# Patient Record
Sex: FEMALE | Race: ASIAN | NOT HISPANIC OR LATINO | URBAN - METROPOLITAN AREA
[De-identification: names, ages, dates, MRNs, and addresses within clinical notes are randomized per-mention and may not be internally consistent; named-entity substitution may affect disease eponyms.]

---

## 2018-05-01 ENCOUNTER — OUTPATIENT (OUTPATIENT)
Dept: OUTPATIENT SERVICES | Facility: HOSPITAL | Age: 58
LOS: 1 days | End: 2018-05-01
Payer: MEDICAID

## 2018-05-01 PROCEDURE — G9001: CPT

## 2018-05-18 ENCOUNTER — INPATIENT (INPATIENT)
Facility: HOSPITAL | Age: 58
LOS: 3 days | Discharge: HOME | End: 2018-05-22
Attending: HOSPITALIST | Admitting: HOSPITALIST
Payer: MEDICAID

## 2018-05-18 VITALS
TEMPERATURE: 98 F | SYSTOLIC BLOOD PRESSURE: 136 MMHG | DIASTOLIC BLOOD PRESSURE: 60 MMHG | OXYGEN SATURATION: 100 % | HEART RATE: 90 BPM | RESPIRATION RATE: 18 BRPM

## 2018-05-18 NOTE — ED ADULT TRIAGE NOTE - CHIEF COMPLAINT QUOTE
Pt c/o being sent by PCP r/t possible UTI from visit one month ago, now presenting with general body aches, "too much pain everywhere". C/o mild SOB, respirations noted to be even and unlabored on assessment PMH DM on metformin.

## 2018-05-19 DIAGNOSIS — Z29.9 ENCOUNTER FOR PROPHYLACTIC MEASURES, UNSPECIFIED: ICD-10-CM

## 2018-05-19 DIAGNOSIS — N39.0 URINARY TRACT INFECTION, SITE NOT SPECIFIED: ICD-10-CM

## 2018-05-19 DIAGNOSIS — E11.9 TYPE 2 DIABETES MELLITUS WITHOUT COMPLICATIONS: ICD-10-CM

## 2018-05-19 DIAGNOSIS — I10 ESSENTIAL (PRIMARY) HYPERTENSION: ICD-10-CM

## 2018-05-19 DIAGNOSIS — J18.9 PNEUMONIA, UNSPECIFIED ORGANISM: ICD-10-CM

## 2018-05-19 DIAGNOSIS — R07.9 CHEST PAIN, UNSPECIFIED: ICD-10-CM

## 2018-05-19 LAB
ALBUMIN SERPL ELPH-MCNC: 4 G/DL — SIGNIFICANT CHANGE UP (ref 3.3–5)
ALP SERPL-CCNC: 84 U/L — SIGNIFICANT CHANGE UP (ref 40–120)
ALT FLD-CCNC: 15 U/L — SIGNIFICANT CHANGE UP (ref 4–33)
APPEARANCE UR: CLEAR — SIGNIFICANT CHANGE UP
AST SERPL-CCNC: 17 U/L — SIGNIFICANT CHANGE UP (ref 4–32)
B PERT DNA SPEC QL NAA+PROBE: SIGNIFICANT CHANGE UP
BACTERIA # UR AUTO: HIGH
BASE EXCESS BLDV CALC-SCNC: 3.3 MMOL/L — SIGNIFICANT CHANGE UP
BASOPHILS # BLD AUTO: 0.03 K/UL — SIGNIFICANT CHANGE UP (ref 0–0.2)
BASOPHILS NFR BLD AUTO: 0.2 % — SIGNIFICANT CHANGE UP (ref 0–2)
BILIRUB SERPL-MCNC: < 0.2 MG/DL — LOW (ref 0.2–1.2)
BILIRUB UR-MCNC: SIGNIFICANT CHANGE UP
BLOOD GAS VENOUS - CREATININE: 0.68 MG/DL — SIGNIFICANT CHANGE UP (ref 0.5–1.3)
BLOOD UR QL VISUAL: NEGATIVE — SIGNIFICANT CHANGE UP
BUN SERPL-MCNC: 10 MG/DL — SIGNIFICANT CHANGE UP (ref 7–23)
C PNEUM DNA SPEC QL NAA+PROBE: NOT DETECTED — SIGNIFICANT CHANGE UP
CALCIUM SERPL-MCNC: 9.5 MG/DL — SIGNIFICANT CHANGE UP (ref 8.4–10.5)
CHLORIDE BLDV-SCNC: 105 MMOL/L — SIGNIFICANT CHANGE UP (ref 96–108)
CHLORIDE SERPL-SCNC: 96 MMOL/L — LOW (ref 98–107)
CHOLEST SERPL-MCNC: 156 MG/DL — SIGNIFICANT CHANGE UP (ref 120–199)
CK MB BLD-MCNC: 1.05 NG/ML — SIGNIFICANT CHANGE UP (ref 1–4.7)
CK MB BLD-MCNC: 1.17 NG/ML — SIGNIFICANT CHANGE UP (ref 1–4.7)
CK MB BLD-MCNC: SIGNIFICANT CHANGE UP (ref 0–2.5)
CK MB BLD-MCNC: SIGNIFICANT CHANGE UP (ref 0–2.5)
CK SERPL-CCNC: 58 U/L — SIGNIFICANT CHANGE UP (ref 25–170)
CK SERPL-CCNC: 68 U/L — SIGNIFICANT CHANGE UP (ref 25–170)
CO2 SERPL-SCNC: 26 MMOL/L — SIGNIFICANT CHANGE UP (ref 22–31)
COLOR SPEC: HIGH
CREAT SERPL-MCNC: 0.8 MG/DL — SIGNIFICANT CHANGE UP (ref 0.5–1.3)
EOSINOPHIL # BLD AUTO: 0.21 K/UL — SIGNIFICANT CHANGE UP (ref 0–0.5)
EOSINOPHIL NFR BLD AUTO: 1.5 % — SIGNIFICANT CHANGE UP (ref 0–6)
FLUAV H1 2009 PAND RNA SPEC QL NAA+PROBE: NOT DETECTED — SIGNIFICANT CHANGE UP
FLUAV H1 RNA SPEC QL NAA+PROBE: NOT DETECTED — SIGNIFICANT CHANGE UP
FLUAV H3 RNA SPEC QL NAA+PROBE: NOT DETECTED — SIGNIFICANT CHANGE UP
FLUAV SUBTYP SPEC NAA+PROBE: SIGNIFICANT CHANGE UP
FLUBV RNA SPEC QL NAA+PROBE: NOT DETECTED — SIGNIFICANT CHANGE UP
GAS PNL BLDV: 135 MMOL/L — LOW (ref 136–146)
GLUCOSE BLDV-MCNC: 166 — HIGH (ref 70–99)
GLUCOSE SERPL-MCNC: 165 MG/DL — HIGH (ref 70–99)
GLUCOSE UR-MCNC: NEGATIVE — SIGNIFICANT CHANGE UP
HADV DNA SPEC QL NAA+PROBE: NOT DETECTED — SIGNIFICANT CHANGE UP
HBA1C BLD-MCNC: 7.2 % — HIGH (ref 4–5.6)
HCO3 BLDV-SCNC: 26 MMOL/L — SIGNIFICANT CHANGE UP (ref 20–27)
HCOV 229E RNA SPEC QL NAA+PROBE: NOT DETECTED — SIGNIFICANT CHANGE UP
HCOV HKU1 RNA SPEC QL NAA+PROBE: NOT DETECTED — SIGNIFICANT CHANGE UP
HCOV NL63 RNA SPEC QL NAA+PROBE: NOT DETECTED — SIGNIFICANT CHANGE UP
HCOV OC43 RNA SPEC QL NAA+PROBE: NOT DETECTED — SIGNIFICANT CHANGE UP
HCT VFR BLD CALC: 30.4 % — LOW (ref 34.5–45)
HCT VFR BLDV CALC: 31.2 % — LOW (ref 34.5–45)
HDLC SERPL-MCNC: 41 MG/DL — LOW (ref 45–65)
HGB BLD-MCNC: 9.5 G/DL — LOW (ref 11.5–15.5)
HGB BLDV-MCNC: 10.1 G/DL — LOW (ref 11.5–15.5)
HMPV RNA SPEC QL NAA+PROBE: NOT DETECTED — SIGNIFICANT CHANGE UP
HPIV1 RNA SPEC QL NAA+PROBE: NOT DETECTED — SIGNIFICANT CHANGE UP
HPIV2 RNA SPEC QL NAA+PROBE: NOT DETECTED — SIGNIFICANT CHANGE UP
HPIV3 RNA SPEC QL NAA+PROBE: NOT DETECTED — SIGNIFICANT CHANGE UP
HPIV4 RNA SPEC QL NAA+PROBE: NOT DETECTED — SIGNIFICANT CHANGE UP
IMM GRANULOCYTES # BLD AUTO: 0.05 # — SIGNIFICANT CHANGE UP
IMM GRANULOCYTES NFR BLD AUTO: 0.4 % — SIGNIFICANT CHANGE UP (ref 0–1.5)
KETONES UR-MCNC: NEGATIVE — SIGNIFICANT CHANGE UP
LACTATE BLDV-MCNC: 1.2 MMOL/L — SIGNIFICANT CHANGE UP (ref 0.5–2)
LEUKOCYTE ESTERASE UR-ACNC: HIGH
LIPID PNL WITH DIRECT LDL SERPL: 107 MG/DL — SIGNIFICANT CHANGE UP
LYMPHOCYTES # BLD AUTO: 32 % — SIGNIFICANT CHANGE UP (ref 13–44)
LYMPHOCYTES # BLD AUTO: 4.48 K/UL — HIGH (ref 1–3.3)
M PNEUMO DNA SPEC QL NAA+PROBE: NOT DETECTED — SIGNIFICANT CHANGE UP
MAGNESIUM SERPL-MCNC: 1.9 MG/DL — SIGNIFICANT CHANGE UP (ref 1.6–2.6)
MCHC RBC-ENTMCNC: 24.3 PG — LOW (ref 27–34)
MCHC RBC-ENTMCNC: 31.3 % — LOW (ref 32–36)
MCV RBC AUTO: 77.7 FL — LOW (ref 80–100)
MONOCYTES # BLD AUTO: 0.97 K/UL — HIGH (ref 0–0.9)
MONOCYTES NFR BLD AUTO: 6.9 % — SIGNIFICANT CHANGE UP (ref 2–14)
MUCOUS THREADS # UR AUTO: SIGNIFICANT CHANGE UP
NEUTROPHILS # BLD AUTO: 8.25 K/UL — HIGH (ref 1.8–7.4)
NEUTROPHILS NFR BLD AUTO: 59 % — SIGNIFICANT CHANGE UP (ref 43–77)
NITRITE UR-MCNC: POSITIVE — HIGH
NRBC # FLD: 0 — SIGNIFICANT CHANGE UP
PCO2 BLDV: 49 MMHG — SIGNIFICANT CHANGE UP (ref 41–51)
PH BLDV: 7.38 PH — SIGNIFICANT CHANGE UP (ref 7.32–7.43)
PH UR: 6 — SIGNIFICANT CHANGE UP (ref 4.6–8)
PLATELET # BLD AUTO: 296 K/UL — SIGNIFICANT CHANGE UP (ref 150–400)
PMV BLD: 10.1 FL — SIGNIFICANT CHANGE UP (ref 7–13)
PO2 BLDV: 31 MMHG — LOW (ref 35–40)
POTASSIUM BLDV-SCNC: 3.8 MMOL/L — SIGNIFICANT CHANGE UP (ref 3.4–4.5)
POTASSIUM SERPL-MCNC: 3.9 MMOL/L — SIGNIFICANT CHANGE UP (ref 3.5–5.3)
POTASSIUM SERPL-SCNC: 3.9 MMOL/L — SIGNIFICANT CHANGE UP (ref 3.5–5.3)
PROT SERPL-MCNC: 8.6 G/DL — HIGH (ref 6–8.3)
PROT UR-MCNC: 20 MG/DL — SIGNIFICANT CHANGE UP
RBC # BLD: 3.91 M/UL — SIGNIFICANT CHANGE UP (ref 3.8–5.2)
RBC # FLD: 12.6 % — SIGNIFICANT CHANGE UP (ref 10.3–14.5)
RBC CASTS # UR COMP ASSIST: SIGNIFICANT CHANGE UP (ref 0–?)
RSV RNA SPEC QL NAA+PROBE: NOT DETECTED — SIGNIFICANT CHANGE UP
RV+EV RNA SPEC QL NAA+PROBE: NOT DETECTED — SIGNIFICANT CHANGE UP
SAO2 % BLDV: 51.6 % — LOW (ref 60–85)
SODIUM SERPL-SCNC: 136 MMOL/L — SIGNIFICANT CHANGE UP (ref 135–145)
SP GR SPEC: 1.01 — SIGNIFICANT CHANGE UP (ref 1–1.04)
SQUAMOUS # UR AUTO: SIGNIFICANT CHANGE UP
TRIGL SERPL-MCNC: 98 MG/DL — SIGNIFICANT CHANGE UP (ref 10–149)
TROPONIN T SERPL-MCNC: < 0.06 NG/ML — SIGNIFICANT CHANGE UP (ref 0–0.06)
TROPONIN T SERPL-MCNC: < 0.06 NG/ML — SIGNIFICANT CHANGE UP (ref 0–0.06)
TSH SERPL-MCNC: 1.24 UIU/ML — SIGNIFICANT CHANGE UP (ref 0.27–4.2)
UROBILINOGEN FLD QL: 4 MG/DL — HIGH
WBC # BLD: 13.99 K/UL — HIGH (ref 3.8–10.5)
WBC # FLD AUTO: 13.99 K/UL — HIGH (ref 3.8–10.5)
WBC UR QL: >50 — HIGH (ref 0–?)

## 2018-05-19 PROCEDURE — 71046 X-RAY EXAM CHEST 2 VIEWS: CPT | Mod: 26

## 2018-05-19 PROCEDURE — 99223 1ST HOSP IP/OBS HIGH 75: CPT

## 2018-05-19 PROCEDURE — 71250 CT THORAX DX C-: CPT | Mod: 26

## 2018-05-19 RX ORDER — DEXTROSE 50 % IN WATER 50 %
25 SYRINGE (ML) INTRAVENOUS ONCE
Qty: 0 | Refills: 0 | Status: DISCONTINUED | OUTPATIENT
Start: 2018-05-19 | End: 2018-05-22

## 2018-05-19 RX ORDER — AZITHROMYCIN 500 MG/1
500 TABLET, FILM COATED ORAL ONCE
Qty: 0 | Refills: 0 | Status: COMPLETED | OUTPATIENT
Start: 2018-05-19 | End: 2018-05-19

## 2018-05-19 RX ORDER — DEXTROSE 50 % IN WATER 50 %
12.5 SYRINGE (ML) INTRAVENOUS ONCE
Qty: 0 | Refills: 0 | Status: DISCONTINUED | OUTPATIENT
Start: 2018-05-19 | End: 2018-05-22

## 2018-05-19 RX ORDER — ERGOCALCIFEROL 1.25 MG/1
50000 CAPSULE ORAL
Qty: 0 | Refills: 0 | Status: DISCONTINUED | OUTPATIENT
Start: 2018-05-27 | End: 2018-05-19

## 2018-05-19 RX ORDER — INSULIN LISPRO 100/ML
VIAL (ML) SUBCUTANEOUS
Qty: 0 | Refills: 0 | Status: DISCONTINUED | OUTPATIENT
Start: 2018-05-19 | End: 2018-05-22

## 2018-05-19 RX ORDER — PANTOPRAZOLE SODIUM 20 MG/1
1 TABLET, DELAYED RELEASE ORAL
Qty: 0 | Refills: 0 | COMMUNITY

## 2018-05-19 RX ORDER — PANTOPRAZOLE SODIUM 20 MG/1
40 TABLET, DELAYED RELEASE ORAL
Qty: 0 | Refills: 0 | Status: DISCONTINUED | OUTPATIENT
Start: 2018-05-19 | End: 2018-05-22

## 2018-05-19 RX ORDER — ASPIRIN/CALCIUM CARB/MAGNESIUM 324 MG
162 TABLET ORAL ONCE
Qty: 0 | Refills: 0 | Status: DISCONTINUED | OUTPATIENT
Start: 2018-05-19 | End: 2018-05-21

## 2018-05-19 RX ORDER — ACETAMINOPHEN 500 MG
650 TABLET ORAL EVERY 6 HOURS
Qty: 0 | Refills: 0 | Status: DISCONTINUED | OUTPATIENT
Start: 2018-05-19 | End: 2018-05-22

## 2018-05-19 RX ORDER — ASPIRIN/CALCIUM CARB/MAGNESIUM 324 MG
81 TABLET ORAL DAILY
Qty: 0 | Refills: 0 | Status: DISCONTINUED | OUTPATIENT
Start: 2018-05-20 | End: 2018-05-22

## 2018-05-19 RX ORDER — GLUCAGON INJECTION, SOLUTION 0.5 MG/.1ML
1 INJECTION, SOLUTION SUBCUTANEOUS ONCE
Qty: 0 | Refills: 0 | Status: DISCONTINUED | OUTPATIENT
Start: 2018-05-19 | End: 2018-05-22

## 2018-05-19 RX ORDER — CEFTRIAXONE 500 MG/1
1 INJECTION, POWDER, FOR SOLUTION INTRAMUSCULAR; INTRAVENOUS ONCE
Qty: 0 | Refills: 0 | Status: COMPLETED | OUTPATIENT
Start: 2018-05-19 | End: 2018-05-19

## 2018-05-19 RX ORDER — AZITHROMYCIN 500 MG/1
500 TABLET, FILM COATED ORAL EVERY 24 HOURS
Qty: 0 | Refills: 0 | Status: DISCONTINUED | OUTPATIENT
Start: 2018-05-20 | End: 2018-05-21

## 2018-05-19 RX ORDER — INSULIN LISPRO 100/ML
VIAL (ML) SUBCUTANEOUS AT BEDTIME
Qty: 0 | Refills: 0 | Status: DISCONTINUED | OUTPATIENT
Start: 2018-05-19 | End: 2018-05-22

## 2018-05-19 RX ORDER — DEXTROSE 50 % IN WATER 50 %
15 SYRINGE (ML) INTRAVENOUS ONCE
Qty: 0 | Refills: 0 | Status: DISCONTINUED | OUTPATIENT
Start: 2018-05-19 | End: 2018-05-22

## 2018-05-19 RX ORDER — CEFTRIAXONE 500 MG/1
1 INJECTION, POWDER, FOR SOLUTION INTRAMUSCULAR; INTRAVENOUS EVERY 24 HOURS
Qty: 0 | Refills: 0 | Status: DISCONTINUED | OUTPATIENT
Start: 2018-05-20 | End: 2018-05-22

## 2018-05-19 RX ORDER — ERGOCALCIFEROL 1.25 MG/1
50000 CAPSULE ORAL
Qty: 0 | Refills: 0 | Status: DISCONTINUED | OUTPATIENT
Start: 2018-05-20 | End: 2018-05-22

## 2018-05-19 RX ORDER — ENOXAPARIN SODIUM 100 MG/ML
40 INJECTION SUBCUTANEOUS EVERY 24 HOURS
Qty: 0 | Refills: 0 | Status: DISCONTINUED | OUTPATIENT
Start: 2018-05-19 | End: 2018-05-22

## 2018-05-19 RX ORDER — SODIUM CHLORIDE 9 MG/ML
1000 INJECTION, SOLUTION INTRAVENOUS
Qty: 0 | Refills: 0 | Status: DISCONTINUED | OUTPATIENT
Start: 2018-05-19 | End: 2018-05-22

## 2018-05-19 RX ORDER — NITROGLYCERIN 6.5 MG
0.3 CAPSULE, EXTENDED RELEASE ORAL ONCE
Qty: 0 | Refills: 0 | Status: DISCONTINUED | OUTPATIENT
Start: 2018-05-19 | End: 2018-05-22

## 2018-05-19 RX ORDER — KETOROLAC TROMETHAMINE 30 MG/ML
15 SYRINGE (ML) INJECTION ONCE
Qty: 0 | Refills: 0 | Status: DISCONTINUED | OUTPATIENT
Start: 2018-05-19 | End: 2018-05-19

## 2018-05-19 RX ORDER — ATORVASTATIN CALCIUM 80 MG/1
20 TABLET, FILM COATED ORAL AT BEDTIME
Qty: 0 | Refills: 0 | Status: DISCONTINUED | OUTPATIENT
Start: 2018-05-19 | End: 2018-05-22

## 2018-05-19 RX ORDER — ASPIRIN/CALCIUM CARB/MAGNESIUM 324 MG
162 TABLET ORAL
Qty: 0 | Refills: 0 | Status: DISCONTINUED | OUTPATIENT
Start: 2018-05-19 | End: 2018-05-19

## 2018-05-19 RX ORDER — ATENOLOL 25 MG/1
50 TABLET ORAL DAILY
Qty: 0 | Refills: 0 | Status: DISCONTINUED | OUTPATIENT
Start: 2018-05-19 | End: 2018-05-22

## 2018-05-19 RX ORDER — SODIUM CHLORIDE 9 MG/ML
1000 INJECTION, SOLUTION INTRAVENOUS ONCE
Qty: 0 | Refills: 0 | Status: COMPLETED | OUTPATIENT
Start: 2018-05-19 | End: 2018-05-19

## 2018-05-19 RX ADMIN — Medication 15 MILLIGRAM(S): at 03:10

## 2018-05-19 RX ADMIN — Medication 650 MILLIGRAM(S): at 15:05

## 2018-05-19 RX ADMIN — Medication 650 MILLIGRAM(S): at 16:05

## 2018-05-19 RX ADMIN — Medication 1: at 13:28

## 2018-05-19 RX ADMIN — Medication 15 MILLIGRAM(S): at 02:55

## 2018-05-19 RX ADMIN — ENOXAPARIN SODIUM 40 MILLIGRAM(S): 100 INJECTION SUBCUTANEOUS at 12:12

## 2018-05-19 RX ADMIN — AZITHROMYCIN 250 MILLIGRAM(S): 500 TABLET, FILM COATED ORAL at 04:00

## 2018-05-19 RX ADMIN — Medication 650 MILLIGRAM(S): at 23:28

## 2018-05-19 RX ADMIN — Medication 1 TABLET(S): at 12:12

## 2018-05-19 RX ADMIN — ATORVASTATIN CALCIUM 20 MILLIGRAM(S): 80 TABLET, FILM COATED ORAL at 21:45

## 2018-05-19 RX ADMIN — SODIUM CHLORIDE 1000 MILLILITER(S): 9 INJECTION, SOLUTION INTRAVENOUS at 02:55

## 2018-05-19 RX ADMIN — CEFTRIAXONE 100 GRAM(S): 500 INJECTION, POWDER, FOR SOLUTION INTRAMUSCULAR; INTRAVENOUS at 03:14

## 2018-05-19 RX ADMIN — Medication 650 MILLIGRAM(S): at 22:59

## 2018-05-19 RX ADMIN — Medication 1 TABLET(S): at 12:11

## 2018-05-19 NOTE — ED PROVIDER NOTE - OBJECTIVE STATEMENT
59 yo female with PMH of UTI diagnosed one month ago by her PMD, on macrobid and pyridium, presents to the ED with fever x several days Tmax 103, defervesces with tylenol and motrin at home but then becomes febrile again, c/o whole body pain but especially suprapubic pain, generalized headache, mild SOB. No resp distress, no neck stiffness or photophobia. Patient appears uncomfortable. At her neuro baseline per son at bedside. Patient is [place] speaking, electing for son to translate at bedside. 59 yo female with PMH of DM and UTI diagnosed one month ago by her PMD, on macrobid and pyridium, presents to the ED with fever x several days Tmax 103, defervesces with tylenol and motrin at home but then becomes febrile again, c/o whole body pain but especially suprapubic pain, generalized headache, mild SOB. No resp distress, no neck stiffness or photophobia. Patient appears uncomfortable. At her neuro baseline per son at bedside. Patient is Vietnamese speaking, electing for son to translate at bedside.

## 2018-05-19 NOTE — CONSULT NOTE ADULT - SUBJECTIVE AND OBJECTIVE BOX
CHIEF COMPLAINT: chest pain x 2 months, whole  body ache ,HA, b/l leg pain    HISTORY OF PRESENT ILLNESS: 58 years old [place]ian female with HTN, DM  who  was  diagnosed with UTI one month ago by her PMD, and placed on microbid and pyridium, presents to the ED with fever x several days Tmax 103, defervesce with tylenol and Motrin at home but then becomes febrile again, c/o whole body pain but especially suprapubic pain, generalized headache, mild shortness of breath and epigastric  pain associate with whole back pain and sore throat. No resp distress, no neck stiffness or photophobia. She found to have UTI and also c/o 10/10 epigastric pain  which improved from nitro s/l x2 and EKG with no ischemic  changes. Currently patient appear comfortable but still c/o of epigastric pain which exacerbates on palpitation ,also c/o left lower abdomen pain.    Allergies:No Known Allergies  	    MEDICATIONS:  aspirin  chewable 162 milliGRAM(s) Oral once  nitroglycerin     SubLingual 0.3 milliGRAM(s) SubLingual once          PAST MEDICAL & SURGICAL HISTORY:  Diabetes  HTN  No significant past surgical history      FAMILY HISTORY:  No pertinent family history in first degree relatives      SOCIAL HISTORY:    [ ] live with:son  [ x] Non-smoker,    [x ] no alcohol use    REVIEW OF SYSTEMS:  General: + fatigue/malaise, no weight loss/gain.  Skin: no rashes.  Ophthalmologic: no blurred vision, no loss of vision. 	  ENT: + sore throat, rhinorrhea, sinus congestion.  Cardiovascular :+ chest pain/epigastric pain ,no palpitation, no dizziness ,no diaphoresis, no edema  Respiratory: + SOB, + cough ,no  wheeze.  Gastrointestinal:  no N/V/D, no melena/hematemesis/hematochezia.  Genitourinary: no dysuria/hesitancy or hematuria.  Musculoskeletal: generalized body ache  Neurological: + HA ,no changes in vision or hearing, no lightheadedness/dizziness, no syncope/near syncope	  Psychiatric: no unusual stress/anxiety.       PHYSICAL EXAM:  T(C): 36.6 (05-18-18 @ 23:55), Max: 36.6 (05-18-18 @ 23:55)  HR: 90 (05-18-18 @ 23:55) (90 - 90)  BP: 136/60 (05-18-18 @ 23:55) (136/60 - 136/60)  RR: 18 (05-18-18 @ 23:55) (18 - 18)  SpO2: 100% (05-18-18 @ 23:55) (100% - 100%)        Appearance: Normal	  HEENT:   Normal oral mucosa, PERRL, EOMI	  Lymphatic: No lymphadenopathy  Cardiovascular: Normal S1 S2, No JVD, No murmurs, No edema  Respiratory: Lungs clear to auscultation	  Psychiatry: A & O x 3, Mood & affect appropriate  Gastrointestinal:  Soft, Non-tender, + BS	  Skin: No rashes, No ecchymoses, No cyanosis	  Neurologic: Non-focal  Extremities: Normal range of motion, No clubbing, cyanosis or edema  Vascular: Peripheral pulses palpable 2+ bilaterally        LABS:	 	    CBC Full  -  ( 19 May 2018 02:38 )  WBC Count : 13.99 K/uL  Hemoglobin : 9.5 g/dL  Hematocrit : 30.4 %  Platelet Count - Automated : 296 K/uL  Mean Cell Volume : 77.7 fL  Mean Cell Hemoglobin : 24.3 pg  Mean Cell Hemoglobin Concentration : 31.3 %  Auto Neutrophil # : 8.25 K/uL  Auto Lymphocyte # : 4.48 K/uL  Auto Monocyte # : 0.97 K/uL  Auto Eosinophil # : 0.21 K/uL  Auto Basophil # : 0.03 K/uL  Auto Neutrophil % : 59.0 %  Auto Lymphocyte % : 32.0 %  Auto Monocyte % : 6.9 %  Auto Eosinophil % : 1.5 %  Auto Basophil % : 0.2 %    05-19    136  |  96<L>  |  10  ----------------------------<  165<H>  3.9   |  26  |  0.80    Ca    9.5      19 May 2018 02:38    TPro  8.6<H>  /  Alb  4.0  /  TBili  < 0.2<L>  /  DBili  x   /  AST  17  /  ALT  15  /  AlkPhos  84  05-19    CARDIAC MARKERS:    CKMB: 1.17 ng/mL (05-19 @ 02:38)    CKMB Relative Index: Test not performed (05-19 @ 02:38)      TELEMETRY: 	    ECG:  NSR	  RADIOLOGY:chest Xray< from: Xray Chest 2 Views PA/Lat (05.19.18 @ 02:21) >  NTERPRETATION:  Evaluation limited by low inspiratory volume. Left linear opacity likely scarring vs discoid atelectasis.      	      ASSESSMENT/PLAN: 58 years old [place]ian female with HTN, DM presents with chest pain, generalized  bodyache and HA , on antibiotics for UTI. Chest pain likley unstable angina  improved with nitro s/l.   Plan: trend CK/trop   echo  stress test

## 2018-05-19 NOTE — ED ADULT NURSE NOTE - OBJECTIVE STATEMENT
Pt. received into room # 25 with c/o generalized body pain and fever. As per son Pt. was recently diagnosed with UTI and has been running a fever. 20g placed to L ac. labs sent. meds given as ordered. will continue to monitor. ST

## 2018-05-19 NOTE — H&P ADULT - HISTORY OF PRESENT ILLNESS
57 y/o Icelandic speaking female (son at her bedside translated), with a PmHx of Dm, HTN and UTI diagnosed 1 month ago by her PCP (was on Macrobid and Pyridium), presented to the [facility] ED c/o fever, body aches and chest pain. Pt states about 1 week ago she started to develop a fever (max temp of 103), generalized body aches but worse to her left hip/thigh region, and chest pain. She states over this past week her symptoms were getting worse so today she decided to come to the hospital for an evaluation. She had associated symptoms of SOB and "head pain" but denied having a HA or any chills, abd pain, n/v, blurred vision, back pain, dizziness. Last travel was 1 month age to [place]. She described the chest pain originally as a substernal to epigastric region, 10/10, non-radiating pain that felt like something was "stuck" in her chest and was worse with inspiration. She states after the ED gave her a nitro tablet, her pain was reduced to about a 6/10. Son states she last had a cardiac workup (echo/stress test) was 3 years ago and was negative. She appears comfortable at this time and is being admitted to telemetry for r/o acs, pneumonia and UTI.

## 2018-05-19 NOTE — H&P ADULT - NEGATIVE CARDIOVASCULAR SYMPTOMS
no dyspnea on exertion/no peripheral edema/no palpitations/no orthopnea/no paroxysmal nocturnal dyspnea

## 2018-05-19 NOTE — ED PROVIDER NOTE - ATTENDING CONTRIBUTION TO CARE
57 y/o F with h/o HTN, DM BIB family member for fevers, body aches, and UTI.  Per family who is translating for pt by her request, the pt was diagnosed with a UTI by her PMD about 1 month ago.  Pt never took the antibiotics, was called back and told to go to the hospital as cultures at the time were positive for ESBL.  Pt never followed up.  But for the past several days, pt c/o body aches, headaches, subjective fevers, relieved with otc meds at home.  Pt c/o feeling generally unwell and weak.  No other recent illness/hospitalizations, travel, known sick contacts.  Tired appearing, lying comfortably in stretcher, awake and alert, nontoxic.  AF/VSS.  NCAT, EOMI, PERRL, dry mucosa.  Neck is supple.  Lungs cta bl.  Cards nl S1/S2, RRR, no MRG.  Abd soft (+)suprapubic tenderness, no rebound or guarding, no cvat.  No pedal edema or calf tenderness.  No focal neuro deficits.  Plan for labs, urine, incl cx given report of ESBL pos urine, cxr, ekg, will need admission.

## 2018-05-19 NOTE — H&P ADULT - ATTENDING COMMENTS
patient seen and examined.  58F with chest pain R/O ACS with UTI and clinical PNA complicated by DM type 2, HTN.  Cardiology consult for R/O ACS - awaiting TTE and continue tele monitor.  Treat UTI and PNA with CTX and azithromycin IV.  F/U UCx and sensitivity.

## 2018-05-19 NOTE — H&P ADULT - NSHPSOCIALHISTORY_GEN_ALL_CORE
Marital Status:     Occupation: Homemaker    Tobacco Use: neg    ETOH Use: neg    Flu Vaccine:       neg                           Pneumonia Vaccine:  neg

## 2018-05-19 NOTE — H&P ADULT - ASSESSMENT
57 y/o Pashto speaking female (son at her bedside translated), with a PmHx of Dm, HTN and UTI diagnosed 1 month ago by her PCP (was on Macrobid and Pyridium), presented to the [facility] ED c/o fever, body aches and chest pain.  Pt was admitted to telemetry for r/o acs, UTI and poss pneumonia.

## 2018-05-19 NOTE — H&P ADULT - RS GEN PE MLT RESP DETAILS PC
airway patent/clear to auscultation bilaterally/chest wall tenderness/good air movement/respirations non-labored/breath sounds equal

## 2018-05-20 LAB
BACTERIA UR CULT: SIGNIFICANT CHANGE UP
BUN SERPL-MCNC: 9 MG/DL — SIGNIFICANT CHANGE UP (ref 7–23)
CALCIUM SERPL-MCNC: 9.3 MG/DL — SIGNIFICANT CHANGE UP (ref 8.4–10.5)
CHLORIDE SERPL-SCNC: 96 MMOL/L — LOW (ref 98–107)
CO2 SERPL-SCNC: 27 MMOL/L — SIGNIFICANT CHANGE UP (ref 22–31)
CREAT SERPL-MCNC: 0.69 MG/DL — SIGNIFICANT CHANGE UP (ref 0.5–1.3)
GLUCOSE SERPL-MCNC: 160 MG/DL — HIGH (ref 70–99)
HBA1C BLD-MCNC: 7.3 % — HIGH (ref 4–5.6)
HCT VFR BLD CALC: 32 % — LOW (ref 34.5–45)
HGB BLD-MCNC: 9.9 G/DL — LOW (ref 11.5–15.5)
MCHC RBC-ENTMCNC: 24 PG — LOW (ref 27–34)
MCHC RBC-ENTMCNC: 30.9 % — LOW (ref 32–36)
MCV RBC AUTO: 77.5 FL — LOW (ref 80–100)
NRBC # FLD: 0 — SIGNIFICANT CHANGE UP
PLATELET # BLD AUTO: 308 K/UL — SIGNIFICANT CHANGE UP (ref 150–400)
PMV BLD: 10.2 FL — SIGNIFICANT CHANGE UP (ref 7–13)
POTASSIUM SERPL-MCNC: 4.4 MMOL/L — SIGNIFICANT CHANGE UP (ref 3.5–5.3)
POTASSIUM SERPL-SCNC: 4.4 MMOL/L — SIGNIFICANT CHANGE UP (ref 3.5–5.3)
RBC # BLD: 4.13 M/UL — SIGNIFICANT CHANGE UP (ref 3.8–5.2)
RBC # FLD: 12.6 % — SIGNIFICANT CHANGE UP (ref 10.3–14.5)
SODIUM SERPL-SCNC: 134 MMOL/L — LOW (ref 135–145)
SPECIMEN SOURCE: SIGNIFICANT CHANGE UP
WBC # BLD: 11.77 K/UL — HIGH (ref 3.8–10.5)
WBC # FLD AUTO: 11.77 K/UL — HIGH (ref 3.8–10.5)

## 2018-05-20 PROCEDURE — 93306 TTE W/DOPPLER COMPLETE: CPT | Mod: 26

## 2018-05-20 PROCEDURE — 99233 SBSQ HOSP IP/OBS HIGH 50: CPT

## 2018-05-20 RX ORDER — TRAMADOL HYDROCHLORIDE 50 MG/1
25 TABLET ORAL ONCE
Qty: 0 | Refills: 0 | Status: DISCONTINUED | OUTPATIENT
Start: 2018-05-20 | End: 2018-05-20

## 2018-05-20 RX ADMIN — ERGOCALCIFEROL 50000 UNIT(S): 1.25 CAPSULE ORAL at 11:37

## 2018-05-20 RX ADMIN — Medication 650 MILLIGRAM(S): at 10:13

## 2018-05-20 RX ADMIN — Medication 650 MILLIGRAM(S): at 08:24

## 2018-05-20 RX ADMIN — TRAMADOL HYDROCHLORIDE 25 MILLIGRAM(S): 50 TABLET ORAL at 23:11

## 2018-05-20 RX ADMIN — ENOXAPARIN SODIUM 40 MILLIGRAM(S): 100 INJECTION SUBCUTANEOUS at 11:37

## 2018-05-20 RX ADMIN — PANTOPRAZOLE SODIUM 40 MILLIGRAM(S): 20 TABLET, DELAYED RELEASE ORAL at 06:27

## 2018-05-20 RX ADMIN — ATORVASTATIN CALCIUM 20 MILLIGRAM(S): 80 TABLET, FILM COATED ORAL at 21:24

## 2018-05-20 RX ADMIN — Medication 1 TABLET(S): at 11:36

## 2018-05-20 RX ADMIN — AZITHROMYCIN 250 MILLIGRAM(S): 500 TABLET, FILM COATED ORAL at 03:59

## 2018-05-20 RX ADMIN — CEFTRIAXONE 100 GRAM(S): 500 INJECTION, POWDER, FOR SOLUTION INTRAMUSCULAR; INTRAVENOUS at 03:18

## 2018-05-20 RX ADMIN — Medication 1: at 17:50

## 2018-05-20 RX ADMIN — Medication 81 MILLIGRAM(S): at 11:37

## 2018-05-21 DIAGNOSIS — E55.9 VITAMIN D DEFICIENCY, UNSPECIFIED: ICD-10-CM

## 2018-05-21 LAB
BASOPHILS # BLD AUTO: 0.04 K/UL — SIGNIFICANT CHANGE UP (ref 0–0.2)
BASOPHILS NFR BLD AUTO: 0.3 % — SIGNIFICANT CHANGE UP (ref 0–2)
BUN SERPL-MCNC: 9 MG/DL — SIGNIFICANT CHANGE UP (ref 7–23)
CALCIUM SERPL-MCNC: 9.3 MG/DL — SIGNIFICANT CHANGE UP (ref 8.4–10.5)
CHLORIDE SERPL-SCNC: 96 MMOL/L — LOW (ref 98–107)
CO2 SERPL-SCNC: 24 MMOL/L — SIGNIFICANT CHANGE UP (ref 22–31)
CREAT SERPL-MCNC: 0.76 MG/DL — SIGNIFICANT CHANGE UP (ref 0.5–1.3)
EOSINOPHIL # BLD AUTO: 0.32 K/UL — SIGNIFICANT CHANGE UP (ref 0–0.5)
EOSINOPHIL NFR BLD AUTO: 2.4 % — SIGNIFICANT CHANGE UP (ref 0–6)
GLUCOSE SERPL-MCNC: 150 MG/DL — HIGH (ref 70–99)
HCT VFR BLD CALC: 30.9 % — LOW (ref 34.5–45)
HGB BLD-MCNC: 9.8 G/DL — LOW (ref 11.5–15.5)
IMM GRANULOCYTES # BLD AUTO: 0.07 # — SIGNIFICANT CHANGE UP
IMM GRANULOCYTES NFR BLD AUTO: 0.5 % — SIGNIFICANT CHANGE UP (ref 0–1.5)
LIDOCAIN IGE QN: 26 U/L — SIGNIFICANT CHANGE UP (ref 7–60)
LYMPHOCYTES # BLD AUTO: 25 % — SIGNIFICANT CHANGE UP (ref 13–44)
LYMPHOCYTES # BLD AUTO: 3.29 K/UL — SIGNIFICANT CHANGE UP (ref 1–3.3)
MAGNESIUM SERPL-MCNC: 2.1 MG/DL — SIGNIFICANT CHANGE UP (ref 1.6–2.6)
MCHC RBC-ENTMCNC: 24.4 PG — LOW (ref 27–34)
MCHC RBC-ENTMCNC: 31.7 % — LOW (ref 32–36)
MCV RBC AUTO: 76.9 FL — LOW (ref 80–100)
MONOCYTES # BLD AUTO: 0.68 K/UL — SIGNIFICANT CHANGE UP (ref 0–0.9)
MONOCYTES NFR BLD AUTO: 5.2 % — SIGNIFICANT CHANGE UP (ref 2–14)
NEUTROPHILS # BLD AUTO: 8.77 K/UL — HIGH (ref 1.8–7.4)
NEUTROPHILS NFR BLD AUTO: 66.6 % — SIGNIFICANT CHANGE UP (ref 43–77)
NRBC # FLD: 0 — SIGNIFICANT CHANGE UP
PLATELET # BLD AUTO: 348 K/UL — SIGNIFICANT CHANGE UP (ref 150–400)
PMV BLD: 10.7 FL — SIGNIFICANT CHANGE UP (ref 7–13)
POTASSIUM SERPL-MCNC: 4.3 MMOL/L — SIGNIFICANT CHANGE UP (ref 3.5–5.3)
POTASSIUM SERPL-SCNC: 4.3 MMOL/L — SIGNIFICANT CHANGE UP (ref 3.5–5.3)
RBC # BLD: 4.02 M/UL — SIGNIFICANT CHANGE UP (ref 3.8–5.2)
RBC # FLD: 12.5 % — SIGNIFICANT CHANGE UP (ref 10.3–14.5)
SODIUM SERPL-SCNC: 138 MMOL/L — SIGNIFICANT CHANGE UP (ref 135–145)
WBC # BLD: 13.17 K/UL — HIGH (ref 3.8–10.5)
WBC # FLD AUTO: 13.17 K/UL — HIGH (ref 3.8–10.5)

## 2018-05-21 PROCEDURE — 93970 EXTREMITY STUDY: CPT | Mod: 26

## 2018-05-21 PROCEDURE — 99233 SBSQ HOSP IP/OBS HIGH 50: CPT

## 2018-05-21 PROCEDURE — 93458 L HRT ARTERY/VENTRICLE ANGIO: CPT | Mod: 26,GC

## 2018-05-21 RX ORDER — SIMETHICONE 80 MG/1
80 TABLET, CHEWABLE ORAL ONCE
Qty: 0 | Refills: 0 | Status: COMPLETED | OUTPATIENT
Start: 2018-05-21 | End: 2018-05-21

## 2018-05-21 RX ORDER — KETOROLAC TROMETHAMINE 30 MG/ML
15 SYRINGE (ML) INJECTION ONCE
Qty: 0 | Refills: 0 | Status: DISCONTINUED | OUTPATIENT
Start: 2018-05-21 | End: 2018-05-21

## 2018-05-21 RX ORDER — SODIUM CHLORIDE 9 MG/ML
500 INJECTION INTRAMUSCULAR; INTRAVENOUS; SUBCUTANEOUS
Qty: 0 | Refills: 0 | Status: DISCONTINUED | OUTPATIENT
Start: 2018-05-21 | End: 2018-05-22

## 2018-05-21 RX ADMIN — Medication 81 MILLIGRAM(S): at 10:05

## 2018-05-21 RX ADMIN — SIMETHICONE 80 MILLIGRAM(S): 80 TABLET, CHEWABLE ORAL at 17:46

## 2018-05-21 RX ADMIN — Medication 15 MILLIGRAM(S): at 17:47

## 2018-05-21 RX ADMIN — ENOXAPARIN SODIUM 40 MILLIGRAM(S): 100 INJECTION SUBCUTANEOUS at 17:47

## 2018-05-21 RX ADMIN — ATORVASTATIN CALCIUM 20 MILLIGRAM(S): 80 TABLET, FILM COATED ORAL at 21:59

## 2018-05-21 RX ADMIN — Medication 650 MILLIGRAM(S): at 06:00

## 2018-05-21 RX ADMIN — TRAMADOL HYDROCHLORIDE 25 MILLIGRAM(S): 50 TABLET ORAL at 00:00

## 2018-05-21 RX ADMIN — Medication 1 TABLET(S): at 17:46

## 2018-05-21 RX ADMIN — Medication 15 MILLIGRAM(S): at 18:00

## 2018-05-21 RX ADMIN — ATENOLOL 50 MILLIGRAM(S): 25 TABLET ORAL at 05:35

## 2018-05-21 RX ADMIN — CEFTRIAXONE 100 GRAM(S): 500 INJECTION, POWDER, FOR SOLUTION INTRAMUSCULAR; INTRAVENOUS at 03:00

## 2018-05-21 RX ADMIN — Medication 650 MILLIGRAM(S): at 05:35

## 2018-05-21 RX ADMIN — AZITHROMYCIN 250 MILLIGRAM(S): 500 TABLET, FILM COATED ORAL at 03:01

## 2018-05-21 RX ADMIN — PANTOPRAZOLE SODIUM 40 MILLIGRAM(S): 20 TABLET, DELAYED RELEASE ORAL at 05:35

## 2018-05-21 RX ADMIN — Medication 1 TABLET(S): at 17:47

## 2018-05-21 NOTE — CHART NOTE - NSCHARTNOTEFT_GEN_A_CORE
Patient scheduled for [facility]     RECS  - will f/u results    Please call for questions or concerns     SHARON Chau [phone]

## 2018-05-21 NOTE — CHART NOTE - NSCHARTNOTEFT_GEN_A_CORE
GIANLUCA ADELITA 58y Female s/p cath right radial site check    Dressing is clear/dry/intact.   Site is without hematoma or bleeding.   Vital Signs Last 24 Hrs  T(C): 36.8 (21 May 2018 15:22), Max: 37.6 (21 May 2018 05:29)  T(F): 98.2 (21 May 2018 15:22), Max: 99.6 (21 May 2018 05:29)  HR: 60 (21 May 2018 15:22) (60 - 88)  BP: 109/60 (21 May 2018 15:22) (109/60 - 139/87)  BP(mean): --  RR: 18 (21 May 2018 15:22) (16 - 18)  SpO2: 96% (21 May 2018 15:22) (96% - 99%)  Pulses palpable, cap refill <2 sec.   Will continue to monitor.

## 2018-05-22 ENCOUNTER — TRANSCRIPTION ENCOUNTER (OUTPATIENT)
Age: 58
End: 2018-05-22

## 2018-05-22 VITALS — WEIGHT: 171.96 LBS

## 2018-05-22 DIAGNOSIS — R10.13 EPIGASTRIC PAIN: ICD-10-CM

## 2018-05-22 LAB
BASOPHILS # BLD AUTO: 0.04 K/UL — SIGNIFICANT CHANGE UP (ref 0–0.2)
BASOPHILS NFR BLD AUTO: 0.3 % — SIGNIFICANT CHANGE UP (ref 0–2)
BUN SERPL-MCNC: 16 MG/DL — SIGNIFICANT CHANGE UP (ref 7–23)
CALCIUM SERPL-MCNC: 8.9 MG/DL — SIGNIFICANT CHANGE UP (ref 8.4–10.5)
CHLORIDE SERPL-SCNC: 97 MMOL/L — LOW (ref 98–107)
CO2 SERPL-SCNC: 23 MMOL/L — SIGNIFICANT CHANGE UP (ref 22–31)
CREAT SERPL-MCNC: 0.86 MG/DL — SIGNIFICANT CHANGE UP (ref 0.5–1.3)
EOSINOPHIL # BLD AUTO: 0.31 K/UL — SIGNIFICANT CHANGE UP (ref 0–0.5)
EOSINOPHIL NFR BLD AUTO: 2.7 % — SIGNIFICANT CHANGE UP (ref 0–6)
GLUCOSE SERPL-MCNC: 136 MG/DL — HIGH (ref 70–99)
HCT VFR BLD CALC: 29.5 % — LOW (ref 34.5–45)
HGB BLD-MCNC: 9.2 G/DL — LOW (ref 11.5–15.5)
IMM GRANULOCYTES # BLD AUTO: 0.05 # — SIGNIFICANT CHANGE UP
IMM GRANULOCYTES NFR BLD AUTO: 0.4 % — SIGNIFICANT CHANGE UP (ref 0–1.5)
LYMPHOCYTES # BLD AUTO: 3.7 K/UL — HIGH (ref 1–3.3)
LYMPHOCYTES # BLD AUTO: 32.2 % — SIGNIFICANT CHANGE UP (ref 13–44)
MAGNESIUM SERPL-MCNC: 2.3 MG/DL — SIGNIFICANT CHANGE UP (ref 1.6–2.6)
MCHC RBC-ENTMCNC: 24 PG — LOW (ref 27–34)
MCHC RBC-ENTMCNC: 31.2 % — LOW (ref 32–36)
MCV RBC AUTO: 76.8 FL — LOW (ref 80–100)
MONOCYTES # BLD AUTO: 0.57 K/UL — SIGNIFICANT CHANGE UP (ref 0–0.9)
MONOCYTES NFR BLD AUTO: 5 % — SIGNIFICANT CHANGE UP (ref 2–14)
NEUTROPHILS # BLD AUTO: 6.81 K/UL — SIGNIFICANT CHANGE UP (ref 1.8–7.4)
NEUTROPHILS NFR BLD AUTO: 59.4 % — SIGNIFICANT CHANGE UP (ref 43–77)
NRBC # FLD: 0 — SIGNIFICANT CHANGE UP
PHOSPHATE SERPL-MCNC: 2.6 MG/DL — SIGNIFICANT CHANGE UP (ref 2.5–4.5)
PLATELET # BLD AUTO: 344 K/UL — SIGNIFICANT CHANGE UP (ref 150–400)
PMV BLD: 10.8 FL — SIGNIFICANT CHANGE UP (ref 7–13)
POTASSIUM SERPL-MCNC: 4.6 MMOL/L — SIGNIFICANT CHANGE UP (ref 3.5–5.3)
POTASSIUM SERPL-SCNC: 4.6 MMOL/L — SIGNIFICANT CHANGE UP (ref 3.5–5.3)
RBC # BLD: 3.84 M/UL — SIGNIFICANT CHANGE UP (ref 3.8–5.2)
RBC # FLD: 12.6 % — SIGNIFICANT CHANGE UP (ref 10.3–14.5)
SODIUM SERPL-SCNC: 136 MMOL/L — SIGNIFICANT CHANGE UP (ref 135–145)
WBC # BLD: 11.48 K/UL — HIGH (ref 3.8–10.5)
WBC # FLD AUTO: 11.48 K/UL — HIGH (ref 3.8–10.5)

## 2018-05-22 PROCEDURE — 99239 HOSP IP/OBS DSCHRG MGMT >30: CPT

## 2018-05-22 RX ORDER — ATORVASTATIN CALCIUM 80 MG/1
1 TABLET, FILM COATED ORAL
Qty: 0 | Refills: 0 | DISCHARGE
Start: 2018-05-22

## 2018-05-22 RX ORDER — ASPIRIN/CALCIUM CARB/MAGNESIUM 324 MG
1 TABLET ORAL
Qty: 0 | Refills: 0 | DISCHARGE
Start: 2018-05-22

## 2018-05-22 RX ORDER — MELOXICAM 15 MG/1
1 TABLET ORAL
Qty: 0 | Refills: 0 | COMMUNITY

## 2018-05-22 RX ORDER — ATORVASTATIN CALCIUM 80 MG/1
1 TABLET, FILM COATED ORAL
Qty: 0 | Refills: 0 | COMMUNITY

## 2018-05-22 RX ORDER — PANTOPRAZOLE SODIUM 20 MG/1
1 TABLET, DELAYED RELEASE ORAL
Qty: 30 | Refills: 0
Start: 2018-05-22 | End: 2018-06-20

## 2018-05-22 RX ORDER — RANITIDINE HYDROCHLORIDE 150 MG/1
1 TABLET, FILM COATED ORAL
Qty: 0 | Refills: 0 | COMMUNITY

## 2018-05-22 RX ORDER — METFORMIN HYDROCHLORIDE 850 MG/1
1 TABLET ORAL
Qty: 0 | Refills: 0 | COMMUNITY

## 2018-05-22 RX ADMIN — CEFTRIAXONE 100 GRAM(S): 500 INJECTION, POWDER, FOR SOLUTION INTRAMUSCULAR; INTRAVENOUS at 03:04

## 2018-05-22 RX ADMIN — ATENOLOL 50 MILLIGRAM(S): 25 TABLET ORAL at 06:30

## 2018-05-22 RX ADMIN — PANTOPRAZOLE SODIUM 40 MILLIGRAM(S): 20 TABLET, DELAYED RELEASE ORAL at 06:30

## 2018-05-22 NOTE — DISCHARGE NOTE ADULT - MEDICATION SUMMARY - MEDICATIONS TO STOP TAKING
I will STOP taking the medications listed below when I get home from the hospital:    raNITIdine 150 mg oral capsule  -- 1 cap(s) by mouth 2 times a day

## 2018-05-22 NOTE — DISCHARGE NOTE ADULT - CARE PLAN
Principal Discharge DX:	Atypical chest pain  Goal:	Your cardiac catheterization revealed normal coronary arteries without obstruction. Continue your medications as directed including Protonix to prevent indigestion/acid reflux.  Assessment and plan of treatment:	Follow up with your primary care physician for further monitoring in 1-2 weeks. Please call to arrange appointment.  Secondary Diagnosis:	Hypertension  Goal:	Ensure compliance with your medications for high blood pressure to maintain goal 120/80mmhg to prevent heart attack, stroke and renal failure.  Assessment and plan of treatment:	Follow up with your primary care physician for further monitoring in 1-2 weeks. Please call to arrange appointment. Low cholesterol diet. Salt restriction. 1800Kcal diabetic diet with carb restriction. [facility] [phone]  Secondary Diagnosis:	Diabetes  Goal:	Ensure compliance with your medications as you were previously taking at home for diabetes. Goal is to maintain a normal blood glucose level and HgA1C level < 5.7 and to prevent diabetic complications such as uncontrolled diabetes, diabetic coma, blindness, renal failure, and amputations.  Assessment and plan of treatment:	Follow up with your primary care physician for further monitoring in 1-2 weeks. Please call to arrange appointment. Continue diet modification. Avoid complex carbohydrates such as bread, pasta, cereal, white rice, white potatoes, etc. Avoid concentrated sugar as found in desserts, candy, soda, juice, etc. Consume a diet based on lean protein (chicken, fish) and vegetables. [facility] [phone]  Secondary Diagnosis:	UTI (urinary tract infection)  Goal:	You completed course of IV antibiotics with Ceftriaxone.  Assessment and plan of treatment:	Follow up with your primary care physician for further monitoring in 1-2 weeks. Please call to arrange appointment. [facility] [phone] Principal Discharge DX:	Atypical chest pain  Goal:	Your cardiac catheterization revealed normal coronary arteries without obstruction. Continue your medications as directed including Protonix to prevent indigestion/acid reflux.  Assessment and plan of treatment:	Follow up with your primary care physician for further monitoring in 1-2 weeks. Please call to arrange appointment.  Secondary Diagnosis:	Hypertension  Goal:	Ensure compliance with your medications for high blood pressure to maintain goal 120/80mmhg to prevent heart attack, stroke and renal failure.  Assessment and plan of treatment:	Follow up with your primary care physician for further monitoring in 1-2 weeks. Please call to arrange appointment. Low cholesterol diet. Salt restriction. 1800Kcal diabetic diet with carb restriction. [facility] [phone]  Secondary Diagnosis:	Diabetes  Goal:	Ensure compliance with your medications as you were previously taking at home for diabetes. Goal is to maintain a normal blood glucose level and HgA1C level < 5.7 and to prevent diabetic complications such as uncontrolled diabetes, diabetic coma, blindness, renal failure, and amputations.  Assessment and plan of treatment:	Follow up with your primary care physician for further monitoring in 1-2 weeks. Please call to arrange appointment. Continue diet modification. Avoid complex carbohydrates such as bread, pasta, cereal, white rice, white potatoes, etc. Avoid concentrated sugar as found in desserts, candy, soda, juice, etc. Consume a diet based on lean protein (chicken, fish) and vegetables. [facility] [phone]  Secondary Diagnosis:	UTI (urinary tract infection)  Goal:	You completed course of IV antibiotics with Ceftriaxone.  Assessment and plan of treatment:	Follow up with your primary care physician for further monitoring in 1-2 weeks. Please call to arrange appointment. [facility] [phone]  Secondary Diagnosis:	Acid reflux disease  Goal:	Continue Protonix daily.  Assessment and plan of treatment:	Follow up with your Gastroenterologist for further monitoring in 1-2 weeks. Please call to arrange appointment. [facility] [phone], ask for Gastroenterologist appointment.  Secondary Diagnosis:	Leg pain  Goal:	You were recommended for SHERRI/PVR as outpatient. Your ultrasound of the lower extremities was negative.  Assessment and plan of treatment:	Follow up with your primary care physician for further monitoring in 1-2 weeks. Please call to arrange appointment. [facility] [phone] Principal Discharge DX:	Atypical chest pain  Goal:	Your cardiac catheterization did not reveal any blockages in your coronary arteries. Continue your medications as directed including Protonix to prevent indigestion/acid reflux.  Assessment and plan of treatment:	Follow up with your primary care physician for further monitoring in 1-2 weeks. Please call to arrange appointment.  Secondary Diagnosis:	Hypertension  Goal:	Ensure compliance with your medications for high blood pressure to maintain goal 120/80mmhg to prevent heart attack, stroke and renal failure.  Assessment and plan of treatment:	Follow up with your primary care physician for further monitoring in 1-2 weeks. Please call to arrange appointment. Low cholesterol diet. Salt restriction. 1800Kcal diabetic diet with carb restriction. [facility] [phone]  Secondary Diagnosis:	Diabetes  Goal:	Ensure compliance with Metformin as you were previously taking at home for diabetes. Goal is to maintain a normal blood glucose level and HgA1C level < 5.7 and to prevent diabetic complications such as uncontrolled diabetes, diabetic coma, blindness, renal failure, and amputations.  Assessment and plan of treatment:	Follow up with your primary care physician for further monitoring in 1-2 weeks. Please call to arrange appointment. Continue diet modification. Avoid complex carbohydrates such as bread, pasta, cereal, white rice, white potatoes, etc. Avoid concentrated sugar as found in desserts, candy, soda, juice, etc. Consume a diet based on lean protein (chicken, fish) and vegetables. [facility] [phone]  Secondary Diagnosis:	UTI (urinary tract infection)  Goal:	You completed course of IV antibiotics with Ceftriaxone.  Assessment and plan of treatment:	Follow up with your primary care physician for further monitoring in 1-2 weeks. Please call to arrange appointment. [facility] [phone]  Secondary Diagnosis:	Acid reflux disease  Goal:	Continue Protonix daily.  Assessment and plan of treatment:	Follow up with your Gastroenterologist for further monitoring in 1-2 weeks. Please call to arrange appointment. [facility] [phone], ask for Gastroenterologist appointment.  Secondary Diagnosis:	Leg pain  Goal:	You were recommended for SHERRI/PVR as outpatient. Your ultrasound of the lower extremities was negative.  Assessment and plan of treatment:	Follow up with your primary care physician for further monitoring in 1-2 weeks. Please call to arrange appointment. [facility] [phone]

## 2018-05-22 NOTE — DISCHARGE NOTE ADULT - HOSPITAL COURSE
59 y/o Irish speaking female (son at her bedside translated), with a PmHx of Dm, HTN and UTI diagnosed 1 month ago by her PCP (was on Macrobid and Pyridium), presented to the [facility] ED c/o fever, body aches and chest pain. Pt states about 1 week ago she started to develop a fever (max temp of 103), generalized body aches but worse to her left hip/thigh region, and chest pain. She states over this past week her symptoms were getting worse so today she decided to come to the hospital for an evaluation. She had associated symptoms of SOB and "head pain" but denied having a HA or any chills, abd pain, n/v, blurred vision, back pain, dizziness. Last travel was 1 month age to [place]. She described the chest pain originally as a substernal to epigastric region, 10/10, non-radiating pain that felt like something was "stuck" in her chest and was worse with inspiration. She states after the ED gave her a nitro tablet, her pain was reduced to about a 6/10. Son states she last had a cardiac workup (echo/stress test) was 3 years ago and was negative.     On admission: EKG: NSR @ 78, T inv III. ACS ruled out by negative cardiac enzymes. WBC: 13.99. UA - brown, positive nitrites, small leuks. RVP negative. CXR - No acute pulmonary disease. CT Chest: Left upper lobe subsegmental atelectasis. Calcified granulomas in the right upper and lower lobes.    TTE: Normal left ventricular internal dimensions and wall thicknesses.  Normal left ventricular systolic function. No segmental wall motion abnormalities. Normal right ventricular size and function. *** No previous Echo exam.    On 5/21, Pt underwent cardiac cath: LVEDP 16, normal coronary arteries; RRA access     LE doppler: No evidence of deep vein thrombosis in the right and left lower extremities.  No evidence of deep and superficial venous insufficiency noted in the right and left lower extremities.    ***incomplete 57 y/o Belarusian speaking female (son at her bedside translated), with a PmHx of Dm, HTN and UTI diagnosed 1 month ago by her PCP (was on Macrobid and Pyridium), presented to the [facility] ED c/o fever, body aches and chest pain. Pt states about 1 week ago she started to develop a fever (max temp of 103), generalized body aches but worse to her left hip/thigh region, and chest pain. She states over this past week her symptoms were getting worse so today she decided to come to the hospital for an evaluation. She had associated symptoms of SOB and "head pain" but denied having a HA or any chills, abd pain, n/v, blurred vision, back pain, dizziness. Last travel was 1 month age to [place]. She described the chest pain originally as a substernal to epigastric region, 10/10, non-radiating pain that felt like something was "stuck" in her chest and was worse with inspiration. She states after the ED gave her a nitro tablet, her pain was reduced to about a 6/10. Son states she last had a cardiac workup (echo/stress test) was 3 years ago and was negative.     On admission: EKG: NSR @ 78, T inv III. ACS ruled out by negative cardiac enzymes. WBC: 13.99. UA - brown, positive nitrites, small leuks. RVP negative. CXR - No acute pulmonary disease. CT Chest: Left upper lobe subsegmental atelectasis. Calcified granulomas in the right upper and lower lobes.    TTE: Normal left ventricular internal dimensions and wall thicknesses.  Normal left ventricular systolic function. No segmental wall motion abnormalities. Normal right ventricular size and function. *** No previous Echo exam.    On 5/21, Pt underwent cardiac cath: LVEDP 16, normal coronary arteries; RRA access     LE doppler: No evidence of deep vein thrombosis in the right and left lower extremities.  No evidence of deep and superficial venous insufficiency noted in the right and left lower extremities.    Pt was cleared for discharge by cardiology, 57 y/o German speaking female (son at her bedside translated), with a PmHx of Dm, HTN and UTI diagnosed 1 month ago by her PCP (was on Macrobid and Pyridium), presented to the [facility] ED c/o fever, body aches and chest pain. Pt states about 1 week ago she started to develop a fever (max temp of 103), generalized body aches but worse to her left hip/thigh region, and chest pain. She states over this past week her symptoms were getting worse so today she decided to come to the hospital for an evaluation. She had associated symptoms of SOB and "head pain" but denied having a HA or any chills, abd pain, n/v, blurred vision, back pain, dizziness. Last travel was 1 month age to [place]. She described the chest pain originally as a substernal to epigastric region, 10/10, non-radiating pain that felt like something was "stuck" in her chest and was worse with inspiration. She states after the ED gave her a nitro tablet, her pain was reduced to about a 6/10. Son states she last had a cardiac workup (echo/stress test) was 3 years ago and was negative.     On admission: EKG: NSR @ 78, T inv III. ACS ruled out by negative cardiac enzymes. WBC: 13.99. UA - brown, positive nitrites, small leuks. RVP negative. CXR - No acute pulmonary disease. CT Chest: Left upper lobe subsegmental atelectasis. Calcified granulomas in the right upper and lower lobes.    TTE: Normal left ventricular internal dimensions and wall thicknesses.  Normal left ventricular systolic function. No segmental wall motion abnormalities. Normal right ventricular size and function. *** No previous Echo exam.    On 5/21, Pt underwent cardiac cath: LVEDP 16, normal coronary arteries; RRA access     LE doppler: No evidence of deep vein thrombosis in the right and left lower extremities.  No evidence of deep and superficial venous insufficiency noted in the right and left lower extremities.    Pt was cleared for discharge by cardiology, recommend c/w PPI. Case discussed with Dr. Kenny, labs/vitals reviewed, Pt medically cleared for discharge to home with outpt follow up noted above. 57 y/o Armenian speaking female (son at her bedside translated), with a PmHx of Dm, HTN and UTI diagnosed 1 month ago by her PCP (was on Macrobid and Pyridium), presented to the [facility] ED c/o fever, body aches and chest pain. Pt states about 1 week ago she started to develop a fever (max temp of 103), generalized body aches but worse to her left hip/thigh region, and chest pain. She states over this past week her symptoms were getting worse so today she decided to come to the hospital for an evaluation. She had associated symptoms of SOB and "head pain" but denied having a HA or any chills, abd pain, n/v, blurred vision, back pain, dizziness. Last travel was 1 month age to [place]. She described the chest pain originally as a substernal to epigastric region, 10/10, non-radiating pain that felt like something was "stuck" in her chest and was worse with inspiration. She states after the ED gave her a nitro tablet, her pain was reduced to about a 6/10. Son states she last had a cardiac workup (echo/stress test) was 3 years ago and was negative.     On admission: EKG: NSR @ 78bpm, T inv III. ACS ruled out by negative cardiac enzymes. WBC: 13.99. UA - brown, positive nitrites, small leuks. +UTI, Pt was treated with 3 days of IV Ceftriaxone. RVP negative. CXR - No acute pulmonary disease. CT Chest: Left upper lobe subsegmental atelectasis. Calcified granulomas in the right upper and lower lobes.    TTE: Normal left ventricular internal dimensions and wall thicknesses.  Normal left ventricular systolic function. No segmental wall motion abnormalities. Normal right ventricular size and function. *** No previous Echo exam.    On 5/21, Pt underwent cardiac cath: LVEDP 16, normal coronary arteries; RRA access     LE doppler: No evidence of deep vein thrombosis in the right and left lower extremities.  No evidence of deep and superficial venous insufficiency noted in the right and left lower extremities.    Pt was cleared for discharge by cardiology, recommend c/w PPI. Case discussed with Dr. Kenny, labs/vitals reviewed, Pt medically cleared for discharge to home with outpt follow up noted above.

## 2018-05-22 NOTE — PROGRESS NOTE ADULT - PROBLEM SELECTOR PLAN 2
-UA positive. NGTD on culture  - d/c Azithromycin,  - c/w Ceftriaxone, f/u UCx
-UA positive. NGTD on culture  azithromycin and ceftriaxone iv
likely due to gastritis  c/w pPI  GI follow up outpt

## 2018-05-22 NOTE — DISCHARGE NOTE ADULT - PROVIDER TOKENS
FREE:[LAST:[[facility]],PHONE:[phone]],FAX:[(   )    -]]

## 2018-05-22 NOTE — DISCHARGE NOTE ADULT - INSTRUCTIONS
Low cholesterol diet. Salt restriction. 1800Kcal diabetic diet with carb restriction.  Avoid complex carbohydrates such as bread, pasta, cereal, white rice, white potatoes, etc. Avoid concentrated sugar as found in desserts, candy, soda, juice, etc. Consume a diet based on lean protein (chicken, fish) and vegetables.

## 2018-05-22 NOTE — DISCHARGE NOTE ADULT - PLAN OF CARE
Ensure compliance with your medications for high blood pressure to maintain goal 120/80mmhg to prevent heart attack, stroke and renal failure. Follow up with your primary care physician for further monitoring in 1-2 weeks. Please call to arrange appointment. Low cholesterol diet. Salt restriction. 1800Kcal diabetic diet with carb restriction. [facility] [phone] Ensure compliance with your medications as you were previously taking at home for diabetes. Goal is to maintain a normal blood glucose level and HgA1C level < 5.7 and to prevent diabetic complications such as uncontrolled diabetes, diabetic coma, blindness, renal failure, and amputations. Follow up with your primary care physician for further monitoring in 1-2 weeks. Please call to arrange appointment. Continue diet modification. Avoid complex carbohydrates such as bread, pasta, cereal, white rice, white potatoes, etc. Avoid concentrated sugar as found in desserts, candy, soda, juice, etc. Consume a diet based on lean protein (chicken, fish) and vegetables. [facility] [phone] You completed course of IV antibiotics with Ceftriaxone. Follow up with your primary care physician for further monitoring in 1-2 weeks. Please call to arrange appointment. [facility] [phone] Your cardiac catheterization revealed normal coronary arteries without obstruction. Continue your medications as directed including Protonix to prevent indigestion/acid reflux. Follow up with your primary care physician for further monitoring in 1-2 weeks. Please call to arrange appointment. Continue Protonix daily. Follow up with your Gastroenterologist for further monitoring in 1-2 weeks. Please call to arrange appointment. [facility] [phone], ask for Gastroenterologist appointment. You were recommended for SHERRI/PVR as outpatient. Your ultrasound of the lower extremities was negative. Follow up with your primary care physician for further monitoring in 1-2 weeks. Please call to arrange appointment. [facility] [phone] Your cardiac catheterization did not reveal any blockages in your coronary arteries. Continue your medications as directed including Protonix to prevent indigestion/acid reflux. Ensure compliance with Metformin as you were previously taking at home for diabetes. Goal is to maintain a normal blood glucose level and HgA1C level < 5.7 and to prevent diabetic complications such as uncontrolled diabetes, diabetic coma, blindness, renal failure, and amputations.

## 2018-05-22 NOTE — PROGRESS NOTE ADULT - PROBLEM SELECTOR PLAN 4
-BP at goal.   c/w atenolol
denies pain/discomfort
-BP at goal. c/w atenolol
FS within acceptable range  monitor fs, insulin ssc, hgba1-c, hold po meds

## 2018-05-22 NOTE — PROGRESS NOTE ADULT - PROBLEM SELECTOR PLAN 1
-Persistent, atypical chest pain.  -Possibly ACS vs chostocondritis? CT chest not indicative of pneumonia. Possible past TB?  -Cardiology input appreciated. c/w ASA, statin. Possible cath in AM. NPO p MN.
-Persistent, atypical chest pain.  -CATH neg for CAD  -  CT chest not indicative of pneumonia.   -. c/w ASA, statin.
-Persistent, atypical chest pain.  -CATH neg for CAD  -  CT chest not indicative of pneumonia.   -. c/w ASA, statin. Possible cath in AM. NPO p MN.

## 2018-05-22 NOTE — PROGRESS NOTE ADULT - PROBLEM SELECTOR PLAN 3
FS within acceptable range  monitor fs, insulin ssc, hgba1-c, hold po meds
monitor fs, insulin ssc, hgba1-c, hold po meds
-UA positive. NGTD on culture  - d/c Azithromycin,  - d/c Ceftriaxone s/p 3 days of ABx, UCx negative

## 2018-05-22 NOTE — PROGRESS NOTE ADULT - ASSESSMENT
58 year old [facility] female with HTN, DM presents with chest pain/burning, generalized  aches and HA , on antibiotics for UTI. Chest pain likley unstable angina  improved with nitro s/l.     CE negative x 2 but pt with continued symptoms   - cont asa and statin   - given risk factors and typical symptoms will plan for cardiac cath tomorrow   - NPO past midnight   - f/u TTE     Brandy Vazquez MD
59 y/o Serbian speaking female (son at her bedside translated), with a PmHx of Dm, HTN and UTI diagnosed 1 month ago by her PCP (was on Macrobid and Pyridium), presented to the [facility] ED c/o fever, body aches and chest pain.  Pt was admitted to telemetry for r/o acs, UTI and poss pneumonia.
59 y/o Hebrew speaking female (son at her bedside translated), with a PmHx of Dm, HTN and UTI diagnosed 1 month ago by her PCP (was on Macrobid and Pyridium), presented to the [facility] ED c/o fever, body aches and chest pain.  Pt was admitted to telemetry for r/o acs, UTI and poss pneumonia.
59 y/o Arabic speaking female (son at her bedside translated), with a PmHx of Dm, HTN and UTI diagnosed 1 month ago by her PCP (was on Macrobid and Pyridium), presented to the [facility] ED c/o fever, body aches and chest pain.  Pt was admitted to telemetry for r/o acs, UTI and poss pneumonia.

## 2018-05-22 NOTE — PROGRESS NOTE ADULT - SUBJECTIVE AND OBJECTIVE BOX
Pt seen at bedside. She wishes son to act as . She continues to have burning epigastric pain. She denies palpitations. Sitting in bed in no acute distress.     MEDICATIONS:  aspirin  chewable 162 milliGRAM(s) Oral once  aspirin enteric coated 81 milliGRAM(s) Oral daily  ATENolol  Tablet 50 milliGRAM(s) Oral daily  enoxaparin Injectable 40 milliGRAM(s) SubCutaneous every 24 hours  nitroglycerin     SubLingual 0.3 milliGRAM(s) SubLingual once    azithromycin  IVPB 500 milliGRAM(s) IV Intermittent every 24 hours  cefTRIAXone   IVPB 1 Gram(s) IV Intermittent every 24 hours      acetaminophen   Tablet 650 milliGRAM(s) Oral every 6 hours PRN  acetaminophen   Tablet. 650 milliGRAM(s) Oral every 6 hours PRN    pantoprazole    Tablet 40 milliGRAM(s) Oral before breakfast    atorvastatin 20 milliGRAM(s) Oral at bedtime  dextrose 40% Gel 15 Gram(s) Oral once PRN  dextrose 50% Injectable 12.5 Gram(s) IV Push once  dextrose 50% Injectable 25 Gram(s) IV Push once  dextrose 50% Injectable 25 Gram(s) IV Push once  glucagon  Injectable 1 milliGRAM(s) IntraMuscular once PRN  insulin lispro (HumaLOG) corrective regimen sliding scale   SubCutaneous three times a day before meals  insulin lispro (HumaLOG) corrective regimen sliding scale   SubCutaneous at bedtime    calcium carbonate 1250 mG + Vitamin D (OsCal 500 + D) 1 Tablet(s) Oral daily  dextrose 5%. 1000 milliLiter(s) IV Continuous <Continuous>  ergocalciferol 58429 Unit(s) Oral every week  multivitamin 1 Tablet(s) Oral daily      REVIEW OF SYSTEMS:  General: no fatigue/malaise, weight loss/gain.  Skin: no rashes.  Ophthalmologic: no blurred vision, no loss of vision. 	  ENT: no sore throat, rhinorrhea, sinus congestion.  Respiratory: no SOB, cough or wheeze.  Gastrointestinal:  no N/V/D, no melena/hematemesis/hematochezia.  Genitourinary: no dysuria/hesitancy or hematuria.  Musculoskeletal: no myalgias or arthralgias.  Neurological: no changes in vision or hearing, no lightheadedness/dizziness, no syncope/near syncope	  Psychiatric: no unusual stress/anxiety.   Hematology/Lymphatics: no unusual bleeding, bruising and no lymphadenopathy.  Endocrine: no unusual thirst.   All others negative except as stated above and in HPI.    PHYSICAL EXAM:  T(C): 37.4 (05-20-18 @ 07:55), Max: 37.7 (05-20-18 @ 06:23)  HR: 88 (05-20-18 @ 07:55) (70 - 92)  BP: 119/71 (05-20-18 @ 06:23) (119/71 - 134/74)  RR: 16 (05-20-18 @ 07:55) (16 - 18)  SpO2: 100% (05-20-18 @ 06:23) (96% - 100%)  Wt(kg): --  I&O's Summary      Appearance: Normal	  HEENT:   Normal oral mucosa, PERRL, EOMI	  Lymphatic: No lymphadenopathy  Cardiovascular: Normal S1 S2, No JVD, No murmurs, No edema  Respiratory: Lungs clear to auscultation	  Psychiatry: A & O x 3, Mood & affect appropriate  Gastrointestinal:  Soft, Non-tender, + BS	  Skin: No rashes, No ecchymoses, No cyanosis	  Neurologic: Non-focal  Extremities: Normal range of motion, No clubbing, cyanosis or edema  Vascular: Peripheral pulses palpable 2+ bilaterally        LABS:	 	    CBC Full  -  ( 20 May 2018 06:39 )  WBC Count : 11.77 K/uL  Hemoglobin : 9.9 g/dL  Hematocrit : 32.0 %  Platelet Count - Automated : 308 K/uL  Mean Cell Volume : 77.5 fL  Mean Cell Hemoglobin : 24.0 pg  Mean Cell Hemoglobin Concentration : 30.9 %  Auto Neutrophil # : x  Auto Lymphocyte # : x  Auto Monocyte # : x  Auto Eosinophil # : x  Auto Basophil # : x  Auto Neutrophil % : x  Auto Lymphocyte % : x  Auto Monocyte % : x  Auto Eosinophil % : x  Auto Basophil % : x    05-20    134<L>  |  96<L>  |  9   ----------------------------<  160<H>  4.4   |  27  |  0.69  05-19    136  |  96<L>  |  10  ----------------------------<  165<H>  3.9   |  26  |  0.80    Ca    9.3      20 May 2018 06:39  Ca    9.5      19 May 2018 02:38  Mg     1.9     05-19    TPro  8.6<H>  /  Alb  4.0  /  TBili  < 0.2<L>  /  DBili  x   /  AST  17  /  ALT  15  /  AlkPhos  84  05-19    Tele: NSR 70 - 80s
Patient is a 58y old  Female who presents with a chief complaint of Fever/Body aches/Chest pain (19 May 2018 07:44)    Translation provided by son at bedside, at patient's request    SUBJECTIVE / OVERNIGHT EVENTS: No acute events overnight. NSR on tele. Persistent, reproducible xiphoid chest pain, worse with inspiration and touch. Afebrile. No HA, vision changes, SOB, abd pain, n/v/d. No LE edema    MEDICATIONS  (STANDING):  aspirin  chewable 162 milliGRAM(s) Oral once  aspirin enteric coated 81 milliGRAM(s) Oral daily  ATENolol  Tablet 50 milliGRAM(s) Oral daily  atorvastatin 20 milliGRAM(s) Oral at bedtime  azithromycin  IVPB 500 milliGRAM(s) IV Intermittent every 24 hours  calcium carbonate 1250 mG + Vitamin D (OsCal 500 + D) 1 Tablet(s) Oral daily  cefTRIAXone   IVPB 1 Gram(s) IV Intermittent every 24 hours  dextrose 5%. 1000 milliLiter(s) (50 mL/Hr) IV Continuous <Continuous>  dextrose 50% Injectable 12.5 Gram(s) IV Push once  dextrose 50% Injectable 25 Gram(s) IV Push once  dextrose 50% Injectable 25 Gram(s) IV Push once  enoxaparin Injectable 40 milliGRAM(s) SubCutaneous every 24 hours  ergocalciferol 09518 Unit(s) Oral every week  insulin lispro (HumaLOG) corrective regimen sliding scale   SubCutaneous three times a day before meals  insulin lispro (HumaLOG) corrective regimen sliding scale   SubCutaneous at bedtime  multivitamin 1 Tablet(s) Oral daily  nitroglycerin     SubLingual 0.3 milliGRAM(s) SubLingual once  pantoprazole    Tablet 40 milliGRAM(s) Oral before breakfast    MEDICATIONS  (PRN):  acetaminophen   Tablet 650 milliGRAM(s) Oral every 6 hours PRN For Temp greater than 38 C (100.4 F)  acetaminophen   Tablet. 650 milliGRAM(s) Oral every 6 hours PRN mild, moderate pain  dextrose 40% Gel 15 Gram(s) Oral once PRN Blood Glucose LESS THAN 70 milliGRAM(s)/deciliter  glucagon  Injectable 1 milliGRAM(s) IntraMuscular once PRN Glucose LESS THAN 70 milligrams/deciliter          PHYSICAL EXAM:  Vital Signs Last 24 Hrs  T(C): 36.8 (20 May 2018 11:32), Max: 37.7 (20 May 2018 06:23)  T(F): 98.2 (20 May 2018 11:32), Max: 99.9 (20 May 2018 06:23)  HR: 81 (20 May 2018 11:32) (70 - 92)  BP: 111/53 (20 May 2018 11:32) (111/53 - 134/74)  BP(mean): --  RR: 16 (20 May 2018 11:32) (16 - 18)  SpO2: 98% (20 May 2018 11:32) (96% - 100%)  GENERAL: Alert, awake, NAD, well-developed  HEAD:  Atraumatic, Normocephalic  EYES: EOMI, PERRLA, conjunctiva and sclera clear  NECK: Supple, No JVD  CHEST/LUNG: Clear to auscultation bilaterally; No wheeze  HEART: Regular rate and rhythm; No murmurs, rubs, or gallops  ABDOMEN: Soft, Nontender, Nondistended; Bowel sounds present  EXTREMITIES:  2+ Peripheral Pulses, No clubbing, cyanosis, or edema  NEUROLOGY: Non-focal  SKIN: No Rashes or lesions      LABS:                        9.9    11.77 )-----------( 308      ( 20 May 2018 06:39 )             32.0     05-20    134<L>  |  96<L>  |  9   ----------------------------<  160<H>  4.4   |  27  |  0.69    Ca    9.3      20 May 2018 06:39  Mg     1.9     05-19    TPro  8.6<H>  /  Alb  4.0  /  TBili  < 0.2<L>  /  DBili  x   /  AST  17  /  ALT  15  /  AlkPhos  84  05-19      CARDIAC MARKERS ( 19 May 2018 08:43 )  x     / < 0.06 ng/mL / 58 u/L / 1.05 ng/mL / x      CARDIAC MARKERS ( 19 May 2018 02:38 )  x     / < 0.06 ng/mL / 68 u/L / 1.17 ng/mL / x          Urinalysis Basic - ( 19 May 2018 02:38 )    Color: BROWN / Appearance: CLEAR / S.011 / pH: 6.0  Gluc: NEGATIVE / Ketone: NEGATIVE  / Bili: SMALL / Urobili: 4 mg/dL   Blood: NEGATIVE / Protein: 20 mg/dL / Nitrite: POSITIVE   Leuk Esterase: SMALL / RBC: 0-2 / WBC >50   Sq Epi: OCC / Non Sq Epi: x / Bacteria: MANY        RADIOLOGY & ADDITIONAL TESTS:    Imaging Personally Reviewed:    Consultant(s) Notes Reviewed:  Cardiology	    Care Discussed with Consultants/Other Providers:
Patient seen and examined at bedside.  Endorsing epigastric pain, specially when lying down.  No other new/acute complaints         Medications:  acetaminophen   Tablet 650 milliGRAM(s) Oral every 6 hours PRN  acetaminophen   Tablet. 650 milliGRAM(s) Oral every 6 hours PRN  aspirin enteric coated 81 milliGRAM(s) Oral daily  ATENolol  Tablet 50 milliGRAM(s) Oral daily  atorvastatin 20 milliGRAM(s) Oral at bedtime  calcium carbonate 1250 mG + Vitamin D (OsCal 500 + D) 1 Tablet(s) Oral daily  cefTRIAXone   IVPB 1 Gram(s) IV Intermittent every 24 hours  dextrose 40% Gel 15 Gram(s) Oral once PRN  dextrose 5%. 1000 milliLiter(s) IV Continuous <Continuous>  dextrose 50% Injectable 12.5 Gram(s) IV Push once  dextrose 50% Injectable 25 Gram(s) IV Push once  dextrose 50% Injectable 25 Gram(s) IV Push once  enoxaparin Injectable 40 milliGRAM(s) SubCutaneous every 24 hours  ergocalciferol 81874 Unit(s) Oral every week  glucagon  Injectable 1 milliGRAM(s) IntraMuscular once PRN  insulin lispro (HumaLOG) corrective regimen sliding scale   SubCutaneous three times a day before meals  insulin lispro (HumaLOG) corrective regimen sliding scale   SubCutaneous at bedtime  multivitamin 1 Tablet(s) Oral daily  nitroglycerin     SubLingual 0.3 milliGRAM(s) SubLingual once  pantoprazole    Tablet 40 milliGRAM(s) Oral before breakfast  sodium chloride 0.9%. 500 milliLiter(s) IV Continuous <Continuous>      PAST MEDICAL & SURGICAL HISTORY:  Hypertension  Diabetes  No significant past surgical history        Vitals:  T(F): 99.5 (05-22), Max: 99.5 (05-22)  HR: 74 (05-22) (59 - 74)  BP: 112/64 (05-22) (108/56 - 139/81)  RR: 18 (05-22)  SpO2: 99% (05-22)  I&O's Summary      Physical Exam:  Appearance: No acute distress; well appearing  Eyes: PERRL, EOMI, pink conjunctiva  HENT: Normal oral mucosa  Cardiovascular: RRR, S1, S2, no murmurs, rubs, or gallops; no edema; no JVD  Respiratory: Clear to auscultation bilaterally  Gastrointestinal: soft, non-tender, non-distended with normal bowel sounds  Musculoskeletal: No clubbing; no joint deformity   Neurologic: Non-focal  Lymphatic: No lymphadenopathy  Psychiatry: AAOx3, mood & affect appropriate  Skin: No rashes, ecchymoses, or cyanosis                          9.2    11.48 )-----------( 344      ( 22 May 2018 07:15 )             29.5     05-22    136  |  97<L>  |  16  ----------------------------<  136<H>  4.6   |  23  |  0.86    Ca    8.9      22 May 2018 07:15  Phos  2.6     05-22  Mg     2.3     05-22    CATH    < from: Cardiac Cath Lab - Adult (05.21.18 @ 11:33) >  CORONARY VESSELS: The coronary circulation is right dominant.  LM:   --  LM: Normal.  LAD:   --  LAD: The vessel was large sized. Angiography showed mild  atherosclerosis with no flow limiting lesions.  --  D1: Normal.  --  D2: Normal.  CX:   --  Circumflex: Angiography showed minor luminal irregularities with  no flow limiting lesions.  RCA:   --  RCA: Angiography showed mild atherosclerosis with no flow  limiting lesions.  COMPLICATIONS: There were no complications.  DIAGNOSTIC IMPRESSIONS: Mild non-obstructive CAD  DIAGNOSTIC RECOMMENDATIONS: Medical therapy  INTERVENTIONAL IMPRESSIONS: Mild non-obstructive CAD  INTERVENTIONAL RECOMMENDATIONS: Medical therapy  Prepared and signed by  Yessi Denton M.D.    < end of copied text >       < from: Transthoracic Echocardiogram (05.20.18 @ 09:25) >    1. Normal left ventricular internal dimensions and wall  thicknesses.  2. Normal left ventricular systolic function. No segmental  wall motion abnormalities.  3. Normal right ventricular size and function.  *** No previous Echo exam.    < end of copied text >        Interpretation of Telemetry:  sinus rhythm, HR 50s     58 year old Pashto female with HTN, DM presented for chest pain, relieved by nitro.  LHC w/ non-obstructive CAD.  Sx's likely due to gastritis from diet (highly condimented foods).    TTE normal     RECS   - cont asa and statin and rest of meds  - will benefit from PCP follow up and treatment of gastritis w/ PPI therapy     No cardiac objections for discharge     SHARON Chau MD  [id number]
Patient is a 58y old  Female who presents with a chief complaint of Fever/Body aches/Chest pain (22 May 2018 08:19)      SUBJECTIVE / OVERNIGHT EVENTS: Improved abd pain. No CP or SOB    MEDICATIONS  (STANDING):  aspirin enteric coated 81 milliGRAM(s) Oral daily  ATENolol  Tablet 50 milliGRAM(s) Oral daily  atorvastatin 20 milliGRAM(s) Oral at bedtime  calcium carbonate 1250 mG + Vitamin D (OsCal 500 + D) 1 Tablet(s) Oral daily  dextrose 5%. 1000 milliLiter(s) (50 mL/Hr) IV Continuous <Continuous>  dextrose 50% Injectable 12.5 Gram(s) IV Push once  dextrose 50% Injectable 25 Gram(s) IV Push once  dextrose 50% Injectable 25 Gram(s) IV Push once  enoxaparin Injectable 40 milliGRAM(s) SubCutaneous every 24 hours  ergocalciferol 59731 Unit(s) Oral every week  insulin lispro (HumaLOG) corrective regimen sliding scale   SubCutaneous three times a day before meals  insulin lispro (HumaLOG) corrective regimen sliding scale   SubCutaneous at bedtime  multivitamin 1 Tablet(s) Oral daily  nitroglycerin     SubLingual 0.3 milliGRAM(s) SubLingual once  pantoprazole    Tablet 40 milliGRAM(s) Oral before breakfast  sodium chloride 0.9%. 500 milliLiter(s) (75 mL/Hr) IV Continuous <Continuous>    MEDICATIONS  (PRN):  acetaminophen   Tablet 650 milliGRAM(s) Oral every 6 hours PRN For Temp greater than 38 C (100.4 F)  acetaminophen   Tablet. 650 milliGRAM(s) Oral every 6 hours PRN mild, moderate pain  dextrose 40% Gel 15 Gram(s) Oral once PRN Blood Glucose LESS THAN 70 milliGRAM(s)/deciliter  glucagon  Injectable 1 milliGRAM(s) IntraMuscular once PRN Glucose LESS THAN 70 milligrams/deciliter      T(C): 37.5 (05-22-18 @ 06:27), Max: 37.5 (05-22-18 @ 06:27)  HR: 74 (05-22-18 @ 06:27) (59 - 74)  BP: 112/64 (05-22-18 @ 06:27) (108/56 - 139/81)  RR: 18 (05-22-18 @ 06:27) (18 - 18)  SpO2: 99% (05-22-18 @ 06:27) (96% - 99%)  CAPILLARY BLOOD GLUCOSE      POCT Blood Glucose.: 174 mg/dL (22 May 2018 12:28)  POCT Blood Glucose.: 128 mg/dL (22 May 2018 08:02)  POCT Blood Glucose.: 121 mg/dL (21 May 2018 21:20)  POCT Blood Glucose.: 136 mg/dL (21 May 2018 17:05)    I&O's Summary      PHYSICAL EXAM:  GENERAL: NAD,   HEAD:  Normocephalic  EYES: EOMI,  conjunctiva and sclera clear  NECK: Supple,   CHEST/LUNG: Clear to auscultation bilaterally; No wheeze  HEART:  s1 s2 + Regular rate and rhythm;   ABDOMEN: Soft, Nontender, Nondistended; Bowel sounds present  EXTREMITIES:   No clubbing, cyanosis  NEURO: AAOx3      LABS:                        9.2    11.48 )-----------( 344      ( 22 May 2018 07:15 )             29.5     05-22    136  |  97<L>  |  16  ----------------------------<  136<H>  4.6   |  23  |  0.86    Ca    8.9      22 May 2018 07:15  Phos  2.6     05-22  Mg     2.3     05-22        Consultant(s) Notes Reviewed:  Cardiology
Patient is a 58y old  Female who presents with a chief complaint of Fever/Body aches/Chest pain (19 May 2018 07:44)      SUBJECTIVE / OVERNIGHT EVENTS: No fever or chills    MEDICATIONS  (STANDING):  aspirin  chewable 162 milliGRAM(s) Oral once  aspirin enteric coated 81 milliGRAM(s) Oral daily  ATENolol  Tablet 50 milliGRAM(s) Oral daily  atorvastatin 20 milliGRAM(s) Oral at bedtime  azithromycin  IVPB 500 milliGRAM(s) IV Intermittent every 24 hours  calcium carbonate 1250 mG + Vitamin D (OsCal 500 + D) 1 Tablet(s) Oral daily  cefTRIAXone   IVPB 1 Gram(s) IV Intermittent every 24 hours  dextrose 5%. 1000 milliLiter(s) (50 mL/Hr) IV Continuous <Continuous>  dextrose 50% Injectable 12.5 Gram(s) IV Push once  dextrose 50% Injectable 25 Gram(s) IV Push once  dextrose 50% Injectable 25 Gram(s) IV Push once  enoxaparin Injectable 40 milliGRAM(s) SubCutaneous every 24 hours  ergocalciferol 46423 Unit(s) Oral every week  insulin lispro (HumaLOG) corrective regimen sliding scale   SubCutaneous three times a day before meals  insulin lispro (HumaLOG) corrective regimen sliding scale   SubCutaneous at bedtime  multivitamin 1 Tablet(s) Oral daily  nitroglycerin     SubLingual 0.3 milliGRAM(s) SubLingual once  pantoprazole    Tablet 40 milliGRAM(s) Oral before breakfast  sodium chloride 0.9%. 500 milliLiter(s) (75 mL/Hr) IV Continuous <Continuous>    MEDICATIONS  (PRN):  acetaminophen   Tablet 650 milliGRAM(s) Oral every 6 hours PRN For Temp greater than 38 C (100.4 F)  acetaminophen   Tablet. 650 milliGRAM(s) Oral every 6 hours PRN mild, moderate pain  dextrose 40% Gel 15 Gram(s) Oral once PRN Blood Glucose LESS THAN 70 milliGRAM(s)/deciliter  glucagon  Injectable 1 milliGRAM(s) IntraMuscular once PRN Glucose LESS THAN 70 milligrams/deciliter      T(C): 36.8 (05-21-18 @ 15:22), Max: 37.6 (05-21-18 @ 05:29)  HR: 60 (05-21-18 @ 15:22) (60 - 88)  BP: 109/60 (05-21-18 @ 15:22) (109/60 - 139/87)  RR: 18 (05-21-18 @ 15:22) (16 - 18)  SpO2: 96% (05-21-18 @ 15:22) (96% - 99%)  CAPILLARY BLOOD GLUCOSE      POCT Blood Glucose.: 143 mg/dL (21 May 2018 08:16)  POCT Blood Glucose.: 137 mg/dL (20 May 2018 22:26)  POCT Blood Glucose.: 182 mg/dL (20 May 2018 16:55)    I&O's Summary      PHYSICAL EXAM:  GENERAL: NAD,   HEAD:  Normocephalic  EYES: EOMI,  conjunctiva and sclera clear  NECK: Supple,   CHEST/LUNG: Clear to auscultation bilaterally; No wheeze  HEART:  s1 s2 + Regular rate and rhythm;   ABDOMEN: Soft, Nontender, Nondistended; Bowel sounds present  EXTREMITIES:   No clubbing, cyanosis  NEURO: AAOx3      LABS:                        9.8    13.17 )-----------( 348      ( 21 May 2018 06:40 )             30.9     05-21    138  |  96<L>  |  9   ----------------------------<  150<H>  4.3   |  24  |  0.76    Ca    9.3      21 May 2018 06:40  Mg     2.1     05-21          Consultant(s) Notes Reviewed:  cardiology

## 2018-05-22 NOTE — DISCHARGE NOTE ADULT - MEDICATION SUMMARY - MEDICATIONS TO TAKE
I will START or STAY ON the medications listed below when I get home from the hospital:    aspirin 81 mg oral delayed release tablet  -- 1 tab(s) by mouth once a day  -- Indication: For Heart Health    metFORMIN 500 mg oral tablet  -- 1 tab(s) by mouth 2 times a day   -- Indication: For Diabetes    atorvastatin 20 mg oral tablet  -- 1 tab(s) by mouth once a day (at bedtime)  -- Indication: For High cholesterol    atenolol 50 mg oral tablet  -- 1 tab(s) by mouth once a day  -- Indication: For High blood pressure    pantoprazole 40 mg oral delayed release tablet  -- 1 tab(s) by mouth once a day (before a meal)  -- Indication: For Indigestion    Calcium 500+D oral tablet, chewable  -- 1 tab(s) by mouth 2 times a day  -- Indication: For Supplement    Multiple Vitamins oral tablet  -- 1 tab(s) by mouth once a day  -- Indication: For Supplement    Vitamin D2 50,000 intl units (1.25 mg) oral capsule  -- 1 cap(s) by mouth once a week  -- Indication: For Vitamin D deficiency

## 2018-05-22 NOTE — DISCHARGE NOTE ADULT - PATIENT PORTAL LINK FT
You can access the [facility], offered by [facility], by registering with the following website: [email or web address]

## 2018-05-23 DIAGNOSIS — R69 ILLNESS, UNSPECIFIED: ICD-10-CM

## 2018-05-24 LAB
BACTERIA BLD CULT: SIGNIFICANT CHANGE UP
BACTERIA BLD CULT: SIGNIFICANT CHANGE UP

## 2018-05-30 ENCOUNTER — EMERGENCY (EMERGENCY)
Facility: HOSPITAL | Age: 58
LOS: 1 days | Discharge: HOME | End: 2018-05-30
Attending: EMERGENCY MEDICINE | Admitting: EMERGENCY MEDICINE
Payer: MEDICAID

## 2018-05-30 VITALS
SYSTOLIC BLOOD PRESSURE: 115 MMHG | OXYGEN SATURATION: 100 % | TEMPERATURE: 98 F | DIASTOLIC BLOOD PRESSURE: 52 MMHG | HEART RATE: 61 BPM | RESPIRATION RATE: 60 BRPM

## 2018-05-30 PROCEDURE — 99284 EMERGENCY DEPT VISIT MOD MDM: CPT | Mod: 25

## 2018-05-30 PROCEDURE — 93010 ELECTROCARDIOGRAM REPORT: CPT

## 2018-05-30 NOTE — ED ADULT TRIAGE NOTE - CHIEF COMPLAINT QUOTE
pt c/o epigastric pain radiating to the RUQ and upper back since being d/c on 5/22. sates she was having pain during admission (for UTI/PNA/Fever) and then it went away and returned after being home for a day. pt has taken Naproxen and Pantoprazole with very little relief. pt c/o epigastric pain radiating to the RUQ and upper back since being d/c on 5/22. sates she was having pain during admission (for UTI/PNA/Fever) and then it went away and returned after being home for a day. pt has taken Naproxen and Pantoprazole with very little relief. pt appears uncomfortable in triage. EKG to be completed.

## 2018-05-31 VITALS
RESPIRATION RATE: 20 BRPM | DIASTOLIC BLOOD PRESSURE: 64 MMHG | OXYGEN SATURATION: 100 % | TEMPERATURE: 98 F | SYSTOLIC BLOOD PRESSURE: 110 MMHG | HEART RATE: 56 BPM

## 2018-05-31 PROBLEM — E11.9 TYPE 2 DIABETES MELLITUS WITHOUT COMPLICATIONS: Chronic | Status: ACTIVE | Noted: 2018-05-19

## 2018-05-31 PROBLEM — I10 ESSENTIAL (PRIMARY) HYPERTENSION: Chronic | Status: ACTIVE | Noted: 2018-05-19

## 2018-05-31 LAB
ALBUMIN SERPL ELPH-MCNC: 3.7 G/DL — SIGNIFICANT CHANGE UP (ref 3.3–5)
ALP SERPL-CCNC: 81 U/L — SIGNIFICANT CHANGE UP (ref 40–120)
ALT FLD-CCNC: 20 U/L — SIGNIFICANT CHANGE UP (ref 4–33)
AST SERPL-CCNC: 21 U/L — SIGNIFICANT CHANGE UP (ref 4–32)
BASE EXCESS BLDV CALC-SCNC: 1.9 MMOL/L — SIGNIFICANT CHANGE UP
BASOPHILS # BLD AUTO: 0.04 K/UL — SIGNIFICANT CHANGE UP (ref 0–0.2)
BASOPHILS NFR BLD AUTO: 0.4 % — SIGNIFICANT CHANGE UP (ref 0–2)
BILIRUB SERPL-MCNC: < 0.2 MG/DL — LOW (ref 0.2–1.2)
BLOOD GAS VENOUS - CREATININE: 0.87 MG/DL — SIGNIFICANT CHANGE UP (ref 0.5–1.3)
BUN SERPL-MCNC: 15 MG/DL — SIGNIFICANT CHANGE UP (ref 7–23)
CALCIUM SERPL-MCNC: 9.2 MG/DL — SIGNIFICANT CHANGE UP (ref 8.4–10.5)
CHLORIDE BLDV-SCNC: 105 MMOL/L — SIGNIFICANT CHANGE UP (ref 96–108)
CHLORIDE SERPL-SCNC: 98 MMOL/L — SIGNIFICANT CHANGE UP (ref 98–107)
CO2 SERPL-SCNC: 23 MMOL/L — SIGNIFICANT CHANGE UP (ref 22–31)
CREAT SERPL-MCNC: 0.98 MG/DL — SIGNIFICANT CHANGE UP (ref 0.5–1.3)
EOSINOPHIL # BLD AUTO: 0.25 K/UL — SIGNIFICANT CHANGE UP (ref 0–0.5)
EOSINOPHIL NFR BLD AUTO: 2.5 % — SIGNIFICANT CHANGE UP (ref 0–6)
GAS PNL BLDV: 135 MMOL/L — LOW (ref 136–146)
GLUCOSE BLDV-MCNC: 140 — HIGH (ref 70–99)
GLUCOSE SERPL-MCNC: 137 MG/DL — HIGH (ref 70–99)
HCO3 BLDV-SCNC: 25 MMOL/L — SIGNIFICANT CHANGE UP (ref 20–27)
HCT VFR BLD CALC: 27.6 % — LOW (ref 34.5–45)
HCT VFR BLDV CALC: 29.2 % — LOW (ref 34.5–45)
HGB BLD-MCNC: 8.8 G/DL — LOW (ref 11.5–15.5)
HGB BLDV-MCNC: 9.4 G/DL — LOW (ref 11.5–15.5)
IMM GRANULOCYTES # BLD AUTO: 0.08 # — SIGNIFICANT CHANGE UP
IMM GRANULOCYTES NFR BLD AUTO: 0.8 % — SIGNIFICANT CHANGE UP (ref 0–1.5)
LACTATE BLDV-MCNC: 1.6 MMOL/L — SIGNIFICANT CHANGE UP (ref 0.5–2)
LIDOCAIN IGE QN: 43 U/L — SIGNIFICANT CHANGE UP (ref 7–60)
LYMPHOCYTES # BLD AUTO: 4.63 K/UL — HIGH (ref 1–3.3)
LYMPHOCYTES # BLD AUTO: 47.2 % — HIGH (ref 13–44)
MCHC RBC-ENTMCNC: 23.8 PG — LOW (ref 27–34)
MCHC RBC-ENTMCNC: 31.9 % — LOW (ref 32–36)
MCV RBC AUTO: 74.6 FL — LOW (ref 80–100)
MONOCYTES # BLD AUTO: 0.47 K/UL — SIGNIFICANT CHANGE UP (ref 0–0.9)
MONOCYTES NFR BLD AUTO: 4.8 % — SIGNIFICANT CHANGE UP (ref 2–14)
NEUTROPHILS # BLD AUTO: 4.34 K/UL — SIGNIFICANT CHANGE UP (ref 1.8–7.4)
NEUTROPHILS NFR BLD AUTO: 44.3 % — SIGNIFICANT CHANGE UP (ref 43–77)
NRBC # FLD: 0 — SIGNIFICANT CHANGE UP
PCO2 BLDV: 53 MMHG — HIGH (ref 41–51)
PH BLDV: 7.34 PH — SIGNIFICANT CHANGE UP (ref 7.32–7.43)
PLATELET # BLD AUTO: 334 K/UL — SIGNIFICANT CHANGE UP (ref 150–400)
PMV BLD: 9.8 FL — SIGNIFICANT CHANGE UP (ref 7–13)
PO2 BLDV: < 24 MMHG — LOW (ref 35–40)
POTASSIUM BLDV-SCNC: 4.4 MMOL/L — SIGNIFICANT CHANGE UP (ref 3.4–4.5)
POTASSIUM SERPL-MCNC: 4.5 MMOL/L — SIGNIFICANT CHANGE UP (ref 3.5–5.3)
POTASSIUM SERPL-SCNC: 4.5 MMOL/L — SIGNIFICANT CHANGE UP (ref 3.5–5.3)
PROT SERPL-MCNC: 7.8 G/DL — SIGNIFICANT CHANGE UP (ref 6–8.3)
RBC # BLD: 3.7 M/UL — LOW (ref 3.8–5.2)
RBC # FLD: 13 % — SIGNIFICANT CHANGE UP (ref 10.3–14.5)
SAO2 % BLDV: 31.1 % — LOW (ref 60–85)
SODIUM SERPL-SCNC: 135 MMOL/L — SIGNIFICANT CHANGE UP (ref 135–145)
WBC # BLD: 9.81 K/UL — SIGNIFICANT CHANGE UP (ref 3.8–10.5)
WBC # FLD AUTO: 9.81 K/UL — SIGNIFICANT CHANGE UP (ref 3.8–10.5)

## 2018-05-31 PROCEDURE — 76705 ECHO EXAM OF ABDOMEN: CPT | Mod: 26

## 2018-05-31 PROCEDURE — 71046 X-RAY EXAM CHEST 2 VIEWS: CPT | Mod: 26

## 2018-05-31 RX ORDER — FAMOTIDINE 10 MG/ML
20 INJECTION INTRAVENOUS ONCE
Qty: 0 | Refills: 0 | Status: COMPLETED | OUTPATIENT
Start: 2018-05-31 | End: 2018-05-31

## 2018-05-31 RX ORDER — SODIUM CHLORIDE 9 MG/ML
1000 INJECTION INTRAMUSCULAR; INTRAVENOUS; SUBCUTANEOUS ONCE
Qty: 0 | Refills: 0 | Status: COMPLETED | OUTPATIENT
Start: 2018-05-31 | End: 2018-05-31

## 2018-05-31 RX ADMIN — SODIUM CHLORIDE 1000 MILLILITER(S): 9 INJECTION INTRAMUSCULAR; INTRAVENOUS; SUBCUTANEOUS at 01:00

## 2018-05-31 RX ADMIN — Medication 30 MILLILITER(S): at 01:14

## 2018-05-31 RX ADMIN — FAMOTIDINE 20 MILLIGRAM(S): 10 INJECTION INTRAVENOUS at 01:14

## 2018-05-31 NOTE — ED PROVIDER NOTE - PHYSICAL EXAMINATION
no LE edema, normal equal distal pulses.  abd: soft, minimal epigastric/ruq ttp, no rebound/guarding, neg murphys. no CVAT

## 2018-05-31 NOTE — ED ADULT NURSE NOTE - CHIEF COMPLAINT QUOTE
pt c/o epigastric pain radiating to the RUQ and upper back since being d/c on 5/22. sates she was having pain during admission (for UTI/PNA/Fever) and then it went away and returned after being home for a day. pt has taken Naproxen and Pantoprazole with very little relief. pt appears uncomfortable in triage. EKG to be completed.

## 2018-05-31 NOTE — ED ADULT NURSE NOTE - OBJECTIVE STATEMENT
Elvin RN: rita, amb with assistance, arrived to ed room Trauma A. pt complains of squeezing epigastric/RUQ pain radiating to back. Per son at bedside pt "was recently here for pneumonia, urinary tract infection. They did an angio of her heart but it was normal". CT showed no PNA per reports. Pt was evaluated by ed provider. 20G IV placed to L AC, labs drawn and sent to lab. NS infusing without s.s of infiltration. Pt appears comfortable. Respirations even and unlabored. Able to tolerate PO medications.  Pt transported to US. Rpt given to Primary RN.

## 2018-05-31 NOTE — ED PROVIDER NOTE - PROGRESS NOTE DETAILS
Klepfish: LAbs grossly wnl. US no acute pathology. Pt asymptomatic. Abd soft ntnd. Given copy of results, comfortable for dc, outpt pmd/gi f/u. recommended stopping naproxen and instead take tylenol prn for pain, continue home pantoprazole.

## 2018-05-31 NOTE — ED PROVIDER NOTE - OBJECTIVE STATEMENT
[place], prefers son to translate  58F PMH DM, HTN, recently dc'd ~1w ago, p/w lower midsternal/epigastric cp, "squeezing," radiating to RUQ, x1w, intermittent, occasionally worse w/ food, no alleviating factors. +Constipation, yesterday had a few drops of BRBPR but none today, no black stool. Denies f/c, SOB, NVD, urinary complaints. Taking occasional naproxen w/ minimal relief.   Pt recently admitted for similar pain. tx for UTI. Had cath w/ no CAD, echo grossly wnl, CT chest w/ no acute pathology.

## 2018-05-31 NOTE — ED PROVIDER NOTE - MEDICAL DECISION MAKING DETAILS
58F PMH DM, HTN, recently dc'd ~1w ago, p/w lower midsternal/epigastric cp, radiating to RUQ, x1w, intermittent, occasionally worse w/ food. Constipation. Recent cardiac w/u wnl. No other systemic symptoms. Vitals wnl, exam as above. EKG grossly wnl.   ddx: GERD/gastritis/PUD vs. juan. clinically not ACS, PE, tamponade, dissection, PTX, perf, myocarditis, mediastinitis.   CBC, cmp, vbg comp, lipase. IVF, symptom control. RUQ US. Reassess.

## 2018-10-30 NOTE — H&P ADULT - CONSTITUTIONAL DETAILS
Will discuss need for vicodin on appt 11/5.  Pt already taking tylenol with codeine   well-groomed/well-developed/no distress/well-nourished

## 2019-04-09 ENCOUNTER — EMERGENCY (EMERGENCY)
Facility: HOSPITAL | Age: 59
LOS: 1 days | Discharge: HOME | End: 2019-04-09
Attending: EMERGENCY MEDICINE | Admitting: EMERGENCY MEDICINE
Payer: MEDICAID

## 2019-04-09 VITALS
OXYGEN SATURATION: 100 % | RESPIRATION RATE: 16 BRPM | SYSTOLIC BLOOD PRESSURE: 139 MMHG | DIASTOLIC BLOOD PRESSURE: 70 MMHG | TEMPERATURE: 97 F | HEART RATE: 59 BPM

## 2019-04-09 PROCEDURE — 99284 EMERGENCY DEPT VISIT MOD MDM: CPT

## 2019-04-09 NOTE — ED PROVIDER NOTE - OBJECTIVE STATEMENT
Patient states to have had intermittent headaches for more than one year; has seen PMD a few times who only prescribed Tylenol; patient states headache is worsening in the past 6 days.

## 2019-04-09 NOTE — ED ADULT TRIAGE NOTE - CHIEF COMPLAINT QUOTE
Pt. c/o severe headache x 1 year; worst over the last 7 days acc with neck and L ear pain; Tylenol & Motrin given today not helping. Denies visual changes.

## 2019-04-10 PROCEDURE — 70450 CT HEAD/BRAIN W/O DYE: CPT | Mod: 26

## 2019-04-10 RX ORDER — ACETAMINOPHEN 500 MG
650 TABLET ORAL ONCE
Qty: 0 | Refills: 0 | Status: COMPLETED | OUTPATIENT
Start: 2019-04-10 | End: 2019-04-10

## 2019-04-10 RX ADMIN — Medication 650 MILLIGRAM(S): at 00:49

## 2019-09-02 ENCOUNTER — EMERGENCY (EMERGENCY)
Facility: HOSPITAL | Age: 59
LOS: 1 days | Discharge: HOME | End: 2019-09-02
Attending: EMERGENCY MEDICINE | Admitting: EMERGENCY MEDICINE
Payer: MEDICAID

## 2019-09-02 VITALS
SYSTOLIC BLOOD PRESSURE: 123 MMHG | OXYGEN SATURATION: 99 % | TEMPERATURE: 97 F | RESPIRATION RATE: 16 BRPM | HEART RATE: 63 BPM | DIASTOLIC BLOOD PRESSURE: 59 MMHG

## 2019-09-02 PROCEDURE — 72020 X-RAY EXAM OF SPINE 1 VIEW: CPT | Mod: 26

## 2019-09-02 PROCEDURE — 93971 EXTREMITY STUDY: CPT | Mod: 26,LT

## 2019-09-02 PROCEDURE — 99284 EMERGENCY DEPT VISIT MOD MDM: CPT

## 2019-09-02 RX ORDER — ACETAMINOPHEN 500 MG
975 TABLET ORAL ONCE
Refills: 0 | Status: COMPLETED | OUTPATIENT
Start: 2019-09-02 | End: 2019-09-02

## 2019-09-02 RX ORDER — LIDOCAINE 4 G/100G
1 CREAM TOPICAL ONCE
Refills: 0 | Status: COMPLETED | OUTPATIENT
Start: 2019-09-02 | End: 2019-09-02

## 2019-09-02 RX ADMIN — Medication 975 MILLIGRAM(S): at 13:13

## 2019-09-02 RX ADMIN — Medication 1 TABLET(S): at 13:19

## 2019-09-02 RX ADMIN — LIDOCAINE 1 PATCH: 4 CREAM TOPICAL at 13:13

## 2019-09-02 NOTE — ED PROVIDER NOTE - PROGRESS NOTE DETAILS
Trace PGY1 - Discussed results w/ pt.  She is feeling better and is agreeable to d/c w/ good f/u and strict return precautions.

## 2019-09-02 NOTE — ED PROVIDER NOTE - NS ED ROS FT
General: denies fever, chills, weight loss/weight gain.  HEENT: +ear pain, difficulty hearing; denies nasal congestion, sore throat, rhinorrhea  Eyes: denies visual changes, blurred vision, eye discharge, eye redness  Neck: denies neck pain, neck swelling  CV: denies chest pain, palpitations  Resp: denies difficulty breathing, cough  Abdominal: denies nausea, vomiting, diarrhea, abdominal pain, blood in stool, dark stool  MSK: +RLE pain, +back pain; denies muscle aches, bony pain, leg swelling  Neuro: denies headaches, numbness, tingling, dizziness, lightheadedness.  Skin: denies rashes, cuts, bruises  Hematologic: denies unexplained bruises

## 2019-09-02 NOTE — ED PROVIDER NOTE - NSFOLLOWUPINSTRUCTIONS_ED_ALL_ED_FT
Rest, drink plenty of fluids.  Advance activity as tolerated.  Continue all previously prescribed medications as directed.  Follow up with your primary care physician in 48-72 hours- bring copies of your results.  Return to the ER for worsening or persistent symptoms, and/or ANY NEW OR CONCERNING SYMPTOMS. If you have issues obtaining follow up, please call: [phone] to obtain a doctor or specialist who takes your insurance in your area.    For the earwax, you can purchase debrox ear drops, which can be found in any major pharmacy and bought without a prescription.  Follow the instructions on the bottle and use accordingly.  Please follow with your primary care doctor within 2-3 days and bring copies of your results.  As your doctor about the need to get a pain management doctor as well.

## 2019-09-02 NOTE — ED PROVIDER NOTE - ATTENDING CONTRIBUTION TO CARE
Dr. Lopez: 60 yo female with HTN, HLD, DM, in ED with bilateral ear pain for a few days, R>L.  Also complaining of chronic low back pain and chronic right LE pain.  No sore throat or fever.  On exam pt overall well appearing and in NAD, heart RRR, lungs CTAB, abd NTND, extremities without swelling and nontender to palpation, strength 5/5 in all extremities and skin without rash.  Bilateral mild paraspinal low back TTP.  Throat patent and clear.  Right ear distal canal with erythema, TM difficult to visualize but light reflex intact.  Left ear normal.  No mastoid TTP bilaterally.

## 2019-09-02 NOTE — ED PROVIDER NOTE - PHYSICAL EXAMINATION
GENERAL: Patient awake alert NAD.  HEENT: Erythematous right ear with ear wax, unremarkable left ear  LUNGS: CTAB, no wheezes or crackles.   CARDIAC: RRR, no m/r/g.    ABDOMEN: Soft, NT, ND, No rebound, guarding. No CVA tenderness.   EXT: No edema. No calf tenderness. CV 2+DP/PT bilaterally.   MSK: +spinal tenderness, leg pain with movement, no deformities.  NEURO: A&Ox3. Moving all extremities.  SKIN: Warm and dry. No rash.  PSYCH: Normal affect. GENERAL: Patient awake alert NAD.  HEENT: Erythematous right ear with ear wax, unremarkable left ear  LUNGS: CTAB, no wheezes or crackles.   CARDIAC: RRR, no m/r/g.    ABDOMEN: Soft, NT, ND, No rebound, guarding. No CVA tenderness.   EXT: No edema. No calf tenderness. CV 2+DP/PT bilaterally.   MSK: +paraspinal tenderness, leg pain with movement, no deformities.  NEURO: A&Ox3. Moving all extremities.  SKIN: Warm and dry. No rash.  PSYCH: Normal affect.

## 2019-09-02 NOTE — ED PROVIDER NOTE - PATIENT PORTAL LINK FT
You can access the FollowMyHealth Patient Portal offered by [facility] by registering at the following website: [email or web address]. By joining [facility]’s FollowMyHealth portal, you will also be able to view your health information using other applications (apps) compatible with our system.

## 2019-09-02 NOTE — ED PROVIDER NOTE - OBJECTIVE STATEMENT
Pt. is a 59 y.o. F w/ pmhx of HTN, HLD, DM p/w right ear pain and gradual decrease in hearing, RLE pain that has been going on for weeks, and some back pain that is chronic.  Pt.'s two sons at bedside translated, as pt. is Hungarian speaking.  Pt. told her sons that the ear pain started after she got some water in her ear while showering.  The pain is worse when chewing food and it has become progressively more difficult for her to hear.  Pt. also has RLE pain that is worse when she walks, and also a chronic back pain that hasn't worsened acutely.  No fevers, chills, CP, SOB, n/v/d, abdominal tenderness, dysuria, hematuria, melena, or hematochezia. Pt. is a 59 y.o. F w/ pmhx of HTN, HLD, DM p/w right ear pain and gradual decrease in hearing, RLE pain that has been going on for weeks, and some back pain that is chronic.  Pt.'s two sons at bedside translated, as pt. is Romanian speaking and requesting them to translate.  Pt. told her sons that the ear pain started after she got some water in her ear while showering.  The pain is worse when chewing food and it has become progressively more difficult for her to hear.  Pt. also has RLE pain that is worse when she walks, and also a chronic back pain that hasn't worsened acutely.  No fevers, chills, CP, SOB, n/v/d, abdominal tenderness, dysuria, hematuria, melena, or hematochezia.

## 2019-09-02 NOTE — ED PROVIDER NOTE - CLINICAL SUMMARY MEDICAL DECISION MAKING FREE TEXT BOX
Pt. is a 59F w/ hx of dm, htn, hld p/w right ear pain, chronic back pain, and chronic RLE pain.  Will start pt. on augmentin for the otitis media.  will obtain us of RLE and xray of thoracolumbar region.  Likely d/c if pt. is feeling better.

## 2019-10-01 ENCOUNTER — OUTPATIENT (OUTPATIENT)
Dept: OUTPATIENT SERVICES | Facility: HOSPITAL | Age: 59
LOS: 1 days | End: 2019-10-01
Payer: MEDICAID

## 2019-10-01 PROCEDURE — G9001: CPT

## 2019-10-07 DIAGNOSIS — Z71.89 OTHER SPECIFIED COUNSELING: ICD-10-CM

## 2020-01-01 NOTE — ED PROVIDER NOTE - CARE PLAN
[facility] called by KYLER Erazo due to mothers Utox positive for Cannibinoids. Case was taken by [facility] - "Zari HUYNH" ID# [id number].   Local Assigned [facility]  - Marcelle Dominguez. Work # [phone] Work cell # [phone]. Principal Discharge DX:	Abdominal pain

## 2020-04-13 ENCOUNTER — TRANSCRIPTION ENCOUNTER (OUTPATIENT)
Age: 60
End: 2020-04-13

## 2020-05-22 ENCOUNTER — INPATIENT (INPATIENT)
Facility: HOSPITAL | Age: 60
LOS: 3 days | Discharge: ANOTHER HOSPITAL | End: 2020-05-26
Attending: INTERNAL MEDICINE | Admitting: INTERNAL MEDICINE
Payer: MEDICAID

## 2020-05-22 VITALS
DIASTOLIC BLOOD PRESSURE: 57 MMHG | TEMPERATURE: 100 F | HEART RATE: 85 BPM | RESPIRATION RATE: 16 BRPM | OXYGEN SATURATION: 96 % | SYSTOLIC BLOOD PRESSURE: 122 MMHG

## 2020-05-22 DIAGNOSIS — D61.818 OTHER PANCYTOPENIA: ICD-10-CM

## 2020-05-22 DIAGNOSIS — E11.9 TYPE 2 DIABETES MELLITUS WITHOUT COMPLICATIONS: ICD-10-CM

## 2020-05-22 DIAGNOSIS — C95.00 ACUTE LEUKEMIA OF UNSPECIFIED CELL TYPE NOT HAVING ACHIEVED REMISSION: ICD-10-CM

## 2020-05-22 DIAGNOSIS — R09.89 OTHER SPECIFIED SYMPTOMS AND SIGNS INVOLVING THE CIRCULATORY AND RESPIRATORY SYSTEMS: ICD-10-CM

## 2020-05-22 DIAGNOSIS — Z02.9 ENCOUNTER FOR ADMINISTRATIVE EXAMINATIONS, UNSPECIFIED: ICD-10-CM

## 2020-05-22 DIAGNOSIS — Z79.899 OTHER LONG TERM (CURRENT) DRUG THERAPY: ICD-10-CM

## 2020-05-22 DIAGNOSIS — U07.1 COVID-19: ICD-10-CM

## 2020-05-22 DIAGNOSIS — D70.9 NEUTROPENIA, UNSPECIFIED: ICD-10-CM

## 2020-05-22 LAB
ALBUMIN SERPL ELPH-MCNC: 4.3 G/DL — SIGNIFICANT CHANGE UP (ref 3.3–5)
ALP SERPL-CCNC: 66 U/L — SIGNIFICANT CHANGE UP (ref 40–120)
ALT FLD-CCNC: 9 U/L — SIGNIFICANT CHANGE UP (ref 4–33)
ANION GAP SERPL CALC-SCNC: 13 MMO/L — SIGNIFICANT CHANGE UP (ref 7–14)
ANISOCYTOSIS BLD QL: SLIGHT — SIGNIFICANT CHANGE UP
APTT BLD: 29.8 SEC — SIGNIFICANT CHANGE UP (ref 27.5–36.3)
AST SERPL-CCNC: 11 U/L — SIGNIFICANT CHANGE UP (ref 4–32)
AUER BODIES BLD QL SMEAR: PRESENT — SIGNIFICANT CHANGE UP
BASE EXCESS BLDV CALC-SCNC: 1 MMOL/L — SIGNIFICANT CHANGE UP
BASOPHILS # BLD AUTO: 0 K/UL — SIGNIFICANT CHANGE UP (ref 0–0.2)
BASOPHILS NFR BLD AUTO: 0 % — SIGNIFICANT CHANGE UP (ref 0–2)
BASOPHILS NFR SPEC: 0 % — SIGNIFICANT CHANGE UP (ref 0–2)
BILIRUB SERPL-MCNC: 0.3 MG/DL — SIGNIFICANT CHANGE UP (ref 0.2–1.2)
BLASTS # FLD: 6 % — CRITICAL HIGH (ref 0–0)
BLD GP AB SCN SERPL QL: NEGATIVE — SIGNIFICANT CHANGE UP
BLOOD GAS VENOUS - CREATININE: 0.78 MG/DL — SIGNIFICANT CHANGE UP (ref 0.5–1.3)
BLOOD GAS VENOUS - FIO2: 21 — SIGNIFICANT CHANGE UP
BUN SERPL-MCNC: 11 MG/DL — SIGNIFICANT CHANGE UP (ref 7–23)
CALCIUM SERPL-MCNC: 9.6 MG/DL — SIGNIFICANT CHANGE UP (ref 8.4–10.5)
CHLORIDE BLDV-SCNC: 105 MMOL/L — SIGNIFICANT CHANGE UP (ref 96–108)
CHLORIDE SERPL-SCNC: 102 MMOL/L — SIGNIFICANT CHANGE UP (ref 98–107)
CO2 SERPL-SCNC: 26 MMOL/L — SIGNIFICANT CHANGE UP (ref 22–31)
CREAT SERPL-MCNC: 0.78 MG/DL — SIGNIFICANT CHANGE UP (ref 0.5–1.3)
CRP SERPL-MCNC: 83.4 MG/L — HIGH
D DIMER BLD IA.RAPID-MCNC: 4083 NG/ML — SIGNIFICANT CHANGE UP
EOSINOPHIL # BLD AUTO: 0 K/UL — SIGNIFICANT CHANGE UP (ref 0–0.5)
EOSINOPHIL NFR BLD AUTO: 0 % — SIGNIFICANT CHANGE UP (ref 0–6)
EOSINOPHIL NFR FLD: 0 % — SIGNIFICANT CHANGE UP (ref 0–6)
FERRITIN SERPL-MCNC: 1095 NG/ML — HIGH (ref 15–150)
GAS PNL BLDV: 141 MMOL/L — SIGNIFICANT CHANGE UP (ref 136–146)
GLUCOSE BLDV-MCNC: 155 MG/DL — HIGH (ref 70–99)
GLUCOSE SERPL-MCNC: 171 MG/DL — HIGH (ref 70–99)
HCO3 BLDV-SCNC: 25 MMOL/L — SIGNIFICANT CHANGE UP (ref 20–27)
HCT VFR BLD CALC: 18.9 % — CRITICAL LOW (ref 34.5–45)
HCT VFR BLDV CALC: 20.6 % — CRITICAL LOW (ref 34.5–45)
HGB BLD-MCNC: 6.2 G/DL — CRITICAL LOW (ref 11.5–15.5)
HGB BLDV-MCNC: 6.6 G/DL — CRITICAL LOW (ref 11.5–15.5)
HYPOCHROMIA BLD QL: SLIGHT — SIGNIFICANT CHANGE UP
IMM GRANULOCYTES NFR BLD AUTO: 8.7 % — HIGH (ref 0–1.5)
INR BLD: 1.3 — HIGH (ref 0.88–1.17)
LACTATE BLDV-MCNC: 1.5 MMOL/L — SIGNIFICANT CHANGE UP (ref 0.5–2)
LYMPHOCYTES # BLD AUTO: 1.05 K/UL — SIGNIFICANT CHANGE UP (ref 1–3.3)
LYMPHOCYTES # BLD AUTO: 65.2 % — HIGH (ref 13–44)
LYMPHOCYTES NFR SPEC AUTO: 84 % — HIGH (ref 13–44)
MANUAL SMEAR VERIFICATION: SIGNIFICANT CHANGE UP
MCHC RBC-ENTMCNC: 25.6 PG — LOW (ref 27–34)
MCHC RBC-ENTMCNC: 32.8 % — SIGNIFICANT CHANGE UP (ref 32–36)
MCV RBC AUTO: 78.1 FL — LOW (ref 80–100)
MICROCYTES BLD QL: SLIGHT — SIGNIFICANT CHANGE UP
MONOCYTES # BLD AUTO: 0.32 K/UL — SIGNIFICANT CHANGE UP (ref 0–0.9)
MONOCYTES NFR BLD AUTO: 19.9 % — HIGH (ref 2–14)
MONOCYTES NFR BLD: 0 % — LOW (ref 2–9)
NEUTROPHIL AB SER-ACNC: 8 % — LOW (ref 43–77)
NEUTROPHILS # BLD AUTO: 0.1 K/UL — LOW (ref 1.8–7.4)
NEUTROPHILS NFR BLD AUTO: 6.2 % — LOW (ref 43–77)
NRBC # BLD: 0 /100WBC — SIGNIFICANT CHANGE UP
NRBC # FLD: 0.02 K/UL — SIGNIFICANT CHANGE UP (ref 0–0)
NRBC FLD-RTO: 1.2 — SIGNIFICANT CHANGE UP
PCO2 BLDV: 45 MMHG — SIGNIFICANT CHANGE UP (ref 41–51)
PH BLDV: 7.37 PH — SIGNIFICANT CHANGE UP (ref 7.32–7.43)
PLATELET # BLD AUTO: 2 K/UL — CRITICAL LOW (ref 150–400)
PLATELET COUNT - ESTIMATE: SIGNIFICANT CHANGE UP
PMV BLD: 10.4 FL — SIGNIFICANT CHANGE UP (ref 7–13)
PO2 BLDV: 29 MMHG — LOW (ref 35–40)
POIKILOCYTOSIS BLD QL AUTO: SLIGHT — SIGNIFICANT CHANGE UP
POTASSIUM BLDV-SCNC: 3.9 MMOL/L — SIGNIFICANT CHANGE UP (ref 3.4–4.5)
POTASSIUM SERPL-MCNC: 4.2 MMOL/L — SIGNIFICANT CHANGE UP (ref 3.5–5.3)
POTASSIUM SERPL-SCNC: 4.2 MMOL/L — SIGNIFICANT CHANGE UP (ref 3.5–5.3)
PROCALCITONIN SERPL-MCNC: 0.06 NG/ML — SIGNIFICANT CHANGE UP (ref 0.02–0.1)
PROMYELOCYTES # FLD: 2 % — CRITICAL HIGH (ref 0–0)
PROT SERPL-MCNC: 7.8 G/DL — SIGNIFICANT CHANGE UP (ref 6–8.3)
PROTHROM AB SERPL-ACNC: 14.9 SEC — HIGH (ref 9.8–13.1)
RBC # BLD: 2.42 M/UL — LOW (ref 3.8–5.2)
RBC # FLD: 14.5 % — SIGNIFICANT CHANGE UP (ref 10.3–14.5)
REVIEW TO FOLLOW: YES — SIGNIFICANT CHANGE UP
RH IG SCN BLD-IMP: POSITIVE — SIGNIFICANT CHANGE UP
SAO2 % BLDV: 43.4 % — LOW (ref 60–85)
SODIUM SERPL-SCNC: 141 MMOL/L — SIGNIFICANT CHANGE UP (ref 135–145)
TROPONIN T, HIGH SENSITIVITY: 7 NG/L — SIGNIFICANT CHANGE UP (ref ?–14)
WBC # BLD: 1.61 K/UL — LOW (ref 3.8–10.5)
WBC # FLD AUTO: 1.61 K/UL — LOW (ref 3.8–10.5)

## 2020-05-22 PROCEDURE — 93010 ELECTROCARDIOGRAM REPORT: CPT

## 2020-05-22 PROCEDURE — 71045 X-RAY EXAM CHEST 1 VIEW: CPT | Mod: 26

## 2020-05-22 PROCEDURE — 70450 CT HEAD/BRAIN W/O DYE: CPT | Mod: 26

## 2020-05-22 PROCEDURE — 85060 BLOOD SMEAR INTERPRETATION: CPT

## 2020-05-22 RX ORDER — SODIUM CHLORIDE 9 MG/ML
1000 INJECTION INTRAMUSCULAR; INTRAVENOUS; SUBCUTANEOUS
Refills: 0 | Status: DISCONTINUED | OUTPATIENT
Start: 2020-05-22 | End: 2020-05-25

## 2020-05-22 RX ORDER — ACETAMINOPHEN 500 MG
975 TABLET ORAL ONCE
Refills: 0 | Status: COMPLETED | OUTPATIENT
Start: 2020-05-22 | End: 2020-05-22

## 2020-05-22 RX ORDER — TRETINOIN 10 MG/1
40 CAPSULE, LIQUID FILLED ORAL EVERY 12 HOURS
Refills: 0 | Status: COMPLETED | OUTPATIENT
Start: 2020-05-22 | End: 2020-05-23

## 2020-05-22 RX ORDER — CEFEPIME 1 G/1
2000 INJECTION, POWDER, FOR SOLUTION INTRAMUSCULAR; INTRAVENOUS ONCE
Refills: 0 | Status: COMPLETED | OUTPATIENT
Start: 2020-05-22 | End: 2020-05-22

## 2020-05-22 RX ADMIN — Medication 975 MILLIGRAM(S): at 17:31

## 2020-05-22 RX ADMIN — CEFEPIME 100 MILLIGRAM(S): 1 INJECTION, POWDER, FOR SOLUTION INTRAMUSCULAR; INTRAVENOUS at 18:46

## 2020-05-22 NOTE — H&P ADULT - NSHPPHYSICALEXAM_GEN_ALL_CORE
Vital Signs Last 24 Hrs  T(C): 36.2 (22 May 2020 22:25), Max: 37.7 (22 May 2020 15:19)  T(F): 97.2 (22 May 2020 22:25), Max: 99.9 (22 May 2020 15:19)  HR: 70 (22 May 2020 22:25) (65 - 85)  BP: 121/56 (22 May 2020 22:25) (104/50 - 122/57)  BP(mean): --  RR: 18 (22 May 2020 22:25) (16 - 20)  SpO2: 99% (22 May 2020 22:25) (96% - 100%)    PHYSICAL EXAM:  GENERAL: NAD, well-developed  HEAD:  Atraumatic, Normocephalic  EYES: EOMI, PERRLA, conjunctiva and sclera clear  NECK: Supple, No JVD  CHEST/LUNG: Clear to auscultation bilaterally; No wheeze  HEART: Regular rate and rhythm; No murmurs, rubs, or gallops  ABDOMEN: Soft, Nontender, Nondistended; Bowel sounds present  EXTREMITIES:  2+ Peripheral Pulses, No clubbing, cyanosis, or edema  PSYCH: AAOx3  NEUROLOGY: non-focal  SKIN: No rashes or lesions Vital Signs Last 24 Hrs  T(C): 36.2 (22 May 2020 22:25), Max: 37.7 (22 May 2020 15:19)  T(F): 97.2 (22 May 2020 22:25), Max: 99.9 (22 May 2020 15:19)  HR: 70 (22 May 2020 22:25) (65 - 85)  BP: 121/56 (22 May 2020 22:25) (104/50 - 122/57)  BP(mean): --  RR: 18 (22 May 2020 22:25) (16 - 20)  SpO2: 99% (22 May 2020 22:25) (96% - 100%)    PHYSICAL EXAM:  GENERAL: NAD, well-developed, resting comfortably in bed  HEAD:  Atraumatic, Normocephalic  EYES: EOMI, PERRLA, conjunctiva and sclera clear  NECK: Supple, No JVD  CHEST/LUNG: Clear to auscultation bilaterally; No wheeze  HEART: Regular rate and rhythm; No murmurs, rubs, or gallops  ABDOMEN: Soft, Nontender, Nondistended; Bowel sounds present  EXTREMITIES:  2+ Peripheral Pulses, No clubbing, cyanosis, or edema  PSYCH: AAOx3  NEUROLOGY: non-focal  SKIN: petechial rash on lower extremity

## 2020-05-22 NOTE — ED PROVIDER NOTE - NOTES
I d/w Heme fellows and reviewwed all labs incl Blasts, neutropenia, debra rods, etc.  Fellow will see pt tonight.

## 2020-05-22 NOTE — ED PROVIDER NOTE - CRITICAL CARE PROVIDED
direct patient care (not related to procedure)/interpretation of diagnostic studies/consultation with other physicians/additional history taking/consult w/ pt's family directly relating to pts condition/documentation

## 2020-05-22 NOTE — ED ADULT NURSE NOTE - CHIEF COMPLAINT QUOTE
Patient Malay speaking, info from her son. Patient tested covid positive since 4/15, c/o continued with fevers, cough, body pain and shaking chills.   Shamim patient son.

## 2020-05-22 NOTE — H&P ADULT - PROBLEM SELECTOR PLAN 1
Patient presenting with new onset pancytopenia with peripheral blasts; Peripheral smear showing no signs of schistocytes, positive for blasts  - Hematology consulted in the ED. Appreciate recs.  - check daily coags, fibrinogen and d-dimer, tumor lysis labs (uric acid, bmp, mag, phos)  - Ordered for 1 unit of packed RBCs and 1 unit of platelets given low suspicion for DIC as coags are normal and no schistocytes on smear.  - Plan for bone marrow biopsy on Tuesday per Hematology

## 2020-05-22 NOTE — H&P ADULT - NSHPLABSRESULTS_GEN_ALL_CORE
Telephone Encounter by Gracia Ramirez CMA at 08/07/17 01:43 PM     Author:  Gracia Ramirez CMA Service:  (none) Author Type:  Certified Medical Assistant     Filed:  08/07/17 01:44 PM Encounter Date:  8/6/2017 Status:  Signed     :  Gracia Ramirez CMA (Certified Medical Assistant)            Refilled per medication protocol.[HS1.1T]      Revision History        User Key Date/Time User Provider Type Action    > HS1.1 08/07/17 01:44 PM Gracia Ramirez CMA Certified Medical Assistant Sign    T - Template            
The Labs were reviewed by me   The Radiology was reviewed by me    EKG tracing reviewed by me    05-22    141  |  102  |  11  ----------------------------<  171<H>  4.2   |  26  |  0.78    Ca    9.6      22 May 2020 17:25    TPro  7.8  /  Alb  4.3  /  TBili  0.3  /  DBili  x   /  AST  11  /  ALT  9   /  AlkPhos  66  05-22          PT/INR - ( 22 May 2020 17:25 )   PT: 14.9 SEC;   INR: 1.30          PTT - ( 22 May 2020 17:25 )  PTT:29.8 SEC                                        6.2    1.61  )-----------( 2        ( 22 May 2020 17:25 )             18.9     CAPILLARY BLOOD GLUCOSE

## 2020-05-22 NOTE — CONSULT NOTE ADULT - ATTENDING COMMENTS
Pt seen at bedside. Has petechial rash over BLE but no areas or large ecchymoses. Peripheral smear reviewed: blasts and promyelocytes noted with 2 pros with debra rods. Fibrinogen is WNL. Keep plt count above 10 K/microL. She is on Atra for possible APL. Awaiting flow cytometry. Will plan for bone marrow biopsy on Tuesday.

## 2020-05-22 NOTE — ED PROVIDER NOTE - PROGRESS NOTE DETAILS
Lewis Calle D.O., PGY1 (Resident)  Received call from colleague of patient's nephrologist (presumed CKD?) Dr. Brewer would said if patient needs to be admitted, they prefer [facility] and Dr. Barney Ryan. Lewis Calle D.O., PGY1 (Resident)  Received call from colleague of patient's nephrologist (presumed CKD?) Dr. Thomas? would said if patient needs to be admitted, they prefer [facility] and Dr. Barney Ryan. Lewis Calle D.O., PGY1 (Resident)  Patient pancytopenic (severe neutropenia). Will give empiric cefepime. Add on fibrinogen, ldh, haptoglobin to eval for MAHA.  Pending CT head as platelet count 2000 and patient was complaining of head warmness but due to language barrier and patient's hearing difficulty, could be headache? Will scan for intracranial bleeding. If positive, will likely transufse plts. Nila-PGY1: pt received at sign-out, seen and evaluated at bedside.  Pt sitting comfortably in NAD. LM with [employer] answering service re: admission, awaiting callback.

## 2020-05-22 NOTE — H&P ADULT - NSHPREVIEWOFSYSTEMS_GEN_ALL_CORE
REVIEW OF SYSTEMS:    CONSTITUTIONAL: +Weakness, + Fevers + Chills  EYES/ENT: No visual changes;  No vertigo or throat pain   NECK: No pain or stiffness  RESPIRATORY: + Pleuritic chest pain + Shortness of breath No cough, wheezing, hemoptysis; No shortness of breath  CARDIOVASCULAR: No chest pain or palpitations  GASTROINTESTINAL: No abdominal or epigastric pain. No nausea, vomiting, or hematemesis; No diarrhea or constipation. No melena or hematochezia.  GENITOURINARY: No dysuria, frequency or hematuria  NEUROLOGICAL: +Headaches; No numbness or weakness  SKIN: +Rash on lower extremeties; No itching, rashes

## 2020-05-22 NOTE — ED ADULT TRIAGE NOTE - CHIEF COMPLAINT QUOTE
Patient Luxembourgish speaking, info from her son. Patient tested covid positive since 4/15, c/o continued with fevers, cough, body pain and shaking chills.   Shamim patient son.

## 2020-05-22 NOTE — ED PROVIDER NOTE - OBJECTIVE STATEMENT
60 y.o. female hx of HTN on atenolol, HLD, DM on metformin presents to the ED for multiple medical complaints. Patient deferred free translation and opted for son to translate. As per son, patient was hypothermic to 92 degrees intermittently for the past 2 days but is generally febrile to 101-102F which improves with tylenol. Patient has been complaining of head feeling hot, pleuritic chest pain that moves around w/o numbness tingling, paresthesias, weakness, a rash on bilateral LE x1 month, a tear drop clearing on her tongue, as well as a few drops of blood in her stool with hard bowel movements. Patient has been having poor PO intake for the past 3 days 2/2 throat pain. Denies leg pain or swelling, abdominal pain, nausea, vomiting, diarrhea, back pain. Patient was COVID + on 4/16/2020, and retested and positive again 5/15/2020.    SON: Cleo Méndez [phone]  PCP: Dr. Elisa Zamora

## 2020-05-22 NOTE — ED PROVIDER NOTE - NS ED ROS FT
CONSTITUTIONAL: FEVERS, CHILLS. No fatigue, dizziness, lightheadedness weakness  HEENT: SORE THROAT, NASAL CONGESTION. No loss of vision, double vision, blurry vision  CV: CHEST PAIN. No palpitations  PULM: No cough, shortness of breath  GI: CONSTIPATION, DROPS OF BLOOD IN STOOL. No abdominal pain, nausea, vomiting, diarrhea  : No dysuria, polyuria, hematuria  SKIN: RASH ON LEGS. No swelling  MSK: No muscle aches, joint aches  NEURO: HEADACHE. no paresthesias

## 2020-05-22 NOTE — ED PROVIDER NOTE - CARE PLAN
Principal Discharge DX:	Pancytopenia  Secondary Diagnosis:	Neutropenic fever Principal Discharge DX:	Pancytopenia  Secondary Diagnosis:	Neutropenic fever  Secondary Diagnosis:	COVID-19 virus infection

## 2020-05-22 NOTE — H&P ADULT - PROBLEM SELECTOR PLAN 4
- COVID-19 sent. Follow up PCR  - Contact and Droplet precautions  - Please keep in an isolated room.

## 2020-05-22 NOTE — ED ADULT NURSE REASSESSMENT NOTE - CONDITION
No acute complaints. Eating dinner at this time resting comfortably in stretcher. No acute complaints/unchanged

## 2020-05-22 NOTE — H&P ADULT - PROBLEM SELECTOR PLAN 2
Pt with Smear showing blast cells, granulocytes (concerning for myeloblasts),   - ATRA ordered by Hematology team  - Flow cytometry  - Daily Tumor lysis labs (uric acid, bmp, mag, phos), coag, fibrinogen, and d-dimer  - Would give rasburicase 3 mg IV x1 if uric acid >8.0

## 2020-05-22 NOTE — H&P ADULT - HISTORY OF PRESENT ILLNESS
Patient is a 59 yo female with a hx of HTN and DM to ED for fevers. Per family, patient has had a low temperature to 92 for the past few days, but has also been spiking fevers (Tmax 101-102F). Of note, patient has been taking tylenol for fevers. Patient also notes pleuritic chest pain but denies shortness of breath. Of note, patient was recently COVID + on 4/16 and retested on 5/15 positive. Patient is a 59 yo female with a hx of HTN and DM to ED for fevers. Per family, patient has had a low temperature to 92 for the past few days, but has also been spiking fevers (Tmax 101-102F). Of note, patient has been taking tylenol for fevers. Patient also notes pleuritic chest pain but denies shortness of breath. Patient complaining of decreased po intake over the past few days as well as dry mouth and throat pain.  Of note, patient was recently COVID + on 4/16 and retested on 5/15 positive.    In the ED, her VS were stable and she had been afebrile. Patient was found to be pancytopenic with elevated d-dimers and fibrinogen, and ferritin. She had a CXR that was clear.

## 2020-05-22 NOTE — H&P ADULT - PROBLEM SELECTOR PLAN 3
- Treat empirically with Vancomycin/Cefepime for now  - Sent blood cultures - Sent blood cultures  - s/p Cefepime in the ED.  - Will monitor off antibiotics for now as suspect fevers are likely 2/2 to leukemia and COVID-19  - Follow up COVID-19

## 2020-05-22 NOTE — H&P ADULT - PROBLEM SELECTOR PLAN 6
Per chart review, patient previously on metformin at home  - Check a1c  - SSI for now  - Adjust insulin regimen as needed

## 2020-05-22 NOTE — H&P ADULT - PROBLEM SELECTOR PLAN 7
Diet: Carb consistent diet  DVT Prophylaxis: Contraindicated i/s/o thrombocytopenia  Dispo: PT Consulted

## 2020-05-22 NOTE — H&P ADULT - PROBLEM SELECTOR PLAN 5
Patient and son unaware of medications. Family to bring in prescription list in the am. Primary team to follow up medication reconciliation

## 2020-05-22 NOTE — ED PROVIDER NOTE - CLINICAL SUMMARY MEDICAL DECISION MAKING FREE TEXT BOX
60 y.o. female here for multiple medical complaints consistent with continued COVID infection. Febrile but not tachycardic, hypoxic, tachypneic but patient is on atenolol. Exam with central clearing on tongue, petechial rash on legs x1 month. Patient overall appears in no acute distress at present time but given symptoms, concern for PE given chest pain vs PNA vs TTP vs ITP, vs liver disease. Will get COVID labs including blood cultures, CXR, give tylenol, and reassess. 60 y.o. female here for multiple medical complaints consistent with continued COVID infection. Febrile but VS otherwise WNL. Exam with central clearing on tongue, petechial rash on legs x1 month. Patient overall appears in no acute distress at present time. Will send labs and reassess.

## 2020-05-22 NOTE — H&P ADULT - ASSESSMENT
59 yo female with a hx of HTN and DM admitted to medicine for pancytopenia concerning for acute leukemia.

## 2020-05-22 NOTE — ED PROVIDER NOTE - PHYSICAL EXAMINATION
GENERAL: middl aged female, lying in bed, NAD. Not hypoxic, tachypneic, tachycardic. Vital signs are within normal limits  HEENT: NC/AT, conjunctiva noninjected and sclera anicteric, moist mucous membranes. Hard of hearing (only can hear through left year. Tear drop clearing on middle tongue. No oropharyngeal exudates  NECK: Supple, trachea midline, no tonsillar swelling, LAD  LUNG: CTAB, no w/r/r appreciated  CV: RRR, no m/r/g appreciated, Pulses- Radial: 2+ b/l; posterior tibial: 2+ b/l; dorsalis pedis: 2+ b/l  ABDOMEN: Soft, NTND, no rebound or guarding  BACK: No midline spinal tenderness or step-offs. No paraspinal tenderness to palpation  MSK: No edema, no visible deformities, full range of motion UE/LE b/l, 5/5 muscle strength UE/LE b/l, nontender extremities  NEURO: AAOx4 (to person, place, time, event), sensation grossly intact, steady gait, CN II-XII grossly intact  SKIN: petechial rash on LE bilateral. Warm, dry, well perfused  PSYCH: Normal mood and affect GENERAL: middl aged female, lying in bed, NAD. Not hypoxic, tachypneic, tachycardic. Vital signs are within normal limits  HEENT: NC/AT, conjunctiva noninjected and sclera anicteric, moist mucous membranes. Hard of hearing (only can hear through left year. Tear drop clearing on middle tongue. No oropharyngeal exudates  NECK: Supple, trachea midline, no tonsillar swelling, LAD  LUNG: CTAB, no w/r/r appreciated  CV: RRR, no m/r/g appreciated, Pulses- Radial: 2+ b/l; posterior tibial: 2+ b/l; dorsalis pedis: 2+ b/l  ABDOMEN: Soft, NTND, no rebound or guarding  BACK: No midline spinal tenderness or step-offs. No paraspinal tenderness to palpation  : Patient declined rectal exam 2/2 Zoroastrian.  MSK: No edema, no visible deformities, full range of motion UE/LE b/l, 5/5 muscle strength UE/LE b/l, nontender extremities  NEURO: AAOx4 (to person, place, time, event), sensation grossly intact, steady gait, CN II-XII grossly intact  SKIN: petechial rash on LE bilateral. Warm, dry, well perfused  PSYCH: Normal mood and affect

## 2020-05-22 NOTE — CONSULT NOTE ADULT - ASSESSMENT
60 y.o. Mohawk speaking  female hx of HTN on atenolol, HLD, DM, recovering from COVID presented to the ED with fever, malaise, HA, found to be pancytopenic with peripheral blasts concerning for leukemia.    # Pancytopenia with peripheral blasts  -WBC 1.6, Hg 6.2 and plt 2 with blasts on differential  -pt complains of HA, CT head was negative for bleeding.   -peripheral smear reviewed (5/22): decreased WBCs,  notable for blast cells, a few with granules in cytoplasm, overall appearance of myeloblasts  ,+ promyelocytes, decreased platelets, no clumping, normal appearing RBCs, no schistocytes  -coags showed mild INR elevation but fibrinogen is high - not in DIC but pt is at risk of developing DIC  -please check daily coags.  check fibrinogen and d-dimer, tumor lysis labs (uric acid, bmp, mag, phos)  -if uric acid > 8.0, will give rasburicase 3mg IV x 1.  if elevated, start allopurinol 300 mg daily  -transfuse 1 u PRBC now, 1 u platelets. transfuse for hg < 7.0 and platelets < 10, <15 if febrile and < 50 if bleeding  -will give ATRA (all-trans-retinoic acid) 45 mg/m2 in 2 divided doses    -will send peripheral flowcytometry  -Bone marrow bx on 5/26  -Hematology team will follow in the a.m.    d/[place].  [id number]; [phone] 60 y.o. Sinhala speaking  female hx of HTN on atenolol, HLD, DM, recovering from COVID presented to the ED with fever, malaise, HA, found to be pancytopenic with peripheral blasts concerning for leukemia.    # Pancytopenia with peripheral blasts  -WBC 1.6, Hg 6.2 and plt 2 with blasts on differential  -pt complains of HA, CT head was negative for bleeding.   -peripheral smear reviewed (5/22): decreased WBCs,  notable for blast cells, a few with granules in cytoplasm, overall appearance of myeloblasts  ,+ promyelocytes, decreased platelets, no clumping, normal appearing RBCs, no schistocytes  -coags showed mild INR elevation but fibrinogen is high - not in DIC but pt is at risk of developing DIC  -please check daily coags.  check fibrinogen and d-dimer, tumor lysis labs (uric acid, bmp, mag, phos)  -if uric acid > 8.0, will give rasburicase 3mg IV x 1.  if elevated, start allopurinol 300 mg daily  -transfuse 1 u PRBC now, 1 u platelets. transfuse for hg < 7.0 and platelets < 10, <15 if febrile and < 50 if bleeding  -will give ATRA (all-trans-retinoic acid or tretinoin) 45 mg/m2 in 2 divided doses    -will send peripheral flowcytometry  -Bone marrow bx on 5/26  -Hematology team will follow in the a.m.    d/[place].  [id number]; [phone]

## 2020-05-22 NOTE — ED ADULT NURSE NOTE - OBJECTIVE STATEMENT
Pt w/ hx of htn hld DM received to rm 8 aaox4 ambulatory Pashto speaking, Pt requests son translate, Per son; Pt tested positive in april and again in may for Covid 19 flu like symptoms persist now with blood in stool and intermittent chest pain.  Pt p/w NAD breaths even unlabored skin warm dry intact 18 g IV access obtained at [facility] labs as ordered will monitor.

## 2020-05-22 NOTE — H&P ADULT - PROBLEM SELECTOR PROBLEM 3
THE [facility] Emergency Department in [address]  Phone:  [phone]  Fax:  [phone] [facility]   MRN: [id number]    Department:  THE [facility] Emergency Department in [place]   Date of Visit:  6/28/ IF THERE IS ANY CHANGE OR WORSENING OF YOUR CONDITION, CALL YOUR PRIMARY CARE PHYSICIAN AT ONCE OR RETURN IMMEDIATELY TO THE EMERGENCY DEPARTMENT.     If you have been prescribed any medication(s), please fill your prescription right away and begin taking t Neutropenic fever

## 2020-05-22 NOTE — ED PROVIDER NOTE - ATTENDING CONTRIBUTION TO CARE
Attending Attestation: Dr. Lizama  I have personally performed a history and physical examination of the patient and discussed management with the resident as well as the patient.  I reviewed the resident's note and agree with the documented findings and plan of care.  I have authored and modified critical sections of the Provider Note, including but not limited to HPI, Physical Exam and MDM. 60 y.o. female here for multiple medical complaints consistent with continued COVID infection. Febrile but VS otherwise WNL. Exam with central clearing on tongue, petechial rash on legs x1 month. Patient overall appears in no acute distress at present time. Will send labs and reassess.

## 2020-05-23 LAB
ALBUMIN SERPL ELPH-MCNC: 4.1 G/DL — SIGNIFICANT CHANGE UP (ref 3.3–5)
ALP SERPL-CCNC: 63 U/L — SIGNIFICANT CHANGE UP (ref 40–120)
ALT FLD-CCNC: 7 U/L — SIGNIFICANT CHANGE UP (ref 4–33)
ANION GAP SERPL CALC-SCNC: 12 MMO/L — SIGNIFICANT CHANGE UP (ref 7–14)
ANION GAP SERPL CALC-SCNC: 13 MMO/L — SIGNIFICANT CHANGE UP (ref 7–14)
APPEARANCE UR: CLEAR — SIGNIFICANT CHANGE UP
APTT BLD: 29.8 SEC — SIGNIFICANT CHANGE UP (ref 27.5–36.3)
AST SERPL-CCNC: 12 U/L — SIGNIFICANT CHANGE UP (ref 4–32)
BACTERIA # UR AUTO: NEGATIVE — SIGNIFICANT CHANGE UP
BASOPHILS # BLD AUTO: 0.01 K/UL — SIGNIFICANT CHANGE UP (ref 0–0.2)
BASOPHILS NFR BLD AUTO: 0.5 % — SIGNIFICANT CHANGE UP (ref 0–2)
BILIRUB DIRECT SERPL-MCNC: < 0.2 MG/DL — SIGNIFICANT CHANGE UP (ref 0.1–0.2)
BILIRUB SERPL-MCNC: 0.4 MG/DL — SIGNIFICANT CHANGE UP (ref 0.2–1.2)
BILIRUB SERPL-MCNC: 0.5 MG/DL — SIGNIFICANT CHANGE UP (ref 0.2–1.2)
BILIRUB UR-MCNC: NEGATIVE — SIGNIFICANT CHANGE UP
BLD GP AB SCN SERPL QL: NEGATIVE — SIGNIFICANT CHANGE UP
BLOOD UR QL VISUAL: NEGATIVE — SIGNIFICANT CHANGE UP
BUN SERPL-MCNC: 11 MG/DL — SIGNIFICANT CHANGE UP (ref 7–23)
BUN SERPL-MCNC: 11 MG/DL — SIGNIFICANT CHANGE UP (ref 7–23)
CALCIUM SERPL-MCNC: 9.2 MG/DL — SIGNIFICANT CHANGE UP (ref 8.4–10.5)
CALCIUM SERPL-MCNC: 9.3 MG/DL — SIGNIFICANT CHANGE UP (ref 8.4–10.5)
CHLORIDE SERPL-SCNC: 103 MMOL/L — SIGNIFICANT CHANGE UP (ref 98–107)
CHLORIDE SERPL-SCNC: 104 MMOL/L — SIGNIFICANT CHANGE UP (ref 98–107)
CO2 SERPL-SCNC: 24 MMOL/L — SIGNIFICANT CHANGE UP (ref 22–31)
CO2 SERPL-SCNC: 25 MMOL/L — SIGNIFICANT CHANGE UP (ref 22–31)
COLOR SPEC: SIGNIFICANT CHANGE UP
CREAT SERPL-MCNC: 0.65 MG/DL — SIGNIFICANT CHANGE UP (ref 0.5–1.3)
CREAT SERPL-MCNC: 0.68 MG/DL — SIGNIFICANT CHANGE UP (ref 0.5–1.3)
D DIMER BLD IA.RAPID-MCNC: 5899 NG/ML — SIGNIFICANT CHANGE UP
EOSINOPHIL # BLD AUTO: 0 K/UL — SIGNIFICANT CHANGE UP (ref 0–0.5)
EOSINOPHIL NFR BLD AUTO: 0 % — SIGNIFICANT CHANGE UP (ref 0–6)
FIBRINOGEN PPP-MCNC: 625 MG/DL — HIGH (ref 300–520)
GLUCOSE BLDC GLUCOMTR-MCNC: 139 MG/DL — HIGH (ref 70–99)
GLUCOSE BLDC GLUCOMTR-MCNC: 162 MG/DL — HIGH (ref 70–99)
GLUCOSE BLDC GLUCOMTR-MCNC: 163 MG/DL — HIGH (ref 70–99)
GLUCOSE BLDC GLUCOMTR-MCNC: 296 MG/DL — HIGH (ref 70–99)
GLUCOSE SERPL-MCNC: 160 MG/DL — HIGH (ref 70–99)
GLUCOSE SERPL-MCNC: 179 MG/DL — HIGH (ref 70–99)
GLUCOSE UR-MCNC: NEGATIVE — SIGNIFICANT CHANGE UP
HAPTOGLOB SERPL-MCNC: 429 MG/DL — HIGH (ref 34–200)
HBA1C BLD-MCNC: 7 % — HIGH (ref 4–5.6)
HBA1C BLD-MCNC: 7.3 % — HIGH (ref 4–5.6)
HCT VFR BLD CALC: 21.3 % — LOW (ref 34.5–45)
HCT VFR BLD CALC: 24.5 % — LOW (ref 34.5–45)
HGB BLD-MCNC: 7.2 G/DL — LOW (ref 11.5–15.5)
HGB BLD-MCNC: 8.1 G/DL — LOW (ref 11.5–15.5)
HYALINE CASTS # UR AUTO: NEGATIVE — SIGNIFICANT CHANGE UP
IMM GRANULOCYTES NFR BLD AUTO: 16 % — HIGH (ref 0–1.5)
INR BLD: 1.31 — HIGH (ref 0.88–1.17)
KETONES UR-MCNC: NEGATIVE — SIGNIFICANT CHANGE UP
LDH SERPL L TO P-CCNC: 182 U/L — SIGNIFICANT CHANGE UP (ref 135–225)
LDH SERPL L TO P-CCNC: 184 U/L — SIGNIFICANT CHANGE UP (ref 135–225)
LEUKOCYTE ESTERASE UR-ACNC: NEGATIVE — SIGNIFICANT CHANGE UP
LYMPHOCYTES # BLD AUTO: 0.9 K/UL — LOW (ref 1–3.3)
LYMPHOCYTES # BLD AUTO: 45 % — HIGH (ref 13–44)
MAGNESIUM SERPL-MCNC: 2 MG/DL — SIGNIFICANT CHANGE UP (ref 1.6–2.6)
MAGNESIUM SERPL-MCNC: 2.2 MG/DL — SIGNIFICANT CHANGE UP (ref 1.6–2.6)
MANUAL SMEAR VERIFICATION: SIGNIFICANT CHANGE UP
MCHC RBC-ENTMCNC: 27.1 PG — SIGNIFICANT CHANGE UP (ref 27–34)
MCHC RBC-ENTMCNC: 27.2 PG — SIGNIFICANT CHANGE UP (ref 27–34)
MCHC RBC-ENTMCNC: 33.1 % — SIGNIFICANT CHANGE UP (ref 32–36)
MCHC RBC-ENTMCNC: 33.8 % — SIGNIFICANT CHANGE UP (ref 32–36)
MCV RBC AUTO: 80.4 FL — SIGNIFICANT CHANGE UP (ref 80–100)
MCV RBC AUTO: 81.9 FL — SIGNIFICANT CHANGE UP (ref 80–100)
MONOCYTES # BLD AUTO: 0.66 K/UL — SIGNIFICANT CHANGE UP (ref 0–0.9)
MONOCYTES NFR BLD AUTO: 33 % — HIGH (ref 2–14)
NEUTROPHILS # BLD AUTO: 0.11 K/UL — LOW (ref 1.8–7.4)
NEUTROPHILS NFR BLD AUTO: 5.5 % — LOW (ref 43–77)
NITRITE UR-MCNC: NEGATIVE — SIGNIFICANT CHANGE UP
NRBC # FLD: 0.02 K/UL — SIGNIFICANT CHANGE UP (ref 0–0)
NRBC # FLD: 0.02 K/UL — SIGNIFICANT CHANGE UP (ref 0–0)
NRBC FLD-RTO: 1 — SIGNIFICANT CHANGE UP
NRBC FLD-RTO: 1 — SIGNIFICANT CHANGE UP
PH UR: 6 — SIGNIFICANT CHANGE UP (ref 5–8)
PHOSPHATE SERPL-MCNC: 3.3 MG/DL — SIGNIFICANT CHANGE UP (ref 2.5–4.5)
PHOSPHATE SERPL-MCNC: 3.6 MG/DL — SIGNIFICANT CHANGE UP (ref 2.5–4.5)
PLATELET # BLD AUTO: 2 K/UL — CRITICAL LOW (ref 150–400)
PLATELET # BLD AUTO: 23 K/UL — LOW (ref 150–400)
PMV BLD: 10.9 FL — SIGNIFICANT CHANGE UP (ref 7–13)
PMV BLD: SIGNIFICANT CHANGE UP FL (ref 7–13)
POTASSIUM SERPL-MCNC: 3.8 MMOL/L — SIGNIFICANT CHANGE UP (ref 3.5–5.3)
POTASSIUM SERPL-MCNC: 4.3 MMOL/L — SIGNIFICANT CHANGE UP (ref 3.5–5.3)
POTASSIUM SERPL-SCNC: 3.8 MMOL/L — SIGNIFICANT CHANGE UP (ref 3.5–5.3)
POTASSIUM SERPL-SCNC: 4.3 MMOL/L — SIGNIFICANT CHANGE UP (ref 3.5–5.3)
PROT SERPL-MCNC: 7.3 G/DL — SIGNIFICANT CHANGE UP (ref 6–8.3)
PROT UR-MCNC: 20 — SIGNIFICANT CHANGE UP
PROTHROM AB SERPL-ACNC: 15.1 SEC — HIGH (ref 9.8–13.1)
RBC # BLD: 2.65 M/UL — LOW (ref 3.8–5.2)
RBC # BLD: 2.99 M/UL — LOW (ref 3.8–5.2)
RBC # FLD: 15.4 % — HIGH (ref 10.3–14.5)
RBC # FLD: 16 % — HIGH (ref 10.3–14.5)
RBC CASTS # UR COMP ASSIST: SIGNIFICANT CHANGE UP (ref 0–?)
RH IG SCN BLD-IMP: POSITIVE — SIGNIFICANT CHANGE UP
RH IG SCN BLD-IMP: POSITIVE — SIGNIFICANT CHANGE UP
SARS-COV-2 RNA SPEC QL NAA+PROBE: SIGNIFICANT CHANGE UP
SODIUM SERPL-SCNC: 140 MMOL/L — SIGNIFICANT CHANGE UP (ref 135–145)
SODIUM SERPL-SCNC: 141 MMOL/L — SIGNIFICANT CHANGE UP (ref 135–145)
SP GR SPEC: 1.01 — SIGNIFICANT CHANGE UP (ref 1–1.04)
SQUAMOUS # UR AUTO: SIGNIFICANT CHANGE UP
TRANSFUSION REACTION INTERP 1: SIGNIFICANT CHANGE UP
URATE SERPL-MCNC: 4.6 MG/DL — SIGNIFICANT CHANGE UP (ref 2.5–7)
UROBILINOGEN FLD QL: NORMAL — SIGNIFICANT CHANGE UP
WBC # BLD: 1.91 K/UL — LOW (ref 3.8–10.5)
WBC # BLD: 2 K/UL — LOW (ref 3.8–10.5)
WBC # FLD AUTO: 1.91 K/UL — LOW (ref 3.8–10.5)
WBC # FLD AUTO: 2 K/UL — LOW (ref 3.8–10.5)
WBC UR QL: SIGNIFICANT CHANGE UP (ref 0–?)

## 2020-05-23 PROCEDURE — 88189 FLOWCYTOMETRY/READ 16 & >: CPT

## 2020-05-23 PROCEDURE — 99223 1ST HOSP IP/OBS HIGH 75: CPT | Mod: GC

## 2020-05-23 PROCEDURE — 86078 PHYS BLOOD BANK SERV REACTJ: CPT

## 2020-05-23 RX ORDER — CEFEPIME 1 G/1
2000 INJECTION, POWDER, FOR SOLUTION INTRAMUSCULAR; INTRAVENOUS ONCE
Refills: 0 | Status: COMPLETED | OUTPATIENT
Start: 2020-05-23 | End: 2020-05-23

## 2020-05-23 RX ORDER — DEXTROSE 50 % IN WATER 50 %
15 SYRINGE (ML) INTRAVENOUS ONCE
Refills: 0 | Status: DISCONTINUED | OUTPATIENT
Start: 2020-05-23 | End: 2020-05-26

## 2020-05-23 RX ORDER — ACETAMINOPHEN 500 MG
650 TABLET ORAL EVERY 6 HOURS
Refills: 0 | Status: DISCONTINUED | OUTPATIENT
Start: 2020-05-23 | End: 2020-05-26

## 2020-05-23 RX ORDER — GLUCAGON INJECTION, SOLUTION 0.5 MG/.1ML
1 INJECTION, SOLUTION SUBCUTANEOUS ONCE
Refills: 0 | Status: DISCONTINUED | OUTPATIENT
Start: 2020-05-23 | End: 2020-05-26

## 2020-05-23 RX ORDER — SODIUM CHLORIDE 9 MG/ML
1000 INJECTION, SOLUTION INTRAVENOUS
Refills: 0 | Status: DISCONTINUED | OUTPATIENT
Start: 2020-05-23 | End: 2020-05-26

## 2020-05-23 RX ORDER — INSULIN LISPRO 100/ML
VIAL (ML) SUBCUTANEOUS
Refills: 0 | Status: DISCONTINUED | OUTPATIENT
Start: 2020-05-23 | End: 2020-05-26

## 2020-05-23 RX ORDER — DEXTROSE 50 % IN WATER 50 %
12.5 SYRINGE (ML) INTRAVENOUS ONCE
Refills: 0 | Status: DISCONTINUED | OUTPATIENT
Start: 2020-05-23 | End: 2020-05-26

## 2020-05-23 RX ORDER — DEXTROSE 50 % IN WATER 50 %
25 SYRINGE (ML) INTRAVENOUS ONCE
Refills: 0 | Status: DISCONTINUED | OUTPATIENT
Start: 2020-05-23 | End: 2020-05-26

## 2020-05-23 RX ORDER — TRETINOIN 10 MG/1
40 CAPSULE, LIQUID FILLED ORAL EVERY 12 HOURS
Refills: 0 | Status: DISCONTINUED | OUTPATIENT
Start: 2020-05-24 | End: 2020-05-26

## 2020-05-23 RX ORDER — LACTOBACILLUS ACIDOPHILUS 100MM CELL
1 CAPSULE ORAL THREE TIMES A DAY
Refills: 0 | Status: DISCONTINUED | OUTPATIENT
Start: 2020-05-23 | End: 2020-05-26

## 2020-05-23 RX ORDER — INSULIN LISPRO 100/ML
VIAL (ML) SUBCUTANEOUS AT BEDTIME
Refills: 0 | Status: DISCONTINUED | OUTPATIENT
Start: 2020-05-23 | End: 2020-05-26

## 2020-05-23 RX ORDER — CEFEPIME 1 G/1
2000 INJECTION, POWDER, FOR SOLUTION INTRAMUSCULAR; INTRAVENOUS EVERY 8 HOURS
Refills: 0 | Status: DISCONTINUED | OUTPATIENT
Start: 2020-05-23 | End: 2020-05-26

## 2020-05-23 RX ORDER — CEFEPIME 1 G/1
INJECTION, POWDER, FOR SOLUTION INTRAMUSCULAR; INTRAVENOUS
Refills: 0 | Status: DISCONTINUED | OUTPATIENT
Start: 2020-05-23 | End: 2020-05-26

## 2020-05-23 RX ADMIN — Medication 3: at 12:08

## 2020-05-23 RX ADMIN — Medication 650 MILLIGRAM(S): at 22:12

## 2020-05-23 RX ADMIN — SODIUM CHLORIDE 100 MILLILITER(S): 9 INJECTION INTRAMUSCULAR; INTRAVENOUS; SUBCUTANEOUS at 09:40

## 2020-05-23 RX ADMIN — Medication 650 MILLIGRAM(S): at 09:40

## 2020-05-23 RX ADMIN — TRETINOIN 40 MILLIGRAM(S): 10 CAPSULE, LIQUID FILLED ORAL at 15:27

## 2020-05-23 RX ADMIN — CEFEPIME 100 MILLIGRAM(S): 1 INJECTION, POWDER, FOR SOLUTION INTRAMUSCULAR; INTRAVENOUS at 21:25

## 2020-05-23 RX ADMIN — TRETINOIN 40 MILLIGRAM(S): 10 CAPSULE, LIQUID FILLED ORAL at 02:25

## 2020-05-23 RX ADMIN — CEFEPIME 100 MILLIGRAM(S): 1 INJECTION, POWDER, FOR SOLUTION INTRAMUSCULAR; INTRAVENOUS at 15:27

## 2020-05-23 RX ADMIN — Medication 1 TABLET(S): at 21:24

## 2020-05-23 NOTE — PHYSICAL THERAPY INITIAL EVALUATION ADULT - GENERAL OBSERVATIONS, REHAB EVAL
Patient found supine in bed in NAD. Patient's son Joseluis [phone]. Attempted to contact son 3 times with no answer.

## 2020-05-23 NOTE — PHYSICAL THERAPY INITIAL EVALUATION ADULT - PERTINENT HX OF CURRENT PROBLEM, REHAB EVAL
59 yo female with a hx of HTN and DM to ED for fevers. Per family, patient has had a low temperature to 92 for the past few days, but has also been spiking fevers (Tmax 101-102F).

## 2020-05-23 NOTE — PHYSICAL THERAPY INITIAL EVALUATION ADULT - PRECAUTIONS/LIMITATIONS, REHAB EVAL
isolation precautions/[place]/CONTACT isolation precautions for rule out COVID maintained throughout.

## 2020-05-23 NOTE — PROVIDER CONTACT NOTE (OTHER) - ASSESSMENT
/59, oral temp 98.3 F. Patient assessed, as per pt, she feels warm and having chills, no visible signs of acute distress note.

## 2020-05-23 NOTE — CHART NOTE - NSCHARTNOTEFT_GEN_A_CORE
Patient received one unit of platelet. had temp 100.4 r/o transfusion reaction.  LDH. Haptoglobulin, ordered.  paper filled out for transfusion reaction.  CBC Blood culture , ua, urine culture ordered.  Laisha WHITLEY

## 2020-05-24 DIAGNOSIS — G44.229 CHRONIC TENSION-TYPE HEADACHE, NOT INTRACTABLE: ICD-10-CM

## 2020-05-24 DIAGNOSIS — C95.00 ACUTE LEUKEMIA OF UNSPECIFIED CELL TYPE NOT HAVING ACHIEVED REMISSION: ICD-10-CM

## 2020-05-24 LAB
ANION GAP SERPL CALC-SCNC: 15 MMO/L — HIGH (ref 7–14)
APTT BLD: 28.6 SEC — SIGNIFICANT CHANGE UP (ref 27.5–36.3)
BASOPHILS # BLD AUTO: 0 K/UL — SIGNIFICANT CHANGE UP (ref 0–0.2)
BASOPHILS NFR BLD AUTO: 0 % — SIGNIFICANT CHANGE UP (ref 0–2)
BUN SERPL-MCNC: 13 MG/DL — SIGNIFICANT CHANGE UP (ref 7–23)
CALCIUM SERPL-MCNC: 9.1 MG/DL — SIGNIFICANT CHANGE UP (ref 8.4–10.5)
CHLORIDE SERPL-SCNC: 104 MMOL/L — SIGNIFICANT CHANGE UP (ref 98–107)
CO2 SERPL-SCNC: 23 MMOL/L — SIGNIFICANT CHANGE UP (ref 22–31)
CREAT SERPL-MCNC: 0.7 MG/DL — SIGNIFICANT CHANGE UP (ref 0.5–1.3)
D DIMER BLD IA.RAPID-MCNC: 7507 NG/ML — SIGNIFICANT CHANGE UP
EOSINOPHIL # BLD AUTO: 0.01 K/UL — SIGNIFICANT CHANGE UP (ref 0–0.5)
EOSINOPHIL NFR BLD AUTO: 0.4 % — SIGNIFICANT CHANGE UP (ref 0–6)
FIBRINOGEN PPP-MCNC: 527 MG/DL — HIGH (ref 300–520)
GLUCOSE BLDC GLUCOMTR-MCNC: 134 MG/DL — HIGH (ref 70–99)
GLUCOSE BLDC GLUCOMTR-MCNC: 149 MG/DL — HIGH (ref 70–99)
GLUCOSE BLDC GLUCOMTR-MCNC: 175 MG/DL — HIGH (ref 70–99)
GLUCOSE BLDC GLUCOMTR-MCNC: 177 MG/DL — HIGH (ref 70–99)
GLUCOSE SERPL-MCNC: 137 MG/DL — HIGH (ref 70–99)
HCT VFR BLD CALC: 21.6 % — LOW (ref 34.5–45)
HCT VFR BLD CALC: 22.2 % — LOW (ref 34.5–45)
HGB BLD-MCNC: 7.3 G/DL — LOW (ref 11.5–15.5)
HGB BLD-MCNC: 7.4 G/DL — LOW (ref 11.5–15.5)
IMM GRANULOCYTES NFR BLD AUTO: 12.3 % — HIGH (ref 0–1.5)
INR BLD: 1.28 — HIGH (ref 0.88–1.17)
LDH SERPL L TO P-CCNC: 148 U/L — SIGNIFICANT CHANGE UP (ref 135–225)
LYMPHOCYTES # BLD AUTO: 1.39 K/UL — SIGNIFICANT CHANGE UP (ref 1–3.3)
LYMPHOCYTES # BLD AUTO: 59.1 % — HIGH (ref 13–44)
MAGNESIUM SERPL-MCNC: 2 MG/DL — SIGNIFICANT CHANGE UP (ref 1.6–2.6)
MANUAL SMEAR VERIFICATION: SIGNIFICANT CHANGE UP
MCHC RBC-ENTMCNC: 26.5 PG — LOW (ref 27–34)
MCHC RBC-ENTMCNC: 28.2 PG — SIGNIFICANT CHANGE UP (ref 27–34)
MCHC RBC-ENTMCNC: 32.9 % — SIGNIFICANT CHANGE UP (ref 32–36)
MCHC RBC-ENTMCNC: 34.3 % — SIGNIFICANT CHANGE UP (ref 32–36)
MCV RBC AUTO: 80.7 FL — SIGNIFICANT CHANGE UP (ref 80–100)
MCV RBC AUTO: 82.4 FL — SIGNIFICANT CHANGE UP (ref 80–100)
MONOCYTES # BLD AUTO: 0.49 K/UL — SIGNIFICANT CHANGE UP (ref 0–0.9)
MONOCYTES NFR BLD AUTO: 20.9 % — HIGH (ref 2–14)
NEUTROPHILS # BLD AUTO: 0.17 K/UL — LOW (ref 1.8–7.4)
NEUTROPHILS NFR BLD AUTO: 7.3 % — LOW (ref 43–77)
NRBC # FLD: 0.03 K/UL — SIGNIFICANT CHANGE UP (ref 0–0)
NRBC # FLD: 0.03 K/UL — SIGNIFICANT CHANGE UP (ref 0–0)
NRBC FLD-RTO: 1.3 — SIGNIFICANT CHANGE UP
NRBC FLD-RTO: 1.6 — SIGNIFICANT CHANGE UP
PHOSPHATE SERPL-MCNC: 3.5 MG/DL — SIGNIFICANT CHANGE UP (ref 2.5–4.5)
PLATELET # BLD AUTO: 57 K/UL — LOW (ref 150–400)
PLATELET # BLD AUTO: 8 K/UL — CRITICAL LOW (ref 150–400)
PMV BLD: 11.3 FL — SIGNIFICANT CHANGE UP (ref 7–13)
PMV BLD: 8.5 FL — SIGNIFICANT CHANGE UP (ref 7–13)
POTASSIUM SERPL-MCNC: 4.1 MMOL/L — SIGNIFICANT CHANGE UP (ref 3.5–5.3)
POTASSIUM SERPL-SCNC: 4.1 MMOL/L — SIGNIFICANT CHANGE UP (ref 3.5–5.3)
PROTHROM AB SERPL-ACNC: 14.8 SEC — HIGH (ref 9.8–13.1)
RBC # BLD: 2.62 M/UL — LOW (ref 3.8–5.2)
RBC # BLD: 2.75 M/UL — LOW (ref 3.8–5.2)
RBC # FLD: 15.9 % — HIGH (ref 10.3–14.5)
RBC # FLD: 15.9 % — HIGH (ref 10.3–14.5)
SODIUM SERPL-SCNC: 142 MMOL/L — SIGNIFICANT CHANGE UP (ref 135–145)
URATE SERPL-MCNC: 5.1 MG/DL — SIGNIFICANT CHANGE UP (ref 2.5–7)
WBC # BLD: 1.82 K/UL — LOW (ref 3.8–10.5)
WBC # BLD: 2.35 K/UL — LOW (ref 3.8–10.5)
WBC # FLD AUTO: 1.82 K/UL — LOW (ref 3.8–10.5)
WBC # FLD AUTO: 2.35 K/UL — LOW (ref 3.8–10.5)

## 2020-05-24 PROCEDURE — 99232 SBSQ HOSP IP/OBS MODERATE 35: CPT

## 2020-05-24 RX ORDER — ALLOPURINOL 300 MG
300 TABLET ORAL DAILY
Refills: 0 | Status: DISCONTINUED | OUTPATIENT
Start: 2020-05-24 | End: 2020-05-26

## 2020-05-24 RX ORDER — ACETAMINOPHEN 500 MG
650 TABLET ORAL ONCE
Refills: 0 | Status: COMPLETED | OUTPATIENT
Start: 2020-05-24 | End: 2020-05-24

## 2020-05-24 RX ADMIN — CEFEPIME 100 MILLIGRAM(S): 1 INJECTION, POWDER, FOR SOLUTION INTRAMUSCULAR; INTRAVENOUS at 21:17

## 2020-05-24 RX ADMIN — Medication 650 MILLIGRAM(S): at 22:02

## 2020-05-24 RX ADMIN — Medication 650 MILLIGRAM(S): at 12:26

## 2020-05-24 RX ADMIN — TRETINOIN 40 MILLIGRAM(S): 10 CAPSULE, LIQUID FILLED ORAL at 06:08

## 2020-05-24 RX ADMIN — TRETINOIN 40 MILLIGRAM(S): 10 CAPSULE, LIQUID FILLED ORAL at 17:28

## 2020-05-24 RX ADMIN — Medication 1 TABLET(S): at 13:54

## 2020-05-24 RX ADMIN — CEFEPIME 100 MILLIGRAM(S): 1 INJECTION, POWDER, FOR SOLUTION INTRAMUSCULAR; INTRAVENOUS at 06:06

## 2020-05-24 RX ADMIN — CEFEPIME 100 MILLIGRAM(S): 1 INJECTION, POWDER, FOR SOLUTION INTRAMUSCULAR; INTRAVENOUS at 13:54

## 2020-05-24 RX ADMIN — Medication 1 TABLET(S): at 06:06

## 2020-05-24 RX ADMIN — Medication 1: at 12:08

## 2020-05-24 RX ADMIN — Medication 1 TABLET(S): at 21:19

## 2020-05-24 NOTE — PROGRESS NOTE ADULT - ASSESSMENT
59 yo female with a hx of HTN and DM admitted to medicine for pancytopenia concerning for APL.    Problem/Plan - 1:  ·  Problem: Pancytopenia.  Plan: Patient presenting with new onset pancytopenia with peripheral blasts; Peripheral smear showing no signs of schistocytes, positive for blasts  - Hematology consulted in the ED. Appreciate recs.  - check daily coags, fibrinogen and d-dimer, tumor lysis labs (uric acid, bmp, mag, phos)  - Transfused 1 unit of plts + 1 unit of pRBC on 5/23, getting 1 unit of plts 5/24  - Plan for bone marrow biopsy on Tuesday per Hematology.     Problem/Plan - 2:  ·  Problem: Leukemia, acute.  Plan: Pt with Smear showing blast cells, granulocytes (concerning for myeloblasts),   - ATRA ordered by Hematology team  - Flow cytometry testing from 5/23/20  - Daily Tumor lysis labs (uric acid, bmp, mag, phos), coag, fibrinogen, and d-dimer  - Would give rasburicase 3 mg IV x1 if uric acid >8.0.     Problem/Plan - 3:  ·  Problem: Neutropenic fever.  Plan: - Sent blood cultures  - s/p Cefepime in the ED.  - Cont cefepime, blood cxs so far (-)  - COVID-19 (-) 5/22    Problem/Plan - 4:  ·  Problem: 2019 novel coronavirus disease (COVID-19).  Plan: - COVID-19 sent. Follow up PCR  - Contact and Droplet precautions  - Please keep in an isolated room.     Problem/Plan - 5:  ·  Problem: Medication management.  Plan: Patient and son unaware of medications. Family to bring in prescription list in the am. Primary team to follow up medication reconciliation.     Problem/Plan - 6:  Problem: Diabetes. Plan: Per chart review, patient previously on metformin at home  - A1c 7.0  - SSI for now  - Adjust insulin regimen as needed.    Problem/Plan - 7:  ·  Problem: Discharge planning issues.  Plan: Diet: Carb consistent diet  DVT Prophylaxis: Contraindicated i/s/o thrombocytopenia  Dispo: PT Consulted. 59 yo female with a hx of HTN and DM admitted to medicine for pancytopenia concerning for APL.    Problem/Plan - 1:  ·  Problem: Pancytopenia.  Plan: Patient presenting with new onset pancytopenia with peripheral blasts; Peripheral smear showing no signs of schistocytes, positive for blasts  - Hematology consulted in the ED. Appreciate recs.  - check daily coags, fibrinogen and d-dimer, tumor lysis labs (uric acid, bmp, mag, phos)  - Transfused 1 unit of plts + 1 unit of pRBC on 5/23, getting 1 unit of plts 5/24  - Plan for bone marrow biopsy on Tuesday per Hematology.     Problem/Plan - 2:  ·  Problem: Leukemia, acute.  Plan: Pt with Smear showing blast cells, granulocytes (concerning for myeloblasts),   - ATRA ordered by Hematology team  - Flow cytometry testing from 5/23/20  - Daily Tumor lysis labs (uric acid, bmp, mag, phos), coag, fibrinogen, and d-dimer  - Would give rasburicase 3 mg IV x1 if uric acid >8.0.     Problem/Plan - 3:  ·  Problem: Neutropenic fever.  Plan: - Sent blood cultures  - s/p Cefepime in the ED.  - Cont cefepime, blood cxs so far (-)  - COVID-19 (-) 5/22    Problem/Plan - 4:  ·  Problem: 2019 novel coronavirus disease (COVID-19).  Plan: - COVID-19 PCR (-) 5/22/20.  - Contact and Droplet precautions  - Please keep in an isolated room.     Problem/Plan - 5:  ·  Problem: Medication management.  Plan: Patient and son unaware of medications. Family to bring in prescription list in the am. Primary team to follow up medication reconciliation.     Problem/Plan - 6:  Problem: Diabetes. Plan: Per chart review, patient previously on metformin at home  - A1c 7.0  - SSI for now  - Adjust insulin regimen as needed.    Problem/Plan - 7:  ·  Problem: Discharge planning issues.  Plan: Diet: Carb consistent diet  DVT Prophylaxis: Contraindicated i/s/o thrombocytopenia  Dispo: PT Consulted.

## 2020-05-24 NOTE — PROVIDER CONTACT NOTE (CRITICAL VALUE NOTIFICATION) - RECOMMENDATIONS
resend CBC s/p platelet transfusion
notify pa, continue to monitor for signs of bleeding, give platelet transfusion

## 2020-05-24 NOTE — PROGRESS NOTE ADULT - PROBLEM SELECTOR PLAN 2
Explained to her and her son reasons for neutropenic fever. She is on broad spectrum Cefepime with blood cultures no growth to date.

## 2020-05-24 NOTE — PROGRESS NOTE ADULT - PROBLEM SELECTOR PLAN 1
Reviewed with her and her son flow cytometry sent Saturday consistent with APL. Explained to her and her son plan of bone marrow biopsy on Tuesday and will try to look for [address] for continued treatment of her leukemia. Explained chemotherapy regimen usually used: arsenic trioxide and continued transfusions. We reviewed expectations and they are agreeable with present plan. Reviewed with her and her son flow cytometry sent Saturday consistent with APL. Explained to her and her son plan of bone marrow biopsy on Tuesday and will try to look for [address] for continued treatment of her leukemia. Start on allopurinol. Explained chemotherapy regimen usually used: arsenic trioxide and continued transfusions. We reviewed expectations and they are agreeable with present plan.

## 2020-05-24 NOTE — PROVIDER CONTACT NOTE (CRITICAL VALUE NOTIFICATION) - BACKGROUND
Pt. admitted with pancytopenia, specimen drawn prior to platelet transfusion
pt is 59 y/o female admit diagnosis other pancytopenia pmh includes hld, dm type II and htn

## 2020-05-24 NOTE — PROGRESS NOTE ADULT - SUBJECTIVE AND OBJECTIVE BOX
Still running occasional fevers  No active bleeding overnight    Vital Signs Last 24 Hrs  T(C): 36.9 (24 May 2020 06:17), Max: 38.2 (23 May 2020 21:34)  T(F): 98.4 (24 May 2020 06:17), Max: 100.8 (23 May 2020 21:34)  HR: 77 (24 May 2020 06:17) (77 - 89)  BP: 134/54 (24 May 2020 06:17) (112/51 - 134/59)  BP(mean): --  RR: 18 (24 May 2020 06:17) (18 - 20)  SpO2: 100% (24 May 2020 06:17) (96% - 100%)    I&O's Summary      GENERAL: NAD, well-developed, resting comfortably in bed  HEAD:  Atraumatic, Normocephalic  EYES: EOMI, PERRLA, conjunctiva and sclera clear  NECK: Supple, No JVD  CHEST/LUNG: Clear to auscultation bilaterally; No wheeze  HEART: Regular rate and rhythm; No murmurs, rubs, or gallops  ABDOMEN: Soft, Nontender, Nondistended; Bowel sounds present  EXTREMITIES:  2+ Peripheral Pulses, No clubbing, cyanosis, or edema  NEUROLOGY: A+O x 3, non-focal  SKIN: petechial rash on lower extremity  PSYCH: nl affect    LABS:                        7.3    2.35  )-----------( 8        ( 24 May 2020 05:45 )             22.2     05-24    142  |  104  |  13  ----------------------------<  137<H>  4.1   |  23  |  0.70    Ca    9.1      24 May 2020 05:45  Phos  3.5     05-24  Mg     2.0     05-24    TPro  x   /  Alb  x   /  TBili  0.5  /  DBili  < 0.2  /  AST  x   /  ALT  x   /  AlkPhos  x   05-23    PT/INR - ( 24 May 2020 05:45 )   PT: 14.8 SEC;   INR: 1.28          PTT - ( 24 May 2020 05:45 )  PTT:28.6 SEC  CAPILLARY BLOOD GLUCOSE      POCT Blood Glucose.: 149 mg/dL (24 May 2020 08:06)  POCT Blood Glucose.: 163 mg/dL (23 May 2020 21:19)  POCT Blood Glucose.: 139 mg/dL (23 May 2020 17:10)  POCT Blood Glucose.: 296 mg/dL (23 May 2020 11:45)        Urinalysis Basic - ( 23 May 2020 12:21 )    Color: LIGHT YELLOW / Appearance: CLEAR / S.013 / pH: 6.0  Gluc: NEGATIVE / Ketone: NEGATIVE  / Bili: NEGATIVE / Urobili: NORMAL   Blood: NEGATIVE / Protein: 20 / Nitrite: NEGATIVE   Leuk Esterase: NEGATIVE / RBC: 0-2 / WBC 0-2   Sq Epi: OCC / Non Sq Epi: x / Bacteria: NEGATIVE        RADIOLOGY & ADDITIONAL TESTS:    Imaging Personally Reviewed:  [x] YES  [ ] NO    Consultant(s) Notes Reviewed:  [x] YES  [ ] NO

## 2020-05-24 NOTE — PROVIDER CONTACT NOTE (OTHER) - ACTION/TREATMENT ORDERED:
GUTIERREZ Bergeron notified, Tylenol PO to be given as prescribed, ice packs provided. Will continue to monitor.

## 2020-05-24 NOTE — PROGRESS NOTE ADULT - PROBLEM SELECTOR PLAN 3
While ATRA could cause color change in vision, will continue to follow symptoms. She had CT of the head without acute bleed. Her son reporting headaches predating admission. May consider MRI of the brain to evaluate for any chloroma or lesion.

## 2020-05-24 NOTE — CHART NOTE - NSCHARTNOTEFT_GEN_A_CORE
Discussed AM labs with Dr. Johnson:    CBC Full  -  ( 24 May 2020 05:45 )  WBC Count : 2.35 K/uL  RBC Count : 2.75 M/uL  Hemoglobin : 7.3 g/dL  Hematocrit : 22.2 %  Platelet Count - Automated : 8 K/uL  Mean Cell Volume : 80.7 fL  Mean Cell Hemoglobin : 26.5 pg  Mean Cell Hemoglobin Concentration : 32.9 %  Auto Neutrophil # : 0.17 K/uL  Auto Lymphocyte # : 1.39 K/uL  Auto Monocyte # : 0.49 K/uL  Auto Eosinophil # : 0.01 K/uL  Auto Basophil # : 0.00 K/uL  Auto Neutrophil % : 7.3 %  Auto Lymphocyte % : 59.1 %  Auto Monocyte % : 20.9 %  Auto Eosinophil % : 0.4 %  Auto Basophil % : 0.0 %      05-24    142  |  104  |  13  ----------------------------<  137<H>  4.1   |  23  |  0.70    Ca    9.1      24 May 2020 05:45  Phos  3.5     05-24  Mg     2.0     05-24    TPro  x   /  Alb  x   /  TBili  0.5  /  DBili  < 0.2  /  AST  x   /  ALT  x   /  AlkPhos  x   05-23    Patient with platelets of 8 today 5/24/2020. As per RN patient with no active bleeding. Ordered for 1 unit of platelets per medical attending recommendations. Team to continue to monitor.

## 2020-05-24 NOTE — PROGRESS NOTE ADULT - SUBJECTIVE AND OBJECTIVE BOX
Hematology Follow-up (Called her son [phone] and was  along with updating family)    INTERVAL HPI/OVERNIGHT EVENTS: She was febrile last night. Feels pressure over top of head which predated ATRA. Since ATRA administration, feels pressure but also thought she saw patch of white. This headache/ pressure seems worse when she has fevers and better with holding pressure over top of head. Denies any focal weakness or bleeding.     VITAL SIGNS:  T(F): 98.4 (20 @ 06:17)  HR: 77 (20 @ 06:17)  BP: 134/54 (20 @ 06:17)  RR: 18 (20 @ 06:17)  SpO2: 100% (20 @ :17)  Wt(kg): --    PHYSICAL EXAM:    Constitutional: NAD, sitting up in bed   Eyes: PERRL, EOMI, sclera non-icteric  Neck: supple, no masses, no JVD  Respiratory: CTA b/l, good air entry b/l  Cardiovascular: RRR, normal S1S2  Gastrointestinal: soft, NTND, no masses palpable, BS normal   Extremities:  no edema   Skin: faint petechial rash over BLE     MEDICATIONS  (STANDING):  cefepime   IVPB      cefepime   IVPB 2000 milliGRAM(s) IV Intermittent every 8 hours  dextrose 5%. 1000 milliLiter(s) (50 mL/Hr) IV Continuous <Continuous>  dextrose 50% Injectable 12.5 Gram(s) IV Push once  dextrose 50% Injectable 25 Gram(s) IV Push once  dextrose 50% Injectable 25 Gram(s) IV Push once  insulin lispro (HumaLOG) corrective regimen sliding scale   SubCutaneous three times a day before meals  insulin lispro (HumaLOG) corrective regimen sliding scale   SubCutaneous at bedtime  lactobacillus acidophilus 1 Tablet(s) Oral three times a day  sodium chloride 0.9%. 1000 milliLiter(s) (100 mL/Hr) IV Continuous <Continuous>  tretinoin 40 milliGRAM(s) Oral every 12 hours    MEDICATIONS  (PRN):  acetaminophen   Tablet .. 650 milliGRAM(s) Oral every 6 hours PRN Temp greater or equal to 38C (100.4F)  dextrose 40% Gel 15 Gram(s) Oral once PRN Blood Glucose LESS THAN 70 milliGRAM(s)/deciliter  glucagon  Injectable 1 milliGRAM(s) IntraMuscular once PRN Glucose LESS THAN 70 milligrams/deciliter      No Known Allergies      LABS:                        7.3    2.35  )-----------( 8        ( 24 May 2020 05:45 )             22.2         142  |  104  |  13  ----------------------------<  137<H>  4.1   |  23  |  0.70    Ca    9.1      24 May 2020 05:45  Phos  3.5       Mg     2.0         TPro  x   /  Alb  x   /  TBili  0.5  /  DBili  < 0.2  /  AST  x   /  ALT  x   /  AlkPhos  x       PT/INR - ( 24 May 2020 05:45 )   PT: 14.8 SEC;   INR: 1.28          PTT - ( 24 May 2020 05:45 )  PTT:28.6 SEC Fibrinogen Assay: 527.0 mg/dL ( @ 05:45)  Lactate Dehydrogenase, Serum: 148 U/L ( @ 05:45)    Urinalysis Basic - ( 23 May 2020 12:21 )    Color: LIGHT YELLOW / Appearance: CLEAR / S.013 / pH: 6.0  Gluc: NEGATIVE / Ketone: NEGATIVE  / Bili: NEGATIVE / Urobili: NORMAL   Blood: NEGATIVE / Protein: 20 / Nitrite: NEGATIVE   Leuk Esterase: NEGATIVE / RBC: 0-2 / WBC 0-2   Sq Epi: OCC / Non Sq Epi: x / Bacteria: NEGATIVE        RADIOLOGY & ADDITIONAL TESTS:  Studies reviewed.

## 2020-05-25 LAB
ALBUMIN SERPL ELPH-MCNC: 4.1 G/DL — SIGNIFICANT CHANGE UP (ref 3.3–5)
ALP SERPL-CCNC: 63 U/L — SIGNIFICANT CHANGE UP (ref 40–120)
ALT FLD-CCNC: 11 U/L — SIGNIFICANT CHANGE UP (ref 4–33)
ANION GAP SERPL CALC-SCNC: 14 MMO/L — SIGNIFICANT CHANGE UP (ref 7–14)
APTT BLD: 28.3 SEC — SIGNIFICANT CHANGE UP (ref 27.5–36.3)
AST SERPL-CCNC: 18 U/L — SIGNIFICANT CHANGE UP (ref 4–32)
BASOPHILS # BLD AUTO: 0 K/UL — SIGNIFICANT CHANGE UP (ref 0–0.2)
BASOPHILS NFR BLD AUTO: 0 % — SIGNIFICANT CHANGE UP (ref 0–2)
BILIRUB SERPL-MCNC: 0.4 MG/DL — SIGNIFICANT CHANGE UP (ref 0.2–1.2)
BUN SERPL-MCNC: 13 MG/DL — SIGNIFICANT CHANGE UP (ref 7–23)
CALCIUM SERPL-MCNC: 9.6 MG/DL — SIGNIFICANT CHANGE UP (ref 8.4–10.5)
CHLORIDE SERPL-SCNC: 102 MMOL/L — SIGNIFICANT CHANGE UP (ref 98–107)
CO2 SERPL-SCNC: 25 MMOL/L — SIGNIFICANT CHANGE UP (ref 22–31)
CREAT SERPL-MCNC: 0.64 MG/DL — SIGNIFICANT CHANGE UP (ref 0.5–1.3)
D DIMER BLD IA.RAPID-MCNC: 3847 NG/ML — SIGNIFICANT CHANGE UP
EOSINOPHIL # BLD AUTO: 0.01 K/UL — SIGNIFICANT CHANGE UP (ref 0–0.5)
EOSINOPHIL NFR BLD AUTO: 0.6 % — SIGNIFICANT CHANGE UP (ref 0–6)
FIBRINOGEN PPP-MCNC: 507 MG/DL — SIGNIFICANT CHANGE UP (ref 300–520)
GLUCOSE BLDC GLUCOMTR-MCNC: 133 MG/DL — HIGH (ref 70–99)
GLUCOSE BLDC GLUCOMTR-MCNC: 146 MG/DL — HIGH (ref 70–99)
GLUCOSE BLDC GLUCOMTR-MCNC: 151 MG/DL — HIGH (ref 70–99)
GLUCOSE BLDC GLUCOMTR-MCNC: 156 MG/DL — HIGH (ref 70–99)
GLUCOSE SERPL-MCNC: 154 MG/DL — HIGH (ref 70–99)
HCT VFR BLD CALC: 21.7 % — LOW (ref 34.5–45)
HGB BLD-MCNC: 7.5 G/DL — LOW (ref 11.5–15.5)
IMM GRANULOCYTES NFR BLD AUTO: 0 % — SIGNIFICANT CHANGE UP (ref 0–1.5)
INR BLD: 1.22 — HIGH (ref 0.88–1.17)
LYMPHOCYTES # BLD AUTO: 1.11 K/UL — SIGNIFICANT CHANGE UP (ref 1–3.3)
LYMPHOCYTES # BLD AUTO: 66.5 % — HIGH (ref 13–44)
MAGNESIUM SERPL-MCNC: 2.1 MG/DL — SIGNIFICANT CHANGE UP (ref 1.6–2.6)
MCHC RBC-ENTMCNC: 28.3 PG — SIGNIFICANT CHANGE UP (ref 27–34)
MCHC RBC-ENTMCNC: 34.6 % — SIGNIFICANT CHANGE UP (ref 32–36)
MCV RBC AUTO: 81.9 FL — SIGNIFICANT CHANGE UP (ref 80–100)
MONOCYTES # BLD AUTO: 0.14 K/UL — SIGNIFICANT CHANGE UP (ref 0–0.9)
MONOCYTES NFR BLD AUTO: 8.4 % — SIGNIFICANT CHANGE UP (ref 2–14)
NEUTROPHILS # BLD AUTO: 0.41 K/UL — LOW (ref 1.8–7.4)
NEUTROPHILS NFR BLD AUTO: 24.5 % — LOW (ref 43–77)
NRBC # FLD: 0 K/UL — SIGNIFICANT CHANGE UP (ref 0–0)
PHOSPHATE SERPL-MCNC: 3.4 MG/DL — SIGNIFICANT CHANGE UP (ref 2.5–4.5)
PLATELET # BLD AUTO: 47 K/UL — LOW (ref 150–400)
PMV BLD: 11.2 FL — SIGNIFICANT CHANGE UP (ref 7–13)
POTASSIUM SERPL-MCNC: 4.2 MMOL/L — SIGNIFICANT CHANGE UP (ref 3.5–5.3)
POTASSIUM SERPL-SCNC: 4.2 MMOL/L — SIGNIFICANT CHANGE UP (ref 3.5–5.3)
PROT SERPL-MCNC: 7.7 G/DL — SIGNIFICANT CHANGE UP (ref 6–8.3)
PROTHROM AB SERPL-ACNC: 14 SEC — HIGH (ref 9.8–13.1)
RBC # BLD: 2.65 M/UL — LOW (ref 3.8–5.2)
RBC # FLD: 16.2 % — HIGH (ref 10.3–14.5)
SODIUM SERPL-SCNC: 141 MMOL/L — SIGNIFICANT CHANGE UP (ref 135–145)
URATE SERPL-MCNC: 5.1 MG/DL — SIGNIFICANT CHANGE UP (ref 2.5–7)
WBC # BLD: 1.67 K/UL — LOW (ref 3.8–10.5)
WBC # FLD AUTO: 1.67 K/UL — LOW (ref 3.8–10.5)

## 2020-05-25 PROCEDURE — 99232 SBSQ HOSP IP/OBS MODERATE 35: CPT | Mod: GC

## 2020-05-25 RX ORDER — POLYETHYLENE GLYCOL 3350 17 G/17G
17 POWDER, FOR SOLUTION ORAL ONCE
Refills: 0 | Status: COMPLETED | OUTPATIENT
Start: 2020-05-25 | End: 2020-05-25

## 2020-05-25 RX ADMIN — Medication 300 MILLIGRAM(S): at 12:47

## 2020-05-25 RX ADMIN — Medication 1 TABLET(S): at 12:47

## 2020-05-25 RX ADMIN — POLYETHYLENE GLYCOL 3350 17 GRAM(S): 17 POWDER, FOR SOLUTION ORAL at 21:37

## 2020-05-25 RX ADMIN — TRETINOIN 40 MILLIGRAM(S): 10 CAPSULE, LIQUID FILLED ORAL at 08:19

## 2020-05-25 RX ADMIN — CEFEPIME 100 MILLIGRAM(S): 1 INJECTION, POWDER, FOR SOLUTION INTRAMUSCULAR; INTRAVENOUS at 12:47

## 2020-05-25 RX ADMIN — Medication 1 TABLET(S): at 05:25

## 2020-05-25 RX ADMIN — Medication 1 TABLET(S): at 21:38

## 2020-05-25 RX ADMIN — CEFEPIME 100 MILLIGRAM(S): 1 INJECTION, POWDER, FOR SOLUTION INTRAMUSCULAR; INTRAVENOUS at 05:25

## 2020-05-25 RX ADMIN — CEFEPIME 100 MILLIGRAM(S): 1 INJECTION, POWDER, FOR SOLUTION INTRAMUSCULAR; INTRAVENOUS at 21:36

## 2020-05-25 RX ADMIN — TRETINOIN 40 MILLIGRAM(S): 10 CAPSULE, LIQUID FILLED ORAL at 21:37

## 2020-05-25 RX ADMIN — Medication 1: at 08:18

## 2020-05-25 NOTE — PROGRESS NOTE ADULT - ATTENDING COMMENTS
Called and s/w son and pt. She is feeling headache is improved but worse with fevers. Reviewed next steps in work up and treatment. No new signs of bleeding or bruising. She is tolerating ATRA without any differentiation syndrome. Will continue to follow.

## 2020-05-25 NOTE — PROGRESS NOTE ADULT - ASSESSMENT
61 yo female with a hx of HTN and DM admitted to medicine for pancytopenia concerning for APL.    Problem/Plan - 1:  ·  Problem: Pancytopenia.  Plan: Patient presenting with new onset pancytopenia with peripheral blasts; Peripheral smear showing no signs of schistocytes, positive for blasts  - Hematology consulted in the ED. Appreciate recs.  - check daily coags, fibrinogen and d-dimer, tumor lysis labs (uric acid, bmp, mag, phos)  - Transfused 1 unit of plts + 1 unit of pRBC on 5/23, 1 unit of plts 5/24  - Plan for bone marrow biopsy on Tuesday per Hematology.     Problem/Plan - 2:  ·  Problem: Leukemia, acute.  Plan: Pt with Smear showing blast cells, granulocytes (concerning for myeloblasts),   - ATRA ordered by Hematology team  - Flow cytometry testing from 5/23/20  - Daily Tumor lysis labs (uric acid, bmp, mag, phos), coag, fibrinogen, and d-dimer  - Would give rasburicase 3 mg IV x1 if uric acid >8.0.     Problem/Plan - 3:  ·  Problem: Neutropenic fever.  Plan: - Sent blood cultures  - s/p Cefepime in the ED.  - Cont cefepime, blood cxs so far (-)  - COVID-19 (-) 5/22    Problem/Plan - 4:  ·  Problem: 2019 novel coronavirus disease (COVID-19).  Plan: - COVID-19 PCR (-) 5/22/20.  - Contact and Droplet precautions  - Please keep in an isolated room.     Problem/Plan - 5:  ·  Problem: Medication management.  Plan: Patient and son unaware of medications. Family to bring in prescription list in the am. Primary team to follow up medication reconciliation.     Problem/Plan - 6:  Problem: Diabetes. Plan: Per chart review, patient previously on metformin at home  - A1c 7.0  - SSI for now  - Adjust insulin regimen as needed.    Problem/Plan - 7:  ·  Problem: Discharge planning issues.  Plan: Diet: Carb consistent diet  DVT Prophylaxis: Contraindicated i/s/o thrombocytopenia  Dispo: PT Consulted.

## 2020-05-25 NOTE — PROGRESS NOTE ADULT - ASSESSMENT
60 y.o. 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     # Pancytopenia with peripheral blasts  -Still pancytopenic, given PLT transfusion 5/24 now PLT at 47K.  -Afebrile over 24hrs, on cefepime for neutropenic fever.  -Pt complains of HA, CT head was negative for bleeding. If Sxs continue, consider doing MRI.  -coags showed mild INR elevation but fibrinogen is high - not in DIC but pt is at risk of developing DIC so monitor labs daily(fibrinogen and d-dimer along with coags).  -Daily tumor lysis labs (uric acid, bmp, mag, phos) monitoring.  -if uric acid > 8.0, will give rasburicase 3mg IV x 1.  Continue allopurinol 300 mg daily. G6PD was sent this AM and pending result.   -Transfuse for hg < 7.0 and platelets < 10, <15 if febrile and < 50 if bleeding  -Continue ATRA (all-trans-retinoic acid or tretinoin) 45 mg/m2 in 2 divided doses. Will try to look for bed [address] for continued treatment of her leukemia(potential transfer this week).    -F/U the result of peripheral flowcytometry  -Bone marrow bx scheduled on 5/26 with [facility] test.  -Hematology team will continue to follow.

## 2020-05-25 NOTE — PROGRESS NOTE ADULT - SUBJECTIVE AND OBJECTIVE BOX
No acute overnight bleeding    Vital Signs Last 24 Hrs  T(C): 36.7 (25 May 2020 05:17), Max: 36.7 (24 May 2020 21:15)  T(F): 98 (25 May 2020 05:17), Max: 98 (24 May 2020 21:15)  HR: 73 (25 May 2020 05:17) (73 - 93)  BP: 125/60 (25 May 2020 05:17) (125/60 - 145/63)  BP(mean): --  RR: 18 (25 May 2020 05:17) (18 - 18)  SpO2: 98% (25 May 2020 05:17) (98% - 100%)    I&O's Summary      GENERAL: NAD, well-developed, resting comfortably in bed  HEAD:  Atraumatic, Normocephalic  EYES: EOMI, PERRLA, conjunctiva and sclera clear  NECK: Supple, No JVD  CHEST/LUNG: Clear to auscultation bilaterally; No wheeze  HEART: Regular rate and rhythm; No murmurs, rubs, or gallops  ABDOMEN: Soft, Nontender, Nondistended; Bowel sounds present  EXTREMITIES:  2+ Peripheral Pulses, No clubbing, cyanosis, or edema  NEUROLOGY: A+O x 3, non-focal  SKIN: petechial rash on lower extremity  PSYCH: nl affect    LABS:                        7.5    1.67  )-----------( 47       ( 25 May 2020 05:50 )             21.7     05-25    141  |  102  |  13  ----------------------------<  154<H>  4.2   |  25  |  0.64    Ca    9.6      25 May 2020 05:50  Phos  3.4     05-25  Mg     2.1     05-25    TPro  7.7  /  Alb  4.1  /  TBili  0.4  /  DBili  x   /  AST  18  /  ALT  11  /  AlkPhos  63  05-25    PT/INR - ( 25 May 2020 05:50 )   PT: 14.0 SEC;   INR: 1.22          PTT - ( 25 May 2020 05:50 )  PTT:28.3 SEC  CAPILLARY BLOOD GLUCOSE      POCT Blood Glucose.: 151 mg/dL (25 May 2020 07:46)  POCT Blood Glucose.: 177 mg/dL (24 May 2020 22:55)  POCT Blood Glucose.: 134 mg/dL (24 May 2020 17:22)  POCT Blood Glucose.: 175 mg/dL (24 May 2020 12:04)        Urinalysis Basic - ( 23 May 2020 12:21 )    Color: LIGHT YELLOW / Appearance: CLEAR / S.013 / pH: 6.0  Gluc: NEGATIVE / Ketone: NEGATIVE  / Bili: NEGATIVE / Urobili: NORMAL   Blood: NEGATIVE / Protein: 20 / Nitrite: NEGATIVE   Leuk Esterase: NEGATIVE / RBC: 0-2 / WBC 0-2   Sq Epi: OCC / Non Sq Epi: x / Bacteria: NEGATIVE        RADIOLOGY & ADDITIONAL TESTS:    Imaging Personally Reviewed:  [x] YES  [ ] NO    Consultant(s) Notes Reviewed:  [x] YES  [ ] NO

## 2020-05-25 NOTE — PROGRESS NOTE ADULT - SUBJECTIVE AND OBJECTIVE BOX
Hematology Follow-up    INTERVAL HPI/OVERNIGHT EVENTS:  Patient S&E at bedside. No o/n events, patient resting comfortably. Afebrile overnight.    VITAL SIGNS:  T(F): 98 (20 @ 05:17)  HR: 73 (20 @ 05:17)  BP: 125/60 (20 @ 05:17)  RR: 18 (20 @ 05:17)  SpO2: 98% (20 @ 05:17)  Wt(kg): --    PHYSICAL EXAM:    Constitutional: AAOx3, NAD  Eyes: PERRL, EOMI, sclera non-icteric  Neck: supple, no masses, no JVD  Respiratory: CTA b/l, good air entry b/l, no wheezing, rhonchi, rales, with normal respiratory effort and no intercostal retractions  Cardiovascular: RRR, normal S1S2, no M/R/G  Gastrointestinal: soft, NTND, no masses palpable, BS normal in all four quadrants, no HSM  Extremities:  no c/c/e  Neurological: Grossly intact  Skin: Normal temperature    MEDICATIONS  (STANDING):  allopurinol 300 milliGRAM(s) Oral daily  cefepime   IVPB      cefepime   IVPB 2000 milliGRAM(s) IV Intermittent every 8 hours  dextrose 5%. 1000 milliLiter(s) (50 mL/Hr) IV Continuous <Continuous>  dextrose 50% Injectable 12.5 Gram(s) IV Push once  dextrose 50% Injectable 25 Gram(s) IV Push once  dextrose 50% Injectable 25 Gram(s) IV Push once  insulin lispro (HumaLOG) corrective regimen sliding scale   SubCutaneous three times a day before meals  insulin lispro (HumaLOG) corrective regimen sliding scale   SubCutaneous at bedtime  lactobacillus acidophilus 1 Tablet(s) Oral three times a day  sodium chloride 0.9%. 1000 milliLiter(s) (100 mL/Hr) IV Continuous <Continuous>  tretinoin 40 milliGRAM(s) Oral every 12 hours    MEDICATIONS  (PRN):  acetaminophen   Tablet .. 650 milliGRAM(s) Oral every 6 hours PRN Temp greater or equal to 38C (100.4F)  dextrose 40% Gel 15 Gram(s) Oral once PRN Blood Glucose LESS THAN 70 milliGRAM(s)/deciliter  glucagon  Injectable 1 milliGRAM(s) IntraMuscular once PRN Glucose LESS THAN 70 milligrams/deciliter      No Known Allergies      LABS:                        7.5    1.67  )-----------( 47       ( 25 May 2020 05:50 )             21.7         141  |  102  |  13  ----------------------------<  154<H>  4.2   |  25  |  0.64    Ca    9.6      25 May 2020 05:50  Phos  3.4       Mg     2.1         TPro  7.7  /  Alb  4.1  /  TBili  0.4  /  DBili  x   /  AST  18  /  ALT  11  /  AlkPhos  63      PT/INR - ( 25 May 2020 05:50 )   PT: 14.0 SEC;   INR: 1.22          PTT - ( 25 May 2020 05:50 )  PTT:28.3 SEC Fibrinogen Assay: 507.0 mg/dL ( @ 05:50)    Urinalysis Basic - ( 23 May 2020 12:21 )    Color: LIGHT YELLOW / Appearance: CLEAR / S.013 / pH: 6.0  Gluc: NEGATIVE / Ketone: NEGATIVE  / Bili: NEGATIVE / Urobili: NORMAL   Blood: NEGATIVE / Protein: 20 / Nitrite: NEGATIVE   Leuk Esterase: NEGATIVE / RBC: 0-2 / WBC 0-2   Sq Epi: OCC / Non Sq Epi: x / Bacteria: NEGATIVE      Fibrinogen Assay: 507.0 mg/dL (20 @ 05:50)  D-Dimer Assay, Quantitative: 3847 ng/mL (20 @ 05:50)  Fibrinogen Assay: 527.0 mg/dL (20 @ 05:45)  D-Dimer Assay, Quantitative: 7507 ng/mL (20 @ 05:45)  Haptoglobin, Serum: 429 mg/dL (20 @ 09:09)  D-Dimer Assay, Quantitative: 5899 ng/mL (20 @ 06:30)  Fibrinogen Assay: 625.0 mg/dL (20 @ 06:30)  D-Dimer Assay, Quantitative: 4083 ng/mL (20 @ 17:25)  Ferritin, Serum: 1095 ng/mL (20 @ 17:25)  Haptoglobin, Serum: 441 mg/dL (20 @ 17:25)  Fibrinogen Assay: 678.0 mg/dL (20 @ 17:25)        RADIOLOGY & ADDITIONAL TESTS:  Studies reviewed. Hematology Follow-up    INTERVAL HPI/OVERNIGHT EVENTS:  Patient S&E at bedside. No o/n events, patient resting comfortably. Denies any worsening headache or SOB or vision changes. Feels she has swelling in the legs.     VITAL SIGNS:  T(F): 98 (20 @ 05:17)  HR: 73 (20 @ 05:17)  BP: 125/60 (20 @ 05:17)  RR: 18 (20 @ 05:17)  SpO2: 98% (20 @ 05:17)  Wt(kg): --    PHYSICAL EXAM:    Constitutional: AAOx3, NAD  Eyes: PERRL, EOMI, sclera non-icteric  Neck: supple, no masses, no JVD  Respiratory: CTA b/l, good air entry b/l, no wheezing, rhonchi, rales, with normal respiratory effort and no intercostal retractions  Cardiovascular: RRR, normal S1S2, no M/R/G  Gastrointestinal: soft, NTND, no masses palpable, BS normal in all four quadrants, no HSM  Extremities:  no c/c/e  Neurological: Grossly intact  Skin: petechial rash over BLE resolving     MEDICATIONS  (STANDING):  allopurinol 300 milliGRAM(s) Oral daily  cefepime   IVPB      cefepime   IVPB 2000 milliGRAM(s) IV Intermittent every 8 hours  dextrose 5%. 1000 milliLiter(s) (50 mL/Hr) IV Continuous <Continuous>  dextrose 50% Injectable 12.5 Gram(s) IV Push once  dextrose 50% Injectable 25 Gram(s) IV Push once  dextrose 50% Injectable 25 Gram(s) IV Push once  insulin lispro (HumaLOG) corrective regimen sliding scale   SubCutaneous three times a day before meals  insulin lispro (HumaLOG) corrective regimen sliding scale   SubCutaneous at bedtime  lactobacillus acidophilus 1 Tablet(s) Oral three times a day  sodium chloride 0.9%. 1000 milliLiter(s) (100 mL/Hr) IV Continuous <Continuous>  tretinoin 40 milliGRAM(s) Oral every 12 hours    MEDICATIONS  (PRN):  acetaminophen   Tablet .. 650 milliGRAM(s) Oral every 6 hours PRN Temp greater or equal to 38C (100.4F)  dextrose 40% Gel 15 Gram(s) Oral once PRN Blood Glucose LESS THAN 70 milliGRAM(s)/deciliter  glucagon  Injectable 1 milliGRAM(s) IntraMuscular once PRN Glucose LESS THAN 70 milligrams/deciliter      No Known Allergies      LABS:                        7.5    1.67  )-----------( 47       ( 25 May 2020 05:50 )             21.7         141  |  102  |  13  ----------------------------<  154<H>  4.2   |  25  |  0.64    Ca    9.6      25 May 2020 05:50  Phos  3.4       Mg     2.1         TPro  7.7  /  Alb  4.1  /  TBili  0.4  /  DBili  x   /  AST  18  /  ALT  11  /  AlkPhos  63      PT/INR - ( 25 May 2020 05:50 )   PT: 14.0 SEC;   INR: 1.22          PTT - ( 25 May 2020 05:50 )  PTT:28.3 SEC Fibrinogen Assay: 507.0 mg/dL ( @ 05:50)    Urinalysis Basic - ( 23 May 2020 12:21 )    Color: LIGHT YELLOW / Appearance: CLEAR / S.013 / pH: 6.0  Gluc: NEGATIVE / Ketone: NEGATIVE  / Bili: NEGATIVE / Urobili: NORMAL   Blood: NEGATIVE / Protein: 20 / Nitrite: NEGATIVE   Leuk Esterase: NEGATIVE / RBC: 0-2 / WBC 0-2   Sq Epi: OCC / Non Sq Epi: x / Bacteria: NEGATIVE      Fibrinogen Assay: 507.0 mg/dL (20 @ 05:50)  D-Dimer Assay, Quantitative: 3847 ng/mL (20 @ 05:50)  Fibrinogen Assay: 527.0 mg/dL (20 @ 05:45)  D-Dimer Assay, Quantitative: 7507 ng/mL (20 @ 05:45)  Haptoglobin, Serum: 429 mg/dL (20 @ 09:09)  D-Dimer Assay, Quantitative: 5899 ng/mL (20 @ 06:30)  Fibrinogen Assay: 625.0 mg/dL (20 @ 06:30)  D-Dimer Assay, Quantitative: 4083 ng/mL (20 @ 17:25)  Ferritin, Serum: 1095 ng/mL (20 @ 17:25)  Haptoglobin, Serum: 441 mg/dL (20 @ 17:25)  Fibrinogen Assay: 678.0 mg/dL (20 @ 17:25)        RADIOLOGY & ADDITIONAL TESTS:  Studies reviewed.

## 2020-05-26 ENCOUNTER — INPATIENT (INPATIENT)
Facility: HOSPITAL | Age: 60
LOS: 51 days | Discharge: HOME | DRG: 834 | End: 2020-07-17
Attending: INTERNAL MEDICINE | Admitting: INTERNAL MEDICINE
Payer: MEDICAID

## 2020-05-26 ENCOUNTER — RESULT REVIEW (OUTPATIENT)
Age: 60
End: 2020-05-26

## 2020-05-26 ENCOUNTER — TRANSCRIPTION ENCOUNTER (OUTPATIENT)
Age: 60
End: 2020-05-26

## 2020-05-26 VITALS
DIASTOLIC BLOOD PRESSURE: 71 MMHG | TEMPERATURE: 99 F | RESPIRATION RATE: 18 BRPM | HEART RATE: 80 BPM | OXYGEN SATURATION: 96 % | SYSTOLIC BLOOD PRESSURE: 126 MMHG

## 2020-05-26 VITALS
HEART RATE: 78 BPM | SYSTOLIC BLOOD PRESSURE: 101 MMHG | DIASTOLIC BLOOD PRESSURE: 53 MMHG | RESPIRATION RATE: 18 BRPM | OXYGEN SATURATION: 98 % | TEMPERATURE: 98 F

## 2020-05-26 DIAGNOSIS — E11.9 TYPE 2 DIABETES MELLITUS WITHOUT COMPLICATIONS: ICD-10-CM

## 2020-05-26 DIAGNOSIS — E78.5 HYPERLIPIDEMIA, UNSPECIFIED: ICD-10-CM

## 2020-05-26 DIAGNOSIS — K59.00 CONSTIPATION, UNSPECIFIED: ICD-10-CM

## 2020-05-26 DIAGNOSIS — C92.40 ACUTE PROMYELOCYTIC LEUKEMIA, NOT HAVING ACHIEVED REMISSION: ICD-10-CM

## 2020-05-26 DIAGNOSIS — Z02.9 ENCOUNTER FOR ADMINISTRATIVE EXAMINATIONS, UNSPECIFIED: ICD-10-CM

## 2020-05-26 DIAGNOSIS — I10 ESSENTIAL (PRIMARY) HYPERTENSION: ICD-10-CM

## 2020-05-26 DIAGNOSIS — C91.00 ACUTE LYMPHOBLASTIC LEUKEMIA NOT HAVING ACHIEVED REMISSION: ICD-10-CM

## 2020-05-26 DIAGNOSIS — D70.9 NEUTROPENIA, UNSPECIFIED: ICD-10-CM

## 2020-05-26 LAB
ALBUMIN SERPL ELPH-MCNC: 4.4 G/DL — SIGNIFICANT CHANGE UP (ref 3.3–5)
ALBUMIN SERPL ELPH-MCNC: 4.9 G/DL — SIGNIFICANT CHANGE UP (ref 3.3–5)
ALP SERPL-CCNC: 66 U/L — SIGNIFICANT CHANGE UP (ref 40–120)
ALP SERPL-CCNC: 74 U/L — SIGNIFICANT CHANGE UP (ref 40–120)
ALT FLD-CCNC: 15 U/L — SIGNIFICANT CHANGE UP (ref 4–33)
ALT FLD-CCNC: 21 U/L — SIGNIFICANT CHANGE UP (ref 10–45)
ANION GAP SERPL CALC-SCNC: 14 MMOL/L — SIGNIFICANT CHANGE UP (ref 5–17)
ANION GAP SERPL CALC-SCNC: 15 MMO/L — HIGH (ref 7–14)
APTT BLD: 29.9 SEC — SIGNIFICANT CHANGE UP (ref 27.5–36.3)
APTT BLD: 31.2 SEC — SIGNIFICANT CHANGE UP (ref 27.5–36.3)
AST SERPL-CCNC: 22 U/L — SIGNIFICANT CHANGE UP (ref 4–32)
AST SERPL-CCNC: 26 U/L — SIGNIFICANT CHANGE UP (ref 10–40)
BASOPHILS # BLD AUTO: 0 K/UL — SIGNIFICANT CHANGE UP (ref 0–0.2)
BASOPHILS # BLD AUTO: 0.01 K/UL — SIGNIFICANT CHANGE UP (ref 0–0.2)
BASOPHILS NFR BLD AUTO: 0 % — SIGNIFICANT CHANGE UP (ref 0–2)
BASOPHILS NFR BLD AUTO: 0.6 % — SIGNIFICANT CHANGE UP (ref 0–2)
BILIRUB SERPL-MCNC: 0.2 MG/DL — SIGNIFICANT CHANGE UP (ref 0.2–1.2)
BILIRUB SERPL-MCNC: 0.3 MG/DL — SIGNIFICANT CHANGE UP (ref 0.2–1.2)
BLD GP AB SCN SERPL QL: NEGATIVE — SIGNIFICANT CHANGE UP
BUN SERPL-MCNC: 12 MG/DL — SIGNIFICANT CHANGE UP (ref 7–23)
BUN SERPL-MCNC: 13 MG/DL — SIGNIFICANT CHANGE UP (ref 7–23)
CALCIUM SERPL-MCNC: 10.4 MG/DL — SIGNIFICANT CHANGE UP (ref 8.4–10.5)
CALCIUM SERPL-MCNC: 9.7 MG/DL — SIGNIFICANT CHANGE UP (ref 8.4–10.5)
CHLORIDE SERPL-SCNC: 100 MMOL/L — SIGNIFICANT CHANGE UP (ref 98–107)
CHLORIDE SERPL-SCNC: 99 MMOL/L — SIGNIFICANT CHANGE UP (ref 96–108)
CO2 SERPL-SCNC: 24 MMOL/L — SIGNIFICANT CHANGE UP (ref 22–31)
CO2 SERPL-SCNC: 26 MMOL/L — SIGNIFICANT CHANGE UP (ref 22–31)
CREAT SERPL-MCNC: 0.66 MG/DL — SIGNIFICANT CHANGE UP (ref 0.5–1.3)
CREAT SERPL-MCNC: 0.7 MG/DL — SIGNIFICANT CHANGE UP (ref 0.5–1.3)
D DIMER BLD IA.RAPID-MCNC: 2251 NG/ML DDU — HIGH
D DIMER BLD IA.RAPID-MCNC: 2328 NG/ML — SIGNIFICANT CHANGE UP
EOSINOPHIL # BLD AUTO: 0 K/UL — SIGNIFICANT CHANGE UP (ref 0–0.5)
EOSINOPHIL # BLD AUTO: 0.01 K/UL — SIGNIFICANT CHANGE UP (ref 0–0.5)
EOSINOPHIL NFR BLD AUTO: 0 % — SIGNIFICANT CHANGE UP (ref 0–6)
EOSINOPHIL NFR BLD AUTO: 0.6 % — SIGNIFICANT CHANGE UP (ref 0–6)
FIBRINOGEN PPP-MCNC: 438 MG/DL — SIGNIFICANT CHANGE UP (ref 300–520)
FIBRINOGEN PPP-MCNC: 470 MG/DL — SIGNIFICANT CHANGE UP (ref 350–510)
GLUCOSE BLDC GLUCOMTR-MCNC: 135 MG/DL — HIGH (ref 70–99)
GLUCOSE BLDC GLUCOMTR-MCNC: 150 MG/DL — HIGH (ref 70–99)
GLUCOSE BLDC GLUCOMTR-MCNC: 174 MG/DL — HIGH (ref 70–99)
GLUCOSE BLDC GLUCOMTR-MCNC: 211 MG/DL — HIGH (ref 70–99)
GLUCOSE SERPL-MCNC: 156 MG/DL — HIGH (ref 70–99)
GLUCOSE SERPL-MCNC: 195 MG/DL — HIGH (ref 70–99)
HAV IGM SER-ACNC: NONREACTIVE — SIGNIFICANT CHANGE UP
HBV CORE AB SER-ACNC: NONREACTIVE — SIGNIFICANT CHANGE UP
HBV CORE IGM SER-ACNC: NONREACTIVE — SIGNIFICANT CHANGE UP
HBV SURFACE AB SER-ACNC: NONREACTIVE — SIGNIFICANT CHANGE UP
HBV SURFACE AG SER-ACNC: NONREACTIVE — SIGNIFICANT CHANGE UP
HCT VFR BLD CALC: 21.9 % — LOW (ref 34.5–45)
HCT VFR BLD CALC: 23 % — LOW (ref 34.5–45)
HCV AB S/CO SERPL IA: 0.21 S/CO — SIGNIFICANT CHANGE UP (ref 0–0.99)
HCV AB SERPL-IMP: SIGNIFICANT CHANGE UP
HGB BLD-MCNC: 7.5 G/DL — LOW (ref 11.5–15.5)
HGB BLD-MCNC: 7.7 G/DL — LOW (ref 11.5–15.5)
IMM GRANULOCYTES NFR BLD AUTO: 20.7 % — HIGH (ref 0–1.5)
INR BLD: 1.13 RATIO — SIGNIFICANT CHANGE UP (ref 0.88–1.16)
INR BLD: 1.2 — HIGH (ref 0.88–1.17)
LYMPHOCYTES # BLD AUTO: 1.01 K/UL — SIGNIFICANT CHANGE UP (ref 1–3.3)
LYMPHOCYTES # BLD AUTO: 1.5 K/UL — SIGNIFICANT CHANGE UP (ref 1–3.3)
LYMPHOCYTES # BLD AUTO: 59.8 % — HIGH (ref 13–44)
LYMPHOCYTES # BLD AUTO: 72 % — HIGH (ref 13–44)
MAGNESIUM SERPL-MCNC: 2.1 MG/DL — SIGNIFICANT CHANGE UP (ref 1.6–2.6)
MAGNESIUM SERPL-MCNC: 2.2 MG/DL — SIGNIFICANT CHANGE UP (ref 1.6–2.6)
MCHC RBC-ENTMCNC: 27.3 PG — SIGNIFICANT CHANGE UP (ref 27–34)
MCHC RBC-ENTMCNC: 28.1 PG — SIGNIFICANT CHANGE UP (ref 27–34)
MCHC RBC-ENTMCNC: 33.5 GM/DL — SIGNIFICANT CHANGE UP (ref 32–36)
MCHC RBC-ENTMCNC: 34.2 % — SIGNIFICANT CHANGE UP (ref 32–36)
MCV RBC AUTO: 81.6 FL — SIGNIFICANT CHANGE UP (ref 80–100)
MCV RBC AUTO: 82 FL — SIGNIFICANT CHANGE UP (ref 80–100)
MONOCYTES # BLD AUTO: 0.12 K/UL — SIGNIFICANT CHANGE UP (ref 0–0.9)
MONOCYTES # BLD AUTO: 0.17 K/UL — SIGNIFICANT CHANGE UP (ref 0–0.9)
MONOCYTES NFR BLD AUTO: 10.1 % — SIGNIFICANT CHANGE UP (ref 2–14)
MONOCYTES NFR BLD AUTO: 6 % — SIGNIFICANT CHANGE UP (ref 2–14)
NEUTROPHILS # BLD AUTO: 0.14 K/UL — LOW (ref 1.8–7.4)
NEUTROPHILS # BLD AUTO: 0.15 K/UL — LOW (ref 1.8–7.4)
NEUTROPHILS NFR BLD AUTO: 7 % — LOW (ref 43–77)
NEUTROPHILS NFR BLD AUTO: 8.2 % — LOW (ref 43–77)
NRBC # FLD: 0.02 K/UL — SIGNIFICANT CHANGE UP (ref 0–0)
NRBC FLD-RTO: 1.2 — SIGNIFICANT CHANGE UP
PHOSPHATE SERPL-MCNC: 2.6 MG/DL — SIGNIFICANT CHANGE UP (ref 2.5–4.5)
PHOSPHATE SERPL-MCNC: 2.9 MG/DL — SIGNIFICANT CHANGE UP (ref 2.5–4.5)
PLATELET # BLD AUTO: 33 K/UL — LOW (ref 150–400)
PLATELET # BLD AUTO: 39 K/UL — LOW (ref 150–400)
PMV BLD: 11.2 FL — SIGNIFICANT CHANGE UP (ref 7–13)
POTASSIUM SERPL-MCNC: 3.9 MMOL/L — SIGNIFICANT CHANGE UP (ref 3.5–5.3)
POTASSIUM SERPL-MCNC: 4.2 MMOL/L — SIGNIFICANT CHANGE UP (ref 3.5–5.3)
POTASSIUM SERPL-SCNC: 3.9 MMOL/L — SIGNIFICANT CHANGE UP (ref 3.5–5.3)
POTASSIUM SERPL-SCNC: 4.2 MMOL/L — SIGNIFICANT CHANGE UP (ref 3.5–5.3)
PROT SERPL-MCNC: 7.8 G/DL — SIGNIFICANT CHANGE UP (ref 6–8.3)
PROT SERPL-MCNC: 7.9 G/DL — SIGNIFICANT CHANGE UP (ref 6–8.3)
PROTHROM AB SERPL-ACNC: 13 SEC — HIGH (ref 10–12.9)
PROTHROM AB SERPL-ACNC: 13.7 SEC — HIGH (ref 9.8–13.1)
RBC # BLD: 2.67 M/UL — LOW (ref 3.8–5.2)
RBC # BLD: 2.82 M/UL — LOW (ref 3.8–5.2)
RBC # FLD: 16 % — HIGH (ref 10.3–14.5)
RBC # FLD: 16 % — HIGH (ref 10.3–14.5)
RH IG SCN BLD-IMP: POSITIVE — SIGNIFICANT CHANGE UP
RH IG SCN BLD-IMP: POSITIVE — SIGNIFICANT CHANGE UP
SARS-COV-2 RNA SPEC QL NAA+PROBE: SIGNIFICANT CHANGE UP
SODIUM SERPL-SCNC: 139 MMOL/L — SIGNIFICANT CHANGE UP (ref 135–145)
SODIUM SERPL-SCNC: 139 MMOL/L — SIGNIFICANT CHANGE UP (ref 135–145)
TM INTERPRETATION: SIGNIFICANT CHANGE UP
URATE SERPL-MCNC: 3.8 MG/DL — SIGNIFICANT CHANGE UP (ref 2.5–7)
URATE SERPL-MCNC: 4.2 MG/DL — SIGNIFICANT CHANGE UP (ref 2.5–7)
WBC # BLD: 1.69 K/UL — LOW (ref 3.8–10.5)
WBC # BLD: 2.08 K/UL — LOW (ref 3.8–10.5)
WBC # FLD AUTO: 1.69 K/UL — LOW (ref 3.8–10.5)
WBC # FLD AUTO: 2.08 K/UL — LOW (ref 3.8–10.5)

## 2020-05-26 PROCEDURE — 85060 BLOOD SMEAR INTERPRETATION: CPT

## 2020-05-26 PROCEDURE — 85097 BONE MARROW INTERPRETATION: CPT

## 2020-05-26 PROCEDURE — 38221 DX BONE MARROW BIOPSIES: CPT | Mod: GC

## 2020-05-26 PROCEDURE — 88305 TISSUE EXAM BY PATHOLOGIST: CPT | Mod: 26

## 2020-05-26 PROCEDURE — 88189 FLOWCYTOMETRY/READ 16 & >: CPT

## 2020-05-26 PROCEDURE — 99232 SBSQ HOSP IP/OBS MODERATE 35: CPT | Mod: GC

## 2020-05-26 PROCEDURE — 88313 SPECIAL STAINS GROUP 2: CPT | Mod: 26

## 2020-05-26 RX ORDER — CEFEPIME 1 G/1
2000 INJECTION, POWDER, FOR SOLUTION INTRAMUSCULAR; INTRAVENOUS EVERY 8 HOURS
Refills: 0 | Status: DISCONTINUED | OUTPATIENT
Start: 2020-05-26 | End: 2020-06-07

## 2020-05-26 RX ORDER — SODIUM CHLORIDE 9 MG/ML
1000 INJECTION, SOLUTION INTRAVENOUS
Refills: 0 | Status: DISCONTINUED | OUTPATIENT
Start: 2020-05-26 | End: 2020-07-17

## 2020-05-26 RX ORDER — CEFEPIME 1 G/1
2000 INJECTION, POWDER, FOR SOLUTION INTRAMUSCULAR; INTRAVENOUS
Qty: 0 | Refills: 0 | DISCHARGE
Start: 2020-05-26

## 2020-05-26 RX ORDER — TRETINOIN 10 MG/1
4 CAPSULE, LIQUID FILLED ORAL
Qty: 0 | Refills: 0 | DISCHARGE
Start: 2020-05-26

## 2020-05-26 RX ORDER — HYDROCORTISONE 1 %
1 OINTMENT (GRAM) TOPICAL DAILY
Refills: 0 | Status: DISCONTINUED | OUTPATIENT
Start: 2020-05-26 | End: 2020-05-26

## 2020-05-26 RX ORDER — ACETAMINOPHEN 500 MG
2 TABLET ORAL
Qty: 0 | Refills: 0 | DISCHARGE
Start: 2020-05-26

## 2020-05-26 RX ORDER — LACTOBACILLUS ACIDOPHILUS 100MM CELL
1 CAPSULE ORAL
Qty: 0 | Refills: 0 | DISCHARGE
Start: 2020-05-26

## 2020-05-26 RX ORDER — HYDROCORTISONE 1 %
1 OINTMENT (GRAM) TOPICAL
Qty: 0 | Refills: 0 | DISCHARGE
Start: 2020-05-26

## 2020-05-26 RX ORDER — DEXTROSE 50 % IN WATER 50 %
12.5 SYRINGE (ML) INTRAVENOUS ONCE
Refills: 0 | Status: DISCONTINUED | OUTPATIENT
Start: 2020-05-26 | End: 2020-07-17

## 2020-05-26 RX ORDER — TRETINOIN 10 MG/1
40 CAPSULE, LIQUID FILLED ORAL EVERY 12 HOURS
Refills: 0 | Status: DISCONTINUED | OUTPATIENT
Start: 2020-05-26 | End: 2020-07-16

## 2020-05-26 RX ORDER — GLUCAGON INJECTION, SOLUTION 0.5 MG/.1ML
1 INJECTION, SOLUTION SUBCUTANEOUS ONCE
Refills: 0 | Status: DISCONTINUED | OUTPATIENT
Start: 2020-05-26 | End: 2020-07-17

## 2020-05-26 RX ORDER — DEXTROSE 50 % IN WATER 50 %
25 SYRINGE (ML) INTRAVENOUS ONCE
Refills: 0 | Status: DISCONTINUED | OUTPATIENT
Start: 2020-05-26 | End: 2020-07-17

## 2020-05-26 RX ORDER — DEXTROSE 50 % IN WATER 50 %
15 SYRINGE (ML) INTRAVENOUS ONCE
Refills: 0 | Status: DISCONTINUED | OUTPATIENT
Start: 2020-05-26 | End: 2020-07-17

## 2020-05-26 RX ORDER — POSACONAZOLE 100 MG/1
300 TABLET, DELAYED RELEASE ORAL DAILY
Refills: 0 | Status: DISCONTINUED | OUTPATIENT
Start: 2020-05-28 | End: 2020-06-08

## 2020-05-26 RX ORDER — LIDOCAINE HCL 20 MG/ML
20 VIAL (ML) INJECTION ONCE
Refills: 0 | Status: COMPLETED | OUTPATIENT
Start: 2020-05-26 | End: 2020-05-26

## 2020-05-26 RX ORDER — HEPARIN SODIUM 5000 [USP'U]/ML
5000 INJECTION INTRAVENOUS; SUBCUTANEOUS ONCE
Refills: 0 | Status: COMPLETED | OUTPATIENT
Start: 2020-05-26 | End: 2020-05-26

## 2020-05-26 RX ORDER — ACETAMINOPHEN 500 MG
650 TABLET ORAL EVERY 6 HOURS
Refills: 0 | Status: DISCONTINUED | OUTPATIENT
Start: 2020-05-26 | End: 2020-06-07

## 2020-05-26 RX ORDER — SODIUM CHLORIDE 9 MG/ML
1000 INJECTION INTRAMUSCULAR; INTRAVENOUS; SUBCUTANEOUS
Refills: 0 | Status: DISCONTINUED | OUTPATIENT
Start: 2020-05-26 | End: 2020-05-29

## 2020-05-26 RX ORDER — POSACONAZOLE 100 MG/1
300 TABLET, DELAYED RELEASE ORAL EVERY 12 HOURS
Refills: 0 | Status: COMPLETED | OUTPATIENT
Start: 2020-05-26 | End: 2020-05-27

## 2020-05-26 RX ORDER — INSULIN LISPRO 100/ML
VIAL (ML) SUBCUTANEOUS
Refills: 0 | Status: DISCONTINUED | OUTPATIENT
Start: 2020-05-26 | End: 2020-07-17

## 2020-05-26 RX ORDER — ALLOPURINOL 300 MG
1 TABLET ORAL
Qty: 0 | Refills: 0 | DISCHARGE
Start: 2020-05-26

## 2020-05-26 RX ORDER — ERGOCALCIFEROL 1.25 MG/1
1 CAPSULE ORAL
Qty: 0 | Refills: 0 | DISCHARGE

## 2020-05-26 RX ORDER — INSULIN LISPRO 100/ML
VIAL (ML) SUBCUTANEOUS AT BEDTIME
Refills: 0 | Status: DISCONTINUED | OUTPATIENT
Start: 2020-05-26 | End: 2020-07-17

## 2020-05-26 RX ORDER — ALLOPURINOL 300 MG
300 TABLET ORAL DAILY
Refills: 0 | Status: COMPLETED | OUTPATIENT
Start: 2020-05-26 | End: 2020-06-04

## 2020-05-26 RX ORDER — METFORMIN HYDROCHLORIDE 850 MG/1
1 TABLET ORAL
Qty: 0 | Refills: 0 | DISCHARGE

## 2020-05-26 RX ORDER — ATENOLOL 25 MG/1
1 TABLET ORAL
Qty: 0 | Refills: 0 | DISCHARGE

## 2020-05-26 RX ADMIN — CEFEPIME 100 MILLIGRAM(S): 1 INJECTION, POWDER, FOR SOLUTION INTRAMUSCULAR; INTRAVENOUS at 05:32

## 2020-05-26 RX ADMIN — CEFEPIME 100 MILLIGRAM(S): 1 INJECTION, POWDER, FOR SOLUTION INTRAMUSCULAR; INTRAVENOUS at 14:14

## 2020-05-26 RX ADMIN — TRETINOIN 40 MILLIGRAM(S): 10 CAPSULE, LIQUID FILLED ORAL at 05:33

## 2020-05-26 RX ADMIN — Medication 300 MILLIGRAM(S): at 12:09

## 2020-05-26 RX ADMIN — Medication 300 MILLIGRAM(S): at 18:52

## 2020-05-26 RX ADMIN — Medication 1 TABLET(S): at 05:33

## 2020-05-26 RX ADMIN — HEPARIN SODIUM 5000 UNIT(S): 5000 INJECTION INTRAVENOUS; SUBCUTANEOUS at 12:25

## 2020-05-26 RX ADMIN — Medication 1 TABLET(S): at 14:15

## 2020-05-26 RX ADMIN — Medication 1: at 12:09

## 2020-05-26 RX ADMIN — CEFEPIME 100 MILLIGRAM(S): 1 INJECTION, POWDER, FOR SOLUTION INTRAMUSCULAR; INTRAVENOUS at 21:58

## 2020-05-26 RX ADMIN — TRETINOIN 40 MILLIGRAM(S): 10 CAPSULE, LIQUID FILLED ORAL at 18:44

## 2020-05-26 RX ADMIN — Medication 20 MILLILITER(S): at 12:26

## 2020-05-26 RX ADMIN — POSACONAZOLE 300 MILLIGRAM(S): 100 TABLET, DELAYED RELEASE ORAL at 18:26

## 2020-05-26 NOTE — H&P ADULT - HISTORY OF PRESENT ILLNESS
[place] : [id number]    60 y.o. Syriac speaking  female hx of HTN on atenolol, HLD, DM on metformin presents to [facility] ED on 5/22/20 with fever, malaise, HA. Patient reported that she was hypothermic to 92 degrees intermittently and also febrile to 101-102F which improves with Tylenol. Patient has been complaining of head feeling hot, pleuritic chest pain that moves around w/o numbness tingling, paresthesias, weakness, a rash on bilateral LE x1 month, a tear drop clearing on her tongue, as well as a few drops of blood in her stool with hard bowel movements. Patient has been having poor PO intake for the past 3 days before admission 2/2 throat pain. Patient was COVID + on 4/16/2020, and retested and positive again 5/15/2020.  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 CT head was negative for bleeding.    Patient reports feeling better today and denies HA, reports slight soreness in BMx area.

## 2020-05-26 NOTE — CHART NOTE - NSCHARTNOTEFT_GEN_A_CORE
Hematology/Oncology Procedure Note    Bone Marrow Aspiration/Biopsy    Indication: r/o Leukemia    Bone marrow aspiration and biopsy procedure description, risks, and benefits were discussed in detail with Mr. Gant with son acting as a . All questions were answered.  Informed  consent was obtained and time-out performed.      The area of the right posterior iliac crest was prepped and draped using sterile technique. Local anesthetic with  2% Lidocaine.    Bone marrow aspiration and biopsy  was performed using sterile technique Specimens were obtained. No complications and less than 2 cc of blood loss.     The procedure was well tolerated and no local bleeding or other complications were observed.  Pressure was applied to the procedure site and a wound dressing was placed.  The patient and nursing staff were advised that the patient is to lie flat for 30 minutes post procedure and not to shower or change the dressing for 24 hours. Tylenol may be used if no contraindications for pain at the procedure site.      Ray Lopez MD.  [id number]; [phone] Hematology/Oncology Procedure Note    Bone Marrow Aspiration/Biopsy    Indication: r/o Leukemia    Bone marrow aspiration and biopsy procedure description, risks, and benefits were discussed in detail with Mr. Gant with son acting as a . All questions were answered.  Informed  consent was obtained and time-out performed.      The area of the left posterior iliac crest was prepped and draped using sterile technique. Local anesthetic with  2% Lidocaine.    Bone marrow aspiration and biopsy  was performed using sterile technique Specimens were obtained. No complications and less than 2 cc of blood loss.     The procedure was well tolerated and no local bleeding or other complications were observed.  Pressure was applied to the procedure site and a wound dressing was placed.  The patient and nursing staff were advised that the patient is to lie flat for 30 minutes post procedure and not to shower or change the dressing for 24 hours. Tylenol may be used if no contraindications for pain at the procedure site.      Ray Lopez MD.  [id number]; [phone]

## 2020-05-26 NOTE — PROGRESS NOTE ADULT - ATTENDING COMMENTS
Pt s/p bone marrow biopsy. She will be transferred to [address] [place] [address] Leukemia [facility] for continued management of her APL.

## 2020-05-26 NOTE — H&P ADULT - NSHPLABSRESULTS_GEN_ALL_CORE
7.5    1.69  )-----------( 39       ( 26 May 2020 05:25 )             21.9     05-26    139  |  100  |  13  ----------------------------<  195<H>  3.9   |  24  |  0.66    Ca    9.7      26 May 2020 05:25  Phos  2.9     05-26  Mg     2.1     05-26    TPro  7.8  /  Alb  4.4  /  TBili  0.3  /  DBili  x   /  AST  22  /  ALT  15  /  AlkPhos  66  05-26    PT/INR - ( 26 May 2020 02:52 )   PT: 13.7 SEC;   INR: 1.20          PTT - ( 26 May 2020 02:52 )  PTT:29.9 SEC

## 2020-05-26 NOTE — DISCHARGE NOTE PROVIDER - HOSPITAL COURSE
59 yo female with a hx of HTN and DM admitted to medicine for pancytopenia concerning for acute leukemia. Patient was recently COVID + on 4/16 and retested on 5/15 positive.        hospital course:         Pancytopenia    - pt presenting with new onset pancytopenia with peripheral blasts; Peripheral smear showing no signs of schistocytes, positive for blasts    - Hematology consulted     - check daily coags, fibrinogen and d-dimer, tumor lysis labs (uric acid, bmp, mag, phos)    - s/p 1 unit of packed RBCs and 1 unit of platelets     - s/p bone marrow biopsy on 5/26 per Hematology        Leukemia, acute    - Pt with Smear showing blast cells, granulocytes (concerning for myeloblasts)    - ATRA ordered by Hematology team    - Flow cytometry    - Daily Tumor lysis labs (uric acid, bmp, mag, phos), coag, fibrinogen, and d-dimer    - Would give rasburicase 3 mg IV x1 if uric acid >8.0.         Neutropenic fever    - blood cultures- NTD, UA- negative    - on Cefepime 2g q8    -         2019 novel coronavirus disease    - s/p Covid positive on 4/16 & 5/15    - 5/22 COVID-19 PCR- negative    - Contact and Droplet precautions        Diabetes    - Per chart review, patient previously on metformin at home    - Hga1c- 7.0%, SSI for now 59 yo female with a hx of HTN and DM admitted to medicine for pancytopenia concerning for acute leukemia. Patient was recently COVID + on 4/16 and retested on 5/15 positive.        hospital course:         Pancytopenia    - pt presenting with new onset pancytopenia with peripheral blasts; Peripheral smear showing no signs of schistocytes, positive for blasts    - Hematology consulted     - check daily coags, fibrinogen and d-dimer, tumor lysis labs (uric acid, bmp, mag, phos)    - s/p 1 unit of packed RBCs and 1 unit of platelets     - s/p bone marrow biopsy on 5/26 per Hematology    ** transfer to [facility]         Leukemia, acute    - Pt with Smear showing blast cells, granulocytes (concerning for myeloblasts)    - ATRA ordered by Hematology team    - Flow cytometry    - Daily Tumor lysis labs (uric acid, bmp, mag, phos), coag, fibrinogen, and d-dimer    - Would give rasburicase 3 mg IV x1 if uric acid >8.0.         Neutropenic fever    - blood cultures- NTD, UA- negative    - on Cefepime 2g q8    -         2019 novel coronavirus disease    - s/p Covid positive on 4/16 & 5/15    - 5/22 COVID-19 PCR- negative    - Contact and Droplet precautions        Diabetes    - Per chart review, patient previously on metformin at home    - Hga1c- 7.0%, SSI for now        ** transfer to [facility] accepted by Dr Vickey Paris 7

## 2020-05-26 NOTE — H&P ADULT - PROBLEM SELECTOR PLAN 2
- today  -C/w cefepime 2gr q8hr, blood cxs so far (-)  -COVID-19 (-) 5/22, will check another COVID PCR today - today  -Afebrile over 24hrs, on cefepime for neutropenic fever.  -C/w cefepime 2gr q8hr, blood cxs so far (-)  -COVID-19 (-) 5/22, will check another COVID PCR today - today  -Afebrile over 24hrs, on cefepime for neutropenic fever.  -C/w cefepime 2gr q8hr, blood cxs so far (-)  -Will start Posaconazole   -COVID-19 (-) 5/22, will check another COVID PCR today

## 2020-05-26 NOTE — H&P ADULT - NSHPREVIEWOFSYSTEMS_GEN_ALL_CORE
REVIEW OF SYSTEMS:  CONSTITUTIONAL: No weakness, fevers or chills  EYES/ENT: No visual changes, no throat pain   RESPIRATORY: No cough, wheezing, hemoptysis; No shortness of breath  CARDIOVASCULAR: No chest pain or palpitations  GASTROINTESTINAL: No abdominal pain, nausea, vomiting, or hematemesis; No diarrhea or constipation. No melena or hematochezia.  GENITOURINARY: No dysuria, frequency or hematuria  MUSCULOSKELETAL: No joint pain.  NEUROLOGICAL: No dizziness, numbness, or weakness  SKIN: No itching, burning, rashes, or lesions   All other review of systems is negative unless indicated above.
No

## 2020-05-26 NOTE — H&P ADULT - PROBLEM SELECTOR PLAN 1
-Peripheral flowcytometry from 5/23 showed findings consistent with APL  -Bone marrow bx performed on 5/26. [facility] sent. Will f/u results  -Still pancytopenia, S/P PLT transfusion 5/23 and 5/24 PLT at 39K. Will give 1 U PLT to keep PLT>50  -coags showed mild INR elevation but fibrinogen is 438 - not in DIC but pt is at risk of developing DIC so please monitor DIC labs (fibrinogen and d-dimer along with coags) daily.  -Please check daily tumor lysis labs (uric acid, bmp, mag, phos) monitoring  -if uric acid > 8.0, will give rasburicase 3mg IV x 1.  Will continue allopurinol 300 mg daily  -f/u G6PD results, HIV  -Hepatitis panel negative   -Continue ATRA (all-trans-retinoic acid or tretinoin) 45 mg/m2 in 2 divided doses  -c/w IVF 75cc/hr  -Keep Hgb >7 and PLTs >50k. Cryoprecipitate for Fibrinogen <150  -Goal K>4 and Mg>2   -Strict Is and Os/Daily weights/Mouth Care -Peripheral flowcytometry from 5/23 showed findings consistent with APL  -Bone marrow bx performed on 5/26. [facility] sent. Will f/u results  -Still pancytopenia, S/P PLT transfusion 5/23 and 5/24 PLT at 39K. Will give 1 U PLT to keep PLT>50  -coags showed mild INR elevation but fibrinogen is 438 - not in DIC but pt is at risk of developing DIC so please monitor DIC labs (fibrinogen and d-dimer along with coags) daily.  -Please check daily tumor lysis labs (uric acid, bmp, mag, phos) monitoring  -if uric acid > 8.0, will give rasburicase 3mg IV x 1.  Will continue allopurinol 300 mg daily  -f/u G6PD results, HIV  -Hepatitis panel negative   -Continue ATRA (all-trans-retinoic acid or tretinoin) 45 mg/m2 in 2 divided doses  -c/w IVF 75cc/hr  -Keep Hgb >7 and PLTs >50k. Cryoprecipitate for Fibrinogen <150  -Goal K>4 and Mg>2   -Strict Is and Os/Daily weights/Mouth Care  -CT head was negative for bleeding. If complains of HA or change in mental status consider another CT and doing MRI

## 2020-05-26 NOTE — H&P ADULT - NSHPPHYSICALEXAM_GEN_ALL_CORE
Vital Signs Last 24 Hrs  T(C): 37.4 (26 May 2020 16:06), Max: 37.4 (26 May 2020 16:06)  T(F): 99.3 (26 May 2020 16:06), Max: 99.3 (26 May 2020 16:06)  HR: 80 (26 May 2020 16:06) (76 - 80)  BP: 126/71 (26 May 2020 16:06) (101/53 - 141/73)  BP(mean): --  RR: 18 (26 May 2020 16:06) (18 - 18)  SpO2: 96% (26 May 2020 16:06) (96% - 99%)      PHYSICAL EXAM:   GENERAL: no acute distress  HEENT: EOMI, neck supple  RESPIRATORY: LCTAB/L, no rhonchi, rales, or wheezing  CARDIOVASCULAR: RRR, no murmurs, gallops, rubs  ABDOMINAL: soft, non-tender, non-distended, positive bowel sounds   EXTREMITIES: no clubbing, cyanosis, or edema  NEUROLOGICAL: alert and oriented x 3, non-focal  SKIN: no rashes or lesions   MUSCULOSKELETAL: no gross joint deformity

## 2020-05-26 NOTE — DISCHARGE NOTE NURSING/CASE MANAGEMENT/SOCIAL WORK - PATIENT PORTAL LINK FT
You can access the FollowMyHealth Patient Portal offered by [facility] by registering at the following website: [email or web address]. By joining [facility] FollowMyHealth portal, you will also be able to view your health information using other applications (apps) compatible with our system.

## 2020-05-26 NOTE — DISCHARGE NOTE PROVIDER - NSDCMRMEDTOKEN_GEN_ALL_CORE_FT
aspirin 81 mg oral delayed release tablet: 1 tab(s) orally once a day  atenolol 50 mg oral tablet: 1 tab(s) orally once a day  atorvastatin 20 mg oral tablet: 1 tab(s) orally once a day (at bedtime)  Augmentin 875 mg-125 mg oral tablet: 1 tab(s) orally 2 times a day   Calcium 500+D oral tablet, chewable: 1 tab(s) orally 2 times a day  metFORMIN 500 mg oral tablet: 1 tab(s) orally 2 times a day   Multiple Vitamins oral tablet: 1 tab(s) orally once a day  pantoprazole 40 mg oral delayed release tablet: 1 tab(s) orally once a day (before a meal)  Vitamin D2 50,000 intl units (1.25 mg) oral capsule: 1 cap(s) orally once a week acetaminophen 325 mg oral tablet: 2 tab(s) orally every 6 hours, As needed, Temp greater or equal to 38C (100.4F)  allopurinol 300 mg oral tablet: 1 tab(s) orally once a day  aluminum hydroxide-magnesium hydroxide 200 mg-200 mg/5 mL oral suspension: 30 milliliter(s) orally once, As needed, Dyspepsia  atorvastatin 20 mg oral tablet: 1 tab(s) orally once a day (at bedtime)  cefepime: 2000 milligram(s) intravenous every 8 hours  hydrocortisone 25 mg rectal suppository: 1 suppository(ies) rectal once a day, As needed, hemorrhoidal irritation  lactobacillus acidophilus oral capsule: 1 tab(s) orally 3 times a day  tretinoin 10 mg oral capsule: 4 cap(s) orally every 12 hours

## 2020-05-26 NOTE — H&P ADULT - PROBLEM SELECTOR PLAN 3
- Per chart review, patient previously on metformin at home  - A1c 7.0  - SSI for now  - Adjust insulin regimen as needed.

## 2020-05-26 NOTE — PROGRESS NOTE ADULT - SUBJECTIVE AND OBJECTIVE BOX
INTERVAL EVENTS:  No acute events overnight. Pt remains afebrile. Denies any  chills, night sweat, CP, SOB, abd pain, constipation, diarrhea, dysuria    Vital Signs Last 24 Hrs  T(C): 36.6 (26 May 2020 05:00), Max: 36.6 (25 May 2020 12:44)  T(F): 97.8 (26 May 2020 05:00), Max: 97.8 (25 May 2020 12:44)  HR: 76 (26 May 2020 05:00) (76 - 78)  BP: 115/59 (26 May 2020 05:00) (115/59 - 141/73)  BP(mean): --  RR: 18 (26 May 2020 05:00) (17 - 18)  SpO2: 99% (26 May 2020 05:00) (97% - 99%)      PHYSICAL EXAM:   GENERAL: no acute distress  HEENT: EOMI, neck supple  RESPIRATORY: LCTAB/L, no rhonchi, rales, or wheezing  CARDIOVASCULAR: RRR, no murmurs, gallops, rubs  ABDOMINAL: soft, non-tender, non-distended, positive bowel sounds   EXTREMITIES: no clubbing, cyanosis, or edema  NEUROLOGICAL: alert and oriented x 3, non-focal  SKIN: + petechiae rashe b/l LE  MUSCULOSKELETAL: no gross joint deformity                          7.5    1.69  )-----------( 39       ( 26 May 2020 05:25 )             21.9     05-26    139  |  100  |  13  ----------------------------<  195<H>  3.9   |  24  |  0.66    Ca    9.7      26 May 2020 05:25  Phos  2.9     05-26  Mg     2.1     05-26    TPro  7.8  /  Alb  4.4  /  TBili  0.3  /  DBili  x   /  AST  22  /  ALT  15  /  AlkPhos  66  05-26    PT/INR - ( 26 May 2020 02:52 )   PT: 13.7 SEC;   INR: 1.20          PTT - ( 26 May 2020 02:52 )  PTT:29.9 SEC    MEDICATIONS  (STANDING):  allopurinol 300 milliGRAM(s) Oral daily  cefepime   IVPB      cefepime   IVPB 2000 milliGRAM(s) IV Intermittent every 8 hours  dextrose 5%. 1000 milliLiter(s) (50 mL/Hr) IV Continuous <Continuous>  dextrose 50% Injectable 12.5 Gram(s) IV Push once  dextrose 50% Injectable 25 Gram(s) IV Push once  dextrose 50% Injectable 25 Gram(s) IV Push once  heparin   Injectable 5000 Unit(s) IV Push once  insulin lispro (HumaLOG) corrective regimen sliding scale   SubCutaneous three times a day before meals  insulin lispro (HumaLOG) corrective regimen sliding scale   SubCutaneous at bedtime  lactobacillus acidophilus 1 Tablet(s) Oral three times a day  lidocaine 2% Injectable 20 milliLiter(s) Local Injection once  tretinoin 40 milliGRAM(s) Oral every 12 hours    TM Interpretation:   Flow Cytometry Final Report  ________________________________________________________________________  Specimen: Peripheral blood  Collected: 05/23/2020 2:00  Received: 05/23/2020 10:30  Processed: 05/23/2020 10:45  Reported: 05/26/2020 10:03  Accession #: [id number]  ________________________________________________________________________  CLINICAL DATA: Rule out acute leukemia   ______________________________________________________________________  DIAGNOSIS:  Peripheral blood:       - Immature myeloid population (36% of cells), positive for , HLA-DR, CD33, CD13, partial  CD15, , partial CD38, partial CD64; negative CD34, CD56, CD14, CD16, CD11b.  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  Correlation with cytogenetics/FISH for t(15;17) is  necessary for confirmation.  Please see interpretation.    INTERPRETATION:  MORPHOLOGY: Increased abnormal promyelocytes with hypergranules and some with debra rods.    IMMUNOPHENOTYPE: Immature myeloid population (36% of cells), positive for , HLA-DR, CD33,  CD13, partial CD15, , partial CD38, partial CD64; negative CD34, CD56, CD14, CD16, CD11b.    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  Correlation with cytogenetics/FISH for t(15;17) is  necessary for confirmation.  Dr. Camara notified of diagnosis on 05/23/2020.  ___  Verified By: Moiz Garvin M.D.  (Electronic Signature)

## 2020-05-26 NOTE — PROGRESS NOTE ADULT - ASSESSMENT
61 yo female with a hx of HTN and DM admitted to medicine for pancytopenia concerning for APL.    Problem/Plan - 1:  ·  Problem: Pancytopenia.  Plan: Patient presenting with new onset pancytopenia with peripheral blasts; Peripheral smear showing no signs of schistocytes, positive for blasts  - Hematology consulted in the ED. Appreciate recs.  - check daily coags, fibrinogen and d-dimer, tumor lysis labs (uric acid, bmp, mag, phos)  - Transfused 1 unit of plts + 1 unit of pRBC on 5/23, 1 unit of plts 5/24  - Plan for bone marrow biopsy 5/26/20 per Hematology.     Problem/Plan - 2:  ·  Problem: Leukemia, acute.  Plan: Pt with Smear showing blast cells, granulocytes (concerning for myeloblasts),   - ATRA ordered by Hematology team  - Flow cytometry testing from 5/23/20  - Daily Tumor lysis labs (uric acid, bmp, mag, phos), coag, fibrinogen, and d-dimer  - Would give rasburicase 3 mg IV x1 if uric acid >8.0.     Problem/Plan - 3:  ·  Problem: Neutropenic fever.  Plan: - Sent blood cultures  - s/p Cefepime in the ED.  - Cont cefepime, blood cxs so far (-)  - COVID-19 (-) 5/22    Problem/Plan - 4:  ·  Problem: 2019 novel coronavirus disease (COVID-19).  Plan: - COVID-19 PCR (-) 5/22/20.  - Contact and Droplet precautions  - Please keep in an isolated room.     Problem/Plan - 5:  ·  Problem: Medication management.  Plan: Patient and son unaware of medications. Family to bring in prescription list in the am. Primary team to follow up medication reconciliation.     Problem/Plan - 6:  Problem: Diabetes. Plan: Per chart review, patient previously on metformin at home  - A1c 7.0  - SSI for now  - Adjust insulin regimen as needed.    Problem/Plan - 7:  ·  Problem: Discharge planning issues.  Plan: Diet: Carb consistent diet  DVT Prophylaxis: Contraindicated i/s/o thrombocytopenia  Dispo: PT Consulted.

## 2020-05-26 NOTE — PROGRESS NOTE ADULT - SUBJECTIVE AND OBJECTIVE BOX
Son and angelito translated over phone  Pt has some hemorrhoidal irritation and complains of sensation of "something pulling inside her rt upper breast wall"    Vital Signs Last 24 Hrs  T(C): 36.6 (26 May 2020 05:00), Max: 36.6 (25 May 2020 12:44)  T(F): 97.8 (26 May 2020 05:00), Max: 97.8 (25 May 2020 12:44)  HR: 76 (26 May 2020 05:00) (76 - 78)  BP: 115/59 (26 May 2020 05:00) (115/59 - 141/73)  BP(mean): --  RR: 18 (26 May 2020 05:00) (17 - 18)  SpO2: 99% (26 May 2020 05:00) (97% - 99%)    I&O's Summary    PE: I asked RN to chaperone with me today to exam pt's chest wall and rt UE  GENERAL: NAD, well-developed, resting comfortably in bed  HEAD:  Atraumatic, Normocephalic  EYES: EOMI, PERRLA, conjunctiva and sclera clear  NECK: Supple, No JVD  CHEST/LUNG: Clear to auscultation bilaterally; No wheezel no obvious masses or lumps on rt upper CW or rt breast tissue  HEART: Regular rate and rhythm; No murmurs, rubs, or gallops  ABDOMEN: Soft, Nontender, Nondistended; Bowel sounds present  EXTREMITIES:  2+ Peripheral Pulses, No rt axillary LAD, no swelling in UE or LE b/l  NEUROLOGY: A+O x 3, non-focal  SKIN: petechial rash on lower extremity  PSYCH: nl affect      LABS:                        7.5    1.69  )-----------( 39       ( 26 May 2020 05:25 )             21.9     05-26    139  |  100  |  13  ----------------------------<  195<H>  3.9   |  24  |  0.66    Ca    9.7      26 May 2020 05:25  Phos  2.9     05-26  Mg     2.1     05-26    TPro  7.8  /  Alb  4.4  /  TBili  0.3  /  DBili  x   /  AST  22  /  ALT  15  /  AlkPhos  66  05-26    PT/INR - ( 26 May 2020 02:52 )   PT: 13.7 SEC;   INR: 1.20          PTT - ( 26 May 2020 02:52 )  PTT:29.9 SEC  CAPILLARY BLOOD GLUCOSE      POCT Blood Glucose.: 150 mg/dL (26 May 2020 08:31)  POCT Blood Glucose.: 156 mg/dL (25 May 2020 21:58)  POCT Blood Glucose.: 133 mg/dL (25 May 2020 17:00)  POCT Blood Glucose.: 146 mg/dL (25 May 2020 12:00)            RADIOLOGY & ADDITIONAL TESTS:    Imaging Personally Reviewed:  [x] YES  [ ] NO    Consultant(s) Notes Reviewed:  [x] YES  [ ] NO

## 2020-05-26 NOTE — H&P ADULT - ASSESSMENT
60 y.o. Greek speaking female hx of HTN on atenolol, HLD, DM, recovering from COVID presented to the ED with fever, malaise, HA, found to be pancytopenic with peripheral blasts, Peripheral flow cytometry from 5/23 showed findings consistent with APL.

## 2020-05-26 NOTE — DISCHARGE NOTE PROVIDER - NSDCCPCAREPLAN_GEN_ALL_CORE_FT
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 Platelets transfused 5/23. CT head negative for bleed.  Continue tretinoin as prescribed. Bone marrow biopsy performed 5/26- you are being transered from [facility] to [facility] for chemo inpatient.      SECONDARY DISCHARGE DIAGNOSES  Diagnosis: COVID-19 virus infection  Assessment and Plan of Treatment: COVID swab negative 5/22/2020    Diagnosis: Diabetes  Assessment and Plan of Treatment: Continue sliding scale, monitor finger sticks premeal and at bedtime.    Diagnosis: Neutropenic fever  Assessment and Plan of Treatment: Due to leukopenia.

## 2020-05-26 NOTE — PROGRESS NOTE ADULT - ASSESSMENT
60 y.o. Hebrew speaking female hx of HTN on atenolol, HLD, DM, recovering from COVID presented to the ED with fever, malaise, HA, found to be pancytopenic with peripheral blasts, was diagnosed with acute leukemia most likely APL.     # Acute promyelocytic leukemia  -peripheral flowcytometry from 5/23 showed findings consistent with APL  -Still pancytopenic, given PLT transfusion 5/23 and 5/24 PLT at 39K.  -Afebrile over 24hrs, on cefepime for neutropenic fever.  -Pt complains of HA, CT head was negative for bleeding. If Sxs continue, consider doing MRI.  -coags showed mild INR elevation but fibrinogen is high - not in DIC but pt is at risk of developing DIC so monitor labs daily(fibrinogen and d-dimer along with coags).  -Daily tumor lysis labs (uric acid, bmp, mag, phos) monitoring.  -if uric acid > 8.0, will give rasburicase 3mg IV x 1.  Continue allopurinol 300 mg daily. G6PD was sent this AM and pending result.   -Transfuse for hg < 7.0 and platelets < 10, <15 if febrile and < 50 if bleeding  -Continue ATRA (all-trans-retinoic acid or tretinoin) 45 mg/m2 in 2 divided doses. Will try to look for [address] for continued treatment of her leukemia(potential transfer this week).    -Bone marrow bx scheduled on 5/26 with [facility] test.  -Hematology team will continue to follow.      Ray Lopez MD. PGY 5  Heme-Onc fellow  [phone]; [id number] 60 y.o. English speaking female hx of HTN on atenolol, HLD, DM, recovering from COVID presented to the ED with fever, malaise, HA, found to be pancytopenic with peripheral blasts, was diagnosed with acute leukemia most likely APL.     # Acute promyelocytic leukemia  -peripheral flowcytometry from 5/23 showed findings consistent with APL  -Still pancytopenic, given PLT transfusion 5/23 and 5/24 PLT at 39K.  -Afebrile over 24hrs, on cefepime for neutropenic fever.  -Pt complains of HA, CT head was negative for bleeding. If Sxs continue, consider doing MRI.  -coags showed mild INR elevation but fibrinogen is high - not in DIC but pt is at risk of developing DIC so monitor labs daily(fibrinogen and d-dimer along with coags).  -Daily tumor lysis labs (uric acid, bmp, mag, phos) monitoring.  -if uric acid > 8.0, will give rasburicase 3mg IV x 1.  Continue allopurinol 300 mg daily. G6PD was sent this AM and pending result.   -Transfuse for hg < 7.0 and platelets < 10, <15 if febrile and < 50 if bleeding  -Continue ATRA (all-trans-retinoic acid or tretinoin) 45 mg/m2 in 2 divided doses.   -Bone marrow bx performed on 5/26. [facility] sent.  -Will transfer to [address] today for continued treatment of her leukemia(potential transfer this week). Spoke with [facility] hematology team.        Ray Lopez MD. PGY 5  Heme-Onc fellow  [phone]; [id number]

## 2020-05-27 ENCOUNTER — TRANSCRIPTION ENCOUNTER (OUTPATIENT)
Age: 60
End: 2020-05-27

## 2020-05-27 DIAGNOSIS — E11.9 TYPE 2 DIABETES MELLITUS WITHOUT COMPLICATIONS: ICD-10-CM

## 2020-05-27 DIAGNOSIS — I10 ESSENTIAL (PRIMARY) HYPERTENSION: ICD-10-CM

## 2020-05-27 DIAGNOSIS — B99.9 UNSPECIFIED INFECTIOUS DISEASE: ICD-10-CM

## 2020-05-27 DIAGNOSIS — Z29.9 ENCOUNTER FOR PROPHYLACTIC MEASURES, UNSPECIFIED: ICD-10-CM

## 2020-05-27 LAB
ALBUMIN SERPL ELPH-MCNC: 4.3 G/DL — SIGNIFICANT CHANGE UP (ref 3.3–5)
ALBUMIN SERPL ELPH-MCNC: 4.6 G/DL — SIGNIFICANT CHANGE UP (ref 3.3–5)
ALP SERPL-CCNC: 75 U/L — SIGNIFICANT CHANGE UP (ref 40–120)
ALP SERPL-CCNC: 77 U/L — SIGNIFICANT CHANGE UP (ref 40–120)
ALT FLD-CCNC: 19 U/L — SIGNIFICANT CHANGE UP (ref 10–45)
ALT FLD-CCNC: 21 U/L — SIGNIFICANT CHANGE UP (ref 10–45)
ANION GAP SERPL CALC-SCNC: 11 MMOL/L — SIGNIFICANT CHANGE UP (ref 5–17)
ANION GAP SERPL CALC-SCNC: 14 MMOL/L — SIGNIFICANT CHANGE UP (ref 5–17)
APTT BLD: 28 SEC — SIGNIFICANT CHANGE UP (ref 27.5–36.3)
APTT BLD: 29.2 SEC — SIGNIFICANT CHANGE UP (ref 27.5–36.3)
AST SERPL-CCNC: 21 U/L — SIGNIFICANT CHANGE UP (ref 10–40)
AST SERPL-CCNC: 24 U/L — SIGNIFICANT CHANGE UP (ref 10–40)
BASOPHILS # BLD AUTO: 0 K/UL — SIGNIFICANT CHANGE UP (ref 0–0.2)
BASOPHILS NFR BLD AUTO: 0 % — SIGNIFICANT CHANGE UP (ref 0–2)
BILIRUB SERPL-MCNC: 0.2 MG/DL — SIGNIFICANT CHANGE UP (ref 0.2–1.2)
BILIRUB SERPL-MCNC: 0.3 MG/DL — SIGNIFICANT CHANGE UP (ref 0.2–1.2)
BUN SERPL-MCNC: 15 MG/DL — SIGNIFICANT CHANGE UP (ref 7–23)
BUN SERPL-MCNC: 16 MG/DL — SIGNIFICANT CHANGE UP (ref 7–23)
CALCIUM SERPL-MCNC: 9.7 MG/DL — SIGNIFICANT CHANGE UP (ref 8.4–10.5)
CALCIUM SERPL-MCNC: 9.8 MG/DL — SIGNIFICANT CHANGE UP (ref 8.4–10.5)
CHLORIDE SERPL-SCNC: 100 MMOL/L — SIGNIFICANT CHANGE UP (ref 96–108)
CHLORIDE SERPL-SCNC: 101 MMOL/L — SIGNIFICANT CHANGE UP (ref 96–108)
CO2 SERPL-SCNC: 23 MMOL/L — SIGNIFICANT CHANGE UP (ref 22–31)
CO2 SERPL-SCNC: 25 MMOL/L — SIGNIFICANT CHANGE UP (ref 22–31)
CREAT SERPL-MCNC: 0.77 MG/DL — SIGNIFICANT CHANGE UP (ref 0.5–1.3)
CREAT SERPL-MCNC: 0.81 MG/DL — SIGNIFICANT CHANGE UP (ref 0.5–1.3)
CULTURE RESULTS: SIGNIFICANT CHANGE UP
CULTURE RESULTS: SIGNIFICANT CHANGE UP
D DIMER BLD IA.RAPID-MCNC: 1984 NG/ML DDU — HIGH
EOSINOPHIL # BLD AUTO: 0 K/UL — SIGNIFICANT CHANGE UP (ref 0–0.5)
EOSINOPHIL NFR BLD AUTO: 0 % — SIGNIFICANT CHANGE UP (ref 0–6)
FIBRINOGEN PPP-MCNC: 412 MG/DL — SIGNIFICANT CHANGE UP (ref 350–510)
FIBRINOGEN PPP-MCNC: 446 MG/DL — SIGNIFICANT CHANGE UP (ref 350–510)
G6PD RBC-CCNC: 13.1 — SIGNIFICANT CHANGE UP (ref 7–20.5)
GIANT PLATELETS BLD QL SMEAR: PRESENT — SIGNIFICANT CHANGE UP
GLUCOSE BLDC GLUCOMTR-MCNC: 151 MG/DL — HIGH (ref 70–99)
GLUCOSE BLDC GLUCOMTR-MCNC: 163 MG/DL — HIGH (ref 70–99)
GLUCOSE BLDC GLUCOMTR-MCNC: 169 MG/DL — HIGH (ref 70–99)
GLUCOSE BLDC GLUCOMTR-MCNC: 216 MG/DL — HIGH (ref 70–99)
GLUCOSE SERPL-MCNC: 211 MG/DL — HIGH (ref 70–99)
GLUCOSE SERPL-MCNC: 215 MG/DL — HIGH (ref 70–99)
HCT VFR BLD CALC: 20.3 % — CRITICAL LOW (ref 34.5–45)
HCT VFR BLD CALC: 22.3 % — LOW (ref 34.5–45)
HCV AB S/CO SERPL IA: 0.18 S/CO — SIGNIFICANT CHANGE UP (ref 0–0.99)
HCV AB SERPL-IMP: SIGNIFICANT CHANGE UP
HEMATOPATHOLOGY REPORT: SIGNIFICANT CHANGE UP
HGB BLD-MCNC: 6.6 G/DL — CRITICAL LOW (ref 11.5–15.5)
HGB BLD-MCNC: 7.2 G/DL — LOW (ref 11.5–15.5)
HIV 1+2 AB+HIV1 P24 AG SERPL QL IA: SIGNIFICANT CHANGE UP
INR BLD: 1.1 RATIO — SIGNIFICANT CHANGE UP (ref 0.88–1.16)
INR BLD: 1.17 RATIO — HIGH (ref 0.88–1.16)
LDH SERPL L TO P-CCNC: 193 U/L — SIGNIFICANT CHANGE UP (ref 50–242)
LG PLATELETS BLD QL AUTO: SLIGHT — SIGNIFICANT CHANGE UP
LYMPHOCYTES # BLD AUTO: 1.41 K/UL — SIGNIFICANT CHANGE UP (ref 1–3.3)
LYMPHOCYTES # BLD AUTO: 60 % — HIGH (ref 13–44)
MAGNESIUM SERPL-MCNC: 2 MG/DL — SIGNIFICANT CHANGE UP (ref 1.6–2.6)
MAGNESIUM SERPL-MCNC: 2.1 MG/DL — SIGNIFICANT CHANGE UP (ref 1.6–2.6)
MANUAL SMEAR VERIFICATION: SIGNIFICANT CHANGE UP
MCHC RBC-ENTMCNC: 26.8 PG — LOW (ref 27–34)
MCHC RBC-ENTMCNC: 26.9 PG — LOW (ref 27–34)
MCHC RBC-ENTMCNC: 32.3 GM/DL — SIGNIFICANT CHANGE UP (ref 32–36)
MCHC RBC-ENTMCNC: 32.5 GM/DL — SIGNIFICANT CHANGE UP (ref 32–36)
MCV RBC AUTO: 82.5 FL — SIGNIFICANT CHANGE UP (ref 80–100)
MCV RBC AUTO: 83.2 FL — SIGNIFICANT CHANGE UP (ref 80–100)
METAMYELOCYTES # FLD: 10 % — HIGH (ref 0–0)
MONOCYTES # BLD AUTO: 0.07 K/UL — SIGNIFICANT CHANGE UP (ref 0–0.9)
MONOCYTES NFR BLD AUTO: 3 % — SIGNIFICANT CHANGE UP (ref 2–14)
MYELOCYTES NFR BLD: 15 % — HIGH (ref 0–0)
NEUTROPHILS # BLD AUTO: 0.21 K/UL — LOW (ref 1.8–7.4)
NEUTROPHILS NFR BLD AUTO: 4 % — LOW (ref 43–77)
NEUTS BAND # BLD: 5 % — SIGNIFICANT CHANGE UP (ref 0–8)
NRBC # BLD: 0 /100 WBCS — SIGNIFICANT CHANGE UP (ref 0–0)
NRBC # BLD: 1 /100 — HIGH (ref 0–0)
PHOSPHATE SERPL-MCNC: 2.1 MG/DL — LOW (ref 2.5–4.5)
PHOSPHATE SERPL-MCNC: 2.3 MG/DL — LOW (ref 2.5–4.5)
PLAT MORPH BLD: ABNORMAL
PLATELET # BLD AUTO: 51 K/UL — LOW (ref 150–400)
PLATELET # BLD AUTO: 77 K/UL — LOW (ref 150–400)
POTASSIUM SERPL-MCNC: 4.4 MMOL/L — SIGNIFICANT CHANGE UP (ref 3.5–5.3)
POTASSIUM SERPL-MCNC: 5.2 MMOL/L — SIGNIFICANT CHANGE UP (ref 3.5–5.3)
POTASSIUM SERPL-SCNC: 4.4 MMOL/L — SIGNIFICANT CHANGE UP (ref 3.5–5.3)
POTASSIUM SERPL-SCNC: 5.2 MMOL/L — SIGNIFICANT CHANGE UP (ref 3.5–5.3)
PROMYELOCYTES # FLD: 3 % — HIGH (ref 0–0)
PROT SERPL-MCNC: 7.1 G/DL — SIGNIFICANT CHANGE UP (ref 6–8.3)
PROT SERPL-MCNC: 7.6 G/DL — SIGNIFICANT CHANGE UP (ref 6–8.3)
PROTHROM AB SERPL-ACNC: 12.6 SEC — SIGNIFICANT CHANGE UP (ref 10–12.9)
PROTHROM AB SERPL-ACNC: 13.4 SEC — HIGH (ref 10–12.9)
RBC # BLD: 2.46 M/UL — LOW (ref 3.8–5.2)
RBC # BLD: 2.68 M/UL — LOW (ref 3.8–5.2)
RBC # FLD: 15.7 % — HIGH (ref 10.3–14.5)
RBC # FLD: 15.7 % — HIGH (ref 10.3–14.5)
RBC BLD AUTO: SIGNIFICANT CHANGE UP
SODIUM SERPL-SCNC: 137 MMOL/L — SIGNIFICANT CHANGE UP (ref 135–145)
SODIUM SERPL-SCNC: 137 MMOL/L — SIGNIFICANT CHANGE UP (ref 135–145)
SPECIMEN SOURCE: SIGNIFICANT CHANGE UP
SPECIMEN SOURCE: SIGNIFICANT CHANGE UP
TM INTERPRETATION: SIGNIFICANT CHANGE UP
URATE SERPL-MCNC: 2.7 MG/DL — SIGNIFICANT CHANGE UP (ref 2.5–7)
URATE SERPL-MCNC: 3.2 MG/DL — SIGNIFICANT CHANGE UP (ref 2.5–7)
WBC # BLD: 2.13 K/UL — LOW (ref 3.8–10.5)
WBC # BLD: 2.35 K/UL — LOW (ref 3.8–10.5)
WBC # FLD AUTO: 2.13 K/UL — LOW (ref 3.8–10.5)
WBC # FLD AUTO: 2.35 K/UL — LOW (ref 3.8–10.5)

## 2020-05-27 PROCEDURE — 71045 X-RAY EXAM CHEST 1 VIEW: CPT | Mod: 26,77

## 2020-05-27 PROCEDURE — 93010 ELECTROCARDIOGRAM REPORT: CPT

## 2020-05-27 PROCEDURE — 99233 SBSQ HOSP IP/OBS HIGH 50: CPT

## 2020-05-27 PROCEDURE — 71045 X-RAY EXAM CHEST 1 VIEW: CPT | Mod: 26

## 2020-05-27 RX ORDER — POTASSIUM PHOSPHATE, MONOBASIC POTASSIUM PHOSPHATE, DIBASIC 236; 224 MG/ML; MG/ML
15 INJECTION, SOLUTION INTRAVENOUS ONCE
Refills: 0 | Status: COMPLETED | OUTPATIENT
Start: 2020-05-27 | End: 2020-05-27

## 2020-05-27 RX ORDER — CHLORHEXIDINE GLUCONATE 213 G/1000ML
1 SOLUTION TOPICAL
Refills: 0 | Status: DISCONTINUED | OUTPATIENT
Start: 2020-05-27 | End: 2020-06-13

## 2020-05-27 RX ORDER — ARSENIC TRIOXIDE 2 MG/ML
11 INJECTION, SOLUTION INTRAVENOUS EVERY 24 HOURS
Refills: 0 | Status: DISCONTINUED | OUTPATIENT
Start: 2020-05-27 | End: 2020-05-27

## 2020-05-27 RX ORDER — SODIUM CHLORIDE 9 MG/ML
10 INJECTION INTRAMUSCULAR; INTRAVENOUS; SUBCUTANEOUS
Refills: 0 | Status: DISCONTINUED | OUTPATIENT
Start: 2020-05-27 | End: 2020-07-17

## 2020-05-27 RX ORDER — SALIVA SUBSTITUTE COMB NO.11 351 MG
5 POWDER IN PACKET (EA) MUCOUS MEMBRANE
Refills: 0 | Status: DISCONTINUED | OUTPATIENT
Start: 2020-05-27 | End: 2020-07-17

## 2020-05-27 RX ORDER — ARSENIC TRIOXIDE 2 MG/ML
11 INJECTION, SOLUTION INTRAVENOUS EVERY 24 HOURS
Refills: 0 | Status: COMPLETED | OUTPATIENT
Start: 2020-05-27 | End: 2020-06-02

## 2020-05-27 RX ORDER — PHYTONADIONE (VIT K1) 5 MG
5 TABLET ORAL DAILY
Refills: 0 | Status: COMPLETED | OUTPATIENT
Start: 2020-05-27 | End: 2020-05-30

## 2020-05-27 RX ADMIN — Medication 5 MILLILITER(S): at 08:40

## 2020-05-27 RX ADMIN — POSACONAZOLE 300 MILLIGRAM(S): 100 TABLET, DELAYED RELEASE ORAL at 05:45

## 2020-05-27 RX ADMIN — CEFEPIME 100 MILLIGRAM(S): 1 INJECTION, POWDER, FOR SOLUTION INTRAMUSCULAR; INTRAVENOUS at 13:12

## 2020-05-27 RX ADMIN — Medication 2: at 17:37

## 2020-05-27 RX ADMIN — POTASSIUM PHOSPHATE, MONOBASIC POTASSIUM PHOSPHATE, DIBASIC 62.5 MILLIMOLE(S): 236; 224 INJECTION, SOLUTION INTRAVENOUS at 21:54

## 2020-05-27 RX ADMIN — TRETINOIN 40 MILLIGRAM(S): 10 CAPSULE, LIQUID FILLED ORAL at 18:06

## 2020-05-27 RX ADMIN — Medication 5 MILLILITER(S): at 20:58

## 2020-05-27 RX ADMIN — Medication 300 MILLIGRAM(S): at 12:18

## 2020-05-27 RX ADMIN — Medication 1: at 12:23

## 2020-05-27 RX ADMIN — Medication 5 MILLILITER(S): at 18:06

## 2020-05-27 RX ADMIN — CEFEPIME 100 MILLIGRAM(S): 1 INJECTION, POWDER, FOR SOLUTION INTRAMUSCULAR; INTRAVENOUS at 05:44

## 2020-05-27 RX ADMIN — ARSENIC TRIOXIDE 130.5 MILLIGRAM(S): 2 INJECTION, SOLUTION INTRAVENOUS at 16:27

## 2020-05-27 RX ADMIN — CEFEPIME 100 MILLIGRAM(S): 1 INJECTION, POWDER, FOR SOLUTION INTRAMUSCULAR; INTRAVENOUS at 20:58

## 2020-05-27 RX ADMIN — TRETINOIN 40 MILLIGRAM(S): 10 CAPSULE, LIQUID FILLED ORAL at 05:45

## 2020-05-27 RX ADMIN — Medication 5 MILLIGRAM(S): at 21:54

## 2020-05-27 RX ADMIN — Medication 1: at 08:39

## 2020-05-27 RX ADMIN — Medication 5 MILLILITER(S): at 12:18

## 2020-05-27 NOTE — DISCHARGE NOTE PROVIDER - NSDCFUADDINST_GEN_ALL_CORE_FT
PLEASE DO NOT TAKE THE CHEMOTHERAPY PILLS. YOU WILL BE ADVISED BY YOUR PHYSICIAN TO TAKE IT.     PLEASE TAKE ELIQUIS AS ADVISED, YOU WERE STARTED ON BLOOD THINNERS ON 6/23/20, YOU WILL NEED TO BE TREATED UNTIL 9/23/20 FOR YOUR DVT'S.

## 2020-05-27 NOTE — PROGRESS NOTE ADULT - PROBLEM SELECTOR PLAN 2
Patient is neutropenic, afebrile  Continue Cefepime, Posaconazole ppx  previous COVID-19+ as outpatient 4/16, 5/15 - now COVID (-) on 5/22, 5/26

## 2020-05-27 NOTE — PROGRESS NOTE ADULT - SUBJECTIVE AND OBJECTIVE BOX
Diagnosis: r/o Acute Promyelocytic Leukemia    Protocol/Chemo Regimen: to be determined (currently on tretinoin 40mg BID since 5/22)  Day: n/a     Pt endorsed:    Review of Systems:    Pain scale:                                        Location:    Diet:     Allergies: No Known Allergies      ANTIMICROBIALS  cefepime   IVPB 2000 milliGRAM(s) IV Intermittent every 8 hours      HEME/ONC MEDICATIONS  tretinoin 40 milliGRAM(s) Oral every 12 hours      STANDING MEDICATIONS  allopurinol 300 milliGRAM(s) Oral daily  Biotene Dry Mouth Oral Rinse 5 milliLiter(s) Swish and Spit five times a day  dextrose 5%. 1000 milliLiter(s) IV Continuous <Continuous>  dextrose 50% Injectable 12.5 Gram(s) IV Push once  dextrose 50% Injectable 25 Gram(s) IV Push once  dextrose 50% Injectable 25 Gram(s) IV Push once  insulin lispro (HumaLOG) corrective regimen sliding scale   SubCutaneous three times a day before meals  insulin lispro (HumaLOG) corrective regimen sliding scale   SubCutaneous at bedtime  sodium chloride 0.9%. 1000 milliLiter(s) IV Continuous <Continuous>      PRN MEDICATIONS  acetaminophen   Tablet .. 650 milliGRAM(s) Oral every 6 hours PRN  aluminum hydroxide/magnesium hydroxide/simethicone Suspension 30 milliLiter(s) Oral every 6 hours PRN  dextrose 40% Gel 15 Gram(s) Oral once PRN  glucagon  Injectable 1 milliGRAM(s) IntraMuscular once PRN      Vital Signs Last 24 Hrs  T(C): 36.2 (27 May 2020 05:08), Max: 37.4 (26 May 2020 16:06)  T(F): 97.2 (27 May 2020 05:08), Max: 99.3 (26 May 2020 16:06)  HR: 79 (27 May 2020 05:08) (78 - 87)  BP: 106/58 (27 May 2020 05:08) (101/53 - 142/76)  RR: 18 (27 May 2020 05:08) (18 - 18)  SpO2: 96% (27 May 2020 05:08) (95% - 99%)    PHYSICAL EXAM  General: adult in NAD  HEENT: clear oropharynx, anicteric sclera, pink conjunctiva  Neck: supple  CV: normal S1/S2 RRR  Lungs: positive air movement b/l ant lungs,clear to auscultation, no wheezes, no rales  Abdomen: soft non-tender non-distended  Ext: no clubbing cyanosis or edema  Skin: no rashes and no petechiae  Neuro: alert and oriented X 4, no focal deficits  Central Line: normal    LABS:    COVID-19 PCR . (05.26.20 @ 17:56)    COVID-19 PCR: NotDetec: This test has been validated by [employer] to be accurate;  though it has not been FDA cleared/approved by the usual pathway.  As with all laboratory tests, results should be correlated with clinical  findings.  https://www.fda.gov/media/408975/download  https://www.fda.gov/media/197828/download                              7.7    2.08  )-----------( 33       ( 26 May 2020 18:29 )             23.0         Mean Cell Volume : 81.6 fl  Mean Cell Hemoglobin : 27.3 pg  Mean Cell Hemoglobin Concentration : 33.5 gm/dL  Auto Neutrophil # : 0.15 K/uL  Auto Lymphocyte # : 1.50 K/uL  Auto Monocyte # : 0.12 K/uL  Auto Eosinophil # : 0.00 K/uL  Auto Basophil # : 0.00 K/uL  Auto Neutrophil % : 7.0 %  Auto Lymphocyte % : 72.0 %  Auto Monocyte % : 6.0 %  Auto Eosinophil % : 0.0 %  Auto Basophil % : 0.0 %      05-26    139  |  99  |  12  ----------------------------<  156<H>  4.2   |  26  |  0.70    Ca    10.4      26 May 2020 18:29  Phos  2.6     05-26  Mg     2.2     05-26    TPro  7.9  /  Alb  4.9  /  TBili  0.2  /  DBili  x   /  AST  26  /  ALT  21  /  AlkPhos  74  05-26      PT/INR - ( 26 May 2020 18:29 )   PT: 13.0 sec;   INR: 1.13 ratio         PTT - ( 26 May 2020 18:29 )  PTT:31.2 sec    LDH --  Uric Acid 3.8        RADIOLOGY & ADDITIONAL STUDIES: Diagnosis: Acute Promyelocytic Leukemia    Protocol/Chemo Regimen: to be determined (currently on tretinoin 40mg BID since 5/22)  Day: n/a     Pt endorsed:    Review of Systems:    Pain scale:                                        Location:    Diet:     Allergies: No Known Allergies      ANTIMICROBIALS  cefepime   IVPB 2000 milliGRAM(s) IV Intermittent every 8 hours      HEME/ONC MEDICATIONS  tretinoin 40 milliGRAM(s) Oral every 12 hours      STANDING MEDICATIONS  allopurinol 300 milliGRAM(s) Oral daily  Biotene Dry Mouth Oral Rinse 5 milliLiter(s) Swish and Spit five times a day  dextrose 5%. 1000 milliLiter(s) IV Continuous <Continuous>  dextrose 50% Injectable 12.5 Gram(s) IV Push once  dextrose 50% Injectable 25 Gram(s) IV Push once  dextrose 50% Injectable 25 Gram(s) IV Push once  insulin lispro (HumaLOG) corrective regimen sliding scale   SubCutaneous three times a day before meals  insulin lispro (HumaLOG) corrective regimen sliding scale   SubCutaneous at bedtime  sodium chloride 0.9%. 1000 milliLiter(s) IV Continuous <Continuous>      PRN MEDICATIONS  acetaminophen   Tablet .. 650 milliGRAM(s) Oral every 6 hours PRN  aluminum hydroxide/magnesium hydroxide/simethicone Suspension 30 milliLiter(s) Oral every 6 hours PRN  dextrose 40% Gel 15 Gram(s) Oral once PRN  glucagon  Injectable 1 milliGRAM(s) IntraMuscular once PRN      Vital Signs Last 24 Hrs  T(C): 36.2 (27 May 2020 05:08), Max: 37.4 (26 May 2020 16:06)  T(F): 97.2 (27 May 2020 05:08), Max: 99.3 (26 May 2020 16:06)  HR: 79 (27 May 2020 05:08) (78 - 87)  BP: 106/58 (27 May 2020 05:08) (101/53 - 142/76)  RR: 18 (27 May 2020 05:08) (18 - 18)  SpO2: 96% (27 May 2020 05:08) (95% - 99%)    PHYSICAL EXAM  General: adult in NAD  HEENT: clear oropharynx, anicteric sclera, pink conjunctiva  Neck: supple  CV: normal S1/S2 RRR  Lungs: positive air movement b/l ant lungs,clear to auscultation, no wheezes, no rales  Abdomen: soft non-tender non-distended  Ext: no clubbing cyanosis or edema  Skin: no rashes and no petechiae  Neuro: alert and oriented X 4, no focal deficits  Central Line: normal    LABS:    COVID-19 PCR . (05.26.20 @ 17:56)    COVID-19 PCR: NotDetec: This test has been validated by [employer] to be accurate;  though it has not been FDA cleared/approved by the usual pathway.  As with all laboratory tests, results should be correlated with clinical  findings.  https://www.fda.gov/media/107189/download  https://www.fda.gov/media/102318/download                              7.7    2.08  )-----------( 33       ( 26 May 2020 18:29 )             23.0         Mean Cell Volume : 81.6 fl  Mean Cell Hemoglobin : 27.3 pg  Mean Cell Hemoglobin Concentration : 33.5 gm/dL  Auto Neutrophil # : 0.15 K/uL  Auto Lymphocyte # : 1.50 K/uL  Auto Monocyte # : 0.12 K/uL  Auto Eosinophil # : 0.00 K/uL  Auto Basophil # : 0.00 K/uL  Auto Neutrophil % : 7.0 %  Auto Lymphocyte % : 72.0 %  Auto Monocyte % : 6.0 %  Auto Eosinophil % : 0.0 %  Auto Basophil % : 0.0 %      05-26    139  |  99  |  12  ----------------------------<  156<H>  4.2   |  26  |  0.70    Ca    10.4      26 May 2020 18:29  Phos  2.6     05-26  Mg     2.2     05-26    TPro  7.9  /  Alb  4.9  /  TBili  0.2  /  DBili  x   /  AST  26  /  ALT  21  /  AlkPhos  74  05-26      PT/INR - ( 26 May 2020 18:29 )   PT: 13.0 sec;   INR: 1.13 ratio         PTT - ( 26 May 2020 18:29 )  PTT:31.2 sec    LDH --  Uric Acid 3.8        RADIOLOGY & ADDITIONAL STUDIES: Diagnosis: Acute Promyelocytic Leukemia    Protocol/Chemo Regimen: to be determined (currently on tretinoin 40mg BID since 5/22)  Day: n/a     Pt endorsed: mid chest discomfort, back pain    Review of Systems: denies nausea, vomiting, diarrhea, SOB    Pain scale: 2-3            Location: mid chest, mid back    Diet: carbohydrate controlled (Halal)    Allergies: No Known Allergies      ANTIMICROBIALS  cefepime   IVPB 2000 milliGRAM(s) IV Intermittent every 8 hours      HEME/ONC MEDICATIONS  tretinoin 40 milliGRAM(s) Oral every 12 hours      STANDING MEDICATIONS  allopurinol 300 milliGRAM(s) Oral daily  Biotene Dry Mouth Oral Rinse 5 milliLiter(s) Swish and Spit five times a day  dextrose 5%. 1000 milliLiter(s) IV Continuous <Continuous>  dextrose 50% Injectable 12.5 Gram(s) IV Push once  dextrose 50% Injectable 25 Gram(s) IV Push once  dextrose 50% Injectable 25 Gram(s) IV Push once  insulin lispro (HumaLOG) corrective regimen sliding scale   SubCutaneous three times a day before meals  insulin lispro (HumaLOG) corrective regimen sliding scale   SubCutaneous at bedtime  sodium chloride 0.9%. 1000 milliLiter(s) IV Continuous <Continuous>      PRN MEDICATIONS  acetaminophen   Tablet .. 650 milliGRAM(s) Oral every 6 hours PRN  aluminum hydroxide/magnesium hydroxide/simethicone Suspension 30 milliLiter(s) Oral every 6 hours PRN  dextrose 40% Gel 15 Gram(s) Oral once PRN  glucagon  Injectable 1 milliGRAM(s) IntraMuscular once PRN      Vital Signs Last 24 Hrs  T(C): 36.2 (27 May 2020 05:08), Max: 37.4 (26 May 2020 16:06)  T(F): 97.2 (27 May 2020 05:08), Max: 99.3 (26 May 2020 16:06)  HR: 79 (27 May 2020 05:08) (78 - 87)  BP: 106/58 (27 May 2020 05:08) (101/53 - 142/76)  RR: 18 (27 May 2020 05:08) (18 - 18)  SpO2: 96% (27 May 2020 05:08) (95% - 99%)    PHYSICAL EXAM  General: adult in NAD  HEENT: clear oropharynx, anicteric sclera, pink conjunctiva  Neck: supple  CV: normal S1/S2 RRR  Lungs: positive air movement b/l ant lungs,clear to auscultation, no wheezes, no rales  Abdomen: soft non-tender non-distended  Ext: no clubbing cyanosis or edema  Skin: no rashes and no petechiae  Neuro: alert and oriented X 4, no focal deficits  Central Line: RUE PICC c/d/i    LABS:    COVID-19 PCR . (05.26.20 @ 17:56)    COVID-19 PCR: NotDetec: This test has been validated by [employer] to be accurate;  though it has not been FDA cleared/approved by the usual pathway.  As with all laboratory tests, results should be correlated with clinical  findings.  https://www.fda.gov/media/324165/download  https://www.fda.gov/media/424564/download                        7.2    2.35  )-----------( 77       ( 27 May 2020 10:31 )             22.3     27 May 2020 10:31    137    |  100    |  16     ----------------------------<  211    5.2     |  23     |  0.77     Ca    9.8        27 May 2020 10:31  Phos  2.3       27 May 2020 10:31  Mg     2.1       27 May 2020 10:31    TPro  7.6    /  Alb  4.6    /  TBili  0.3    /  DBili  x      /  AST  24     /  ALT  21     /  AlkPhos  77     27 May 2020 10:31    PT/INR - ( 27 May 2020 10:31 )   PT: 12.6 sec;   INR: 1.10 ratio    PTT - ( 27 May 2020 10:31 )  PTT:29.2 sec    CAPILLARY BLOOD GLUCOSE  POCT Blood Glucose.: 216 mg/dL (27 May 2020 16:57)  POCT Blood Glucose.: 151 mg/dL (27 May 2020 12:15)  POCT Blood Glucose.: 169 mg/dL (27 May 2020 07:57)  POCT Blood Glucose.: 211 mg/dL (26 May 2020 22:11)  POCT Blood Glucose.: 135 mg/dL (26 May 2020 18:48)    LIVER FUNCTIONS - ( 27 May 2020 10:31 )  Alb: 4.6 g/dL / Pro: 7.6 g/dL / ALK PHOS: 77 U/L / ALT: 21 U/L / AST: 24 U/L / GGT: x             RADIOLOGY & ADDITIONAL STUDIES:    from: Xray Chest 1 View- PORTABLE-Urgent (05.27.20 @ 12:35)   IMPRESSION:     No pleural effusion or pneumothorax. Left upper lung linear scarring unchanged. Otherwise, clear lungs. Degenerative changes of the spine.

## 2020-05-27 NOTE — PROGRESS NOTE ADULT - ASSESSMENT
59 y/o Arabic speaking female hx of HTN on atenolol, HLD, DM, recovering from COVID-19 presented to the ED with fever, malaise, HA, found to be pancytopenic with peripheral blasts, Peripheral flow cytometry from 5/23 showed findings consistent with APL. 61 y/o Macedonian speaking female hx of HTN on atenolol, HLD, DM, recovering from COVID-19 presented to the ED with fever, malaise, HA, found to be pancytopenic with peripheral blasts, Peripheral flow cytometry from 5/23 showed findings consistent with APL. ATRA started on 5/22, Arsenic Trioxide started 5/27.

## 2020-05-27 NOTE — DISCHARGE NOTE PROVIDER - CARE PROVIDER_API CALL
Karen Hurd  HEMATOLOGY  [address] [place] [address]  Phone: [phone]  Fax: [phone]  Follow Up Time:

## 2020-05-27 NOTE — PROGRESS NOTE ADULT - ATTENDING COMMENTS
61 y/o Chinese speaking female who is transferred for newly diagnosed APL.  Presented with pancytopenia, flow cytometry/bone marrow biopsy/FISH from 5/26- consistent with acute promyelocytic leukemia with PML-AWA translocation.    She was started on ATRA 5/26, begin GALA today.  Monitor carefully for signs of differentiation syndrome.    Check CBC, coags/fibrinogen twice daily.  Keep plt >50, fibrinogen >150.  Monitor lytes daily, keep K>4, Mg>2.  EKG daily.   If any signs of differentiation syndrome, would hold ATRA/GALA and begin dexamethasone 10 mg bid.    Headache better, CT head no contrast on 5/22 with no bleeding.    Discussed with patient's son over the phone at length regarding diagnosis/treatment.

## 2020-05-27 NOTE — DISCHARGE NOTE PROVIDER - EXTENDED VTE OTHER REASON FREE TEXT
Patient prescribed Eliquis to be taken as outpatient for history of DVT of the Right upper extremity

## 2020-05-27 NOTE — DISCHARGE NOTE PROVIDER - HOSPITAL COURSE
60 y.o. Amharic speaking  female hx of HTN on atenolol, HLD, DM on metformin presents to [facility] ED on 5/22/20 with fever, malaise, HA. Patient reported that she was hypothermic to 92 degrees intermittently and also febrile to 101-102F which improves with Tylenol. Patient has been complaining of head feeling hot, pleuritic chest pain that moves around w/o numbness tingling, paresthesias, weakness, a rash on bilateral LE x1 month, a tear drop clearing on her tongue, as well as a few drops of blood in her stool with hard bowel movements. Patient has been having poor PO intake for the past 3 days before admission 2/2 throat pain. Patient was COVID + on 4/16/2020, and retested and positive again 5/15/2020. 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 CT head was negative for bleeding. 60 y.o. Maltese speaking  female hx of HTN on atenolol, HLD, DM on metformin presents to [facility] ED on 5/22/20 with fever, malaise, HA. Patient reported that she was hypothermic to 92 degrees intermittently and also febrile to 101-102F which improves with Tylenol. Patient has been complaining of head feeling hot, pleuritic chest pain that moves around w/o numbness tingling, paresthesias, weakness, a rash on bilateral LE x1 month, a tear drop clearing on her tongue, as well as a few drops of blood in her stool with hard bowel movements. Patient has been having poor PO intake for the past 3 days before admission 2/2 throat pain. Patient was COVID + on 4/16/2020, and retested and positive again 5/15/2020. 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 CT head was negative for acute intracranial hemorrhage.         On 5/26 patient had a bone marrow biopsy, which confirmed the diagnosis of APL. Patient was previously started on Atra on 5/23 with flow cytometry results. Started on Arsenic trioxide on 5/27 after confirmation with BMBX.     On 5/28 patient complained of occipital hyperthermia. Symptom was attributed to ATRA. An MR of the brain with contrast was done to rule out extraneous causes which revealed ---- 60 y.o. Albanian speaking  female hx of HTN on atenolol, HLD, DM on metformin presents to [facility] ED on 5/22/20 with fever, malaise, HA. Patient reported that she was hypothermic to 92 degrees intermittently and also febrile to 101-102F which improves with Tylenol. Patient has been complaining of head feeling hot, pleuritic chest pain that moves around w/o numbness tingling, paresthesias, weakness, a rash on bilateral LE x1 month, a tear drop clearing on her tongue, as well as a few drops of blood in her stool with hard bowel movements. Patient has been having poor PO intake for the past 3 days before admission 2/2 throat pain. Patient was COVID + on 4/16/2020, and retested and positive again 5/15/2020. 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 CT head was negative for acute intracranial hemorrhage.         On 5/26 patient had a bone marrow biopsy, which confirmed the diagnosis of APL. Patient was previously started on Atra on 5/23 with flow cytometry results. Started on Arsenic trioxide on 5/27 after confirmation with BMBX.     On 5/28 patient complained of occipital hyperthermia. Symptom was attributed to ATRA. An MR of the brain with contrast was done to rule out extraneous causes which revealed negative. 60 y.o. Slovak speaking  female hx of HTN on atenolol, HLD, DM on metformin presents to [facility] ED on 5/22/20 with fever, malaise, HA. Patient reported that she was hypothermic to 92 degrees intermittently and also febrile to 101-102F which improves with Tylenol. Patient has been complaining of head feeling hot, pleuritic chest pain that moves around w/o numbness tingling, paresthesias, weakness, a rash on bilateral LE x1 month, a tear drop clearing on her tongue, as well as a few drops of blood in her stool with hard bowel movements. Patient has been having poor PO intake for the past 3 days before admission 2/2 throat pain. Patient was COVID + on 4/16/2020, and retested and positive again 5/15/2020. 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 CT head was negative for acute intracranial hemorrhage.         On 5/26 patient had a bone marrow biopsy, which confirmed the diagnosis of APL. Patient was previously started on Atra on 5/23 with flow cytometry results. Started on Arsenic trioxide on 5/27 after confirmation with BMBX. On 5/28 patient complained of occipital hyperthermia. Symptom was attributed to ATRA. An MR of the brain with contrast was done to rule out extraneous causes which revealed negative. Due to refractory thrombocytopenia platelets were given around the clock and HLA typing was completed. Patient reported dark stool and with concern for GIB IV Protonix was stared and CBC q 12. 60 y.o. Indonesian speaking  female hx of HTN on atenolol, HLD, DM on metformin presents to [facility] ED on 5/22/20 with fever, malaise, HA. Patient reported that she was hypothermic to 92 degrees intermittently and also febrile to 101-102F which improves with Tylenol. Patient has been complaining of head feeling hot, pleuritic chest pain that moves around w/o numbness tingling, paresthesias, weakness, a rash on bilateral LE x1 month, a tear drop clearing on her tongue, as well as a few drops of blood in her stool with hard bowel movements. Patient has been having poor PO intake for the past 3 days before admission 2/2 throat pain. Patient was COVID + on 4/16/2020, and retested and positive again 5/15/2020. 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 CT head was negative for acute intracranial hemorrhage.         On 5/26 patient had a bone marrow biopsy, which confirmed the diagnosis of APL. Patient was previously started on Atra on 5/23 with flow cytometry results. Started on Arsenic trioxide on 5/27 after confirmation with BMBX. On 5/28 patient complained of occipital hyperthermia. Symptom was attributed to ATRA. An MR of the brain with contrast was done to rule out extraneous causes which revealed negative. Due to refractory thrombocytopenia short course of Prednisone 20mg x 3 days was started and platelets were given around the clock. HLA typing was completed. Patient reported dark stool and with concern for GIB IV Protonix was stared and CBC q 12. 60 y.o. Kiswahili speaking  female hx of HTN on atenolol, HLD, DM on metformin presents to [facility] ED on 5/22/20 with fever, malaise, HA. Patient reported that she was hypothermic to 92 degrees intermittently and also febrile to 101-102F which improves with Tylenol. Patient has been complaining of head feeling hot, pleuritic chest pain that moves around w/o numbness tingling, paresthesias, weakness, a rash on bilateral LE x1 month, a tear drop clearing on her tongue, as well as a few drops of blood in her stool with hard bowel movements. Patient has been having poor PO intake for the past 3 days before admission 2/2 throat pain. Patient was COVID + on 4/16/2020, and retested and positive again 5/15/2020. 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 CT head was negative for acute intracranial hemorrhage.     On 5/26 patient had a bone marrow biopsy, which confirmed the diagnosis of APL. Patient was previously started on Atra on 5/23 with flow cytometry results. Started on Arsenic trioxide on 5/27 after confirmation with BMBX. On 5/28 patient complained of occipital hyperthermia. Symptom was attributed to ATRA. An MR of the brain with contrast was done to rule out extraneous causes which revealed negative. Due to refractory thrombocytopenia short course of Prednisone 20mg x 3 days was started and platelets were given around the clock. HLA typing was completed. Patient reported dark stool and with concern for GIB IV Protonix was stared and CBC q 12. 60 y.o. Latvian speaking  female hx of HTN on atenolol, HLD, DM on metformin presents to [facility] ED on 5/22/20 with fever, malaise, HA. Patient reported that she was hypothermic to 92 degrees intermittently and also febrile to 101-102F which improves with Tylenol. Patient has been complaining of head feeling hot, pleuritic chest pain that moves around w/o numbness tingling, paresthesias, weakness, a rash on bilateral LE x1 month, a tear drop clearing on her tongue, as well as a few drops of blood in her stool with hard bowel movements. Patient has been having poor PO intake for the past 3 days before admission 2/2 throat pain. Patient was COVID + on 4/16/2020, and retested and positive again 5/15/2020. 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 CT head was negative for acute intracranial hemorrhage. On 5/26 patient had a bone marrow biopsy, which confirmed the diagnosis of APL. Patient was previously started on Atra on 5/23 with flow cytometry results. Started on Arsenic trioxide on 5/27 after confirmation with BMBX. On 5/28 patient complained of occipital hyperthermia. Symptom was attributed to ATRA. An MR of the brain with contrast was done to rule out extraneous causes which revealed negative. Due to refractory thrombocytopenia short course of Prednisone 20mg x 3 days was started and platelets were given around the clock. HLA typing was completed. Patient reported dark stool and with concern for GIB IV Protonix was stared and CBC q 12. She was refractory to platelets maintained platelet count between 30-40 during the hospitalization, however transfused paltelets half units Q 12hrs. 60 y.o. Kinyarwanda speaking  female hx of HTN on atenolol, HLD, DM on metformin presents to [facility] ED on 5/22/20 with fever, malaise, HA. Patient reported that she was hypothermic to 92 degrees intermittently and also febrile to 101-102F which improves with Tylenol. Patient has been complaining of head feeling hot, pleuritic chest pain that moves around w/o numbness tingling, paresthesias, weakness, a rash on bilateral LE x1 month, a tear drop clearing on her tongue, as well as a few drops of blood in her stool with hard bowel movements. Patient has been having poor PO intake for the past 3 days before admission 2/2 throat pain. Patient was COVID + on 4/16/2020, and retested and positive again 5/15/2020. 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 CT head was negative for acute intracranial hemorrhage. On 5/26 patient had a bone marrow biopsy, which confirmed the diagnosis of APL. Patient was previously started on Atra on 5/23 with flow cytometry results. Started on Arsenic trioxide on 5/27 after confirmation with BMBX. On 5/28 patient complained of occipital hyperthermia. Symptom was attributed to ATRA. An MR of the brain with contrast was done to rule out extraneous causes which revealed negative. Due to refractory thrombocytopenia short course of Prednisone 20mg x 3 days was started and platelets were given around the clock. HLA typing was completed. Patient reported dark stool and with concern for GIB IV Protonix was stared and CBC q 12. She was refractory to platelets maintained platelet count between 30-40 during the hospitalization, however transfused paltelets half units Q 12hrs.         Patient tested positive for COVID19 PCR on 6/16 and subsequently placed on isolation on [address] [place] [address]. 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 At this time, Arsenic was held. After repeated COVID19 PCR negative test results, patient was transferred back to [address] [place] [address]. On 6/26, patient had a bone marrow biopsy which revealed _____. 60 y.o. Khmer speaking  female hx of HTN on atenolol, HLD, DM on metformin presents to [facility] ED on 5/22/20 with fever, malaise, HA. Patient reported that she was hypothermic to 92 degrees intermittently and also febrile to 101-102F which improves with Tylenol. Patient has been complaining of head feeling hot, pleuritic chest pain that moves around w/o numbness tingling, paresthesias, weakness, a rash on bilateral LE x1 month, a tear drop clearing on her tongue, as well as a few drops of blood in her stool with hard bowel movements. Patient has been having poor PO intake for the past 3 days before admission 2/2 throat pain. Patient was COVID + on 4/16/2020, and retested and positive again 5/15/2020. 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 CT head was negative for acute intracranial hemorrhage. On 5/26 patient had a bone marrow biopsy, which confirmed the diagnosis of APL. Patient was previously started on Atra on 5/23 with flow cytometry results. Started on Arsenic trioxide on 5/27 after confirmation with BMBX. On 5/28 patient complained of occipital hyperthermia. Symptom was attributed to ATRA. An MR of the brain with contrast was done to rule out extraneous causes which revealed negative. Due to refractory thrombocytopenia short course of Prednisone 20mg x 3 days was started and platelets were given around the clock. HLA typing was completed. Patient reported dark stool and with concern for GIB IV Protonix was stared and CBC q 12. She was refractory to platelets maintained platelet count between 30-40 during the hospitalization, however transfused paltelets half units Q 12hrs.         Patient tested positive for COVID19 PCR on 6/16 and subsequently placed on isolation on [address] [place] [address]. 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 At this time, Arsenic was held. After repeated COVID19 PCR negative test results, patient was transferred back to [address] [place] [address]. On 6/26, patient had a bone marrow biopsy which revealed _____. ENT was consulted for hearing loss and recommended-- 60 y.o. Romansh speaking  female hx of HTN on atenolol, HLD, DM on metformin presents to [facility] ED on 5/22/20 with fever, malaise, HA. Patient reported that she was hypothermic to 92 degrees intermittently and also febrile to 101-102F which improves with Tylenol. Patient has been complaining of head feeling hot, pleuritic chest pain that moves around w/o numbness tingling, paresthesias, weakness, a rash on bilateral LE x1 month, a tear drop clearing on her tongue, as well as a few drops of blood in her stool with hard bowel movements. Patient has been having poor PO intake for the past 3 days before admission 2/2 throat pain. Patient was COVID + on 4/16/2020, and retested and positive again 5/15/2020. 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 CT head was negative for acute intracranial hemorrhage. On 5/26 patient had a bone marrow biopsy, which confirmed the diagnosis of APL. Patient was previously started on Atra on 5/23 with flow cytometry results. Started on Arsenic trioxide on 5/27 after confirmation with BMBX. On 5/28 patient complained of occipital hyperthermia. Symptom was attributed to ATRA. An MR of the brain with contrast was done to rule out extraneous causes which revealed negative. Due to refractory thrombocytopenia short course of Prednisone 20mg x 3 days was started and platelets were given around the clock. HLA typing was completed. Patient reported dark stool and with concern for GIB IV Protonix was stared and CBC q 12. She was refractory to platelets maintained platelet count between 30-40 during the hospitalization, however transfused paltelets half units Q 12hrs.     Patient tested positive for COVID19 PCR on 6/16 and subsequently placed on isolation on [address] [place] [address]. 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 At this time, Arsenic was held. After repeated COVID19 PCR negative test results, patient was transferred back to [address] [place] [address]. On 6/26, patient had a bone marrow biopsy which revealed cerumen impaction, debrox ear drops started, as per ENT recommendation was consulted for hearing loss and recommended audiogram. 60 y.o. Chinese speaking  female hx of HTN on atenolol, HLD, DM on metformin presents to [facility] ED on 5/22/20 with fever, malaise, HA. Patient reported that she was hypothermic to 92 degrees intermittently and also febrile to 101-102F which improves with Tylenol. Patient has been complaining of head feeling hot, pleuritic chest pain that moves around w/o numbness tingling, paresthesias, weakness, a rash on bilateral LE x1 month, a tear drop clearing on her tongue, as well as a few drops of blood in her stool with hard bowel movements. Patient has been having poor PO intake for the past 3 days before admission 2/2 throat pain. Patient was COVID + on 4/16/2020, and retested and positive again 5/15/2020. 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 CT head was negative for acute intracranial hemorrhage. On 5/26 patient had a bone marrow biopsy, which confirmed the diagnosis of APL. Patient was previously started on Atra on 5/23 with flow cytometry results. Started on Arsenic trioxide on 5/27 after confirmation with BMBX. On 5/28 patient complained of occipital hyperthermia. Symptom was attributed to ATRA. An MR of the brain with contrast was done to rule out extraneous causes which revealed negative. Due to refractory thrombocytopenia short course of Prednisone 20mg x 3 days was started and platelets were given around the clock. HLA typing was completed. Patient reported dark stool and with concern for GIB IV Protonix was stared and CBC q 12. She was refractory to platelets maintained platelet count between 30-40 during the hospitalization, however transfused paltelets half units Q 12hrs.     Patient tested positive for COVID19 PCR on 6/16 and subsequently placed on isolation on [address] [facility]. 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 At this time, Arsenic was held. After repeated COVID19 PCR negative test results, patient was transferred back to [address] [place] [address]. On 6/26, patient had a bone marrow biopsy which revealed cerumen impaction, debrox ear drops started, as per ENT recommendation was consulted for hearing loss and recommended audiogram. The audiogram shoed severe hearing lost on bilateral ears and recommended outpatient follow up . On 7/1, PICC line was placed. Repeat right arm Doppler showed resolution of clot and decreased phasicity in the right subclavian vein suggests proximal obstruction. 60 y.o. Greek speaking  female hx of HTN on atenolol, HLD, DM on metformin presents to [facility] ED on 5/22/20 with fever, malaise, HA. Patient reported that she was hypothermic to 92 degrees intermittently and also febrile to 101-102F which improves with Tylenol. Patient has been complaining of head feeling hot, pleuritic chest pain that moves around w/o numbness tingling, paresthesias, weakness, a rash on bilateral LE x1 month, a tear drop clearing on her tongue, as well as a few drops of blood in her stool with hard bowel movements. Patient has been having poor PO intake for the past 3 days before admission 2/2 throat pain. Patient was COVID + on 4/16/2020, and retested and positive again 5/15/2020. 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 CT head was negative for acute intracranial hemorrhage. On 5/26 patient had a bone marrow biopsy, which confirmed the diagnosis of APL. Patient was previously started on Atra on 5/23 with flow cytometry results. Started on Arsenic trioxide on 5/27 after confirmation with BMBX. On 5/28 patient complained of occipital hyperthermia. Symptom was attributed to ATRA. An MR of the brain with contrast was done to rule out extraneous causes which revealed negative. Due to refractory thrombocytopenia short course of Prednisone 20mg x 3 days was started and platelets were given around the clock. HLA typing was completed. Patient reported dark stool and with concern for GIB IV Protonix was stared and CBC q 12. She was refractory to platelets maintained platelet count between 30-40 during the hospitalization, however transfused paltelets half units Q 12hrs.     Patient tested positive for COVID19 PCR on 6/16 and subsequently placed on isolation on [facility]. 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 At this time, Arsenic was held. After repeated COVID19 PCR negative test results, patient was transferred back to [address] [place] [address]. On 6/26, patient had a bone marrow biopsy which revealed cerumen impaction, debrox ear drops started, as per ENT recommendation was consulted for hearing loss and recommended audiogram. The audiogram shoed severe hearing lost on bilateral ears and recommended outpatient follow up . On 7/1, PICC line was placed. Repeat right arm Doppler showed resolution of clot and decreased phasicity in the right subclavian vein suggests proximal obstruction.        On 7/4 patient complained of parietal hypersensitivity and scalp pain to palpation. Patient was started on lidocaine gel to scalp decreased pain and hypersensitivity. On 7/9 patient had a bone marrow biopsy which revealed -- 60 y.o. Ukrainian speaking  female hx of HTN on atenolol, HLD, DM on metformin presents to [facility] ED on 5/22/20 with fever, malaise, HA. Patient reported that she was hypothermic to 92 degrees intermittently and also febrile to 101-102F which improves with Tylenol. Patient has been complaining of head feeling hot, pleuritic chest pain that moves around w/o numbness tingling, paresthesias, weakness, a rash on bilateral LE x1 month, a tear drop clearing on her tongue, as well as a few drops of blood in her stool with hard bowel movements. Patient has been having poor PO intake for the past 3 days before admission 2/2 throat pain. Patient was COVID + on 4/16/2020, and retested and positive again 5/15/2020. 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 CT head was negative for acute intracranial hemorrhage. On 5/26 patient had a bone marrow biopsy, which confirmed the diagnosis of APL. Patient was previously started on Atra on 5/23 with flow cytometry results. Started on Arsenic trioxide on 5/27 after confirmation with BMBX. On 5/28 patient complained of occipital hyperthermia. Symptom was attributed to ATRA. An MR of the brain with contrast was done to rule out extraneous causes which revealed negative. Due to refractory thrombocytopenia short course of Prednisone 20mg x 3 days was started and platelets were given around the clock. HLA typing was completed. Patient reported dark stool and with concern for GIB IV Protonix was stared and CBC q 12. She was refractory to platelets maintained platelet count between 30-40 during the hospitalization, however transfused paltelets half units Q 12hrs.     Patient tested positive for COVID19 PCR on 6/16 and subsequently placed on isolation on 9 [facility]. 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 At this time, Arsenic was held. After repeated COVID19 PCR negative test results, patient was transferred back to [address] [place] [address]. On 6/26, patient had a bone marrow biopsy which revealed cerumen impaction, debrox ear drops started, as per ENT recommendation was consulted for hearing loss and recommended audiogram. The audiogram shoed severe hearing lost on bilateral ears and recommended outpatient follow up . On 7/1, PICC line was placed. Repeat right arm Doppler showed resolution of clot and decreased phasicity in the right subclavian vein suggests proximal obstruction.    On 7/4 patient complained of parietal hypersensitivity and scalp pain to palpation. Patient was started on lidocaine gel to scalp decreased pain and hypersensitivity. On 7/9 patient had a bone marrow biopsy which revealed -- 60 y.o. Divehi speaking  female hx of HTN on atenolol, HLD, DM on metformin presents to [facility] ED on 5/22/20 with fever, malaise, HA. Patient reported that she was hypothermic to 92 degrees intermittently and also febrile to 101-102F which improves with Tylenol. Patient has been complaining of head feeling hot, pleuritic chest pain that moves around w/o numbness tingling, paresthesias, weakness, a rash on bilateral LE x1 month, a tear drop clearing on her tongue, as well as a few drops of blood in her stool with hard bowel movements. Patient has been having poor PO intake for the past 3 days before admission 2/2 throat pain. Patient was COVID + on 4/16/2020, and retested and positive again 5/15/2020. 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 CT head was negative for acute intracranial hemorrhage. On 5/26 patient had a bone marrow biopsy, which confirmed the diagnosis of APL. Patient was previously started on Atra on 5/23 with flow cytometry results. Started on Arsenic trioxide on 5/27 after confirmation with BMBX. On 5/28 patient complained of occipital hyperthermia. Symptom was attributed to ATRA. An MR of the brain with contrast was done to rule out extraneous causes which revealed negative. Due to refractory thrombocytopenia short course of Prednisone 20mg x 3 days was started and platelets were given around the clock. HLA typing was completed. Patient reported dark stool and with concern for GIB IV Protonix was stared and CBC q 12. She was refractory to platelets maintained platelet count between 30-40 during the hospitalization, however transfused paltelets half units Q 12hrs.     Patient tested positive for COVID19 PCR on 6/16 and subsequently placed on isolation on 9 [facility]. 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 At this time, Arsenic was held. After repeated COVID19 PCR negative test results, patient was transferred back to [address] [place] [address]. On 6/26, patient had a bone marrow biopsy which revealed cerumen impaction, debrox ear drops started, as per ENT recommendation was consulted for hearing loss and recommended audiogram. The audiogram shoed severe hearing lost on bilateral ears and recommended outpatient follow up . On 7/1, PICC line was placed. Repeat right arm Doppler showed resolution of clot and decreased phasicity in the right subclavian vein suggests proximal obstruction.    On 7/4 patient complained of parietal hypersensitivity and scalp pain to palpation. Patient was started on lidocaine gel to scalp decreased pain and hypersensitivity. On 7/8 patient complained of chest pain, Cardiac etiology was ruled out. A Chest xray was done which had an incidental finding of PICC tip in the left subclavian vein. On 7/9 patient had a  successful PICC exchange. Patient had a bone marrow biopsy on 7/13 which revealed --- 60 y.o. Kazakh speaking  female hx of HTN on atenolol, HLD, DM on metformin presents to [facility] ED on 5/22/20 with fever, malaise, HA. Patient reported that she was hypothermic to 92 degrees intermittently and also febrile to 101-102F which improves with Tylenol. Patient has been complaining of head feeling hot, pleuritic chest pain that moves around w/o numbness tingling, paresthesias, weakness, a rash on bilateral LE x1 month, a tear drop clearing on her tongue, as well as a few drops of blood in her stool with hard bowel movements. Patient has been having poor PO intake for the past 3 days before admission 2/2 throat pain. Patient was COVID + on 4/16/2020, and retested and positive again 5/15/2020. 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 CT head was negative for acute intracranial hemorrhage. On 5/26 patient had a bone marrow biopsy, which confirmed the diagnosis of APL. Patient was previously started on Atra on 5/23 with flow cytometry results. Started on Arsenic trioxide on 5/27 after confirmation with BMBX. On 5/28 patient complained of occipital hyperthermia. Symptom was attributed to ATRA. An MR of the brain with contrast was done to rule out extraneous causes which revealed negative. Due to refractory thrombocytopenia short course of Prednisone 20mg x 3 days was started and platelets were given around the clock. HLA typing was completed. Patient reported dark stool and with concern for GIB IV Protonix was stared and CBC q 12. She was refractory to platelets maintained platelet count between 30-40 during the hospitalization, however transfused paltelets half units Q 12hrs.     Patient tested positive for COVID19 PCR on 6/16 and subsequently placed on isolation on 9 [facility]. 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 At this time, Arsenic was held. After repeated COVID19 PCR negative test results, patient was transferred back to [address] [place] [address]. On 6/26, patient had a bone marrow biopsy which revealed cerumen impaction, debrox ear drops started, as per ENT recommendation was consulted for hearing loss and recommended audiogram. The audiogram shoed severe hearing lost on bilateral ears and recommended outpatient follow up . On 7/1, PICC line was placed. Repeat right arm Doppler showed resolution of clot and decreased phasicity in the right subclavian vein suggests proximal obstruction.    On 7/4 patient complained of parietal hypersensitivity and scalp pain to palpation. Patient was started on lidocaine gel to scalp decreased pain and hypersensitivity. On 7/8 patient complained of chest pain, Cardiac etiology was ruled out. A Chest xray was done which had an incidental finding of PICC tip in the left subclavian vein. On 7/9 patient had a  successful PICC exchange. Patient had a bone marrow biopsy on 7/13 which revealed persistent disease patient continued the Arsenic trioxide and ATRA. 60 y.o. Danish speaking  female hx of HTN on atenolol, HLD, DM on metformin presents to [facility] ED on 5/22/20 with fever, malaise, HA. Patient reported that she was hypothermic to 92 degrees intermittently and also febrile to 101-102F which improves with Tylenol. Patient has been complaining of head feeling hot, pleuritic chest pain that moves around w/o numbness tingling, paresthesias, weakness, a rash on bilateral LE x1 month, a tear drop clearing on her tongue, as well as a few drops of blood in her stool with hard bowel movements. Patient has been having poor PO intake for the past 3 days before admission 2/2 throat pain. Patient was COVID + on 4/16/2020, and retested and positive again 5/15/2020. 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 CT head was negative for acute intracranial hemorrhage. On 5/26 patient had a bone marrow biopsy, which confirmed the diagnosis of APL. Patient was previously started on Atra on 5/23 with flow cytometry results. Started on Arsenic trioxide on 5/27 after confirmation with BMBX. On 5/28 patient complained of occipital hyperthermia. Symptom was attributed to ATRA. An MR of the brain with contrast was done to rule out extraneous causes which revealed negative. Due to refractory thrombocytopenia short course of Prednisone 20mg x 3 days was started and platelets were given around the clock. HLA typing was completed. Patient reported dark stool and with concern for GIB IV Protonix was stared and CBC q 12. She was refractory to platelets maintained platelet count between 30-40 during the hospitalization, however transfused paltelets half units Q 12hrs.     Patient tested positive for COVID19 PCR on 6/16 and subsequently placed on isolation on 9 [facility]. 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 At this time, Arsenic was held. After repeated COVID19 PCR negative test results, patient was transferred back to [address] [place] [address]. On 6/26, patient had a bone marrow biopsy which revealed cerumen impaction, debrox ear drops started, as per ENT recommendation was consulted for hearing loss and recommended audiogram. The audiogram shoed severe hearing lost on bilateral ears and recommended outpatient follow up . On 7/1, PICC line was placed. Repeat right arm Doppler showed resolution of clot and decreased phasicity in the right subclavian vein suggests proximal obstruction.    On 7/4 patient complained of parietal hypersensitivity and scalp pain to palpation. Patient was started on lidocaine gel to scalp decreased pain and hypersensitivity. On 7/8 patient complained of chest pain, Cardiac etiology was ruled out. A Chest xray was done which had an incidental finding of PICC tip in the left subclavian vein. On 7/9 patient had a  successful PICC exchange. Patient had a bone marrow biopsy on 7/13 which revealed persistent disease patient continued the Arsenic trioxide and ATRA 60 y.o. Mohawk speaking  female hx of HTN HLD, DM on metformin presented to [facility] ED on 5/22/20 with fever, malaise and headaches. Patient was found to be COVID (+)             Patient reported that she was hypothermic to 92 degrees intermittently and also febrile to 101-102F which improves with Tylenol. Patient has been complaining of head feeling hot, pleuritic chest pain that moves around w/o numbness tingling, paresthesias, weakness, a rash on bilateral LE x1 month, a tear drop clearing on her tongue, as well as a few drops of blood in her stool with hard bowel movements. Patient has been having poor PO intake for the past 3 days before admission 2/2 throat pain. Patient was COVID + on 4/16/2020, and retested and positive again 5/15/2020. 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 CT head was negative for acute intracranial hemorrhage. On 5/26 patient had a bone marrow biopsy, which confirmed the diagnosis of APL. Patient was previously started on Atra on 5/23 with flow cytometry results. Started on Arsenic trioxide on 5/27 after confirmation with BMBX. On 5/28 patient complained of occipital hyperthermia. Symptom was attributed to ATRA. An MR of the brain with contrast was done to rule out extraneous causes which revealed negative. Due to refractory thrombocytopenia short course of Prednisone 20mg x 3 days was started and platelets were given around the clock. HLA typing was completed. Patient reported dark stool and with concern for GIB IV Protonix was stared and CBC q 12. She was refractory to platelets maintained platelet count between 30-40 during the hospitalization, however transfused paltelets half units Q 12hrs.     Patient tested positive for COVID19 PCR on 6/16 and subsequently placed on isolation on [address] [place] [address]. 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 At this time, Arsenic was held. After repeated COVID19 PCR negative test results, patient was transferred back to [address] [place] [address]. On 6/26, patient had a bone marrow biopsy which revealed cerumen impaction, debrox ear drops started, as per ENT recommendation was consulted for hearing loss and recommended audiogram. The audiogram shoed severe hearing lost on bilateral ears and recommended outpatient follow up . On 7/1, PICC line was placed. Repeat right arm Doppler showed resolution of clot and decreased phasicity in the right subclavian vein suggests proximal obstruction.    On 7/4 patient complained of parietal hypersensitivity and scalp pain to palpation. Patient was started on lidocaine gel to scalp decreased pain and hypersensitivity. On 7/8 patient complained of chest pain, Cardiac etiology was ruled out. A Chest xray was done which had an incidental finding of PICC tip in the left subclavian vein. On 7/9 patient had a  successful PICC exchange. Patient had a bone marrow biopsy on 7/13 which revealed persistent disease patient continued the Arsenic trioxide and ATRA 60 y.o. Telugu speaking  female hx of HTN HLD, DM on metformin presented to [facility]  ED on 5/22/20 with fever, malaise and headaches. Patient was found to be COVID (+) on 4/16/20. and 5/15/20. Patient on presentation was found to have pancytopenia and peripheral blasts. 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         On 5/26 patient had a bone marrow biopsy, which confirmed the diagnosis of APL. Patient was previously started on Atra on 5/23 with flow cytometry results. Started on Arsenic trioxide on 5/27 after confirmation with BMBX. Due to refractory thrombocytopenia, patient was treated with short course of Prednisone 20mg x 3 days. Patient was given slow infusion of platelets over 3 hours q 12hours, refractory thrombocytopenia eventually resolved. Arsenic was held from 6/22/20 to 6/30/20 due to Herpes Zoster infection. On 6/25 patient had a bone marrow biopsy which showed persistent disease.  IArsenic was resumed on 6/30. Arsenic was held again for one day on 7/10 (because of removal of PICC line and lack of IV access) and resumed on 7/11.         On 5/22 patient complained of occipital/ parietal headaches and hyperthermia of the head. Symptoms were suspected to be secondary to ATRA.  Patient underwent a CT of the head, and MR of the Brain, findings as follows.        CT Head No Cont (05.22.20 @ 20:02)     There is no hydrocephalus, mass effect, midline shift, vasogenic edema, or acute intracranial hemorrhage.  There is no CT evidence of acute territorial infarct.      The visualized paranasal sinuses are clear. The mastoids are under pneumatized.    No acute intracranial hemorrhage, mass effect, vasogenic edema, or evidence of acute territorial infarct.        MR Head w/ IV Cont (05.29.20 @ 14:59)     There is a small amount of fluid in the mastoid sinuses and middle ear cavities bilaterally. There is slightly prominent soft tissue in the left nasopharynx could be related to lymphoid hyperplasia versus lymphoma. No masses or abnormal enhancement of the brain parenchyma.        On 6/16 patient was found to be COVID (+) was transferred to [address] [place] [address] and placed on isolation. Patient's COVID antibody was found to be (+) , on 6/19 when COVID was found to be (-) and patient was transferred back to .     On 6/20 patient was found to have an Enterococcus UTI and patient was treated with antibiotics. On 6/20 patient complained of discomfort in her PICC site.  A doppler was done the results as follows:        US Extremity Nonvasc Limited, Right (06.20.20 @ 18:53) :    Ultrasound evaluation of the right arm demonstrates mild subcutaneous edema. There is no evidence of hematoma or fluid collection.        a second VA duplex was done for the  persistence of symptoms. results as follows:     VA Duplex Upper Ext Vein Scan, Right (06.23.20 @ 17:19)     There is acute, partially occlusive DVT affecting the right axillary vein.        Patient was started on anti coagulation with Heparin gtt, which was subsequently switched to therapeutic treatment dose of Lovenox. A repeat VA duplex vein scan and partial resolution was observed. Patient underwent a third VA duplex which showed complete resolution of RUQ DVT. Findings as follows:         VA Duplex Upper Ext Vein Scan, Right (07.16.20 @ 14:17)     The right internal jugular, subclavian, axillary and brachial veins are patent and compressible where applicable.  The basilic and cephalic veins (superficial veins) are patent and without thrombus.     Doppler examination shows normal spontaneous and phasic flow.    No evidence of right upper extremity deep venous thrombosis.        On 6/22 patient developed a vesicular rash on the Right buttock and posterior thigh. Patient was seen by Dermatology and was diagnosed with Herpes Zoster infection. As per Dermatology was treated with Valtrex and herpetic neuralgia was managed with Gabapentin. A PICC line was placed in the Left upper extremity.  On 7/8 patient had a routine cxr and was incidentally found to have a malpositioned PICC. on 7/9 patient underwent a PICC exchange. On 7/10 LUE PICC line was removed. A CT and the Doppler of the left lower extremity. results were as follows:        VA Duplex Upper Ext Vein Scan, Left (07.10.20 @ 01:12)     Left upper extremity PICC line in place.    No evidence of left upper extremity deep venous thrombosis.        CT Upper Extremity No Cont, Left (07.10.20 @ 23:05)     No fluid collection.         On 7/13 patient had a bone marrow biopsy the pathology was (+) for Hypercellular marrow with trilineage hematopoesis. On 7/15 ATRA and Arsenic were stopped. Lovenox was switched to Eliquis.                                                                                                     Patient complained of dark stools, CBC's were closely monitored and patient was treated with IV protonix. Dark stools were attributed to low platelets and platelets were replenished.     Patient tested positive for COVID19 PCR on 6/16 and subsequently placed on isolation on 9 [facility]. 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 At this time, Arsenic was held. After repeated COVID19 PCR negative test results, patient was transferred back to [address] [place] [address]. On 6/26, patient had a bone marrow biopsy which revealed cerumen impaction, debrox ear drops started, as per ENT recommendation was consulted for hearing loss and recommended audiogram. The audiogram shoed severe hearing lost on bilateral ears and recommended outpatient follow up . On 7/1, PICC line was placed. Repeat right arm Doppler showed resolution of clot and decreased phasicity in the right subclavian vein suggests proximal obstruction.    On 7/4 patient complained of parietal hypersensitivity and scalp pain to palpation. Patient was started on lidocaine gel to scalp decreased pain and hypersensitivity. On 7/8 patient complained of chest pain, Cardiac etiology was ruled out. A Chest xray was done which had an incidental finding of PICC tip in the left subclavian vein. On 7/9 patient had a  successful PICC exchange. Patient had a bone marrow biopsy on 7/13 which revealed persistent disease patient continued the Arsenic trioxide and ATRA 60 y.o. Urdu speaking  female hx of HTN HLD, DM on metformin presented to [facility]  ED on 5/22/20 with fever, malaise and headaches. Patient was found to be COVID (+) on 4/16/20. and 5/15/20. Patient on presentation was found to have pancytopenia and peripheral blasts. 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         On 5/26 patient had a bone marrow biopsy, which confirmed the diagnosis of APL. Patient was previously started on Atra on 5/23 with flow cytometry results. Started on Arsenic trioxide on 5/27 after confirmation with BMBX. Due to refractory thrombocytopenia, patient was treated with short course of Prednisone 20mg x 3 days. Patient was given slow infusion of platelets over 3 hours q 12hours, refractory thrombocytopenia eventually resolved. Arsenic was held from 6/22/20 to 6/30/20 due to Herpes Zoster infection. On 6/25 patient had a bone marrow biopsy which showed persistent disease.  IArsenic was resumed on 6/30. Arsenic was held again for one day on 7/10 (because of removal of PICC line and lack of IV access) and resumed on 7/11.         On 5/22 patient complained of occipital/ parietal headaches and hyperthermia of the head. Symptoms were suspected to be secondary to ATRA.  Patient underwent a CT of the head, and MR of the Brain, findings as follows.        CT Head No Cont (05.22.20 @ 20:02)     There is no hydrocephalus, mass effect, midline shift, vasogenic edema, or acute intracranial hemorrhage.  There is no CT evidence of acute territorial infarct.      The visualized paranasal sinuses are clear. The mastoids are under pneumatized.    No acute intracranial hemorrhage, mass effect, vasogenic edema, or evidence of acute territorial infarct.        MR Head w/ IV Cont (05.29.20 @ 14:59)     There is a small amount of fluid in the mastoid sinuses and middle ear cavities bilaterally. There is slightly prominent soft tissue in the left nasopharynx could be related to lymphoid hyperplasia versus lymphoma. No masses or abnormal enhancement of the brain parenchyma.        On 6/16 patient was found to be COVID (+) was transferred to [address] [place] [address] and placed on isolation. Patient's COVID antibody was found to be (+) , on 6/19 when COVID was found to be (-) and patient was transferred back to .     On 6/20 patient was found to have an Enterococcus UTI and patient was treated with antibiotics. On 6/20 patient complained of discomfort in her PICC site.  A doppler was done the results as follows:        US Extremity Nonvasc Limited, Right (06.20.20 @ 18:53) :    Ultrasound evaluation of the right arm demonstrates mild subcutaneous edema. There is no evidence of hematoma or fluid collection.        a second VA duplex was done for the  persistence of symptoms. results as follows:     VA Duplex Upper Ext Vein Scan, Right (06.23.20 @ 17:19)     There is acute, partially occlusive DVT affecting the right axillary vein.        Patient was started on anti coagulation with Heparin gtt, which was subsequently switched to therapeutic treatment dose of Lovenox. A repeat VA duplex vein scan and partial resolution was observed. Patient underwent a third VA duplex which showed complete resolution of RUQ DVT. Findings as follows:         VA Duplex Upper Ext Vein Scan, Right (07.16.20 @ 14:17)     The right internal jugular, subclavian, axillary and brachial veins are patent and compressible where applicable.  The basilic and cephalic veins (superficial veins) are patent and without thrombus.     Doppler examination shows normal spontaneous and phasic flow.    No evidence of right upper extremity deep venous thrombosis.        On 6/22 patient developed a vesicular rash on the Right buttock and posterior thigh. Patient was seen by Dermatology and was diagnosed with Herpes Zoster infection. As per Dermatology was treated with Valtrex and herpetic neuralgia was managed with Gabapentin. A PICC line was placed in the Left upper extremity.  On 7/8 patient had a routine cxr and was incidentally found to have a malpositioned PICC. on 7/9 patient underwent a PICC exchange. On 7/10 LUE PICC line was removed. A CT and the Doppler of the left lower extremity. results were as follows:        VA Duplex Upper Ext Vein Scan, Left (07.10.20 @ 01:12)     Left upper extremity PICC line in place.    No evidence of left upper extremity deep venous thrombosis.        CT Upper Extremity No Cont, Left (07.10.20 @ 23:05)     No fluid collection.         On 7/13 patient had a bone marrow biopsy the pathology was (+) for Hypercellular marrow with trilineage hematopoesis. On 7/15 ATRA and Arsenic were stopped. Lovenox was switched to Eliquis. Patient was discharged home. Patient was advised to follow up with Dr. Hurd, as outpatient. Chemotherapy with ATRA to be restarted as per instructions of . Arsenic to be scheduled as outpatient.                                                                                                     Patient complained of dark stools, CBC's were closely monitored and patient was treated with IV protonix. Dark stools were attributed to low platelets and platelets were replenished.     Patient tested positive for COVID19 PCR on 6/16 and subsequently placed on isolation on [address] [place] [address]. 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 At this time, Arsenic was held. After repeated COVID19 PCR negative test results, patient was transferred back to [address] [place] [address]. On 6/26, patient had a bone marrow biopsy which revealed cerumen impaction, debrox ear drops started, as per ENT recommendation was consulted for hearing loss and recommended audiogram. The audiogram shoed severe hearing lost on bilateral ears and recommended outpatient follow up . On 7/1, PICC line was placed. Repeat right arm Doppler showed resolution of clot and decreased phasicity in the right subclavian vein suggests proximal obstruction.    On 7/4 patient complained of parietal hypersensitivity and scalp pain to palpation. Patient was started on lidocaine gel to scalp decreased pain and hypersensitivity. On 7/8 patient complained of chest pain, Cardiac etiology was ruled out. A Chest xray was done which had an incidental finding of PICC tip in the left subclavian vein. On 7/9 patient had a  successful PICC exchange. Patient had a bone marrow biopsy on 7/13 which revealed persistent disease patient continued the Arsenic trioxide and ATRA

## 2020-05-27 NOTE — DISCHARGE NOTE PROVIDER - NSDCCPCAREPLAN_GEN_ALL_CORE_FT
PRINCIPAL DISCHARGE DIAGNOSIS  Diagnosis: APL (acute promyelocytic leukemia)  Assessment and Plan of Treatment: APL (acute promyelocytic leukemia)  maintain counts, remain from infection. Notify MD and report to ER for any temperature greater than or equal to 100.4 degrees, intractable nausea, vomiting, diarrhea, or uncontrolled bleeding.

## 2020-05-27 NOTE — ADVANCED PRACTICE NURSE CONSULT - ASSESSMENT
Patient's alert & oriented x 4,resting in bed,denies any discomfort,explained re- chemo TX.by GUTIERREZ Baltazar ,,carenotes given ,consent signed,labs checked & reviewed by Dr. Hurd during rounds,EKG ,done, w/ L AC PIV,inserted 5/26,can give chemo  via PIV as per GUTIERREZ Baltazar ,Chemo TX checked & verified w/ Primary Nurse,Arsenc trioxide .15 mg/kg=11 mg IV to infuse over 2 hours,given @ 1628 pm . Primary Nurse aware re- chemo TX.,will follow.

## 2020-05-27 NOTE — DISCHARGE NOTE PROVIDER - NSDCMRMEDTOKEN_GEN_ALL_CORE_FT
acetaminophen 325 mg oral tablet: 2 tab(s) orally every 6 hours, As needed, Temp greater or equal to 38C (100.4F)  allopurinol 300 mg oral tablet: 1 tab(s) orally once a day  aluminum hydroxide-magnesium hydroxide 200 mg-200 mg/5 mL oral suspension: 30 milliliter(s) orally once, As needed, Dyspepsia  atorvastatin 20 mg oral tablet: 1 tab(s) orally once a day (at bedtime)  cefepime: 2000 milligram(s) intravenous every 8 hours  hydrocortisone 25 mg rectal suppository: 1 suppository(ies) rectal once a day, As needed, hemorrhoidal irritation  lactobacillus acidophilus oral capsule: 1 tab(s) orally 3 times a day  tretinoin 10 mg oral capsule: 4 cap(s) orally every 12 hours acetaminophen 325 mg oral tablet: 2 tab(s) orally every 6 hours, As needed, Temp greater or equal to 38C (100.4F)  aluminum hydroxide-magnesium hydroxide 200 mg-200 mg/5 mL oral suspension: 30 milliliter(s) orally once, As needed, Dyspepsia  atorvastatin 20 mg oral tablet: 1 tab(s) orally once a day (at bedtime)  hydrocortisone 25 mg rectal suppository: 1 suppository(ies) rectal once a day, As needed, hemorrhoidal irritation  tretinoin 10 mg oral capsule: 4 cap(s) orally every 12 hours MDD:2 acetaminophen 325 mg oral tablet: 2 tab(s) orally every 6 hours, As needed, Temp greater or equal to 38C (100.4F)  aluminum hydroxide-magnesium hydroxide 200 mg-200 mg/5 mL oral suspension: 30 milliliter(s) orally once, As needed, Dyspepsia  atorvastatin 20 mg oral tablet: 1 tab(s) orally once a day (at bedtime)  enoxaparin 80 mg/0.8 mL injectable solution: 80 milligram(s) subcutaneously 2 times a day MDD:2   hydrocortisone 25 mg rectal suppository: 1 suppository(ies) rectal once a day, As needed, hemorrhoidal irritation  tretinoin 10 mg oral capsule: 4 cap(s) orally every 12 hours MDD:2 atorvastatin 20 mg oral tablet: 1 tab(s) orally once a day (at bedtime)  enoxaparin 80 mg/0.8 mL injectable solution: 80 milligram(s) subcutaneously 2 times a day MDD:2   hydrocortisone 25 mg rectal suppository: 1 suppository(ies) rectal once a day, As needed, hemorrhoidal irritation  tretinoin 10 mg oral capsule: 4 cap(s) orally every 12 hours MDD:2 ammonium lactate 12% topical lotion: 1 application topically 2 times a day  atenolol 25 mg oral tablet: 1 tab(s) orally once a day  atorvastatin 20 mg oral tablet: 1 tab(s) orally once a day (at bedtime)  Eliquis Starter Pack for Treatment of DVT and PE 5 mg oral tablet: 1 tab(s) orally 2 times a day   gabapentin 100 mg oral capsule: 1 cap(s) orally every 8 hours  hydrocortisone 25 mg rectal suppository: 1 suppository(ies) rectal once a day, As needed, hemorrhoidal irritation  ocular lubricant ophthalmic solution: 1 drop(s) to each affected eye 3 times a day  polyethylene glycol 3350 oral powder for reconstitution: 17 gram(s) orally once a day, As Needed ammonium lactate 12% topical lotion: 1 application topically 2 times a day  atenolol 25 mg oral tablet: 1 tab(s) orally once a day  atorvastatin 20 mg oral tablet: 1 tab(s) orally once a day (at bedtime)  Eliquis Starter Pack for Treatment of DVT and PE 5 mg oral tablet: 1 tab(s) orally 2 times a day   gabapentin 100 mg oral capsule: 1 cap(s) orally every 8 hours  lidocaine 0.5% topical gel: Apply topically to affected area once a day MDD:apply once daily  metFORMIN 500 mg oral tablet: 1 tab(s) orally 2 times a day MDD:2 tablets  Nystop 100,000 units/g topical powder: Apply topically to affected area 3 times a day   ocular lubricant ophthalmic solution: 1 drop(s) to each affected eye 3 times a day  polyethylene glycol 3350 oral powder for reconstitution: 17 gram(s) orally once a day, As Needed

## 2020-05-27 NOTE — ADVANCED PRACTICE NURSE CONSULT - REASON FOR CONSULT
Chemotherapy Notes:                                                                                                                                                                                                                                                                                                                         Day 1 Arsenic Trioxide IV

## 2020-05-27 NOTE — PROGRESS NOTE ADULT - PROBLEM SELECTOR PLAN 1
suspected based on peripheral blood flow cytometry and smear  s/p Bone Marrow Biopsy 5/26 at [facility] - follow up results  continue tretinoin 40mg BID  Monitor for differentiation syndrome  continue Allopurinol 300mg PO daily  Follow up CBC w diff, CMP, TLS, DIC labs BID  Keep PLTs > 50,000, Fibrinogen > 150  Follow up G6PD  Echo EF 60% suspected based on peripheral blood flow cytometry and smear  s/p Bone Marrow Biopsy 5/26 at [facility] - follow up results  continue tretinoin 40mg BID (started 5/22)  Arsenic Trioxide starting today 5/27  Monitor for differentiation syndrome  Check EKG for QTc every Tuesday, Friday  continue Allopurinol 300mg PO daily  Follow up CBC w diff, CMP, TLS, DIC labs BID  Keep PLTs > 50,000, Fibrinogen > 150  Follow up G6PD  Echo EF 60%

## 2020-05-27 NOTE — DISCHARGE NOTE PROVIDER - NSDCFUADDAPPT_GEN_ALL_CORE_FT
To [facility]  to see Dr. Hurd on You will receive a call from [facility] regarding your appointment with    and also Chemotherapy appointments.

## 2020-05-28 LAB
ALBUMIN SERPL ELPH-MCNC: 4.5 G/DL — SIGNIFICANT CHANGE UP (ref 3.3–5)
ALBUMIN SERPL ELPH-MCNC: 4.5 G/DL — SIGNIFICANT CHANGE UP (ref 3.3–5)
ALP SERPL-CCNC: 80 U/L — SIGNIFICANT CHANGE UP (ref 40–120)
ALP SERPL-CCNC: 87 U/L — SIGNIFICANT CHANGE UP (ref 40–120)
ALT FLD-CCNC: 19 U/L — SIGNIFICANT CHANGE UP (ref 10–45)
ALT FLD-CCNC: 21 U/L — SIGNIFICANT CHANGE UP (ref 10–45)
ANION GAP SERPL CALC-SCNC: 13 MMOL/L — SIGNIFICANT CHANGE UP (ref 5–17)
ANION GAP SERPL CALC-SCNC: 13 MMOL/L — SIGNIFICANT CHANGE UP (ref 5–17)
ANISOCYTOSIS BLD QL: SLIGHT — SIGNIFICANT CHANGE UP
APTT BLD: 28.3 SEC — SIGNIFICANT CHANGE UP (ref 27.5–36.3)
AST SERPL-CCNC: 22 U/L — SIGNIFICANT CHANGE UP (ref 10–40)
AST SERPL-CCNC: 22 U/L — SIGNIFICANT CHANGE UP (ref 10–40)
BASOPHILS # BLD AUTO: 0 K/UL — SIGNIFICANT CHANGE UP (ref 0–0.2)
BASOPHILS NFR BLD AUTO: 0 % — SIGNIFICANT CHANGE UP (ref 0–2)
BILIRUB SERPL-MCNC: 0.3 MG/DL — SIGNIFICANT CHANGE UP (ref 0.2–1.2)
BILIRUB SERPL-MCNC: 0.3 MG/DL — SIGNIFICANT CHANGE UP (ref 0.2–1.2)
BUN SERPL-MCNC: 14 MG/DL — SIGNIFICANT CHANGE UP (ref 7–23)
BUN SERPL-MCNC: 16 MG/DL — SIGNIFICANT CHANGE UP (ref 7–23)
CALCIUM SERPL-MCNC: 10 MG/DL — SIGNIFICANT CHANGE UP (ref 8.4–10.5)
CALCIUM SERPL-MCNC: 9.7 MG/DL — SIGNIFICANT CHANGE UP (ref 8.4–10.5)
CHLORIDE SERPL-SCNC: 101 MMOL/L — SIGNIFICANT CHANGE UP (ref 96–108)
CHLORIDE SERPL-SCNC: 104 MMOL/L — SIGNIFICANT CHANGE UP (ref 96–108)
CO2 SERPL-SCNC: 23 MMOL/L — SIGNIFICANT CHANGE UP (ref 22–31)
CO2 SERPL-SCNC: 23 MMOL/L — SIGNIFICANT CHANGE UP (ref 22–31)
CREAT SERPL-MCNC: 0.74 MG/DL — SIGNIFICANT CHANGE UP (ref 0.5–1.3)
CREAT SERPL-MCNC: 0.78 MG/DL — SIGNIFICANT CHANGE UP (ref 0.5–1.3)
CULTURE RESULTS: SIGNIFICANT CHANGE UP
CULTURE RESULTS: SIGNIFICANT CHANGE UP
D DIMER BLD IA.RAPID-MCNC: 1526 NG/ML DDU — HIGH
DACRYOCYTES BLD QL SMEAR: SLIGHT — SIGNIFICANT CHANGE UP
EOSINOPHIL # BLD AUTO: 0 K/UL — SIGNIFICANT CHANGE UP (ref 0–0.5)
EOSINOPHIL NFR BLD AUTO: 0 % — SIGNIFICANT CHANGE UP (ref 0–6)
FIBRINOGEN PPP-MCNC: 425 MG/DL — SIGNIFICANT CHANGE UP (ref 350–510)
FIBRINOGEN PPP-MCNC: 443 MG/DL — SIGNIFICANT CHANGE UP (ref 350–510)
GLUCOSE BLDC GLUCOMTR-MCNC: 136 MG/DL — HIGH (ref 70–99)
GLUCOSE BLDC GLUCOMTR-MCNC: 143 MG/DL — HIGH (ref 70–99)
GLUCOSE BLDC GLUCOMTR-MCNC: 149 MG/DL — HIGH (ref 70–99)
GLUCOSE BLDC GLUCOMTR-MCNC: 200 MG/DL — HIGH (ref 70–99)
GLUCOSE SERPL-MCNC: 146 MG/DL — HIGH (ref 70–99)
GLUCOSE SERPL-MCNC: 190 MG/DL — HIGH (ref 70–99)
HCT VFR BLD CALC: 24.5 % — LOW (ref 34.5–45)
HCT VFR BLD CALC: 26 % — LOW (ref 34.5–45)
HGB BLD-MCNC: 8.2 G/DL — LOW (ref 11.5–15.5)
HGB BLD-MCNC: 8.5 G/DL — LOW (ref 11.5–15.5)
HYPOCHROMIA BLD QL: SLIGHT — SIGNIFICANT CHANGE UP
INR BLD: 1.17 RATIO — HIGH (ref 0.88–1.16)
LDH SERPL L TO P-CCNC: 197 U/L — SIGNIFICANT CHANGE UP (ref 50–242)
LYMPHOCYTES # BLD AUTO: 1.5 K/UL — SIGNIFICANT CHANGE UP (ref 1–3.3)
LYMPHOCYTES # BLD AUTO: 58 % — HIGH (ref 13–44)
MAGNESIUM SERPL-MCNC: 1.9 MG/DL — SIGNIFICANT CHANGE UP (ref 1.6–2.6)
MAGNESIUM SERPL-MCNC: 2.1 MG/DL — SIGNIFICANT CHANGE UP (ref 1.6–2.6)
MANUAL SMEAR VERIFICATION: SIGNIFICANT CHANGE UP
MCHC RBC-ENTMCNC: 27.6 PG — SIGNIFICANT CHANGE UP (ref 27–34)
MCHC RBC-ENTMCNC: 27.8 PG — SIGNIFICANT CHANGE UP (ref 27–34)
MCHC RBC-ENTMCNC: 32.7 GM/DL — SIGNIFICANT CHANGE UP (ref 32–36)
MCHC RBC-ENTMCNC: 33.5 GM/DL — SIGNIFICANT CHANGE UP (ref 32–36)
MCV RBC AUTO: 83.1 FL — SIGNIFICANT CHANGE UP (ref 80–100)
MCV RBC AUTO: 84.4 FL — SIGNIFICANT CHANGE UP (ref 80–100)
METAMYELOCYTES # FLD: 4 % — HIGH (ref 0–0)
MONOCYTES # BLD AUTO: 0.39 K/UL — SIGNIFICANT CHANGE UP (ref 0–0.9)
MONOCYTES NFR BLD AUTO: 15 % — HIGH (ref 2–14)
MYELOCYTES NFR BLD: 10 % — HIGH (ref 0–0)
NEUTROPHILS # BLD AUTO: 0.34 K/UL — LOW (ref 1.8–7.4)
NEUTROPHILS NFR BLD AUTO: 11 % — LOW (ref 43–77)
NEUTS BAND # BLD: 2 % — SIGNIFICANT CHANGE UP (ref 0–8)
NRBC # BLD: 0 /100 WBCS — SIGNIFICANT CHANGE UP (ref 0–0)
NRBC # BLD: 0 /100 — SIGNIFICANT CHANGE UP (ref 0–0)
OVALOCYTES BLD QL SMEAR: SLIGHT — SIGNIFICANT CHANGE UP
PHOSPHATE SERPL-MCNC: 2.3 MG/DL — LOW (ref 2.5–4.5)
PHOSPHATE SERPL-MCNC: 3.3 MG/DL — SIGNIFICANT CHANGE UP (ref 2.5–4.5)
PLAT MORPH BLD: NORMAL — SIGNIFICANT CHANGE UP
PLATELET # BLD AUTO: 36 K/UL — LOW (ref 150–400)
PLATELET # BLD AUTO: 42 K/UL — LOW (ref 150–400)
POIKILOCYTOSIS BLD QL AUTO: SLIGHT — SIGNIFICANT CHANGE UP
POTASSIUM SERPL-MCNC: 4.1 MMOL/L — SIGNIFICANT CHANGE UP (ref 3.5–5.3)
POTASSIUM SERPL-MCNC: 4.6 MMOL/L — SIGNIFICANT CHANGE UP (ref 3.5–5.3)
POTASSIUM SERPL-SCNC: 4.1 MMOL/L — SIGNIFICANT CHANGE UP (ref 3.5–5.3)
POTASSIUM SERPL-SCNC: 4.6 MMOL/L — SIGNIFICANT CHANGE UP (ref 3.5–5.3)
PROT SERPL-MCNC: 7.2 G/DL — SIGNIFICANT CHANGE UP (ref 6–8.3)
PROT SERPL-MCNC: 7.4 G/DL — SIGNIFICANT CHANGE UP (ref 6–8.3)
PROTHROM AB SERPL-ACNC: 13.5 SEC — HIGH (ref 10–12.9)
RBC # BLD: 2.95 M/UL — LOW (ref 3.8–5.2)
RBC # BLD: 3.08 M/UL — LOW (ref 3.8–5.2)
RBC # FLD: 15.7 % — HIGH (ref 10.3–14.5)
RBC # FLD: 15.9 % — HIGH (ref 10.3–14.5)
RBC BLD AUTO: ABNORMAL
SODIUM SERPL-SCNC: 137 MMOL/L — SIGNIFICANT CHANGE UP (ref 135–145)
SODIUM SERPL-SCNC: 140 MMOL/L — SIGNIFICANT CHANGE UP (ref 135–145)
SPECIMEN SOURCE: SIGNIFICANT CHANGE UP
SPECIMEN SOURCE: SIGNIFICANT CHANGE UP
URATE SERPL-MCNC: 2.5 MG/DL — SIGNIFICANT CHANGE UP (ref 2.5–7)
URATE SERPL-MCNC: 2.8 MG/DL — SIGNIFICANT CHANGE UP (ref 2.5–7)
WBC # BLD: 2.29 K/UL — LOW (ref 3.8–10.5)
WBC # BLD: 2.59 K/UL — LOW (ref 3.8–10.5)
WBC # FLD AUTO: 2.29 K/UL — LOW (ref 3.8–10.5)
WBC # FLD AUTO: 2.59 K/UL — LOW (ref 3.8–10.5)

## 2020-05-28 PROCEDURE — 99232 SBSQ HOSP IP/OBS MODERATE 35: CPT

## 2020-05-28 RX ORDER — POLYETHYLENE GLYCOL 3350 17 G/17G
17 POWDER, FOR SOLUTION ORAL DAILY
Refills: 0 | Status: DISCONTINUED | OUTPATIENT
Start: 2020-05-28 | End: 2020-07-17

## 2020-05-28 RX ORDER — POTASSIUM PHOSPHATE, MONOBASIC POTASSIUM PHOSPHATE, DIBASIC 236; 224 MG/ML; MG/ML
15 INJECTION, SOLUTION INTRAVENOUS ONCE
Refills: 0 | Status: COMPLETED | OUTPATIENT
Start: 2020-05-28 | End: 2020-05-28

## 2020-05-28 RX ORDER — TRAMADOL HYDROCHLORIDE 50 MG/1
25 TABLET ORAL ONCE
Refills: 0 | Status: DISCONTINUED | OUTPATIENT
Start: 2020-05-28 | End: 2020-05-28

## 2020-05-28 RX ORDER — PANTOPRAZOLE SODIUM 20 MG/1
40 TABLET, DELAYED RELEASE ORAL
Refills: 0 | Status: DISCONTINUED | OUTPATIENT
Start: 2020-05-28 | End: 2020-06-04

## 2020-05-28 RX ADMIN — TRETINOIN 40 MILLIGRAM(S): 10 CAPSULE, LIQUID FILLED ORAL at 05:26

## 2020-05-28 RX ADMIN — CEFEPIME 100 MILLIGRAM(S): 1 INJECTION, POWDER, FOR SOLUTION INTRAMUSCULAR; INTRAVENOUS at 13:13

## 2020-05-28 RX ADMIN — TRAMADOL HYDROCHLORIDE 25 MILLIGRAM(S): 50 TABLET ORAL at 11:47

## 2020-05-28 RX ADMIN — Medication 5 MILLILITER(S): at 00:16

## 2020-05-28 RX ADMIN — POTASSIUM PHOSPHATE, MONOBASIC POTASSIUM PHOSPHATE, DIBASIC 62.5 MILLIMOLE(S): 236; 224 INJECTION, SOLUTION INTRAVENOUS at 18:36

## 2020-05-28 RX ADMIN — Medication 5 MILLILITER(S): at 21:58

## 2020-05-28 RX ADMIN — CEFEPIME 100 MILLIGRAM(S): 1 INJECTION, POWDER, FOR SOLUTION INTRAMUSCULAR; INTRAVENOUS at 21:57

## 2020-05-28 RX ADMIN — ARSENIC TRIOXIDE 130.5 MILLIGRAM(S): 2 INJECTION, SOLUTION INTRAVENOUS at 15:46

## 2020-05-28 RX ADMIN — Medication 300 MILLIGRAM(S): at 11:46

## 2020-05-28 RX ADMIN — TRETINOIN 40 MILLIGRAM(S): 10 CAPSULE, LIQUID FILLED ORAL at 17:18

## 2020-05-28 RX ADMIN — POLYETHYLENE GLYCOL 3350 17 GRAM(S): 17 POWDER, FOR SOLUTION ORAL at 11:50

## 2020-05-28 RX ADMIN — CEFEPIME 100 MILLIGRAM(S): 1 INJECTION, POWDER, FOR SOLUTION INTRAMUSCULAR; INTRAVENOUS at 05:26

## 2020-05-28 RX ADMIN — Medication 1: at 17:17

## 2020-05-28 RX ADMIN — Medication 5 MILLILITER(S): at 17:17

## 2020-05-28 RX ADMIN — Medication 5 MILLILITER(S): at 08:58

## 2020-05-28 RX ADMIN — PANTOPRAZOLE SODIUM 40 MILLIGRAM(S): 20 TABLET, DELAYED RELEASE ORAL at 11:46

## 2020-05-28 RX ADMIN — Medication 5 MILLIGRAM(S): at 11:50

## 2020-05-28 RX ADMIN — POSACONAZOLE 300 MILLIGRAM(S): 100 TABLET, DELAYED RELEASE ORAL at 11:50

## 2020-05-28 RX ADMIN — Medication 5 MILLILITER(S): at 11:51

## 2020-05-28 NOTE — PROGRESS NOTE ADULT - PROBLEM SELECTOR PLAN 2
Patient is Neutropenic, Afebrile  Continue Cefepime, Posaconazole ppx  previous COVID-19+ as outpatient 4/16, 5/15 - now COVID (-) on 5/22, 5/26 Patient is Neutropenic, Afebrile  Continue Cefepime, Posaconazole ppx  previous COVID-19+ as outpatient 4/16, 5/15 - now COVID (-) on 5/22, 5/26 5/27 CXR (-) PICC in place.

## 2020-05-28 NOTE — PROGRESS NOTE ADULT - ATTENDING COMMENTS
61 y/o Swedish speaking female who is transferred for newly diagnosed APL.  Presented with pancytopenia, flow cytometry/bone marrow biopsy/FISH from 5/26- consistent with acute promyelocytic leukemia with PML-AWA translocation.    She was started on ATRA 5/26, begin GALA today.  Monitor carefully for signs of differentiation syndrome.    Check CBC, coags/fibrinogen twice daily.  Keep plt >50, fibrinogen >150.  Monitor lytes daily, keep K>4, Mg>2.  EKG daily.   If any signs of differentiation syndrome, would hold ATRA/GALA and begin dexamethasone 10 mg bid.    Headache better, CT head no contrast on 5/22 with no bleeding.    Discussed with patient's son over the phone at length regarding diagnosis/treatment. 61 y/o Moh[place] speaking female who is transferred for newly diagnosed APL.  Presented with pancytopenia, flow cytometry/bone marrow biopsy/FISH from 5/26- consistent with acute promyelocytic leukemia with PML-AWA translocation.    She was started on ATRA 5/23, GALA 5/27- day 2.  Monitor carefully for signs of differentiation syndrome.    Check CBC, coags/fibrinogen twice daily.  Keep plt >50, fibrinogen >150.  Monitor lytes daily, keep K>4, Mg>2.  EKG daily.   If any signs of differentiation syndrome, would hold ATRA/GALA and begin dexamethasone 10 mg bid.    Headache better, CT head no contrast on 5/22 with no bleeding.  Attempt tramadol, if no improvement check MRI brain.    Discussed with patient's son over the phone at length regarding diagnosis/treatment.

## 2020-05-28 NOTE — PROGRESS NOTE ADULT - PROBLEM SELECTOR PLAN 1
suspected based on peripheral blood flow cytometry and smear  s/p Bone Marrow Biopsy 5/26 at [facility] (+) for acute promyelocytic leukemia  continue tretinoin 40mg BID (started 5/22)  continue Arsenic Trioxide starting (started on 5/27)  Monitor for differentiation syndrome  Check EKG for QTc every Tuesday, Friday  continue Allopurinol 300mg PO daily  Follow up CBC w diff, CMP, TLS, DIC labs BID  Keep PLTs > 50,000, Fibrinogen > 150  Follow up G6PD  Echo EF 60% suspected based on peripheral blood flow cytometry and smear  s/p Bone Marrow Biopsy 5/26 at [facility] (+) for acute promyelocytic leukemia  continue tretinoin 40mg BID (started 5/22)  continue Arsenic Trioxide starting (started on 5/27)  Monitor for differentiation syndrome  Check EKG for QTc every Tuesday, Friday  continue Allopurinol 300mg PO daily  Follow up CBC w diff, CMP, TLS, DIC labs BID  Keep PLTs > 50,000, Fibrinogen > 150  Follow up G6PD  Echo EF 60%  5/25 G6PD deficiency (-)

## 2020-05-28 NOTE — PROGRESS NOTE ADULT - SUBJECTIVE AND OBJECTIVE BOX
Diagnosis: Acute Promyelocytic Leukemia     Protocol/Chemo Regimen: Tretinoin 40 mg BID, Arsenic 11 mg every 24 hours    Day:  ATRA day 6, Arsenic day 2     Pt endorsed: No acute complaints     Review of Systems: Patient denies chest pain, palpitations, SOB, abdominal pain, vomiting, diarrhea.     Pain scale: 0    Diet: Regular     Allergies    No Known Allergies    Intolerances        ANTIMICROBIALS  cefepime   IVPB 2000 milliGRAM(s) IV Intermittent every 8 hours  posaconazole DR Tablet 300 milliGRAM(s) Oral daily      HEME/ONC MEDICATIONS  arsenic trioxide IVPB (eMAR) 11 milliGRAM(s) IV Intermittent every 24 hours  tretinoin 40 milliGRAM(s) Oral every 12 hours      STANDING MEDICATIONS  allopurinol 300 milliGRAM(s) Oral daily  Biotene Dry Mouth Oral Rinse 5 milliLiter(s) Swish and Spit five times a day  chlorhexidine 4% Liquid 1 Application(s) Topical <User Schedule>  dextrose 5%. 1000 milliLiter(s) IV Continuous <Continuous>  dextrose 50% Injectable 12.5 Gram(s) IV Push once  dextrose 50% Injectable 25 Gram(s) IV Push once  dextrose 50% Injectable 25 Gram(s) IV Push once  insulin lispro (HumaLOG) corrective regimen sliding scale   SubCutaneous three times a day before meals  insulin lispro (HumaLOG) corrective regimen sliding scale   SubCutaneous at bedtime  phytonadione   Solution 5 milliGRAM(s) Oral daily  sodium chloride 0.9%. 1000 milliLiter(s) IV Continuous <Continuous>      PRN MEDICATIONS  acetaminophen   Tablet .. 650 milliGRAM(s) Oral every 6 hours PRN  aluminum hydroxide/magnesium hydroxide/simethicone Suspension 30 milliLiter(s) Oral every 6 hours PRN  dextrose 40% Gel 15 Gram(s) Oral once PRN  glucagon  Injectable 1 milliGRAM(s) IntraMuscular once PRN  sodium chloride 0.9% lock flush 10 milliLiter(s) IV Push every 1 hour PRN        Vital Signs Last 24 Hrs  T(C): 36.9 (28 May 2020 05:58), Max: 37.2 (27 May 2020 21:30)  T(F): 98.5 (28 May 2020 05:58), Max: 98.9 (27 May 2020 21:30)  HR: 73 (28 May 2020 05:58) (72 - 88)  BP: 125/77 (28 May 2020 05:58) (118/69 - 135/72)  BP(mean): --  RR: 18 (28 May 2020 05:58) (16 - 18)  SpO2: 99% (28 May 2020 05:58) (97% - 100%)    PHYSICAL EXAM  General: NAD  CV: (+) S1/S2 RRR  Lungs: clear to auscultation, no wheezes or rales  Abdomen: soft, non-tender, non-distended (+) BS  Ext: no edema  Skin: no rash  Neuro: alert and oriented X 3, no focal deficits  Central Line: PICC line C/D/I     RECENT CULTURES:  05-23 @ 12:31  .Blood Blood-Peripheral  No growth to date.    05-22 @ 17:25  .Blood Blood-Peripheral  No Growth Final    05-22 @ 17:10  .Blood Blood-Peripheral  No Growth Final          LABS:                        8.5    2.59  )-----------( 42       ( 28 May 2020 06:43 )             26.0         Mean Cell Volume : 84.4 fl  Mean Cell Hemoglobin : 27.6 pg  Mean Cell Hemoglobin Concentration : 32.7 gm/dL  Auto Neutrophil # : 0.34 K/uL  Auto Lymphocyte # : 1.50 K/uL  Auto Monocyte # : 0.39 K/uL  Auto Eosinophil # : 0.00 K/uL  Auto Basophil # : 0.00 K/uL  Auto Neutrophil % : 11.0 %  Auto Lymphocyte % : 58.0 %  Auto Monocyte % : 15.0 %  Auto Eosinophil % : 0.0 %  Auto Basophil % : 0.0 %      05-28    140  |  104  |  16  ----------------------------<  146<H>  4.6   |  23  |  0.78    Ca    10.0      28 May 2020 06:43  Phos  3.3     05-28  Mg     2.1     05-28    TPro  7.2  /  Alb  4.5  /  TBili  0.3  /  DBili  x   /  AST  22  /  ALT  21  /  AlkPhos  80  05-28      Mg 2.1  Phos 3.3  Mg 2.0  Phos 2.1  Mg 2.1  Phos 2.3      PT/INR - ( 27 May 2020 19:58 )   PT: 13.4 sec;   INR: 1.17 ratio         PTT - ( 27 May 2020 19:58 )  PTT:28.0 sec      Uric Acid 2.8    LDH --  Uric Acid 2.7      Uric Acid 3.2        RADIOLOGY & ADDITIONAL STUDIES:  Xray Chest 1 View- PORTABLE-Urgent (05.27.20 @ 18:44)   INTERPRETATION:  Right PICC with tip in the SVC. Diagnosis: Acute Promyelocytic Leukemia     Protocol/Chemo Regimen: Tretinoin 40 mg BID, Arsenic 11 mg every 24 hours    Day:  ATRA day 6, Arsenic day 2     Pt endorsed: (+) constipation     Review of Systems: Patient denies chest pain, palpitations, SOB, abdominal pain, vomiting, diarrhea.     Pain scale: 0    Diet: Regular     Allergies    No Known Allergies    Intolerances        ANTIMICROBIALS  cefepime   IVPB 2000 milliGRAM(s) IV Intermittent every 8 hours  posaconazole DR Tablet 300 milliGRAM(s) Oral daily      HEME/ONC MEDICATIONS  arsenic trioxide IVPB (eMAR) 11 milliGRAM(s) IV Intermittent every 24 hours  tretinoin 40 milliGRAM(s) Oral every 12 hours      STANDING MEDICATIONS  allopurinol 300 milliGRAM(s) Oral daily  Biotene Dry Mouth Oral Rinse 5 milliLiter(s) Swish and Spit five times a day  chlorhexidine 4% Liquid 1 Application(s) Topical <User Schedule>  dextrose 5%. 1000 milliLiter(s) IV Continuous <Continuous>  dextrose 50% Injectable 12.5 Gram(s) IV Push once  dextrose 50% Injectable 25 Gram(s) IV Push once  dextrose 50% Injectable 25 Gram(s) IV Push once  insulin lispro (HumaLOG) corrective regimen sliding scale   SubCutaneous three times a day before meals  insulin lispro (HumaLOG) corrective regimen sliding scale   SubCutaneous at bedtime  phytonadione   Solution 5 milliGRAM(s) Oral daily  sodium chloride 0.9%. 1000 milliLiter(s) IV Continuous <Continuous>      PRN MEDICATIONS  acetaminophen   Tablet .. 650 milliGRAM(s) Oral every 6 hours PRN  aluminum hydroxide/magnesium hydroxide/simethicone Suspension 30 milliLiter(s) Oral every 6 hours PRN  dextrose 40% Gel 15 Gram(s) Oral once PRN  glucagon  Injectable 1 milliGRAM(s) IntraMuscular once PRN  sodium chloride 0.9% lock flush 10 milliLiter(s) IV Push every 1 hour PRN        Vital Signs Last 24 Hrs  T(C): 36.9 (28 May 2020 05:58), Max: 37.2 (27 May 2020 21:30)  T(F): 98.5 (28 May 2020 05:58), Max: 98.9 (27 May 2020 21:30)  HR: 73 (28 May 2020 05:58) (72 - 88)  BP: 125/77 (28 May 2020 05:58) (118/69 - 135/72)  BP(mean): --  RR: 18 (28 May 2020 05:58) (16 - 18)  SpO2: 99% (28 May 2020 05:58) (97% - 100%)    PHYSICAL EXAM  General: NAD  CV: (+) S1/S2 RRR  Lungs: clear to auscultation, no wheezes or rales  Abdomen: soft, non-tender, non-distended (+) BS  Ext: no edema  Skin: no rash  Neuro: alert and oriented X 3, no focal deficits  Central Line: PICC line C/D/I     RECENT CULTURES:  05-23 @ 12:31  .Blood Blood-Peripheral  No growth to date.    05-22 @ 17:25  .Blood Blood-Peripheral  No Growth Final    05-22 @ 17:10  .Blood Blood-Peripheral  No Growth Final          LABS:                        8.5    2.59  )-----------( 42       ( 28 May 2020 06:43 )             26.0         Mean Cell Volume : 84.4 fl  Mean Cell Hemoglobin : 27.6 pg  Mean Cell Hemoglobin Concentration : 32.7 gm/dL  Auto Neutrophil # : 0.34 K/uL  Auto Lymphocyte # : 1.50 K/uL  Auto Monocyte # : 0.39 K/uL  Auto Eosinophil # : 0.00 K/uL  Auto Basophil # : 0.00 K/uL  Auto Neutrophil % : 11.0 %  Auto Lymphocyte % : 58.0 %  Auto Monocyte % : 15.0 %  Auto Eosinophil % : 0.0 %  Auto Basophil % : 0.0 %      05-28    140  |  104  |  16  ----------------------------<  146<H>  4.6   |  23  |  0.78    Ca    10.0      28 May 2020 06:43  Phos  3.3     05-28  Mg     2.1     05-28    TPro  7.2  /  Alb  4.5  /  TBili  0.3  /  DBili  x   /  AST  22  /  ALT  21  /  AlkPhos  80  05-28      Mg 2.1  Phos 3.3  Mg 2.0  Phos 2.1  Mg 2.1  Phos 2.3      PT/INR - ( 27 May 2020 19:58 )   PT: 13.4 sec;   INR: 1.17 ratio         PTT - ( 27 May 2020 19:58 )  PTT:28.0 sec      Uric Acid 2.8    LDH --  Uric Acid 2.7      Uric Acid 3.2        RADIOLOGY & ADDITIONAL STUDIES:  Xray Chest 1 View- PORTABLE-Urgent (05.27.20 @ 18:44)   INTERPRETATION:  Right PICC with tip in the SVC.

## 2020-05-28 NOTE — PROGRESS NOTE ADULT - ASSESSMENT
59 y/o Kyrgyz speaking female hx of HTN on atenolol, HLD, DM, recovering from COVID-19 presented to the ED with fever, malaise, HA, found to be pancytopenic with peripheral blasts, Peripheral flow cytometry from 5/23 showed findings consistent with APL. ATRA started on 5/22, Arsenic Trioxide started 5/27.

## 2020-05-28 NOTE — ADVANCED PRACTICE NURSE CONSULT - ASSESSMENT
Patient's alert & oriented x 4,resting in bed,denies any discomfort,reinforced teachibngs about her chemo regimen well understood.,labs checked & reviewed by Dr. Hurd  ,Chemo TX checked & verified w/ Primary Nurse,Arsenc trioxide .15 mg/kg=11 mg IV to infuse over 2 hours,given @ 1554 pm at 125ml/hr. Primary Nurse aware re- chemo TX.,will follow. Safety maintained.

## 2020-05-28 NOTE — ADVANCED PRACTICE NURSE CONSULT - REASON FOR CONSULT
Chemotherapy Notes:                                                                                                                                                                                                                                                                                                                         Day 2 Arsenic Trioxide IV

## 2020-05-29 LAB
ALBUMIN SERPL ELPH-MCNC: 4.2 G/DL — SIGNIFICANT CHANGE UP (ref 3.3–5)
ALBUMIN SERPL ELPH-MCNC: 4.4 G/DL — SIGNIFICANT CHANGE UP (ref 3.3–5)
ALP SERPL-CCNC: 86 U/L — SIGNIFICANT CHANGE UP (ref 40–120)
ALP SERPL-CCNC: 86 U/L — SIGNIFICANT CHANGE UP (ref 40–120)
ALT FLD-CCNC: 19 U/L — SIGNIFICANT CHANGE UP (ref 10–45)
ALT FLD-CCNC: 22 U/L — SIGNIFICANT CHANGE UP (ref 10–45)
ANION GAP SERPL CALC-SCNC: 12 MMOL/L — SIGNIFICANT CHANGE UP (ref 5–17)
ANION GAP SERPL CALC-SCNC: 13 MMOL/L — SIGNIFICANT CHANGE UP (ref 5–17)
APTT BLD: 30.2 SEC — SIGNIFICANT CHANGE UP (ref 27.5–36.3)
AST SERPL-CCNC: 22 U/L — SIGNIFICANT CHANGE UP (ref 10–40)
AST SERPL-CCNC: 23 U/L — SIGNIFICANT CHANGE UP (ref 10–40)
BASOPHILS # BLD AUTO: 0 K/UL — SIGNIFICANT CHANGE UP (ref 0–0.2)
BASOPHILS NFR BLD AUTO: 0 % — SIGNIFICANT CHANGE UP (ref 0–2)
BILIRUB SERPL-MCNC: 0.2 MG/DL — SIGNIFICANT CHANGE UP (ref 0.2–1.2)
BILIRUB SERPL-MCNC: 0.3 MG/DL — SIGNIFICANT CHANGE UP (ref 0.2–1.2)
BLD GP AB SCN SERPL QL: NEGATIVE — SIGNIFICANT CHANGE UP
BUN SERPL-MCNC: 13 MG/DL — SIGNIFICANT CHANGE UP (ref 7–23)
BUN SERPL-MCNC: 15 MG/DL — SIGNIFICANT CHANGE UP (ref 7–23)
CALCIUM SERPL-MCNC: 9.7 MG/DL — SIGNIFICANT CHANGE UP (ref 8.4–10.5)
CALCIUM SERPL-MCNC: 9.8 MG/DL — SIGNIFICANT CHANGE UP (ref 8.4–10.5)
CHLORIDE SERPL-SCNC: 102 MMOL/L — SIGNIFICANT CHANGE UP (ref 96–108)
CHLORIDE SERPL-SCNC: 103 MMOL/L — SIGNIFICANT CHANGE UP (ref 96–108)
CO2 SERPL-SCNC: 22 MMOL/L — SIGNIFICANT CHANGE UP (ref 22–31)
CO2 SERPL-SCNC: 23 MMOL/L — SIGNIFICANT CHANGE UP (ref 22–31)
CREAT SERPL-MCNC: 0.77 MG/DL — SIGNIFICANT CHANGE UP (ref 0.5–1.3)
CREAT SERPL-MCNC: 0.81 MG/DL — SIGNIFICANT CHANGE UP (ref 0.5–1.3)
D DIMER BLD IA.RAPID-MCNC: 1480 NG/ML DDU — HIGH
EOSINOPHIL # BLD AUTO: 0 K/UL — SIGNIFICANT CHANGE UP (ref 0–0.5)
EOSINOPHIL NFR BLD AUTO: 0 % — SIGNIFICANT CHANGE UP (ref 0–6)
FIBRINOGEN PPP-MCNC: 402 MG/DL — SIGNIFICANT CHANGE UP (ref 350–510)
FIBRINOGEN PPP-MCNC: 418 MG/DL — SIGNIFICANT CHANGE UP (ref 350–510)
GLUCOSE BLDC GLUCOMTR-MCNC: 142 MG/DL — HIGH (ref 70–99)
GLUCOSE BLDC GLUCOMTR-MCNC: 144 MG/DL — HIGH (ref 70–99)
GLUCOSE BLDC GLUCOMTR-MCNC: 166 MG/DL — HIGH (ref 70–99)
GLUCOSE BLDC GLUCOMTR-MCNC: 200 MG/DL — HIGH (ref 70–99)
GLUCOSE SERPL-MCNC: 146 MG/DL — HIGH (ref 70–99)
GLUCOSE SERPL-MCNC: 181 MG/DL — HIGH (ref 70–99)
HCT VFR BLD CALC: 23.4 % — LOW (ref 34.5–45)
HCT VFR BLD CALC: 24.2 % — LOW (ref 34.5–45)
HGB BLD-MCNC: 7.6 G/DL — LOW (ref 11.5–15.5)
HGB BLD-MCNC: 7.8 G/DL — LOW (ref 11.5–15.5)
INR BLD: 1.13 RATIO — SIGNIFICANT CHANGE UP (ref 0.88–1.16)
INR BLD: 1.2 RATIO — HIGH (ref 0.88–1.16)
LDH SERPL L TO P-CCNC: 214 U/L — SIGNIFICANT CHANGE UP (ref 50–242)
LYMPHOCYTES # BLD AUTO: 1.69 K/UL — SIGNIFICANT CHANGE UP (ref 1–3.3)
LYMPHOCYTES # BLD AUTO: 60 % — HIGH (ref 13–44)
MAGNESIUM SERPL-MCNC: 2 MG/DL — SIGNIFICANT CHANGE UP (ref 1.6–2.6)
MANUAL SMEAR VERIFICATION: SIGNIFICANT CHANGE UP
MCHC RBC-ENTMCNC: 27.3 PG — SIGNIFICANT CHANGE UP (ref 27–34)
MCHC RBC-ENTMCNC: 27.4 PG — SIGNIFICANT CHANGE UP (ref 27–34)
MCHC RBC-ENTMCNC: 32.2 GM/DL — SIGNIFICANT CHANGE UP (ref 32–36)
MCHC RBC-ENTMCNC: 32.5 GM/DL — SIGNIFICANT CHANGE UP (ref 32–36)
MCV RBC AUTO: 84.2 FL — SIGNIFICANT CHANGE UP (ref 80–100)
MCV RBC AUTO: 84.9 FL — SIGNIFICANT CHANGE UP (ref 80–100)
METAMYELOCYTES # FLD: 1 % — HIGH (ref 0–0)
MONOCYTES # BLD AUTO: 0.56 K/UL — SIGNIFICANT CHANGE UP (ref 0–0.9)
MONOCYTES NFR BLD AUTO: 20 % — HIGH (ref 2–14)
MYELOCYTES NFR BLD: 9 % — HIGH (ref 0–0)
NEUTROPHILS # BLD AUTO: 0.22 K/UL — LOW (ref 1.8–7.4)
NEUTROPHILS NFR BLD AUTO: 7 % — LOW (ref 43–77)
NEUTS BAND # BLD: 1 % — SIGNIFICANT CHANGE UP (ref 0–8)
NRBC # BLD: 0 /100 WBCS — SIGNIFICANT CHANGE UP (ref 0–0)
NRBC # BLD: 0 /100 — SIGNIFICANT CHANGE UP (ref 0–0)
PHOSPHATE SERPL-MCNC: 2.7 MG/DL — SIGNIFICANT CHANGE UP (ref 2.5–4.5)
PHOSPHATE SERPL-MCNC: 3.1 MG/DL — SIGNIFICANT CHANGE UP (ref 2.5–4.5)
PLAT MORPH BLD: NORMAL — SIGNIFICANT CHANGE UP
PLATELET # BLD AUTO: 35 K/UL — LOW (ref 150–400)
PLATELET # BLD AUTO: 64 K/UL — LOW (ref 150–400)
POTASSIUM SERPL-MCNC: 4.2 MMOL/L — SIGNIFICANT CHANGE UP (ref 3.5–5.3)
POTASSIUM SERPL-MCNC: 4.4 MMOL/L — SIGNIFICANT CHANGE UP (ref 3.5–5.3)
POTASSIUM SERPL-SCNC: 4.2 MMOL/L — SIGNIFICANT CHANGE UP (ref 3.5–5.3)
POTASSIUM SERPL-SCNC: 4.4 MMOL/L — SIGNIFICANT CHANGE UP (ref 3.5–5.3)
PROT SERPL-MCNC: 6.9 G/DL — SIGNIFICANT CHANGE UP (ref 6–8.3)
PROT SERPL-MCNC: 7.3 G/DL — SIGNIFICANT CHANGE UP (ref 6–8.3)
PROTHROM AB SERPL-ACNC: 13 SEC — HIGH (ref 10–12.9)
PROTHROM AB SERPL-ACNC: 13.8 SEC — HIGH (ref 10–12.9)
RBC # BLD: 2.78 M/UL — LOW (ref 3.8–5.2)
RBC # BLD: 2.85 M/UL — LOW (ref 3.8–5.2)
RBC # FLD: 15.9 % — HIGH (ref 10.3–14.5)
RBC # FLD: 15.9 % — HIGH (ref 10.3–14.5)
RBC BLD AUTO: SIGNIFICANT CHANGE UP
RH IG SCN BLD-IMP: POSITIVE — SIGNIFICANT CHANGE UP
SODIUM SERPL-SCNC: 137 MMOL/L — SIGNIFICANT CHANGE UP (ref 135–145)
SODIUM SERPL-SCNC: 138 MMOL/L — SIGNIFICANT CHANGE UP (ref 135–145)
URATE SERPL-MCNC: 2.5 MG/DL — SIGNIFICANT CHANGE UP (ref 2.5–7)
VARIANT LYMPHS # BLD: 2 % — SIGNIFICANT CHANGE UP (ref 0–6)
WBC # BLD: 2.51 K/UL — LOW (ref 3.8–10.5)
WBC # BLD: 2.81 K/UL — LOW (ref 3.8–10.5)
WBC # FLD AUTO: 2.51 K/UL — LOW (ref 3.8–10.5)
WBC # FLD AUTO: 2.81 K/UL — LOW (ref 3.8–10.5)

## 2020-05-29 PROCEDURE — 70552 MRI BRAIN STEM W/DYE: CPT | Mod: 26

## 2020-05-29 PROCEDURE — 99232 SBSQ HOSP IP/OBS MODERATE 35: CPT

## 2020-05-29 PROCEDURE — 93010 ELECTROCARDIOGRAM REPORT: CPT

## 2020-05-29 RX ORDER — AER TRAVELER 0.5 G/1
1 SOLUTION RECTAL; TOPICAL EVERY 12 HOURS
Refills: 0 | Status: DISCONTINUED | OUTPATIENT
Start: 2020-05-29 | End: 2020-06-24

## 2020-05-29 RX ORDER — MAGNESIUM SULFATE 500 MG/ML
1 VIAL (ML) INJECTION ONCE
Refills: 0 | Status: COMPLETED | OUTPATIENT
Start: 2020-05-29 | End: 2020-05-30

## 2020-05-29 RX ORDER — SODIUM CHLORIDE 9 MG/ML
1000 INJECTION INTRAMUSCULAR; INTRAVENOUS; SUBCUTANEOUS
Refills: 0 | Status: DISCONTINUED | OUTPATIENT
Start: 2020-05-29 | End: 2020-06-27

## 2020-05-29 RX ADMIN — Medication 5 MILLILITER(S): at 11:18

## 2020-05-29 RX ADMIN — TRETINOIN 40 MILLIGRAM(S): 10 CAPSULE, LIQUID FILLED ORAL at 17:53

## 2020-05-29 RX ADMIN — Medication 1 TABLET(S): at 11:21

## 2020-05-29 RX ADMIN — Medication 1 TABLET(S): at 21:05

## 2020-05-29 RX ADMIN — Medication 5 MILLIGRAM(S): at 11:18

## 2020-05-29 RX ADMIN — AER TRAVELER 1 APPLICATION(S): 0.5 SOLUTION RECTAL; TOPICAL at 21:06

## 2020-05-29 RX ADMIN — TRETINOIN 40 MILLIGRAM(S): 10 CAPSULE, LIQUID FILLED ORAL at 05:46

## 2020-05-29 RX ADMIN — Medication 300 MILLIGRAM(S): at 11:18

## 2020-05-29 RX ADMIN — PANTOPRAZOLE SODIUM 40 MILLIGRAM(S): 20 TABLET, DELAYED RELEASE ORAL at 05:46

## 2020-05-29 RX ADMIN — ARSENIC TRIOXIDE 130.5 MILLIGRAM(S): 2 INJECTION, SOLUTION INTRAVENOUS at 16:27

## 2020-05-29 RX ADMIN — SODIUM CHLORIDE 25 MILLILITER(S): 9 INJECTION INTRAMUSCULAR; INTRAVENOUS; SUBCUTANEOUS at 21:08

## 2020-05-29 RX ADMIN — POLYETHYLENE GLYCOL 3350 17 GRAM(S): 17 POWDER, FOR SOLUTION ORAL at 11:19

## 2020-05-29 RX ADMIN — POSACONAZOLE 300 MILLIGRAM(S): 100 TABLET, DELAYED RELEASE ORAL at 11:19

## 2020-05-29 RX ADMIN — Medication 1: at 17:52

## 2020-05-29 RX ADMIN — CEFEPIME 100 MILLIGRAM(S): 1 INJECTION, POWDER, FOR SOLUTION INTRAMUSCULAR; INTRAVENOUS at 15:53

## 2020-05-29 RX ADMIN — CEFEPIME 100 MILLIGRAM(S): 1 INJECTION, POWDER, FOR SOLUTION INTRAMUSCULAR; INTRAVENOUS at 05:46

## 2020-05-29 RX ADMIN — Medication 5 MILLIGRAM(S): at 11:20

## 2020-05-29 RX ADMIN — Medication 5 MILLILITER(S): at 17:53

## 2020-05-29 RX ADMIN — Medication 5 MILLILITER(S): at 21:06

## 2020-05-29 NOTE — CHART NOTE - NSCHARTNOTEFT_GEN_A_CORE
Called by RN to evaluate Pt. for c/o itching at Picc line site. Pt. seen and evaluated at bedside. Son utilized by phone

## 2020-05-29 NOTE — PROGRESS NOTE ADULT - PROBLEM SELECTOR PLAN 2
Patient is Neutropenic, Afebrile  Continue Cefepime, Posaconazole ppx  previous COVID-19+ as outpatient 4/16, 5/15 - now COVID (-) on 5/22, 5/26 5/27 CXR (-) PICC in place.

## 2020-05-29 NOTE — PROGRESS NOTE ADULT - SUBJECTIVE AND OBJECTIVE BOX
Diagnosis: Acute Promyelocytic Leukemia     Protocol/Chemo Regimen: Tretinoin 40 mg BID, Arsenic 11 mg every 24 hours    Day:  ATRA day 7, Arsenic day 3    Pt endorsed: (+) Occipital hyperthermia.     Review of Systems: Patient denies chest pain, palpitations, SOB, abdominal pain, vomiting, diarrhea.     Pain scale: 0    Diet: Regular     Allergies    No Known Allergies    Intolerances      ANTIMICROBIALS  cefepime   IVPB 2000 milliGRAM(s) IV Intermittent every 8 hours  posaconazole DR Tablet 300 milliGRAM(s) Oral daily      HEME/ONC MEDICATIONS  arsenic trioxide IVPB (eMAR) 11 milliGRAM(s) IV Intermittent every 24 hours  tretinoin 40 milliGRAM(s) Oral every 12 hours      STANDING MEDICATIONS  allopurinol 300 milliGRAM(s) Oral daily  Biotene Dry Mouth Oral Rinse 5 milliLiter(s) Swish and Spit five times a day  chlorhexidine 4% Liquid 1 Application(s) Topical <User Schedule>  dextrose 5%. 1000 milliLiter(s) IV Continuous <Continuous>  dextrose 50% Injectable 12.5 Gram(s) IV Push once  dextrose 50% Injectable 25 Gram(s) IV Push once  dextrose 50% Injectable 25 Gram(s) IV Push once  insulin lispro (HumaLOG) corrective regimen sliding scale   SubCutaneous three times a day before meals  insulin lispro (HumaLOG) corrective regimen sliding scale   SubCutaneous at bedtime  pantoprazole    Tablet 40 milliGRAM(s) Oral before breakfast  phytonadione   Solution 5 milliGRAM(s) Oral daily  polyethylene glycol 3350 17 Gram(s) Oral daily  sodium chloride 0.9%. 1000 milliLiter(s) IV Continuous <Continuous>      PRN MEDICATIONS  acetaminophen   Tablet .. 650 milliGRAM(s) Oral every 6 hours PRN  aluminum hydroxide/magnesium hydroxide/simethicone Suspension 30 milliLiter(s) Oral every 6 hours PRN  bisacodyl 5 milliGRAM(s) Oral every 12 hours PRN  dextrose 40% Gel 15 Gram(s) Oral once PRN  glucagon  Injectable 1 milliGRAM(s) IntraMuscular once PRN  sodium chloride 0.9% lock flush 10 milliLiter(s) IV Push every 1 hour PRN        Vital Signs Last 24 Hrs  T(C): 37.2 (29 May 2020 05:07), Max: 37.7 (28 May 2020 20:30)  T(F): 99 (29 May 2020 05:07), Max: 99.9 (28 May 2020 20:30)  HR: 70 (29 May 2020 05:07) (70 - 90)  BP: 120/68 (29 May 2020 05:07) (120/68 - 144/77)  BP(mean): --  RR: 18 (29 May 2020 05:07) (16 - 18)  SpO2: 97% (29 May 2020 05:07) (97% - 99%)    PHYSICAL EXAM  General: NAD  CV: (+) S1/S2 RRR  Lungs: clear to auscultation, no wheezes or rales  Abdomen: soft, non-tender, non-distended (+) BS  Ext: no edema  Skin: no rash  Neuro: alert and oriented X 3, no focal deficits  Central Line: PICC line C/S    RECENT CULTURES:  05-23 @ 09:09  .Blood Blood-Peripheral  No Growth Final    05-22 @ 17:25  .Blood Blood-Peripheral  No Growth Final    05-22 @ 17:10  .Blood Blood-Peripheral  No Growth Final          LABS:                        7.8    2.81  )-----------( 64       ( 29 May 2020 06:54 )             24.2         Mean Cell Volume : 84.9 fl  Mean Cell Hemoglobin : 27.4 pg  Mean Cell Hemoglobin Concentration : 32.2 gm/dL  Auto Neutrophil # : 0.22 K/uL  Auto Lymphocyte # : 1.69 K/uL  Auto Monocyte # : 0.56 K/uL  Auto Eosinophil # : 0.00 K/uL  Auto Basophil # : 0.00 K/uL  Auto Neutrophil % : 7.0 %  Auto Lymphocyte % : 60.0 %  Auto Monocyte % : 20.0 %  Auto Eosinophil % : 0.0 %  Auto Basophil % : 0.0 %      05-29    137  |  102  |  15  ----------------------------<  146<H>  4.2   |  22  |  0.81    Ca    9.7      29 May 2020 06:54  Phos  3.1     05-29  Mg     1.9     05-28    TPro  7.3  /  Alb  4.4  /  TBili  0.3  /  DBili  x   /  AST  22  /  ALT  19  /  AlkPhos  86  05-29      Mg   Phos 3.1  Mg 1.9  Phos 2.3      PT/INR - ( 29 May 2020 06:54 )   PT: 13.0 sec;   INR: 1.13 ratio         PTT - ( 29 May 2020 06:54 )  PTT:30.2 sec      Uric Acid 2.5    LDH --  Uric Acid 2.5        RADIOLOGY & ADDITIONAL STUDIES:  Xray Chest 1 View- PORTABLE-Urgent (05.27.20 @ 18:44)   Right PICC line terminates in the SVC. No pneumothorax.   Left midlung atelectasis/scar.

## 2020-05-29 NOTE — PROGRESS NOTE ADULT - ASSESSMENT
61 y/o Macedonian speaking female hx of HTN on atenolol, HLD, DM, recovering from COVID-19 presented to the ED with fever, malaise, HA, found to be pancytopenic with peripheral blasts, Peripheral flow cytometry from 5/23 showed findings consistent with APL. ATRA started on 5/22, Arsenic Trioxide started 5/27. 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 ATRA started on 5/22, Arsenic Trioxide started 5/27 continued on close monitoring for differentiation syndrome.

## 2020-05-29 NOTE — PROGRESS NOTE ADULT - ATTENDING COMMENTS
61 y/o Slovak speaking female who is transferred for newly diagnosed APL.  Presented with pancytopenia, flow cytometry/bone marrow biopsy/FISH from 5/26- consistent with acute promyelocytic leukemia with PML-AWA translocation.    She was started on ATRA 5/23, GALA 5/27- day 2.  Monitor carefully for signs of differentiation syndrome.    Check CBC, coags/fibrinogen twice daily.  Keep plt >50, fibrinogen >150.  Monitor lytes daily, keep K>4, Mg>2.  EKG daily.   If any signs of differentiation syndrome, would hold ATRA/GALA and begin dexamethasone 10 mg bid.    Headache better, CT head no contrast on 5/22 with no bleeding.  Attempt tramadol, if no improvement check MRI brain.    Discussed with patient's son over the phone at length regarding diagnosis/treatment. 61 y/o Slovenian speaking female who is transferred for newly diagnosed APL.  Presented with pancytopenia, flow cytometry/bone marrow biopsy/FISH from 5/26- consistent with acute promyelocytic leukemia with PML-AWA translocation.    She was started on ATRA 5/23- day 7, GALA 5/27- day 3.  Monitor carefully for signs of differentiation syndrome.    Check CBC, coags/fibrinogen twice daily.  Keep plt >50, fibrinogen >150.  Monitor lytes daily, keep K>4, Mg>2.  EKG daily.   If any signs of differentiation syndrome, would hold ATRA/GALA and begin dexamethasone 10 mg bid.    Headaches likely due to ATRA, CT head no contrast on 5/22 with no bleeding.  MRI head scheduled- requested by family.  Fioricet today, no improvement with tylenol, tramadol.  Discussed with patient's son over the phone at length regarding diagnosis/treatment.

## 2020-05-29 NOTE — ADVANCED PRACTICE NURSE CONSULT - ASSESSMENT
Labs today WBC 2.81 HGB 7.8 HCT 24.2 PLTS 64 CR. 0.81 TBILI. 0.3,K 4.2 Pt found in bed, alert and oriented x4, v/s stable afebrile, n/c offered.  Right picc line in place. Site without redness or swelling. Lumen previously accessed with + blood return flushes well with NS. Chemotherapy verified by 2 RNs prior to administration. at 1430 treated with arsenic .15mg/kg= 11 mg ivss over 2 hours. Pt remains in bed with n/c offered. Primary RN aware of treatment plan. Labs today WBC 2.81 HGB 7.8 HCT 24.2 PLTS 64 CR. 0.81 TBILI. 0.3,K 4.2 Pt found in bed, alert and oriented x4, v/s stable afebrile, n/c offered.  Right picc line in place. Site without redness or swelling. Lumen previously accessed with + blood return flushes well with NS. Chemotherapy verified by 2 RNs prior to administration. at 1630 treated with arsenic .15mg/kg= 11 mg ivss over 2 hours. Pt remains in bed with n/c offered. Primary RN aware of treatment plan.

## 2020-05-29 NOTE — PROGRESS NOTE ADULT - PROBLEM SELECTOR PLAN 1
suspected based on peripheral blood flow cytometry and smear  s/p Bone Marrow Biopsy 5/26 at [facility] (+) for acute promyelocytic leukemia  continue tretinoin 40mg BID (started 5/22)  continue Arsenic Trioxide starting (started on 5/27)  Monitor for differentiation syndrome  Check EKG for QTc every Tuesday, Friday  continue Allopurinol 300mg PO daily  Follow up CBC w diff, CMP, TLS, DIC labs BID  Keep PLTs > 50,000, Fibrinogen > 150  Follow up G6PD  Echo EF 60%  5/25 G6PD deficiency (-) Bone Marrow Biopsy 5/26 at [facility] (+) for acute promyelocytic leukemia  continue tretinoin 40mg BID (started 5/22)  continue Arsenic Trioxide starting (started on 5/27)  Monitor for differentiation syndrome  Check EKG for QTc every Tuesday, Friday  continue Allopurinol 300mg PO daily  Follow up CBC w diff, CMP, TLS, DIC labs BID  Keep PLTs > 50,000, Fibrinogen > 150  Echo EF 60%  5/25 G6PD deficiency (-)  5/29 occipital hyperthermia - follow up MR of the brain with contrast.

## 2020-05-30 DIAGNOSIS — L53.9 ERYTHEMATOUS CONDITION, UNSPECIFIED: ICD-10-CM

## 2020-05-30 LAB
ALBUMIN SERPL ELPH-MCNC: 4.3 G/DL — SIGNIFICANT CHANGE UP (ref 3.3–5)
ALBUMIN SERPL ELPH-MCNC: 4.7 G/DL — SIGNIFICANT CHANGE UP (ref 3.3–5)
ALP SERPL-CCNC: 84 U/L — SIGNIFICANT CHANGE UP (ref 40–120)
ALP SERPL-CCNC: 95 U/L — SIGNIFICANT CHANGE UP (ref 40–120)
ALT FLD-CCNC: 21 U/L — SIGNIFICANT CHANGE UP (ref 10–45)
ALT FLD-CCNC: 25 U/L — SIGNIFICANT CHANGE UP (ref 10–45)
ANION GAP SERPL CALC-SCNC: 13 MMOL/L — SIGNIFICANT CHANGE UP (ref 5–17)
ANION GAP SERPL CALC-SCNC: 13 MMOL/L — SIGNIFICANT CHANGE UP (ref 5–17)
APTT BLD: 29.4 SEC — SIGNIFICANT CHANGE UP (ref 27.5–36.3)
AST SERPL-CCNC: 25 U/L — SIGNIFICANT CHANGE UP (ref 10–40)
AST SERPL-CCNC: 29 U/L — SIGNIFICANT CHANGE UP (ref 10–40)
BASOPHILS # BLD AUTO: 0 K/UL — SIGNIFICANT CHANGE UP (ref 0–0.2)
BASOPHILS NFR BLD AUTO: 0 % — SIGNIFICANT CHANGE UP (ref 0–2)
BILIRUB SERPL-MCNC: 0.3 MG/DL — SIGNIFICANT CHANGE UP (ref 0.2–1.2)
BILIRUB SERPL-MCNC: 0.3 MG/DL — SIGNIFICANT CHANGE UP (ref 0.2–1.2)
BUN SERPL-MCNC: 13 MG/DL — SIGNIFICANT CHANGE UP (ref 7–23)
BUN SERPL-MCNC: 14 MG/DL — SIGNIFICANT CHANGE UP (ref 7–23)
CALCIUM SERPL-MCNC: 10 MG/DL — SIGNIFICANT CHANGE UP (ref 8.4–10.5)
CALCIUM SERPL-MCNC: 9.8 MG/DL — SIGNIFICANT CHANGE UP (ref 8.4–10.5)
CHLORIDE SERPL-SCNC: 103 MMOL/L — SIGNIFICANT CHANGE UP (ref 96–108)
CHLORIDE SERPL-SCNC: 104 MMOL/L — SIGNIFICANT CHANGE UP (ref 96–108)
CO2 SERPL-SCNC: 22 MMOL/L — SIGNIFICANT CHANGE UP (ref 22–31)
CO2 SERPL-SCNC: 23 MMOL/L — SIGNIFICANT CHANGE UP (ref 22–31)
CREAT SERPL-MCNC: 0.79 MG/DL — SIGNIFICANT CHANGE UP (ref 0.5–1.3)
CREAT SERPL-MCNC: 0.9 MG/DL — SIGNIFICANT CHANGE UP (ref 0.5–1.3)
D DIMER BLD IA.RAPID-MCNC: 1144 NG/ML DDU — HIGH
EOSINOPHIL # BLD AUTO: 0 K/UL — SIGNIFICANT CHANGE UP (ref 0–0.5)
EOSINOPHIL NFR BLD AUTO: 0 % — SIGNIFICANT CHANGE UP (ref 0–6)
FIBRINOGEN PPP-MCNC: 399 MG/DL — SIGNIFICANT CHANGE UP (ref 350–510)
FIBRINOGEN PPP-MCNC: 420 MG/DL — SIGNIFICANT CHANGE UP (ref 350–510)
GLUCOSE BLDC GLUCOMTR-MCNC: 136 MG/DL — HIGH (ref 70–99)
GLUCOSE BLDC GLUCOMTR-MCNC: 139 MG/DL — HIGH (ref 70–99)
GLUCOSE BLDC GLUCOMTR-MCNC: 149 MG/DL — HIGH (ref 70–99)
GLUCOSE BLDC GLUCOMTR-MCNC: 187 MG/DL — HIGH (ref 70–99)
GLUCOSE SERPL-MCNC: 132 MG/DL — HIGH (ref 70–99)
GLUCOSE SERPL-MCNC: 138 MG/DL — HIGH (ref 70–99)
HCT VFR BLD CALC: 23.1 % — LOW (ref 34.5–45)
HCT VFR BLD CALC: 23.7 % — LOW (ref 34.5–45)
HGB BLD-MCNC: 7.4 G/DL — LOW (ref 11.5–15.5)
HGB BLD-MCNC: 7.9 G/DL — LOW (ref 11.5–15.5)
LDH SERPL L TO P-CCNC: 216 U/L — SIGNIFICANT CHANGE UP (ref 50–242)
LYMPHOCYTES # BLD AUTO: 1.82 K/UL — SIGNIFICANT CHANGE UP (ref 1–3.3)
LYMPHOCYTES # BLD AUTO: 48 % — HIGH (ref 13–44)
MAGNESIUM SERPL-MCNC: 2.2 MG/DL — SIGNIFICANT CHANGE UP (ref 1.6–2.6)
MAGNESIUM SERPL-MCNC: 2.4 MG/DL — SIGNIFICANT CHANGE UP (ref 1.6–2.6)
MANUAL SMEAR VERIFICATION: SIGNIFICANT CHANGE UP
MCHC RBC-ENTMCNC: 27.1 PG — SIGNIFICANT CHANGE UP (ref 27–34)
MCHC RBC-ENTMCNC: 28 PG — SIGNIFICANT CHANGE UP (ref 27–34)
MCHC RBC-ENTMCNC: 32 GM/DL — SIGNIFICANT CHANGE UP (ref 32–36)
MCHC RBC-ENTMCNC: 33.3 GM/DL — SIGNIFICANT CHANGE UP (ref 32–36)
MCV RBC AUTO: 84 FL — SIGNIFICANT CHANGE UP (ref 80–100)
MCV RBC AUTO: 84.6 FL — SIGNIFICANT CHANGE UP (ref 80–100)
METAMYELOCYTES # FLD: 1 % — HIGH (ref 0–0)
MONOCYTES # BLD AUTO: 1.1 K/UL — HIGH (ref 0–0.9)
MONOCYTES NFR BLD AUTO: 29 % — HIGH (ref 2–14)
MYELOCYTES NFR BLD: 7 % — HIGH (ref 0–0)
NEUTROPHILS # BLD AUTO: 0.57 K/UL — LOW (ref 1.8–7.4)
NEUTROPHILS NFR BLD AUTO: 11 % — LOW (ref 43–77)
NEUTS BAND # BLD: 4 % — SIGNIFICANT CHANGE UP (ref 0–8)
NRBC # BLD: 0 /100 WBCS — SIGNIFICANT CHANGE UP (ref 0–0)
NRBC # BLD: 1 /100 — HIGH (ref 0–0)
PHOSPHATE SERPL-MCNC: 1.8 MG/DL — LOW (ref 2.5–4.5)
PHOSPHATE SERPL-MCNC: 1.8 MG/DL — LOW (ref 2.5–4.5)
PHOSPHATE SERPL-MCNC: 3 MG/DL — SIGNIFICANT CHANGE UP (ref 2.5–4.5)
PLAT MORPH BLD: NORMAL — SIGNIFICANT CHANGE UP
PLATELET # BLD AUTO: 65 K/UL — LOW (ref 150–400)
PLATELET # BLD AUTO: 74 K/UL — LOW (ref 150–400)
POTASSIUM SERPL-MCNC: 4.3 MMOL/L — SIGNIFICANT CHANGE UP (ref 3.5–5.3)
POTASSIUM SERPL-MCNC: 4.9 MMOL/L — SIGNIFICANT CHANGE UP (ref 3.5–5.3)
POTASSIUM SERPL-SCNC: 4.3 MMOL/L — SIGNIFICANT CHANGE UP (ref 3.5–5.3)
POTASSIUM SERPL-SCNC: 4.9 MMOL/L — SIGNIFICANT CHANGE UP (ref 3.5–5.3)
PROT SERPL-MCNC: 7.2 G/DL — SIGNIFICANT CHANGE UP (ref 6–8.3)
PROT SERPL-MCNC: 7.9 G/DL — SIGNIFICANT CHANGE UP (ref 6–8.3)
RBC # BLD: 2.73 M/UL — LOW (ref 3.8–5.2)
RBC # BLD: 2.82 M/UL — LOW (ref 3.8–5.2)
RBC # FLD: 16 % — HIGH (ref 10.3–14.5)
RBC # FLD: 16.3 % — HIGH (ref 10.3–14.5)
RBC BLD AUTO: SIGNIFICANT CHANGE UP
SODIUM SERPL-SCNC: 139 MMOL/L — SIGNIFICANT CHANGE UP (ref 135–145)
SODIUM SERPL-SCNC: 139 MMOL/L — SIGNIFICANT CHANGE UP (ref 135–145)
URATE SERPL-MCNC: 2.3 MG/DL — LOW (ref 2.5–7)
URATE SERPL-MCNC: 2.6 MG/DL — SIGNIFICANT CHANGE UP (ref 2.5–7)
WBC # BLD: 3.79 K/UL — LOW (ref 3.8–10.5)
WBC # BLD: 4.5 K/UL — SIGNIFICANT CHANGE UP (ref 3.8–10.5)
WBC # FLD AUTO: 3.79 K/UL — LOW (ref 3.8–10.5)
WBC # FLD AUTO: 4.5 K/UL — SIGNIFICANT CHANGE UP (ref 3.8–10.5)

## 2020-05-30 PROCEDURE — 71045 X-RAY EXAM CHEST 1 VIEW: CPT | Mod: 26

## 2020-05-30 PROCEDURE — 99232 SBSQ HOSP IP/OBS MODERATE 35: CPT | Mod: GC

## 2020-05-30 RX ORDER — POTASSIUM PHOSPHATE, MONOBASIC POTASSIUM PHOSPHATE, DIBASIC 236; 224 MG/ML; MG/ML
15 INJECTION, SOLUTION INTRAVENOUS ONCE
Refills: 0 | Status: COMPLETED | OUTPATIENT
Start: 2020-05-30 | End: 2020-05-30

## 2020-05-30 RX ORDER — DIPHENHYDRAMINE HCL 50 MG
25 CAPSULE ORAL EVERY 6 HOURS
Refills: 0 | Status: DISCONTINUED | OUTPATIENT
Start: 2020-05-30 | End: 2020-07-15

## 2020-05-30 RX ORDER — HYDROCORTISONE 1 %
1 OINTMENT (GRAM) TOPICAL EVERY 8 HOURS
Refills: 0 | Status: DISCONTINUED | OUTPATIENT
Start: 2020-05-30 | End: 2020-06-24

## 2020-05-30 RX ADMIN — CEFEPIME 100 MILLIGRAM(S): 1 INJECTION, POWDER, FOR SOLUTION INTRAMUSCULAR; INTRAVENOUS at 09:16

## 2020-05-30 RX ADMIN — ARSENIC TRIOXIDE 130.5 MILLIGRAM(S): 2 INJECTION, SOLUTION INTRAVENOUS at 16:04

## 2020-05-30 RX ADMIN — Medication 1: at 17:09

## 2020-05-30 RX ADMIN — SODIUM CHLORIDE 25 MILLILITER(S): 9 INJECTION INTRAMUSCULAR; INTRAVENOUS; SUBCUTANEOUS at 23:25

## 2020-05-30 RX ADMIN — Medication 5 MILLILITER(S): at 23:26

## 2020-05-30 RX ADMIN — TRETINOIN 40 MILLIGRAM(S): 10 CAPSULE, LIQUID FILLED ORAL at 06:35

## 2020-05-30 RX ADMIN — Medication 5 MILLIGRAM(S): at 09:25

## 2020-05-30 RX ADMIN — Medication 300 MILLIGRAM(S): at 09:25

## 2020-05-30 RX ADMIN — TRETINOIN 40 MILLIGRAM(S): 10 CAPSULE, LIQUID FILLED ORAL at 18:21

## 2020-05-30 RX ADMIN — AER TRAVELER 1 APPLICATION(S): 0.5 SOLUTION RECTAL; TOPICAL at 06:34

## 2020-05-30 RX ADMIN — CEFEPIME 100 MILLIGRAM(S): 1 INJECTION, POWDER, FOR SOLUTION INTRAMUSCULAR; INTRAVENOUS at 00:50

## 2020-05-30 RX ADMIN — CEFEPIME 100 MILLIGRAM(S): 1 INJECTION, POWDER, FOR SOLUTION INTRAMUSCULAR; INTRAVENOUS at 23:26

## 2020-05-30 RX ADMIN — PANTOPRAZOLE SODIUM 40 MILLIGRAM(S): 20 TABLET, DELAYED RELEASE ORAL at 06:34

## 2020-05-30 RX ADMIN — CHLORHEXIDINE GLUCONATE 1 APPLICATION(S): 213 SOLUTION TOPICAL at 09:26

## 2020-05-30 RX ADMIN — Medication 100 GRAM(S): at 03:02

## 2020-05-30 RX ADMIN — POLYETHYLENE GLYCOL 3350 17 GRAM(S): 17 POWDER, FOR SOLUTION ORAL at 09:25

## 2020-05-30 RX ADMIN — CEFEPIME 100 MILLIGRAM(S): 1 INJECTION, POWDER, FOR SOLUTION INTRAMUSCULAR; INTRAVENOUS at 15:34

## 2020-05-30 RX ADMIN — AER TRAVELER 1 APPLICATION(S): 0.5 SOLUTION RECTAL; TOPICAL at 15:38

## 2020-05-30 RX ADMIN — POSACONAZOLE 300 MILLIGRAM(S): 100 TABLET, DELAYED RELEASE ORAL at 09:26

## 2020-05-30 RX ADMIN — Medication 5 MILLILITER(S): at 09:15

## 2020-05-30 RX ADMIN — Medication 5 MILLILITER(S): at 15:34

## 2020-05-30 NOTE — PROGRESS NOTE ADULT - PROBLEM SELECTOR PLAN 1
Bone Marrow Biopsy 5/26 at [facility] (+) for acute promyelocytic leukemia  continue tretinoin 40mg BID (started 5/22)  continue Arsenic Trioxide starting (started on 5/27)  Monitor for differentiation syndrome  Check EKG for QTc every Tuesday, Friday  continue Allopurinol 300mg PO daily  Follow up CBC w diff, CMP, TLS, DIC labs BID  Keep PLTs > 50,000, Fibrinogen > 150  Echo EF 60%  5/25 G6PD deficiency (-)  5/29 occipital hyperthermia - follow up MR of the brain with contrast. Bone Marrow Biopsy 5/26 at [facility] (+) for acute promyelocytic leukemia  continue tretinoin 40mg BID (started 5/22)  continue Arsenic Trioxide starting (started on 5/27)  Monitor for differentiation syndrome  Check EKG for QTc every Tuesday, Friday  continue Allopurinol 300mg PO daily  Follow up CBC w diff, CMP, TLS, DIC labs BID  Keep PLTs > 50,000, Fibrinogen > 150  5/29 occipital hyperthermia - follow up MR of the brain with contrast. (-)

## 2020-05-30 NOTE — ADVANCED PRACTICE NURSE CONSULT - ASSESSMENT
Pt found in bed, alert and oriented x4, n/c offered. Chemotherapy teachings done .Pt. verbalized understanding. Pt. has right double lumen PICC not in use.Awaiting for doppler . Pt. has left lower arm PIV g# 20 .Site with no s/s of redness or swelling .With positive blood return noted and flushing easily with 10 ML NS. Dsg dry and intact.Pt. is hard stick. Chemotherapy verified by 2 RNs prior to administration , At  1604  treated with arsenic .15mg/kg= 11 mg ivss over 2 hours. Pt remains in bed with n/c offered. Primary RN aware of treatment plan.

## 2020-05-30 NOTE — PROVIDER CONTACT NOTE (OTHER) - ASSESSMENT
VSS. Pt in no acute signs of distress. Both lumens of PICC have blood return & flushable without resistance. slight swelling noted on R upper arm

## 2020-05-30 NOTE — PROGRESS NOTE ADULT - PROBLEM SELECTOR PLAN 2
Patient is Neutropenic, Afebrile  Continue Cefepime, Posaconazole ppx  previous COVID-19+ as outpatient 4/16, 5/15 - now COVID (-) on 5/22, 5/26 5/27 CXR (-) PICC in place. Patient is Neutropenic, Afebrile  Continue Cefepime, Posaconazole ppx  previous COVID-19+ as outpatient 4/16, 5/15 - now COVID (-) on 5/22, 5/26 5/27 CXR (-) PICC in place.  5/30- CXR

## 2020-05-30 NOTE — PROGRESS NOTE ADULT - SUBJECTIVE AND OBJECTIVE BOX
Diagnosis:    Protocol/Chemo Regimen:    Day:     Pt endorsed:    Review of Systems:     Pain scale:     Diet:     Allergies    No Known Allergies    Intolerances        ANTIMICROBIALS  cefepime   IVPB 2000 milliGRAM(s) IV Intermittent every 8 hours  posaconazole DR Tablet 300 milliGRAM(s) Oral daily      HEME/ONC MEDICATIONS  arsenic trioxide IVPB (eMAR) 11 milliGRAM(s) IV Intermittent every 24 hours  tretinoin 40 milliGRAM(s) Oral every 12 hours      STANDING MEDICATIONS  allopurinol 300 milliGRAM(s) Oral daily  Biotene Dry Mouth Oral Rinse 5 milliLiter(s) Swish and Spit five times a day  bisacodyl 5 milliGRAM(s) Oral every 12 hours  chlorhexidine 4% Liquid 1 Application(s) Topical <User Schedule>  dextrose 5%. 1000 milliLiter(s) IV Continuous <Continuous>  dextrose 50% Injectable 12.5 Gram(s) IV Push once  dextrose 50% Injectable 25 Gram(s) IV Push once  dextrose 50% Injectable 25 Gram(s) IV Push once  insulin lispro (HumaLOG) corrective regimen sliding scale   SubCutaneous three times a day before meals  insulin lispro (HumaLOG) corrective regimen sliding scale   SubCutaneous at bedtime  pantoprazole    Tablet 40 milliGRAM(s) Oral before breakfast  phytonadione   Solution 5 milliGRAM(s) Oral daily  polyethylene glycol 3350 17 Gram(s) Oral daily  sodium chloride 0.9%. 1000 milliLiter(s) IV Continuous <Continuous>  witch hazel Pads 1 Application(s) Topical every 12 hours      PRN MEDICATIONS  acetaminophen   Tablet .. 650 milliGRAM(s) Oral every 6 hours PRN  acetaminophen 325 mG/butalbital 50 mG/caffeine 40 mG 1 Tablet(s) Oral every 4 hours PRN  aluminum hydroxide/magnesium hydroxide/simethicone Suspension 30 milliLiter(s) Oral every 6 hours PRN  dextrose 40% Gel 15 Gram(s) Oral once PRN  glucagon  Injectable 1 milliGRAM(s) IntraMuscular once PRN  sodium chloride 0.9% lock flush 10 milliLiter(s) IV Push every 1 hour PRN        Vital Signs Last 24 Hrs  T(C): 36.3 (30 May 2020 00:40), Max: 36.7 (29 May 2020 17:15)  T(F): 97.4 (30 May 2020 00:40), Max: 98.1 (29 May 2020 17:15)  HR: 77 (30 May 2020 00:40) (71 - 88)  BP: 115/65 (30 May 2020 00:40) (104/65 - 129/75)  BP(mean): --  RR: 18 (30 May 2020 00:40) (16 - 18)  SpO2: 98% (30 May 2020 00:40) (97% - 98%)    PHYSICAL EXAM  General: NAD  HEENT: PERRLA, EOMOI, clear oropharynx, anicteric sclera, pink conjunctiva  Neck: supple  CV: (+) S1/S2 RRR  Lungs: clear to auscultation, no wheezes or rales  Abdomen: soft, non-tender, non-distended (+) BS  Ext: no clubbing, cyanosis or edema  Skin: no rashes and no petechiae  Neuro: alert and oriented X 3, no focal deficits  Central Line:     RECENT CULTURES:  05-23 @ 09:09  .Blood Blood-Peripheral  --  --  --    No Growth Final  --        LABS:                        7.4    3.79  )-----------( 74       ( 30 May 2020 06:52 )             23.1         Mean Cell Volume : 84.6 fl  Mean Cell Hemoglobin : 27.1 pg  Mean Cell Hemoglobin Concentration : 32.0 gm/dL  Auto Neutrophil # : x  Auto Lymphocyte # : x  Auto Monocyte # : x  Auto Eosinophil # : x  Auto Basophil # : x  Auto Neutrophil % : x  Auto Lymphocyte % : x  Auto Monocyte % : x  Auto Eosinophil % : x  Auto Basophil % : x      05-30    139  |  104  |  14  ----------------------------<  138<H>  4.3   |  22  |  0.79    Ca    9.8      30 May 2020 06:52  Phos  3.0     05-30  Mg     2.4     05-30    TPro  7.2  /  Alb  4.3  /  TBili  0.3  /  DBili  x   /  AST  25  /  ALT  21  /  AlkPhos  84  05-30      Mg 2.4  Phos 3.0  Mg 2.0  Phos 2.7      PT/INR - ( 29 May 2020 18:38 )   PT: 13.8 sec;   INR: 1.20 ratio         PTT - ( 29 May 2020 06:54 )  PTT:30.2 sec      Uric Acid 2.6        RADIOLOGY & ADDITIONAL STUDIES: Diagnosis: Acute Promyelocytic Leukemia     Protocol/Chemo Regimen: Tretinoin 40 mg BID, Arsenic 11 mg every 24 hours    Day:  ATRA day 8, Arsenic day 4    Pt endorsed: c/o scalp sensitivity    Review of Systems: Patient denies chest pain, palpitations, SOB, abdominal pain, vomiting, diarrhea.     Pain scale: 0    Diet: Regular     Allergies: No Known Allergies    ANTIMICROBIALS  cefepime   IVPB 2000 milliGRAM(s) IV Intermittent every 8 hours  posaconazole DR Tablet 300 milliGRAM(s) Oral daily    HEME/ONC MEDICATIONS  arsenic trioxide IVPB (eMAR) 11 milliGRAM(s) IV Intermittent every 24 hours  tretinoin 40 milliGRAM(s) Oral every 12 hours    STANDING MEDICATIONS  allopurinol 300 milliGRAM(s) Oral daily  Biotene Dry Mouth Oral Rinse 5 milliLiter(s) Swish and Spit five times a day  bisacodyl 5 milliGRAM(s) Oral every 12 hours  chlorhexidine 4% Liquid 1 Application(s) Topical <User Schedule>  dextrose 5%. 1000 milliLiter(s) IV Continuous <Continuous>  dextrose 50% Injectable 12.5 Gram(s) IV Push once  dextrose 50% Injectable 25 Gram(s) IV Push once  dextrose 50% Injectable 25 Gram(s) IV Push once  insulin lispro (HumaLOG) corrective regimen sliding scale   SubCutaneous three times a day before meals  insulin lispro (HumaLOG) corrective regimen sliding scale   SubCutaneous at bedtime  pantoprazole    Tablet 40 milliGRAM(s) Oral before breakfast  phytonadione   Solution 5 milliGRAM(s) Oral daily  polyethylene glycol 3350 17 Gram(s) Oral daily  sodium chloride 0.9%. 1000 milliLiter(s) IV Continuous <Continuous>  witch hazel Pads 1 Application(s) Topical every 12 hours    PRN MEDICATIONS  acetaminophen   Tablet .. 650 milliGRAM(s) Oral every 6 hours PRN  acetaminophen 325 mG/butalbital 50 mG/caffeine 40 mG 1 Tablet(s) Oral every 4 hours PRN  aluminum hydroxide/magnesium hydroxide/simethicone Suspension 30 milliLiter(s) Oral every 6 hours PRN  dextrose 40% Gel 15 Gram(s) Oral once PRN  glucagon  Injectable 1 milliGRAM(s) IntraMuscular once PRN  sodium chloride 0.9% lock flush 10 milliLiter(s) IV Push every 1 hour PRN    Vital Signs Last 24 Hrs  T(C): 36.3 (30 May 2020 00:40), Max: 36.7 (29 May 2020 17:15)  T(F): 97.4 (30 May 2020 00:40), Max: 98.1 (29 May 2020 17:15)  HR: 77 (30 May 2020 00:40) (71 - 88)  BP: 115/65 (30 May 2020 00:40) (104/65 - 129/75)  BP(mean): --  RR: 18 (30 May 2020 00:40) (16 - 18)  SpO2: 98% (30 May 2020 00:40) (97% - 98%)    PHYSICAL EXAM  General: NAD  CV: (+) S1/S2 RRR  Lungs: clear to auscultation, no wheezes or rales  Abdomen: soft, non-tender, non-distended (+) BS  Ext: no edema  Skin: no rash  Neuro: alert and oriented X 3, no focal deficits  Central Line: PICC line, erythema + around the dressing site.    RECENT CULTURES:  05-23 @ 09:09  .Blood Blood-Peripheral  No Growth Final    LABS:                        7.4    3.79  )-----------( 74       ( 30 May 2020 06:52 )             23.1         Mean Cell Volume : 84.6 fl  Mean Cell Hemoglobin : 27.1 pg  Mean Cell Hemoglobin Concentration : 32.0 gm/dL  Auto Neutrophil # : x  Auto Lymphocyte # : x  Auto Monocyte # : x  Auto Eosinophil # : x  Auto Basophil # : x  Auto Neutrophil % : x  Auto Lymphocyte % : x  Auto Monocyte % : x  Auto Eosinophil % : x  Auto Basophil % : x      05-30    139  |  104  |  14  ----------------------------<  138<H>  4.3   |  22  |  0.79    Ca    9.8      30 May 2020 06:52  Phos  3.0     05-30  Mg     2.4     05-30    TPro  7.2  /  Alb  4.3  /  TBili  0.3  /  DBili  x   /  AST  25  /  ALT  21  /  AlkPhos  84  05-30  Mg 2.4  Phos 3.0  Mg 2.0  Phos 2.7  PT/INR - ( 29 May 2020 18:38 )   PT: 13.8 sec;   INR: 1.20 ratio         PTT - ( 29 May 2020 06:54 )  PTT:30.2 sec    Uric Acid 2.6    RADIOLOGY & ADDITIONAL STUDIES:   MR Head w/ IV Cont (05.29.20 @ 14:59) >  There is a small amount of fluid in the mastoid sinuses and middle ear cavities bilaterally. There is slightly prominent soft tissue in the left nasopharynx could be related to lymphoid hyperplasia versus lymphoma. No masses or abnormal enhancement of the brain parenchyma.

## 2020-05-30 NOTE — PROGRESS NOTE ADULT - ATTENDING COMMENTS
61 y/o Portuguese speaking female who is transferred for newly diagnosed APL.  Presented with pancytopenia, flow cytometry/bone marrow biopsy/FISH from 5/26- consistent with acute promyelocytic leukemia with PML-AWA translocation.    She was started on ATRA 5/23- day 7, GALA 5/27- day 3.  Monitor carefully for signs of differentiation syndrome.    Check CBC, coags/fibrinogen twice daily.  Keep plt >50, fibrinogen >150.  Monitor lytes daily, keep K>4, Mg>2.  EKG daily.   If any signs of differentiation syndrome, would hold ATRA/GALA and begin dexamethasone 10 mg bid.    Headaches likely due to ATRA, CT head no contrast on 5/22 with no bleeding.  MRI head scheduled- requested by family.  Fioricet today, no improvement with tylenol, tramadol.  Discussed with patient's son over the phone at length regarding diagnosis/treatment. 61 y/o Wolof speaking female who is transferred for newly diagnosed APL.  Presented with pancytopenia, flow cytometry/bone marrow biopsy/FISH from 5/26- consistent with acute promyelocytic leukemia with PML-AWA translocation.    She was started on ATRA 5/23- day 7, GALA 5/27- day 3.  Monitor carefully for signs of differentiation syndrome.    Check CBC, coags/fibrinogen twice daily.  Keep plt >50, fibrinogen >150.  Monitor lytes daily, keep K>4, Mg>2.  EKG daily.   If any signs of differentiation syndrome, would hold ATRA/GALA and begin dexamethasone 10 mg bid.    Headaches likely due to ATRA, CT head no contrast on 5/22 with no bleeding.  MRI head scheduled- requested by family.  No more headaches, just a sensation of heat on the top of the head. MRI was not suggestive of any bleeding. Heat on the head is worse with platelet transfusion, will try benadryl prior to platelets. Fioricet today, no improvement with tylenol, tramadol.  Discussed with patient's son over the phone at length regarding diagnosis/treatment.

## 2020-05-30 NOTE — PROGRESS NOTE ADULT - ASSESSMENT
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 ATRA started on 5/22, Arsenic Trioxide started 5/27 continued on close monitoring for differentiation syndrome.

## 2020-05-31 LAB
ALBUMIN SERPL ELPH-MCNC: 4.4 G/DL — SIGNIFICANT CHANGE UP (ref 3.3–5)
ALBUMIN SERPL ELPH-MCNC: 4.5 G/DL — SIGNIFICANT CHANGE UP (ref 3.3–5)
ALP SERPL-CCNC: 104 U/L — SIGNIFICANT CHANGE UP (ref 40–120)
ALP SERPL-CCNC: 99 U/L — SIGNIFICANT CHANGE UP (ref 40–120)
ALT FLD-CCNC: 28 U/L — SIGNIFICANT CHANGE UP (ref 10–45)
ALT FLD-CCNC: 28 U/L — SIGNIFICANT CHANGE UP (ref 10–45)
ANION GAP SERPL CALC-SCNC: 10 MMOL/L — SIGNIFICANT CHANGE UP (ref 5–17)
ANION GAP SERPL CALC-SCNC: 15 MMOL/L — SIGNIFICANT CHANGE UP (ref 5–17)
APTT BLD: 29.4 SEC — SIGNIFICANT CHANGE UP (ref 27.5–36.3)
APTT BLD: 29.8 SEC — SIGNIFICANT CHANGE UP (ref 27.5–36.3)
AST SERPL-CCNC: 27 U/L — SIGNIFICANT CHANGE UP (ref 10–40)
AST SERPL-CCNC: 28 U/L — SIGNIFICANT CHANGE UP (ref 10–40)
BASOPHILS # BLD AUTO: 0 K/UL — SIGNIFICANT CHANGE UP (ref 0–0.2)
BASOPHILS NFR BLD AUTO: 0 % — SIGNIFICANT CHANGE UP (ref 0–2)
BILIRUB SERPL-MCNC: 0.3 MG/DL — SIGNIFICANT CHANGE UP (ref 0.2–1.2)
BILIRUB SERPL-MCNC: 0.3 MG/DL — SIGNIFICANT CHANGE UP (ref 0.2–1.2)
BUN SERPL-MCNC: 15 MG/DL — SIGNIFICANT CHANGE UP (ref 7–23)
BUN SERPL-MCNC: 19 MG/DL — SIGNIFICANT CHANGE UP (ref 7–23)
CALCIUM SERPL-MCNC: 10.3 MG/DL — SIGNIFICANT CHANGE UP (ref 8.4–10.5)
CALCIUM SERPL-MCNC: 9.9 MG/DL — SIGNIFICANT CHANGE UP (ref 8.4–10.5)
CHLORIDE SERPL-SCNC: 104 MMOL/L — SIGNIFICANT CHANGE UP (ref 96–108)
CHLORIDE SERPL-SCNC: 104 MMOL/L — SIGNIFICANT CHANGE UP (ref 96–108)
CO2 SERPL-SCNC: 21 MMOL/L — LOW (ref 22–31)
CO2 SERPL-SCNC: 22 MMOL/L — SIGNIFICANT CHANGE UP (ref 22–31)
CREAT SERPL-MCNC: 0.79 MG/DL — SIGNIFICANT CHANGE UP (ref 0.5–1.3)
CREAT SERPL-MCNC: 0.89 MG/DL — SIGNIFICANT CHANGE UP (ref 0.5–1.3)
D DIMER BLD IA.RAPID-MCNC: 1860 NG/ML DDU — HIGH
EOSINOPHIL # BLD AUTO: 0 K/UL — SIGNIFICANT CHANGE UP (ref 0–0.5)
EOSINOPHIL NFR BLD AUTO: 0 % — SIGNIFICANT CHANGE UP (ref 0–6)
FIBRINOGEN PPP-MCNC: 389 MG/DL — SIGNIFICANT CHANGE UP (ref 350–510)
FIBRINOGEN PPP-MCNC: 405 MG/DL — SIGNIFICANT CHANGE UP (ref 350–510)
GLUCOSE BLDC GLUCOMTR-MCNC: 139 MG/DL — HIGH (ref 70–99)
GLUCOSE BLDC GLUCOMTR-MCNC: 150 MG/DL — HIGH (ref 70–99)
GLUCOSE BLDC GLUCOMTR-MCNC: 161 MG/DL — HIGH (ref 70–99)
GLUCOSE BLDC GLUCOMTR-MCNC: 195 MG/DL — HIGH (ref 70–99)
GLUCOSE BLDC GLUCOMTR-MCNC: 206 MG/DL — HIGH (ref 70–99)
GLUCOSE SERPL-MCNC: 133 MG/DL — HIGH (ref 70–99)
GLUCOSE SERPL-MCNC: 210 MG/DL — HIGH (ref 70–99)
HCT VFR BLD CALC: 23 % — LOW (ref 34.5–45)
HCT VFR BLD CALC: 25.1 % — LOW (ref 34.5–45)
HGB BLD-MCNC: 7.5 G/DL — LOW (ref 11.5–15.5)
HGB BLD-MCNC: 8.2 G/DL — LOW (ref 11.5–15.5)
INR BLD: 1.15 RATIO — SIGNIFICANT CHANGE UP (ref 0.88–1.16)
LDH SERPL L TO P-CCNC: 272 U/L — HIGH (ref 50–242)
LYMPHOCYTES # BLD AUTO: 1.34 K/UL — SIGNIFICANT CHANGE UP (ref 1–3.3)
LYMPHOCYTES # BLD AUTO: 28 % — SIGNIFICANT CHANGE UP (ref 13–44)
MAGNESIUM SERPL-MCNC: 2 MG/DL — SIGNIFICANT CHANGE UP (ref 1.6–2.6)
MAGNESIUM SERPL-MCNC: 2.3 MG/DL — SIGNIFICANT CHANGE UP (ref 1.6–2.6)
MANUAL SMEAR VERIFICATION: SIGNIFICANT CHANGE UP
MCHC RBC-ENTMCNC: 27.5 PG — SIGNIFICANT CHANGE UP (ref 27–34)
MCHC RBC-ENTMCNC: 27.7 PG — SIGNIFICANT CHANGE UP (ref 27–34)
MCHC RBC-ENTMCNC: 32.6 GM/DL — SIGNIFICANT CHANGE UP (ref 32–36)
MCHC RBC-ENTMCNC: 32.7 GM/DL — SIGNIFICANT CHANGE UP (ref 32–36)
MCV RBC AUTO: 84.2 FL — SIGNIFICANT CHANGE UP (ref 80–100)
MCV RBC AUTO: 84.8 FL — SIGNIFICANT CHANGE UP (ref 80–100)
METAMYELOCYTES # FLD: 6 % — HIGH (ref 0–0)
MONOCYTES # BLD AUTO: 1.57 K/UL — HIGH (ref 0–0.9)
MONOCYTES NFR BLD AUTO: 33 % — HIGH (ref 2–14)
MYELOCYTES NFR BLD: 10 % — HIGH (ref 0–0)
NEUTROPHILS # BLD AUTO: 1.1 K/UL — LOW (ref 1.8–7.4)
NEUTROPHILS NFR BLD AUTO: 12 % — LOW (ref 43–77)
NEUTS BAND # BLD: 11 % — HIGH (ref 0–8)
NRBC # BLD: 0 /100 WBCS — SIGNIFICANT CHANGE UP (ref 0–0)
NRBC # BLD: 1 /100 — HIGH (ref 0–0)
PHOSPHATE SERPL-MCNC: 2.8 MG/DL — SIGNIFICANT CHANGE UP (ref 2.5–4.5)
PHOSPHATE SERPL-MCNC: 4.9 MG/DL — HIGH (ref 2.5–4.5)
PLAT MORPH BLD: NORMAL — SIGNIFICANT CHANGE UP
PLATELET # BLD AUTO: 45 K/UL — LOW (ref 150–400)
PLATELET # BLD AUTO: 57 K/UL — LOW (ref 150–400)
POTASSIUM SERPL-MCNC: 4.1 MMOL/L — SIGNIFICANT CHANGE UP (ref 3.5–5.3)
POTASSIUM SERPL-MCNC: 4.5 MMOL/L — SIGNIFICANT CHANGE UP (ref 3.5–5.3)
POTASSIUM SERPL-SCNC: 4.1 MMOL/L — SIGNIFICANT CHANGE UP (ref 3.5–5.3)
POTASSIUM SERPL-SCNC: 4.5 MMOL/L — SIGNIFICANT CHANGE UP (ref 3.5–5.3)
PROT SERPL-MCNC: 7.6 G/DL — SIGNIFICANT CHANGE UP (ref 6–8.3)
PROT SERPL-MCNC: 7.7 G/DL — SIGNIFICANT CHANGE UP (ref 6–8.3)
PROTHROM AB SERPL-ACNC: 13.2 SEC — HIGH (ref 10–12.9)
RBC # BLD: 2.73 M/UL — LOW (ref 3.8–5.2)
RBC # BLD: 2.96 M/UL — LOW (ref 3.8–5.2)
RBC # FLD: 16.2 % — HIGH (ref 10.3–14.5)
RBC # FLD: 16.4 % — HIGH (ref 10.3–14.5)
RBC BLD AUTO: SIGNIFICANT CHANGE UP
SODIUM SERPL-SCNC: 135 MMOL/L — SIGNIFICANT CHANGE UP (ref 135–145)
SODIUM SERPL-SCNC: 141 MMOL/L — SIGNIFICANT CHANGE UP (ref 135–145)
URATE SERPL-MCNC: 2.3 MG/DL — LOW (ref 2.5–7)
URATE SERPL-MCNC: 2.6 MG/DL — SIGNIFICANT CHANGE UP (ref 2.5–7)
WBC # BLD: 4.77 K/UL — SIGNIFICANT CHANGE UP (ref 3.8–10.5)
WBC # BLD: 5.06 K/UL — SIGNIFICANT CHANGE UP (ref 3.8–10.5)
WBC # FLD AUTO: 4.77 K/UL — SIGNIFICANT CHANGE UP (ref 3.8–10.5)
WBC # FLD AUTO: 5.06 K/UL — SIGNIFICANT CHANGE UP (ref 3.8–10.5)

## 2020-05-31 PROCEDURE — 99232 SBSQ HOSP IP/OBS MODERATE 35: CPT | Mod: GC

## 2020-05-31 PROCEDURE — 93971 EXTREMITY STUDY: CPT | Mod: 26

## 2020-05-31 RX ORDER — PETROLATUM,WHITE
1 JELLY (GRAM) TOPICAL
Refills: 0 | Status: DISCONTINUED | OUTPATIENT
Start: 2020-05-31 | End: 2020-06-02

## 2020-05-31 RX ADMIN — Medication 5 MILLILITER(S): at 08:29

## 2020-05-31 RX ADMIN — CHLORHEXIDINE GLUCONATE 1 APPLICATION(S): 213 SOLUTION TOPICAL at 08:27

## 2020-05-31 RX ADMIN — CEFEPIME 100 MILLIGRAM(S): 1 INJECTION, POWDER, FOR SOLUTION INTRAMUSCULAR; INTRAVENOUS at 08:29

## 2020-05-31 RX ADMIN — Medication 1 APPLICATION(S): at 00:12

## 2020-05-31 RX ADMIN — CEFEPIME 100 MILLIGRAM(S): 1 INJECTION, POWDER, FOR SOLUTION INTRAMUSCULAR; INTRAVENOUS at 16:21

## 2020-05-31 RX ADMIN — POSACONAZOLE 300 MILLIGRAM(S): 100 TABLET, DELAYED RELEASE ORAL at 11:48

## 2020-05-31 RX ADMIN — Medication 1 APPLICATION(S): at 05:57

## 2020-05-31 RX ADMIN — TRETINOIN 40 MILLIGRAM(S): 10 CAPSULE, LIQUID FILLED ORAL at 18:03

## 2020-05-31 RX ADMIN — Medication 5 MILLILITER(S): at 11:46

## 2020-05-31 RX ADMIN — Medication 300 MILLIGRAM(S): at 11:48

## 2020-05-31 RX ADMIN — ARSENIC TRIOXIDE 130.5 MILLIGRAM(S): 2 INJECTION, SOLUTION INTRAVENOUS at 15:37

## 2020-05-31 RX ADMIN — Medication 5 MILLILITER(S): at 20:22

## 2020-05-31 RX ADMIN — AER TRAVELER 1 APPLICATION(S): 0.5 SOLUTION RECTAL; TOPICAL at 17:43

## 2020-05-31 RX ADMIN — TRETINOIN 40 MILLIGRAM(S): 10 CAPSULE, LIQUID FILLED ORAL at 05:57

## 2020-05-31 RX ADMIN — Medication 1 APPLICATION(S): at 21:13

## 2020-05-31 RX ADMIN — POTASSIUM PHOSPHATE, MONOBASIC POTASSIUM PHOSPHATE, DIBASIC 62.5 MILLIMOLE(S): 236; 224 INJECTION, SOLUTION INTRAVENOUS at 00:11

## 2020-05-31 RX ADMIN — Medication 1: at 14:15

## 2020-05-31 RX ADMIN — PANTOPRAZOLE SODIUM 40 MILLIGRAM(S): 20 TABLET, DELAYED RELEASE ORAL at 08:31

## 2020-05-31 RX ADMIN — Medication 1 APPLICATION(S): at 14:10

## 2020-05-31 RX ADMIN — Medication 5 MILLILITER(S): at 16:21

## 2020-05-31 RX ADMIN — SODIUM CHLORIDE 25 MILLILITER(S): 9 INJECTION INTRAMUSCULAR; INTRAVENOUS; SUBCUTANEOUS at 21:13

## 2020-05-31 RX ADMIN — Medication 1 TABLET(S): at 00:57

## 2020-05-31 RX ADMIN — SODIUM CHLORIDE 25 MILLILITER(S): 9 INJECTION INTRAMUSCULAR; INTRAVENOUS; SUBCUTANEOUS at 18:04

## 2020-05-31 NOTE — PROGRESS NOTE ADULT - ATTENDING COMMENTS
59 y/o Occitan speaking female who is transferred for newly diagnosed APL.  Presented with pancytopenia, flow cytometry/bone marrow biopsy/FISH from 5/26- consistent with acute promyelocytic leukemia with PML-AWA translocation.    She was started on ATRA 5/23- day 7, GALA 5/27- day 3.  Monitor carefully for signs of differentiation syndrome.    Check CBC, coags/fibrinogen twice daily.  Keep plt >50, fibrinogen >150.  Monitor lytes daily, keep K>4, Mg>2.  EKG daily.   If any signs of differentiation syndrome, would hold ATRA/GALA and begin dexamethasone 10 mg bid.    Headaches likely due to ATRA, CT head no contrast on 5/22 with no bleeding.  MRI head scheduled- requested by family.  No more headaches, just a sensation of heat on the top of the head. MRI was not suggestive of any bleeding. Heat on the head is worse with platelet transfusion, will try benadryl prior to platelets. Fioricet today, no improvement with tylenol, tramadol.  Discussed with patient's son over the phone at length regarding diagnosis/treatment. 61 y/o Yoruba speaking female who is transferred for newly diagnosed APL.  Presented with pancytopenia, flow cytometry/bone marrow biopsy/FISH from 5/26- consistent with acute promyelocytic leukemia with PML-AWA translocation.    She was started on ATRA 5/23- day 9, GALA 5/27- day 5.  Monitor carefully for signs of differentiation syndrome.   Check CBC, coags/fibrinogen twice daily.  Keep plt >50, fibrinogen >150.  Monitor lytes daily, keep K>4, Mg>2.  EKG twice a week  If any signs of differentiation syndrome, would hold ATRA/GALA and begin dexamethasone 10 mg bid.    Headaches likely due to ATRA, CT head no contrast on 5/22 with no bleeding.  MRI head scheduled- requested by family.  No more headaches, just a sensation of heat on the top of the head. MRI was not suggestive of any bleeding. Heat on the head is worse with platelet transfusion, will try benadryl prior to platelets. Fioricet today which patient reports has helped.   Discussed with patient's son over the phone at length regarding diagnosis/treatment. 61 y/o Estonian speaking female who is transferred for newly diagnosed APL.  Presented with pancytopenia, flow cytometry/bone marrow biopsy/FISH from 5/26- consistent with acute promyelocytic leukemia with PML-AWA translocation.    She was started on ATRA 5/23- day 9, GALA 5/27- day 5.  Monitor carefully for signs of differentiation syndrome.   Check CBC, coags/fibrinogen twice daily.  Keep plt >50, fibrinogen >150.  Monitor lytes daily, keep K>4, Mg>2.  EKG twice a week  If any signs of differentiation syndrome, would hold ATRA/GALA and begin dexamethasone 10 mg bid.    Headaches likely due to ATRA, CT head no contrast on 5/22 with no bleeding.  No more headaches, just a sensation of heat on the top of the head. MRI was not suggestive of any bleeding. Heat on the head is worse with platelet transfusion, will try benadryl prior to platelets. Fioricet today which patient reports has helped.   Discussed with patient's son over the phone at length regarding diagnosis/treatment.

## 2020-05-31 NOTE — PROGRESS NOTE ADULT - PROBLEM SELECTOR PLAN 2
Patient is Neutropenic, Afebrile  Continue Cefepime, Posaconazole ppx  previous COVID-19+ as outpatient 4/16, 5/15 - now COVID (-) on 5/22, 5/26 5/27 CXR (-) PICC in place.  5/30- CXR Patient is Neutropenic, Afebrile  Continue Cefepime, Posaconazole ppx  previous COVID-19+ as outpatient 4/16, 5/15 - now COVID (-) on 5/22, 5/26 5/27 CXR (-) PICC in place.  5/30- CXR- Right PICC tip within SVC, bandlike opacity within the left upper lung is unchanged.

## 2020-05-31 NOTE — PROGRESS NOTE ADULT - PROBLEM SELECTOR PLAN 6
Erythema around PICC dressing.  F/u doppler. Erythema around PICC dressing.  Warm compress.  F/u doppler.

## 2020-05-31 NOTE — PROGRESS NOTE ADULT - ASSESSMENT
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 ATRA started on 5/22, Arsenic Trioxide started 5/27 continued on close monitoring for differentiation syndrome.

## 2020-05-31 NOTE — ADVANCED PRACTICE NURSE CONSULT - ASSESSMENT
Consent confirmed in chart.  H/W verified.  EKG completed on 5- which showed a QTc of 388ms.  All labs reviewed by PA prior to administration of Arsenic.    Patient verbalized slight tingling in BL fingertips.   Patient has double lumen DL PICC pending ultrasound prior to use.  PIV in left hand 20 gauge. No premeds given.      Two license professionals perform the following independent verification prior to administrastion: Drug name; Drug dose; Infusion volume of bag or syringe; Rate of administartion; Route of administartion; Expiration date/time; Appearance and integrity of drug(s);Compatability of Solution;  Rate set on infusion pump.  In the presence of the patient, verify the patient's identification using two patient identifiers.  Arsenic :  0.15mg/kg totaling 11mg over 2 hours @ Consent confirmed in chart.  H/W verified.  EKG completed on 5- which showed a QTc of 388ms.  All labs reviewed by PA prior to administration of Arsenic.    Patient verbalized slight tingling in BL fingertips.   Patient has double lumen DL PICC pending ultrasound prior to use.  PIV in left hand 20 gauge. No premeds given today.  Zofran 8mg IV given prior to chemotherapy on D1 5-.      Two license professionals perform the following independent verification prior to administrastion: Drug name; Drug dose; Infusion volume of bag or syringe; Rate of administartion; Route of administartion; Expiration date/time; Appearance and integrity of drug(s);Compatability of Solution;  Rate set on infusion pump.  In the presence of the patient, verify the patient's identification using two patient identifiers.  Arsenic :  0.15mg/kg totaling 11mg over 2 hours @ Consent confirmed in chart.  H/W verified.  EKG completed on 5- which showed a QTc of 388ms.  All labs reviewed by AUSTIN Saleem prior to administration of Arsenic.    Patient verbalized slight tingling in BL fingertips.   Patient has double lumen DL PICC pending ultrasound prior to use.  PIV in left hand 20 gauge. No premeds given today.  Zofran 8mg IV given prior to chemotherapy on D1 5-.      Two license professionals perform the following independent verification prior to administrastion: Drug name; Drug dose; Infusion volume of bag or syringe; Rate of administartion; Route of administartion; Expiration date/time; Appearance and integrity of drug(s);Compatability of Solution;  Rate set on infusion pump.  In the presence of the patient, verify the patient's identification using two patient identifiers.  Arsenic :  0.15mg/kg totaling 11mg over 2 hours @ Consent confirmed in chart.  H/W verified.  EKG completed on 5- which showed a QTc of 388ms.  All labs reviewed by AUSTIN Saleem prior to administration of Arsenic.    Patient verbalized slight tingling in BL fingertips.   Patient has double lumen DL PICC pending ultrasound prior to use.  PIV in left hand 20 gauge. No premeds given today.  Zofran 8mg IV given prior to chemotherapy on D1 5-.      Two license professionals perform the following independent verification prior to administrastion: Drug name; Drug dose; Infusion volume of bag or syringe; Rate of administartion; Route of administartion; Expiration date/time; Appearance and integrity of drug(s);Compatability of Solution;  Rate set on infusion pump.  In the presence of the patient, verify the patient's identification using two patient identifiers.  Arsenic :  0.15mg/kg totaling 11mg over 2 hours @  8164    1737: Consent confirmed in chart.  H/W verified.  EKG completed on 5- which showed a QTc of 388ms.  All labs reviewed by NP Juan Saleem prior to administration of Arsenic.    Patient verbalized slight tingling in BL fingertips.   Patient has double lumen PICC pending doppler prior to use.   [facility],  No premeds given today.  Zofran 8mg IV given prior to chemotherapy on D1 5-.      Two license professionals perform the following independent verification prior to administration Drug name; Drug dose; Infusion volume of bag or syringe; Rate of administration Route of administration Expiration date/time; Appearance and integrity of Commutability of Solution;  Rate set on infusion pump.  In the presence of the patient, verify the patient's identification using two patient identifiers.    1537:  Arsenic:  0.15mg/kg totaling 11mg over 2 hours @  1537 through DL PICC purple line.  Blood return and patentcy checked prior to the start of chemotherapy.    1737: Consent confirmed in chart.  H/W verified.  EKG completed on 5- which showed a QTc of 388ms.  All labs reviewed by NP Juan Saleem prior to administration of Arsenic.    Patient verbalized slight tingling in BL fingertips.   Patient has double lumen PICC pending doppler prior to use.   [facility] Juan Saleem,  No premeds given today.  Zofran 8mg IV given prior to chemotherapy on D1 5-.      Two license professionals perform the following independent verification prior to administration Drug name; Drug dose; Infusion volume of bag or syringe; Rate of administration Route of administration Expiration date/time; Appearance and integrity of Commutability of Solution;  Rate set on infusion pump.  In the presence of the patient, verify the patient's identification using two patient identifiers.    1537:  Arsenic:  0.15mg/kg totaling 11mg over 2 hours @  1537 through DL PICC purple line.  Blood return and patentcy checked prior to the start of chemotherapy.    1737: Tolerated treatment without reaction or complaints. Consent confirmed in chart.  H/W verified.  EKG completed on 5- which showed a QTc of 388ms.  All labs reviewed by NP Juan Saleem prior to administration of Arsenic.    Patient verbalized slight tingling in BL fingertips.   Patient has double lumen PICC pending doppler prior to use.   Doppler completed [facility] available to use per Juan Saleem,         Two license professionals perform the following independent verification prior to administration Drug name; Drug dose; Infusion volume of bag or syringe; Rate of administration Route of administration Expiration date/time; Appearance and integrity of Commutability of Solution;  Rate set on infusion pump.  In the presence of the patient, verify the patient's identification using two patient identifiers.    1537:  Arsenic:  0.15mg/kg totaling 11mg over 2 hours @  1537 through DL PICC purple line.  Blood return and patentcy checked prior to the start of chemotherapy.    1737: Tolerated treatment without reaction or complaints.

## 2020-05-31 NOTE — PROGRESS NOTE ADULT - PROBLEM SELECTOR PLAN 1
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 (-)

## 2020-05-31 NOTE — PROGRESS NOTE ADULT - SUBJECTIVE AND OBJECTIVE BOX
Diagnosis:    Protocol/Chemo Regimen:    Day:     Pt endorsed:    Review of Systems:     Pain scale:     Diet:     Allergies    No Known Allergies    Intolerances        ANTIMICROBIALS  cefepime   IVPB 2000 milliGRAM(s) IV Intermittent every 8 hours  posaconazole DR Tablet 300 milliGRAM(s) Oral daily      HEME/ONC MEDICATIONS  arsenic trioxide IVPB (eMAR) 11 milliGRAM(s) IV Intermittent every 24 hours  tretinoin 40 milliGRAM(s) Oral every 12 hours      STANDING MEDICATIONS  allopurinol 300 milliGRAM(s) Oral daily  Biotene Dry Mouth Oral Rinse 5 milliLiter(s) Swish and Spit five times a day  bisacodyl 5 milliGRAM(s) Oral every 12 hours  chlorhexidine 4% Liquid 1 Application(s) Topical <User Schedule>  dextrose 5%. 1000 milliLiter(s) IV Continuous <Continuous>  dextrose 50% Injectable 12.5 Gram(s) IV Push once  dextrose 50% Injectable 25 Gram(s) IV Push once  dextrose 50% Injectable 25 Gram(s) IV Push once  hydrocortisone 2.5% Rectal Cream 1 Application(s) Rectal every 8 hours  insulin lispro (HumaLOG) corrective regimen sliding scale   SubCutaneous three times a day before meals  insulin lispro (HumaLOG) corrective regimen sliding scale   SubCutaneous at bedtime  pantoprazole    Tablet 40 milliGRAM(s) Oral before breakfast  polyethylene glycol 3350 17 Gram(s) Oral daily  sodium chloride 0.9%. 1000 milliLiter(s) IV Continuous <Continuous>  witch hazel Pads 1 Application(s) Topical every 12 hours      PRN MEDICATIONS  acetaminophen   Tablet .. 650 milliGRAM(s) Oral every 6 hours PRN  acetaminophen 325 mG/butalbital 50 mG/caffeine 40 mG 1 Tablet(s) Oral every 4 hours PRN  aluminum hydroxide/magnesium hydroxide/simethicone Suspension 30 milliLiter(s) Oral every 6 hours PRN  dextrose 40% Gel 15 Gram(s) Oral once PRN  diphenhydrAMINE   Injectable 25 milliGRAM(s) IV Push every 6 hours PRN  glucagon  Injectable 1 milliGRAM(s) IntraMuscular once PRN  sodium chloride 0.9% lock flush 10 milliLiter(s) IV Push every 1 hour PRN        Vital Signs Last 24 Hrs  T(C): 36.3 (31 May 2020 05:33), Max: 36.8 (30 May 2020 17:08)  T(F): 97.4 (31 May 2020 05:33), Max: 98.2 (30 May 2020 17:08)  HR: 81 (31 May 2020 05:33) (75 - 90)  BP: 132/70 (31 May 2020 05:33) (112/69 - 133/68)  BP(mean): --  RR: 18 (31 May 2020 05:33) (18 - 18)  SpO2: 99% (31 May 2020 05:33) (97% - 99%)    PHYSICAL EXAM  General: NAD  HEENT: PERRLA, EOMOI, clear oropharynx, anicteric sclera, pink conjunctiva  Neck: supple  CV: (+) S1/S2 RRR  Lungs: clear to auscultation, no wheezes or rales  Abdomen: soft, non-tender, non-distended (+) BS  Ext: no clubbing, cyanosis or edema  Skin: no rashes and no petechiae  Neuro: alert and oriented X 3, no focal deficits  Central Line:     RECENT CULTURES:        LABS:                        8.2    x     )-----------( 57       ( 31 May 2020 07:08 )             25.1         Mean Cell Volume : 84.8 fl  Mean Cell Hemoglobin : 27.7 pg  Mean Cell Hemoglobin Concentration : 32.7 gm/dL  Auto Neutrophil # : x  Auto Lymphocyte # : x  Auto Monocyte # : x  Auto Eosinophil # : x  Auto Basophil # : x  Auto Neutrophil % : x  Auto Lymphocyte % : x  Auto Monocyte % : x  Auto Eosinophil % : x  Auto Basophil % : x      05-30    139  |  103  |  13  ----------------------------<  132<H>  4.9   |  23  |  0.90    Ca    10.0      30 May 2020 20:14  Phos  1.8     05-30  Mg     2.2     05-30    TPro  7.9  /  Alb  4.7  /  TBili  0.3  /  DBili  x   /  AST  29  /  ALT  25  /  AlkPhos  95  05-30      Mg 2.2  Phos 1.8      PT/INR - ( 29 May 2020 18:38 )   PT: 13.8 sec;   INR: 1.20 ratio         PTT - ( 30 May 2020 20:11 )  PTT:29.4 sec    LDH --  Uric Acid 2.3        RADIOLOGY & ADDITIONAL STUDIES: Diagnosis: Acute Promyelocytic Leukemia     Protocol/Chemo Regimen: Tretinoin 40 mg BID, Arsenic 11 mg every 24 hours    Day:  ATRA day 9, Arsenic day 4    Pt endorsed: c/o scalp sensitivity, heat     Review of Systems: Patient denies chest pain, palpitations, SOB, abdominal pain, vomiting, diarrhea.     Pain scale: 0    Diet: Regular     Allergies: No Known Allergies    ANTIMICROBIALS  cefepime   IVPB 2000 milliGRAM(s) IV Intermittent every 8 hours  posaconazole DR Tablet 300 milliGRAM(s) Oral daily    HEME/ONC MEDICATIONS  arsenic trioxide IVPB (eMAR) 11 milliGRAM(s) IV Intermittent every 24 hours  tretinoin 40 milliGRAM(s) Oral every 12 hours    STANDING MEDICATIONS  allopurinol 300 milliGRAM(s) Oral daily  Biotene Dry Mouth Oral Rinse 5 milliLiter(s) Swish and Spit five times a day  bisacodyl 5 milliGRAM(s) Oral every 12 hours  chlorhexidine 4% Liquid 1 Application(s) Topical <User Schedule>  dextrose 5%. 1000 milliLiter(s) IV Continuous <Continuous>  dextrose 50% Injectable 12.5 Gram(s) IV Push once  dextrose 50% Injectable 25 Gram(s) IV Push once  dextrose 50% Injectable 25 Gram(s) IV Push once  hydrocortisone 2.5% Rectal Cream 1 Application(s) Rectal every 8 hours  insulin lispro (HumaLOG) corrective regimen sliding scale   SubCutaneous three times a day before meals  insulin lispro (HumaLOG) corrective regimen sliding scale   SubCutaneous at bedtime  pantoprazole    Tablet 40 milliGRAM(s) Oral before breakfast  polyethylene glycol 3350 17 Gram(s) Oral daily  sodium chloride 0.9%. 1000 milliLiter(s) IV Continuous <Continuous>  witch hazel Pads 1 Application(s) Topical every 12 hours    PRN MEDICATIONS  acetaminophen   Tablet .. 650 milliGRAM(s) Oral every 6 hours PRN  acetaminophen 325 mG/butalbital 50 mG/caffeine 40 mG 1 Tablet(s) Oral every 4 hours PRN  aluminum hydroxide/magnesium hydroxide/simethicone Suspension 30 milliLiter(s) Oral every 6 hours PRN  dextrose 40% Gel 15 Gram(s) Oral once PRN  diphenhydrAMINE   Injectable 25 milliGRAM(s) IV Push every 6 hours PRN  glucagon  Injectable 1 milliGRAM(s) IntraMuscular once PRN  sodium chloride 0.9% lock flush 10 milliLiter(s) IV Push every 1 hour PRN    Vital Signs Last 24 Hrs  T(C): 36.3 (31 May 2020 05:33), Max: 36.8 (30 May 2020 17:08)  T(F): 97.4 (31 May 2020 05:33), Max: 98.2 (30 May 2020 17:08)  HR: 81 (31 May 2020 05:33) (75 - 90)  BP: 132/70 (31 May 2020 05:33) (112/69 - 133/68)  BP(mean): --  RR: 18 (31 May 2020 05:33) (18 - 18)  SpO2: 99% (31 May 2020 05:33) (97% - 99%)    PHYSICAL EXAM  General: NAD  CV: (+) S1/S2 RRR  Lungs: clear to auscultation, no wheezes or rales  Abdomen: soft, non-tender, non-distended (+) BS  Ext: no edema  Skin: no rash  Neuro: alert and oriented X 3, no focal deficits  Central Line: PICC line, erythema + around the dressing site.    RECENT CULTURES:  Culture - Blood (05.23.20 @ 09:09)    Specimen Source: .Blood Blood-Venous    Culture Results:   No Growth Final    LABS:                         8.2    4.77  )-----------( 57       ( 31 May 2020 07:08 )             25.1   Mean Cell Volume : 84.8 fl  Mean Cell Hemoglobin : 27.7 pg  Mean Cell Hemoglobin Concentration : 32.7 gm/dL  Auto Neutrophil # : 1.10 K/uL  Auto Lymphocyte # : 1.34 K/uL  Auto Monocyte # : 1.57 K/uL  Auto Eosinophil # : 0.00 K/uL  Auto Basophil # : 0.00 K/uL  Auto Neutrophil % : 12.0 %  Auto Lymphocyte % : 28.0 %  Auto Monocyte % : 33.0 %  Auto Eosinophil % : 0.0 %  Auto Basophil % : 0.0 %    05-31    141  |  104  |  19  ----------------------------<  133<H>  4.5   |  22  |  0.89    Ca    10.3      31 May 2020 07:08  Phos  4.9     05-31  Mg     2.3     05-31    TPro  7.6  /  Alb  4.5  /  TBili  0.3  /  DBili  x   /  AST  27  /  ALT  28  /  AlkPhos  99  05-31  Mg 2.3  Phos 4.9  Mg 2.2  Phos 1.8  PT/INR - ( 29 May 2020 18:38 )   PT: 13.8 sec;   INR: 1.20 ratio    PTT - ( 31 May 2020 08:44 )  PTT:29.8 sec    Uric Acid 2.6  LDH --  Uric Acid 2.3    RADIOLOGY & ADDITIONAL STUDIES:  Xray Chest 1 View- PORTABLE-Urgent (05.30.20 @ 11:04) >  Right PICC tip within SVC.    Bandlike opacity within the left upper lung is unchanged. The lungs are otherwise clear.. Diagnosis: Acute Promyelocytic Leukemia     Protocol/Chemo Regimen: Tretinoin 40 mg BID, Arsenic 11 mg every 24 hours    Day:  ATRA day 9, Arsenic day 4    Patient speaks Persian, refused  service, patient's son used as a  as she refused  service.    Pt endorsed: c/o scalp sensitivity, heat     Review of Systems: Patient denies chest pain, palpitations, SOB, abdominal pain, vomiting, diarrhea.     Pain scale: 0    Diet: Regular     Allergies: No Known Allergies    ANTIMICROBIALS  cefepime   IVPB 2000 milliGRAM(s) IV Intermittent every 8 hours  posaconazole DR Tablet 300 milliGRAM(s) Oral daily    HEME/ONC MEDICATIONS  arsenic trioxide IVPB (eMAR) 11 milliGRAM(s) IV Intermittent every 24 hours  tretinoin 40 milliGRAM(s) Oral every 12 hours    STANDING MEDICATIONS  allopurinol 300 milliGRAM(s) Oral daily  Biotene Dry Mouth Oral Rinse 5 milliLiter(s) Swish and Spit five times a day  bisacodyl 5 milliGRAM(s) Oral every 12 hours  chlorhexidine 4% Liquid 1 Application(s) Topical <User Schedule>  dextrose 5%. 1000 milliLiter(s) IV Continuous <Continuous>  dextrose 50% Injectable 12.5 Gram(s) IV Push once  dextrose 50% Injectable 25 Gram(s) IV Push once  dextrose 50% Injectable 25 Gram(s) IV Push once  hydrocortisone 2.5% Rectal Cream 1 Application(s) Rectal every 8 hours  insulin lispro (HumaLOG) corrective regimen sliding scale   SubCutaneous three times a day before meals  insulin lispro (HumaLOG) corrective regimen sliding scale   SubCutaneous at bedtime  pantoprazole    Tablet 40 milliGRAM(s) Oral before breakfast  polyethylene glycol 3350 17 Gram(s) Oral daily  sodium chloride 0.9%. 1000 milliLiter(s) IV Continuous <Continuous>  witch hazel Pads 1 Application(s) Topical every 12 hours    PRN MEDICATIONS  acetaminophen   Tablet .. 650 milliGRAM(s) Oral every 6 hours PRN  acetaminophen 325 mG/butalbital 50 mG/caffeine 40 mG 1 Tablet(s) Oral every 4 hours PRN  aluminum hydroxide/magnesium hydroxide/simethicone Suspension 30 milliLiter(s) Oral every 6 hours PRN  dextrose 40% Gel 15 Gram(s) Oral once PRN  diphenhydrAMINE   Injectable 25 milliGRAM(s) IV Push every 6 hours PRN  glucagon  Injectable 1 milliGRAM(s) IntraMuscular once PRN  sodium chloride 0.9% lock flush 10 milliLiter(s) IV Push every 1 hour PRN    Vital Signs Last 24 Hrs  T(C): 36.3 (31 May 2020 05:33), Max: 36.8 (30 May 2020 17:08)  T(F): 97.4 (31 May 2020 05:33), Max: 98.2 (30 May 2020 17:08)  HR: 81 (31 May 2020 05:33) (75 - 90)  BP: 132/70 (31 May 2020 05:33) (112/69 - 133/68)  BP(mean): --  RR: 18 (31 May 2020 05:33) (18 - 18)  SpO2: 99% (31 May 2020 05:33) (97% - 99%)    PHYSICAL EXAM  General: NAD  CV: (+) S1/S2 RRR  Lungs: clear to auscultation, no wheezes or rales  Abdomen: soft, non-tender, non-distended (+) BS  Ext: no edema  Skin: no rash  Neuro: alert and oriented X 3, no focal deficits, speaks Bengalli.  Central Line: PICC line, erythema + around the dressing site.    RECENT CULTURES:  Culture - Blood (05.23.20 @ 09:09)    Specimen Source: .Blood Blood-Venous    Culture Results:   No Growth Final    LABS:                         8.2    4.77  )-----------( 57       ( 31 May 2020 07:08 )             25.1   Mean Cell Volume : 84.8 fl  Mean Cell Hemoglobin : 27.7 pg  Mean Cell Hemoglobin Concentration : 32.7 gm/dL  Auto Neutrophil # : 1.10 K/uL  Auto Lymphocyte # : 1.34 K/uL  Auto Monocyte # : 1.57 K/uL  Auto Eosinophil # : 0.00 K/uL  Auto Basophil # : 0.00 K/uL  Auto Neutrophil % : 12.0 %  Auto Lymphocyte % : 28.0 %  Auto Monocyte % : 33.0 %  Auto Eosinophil % : 0.0 %  Auto Basophil % : 0.0 %    05-31    141  |  104  |  19  ----------------------------<  133<H>  4.5   |  22  |  0.89    Ca    10.3      31 May 2020 07:08  Phos  4.9     05-31  Mg     2.3     05-31    TPro  7.6  /  Alb  4.5  /  TBili  0.3  /  DBili  x   /  AST  27  /  ALT  28  /  AlkPhos  99  05-31  Mg 2.3  Phos 4.9  Mg 2.2  Phos 1.8  PT/INR - ( 29 May 2020 18:38 )   PT: 13.8 sec;   INR: 1.20 ratio    PTT - ( 31 May 2020 08:44 )  PTT:29.8 sec    Uric Acid 2.6  LDH --  Uric Acid 2.3    RADIOLOGY & ADDITIONAL STUDIES:  Xray Chest 1 View- PORTABLE-Urgent (05.30.20 @ 11:04) >  Right PICC tip within SVC.    Bandlike opacity within the left upper lung is unchanged. The lungs are otherwise clear.. Diagnosis: Acute Promyelocytic Leukemia     Protocol/Chemo Regimen: Tretinoin 40 mg BID, Arsenic 11 mg every 24 hours    Day:  ATRA day 9, Arsenic day 4    Patient speaks Czech, refused  service, patient's son used as a .,    Pt endorsed: c/o scalp sensitivity, heat     Review of Systems: Patient denies chest pain, palpitations, SOB, abdominal pain, vomiting, diarrhea.     Pain scale: 0    Diet: Regular     Allergies: No Known Allergies    ANTIMICROBIALS  cefepime   IVPB 2000 milliGRAM(s) IV Intermittent every 8 hours  posaconazole DR Tablet 300 milliGRAM(s) Oral daily    HEME/ONC MEDICATIONS  arsenic trioxide IVPB (eMAR) 11 milliGRAM(s) IV Intermittent every 24 hours  tretinoin 40 milliGRAM(s) Oral every 12 hours    STANDING MEDICATIONS  allopurinol 300 milliGRAM(s) Oral daily  Biotene Dry Mouth Oral Rinse 5 milliLiter(s) Swish and Spit five times a day  bisacodyl 5 milliGRAM(s) Oral every 12 hours  chlorhexidine 4% Liquid 1 Application(s) Topical <User Schedule>  dextrose 5%. 1000 milliLiter(s) IV Continuous <Continuous>  dextrose 50% Injectable 12.5 Gram(s) IV Push once  dextrose 50% Injectable 25 Gram(s) IV Push once  dextrose 50% Injectable 25 Gram(s) IV Push once  hydrocortisone 2.5% Rectal Cream 1 Application(s) Rectal every 8 hours  insulin lispro (HumaLOG) corrective regimen sliding scale   SubCutaneous three times a day before meals  insulin lispro (HumaLOG) corrective regimen sliding scale   SubCutaneous at bedtime  pantoprazole    Tablet 40 milliGRAM(s) Oral before breakfast  polyethylene glycol 3350 17 Gram(s) Oral daily  sodium chloride 0.9%. 1000 milliLiter(s) IV Continuous <Continuous>  witch hazel Pads 1 Application(s) Topical every 12 hours    PRN MEDICATIONS  acetaminophen   Tablet .. 650 milliGRAM(s) Oral every 6 hours PRN  acetaminophen 325 mG/butalbital 50 mG/caffeine 40 mG 1 Tablet(s) Oral every 4 hours PRN  aluminum hydroxide/magnesium hydroxide/simethicone Suspension 30 milliLiter(s) Oral every 6 hours PRN  dextrose 40% Gel 15 Gram(s) Oral once PRN  diphenhydrAMINE   Injectable 25 milliGRAM(s) IV Push every 6 hours PRN  glucagon  Injectable 1 milliGRAM(s) IntraMuscular once PRN  sodium chloride 0.9% lock flush 10 milliLiter(s) IV Push every 1 hour PRN    Vital Signs Last 24 Hrs  T(C): 36.3 (31 May 2020 05:33), Max: 36.8 (30 May 2020 17:08)  T(F): 97.4 (31 May 2020 05:33), Max: 98.2 (30 May 2020 17:08)  HR: 81 (31 May 2020 05:33) (75 - 90)  BP: 132/70 (31 May 2020 05:33) (112/69 - 133/68)  BP(mean): --  RR: 18 (31 May 2020 05:33) (18 - 18)  SpO2: 99% (31 May 2020 05:33) (97% - 99%)    PHYSICAL EXAM  General: NAD  CV: (+) S1/S2 RRR  Lungs: clear to auscultation, no wheezes or rales  Abdomen: soft, non-tender, non-distended (+) BS  Ext: no edema  Skin: no rash  Neuro: alert and oriented X 3, no focal deficits, speaks Bengalli.  Central Line: PICC line, erythema + around the dressing site.    RECENT CULTURES:  Culture - Blood (05.23.20 @ 09:09)    Specimen Source: .Blood Blood-Venous    Culture Results:   No Growth Final    LABS:                         8.2    4.77  )-----------( 57       ( 31 May 2020 07:08 )             25.1   Mean Cell Volume : 84.8 fl  Mean Cell Hemoglobin : 27.7 pg  Mean Cell Hemoglobin Concentration : 32.7 gm/dL  Auto Neutrophil # : 1.10 K/uL  Auto Lymphocyte # : 1.34 K/uL  Auto Monocyte # : 1.57 K/uL  Auto Eosinophil # : 0.00 K/uL  Auto Basophil # : 0.00 K/uL  Auto Neutrophil % : 12.0 %  Auto Lymphocyte % : 28.0 %  Auto Monocyte % : 33.0 %  Auto Eosinophil % : 0.0 %  Auto Basophil % : 0.0 %    05-31    141  |  104  |  19  ----------------------------<  133<H>  4.5   |  22  |  0.89    Ca    10.3      31 May 2020 07:08  Phos  4.9     05-31  Mg     2.3     05-31    TPro  7.6  /  Alb  4.5  /  TBili  0.3  /  DBili  x   /  AST  27  /  ALT  28  /  AlkPhos  99  05-31  Mg 2.3  Phos 4.9  Mg 2.2  Phos 1.8  PT/INR - ( 29 May 2020 18:38 )   PT: 13.8 sec;   INR: 1.20 ratio    PTT - ( 31 May 2020 08:44 )  PTT:29.8 sec    Uric Acid 2.6  LDH --  Uric Acid 2.3    RADIOLOGY & ADDITIONAL STUDIES:  Xray Chest 1 View- PORTABLE-Urgent (05.30.20 @ 11:04) >  Right PICC tip within SVC.    Bandlike opacity within the left upper lung is unchanged. The lungs are otherwise clear.. Diagnosis: Acute Promyelocytic Leukemia     Protocol/Chemo Regimen: Tretinoin 40 mg BID, Arsenic 11 mg every 24 hours    Day:  ATRA day 9, Arsenic day 4    Patient speaks Persian, refused  service, patient's son used as a .,    Pt endorsed: c/o scalp sensitivity, heat     Review of Systems: Patient denies chest pain, palpitations, SOB, abdominal pain, vomiting, diarrhea.     Pain scale: 0    Diet: Regular     Allergies: No Known Allergies    ANTIMICROBIALS  cefepime   IVPB 2000 milliGRAM(s) IV Intermittent every 8 hours  posaconazole DR Tablet 300 milliGRAM(s) Oral daily    HEME/ONC MEDICATIONS  arsenic trioxide IVPB (eMAR) 11 milliGRAM(s) IV Intermittent every 24 hours  tretinoin 40 milliGRAM(s) Oral every 12 hours    STANDING MEDICATIONS  allopurinol 300 milliGRAM(s) Oral daily  Biotene Dry Mouth Oral Rinse 5 milliLiter(s) Swish and Spit five times a day  bisacodyl 5 milliGRAM(s) Oral every 12 hours  chlorhexidine 4% Liquid 1 Application(s) Topical <User Schedule>  dextrose 5%. 1000 milliLiter(s) IV Continuous <Continuous>  dextrose 50% Injectable 12.5 Gram(s) IV Push once  dextrose 50% Injectable 25 Gram(s) IV Push once  dextrose 50% Injectable 25 Gram(s) IV Push once  hydrocortisone 2.5% Rectal Cream 1 Application(s) Rectal every 8 hours  insulin lispro (HumaLOG) corrective regimen sliding scale   SubCutaneous three times a day before meals  insulin lispro (HumaLOG) corrective regimen sliding scale   SubCutaneous at bedtime  pantoprazole    Tablet 40 milliGRAM(s) Oral before breakfast  polyethylene glycol 3350 17 Gram(s) Oral daily  sodium chloride 0.9%. 1000 milliLiter(s) IV Continuous <Continuous>  witch hazel Pads 1 Application(s) Topical every 12 hours    PRN MEDICATIONS  acetaminophen   Tablet .. 650 milliGRAM(s) Oral every 6 hours PRN  acetaminophen 325 mG/butalbital 50 mG/caffeine 40 mG 1 Tablet(s) Oral every 4 hours PRN  aluminum hydroxide/magnesium hydroxide/simethicone Suspension 30 milliLiter(s) Oral every 6 hours PRN  dextrose 40% Gel 15 Gram(s) Oral once PRN  diphenhydrAMINE   Injectable 25 milliGRAM(s) IV Push every 6 hours PRN  glucagon  Injectable 1 milliGRAM(s) IntraMuscular once PRN  sodium chloride 0.9% lock flush 10 milliLiter(s) IV Push every 1 hour PRN    Vital Signs Last 24 Hrs  T(C): 36.3 (31 May 2020 05:33), Max: 36.8 (30 May 2020 17:08)  T(F): 97.4 (31 May 2020 05:33), Max: 98.2 (30 May 2020 17:08)  HR: 81 (31 May 2020 05:33) (75 - 90)  BP: 132/70 (31 May 2020 05:33) (112/69 - 133/68)  BP(mean): --  RR: 18 (31 May 2020 05:33) (18 - 18)  SpO2: 99% (31 May 2020 05:33) (97% - 99%)    PHYSICAL EXAM  General: NAD  CV: (+) S1/S2 RRR  Lungs: clear to auscultation, no wheezes or rales  Abdomen: soft, non-tender, non-distended (+) BS  Ext: no edema in right LE, LE trace edema +.  Skin: no rash  Neuro: alert and oriented X 3, no focal deficits, speaks Bengalli.  Central Line: PICC line, erythema + around the dressing site.    RECENT CULTURES:  Culture - Blood (05.23.20 @ 09:09)    Specimen Source: .Blood Blood-Venous    Culture Results:   No Growth Final    LABS:                         8.2    4.77  )-----------( 57       ( 31 May 2020 07:08 )             25.1   Mean Cell Volume : 84.8 fl  Mean Cell Hemoglobin : 27.7 pg  Mean Cell Hemoglobin Concentration : 32.7 gm/dL  Auto Neutrophil # : 1.10 K/uL  Auto Lymphocyte # : 1.34 K/uL  Auto Monocyte # : 1.57 K/uL  Auto Eosinophil # : 0.00 K/uL  Auto Basophil # : 0.00 K/uL  Auto Neutrophil % : 12.0 %  Auto Lymphocyte % : 28.0 %  Auto Monocyte % : 33.0 %  Auto Eosinophil % : 0.0 %  Auto Basophil % : 0.0 %    05-31    141  |  104  |  19  ----------------------------<  133<H>  4.5   |  22  |  0.89    Ca    10.3      31 May 2020 07:08  Phos  4.9     05-31  Mg     2.3     05-31    TPro  7.6  /  Alb  4.5  /  TBili  0.3  /  DBili  x   /  AST  27  /  ALT  28  /  AlkPhos  99  05-31  Mg 2.3  Phos 4.9  Mg 2.2  Phos 1.8  PT/INR - ( 29 May 2020 18:38 )   PT: 13.8 sec;   INR: 1.20 ratio    PTT - ( 31 May 2020 08:44 )  PTT:29.8 sec    Uric Acid 2.6  LDH --  Uric Acid 2.3    RADIOLOGY & ADDITIONAL STUDIES:  Xray Chest 1 View- PORTABLE-Urgent (05.30.20 @ 11:04) >  Right PICC tip within SVC.    Bandlike opacity within the left upper lung is unchanged. The lungs are otherwise clear..

## 2020-06-01 LAB
ALBUMIN SERPL ELPH-MCNC: 3.9 G/DL — SIGNIFICANT CHANGE UP (ref 3.3–5)
ALBUMIN SERPL ELPH-MCNC: 4 G/DL — SIGNIFICANT CHANGE UP (ref 3.3–5)
ALP SERPL-CCNC: 90 U/L — SIGNIFICANT CHANGE UP (ref 40–120)
ALP SERPL-CCNC: 91 U/L — SIGNIFICANT CHANGE UP (ref 40–120)
ALT FLD-CCNC: 27 U/L — SIGNIFICANT CHANGE UP (ref 10–45)
ALT FLD-CCNC: 30 U/L — SIGNIFICANT CHANGE UP (ref 10–45)
ANION GAP SERPL CALC-SCNC: 11 MMOL/L — SIGNIFICANT CHANGE UP (ref 5–17)
ANION GAP SERPL CALC-SCNC: 14 MMOL/L — SIGNIFICANT CHANGE UP (ref 5–17)
APTT BLD: 26 SEC — LOW (ref 27.5–36.3)
APTT BLD: 27.3 SEC — LOW (ref 27.5–36.3)
AST SERPL-CCNC: 31 U/L — SIGNIFICANT CHANGE UP (ref 10–40)
AST SERPL-CCNC: 32 U/L — SIGNIFICANT CHANGE UP (ref 10–40)
BASOPHILS # BLD AUTO: 0 K/UL — SIGNIFICANT CHANGE UP (ref 0–0.2)
BASOPHILS NFR BLD AUTO: 0 % — SIGNIFICANT CHANGE UP (ref 0–2)
BILIRUB SERPL-MCNC: 0.3 MG/DL — SIGNIFICANT CHANGE UP (ref 0.2–1.2)
BILIRUB SERPL-MCNC: 0.4 MG/DL — SIGNIFICANT CHANGE UP (ref 0.2–1.2)
BLASTS # FLD: 2 % — HIGH (ref 0–0)
BLD GP AB SCN SERPL QL: NEGATIVE — SIGNIFICANT CHANGE UP
BUN SERPL-MCNC: 13 MG/DL — SIGNIFICANT CHANGE UP (ref 7–23)
BUN SERPL-MCNC: 17 MG/DL — SIGNIFICANT CHANGE UP (ref 7–23)
CALCIUM SERPL-MCNC: 9.1 MG/DL — SIGNIFICANT CHANGE UP (ref 8.4–10.5)
CALCIUM SERPL-MCNC: 9.5 MG/DL — SIGNIFICANT CHANGE UP (ref 8.4–10.5)
CHLORIDE SERPL-SCNC: 106 MMOL/L — SIGNIFICANT CHANGE UP (ref 96–108)
CHLORIDE SERPL-SCNC: 107 MMOL/L — SIGNIFICANT CHANGE UP (ref 96–108)
CO2 SERPL-SCNC: 19 MMOL/L — LOW (ref 22–31)
CO2 SERPL-SCNC: 20 MMOL/L — LOW (ref 22–31)
CREAT SERPL-MCNC: 0.78 MG/DL — SIGNIFICANT CHANGE UP (ref 0.5–1.3)
CREAT SERPL-MCNC: 0.84 MG/DL — SIGNIFICANT CHANGE UP (ref 0.5–1.3)
D DIMER BLD IA.RAPID-MCNC: 1222 NG/ML DDU — HIGH
EOSINOPHIL # BLD AUTO: 0 K/UL — SIGNIFICANT CHANGE UP (ref 0–0.5)
EOSINOPHIL NFR BLD AUTO: 0 % — SIGNIFICANT CHANGE UP (ref 0–6)
FIBRINOGEN PPP-MCNC: 356 MG/DL — SIGNIFICANT CHANGE UP (ref 350–510)
FIBRINOGEN PPP-MCNC: 388 MG/DL — SIGNIFICANT CHANGE UP (ref 350–510)
GLUCOSE BLDC GLUCOMTR-MCNC: 115 MG/DL — HIGH (ref 70–99)
GLUCOSE BLDC GLUCOMTR-MCNC: 119 MG/DL — HIGH (ref 70–99)
GLUCOSE BLDC GLUCOMTR-MCNC: 140 MG/DL — HIGH (ref 70–99)
GLUCOSE BLDC GLUCOMTR-MCNC: 187 MG/DL — HIGH (ref 70–99)
GLUCOSE SERPL-MCNC: 140 MG/DL — HIGH (ref 70–99)
GLUCOSE SERPL-MCNC: 150 MG/DL — HIGH (ref 70–99)
HCT VFR BLD CALC: 20.7 % — CRITICAL LOW (ref 34.5–45)
HCT VFR BLD CALC: 23 % — LOW (ref 34.5–45)
HGB BLD-MCNC: 6.9 G/DL — CRITICAL LOW (ref 11.5–15.5)
HGB BLD-MCNC: 7.7 G/DL — LOW (ref 11.5–15.5)
INR BLD: 1.13 RATIO — SIGNIFICANT CHANGE UP (ref 0.88–1.16)
INR BLD: 1.21 RATIO — HIGH (ref 0.88–1.16)
LDH SERPL L TO P-CCNC: 286 U/L — HIGH (ref 50–242)
LYMPHOCYTES # BLD AUTO: 1.78 K/UL — SIGNIFICANT CHANGE UP (ref 1–3.3)
LYMPHOCYTES # BLD AUTO: 43 % — SIGNIFICANT CHANGE UP (ref 13–44)
MAGNESIUM SERPL-MCNC: 1.6 MG/DL — SIGNIFICANT CHANGE UP (ref 1.6–2.6)
MAGNESIUM SERPL-MCNC: 2 MG/DL — SIGNIFICANT CHANGE UP (ref 1.6–2.6)
MANUAL SMEAR VERIFICATION: SIGNIFICANT CHANGE UP
MCHC RBC-ENTMCNC: 28 PG — SIGNIFICANT CHANGE UP (ref 27–34)
MCHC RBC-ENTMCNC: 28.2 PG — SIGNIFICANT CHANGE UP (ref 27–34)
MCHC RBC-ENTMCNC: 33.3 GM/DL — SIGNIFICANT CHANGE UP (ref 32–36)
MCHC RBC-ENTMCNC: 33.5 GM/DL — SIGNIFICANT CHANGE UP (ref 32–36)
MCV RBC AUTO: 84.1 FL — SIGNIFICANT CHANGE UP (ref 80–100)
MCV RBC AUTO: 84.2 FL — SIGNIFICANT CHANGE UP (ref 80–100)
METAMYELOCYTES # FLD: 3 % — HIGH (ref 0–0)
MONOCYTES # BLD AUTO: 1.12 K/UL — HIGH (ref 0–0.9)
MONOCYTES NFR BLD AUTO: 27 % — HIGH (ref 2–14)
MYELOCYTES NFR BLD: 4 % — HIGH (ref 0–0)
NEUTROPHILS # BLD AUTO: 0.83 K/UL — LOW (ref 1.8–7.4)
NEUTROPHILS NFR BLD AUTO: 18 % — LOW (ref 43–77)
NEUTS BAND # BLD: 2 % — SIGNIFICANT CHANGE UP (ref 0–8)
NRBC # BLD: 0 /100 WBCS — SIGNIFICANT CHANGE UP (ref 0–0)
NRBC # BLD: 0 /100 — SIGNIFICANT CHANGE UP (ref 0–0)
PHOSPHATE SERPL-MCNC: 3 MG/DL — SIGNIFICANT CHANGE UP (ref 2.5–4.5)
PHOSPHATE SERPL-MCNC: 3.1 MG/DL — SIGNIFICANT CHANGE UP (ref 2.5–4.5)
PLAT MORPH BLD: NORMAL — SIGNIFICANT CHANGE UP
PLATELET # BLD AUTO: 22 K/UL — LOW (ref 150–400)
PLATELET # BLD AUTO: 25 K/UL — LOW (ref 150–400)
PLATELET # BLD AUTO: 29 K/UL — LOW (ref 150–400)
POTASSIUM SERPL-MCNC: 3.7 MMOL/L — SIGNIFICANT CHANGE UP (ref 3.5–5.3)
POTASSIUM SERPL-MCNC: 4.4 MMOL/L — SIGNIFICANT CHANGE UP (ref 3.5–5.3)
POTASSIUM SERPL-SCNC: 3.7 MMOL/L — SIGNIFICANT CHANGE UP (ref 3.5–5.3)
POTASSIUM SERPL-SCNC: 4.4 MMOL/L — SIGNIFICANT CHANGE UP (ref 3.5–5.3)
PROMYELOCYTES # FLD: 1 % — HIGH (ref 0–0)
PROT SERPL-MCNC: 6.7 G/DL — SIGNIFICANT CHANGE UP (ref 6–8.3)
PROT SERPL-MCNC: 6.9 G/DL — SIGNIFICANT CHANGE UP (ref 6–8.3)
PROTHROM AB SERPL-ACNC: 12.9 SEC — SIGNIFICANT CHANGE UP (ref 10–12.9)
PROTHROM AB SERPL-ACNC: 13.9 SEC — HIGH (ref 10–12.9)
RBC # BLD: 2.46 M/UL — LOW (ref 3.8–5.2)
RBC # BLD: 2.73 M/UL — LOW (ref 3.8–5.2)
RBC # FLD: 16.2 % — HIGH (ref 10.3–14.5)
RBC # FLD: 16.5 % — HIGH (ref 10.3–14.5)
RBC BLD AUTO: SIGNIFICANT CHANGE UP
RH IG SCN BLD-IMP: POSITIVE — SIGNIFICANT CHANGE UP
SODIUM SERPL-SCNC: 137 MMOL/L — SIGNIFICANT CHANGE UP (ref 135–145)
SODIUM SERPL-SCNC: 140 MMOL/L — SIGNIFICANT CHANGE UP (ref 135–145)
URATE SERPL-MCNC: 2.4 MG/DL — LOW (ref 2.5–7)
URATE SERPL-MCNC: 2.5 MG/DL — SIGNIFICANT CHANGE UP (ref 2.5–7)
WBC # BLD: 4.13 K/UL — SIGNIFICANT CHANGE UP (ref 3.8–10.5)
WBC # BLD: 4.47 K/UL — SIGNIFICANT CHANGE UP (ref 3.8–10.5)
WBC # FLD AUTO: 4.13 K/UL — SIGNIFICANT CHANGE UP (ref 3.8–10.5)
WBC # FLD AUTO: 4.47 K/UL — SIGNIFICANT CHANGE UP (ref 3.8–10.5)

## 2020-06-01 PROCEDURE — 99232 SBSQ HOSP IP/OBS MODERATE 35: CPT | Mod: GC

## 2020-06-01 RX ORDER — POTASSIUM CHLORIDE 20 MEQ
40 PACKET (EA) ORAL ONCE
Refills: 0 | Status: COMPLETED | OUTPATIENT
Start: 2020-06-01 | End: 2020-06-01

## 2020-06-01 RX ORDER — FUROSEMIDE 40 MG
40 TABLET ORAL ONCE
Refills: 0 | Status: COMPLETED | OUTPATIENT
Start: 2020-06-01 | End: 2020-06-01

## 2020-06-01 RX ORDER — MAGNESIUM SULFATE 500 MG/ML
1 VIAL (ML) INJECTION ONCE
Refills: 0 | Status: COMPLETED | OUTPATIENT
Start: 2020-06-01 | End: 2020-06-01

## 2020-06-01 RX ADMIN — TRETINOIN 40 MILLIGRAM(S): 10 CAPSULE, LIQUID FILLED ORAL at 17:32

## 2020-06-01 RX ADMIN — CEFEPIME 100 MILLIGRAM(S): 1 INJECTION, POWDER, FOR SOLUTION INTRAMUSCULAR; INTRAVENOUS at 23:44

## 2020-06-01 RX ADMIN — Medication 5 MILLILITER(S): at 15:43

## 2020-06-01 RX ADMIN — Medication 1 TABLET(S): at 00:05

## 2020-06-01 RX ADMIN — CEFEPIME 100 MILLIGRAM(S): 1 INJECTION, POWDER, FOR SOLUTION INTRAMUSCULAR; INTRAVENOUS at 17:32

## 2020-06-01 RX ADMIN — Medication 5 MILLILITER(S): at 08:25

## 2020-06-01 RX ADMIN — Medication 1 APPLICATION(S): at 13:25

## 2020-06-01 RX ADMIN — CEFEPIME 100 MILLIGRAM(S): 1 INJECTION, POWDER, FOR SOLUTION INTRAMUSCULAR; INTRAVENOUS at 00:43

## 2020-06-01 RX ADMIN — Medication 650 MILLIGRAM(S): at 11:09

## 2020-06-01 RX ADMIN — Medication 40 MILLIGRAM(S): at 13:57

## 2020-06-01 RX ADMIN — POSACONAZOLE 300 MILLIGRAM(S): 100 TABLET, DELAYED RELEASE ORAL at 11:09

## 2020-06-01 RX ADMIN — CHLORHEXIDINE GLUCONATE 1 APPLICATION(S): 213 SOLUTION TOPICAL at 08:25

## 2020-06-01 RX ADMIN — TRETINOIN 40 MILLIGRAM(S): 10 CAPSULE, LIQUID FILLED ORAL at 05:41

## 2020-06-01 RX ADMIN — SODIUM CHLORIDE 25 MILLILITER(S): 9 INJECTION INTRAMUSCULAR; INTRAVENOUS; SUBCUTANEOUS at 08:25

## 2020-06-01 RX ADMIN — CEFEPIME 100 MILLIGRAM(S): 1 INJECTION, POWDER, FOR SOLUTION INTRAMUSCULAR; INTRAVENOUS at 08:25

## 2020-06-01 RX ADMIN — SODIUM CHLORIDE 25 MILLILITER(S): 9 INJECTION INTRAMUSCULAR; INTRAVENOUS; SUBCUTANEOUS at 05:39

## 2020-06-01 RX ADMIN — Medication 5 MILLILITER(S): at 11:09

## 2020-06-01 RX ADMIN — ARSENIC TRIOXIDE 130.5 MILLIGRAM(S): 2 INJECTION, SOLUTION INTRAVENOUS at 17:42

## 2020-06-01 RX ADMIN — Medication 300 MILLIGRAM(S): at 11:09

## 2020-06-01 RX ADMIN — Medication 5 MILLILITER(S): at 20:05

## 2020-06-01 RX ADMIN — Medication 100 GRAM(S): at 21:53

## 2020-06-01 RX ADMIN — AER TRAVELER 1 APPLICATION(S): 0.5 SOLUTION RECTAL; TOPICAL at 07:50

## 2020-06-01 RX ADMIN — Medication 5 MILLIGRAM(S): at 21:51

## 2020-06-01 RX ADMIN — Medication 5 MILLILITER(S): at 23:45

## 2020-06-01 RX ADMIN — PANTOPRAZOLE SODIUM 40 MILLIGRAM(S): 20 TABLET, DELAYED RELEASE ORAL at 05:41

## 2020-06-01 RX ADMIN — AER TRAVELER 1 APPLICATION(S): 0.5 SOLUTION RECTAL; TOPICAL at 17:33

## 2020-06-01 RX ADMIN — Medication 5 MILLILITER(S): at 00:44

## 2020-06-01 RX ADMIN — SODIUM CHLORIDE 25 MILLILITER(S): 9 INJECTION INTRAMUSCULAR; INTRAVENOUS; SUBCUTANEOUS at 21:47

## 2020-06-01 RX ADMIN — Medication 1 APPLICATION(S): at 05:41

## 2020-06-01 RX ADMIN — Medication 25 MILLIGRAM(S): at 11:10

## 2020-06-01 RX ADMIN — Medication 1: at 11:32

## 2020-06-01 RX ADMIN — Medication 40 MILLIEQUIVALENT(S): at 21:53

## 2020-06-01 NOTE — PROGRESS NOTE ADULT - PROBLEM SELECTOR PLAN 6
Erythema around PICC dressing.  Warm compress.  F/u doppler. Erythema around PICC dressing.  Warm compress.  F/u doppler (-).

## 2020-06-01 NOTE — DIETITIAN INITIAL EVALUATION ADULT. - PHYSICAL APPEARANCE
well nourished/other (specify) Skin: no pressure injuries   Edema: none at this time     ht: 62", wt: 165pounds, BMI: 30.2kg/m2, IBW: 110 pounds +/- 10%

## 2020-06-01 NOTE — PROGRESS NOTE ADULT - ATTENDING COMMENTS
61 y/o Mongolian speaking female who is transferred for newly diagnosed APL.  Presented with pancytopenia, flow cytometry/bone marrow biopsy/FISH from 5/26- consistent with acute promyelocytic leukemia with PML-AWA translocation.    She was started on ATRA 5/23- day 9, GALA 5/27- day 5.  Monitor carefully for signs of differentiation syndrome.   Check CBC, coags/fibrinogen twice daily.  Keep plt >50, fibrinogen >150.  Monitor lytes daily, keep K>4, Mg>2.  EKG twice a week  If any signs of differentiation syndrome, would hold ATRA/GALA and begin dexamethasone 10 mg bid.    Headaches likely due to ATRA, CT head no contrast on 5/22 with no bleeding.  No more headaches, just a sensation of heat on the top of the head. MRI was not suggestive of any bleeding. Heat on the head is worse with platelet transfusion, will try benadryl prior to platelets. Fioricet today which patient reports has helped.   Discussed with patient's son over the phone at length regarding diagnosis/treatment. 59 y/o Faroese speaking F who is transferred for newly diagnosed low/interm risk APL.  Presented with pancytopenia, flow cytometry/bone marrow biopsy/FISH from 5/26- consistent with acute promyelocytic leukemia with PML-AWA translocation.    She was started on ATRA 5/23- day +10, GALA 5/27- day +6    -monitor carefully for signs of differentiation syndrome.   -Check CBC, coags/fibrinogen twice daily.  Keep plt >50, fibrinogen >150.  Monitor lytes daily, keep K>4, Mg>2.  EKG twice a week  -Headaches likely due to ATRA, CT head no contrast on 5/22 with no bleeding.  No more headaches, just a sensation of heat on the top of the head. MRI was not suggestive of any bleeding. Heat on the head is worse with platelet transfusion, will try benadryl prior to platelets. Fioricet today which patient reports has helped.   -cont Cefepime/Posaconazole

## 2020-06-01 NOTE — DIETITIAN INITIAL EVALUATION ADULT. - FACTORS AFF FOOD INTAKE
pt reports she has been eating well in house; son confirms the same, states pt has been enjoying variety of meals in house, has been assisted by Diet Technicians; denies any chewing/swallowing issues; noted last BM 5/31; pt observes halal dietary laws

## 2020-06-01 NOTE — DIETITIAN INITIAL EVALUATION ADULT. - PROBLEM SELECTOR PLAN 2
- today  -Afebrile over 24hrs, on cefepime for neutropenic fever.  -C/w cefepime 2gr q8hr, blood cxs so far (-)  -Will start Posaconazole   -COVID-19 (-) 5/22, will check another COVID PCR today

## 2020-06-01 NOTE — DIETITIAN INITIAL EVALUATION ADULT. - REASON INDICATOR FOR ASSESSMENT
Pt seen for LOS.  Source: pt, spoke c pt in [facility], pt sleepy at this time as she recently received Benadryl in preparation for blood transfusion, preferred for RD to speak c sonJoseluis on the phone.     Pt admitted to [facility] c fever, malaise, pt c APL, undergoing chemo at this time.

## 2020-06-01 NOTE — PROGRESS NOTE ADULT - PROBLEM SELECTOR PLAN 1
Bone Marrow Biopsy 5/26 at [facility] (+) for acute promyelocytic leukemia  continue tretinoin 40mg BID (started 5/22)  continue Arsenic Trioxide starting (started on 5/27)  Monitor for differentiation syndrome  Check EKG for QTc every Tuesday, Friday  continue Allopurinol 300mg PO daily  Follow up CBC w diff, CMP, TLS, DIC labs BID  Keep PLTs > 50,000, Fibrinogen > 150  5/29 occipital hyperthermia - follow up MR of the brain with contrast. (-) Bone Marrow Biopsy 5/26 at [facility] (+) for acute promyelocytic leukemia  continue tretinoin 40mg BID (started 5/22)  continue Arsenic Trioxide starting (started on 5/27)  Monitor for differentiation syndrome, if WBC continue to rise >10K, start Hydrea.  Check EKG for QTc every Tuesday, Friday  continue Allopurinol 300mg PO daily  Follow up CBC w diff, CMP, TLS, DIC labs BID  Keep PLTs > 50,000, Fibrinogen > 150  5/29 occipital hyperthermia - follow up MR of the brain with contrast. (-)  Daily weights- today- 78.1 kg gained 4 kgs from admission weight, Lasix 40 mg IV x1 dose given in between blood products.

## 2020-06-01 NOTE — DIETITIAN INITIAL EVALUATION ADULT. - OTHER INFO
Son reports pt c some decreased intake about one month ago when pt had COVID-19 and then once she was better and home she was eating better, states she had some decrease in intake right before admission for a few days but otherwise she was eating well. Son unable to provide usual intake at this time, reports pt was checking her Finger sticks at home and usually her Finger sticks were under control. Noted A1C of 7% indicating overall good glycemic control.     Son reports NKFA. States pt was likely taking multivitamin at home. Per chart, pt was also on lactobacillus at home.     Son reports pt c stable wt PTA but unsure of exact wt, noted admit wt of 165 pounds, and most recent wt of 172.1 pounds, likely some increase due to fluids, would monitor.

## 2020-06-01 NOTE — DIETITIAN INITIAL EVALUATION ADULT. - ADD RECOMMEND
Encouraged continued good PO intake, balanced meals and importance of adequate intake at this time. Pt aware RD remains available as needed for further nutrition interventions.

## 2020-06-01 NOTE — PROGRESS NOTE ADULT - SUBJECTIVE AND OBJECTIVE BOX
Diagnosis:    Protocol/Chemo Regimen:    Day:     Pt endorsed:    Review of Systems:     Pain scale:     Diet:     Allergies    No Known Allergies    Intolerances        ANTIMICROBIALS  cefepime   IVPB 2000 milliGRAM(s) IV Intermittent every 8 hours  posaconazole DR Tablet 300 milliGRAM(s) Oral daily      HEME/ONC MEDICATIONS  arsenic trioxide IVPB (eMAR) 11 milliGRAM(s) IV Intermittent every 24 hours  tretinoin 40 milliGRAM(s) Oral every 12 hours      STANDING MEDICATIONS  allopurinol 300 milliGRAM(s) Oral daily  Biotene Dry Mouth Oral Rinse 5 milliLiter(s) Swish and Spit five times a day  bisacodyl 5 milliGRAM(s) Oral every 12 hours  chlorhexidine 4% Liquid 1 Application(s) Topical <User Schedule>  dextrose 5%. 1000 milliLiter(s) IV Continuous <Continuous>  dextrose 50% Injectable 12.5 Gram(s) IV Push once  dextrose 50% Injectable 25 Gram(s) IV Push once  dextrose 50% Injectable 25 Gram(s) IV Push once  hydrocortisone 2.5% Rectal Cream 1 Application(s) Rectal every 8 hours  insulin lispro (HumaLOG) corrective regimen sliding scale   SubCutaneous three times a day before meals  insulin lispro (HumaLOG) corrective regimen sliding scale   SubCutaneous at bedtime  pantoprazole    Tablet 40 milliGRAM(s) Oral before breakfast  polyethylene glycol 3350 17 Gram(s) Oral daily  sodium chloride 0.9%. 1000 milliLiter(s) IV Continuous <Continuous>  witch hazel Pads 1 Application(s) Topical every 12 hours      PRN MEDICATIONS  acetaminophen   Tablet .. 650 milliGRAM(s) Oral every 6 hours PRN  acetaminophen 325 mG/butalbital 50 mG/caffeine 40 mG 1 Tablet(s) Oral every 4 hours PRN  aluminum hydroxide/magnesium hydroxide/simethicone Suspension 30 milliLiter(s) Oral every 6 hours PRN  dextrose 40% Gel 15 Gram(s) Oral once PRN  diphenhydrAMINE   Injectable 25 milliGRAM(s) IV Push every 6 hours PRN  glucagon  Injectable 1 milliGRAM(s) IntraMuscular once PRN  petrolatum white Ointment 1 Application(s) Topical two times a day PRN  sodium chloride 0.9% lock flush 10 milliLiter(s) IV Push every 1 hour PRN        Vital Signs Last 24 Hrs  T(C): 36.8 (01 Jun 2020 05:08), Max: 37.1 (31 May 2020 22:05)  T(F): 98.2 (01 Jun 2020 05:08), Max: 98.7 (31 May 2020 22:05)  HR: 84 (01 Jun 2020 05:08) (84 - 95)  BP: 134/69 (01 Jun 2020 05:08) (115/53 - 148/70)  BP(mean): --  RR: 18 (01 Jun 2020 05:08) (18 - 18)  SpO2: 97% (01 Jun 2020 05:08) (96% - 99%)    PHYSICAL EXAM  General: NAD  HEENT: PERRLA, EOMOI, clear oropharynx, anicteric sclera, pink conjunctiva  Neck: supple  CV: (+) S1/S2 RRR  Lungs: clear to auscultation, no wheezes or rales  Abdomen: soft, non-tender, non-distended (+) BS  Ext: no clubbing, cyanosis or edema  Skin: no rashes and no petechiae  Neuro: alert and oriented X 3, no focal deficits  Central Line:     RECENT CULTURES:        LABS:                        7.5    5.06  )-----------( 45       ( 31 May 2020 18:25 )             23.0         Mean Cell Volume : 84.2 fl  Mean Cell Hemoglobin : 27.5 pg  Mean Cell Hemoglobin Concentration : 32.6 gm/dL  Auto Neutrophil # : x  Auto Lymphocyte # : x  Auto Monocyte # : x  Auto Eosinophil # : x  Auto Basophil # : x  Auto Neutrophil % : x  Auto Lymphocyte % : x  Auto Monocyte % : x  Auto Eosinophil % : x  Auto Basophil % : x      05-31    135  |  104  |  15  ----------------------------<  210<H>  4.1   |  21<L>  |  0.79    Ca    9.9      31 May 2020 18:25  Phos  2.8     05-31  Mg     2.0     05-31    TPro  7.7  /  Alb  4.4  /  TBili  0.3  /  DBili  x   /  AST  28  /  ALT  28  /  AlkPhos  104  05-31      Mg 2.0  Phos 2.8      PT/INR - ( 31 May 2020 18:25 )   PT: 13.2 sec;   INR: 1.15 ratio         PTT - ( 31 May 2020 18:25 )  PTT:29.4 sec    LDH --  Uric Acid 2.3        RADIOLOGY & ADDITIONAL STUDIES: Diagnosis: Acute Promyelocytic Leukemia     Protocol/Chemo Regimen: Tretinoin 40 mg BID, Arsenic 11 mg every 24 hours    Day:  ATRA day 10, Arsenic day 5    Patient speaks Upper sorbian, refused  service, patient's son used as a .,    Pt endorsed:  scalp sensitivity/pain is resolved    Review of Systems: Patient denies chest pain, palpitations, SOB, abdominal pain, vomiting, diarrhea.     Pain scale: 0    Diet: Regular     Allergies: No Known Allergies    ANTIMICROBIALS  cefepime   IVPB 2000 milliGRAM(s) IV Intermittent every 8 hours  posaconazole DR Tablet 300 milliGRAM(s) Oral daily      HEME/ONC MEDICATIONS  arsenic trioxide IVPB (eMAR) 11 milliGRAM(s) IV Intermittent every 24 hours  tretinoin 40 milliGRAM(s) Oral every 12 hours      STANDING MEDICATIONS  allopurinol 300 milliGRAM(s) Oral daily  Biotene Dry Mouth Oral Rinse 5 milliLiter(s) Swish and Spit five times a day  bisacodyl 5 milliGRAM(s) Oral every 12 hours  chlorhexidine 4% Liquid 1 Application(s) Topical <User Schedule>  dextrose 5%. 1000 milliLiter(s) IV Continuous <Continuous>  dextrose 50% Injectable 12.5 Gram(s) IV Push once  dextrose 50% Injectable 25 Gram(s) IV Push once  dextrose 50% Injectable 25 Gram(s) IV Push once  hydrocortisone 2.5% Rectal Cream 1 Application(s) Rectal every 8 hours  insulin lispro (HumaLOG) corrective regimen sliding scale   SubCutaneous three times a day before meals  insulin lispro (HumaLOG) corrective regimen sliding scale   SubCutaneous at bedtime  pantoprazole    Tablet 40 milliGRAM(s) Oral before breakfast  polyethylene glycol 3350 17 Gram(s) Oral daily  sodium chloride 0.9%. 1000 milliLiter(s) IV Continuous <Continuous>  witch hazel Pads 1 Application(s) Topical every 12 hours      PRN MEDICATIONS  acetaminophen   Tablet .. 650 milliGRAM(s) Oral every 6 hours PRN  acetaminophen 325 mG/butalbital 50 mG/caffeine 40 mG 1 Tablet(s) Oral every 4 hours PRN  aluminum hydroxide/magnesium hydroxide/simethicone Suspension 30 milliLiter(s) Oral every 6 hours PRN  dextrose 40% Gel 15 Gram(s) Oral once PRN  diphenhydrAMINE   Injectable 25 milliGRAM(s) IV Push every 6 hours PRN  glucagon  Injectable 1 milliGRAM(s) IntraMuscular once PRN  petrolatum white Ointment 1 Application(s) Topical two times a day PRN  sodium chloride 0.9% lock flush 10 milliLiter(s) IV Push every 1 hour PRN        Vital Signs Last 24 Hrs  T(C): 36.8 (01 Jun 2020 05:08), Max: 37.1 (31 May 2020 22:05)  T(F): 98.2 (01 Jun 2020 05:08), Max: 98.7 (31 May 2020 22:05)  HR: 84 (01 Jun 2020 05:08) (84 - 95)  BP: 134/69 (01 Jun 2020 05:08) (115/53 - 148/70)  BP(mean): --  RR: 18 (01 Jun 2020 05:08) (18 - 18)  SpO2: 97% (01 Jun 2020 05:08) (96% - 99%)    PHYSICAL EXAM  General: NAD  CV: (+) S1/S2 RRR  Lungs: clear to auscultation, no wheezes or rales  Abdomen: soft, non-tender, non-distended (+) BS  Ext: no edema in right LE, LE trace edema +.  Skin: no rash  Neuro: alert and oriented X 3, no focal deficits, speaks Bengalli.  Central Line: PICC line, erythema + around the dressing site.    RECENT CULTURES:        LABS:                        7.5    5.06  )-----------( 45       ( 31 May 2020 18:25 )             23.0         Mean Cell Volume : 84.2 fl  Mean Cell Hemoglobin : 27.5 pg  Mean Cell Hemoglobin Concentration : 32.6 gm/dL  Auto Neutrophil # : x  Auto Lymphocyte # : x  Auto Monocyte # : x  Auto Eosinophil # : x  Auto Basophil # : x  Auto Neutrophil % : x  Auto Lymphocyte % : x  Auto Monocyte % : x  Auto Eosinophil % : x  Auto Basophil % : x      05-31    135  |  104  |  15  ----------------------------<  210<H>  4.1   |  21<L>  |  0.79    Ca    9.9      31 May 2020 18:25  Phos  2.8     05-31  Mg     2.0     05-31    TPro  7.7  /  Alb  4.4  /  TBili  0.3  /  DBili  x   /  AST  28  /  ALT  28  /  AlkPhos  104  05-31      Mg 2.0  Phos 2.8      PT/INR - ( 31 May 2020 18:25 )   PT: 13.2 sec;   INR: 1.15 ratio         PTT - ( 31 May 2020 18:25 )  PTT:29.4 sec    LDH --  Uric Acid 2.3        RADIOLOGY & ADDITIONAL STUDIES: Diagnosis: Acute Promyelocytic Leukemia     Protocol/Chemo Regimen: Tretinoin 40 mg BID, Arsenic 11 mg every 24 hours    Day:  ATRA day 10, Arsenic day 5    Patient speaks Polish, refused  service, patient's son used as a .,    Pt endorsed:  scalp sensitivity/pain is resolved    Review of Systems: Patient denies chest pain, palpitations, SOB, abdominal pain, vomiting, diarrhea.     Pain scale: 0    Diet: Regular     Allergies: No Known Allergies    ANTIMICROBIALS  cefepime   IVPB 2000 milliGRAM(s) IV Intermittent every 8 hours  posaconazole DR Tablet 300 milliGRAM(s) Oral daily      HEME/ONC MEDICATIONS  arsenic trioxide IVPB (eMAR) 11 milliGRAM(s) IV Intermittent every 24 hours  tretinoin 40 milliGRAM(s) Oral every 12 hours      STANDING MEDICATIONS  allopurinol 300 milliGRAM(s) Oral daily  Biotene Dry Mouth Oral Rinse 5 milliLiter(s) Swish and Spit five times a day  bisacodyl 5 milliGRAM(s) Oral every 12 hours  chlorhexidine 4% Liquid 1 Application(s) Topical <User Schedule>  dextrose 5%. 1000 milliLiter(s) IV Continuous <Continuous>  dextrose 50% Injectable 12.5 Gram(s) IV Push once  dextrose 50% Injectable 25 Gram(s) IV Push once  dextrose 50% Injectable 25 Gram(s) IV Push once  hydrocortisone 2.5% Rectal Cream 1 Application(s) Rectal every 8 hours  insulin lispro (HumaLOG) corrective regimen sliding scale   SubCutaneous three times a day before meals  insulin lispro (HumaLOG) corrective regimen sliding scale   SubCutaneous at bedtime  pantoprazole    Tablet 40 milliGRAM(s) Oral before breakfast  polyethylene glycol 3350 17 Gram(s) Oral daily  sodium chloride 0.9%. 1000 milliLiter(s) IV Continuous <Continuous>  witch hazel Pads 1 Application(s) Topical every 12 hours      PRN MEDICATIONS  acetaminophen   Tablet .. 650 milliGRAM(s) Oral every 6 hours PRN  acetaminophen 325 mG/butalbital 50 mG/caffeine 40 mG 1 Tablet(s) Oral every 4 hours PRN  aluminum hydroxide/magnesium hydroxide/simethicone Suspension 30 milliLiter(s) Oral every 6 hours PRN  dextrose 40% Gel 15 Gram(s) Oral once PRN  diphenhydrAMINE   Injectable 25 milliGRAM(s) IV Push every 6 hours PRN  glucagon  Injectable 1 milliGRAM(s) IntraMuscular once PRN  petrolatum white Ointment 1 Application(s) Topical two times a day PRN  sodium chloride 0.9% lock flush 10 milliLiter(s) IV Push every 1 hour PRN        Vital Signs Last 24 Hrs  T(C): 36.8 (01 Jun 2020 05:08), Max: 37.1 (31 May 2020 22:05)  T(F): 98.2 (01 Jun 2020 05:08), Max: 98.7 (31 May 2020 22:05)  HR: 84 (01 Jun 2020 05:08) (84 - 95)  BP: 134/69 (01 Jun 2020 05:08) (115/53 - 148/70)  BP(mean): --  RR: 18 (01 Jun 2020 05:08) (18 - 18)  SpO2: 97% (01 Jun 2020 05:08) (96% - 99%)    PHYSICAL EXAM  General: NAD  CV: (+) S1/S2 RRR  Lungs: clear to auscultation, no wheezes or rales  Abdomen: soft, non-tender, non-distended (+) BS  Ext: no edema in right LE, LE trace edema +.  Skin: no rash  Neuro: alert and oriented X 3, no focal deficits, speaks Bengalli.  Central Line: PICC line, erythema + around the dressing site.    RECENT CULTURES:  Culture - Blood (05.23.20 @ 09:09)    Specimen Source: .Blood Blood-Venous    Culture Results:   No Growth Final    Culture - Blood (05.23.20 @ 09:09)    Specimen Source: .Blood Blood-Peripheral    Culture Results:   No Growth Final    LABS:                          6.9    4.13  )-----------( 29       ( 01 Jun 2020 07:17 )             20.7   Mean Cell Volume : 84.1 fl  Mean Cell Hemoglobin : 28.0 pg  Mean Cell Hemoglobin Concentration : 33.3 gm/dL  Auto Neutrophil # : 0.83 K/uL  Auto Lymphocyte # : 1.78 K/uL  Auto Monocyte # : 1.12 K/uL  Auto Eosinophil # : 0.00 K/uL  Auto Basophil # : 0.00 K/uL  Auto Neutrophil % : 18.0 %  Auto Lymphocyte % : 43.0 %  Auto Monocyte % : 27.0 %  Auto Eosinophil % : 0.0 %  Auto Basophil % : 0.0 %      06-01    137  |  106  |  17  ----------------------------<  140<H>  4.4   |  20<L>  |  0.84    Ca    9.5      01 Jun 2020 07:15  Phos  3.1     06-01  Mg     2.0     06-01    TPro  6.9  /  Alb  4.0  /  TBili  0.3  /  DBili  x   /  AST  32  /  ALT  27  /  AlkPhos  91  06-01  Mg 2.0  Phos 3.1  Mg 2.0  Phos 2.8  PT/INR - ( 01 Jun 2020 07:17 )   PT: 12.9 sec;   INR: 1.13 ratio    PTT - ( 01 Jun 2020 07:17 )  PTT:26.0 sec    Uric Acid 2.5    LDH --  Uric Acid 2.3    RADIOLOGY & ADDITIONAL STUDIES:   VA Duplex Upper Ext Vein Scan, Right (05.31.20 @ 14:47) >  No evidence ofdeep venous thrombosis in the visualized right upper extremity. Diagnosis: Acute Promyelocytic Leukemia     Protocol/Chemo Regimen: Tretinoin 40 mg BID, Arsenic 11 mg every 24 hours    Day:  ATRA day 10, Arsenic day 6    Patient speaks Arabic, refused  service, patient's son used as a .,    Pt endorsed:  scalp sensitivity/ heat sensation on top of the head now resolved    Review of Systems: Patient denies chest pain, palpitations, SOB, abdominal pain, vomiting, diarrhea.     Pain scale: 0    Diet: Regular     Allergies: No Known Allergies    ANTIMICROBIALS  cefepime   IVPB 2000 milliGRAM(s) IV Intermittent every 8 hours  posaconazole DR Tablet 300 milliGRAM(s) Oral daily    HEME/ONC MEDICATIONS  arsenic trioxide IVPB (eMAR) 11 milliGRAM(s) IV Intermittent every 24 hours  tretinoin 40 milliGRAM(s) Oral every 12 hours    STANDING MEDICATIONS  allopurinol 300 milliGRAM(s) Oral daily  Biotene Dry Mouth Oral Rinse 5 milliLiter(s) Swish and Spit five times a day  bisacodyl 5 milliGRAM(s) Oral every 12 hours  chlorhexidine 4% Liquid 1 Application(s) Topical <User Schedule>  dextrose 5%. 1000 milliLiter(s) IV Continuous <Continuous>  dextrose 50% Injectable 12.5 Gram(s) IV Push once  dextrose 50% Injectable 25 Gram(s) IV Push once  dextrose 50% Injectable 25 Gram(s) IV Push once  hydrocortisone 2.5% Rectal Cream 1 Application(s) Rectal every 8 hours  insulin lispro (HumaLOG) corrective regimen sliding scale   SubCutaneous three times a day before meals  insulin lispro (HumaLOG) corrective regimen sliding scale   SubCutaneous at bedtime  pantoprazole    Tablet 40 milliGRAM(s) Oral before breakfast  polyethylene glycol 3350 17 Gram(s) Oral daily  sodium chloride 0.9%. 1000 milliLiter(s) IV Continuous <Continuous>  witch hazel Pads 1 Application(s) Topical every 12 hours    PRN MEDICATIONS  acetaminophen   Tablet .. 650 milliGRAM(s) Oral every 6 hours PRN  acetaminophen 325 mG/butalbital 50 mG/caffeine 40 mG 1 Tablet(s) Oral every 4 hours PRN  aluminum hydroxide/magnesium hydroxide/simethicone Suspension 30 milliLiter(s) Oral every 6 hours PRN  dextrose 40% Gel 15 Gram(s) Oral once PRN  diphenhydrAMINE   Injectable 25 milliGRAM(s) IV Push every 6 hours PRN  glucagon  Injectable 1 milliGRAM(s) IntraMuscular once PRN  petrolatum white Ointment 1 Application(s) Topical two times a day PRN  sodium chloride 0.9% lock flush 10 milliLiter(s) IV Push every 1 hour PRN    Vital Signs Last 24 Hrs  T(C): 36.8 (01 Jun 2020 05:08), Max: 37.1 (31 May 2020 22:05)  T(F): 98.2 (01 Jun 2020 05:08), Max: 98.7 (31 May 2020 22:05)  HR: 84 (01 Jun 2020 05:08) (84 - 95)  BP: 134/69 (01 Jun 2020 05:08) (115/53 - 148/70)  BP(mean): --  RR: 18 (01 Jun 2020 05:08) (18 - 18)  SpO2: 97% (01 Jun 2020 05:08) (96% - 99%)    PHYSICAL EXAM  General: NAD  CV: (+) S1/S2 RRR  Lungs: clear to auscultation, no wheezes or rales  Abdomen: soft, non-tender, non-distended (+) BS  Ext: no edema in right LE, LE trace edema +.  Skin: no rash  Neuro: alert and oriented X 3, no focal deficits, speaks only Arabic  Central Line: PICC line, erythema + around the dressing site.    RECENT CULTURES:  Culture - Blood (05.23.20 @ 09:09)    Specimen Source: .Blood Blood-Venous    Culture Results:   No Growth Final    Culture - Blood (05.23.20 @ 09:09)    Specimen Source: .Blood Blood-Peripheral    Culture Results:   No Growth Final    LABS:                          6.9    4.13  )-----------( 29       ( 01 Jun 2020 07:17 )             20.7   Mean Cell Volume : 84.1 fl  Mean Cell Hemoglobin : 28.0 pg  Mean Cell Hemoglobin Concentration : 33.3 gm/dL  Auto Neutrophil # : 0.83 K/uL  Auto Lymphocyte # : 1.78 K/uL  Auto Monocyte # : 1.12 K/uL  Auto Eosinophil # : 0.00 K/uL  Auto Basophil # : 0.00 K/uL  Auto Neutrophil % : 18.0 %  Auto Lymphocyte % : 43.0 %  Auto Monocyte % : 27.0 %  Auto Eosinophil % : 0.0 %  Auto Basophil % : 0.0 %      06-01    137  |  106  |  17  ----------------------------<  140<H>  4.4   |  20<L>  |  0.84    Ca    9.5      01 Jun 2020 07:15  Phos  3.1     06-01  Mg     2.0     06-01    TPro  6.9  /  Alb  4.0  /  TBili  0.3  /  DBili  x   /  AST  32  /  ALT  27  /  AlkPhos  91  06-01  Mg 2.0  Phos 3.1  Mg 2.0  Phos 2.8  PT/INR - ( 01 Jun 2020 07:17 )   PT: 12.9 sec;   INR: 1.13 ratio    PTT - ( 01 Jun 2020 07:17 )  PTT:26.0 sec    Uric Acid 2.5    LDH --  Uric Acid 2.3    RADIOLOGY & ADDITIONAL STUDIES:   VA Duplex Upper Ext Vein Scan, Right (05.31.20 @ 14:47) >  No evidence ofdeep venous thrombosis in the visualized right upper extremity.

## 2020-06-01 NOTE — PROGRESS NOTE ADULT - ASSESSMENT
61 y/o Portuguese speaking female hx of HTN on atenolol, HLD, DM, recovering from COVID-19 presented to the ED with fever, malaise, HA, found to be pancytopenic with peripheral blasts, Peripheral flow cytometry and Bone marrow biopsy confirmed Acute Promyelocytic leukemia. ATRA started on 5/22, Arsenic Trioxide started 5/27 continued on close monitoring for differentiation syndrome.

## 2020-06-01 NOTE — ADVANCED PRACTICE NURSE CONSULT - ASSESSMENT
Consent confirmed in chart.  H/W verified.  EKG completed on 5- which showed a QTc of 388ms.  All labs reviewed by AUSTIN Saleem prior to administration of Arsenic.      Patient has double lumen PICC pending doppler prior to use.   Doppler completed [facility] available to use per Juan Saleem,         Two license professionals perform the following independent verification prior to administration Drug name; Drug dose; Infusion volume of bag or syringe; Rate of administration Route of administration Expiration date/time; Appearance and integrity of Commutability of Solution;  Rate set on infusion pump.  In the presence of the patient, verify the patient's identification using two patient identifiers.    :  Arsenic:  0.15mg/kg totaling 11mg over 2 hours @  1537 through DL PICC purple line.  Blood return and patentcy checked prior to the start of chemotherapy. Consent confirmed in chart.  H/W verified.  EKG completed on 5- which showed a QTc of 388ms.  All labs reviewed and orders signed in full by Dr. John prior to the administration of Arsenic.    30 minute observation completed between PRBC transfusion and administration of Arsenic         Two license professionals perform the following independent verification prior to administration Drug name; Drug dose; Infusion volume of bag or syringe; Rate of administration Route of administration Expiration date/time; Appearance and integrity of Commutability of Solution;  Rate set on infusion pump.  In the presence of the patient, verify the patient's identification using two patient identifiers.    1742: Arsenic:  0.15mg/kg totaling 11mg over 2 hours @  1537 through DL PICC purple line.  Blood return and patentcy checked prior to the start of chemotherapy.

## 2020-06-01 NOTE — PROGRESS NOTE ADULT - PROBLEM SELECTOR PLAN 2
Patient is Neutropenic, Afebrile  Continue Cefepime, Posaconazole ppx  previous COVID-19+ as outpatient 4/16, 5/15 - now COVID (-) on 5/22, 5/26 5/27 CXR (-) PICC in place.  5/30- CXR- Right PICC tip within SVC, bandlike opacity within the left upper lung is unchanged.

## 2020-06-01 NOTE — DIETITIAN INITIAL EVALUATION ADULT. - PROBLEM SELECTOR PLAN 1
-Peripheral flowcytometry from 5/23 showed findings consistent with APL  -Bone marrow bx performed on 5/26. [facility] sent. Will f/u results  -Still pancytopenia, S/P PLT transfusion 5/23 and 5/24 PLT at 39K. Will give 1 U PLT to keep PLT>50  -coags showed mild INR elevation but fibrinogen is 438 - not in DIC but pt is at risk of developing DIC so please monitor DIC labs (fibrinogen and d-dimer along with coags) daily.  -Please check daily tumor lysis labs (uric acid, bmp, mag, phos) monitoring  -if uric acid > 8.0, will give rasburicase 3mg IV x 1.  Will continue allopurinol 300 mg daily  -f/u G6PD results, HIV  -Hepatitis panel negative   -Continue ATRA (all-trans-retinoic acid or tretinoin) 45 mg/m2 in 2 divided doses  -c/w IVF 75cc/hr  -Keep Hgb >7 and PLTs >50k. Cryoprecipitate for Fibrinogen <150  -Goal K>4 and Mg>2   -Strict Is and Os/Daily weights/Mouth Care  -CT head was negative for bleeding. If complains of HA or change in mental status consider another CT and doing MRI

## 2020-06-02 LAB
ALBUMIN SERPL ELPH-MCNC: 4.4 G/DL — SIGNIFICANT CHANGE UP (ref 3.3–5)
ALBUMIN SERPL ELPH-MCNC: 4.4 G/DL — SIGNIFICANT CHANGE UP (ref 3.3–5)
ALP SERPL-CCNC: 101 U/L — SIGNIFICANT CHANGE UP (ref 40–120)
ALP SERPL-CCNC: 99 U/L — SIGNIFICANT CHANGE UP (ref 40–120)
ALT FLD-CCNC: 36 U/L — SIGNIFICANT CHANGE UP (ref 10–45)
ALT FLD-CCNC: 39 U/L — SIGNIFICANT CHANGE UP (ref 10–45)
ANION GAP SERPL CALC-SCNC: 13 MMOL/L — SIGNIFICANT CHANGE UP (ref 5–17)
ANION GAP SERPL CALC-SCNC: 13 MMOL/L — SIGNIFICANT CHANGE UP (ref 5–17)
APTT BLD: 31.2 SEC — SIGNIFICANT CHANGE UP (ref 27.5–36.3)
APTT BLD: 31.2 SEC — SIGNIFICANT CHANGE UP (ref 27.5–36.3)
AST SERPL-CCNC: 39 U/L — SIGNIFICANT CHANGE UP (ref 10–40)
AST SERPL-CCNC: 41 U/L — HIGH (ref 10–40)
BASOPHILS # BLD AUTO: 0 K/UL — SIGNIFICANT CHANGE UP (ref 0–0.2)
BASOPHILS NFR BLD AUTO: 0 % — SIGNIFICANT CHANGE UP (ref 0–2)
BILIRUB SERPL-MCNC: 0.4 MG/DL — SIGNIFICANT CHANGE UP (ref 0.2–1.2)
BILIRUB SERPL-MCNC: 0.4 MG/DL — SIGNIFICANT CHANGE UP (ref 0.2–1.2)
BLASTS # FLD: 4 % — HIGH (ref 0–0)
BUN SERPL-MCNC: 20 MG/DL — SIGNIFICANT CHANGE UP (ref 7–23)
BUN SERPL-MCNC: 21 MG/DL — SIGNIFICANT CHANGE UP (ref 7–23)
CALCIUM SERPL-MCNC: 9.8 MG/DL — SIGNIFICANT CHANGE UP (ref 8.4–10.5)
CALCIUM SERPL-MCNC: 9.9 MG/DL — SIGNIFICANT CHANGE UP (ref 8.4–10.5)
CHLORIDE SERPL-SCNC: 103 MMOL/L — SIGNIFICANT CHANGE UP (ref 96–108)
CHLORIDE SERPL-SCNC: 104 MMOL/L — SIGNIFICANT CHANGE UP (ref 96–108)
CO2 SERPL-SCNC: 21 MMOL/L — LOW (ref 22–31)
CO2 SERPL-SCNC: 22 MMOL/L — SIGNIFICANT CHANGE UP (ref 22–31)
CREAT SERPL-MCNC: 0.94 MG/DL — SIGNIFICANT CHANGE UP (ref 0.5–1.3)
CREAT SERPL-MCNC: 1.04 MG/DL — SIGNIFICANT CHANGE UP (ref 0.5–1.3)
D DIMER BLD IA.RAPID-MCNC: 980 NG/ML DDU — HIGH
EOSINOPHIL # BLD AUTO: 0 K/UL — SIGNIFICANT CHANGE UP (ref 0–0.5)
EOSINOPHIL NFR BLD AUTO: 0 % — SIGNIFICANT CHANGE UP (ref 0–6)
FIBRINOGEN PPP-MCNC: 371 MG/DL — SIGNIFICANT CHANGE UP (ref 350–510)
FIBRINOGEN PPP-MCNC: 427 MG/DL — SIGNIFICANT CHANGE UP (ref 350–510)
GLUCOSE BLDC GLUCOMTR-MCNC: 126 MG/DL — HIGH (ref 70–99)
GLUCOSE BLDC GLUCOMTR-MCNC: 135 MG/DL — HIGH (ref 70–99)
GLUCOSE BLDC GLUCOMTR-MCNC: 138 MG/DL — HIGH (ref 70–99)
GLUCOSE BLDC GLUCOMTR-MCNC: 144 MG/DL — HIGH (ref 70–99)
GLUCOSE SERPL-MCNC: 132 MG/DL — HIGH (ref 70–99)
GLUCOSE SERPL-MCNC: 144 MG/DL — HIGH (ref 70–99)
HCT VFR BLD CALC: 23.2 % — LOW (ref 34.5–45)
HCT VFR BLD CALC: 24.2 % — LOW (ref 34.5–45)
HGB BLD-MCNC: 7.8 G/DL — LOW (ref 11.5–15.5)
HGB BLD-MCNC: 8 G/DL — LOW (ref 11.5–15.5)
INR BLD: 1.11 RATIO — SIGNIFICANT CHANGE UP (ref 0.88–1.16)
INR BLD: 1.14 RATIO — SIGNIFICANT CHANGE UP (ref 0.88–1.16)
LDH SERPL L TO P-CCNC: 305 U/L — HIGH (ref 50–242)
LYMPHOCYTES # BLD AUTO: 1.33 K/UL — SIGNIFICANT CHANGE UP (ref 1–3.3)
LYMPHOCYTES # BLD AUTO: 27 % — SIGNIFICANT CHANGE UP (ref 13–44)
MAGNESIUM SERPL-MCNC: 2 MG/DL — SIGNIFICANT CHANGE UP (ref 1.6–2.6)
MAGNESIUM SERPL-MCNC: 2.3 MG/DL — SIGNIFICANT CHANGE UP (ref 1.6–2.6)
MANUAL SMEAR VERIFICATION: SIGNIFICANT CHANGE UP
MCHC RBC-ENTMCNC: 27.9 PG — SIGNIFICANT CHANGE UP (ref 27–34)
MCHC RBC-ENTMCNC: 28.1 PG — SIGNIFICANT CHANGE UP (ref 27–34)
MCHC RBC-ENTMCNC: 33.1 GM/DL — SIGNIFICANT CHANGE UP (ref 32–36)
MCHC RBC-ENTMCNC: 33.6 GM/DL — SIGNIFICANT CHANGE UP (ref 32–36)
MCV RBC AUTO: 83.5 FL — SIGNIFICANT CHANGE UP (ref 80–100)
MCV RBC AUTO: 84.3 FL — SIGNIFICANT CHANGE UP (ref 80–100)
METAMYELOCYTES # FLD: 14 % — HIGH (ref 0–0)
MONOCYTES # BLD AUTO: 0.59 K/UL — SIGNIFICANT CHANGE UP (ref 0–0.9)
MONOCYTES NFR BLD AUTO: 12 % — SIGNIFICANT CHANGE UP (ref 2–14)
MYELOCYTES NFR BLD: 18 % — HIGH (ref 0–0)
NEUTROPHILS # BLD AUTO: 1.13 K/UL — LOW (ref 1.8–7.4)
NEUTROPHILS NFR BLD AUTO: 13 % — LOW (ref 43–77)
NEUTS BAND # BLD: 10 % — HIGH (ref 0–8)
NRBC # BLD: 0 /100 WBCS — SIGNIFICANT CHANGE UP (ref 0–0)
NRBC # BLD: 0 /100 — SIGNIFICANT CHANGE UP (ref 0–0)
PHOSPHATE SERPL-MCNC: 2.6 MG/DL — SIGNIFICANT CHANGE UP (ref 2.5–4.5)
PHOSPHATE SERPL-MCNC: 3 MG/DL — SIGNIFICANT CHANGE UP (ref 2.5–4.5)
PLAT MORPH BLD: NORMAL — SIGNIFICANT CHANGE UP
PLATELET # BLD AUTO: 11 K/UL — CRITICAL LOW (ref 150–400)
PLATELET # BLD AUTO: 11 K/UL — CRITICAL LOW (ref 150–400)
PLATELET # BLD AUTO: 24 K/UL — LOW (ref 150–400)
PLATELET # BLD AUTO: 25 K/UL — LOW (ref 150–400)
PLATELET # BLD AUTO: 32 K/UL — LOW (ref 150–400)
POTASSIUM SERPL-MCNC: 4 MMOL/L — SIGNIFICANT CHANGE UP (ref 3.5–5.3)
POTASSIUM SERPL-MCNC: 4.6 MMOL/L — SIGNIFICANT CHANGE UP (ref 3.5–5.3)
POTASSIUM SERPL-SCNC: 4 MMOL/L — SIGNIFICANT CHANGE UP (ref 3.5–5.3)
POTASSIUM SERPL-SCNC: 4.6 MMOL/L — SIGNIFICANT CHANGE UP (ref 3.5–5.3)
PROMYELOCYTES # FLD: 2 % — HIGH (ref 0–0)
PROT SERPL-MCNC: 7.3 G/DL — SIGNIFICANT CHANGE UP (ref 6–8.3)
PROT SERPL-MCNC: 7.4 G/DL — SIGNIFICANT CHANGE UP (ref 6–8.3)
PROTHROM AB SERPL-ACNC: 12.8 SEC — SIGNIFICANT CHANGE UP (ref 10–12.9)
PROTHROM AB SERPL-ACNC: 13.2 SEC — HIGH (ref 10–12.9)
RBC # BLD: 2.78 M/UL — LOW (ref 3.8–5.2)
RBC # BLD: 2.87 M/UL — LOW (ref 3.8–5.2)
RBC # FLD: 16.1 % — HIGH (ref 10.3–14.5)
RBC # FLD: 16.4 % — HIGH (ref 10.3–14.5)
RBC BLD AUTO: NORMAL — SIGNIFICANT CHANGE UP
SODIUM SERPL-SCNC: 138 MMOL/L — SIGNIFICANT CHANGE UP (ref 135–145)
SODIUM SERPL-SCNC: 138 MMOL/L — SIGNIFICANT CHANGE UP (ref 135–145)
URATE SERPL-MCNC: 2.8 MG/DL — SIGNIFICANT CHANGE UP (ref 2.5–7)
URATE SERPL-MCNC: 2.8 MG/DL — SIGNIFICANT CHANGE UP (ref 2.5–7)
WBC # BLD: 4.92 K/UL — SIGNIFICANT CHANGE UP (ref 3.8–10.5)
WBC # BLD: 5.1 K/UL — SIGNIFICANT CHANGE UP (ref 3.8–10.5)
WBC # FLD AUTO: 4.92 K/UL — SIGNIFICANT CHANGE UP (ref 3.8–10.5)
WBC # FLD AUTO: 5.1 K/UL — SIGNIFICANT CHANGE UP (ref 3.8–10.5)

## 2020-06-02 PROCEDURE — 93010 ELECTROCARDIOGRAM REPORT: CPT

## 2020-06-02 PROCEDURE — 99232 SBSQ HOSP IP/OBS MODERATE 35: CPT | Mod: GC

## 2020-06-02 RX ORDER — PETROLATUM,WHITE
1 JELLY (GRAM) TOPICAL THREE TIMES A DAY
Refills: 0 | Status: DISCONTINUED | OUTPATIENT
Start: 2020-06-02 | End: 2020-07-15

## 2020-06-02 RX ORDER — SODIUM CHLORIDE 0.65 %
1 AEROSOL, SPRAY (ML) NASAL EVERY 8 HOURS
Refills: 0 | Status: DISCONTINUED | OUTPATIENT
Start: 2020-06-02 | End: 2020-07-06

## 2020-06-02 RX ORDER — FUROSEMIDE 40 MG
40 TABLET ORAL ONCE
Refills: 0 | Status: COMPLETED | OUTPATIENT
Start: 2020-06-02 | End: 2020-06-02

## 2020-06-02 RX ADMIN — CEFEPIME 100 MILLIGRAM(S): 1 INJECTION, POWDER, FOR SOLUTION INTRAMUSCULAR; INTRAVENOUS at 07:27

## 2020-06-02 RX ADMIN — Medication 5 MILLILITER(S): at 21:31

## 2020-06-02 RX ADMIN — POSACONAZOLE 300 MILLIGRAM(S): 100 TABLET, DELAYED RELEASE ORAL at 11:43

## 2020-06-02 RX ADMIN — SODIUM CHLORIDE 25 MILLILITER(S): 9 INJECTION INTRAMUSCULAR; INTRAVENOUS; SUBCUTANEOUS at 05:50

## 2020-06-02 RX ADMIN — ARSENIC TRIOXIDE 130.5 MILLIGRAM(S): 2 INJECTION, SOLUTION INTRAVENOUS at 16:10

## 2020-06-02 RX ADMIN — Medication 5 MILLIGRAM(S): at 21:32

## 2020-06-02 RX ADMIN — Medication 1 APPLICATION(S): at 05:50

## 2020-06-02 RX ADMIN — Medication 1 SPRAY(S): at 14:55

## 2020-06-02 RX ADMIN — Medication 1 APPLICATION(S): at 14:55

## 2020-06-02 RX ADMIN — Medication 1 SPRAY(S): at 21:32

## 2020-06-02 RX ADMIN — Medication 5 MILLILITER(S): at 11:43

## 2020-06-02 RX ADMIN — Medication 5 MILLILITER(S): at 07:29

## 2020-06-02 RX ADMIN — Medication 1 APPLICATION(S): at 21:32

## 2020-06-02 RX ADMIN — Medication 40 MILLIGRAM(S): at 11:42

## 2020-06-02 RX ADMIN — Medication 300 MILLIGRAM(S): at 11:42

## 2020-06-02 RX ADMIN — Medication 1 APPLICATION(S): at 21:33

## 2020-06-02 RX ADMIN — PANTOPRAZOLE SODIUM 40 MILLIGRAM(S): 20 TABLET, DELAYED RELEASE ORAL at 05:52

## 2020-06-02 RX ADMIN — CEFEPIME 100 MILLIGRAM(S): 1 INJECTION, POWDER, FOR SOLUTION INTRAMUSCULAR; INTRAVENOUS at 15:20

## 2020-06-02 RX ADMIN — TRETINOIN 40 MILLIGRAM(S): 10 CAPSULE, LIQUID FILLED ORAL at 05:52

## 2020-06-02 RX ADMIN — TRETINOIN 40 MILLIGRAM(S): 10 CAPSULE, LIQUID FILLED ORAL at 17:19

## 2020-06-02 NOTE — PROGRESS NOTE ADULT - PROBLEM SELECTOR PLAN 6
Erythema around PICC dressing.  Warm compress.  F/u doppler (-). Erythema around PICC dressing resolved  F/u doppler (-).

## 2020-06-02 NOTE — PROGRESS NOTE ADULT - ASSESSMENT
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 ATRA started on 5/22, Arsenic Trioxide started 5/27 continued on close monitoring for differentiation syndrome.

## 2020-06-02 NOTE — PROGRESS NOTE ADULT - SUBJECTIVE AND OBJECTIVE BOX
Diagnosis:    Protocol/Chemo Regimen:    Day:     Pt endorsed:    Review of Systems:     Pain scale:     Diet:     Allergies    No Known Allergies    Intolerances        ANTIMICROBIALS  cefepime   IVPB 2000 milliGRAM(s) IV Intermittent every 8 hours  posaconazole DR Tablet 300 milliGRAM(s) Oral daily      HEME/ONC MEDICATIONS  arsenic trioxide IVPB (eMAR) 11 milliGRAM(s) IV Intermittent every 24 hours  tretinoin 40 milliGRAM(s) Oral every 12 hours      STANDING MEDICATIONS  allopurinol 300 milliGRAM(s) Oral daily  Biotene Dry Mouth Oral Rinse 5 milliLiter(s) Swish and Spit five times a day  bisacodyl 5 milliGRAM(s) Oral every 12 hours  chlorhexidine 4% Liquid 1 Application(s) Topical <User Schedule>  dextrose 5%. 1000 milliLiter(s) IV Continuous <Continuous>  dextrose 50% Injectable 12.5 Gram(s) IV Push once  dextrose 50% Injectable 25 Gram(s) IV Push once  dextrose 50% Injectable 25 Gram(s) IV Push once  hydrocortisone 2.5% Rectal Cream 1 Application(s) Rectal every 8 hours  insulin lispro (HumaLOG) corrective regimen sliding scale   SubCutaneous three times a day before meals  insulin lispro (HumaLOG) corrective regimen sliding scale   SubCutaneous at bedtime  pantoprazole    Tablet 40 milliGRAM(s) Oral before breakfast  polyethylene glycol 3350 17 Gram(s) Oral daily  sodium chloride 0.9%. 1000 milliLiter(s) IV Continuous <Continuous>  witch hazel Pads 1 Application(s) Topical every 12 hours      PRN MEDICATIONS  acetaminophen   Tablet .. 650 milliGRAM(s) Oral every 6 hours PRN  acetaminophen 325 mG/butalbital 50 mG/caffeine 40 mG 1 Tablet(s) Oral every 4 hours PRN  aluminum hydroxide/magnesium hydroxide/simethicone Suspension 30 milliLiter(s) Oral every 6 hours PRN  dextrose 40% Gel 15 Gram(s) Oral once PRN  diphenhydrAMINE   Injectable 25 milliGRAM(s) IV Push every 6 hours PRN  glucagon  Injectable 1 milliGRAM(s) IntraMuscular once PRN  petrolatum white Ointment 1 Application(s) Topical two times a day PRN  sodium chloride 0.9% lock flush 10 milliLiter(s) IV Push every 1 hour PRN        Vital Signs Last 24 Hrs  T(C): 37.4 (02 Jun 2020 05:37), Max: 37.4 (02 Jun 2020 05:37)  T(F): 99.4 (02 Jun 2020 05:37), Max: 99.4 (02 Jun 2020 05:37)  HR: 81 (02 Jun 2020 05:37) (78 - 89)  BP: 129/77 (02 Jun 2020 05:37) (120/68 - 144/79)  BP(mean): --  RR: 18 (02 Jun 2020 05:37) (18 - 18)  SpO2: 96% (02 Jun 2020 05:37) (96% - 100%)    PHYSICAL EXAM  General: NAD  HEENT: PERRLA, EOMOI, clear oropharynx, anicteric sclera, pink conjunctiva  Neck: supple  CV: (+) S1/S2 RRR  Lungs: clear to auscultation, no wheezes or rales  Abdomen: soft, non-tender, non-distended (+) BS  Ext: no clubbing, cyanosis or edema  Skin: no rashes and no petechiae  Neuro: alert and oriented X 3, no focal deficits  Central Line:     RECENT CULTURES:        LABS:                        x      x     )-----------( 32       ( 02 Jun 2020 00:37 )             x            Mean Cell Volume : 84.2 fl  Mean Cell Hemoglobin : 28.2 pg  Mean Cell Hemoglobin Concentration : 33.5 gm/dL  Auto Neutrophil # : x  Auto Lymphocyte # : x  Auto Monocyte # : x  Auto Eosinophil # : x  Auto Basophil # : x  Auto Neutrophil % : x  Auto Lymphocyte % : x  Auto Monocyte % : x  Auto Eosinophil % : x  Auto Basophil % : x      06-01    140  |  107  |  13  ----------------------------<  150<H>  3.7   |  19<L>  |  0.78    Ca    9.1      01 Jun 2020 17:55  Phos  3.0     06-01  Mg     1.6     06-01    TPro  6.7  /  Alb  3.9  /  TBili  0.4  /  DBili  x   /  AST  31  /  ALT  30  /  AlkPhos  90  06-01      Mg 1.6  Phos 3.0      PT/INR - ( 01 Jun 2020 17:55 )   PT: 13.9 sec;   INR: 1.21 ratio         PTT - ( 01 Jun 2020 17:55 )  PTT:27.3 sec    LDH --  Uric Acid 2.4        RADIOLOGY & ADDITIONAL STUDIES: Diagnosis: Acute Promyelocytic Leukemia     Protocol/Chemo Regimen: Tretinoin 40 mg BID, Arsenic 11 mg every 24 hours    Day:  ATRA day 11, Arsenic day 7    Patient speaks Arabic, refused  service, patient's son used as a .,    Pt endorsed:  scalp sensitivity/ heat sensation on top of the head now resolved    Review of Systems: Patient denies chest pain, palpitations, SOB, abdominal pain, vomiting, diarrhea.     Pain scale: 0    Diet: Regular     Allergies: No Known Allergies    ANTIMICROBIALS  cefepime   IVPB 2000 milliGRAM(s) IV Intermittent every 8 hours  posaconazole DR Tablet 300 milliGRAM(s) Oral daily    HEME/ONC MEDICATIONS  arsenic trioxide IVPB (eMAR) 11 milliGRAM(s) IV Intermittent every 24 hours  tretinoin 40 milliGRAM(s) Oral every 12 hours    STANDING MEDICATIONS  allopurinol 300 milliGRAM(s) Oral daily  Biotene Dry Mouth Oral Rinse 5 milliLiter(s) Swish and Spit five times a day  bisacodyl 5 milliGRAM(s) Oral every 12 hours  chlorhexidine 4% Liquid 1 Application(s) Topical <User Schedule>  dextrose 5%. 1000 milliLiter(s) IV Continuous <Continuous>  dextrose 50% Injectable 12.5 Gram(s) IV Push once  dextrose 50% Injectable 25 Gram(s) IV Push once  dextrose 50% Injectable 25 Gram(s) IV Push once  hydrocortisone 2.5% Rectal Cream 1 Application(s) Rectal every 8 hours  insulin lispro (HumaLOG) corrective regimen sliding scale   SubCutaneous three times a day before meals  insulin lispro (HumaLOG) corrective regimen sliding scale   SubCutaneous at bedtime  pantoprazole    Tablet 40 milliGRAM(s) Oral before breakfast  polyethylene glycol 3350 17 Gram(s) Oral daily  sodium chloride 0.9%. 1000 milliLiter(s) IV Continuous <Continuous>  witch hazel Pads 1 Application(s) Topical every 12 hours    PRN MEDICATIONS  acetaminophen   Tablet .. 650 milliGRAM(s) Oral every 6 hours PRN  acetaminophen 325 mG/butalbital 50 mG/caffeine 40 mG 1 Tablet(s) Oral every 4 hours PRN  aluminum hydroxide/magnesium hydroxide/simethicone Suspension 30 milliLiter(s) Oral every 6 hours PRN  dextrose 40% Gel 15 Gram(s) Oral once PRN  diphenhydrAMINE   Injectable 25 milliGRAM(s) IV Push every 6 hours PRN  glucagon  Injectable 1 milliGRAM(s) IntraMuscular once PRN  petrolatum white Ointment 1 Application(s) Topical two times a day PRN  sodium chloride 0.9% lock flush 10 milliLiter(s) IV Push every 1 hour PRN    Vital Signs Last 24 Hrs  T(C): 37.4 (02 Jun 2020 05:37), Max: 37.4 (02 Jun 2020 05:37)  T(F): 99.4 (02 Jun 2020 05:37), Max: 99.4 (02 Jun 2020 05:37)  HR: 81 (02 Jun 2020 05:37) (78 - 89)  BP: 129/77 (02 Jun 2020 05:37) (120/68 - 144/79)  BP(mean): --  RR: 18 (02 Jun 2020 05:37) (18 - 18)  SpO2: 96% (02 Jun 2020 05:37) (96% - 100%)    PHYSICAL EXAM  General: NAD  CV: (+) S1/S2 RRR  Lungs: clear to auscultation, no wheezes or rales  Abdomen: soft, non-tender, non-distended (+) BS  Ext: no edema in right LE, LE trace edema +.  Skin: no rash  Neuro: alert and oriented X 3, no focal deficits, speaks only Arabic  Central Line: PICC line, erythema + around the dressing site.    RECENT CULTURES:  Culture - Blood (05.23.20 @ 09:09)    Specimen Source: .Blood Blood-Venous    Culture Results:   No Growth Final    LABS:                                        8.0    4.92  )-----------( 25       ( 02 Jun 2020 07:29 )             24.2     Mean Cell Volume : 84.3 fl  Mean Cell Hemoglobin : 27.9 pg  Mean Cell Hemoglobin Concentration : 33.1 gm/dL  Auto Neutrophil # : x  Auto Lymphocyte # : x  Auto Monocyte # : x  Auto Eosinophil # : x  Auto Basophil # : x  Auto Neutrophil % : x  Auto Lymphocyte % : x  Auto Monocyte % : x  Auto Eosinophil % : x  Auto Basophil % : x      06-02    138  |  104  |  21  ----------------------------<  132<H>  4.6   |  21<L>  |  1.04    Ca    9.9      02 Jun 2020 07:25  Phos  3.0     06-02  Mg     2.3     06-02    TPro  7.3  /  Alb  4.4  /  TBili  0.4  /  DBili  x   /  AST  39  /  ALT  36  /  AlkPhos  99  06-02      Mg 2.3  Phos 3.0  Mg 1.6  Phos 3.0  PT/INR - ( 02 Jun 2020 07:29 )   PT: 12.8 sec;   INR: 1.11 ratio    PTT - ( 02 Jun 2020 07:29 )  PTT:31.2 sec    Uric Acid 2.8  LDH --  Uric Acid 2.4    RADIOLOGY & ADDITIONAL STUDIES:   VA Duplex Upper Ext Vein Scan, Right (05.31.20 @ 14:47) >  No evidence ofdeep venous thrombosis in the visualized right upper extremity. Diagnosis: Acute Promyelocytic Leukemia     Protocol/Chemo Regimen: Tretinoin 40 mg BID, Arsenic 11 mg every 24 hours    Day:  ATRA day 11, Arsenic day 7    Patient speaks Armenian, refused  service, patient's son used as a .,    Pt endorsed:  scalp sensitivity/ heat sensation on top of the head now resolved    Review of Systems: Patient denies chest pain, palpitations, SOB, abdominal pain, vomiting, diarrhea.     Pain scale: 0    Diet: Regular     Allergies: No Known Allergies    ANTIMICROBIALS  cefepime   IVPB 2000 milliGRAM(s) IV Intermittent every 8 hours  posaconazole DR Tablet 300 milliGRAM(s) Oral daily    HEME/ONC MEDICATIONS  arsenic trioxide IVPB (eMAR) 11 milliGRAM(s) IV Intermittent every 24 hours  tretinoin 40 milliGRAM(s) Oral every 12 hours    STANDING MEDICATIONS  allopurinol 300 milliGRAM(s) Oral daily  Biotene Dry Mouth Oral Rinse 5 milliLiter(s) Swish and Spit five times a day  bisacodyl 5 milliGRAM(s) Oral every 12 hours  chlorhexidine 4% Liquid 1 Application(s) Topical <User Schedule>  dextrose 5%. 1000 milliLiter(s) IV Continuous <Continuous>  dextrose 50% Injectable 12.5 Gram(s) IV Push once  dextrose 50% Injectable 25 Gram(s) IV Push once  dextrose 50% Injectable 25 Gram(s) IV Push once  hydrocortisone 2.5% Rectal Cream 1 Application(s) Rectal every 8 hours  insulin lispro (HumaLOG) corrective regimen sliding scale   SubCutaneous three times a day before meals  insulin lispro (HumaLOG) corrective regimen sliding scale   SubCutaneous at bedtime  pantoprazole    Tablet 40 milliGRAM(s) Oral before breakfast  polyethylene glycol 3350 17 Gram(s) Oral daily  sodium chloride 0.9%. 1000 milliLiter(s) IV Continuous <Continuous>  witch hazel Pads 1 Application(s) Topical every 12 hours    PRN MEDICATIONS  acetaminophen   Tablet .. 650 milliGRAM(s) Oral every 6 hours PRN  acetaminophen 325 mG/butalbital 50 mG/caffeine 40 mG 1 Tablet(s) Oral every 4 hours PRN  aluminum hydroxide/magnesium hydroxide/simethicone Suspension 30 milliLiter(s) Oral every 6 hours PRN  dextrose 40% Gel 15 Gram(s) Oral once PRN  diphenhydrAMINE   Injectable 25 milliGRAM(s) IV Push every 6 hours PRN  glucagon  Injectable 1 milliGRAM(s) IntraMuscular once PRN  petrolatum white Ointment 1 Application(s) Topical two times a day PRN  sodium chloride 0.9% lock flush 10 milliLiter(s) IV Push every 1 hour PRN    Vital Signs Last 24 Hrs  T(C): 37.4 (02 Jun 2020 05:37), Max: 37.4 (02 Jun 2020 05:37)  T(F): 99.4 (02 Jun 2020 05:37), Max: 99.4 (02 Jun 2020 05:37)  HR: 81 (02 Jun 2020 05:37) (78 - 89)  BP: 129/77 (02 Jun 2020 05:37) (120/68 - 144/79)  BP(mean): --  RR: 18 (02 Jun 2020 05:37) (18 - 18)  SpO2: 96% (02 Jun 2020 05:37) (96% - 100%)    PHYSICAL EXAM  General: NAD  CV: (+) S1/S2 RRR  Lungs: clear to auscultation, no wheezes or rales  Abdomen: soft, non-tender, non-distended (+) BS  Ext: no edema in right LE, LE trace edema +.  Skin: no rash  Neuro: alert and oriented X 3, no focal deficits, speaks only Armenian  Central Line: PICC line, erythema + around the dressing site resolving.    RECENT CULTURES:  Culture - Blood (05.23.20 @ 09:09)    Specimen Source: .Blood Blood-Venous    Culture Results:   No Growth Final    LABS:                                        8.0    4.92  )-----------( 25       ( 02 Jun 2020 07:29 )             24.2     Mean Cell Volume : 84.3 fl  Mean Cell Hemoglobin : 27.9 pg  Mean Cell Hemoglobin Concentration : 33.1 gm/dL  Auto Neutrophil # : x  Auto Lymphocyte # : x  Auto Monocyte # : x  Auto Eosinophil # : x  Auto Basophil # : x  Auto Neutrophil % : x  Auto Lymphocyte % : x  Auto Monocyte % : x  Auto Eosinophil % : x  Auto Basophil % : x      06-02    138  |  104  |  21  ----------------------------<  132<H>  4.6   |  21<L>  |  1.04    Ca    9.9      02 Jun 2020 07:25  Phos  3.0     06-02  Mg     2.3     06-02    TPro  7.3  /  Alb  4.4  /  TBili  0.4  /  DBili  x   /  AST  39  /  ALT  36  /  AlkPhos  99  06-02      Mg 2.3  Phos 3.0  Mg 1.6  Phos 3.0  PT/INR - ( 02 Jun 2020 07:29 )   PT: 12.8 sec;   INR: 1.11 ratio    PTT - ( 02 Jun 2020 07:29 )  PTT:31.2 sec    Uric Acid 2.8  LDH --  Uric Acid 2.4    RADIOLOGY & ADDITIONAL STUDIES:   VA Duplex Upper Ext Vein Scan, Right (05.31.20 @ 14:47) >  No evidence ofdeep venous thrombosis in the visualized right upper extremity.

## 2020-06-02 NOTE — PROGRESS NOTE ADULT - PROBLEM SELECTOR PLAN 2
Patient is Neutropenic, Afebrile  Continue Cefepime, Posaconazole ppx  previous COVID-19+ as outpatient 4/16, 5/15 - now COVID (-) on 5/22, 5/26 5/27 CXR (-) PICC in place.  5/30- CXR- Right PICC tip within SVC, bandlike opacity within the left upper lung is unchanged. Patient is Neutropenic, Afebrile  Continue Cefepime, Posaconazole ppx  previous COVID-19+ as outpatient 4/16, 5/15 - now COVID (-) on 5/22, 5/26 5/27 CXR (-) PICC in place.  5/30- CXR- Right PICC tip within SVC, bandlike opacity within the left upper lung is unchanged.  6/2- COVID  test pending result.

## 2020-06-02 NOTE — ADVANCED PRACTICE NURSE CONSULT - ASSESSMENT
Lab results reviewed by Dr. Cuevas. Reinforced teachings to patient about her chemo regimen well understood. Right arm ac with double lumen PICC both ports in used patent. Patient to get platelet transfusion after arsenic infusion. Arsenic trioxide 11mg in 250ml NSS started at 1611 x 2 hours infusion via lowest port of NSS line into purple port from PICC line right arm through Alaris IV pump. 2 RNs verification completed prior to start of treatment. Primary RN aware of plan of care. Safety maintained.

## 2020-06-02 NOTE — PROGRESS NOTE ADULT - PROBLEM SELECTOR PLAN 1
Bone Marrow Biopsy 5/26 at [facility] (+) for acute promyelocytic leukemia  continue tretinoin 40mg BID (started 5/22)  continue Arsenic Trioxide starting (started on 5/27)  Monitor for differentiation syndrome, if WBC continue to rise >10K, start Hydrea.  Check EKG for QTc every Tuesday, Friday  continue Allopurinol 300mg PO daily  Follow up CBC w diff, CMP, TLS, DIC labs BID  Keep PLTs > 50,000, Fibrinogen > 150  5/29 occipital hyperthermia - follow up MR of the brain with contrast. (-)  Daily weights- today- 78.1 kg gained 4 kgs from admission weight, Lasix 40 mg IV x1 dose given in between blood products. Bone Marrow Biopsy 5/26 at [facility] (+) for acute promyelocytic leukemia  continue tretinoin 40mg BID (started 5/22)  continue Arsenic Trioxide starting (started on 5/27)  Monitor for differentiation syndrome, if WBC continue to rise >10K, start Hydrea.  Check EKG for QTc every Tuesday, Friday  continue Allopurinol 300mg PO daily  Follow up CBC w diff, CMP, TLS, DIC labs BID  Keep PLTs > 50,000, Fibrinogen > 150. Transfuse one unit Platelets today.  5/29 occipital hyperthermia - MR of the brain with contrast. (-)  Daily weights- today- 77.9 kg gained 4 kgs from admission weight, Lasix 40 mg IV x1 dose given.

## 2020-06-03 DIAGNOSIS — E11.9 TYPE 2 DIABETES MELLITUS WITHOUT COMPLICATIONS: ICD-10-CM

## 2020-06-03 LAB
ALBUMIN SERPL ELPH-MCNC: 4.4 G/DL — SIGNIFICANT CHANGE UP (ref 3.3–5)
ALBUMIN SERPL ELPH-MCNC: 4.8 G/DL — SIGNIFICANT CHANGE UP (ref 3.3–5)
ALP SERPL-CCNC: 101 U/L — SIGNIFICANT CHANGE UP (ref 40–120)
ALP SERPL-CCNC: 117 U/L — SIGNIFICANT CHANGE UP (ref 40–120)
ALT FLD-CCNC: 39 U/L — SIGNIFICANT CHANGE UP (ref 10–45)
ALT FLD-CCNC: 45 U/L — SIGNIFICANT CHANGE UP (ref 10–45)
ANION GAP SERPL CALC-SCNC: 12 MMOL/L — SIGNIFICANT CHANGE UP (ref 5–17)
ANION GAP SERPL CALC-SCNC: 14 MMOL/L — SIGNIFICANT CHANGE UP (ref 5–17)
ANISOCYTOSIS BLD QL: SLIGHT — SIGNIFICANT CHANGE UP
APTT BLD: 30.9 SEC — SIGNIFICANT CHANGE UP (ref 27.5–36.3)
APTT BLD: 31.5 SEC — SIGNIFICANT CHANGE UP (ref 27.5–36.3)
AST SERPL-CCNC: 42 U/L — HIGH (ref 10–40)
AST SERPL-CCNC: 43 U/L — HIGH (ref 10–40)
BASOPHILS # BLD AUTO: 0 K/UL — SIGNIFICANT CHANGE UP (ref 0–0.2)
BASOPHILS NFR BLD AUTO: 0 % — SIGNIFICANT CHANGE UP (ref 0–2)
BILIRUB SERPL-MCNC: 0.4 MG/DL — SIGNIFICANT CHANGE UP (ref 0.2–1.2)
BILIRUB SERPL-MCNC: 0.4 MG/DL — SIGNIFICANT CHANGE UP (ref 0.2–1.2)
BLASTS # FLD: 3.6 % — HIGH (ref 0–0)
BUN SERPL-MCNC: 19 MG/DL — SIGNIFICANT CHANGE UP (ref 7–23)
BUN SERPL-MCNC: 19 MG/DL — SIGNIFICANT CHANGE UP (ref 7–23)
CALCIUM SERPL-MCNC: 10 MG/DL — SIGNIFICANT CHANGE UP (ref 8.4–10.5)
CALCIUM SERPL-MCNC: 10.4 MG/DL — SIGNIFICANT CHANGE UP (ref 8.4–10.5)
CHLORIDE SERPL-SCNC: 102 MMOL/L — SIGNIFICANT CHANGE UP (ref 96–108)
CHLORIDE SERPL-SCNC: 102 MMOL/L — SIGNIFICANT CHANGE UP (ref 96–108)
CO2 SERPL-SCNC: 22 MMOL/L — SIGNIFICANT CHANGE UP (ref 22–31)
CO2 SERPL-SCNC: 23 MMOL/L — SIGNIFICANT CHANGE UP (ref 22–31)
CREAT SERPL-MCNC: 0.84 MG/DL — SIGNIFICANT CHANGE UP (ref 0.5–1.3)
CREAT SERPL-MCNC: 0.91 MG/DL — SIGNIFICANT CHANGE UP (ref 0.5–1.3)
D DIMER BLD IA.RAPID-MCNC: 894 NG/ML DDU — HIGH
EOSINOPHIL # BLD AUTO: 0 K/UL — SIGNIFICANT CHANGE UP (ref 0–0.5)
EOSINOPHIL NFR BLD AUTO: 0 % — SIGNIFICANT CHANGE UP (ref 0–6)
FIBRINOGEN PPP-MCNC: 413 MG/DL — SIGNIFICANT CHANGE UP (ref 350–510)
FIBRINOGEN PPP-MCNC: 431 MG/DL — SIGNIFICANT CHANGE UP (ref 350–510)
GLUCOSE BLDC GLUCOMTR-MCNC: 137 MG/DL — HIGH (ref 70–99)
GLUCOSE BLDC GLUCOMTR-MCNC: 151 MG/DL — HIGH (ref 70–99)
GLUCOSE BLDC GLUCOMTR-MCNC: 181 MG/DL — HIGH (ref 70–99)
GLUCOSE BLDC GLUCOMTR-MCNC: 200 MG/DL — HIGH (ref 70–99)
GLUCOSE SERPL-MCNC: 134 MG/DL — HIGH (ref 70–99)
GLUCOSE SERPL-MCNC: 155 MG/DL — HIGH (ref 70–99)
HCT VFR BLD CALC: 22.6 % — LOW (ref 34.5–45)
HCT VFR BLD CALC: 24.5 % — LOW (ref 34.5–45)
HCT VFR BLD CALC: SIGNIFICANT CHANGE UP (ref 34.5–45)
HGB BLD-MCNC: 7.6 G/DL — LOW (ref 11.5–15.5)
HGB BLD-MCNC: 8.2 G/DL — LOW (ref 11.5–15.5)
HGB BLD-MCNC: SIGNIFICANT CHANGE UP (ref 11.5–15.5)
INR BLD: 1.13 RATIO — SIGNIFICANT CHANGE UP (ref 0.88–1.16)
INR BLD: 1.17 RATIO — HIGH (ref 0.88–1.16)
LDH SERPL L TO P-CCNC: 301 U/L — HIGH (ref 50–242)
LYMPHOCYTES # BLD AUTO: 1.62 K/UL — SIGNIFICANT CHANGE UP (ref 1–3.3)
LYMPHOCYTES # BLD AUTO: 33.9 % — SIGNIFICANT CHANGE UP (ref 13–44)
MAGNESIUM SERPL-MCNC: 2 MG/DL — SIGNIFICANT CHANGE UP (ref 1.6–2.6)
MAGNESIUM SERPL-MCNC: 2 MG/DL — SIGNIFICANT CHANGE UP (ref 1.6–2.6)
MANUAL SMEAR VERIFICATION: SIGNIFICANT CHANGE UP
MCHC RBC-ENTMCNC: 27.9 PG — SIGNIFICANT CHANGE UP (ref 27–34)
MCHC RBC-ENTMCNC: 28.1 PG — SIGNIFICANT CHANGE UP (ref 27–34)
MCHC RBC-ENTMCNC: 33.5 GM/DL — SIGNIFICANT CHANGE UP (ref 32–36)
MCHC RBC-ENTMCNC: 33.6 GM/DL — SIGNIFICANT CHANGE UP (ref 32–36)
MCHC RBC-ENTMCNC: SIGNIFICANT CHANGE UP (ref 27–34)
MCHC RBC-ENTMCNC: SIGNIFICANT CHANGE UP (ref 32–36)
MCV RBC AUTO: 83.3 FL — SIGNIFICANT CHANGE UP (ref 80–100)
MCV RBC AUTO: 83.7 FL — SIGNIFICANT CHANGE UP (ref 80–100)
MCV RBC AUTO: SIGNIFICANT CHANGE UP (ref 80–100)
METAMYELOCYTES # FLD: 23.2 % — HIGH (ref 0–0)
MICROCYTES BLD QL: SLIGHT — SIGNIFICANT CHANGE UP
MONOCYTES # BLD AUTO: 0.34 K/UL — SIGNIFICANT CHANGE UP (ref 0–0.9)
MONOCYTES NFR BLD AUTO: 7.2 % — SIGNIFICANT CHANGE UP (ref 2–14)
MYELOCYTES NFR BLD: 10.7 % — HIGH (ref 0–0)
NEUTROPHILS # BLD AUTO: 0.89 K/UL — LOW (ref 1.8–7.4)
NEUTROPHILS NFR BLD AUTO: 10.7 % — LOW (ref 43–77)
NEUTS BAND # BLD: 8 % — SIGNIFICANT CHANGE UP (ref 0–8)
NRBC # BLD: 0 /100 WBCS — SIGNIFICANT CHANGE UP (ref 0–0)
NRBC # BLD: 0 /100 WBCS — SIGNIFICANT CHANGE UP (ref 0–0)
NRBC # BLD: 1 /100 — HIGH (ref 0–0)
PHOSPHATE SERPL-MCNC: 3.1 MG/DL — SIGNIFICANT CHANGE UP (ref 2.5–4.5)
PHOSPHATE SERPL-MCNC: 3.7 MG/DL — SIGNIFICANT CHANGE UP (ref 2.5–4.5)
PLAT MORPH BLD: NORMAL — SIGNIFICANT CHANGE UP
PLATELET # BLD AUTO: 10 K/UL — CRITICAL LOW (ref 150–400)
PLATELET # BLD AUTO: 7 K/UL — CRITICAL LOW (ref 150–400)
PLATELET # BLD AUTO: 8 K/UL — CRITICAL LOW (ref 150–400)
POIKILOCYTOSIS BLD QL AUTO: SLIGHT — SIGNIFICANT CHANGE UP
POTASSIUM SERPL-MCNC: 3.9 MMOL/L — SIGNIFICANT CHANGE UP (ref 3.5–5.3)
POTASSIUM SERPL-MCNC: 4.1 MMOL/L — SIGNIFICANT CHANGE UP (ref 3.5–5.3)
POTASSIUM SERPL-SCNC: 3.9 MMOL/L — SIGNIFICANT CHANGE UP (ref 3.5–5.3)
POTASSIUM SERPL-SCNC: 4.1 MMOL/L — SIGNIFICANT CHANGE UP (ref 3.5–5.3)
PROMYELOCYTES # FLD: 0.9 % — HIGH (ref 0–0)
PROT SERPL-MCNC: 7.2 G/DL — SIGNIFICANT CHANGE UP (ref 6–8.3)
PROT SERPL-MCNC: 8.3 G/DL — SIGNIFICANT CHANGE UP (ref 6–8.3)
PROTHROM AB SERPL-ACNC: 13.1 SEC — HIGH (ref 10–12.9)
PROTHROM AB SERPL-ACNC: 13.4 SEC — HIGH (ref 10–12.9)
RBC # BLD: 2.7 M/UL — LOW (ref 3.8–5.2)
RBC # BLD: 2.75 M/UL — LOW (ref 3.8–5.2)
RBC # BLD: 2.94 M/UL — LOW (ref 3.8–5.2)
RBC # FLD: 16.6 % — HIGH (ref 10.3–14.5)
RBC # FLD: 16.6 % — HIGH (ref 10.3–14.5)
RBC # FLD: SIGNIFICANT CHANGE UP (ref 10.3–14.5)
RBC BLD AUTO: SIGNIFICANT CHANGE UP
SARS-COV-2 RNA SPEC QL NAA+PROBE: SIGNIFICANT CHANGE UP
SODIUM SERPL-SCNC: 137 MMOL/L — SIGNIFICANT CHANGE UP (ref 135–145)
SODIUM SERPL-SCNC: 138 MMOL/L — SIGNIFICANT CHANGE UP (ref 135–145)
URATE SERPL-MCNC: 2.6 MG/DL — SIGNIFICANT CHANGE UP (ref 2.5–7)
URATE SERPL-MCNC: 2.8 MG/DL — SIGNIFICANT CHANGE UP (ref 2.5–7)
VARIANT LYMPHS # BLD: 1.8 % — SIGNIFICANT CHANGE UP (ref 0–6)
WBC # BLD: 4.45 K/UL — SIGNIFICANT CHANGE UP (ref 3.8–10.5)
WBC # BLD: 4.78 K/UL — SIGNIFICANT CHANGE UP (ref 3.8–10.5)
WBC # BLD: 5.02 K/UL — SIGNIFICANT CHANGE UP (ref 3.8–10.5)
WBC # FLD AUTO: 4.45 K/UL — SIGNIFICANT CHANGE UP (ref 3.8–10.5)
WBC # FLD AUTO: 4.78 K/UL — SIGNIFICANT CHANGE UP (ref 3.8–10.5)
WBC # FLD AUTO: 5.02 K/UL — SIGNIFICANT CHANGE UP (ref 3.8–10.5)

## 2020-06-03 PROCEDURE — 99231 SBSQ HOSP IP/OBS SF/LOW 25: CPT | Mod: GC

## 2020-06-03 RX ORDER — ARSENIC TRIOXIDE 2 MG/ML
11 INJECTION, SOLUTION INTRAVENOUS EVERY 24 HOURS
Refills: 0 | Status: COMPLETED | OUTPATIENT
Start: 2020-06-03 | End: 2020-06-09

## 2020-06-03 RX ORDER — FUROSEMIDE 40 MG
40 TABLET ORAL ONCE
Refills: 0 | Status: COMPLETED | OUTPATIENT
Start: 2020-06-03 | End: 2020-06-03

## 2020-06-03 RX ORDER — POTASSIUM CHLORIDE 20 MEQ
40 PACKET (EA) ORAL ONCE
Refills: 0 | Status: COMPLETED | OUTPATIENT
Start: 2020-06-03 | End: 2020-06-03

## 2020-06-03 RX ADMIN — Medication 300 MILLIGRAM(S): at 11:03

## 2020-06-03 RX ADMIN — CEFEPIME 100 MILLIGRAM(S): 1 INJECTION, POWDER, FOR SOLUTION INTRAMUSCULAR; INTRAVENOUS at 08:23

## 2020-06-03 RX ADMIN — AER TRAVELER 1 APPLICATION(S): 0.5 SOLUTION RECTAL; TOPICAL at 05:48

## 2020-06-03 RX ADMIN — Medication 5 MILLILITER(S): at 08:23

## 2020-06-03 RX ADMIN — Medication 1 APPLICATION(S): at 05:47

## 2020-06-03 RX ADMIN — CEFEPIME 100 MILLIGRAM(S): 1 INJECTION, POWDER, FOR SOLUTION INTRAMUSCULAR; INTRAVENOUS at 01:42

## 2020-06-03 RX ADMIN — Medication 1: at 16:50

## 2020-06-03 RX ADMIN — Medication 40 MILLIGRAM(S): at 16:51

## 2020-06-03 RX ADMIN — Medication 1 APPLICATION(S): at 22:17

## 2020-06-03 RX ADMIN — POSACONAZOLE 300 MILLIGRAM(S): 100 TABLET, DELAYED RELEASE ORAL at 11:03

## 2020-06-03 RX ADMIN — Medication 1 SPRAY(S): at 22:17

## 2020-06-03 RX ADMIN — TRETINOIN 40 MILLIGRAM(S): 10 CAPSULE, LIQUID FILLED ORAL at 05:47

## 2020-06-03 RX ADMIN — Medication 1 SPRAY(S): at 05:47

## 2020-06-03 RX ADMIN — Medication 1 APPLICATION(S): at 05:48

## 2020-06-03 RX ADMIN — CEFEPIME 100 MILLIGRAM(S): 1 INJECTION, POWDER, FOR SOLUTION INTRAMUSCULAR; INTRAVENOUS at 16:57

## 2020-06-03 RX ADMIN — ARSENIC TRIOXIDE 130.5 MILLIGRAM(S): 2 INJECTION, SOLUTION INTRAVENOUS at 15:50

## 2020-06-03 RX ADMIN — PANTOPRAZOLE SODIUM 40 MILLIGRAM(S): 20 TABLET, DELAYED RELEASE ORAL at 05:46

## 2020-06-03 RX ADMIN — Medication 1 SPRAY(S): at 16:22

## 2020-06-03 RX ADMIN — Medication 25 MILLIGRAM(S): at 06:35

## 2020-06-03 RX ADMIN — Medication 5 MILLIGRAM(S): at 22:17

## 2020-06-03 RX ADMIN — Medication 40 MILLIEQUIVALENT(S): at 11:01

## 2020-06-03 RX ADMIN — Medication 5 MILLILITER(S): at 11:02

## 2020-06-03 RX ADMIN — TRETINOIN 40 MILLIGRAM(S): 10 CAPSULE, LIQUID FILLED ORAL at 16:51

## 2020-06-03 RX ADMIN — Medication 1: at 12:38

## 2020-06-03 RX ADMIN — Medication 5 MILLILITER(S): at 16:50

## 2020-06-03 NOTE — ADVANCED PRACTICE NURSE CONSULT - REASON FOR CONSULT
Day 1/7  Arsenic trioxide [facility], [facility] [phone]  SW following.  Blaire Viktoriya [phone] is the assigned initial assessment worker with the Division of [place] Child Protective Services (CPS).  Blaire is going to attempt to meet with the mother today in her hospital room.  SW will continue to follow.    Addendum 3:51 PM  CPS met with infant's mother, Oliva Rubio.  Mom is interested [place] [facility] and will plan to discharge there tomorrow.  [facility] [place] and [place] bed is available.  Plan will be for mom to discharge to the [facility] ([facility] for residential AODA treatment.  SW will continue to follow for discharge planning for infant.

## 2020-06-03 NOTE — ADVANCED PRACTICE NURSE CONSULT - ASSESSMENT
Lab results reviewed by Dr. Cuevas. Reinforced teachings to patient about her chemo regimen well understood. Right arm ac with double lumen PICC both ports in used patent. Patient to get platelet transfusion after arsenic infusion. Arsenic trioxide 11mg in 250ml NSS started at 1550 x 2 hours infusion via lowest port of NSS line into purple port from PICC line right arm through Alaris IV pump. 2 RNs verification completed prior to start of treatment. Primary RN aware of plan of care. Safety maintained.

## 2020-06-03 NOTE — PHYSICAL THERAPY INITIAL EVALUATION ADULT - PLANNED THERAPY INTERVENTIONS, PT EVAL
GOAL: Pt will negotiate 5 steps  up and down using HR support, independent  within 2-3 weeks./gait training/balance training/strengthening/transfer training

## 2020-06-03 NOTE — PHYSICAL THERAPY INITIAL EVALUATION ADULT - PATIENT/FAMILY AGREES WITH PLAN
yes/Home with home PT for safety assessment, gait,stair negotiation, balance, & strength training and to return pt to baseline functional mobility status.

## 2020-06-03 NOTE — PHYSICAL THERAPY INITIAL EVALUATION ADULT - PERTINENT HX OF CURRENT PROBLEM, REHAB EVAL
60 y.o. Amharic speaking  female hx of HTN on atenolol, HLD, DM on metformin presents to [facility] ED on 5/22/20 with fever, malaise, HA.  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 CT head was negative for bleeding.  COVID-19+ as outpatient 4/16, 5/15 - now COVID (-) on 5/22, 5/26

## 2020-06-03 NOTE — PROGRESS NOTE ADULT - PROBLEM SELECTOR PLAN 5
Hold pharmacologic DVT ppx for thrombocytopenia No pharmacologic ppx 2/2 thrombocytopenia        Contact Information [phone]

## 2020-06-03 NOTE — CHART NOTE - NSCHARTNOTEFT_GEN_A_CORE
Medicine PA Episodic Note    CC: Rash  HPI: Notified by RN that patient has a rash on bilateral arms. She denies itching. She does not recall when the rash began.  Also states that she felt some 'heat on head' after receiving her platelet transfusion. She states that ice packs help with the head discomfort. Denies swelling, headache, chest pain, palpitations, sob, dyspnea, dysphagia. Denies NVD, abdominal pain.       Vital Signs Last 24 Hrs  T(C): 37 (03 Jun 2020 04:34), Max: 37.6 (02 Jun 2020 21:50)  T(F): 98.6 (03 Jun 2020 04:34), Max: 99.6 (02 Jun 2020 21:50)  HR: 83 (03 Jun 2020 04:34) (75 - 96)  BP: 137/67 (03 Jun 2020 04:34) (118/69 - 145/76)  BP(mean): --  RR: 18 (03 Jun 2020 04:34) (18 - 18)  SpO2: 95% (03 Jun 2020 04:34) (95% - 99%)      Labs:                          See note  4.78  )-----------( 10       ( 03 Jun 2020 04:39 )             See note    06-03    138  |  102  |  19  ----------------------------<  134<H>  3.9   |  22  |  0.91    Ca    10.0      03 Jun 2020 04:39  Phos  3.7     06-03  Mg     2.0     06-03    TPro  7.2  /  Alb  4.4  /  TBili  0.4  /  DBili  x   /  AST  43<H>  /  ALT  39  /  AlkPhos  101  06-03        Radiology:    Physical Exam:  General: WN/WD NAD  Neurology: A&Ox3, nonfocal, CHAIREZ x 4  Head:  Normocephalic, atraumatic  Respiratory: CTA B/L  CV: RRR, S1S2, no murmur  Abdominal: Soft, NT, ND no palpable mass  MSK: No edema, + peripheral pulses, FROM all 4 extremity  Skin: Pinprick rash on Right Upper extremity, spreading to torso.  Macular rash on Left upper extremity.     Assessment & Plan:  HPI:    60 y.o. Tajik speaking  female hx of HTN on atenolol, HLD, DM on metformin presents to [facility] ED on 5/22/20 with fever, malaise, HA. Patient reported that she was hypothermic to 92 degrees intermittently and also febrile to 101-102F which improves with Tylenol. Patient has been complaining of head feeling hot, pleuritic chest pain that moves around w/o numbness tingling, paresthesias, weakness, a rash on bilateral LE x1 month, a tear drop clearing on her tongue, as well as a few drops of blood in her stool with hard bowel movements. Patient has been having poor PO intake for the past 3 days before admission 2/2 throat pain. Patient was COVID + on 4/16/2020, and retested and positive again 5/15/2020.    Patient with a current complaint of rash on bilateral arms after platelet transfusion.       Plan:    #Rash  - Patient in no acute distress, no sob, mentating well and speaking in full sentences.   - Benadryl 25mg IV PRN given  - Will monitor for signs/symptoms of anaphylaxis  - Will endorse to AM team.        Endorse to AM team.   Follow up with Attending in AM.  Wendy WHITLEY  Dept of Medicine Medicine PA Episodic Note    CC: Rash  HPI: Notified by RN that patient who is Yoruba speaking has a rash on bilateral arms. I was able to communicate with patient in patient's primary language. She denies itching. She does not recall when the rash began.  Also states that she felt some 'heat on head' after receiving her platelet transfusion. She states that ice packs help with the head discomfort. Denies swelling, headache, chest pain, palpitations, sob, dyspnea, dysphagia. Denies NVD, abdominal pain.       Vital Signs Last 24 Hrs  T(C): 37 (03 Jun 2020 04:34), Max: 37.6 (02 Jun 2020 21:50)  T(F): 98.6 (03 Jun 2020 04:34), Max: 99.6 (02 Jun 2020 21:50)  HR: 83 (03 Jun 2020 04:34) (75 - 96)  BP: 137/67 (03 Jun 2020 04:34) (118/69 - 145/76)  BP(mean): --  RR: 18 (03 Jun 2020 04:34) (18 - 18)  SpO2: 95% (03 Jun 2020 04:34) (95% - 99%)      Labs:                          See note  4.78  )-----------( 10       ( 03 Jun 2020 04:39 )             See note    06-03    138  |  102  |  19  ----------------------------<  134<H>  3.9   |  22  |  0.91    Ca    10.0      03 Jun 2020 04:39  Phos  3.7     06-03  Mg     2.0     06-03    TPro  7.2  /  Alb  4.4  /  TBili  0.4  /  DBili  x   /  AST  43<H>  /  ALT  39  /  AlkPhos  101  06-03        Radiology:    Physical Exam:  General: WN/WD NAD  Neurology: A&Ox3, nonfocal, CHAIREZ x 4  Head:  Normocephalic, atraumatic  Respiratory: CTA B/L  CV: RRR, S1S2, no murmur  Abdominal: Soft, NT, ND no palpable mass  MSK: No edema, + peripheral pulses, FROM all 4 extremity  Skin: Pinprick rash on Right Upper extremity, spreading to torso.  Macular rash on Left upper extremity.     Assessment & Plan:  HPI:    60 y.o. Yoruba speaking  female hx of HTN on atenolol, HLD, DM on metformin presents to [facility] ED on 5/22/20 with fever, malaise, HA. Patient reported that she was hypothermic to 92 degrees intermittently and also febrile to 101-102F which improves with Tylenol. Patient has been complaining of head feeling hot, pleuritic chest pain that moves around w/o numbness tingling, paresthesias, weakness, a rash on bilateral LE x1 month, a tear drop clearing on her tongue, as well as a few drops of blood in her stool with hard bowel movements. Patient has been having poor PO intake for the past 3 days before admission 2/2 throat pain. Patient was COVID + on 4/16/2020, and retested and positive again 5/15/2020.    Patient with a current complaint of rash on bilateral arms after platelet transfusion.       Plan:    #Rash  - Patient in no acute distress, no sob, mentating well and speaking in full sentences.   - Benadryl 25mg IV PRN given  - Will monitor for signs/symptoms of respiratory compromise or acute changes in clinical status.   - Will endorse to AM team.        Endorse to AM team.   Follow up with Attending in AM.  Wendy WHITLEY  Dept of Medicine

## 2020-06-03 NOTE — PROGRESS NOTE ADULT - SUBJECTIVE AND OBJECTIVE BOX
Diagnosis:    Protocol/Chemo Regimen:    Day:     Pt endorsed:    Review of Systems:     Pain scale:     Diet:     Allergies    No Known Allergies    Intolerances        ANTIMICROBIALS  cefepime   IVPB 2000 milliGRAM(s) IV Intermittent every 8 hours  posaconazole DR Tablet 300 milliGRAM(s) Oral daily      HEME/ONC MEDICATIONS  tretinoin 40 milliGRAM(s) Oral every 12 hours      STANDING MEDICATIONS  allopurinol 300 milliGRAM(s) Oral daily  Biotene Dry Mouth Oral Rinse 5 milliLiter(s) Swish and Spit five times a day  bisacodyl 5 milliGRAM(s) Oral every 12 hours  chlorhexidine 4% Liquid 1 Application(s) Topical <User Schedule>  dextrose 5%. 1000 milliLiter(s) IV Continuous <Continuous>  dextrose 50% Injectable 12.5 Gram(s) IV Push once  dextrose 50% Injectable 25 Gram(s) IV Push once  dextrose 50% Injectable 25 Gram(s) IV Push once  hydrocortisone 2.5% Rectal Cream 1 Application(s) Rectal every 8 hours  insulin lispro (HumaLOG) corrective regimen sliding scale   SubCutaneous three times a day before meals  insulin lispro (HumaLOG) corrective regimen sliding scale   SubCutaneous at bedtime  pantoprazole    Tablet 40 milliGRAM(s) Oral before breakfast  petrolatum white Ointment 1 Application(s) Topical three times a day  polyethylene glycol 3350 17 Gram(s) Oral daily  sodium chloride 0.65% Nasal 1 Spray(s) Both Nostrils every 8 hours  sodium chloride 0.9%. 1000 milliLiter(s) IV Continuous <Continuous>  witch hazel Pads 1 Application(s) Topical every 12 hours      PRN MEDICATIONS  acetaminophen   Tablet .. 650 milliGRAM(s) Oral every 6 hours PRN  acetaminophen 325 mG/butalbital 50 mG/caffeine 40 mG 1 Tablet(s) Oral every 4 hours PRN  aluminum hydroxide/magnesium hydroxide/simethicone Suspension 30 milliLiter(s) Oral every 6 hours PRN  dextrose 40% Gel 15 Gram(s) Oral once PRN  diphenhydrAMINE   Injectable 25 milliGRAM(s) IV Push every 6 hours PRN  glucagon  Injectable 1 milliGRAM(s) IntraMuscular once PRN  sodium chloride 0.9% lock flush 10 milliLiter(s) IV Push every 1 hour PRN        Vital Signs Last 24 Hrs  T(C): 37 (03 Jun 2020 04:34), Max: 37.6 (02 Jun 2020 21:50)  T(F): 98.6 (03 Jun 2020 04:34), Max: 99.6 (02 Jun 2020 21:50)  HR: 83 (03 Jun 2020 04:34) (75 - 96)  BP: 137/67 (03 Jun 2020 04:34) (118/69 - 145/76)  BP(mean): --  RR: 18 (03 Jun 2020 04:34) (18 - 18)  SpO2: 95% (03 Jun 2020 04:34) (95% - 99%)    PHYSICAL EXAM  General: NAD  HEENT: PERRLA, EOMI, clear oropharynx  Neck: supple  CV: (+) S1/S2 RRR  Lungs: clear to auscultation, no wheezes or rales  Abdomen: soft, non-tender, non-distended (+) BS  Ext: no edema  Skin: no rashes   Neuro: alert and oriented X 3, no focal deficits  Central Line:     RECENT CULTURES:        LABS:                        See note  4.78  )-----------( 10       ( 03 Jun 2020 04:39 )             See note        Mean Cell Volume : See note  Mean Cell Hemoglobin : See note  Mean Cell Hemoglobin Concentration : See note  Auto Neutrophil # : 0.89 K/uL  Auto Lymphocyte # : 1.62 K/uL  Auto Monocyte # : 0.34 K/uL  Auto Eosinophil # : 0.00 K/uL  Auto Basophil # : 0.00 K/uL  Auto Neutrophil % : 10.7 %  Auto Lymphocyte % : 33.9 %  Auto Monocyte % : 7.2 %  Auto Eosinophil % : 0.0 %  Auto Basophil % : 0.0 %      06-03    138  |  102  |  19  ----------------------------<  134<H>  3.9   |  22  |  0.91    Ca    10.0      03 Jun 2020 04:39  Phos  3.7     06-03  Mg     2.0     06-03    TPro  7.2  /  Alb  4.4  /  TBili  0.4  /  DBili  x   /  AST  43<H>  /  ALT  39  /  AlkPhos  101  06-03      Mg 2.0  Phos 3.7  Mg 2.0  Phos 2.6      PT/INR - ( 03 Jun 2020 04:39 )   PT: 13.1 sec;   INR: 1.13 ratio         PTT - ( 03 Jun 2020 04:39 )  PTT:31.5 sec      Uric Acid 2.8    LDH --  Uric Acid 2.8        RADIOLOGY & ADDITIONAL STUDIES: Diagnosis: Acute Promyelocytic Leukemia     Protocol/Chemo Regimen: ATRA/Arsenic    Day:  ATRA Day 12 and Arsenic Day 8    Patient Arabic speaking refusing  service. Patient's son called by patient    Pt endorsed: no compliants    Review of Systems: Patient denies headache, dizziness, visual changes, chest pain, palpitations, SOB, abdominal pain, nausea, vomiting, diarrhea or dysuria.    Pain scale: 0    Diet: Regular     Allergies: No Known Allergies      ANTIMICROBIALS  cefepime   IVPB 2000 milliGRAM(s) IV Intermittent every 8 hours  posaconazole DR Tablet 300 milliGRAM(s) Oral daily      HEME/ONC MEDICATIONS  tretinoin 40 milliGRAM(s) Oral every 12 hours      STANDING MEDICATIONS  allopurinol 300 milliGRAM(s) Oral daily  Biotene Dry Mouth Oral Rinse 5 milliLiter(s) Swish and Spit five times a day  bisacodyl 5 milliGRAM(s) Oral every 12 hours  chlorhexidine 4% Liquid 1 Application(s) Topical <User Schedule>  dextrose 5%. 1000 milliLiter(s) IV Continuous <Continuous>  dextrose 50% Injectable 12.5 Gram(s) IV Push once  dextrose 50% Injectable 25 Gram(s) IV Push once  dextrose 50% Injectable 25 Gram(s) IV Push once  hydrocortisone 2.5% Rectal Cream 1 Application(s) Rectal every 8 hours  insulin lispro (HumaLOG) corrective regimen sliding scale   SubCutaneous three times a day before meals  insulin lispro (HumaLOG) corrective regimen sliding scale   SubCutaneous at bedtime  pantoprazole    Tablet 40 milliGRAM(s) Oral before breakfast  petrolatum white Ointment 1 Application(s) Topical three times a day  polyethylene glycol 3350 17 Gram(s) Oral daily  sodium chloride 0.65% Nasal 1 Spray(s) Both Nostrils every 8 hours  sodium chloride 0.9%. 1000 milliLiter(s) IV Continuous <Continuous>  witch hazel Pads 1 Application(s) Topical every 12 hours      PRN MEDICATIONS  acetaminophen   Tablet .. 650 milliGRAM(s) Oral every 6 hours PRN  acetaminophen 325 mG/butalbital 50 mG/caffeine 40 mG 1 Tablet(s) Oral every 4 hours PRN  aluminum hydroxide/magnesium hydroxide/simethicone Suspension 30 milliLiter(s) Oral every 6 hours PRN  dextrose 40% Gel 15 Gram(s) Oral once PRN  diphenhydrAMINE   Injectable 25 milliGRAM(s) IV Push every 6 hours PRN  glucagon  Injectable 1 milliGRAM(s) IntraMuscular once PRN  sodium chloride 0.9% lock flush 10 milliLiter(s) IV Push every 1 hour PRN      Vital Signs Last 24 Hrs  T(C): 37 (03 Jun 2020 04:34), Max: 37.6 (02 Jun 2020 21:50)  T(F): 98.6 (03 Jun 2020 04:34), Max: 99.6 (02 Jun 2020 21:50)  HR: 83 (03 Jun 2020 04:34) (75 - 96)  BP: 137/67 (03 Jun 2020 04:34) (118/69 - 145/76)  BP(mean): --  RR: 18 (03 Jun 2020 04:34) (18 - 18)  SpO2: 95% (03 Jun 2020 04:34) (95% - 99%)      PHYSICAL EXAM  General: NAD  HEENT: clear oropharynx  CV: (+) S1/S2 RRR  Lungs: clear to auscultation, no wheezes or rales  Abdomen: soft, non-tender, non-distended (+) BS  Ext: no edema  Skin: no rashes   Neuro: alert and oriented X 3  Central Line: C/D/I          LABS:                           7.6    4.45  )-----------( 8        ( 03 Jun 2020 09:09 )             22.6         Mean Cell Volume : 83.7 fl  Mean Cell Hemoglobin : 28.1 pg  Mean Cell Hemoglobin Concentration : 33.6 gm/dL  Auto Neutrophil # : x  Auto Lymphocyte # : x  Auto Monocyte # : x  Auto Eosinophil # : x  Auto Basophil # : x  Auto Neutrophil % : x  Auto Lymphocyte % : x  Auto Monocyte % : x  Auto Eosinophil % : x  Auto Basophil % : x      06-03    138  |  102  |  19  ----------------------------<  134<H>  3.9   |  22  |  0.91    Ca    10.0      03 Jun 2020 04:39  Phos  3.7     06-03  Mg     2.0     06-03    TPro  7.2  /  Alb  4.4  /  TBili  0.4  /  DBili  x   /  AST  43<H>  /  ALT  39  /  AlkPhos  101  06-03      Mg 2.0  Phos 3.7      PT/INR - ( 03 Jun 2020 04:39 )   PT: 13.1 sec;   INR: 1.13 ratio         PTT - ( 03 Jun 2020 04:39 )  PTT:31.5 sec      Uric Acid 2.8        RADIOLOGY & ADDITIONAL STUDIES:    EXAM:  DUPLEX EXT VEINS UPPER RT                        PROCEDURE DATE:  05/31/2020    IMPRESSION: No evidence of deep venous thrombosis in the visualized right upper extremity.

## 2020-06-03 NOTE — PROGRESS NOTE ADULT - PROBLEM SELECTOR PLAN 2
Patient is Neutropenic, Afebrile  Continue Cefepime, Posaconazole ppx  previous COVID-19+ as outpatient 4/16, 5/15 - now COVID (-) on 5/22, 5/26 5/27 CXR (-) PICC in place.  5/30- CXR- Right PICC tip within SVC, bandlike opacity within the left upper lung is unchanged.  6/2- COVID  test pending result. The patient is neutropenic, afebrile  If febrile Pan Cx and CXR  Continue Cefepime and Posaconazole  Previous COVID-19 (+) outpatient now PCR (-)

## 2020-06-03 NOTE — PHYSICAL THERAPY INITIAL EVALUATION ADULT - ADDITIONAL COMMENTS
.[place] house Patient lives in apt. with son ~ 10  steps to enter. Patient ambulated without AD independent.

## 2020-06-03 NOTE — PHYSICAL THERAPY INITIAL EVALUATION ADULT - BALANCE TRAINING, PT EVAL
GOAL: Pt will improve  balance during (static/dynamic) (standing) activities by at least 1 balance grade within 3-4 weeks to assist with greater independence during functional mobility and ADL's.;

## 2020-06-03 NOTE — PROGRESS NOTE ADULT - PROBLEM SELECTOR PLAN 1
Bone Marrow Biopsy 5/26 at [facility] (+) for acute promyelocytic leukemia  continue tretinoin 40mg BID (started 5/22)  continue Arsenic Trioxide starting (started on 5/27)  Monitor for differentiation syndrome, if WBC continue to rise >10K, start Hydrea.  Check EKG for QTc every Tuesday, Friday  continue Allopurinol 300mg PO daily  Follow up CBC w diff, CMP, TLS, DIC labs BID  Keep PLTs > 50,000, Fibrinogen > 150. Transfuse one unit Platelets today.  5/29 occipital hyperthermia - MR of the brain with contrast. (-)  Daily weights- today- 77.9 kg gained 4 kgs from admission weight, Lasix 40 mg IV x1 dose given. Continue ATRA 40mg BID (started 5/22) and Arsenic Trioxide daily (started on 5/27)  Monitor for differentiation syndrome and check EKG for QTc prolongation every Tuesday and Friday. Keep K>4 and Mg>2  Follow up CBC with diff, CMP, TLS and DIC labs q 12  Keep PLTs >50 K although now refractory. Order 1/2 units over 3 hours q 6. Keep Fibrinogen >150 if less give Cryo  Strict Is and Os/Daily weights/Mouth Care  Allopurinol 300mg PO daily through tomorrow

## 2020-06-03 NOTE — PROGRESS NOTE ADULT - PROBLEM SELECTOR PLAN 3
Normotensive currently, hold Atenolol (home med) Home regimen with Atenolol held on admission  If BP increases can consider restarting

## 2020-06-03 NOTE — PROGRESS NOTE ADULT - ASSESSMENT
61 y/o Frisian speaking female hx of HTN on atenolol, HLD, DM, recovering from COVID-19 presented to the ED with fever, malaise, HA, found to be pancytopenic with peripheral blasts, Peripheral flow cytometry and Bone marrow biopsy confirmed Acute Promyelocytic leukemia. ATRA started on 5/22, Arsenic Trioxide started 5/27 continued on close monitoring for differentiation syndrome. This is a 59 yo Occitan speaking F with PMHx of HTN, HLD, T2DM, COVID-19 infection admitted for management of newly diagnosed APL. The patient is currently reciving ATRA and Arsenic. syndrome. The patient has pancytopenia 2/2 disease and/or ATRA/Arsenic.

## 2020-06-03 NOTE — PROGRESS NOTE ADULT - ATTENDING COMMENTS
61 y/o Occitan speaking F who is transferred for newly diagnosed low/interm risk APL.  Presented with pancytopenia, flow cytometry/bone marrow biopsy/FISH from 5/26- consistent with acute promyelocytic leukemia with PML-AWA translocation.    She was started on ATRA 5/23- day +11, GALA 5/27- day +7    -monitor carefully for signs of differentiation syndrome.   -Check CBC, coags/fibrinogen twice daily.  Keep plt >40-50, fibrinogen >150.  Monitor lytes daily, keep K>4, Mg>2.  EKG twice a week  -Headaches likely due to ATRA, CT head no contrast on 5/22 with no bleeding.  No more headaches, just a sensation of heat on the top of the head. MRI was not suggestive of any bleeding. Heat on the head is worse with platelet transfusion, will try benadryl prior to platelets. On Fioricet which patient reports has helped.   -cont Cefepime/Posaconazole 61 y/o Lao speaking F who is transferred for newly diagnosed low/interm risk APL.  Presented with pancytopenia, flow cytometry/bone marrow biopsy/FISH from 5/26- consistent with acute promyelocytic leukemia with PML-AWA translocation.    She was started on ATRA 5/23- day +12, GALA 5/27- day +8    -monitor carefully for signs of differentiation syndrome. Wbc has been stable  -Check CBC, coags/fibrinogen twice daily.  Keep plt >40-50, fibrinogen >150.  Monitor lytes daily, keep K>4, Mg>2.  EKG twice a week  -Headaches likely due to ATRA, CT head no contrast on 5/22 with no bleeding.  No more headaches, just a sensation of heat on the top of the head. MRI was not suggestive of any bleeding. Heat on the head is worse with platelet transfusion, will try benadryl prior to platelets. On Fioricet which patient reports has helped.   -cont Cefepime/Posaconazole  -plt refractory -getting slow plt infusions

## 2020-06-04 DIAGNOSIS — R74.0 NONSPECIFIC ELEVATION OF LEVELS OF TRANSAMINASE AND LACTIC ACID DEHYDROGENASE [LDH]: ICD-10-CM

## 2020-06-04 LAB
ALBUMIN SERPL ELPH-MCNC: 4.6 G/DL — SIGNIFICANT CHANGE UP (ref 3.3–5)
ALP SERPL-CCNC: 106 U/L — SIGNIFICANT CHANGE UP (ref 40–120)
ALT FLD-CCNC: 48 U/L — HIGH (ref 10–45)
ANION GAP SERPL CALC-SCNC: 13 MMOL/L — SIGNIFICANT CHANGE UP (ref 5–17)
APTT BLD: 31.9 SEC — SIGNIFICANT CHANGE UP (ref 27.5–36.3)
AST SERPL-CCNC: 48 U/L — HIGH (ref 10–40)
BASOPHILS # BLD AUTO: 0 K/UL — SIGNIFICANT CHANGE UP (ref 0–0.2)
BASOPHILS NFR BLD AUTO: 0 % — SIGNIFICANT CHANGE UP (ref 0–2)
BILIRUB SERPL-MCNC: 0.5 MG/DL — SIGNIFICANT CHANGE UP (ref 0.2–1.2)
BUN SERPL-MCNC: 20 MG/DL — SIGNIFICANT CHANGE UP (ref 7–23)
CALCIUM SERPL-MCNC: 10 MG/DL — SIGNIFICANT CHANGE UP (ref 8.4–10.5)
CHLORIDE SERPL-SCNC: 104 MMOL/L — SIGNIFICANT CHANGE UP (ref 96–108)
CO2 SERPL-SCNC: 22 MMOL/L — SIGNIFICANT CHANGE UP (ref 22–31)
CREAT SERPL-MCNC: 0.85 MG/DL — SIGNIFICANT CHANGE UP (ref 0.5–1.3)
D DIMER BLD IA.RAPID-MCNC: 825 NG/ML DDU — HIGH
EOSINOPHIL # BLD AUTO: 0 K/UL — SIGNIFICANT CHANGE UP (ref 0–0.5)
EOSINOPHIL NFR BLD AUTO: 0 % — SIGNIFICANT CHANGE UP (ref 0–6)
FIBRINOGEN PPP-MCNC: 417 MG/DL — SIGNIFICANT CHANGE UP (ref 350–510)
GLUCOSE BLDC GLUCOMTR-MCNC: 129 MG/DL — HIGH (ref 70–99)
GLUCOSE BLDC GLUCOMTR-MCNC: 140 MG/DL — HIGH (ref 70–99)
GLUCOSE BLDC GLUCOMTR-MCNC: 205 MG/DL — HIGH (ref 70–99)
GLUCOSE BLDC GLUCOMTR-MCNC: 219 MG/DL — HIGH (ref 70–99)
GLUCOSE SERPL-MCNC: 134 MG/DL — HIGH (ref 70–99)
HCT VFR BLD CALC: 22.2 % — LOW (ref 34.5–45)
HCT VFR BLD CALC: 23.9 % — LOW (ref 34.5–45)
HGB BLD-MCNC: 7.5 G/DL — LOW (ref 11.5–15.5)
HGB BLD-MCNC: 7.9 G/DL — LOW (ref 11.5–15.5)
INR BLD: 1.14 RATIO — SIGNIFICANT CHANGE UP (ref 0.88–1.16)
LDH SERPL L TO P-CCNC: 292 U/L — HIGH (ref 50–242)
LYMPHOCYTES # BLD AUTO: 1.33 K/UL — SIGNIFICANT CHANGE UP (ref 1–3.3)
LYMPHOCYTES # BLD AUTO: 27 % — SIGNIFICANT CHANGE UP (ref 13–44)
MAGNESIUM SERPL-MCNC: 2 MG/DL — SIGNIFICANT CHANGE UP (ref 1.6–2.6)
MANUAL SMEAR VERIFICATION: SIGNIFICANT CHANGE UP
MCHC RBC-ENTMCNC: 27.6 PG — SIGNIFICANT CHANGE UP (ref 27–34)
MCHC RBC-ENTMCNC: 28.3 PG — SIGNIFICANT CHANGE UP (ref 27–34)
MCHC RBC-ENTMCNC: 33.1 GM/DL — SIGNIFICANT CHANGE UP (ref 32–36)
MCHC RBC-ENTMCNC: 33.8 GM/DL — SIGNIFICANT CHANGE UP (ref 32–36)
MCV RBC AUTO: 83.6 FL — SIGNIFICANT CHANGE UP (ref 80–100)
MCV RBC AUTO: 83.8 FL — SIGNIFICANT CHANGE UP (ref 80–100)
METAMYELOCYTES # FLD: 5 % — HIGH (ref 0–0)
MONOCYTES # BLD AUTO: 1.13 K/UL — HIGH (ref 0–0.9)
MONOCYTES NFR BLD AUTO: 23 % — HIGH (ref 2–14)
MYELOCYTES NFR BLD: 2 % — HIGH (ref 0–0)
NEUTROPHILS # BLD AUTO: 1.72 K/UL — LOW (ref 1.8–7.4)
NEUTROPHILS NFR BLD AUTO: 33 % — LOW (ref 43–77)
NEUTS BAND # BLD: 2 % — SIGNIFICANT CHANGE UP (ref 0–8)
NRBC # BLD: 0 /100 WBCS — SIGNIFICANT CHANGE UP (ref 0–0)
NRBC # BLD: 0 /100 — SIGNIFICANT CHANGE UP (ref 0–0)
PHOSPHATE SERPL-MCNC: 3.5 MG/DL — SIGNIFICANT CHANGE UP (ref 2.5–4.5)
PLAT MORPH BLD: NORMAL — SIGNIFICANT CHANGE UP
PLATELET # BLD AUTO: 31 K/UL — LOW (ref 150–400)
PLATELET # BLD AUTO: 5 K/UL — CRITICAL LOW (ref 150–400)
POTASSIUM SERPL-MCNC: 3.9 MMOL/L — SIGNIFICANT CHANGE UP (ref 3.5–5.3)
POTASSIUM SERPL-SCNC: 3.9 MMOL/L — SIGNIFICANT CHANGE UP (ref 3.5–5.3)
PROMYELOCYTES # FLD: 6 % — HIGH (ref 0–0)
PROT SERPL-MCNC: 7.7 G/DL — SIGNIFICANT CHANGE UP (ref 6–8.3)
PROTHROM AB SERPL-ACNC: 13.2 SEC — HIGH (ref 10–12.9)
RBC # BLD: 2.65 M/UL — LOW (ref 3.8–5.2)
RBC # BLD: 2.86 M/UL — LOW (ref 3.8–5.2)
RBC # FLD: 16.6 % — HIGH (ref 10.3–14.5)
RBC # FLD: 16.7 % — HIGH (ref 10.3–14.5)
RBC BLD AUTO: SIGNIFICANT CHANGE UP
SODIUM SERPL-SCNC: 139 MMOL/L — SIGNIFICANT CHANGE UP (ref 135–145)
URATE SERPL-MCNC: 2.6 MG/DL — SIGNIFICANT CHANGE UP (ref 2.5–7)
VARIANT LYMPHS # BLD: 2 % — SIGNIFICANT CHANGE UP (ref 0–6)
WBC # BLD: 4 K/UL — SIGNIFICANT CHANGE UP (ref 3.8–10.5)
WBC # BLD: 4.91 K/UL — SIGNIFICANT CHANGE UP (ref 3.8–10.5)
WBC # FLD AUTO: 4 K/UL — SIGNIFICANT CHANGE UP (ref 3.8–10.5)
WBC # FLD AUTO: 4.91 K/UL — SIGNIFICANT CHANGE UP (ref 3.8–10.5)

## 2020-06-04 PROCEDURE — 99232 SBSQ HOSP IP/OBS MODERATE 35: CPT | Mod: GC

## 2020-06-04 RX ORDER — ONDANSETRON 8 MG/1
8 TABLET, FILM COATED ORAL EVERY 8 HOURS
Refills: 0 | Status: DISCONTINUED | OUTPATIENT
Start: 2020-06-04 | End: 2020-07-17

## 2020-06-04 RX ORDER — POTASSIUM CHLORIDE 20 MEQ
40 PACKET (EA) ORAL ONCE
Refills: 0 | Status: COMPLETED | OUTPATIENT
Start: 2020-06-04 | End: 2020-06-04

## 2020-06-04 RX ORDER — PETROLATUM,WHITE
1 JELLY (GRAM) TOPICAL
Refills: 0 | Status: DISCONTINUED | OUTPATIENT
Start: 2020-06-04 | End: 2020-07-15

## 2020-06-04 RX ORDER — PHYTONADIONE (VIT K1) 5 MG
10 TABLET ORAL DAILY
Refills: 0 | Status: COMPLETED | OUTPATIENT
Start: 2020-06-04 | End: 2020-06-06

## 2020-06-04 RX ORDER — TRETINOIN 10 MG/1
4 CAPSULE, LIQUID FILLED ORAL
Qty: 240 | Refills: 3
Start: 2020-06-04 | End: 2020-10-01

## 2020-06-04 RX ORDER — PANTOPRAZOLE SODIUM 20 MG/1
40 TABLET, DELAYED RELEASE ORAL EVERY 12 HOURS
Refills: 0 | Status: DISCONTINUED | OUTPATIENT
Start: 2020-06-04 | End: 2020-06-07

## 2020-06-04 RX ADMIN — Medication 5 MILLILITER(S): at 12:45

## 2020-06-04 RX ADMIN — Medication 1 APPLICATION(S): at 06:13

## 2020-06-04 RX ADMIN — Medication 1 SPRAY(S): at 06:13

## 2020-06-04 RX ADMIN — Medication 1 APPLICATION(S): at 21:56

## 2020-06-04 RX ADMIN — PANTOPRAZOLE SODIUM 40 MILLIGRAM(S): 20 TABLET, DELAYED RELEASE ORAL at 12:45

## 2020-06-04 RX ADMIN — TRETINOIN 40 MILLIGRAM(S): 10 CAPSULE, LIQUID FILLED ORAL at 17:14

## 2020-06-04 RX ADMIN — Medication 1 APPLICATION(S): at 21:57

## 2020-06-04 RX ADMIN — Medication 40 MILLIEQUIVALENT(S): at 10:44

## 2020-06-04 RX ADMIN — Medication 2: at 18:05

## 2020-06-04 RX ADMIN — POLYETHYLENE GLYCOL 3350 17 GRAM(S): 17 POWDER, FOR SOLUTION ORAL at 12:45

## 2020-06-04 RX ADMIN — Medication 1 SPRAY(S): at 21:56

## 2020-06-04 RX ADMIN — Medication 300 MILLIGRAM(S): at 12:45

## 2020-06-04 RX ADMIN — TRETINOIN 40 MILLIGRAM(S): 10 CAPSULE, LIQUID FILLED ORAL at 06:13

## 2020-06-04 RX ADMIN — Medication 5 MILLILITER(S): at 17:18

## 2020-06-04 RX ADMIN — Medication 5 MILLIGRAM(S): at 21:56

## 2020-06-04 RX ADMIN — Medication 10 MILLIGRAM(S): at 10:44

## 2020-06-04 RX ADMIN — PANTOPRAZOLE SODIUM 40 MILLIGRAM(S): 20 TABLET, DELAYED RELEASE ORAL at 17:18

## 2020-06-04 RX ADMIN — Medication 5 MILLILITER(S): at 20:41

## 2020-06-04 RX ADMIN — Medication 20 MILLIGRAM(S): at 10:44

## 2020-06-04 RX ADMIN — Medication 5 MILLIGRAM(S): at 10:20

## 2020-06-04 RX ADMIN — ARSENIC TRIOXIDE 130.5 MILLIGRAM(S): 2 INJECTION, SOLUTION INTRAVENOUS at 14:48

## 2020-06-04 RX ADMIN — CEFEPIME 100 MILLIGRAM(S): 1 INJECTION, POWDER, FOR SOLUTION INTRAMUSCULAR; INTRAVENOUS at 08:21

## 2020-06-04 RX ADMIN — AER TRAVELER 1 APPLICATION(S): 0.5 SOLUTION RECTAL; TOPICAL at 17:17

## 2020-06-04 RX ADMIN — POSACONAZOLE 300 MILLIGRAM(S): 100 TABLET, DELAYED RELEASE ORAL at 12:45

## 2020-06-04 RX ADMIN — Medication 1 APPLICATION(S): at 14:22

## 2020-06-04 RX ADMIN — CEFEPIME 100 MILLIGRAM(S): 1 INJECTION, POWDER, FOR SOLUTION INTRAMUSCULAR; INTRAVENOUS at 00:11

## 2020-06-04 RX ADMIN — CEFEPIME 100 MILLIGRAM(S): 1 INJECTION, POWDER, FOR SOLUTION INTRAMUSCULAR; INTRAVENOUS at 17:14

## 2020-06-04 RX ADMIN — Medication 1 APPLICATION(S): at 14:23

## 2020-06-04 RX ADMIN — PANTOPRAZOLE SODIUM 40 MILLIGRAM(S): 20 TABLET, DELAYED RELEASE ORAL at 06:13

## 2020-06-04 RX ADMIN — Medication 1 APPLICATION(S): at 06:14

## 2020-06-04 RX ADMIN — Medication 5 MILLILITER(S): at 08:22

## 2020-06-04 RX ADMIN — Medication 1 SPRAY(S): at 14:21

## 2020-06-04 NOTE — PROGRESS NOTE ADULT - PROBLEM SELECTOR PLAN 5
No pharmacologic ppx 2/2 thrombocytopenia        Contact Information [phone] Home regimen with Atenolol held on admission  If BP increases can consider restarting

## 2020-06-04 NOTE — PROGRESS NOTE ADULT - PROBLEM SELECTOR PLAN 4
5/23 HgA1c 7.0  FS AC & HS with HISS  Consistent Carbohydrate diet Grade 1, likely due to treatment. May need to change Posaconazole to Caspofungin  Monitor daily CMP  Avoid further hepatotoxic medications

## 2020-06-04 NOTE — PROGRESS NOTE ADULT - PROBLEM SELECTOR PLAN 2
The patient is neutropenic, afebrile  If febrile Pan Cx and CXR  Continue Cefepime and Posaconazole  Previous COVID-19 (+) outpatient now PCR (-) The patient is not neutropenic, afebrile  If febrile Pan Cx and CXR  Continue Cefepime and Posaconazole  Previous COVID-19 (+) outpatient now PCR (-) The patient is not neutropenic, afebrile  If febrile Pan Cx and CXR  Continue Cefepime and Posaconazole  Previous COVID-19 (+) outpatient, now PCR (-)

## 2020-06-04 NOTE — ADVANCED PRACTICE NURSE CONSULT - ASSESSMENT
Pt. a/ox3.at the chair.oob to bathroom,voided.Lab results reviewed by Dr. Cuevas. Reinforced teachings to patient about her chemo regimen well understood. Right arm ac with double lumen PICC both ports in used patent. Patient to get platelet transfusion during infusion of arsenic.Dr. Swenson aware and said okay to infuse arsenic with blood infusing in the other port. Arsenic trioxide 11mg in 250ml NSS started at 1448 x 2 hours infusion via lowest port of NSS line into purple port from PICC line right arm through Alaris IV pump. 2 RNs verification completed prior to start of treatment. Primary RN aware of plan of care. Safety maintained.

## 2020-06-04 NOTE — PROGRESS NOTE ADULT - ATTENDING COMMENTS
61 y/o Czech speaking F who is transferred for newly diagnosed low/interm risk APL.  Presented with pancytopenia, flow cytometry/bone marrow biopsy/FISH from 5/26- consistent with acute promyelocytic leukemia with PML-AWA translocation.    She was started on ATRA 5/23- day +12, GALA 5/27- day +8    -monitor carefully for signs of differentiation syndrome. Wbc has been stable  -Check CBC, coags/fibrinogen twice daily.  Keep plt >40-50, fibrinogen >150.  Monitor lytes daily, keep K>4, Mg>2.  EKG twice a week  -Headaches likely due to ATRA, CT head no contrast on 5/22 with no bleeding.  No more headaches, just a sensation of heat on the top of the head. MRI was not suggestive of any bleeding. Heat on the head is worse with platelet transfusion, will try benadryl prior to platelets. On Fioricet which patient reports has helped.   -cont Cefepime/Posaconazole  -plt refractory -getting slow plt infusions 59 y/o Japanese speaking F who is transferred for newly diagnosed low/interm risk APL.  Presented with pancytopenia, flow cytometry/bone marrow biopsy/FISH from 5/26- consistent with acute promyelocytic leukemia with PML-AWA translocation.    She was started on ATRA 5/23- day +12, GALA 5/27- day +9    -monitor carefully for signs of differentiation syndrome. Wbc has been stable  -Check CBC, coags/fibrinogen once daily.  Goal plt >40-50, fibrinogen >150; however, pt is plt refractory and unable to maintain these plt goals. Being monitored closely for any signs of bleeding -none at this time. Awaiting HLA-matched plt. Meantime, getting plt transfusions via continuous 1/2 unit infusions. Will add Prednisone 20mg x 5 days in case pt has autoimmune destruction of plt. Low threshold for scanning if pt has HA  -monitor lytes daily, keep K>4, Mg>2.  EKG twice a week  -pt had HA in May -had  CT head no contrast on 5/22 with no bleeding.  No more headaches, just a sensation of heat on the top of the head. MRI was not suggestive of any bleeding. Heat on the head is worse with platelet transfusion, on benadryl prior to platelets and PRN Fioricet   -cont Cefepime/Posaconazole  -plt refractory -getting slow plt infusions

## 2020-06-04 NOTE — PROGRESS NOTE ADULT - SUBJECTIVE AND OBJECTIVE BOX
Diagnosis: Acute Promyelocytic Leukemia     Protocol/Chemo Regimen: ATRA/Arsenic    Day:  ATRA Day 13 and Arsenic Day 9    Patient Serbian speaking refusing  service. Patient's son called by patient    Pt endorsed: no compliants    Review of Systems: Patient denies headache, dizziness, visual changes, chest pain, palpitations, SOB, abdominal pain, nausea, vomiting, diarrhea or dysuria.    Pain scale: 0    Diet: Regular     Allergies: No Known Allergies      ANTIMICROBIALS  cefepime   IVPB 2000 milliGRAM(s) IV Intermittent every 8 hours  posaconazole DR Tablet 300 milliGRAM(s) Oral daily      HEME/ONC MEDICATIONS  tretinoin 40 milliGRAM(s) Oral every 12 hours      STANDING MEDICATIONS  allopurinol 300 milliGRAM(s) Oral daily  Biotene Dry Mouth Oral Rinse 5 milliLiter(s) Swish and Spit five times a day  bisacodyl 5 milliGRAM(s) Oral every 12 hours  chlorhexidine 4% Liquid 1 Application(s) Topical <User Schedule>  dextrose 5%. 1000 milliLiter(s) IV Continuous <Continuous>  dextrose 50% Injectable 12.5 Gram(s) IV Push once  dextrose 50% Injectable 25 Gram(s) IV Push once  dextrose 50% Injectable 25 Gram(s) IV Push once  hydrocortisone 2.5% Rectal Cream 1 Application(s) Rectal every 8 hours  insulin lispro (HumaLOG) corrective regimen sliding scale   SubCutaneous three times a day before meals  insulin lispro (HumaLOG) corrective regimen sliding scale   SubCutaneous at bedtime  pantoprazole    Tablet 40 milliGRAM(s) Oral before breakfast  petrolatum white Ointment 1 Application(s) Topical three times a day  polyethylene glycol 3350 17 Gram(s) Oral daily  sodium chloride 0.65% Nasal 1 Spray(s) Both Nostrils every 8 hours  sodium chloride 0.9%. 1000 milliLiter(s) IV Continuous <Continuous>  witch hazel Pads 1 Application(s) Topical every 12 hours      PRN MEDICATIONS  acetaminophen   Tablet .. 650 milliGRAM(s) Oral every 6 hours PRN  acetaminophen 325 mG/butalbital 50 mG/caffeine 40 mG 1 Tablet(s) Oral every 4 hours PRN  aluminum hydroxide/magnesium hydroxide/simethicone Suspension 30 milliLiter(s) Oral every 6 hours PRN  dextrose 40% Gel 15 Gram(s) Oral once PRN  diphenhydrAMINE   Injectable 25 milliGRAM(s) IV Push every 6 hours PRN  glucagon  Injectable 1 milliGRAM(s) IntraMuscular once PRN  sodium chloride 0.9% lock flush 10 milliLiter(s) IV Push every 1 hour PRN      Vital Signs Last 24 Hrs  T(C): 37 (03 Jun 2020 04:34), Max: 37.6 (02 Jun 2020 21:50)  T(F): 98.6 (03 Jun 2020 04:34), Max: 99.6 (02 Jun 2020 21:50)  HR: 83 (03 Jun 2020 04:34) (75 - 96)  BP: 137/67 (03 Jun 2020 04:34) (118/69 - 145/76)  BP(mean): --  RR: 18 (03 Jun 2020 04:34) (18 - 18)  SpO2: 95% (03 Jun 2020 04:34) (95% - 99%)      PHYSICAL EXAM  General: NAD  HEENT: clear oropharynx  CV: (+) S1/S2 RRR  Lungs: clear to auscultation, no wheezes or rales  Abdomen: soft, non-tender, non-distended (+) BS  Ext: no edema  Skin: no rashes   Neuro: alert and oriented X 3  Central Line: C/D/I          LABS:                RADIOLOGY & ADDITIONAL STUDIES:    EXAM:  DUPLEX EXT VEINS UPPER RT                        PROCEDURE DATE:  05/31/2020    IMPRESSION: No evidence of deep venous thrombosis in the visualized right upper extremity. Diagnosis: Acute Promyelocytic Leukemia     Protocol/Chemo Regimen: ATRA/Arsenic    Day:  ATRA Day 13 and Arsenic Day 9    Patient Welsh speaking refusing  service. Patient's son called by patient    Pt endorsed: no compliants    Review of Systems: Patient denies headache, dizziness, visual changes, chest pain, palpitations, SOB, abdominal pain, nausea, vomiting, diarrhea or dysuria.    Pain scale: 0    Diet: Regular     Allergies: No Known Allergies      ANTIMICROBIALS  cefepime   IVPB 2000 milliGRAM(s) IV Intermittent every 8 hours  posaconazole DR Tablet 300 milliGRAM(s) Oral daily      HEME/ONC MEDICATIONS  tretinoin 40 milliGRAM(s) Oral every 12 hours      STANDING MEDICATIONS  allopurinol 300 milliGRAM(s) Oral daily  Biotene Dry Mouth Oral Rinse 5 milliLiter(s) Swish and Spit five times a day  bisacodyl 5 milliGRAM(s) Oral every 12 hours  chlorhexidine 4% Liquid 1 Application(s) Topical <User Schedule>  dextrose 5%. 1000 milliLiter(s) IV Continuous <Continuous>  dextrose 50% Injectable 12.5 Gram(s) IV Push once  dextrose 50% Injectable 25 Gram(s) IV Push once  dextrose 50% Injectable 25 Gram(s) IV Push once  hydrocortisone 2.5% Rectal Cream 1 Application(s) Rectal every 8 hours  insulin lispro (HumaLOG) corrective regimen sliding scale   SubCutaneous three times a day before meals  insulin lispro (HumaLOG) corrective regimen sliding scale   SubCutaneous at bedtime  pantoprazole    Tablet 40 milliGRAM(s) Oral before breakfast  petrolatum white Ointment 1 Application(s) Topical three times a day  polyethylene glycol 3350 17 Gram(s) Oral daily  sodium chloride 0.65% Nasal 1 Spray(s) Both Nostrils every 8 hours  sodium chloride 0.9%. 1000 milliLiter(s) IV Continuous <Continuous>  witch hazel Pads 1 Application(s) Topical every 12 hours      PRN MEDICATIONS  acetaminophen   Tablet .. 650 milliGRAM(s) Oral every 6 hours PRN  acetaminophen 325 mG/butalbital 50 mG/caffeine 40 mG 1 Tablet(s) Oral every 4 hours PRN  aluminum hydroxide/magnesium hydroxide/simethicone Suspension 30 milliLiter(s) Oral every 6 hours PRN  dextrose 40% Gel 15 Gram(s) Oral once PRN  diphenhydrAMINE   Injectable 25 milliGRAM(s) IV Push every 6 hours PRN  glucagon  Injectable 1 milliGRAM(s) IntraMuscular once PRN  sodium chloride 0.9% lock flush 10 milliLiter(s) IV Push every 1 hour PRN      Vital Signs Last 24 Hrs  T(C): 37 (03 Jun 2020 04:34), Max: 37.6 (02 Jun 2020 21:50)  T(F): 98.6 (03 Jun 2020 04:34), Max: 99.6 (02 Jun 2020 21:50)  HR: 83 (03 Jun 2020 04:34) (75 - 96)  BP: 137/67 (03 Jun 2020 04:34) (118/69 - 145/76)  BP(mean): --  RR: 18 (03 Jun 2020 04:34) (18 - 18)  SpO2: 95% (03 Jun 2020 04:34) (95% - 99%)      PHYSICAL EXAM  General: NAD  HEENT: clear oropharynx  CV: (+) S1/S2 RRR  Lungs: clear to auscultation, no wheezes or rales  Abdomen: soft, non-tender, non-distended (+) BS  Ext: no edema  Skin: no rashes   Neuro: alert and oriented X 3  Central Line: C/D/I          LABS:                          7.9    4.91  )-----------( 5        ( 04 Jun 2020 07:03 )             23.9         Mean Cell Volume : 83.6 fl  Mean Cell Hemoglobin : 27.6 pg  Mean Cell Hemoglobin Concentration : 33.1 gm/dL  Auto Neutrophil # : x  Auto Lymphocyte # : x  Auto Monocyte # : x  Auto Eosinophil # : x  Auto Basophil # : x  Auto Neutrophil % : x  Auto Lymphocyte % : x  Auto Monocyte % : x  Auto Eosinophil % : x  Auto Basophil % : x      06-04    139  |  104  |  20  ----------------------------<  134<H>  3.9   |  22  |  0.85    Ca    10.0      04 Jun 2020 07:03  Phos  3.5     06-04  Mg     2.0     06-04    TPro  7.7  /  Alb  4.6  /  TBili  0.5  /  DBili  x   /  AST  48<H>  /  ALT  48<H>  /  AlkPhos  106  06-04      Mg 2.0  Phos 3.5        PT/INR - ( 03 Jun 2020 18:19 )   PT: 13.4 sec;   INR: 1.17 ratio         PTT - ( 03 Jun 2020 18:19 )  PTT:30.9 sec      Uric Acid 2.6          RADIOLOGY & ADDITIONAL STUDIES:    EXAM:  DUPLEX EXT VEINS UPPER RT                        PROCEDURE DATE:  05/31/2020    IMPRESSION: No evidence of deep venous thrombosis in the visualized right upper extremity. Diagnosis: Acute Promyelocytic Leukemia     Protocol/Chemo Regimen: ATRA/Arsenic    Day:  ATRA Day 13 and Arsenic Day 9    Patient Czech speaking refusing  service. Patient's son called by patient    Pt endorsed: dark stool and poor appetite    Review of Systems: Patient denies headache, dizziness, visual changes, chest pain, palpitations, SOB, abdominal pain, nausea, vomiting, diarrhea or dysuria.    Pain scale: 0    Diet: Regular     Allergies: No Known Allergies      ANTIMICROBIALS  cefepime   IVPB 2000 milliGRAM(s) IV Intermittent every 8 hours  posaconazole DR Tablet 300 milliGRAM(s) Oral daily      HEME/ONC MEDICATIONS  tretinoin 40 milliGRAM(s) Oral every 12 hours      STANDING MEDICATIONS  allopurinol 300 milliGRAM(s) Oral daily  Biotene Dry Mouth Oral Rinse 5 milliLiter(s) Swish and Spit five times a day  bisacodyl 5 milliGRAM(s) Oral every 12 hours  chlorhexidine 4% Liquid 1 Application(s) Topical <User Schedule>  dextrose 5%. 1000 milliLiter(s) IV Continuous <Continuous>  dextrose 50% Injectable 12.5 Gram(s) IV Push once  dextrose 50% Injectable 25 Gram(s) IV Push once  dextrose 50% Injectable 25 Gram(s) IV Push once  hydrocortisone 2.5% Rectal Cream 1 Application(s) Rectal every 8 hours  insulin lispro (HumaLOG) corrective regimen sliding scale   SubCutaneous three times a day before meals  insulin lispro (HumaLOG) corrective regimen sliding scale   SubCutaneous at bedtime  pantoprazole    Tablet 40 milliGRAM(s) Oral before breakfast  petrolatum white Ointment 1 Application(s) Topical three times a day  polyethylene glycol 3350 17 Gram(s) Oral daily  sodium chloride 0.65% Nasal 1 Spray(s) Both Nostrils every 8 hours  sodium chloride 0.9%. 1000 milliLiter(s) IV Continuous <Continuous>  witch hazel Pads 1 Application(s) Topical every 12 hours      PRN MEDICATIONS  acetaminophen   Tablet .. 650 milliGRAM(s) Oral every 6 hours PRN  acetaminophen 325 mG/butalbital 50 mG/caffeine 40 mG 1 Tablet(s) Oral every 4 hours PRN  aluminum hydroxide/magnesium hydroxide/simethicone Suspension 30 milliLiter(s) Oral every 6 hours PRN  dextrose 40% Gel 15 Gram(s) Oral once PRN  diphenhydrAMINE   Injectable 25 milliGRAM(s) IV Push every 6 hours PRN  glucagon  Injectable 1 milliGRAM(s) IntraMuscular once PRN  sodium chloride 0.9% lock flush 10 milliLiter(s) IV Push every 1 hour PRN      Vital Signs Last 24 Hrs  T(C): 37 (03 Jun 2020 04:34), Max: 37.6 (02 Jun 2020 21:50)  T(F): 98.6 (03 Jun 2020 04:34), Max: 99.6 (02 Jun 2020 21:50)  HR: 83 (03 Jun 2020 04:34) (75 - 96)  BP: 137/67 (03 Jun 2020 04:34) (118/69 - 145/76)  BP(mean): --  RR: 18 (03 Jun 2020 04:34) (18 - 18)  SpO2: 95% (03 Jun 2020 04:34) (95% - 99%)      PHYSICAL EXAM  General: NAD  HEENT: (+) blood blisters on lips  CV: (+) S1/S2 RRR  Lungs: clear to auscultation, no wheezes or rales  Abdomen: soft, non-tender, non-distended (+) BS  Ext: no edema  Skin: (+) petechial rash to upper chest and lower abdomen  Neuro: alert and oriented X 3  Central Line: C/D/I          LABS:                          7.9    4.91  )-----------( 5        ( 04 Jun 2020 07:03 )             23.9         Mean Cell Volume : 83.6 fl  Mean Cell Hemoglobin : 27.6 pg  Mean Cell Hemoglobin Concentration : 33.1 gm/dL  Auto Neutrophil # : x  Auto Lymphocyte # : x  Auto Monocyte # : x  Auto Eosinophil # : x  Auto Basophil # : x  Auto Neutrophil % : x  Auto Lymphocyte % : x  Auto Monocyte % : x  Auto Eosinophil % : x  Auto Basophil % : x      06-04    139  |  104  |  20  ----------------------------<  134<H>  3.9   |  22  |  0.85    Ca    10.0      04 Jun 2020 07:03  Phos  3.5     06-04  Mg     2.0     06-04    TPro  7.7  /  Alb  4.6  /  TBili  0.5  /  DBili  x   /  AST  48<H>  /  ALT  48<H>  /  AlkPhos  106  06-04      Mg 2.0  Phos 3.5        PT/INR - ( 03 Jun 2020 18:19 )   PT: 13.4 sec;   INR: 1.17 ratio         PTT - ( 03 Jun 2020 18:19 )  PTT:30.9 sec      Uric Acid 2.6          RADIOLOGY & ADDITIONAL STUDIES:    EXAM:  DUPLEX EXT VEINS UPPER RT                        PROCEDURE DATE:  05/31/2020    IMPRESSION: No evidence of deep venous thrombosis in the visualized right upper extremity. Diagnosis: Acute Promyelocytic Leukemia     Protocol/Chemo Regimen: ATRA/Arsenic    Day:  ATRA Day 13 and Arsenic Day 9    Patient French speaking refusing  service. Patient's son called by patient    Pt endorsed: dark stool and poor appetite    Review of Systems: Patient denies headache, dizziness, visual changes, chest pain, palpitations, SOB, abdominal pain, nausea, vomiting, diarrhea or dysuria.    Pain scale: 0    Diet: Regular     Allergies: No Known Allergies      ANTIMICROBIALS  cefepime   IVPB 2000 milliGRAM(s) IV Intermittent every 8 hours  posaconazole DR Tablet 300 milliGRAM(s) Oral daily      HEME/ONC MEDICATIONS  tretinoin 40 milliGRAM(s) Oral every 12 hours      STANDING MEDICATIONS  allopurinol 300 milliGRAM(s) Oral daily  Biotene Dry Mouth Oral Rinse 5 milliLiter(s) Swish and Spit five times a day  bisacodyl 5 milliGRAM(s) Oral every 12 hours  chlorhexidine 4% Liquid 1 Application(s) Topical <User Schedule>  dextrose 5%. 1000 milliLiter(s) IV Continuous <Continuous>  dextrose 50% Injectable 12.5 Gram(s) IV Push once  dextrose 50% Injectable 25 Gram(s) IV Push once  dextrose 50% Injectable 25 Gram(s) IV Push once  hydrocortisone 2.5% Rectal Cream 1 Application(s) Rectal every 8 hours  insulin lispro (HumaLOG) corrective regimen sliding scale   SubCutaneous three times a day before meals  insulin lispro (HumaLOG) corrective regimen sliding scale   SubCutaneous at bedtime  pantoprazole    Tablet 40 milliGRAM(s) Oral before breakfast  petrolatum white Ointment 1 Application(s) Topical three times a day  polyethylene glycol 3350 17 Gram(s) Oral daily  sodium chloride 0.65% Nasal 1 Spray(s) Both Nostrils every 8 hours  sodium chloride 0.9%. 1000 milliLiter(s) IV Continuous <Continuous>  witch hazel Pads 1 Application(s) Topical every 12 hours      PRN MEDICATIONS  acetaminophen   Tablet .. 650 milliGRAM(s) Oral every 6 hours PRN  acetaminophen 325 mG/butalbital 50 mG/caffeine 40 mG 1 Tablet(s) Oral every 4 hours PRN  aluminum hydroxide/magnesium hydroxide/simethicone Suspension 30 milliLiter(s) Oral every 6 hours PRN  dextrose 40% Gel 15 Gram(s) Oral once PRN  diphenhydrAMINE   Injectable 25 milliGRAM(s) IV Push every 6 hours PRN  glucagon  Injectable 1 milliGRAM(s) IntraMuscular once PRN  sodium chloride 0.9% lock flush 10 milliLiter(s) IV Push every 1 hour PRN      Vital Signs Last 24 Hrs  T(C): 37 (03 Jun 2020 04:34), Max: 37.6 (02 Jun 2020 21:50)  T(F): 98.6 (03 Jun 2020 04:34), Max: 99.6 (02 Jun 2020 21:50)  HR: 83 (03 Jun 2020 04:34) (75 - 96)  BP: 137/67 (03 Jun 2020 04:34) (118/69 - 145/76)  BP(mean): --  RR: 18 (03 Jun 2020 04:34) (18 - 18)  SpO2: 95% (03 Jun 2020 04:34) (95% - 99%)        PHYSICAL EXAM  General: NAD  HEENT: (+) blood blisters on lips  CV: (+) S1/S2 RRR  Lungs: clear to auscultation, no wheezes or rales  Abdomen: soft, non-tender, non-distended (+) BS  Ext: no edema  Skin: (+) petechial rash to upper chest and lower abdomen  Neuro: alert and oriented X 3  Central Line: C/D/I          LABS:                          7.9    4.91  )-----------( 5        ( 04 Jun 2020 07:03 )             23.9         Mean Cell Volume : 83.6 fl  Mean Cell Hemoglobin : 27.6 pg  Mean Cell Hemoglobin Concentration : 33.1 gm/dL  Auto Neutrophil # : x  Auto Lymphocyte # : x  Auto Monocyte # : x  Auto Eosinophil # : x  Auto Basophil # : x  Auto Neutrophil % : x  Auto Lymphocyte % : x  Auto Monocyte % : x  Auto Eosinophil % : x  Auto Basophil % : x      06-04    139  |  104  |  20  ----------------------------<  134<H>  3.9   |  22  |  0.85    Ca    10.0      04 Jun 2020 07:03  Phos  3.5     06-04  Mg     2.0     06-04    TPro  7.7  /  Alb  4.6  /  TBili  0.5  /  DBili  x   /  AST  48<H>  /  ALT  48<H>  /  AlkPhos  106  06-04      Mg 2.0  Phos 3.5        PT/INR - ( 03 Jun 2020 18:19 )   PT: 13.4 sec;   INR: 1.17 ratio         PTT - ( 03 Jun 2020 18:19 )  PTT:30.9 sec      Uric Acid 2.6          RADIOLOGY & ADDITIONAL STUDIES:    EXAM:  DUPLEX EXT VEINS UPPER RT                        PROCEDURE DATE:  05/31/2020    IMPRESSION: No evidence of deep venous thrombosis in the visualized right upper extremity.

## 2020-06-04 NOTE — PROGRESS NOTE ADULT - PROBLEM SELECTOR PLAN 1
Continue ATRA 40mg BID (started 5/22) and Arsenic Trioxide daily (started on 5/27)  Monitor for differentiation syndrome and check EKG for QTc prolongation every Tuesday and Friday. Keep K>4 and Mg>2  Follow up CBC with diff, CMP, TLS and DIC labs q 12  Keep PLTs >50 K although now refractory. Order 1/2 units over 3 hours q 6. Keep Fibrinogen >150 if less give Cryo  Strict Is and Os/Daily weights/Mouth Care  Allopurinol 300mg PO daily through tomorrow Continue ATRA 40mg BID (started 5/22) and Arsenic Trioxide daily (started on 5/27)  Monitor for differentiation syndrome and check EKG for QTc prolongation every Tuesday and Friday. Keep K>4 and Mg>2  Follow up CBC with diff, CMP, TLS and DIC daily  KCL 40 mEq PO x 1  Keep PLTs >50 K although now refractory. Order 1/2 units over 3 hours q 3 for CIVI. HLA work up in process. Keep Fibrinogen >150 if less give Cryo  Strict Is and Os/Daily weights/Mouth Care  Allopurinol 300mg PO daily through today Continue ATRA 40mg BID (started 5/22) and Arsenic Trioxide daily (started on 5/27)  Monitor for differentiation syndrome and check EKG for QTc prolongation every Tuesday and Friday. Keep K>4 and Mg>2  Follow up CBC with diff, CMP, TLS and DIC daily  KCL 40 mEq PO x 1  Keep PLTs >50 K although now refractory. Order 1/2 units over 3 hours q 3 for CIVI. HLA work up in process. Prednisone 20mg PO daily x 3 days started  Keep Fibrinogen >150 if less give Cryo  Strict Is and Os/Daily weights/Mouth Care  Allopurinol 300mg PO daily through today

## 2020-06-04 NOTE — PROGRESS NOTE ADULT - ASSESSMENT
This is a 61 yo Maltese speaking F with PMHx of HTN, HLD, T2DM, COVID-19 infection admitted for management of newly diagnosed APL. The patient is currently reciving ATRA and Arsenic. syndrome. The patient has pancytopenia 2/2 disease and/or ATRA/Arsenic. This is a 61 yo Wolof speaking F with PMHx of HTN, HLD, T2DM, COVID-19 infection admitted for management of newly diagnosed APL. The patient is currently reciving ATRA and Arsenic. syndrome. Patients hospital course has been complicated by transaminitis and refractory thrombocytopenia. The patient has pancytopenia 2/2 disease and/or ATRA/Arsenic.

## 2020-06-04 NOTE — PROGRESS NOTE ADULT - PROBLEM SELECTOR PLAN 3
Home regimen with Atenolol held on admission  If BP increases can consider restarting Patient reporting dark black stools  Follow up Stool for OB  Start CIVI of platelets  Protonix IV q 12  CBC q 12 and transfuse if Hgb <7

## 2020-06-05 LAB
ALBUMIN SERPL ELPH-MCNC: 4.6 G/DL — SIGNIFICANT CHANGE UP (ref 3.3–5)
ALBUMIN SERPL ELPH-MCNC: 4.6 G/DL — SIGNIFICANT CHANGE UP (ref 3.3–5)
ALP SERPL-CCNC: 103 U/L — SIGNIFICANT CHANGE UP (ref 40–120)
ALP SERPL-CCNC: 105 U/L — SIGNIFICANT CHANGE UP (ref 40–120)
ALT FLD-CCNC: 44 U/L — SIGNIFICANT CHANGE UP (ref 10–45)
ALT FLD-CCNC: 46 U/L — HIGH (ref 10–45)
ANION GAP SERPL CALC-SCNC: 14 MMOL/L — SIGNIFICANT CHANGE UP (ref 5–17)
ANION GAP SERPL CALC-SCNC: 14 MMOL/L — SIGNIFICANT CHANGE UP (ref 5–17)
APTT BLD: 29.1 SEC — SIGNIFICANT CHANGE UP (ref 27.5–36.3)
AST SERPL-CCNC: 40 U/L — SIGNIFICANT CHANGE UP (ref 10–40)
AST SERPL-CCNC: 41 U/L — HIGH (ref 10–40)
BASOPHILS # BLD AUTO: 0 K/UL — SIGNIFICANT CHANGE UP (ref 0–0.2)
BASOPHILS # BLD AUTO: 0.01 K/UL — SIGNIFICANT CHANGE UP (ref 0–0.2)
BASOPHILS NFR BLD AUTO: 0 % — SIGNIFICANT CHANGE UP (ref 0–2)
BASOPHILS NFR BLD AUTO: 0.2 % — SIGNIFICANT CHANGE UP (ref 0–2)
BILIRUB SERPL-MCNC: 0.3 MG/DL — SIGNIFICANT CHANGE UP (ref 0.2–1.2)
BILIRUB SERPL-MCNC: 0.4 MG/DL — SIGNIFICANT CHANGE UP (ref 0.2–1.2)
BLD GP AB SCN SERPL QL: NEGATIVE — SIGNIFICANT CHANGE UP
BUN SERPL-MCNC: 20 MG/DL — SIGNIFICANT CHANGE UP (ref 7–23)
BUN SERPL-MCNC: 21 MG/DL — SIGNIFICANT CHANGE UP (ref 7–23)
CALCIUM SERPL-MCNC: 10 MG/DL — SIGNIFICANT CHANGE UP (ref 8.4–10.5)
CALCIUM SERPL-MCNC: 9.8 MG/DL — SIGNIFICANT CHANGE UP (ref 8.4–10.5)
CHLORIDE SERPL-SCNC: 104 MMOL/L — SIGNIFICANT CHANGE UP (ref 96–108)
CHLORIDE SERPL-SCNC: 106 MMOL/L — SIGNIFICANT CHANGE UP (ref 96–108)
CO2 SERPL-SCNC: 18 MMOL/L — LOW (ref 22–31)
CO2 SERPL-SCNC: 19 MMOL/L — LOW (ref 22–31)
CREAT SERPL-MCNC: 0.88 MG/DL — SIGNIFICANT CHANGE UP (ref 0.5–1.3)
CREAT SERPL-MCNC: 0.94 MG/DL — SIGNIFICANT CHANGE UP (ref 0.5–1.3)
D DIMER BLD IA.RAPID-MCNC: 639 NG/ML DDU — HIGH
EOSINOPHIL # BLD AUTO: 0.01 K/UL — SIGNIFICANT CHANGE UP (ref 0–0.5)
EOSINOPHIL # BLD AUTO: 0.05 K/UL — SIGNIFICANT CHANGE UP (ref 0–0.5)
EOSINOPHIL NFR BLD AUTO: 0.2 % — SIGNIFICANT CHANGE UP (ref 0–6)
EOSINOPHIL NFR BLD AUTO: 1 % — SIGNIFICANT CHANGE UP (ref 0–6)
FIBRINOGEN PPP-MCNC: 387 MG/DL — SIGNIFICANT CHANGE UP (ref 350–510)
GLUCOSE BLDC GLUCOMTR-MCNC: 169 MG/DL — HIGH (ref 70–99)
GLUCOSE BLDC GLUCOMTR-MCNC: 222 MG/DL — HIGH (ref 70–99)
GLUCOSE BLDC GLUCOMTR-MCNC: 232 MG/DL — HIGH (ref 70–99)
GLUCOSE BLDC GLUCOMTR-MCNC: 254 MG/DL — HIGH (ref 70–99)
GLUCOSE SERPL-MCNC: 126 MG/DL — HIGH (ref 70–99)
GLUCOSE SERPL-MCNC: 133 MG/DL — HIGH (ref 70–99)
HCT VFR BLD CALC: 19.6 % — CRITICAL LOW (ref 34.5–45)
HCT VFR BLD CALC: 20 % — CRITICAL LOW (ref 34.5–45)
HCT VFR BLD CALC: 26.1 % — LOW (ref 34.5–45)
HGB BLD-MCNC: 6.5 G/DL — CRITICAL LOW (ref 11.5–15.5)
HGB BLD-MCNC: 6.8 G/DL — CRITICAL LOW (ref 11.5–15.5)
HGB BLD-MCNC: 8.6 G/DL — LOW (ref 11.5–15.5)
IMM GRANULOCYTES NFR BLD AUTO: 24.4 % — HIGH (ref 0–1.5)
INR BLD: 1.15 RATIO — SIGNIFICANT CHANGE UP (ref 0.88–1.16)
LDH SERPL L TO P-CCNC: 269 U/L — HIGH (ref 50–242)
LYMPHOCYTES # BLD AUTO: 0.62 K/UL — LOW (ref 1–3.3)
LYMPHOCYTES # BLD AUTO: 1.6 K/UL — SIGNIFICANT CHANGE UP (ref 1–3.3)
LYMPHOCYTES # BLD AUTO: 15.5 % — SIGNIFICANT CHANGE UP (ref 13–44)
LYMPHOCYTES # BLD AUTO: 34 % — SIGNIFICANT CHANGE UP (ref 13–44)
MAGNESIUM SERPL-MCNC: 1.8 MG/DL — SIGNIFICANT CHANGE UP (ref 1.6–2.6)
MANUAL SMEAR VERIFICATION: SIGNIFICANT CHANGE UP
MCHC RBC-ENTMCNC: 27.9 PG — SIGNIFICANT CHANGE UP (ref 27–34)
MCHC RBC-ENTMCNC: 28 PG — SIGNIFICANT CHANGE UP (ref 27–34)
MCHC RBC-ENTMCNC: 28.1 PG — SIGNIFICANT CHANGE UP (ref 27–34)
MCHC RBC-ENTMCNC: 33 GM/DL — SIGNIFICANT CHANGE UP (ref 32–36)
MCHC RBC-ENTMCNC: 33.2 GM/DL — SIGNIFICANT CHANGE UP (ref 32–36)
MCHC RBC-ENTMCNC: 34 GM/DL — SIGNIFICANT CHANGE UP (ref 32–36)
MCV RBC AUTO: 82.6 FL — SIGNIFICANT CHANGE UP (ref 80–100)
MCV RBC AUTO: 84.1 FL — SIGNIFICANT CHANGE UP (ref 80–100)
MCV RBC AUTO: 85 FL — SIGNIFICANT CHANGE UP (ref 80–100)
METAMYELOCYTES # FLD: 9 % — HIGH (ref 0–0)
MONOCYTES # BLD AUTO: 0.08 K/UL — SIGNIFICANT CHANGE UP (ref 0–0.9)
MONOCYTES # BLD AUTO: 0.19 K/UL — SIGNIFICANT CHANGE UP (ref 0–0.9)
MONOCYTES NFR BLD AUTO: 2 % — SIGNIFICANT CHANGE UP (ref 2–14)
MONOCYTES NFR BLD AUTO: 4 % — SIGNIFICANT CHANGE UP (ref 2–14)
MYELOCYTES NFR BLD: 2 % — HIGH (ref 0–0)
NEUTROPHILS # BLD AUTO: 2.31 K/UL — SIGNIFICANT CHANGE UP (ref 1.8–7.4)
NEUTROPHILS # BLD AUTO: 2.31 K/UL — SIGNIFICANT CHANGE UP (ref 1.8–7.4)
NEUTROPHILS NFR BLD AUTO: 43 % — SIGNIFICANT CHANGE UP (ref 43–77)
NEUTROPHILS NFR BLD AUTO: 57.7 % — SIGNIFICANT CHANGE UP (ref 43–77)
NEUTS BAND # BLD: 6 % — SIGNIFICANT CHANGE UP (ref 0–8)
NRBC # BLD: 0 /100 WBCS — SIGNIFICANT CHANGE UP (ref 0–0)
NRBC # BLD: 0 /100 WBCS — SIGNIFICANT CHANGE UP (ref 0–0)
NRBC # BLD: 0 /100 — SIGNIFICANT CHANGE UP (ref 0–0)
OB PNL STL: NEGATIVE — SIGNIFICANT CHANGE UP
PHOSPHATE SERPL-MCNC: 3.2 MG/DL — SIGNIFICANT CHANGE UP (ref 2.5–4.5)
PLAT MORPH BLD: NORMAL — SIGNIFICANT CHANGE UP
PLATELET # BLD AUTO: 102 K/UL — LOW (ref 150–400)
PLATELET # BLD AUTO: 80 K/UL — LOW (ref 150–400)
PLATELET # BLD AUTO: 84 K/UL — LOW (ref 150–400)
PLATELET # BLD AUTO: 90 K/UL — LOW (ref 150–400)
POTASSIUM SERPL-MCNC: 4.1 MMOL/L — SIGNIFICANT CHANGE UP (ref 3.5–5.3)
POTASSIUM SERPL-MCNC: 4.4 MMOL/L — SIGNIFICANT CHANGE UP (ref 3.5–5.3)
POTASSIUM SERPL-SCNC: 4.1 MMOL/L — SIGNIFICANT CHANGE UP (ref 3.5–5.3)
POTASSIUM SERPL-SCNC: 4.4 MMOL/L — SIGNIFICANT CHANGE UP (ref 3.5–5.3)
PROT SERPL-MCNC: 7.6 G/DL — SIGNIFICANT CHANGE UP (ref 6–8.3)
PROT SERPL-MCNC: 7.7 G/DL — SIGNIFICANT CHANGE UP (ref 6–8.3)
PROTHROM AB SERPL-ACNC: 13.3 SEC — HIGH (ref 10–12.9)
RBC # BLD: 2.33 M/UL — LOW (ref 3.8–5.2)
RBC # BLD: 2.42 M/UL — LOW (ref 3.8–5.2)
RBC # BLD: 3.07 M/UL — LOW (ref 3.8–5.2)
RBC # FLD: 16.5 % — HIGH (ref 10.3–14.5)
RBC # FLD: 16.9 % — HIGH (ref 10.3–14.5)
RBC # FLD: 17.1 % — HIGH (ref 10.3–14.5)
RBC BLD AUTO: SIGNIFICANT CHANGE UP
RH IG SCN BLD-IMP: POSITIVE — SIGNIFICANT CHANGE UP
SODIUM SERPL-SCNC: 137 MMOL/L — SIGNIFICANT CHANGE UP (ref 135–145)
SODIUM SERPL-SCNC: 138 MMOL/L — SIGNIFICANT CHANGE UP (ref 135–145)
URATE SERPL-MCNC: 2.4 MG/DL — LOW (ref 2.5–7)
URATE SERPL-MCNC: 2.6 MG/DL — SIGNIFICANT CHANGE UP (ref 2.5–7)
VARIANT LYMPHS # BLD: 1 % — SIGNIFICANT CHANGE UP (ref 0–6)
WBC # BLD: 4.01 K/UL — SIGNIFICANT CHANGE UP (ref 3.8–10.5)
WBC # BLD: 4.32 K/UL — SIGNIFICANT CHANGE UP (ref 3.8–10.5)
WBC # BLD: 4.72 K/UL — SIGNIFICANT CHANGE UP (ref 3.8–10.5)
WBC # FLD AUTO: 4.01 K/UL — SIGNIFICANT CHANGE UP (ref 3.8–10.5)
WBC # FLD AUTO: 4.32 K/UL — SIGNIFICANT CHANGE UP (ref 3.8–10.5)
WBC # FLD AUTO: 4.72 K/UL — SIGNIFICANT CHANGE UP (ref 3.8–10.5)

## 2020-06-05 PROCEDURE — 93010 ELECTROCARDIOGRAM REPORT: CPT

## 2020-06-05 PROCEDURE — 71045 X-RAY EXAM CHEST 1 VIEW: CPT | Mod: 26

## 2020-06-05 PROCEDURE — 99232 SBSQ HOSP IP/OBS MODERATE 35: CPT | Mod: GC

## 2020-06-05 RX ORDER — CEFTRIAXONE 500 MG/1
2 INJECTION, POWDER, FOR SOLUTION INTRAMUSCULAR; INTRAVENOUS
Qty: 10 | Refills: 0
Start: 2020-06-05 | End: 2020-06-09

## 2020-06-05 RX ORDER — IPRATROPIUM/ALBUTEROL SULFATE 18-103MCG
3 AEROSOL WITH ADAPTER (GRAM) INHALATION ONCE
Refills: 0 | Status: COMPLETED | OUTPATIENT
Start: 2020-06-05 | End: 2020-06-05

## 2020-06-05 RX ORDER — FUROSEMIDE 40 MG
20 TABLET ORAL ONCE
Refills: 0 | Status: COMPLETED | OUTPATIENT
Start: 2020-06-05 | End: 2020-06-05

## 2020-06-05 RX ORDER — FUROSEMIDE 40 MG
40 TABLET ORAL ONCE
Refills: 0 | Status: COMPLETED | OUTPATIENT
Start: 2020-06-05 | End: 2020-06-05

## 2020-06-05 RX ORDER — DEXAMETHASONE 0.5 MG/5ML
10 ELIXIR ORAL ONCE
Refills: 0 | Status: COMPLETED | OUTPATIENT
Start: 2020-06-05 | End: 2020-06-05

## 2020-06-05 RX ORDER — IPRATROPIUM/ALBUTEROL SULFATE 18-103MCG
3 AEROSOL WITH ADAPTER (GRAM) INHALATION EVERY 6 HOURS
Refills: 0 | Status: DISCONTINUED | OUTPATIENT
Start: 2020-06-05 | End: 2020-06-18

## 2020-06-05 RX ADMIN — Medication 3: at 18:39

## 2020-06-05 RX ADMIN — Medication 1 APPLICATION(S): at 21:27

## 2020-06-05 RX ADMIN — Medication 1 APPLICATION(S): at 06:16

## 2020-06-05 RX ADMIN — Medication 1 APPLICATION(S): at 13:07

## 2020-06-05 RX ADMIN — Medication 650 MILLIGRAM(S): at 08:52

## 2020-06-05 RX ADMIN — CEFEPIME 100 MILLIGRAM(S): 1 INJECTION, POWDER, FOR SOLUTION INTRAMUSCULAR; INTRAVENOUS at 08:52

## 2020-06-05 RX ADMIN — PANTOPRAZOLE SODIUM 40 MILLIGRAM(S): 20 TABLET, DELAYED RELEASE ORAL at 06:13

## 2020-06-05 RX ADMIN — Medication 25 MILLIGRAM(S): at 08:52

## 2020-06-05 RX ADMIN — SODIUM CHLORIDE 25 MILLILITER(S): 9 INJECTION INTRAMUSCULAR; INTRAVENOUS; SUBCUTANEOUS at 18:41

## 2020-06-05 RX ADMIN — Medication 20 MILLIGRAM(S): at 06:12

## 2020-06-05 RX ADMIN — SODIUM CHLORIDE 25 MILLILITER(S): 9 INJECTION INTRAMUSCULAR; INTRAVENOUS; SUBCUTANEOUS at 06:15

## 2020-06-05 RX ADMIN — Medication 100 MILLIGRAM(S): at 16:36

## 2020-06-05 RX ADMIN — Medication 3 MILLILITER(S): at 11:52

## 2020-06-05 RX ADMIN — Medication 5 MILLILITER(S): at 16:35

## 2020-06-05 RX ADMIN — PANTOPRAZOLE SODIUM 40 MILLIGRAM(S): 20 TABLET, DELAYED RELEASE ORAL at 18:41

## 2020-06-05 RX ADMIN — Medication 1 SPRAY(S): at 06:13

## 2020-06-05 RX ADMIN — Medication 1: at 09:24

## 2020-06-05 RX ADMIN — Medication 3 MILLILITER(S): at 19:22

## 2020-06-05 RX ADMIN — ARSENIC TRIOXIDE 130.5 MILLIGRAM(S): 2 INJECTION, SOLUTION INTRAVENOUS at 13:58

## 2020-06-05 RX ADMIN — Medication 20 MILLIGRAM(S): at 16:35

## 2020-06-05 RX ADMIN — TRETINOIN 40 MILLIGRAM(S): 10 CAPSULE, LIQUID FILLED ORAL at 08:53

## 2020-06-05 RX ADMIN — Medication 1 APPLICATION(S): at 21:28

## 2020-06-05 RX ADMIN — Medication 1 SPRAY(S): at 21:26

## 2020-06-05 RX ADMIN — Medication 5 MILLILITER(S): at 11:48

## 2020-06-05 RX ADMIN — Medication 2: at 12:53

## 2020-06-05 RX ADMIN — Medication 1 SPRAY(S): at 14:01

## 2020-06-05 RX ADMIN — Medication 5 MILLIGRAM(S): at 21:24

## 2020-06-05 RX ADMIN — Medication 5 MILLILITER(S): at 08:53

## 2020-06-05 RX ADMIN — Medication 100 MILLIGRAM(S): at 21:24

## 2020-06-05 RX ADMIN — CEFEPIME 100 MILLIGRAM(S): 1 INJECTION, POWDER, FOR SOLUTION INTRAMUSCULAR; INTRAVENOUS at 16:35

## 2020-06-05 RX ADMIN — POSACONAZOLE 300 MILLIGRAM(S): 100 TABLET, DELAYED RELEASE ORAL at 11:48

## 2020-06-05 RX ADMIN — Medication 102 MILLIGRAM(S): at 04:00

## 2020-06-05 RX ADMIN — AER TRAVELER 1 APPLICATION(S): 0.5 SOLUTION RECTAL; TOPICAL at 18:41

## 2020-06-05 RX ADMIN — Medication 5 MILLILITER(S): at 00:22

## 2020-06-05 RX ADMIN — CEFEPIME 100 MILLIGRAM(S): 1 INJECTION, POWDER, FOR SOLUTION INTRAMUSCULAR; INTRAVENOUS at 00:22

## 2020-06-05 RX ADMIN — Medication 10 MILLIGRAM(S): at 11:49

## 2020-06-05 RX ADMIN — POLYETHYLENE GLYCOL 3350 17 GRAM(S): 17 POWDER, FOR SOLUTION ORAL at 11:48

## 2020-06-05 RX ADMIN — Medication 1 APPLICATION(S): at 06:12

## 2020-06-05 RX ADMIN — CHLORHEXIDINE GLUCONATE 1 APPLICATION(S): 213 SOLUTION TOPICAL at 08:54

## 2020-06-05 RX ADMIN — AER TRAVELER 1 APPLICATION(S): 0.5 SOLUTION RECTAL; TOPICAL at 06:15

## 2020-06-05 RX ADMIN — Medication 3 MILLILITER(S): at 03:12

## 2020-06-05 RX ADMIN — TRETINOIN 40 MILLIGRAM(S): 10 CAPSULE, LIQUID FILLED ORAL at 20:25

## 2020-06-05 RX ADMIN — Medication 40 MILLIGRAM(S): at 02:30

## 2020-06-05 RX ADMIN — Medication 1 APPLICATION(S): at 14:02

## 2020-06-05 RX ADMIN — Medication 5 MILLILITER(S): at 20:25

## 2020-06-05 NOTE — PROGRESS NOTE ADULT - PROBLEM SELECTOR PLAN 1
Continue ATRA 40mg BID (started 5/22) and Arsenic Trioxide daily (started on 5/27)  Monitor for differentiation syndrome and check EKG for QTc prolongation every Tuesday and Friday. Keep K>4 and Mg>2  Follow up CBC with diff, CMP, TLS and DIC daily  Keep PLTs >50 K although now refractory. Order 1/2 units over 3 hours q 3 for CIVI. HLA work up in process. Prednisone 20mg PO daily x 3 days started  Keep Fibrinogen >150 if less give Cryo  Strict Is and Os/Daily weights/Mouth Care  6/5 pt with acute sob, CXR (prelim) poss pulmonary edema   given one dose of Dexamethasone, ATRA on hold. COVID-19 PCR . Continue ATRA 40mg BID (started 5/22) and Arsenic Trioxide daily (started on 5/27)  Monitor for differentiation syndrome and check EKG for QTc prolongation every Tuesday and Friday. Keep K>4 and Mg>2  Follow up CBC with diff, CMP, TLS and DIC daily  Keep PLTs >50 K although now refractory. Order 1/2 units over 3 hours q 3 for CIVI. HLA work up in process. Prednisone 20mg PO daily x 3 days started  Keep Fibrinogen >150 if less give Cryo  Strict Is and Os/Daily weights/Mouth Care  6/5 pt with acute sob, CXR - right upper lung linear atelectasis  given one dose of Dexamethasone  COVID-19 PCR (-)   Atra continued, incentive spirometry, duonebs ATC

## 2020-06-05 NOTE — ADVANCED PRACTICE NURSE CONSULT - ASSESSMENT
Labs today WBC 4.32 HGB 6.5 HCT 19.6 PLTS 102 CR. 0.94 TBILI. 0.3,K 4.1,Mg 1.8. Pt found in bed, alert and oriented x4, v/s stable afebrile, n/c offered.  Right picc line in place. Site without redness or swelling. Lumen previously accessed with + blood return flushes well with NS. Chemotherapy verified by 2 RNs prior to administration. at 1400 treated with arsenic .15mg/kg= 11 mg ivss over 2 hours. Pt remains in bed with n/c offered. Primary RN aware of treatment plan.

## 2020-06-05 NOTE — PROGRESS NOTE ADULT - ATTENDING COMMENTS
59 y/o German speaking F who is transferred for newly diagnosed low/interm risk APL.  Presented with pancytopenia, flow cytometry/bone marrow biopsy/FISH from 5/26- consistent with acute promyelocytic leukemia with PML-AWA translocation.    She was started on ATRA 5/23- day +12, GALA 5/27- day +9    -monitor carefully for signs of differentiation syndrome. Wbc has been stable  -Check CBC, coags/fibrinogen once daily.  Goal plt >40-50, fibrinogen >150; however, pt is plt refractory and unable to maintain these plt goals. Being monitored closely for any signs of bleeding -none at this time. Awaiting HLA-matched plt. Meantime, getting plt transfusions via continuous 1/2 unit infusions. Will add Prednisone 20mg x 5 days in case pt has autoimmune destruction of plt. Low threshold for scanning if pt has HA  -monitor lytes daily, keep K>4, Mg>2.  EKG twice a week  -pt had HA in May -had  CT head no contrast on 5/22 with no bleeding.  No more headaches, just a sensation of heat on the top of the head. MRI was not suggestive of any bleeding. Heat on the head is worse with platelet transfusion, on benadryl prior to platelets and PRN Fioricet   -cont Cefepime/Posaconazole  -plt refractory -getting slow plt infusions 61 y/o Romansh speaking F who is transferred for newly diagnosed low/interm risk APL.  Presented with pancytopenia, flow cytometry/bone marrow biopsy/FISH from 5/26- consistent with acute promyelocytic leukemia with PML-AWA translocation.    She was started on ATRA 5/23- day +14, GALA 5/27- day +10    -pt had SOB last night -given Lasix, given Dexa 10mg x1, pt improved. CXR 6/5 -atelectasis. Daily weights, diurese today again. Monitor for signs of differentiation syndrome. Wbc has been stable  -Check CBC, coags/fibrinogen once daily.  Goal plt >40-50, fibrinogen >150; however, pt has been plt refractory and unable to maintain these plt goals. Being monitored closely for any signs of bleeding -none at this time. On 6/4, pt had ABO-matched platelets given over 3 hrs in 1/2 unit infusions -->she responded very well, plt count >100. Pt was also started on Prednisone 20mg on 6/4 x 5 days in case pt has autoimmune destruction of plt. Low threshold for scanning if pt has HA  -monitor lytes daily, keep K>4, Mg>2.  EKG twice a week  -pt had HA in May -had  CT head no contrast on 5/22 with no bleeding.  No more headaches, just a sensation of heat on the top of the head. MRI was not suggestive of any bleeding. Heat on the head is worse with platelet transfusion, on benadryl prior to platelets and PRN Fioricet   -cont Cefepime/Posaconazole

## 2020-06-05 NOTE — CHART NOTE - NSCHARTNOTEFT_GEN_A_CORE
Nutrition Follow Up Note  Patient seen for: Nutrition follow up. Pt with newly diagnosed APL receiving treatment.     Source: Pt, Son via phone. Pt is primarily Thai speaking and preferred so to translate via speakerphone, declined  services.     Diet : Consistent carbohydrate, Halal diet with ensure clear 3 daily     Patient reports: Per son pt with nausea but no episodes of vomiting, pt with a couple small BMs this morning-pt previously with dark colored stools, son stated pt did not report further blood today, interested in stewed prunes.      PO intake : Per son pt with reduced po intake over the past couple of days which pt attributes to sensitivity to food smells and nausea, stated pt has been mostly taking oatmeal during this time, enjoys ensure clear supplements-receptive to glucerna shakes to further optimize nutrient density. RD obtained meal selections for today and tomorrow.       Weight: 164.9 lbs (5/26), 172.1 lbs (6/1), 173 lbs (6/5), weight increased from admission, likely attributed to fluid shifts, no edema noted currently.     Pertinent Medications: MEDICATIONS  (STANDING):  albuterol/ipratropium for Nebulization 3 milliLiter(s) Nebulizer every 6 hours  arsenic trioxide IVPB (eMAR) 11 milliGRAM(s) IV Intermittent every 24 hours  Biotene Dry Mouth Oral Rinse 5 milliLiter(s) Swish and Spit five times a day  bisacodyl 5 milliGRAM(s) Oral every 12 hours  cefepime   IVPB 2000 milliGRAM(s) IV Intermittent every 8 hours  chlorhexidine 4% Liquid 1 Application(s) Topical <User Schedule>  dextrose 5%. 1000 milliLiter(s) (50 mL/Hr) IV Continuous <Continuous>  dextrose 50% Injectable 12.5 Gram(s) IV Push once  dextrose 50% Injectable 25 Gram(s) IV Push once  dextrose 50% Injectable 25 Gram(s) IV Push once  hydrocortisone 2.5% Rectal Cream 1 Application(s) Rectal every 8 hours  insulin lispro (HumaLOG) corrective regimen sliding scale   SubCutaneous three times a day before meals  insulin lispro (HumaLOG) corrective regimen sliding scale   SubCutaneous at bedtime  pantoprazole  Injectable 40 milliGRAM(s) IV Push every 12 hours  petrolatum white Ointment 1 Application(s) Topical three times a day  phytonadione   Solution 10 milliGRAM(s) Oral daily  polyethylene glycol 3350 17 Gram(s) Oral daily  posaconazole DR Tablet 300 milliGRAM(s) Oral daily  predniSONE   Tablet 20 milliGRAM(s) Oral daily  sodium chloride 0.65% Nasal 1 Spray(s) Both Nostrils every 8 hours  sodium chloride 0.9%. 1000 milliLiter(s) (25 mL/Hr) IV Continuous <Continuous>  tretinoin 40 milliGRAM(s) Oral every 12 hours  witch hazel Pads 1 Application(s) Topical every 12 hours    MEDICATIONS  (PRN):  acetaminophen   Tablet .. 650 milliGRAM(s) Oral every 6 hours PRN Temp greater or equal to 38C (100.4F), Mild Pain (1 - 3)  acetaminophen 325 mG/butalbital 50 mG/caffeine 40 mG 1 Tablet(s) Oral every 4 hours PRN headache  aluminum hydroxide/magnesium hydroxide/simethicone Suspension 30 milliLiter(s) Oral every 6 hours PRN Dyspepsia  AQUAPHOR (petrolatum Ointment) 1 Application(s) Topical every 3 hours PRN Dry skin  dextrose 40% Gel 15 Gram(s) Oral once PRN Blood Glucose LESS THAN 70 milliGRAM(s)/deciliter  diphenhydrAMINE   Injectable 25 milliGRAM(s) IV Push every 6 hours PRN Allergy symptoms  glucagon  Injectable 1 milliGRAM(s) IntraMuscular once PRN Glucose LESS THAN 70 milligrams/deciliter  ondansetron Injectable 8 milliGRAM(s) IV Push every 8 hours PRN Nausea and/or Vomiting  sodium chloride 0.9% lock flush 10 milliLiter(s) IV Push every 1 hour PRN Pre/post blood products, medications, blood draw, and to maintain line patency    Pertinent Labs: 06-05 @ 07:15: Na 137, BUN 21, Cr 0.94, <H>, K+ 4.1, Phos 3.2, Mg 1.8, Alk Phos 103, ALT/SGPT 44, AST/SGOT 40, HbA1c --  06-05 @ 02:58: Na 138, BUN 20, Cr 0.88, <H>, K+ 4.4, Phos --, Mg --, Alk Phos 105, ALT/SGPT 46<H>, AST/SGOT 41<H>, HbA1c --    Finger Sticks:  POCT Blood Glucose.: 232 mg/dL (06-05 @ 12:47)  POCT Blood Glucose.: 169 mg/dL (06-05 @ 09:14)  POCT Blood Glucose.: 219 mg/dL (06-04 @ 21:51)  POCT Blood Glucose.: 205 mg/dL (06-04 @ 17:24)      Skin per nursing documentation: free of pressure injury   Edema: none     Estimated Needs:   [ x] no change since previous assessment  [ ] recalculated:     Previous Nutrition Diagnosis: Predicted suboptimal energy intake  Nutrition Diagnosis is: ongoing    New Nutrition Diagnosis:  Related to:    As evidenced by:      Interventions:     Recommend  1) Continue Halal, consistent carbohydrate diet with evening snack, consider changing to glucerna 3 daily to further optimize po intake   2) Continue to encourage po intake and obtain/honor food preferences as able, RD encouraged pt to order nutrient dense foods with meals to consume in between, emphasis on protein containing foods first.     Monitoring and Evaluation:     Continue to monitor Nutritional intake, Tolerance to diet prescription, weights, labs, skin integrity    RD remains available upon request and will follow up per protocol    Alma Jacobson R.D., [facility], Pager #[phone]

## 2020-06-05 NOTE — PROGRESS NOTE ADULT - PROBLEM SELECTOR PLAN 4
Mild transaminitis Grade 1, likely due to treatment.   Monitor daily CMP  Avoid further hepatotoxic medications

## 2020-06-05 NOTE — PROGRESS NOTE ADULT - ASSESSMENT
This is a 61 yo Slovak speaking F with PMHx of HTN, HLD, T2DM, COVID-19 infection admitted for management of newly diagnosed APL. The patient is currently reciving ATRA and Arsenic. syndrome. Patients hospital course has been complicated by transaminitis and refractory thrombocytopenia. The patient has pancytopenia 2/2 disease and/or ATRA/Arsenic.

## 2020-06-05 NOTE — PROGRESS NOTE ADULT - SUBJECTIVE AND OBJECTIVE BOX
Diagnosis: Acute Promyelocytic Leukemia     Protocol/Chemo Regimen: ATRA/Arsenic    Day:  ATRA Day 14 and Arsenic Day 10    Patient Turkish speaking refusing  service. Patient's son called by patient    Pt endorsed: improved shortness of breath after duoneb treatment.     Review of Systems: Patient denies headache, dizziness, visual changes, chest pain, palpitations, abdominal pain, nausea, vomiting, diarrhea or dysuria.    Pain scale: 0    Diet: Regular     Allergies: No Known Allergies    Intolerances        ANTIMICROBIALS  cefepime   IVPB 2000 milliGRAM(s) IV Intermittent every 8 hours  posaconazole DR Tablet 300 milliGRAM(s) Oral daily      HEME/ONC MEDICATIONS  arsenic trioxide IVPB (eMAR) 11 milliGRAM(s) IV Intermittent every 24 hours  tretinoin 40 milliGRAM(s) Oral every 12 hours      STANDING MEDICATIONS  albuterol/ipratropium for Nebulization 3 milliLiter(s) Nebulizer every 6 hours  Biotene Dry Mouth Oral Rinse 5 milliLiter(s) Swish and Spit five times a day  bisacodyl 5 milliGRAM(s) Oral every 12 hours  chlorhexidine 4% Liquid 1 Application(s) Topical <User Schedule>  dextrose 5%. 1000 milliLiter(s) IV Continuous <Continuous>  dextrose 50% Injectable 12.5 Gram(s) IV Push once  dextrose 50% Injectable 25 Gram(s) IV Push once  dextrose 50% Injectable 25 Gram(s) IV Push once  hydrocortisone 2.5% Rectal Cream 1 Application(s) Rectal every 8 hours  insulin lispro (HumaLOG) corrective regimen sliding scale   SubCutaneous three times a day before meals  insulin lispro (HumaLOG) corrective regimen sliding scale   SubCutaneous at bedtime  pantoprazole  Injectable 40 milliGRAM(s) IV Push every 12 hours  petrolatum white Ointment 1 Application(s) Topical three times a day  phytonadione   Solution 10 milliGRAM(s) Oral daily  polyethylene glycol 3350 17 Gram(s) Oral daily  predniSONE   Tablet 20 milliGRAM(s) Oral daily  sodium chloride 0.65% Nasal 1 Spray(s) Both Nostrils every 8 hours  sodium chloride 0.9%. 1000 milliLiter(s) IV Continuous <Continuous>  witch hazel Pads 1 Application(s) Topical every 12 hours      PRN MEDICATIONS  acetaminophen   Tablet .. 650 milliGRAM(s) Oral every 6 hours PRN  acetaminophen 325 mG/butalbital 50 mG/caffeine 40 mG 1 Tablet(s) Oral every 4 hours PRN  aluminum hydroxide/magnesium hydroxide/simethicone Suspension 30 milliLiter(s) Oral every 6 hours PRN  AQUAPHOR (petrolatum Ointment) 1 Application(s) Topical every 3 hours PRN  dextrose 40% Gel 15 Gram(s) Oral once PRN  diphenhydrAMINE   Injectable 25 milliGRAM(s) IV Push every 6 hours PRN  glucagon  Injectable 1 milliGRAM(s) IntraMuscular once PRN  ondansetron Injectable 8 milliGRAM(s) IV Push every 8 hours PRN  sodium chloride 0.9% lock flush 10 milliLiter(s) IV Push every 1 hour PRN        Vital Signs Last 24 Hrs  T(C): 36.7 (05 Jun 2020 13:10), Max: 37 (04 Jun 2020 15:20)  T(F): 98 (05 Jun 2020 13:10), Max: 98.6 (04 Jun 2020 15:20)  HR: 78 (05 Jun 2020 13:10) (69 - 83)  BP: 135/66 (05 Jun 2020 13:10) (112/60 - 148/81)  BP(mean): --  RR: 20 (05 Jun 2020 13:10) (18 - 20)  SpO2: 95% (05 Jun 2020 13:10) (95% - 100%)    PHYSICAL EXAM  General: NAD  HEENT: (+) blood blisters on lips  CV: (+) S1/S2 RRR  Lungs: clear to auscultation, no wheezes or rales  Abdomen: soft, non-tender, non-distended (+) BS  Ext: no edema  Skin: (+) petechial rash to upper chest and lower abdomen  Neuro: alert and oriented X 3  Central Line: C/D/I    RECENT CULTURES:    COVID-19 PCR . (06.02.20 @ 18:42)    COVID-19 PCR: NotDetec: This test has been validated by [employer] to be accurate;  though it has not been FDA cleared/approved by the usual pathway.  As with all laboratory tests, results should be correlated with clinical  findings.  https://www.fda.gov/media/275815/download  https://www.fda.gov/media/268683/download            LABS:                        6.5    4.32  )-----------( 102      ( 05 Jun 2020 07:15 )             19.6         Mean Cell Volume : 84.1 fl  Mean Cell Hemoglobin : 27.9 pg  Mean Cell Hemoglobin Concentration : 33.2 gm/dL  Auto Neutrophil # : x  Auto Lymphocyte # : x  Auto Monocyte # : x  Auto Eosinophil # : x  Auto Basophil # : x  Auto Neutrophil % : x  Auto Lymphocyte % : x  Auto Monocyte % : x  Auto Eosinophil % : x  Auto Basophil % : x      06-05    137  |  104  |  21  ----------------------------<  126<H>  4.1   |  19<L>  |  0.94    Ca    9.8      05 Jun 2020 07:15  Phos  3.2     06-05  Mg     1.8     06-05    TPro  7.7  /  Alb  4.6  /  TBili  0.3  /  DBili  x   /  AST  40  /  ALT  44  /  AlkPhos  103  06-05      Mg 1.8  Phos 3.2      PT/INR - ( 05 Jun 2020 07:15 )   PT: 13.3 sec;   INR: 1.15 ratio         PTT - ( 05 Jun 2020 07:15 )  PTT:29.1 sec      Uric Acid 2.4        RADIOLOGY & ADDITIONAL STUDIES:    Xray Chest 1 View- PORTABLE-Urgent (06.05.20 @ 02:48) >  Lines and Tubes: Right PICC in the SVC.  Lungs: Left upper lung linear atelectasis. The lungs are otherwise clear.  Pleura: No pleural effusion or pneumothorax.  Heart and Mediastinum: The heart is normal in size.  Skeletal: Unremarkable.

## 2020-06-05 NOTE — CHART NOTE - NSCHARTNOTEFT_GEN_A_CORE
Medicine [facility]     GIANLUCA UREÑA  MRN-[id number]    60 y.o. Kinyarwanda speaking  female hx of HTN on atenolol, HLD, DM on metformin, APL       Notified by RN for patient     Vital Signs Last 24 Hrs  T(C): 36.2 (06-05-20 @ 01:39), Max: 37.4 (06-04-20 @ 06:55)  T(F): 97.2 (06-05-20 @ 01:39), Max: 99.3 (06-04-20 @ 06:55)  HR: 83 (06-05-20 @ 01:39) (73 - 92)  BP: 117/69 (06-05-20 @ 01:39) (112/60 - 154/79)  BP(mean): --  RR: 18 (06-05-20 @ 01:39) (18 - 18)  SpO2: 96% (06-05-20 @ 01:39) (94% - 98%)  Daily     Daily   I&O's Summary    03 Jun 2020 07:01  -  04 Jun 2020 07:00  --------------------------------------------------------  IN: 2098 mL / OUT: 2400 mL / NET: -302 mL    04 Jun 2020 07:01  -  05 Jun 2020 02:58  --------------------------------------------------------  IN: 1800 mL / OUT: 1400 mL / NET: 400 mL      CAPILLARY BLOOD GLUCOSE                          7.5    4.00  )-----------( 31       ( 04 Jun 2020 17:45 )             22.2     06-04    139  |  104  |  20  ----------------------------<  134<H>  3.9   |  22  |  0.85    Ca    10.0      04 Jun 2020 07:03  Phos  3.5     06-04  Mg     2.0     06-04    TPro  7.7  /  Alb  4.6  /  TBili  0.5  /  DBili  x   /  AST  48<H>  /  ALT  48<H>  /  AlkPhos  106  06-04    PT/INR - ( 04 Jun 2020 07:03 )   PT: 13.2 sec;   INR: 1.14 ratio         PTT - ( 04 Jun 2020 07:03 )  PTT:31.9 sec          PHYSICAL EXAM:  GENERAL: tachypneic   CHEST/LUNG: crackles at lung bases   HEART: Regular rate and rhythm  ABDOMEN: Soft, Nontender, Nondistended; Bowel sounds present  EXTREMITIES:  2+ Peripheral Pulses,     Assessment/Plan: ? differentiation syndrome vs fluid overload   - VSS (aside from initial hypoxia to 90% -- > s/p NC 2L -->100%)   - echo from 2018 EF 60% (no prior on record)   - CXR from 5/30 showing clear lungs   - given IV lasix 40mg stat   - CXR urgent   - D/W heme/onc fellow Dr. Tena overnight, will hold ATRA/arsenic, and give x 1 dose dexamethasone 10mg x1   - will endorse events to day team   - will monitor closely overnight       Federico Benoit PA-C,   Department of Medicine Medicine [facility]     GIANLUCA UREÑA  [id number]    60 y.o. Romanian speaking  female hx of HTN on atenolol, HLD, DM on metformin, APL       Notified by RN for patient     Vital Signs Last 24 Hrs  T(C): 36.2 (06-05-20 @ 01:39), Max: 37.4 (06-04-20 @ 06:55)  T(F): 97.2 (06-05-20 @ 01:39), Max: 99.3 (06-04-20 @ 06:55)  HR: 83 (06-05-20 @ 01:39) (73 - 92)  BP: 117/69 (06-05-20 @ 01:39) (112/60 - 154/79)  BP(mean): --  RR: 18 (06-05-20 @ 01:39) (18 - 18)  SpO2: 96% (06-05-20 @ 01:39) (94% - 98%)  Daily     Daily   I&O's Summary    03 Jun 2020 07:01  -  04 Jun 2020 07:00  --------------------------------------------------------  IN: 2098 mL / OUT: 2400 mL / NET: -302 mL    04 Jun 2020 07:01  -  05 Jun 2020 02:58  --------------------------------------------------------  IN: 1800 mL / OUT: 1400 mL / NET: 400 mL      CAPILLARY BLOOD GLUCOSE                          7.5    4.00  )-----------( 31       ( 04 Jun 2020 17:45 )             22.2     06-04    139  |  104  |  20  ----------------------------<  134<H>  3.9   |  22  |  0.85    Ca    10.0      04 Jun 2020 07:03  Phos  3.5     06-04  Mg     2.0     06-04    TPro  7.7  /  Alb  4.6  /  TBili  0.5  /  DBili  x   /  AST  48<H>  /  ALT  48<H>  /  AlkPhos  106  06-04    PT/INR - ( 04 Jun 2020 07:03 )   PT: 13.2 sec;   INR: 1.14 ratio         PTT - ( 04 Jun 2020 07:03 )  PTT:31.9 sec          PHYSICAL EXAM:  GENERAL: tachypneic   CHEST/LUNG: crackles at lung bases   HEART: Regular rate and rhythm  ABDOMEN: Soft, Nontender, Nondistended; Bowel sounds present  EXTREMITIES:  2+ Peripheral Pulses,     Assessment/Plan: ? differentiation syndrome vs fluid overload   - VSS (aside from initial hypoxia to 90% -- > s/p NC 2L -->100%)   - echo from 2018 EF 60% (no prior on record)   - CXR from 5/30 showing clear lungs  - given IV lasix 40mg stat   - CXR urgent ---> d/w radiology on call #[id number] CXR showing mild pulmonary edema   - D/W heme/onc fellow Dr. Tena overnight, will hold ATRA/arsenic, and give x 1 dose dexamethasone 10mg x1   - will endorse events to day team   - will monitor closely overnight       Federico Benoit PA-C,   Department of Medicine Medicine PA Note     GIANLUCA UREÑA  MRN-[id number]    60 y.o. Serbian speaking  female hx of HTN on atenolol, HLD, DM on metformin, APL       Notified by RN for patient c/o SOB. Patient seen and examined at bedside, tachypneic. Patient Serbian speaking, refusing  using her son Giovana as . stating she is "having chest congestion and shortness of breath". He states she has no other complaints or issues presently, no CP/N/V/D/headache/diaphoresis/dizziness or abdominal pain. Of note Patient is on ATRA treatment for APL.     Vital Signs Last 24 Hrs  T(C): 36.2 (06-05-20 @ 01:39), Max: 37.4 (06-04-20 @ 06:55)  T(F): 97.2 (06-05-20 @ 01:39), Max: 99.3 (06-04-20 @ 06:55)  HR: 83 (06-05-20 @ 01:39) (73 - 92)  BP: 117/69 (06-05-20 @ 01:39) (112/60 - 154/79)  BP(mean): --  RR: 18 (06-05-20 @ 01:39) (18 - 18)  SpO2: 96% (06-05-20 @ 01:39) (94% - 98%)  Daily     Daily   I&O's Summary    03 Jun 2020 07:01  -  04 Jun 2020 07:00  --------------------------------------------------------  IN: 2098 mL / OUT: 2400 mL / NET: -302 mL    04 Jun 2020 07:01  -  05 Jun 2020 02:58  --------------------------------------------------------  IN: 1800 mL / OUT: 1400 mL / NET: 400 mL      CAPILLARY BLOOD GLUCOSE                          7.5    4.00  )-----------( 31       ( 04 Jun 2020 17:45 )             22.2     06-04    139  |  104  |  20  ----------------------------<  134<H>  3.9   |  22  |  0.85    Ca    10.0      04 Jun 2020 07:03  Phos  3.5     06-04  Mg     2.0     06-04    TPro  7.7  /  Alb  4.6  /  TBili  0.5  /  DBili  x   /  AST  48<H>  /  ALT  48<H>  /  AlkPhos  106  06-04    PT/INR - ( 04 Jun 2020 07:03 )   PT: 13.2 sec;   INR: 1.14 ratio         PTT - ( 04 Jun 2020 07:03 )  PTT:31.9 sec      PHYSICAL EXAM:  GENERAL: tachypneic   CHEST/LUNG: crackles at lung bases   HEART: Regular rate and rhythm  ABDOMEN: Soft, Nontender, Nondistended; Bowel sounds present  EXTREMITIES:  2+ Peripheral Pulses,     Assessment/Plan: ? differentiation syndrome vs fluid overload   - VSS (aside from initial hypoxia to 90% -- > s/p NC 2L -->100%)   - echo from 2018 EF 60% (no prior on record)   - CXR from 5/30 showing clear lungs  - given IV lasix 40mg stat   - CXR urgent ---> d/w radiology on call #[id number] CXR showing mild pulmonary edema   -duoneb x 1   - D/W heme/onc fellow Dr. Tena overnight, will hold ATRA/arsenic, and give x 1 dose dexamethasone 10mg x1   - will endorse events to day team   - will monitor closely overnight       Federico Benoit PA-C,   Department of Medicine

## 2020-06-05 NOTE — PROGRESS NOTE ADULT - PROBLEM SELECTOR PLAN 3
Patient reporting dark black stools  Follow up Stool for OB  Start CIVI of platelets  Protonix IV q 12  CBC q 12 and transfuse if Hgb <7

## 2020-06-05 NOTE — PROGRESS NOTE ADULT - PROBLEM SELECTOR PLAN 2
The patient is not neutropenic, afebrile  If febrile Pan Cx and CXR  Continue Cefepime and Posaconazole  Previous COVID-19 (+) outpatient  5/22, 5/26 and 6/2 COVID (-)

## 2020-06-06 LAB
ALBUMIN SERPL ELPH-MCNC: 4.9 G/DL — SIGNIFICANT CHANGE UP (ref 3.3–5)
ALP SERPL-CCNC: 107 U/L — SIGNIFICANT CHANGE UP (ref 40–120)
ALT FLD-CCNC: 53 U/L — HIGH (ref 10–45)
ANION GAP SERPL CALC-SCNC: 16 MMOL/L — SIGNIFICANT CHANGE UP (ref 5–17)
APTT BLD: 28.7 SEC — SIGNIFICANT CHANGE UP (ref 27.5–36.3)
AST SERPL-CCNC: 43 U/L — HIGH (ref 10–40)
BASOPHILS # BLD AUTO: 0 K/UL — SIGNIFICANT CHANGE UP (ref 0–0.2)
BASOPHILS NFR BLD AUTO: 0 % — SIGNIFICANT CHANGE UP (ref 0–2)
BILIRUB SERPL-MCNC: 0.4 MG/DL — SIGNIFICANT CHANGE UP (ref 0.2–1.2)
BUN SERPL-MCNC: 29 MG/DL — HIGH (ref 7–23)
CALCIUM SERPL-MCNC: 10.1 MG/DL — SIGNIFICANT CHANGE UP (ref 8.4–10.5)
CHLORIDE SERPL-SCNC: 103 MMOL/L — SIGNIFICANT CHANGE UP (ref 96–108)
CO2 SERPL-SCNC: 18 MMOL/L — LOW (ref 22–31)
CREAT SERPL-MCNC: 1.01 MG/DL — SIGNIFICANT CHANGE UP (ref 0.5–1.3)
D DIMER BLD IA.RAPID-MCNC: 499 NG/ML DDU — HIGH
D DIMER BLD IA.RAPID-MCNC: 522 NG/ML DDU — HIGH
EOSINOPHIL # BLD AUTO: 0 K/UL — SIGNIFICANT CHANGE UP (ref 0–0.5)
EOSINOPHIL NFR BLD AUTO: 0 % — SIGNIFICANT CHANGE UP (ref 0–6)
FIBRINOGEN PPP-MCNC: 348 MG/DL — LOW (ref 350–510)
FIBRINOGEN PPP-MCNC: 357 MG/DL — SIGNIFICANT CHANGE UP (ref 350–510)
GLUCOSE BLDC GLUCOMTR-MCNC: 144 MG/DL — HIGH (ref 70–99)
GLUCOSE BLDC GLUCOMTR-MCNC: 200 MG/DL — HIGH (ref 70–99)
GLUCOSE BLDC GLUCOMTR-MCNC: 203 MG/DL — HIGH (ref 70–99)
GLUCOSE BLDC GLUCOMTR-MCNC: 273 MG/DL — HIGH (ref 70–99)
GLUCOSE SERPL-MCNC: 180 MG/DL — HIGH (ref 70–99)
HCT VFR BLD CALC: 24.6 % — LOW (ref 34.5–45)
HCT VFR BLD CALC: 25.1 % — LOW (ref 34.5–45)
HGB BLD-MCNC: 8.1 G/DL — LOW (ref 11.5–15.5)
HGB BLD-MCNC: 8.4 G/DL — LOW (ref 11.5–15.5)
INR BLD: 1.1 RATIO — SIGNIFICANT CHANGE UP (ref 0.88–1.16)
LDH SERPL L TO P-CCNC: 279 U/L — HIGH (ref 50–242)
LYMPHOCYTES # BLD AUTO: 1.72 K/UL — SIGNIFICANT CHANGE UP (ref 1–3.3)
LYMPHOCYTES # BLD AUTO: 36 % — SIGNIFICANT CHANGE UP (ref 13–44)
MAGNESIUM SERPL-MCNC: 2.1 MG/DL — SIGNIFICANT CHANGE UP (ref 1.6–2.6)
MANUAL SMEAR VERIFICATION: SIGNIFICANT CHANGE UP
MCHC RBC-ENTMCNC: 28 PG — SIGNIFICANT CHANGE UP (ref 27–34)
MCHC RBC-ENTMCNC: 28.4 PG — SIGNIFICANT CHANGE UP (ref 27–34)
MCHC RBC-ENTMCNC: 32.9 GM/DL — SIGNIFICANT CHANGE UP (ref 32–36)
MCHC RBC-ENTMCNC: 33.5 GM/DL — SIGNIFICANT CHANGE UP (ref 32–36)
MCV RBC AUTO: 84.8 FL — SIGNIFICANT CHANGE UP (ref 80–100)
MCV RBC AUTO: 85.1 FL — SIGNIFICANT CHANGE UP (ref 80–100)
METAMYELOCYTES # FLD: 1.8 % — HIGH (ref 0–0)
MONOCYTES # BLD AUTO: 0.21 K/UL — SIGNIFICANT CHANGE UP (ref 0–0.9)
MONOCYTES NFR BLD AUTO: 4.4 % — SIGNIFICANT CHANGE UP (ref 2–14)
MYELOCYTES NFR BLD: 6.1 % — HIGH (ref 0–0)
NEUTROPHILS # BLD AUTO: 2.39 K/UL — SIGNIFICANT CHANGE UP (ref 1.8–7.4)
NEUTROPHILS NFR BLD AUTO: 43 % — SIGNIFICANT CHANGE UP (ref 43–77)
NEUTS BAND # BLD: 7 % — SIGNIFICANT CHANGE UP (ref 0–8)
NRBC # BLD: 0 /100 WBCS — SIGNIFICANT CHANGE UP (ref 0–0)
PHOSPHATE SERPL-MCNC: 3.7 MG/DL — SIGNIFICANT CHANGE UP (ref 2.5–4.5)
PLAT MORPH BLD: NORMAL — SIGNIFICANT CHANGE UP
PLATELET # BLD AUTO: 60 K/UL — LOW (ref 150–400)
PLATELET # BLD AUTO: 66 K/UL — LOW (ref 150–400)
POTASSIUM SERPL-MCNC: 4.9 MMOL/L — SIGNIFICANT CHANGE UP (ref 3.5–5.3)
POTASSIUM SERPL-SCNC: 4.9 MMOL/L — SIGNIFICANT CHANGE UP (ref 3.5–5.3)
PROMYELOCYTES # FLD: 1.7 % — HIGH (ref 0–0)
PROT SERPL-MCNC: 7.9 G/DL — SIGNIFICANT CHANGE UP (ref 6–8.3)
PROTHROM AB SERPL-ACNC: 12.6 SEC — SIGNIFICANT CHANGE UP (ref 10–12.9)
RBC # BLD: 2.89 M/UL — LOW (ref 3.8–5.2)
RBC # BLD: 2.96 M/UL — LOW (ref 3.8–5.2)
RBC # FLD: 16.8 % — HIGH (ref 10.3–14.5)
RBC # FLD: 17.2 % — HIGH (ref 10.3–14.5)
RBC BLD AUTO: SIGNIFICANT CHANGE UP
SODIUM SERPL-SCNC: 137 MMOL/L — SIGNIFICANT CHANGE UP (ref 135–145)
URATE SERPL-MCNC: 2.7 MG/DL — SIGNIFICANT CHANGE UP (ref 2.5–7)
URATE SERPL-MCNC: 3 MG/DL — SIGNIFICANT CHANGE UP (ref 2.5–7)
WBC # BLD: 4.77 K/UL — SIGNIFICANT CHANGE UP (ref 3.8–10.5)
WBC # BLD: 5.95 K/UL — SIGNIFICANT CHANGE UP (ref 3.8–10.5)
WBC # FLD AUTO: 4.77 K/UL — SIGNIFICANT CHANGE UP (ref 3.8–10.5)
WBC # FLD AUTO: 5.95 K/UL — SIGNIFICANT CHANGE UP (ref 3.8–10.5)

## 2020-06-06 PROCEDURE — 99232 SBSQ HOSP IP/OBS MODERATE 35: CPT | Mod: GC

## 2020-06-06 RX ORDER — FUROSEMIDE 40 MG
40 TABLET ORAL ONCE
Refills: 0 | Status: COMPLETED | OUTPATIENT
Start: 2020-06-06 | End: 2020-06-06

## 2020-06-06 RX ORDER — BACLOFEN 100 %
10 POWDER (GRAM) MISCELLANEOUS ONCE
Refills: 0 | Status: COMPLETED | OUTPATIENT
Start: 2020-06-06 | End: 2020-06-06

## 2020-06-06 RX ORDER — BACLOFEN 100 %
5 POWDER (GRAM) MISCELLANEOUS ONCE
Refills: 0 | Status: COMPLETED | OUTPATIENT
Start: 2020-06-06 | End: 2020-06-06

## 2020-06-06 RX ADMIN — TRETINOIN 40 MILLIGRAM(S): 10 CAPSULE, LIQUID FILLED ORAL at 21:28

## 2020-06-06 RX ADMIN — CEFEPIME 100 MILLIGRAM(S): 1 INJECTION, POWDER, FOR SOLUTION INTRAMUSCULAR; INTRAVENOUS at 07:53

## 2020-06-06 RX ADMIN — Medication 1: at 07:53

## 2020-06-06 RX ADMIN — Medication 1 SPRAY(S): at 06:30

## 2020-06-06 RX ADMIN — POLYETHYLENE GLYCOL 3350 17 GRAM(S): 17 POWDER, FOR SOLUTION ORAL at 11:12

## 2020-06-06 RX ADMIN — Medication 5 MILLILITER(S): at 07:55

## 2020-06-06 RX ADMIN — CEFEPIME 100 MILLIGRAM(S): 1 INJECTION, POWDER, FOR SOLUTION INTRAMUSCULAR; INTRAVENOUS at 16:15

## 2020-06-06 RX ADMIN — Medication 3 MILLILITER(S): at 00:32

## 2020-06-06 RX ADMIN — Medication 2: at 17:30

## 2020-06-06 RX ADMIN — Medication 5 MILLILITER(S): at 00:32

## 2020-06-06 RX ADMIN — Medication 1 APPLICATION(S): at 13:42

## 2020-06-06 RX ADMIN — POSACONAZOLE 300 MILLIGRAM(S): 100 TABLET, DELAYED RELEASE ORAL at 11:34

## 2020-06-06 RX ADMIN — Medication 3 MILLILITER(S): at 11:12

## 2020-06-06 RX ADMIN — Medication 20 MILLIGRAM(S): at 06:29

## 2020-06-06 RX ADMIN — Medication 1 APPLICATION(S): at 22:36

## 2020-06-06 RX ADMIN — Medication 1 APPLICATION(S): at 06:29

## 2020-06-06 RX ADMIN — CEFEPIME 100 MILLIGRAM(S): 1 INJECTION, POWDER, FOR SOLUTION INTRAMUSCULAR; INTRAVENOUS at 23:10

## 2020-06-06 RX ADMIN — Medication 40 MILLIGRAM(S): at 11:11

## 2020-06-06 RX ADMIN — Medication 5 MILLIGRAM(S): at 15:21

## 2020-06-06 RX ADMIN — Medication 1 SPRAY(S): at 13:50

## 2020-06-06 RX ADMIN — ARSENIC TRIOXIDE 130.5 MILLIGRAM(S): 2 INJECTION, SOLUTION INTRAVENOUS at 14:03

## 2020-06-06 RX ADMIN — Medication 5 MILLILITER(S): at 23:10

## 2020-06-06 RX ADMIN — PANTOPRAZOLE SODIUM 40 MILLIGRAM(S): 20 TABLET, DELAYED RELEASE ORAL at 17:17

## 2020-06-06 RX ADMIN — TRETINOIN 40 MILLIGRAM(S): 10 CAPSULE, LIQUID FILLED ORAL at 08:22

## 2020-06-06 RX ADMIN — Medication 3 MILLILITER(S): at 17:17

## 2020-06-06 RX ADMIN — Medication 1 APPLICATION(S): at 06:32

## 2020-06-06 RX ADMIN — SODIUM CHLORIDE 25 MILLILITER(S): 9 INJECTION INTRAMUSCULAR; INTRAVENOUS; SUBCUTANEOUS at 06:29

## 2020-06-06 RX ADMIN — AER TRAVELER 1 APPLICATION(S): 0.5 SOLUTION RECTAL; TOPICAL at 16:59

## 2020-06-06 RX ADMIN — Medication 100 MILLIGRAM(S): at 21:28

## 2020-06-06 RX ADMIN — CHLORHEXIDINE GLUCONATE 1 APPLICATION(S): 213 SOLUTION TOPICAL at 08:22

## 2020-06-06 RX ADMIN — Medication 3: at 12:12

## 2020-06-06 RX ADMIN — Medication 5 MILLIGRAM(S): at 21:29

## 2020-06-06 RX ADMIN — Medication 3 MILLILITER(S): at 23:11

## 2020-06-06 RX ADMIN — Medication 5 MILLILITER(S): at 15:23

## 2020-06-06 RX ADMIN — Medication 1 SPRAY(S): at 21:29

## 2020-06-06 RX ADMIN — SODIUM CHLORIDE 25 MILLILITER(S): 9 INJECTION INTRAMUSCULAR; INTRAVENOUS; SUBCUTANEOUS at 17:20

## 2020-06-06 RX ADMIN — Medication 100 MILLIGRAM(S): at 06:30

## 2020-06-06 RX ADMIN — AER TRAVELER 1 APPLICATION(S): 0.5 SOLUTION RECTAL; TOPICAL at 06:29

## 2020-06-06 RX ADMIN — Medication 5 MILLILITER(S): at 11:12

## 2020-06-06 RX ADMIN — Medication 10 MILLIGRAM(S): at 11:12

## 2020-06-06 RX ADMIN — Medication 10 MILLIGRAM(S): at 23:49

## 2020-06-06 RX ADMIN — Medication 1 APPLICATION(S): at 21:29

## 2020-06-06 RX ADMIN — CEFEPIME 100 MILLIGRAM(S): 1 INJECTION, POWDER, FOR SOLUTION INTRAMUSCULAR; INTRAVENOUS at 00:32

## 2020-06-06 RX ADMIN — Medication 5 MILLILITER(S): at 21:27

## 2020-06-06 RX ADMIN — PANTOPRAZOLE SODIUM 40 MILLIGRAM(S): 20 TABLET, DELAYED RELEASE ORAL at 06:29

## 2020-06-06 RX ADMIN — Medication 100 MILLIGRAM(S): at 13:51

## 2020-06-06 RX ADMIN — Medication 3 MILLILITER(S): at 06:28

## 2020-06-06 NOTE — PROGRESS NOTE ADULT - ATTENDING COMMENTS
61 y/o Turkish speaking F who is transferred for newly diagnosed low/interm risk APL.  Presented with pancytopenia, flow cytometry/bone marrow biopsy/FISH from 5/26- consistent with acute promyelocytic leukemia with PML-AWA translocation.    She was started on ATRA 5/23- day +14, GALA 5/27- day +10    -pt had SOB last night -given Lasix, given Dexa 10mg x1, pt improved. CXR 6/5 -atelectasis. Daily weights, diurese today again. Monitor for signs of differentiation syndrome. Wbc has been stable  -Check CBC, coags/fibrinogen once daily.  Goal plt >40-50, fibrinogen >150; however, pt has been plt refractory and unable to maintain these plt goals. Being monitored closely for any signs of bleeding -none at this time. On 6/4, pt had ABO-matched platelets given over 3 hrs in 1/2 unit infusions -->she responded very well, plt count >100. Pt was also started on Prednisone 20mg on 6/4 x 5 days in case pt has autoimmune destruction of plt. Low threshold for scanning if pt has HA  -monitor lytes daily, keep K>4, Mg>2.  EKG twice a week  -pt had HA in May -had  CT head no contrast on 5/22 with no bleeding.  No more headaches, just a sensation of heat on the top of the head. MRI was not suggestive of any bleeding. Heat on the head is worse with platelet transfusion, on benadryl prior to platelets and PRN Fioricet   -cont Cefepime/Posaconazole 59 y/o Indonesian speaking F who is transferred for newly diagnosed low/interm risk APL.  Presented with pancytopenia, flow cytometry/bone marrow biopsy/FISH from 5/26- consistent with acute promyelocytic leukemia with PML-AWA translocation.    She was started on ATRA 5/23- day +15, GALA 5/27- day +11    -pt had SOB on 6/4 -given Lasix, given Dexa 10mg x1, pt improved. CXR 6/5 -atelectasis. Daily weights, diurese today again. Monitor for signs of differentiation syndrome. Wbc has been stable  -new onset tremors, unclear etiology, electrolytes WNL, will hold cefepime, trial of baclofen for symptomatic relief  -Check CBC, coags/fibrinogen once daily.  Goal plt >40-50, fibrinogen >150; however, pt has been plt refractory and unable to maintain these plt goals. Being monitored closely for any signs of bleeding -none at this time. On 6/4, pt had ABO-matched platelets given over 3 hrs in 1/2 unit infusions -->she responded very well, plt count >100. 6/6 platelet count 66K Pt was also started on Prednisone 20mg on 6/4 x 5 days in case pt has autoimmune destruction of plt. Low threshold for scanning if pt has HA  -monitor lytes daily, keep K>4, Mg>2.  EKG twice a week  -pt had HA in May -had  CT head no contrast on 5/22 with no bleeding.  No more headaches, just a sensation of heat on the top of the head. MRI was not suggestive of any bleeding. Heat on the head is worse with platelet transfusion, on benadryl prior to platelets and PRN Fioricet   -cont Cefepime/Posaconazole

## 2020-06-06 NOTE — PROGRESS NOTE ADULT - PROBLEM SELECTOR PLAN 1
Continue ATRA 40mg BID (started 5/22) and Arsenic Trioxide daily (started on 5/27)  Monitor for differentiation syndrome and check EKG for QTc prolongation every Tuesday and Friday. Keep K>4 and Mg>2  Follow up CBC with diff, CMP, TLS and DIC daily  Keep PLTs >50 K although now refractory. Order 1/2 units over 3 hours q 3 for CIVI. HLA work up in process. Prednisone 20mg PO daily x 3 days started  Keep Fibrinogen >150 if less give Cryo  Strict Is and Os/Daily weights/Mouth Care  6/5 pt with acute sob, CXR - right upper lung linear atelectasis  given one dose of Dexamethasone  COVID-19 PCR (-)   Atra continued, incentive spirometry, duonebs ATC Continue ATRA 40mg BID (started 5/22) and Arsenic Trioxide daily (started on 5/27)  Monitor for differentiation syndrome and check EKG for QTc prolongation every Tuesday and Friday. Keep K>4 and Mg>2  Follow up CBC with diff, CMP, TLS and DIC daily  Keep PLTs >50 K although now refractory. Order 1/2 units over 3 hours q 3 for CIVI. HLA work up in process. Prednisone 20mg PO daily x 3 days started  Keep Fibrinogen >150 if less give Cryo  Strict Is and Os/Daily weights/Mouth Care  6/5 pt with acute sob, CXR - right upper lung linear atelectasis  given one dose of Dexamethasone  COVID-19 PCR (-)   Atra continued, incentive spirometry, duo nebs ATC  6/6 Intentional tremors - responsive to baclofen

## 2020-06-06 NOTE — ADVANCED PRACTICE NURSE CONSULT - ASSESSMENT
Labs today WBC 4.7 HGB 8.1 HCT 24.6 PLTS 66 CR. 1.01 TBILI. 0.4,K 4.9,Mg 2.1. Pt found in bed, alert and oriented x4, v/s stable afebrile, n/c offered.  Right picc line in place. Site without redness or swelling. Lumen previously accessed with + blood return flushes well with NS. Chemotherapy verified by 2 RNs prior to administration. at 1400 treated with arsenic .15mg/kg= 11 mg ivss over 2 hours. Pt remains in bed with n/c offered. Primary RN aware of treatment plan.

## 2020-06-06 NOTE — PROGRESS NOTE ADULT - SUBJECTIVE AND OBJECTIVE BOX
Diagnosis: Acute Promyelocytic leukemia     Protocol/Chemo Regimen: ATRA/ Arsenic     Day: ATRA Day 15 and Arsenic Day 11     Patient Sami speaking refusing  service. Patient's son called by patient    Review of Systems: Patient denies headache, dizziness, visual changes, chest pain, palpitations, abdominal pain, nausea, vomiting, diarrhea or dysuria.    Pain scale: 0     Diet: Regular     Allergies    No Known Allergies    Intolerances        ANTIMICROBIALS  cefepime   IVPB 2000 milliGRAM(s) IV Intermittent every 8 hours  posaconazole DR Tablet 300 milliGRAM(s) Oral daily      HEME/ONC MEDICATIONS  arsenic trioxide IVPB (eMAR) 11 milliGRAM(s) IV Intermittent every 24 hours  tretinoin 40 milliGRAM(s) Oral every 12 hours      STANDING MEDICATIONS  albuterol/ipratropium for Nebulization 3 milliLiter(s) Nebulizer every 6 hours  benzonatate 100 milliGRAM(s) Oral every 8 hours  Biotene Dry Mouth Oral Rinse 5 milliLiter(s) Swish and Spit five times a day  bisacodyl 5 milliGRAM(s) Oral every 12 hours  chlorhexidine 4% Liquid 1 Application(s) Topical <User Schedule>  dextrose 5%. 1000 milliLiter(s) IV Continuous <Continuous>  dextrose 50% Injectable 12.5 Gram(s) IV Push once  dextrose 50% Injectable 25 Gram(s) IV Push once  dextrose 50% Injectable 25 Gram(s) IV Push once  hydrocortisone 2.5% Rectal Cream 1 Application(s) Rectal every 8 hours  insulin lispro (HumaLOG) corrective regimen sliding scale   SubCutaneous three times a day before meals  insulin lispro (HumaLOG) corrective regimen sliding scale   SubCutaneous at bedtime  pantoprazole  Injectable 40 milliGRAM(s) IV Push every 12 hours  petrolatum white Ointment 1 Application(s) Topical three times a day  polyethylene glycol 3350 17 Gram(s) Oral daily  predniSONE   Tablet 20 milliGRAM(s) Oral daily  sodium chloride 0.65% Nasal 1 Spray(s) Both Nostrils every 8 hours  sodium chloride 0.9%. 1000 milliLiter(s) IV Continuous <Continuous>  witch hazel Pads 1 Application(s) Topical every 12 hours      PRN MEDICATIONS  acetaminophen   Tablet .. 650 milliGRAM(s) Oral every 6 hours PRN  acetaminophen 325 mG/butalbital 50 mG/caffeine 40 mG 1 Tablet(s) Oral every 4 hours PRN  aluminum hydroxide/magnesium hydroxide/simethicone Suspension 30 milliLiter(s) Oral every 6 hours PRN  AQUAPHOR (petrolatum Ointment) 1 Application(s) Topical every 3 hours PRN  dextrose 40% Gel 15 Gram(s) Oral once PRN  diphenhydrAMINE   Injectable 25 milliGRAM(s) IV Push every 6 hours PRN  glucagon  Injectable 1 milliGRAM(s) IntraMuscular once PRN  ondansetron Injectable 8 milliGRAM(s) IV Push every 8 hours PRN  sodium chloride 0.9% lock flush 10 milliLiter(s) IV Push every 1 hour PRN        Vital Signs Last 24 Hrs  T(C): 35.9 (06 Jun 2020 13:10), Max: 36.2 (05 Jun 2020 21:29)  T(F): 96.6 (06 Jun 2020 13:10), Max: 97.2 (05 Jun 2020 21:29)  HR: 91 (06 Jun 2020 13:10) (66 - 91)  BP: 147/67 (06 Jun 2020 13:10) (132/72 - 155/72)  BP(mean): --  RR: 20 (06 Jun 2020 13:10) (18 - 20)  SpO2: 97% (06 Jun 2020 13:10) (97% - 100%)    PHYSICAL EXAM  General: NAD  HEENT: (+) blood blisters on lips  CV: (+) S1/S2 RRR  Lungs: clear to auscultation, no wheezes or rales  Abdomen: soft, non-tender, non-distended (+) BS  Ext: no edema  Skin: (+) petechial rash to upper chest and lower abdomen  Neuro: alert and oriented X 3  Central Line: C/D/I    RECENT CULTURES:    COVID-19 PCR . (06.02.20 @ 18:42)    COVID-19 PCR: NotDetec: This test has been validated by [facility] [facility] to be accurate;  though it has not been FDA cleared/approved by the usual pathway.  As with all laboratory tests, results should be correlated with clinical  findings.        LABS:                        8.1    4.77  )-----------( 66       ( 06 Jun 2020 07:12 )             24.6         Mean Cell Volume : 85.1 fl  Mean Cell Hemoglobin : 28.0 pg  Mean Cell Hemoglobin Concentration : 32.9 gm/dL  Auto Neutrophil # : 2.39 K/uL  Auto Lymphocyte # : 1.72 K/uL  Auto Monocyte # : 0.21 K/uL  Auto Eosinophil # : 0.00 K/uL  Auto Basophil # : 0.00 K/uL  Auto Neutrophil % : 43.0 %  Auto Lymphocyte % : 36.0 %  Auto Monocyte % : 4.4 %  Auto Eosinophil % : 0.0 %  Auto Basophil % : 0.0 %      06-06    137  |  103  |  29<H>  ----------------------------<  180<H>  4.9   |  18<L>  |  1.01    Ca    10.1      06 Jun 2020 07:12  Phos  3.7     06-06  Mg     2.1     06-06    TPro  7.9  /  Alb  4.9  /  TBili  0.4  /  DBili  x   /  AST  43<H>  /  ALT  53<H>  /  AlkPhos  107  06-06      Mg 2.1  Phos 3.7      PT/INR - ( 06 Jun 2020 07:12 )   PT: 12.6 sec;   INR: 1.10 ratio         PTT - ( 06 Jun 2020 07:12 )  PTT:28.7 sec      Uric Acid 2.7    LDH --  Uric Acid 2.6        RADIOLOGY & ADDITIONAL STUDIES:     Xray Chest 1 View- PORTABLE-Urgent (06.05.20 @ 02:48) >  1.  Right PICC in the SVC.  2.  Left upper lung linear atelectasis.    < end of copied text >

## 2020-06-06 NOTE — PROGRESS NOTE ADULT - ASSESSMENT
This is a 61 yo Tamaz[place] speaking F with PMHx of HTN, HLD, T2DM, COVID-19 infection admitted for management of newly diagnosed APL. The patient is currently reciving ATRA and Arsenic. syndrome. Patients hospital course has been complicated by transaminitis and refractory thrombocytopenia. The patient has pancytopenia 2/2 disease and/or ATRA/Arsenic.

## 2020-06-07 DIAGNOSIS — G25.0 ESSENTIAL TREMOR: ICD-10-CM

## 2020-06-07 DIAGNOSIS — R06.02 SHORTNESS OF BREATH: ICD-10-CM

## 2020-06-07 LAB
ALBUMIN SERPL ELPH-MCNC: 4.9 G/DL — SIGNIFICANT CHANGE UP (ref 3.3–5)
ALP SERPL-CCNC: 106 U/L — SIGNIFICANT CHANGE UP (ref 40–120)
ALT FLD-CCNC: 52 U/L — HIGH (ref 10–45)
ANION GAP SERPL CALC-SCNC: 14 MMOL/L — SIGNIFICANT CHANGE UP (ref 5–17)
ANISOCYTOSIS BLD QL: SLIGHT — SIGNIFICANT CHANGE UP
AST SERPL-CCNC: 40 U/L — SIGNIFICANT CHANGE UP (ref 10–40)
BASOPHILS # BLD AUTO: 0 K/UL — SIGNIFICANT CHANGE UP (ref 0–0.2)
BASOPHILS NFR BLD AUTO: 0 % — SIGNIFICANT CHANGE UP (ref 0–2)
BILIRUB SERPL-MCNC: 0.4 MG/DL — SIGNIFICANT CHANGE UP (ref 0.2–1.2)
BUN SERPL-MCNC: 41 MG/DL — HIGH (ref 7–23)
CALCIUM SERPL-MCNC: 10.2 MG/DL — SIGNIFICANT CHANGE UP (ref 8.4–10.5)
CHLORIDE SERPL-SCNC: 106 MMOL/L — SIGNIFICANT CHANGE UP (ref 96–108)
CO2 SERPL-SCNC: 18 MMOL/L — LOW (ref 22–31)
CREAT SERPL-MCNC: 1.07 MG/DL — SIGNIFICANT CHANGE UP (ref 0.5–1.3)
D DIMER BLD IA.RAPID-MCNC: 614 NG/ML DDU — HIGH
DACRYOCYTES BLD QL SMEAR: SLIGHT — SIGNIFICANT CHANGE UP
EOSINOPHIL # BLD AUTO: 0 K/UL — SIGNIFICANT CHANGE UP (ref 0–0.5)
EOSINOPHIL NFR BLD AUTO: 0 % — SIGNIFICANT CHANGE UP (ref 0–6)
FIBRINOGEN PPP-MCNC: 286 MG/DL — LOW (ref 350–510)
GLUCOSE BLDC GLUCOMTR-MCNC: 175 MG/DL — HIGH (ref 70–99)
GLUCOSE BLDC GLUCOMTR-MCNC: 181 MG/DL — HIGH (ref 70–99)
GLUCOSE BLDC GLUCOMTR-MCNC: 199 MG/DL — HIGH (ref 70–99)
GLUCOSE BLDC GLUCOMTR-MCNC: 224 MG/DL — HIGH (ref 70–99)
GLUCOSE SERPL-MCNC: 139 MG/DL — HIGH (ref 70–99)
HCT VFR BLD CALC: 24 % — LOW (ref 34.5–45)
HCT VFR BLD CALC: 24.8 % — LOW (ref 34.5–45)
HGB BLD-MCNC: 8.1 G/DL — LOW (ref 11.5–15.5)
HGB BLD-MCNC: 8.2 G/DL — LOW (ref 11.5–15.5)
HYPOCHROMIA BLD QL: SLIGHT — SIGNIFICANT CHANGE UP
LDH SERPL L TO P-CCNC: 302 U/L — HIGH (ref 50–242)
LYMPHOCYTES # BLD AUTO: 1.62 K/UL — SIGNIFICANT CHANGE UP (ref 1–3.3)
LYMPHOCYTES # BLD AUTO: 19 % — SIGNIFICANT CHANGE UP (ref 13–44)
MAGNESIUM SERPL-MCNC: 2.3 MG/DL — SIGNIFICANT CHANGE UP (ref 1.6–2.6)
MANUAL SMEAR VERIFICATION: SIGNIFICANT CHANGE UP
MCHC RBC-ENTMCNC: 28.3 PG — SIGNIFICANT CHANGE UP (ref 27–34)
MCHC RBC-ENTMCNC: 28.6 PG — SIGNIFICANT CHANGE UP (ref 27–34)
MCHC RBC-ENTMCNC: 33.1 GM/DL — SIGNIFICANT CHANGE UP (ref 32–36)
MCHC RBC-ENTMCNC: 33.8 GM/DL — SIGNIFICANT CHANGE UP (ref 32–36)
MCV RBC AUTO: 84.8 FL — SIGNIFICANT CHANGE UP (ref 80–100)
MCV RBC AUTO: 85.5 FL — SIGNIFICANT CHANGE UP (ref 80–100)
METAMYELOCYTES # FLD: 3 % — HIGH (ref 0–0)
MONOCYTES # BLD AUTO: 1.36 K/UL — HIGH (ref 0–0.9)
MONOCYTES NFR BLD AUTO: 16 % — HIGH (ref 2–14)
MYELOCYTES NFR BLD: 11 % — HIGH (ref 0–0)
NEUTROPHILS # BLD AUTO: 4.35 K/UL — SIGNIFICANT CHANGE UP (ref 1.8–7.4)
NEUTROPHILS NFR BLD AUTO: 46 % — SIGNIFICANT CHANGE UP (ref 43–77)
NEUTS BAND # BLD: 5 % — SIGNIFICANT CHANGE UP (ref 0–8)
NRBC # BLD: 0 /100 WBCS — SIGNIFICANT CHANGE UP (ref 0–0)
NRBC # BLD: 1 /100 — HIGH (ref 0–0)
OVALOCYTES BLD QL SMEAR: SLIGHT — SIGNIFICANT CHANGE UP
PHOSPHATE SERPL-MCNC: 4.2 MG/DL — SIGNIFICANT CHANGE UP (ref 2.5–4.5)
PLAT MORPH BLD: NORMAL — SIGNIFICANT CHANGE UP
PLATELET # BLD AUTO: 41 K/UL — LOW (ref 150–400)
PLATELET # BLD AUTO: 47 K/UL — LOW (ref 150–400)
POIKILOCYTOSIS BLD QL AUTO: SLIGHT — SIGNIFICANT CHANGE UP
POTASSIUM SERPL-MCNC: 4.7 MMOL/L — SIGNIFICANT CHANGE UP (ref 3.5–5.3)
POTASSIUM SERPL-SCNC: 4.7 MMOL/L — SIGNIFICANT CHANGE UP (ref 3.5–5.3)
PROT SERPL-MCNC: 8.1 G/DL — SIGNIFICANT CHANGE UP (ref 6–8.3)
RBC # BLD: 2.83 M/UL — LOW (ref 3.8–5.2)
RBC # BLD: 2.9 M/UL — LOW (ref 3.8–5.2)
RBC # FLD: 17.5 % — HIGH (ref 10.3–14.5)
RBC # FLD: 17.8 % — HIGH (ref 10.3–14.5)
RBC BLD AUTO: ABNORMAL
SODIUM SERPL-SCNC: 138 MMOL/L — SIGNIFICANT CHANGE UP (ref 135–145)
URATE SERPL-MCNC: 3.1 MG/DL — SIGNIFICANT CHANGE UP (ref 2.5–7)
URATE SERPL-MCNC: 3.3 MG/DL — SIGNIFICANT CHANGE UP (ref 2.5–7)
WBC # BLD: 11.78 K/UL — HIGH (ref 3.8–10.5)
WBC # BLD: 8.53 K/UL — SIGNIFICANT CHANGE UP (ref 3.8–10.5)
WBC # FLD AUTO: 11.78 K/UL — HIGH (ref 3.8–10.5)
WBC # FLD AUTO: 8.53 K/UL — SIGNIFICANT CHANGE UP (ref 3.8–10.5)

## 2020-06-07 PROCEDURE — 99232 SBSQ HOSP IP/OBS MODERATE 35: CPT | Mod: GC

## 2020-06-07 PROCEDURE — 71250 CT THORAX DX C-: CPT | Mod: 26

## 2020-06-07 RX ORDER — ACETAMINOPHEN 500 MG
650 TABLET ORAL EVERY 6 HOURS
Refills: 0 | Status: DISCONTINUED | OUTPATIENT
Start: 2020-06-07 | End: 2020-07-17

## 2020-06-07 RX ORDER — FUROSEMIDE 40 MG
40 TABLET ORAL ONCE
Refills: 0 | Status: COMPLETED | OUTPATIENT
Start: 2020-06-07 | End: 2020-06-07

## 2020-06-07 RX ORDER — PANTOPRAZOLE SODIUM 20 MG/1
40 TABLET, DELAYED RELEASE ORAL
Refills: 0 | Status: DISCONTINUED | OUTPATIENT
Start: 2020-06-07 | End: 2020-07-15

## 2020-06-07 RX ORDER — FUROSEMIDE 40 MG
20 TABLET ORAL ONCE
Refills: 0 | Status: DISCONTINUED | OUTPATIENT
Start: 2020-06-07 | End: 2020-06-07

## 2020-06-07 RX ORDER — BACLOFEN 100 %
5 POWDER (GRAM) MISCELLANEOUS EVERY 12 HOURS
Refills: 0 | Status: DISCONTINUED | OUTPATIENT
Start: 2020-06-07 | End: 2020-06-09

## 2020-06-07 RX ADMIN — Medication 650 MILLIGRAM(S): at 08:29

## 2020-06-07 RX ADMIN — Medication 25 MILLIGRAM(S): at 10:46

## 2020-06-07 RX ADMIN — ARSENIC TRIOXIDE 130.5 MILLIGRAM(S): 2 INJECTION, SOLUTION INTRAVENOUS at 14:35

## 2020-06-07 RX ADMIN — Medication 100 MILLIGRAM(S): at 23:36

## 2020-06-07 RX ADMIN — Medication 1 APPLICATION(S): at 05:32

## 2020-06-07 RX ADMIN — Medication 5 MILLILITER(S): at 23:37

## 2020-06-07 RX ADMIN — Medication 5 MILLIGRAM(S): at 14:36

## 2020-06-07 RX ADMIN — Medication 5 MILLILITER(S): at 08:00

## 2020-06-07 RX ADMIN — Medication 25 MILLIGRAM(S): at 19:33

## 2020-06-07 RX ADMIN — AER TRAVELER 1 APPLICATION(S): 0.5 SOLUTION RECTAL; TOPICAL at 05:31

## 2020-06-07 RX ADMIN — Medication 1: at 08:49

## 2020-06-07 RX ADMIN — AER TRAVELER 1 APPLICATION(S): 0.5 SOLUTION RECTAL; TOPICAL at 18:22

## 2020-06-07 RX ADMIN — Medication 3 MILLILITER(S): at 23:36

## 2020-06-07 RX ADMIN — Medication 20 MILLIGRAM(S): at 05:33

## 2020-06-07 RX ADMIN — Medication 3 MILLILITER(S): at 05:32

## 2020-06-07 RX ADMIN — Medication 1 SPRAY(S): at 05:33

## 2020-06-07 RX ADMIN — Medication 5 MILLIGRAM(S): at 10:46

## 2020-06-07 RX ADMIN — Medication 2: at 17:09

## 2020-06-07 RX ADMIN — Medication 5 MILLILITER(S): at 11:26

## 2020-06-07 RX ADMIN — TRETINOIN 40 MILLIGRAM(S): 10 CAPSULE, LIQUID FILLED ORAL at 08:30

## 2020-06-07 RX ADMIN — Medication 40 MILLIGRAM(S): at 13:19

## 2020-06-07 RX ADMIN — Medication 100 MILLIGRAM(S): at 05:32

## 2020-06-07 RX ADMIN — Medication 1 SPRAY(S): at 13:20

## 2020-06-07 RX ADMIN — Medication 650 MILLIGRAM(S): at 19:34

## 2020-06-07 RX ADMIN — PANTOPRAZOLE SODIUM 40 MILLIGRAM(S): 20 TABLET, DELAYED RELEASE ORAL at 11:26

## 2020-06-07 RX ADMIN — Medication 5 MILLILITER(S): at 17:07

## 2020-06-07 RX ADMIN — CEFEPIME 100 MILLIGRAM(S): 1 INJECTION, POWDER, FOR SOLUTION INTRAMUSCULAR; INTRAVENOUS at 08:00

## 2020-06-07 RX ADMIN — Medication 3 MILLILITER(S): at 17:07

## 2020-06-07 RX ADMIN — PANTOPRAZOLE SODIUM 40 MILLIGRAM(S): 20 TABLET, DELAYED RELEASE ORAL at 05:32

## 2020-06-07 RX ADMIN — Medication 3 MILLILITER(S): at 12:52

## 2020-06-07 RX ADMIN — Medication 100 MILLIGRAM(S): at 13:20

## 2020-06-07 RX ADMIN — Medication 1: at 13:35

## 2020-06-07 RX ADMIN — Medication 1 APPLICATION(S): at 13:28

## 2020-06-07 RX ADMIN — TRETINOIN 40 MILLIGRAM(S): 10 CAPSULE, LIQUID FILLED ORAL at 19:36

## 2020-06-07 RX ADMIN — Medication 1 APPLICATION(S): at 15:00

## 2020-06-07 RX ADMIN — SODIUM CHLORIDE 25 MILLILITER(S): 9 INJECTION INTRAMUSCULAR; INTRAVENOUS; SUBCUTANEOUS at 17:10

## 2020-06-07 RX ADMIN — POSACONAZOLE 300 MILLIGRAM(S): 100 TABLET, DELAYED RELEASE ORAL at 11:26

## 2020-06-07 RX ADMIN — CHLORHEXIDINE GLUCONATE 1 APPLICATION(S): 213 SOLUTION TOPICAL at 08:05

## 2020-06-07 RX ADMIN — Medication 1 SPRAY(S): at 23:36

## 2020-06-07 NOTE — PROGRESS NOTE ADULT - PROBLEM SELECTOR PLAN 6
5/23 HgA1c 7.0  FS AC & HS with HISS  Consistent Carbohydrate diet Home regimen with Atenolol held on admission  If BP increases can consider restarting Mild transaminitis Grade 1, likely due to treatment.   Monitor daily CMP  Avoid further hepatotoxic medications

## 2020-06-07 NOTE — PROGRESS NOTE ADULT - ASSESSMENT
This is a 61 yo Japanese speaking F with PMHx of HTN, HLD, T2DM, COVID-19 infection admitted for management of newly diagnosed APL. The patient is currently reciving ATRA and Arsenic. syndrome. Patients hospital course has been complicated by transaminitis and refractory thrombocytopenia. The patient has pancytopenia 2/2 disease and/or ATRA/Arsenic.

## 2020-06-07 NOTE — PROGRESS NOTE ADULT - PROBLEM SELECTOR PLAN 8
No pharmacologic ppx 2/2 thrombocytopenia        Contact Information [phone] 5/23 HgA1c 7.0  FS AC & HS with HISS  Consistent Carbohydrate diet

## 2020-06-07 NOTE — PROGRESS NOTE ADULT - PROBLEM SELECTOR PLAN 3
Patient reporting dark black stools  Follow up Stool for OB  Start CIVI of platelets  Protonix IV q 12  CBC q 12 and transfuse if Hgb <7 Stool occult blood (-)   no more complaints of dark stools.  protonix changed to oral once daily. Patient with c/o persistent sob  on 6/5 patient received one dose of dexa for ? ATRA syndrome.   6/5 CXR (-) for acute infiltrates, (+) atelectasis   incentive spirometer provided.   received diuresis with lasix 40 mg on 6/5, 6/6 and 6/7   O2 saturation off O2 = 95%  6/7 follow up CT chest without contrast

## 2020-06-07 NOTE — PROGRESS NOTE ADULT - PROBLEM SELECTOR PLAN 2
The patient is not neutropenic, afebrile  If febrile Pan Cx and CXR  Continue Cefepime and Posaconazole  Previous COVID-19 (+) outpatient  5/22, 5/26 and 6/2 COVID (-) The patient is not neutropenic, afebrile  will d/c Cefepime, consider d/cing Posaconazole in am.   Previous COVID-19 (+) outpatient  5/22, 5/26 and 6/2 COVID (-)

## 2020-06-07 NOTE — PROGRESS NOTE ADULT - ATTENDING COMMENTS
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  Presented with pancytopenia, flow cytometry/bone marrow biopsy/FISH from 5/26- consistent with acute promyelocytic leukemia with PML-AWA translocation.    She was started on ATRA 5/23- day +15, GALA 5/27- day +11    -pt had SOB on 6/4 -given Lasix, given Dexa 10mg x1, pt improved. CXR 6/5 -atelectasis. Daily weights, diurese today again. Monitor for signs of differentiation syndrome. Wbc has been stable  -new onset tremors, unclear etiology, electrolytes WNL, will hold cefepime, trial of baclofen for symptomatic relief  -Check CBC, coags/fibrinogen once daily.  Goal plt >40-50, fibrinogen >150; however, pt has been plt refractory and unable to maintain these plt goals. Being monitored closely for any signs of bleeding -none at this time. On 6/4, pt had ABO-matched platelets given over 3 hrs in 1/2 unit infusions -->she responded very well, plt count >100. 6/6 platelet count 66K Pt was also started on Prednisone 20mg on 6/4 x 5 days in case pt has autoimmune destruction of plt. Low threshold for scanning if pt has HA  -monitor lytes daily, keep K>4, Mg>2.  EKG twice a week  -pt had HA in May -had  CT head no contrast on 5/22 with no bleeding.  No more headaches, just a sensation of heat on the top of the head. MRI was not suggestive of any bleeding. Heat on the head is worse with platelet transfusion, on benadryl prior to platelets and PRN Fioricet   -cont Cefepime/Posaconazole 61 y/o Sinhala speaking F who is transferred for newly diagnosed low/interm risk APL.  Presented with pancytopenia, flow cytometry/bone marrow biopsy/FISH from 5/26- consistent with acute promyelocytic leukemia with PML-AWA translocation.    She was started on ATRA 5/23- day +15, GALA 5/27- day +12    -pt had SOB on 6/4 -given Lasix, given Dexa 10mg x1, pt improved. CXR 6/5 -atelectasis. Daily weights, diurese today again. Monitor for signs of differentiation syndrome. Wbc has been stable  -new onset tremors, unclear etiology, electrolytes WNL, will hold cefepime, trial of baclofen for symptomatic relief  -Check CBC, coags/fibrinogen once daily.  Goal plt >40-50, fibrinogen >150; however, pt has been plt refractory and unable to maintain these plt goals. Being monitored closely for any signs of bleeding -none at this time. On 6/4, pt had ABO-matched platelets given over 3 hrs in 1/2 unit infusions -->she responded very well, plt count >100. 6/6 platelet count 66K Pt was also started on Prednisone 20mg on 6/4 x 5 days in case pt has autoimmune destruction of plt. Low threshold for scanning if pt has HA  -monitor lytes daily, keep K>4, Mg>2.  EKG twice a week  -pt had HA in May -had  CT head no contrast on 5/22 with no bleeding.  No more headaches, just a sensation of heat on the top of the head. MRI was not suggestive of any bleeding. Heat on the head is worse with platelet transfusion, on benadryl prior to platelets and PRN Fioricet   -cont Cefepime/Posaconazole

## 2020-06-07 NOTE — PROGRESS NOTE ADULT - SUBJECTIVE AND OBJECTIVE BOX
Diagnosis:    Protocol/Chemo Regimen:    Day:     Pt endorsed:    Review of Systems:     Pain scale:     Diet:     Allergies    No Known Allergies    Intolerances        ANTIMICROBIALS  cefepime   IVPB 2000 milliGRAM(s) IV Intermittent every 8 hours  posaconazole DR Tablet 300 milliGRAM(s) Oral daily      HEME/ONC MEDICATIONS  arsenic trioxide IVPB (eMAR) 11 milliGRAM(s) IV Intermittent every 24 hours  tretinoin 40 milliGRAM(s) Oral every 12 hours      STANDING MEDICATIONS  albuterol/ipratropium for Nebulization 3 milliLiter(s) Nebulizer every 6 hours  benzonatate 100 milliGRAM(s) Oral every 8 hours  Biotene Dry Mouth Oral Rinse 5 milliLiter(s) Swish and Spit five times a day  bisacodyl 5 milliGRAM(s) Oral every 12 hours  chlorhexidine 4% Liquid 1 Application(s) Topical <User Schedule>  dextrose 5%. 1000 milliLiter(s) IV Continuous <Continuous>  dextrose 50% Injectable 12.5 Gram(s) IV Push once  dextrose 50% Injectable 25 Gram(s) IV Push once  dextrose 50% Injectable 25 Gram(s) IV Push once  hydrocortisone 2.5% Rectal Cream 1 Application(s) Rectal every 8 hours  insulin lispro (HumaLOG) corrective regimen sliding scale   SubCutaneous three times a day before meals  insulin lispro (HumaLOG) corrective regimen sliding scale   SubCutaneous at bedtime  pantoprazole  Injectable 40 milliGRAM(s) IV Push every 12 hours  petrolatum white Ointment 1 Application(s) Topical three times a day  polyethylene glycol 3350 17 Gram(s) Oral daily  predniSONE   Tablet 20 milliGRAM(s) Oral daily  sodium chloride 0.65% Nasal 1 Spray(s) Both Nostrils every 8 hours  sodium chloride 0.9%. 1000 milliLiter(s) IV Continuous <Continuous>  witch hazel Pads 1 Application(s) Topical every 12 hours      PRN MEDICATIONS  acetaminophen   Tablet .. 650 milliGRAM(s) Oral every 6 hours PRN  acetaminophen 325 mG/butalbital 50 mG/caffeine 40 mG 1 Tablet(s) Oral every 4 hours PRN  aluminum hydroxide/magnesium hydroxide/simethicone Suspension 30 milliLiter(s) Oral every 6 hours PRN  AQUAPHOR (petrolatum Ointment) 1 Application(s) Topical every 3 hours PRN  dextrose 40% Gel 15 Gram(s) Oral once PRN  diphenhydrAMINE   Injectable 25 milliGRAM(s) IV Push every 6 hours PRN  glucagon  Injectable 1 milliGRAM(s) IntraMuscular once PRN  ondansetron Injectable 8 milliGRAM(s) IV Push every 8 hours PRN  sodium chloride 0.9% lock flush 10 milliLiter(s) IV Push every 1 hour PRN        Vital Signs Last 24 Hrs  T(C): 35.7 (07 Jun 2020 05:13), Max: 36.1 (06 Jun 2020 22:03)  T(F): 96.2 (07 Jun 2020 05:13), Max: 97 (07 Jun 2020 01:16)  HR: 86 (07 Jun 2020 05:13) (74 - 93)  BP: 134/80 (07 Jun 2020 05:13) (129/84 - 157/76)  BP(mean): --  RR: 18 (07 Jun 2020 05:13) (18 - 20)  SpO2: 100% (07 Jun 2020 05:13) (97% - 100%)    PHYSICAL EXAM  General: NAD  Oral: no erythema or ulcers  HEENT: PERRLA, EOMI, clear oropharynx  Neck: supple  CV: (+) S1/S2 RRR  Lungs: clear to auscultation, no wheezes or rales  Abdomen: soft, non-tender, non-distended (+) BS  Ext: no edema  Skin: no rash  Neuro: alert and oriented X 3, no focal deficits  Central Line:     RECENT CULTURES:    COVID-19 PCR . (06.02.20 @ 18:42)    COVID-19 PCR: NotDetec: This test has been validated by [employer] to be accurate;  though it has not been FDA cleared/approved by the usual pathway.  As with all laboratory tests, results should be correlated with clinical  findings.  https://www.fda.gov/media/083844/download  https://www.fda.gov/media/324724/download            LABS:                        8.2    8.53  )-----------( 47       ( 07 Jun 2020 07:09 )             24.8         Mean Cell Volume : 85.5 fl  Mean Cell Hemoglobin : 28.3 pg  Mean Cell Hemoglobin Concentration : 33.1 gm/dL  Auto Neutrophil # : 4.35 K/uL  Auto Lymphocyte # : 1.62 K/uL  Auto Monocyte # : 1.36 K/uL  Auto Eosinophil # : 0.00 K/uL  Auto Basophil # : 0.00 K/uL  Auto Neutrophil % : 46.0 %  Auto Lymphocyte % : 19.0 %  Auto Monocyte % : 16.0 %  Auto Eosinophil % : 0.0 %  Auto Basophil % : 0.0 %      06-07    138  |  106  |  41<H>  ----------------------------<  139<H>  4.7   |  18<L>  |  1.07    Ca    10.2      07 Jun 2020 07:08  Phos  4.2     06-07  Mg     2.3     06-07    TPro  8.1  /  Alb  4.9  /  TBili  0.4  /  DBili  x   /  AST  40  /  ALT  52<H>  /  AlkPhos  106  06-07      Mg 2.3  Phos 4.2      PT/INR - ( 06 Jun 2020 07:12 )   PT: 12.6 sec;   INR: 1.10 ratio         PTT - ( 06 Jun 2020 07:12 )  PTT:28.7 sec      Uric Acid 3.1    LDH --  Uric Acid 3.0        RADIOLOGY & ADDITIONAL STUDIES:    Xray Chest 1 View- PORTABLE-Urgent (06.05.20 @ 02:48) >  Lines and Tubes: Right PICC in the SVC.  Lungs: Left upper lung linear atelectasis. The lungs are otherwise clear.  Pleura: No pleural effusion or pneumothorax.  Heart and Mediastinum: The heart is normal in size.  Skeletal: Unremarkable. Diagnosis: Acute Promyelocytic leukemia     Protocol/Chemo Regimen: ATRA/ Arsenic     Day: ATRA Day 16 and Arsenic Day 12    Patient Icelandic speaking refusing  service. Patient's son called by patient        Review of Systems: Patient denies headache, dizziness, visual changes, chest pain, palpitations, abdominal pain, nausea, vomiting, diarrhea or dysuria.    Pain scale: 0     Diet: Regular     Allergies    No Known Allergies    Intolerances      ANTIMICROBIALS  cefepime   IVPB 2000 milliGRAM(s) IV Intermittent every 8 hours  posaconazole DR Tablet 300 milliGRAM(s) Oral daily      HEME/ONC MEDICATIONS  arsenic trioxide IVPB (eMAR) 11 milliGRAM(s) IV Intermittent every 24 hours  tretinoin 40 milliGRAM(s) Oral every 12 hours      STANDING MEDICATIONS  albuterol/ipratropium for Nebulization 3 milliLiter(s) Nebulizer every 6 hours  benzonatate 100 milliGRAM(s) Oral every 8 hours  Biotene Dry Mouth Oral Rinse 5 milliLiter(s) Swish and Spit five times a day  bisacodyl 5 milliGRAM(s) Oral every 12 hours  chlorhexidine 4% Liquid 1 Application(s) Topical <User Schedule>  dextrose 5%. 1000 milliLiter(s) IV Continuous <Continuous>  dextrose 50% Injectable 12.5 Gram(s) IV Push once  dextrose 50% Injectable 25 Gram(s) IV Push once  dextrose 50% Injectable 25 Gram(s) IV Push once  hydrocortisone 2.5% Rectal Cream 1 Application(s) Rectal every 8 hours  insulin lispro (HumaLOG) corrective regimen sliding scale   SubCutaneous three times a day before meals  insulin lispro (HumaLOG) corrective regimen sliding scale   SubCutaneous at bedtime  pantoprazole  Injectable 40 milliGRAM(s) IV Push every 12 hours  petrolatum white Ointment 1 Application(s) Topical three times a day  polyethylene glycol 3350 17 Gram(s) Oral daily  predniSONE   Tablet 20 milliGRAM(s) Oral daily  sodium chloride 0.65% Nasal 1 Spray(s) Both Nostrils every 8 hours  sodium chloride 0.9%. 1000 milliLiter(s) IV Continuous <Continuous>  witch hazel Pads 1 Application(s) Topical every 12 hours      PRN MEDICATIONS  acetaminophen   Tablet .. 650 milliGRAM(s) Oral every 6 hours PRN  acetaminophen 325 mG/butalbital 50 mG/caffeine 40 mG 1 Tablet(s) Oral every 4 hours PRN  aluminum hydroxide/magnesium hydroxide/simethicone Suspension 30 milliLiter(s) Oral every 6 hours PRN  AQUAPHOR (petrolatum Ointment) 1 Application(s) Topical every 3 hours PRN  dextrose 40% Gel 15 Gram(s) Oral once PRN  diphenhydrAMINE   Injectable 25 milliGRAM(s) IV Push every 6 hours PRN  glucagon  Injectable 1 milliGRAM(s) IntraMuscular once PRN  ondansetron Injectable 8 milliGRAM(s) IV Push every 8 hours PRN  sodium chloride 0.9% lock flush 10 milliLiter(s) IV Push every 1 hour PRN        Vital Signs Last 24 Hrs  T(C): 35.7 (07 Jun 2020 05:13), Max: 36.1 (06 Jun 2020 22:03)  T(F): 96.2 (07 Jun 2020 05:13), Max: 97 (07 Jun 2020 01:16)  HR: 86 (07 Jun 2020 05:13) (74 - 93)  BP: 134/80 (07 Jun 2020 05:13) (129/84 - 157/76)  BP(mean): --  RR: 18 (07 Jun 2020 05:13) (18 - 20)  SpO2: 100% (07 Jun 2020 05:13) (97% - 100%)    PHYSICAL EXAM  General: NAD  Oral: no erythema or ulcers  HEENT: PERRLA, EOMI, clear oropharynx  Neck: supple  CV: (+) S1/S2 RRR  Lungs: clear to auscultation, no wheezes or rales  Abdomen: soft, non-tender, non-distended (+) BS  Ext: no edema  Skin: no rash  Neuro: alert and oriented X 3, no focal deficits  Central Line:     RECENT CULTURES:    COVID-19 PCR . (06.02.20 @ 18:42)    COVID-19 PCR: NotDetec: This test has been validated by [employer] to be accurate;  though it has not been FDA cleared/approved by the usual pathway.  As with all laboratory tests, results should be correlated with clinical  findings.  https://www.fda.gov/media/191913/download  https://www.fda.gov/media/928968/download            LABS:                        8.2    8.53  )-----------( 47       ( 07 Jun 2020 07:09 )             24.8         Mean Cell Volume : 85.5 fl  Mean Cell Hemoglobin : 28.3 pg  Mean Cell Hemoglobin Concentration : 33.1 gm/dL  Auto Neutrophil # : 4.35 K/uL  Auto Lymphocyte # : 1.62 K/uL  Auto Monocyte # : 1.36 K/uL  Auto Eosinophil # : 0.00 K/uL  Auto Basophil # : 0.00 K/uL  Auto Neutrophil % : 46.0 %  Auto Lymphocyte % : 19.0 %  Auto Monocyte % : 16.0 %  Auto Eosinophil % : 0.0 %  Auto Basophil % : 0.0 %      06-07    138  |  106  |  41<H>  ----------------------------<  139<H>  4.7   |  18<L>  |  1.07    Ca    10.2      07 Jun 2020 07:08  Phos  4.2     06-07  Mg     2.3     06-07    TPro  8.1  /  Alb  4.9  /  TBili  0.4  /  DBili  x   /  AST  40  /  ALT  52<H>  /  AlkPhos  106  06-07      Mg 2.3  Phos 4.2      PT/INR - ( 06 Jun 2020 07:12 )   PT: 12.6 sec;   INR: 1.10 ratio         PTT - ( 06 Jun 2020 07:12 )  PTT:28.7 sec      Uric Acid 3.1    LDH --  Uric Acid 3.0        RADIOLOGY & ADDITIONAL STUDIES:    Xray Chest 1 View- PORTABLE-Urgent (06.05.20 @ 02:48) >  Lines and Tubes: Right PICC in the SVC.  Lungs: Left upper lung linear atelectasis. The lungs are otherwise clear.  Pleura: No pleural effusion or pneumothorax.  Heart and Mediastinum: The heart is normal in size.  Skeletal: Unremarkable. Diagnosis: Acute Promyelocytic leukemia     Protocol/Chemo Regimen: ATRA/ Arsenic     Day: ATRA Day 16 and Arsenic Day 12    Patient Albanian speaking refusing  service. Patient's son called by patient    Patient endorses: (+) Chest congestion, occasional dry cough, mild shortness of breath.     Review of Systems: Patient denies headache, dizziness, visual changes, palpitations, abdominal pain, nausea, vomiting, diarrhea or dysuria.    Pain scale: 0     Diet: Regular     Allergies    No Known Allergies    Intolerances      ANTIMICROBIALS  cefepime   IVPB 2000 milliGRAM(s) IV Intermittent every 8 hours  posaconazole DR Tablet 300 milliGRAM(s) Oral daily      HEME/ONC MEDICATIONS  arsenic trioxide IVPB (eMAR) 11 milliGRAM(s) IV Intermittent every 24 hours  tretinoin 40 milliGRAM(s) Oral every 12 hours      STANDING MEDICATIONS  albuterol/ipratropium for Nebulization 3 milliLiter(s) Nebulizer every 6 hours  benzonatate 100 milliGRAM(s) Oral every 8 hours  Biotene Dry Mouth Oral Rinse 5 milliLiter(s) Swish and Spit five times a day  bisacodyl 5 milliGRAM(s) Oral every 12 hours  chlorhexidine 4% Liquid 1 Application(s) Topical <User Schedule>  dextrose 5%. 1000 milliLiter(s) IV Continuous <Continuous>  dextrose 50% Injectable 12.5 Gram(s) IV Push once  dextrose 50% Injectable 25 Gram(s) IV Push once  dextrose 50% Injectable 25 Gram(s) IV Push once  hydrocortisone 2.5% Rectal Cream 1 Application(s) Rectal every 8 hours  insulin lispro (HumaLOG) corrective regimen sliding scale   SubCutaneous three times a day before meals  insulin lispro (HumaLOG) corrective regimen sliding scale   SubCutaneous at bedtime  pantoprazole  Injectable 40 milliGRAM(s) IV Push every 12 hours  petrolatum white Ointment 1 Application(s) Topical three times a day  polyethylene glycol 3350 17 Gram(s) Oral daily  predniSONE   Tablet 20 milliGRAM(s) Oral daily  sodium chloride 0.65% Nasal 1 Spray(s) Both Nostrils every 8 hours  sodium chloride 0.9%. 1000 milliLiter(s) IV Continuous <Continuous>  witch hazel Pads 1 Application(s) Topical every 12 hours      PRN MEDICATIONS  acetaminophen   Tablet .. 650 milliGRAM(s) Oral every 6 hours PRN  acetaminophen 325 mG/butalbital 50 mG/caffeine 40 mG 1 Tablet(s) Oral every 4 hours PRN  aluminum hydroxide/magnesium hydroxide/simethicone Suspension 30 milliLiter(s) Oral every 6 hours PRN  AQUAPHOR (petrolatum Ointment) 1 Application(s) Topical every 3 hours PRN  dextrose 40% Gel 15 Gram(s) Oral once PRN  diphenhydrAMINE   Injectable 25 milliGRAM(s) IV Push every 6 hours PRN  glucagon  Injectable 1 milliGRAM(s) IntraMuscular once PRN  ondansetron Injectable 8 milliGRAM(s) IV Push every 8 hours PRN  sodium chloride 0.9% lock flush 10 milliLiter(s) IV Push every 1 hour PRN        Vital Signs Last 24 Hrs  T(C): 35.7 (07 Jun 2020 05:13), Max: 36.1 (06 Jun 2020 22:03)  T(F): 96.2 (07 Jun 2020 05:13), Max: 97 (07 Jun 2020 01:16)  HR: 86 (07 Jun 2020 05:13) (74 - 93)  BP: 134/80 (07 Jun 2020 05:13) (129/84 - 157/76)  BP(mean): --  RR: 18 (07 Jun 2020 05:13) (18 - 20)  SpO2: 100% (07 Jun 2020 05:13) (97% - 100%)    PHYSICAL EXAM  General: NAD  Oral: no erythema or ulcers  HEENT: PERRLA, EOMI, clear oropharynx  Neck: supple  CV: (+) S1/S2 RRR  Lungs: clear to auscultation, no wheezes or rales  Abdomen: soft, non-tender, non-distended (+) BS  Ext: no edema  Skin: no rash  Neuro: alert and oriented X 3, no focal deficits  Central Line:     RECENT CULTURES:    COVID-19 PCR . (06.02.20 @ 18:42)    COVID-19 PCR: NotDetec: This test has been validated by [employer] to be accurate;  though it has not been FDA cleared/approved by the usual pathway.  As with all laboratory tests, results should be correlated with clinical  findings.  https://www.fda.gov/media/247045/download  https://www.fda.gov/media/177279/download            LABS:                        8.2    8.53  )-----------( 47       ( 07 Jun 2020 07:09 )             24.8         Mean Cell Volume : 85.5 fl  Mean Cell Hemoglobin : 28.3 pg  Mean Cell Hemoglobin Concentration : 33.1 gm/dL  Auto Neutrophil # : 4.35 K/uL  Auto Lymphocyte # : 1.62 K/uL  Auto Monocyte # : 1.36 K/uL  Auto Eosinophil # : 0.00 K/uL  Auto Basophil # : 0.00 K/uL  Auto Neutrophil % : 46.0 %  Auto Lymphocyte % : 19.0 %  Auto Monocyte % : 16.0 %  Auto Eosinophil % : 0.0 %  Auto Basophil % : 0.0 %      06-07    138  |  106  |  41<H>  ----------------------------<  139<H>  4.7   |  18<L>  |  1.07    Ca    10.2      07 Jun 2020 07:08  Phos  4.2     06-07  Mg     2.3     06-07    TPro  8.1  /  Alb  4.9  /  TBili  0.4  /  DBili  x   /  AST  40  /  ALT  52<H>  /  AlkPhos  106  06-07      Mg 2.3  Phos 4.2      PT/INR - ( 06 Jun 2020 07:12 )   PT: 12.6 sec;   INR: 1.10 ratio         PTT - ( 06 Jun 2020 07:12 )  PTT:28.7 sec      Uric Acid 3.1    LDH --  Uric Acid 3.0        RADIOLOGY & ADDITIONAL STUDIES:    Xray Chest 1 View- PORTABLE-Urgent (06.05.20 @ 02:48) >  Lines and Tubes: Right PICC in the SVC.  Lungs: Left upper lung linear atelectasis. The lungs are otherwise clear.  Pleura: No pleural effusion or pneumothorax.  Heart and Mediastinum: The heart is normal in size.  Skeletal: Unremarkable.

## 2020-06-07 NOTE — PROGRESS NOTE ADULT - PROBLEM SELECTOR PLAN 5
Home regimen with Atenolol held on admission  If BP increases can consider restarting Mild transaminitis Grade 1, likely due to treatment.   Monitor daily CMP  Avoid further hepatotoxic medications Stool occult blood (-)   no more complaints of dark stools.  protonix changed to oral once daily.

## 2020-06-07 NOTE — PROGRESS NOTE ADULT - PROBLEM SELECTOR PLAN 1
Continue ATRA 40mg BID (started 5/22) and Arsenic Trioxide daily (started on 5/27)  Monitor for differentiation syndrome and check EKG for QTc prolongation every Tuesday and Friday. Keep K>4 and Mg>2  Follow up CBC with diff, CMP, TLS and DIC daily  Keep PLTs >50 K although now refractory. Order 1/2 units over 3 hours q 3 for CIVI. HLA work up in process. Prednisone 20mg PO daily x 3 days started  Keep Fibrinogen >150 if less give Cryo  Strict Is and Os/Daily weights/Mouth Care  6/5 pt with acute sob, CXR - right upper lung linear atelectasis  given one dose of Dexamethasone  COVID-19 PCR (-)   Atra continued, incentive spirometry, duo nebs ATC  6/6 Intentional tremors - responsive to baclofen Continue ATRA 40mg BID (started 5/22) and Arsenic Trioxide daily (started on 5/27)  Monitor for differentiation syndrome and check EKG for QTc prolongation every Tuesday and Friday. Keep K>4 and Mg>2  Follow up CBC with diff, CMP, TLS and DIC daily  Keep PLTs >50 K although now refractory. Order 1/2 units over 3 hours q 3 for CIVI. HLA work up in process. Prednisone 20mg PO daily x 3 days started  Keep Fibrinogen >150 if less give Cryo  Strict Is and Os/Daily weights/Mouth Care  6/6 Intentional tremors - responsive to baclofen Continue ATRA 40mg BID (started 5/22) and Arsenic Trioxide daily (started on 5/27)  Monitor for differentiation syndrome and check EKG for QTc prolongation every Tuesday and Friday. Keep K>4 and Mg>2  Follow up CBC with diff, CMP, TLS and DIC daily  Keep PLTs >50 K although now refractory. Order 1/2 units over 3 hours q 3 for CIVI. HLA work up in process. Prednisone 20mg PO daily x 3 days started  Keep Fibrinogen >150 if less give Cryo  Strict Is and Os/Daily weights/Mouth Care

## 2020-06-07 NOTE — PROGRESS NOTE ADULT - PROBLEM SELECTOR PLAN 4
Mild transaminitis Grade 1, likely due to treatment.   Monitor daily CMP  Avoid further hepatotoxic medications Stool occult blood (-)   no more complaints of dark stools.  protonix changed to oral once daily. new onset essential tremor   started on Baclofen 5 mg q 12h for x 5 days. monitor progress and renew as needed.

## 2020-06-07 NOTE — ADVANCED PRACTICE NURSE CONSULT - ASSESSMENT
Labs today WBC 11.7 HGB 8.1 HCT 24 PLTS 41 CR. 1.07 TBILI. 0.4,K 4.7,Mg 2.3. Pt found in bed, alert and oriented x4, v/s stable afebrile, n/c offered.  Right picc line in place. Site without redness or swelling. Lumen previously accessed with + blood return flushes well with NS. Chemotherapy verified by 2 RNs prior to administration. at 1436 treated with arsenic .15mg/kg= 11 mg ivss over 2 hours. Pt remains in bed with n/c offered. Primary RN aware of treatment plan.

## 2020-06-07 NOTE — PROGRESS NOTE ADULT - PROBLEM SELECTOR PLAN 7
No pharmacologic ppx 2/2 thrombocytopenia        Contact Information [phone] 5/23 HgA1c 7.0  FS AC & HS with HISS  Consistent Carbohydrate diet Home regimen with Atenolol held on admission  If BP increases can consider restarting

## 2020-06-08 LAB
ALBUMIN SERPL ELPH-MCNC: 4.5 G/DL — SIGNIFICANT CHANGE UP (ref 3.3–5)
ALP SERPL-CCNC: 101 U/L — SIGNIFICANT CHANGE UP (ref 40–120)
ALT FLD-CCNC: 46 U/L — HIGH (ref 10–45)
ANION GAP SERPL CALC-SCNC: 12 MMOL/L — SIGNIFICANT CHANGE UP (ref 5–17)
APTT BLD: 25.7 SEC — LOW (ref 27.5–36.3)
AST SERPL-CCNC: 39 U/L — SIGNIFICANT CHANGE UP (ref 10–40)
BASOPHILS # BLD AUTO: 0 K/UL — SIGNIFICANT CHANGE UP (ref 0–0.2)
BASOPHILS NFR BLD AUTO: 0 % — SIGNIFICANT CHANGE UP (ref 0–2)
BILIRUB SERPL-MCNC: 0.3 MG/DL — SIGNIFICANT CHANGE UP (ref 0.2–1.2)
BLD GP AB SCN SERPL QL: NEGATIVE — SIGNIFICANT CHANGE UP
BUN SERPL-MCNC: 44 MG/DL — HIGH (ref 7–23)
CALCIUM SERPL-MCNC: 10.2 MG/DL — SIGNIFICANT CHANGE UP (ref 8.4–10.5)
CHLORIDE SERPL-SCNC: 105 MMOL/L — SIGNIFICANT CHANGE UP (ref 96–108)
CO2 SERPL-SCNC: 20 MMOL/L — LOW (ref 22–31)
CREAT SERPL-MCNC: 1.11 MG/DL — SIGNIFICANT CHANGE UP (ref 0.5–1.3)
D DIMER BLD IA.RAPID-MCNC: 624 NG/ML DDU — HIGH
EOSINOPHIL # BLD AUTO: 0 K/UL — SIGNIFICANT CHANGE UP (ref 0–0.5)
EOSINOPHIL NFR BLD AUTO: 0 % — SIGNIFICANT CHANGE UP (ref 0–6)
FIBRINOGEN PPP-MCNC: 267 MG/DL — LOW (ref 350–510)
GLUCOSE BLDC GLUCOMTR-MCNC: 131 MG/DL — HIGH (ref 70–99)
GLUCOSE BLDC GLUCOMTR-MCNC: 162 MG/DL — HIGH (ref 70–99)
GLUCOSE BLDC GLUCOMTR-MCNC: 206 MG/DL — HIGH (ref 70–99)
GLUCOSE BLDC GLUCOMTR-MCNC: 216 MG/DL — HIGH (ref 70–99)
GLUCOSE SERPL-MCNC: 120 MG/DL — HIGH (ref 70–99)
HCT VFR BLD CALC: 23.8 % — LOW (ref 34.5–45)
HGB BLD-MCNC: 7.7 G/DL — LOW (ref 11.5–15.5)
INR BLD: 1.1 RATIO — SIGNIFICANT CHANGE UP (ref 0.88–1.16)
LDH SERPL L TO P-CCNC: 295 U/L — HIGH (ref 50–242)
LYMPHOCYTES # BLD AUTO: 2.77 K/UL — SIGNIFICANT CHANGE UP (ref 1–3.3)
LYMPHOCYTES # BLD AUTO: 25.9 % — SIGNIFICANT CHANGE UP (ref 13–44)
MAGNESIUM SERPL-MCNC: 2.3 MG/DL — SIGNIFICANT CHANGE UP (ref 1.6–2.6)
MCHC RBC-ENTMCNC: 27.8 PG — SIGNIFICANT CHANGE UP (ref 27–34)
MCHC RBC-ENTMCNC: 32.4 GM/DL — SIGNIFICANT CHANGE UP (ref 32–36)
MCV RBC AUTO: 85.9 FL — SIGNIFICANT CHANGE UP (ref 80–100)
MONOCYTES # BLD AUTO: 0.92 K/UL — HIGH (ref 0–0.9)
MONOCYTES NFR BLD AUTO: 8.6 % — SIGNIFICANT CHANGE UP (ref 2–14)
NEUTROPHILS # BLD AUTO: 5.54 K/UL — SIGNIFICANT CHANGE UP (ref 1.8–7.4)
NEUTROPHILS NFR BLD AUTO: 44.8 % — SIGNIFICANT CHANGE UP (ref 43–77)
PHOSPHATE SERPL-MCNC: 4.1 MG/DL — SIGNIFICANT CHANGE UP (ref 2.5–4.5)
PLATELET # BLD AUTO: 32 K/UL — LOW (ref 150–400)
PLATELET # BLD AUTO: 36 K/UL — LOW (ref 150–400)
PLATELET # BLD AUTO: 39 K/UL — LOW (ref 150–400)
PLATELET # BLD AUTO: 47 K/UL — LOW (ref 150–400)
POTASSIUM SERPL-MCNC: 4 MMOL/L — SIGNIFICANT CHANGE UP (ref 3.5–5.3)
POTASSIUM SERPL-SCNC: 4 MMOL/L — SIGNIFICANT CHANGE UP (ref 3.5–5.3)
PROT SERPL-MCNC: 7.2 G/DL — SIGNIFICANT CHANGE UP (ref 6–8.3)
PROTHROM AB SERPL-ACNC: 12.6 SEC — SIGNIFICANT CHANGE UP (ref 10–12.9)
RBC # BLD: 2.77 M/UL — LOW (ref 3.8–5.2)
RBC # FLD: 17.7 % — HIGH (ref 10.3–14.5)
RH IG SCN BLD-IMP: POSITIVE — SIGNIFICANT CHANGE UP
SODIUM SERPL-SCNC: 137 MMOL/L — SIGNIFICANT CHANGE UP (ref 135–145)
URATE SERPL-MCNC: 3.4 MG/DL — SIGNIFICANT CHANGE UP (ref 2.5–7)
WBC # BLD: 10.71 K/UL — HIGH (ref 3.8–10.5)
WBC # FLD AUTO: 10.71 K/UL — HIGH (ref 3.8–10.5)

## 2020-06-08 PROCEDURE — 99232 SBSQ HOSP IP/OBS MODERATE 35: CPT | Mod: GC

## 2020-06-08 RX ORDER — FUROSEMIDE 40 MG
40 TABLET ORAL ONCE
Refills: 0 | Status: COMPLETED | OUTPATIENT
Start: 2020-06-08 | End: 2020-06-08

## 2020-06-08 RX ORDER — HYDROXYUREA 500 MG/1
500 CAPSULE ORAL ONCE
Refills: 0 | Status: COMPLETED | OUTPATIENT
Start: 2020-06-08 | End: 2020-06-08

## 2020-06-08 RX ADMIN — Medication 100 MILLIGRAM(S): at 21:34

## 2020-06-08 RX ADMIN — Medication 1 SPRAY(S): at 13:47

## 2020-06-08 RX ADMIN — AER TRAVELER 1 APPLICATION(S): 0.5 SOLUTION RECTAL; TOPICAL at 05:47

## 2020-06-08 RX ADMIN — Medication 40 MILLIGRAM(S): at 12:25

## 2020-06-08 RX ADMIN — Medication 5 MILLILITER(S): at 23:39

## 2020-06-08 RX ADMIN — TRETINOIN 40 MILLIGRAM(S): 10 CAPSULE, LIQUID FILLED ORAL at 08:07

## 2020-06-08 RX ADMIN — Medication 1 APPLICATION(S): at 21:41

## 2020-06-08 RX ADMIN — Medication 3 MILLILITER(S): at 23:41

## 2020-06-08 RX ADMIN — Medication 1 SPRAY(S): at 21:40

## 2020-06-08 RX ADMIN — Medication 1: at 12:29

## 2020-06-08 RX ADMIN — Medication 25 MILLIGRAM(S): at 05:40

## 2020-06-08 RX ADMIN — Medication 1 APPLICATION(S): at 13:49

## 2020-06-08 RX ADMIN — Medication 650 MILLIGRAM(S): at 05:40

## 2020-06-08 RX ADMIN — AER TRAVELER 1 APPLICATION(S): 0.5 SOLUTION RECTAL; TOPICAL at 17:20

## 2020-06-08 RX ADMIN — Medication 5 MILLIGRAM(S): at 05:46

## 2020-06-08 RX ADMIN — TRETINOIN 40 MILLIGRAM(S): 10 CAPSULE, LIQUID FILLED ORAL at 21:34

## 2020-06-08 RX ADMIN — Medication 100 MILLIGRAM(S): at 05:47

## 2020-06-08 RX ADMIN — Medication 25 MILLIGRAM(S): at 13:45

## 2020-06-08 RX ADMIN — Medication 3 MILLILITER(S): at 11:33

## 2020-06-08 RX ADMIN — Medication 5 MILLIGRAM(S): at 17:20

## 2020-06-08 RX ADMIN — PANTOPRAZOLE SODIUM 40 MILLIGRAM(S): 20 TABLET, DELAYED RELEASE ORAL at 05:47

## 2020-06-08 RX ADMIN — HYDROXYUREA 500 MILLIGRAM(S): 500 CAPSULE ORAL at 12:25

## 2020-06-08 RX ADMIN — Medication 5 MILLILITER(S): at 20:52

## 2020-06-08 RX ADMIN — Medication 5 MILLILITER(S): at 08:07

## 2020-06-08 RX ADMIN — Medication 3 MILLILITER(S): at 17:20

## 2020-06-08 RX ADMIN — Medication 20 MILLIGRAM(S): at 05:46

## 2020-06-08 RX ADMIN — Medication 25 MILLIGRAM(S): at 23:28

## 2020-06-08 RX ADMIN — ARSENIC TRIOXIDE 130.5 MILLIGRAM(S): 2 INJECTION, SOLUTION INTRAVENOUS at 14:33

## 2020-06-08 RX ADMIN — Medication 650 MILLIGRAM(S): at 21:43

## 2020-06-08 RX ADMIN — Medication 650 MILLIGRAM(S): at 13:44

## 2020-06-08 RX ADMIN — Medication 1 APPLICATION(S): at 00:16

## 2020-06-08 RX ADMIN — Medication 3 MILLILITER(S): at 05:47

## 2020-06-08 RX ADMIN — Medication 5 MILLILITER(S): at 17:22

## 2020-06-08 RX ADMIN — Medication 1 APPLICATION(S): at 13:47

## 2020-06-08 RX ADMIN — Medication 1 APPLICATION(S): at 05:48

## 2020-06-08 RX ADMIN — Medication 2: at 17:20

## 2020-06-08 RX ADMIN — Medication 1 APPLICATION(S): at 05:47

## 2020-06-08 RX ADMIN — Medication 100 MILLIGRAM(S): at 13:44

## 2020-06-08 RX ADMIN — Medication 5 MILLILITER(S): at 11:34

## 2020-06-08 NOTE — ADVANCED PRACTICE NURSE CONSULT - ASSESSMENT
Pt. a/ox3.at the chair.oob to bathroom,voided.Lab results reviewed by Dr. Cuevas. Reinforced teachings to patient about her chemo regimen well understood. Right arm ac with double lumen PICC both ports in used patent. Patient to get platelet transfusion during infusion of arsenic.Dr. Swenson aware and said okay to infuse arsenic with platelets infusing in the other port. Arsenic trioxide 11mg in 250ml NSS started at 1433 x 2 hours infusion via lowest port of NSS line into purple port from PICC line right arm through Alaris IV pump. 2 RNs verification completed prior to start of treatment. Primary RN aware of plan of care. Safety maintained.

## 2020-06-08 NOTE — PROGRESS NOTE ADULT - PROBLEM SELECTOR PLAN 1
Continue ATRA 40mg BID (started 5/22) and Arsenic Trioxide daily (started on 5/27)  Monitor for differentiation syndrome and check EKG for QTc prolongation every Tuesday and Friday. Keep K>4 and Mg>2  Follow up CBC with diff, CMP, TLS and DIC daily  Keep PLTs >50 K although now refractory. Order 1/2 units over 3 hours q 3 for CIVI. HLA work up in process. Prednisone 20mg PO daily x 3 days started  Keep Fibrinogen >150 if less give Cryo  Strict Is and Os/Daily weights/Mouth Care Continue ATRA 40mg BID (started 5/22) and Arsenic Trioxide daily (started on 5/27)  Monitor for differentiation syndrome and check EKG for QTc prolongation every Tuesday and Friday. Keep K>4 and Mg>2  Follow up CBC with diff, CMP, TLS and DIC daily  Keep PLTs >50 K although now refractory. Order 1/2 units over 3 hours q 3 for CIVI. HLA work up in process. Prednisone 20mg PO daily x 3 days started  Keep Fibrinogen >150 if less give Cryo  Strict Is and Os/Daily weights/Mouth Care  - Thrombocytopenia: Platelets 1/2 units over 3 hours q 8 hours Continue ATRA 40mg BID (started 5/22) and Arsenic Trioxide daily (started on 5/27)  Monitor for differentiation syndrome and check EKG for QTc prolongation every Tuesday and Friday. Keep K > 4 and Mg > 2  Follow up CBC with diff, CMP, TLS and DIC daily  Keep PLTs >50 K although now refractory. Continue with platelets infusion (single donor) 1/2 units over 3 hours every 8 hours until HLA platelets available   Prednisone 20mg PO daily x 5 more days   Keep Fibrinogen >150 if less give Cryo  Strict Is and Os/Daily weights/Mouth Care  Total WBC >10 today 6/8. Hydrea 500mg PO x 1 ordered. Monitor WBC in am   - Thrombocytopenia: Platelets 1/2 units over 3 hours q 8 hours - until HLA platelets available   - Volume overload: Lasix 40mg IV x 1 today

## 2020-06-08 NOTE — PROGRESS NOTE ADULT - PROBLEM SELECTOR PLAN 3
Patient with c/o persistent sob  on 6/5 patient received one dose of dexa for ? ATRA syndrome.   6/5 CXR (-) for acute infiltrates, (+) atelectasis   incentive spirometer provided.   received diuresis with lasix 40 mg on 6/5, 6/6 and 6/7   O2 saturation off O2 = 95%  6/7 follow up CT chest without contrast Patient with c/o persistent sob  on 6/5 patient received one dose of dexa for ? ATRA syndrome   6/5 CXR (-) for acute infiltrates, (+) atelectasis   incentive spirometer provided.   received diuresis with lasix 40 mg on 6/5 - 6/8  O2 saturation off O2 = 95%  CT chest on 6/7 c/w biapical pleural-parenchymal scarring with calcification, likely from prior granulomatous disease. No consolidation. (Son reports a history of TB in 1980s)

## 2020-06-08 NOTE — PROGRESS NOTE ADULT - SUBJECTIVE AND OBJECTIVE BOX
Diagnosis: Acute Promyelocytic leukemia     Protocol/Chemo Regimen: ATRA/ Arsenic     Day: ATRA Day 17 and Arsenic Day 13    Patient Serbian speaking refusing  service. Patient's son called by patient    Patient endorses: (+) Chest congestion, occasional dry cough, mild shortness of breath    Review of Systems:  Denies nausea, vomiting, diarrhea, chest pain, SOB     Pain scale: 0     Diet: Regular     Allergies: No Known Allergies    -------------------- Diagnosis: Acute Promyelocytic leukemia     Protocol/Chemo Regimen: ATRA/ Arsenic     Day: ATRA Day 17 and Arsenic Day 13    Patient Chinese speaking refusing  service. Patient's son called by patient    Patient endorses: (+) Chest congestion, occasional dry cough, mild shortness of breath    Review of Systems:  Denies nausea, vomiting, diarrhea, chest pain, SOB     Pain scale: 0     Diet: Regular     Allergies: No Known Allergies    ANTIMICROBIALS  posaconazole DR Tablet 300 milliGRAM(s) Oral daily    HEME/ONC MEDICATIONS  arsenic trioxide IVPB (eMAR) 11 milliGRAM(s) IV Intermittent every 24 hours  tretinoin 40 milliGRAM(s) Oral every 12 hours    STANDING MEDICATIONS  albuterol/ipratropium for Nebulization 3 milliLiter(s) Nebulizer every 6 hours  baclofen 5 milliGRAM(s) Oral every 12 hours  benzonatate 100 milliGRAM(s) Oral every 8 hours  Biotene Dry Mouth Oral Rinse 5 milliLiter(s) Swish and Spit five times a day  bisacodyl 5 milliGRAM(s) Oral every 12 hours  chlorhexidine 4% Liquid 1 Application(s) Topical <User Schedule>  dextrose 5%. 1000 milliLiter(s) IV Continuous <Continuous>  dextrose 50% Injectable 12.5 Gram(s) IV Push once  dextrose 50% Injectable 25 Gram(s) IV Push once  dextrose 50% Injectable 25 Gram(s) IV Push once  hydrocortisone 2.5% Rectal Cream 1 Application(s) Rectal every 8 hours  insulin lispro (HumaLOG) corrective regimen sliding scale   SubCutaneous three times a day before meals  insulin lispro (HumaLOG) corrective regimen sliding scale   SubCutaneous at bedtime  pantoprazole    Tablet 40 milliGRAM(s) Oral before breakfast  petrolatum white Ointment 1 Application(s) Topical three times a day  polyethylene glycol 3350 17 Gram(s) Oral daily  sodium chloride 0.65% Nasal 1 Spray(s) Both Nostrils every 8 hours  sodium chloride 0.9%. 1000 milliLiter(s) IV Continuous <Continuous>  witch hazel Pads 1 Application(s) Topical every 12 hours    PRN MEDICATIONS  acetaminophen   Tablet .. 650 milliGRAM(s) Oral every 6 hours PRN  acetaminophen 325 mG/butalbital 50 mG/caffeine 40 mG 1 Tablet(s) Oral every 4 hours PRN  aluminum hydroxide/magnesium hydroxide/simethicone Suspension 30 milliLiter(s) Oral every 6 hours PRN  AQUAPHOR (petrolatum Ointment) 1 Application(s) Topical every 3 hours PRN  dextrose 40% Gel 15 Gram(s) Oral once PRN  diphenhydrAMINE   Injectable 25 milliGRAM(s) IV Push every 6 hours PRN  glucagon  Injectable 1 milliGRAM(s) IntraMuscular once PRN  ondansetron Injectable 8 milliGRAM(s) IV Push every 8 hours PRN  sodium chloride 0.9% lock flush 10 milliLiter(s) IV Push every 1 hour PRN    Vital Signs Last 24 Hrs  T(C): 36.4 (08 Jun 2020 06:00), Max: 36.9 (07 Jun 2020 20:10)  T(F): 97.5 (08 Jun 2020 06:00), Max: 98.5 (07 Jun 2020 20:10)  HR: 75 (08 Jun 2020 06:00) (73 - 93)  BP: 137/77 (08 Jun 2020 06:00) (124/71 - 157/74)  BP(mean): --  RR: 18 (08 Jun 2020 06:00) (18 - 20)  SpO2: 100% (08 Jun 2020 06:00) (96% - 100%)    PHYSICAL EXAM  General: adult in NAD  HEENT: clear oropharynx, no erythema, no ulcers  Neck: supple  CV: normal S1, S2, RRR  Lungs: clear to auscultation, no wheezes, no rales  Abdomen: soft, nontender, nondistended, normal BS  Ext: no edema  Skin: no rash  Neuro: alert and oriented x 3  Central line: normal     LABS:                        7.7    10.71 )-----------( 32       ( 08 Jun 2020 06:24 )             23.8         Mean Cell Volume : 85.9 fl  Mean Cell Hemoglobin : 27.8 pg  Mean Cell Hemoglobin Concentration : 32.4 gm/dL  Auto Neutrophil # : 5.54 K/uL  Auto Lymphocyte # : 2.77 K/uL  Auto Monocyte # : 0.92 K/uL  Auto Eosinophil # : 0.00 K/uL  Auto Basophil # : 0.00 K/uL  Auto Neutrophil % : 44.8 %  Auto Lymphocyte % : 25.9 %  Auto Monocyte % : 8.6 %  Auto Eosinophil % : 0.0 %  Auto Basophil % : 0.0 %    06-08  137  |  105  |  44<H>  ----------------------------<  120<H>  4.0   |  20<L>  |  1.11    Ca    10.2      08 Jun 2020 06:23  Phos  4.1     06-08  Mg     2.3     06-08    TPro  7.2  /  Alb  4.5  /  TBili  0.3  /  DBili  x   /  AST  39  /  ALT  46<H>  /  AlkPhos  101  06-08    Mg 2.3  Phos 4.1    PT/INR - ( 08 Jun 2020 06:24 )   PT: 12.6 sec;   INR: 1.10 ratio      PTT - ( 08 Jun 2020 06:24 )  PTT:25.7 sec    Uric Acid 3.4  LDH --  Uric Acid 3.3    RADIOLOGY & ADDITIONAL STUDIES:  < from: Xray Chest 1 View- PORTABLE-Urgent (06.05.20 @ 02:48) >  IMPRESSION:    1.  Right PICC in the SVC.  2.  Left upper lung linear atelectasis. Diagnosis: Acute Promyelocytic leukemia     Protocol/Chemo Regimen: ATRA/Arsenic     Day: ATRA Day 17 and Arsenic Day 13    Patient Sinhala speaking refusing  service. Patient's son called by patient    Patient endorses: (+) Chest congestion, occasional dry cough, mild shortness of breath    Review of Systems:  Denies nausea, vomiting, diarrhea, chest pain, SOB     Pain scale: 0     Diet: Regular     Allergies: No Known Allergies    ANTIMICROBIALS  posaconazole DR Tablet 300 milliGRAM(s) Oral daily    HEME/ONC MEDICATIONS  arsenic trioxide IVPB (eMAR) 11 milliGRAM(s) IV Intermittent every 24 hours  tretinoin 40 milliGRAM(s) Oral every 12 hours    STANDING MEDICATIONS  albuterol/ipratropium for Nebulization 3 milliLiter(s) Nebulizer every 6 hours  baclofen 5 milliGRAM(s) Oral every 12 hours  benzonatate 100 milliGRAM(s) Oral every 8 hours  Biotene Dry Mouth Oral Rinse 5 milliLiter(s) Swish and Spit five times a day  bisacodyl 5 milliGRAM(s) Oral every 12 hours  chlorhexidine 4% Liquid 1 Application(s) Topical <User Schedule>  dextrose 5%. 1000 milliLiter(s) IV Continuous <Continuous>  dextrose 50% Injectable 12.5 Gram(s) IV Push once  dextrose 50% Injectable 25 Gram(s) IV Push once  dextrose 50% Injectable 25 Gram(s) IV Push once  hydrocortisone 2.5% Rectal Cream 1 Application(s) Rectal every 8 hours  insulin lispro (HumaLOG) corrective regimen sliding scale   SubCutaneous three times a day before meals  insulin lispro (HumaLOG) corrective regimen sliding scale   SubCutaneous at bedtime  pantoprazole    Tablet 40 milliGRAM(s) Oral before breakfast  petrolatum white Ointment 1 Application(s) Topical three times a day  polyethylene glycol 3350 17 Gram(s) Oral daily  sodium chloride 0.65% Nasal 1 Spray(s) Both Nostrils every 8 hours  sodium chloride 0.9%. 1000 milliLiter(s) IV Continuous <Continuous>  witch hazel Pads 1 Application(s) Topical every 12 hours    PRN MEDICATIONS  acetaminophen   Tablet .. 650 milliGRAM(s) Oral every 6 hours PRN  acetaminophen 325 mG/butalbital 50 mG/caffeine 40 mG 1 Tablet(s) Oral every 4 hours PRN  aluminum hydroxide/magnesium hydroxide/simethicone Suspension 30 milliLiter(s) Oral every 6 hours PRN  AQUAPHOR (petrolatum Ointment) 1 Application(s) Topical every 3 hours PRN  dextrose 40% Gel 15 Gram(s) Oral once PRN  diphenhydrAMINE   Injectable 25 milliGRAM(s) IV Push every 6 hours PRN  glucagon  Injectable 1 milliGRAM(s) IntraMuscular once PRN  ondansetron Injectable 8 milliGRAM(s) IV Push every 8 hours PRN  sodium chloride 0.9% lock flush 10 milliLiter(s) IV Push every 1 hour PRN    Vital Signs Last 24 Hrs  T(C): 36.4 (08 Jun 2020 06:00), Max: 36.9 (07 Jun 2020 20:10)  T(F): 97.5 (08 Jun 2020 06:00), Max: 98.5 (07 Jun 2020 20:10)  HR: 75 (08 Jun 2020 06:00) (73 - 93)  BP: 137/77 (08 Jun 2020 06:00) (124/71 - 157/74)  BP(mean): --  RR: 18 (08 Jun 2020 06:00) (18 - 20)  SpO2: 100% (08 Jun 2020 06:00) (96% - 100%)    PHYSICAL EXAM  General: adult in NAD  HEENT: clear oropharynx, no erythema, no ulcers  Neck: supple  CV: normal S1, S2, RRR  Lungs: clear to auscultation, no wheezes, no rales  Abdomen: soft, nontender, nondistended, normal BS  Ext: no edema  Skin: no rash  Neuro: alert and oriented x 3  Central line: normal     LABS:                        7.7    10.71 )-----------( 32       ( 08 Jun 2020 06:24 )             23.8         Mean Cell Volume : 85.9 fl  Mean Cell Hemoglobin : 27.8 pg  Mean Cell Hemoglobin Concentration : 32.4 gm/dL  Auto Neutrophil # : 5.54 K/uL  Auto Lymphocyte # : 2.77 K/uL  Auto Monocyte # : 0.92 K/uL  Auto Eosinophil # : 0.00 K/uL  Auto Basophil # : 0.00 K/uL  Auto Neutrophil % : 44.8 %  Auto Lymphocyte % : 25.9 %  Auto Monocyte % : 8.6 %  Auto Eosinophil % : 0.0 %  Auto Basophil % : 0.0 %    06-08  137  |  105  |  44<H>  ----------------------------<  120<H>  4.0   |  20<L>  |  1.11    Ca    10.2      08 Jun 2020 06:23  Phos  4.1     06-08  Mg     2.3     06-08    TPro  7.2  /  Alb  4.5  /  TBili  0.3  /  DBili  x   /  AST  39  /  ALT  46<H>  /  AlkPhos  101  06-08    Mg 2.3  Phos 4.1    PT/INR - ( 08 Jun 2020 06:24 )   PT: 12.6 sec;   INR: 1.10 ratio      PTT - ( 08 Jun 2020 06:24 )  PTT:25.7 sec    Uric Acid 3.4  LDH --  Uric Acid 3.3    RADIOLOGY & ADDITIONAL STUDIES:  < from: Xray Chest 1 View- PORTABLE-Urgent (06.05.20 @ 02:48) >  IMPRESSION:    1.  Right PICC in the SVC.  2.  Left upper lung linear atelectasis.

## 2020-06-08 NOTE — PROGRESS NOTE ADULT - PROBLEM SELECTOR PLAN 2
The patient is not neutropenic, afebrile  will d/c Cefepime, consider d/cing Posaconazole in am  Previous COVID-19 (+) outpatient  5/22, 5/26 and 6/2 COVID (-) The patient is not neutropenic, afebrile  Antibiotics/antifungals discontinued   Previous COVID-19 (+) outpatient  5/22, 5/26 and 6/2 COVID (-)

## 2020-06-08 NOTE — PROGRESS NOTE ADULT - ASSESSMENT
This is a 61 yo Maori speaking F with PMHx of HTN, HLD, T2DM, COVID-19 infection admitted for management of newly diagnosed APL. The patient is currently reciving ATRA and Arsenic. syndrome. Patients hospital course has been complicated by transaminitis and refractory thrombocytopenia. The patient has pancytopenia 2/2 disease and/or ATRA/Arsenic.

## 2020-06-08 NOTE — PROGRESS NOTE ADULT - PROBLEM SELECTOR PLAN 5
Stool occult blood (-)   no more complaints of dark stools.  protonix changed to oral once daily. Stool occult blood (-)   No more complaints of dark stools  Continue Protonix daily

## 2020-06-08 NOTE — PROGRESS NOTE ADULT - PROBLEM SELECTOR PLAN 6
Mild transaminitis Grade 1, likely due to treatment.   Monitor daily CMP  Avoid further hepatotoxic medications Mild transaminitis Grade 1, likely due to treatment. Improving   Monitor daily CMP  Avoid further hepatotoxic medications

## 2020-06-08 NOTE — PROGRESS NOTE ADULT - ATTENDING COMMENTS
61 y/o Korean speaking F who is transferred for newly diagnosed low/interm risk APL.  Presented with pancytopenia, flow cytometry/bone marrow biopsy/FISH from 5/26- consistent with acute promyelocytic leukemia with PML-AWA translocation.    She was started on ATRA 5/23- day +15, GALA 5/27- day +12    -pt had SOB on 6/4 -given Lasix, given Dexa 10mg x1, pt improved. CXR 6/5 -atelectasis. Daily weights, diurese today again. Monitor for signs of differentiation syndrome. Wbc has been stable  -new onset tremors, unclear etiology, electrolytes WNL, will hold cefepime, trial of baclofen for symptomatic relief  -Check CBC, coags/fibrinogen once daily.  Goal plt >40-50, fibrinogen >150; however, pt has been plt refractory and unable to maintain these plt goals. Being monitored closely for any signs of bleeding -none at this time. On 6/4, pt had ABO-matched platelets given over 3 hrs in 1/2 unit infusions -->she responded very well, plt count >100. 6/6 platelet count 66K Pt was also started on Prednisone 20mg on 6/4 x 5 days in case pt has autoimmune destruction of plt. Low threshold for scanning if pt has HA  -monitor lytes daily, keep K>4, Mg>2.  EKG twice a week  -pt had HA in May -had  CT head no contrast on 5/22 with no bleeding.  No more headaches, just a sensation of heat on the top of the head. MRI was not suggestive of any bleeding. Heat on the head is worse with platelet transfusion, on benadryl prior to platelets and PRN Fioricet   -cont Cefepime/Posaconazole 61 y/o English speaking F who is transferred for newly diagnosed low/interm risk APL.  Presented with pancytopenia, flow cytometry/bone marrow biopsy/FISH from 5/26- consistent with acute promyelocytic leukemia with PML-AWA translocation.    She was started on ATRA 5/23- day +15, GALA 5/27- day +13    -f/u CT chest, pt had SOB on 6/4, now resolved. She was given Lasix, given Dexa 10mg x1, pt improved. CXR 6/5 -atelectasis. Daily weights, diurese. wbc>10 -will give Hydrea 500mg po x 1 dose. Give Prednisone 20mg x 5 more days starting 6/8. If pt becomes SOB again, change to Dexa 10mg q12hrs  -new onset tremors, unclear etiology, electrolytes WNL. Cefepime held, given Baclofen -they resolved  -Check CBC, coags/fibrinogen once daily.  Goal plt >40-50, fibrinogen >150; however, pt has been plt refractory and unable to maintain these plt goals. Being monitored closely for any signs of bleeding -none at this time. On 6/4, pt had ABO-matched platelets given over 3 hrs in 1/2 unit infusions -->she responded very well, plt count >100.   -monitor lytes daily, keep K>4, Mg>2.  EKG twice a week  -pt had HA in May -had  CT head no contrast on 5/22 with no bleeding.  No more headaches, just a sensation of heat on the top of the head. MRI was not suggestive of any bleeding. Heat on the head is worse with platelet transfusion, on benadryl prior to platelets and PRN Fioricet   -d/c Posaconazole

## 2020-06-09 LAB
ALBUMIN SERPL ELPH-MCNC: 4.5 G/DL — SIGNIFICANT CHANGE UP (ref 3.3–5)
ALP SERPL-CCNC: 109 U/L — SIGNIFICANT CHANGE UP (ref 40–120)
ALT FLD-CCNC: 49 U/L — HIGH (ref 10–45)
ANION GAP SERPL CALC-SCNC: 11 MMOL/L — SIGNIFICANT CHANGE UP (ref 5–17)
AST SERPL-CCNC: 39 U/L — SIGNIFICANT CHANGE UP (ref 10–40)
BASOPHILS # BLD AUTO: 0 K/UL — SIGNIFICANT CHANGE UP (ref 0–0.2)
BASOPHILS NFR BLD AUTO: 0 % — SIGNIFICANT CHANGE UP (ref 0–2)
BILIRUB SERPL-MCNC: 0.3 MG/DL — SIGNIFICANT CHANGE UP (ref 0.2–1.2)
BUN SERPL-MCNC: 45 MG/DL — HIGH (ref 7–23)
CALCIUM SERPL-MCNC: 9.5 MG/DL — SIGNIFICANT CHANGE UP (ref 8.4–10.5)
CHLORIDE SERPL-SCNC: 106 MMOL/L — SIGNIFICANT CHANGE UP (ref 96–108)
CO2 SERPL-SCNC: 22 MMOL/L — SIGNIFICANT CHANGE UP (ref 22–31)
CREAT SERPL-MCNC: 1.11 MG/DL — SIGNIFICANT CHANGE UP (ref 0.5–1.3)
D DIMER BLD IA.RAPID-MCNC: 658 NG/ML DDU — HIGH
EOSINOPHIL # BLD AUTO: 0 K/UL — SIGNIFICANT CHANGE UP (ref 0–0.5)
EOSINOPHIL NFR BLD AUTO: 0 % — SIGNIFICANT CHANGE UP (ref 0–6)
FIBRINOGEN PPP-MCNC: 294 MG/DL — LOW (ref 350–510)
GLUCOSE BLDC GLUCOMTR-MCNC: 128 MG/DL — HIGH (ref 70–99)
GLUCOSE BLDC GLUCOMTR-MCNC: 132 MG/DL — HIGH (ref 70–99)
GLUCOSE BLDC GLUCOMTR-MCNC: 178 MG/DL — HIGH (ref 70–99)
GLUCOSE BLDC GLUCOMTR-MCNC: 201 MG/DL — HIGH (ref 70–99)
GLUCOSE SERPL-MCNC: 132 MG/DL — HIGH (ref 70–99)
HCT VFR BLD CALC: 22.6 % — LOW (ref 34.5–45)
HCT VFR BLD CALC: 23.1 % — LOW (ref 34.5–45)
HGB BLD-MCNC: 7.6 G/DL — LOW (ref 11.5–15.5)
HGB BLD-MCNC: 7.9 G/DL — LOW (ref 11.5–15.5)
LDH SERPL L TO P-CCNC: 306 U/L — HIGH (ref 50–242)
LYMPHOCYTES # BLD AUTO: 28 % — SIGNIFICANT CHANGE UP (ref 13–44)
LYMPHOCYTES # BLD AUTO: 3.02 K/UL — SIGNIFICANT CHANGE UP (ref 1–3.3)
MAGNESIUM SERPL-MCNC: 2.2 MG/DL — SIGNIFICANT CHANGE UP (ref 1.6–2.6)
MANUAL SMEAR VERIFICATION: SIGNIFICANT CHANGE UP
MCHC RBC-ENTMCNC: 28.4 PG — SIGNIFICANT CHANGE UP (ref 27–34)
MCHC RBC-ENTMCNC: 28.8 PG — SIGNIFICANT CHANGE UP (ref 27–34)
MCHC RBC-ENTMCNC: 33.6 GM/DL — SIGNIFICANT CHANGE UP (ref 32–36)
MCHC RBC-ENTMCNC: 34.2 GM/DL — SIGNIFICANT CHANGE UP (ref 32–36)
MCV RBC AUTO: 83.1 FL — SIGNIFICANT CHANGE UP (ref 80–100)
MCV RBC AUTO: 85.6 FL — SIGNIFICANT CHANGE UP (ref 80–100)
METAMYELOCYTES # FLD: 6 % — HIGH (ref 0–0)
MONOCYTES # BLD AUTO: 0.86 K/UL — SIGNIFICANT CHANGE UP (ref 0–0.9)
MONOCYTES NFR BLD AUTO: 8 % — SIGNIFICANT CHANGE UP (ref 2–14)
MYELOCYTES NFR BLD: 9 % — HIGH (ref 0–0)
NEUTROPHILS # BLD AUTO: 5.28 K/UL — SIGNIFICANT CHANGE UP (ref 1.8–7.4)
NEUTROPHILS NFR BLD AUTO: 43 % — SIGNIFICANT CHANGE UP (ref 43–77)
NEUTS BAND # BLD: 6 % — SIGNIFICANT CHANGE UP (ref 0–8)
NRBC # BLD: 0 /100 WBCS — SIGNIFICANT CHANGE UP (ref 0–0)
NRBC # BLD: 0 /100 — SIGNIFICANT CHANGE UP (ref 0–0)
PHOSPHATE SERPL-MCNC: 4.7 MG/DL — HIGH (ref 2.5–4.5)
PLAT MORPH BLD: NORMAL — SIGNIFICANT CHANGE UP
PLATELET # BLD AUTO: 49 K/UL — LOW (ref 150–400)
PLATELET # BLD AUTO: 60 K/UL — LOW (ref 150–400)
PLATELET # BLD AUTO: 60 K/UL — LOW (ref 150–400)
POTASSIUM SERPL-MCNC: 4 MMOL/L — SIGNIFICANT CHANGE UP (ref 3.5–5.3)
POTASSIUM SERPL-SCNC: 4 MMOL/L — SIGNIFICANT CHANGE UP (ref 3.5–5.3)
PROT SERPL-MCNC: 7.5 G/DL — SIGNIFICANT CHANGE UP (ref 6–8.3)
RBC # BLD: 2.64 M/UL — LOW (ref 3.8–5.2)
RBC # BLD: 2.78 M/UL — LOW (ref 3.8–5.2)
RBC # FLD: 17.7 % — HIGH (ref 10.3–14.5)
RBC # FLD: 17.7 % — HIGH (ref 10.3–14.5)
RBC BLD AUTO: SIGNIFICANT CHANGE UP
SODIUM SERPL-SCNC: 139 MMOL/L — SIGNIFICANT CHANGE UP (ref 135–145)
WBC # BLD: 10.77 K/UL — HIGH (ref 3.8–10.5)
WBC # BLD: 10.87 K/UL — HIGH (ref 3.8–10.5)
WBC # FLD AUTO: 10.77 K/UL — HIGH (ref 3.8–10.5)
WBC # FLD AUTO: 10.87 K/UL — HIGH (ref 3.8–10.5)

## 2020-06-09 PROCEDURE — 99232 SBSQ HOSP IP/OBS MODERATE 35: CPT | Mod: GC

## 2020-06-09 PROCEDURE — 71045 X-RAY EXAM CHEST 1 VIEW: CPT | Mod: 26

## 2020-06-09 PROCEDURE — 93010 ELECTROCARDIOGRAM REPORT: CPT

## 2020-06-09 RX ORDER — FUROSEMIDE 40 MG
40 TABLET ORAL ONCE
Refills: 0 | Status: COMPLETED | OUTPATIENT
Start: 2020-06-09 | End: 2020-06-09

## 2020-06-09 RX ORDER — CALCIUM ACETATE 667 MG
667 TABLET ORAL
Refills: 0 | Status: COMPLETED | OUTPATIENT
Start: 2020-06-09 | End: 2020-06-09

## 2020-06-09 RX ORDER — BACLOFEN 100 %
5 POWDER (GRAM) MISCELLANEOUS EVERY 12 HOURS
Refills: 0 | Status: DISCONTINUED | OUTPATIENT
Start: 2020-06-09 | End: 2020-07-06

## 2020-06-09 RX ORDER — PHENYLEPHRINE-SHARK LIVER OIL-MINERAL OIL-PETROLATUM RECTAL OINTMENT
1 OINTMENT (GRAM) RECTAL EVERY 8 HOURS
Refills: 0 | Status: DISCONTINUED | OUTPATIENT
Start: 2020-06-09 | End: 2020-07-06

## 2020-06-09 RX ORDER — POTASSIUM CHLORIDE 20 MEQ
40 PACKET (EA) ORAL ONCE
Refills: 0 | Status: COMPLETED | OUTPATIENT
Start: 2020-06-09 | End: 2020-06-09

## 2020-06-09 RX ORDER — HYDROXYUREA 500 MG/1
500 CAPSULE ORAL ONCE
Refills: 0 | Status: COMPLETED | OUTPATIENT
Start: 2020-06-09 | End: 2020-06-09

## 2020-06-09 RX ADMIN — Medication 650 MILLIGRAM(S): at 18:32

## 2020-06-09 RX ADMIN — Medication 100 MILLIGRAM(S): at 14:37

## 2020-06-09 RX ADMIN — Medication 1 SPRAY(S): at 06:06

## 2020-06-09 RX ADMIN — Medication 40 MILLIEQUIVALENT(S): at 10:05

## 2020-06-09 RX ADMIN — Medication 1 APPLICATION(S): at 22:22

## 2020-06-09 RX ADMIN — Medication 1 APPLICATION(S): at 06:06

## 2020-06-09 RX ADMIN — Medication 3 MILLILITER(S): at 06:06

## 2020-06-09 RX ADMIN — Medication 20 MILLIGRAM(S): at 06:10

## 2020-06-09 RX ADMIN — HYDROXYUREA 500 MILLIGRAM(S): 500 CAPSULE ORAL at 12:02

## 2020-06-09 RX ADMIN — Medication 100 MILLIGRAM(S): at 22:20

## 2020-06-09 RX ADMIN — PANTOPRAZOLE SODIUM 40 MILLIGRAM(S): 20 TABLET, DELAYED RELEASE ORAL at 06:05

## 2020-06-09 RX ADMIN — Medication 667 MILLIGRAM(S): at 08:04

## 2020-06-09 RX ADMIN — Medication 5 MILLILITER(S): at 22:22

## 2020-06-09 RX ADMIN — Medication 1 APPLICATION(S): at 14:36

## 2020-06-09 RX ADMIN — Medication 667 MILLIGRAM(S): at 17:06

## 2020-06-09 RX ADMIN — Medication 5 MILLILITER(S): at 17:05

## 2020-06-09 RX ADMIN — AER TRAVELER 1 APPLICATION(S): 0.5 SOLUTION RECTAL; TOPICAL at 17:04

## 2020-06-09 RX ADMIN — TRETINOIN 40 MILLIGRAM(S): 10 CAPSULE, LIQUID FILLED ORAL at 08:48

## 2020-06-09 RX ADMIN — Medication 5 MILLILITER(S): at 08:04

## 2020-06-09 RX ADMIN — AER TRAVELER 1 APPLICATION(S): 0.5 SOLUTION RECTAL; TOPICAL at 06:05

## 2020-06-09 RX ADMIN — Medication 25 MILLIGRAM(S): at 18:32

## 2020-06-09 RX ADMIN — Medication 5 MILLIGRAM(S): at 17:06

## 2020-06-09 RX ADMIN — Medication 40 MILLIGRAM(S): at 12:02

## 2020-06-09 RX ADMIN — Medication 5 MILLILITER(S): at 12:03

## 2020-06-09 RX ADMIN — TRETINOIN 40 MILLIGRAM(S): 10 CAPSULE, LIQUID FILLED ORAL at 22:21

## 2020-06-09 RX ADMIN — ARSENIC TRIOXIDE 130.5 MILLIGRAM(S): 2 INJECTION, SOLUTION INTRAVENOUS at 15:17

## 2020-06-09 RX ADMIN — Medication 1: at 12:59

## 2020-06-09 RX ADMIN — Medication 1 APPLICATION(S): at 06:10

## 2020-06-09 RX ADMIN — Medication 1 SPRAY(S): at 14:37

## 2020-06-09 RX ADMIN — Medication 3 MILLILITER(S): at 12:02

## 2020-06-09 RX ADMIN — SODIUM CHLORIDE 25 MILLILITER(S): 9 INJECTION INTRAMUSCULAR; INTRAVENOUS; SUBCUTANEOUS at 12:17

## 2020-06-09 RX ADMIN — Medication 100 MILLIGRAM(S): at 06:05

## 2020-06-09 RX ADMIN — POLYETHYLENE GLYCOL 3350 17 GRAM(S): 17 POWDER, FOR SOLUTION ORAL at 12:02

## 2020-06-09 RX ADMIN — Medication 667 MILLIGRAM(S): at 12:02

## 2020-06-09 RX ADMIN — Medication 5 MILLIGRAM(S): at 22:20

## 2020-06-09 RX ADMIN — Medication 667 MILLIGRAM(S): at 22:21

## 2020-06-09 RX ADMIN — Medication 2: at 17:05

## 2020-06-09 RX ADMIN — Medication 1 APPLICATION(S): at 14:37

## 2020-06-09 RX ADMIN — Medication 3 MILLILITER(S): at 17:05

## 2020-06-09 RX ADMIN — Medication 5 MILLIGRAM(S): at 06:06

## 2020-06-09 RX ADMIN — Medication 1 SPRAY(S): at 22:22

## 2020-06-09 RX ADMIN — Medication 5 MILLIGRAM(S): at 10:05

## 2020-06-09 NOTE — PROGRESS NOTE ADULT - PROBLEM SELECTOR PLAN 7
Home regimen with Atenolol held on admission  If BP increases can consider restarting 5/23 HgA1c 7.0  FS AC & HS with HISS  Consistent Carbohydrate diet

## 2020-06-09 NOTE — PROGRESS NOTE ADULT - PROBLEM SELECTOR PLAN 3
Patient with c/o persistent sob  on 6/5 patient received one dose of dexa for ? ATRA syndrome   6/5 CXR (-) for acute infiltrates, (+) atelectasis   incentive spirometer provided.   received diuresis with lasix 40 mg on 6/5 - 6/8  O2 saturation off O2 = 95%  CT chest on 6/7 c/w biapical pleural-parenchymal scarring with calcification, likely from prior granulomatous disease. No consolidation. (Son reports a history of TB in 1980s) Patient with c/o persistent sob with stable O2 sat  6/5 Patient received one dose of Dexamethasone for possible ATRA syndrome   CXR (-) for acute infiltrates and (+) atelectasis   Continue Incentive Spirometer   Lasix 40 mg IV 6/5 - 6/8 6/7 CT chest with biapical pleural-parenchymal scarring with calcification, likely from prior granulomatous disease. No consolidation. (Son reports a history of TB in 1980s) Patient previously with c/o SOB with stable O2 sat and concern for ATRA syndrome  6/5 Patient received one dose of Dexamethasone   CXR (-) for acute infiltrates and (+) atelectasis   Continue Incentive Spirometer   Lasix 40 mg IV 6/5 - 6/8 6/8 Hydrea 500mg x 1 given  6/7 CT chest with biapical pleural-parenchymal scarring with calcification, likely from prior granulomatous disease. No consolidation. (Son reports a history of TB in 1980s) Patient previously with c/o SOB with stable O2 sat and concern for ATRA syndrome  6/5 Patient received one dose of Dexamethasone   CXR (-) for acute infiltrates and (+) atelectasis   Continue Incentive Spirometer   Lasix PRN  6/8 Hydrea 500mg x 1 given  6/7 CT chest with biapical pleural-parenchymal scarring with calcification, likely from prior granulomatous disease. No consolidation. (Son reports a history of TB in 1980s)

## 2020-06-09 NOTE — PROGRESS NOTE ADULT - ASSESSMENT
This is a 59 yo Albanian speaking F with PMHx of HTN, HLD, T2DM, COVID-19 infection admitted for management of newly diagnosed APL. The patient is currently reciving ATRA and Arsenic. syndrome. Patients hospital course has been complicated by transaminitis and refractory thrombocytopenia. The patient has pancytopenia 2/2 disease and/or ATRA/Arsenic.

## 2020-06-09 NOTE — PROGRESS NOTE ADULT - PROBLEM SELECTOR PLAN 4
new onset essential tremor   started on Baclofen 5 mg q 12h for x 5 days. monitor progress and renew as needed new onset essential tremor  Improving with Baclofen 5 mg q 12h for x 5 days (through 6/10)  Monitor progress and renew as needed

## 2020-06-09 NOTE — PROGRESS NOTE ADULT - PROBLEM SELECTOR PLAN 1
Continue ATRA 40mg BID (started 5/22) and Arsenic Trioxide daily (started on 5/27)  Monitor for differentiation syndrome and check EKG for QTc prolongation every Tuesday and Friday. Keep K > 4 and Mg > 2  Follow up CBC with diff, CMP, TLS and DIC daily  Keep PLTs >50 K although now refractory. Continue with platelets infusion (single donor) 1/2 units over 3 hours every 8 hours until HLA platelets available   Prednisone 20mg PO daily x 5 more days   Keep Fibrinogen >150 if less give Cryo  Strict Is and Os/Daily weights/Mouth Care  Total WBC >10 today 6/8. Hydrea 500mg PO x 1 ordered. Monitor WBC in am   - Thrombocytopenia: Platelets 1/2 units over 3 hours q 8 hours - until HLA platelets available   - Volume overload: Lasix 40mg IV x 1 today Continue ATRA 40mg BID (started 5/22) and Arsenic Trioxide daily (started on 5/27)  Monitor for differentiation syndrome and check EKG for QTc prolongation every Tuesday and Friday. Keep K > 4 and Mg > 2  Follow up CBC with diff, CMP, TLS and DIC panel daily  WBC >10   Keep PLTs >50 K. If unable to achieve goal continue with platelets infusion (single donor) 1/2 units over 3 hours every 8 hours until HLA platelets available   Prednisone 20mg PO daily x 5 more days (through 6/12)  Keep Fibrinogen >150 if less give Cryo  Strict Is and Os/Daily weights/Mouth Care Continue ATRA 40mg BID (started 5/22) and Arsenic Trioxide daily (started on 5/27)  Monitor for differentiation syndrome and check EKG for QTc prolongation every Tuesday and Friday. Keep K > 4 and Mg > 2  Potassium 40 mEq PO x 1  Follow up CBC with diff, CMP, TLS and DIC panel daily  WBC >10   Keep PLTs >50 K. If unable to achieve goal continue with platelets infusion (single donor) 1/2 units over 3 hours every 8 hours until HLA platelets available   Prednisone 20mg PO daily x 5 more days (through 6/12)  Keep Fibrinogen >150 if less give Cryo  Strict Is and Os/Daily weights/Mouth Care Continue ATRA 40mg BID (started 5/22) and Arsenic Trioxide daily (started on 5/27)  Monitor for differentiation syndrome and check EKG for QTc prolongation every Tuesday and Friday. Keep K > 4 and Mg > 2  Potassium 40 mEq PO x 1  Follow up CBC with diff, CMP, TLS and DIC panel daily  WBC >10   Keep PLTs >50 K. If unable to achieve goal continue with platelets infusion (single donor) 1/2 units over 3 hours every 8 hours until HLA platelets available   Prednisone 20mg PO daily x 5 more days (through 6/12)  Keep Fibrinogen >150 if less give Cryo  Strict Is and Os/Daily weights/Mouth Care  Lasix 40mg IV x 1

## 2020-06-09 NOTE — PROGRESS NOTE ADULT - PROBLEM SELECTOR PLAN 5
Stool occult blood (-)   No more complaints of dark stools  Continue Protonix daily Mild transaminitis Grade 1, likely due to treatment. Improving   Monitor daily CMP  Avoid further hepatotoxic medications

## 2020-06-09 NOTE — ADVANCED PRACTICE NURSE CONSULT - ASSESSMENT
Lab results reviewed by Dr. Cuevas. Patient with language barrier but able to understand a little about her chemo regimen. Right arm ac double lumen PICC line(s/p cxr related to dressing almost off from site),as per NP(NYA Finch) it's tereza to use. Seen by PICC line RN dressing changed by same. 2 RNs verification completed prior to start of chemotherapy. Arsenic 11mg in 250ml D5W started at 1517 x 2 hours infusion via lowest port of NSS line into red port of PICC line. Patent. Primary RN aware of plan of care. Safety maintained.

## 2020-06-09 NOTE — PROGRESS NOTE ADULT - SUBJECTIVE AND OBJECTIVE BOX
Diagnosis:    Protocol/Chemo Regimen:    Day:     Pt endorsed:    Review of Systems:     Pain scale:     Diet:     Allergies    No Known Allergies    Intolerances        ANTIMICROBIALS      HEME/ONC MEDICATIONS  arsenic trioxide IVPB (eMAR) 11 milliGRAM(s) IV Intermittent every 24 hours  tretinoin 40 milliGRAM(s) Oral every 12 hours      STANDING MEDICATIONS  albuterol/ipratropium for Nebulization 3 milliLiter(s) Nebulizer every 6 hours  baclofen 5 milliGRAM(s) Oral every 12 hours  benzonatate 100 milliGRAM(s) Oral every 8 hours  Biotene Dry Mouth Oral Rinse 5 milliLiter(s) Swish and Spit five times a day  bisacodyl 5 milliGRAM(s) Oral every 12 hours  chlorhexidine 4% Liquid 1 Application(s) Topical <User Schedule>  dextrose 5%. 1000 milliLiter(s) IV Continuous <Continuous>  dextrose 50% Injectable 12.5 Gram(s) IV Push once  dextrose 50% Injectable 25 Gram(s) IV Push once  dextrose 50% Injectable 25 Gram(s) IV Push once  hydrocortisone 2.5% Rectal Cream 1 Application(s) Rectal every 8 hours  insulin lispro (HumaLOG) corrective regimen sliding scale   SubCutaneous three times a day before meals  insulin lispro (HumaLOG) corrective regimen sliding scale   SubCutaneous at bedtime  pantoprazole    Tablet 40 milliGRAM(s) Oral before breakfast  petrolatum white Ointment 1 Application(s) Topical three times a day  polyethylene glycol 3350 17 Gram(s) Oral daily  predniSONE   Tablet 20 milliGRAM(s) Oral daily  sodium chloride 0.65% Nasal 1 Spray(s) Both Nostrils every 8 hours  sodium chloride 0.9%. 1000 milliLiter(s) IV Continuous <Continuous>  witch hazel Pads 1 Application(s) Topical every 12 hours      PRN MEDICATIONS  acetaminophen   Tablet .. 650 milliGRAM(s) Oral every 6 hours PRN  acetaminophen 325 mG/butalbital 50 mG/caffeine 40 mG 1 Tablet(s) Oral every 4 hours PRN  aluminum hydroxide/magnesium hydroxide/simethicone Suspension 30 milliLiter(s) Oral every 6 hours PRN  AQUAPHOR (petrolatum Ointment) 1 Application(s) Topical every 3 hours PRN  dextrose 40% Gel 15 Gram(s) Oral once PRN  diphenhydrAMINE   Injectable 25 milliGRAM(s) IV Push every 6 hours PRN  glucagon  Injectable 1 milliGRAM(s) IntraMuscular once PRN  ondansetron Injectable 8 milliGRAM(s) IV Push every 8 hours PRN  sodium chloride 0.9% lock flush 10 milliLiter(s) IV Push every 1 hour PRN        Vital Signs Last 24 Hrs  T(C): 36.1 (09 Jun 2020 06:07), Max: 36.7 (08 Jun 2020 14:15)  T(F): 97 (09 Jun 2020 06:07), Max: 98.1 (08 Jun 2020 17:10)  HR: 76 (09 Jun 2020 06:07) (75 - 97)  BP: 144/73 (09 Jun 2020 06:07) (122/76 - 155/72)  BP(mean): --  RR: 18 (09 Jun 2020 06:07) (18 - 18)  SpO2: 97% (09 Jun 2020 06:07) (94% - 100%)    PHYSICAL EXAM  General: NAD  HEENT: PERRLA, EOMI, clear oropharynx  Neck: supple  CV: (+) S1/S2 RRR  Lungs: clear to auscultation, no wheezes or rales  Abdomen: soft, non-tender, non-distended (+) BS  Ext: no edema  Skin: no rashes   Neuro: alert and oriented X 3, no focal deficits  Central Line:     RECENT CULTURES:        LABS:                        7.6    10.77 )-----------( 60       ( 09 Jun 2020 04:47 )             22.6         Mean Cell Volume : 85.6 fl  Mean Cell Hemoglobin : 28.8 pg  Mean Cell Hemoglobin Concentration : 33.6 gm/dL  Auto Neutrophil # : 5.28 K/uL  Auto Lymphocyte # : 3.02 K/uL  Auto Monocyte # : 0.86 K/uL  Auto Eosinophil # : 0.00 K/uL  Auto Basophil # : 0.00 K/uL  Auto Neutrophil % : 43.0 %  Auto Lymphocyte % : 28.0 %  Auto Monocyte % : 8.0 %  Auto Eosinophil % : 0.0 %  Auto Basophil % : 0.0 %      06-09    139  |  106  |  45<H>  ----------------------------<  132<H>  4.0   |  22  |  1.11    Ca    9.5      09 Jun 2020 04:47  Phos  4.7     06-09  Mg     2.2     06-09    TPro  7.5  /  Alb  4.5  /  TBili  0.3  /  DBili  x   /  AST  39  /  ALT  49<H>  /  AlkPhos  109  06-09      Mg 2.2  Phos 4.7      PT/INR - ( 08 Jun 2020 06:24 )   PT: 12.6 sec;   INR: 1.10 ratio         PTT - ( 08 Jun 2020 06:24 )  PTT:25.7 sec      Uric Acid --        RADIOLOGY & ADDITIONAL STUDIES: Diagnosis: Acute Promyelocytic leukemia     Protocol/Chemo Regimen: ATRA/Arsenic     Day: ATRA Day 18 and Arsenic Day 14    Patient French speaking refusing  service. Patient's son called by patient    Patient endorses: no acute complaints    Review of Systems:  Patient denies headache, dizziness, visual changes, chest pain, palpitations, SOB, abdominal pain, nausea, vomiting, diarrhea or dysuria.    Pain scale: 0     Diet: Regular     Allergies: No Known Allergies      HEME/ONC MEDICATIONS  arsenic trioxide IVPB (eMAR) 11 milliGRAM(s) IV Intermittent every 24 hours  tretinoin 40 milliGRAM(s) Oral every 12 hours      STANDING MEDICATIONS  albuterol/ipratropium for Nebulization 3 milliLiter(s) Nebulizer every 6 hours  baclofen 5 milliGRAM(s) Oral every 12 hours  benzonatate 100 milliGRAM(s) Oral every 8 hours  Biotene Dry Mouth Oral Rinse 5 milliLiter(s) Swish and Spit five times a day  bisacodyl 5 milliGRAM(s) Oral every 12 hours  chlorhexidine 4% Liquid 1 Application(s) Topical <User Schedule>  dextrose 5%. 1000 milliLiter(s) IV Continuous <Continuous>  dextrose 50% Injectable 12.5 Gram(s) IV Push once  dextrose 50% Injectable 25 Gram(s) IV Push once  dextrose 50% Injectable 25 Gram(s) IV Push once  hydrocortisone 2.5% Rectal Cream 1 Application(s) Rectal every 8 hours  insulin lispro (HumaLOG) corrective regimen sliding scale   SubCutaneous three times a day before meals  insulin lispro (HumaLOG) corrective regimen sliding scale   SubCutaneous at bedtime  pantoprazole    Tablet 40 milliGRAM(s) Oral before breakfast  petrolatum white Ointment 1 Application(s) Topical three times a day  polyethylene glycol 3350 17 Gram(s) Oral daily  predniSONE   Tablet 20 milliGRAM(s) Oral daily  sodium chloride 0.65% Nasal 1 Spray(s) Both Nostrils every 8 hours  sodium chloride 0.9%. 1000 milliLiter(s) IV Continuous <Continuous>  witch hazel Pads 1 Application(s) Topical every 12 hours      PRN MEDICATIONS  acetaminophen   Tablet .. 650 milliGRAM(s) Oral every 6 hours PRN  acetaminophen 325 mG/butalbital 50 mG/caffeine 40 mG 1 Tablet(s) Oral every 4 hours PRN  aluminum hydroxide/magnesium hydroxide/simethicone Suspension 30 milliLiter(s) Oral every 6 hours PRN  AQUAPHOR (petrolatum Ointment) 1 Application(s) Topical every 3 hours PRN  dextrose 40% Gel 15 Gram(s) Oral once PRN  diphenhydrAMINE   Injectable 25 milliGRAM(s) IV Push every 6 hours PRN  glucagon  Injectable 1 milliGRAM(s) IntraMuscular once PRN  ondansetron Injectable 8 milliGRAM(s) IV Push every 8 hours PRN  sodium chloride 0.9% lock flush 10 milliLiter(s) IV Push every 1 hour PRN      Vital Signs Last 24 Hrs  T(C): 36.1 (09 Jun 2020 06:07), Max: 36.7 (08 Jun 2020 14:15)  T(F): 97 (09 Jun 2020 06:07), Max: 98.1 (08 Jun 2020 17:10)  HR: 76 (09 Jun 2020 06:07) (75 - 97)  BP: 144/73 (09 Jun 2020 06:07) (122/76 - 155/72)  BP(mean): --  RR: 18 (09 Jun 2020 06:07) (18 - 18)  SpO2: 97% (09 Jun 2020 06:07) (94% - 100%)      PHYSICAL EXAM  General: NAD  HEENT: clear oropharynx  CV: (+) S1/S2 RRR  Lungs: clear to auscultation, no wheezes or rales  Abdomen: soft, non-tender, non-distended (+) BS  Ext: no edema  Skin: no rashes   Neuro: alert and oriented X 3, no focal deficits  Central Line: C/D/I        LABS:                RADIOLOGY & ADDITIONAL STUDIES:    EXAM:  CT CHEST                        PROCEDURE DATE:  06/07/2020    IMPRESSION:Biapical pleural-parenchymal scarring with calcification noted, likely from prior granulomatous disease. No consolidations, edema, effusion or pneumothorax. Apparent mild groundglass opacity noted bilaterally likely due to expiratory phase of scanning. No definite consolidations. Diagnosis: Acute Promyelocytic leukemia     Protocol/Chemo Regimen: ATRA/Arsenic     Day: ATRA Day 18 and Arsenic Day 14    Patient Wol[place] speaking refusing  service. Patient's son called by patient    Patient endorses: no acute complaints    Review of Systems:  Patient denies headache, dizziness, visual changes, chest pain, palpitations, SOB, abdominal pain, nausea, vomiting, diarrhea or dysuria.    Pain scale: 0     Diet: Regular     Allergies: No Known Allergies      HEME/ONC MEDICATIONS  arsenic trioxide IVPB (eMAR) 11 milliGRAM(s) IV Intermittent every 24 hours  tretinoin 40 milliGRAM(s) Oral every 12 hours      STANDING MEDICATIONS  albuterol/ipratropium for Nebulization 3 milliLiter(s) Nebulizer every 6 hours  baclofen 5 milliGRAM(s) Oral every 12 hours  benzonatate 100 milliGRAM(s) Oral every 8 hours  Biotene Dry Mouth Oral Rinse 5 milliLiter(s) Swish and Spit five times a day  bisacodyl 5 milliGRAM(s) Oral every 12 hours  chlorhexidine 4% Liquid 1 Application(s) Topical <User Schedule>  dextrose 5%. 1000 milliLiter(s) IV Continuous <Continuous>  dextrose 50% Injectable 12.5 Gram(s) IV Push once  dextrose 50% Injectable 25 Gram(s) IV Push once  dextrose 50% Injectable 25 Gram(s) IV Push once  hydrocortisone 2.5% Rectal Cream 1 Application(s) Rectal every 8 hours  insulin lispro (HumaLOG) corrective regimen sliding scale   SubCutaneous three times a day before meals  insulin lispro (HumaLOG) corrective regimen sliding scale   SubCutaneous at bedtime  pantoprazole    Tablet 40 milliGRAM(s) Oral before breakfast  petrolatum white Ointment 1 Application(s) Topical three times a day  polyethylene glycol 3350 17 Gram(s) Oral daily  predniSONE   Tablet 20 milliGRAM(s) Oral daily  sodium chloride 0.65% Nasal 1 Spray(s) Both Nostrils every 8 hours  sodium chloride 0.9%. 1000 milliLiter(s) IV Continuous <Continuous>  witch hazel Pads 1 Application(s) Topical every 12 hours      PRN MEDICATIONS  acetaminophen   Tablet .. 650 milliGRAM(s) Oral every 6 hours PRN  acetaminophen 325 mG/butalbital 50 mG/caffeine 40 mG 1 Tablet(s) Oral every 4 hours PRN  aluminum hydroxide/magnesium hydroxide/simethicone Suspension 30 milliLiter(s) Oral every 6 hours PRN  AQUAPHOR (petrolatum Ointment) 1 Application(s) Topical every 3 hours PRN  dextrose 40% Gel 15 Gram(s) Oral once PRN  diphenhydrAMINE   Injectable 25 milliGRAM(s) IV Push every 6 hours PRN  glucagon  Injectable 1 milliGRAM(s) IntraMuscular once PRN  ondansetron Injectable 8 milliGRAM(s) IV Push every 8 hours PRN  sodium chloride 0.9% lock flush 10 milliLiter(s) IV Push every 1 hour PRN      Vital Signs Last 24 Hrs  T(C): 36.1 (09 Jun 2020 06:07), Max: 36.7 (08 Jun 2020 14:15)  T(F): 97 (09 Jun 2020 06:07), Max: 98.1 (08 Jun 2020 17:10)  HR: 76 (09 Jun 2020 06:07) (75 - 97)  BP: 144/73 (09 Jun 2020 06:07) (122/76 - 155/72)  BP(mean): --  RR: 18 (09 Jun 2020 06:07) (18 - 18)  SpO2: 97% (09 Jun 2020 06:07) (94% - 100%)      PHYSICAL EXAM  General: NAD  HEENT: clear oropharynx  CV: (+) S1/S2 RRR  Lungs: clear to auscultation, no wheezes or rales  Abdomen: soft, non-tender, non-distended (+) BS  Ext: no edema  Skin: no rashes   Neuro: alert and oriented X 3, no focal deficits  Central Line: C/D/I        LABS:                            7.6    10.77 )-----------( 60       ( 09 Jun 2020 04:47 )             22.6         Mean Cell Volume : 85.6 fl  Mean Cell Hemoglobin : 28.8 pg  Mean Cell Hemoglobin Concentration : 33.6 gm/dL  Auto Neutrophil # : 5.28 K/uL  Auto Lymphocyte # : 3.02 K/uL  Auto Monocyte # : 0.86 K/uL  Auto Eosinophil # : 0.00 K/uL  Auto Basophil # : 0.00 K/uL  Auto Neutrophil % : 43.0 %  Auto Lymphocyte % : 28.0 %  Auto Monocyte % : 8.0 %  Auto Eosinophil % : 0.0 %  Auto Basophil % : 0.0 %      06-09    139  |  106  |  45<H>  ----------------------------<  132<H>  4.0   |  22  |  1.11    Ca    9.5      09 Jun 2020 04:47  Phos  4.7     06-09  Mg     2.2     06-09    TPro  7.5  /  Alb  4.5  /  TBili  0.3  /  DBili  x   /  AST  39  /  ALT  49<H>  /  AlkPhos  109  06-09      Mg 2.2  Phos 4.7      PT/INR - ( 08 Jun 2020 06:24 )   PT: 12.6 sec;   INR: 1.10 ratio         PTT - ( 08 Jun 2020 06:24 )  PTT:25.7 sec      Uric Acid --          RADIOLOGY & ADDITIONAL STUDIES:    EXAM:  CT CHEST                        PROCEDURE DATE:  06/07/2020    IMPRESSION:Biapical pleural-parenchymal scarring with calcification noted, likely from prior granulomatous disease. No consolidations, edema, effusion or pneumothorax. Apparent mild groundglass opacity noted bilaterally likely due to expiratory phase of scanning. No definite consolidations. Diagnosis: Acute Promyelocytic leukemia     Protocol/Chemo Regimen: ATRA/Arsenic     Day: ATRA Day 18 and Arsenic Day 14    Patient Georgian speaking refusing  service. Patient's son called by patient    Patient endorses: (+) Hemorrhoids    Review of Systems:  Patient denies headache, dizziness, visual changes, chest pain, palpitations, SOB, abdominal pain, nausea, vomiting, diarrhea or dysuria.    Pain scale: 0     Diet: Regular     Allergies: No Known Allergies      HEME/ONC MEDICATIONS  arsenic trioxide IVPB (eMAR) 11 milliGRAM(s) IV Intermittent every 24 hours  tretinoin 40 milliGRAM(s) Oral every 12 hours      STANDING MEDICATIONS  albuterol/ipratropium for Nebulization 3 milliLiter(s) Nebulizer every 6 hours  baclofen 5 milliGRAM(s) Oral every 12 hours  benzonatate 100 milliGRAM(s) Oral every 8 hours  Biotene Dry Mouth Oral Rinse 5 milliLiter(s) Swish and Spit five times a day  bisacodyl 5 milliGRAM(s) Oral every 12 hours  chlorhexidine 4% Liquid 1 Application(s) Topical <User Schedule>  dextrose 5%. 1000 milliLiter(s) IV Continuous <Continuous>  dextrose 50% Injectable 12.5 Gram(s) IV Push once  dextrose 50% Injectable 25 Gram(s) IV Push once  dextrose 50% Injectable 25 Gram(s) IV Push once  hydrocortisone 2.5% Rectal Cream 1 Application(s) Rectal every 8 hours  insulin lispro (HumaLOG) corrective regimen sliding scale   SubCutaneous three times a day before meals  insulin lispro (HumaLOG) corrective regimen sliding scale   SubCutaneous at bedtime  pantoprazole    Tablet 40 milliGRAM(s) Oral before breakfast  petrolatum white Ointment 1 Application(s) Topical three times a day  polyethylene glycol 3350 17 Gram(s) Oral daily  predniSONE   Tablet 20 milliGRAM(s) Oral daily  sodium chloride 0.65% Nasal 1 Spray(s) Both Nostrils every 8 hours  sodium chloride 0.9%. 1000 milliLiter(s) IV Continuous <Continuous>  witch hazel Pads 1 Application(s) Topical every 12 hours      PRN MEDICATIONS  acetaminophen   Tablet .. 650 milliGRAM(s) Oral every 6 hours PRN  acetaminophen 325 mG/butalbital 50 mG/caffeine 40 mG 1 Tablet(s) Oral every 4 hours PRN  aluminum hydroxide/magnesium hydroxide/simethicone Suspension 30 milliLiter(s) Oral every 6 hours PRN  AQUAPHOR (petrolatum Ointment) 1 Application(s) Topical every 3 hours PRN  dextrose 40% Gel 15 Gram(s) Oral once PRN  diphenhydrAMINE   Injectable 25 milliGRAM(s) IV Push every 6 hours PRN  glucagon  Injectable 1 milliGRAM(s) IntraMuscular once PRN  ondansetron Injectable 8 milliGRAM(s) IV Push every 8 hours PRN  sodium chloride 0.9% lock flush 10 milliLiter(s) IV Push every 1 hour PRN      Vital Signs Last 24 Hrs  T(C): 36.1 (09 Jun 2020 06:07), Max: 36.7 (08 Jun 2020 14:15)  T(F): 97 (09 Jun 2020 06:07), Max: 98.1 (08 Jun 2020 17:10)  HR: 76 (09 Jun 2020 06:07) (75 - 97)  BP: 144/73 (09 Jun 2020 06:07) (122/76 - 155/72)  BP(mean): --  RR: 18 (09 Jun 2020 06:07) (18 - 18)  SpO2: 97% (09 Jun 2020 06:07) (94% - 100%)      PHYSICAL EXAM  General: NAD  HEENT: clear oropharynx  CV: (+) S1/S2 RRR  Lungs: clear to auscultation, no wheezes or rales  Abdomen: soft, non-tender, non-distended (+) BS  Ext: no edema  Skin: no rashes   Neuro: alert and oriented X 3, no focal deficits  Central Line: C/D/I        LABS:                            7.6    10.77 )-----------( 60       ( 09 Jun 2020 04:47 )             22.6         Mean Cell Volume : 85.6 fl  Mean Cell Hemoglobin : 28.8 pg  Mean Cell Hemoglobin Concentration : 33.6 gm/dL  Auto Neutrophil # : 5.28 K/uL  Auto Lymphocyte # : 3.02 K/uL  Auto Monocyte # : 0.86 K/uL  Auto Eosinophil # : 0.00 K/uL  Auto Basophil # : 0.00 K/uL  Auto Neutrophil % : 43.0 %  Auto Lymphocyte % : 28.0 %  Auto Monocyte % : 8.0 %  Auto Eosinophil % : 0.0 %  Auto Basophil % : 0.0 %      06-09    139  |  106  |  45<H>  ----------------------------<  132<H>  4.0   |  22  |  1.11    Ca    9.5      09 Jun 2020 04:47  Phos  4.7     06-09  Mg     2.2     06-09    TPro  7.5  /  Alb  4.5  /  TBili  0.3  /  DBili  x   /  AST  39  /  ALT  49<H>  /  AlkPhos  109  06-09      Mg 2.2  Phos 4.7      PT/INR - ( 08 Jun 2020 06:24 )   PT: 12.6 sec;   INR: 1.10 ratio         PTT - ( 08 Jun 2020 06:24 )  PTT:25.7 sec      Uric Acid --          RADIOLOGY & ADDITIONAL STUDIES:    EXAM:  CT CHEST                        PROCEDURE DATE:  06/07/2020    IMPRESSION:Biapical pleural-parenchymal scarring with calcification noted, likely from prior granulomatous disease. No consolidations, edema, effusion or pneumothorax. Apparent mild groundglass opacity noted bilaterally likely due to expiratory phase of scanning. No definite consolidations.

## 2020-06-09 NOTE — PROGRESS NOTE ADULT - PROBLEM SELECTOR PLAN 8
5/23 HgA1c 7.0  FS AC & HS with HISS  Consistent Carbohydrate diet No pharmacologic ppx 2/2 thrombocytopenia        Contact Information [phone]

## 2020-06-09 NOTE — PROGRESS NOTE ADULT - PROBLEM SELECTOR PLAN 6
Mild transaminitis Grade 1, likely due to treatment. Improving   Monitor daily CMP  Avoid further hepatotoxic medications Home regimen with Atenolol held on admission  If BP increases can consider restarting

## 2020-06-09 NOTE — PROGRESS NOTE ADULT - PROBLEM SELECTOR PLAN 2
The patient is not neutropenic, afebrile  Antibiotics/antifungals discontinued   Previous COVID-19 (+) outpatient  5/22, 5/26 and 6/2 COVID (-) The patient is not neutropenic, afebrile  If febrile Pan Cx and CXR  Previous COVID-19 (+) outpatient  5/22, 5/26 and 6/2 COVID (-)

## 2020-06-09 NOTE — PROGRESS NOTE ADULT - ATTENDING COMMENTS
61 y/o Greek speaking F who is transferred for newly diagnosed low/interm risk APL.  Presented with pancytopenia, flow cytometry/bone marrow biopsy/FISH from 5/26- consistent with acute promyelocytic leukemia with PML-AWA translocation.    She was started on ATRA 5/23- day +15, GALA 5/27- day +13    -f/u CT chest, pt had SOB on 6/4, now resolved. She was given Lasix, given Dexa 10mg x1, pt improved. CXR 6/5 -atelectasis. Daily weights, diurese. wbc>10 -will give Hydrea 500mg po x 1 dose. Give Prednisone 20mg x 5 more days starting 6/8. If pt becomes SOB again, change to Dexa 10mg q12hrs  -new onset tremors, unclear etiology, electrolytes WNL. Cefepime held, given Baclofen -they resolved  -Check CBC, coags/fibrinogen once daily.  Goal plt >40-50, fibrinogen >150; however, pt has been plt refractory and unable to maintain these plt goals. Being monitored closely for any signs of bleeding -none at this time. On 6/4, pt had ABO-matched platelets given over 3 hrs in 1/2 unit infusions -->she responded very well, plt count >100.   -monitor lytes daily, keep K>4, Mg>2.  EKG twice a week  -pt had HA in May -had  CT head no contrast on 5/22 with no bleeding.  No more headaches, just a sensation of heat on the top of the head. MRI was not suggestive of any bleeding. 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  Presented with pancytopenia, flow cytometry/bone marrow biopsy/FISH from 5/26- consistent with acute promyelocytic leukemia with PML-AWA translocation.    She was started on ATRA 5/23- day +18, GALA 5/27- day +14    -CT chest 6/6 c/w granulomatous disease (hx of TB in 1980's) pt had SOB on 6/4, now resolved.  Daily weights, diurese. wbc>10 -will give Hydrea 500mg po x 1 dose again today. Give Prednisone 20mg x 5 more days starting 6/8. If pt becomes SOB again, change to Dexa 10mg q12hrs  -tremors improved, unclear etiology, electrolytes WNL. Cefepime held, given Baclofen -they resolved  -Check CBC, coags/fibrinogen once daily.  Goal plt >40-50, fibrinogen >150; however, pt has been plt refractory and unable to maintain these plt goals. Being monitored closely for any signs of bleeding -none at this time. On 6/4, pt had ABO-matched platelets given over 3 hrs in 1/2 unit infusions -->she responded very well, plt count >100.   -monitor lytes daily, keep K>4, Mg>2.  EKG twice a week  -pt had HA in May -had  CT head no contrast on 5/22 with no bleeding.  No more headaches, just a sensation of heat on the top of the head. MRI was not suggestive of any bleeding. Heat on the head is worse with platelet transfusion, on benadryl prior to platelets and PRN Fioricet   -off antibiotics

## 2020-06-10 LAB
ALBUMIN SERPL ELPH-MCNC: 4.6 G/DL — SIGNIFICANT CHANGE UP (ref 3.3–5)
ALP SERPL-CCNC: 110 U/L — SIGNIFICANT CHANGE UP (ref 40–120)
ALT FLD-CCNC: 61 U/L — HIGH (ref 10–45)
ANION GAP SERPL CALC-SCNC: 12 MMOL/L — SIGNIFICANT CHANGE UP (ref 5–17)
AST SERPL-CCNC: 54 U/L — HIGH (ref 10–40)
BASOPHILS # BLD AUTO: 0 K/UL — SIGNIFICANT CHANGE UP (ref 0–0.2)
BASOPHILS NFR BLD AUTO: 0 % — SIGNIFICANT CHANGE UP (ref 0–2)
BILIRUB SERPL-MCNC: 0.4 MG/DL — SIGNIFICANT CHANGE UP (ref 0.2–1.2)
BUN SERPL-MCNC: 37 MG/DL — HIGH (ref 7–23)
CALCIUM SERPL-MCNC: 10.3 MG/DL — SIGNIFICANT CHANGE UP (ref 8.4–10.5)
CHLORIDE SERPL-SCNC: 105 MMOL/L — SIGNIFICANT CHANGE UP (ref 96–108)
CO2 SERPL-SCNC: 24 MMOL/L — SIGNIFICANT CHANGE UP (ref 22–31)
CREAT SERPL-MCNC: 0.91 MG/DL — SIGNIFICANT CHANGE UP (ref 0.5–1.3)
D DIMER BLD IA.RAPID-MCNC: 672 NG/ML DDU — HIGH
EOSINOPHIL # BLD AUTO: 0 K/UL — SIGNIFICANT CHANGE UP (ref 0–0.5)
EOSINOPHIL NFR BLD AUTO: 0 % — SIGNIFICANT CHANGE UP (ref 0–6)
FIBRINOGEN PPP-MCNC: 291 MG/DL — LOW (ref 350–510)
GLUCOSE BLDC GLUCOMTR-MCNC: 117 MG/DL — HIGH (ref 70–99)
GLUCOSE BLDC GLUCOMTR-MCNC: 160 MG/DL — HIGH (ref 70–99)
GLUCOSE BLDC GLUCOMTR-MCNC: 171 MG/DL — HIGH (ref 70–99)
GLUCOSE BLDC GLUCOMTR-MCNC: 186 MG/DL — HIGH (ref 70–99)
GLUCOSE SERPL-MCNC: 106 MG/DL — HIGH (ref 70–99)
HCT VFR BLD CALC: 22.8 % — LOW (ref 34.5–45)
HCT VFR BLD CALC: 25.1 % — LOW (ref 34.5–45)
HGB BLD-MCNC: 7.8 G/DL — LOW (ref 11.5–15.5)
HGB BLD-MCNC: 8.4 G/DL — LOW (ref 11.5–15.5)
INR BLD: 1.15 RATIO — SIGNIFICANT CHANGE UP (ref 0.88–1.16)
LDH SERPL L TO P-CCNC: 295 U/L — HIGH (ref 50–242)
LYMPHOCYTES # BLD AUTO: 4.37 K/UL — HIGH (ref 1–3.3)
LYMPHOCYTES # BLD AUTO: 47 % — HIGH (ref 13–44)
MAGNESIUM SERPL-MCNC: 2.2 MG/DL — SIGNIFICANT CHANGE UP (ref 1.6–2.6)
MANUAL SMEAR VERIFICATION: SIGNIFICANT CHANGE UP
MCHC RBC-ENTMCNC: 28.1 PG — SIGNIFICANT CHANGE UP (ref 27–34)
MCHC RBC-ENTMCNC: 29.1 PG — SIGNIFICANT CHANGE UP (ref 27–34)
MCHC RBC-ENTMCNC: 33.5 GM/DL — SIGNIFICANT CHANGE UP (ref 32–36)
MCHC RBC-ENTMCNC: 34.2 GM/DL — SIGNIFICANT CHANGE UP (ref 32–36)
MCV RBC AUTO: 83.9 FL — SIGNIFICANT CHANGE UP (ref 80–100)
MCV RBC AUTO: 85.1 FL — SIGNIFICANT CHANGE UP (ref 80–100)
METAMYELOCYTES # FLD: 3 % — HIGH (ref 0–0)
MONOCYTES # BLD AUTO: 0.65 K/UL — SIGNIFICANT CHANGE UP (ref 0–0.9)
MONOCYTES NFR BLD AUTO: 7 % — SIGNIFICANT CHANGE UP (ref 2–14)
MYELOCYTES NFR BLD: 5 % — HIGH (ref 0–0)
NEUTROPHILS # BLD AUTO: 3.53 K/UL — SIGNIFICANT CHANGE UP (ref 1.8–7.4)
NEUTROPHILS NFR BLD AUTO: 36 % — LOW (ref 43–77)
NEUTS BAND # BLD: 2 % — SIGNIFICANT CHANGE UP (ref 0–8)
NRBC # BLD: 0 /100 WBCS — SIGNIFICANT CHANGE UP (ref 0–0)
NRBC # BLD: 1 /100 — HIGH (ref 0–0)
PHOSPHATE SERPL-MCNC: 4.5 MG/DL — SIGNIFICANT CHANGE UP (ref 2.5–4.5)
PLAT MORPH BLD: NORMAL — SIGNIFICANT CHANGE UP
PLATELET # BLD AUTO: 54 K/UL — LOW (ref 150–400)
PLATELET # BLD AUTO: 57 K/UL — LOW (ref 150–400)
POTASSIUM SERPL-MCNC: 4 MMOL/L — SIGNIFICANT CHANGE UP (ref 3.5–5.3)
POTASSIUM SERPL-SCNC: 4 MMOL/L — SIGNIFICANT CHANGE UP (ref 3.5–5.3)
PROT SERPL-MCNC: 7.6 G/DL — SIGNIFICANT CHANGE UP (ref 6–8.3)
PROTHROM AB SERPL-ACNC: 13.3 SEC — HIGH (ref 10–12.9)
RBC # BLD: 2.68 M/UL — LOW (ref 3.8–5.2)
RBC # BLD: 2.99 M/UL — LOW (ref 3.8–5.2)
RBC # FLD: 17.4 % — HIGH (ref 10.3–14.5)
RBC # FLD: 18.4 % — HIGH (ref 10.3–14.5)
RBC BLD AUTO: SIGNIFICANT CHANGE UP
SARS-COV-2 RNA SPEC QL NAA+PROBE: SIGNIFICANT CHANGE UP
SODIUM SERPL-SCNC: 141 MMOL/L — SIGNIFICANT CHANGE UP (ref 135–145)
URATE SERPL-MCNC: 3.8 MG/DL — SIGNIFICANT CHANGE UP (ref 2.5–7)
WBC # BLD: 9.29 K/UL — SIGNIFICANT CHANGE UP (ref 3.8–10.5)
WBC # BLD: 9.33 K/UL — SIGNIFICANT CHANGE UP (ref 3.8–10.5)
WBC # FLD AUTO: 9.29 K/UL — SIGNIFICANT CHANGE UP (ref 3.8–10.5)
WBC # FLD AUTO: 9.33 K/UL — SIGNIFICANT CHANGE UP (ref 3.8–10.5)

## 2020-06-10 PROCEDURE — 99232 SBSQ HOSP IP/OBS MODERATE 35: CPT | Mod: GC

## 2020-06-10 RX ORDER — ARSENIC TRIOXIDE 2 MG/ML
11 INJECTION, SOLUTION INTRAVENOUS EVERY 24 HOURS
Refills: 0 | Status: COMPLETED | OUTPATIENT
Start: 2020-06-10 | End: 2020-06-16

## 2020-06-10 RX ORDER — POTASSIUM CHLORIDE 20 MEQ
40 PACKET (EA) ORAL ONCE
Refills: 0 | Status: COMPLETED | OUTPATIENT
Start: 2020-06-10 | End: 2020-06-10

## 2020-06-10 RX ORDER — FUROSEMIDE 40 MG
40 TABLET ORAL ONCE
Refills: 0 | Status: COMPLETED | OUTPATIENT
Start: 2020-06-10 | End: 2020-06-10

## 2020-06-10 RX ADMIN — Medication 1 APPLICATION(S): at 21:29

## 2020-06-10 RX ADMIN — Medication 1 APPLICATION(S): at 13:05

## 2020-06-10 RX ADMIN — Medication 1: at 13:04

## 2020-06-10 RX ADMIN — AER TRAVELER 1 APPLICATION(S): 0.5 SOLUTION RECTAL; TOPICAL at 17:23

## 2020-06-10 RX ADMIN — Medication 5 MILLILITER(S): at 21:27

## 2020-06-10 RX ADMIN — ARSENIC TRIOXIDE 130.5 MILLIGRAM(S): 2 INJECTION, SOLUTION INTRAVENOUS at 15:01

## 2020-06-10 RX ADMIN — Medication 5 MILLILITER(S): at 11:04

## 2020-06-10 RX ADMIN — Medication 1 SPRAY(S): at 13:03

## 2020-06-10 RX ADMIN — PANTOPRAZOLE SODIUM 40 MILLIGRAM(S): 20 TABLET, DELAYED RELEASE ORAL at 08:40

## 2020-06-10 RX ADMIN — Medication 1 APPLICATION(S): at 13:03

## 2020-06-10 RX ADMIN — AER TRAVELER 1 APPLICATION(S): 0.5 SOLUTION RECTAL; TOPICAL at 05:53

## 2020-06-10 RX ADMIN — Medication 20 MILLIGRAM(S): at 05:51

## 2020-06-10 RX ADMIN — Medication 3 MILLILITER(S): at 11:03

## 2020-06-10 RX ADMIN — Medication 5 MILLIGRAM(S): at 05:51

## 2020-06-10 RX ADMIN — TRETINOIN 40 MILLIGRAM(S): 10 CAPSULE, LIQUID FILLED ORAL at 21:28

## 2020-06-10 RX ADMIN — Medication 40 MILLIEQUIVALENT(S): at 08:41

## 2020-06-10 RX ADMIN — Medication 100 MILLIGRAM(S): at 21:28

## 2020-06-10 RX ADMIN — Medication 1 SPRAY(S): at 21:29

## 2020-06-10 RX ADMIN — Medication 1: at 17:21

## 2020-06-10 RX ADMIN — Medication 100 MILLIGRAM(S): at 13:03

## 2020-06-10 RX ADMIN — Medication 5 MILLIGRAM(S): at 21:28

## 2020-06-10 RX ADMIN — Medication 40 MILLIGRAM(S): at 12:02

## 2020-06-10 RX ADMIN — Medication 3 MILLILITER(S): at 05:51

## 2020-06-10 RX ADMIN — Medication 1 SPRAY(S): at 05:51

## 2020-06-10 RX ADMIN — POLYETHYLENE GLYCOL 3350 17 GRAM(S): 17 POWDER, FOR SOLUTION ORAL at 11:04

## 2020-06-10 RX ADMIN — Medication 5 MILLIGRAM(S): at 12:05

## 2020-06-10 RX ADMIN — TRETINOIN 40 MILLIGRAM(S): 10 CAPSULE, LIQUID FILLED ORAL at 08:38

## 2020-06-10 RX ADMIN — Medication 3 MILLILITER(S): at 23:17

## 2020-06-10 RX ADMIN — Medication 1 APPLICATION(S): at 05:51

## 2020-06-10 RX ADMIN — Medication 5 MILLILITER(S): at 23:17

## 2020-06-10 RX ADMIN — Medication 100 MILLIGRAM(S): at 05:51

## 2020-06-10 RX ADMIN — Medication 5 MILLIGRAM(S): at 17:20

## 2020-06-10 RX ADMIN — Medication 5 MILLILITER(S): at 08:37

## 2020-06-10 RX ADMIN — Medication 3 MILLILITER(S): at 17:21

## 2020-06-10 RX ADMIN — Medication 1 APPLICATION(S): at 05:53

## 2020-06-10 NOTE — PROGRESS NOTE ADULT - SUBJECTIVE AND OBJECTIVE BOX
Diagnosis:    Protocol/Chemo Regimen:    Day:     Pt endorsed:    Review of Systems:     Pain scale:     Diet:     Allergies    No Known Allergies    Intolerances        ANTIMICROBIALS      HEME/ONC MEDICATIONS  tretinoin 40 milliGRAM(s) Oral every 12 hours      STANDING MEDICATIONS  albuterol/ipratropium for Nebulization 3 milliLiter(s) Nebulizer every 6 hours  baclofen 5 milliGRAM(s) Oral every 12 hours  benzonatate 100 milliGRAM(s) Oral every 8 hours  Biotene Dry Mouth Oral Rinse 5 milliLiter(s) Swish and Spit five times a day  bisacodyl 5 milliGRAM(s) Oral every 12 hours  chlorhexidine 4% Liquid 1 Application(s) Topical <User Schedule>  dextrose 5%. 1000 milliLiter(s) IV Continuous <Continuous>  dextrose 50% Injectable 12.5 Gram(s) IV Push once  dextrose 50% Injectable 25 Gram(s) IV Push once  dextrose 50% Injectable 25 Gram(s) IV Push once  hydrocortisone 2.5% Rectal Cream 1 Application(s) Rectal every 8 hours  insulin lispro (HumaLOG) corrective regimen sliding scale   SubCutaneous three times a day before meals  insulin lispro (HumaLOG) corrective regimen sliding scale   SubCutaneous at bedtime  pantoprazole    Tablet 40 milliGRAM(s) Oral before breakfast  petrolatum white Ointment 1 Application(s) Topical three times a day  polyethylene glycol 3350 17 Gram(s) Oral daily  predniSONE   Tablet 20 milliGRAM(s) Oral daily  sodium chloride 0.65% Nasal 1 Spray(s) Both Nostrils every 8 hours  sodium chloride 0.9%. 1000 milliLiter(s) IV Continuous <Continuous>  witch hazel Pads 1 Application(s) Topical every 12 hours      PRN MEDICATIONS  acetaminophen   Tablet .. 650 milliGRAM(s) Oral every 6 hours PRN  acetaminophen 325 mG/butalbital 50 mG/caffeine 40 mG 1 Tablet(s) Oral every 4 hours PRN  aluminum hydroxide/magnesium hydroxide/simethicone Suspension 30 milliLiter(s) Oral every 6 hours PRN  AQUAPHOR (petrolatum Ointment) 1 Application(s) Topical every 3 hours PRN  dextrose 40% Gel 15 Gram(s) Oral once PRN  diphenhydrAMINE   Injectable 25 milliGRAM(s) IV Push every 6 hours PRN  glucagon  Injectable 1 milliGRAM(s) IntraMuscular once PRN  hemorrhoidal Ointment 1 Application(s) Rectal every 8 hours PRN  ondansetron Injectable 8 milliGRAM(s) IV Push every 8 hours PRN  sodium chloride 0.9% lock flush 10 milliLiter(s) IV Push every 1 hour PRN        Vital Signs Last 24 Hrs  T(C): 35.9 (10 Luis 2020 05:35), Max: 37.1 (09 Jun 2020 09:34)  T(F): 96.6 (10 Luis 2020 05:35), Max: 98.7 (09 Jun 2020 09:34)  HR: 70 (10 Luis 2020 05:35) (70 - 99)  BP: 124/83 (10 Luis 2020 05:35) (124/83 - 166/83)  BP(mean): --  RR: 18 (10 Luis 2020 05:35) (18 - 19)  SpO2: 100% (10 Luis 2020 05:35) (98% - 100%)    PHYSICAL EXAM  General: NAD  HEENT: PERRLA, EOMI, clear oropharynx  Neck: supple  CV: (+) S1/S2 RRR  Lungs: clear to auscultation, no wheezes or rales  Abdomen: soft, non-tender, non-distended (+) BS  Ext: no edema  Skin: no rashes   Neuro: alert and oriented X 3, no focal deficits  Central Line:     RECENT CULTURES:        LABS:                        7.8    9.29  )-----------( 57       ( 10 Luis 2020 06:58 )             22.8         Mean Cell Volume : 85.1 fl  Mean Cell Hemoglobin : 29.1 pg  Mean Cell Hemoglobin Concentration : 34.2 gm/dL  Auto Neutrophil # : x  Auto Lymphocyte # : x  Auto Monocyte # : x  Auto Eosinophil # : x  Auto Basophil # : x  Auto Neutrophil % : x  Auto Lymphocyte % : x  Auto Monocyte % : x  Auto Eosinophil % : x  Auto Basophil % : x      06-09    139  |  106  |  45<H>  ----------------------------<  132<H>  4.0   |  22  |  1.11    Ca    9.5      09 Jun 2020 04:47  Phos  4.7     06-09  Mg     2.2     06-09    TPro  7.5  /  Alb  4.5  /  TBili  0.3  /  DBili  x   /  AST  39  /  ALT  49<H>  /  AlkPhos  109  06-09                  RADIOLOGY & ADDITIONAL STUDIES: Diagnosis: Acute Promyelocytic leukemia     Protocol/Chemo Regimen: ATRA/Arsenic     Day: ATRA Day 19 and Arsenic Day 15    Patient Yakut speaking refusing  service. Patient's son called by patient    Patient endorses: (+) Hemorrhoids    Review of Systems:  Patient denies headache, dizziness, visual changes, chest pain, palpitations, SOB, abdominal pain, nausea, vomiting, diarrhea or dysuria.    Pain scale: 0     Diet: Regular     Allergies: No Known Allergies      HEME/ONC MEDICATIONS  tretinoin 40 milliGRAM(s) Oral every 12 hours      STANDING MEDICATIONS  albuterol/ipratropium for Nebulization 3 milliLiter(s) Nebulizer every 6 hours  baclofen 5 milliGRAM(s) Oral every 12 hours  benzonatate 100 milliGRAM(s) Oral every 8 hours  Biotene Dry Mouth Oral Rinse 5 milliLiter(s) Swish and Spit five times a day  bisacodyl 5 milliGRAM(s) Oral every 12 hours  chlorhexidine 4% Liquid 1 Application(s) Topical <User Schedule>  dextrose 5%. 1000 milliLiter(s) IV Continuous <Continuous>  dextrose 50% Injectable 12.5 Gram(s) IV Push once  dextrose 50% Injectable 25 Gram(s) IV Push once  dextrose 50% Injectable 25 Gram(s) IV Push once  hydrocortisone 2.5% Rectal Cream 1 Application(s) Rectal every 8 hours  insulin lispro (HumaLOG) corrective regimen sliding scale   SubCutaneous three times a day before meals  insulin lispro (HumaLOG) corrective regimen sliding scale   SubCutaneous at bedtime  pantoprazole    Tablet 40 milliGRAM(s) Oral before breakfast  petrolatum white Ointment 1 Application(s) Topical three times a day  polyethylene glycol 3350 17 Gram(s) Oral daily  predniSONE   Tablet 20 milliGRAM(s) Oral daily  sodium chloride 0.65% Nasal 1 Spray(s) Both Nostrils every 8 hours  sodium chloride 0.9%. 1000 milliLiter(s) IV Continuous <Continuous>  witch hazel Pads 1 Application(s) Topical every 12 hours      PRN MEDICATIONS  acetaminophen   Tablet .. 650 milliGRAM(s) Oral every 6 hours PRN  acetaminophen 325 mG/butalbital 50 mG/caffeine 40 mG 1 Tablet(s) Oral every 4 hours PRN  aluminum hydroxide/magnesium hydroxide/simethicone Suspension 30 milliLiter(s) Oral every 6 hours PRN  AQUAPHOR (petrolatum Ointment) 1 Application(s) Topical every 3 hours PRN  dextrose 40% Gel 15 Gram(s) Oral once PRN  diphenhydrAMINE   Injectable 25 milliGRAM(s) IV Push every 6 hours PRN  glucagon  Injectable 1 milliGRAM(s) IntraMuscular once PRN  hemorrhoidal Ointment 1 Application(s) Rectal every 8 hours PRN  ondansetron Injectable 8 milliGRAM(s) IV Push every 8 hours PRN  sodium chloride 0.9% lock flush 10 milliLiter(s) IV Push every 1 hour PRN      Vital Signs Last 24 Hrs  T(C): 35.9 (10 Luis 2020 05:35), Max: 37.1 (09 Jun 2020 09:34)  T(F): 96.6 (10 Luis 2020 05:35), Max: 98.7 (09 Jun 2020 09:34)  HR: 70 (10 Luis 2020 05:35) (70 - 99)  BP: 124/83 (10 Luis 2020 05:35) (124/83 - 166/83)  BP(mean): --  RR: 18 (10 Luis 2020 05:35) (18 - 19)  SpO2: 100% (10 Luis 2020 05:35) (98% - 100%)      PHYSICAL EXAM  General: NAD  HEENT: clear oropharynx  CV: (+) S1/S2 RRR  Lungs: clear to auscultation, no wheezes or rales  Abdomen: soft, non-tender, non-distended (+) BS  Ext: no edema  Skin: no rashes   Neuro: alert and oriented X 3, no focal deficits  Central Line: C/D/I        LABS:                            7.8    9.29  )-----------( 57       ( 10 Luis 2020 06:58 )             22.8         Mean Cell Volume : 85.1 fl  Mean Cell Hemoglobin : 29.1 pg  Mean Cell Hemoglobin Concentration : 34.2 gm/dL  Auto Neutrophil # : 3.53 K/uL  Auto Lymphocyte # : 4.37 K/uL  Auto Monocyte # : 0.65 K/uL  Auto Eosinophil # : 0.00 K/uL  Auto Basophil # : 0.00 K/uL  Auto Neutrophil % : 36.0 %  Auto Lymphocyte % : 47.0 %  Auto Monocyte % : 7.0 %  Auto Eosinophil % : 0.0 %  Auto Basophil % : 0.0 %      06-10    141  |  105  |  37<H>  ----------------------------<  106<H>  4.0   |  24  |  0.91    Ca    10.3      10 Luis 2020 06:58  Phos  4.5     06-10  Mg     2.2     06-10    TPro  7.6  /  Alb  4.6  /  TBili  0.4  /  DBili  x   /  AST  54<H>  /  ALT  61<H>  /  AlkPhos  110  06-10      Mg 2.2  Phos 4.5      PT/INR - ( 10 Luis 2020 06:58 )   PT: 13.3 sec;   INR: 1.15 ratio            Uric Acid 3.8        RADIOLOGY & ADDITIONAL STUDIES:    EXAM:  CT CHEST                        PROCEDURE DATE:  06/07/2020    IMPRESSION:Biapical pleural-parenchymal scarring with calcification noted, likely from prior granulomatous disease. No consolidations, edema, effusion or pneumothorax. Apparent mild groundglass opacity noted bilaterally likely due to expiratory phase of scanning. No definite consolidations. Diagnosis: Acute Promyelocytic leukemia     Protocol/Chemo Regimen: ATRA/Arsenic     Day: ATRA Day 19 and Arsenic Day 15    Patient Yakut speaking refusing  service. Patient's son called by patient    Patient endorses: shortness of breath at times    Review of Systems:  Patient denies headache, dizziness, visual changes, chest pain, palpitations, abdominal pain, nausea, vomiting, diarrhea or dysuria.    Pain scale: 0     Diet: Regular     Allergies: No Known Allergies      HEME/ONC MEDICATIONS  tretinoin 40 milliGRAM(s) Oral every 12 hours      STANDING MEDICATIONS  albuterol/ipratropium for Nebulization 3 milliLiter(s) Nebulizer every 6 hours  baclofen 5 milliGRAM(s) Oral every 12 hours  benzonatate 100 milliGRAM(s) Oral every 8 hours  Biotene Dry Mouth Oral Rinse 5 milliLiter(s) Swish and Spit five times a day  bisacodyl 5 milliGRAM(s) Oral every 12 hours  chlorhexidine 4% Liquid 1 Application(s) Topical <User Schedule>  dextrose 5%. 1000 milliLiter(s) IV Continuous <Continuous>  dextrose 50% Injectable 12.5 Gram(s) IV Push once  dextrose 50% Injectable 25 Gram(s) IV Push once  dextrose 50% Injectable 25 Gram(s) IV Push once  hydrocortisone 2.5% Rectal Cream 1 Application(s) Rectal every 8 hours  insulin lispro (HumaLOG) corrective regimen sliding scale   SubCutaneous three times a day before meals  insulin lispro (HumaLOG) corrective regimen sliding scale   SubCutaneous at bedtime  pantoprazole    Tablet 40 milliGRAM(s) Oral before breakfast  petrolatum white Ointment 1 Application(s) Topical three times a day  polyethylene glycol 3350 17 Gram(s) Oral daily  predniSONE   Tablet 20 milliGRAM(s) Oral daily  sodium chloride 0.65% Nasal 1 Spray(s) Both Nostrils every 8 hours  sodium chloride 0.9%. 1000 milliLiter(s) IV Continuous <Continuous>  witch hazel Pads 1 Application(s) Topical every 12 hours      PRN MEDICATIONS  acetaminophen   Tablet .. 650 milliGRAM(s) Oral every 6 hours PRN  acetaminophen 325 mG/butalbital 50 mG/caffeine 40 mG 1 Tablet(s) Oral every 4 hours PRN  aluminum hydroxide/magnesium hydroxide/simethicone Suspension 30 milliLiter(s) Oral every 6 hours PRN  AQUAPHOR (petrolatum Ointment) 1 Application(s) Topical every 3 hours PRN  dextrose 40% Gel 15 Gram(s) Oral once PRN  diphenhydrAMINE   Injectable 25 milliGRAM(s) IV Push every 6 hours PRN  glucagon  Injectable 1 milliGRAM(s) IntraMuscular once PRN  hemorrhoidal Ointment 1 Application(s) Rectal every 8 hours PRN  ondansetron Injectable 8 milliGRAM(s) IV Push every 8 hours PRN  sodium chloride 0.9% lock flush 10 milliLiter(s) IV Push every 1 hour PRN      Vital Signs Last 24 Hrs  T(C): 35.9 (10 Luis 2020 05:35), Max: 37.1 (09 Jun 2020 09:34)  T(F): 96.6 (10 Luis 2020 05:35), Max: 98.7 (09 Jun 2020 09:34)  HR: 70 (10 Luis 2020 05:35) (70 - 99)  BP: 124/83 (10 Lius 2020 05:35) (124/83 - 166/83)  BP(mean): --  RR: 18 (10 Luis 2020 05:35) (18 - 19)  SpO2: 100% (10 Luis 2020 05:35) (98% - 100%)      PHYSICAL EXAM  General: NAD  HEENT: clear oropharynx  CV: (+) S1/S2 RRR  Lungs: clear to auscultation, no wheezes or rales  Abdomen: soft, non-tender, non-distended (+) BS  Ext: no edema  Skin: no rashes   Neuro: alert and oriented X 3, no focal deficits  Central Line: C/D/I        LABS:                            7.8    9.29  )-----------( 57       ( 10 Luis 2020 06:58 )             22.8         Mean Cell Volume : 85.1 fl  Mean Cell Hemoglobin : 29.1 pg  Mean Cell Hemoglobin Concentration : 34.2 gm/dL  Auto Neutrophil # : 3.53 K/uL  Auto Lymphocyte # : 4.37 K/uL  Auto Monocyte # : 0.65 K/uL  Auto Eosinophil # : 0.00 K/uL  Auto Basophil # : 0.00 K/uL  Auto Neutrophil % : 36.0 %  Auto Lymphocyte % : 47.0 %  Auto Monocyte % : 7.0 %  Auto Eosinophil % : 0.0 %  Auto Basophil % : 0.0 %      06-10    141  |  105  |  37<H>  ----------------------------<  106<H>  4.0   |  24  |  0.91    Ca    10.3      10 Luis 2020 06:58  Phos  4.5     06-10  Mg     2.2     06-10    TPro  7.6  /  Alb  4.6  /  TBili  0.4  /  DBili  x   /  AST  54<H>  /  ALT  61<H>  /  AlkPhos  110  06-10      Mg 2.2  Phos 4.5      PT/INR - ( 10 Luis 2020 06:58 )   PT: 13.3 sec;   INR: 1.15 ratio            Uric Acid 3.8        RADIOLOGY & ADDITIONAL STUDIES:    EXAM:  CT CHEST                        PROCEDURE DATE:  06/07/2020    IMPRESSION:Biapical pleural-parenchymal scarring with calcification noted, likely from prior granulomatous disease. No consolidations, edema, effusion or pneumothorax. Apparent mild groundglass opacity noted bilaterally likely due to expiratory phase of scanning. No definite consolidations.

## 2020-06-10 NOTE — PROGRESS NOTE ADULT - ASSESSMENT
This is a 61 yo Khmer speaking F with PMHx of HTN, HLD, T2DM, COVID-19 infection admitted for management of newly diagnosed APL. The patient is currently reciving ATRA and Arsenic. syndrome. Patients hospital course has been complicated by transaminitis and refractory thrombocytopenia. The patient has pancytopenia 2/2 disease and/or ATRA/Arsenic.

## 2020-06-10 NOTE — PROGRESS NOTE ADULT - PROBLEM SELECTOR PLAN 3
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 No consolidation. 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 No consolidation. 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 No consolidation. (Son reports a history of TB in 1980s)

## 2020-06-10 NOTE — PROGRESS NOTE ADULT - PROBLEM SELECTOR PLAN 5
Mild transaminitis Grade 1, likely due to treatment. Improving   Monitor daily CMP  Avoid further hepatotoxic medications Mild transaminitis Grade 1, likely due to treatment. Stable  Monitor daily CMP  Avoid further hepatotoxic medications

## 2020-06-10 NOTE — PROGRESS NOTE ADULT - ATTENDING COMMENTS
61 y/o Belarusian speaking F who is transferred for newly diagnosed low/interm risk APL.  Presented with pancytopenia, flow cytometry/bone marrow biopsy/FISH from 5/26- consistent with acute promyelocytic leukemia with PML-AWA translocation.    She was started on ATRA 5/23- day +18, GALA 5/27- day +14    -CT chest 6/6 c/w granulomatous disease (hx of TB in 1980's) pt had SOB on 6/4, now resolved.  Daily weights, diurese. wbc>10 -will give Hydrea 500mg po x 1 dose again today. Give Prednisone 20mg x 5 more days starting 6/8. If pt becomes SOB again, change to Dexa 10mg q12hrs  -tremors improved, unclear etiology, electrolytes WNL. Cefepime held, given Baclofen -they resolved  -Check CBC, coags/fibrinogen once daily.  Goal plt >40-50, fibrinogen >150; however, pt has been plt refractory and unable to maintain these plt goals. Being monitored closely for any signs of bleeding -none at this time. On 6/4, pt had ABO-matched platelets given over 3 hrs in 1/2 unit infusions -->she responded very well, plt count >100.   -monitor lytes daily, keep K>4, Mg>2.  EKG twice a week  -pt had HA in May -had  CT head no contrast on 5/22 with no bleeding.  No more headaches, just a sensation of heat on the top of the head. MRI was not suggestive of any bleeding. Heat on the head is worse with platelet transfusion, on benadryl prior to platelets and PRN Fioricet   -off antibiotics 59 y/o Setswana speaking F who is transferred for newly diagnosed low/interm risk APL.  Presented with pancytopenia, flow cytometry/bone marrow biopsy/FISH from 5/26- consistent with acute promyelocytic leukemia with PML-AWA translocation.    She was started on ATRA 5/23- day +18, GALA 5/27- day +15    -CT chest 6/6 c/w granulomatous disease (hx of TB in 1980's) pt had SOB on 6/4, now resolved.  Daily weights, diurese. wbc>10 -will give Hydrea 500mg po x 1 dose again today. Give Prednisone 20mg x 5 more days starting 6/8. If pt becomes SOB again, change to Dexa 10mg q12hrs  -tremors improved, unclear etiology, electrolytes WNL. Cefepime held, given Baclofen -they improved  -Check CBC, coags/fibrinogen once daily.  Goal plt >40-50, fibrinogen >150; however, pt has been plt refractory and unable to maintain these plt goals. Being monitored closely for any signs of bleeding -none at this time. On 6/4, pt had ABO-matched platelets given over 3 hrs in 1/2 unit infusions -->she responded very well, plt count >100.   -monitor lytes daily, keep K>4, Mg>2.  EKG twice a week  -pt had HA in May -had  CT head no contrast on 5/22 with no bleeding.  No more headaches, just a sensation of heat on the top of the head. MRI was not suggestive of any bleeding. Heat on the head is worse with platelet transfusion, on benadryl prior to platelets and PRN Fioricet   -off antibiotics

## 2020-06-10 NOTE — ADVANCED PRACTICE NURSE CONSULT - ASSESSMENT
Pt. a/ox3.at the chair.oob to bathroom,voided.Lab results reviewed by Dr. Cuevas. Reinforced teachings to patient about her chemo regimen well understood. Right arm basilic double lumen PICC both ports,w/ + blood return flushes easily, chemo TX. checked & verified w/ Primary Nurse,. Arsenic trioxide 11mg in 250ml NSS started at 1448 x 2 hours infusion via lowest port of NSS line into purple port from PICC line right arm through Alaris IV pump. . Primary RN aware of plan of care, Safety maintained.

## 2020-06-10 NOTE — PROGRESS NOTE ADULT - PROBLEM SELECTOR PLAN 1
Continue ATRA 40mg BID (started 5/22) and Arsenic Trioxide daily (started on 5/27)  Monitor for differentiation syndrome and check EKG for QTc prolongation every Tuesday and Friday. Keep K > 4 and Mg > 2  Potassium 40 mEq PO x 1  Follow up CBC with diff, CMP, TLS and DIC panel daily  WBC >10   Keep PLTs >50 K. If unable to achieve goal continue with platelets infusion (single donor) 1/2 units over 3 hours every 8 hours until HLA platelets available   Prednisone 20mg PO daily x 5 more days (through 6/12)  Keep Fibrinogen >150 if less give Cryo  Strict Is and Os/Daily weights/Mouth Care  Lasix 40mg IV x 1 Continue ATRA 40mg BID (started 5/22) and Arsenic Trioxide daily (started on 5/27)  Monitor for differentiation syndrome and check EKG for QTc prolongation every Tuesday and Friday. Keep K > 4 and Mg > 2  KCL 40 mEq PO x 1  Status post Hydrea 500mg PO x 1 on 6/8 and 6/9 for WBC >10  Follow up CBC with diff, CMP, TLS and DIC panel daily  Keep PLTs >50 K. If unable to achieve goal continue with platelets infusion (single donor) 1/2 units over 3 hours every 8 hours until HLA platelets available   Prednisone 20mg PO daily x 5 more days (through 6/12)  Keep Fibrinogen >150 if less give Cryo  Strict Is and Os/Daily weights/Mouth Care Continue ATRA 40mg BID (started 5/22) and Arsenic Trioxide daily (started on 5/27)  Monitor for differentiation syndrome and check EKG for QTc prolongation every Tuesday and Friday. Keep K > 4 and Mg > 2  KCL 40 mEq PO x 1  Status post Hydrea 500mg PO x 1 on 6/8 and 6/9 for WBC >10  Follow up CBC with diff, CMP, TLS and DIC panel daily  Keep PLTs >50 K. If unable to achieve goal continue with platelets infusion (single donor) 1/2 units over 3 hours every 8 hours until HLA platelets available   Prednisone 20mg PO daily x 5 more days (through 6/12)  Keep Fibrinogen >150 if less give Cryo  Strict Is and Os/Daily weights/Mouth Care  Lasix 40mg IV x 1

## 2020-06-10 NOTE — PROGRESS NOTE ADULT - PROBLEM SELECTOR PLAN 2
The patient is not neutropenic, afebrile  If febrile Pan Cx and CXR  Previous COVID-19 (+) outpatient  5/22, 5/26 and 6/2 COVID (-) The patient is not neutropenic, afebrile  If febrile Pan Cx and CXR  Previous COVID-19 (+) outpatient  5/22, 5/26 and 6/2 COVID (-). 6/9 Results pending

## 2020-06-10 NOTE — PROGRESS NOTE ADULT - PROBLEM SELECTOR PLAN 4
new onset essential tremor  Improving with Baclofen 5 mg q 12h for x 5 days (through 6/10)  Monitor progress and renew as needed

## 2020-06-11 LAB
ALBUMIN SERPL ELPH-MCNC: 4.8 G/DL — SIGNIFICANT CHANGE UP (ref 3.3–5)
ALP SERPL-CCNC: 117 U/L — SIGNIFICANT CHANGE UP (ref 40–120)
ALT FLD-CCNC: 80 U/L — HIGH (ref 10–45)
ANION GAP SERPL CALC-SCNC: 15 MMOL/L — SIGNIFICANT CHANGE UP (ref 5–17)
AST SERPL-CCNC: 64 U/L — HIGH (ref 10–40)
BASOPHILS # BLD AUTO: 0 K/UL — SIGNIFICANT CHANGE UP (ref 0–0.2)
BASOPHILS NFR BLD AUTO: 0 % — SIGNIFICANT CHANGE UP (ref 0–2)
BILIRUB SERPL-MCNC: 0.4 MG/DL — SIGNIFICANT CHANGE UP (ref 0.2–1.2)
BUN SERPL-MCNC: 40 MG/DL — HIGH (ref 7–23)
CALCIUM SERPL-MCNC: 10.3 MG/DL — SIGNIFICANT CHANGE UP (ref 8.4–10.5)
CHLORIDE SERPL-SCNC: 103 MMOL/L — SIGNIFICANT CHANGE UP (ref 96–108)
CO2 SERPL-SCNC: 22 MMOL/L — SIGNIFICANT CHANGE UP (ref 22–31)
CREAT SERPL-MCNC: 0.96 MG/DL — SIGNIFICANT CHANGE UP (ref 0.5–1.3)
D DIMER BLD IA.RAPID-MCNC: 817 NG/ML DDU — HIGH
EOSINOPHIL # BLD AUTO: 0.09 K/UL — SIGNIFICANT CHANGE UP (ref 0–0.5)
EOSINOPHIL NFR BLD AUTO: 1 % — SIGNIFICANT CHANGE UP (ref 0–6)
FIBRINOGEN PPP-MCNC: 300 MG/DL — LOW (ref 350–510)
GLUCOSE BLDC GLUCOMTR-MCNC: 133 MG/DL — HIGH (ref 70–99)
GLUCOSE BLDC GLUCOMTR-MCNC: 167 MG/DL — HIGH (ref 70–99)
GLUCOSE BLDC GLUCOMTR-MCNC: 173 MG/DL — HIGH (ref 70–99)
GLUCOSE BLDC GLUCOMTR-MCNC: 214 MG/DL — HIGH (ref 70–99)
GLUCOSE SERPL-MCNC: 123 MG/DL — HIGH (ref 70–99)
HCT VFR BLD CALC: 25.8 % — LOW (ref 34.5–45)
HGB BLD-MCNC: 8.4 G/DL — LOW (ref 11.5–15.5)
LDH SERPL L TO P-CCNC: 295 U/L — HIGH (ref 50–242)
LYMPHOCYTES # BLD AUTO: 3.85 K/UL — HIGH (ref 1–3.3)
LYMPHOCYTES # BLD AUTO: 43 % — SIGNIFICANT CHANGE UP (ref 13–44)
MAGNESIUM SERPL-MCNC: 2.1 MG/DL — SIGNIFICANT CHANGE UP (ref 1.6–2.6)
MANUAL SMEAR VERIFICATION: SIGNIFICANT CHANGE UP
MCHC RBC-ENTMCNC: 28 PG — SIGNIFICANT CHANGE UP (ref 27–34)
MCHC RBC-ENTMCNC: 32.6 GM/DL — SIGNIFICANT CHANGE UP (ref 32–36)
MCV RBC AUTO: 86 FL — SIGNIFICANT CHANGE UP (ref 80–100)
METAMYELOCYTES # FLD: 5 % — HIGH (ref 0–0)
MONOCYTES # BLD AUTO: 0.45 K/UL — SIGNIFICANT CHANGE UP (ref 0–0.9)
MONOCYTES NFR BLD AUTO: 5 % — SIGNIFICANT CHANGE UP (ref 2–14)
MYELOCYTES NFR BLD: 18 % — HIGH (ref 0–0)
NEUTROPHILS # BLD AUTO: 2.51 K/UL — SIGNIFICANT CHANGE UP (ref 1.8–7.4)
NEUTROPHILS NFR BLD AUTO: 26 % — LOW (ref 43–77)
NEUTS BAND # BLD: 2 % — SIGNIFICANT CHANGE UP (ref 0–8)
NRBC # BLD: 0 /100 — SIGNIFICANT CHANGE UP (ref 0–0)
PHOSPHATE SERPL-MCNC: 4.6 MG/DL — HIGH (ref 2.5–4.5)
PLAT MORPH BLD: NORMAL — SIGNIFICANT CHANGE UP
PLATELET # BLD AUTO: 55 K/UL — LOW (ref 150–400)
POTASSIUM SERPL-MCNC: 4.1 MMOL/L — SIGNIFICANT CHANGE UP (ref 3.5–5.3)
POTASSIUM SERPL-SCNC: 4.1 MMOL/L — SIGNIFICANT CHANGE UP (ref 3.5–5.3)
PROT SERPL-MCNC: 7.8 G/DL — SIGNIFICANT CHANGE UP (ref 6–8.3)
RBC # BLD: 3 M/UL — LOW (ref 3.8–5.2)
RBC # FLD: 17.9 % — HIGH (ref 10.3–14.5)
RBC BLD AUTO: SIGNIFICANT CHANGE UP
SODIUM SERPL-SCNC: 140 MMOL/L — SIGNIFICANT CHANGE UP (ref 135–145)
URATE SERPL-MCNC: 3.9 MG/DL — SIGNIFICANT CHANGE UP (ref 2.5–7)
WBC # BLD: 8.95 K/UL — SIGNIFICANT CHANGE UP (ref 3.8–10.5)
WBC # FLD AUTO: 8.95 K/UL — SIGNIFICANT CHANGE UP (ref 3.8–10.5)

## 2020-06-11 PROCEDURE — 99232 SBSQ HOSP IP/OBS MODERATE 35: CPT | Mod: GC

## 2020-06-11 RX ORDER — CALCIUM ACETATE 667 MG
667 TABLET ORAL
Refills: 0 | Status: COMPLETED | OUTPATIENT
Start: 2020-06-11 | End: 2020-06-11

## 2020-06-11 RX ORDER — FUROSEMIDE 40 MG
40 TABLET ORAL ONCE
Refills: 0 | Status: COMPLETED | OUTPATIENT
Start: 2020-06-11 | End: 2020-06-11

## 2020-06-11 RX ORDER — ATENOLOL 25 MG/1
25 TABLET ORAL DAILY
Refills: 0 | Status: DISCONTINUED | OUTPATIENT
Start: 2020-06-11 | End: 2020-07-17

## 2020-06-11 RX ADMIN — Medication 100 MILLIGRAM(S): at 05:36

## 2020-06-11 RX ADMIN — Medication 1 APPLICATION(S): at 13:13

## 2020-06-11 RX ADMIN — Medication 1 APPLICATION(S): at 13:02

## 2020-06-11 RX ADMIN — TRETINOIN 40 MILLIGRAM(S): 10 CAPSULE, LIQUID FILLED ORAL at 19:57

## 2020-06-11 RX ADMIN — Medication 5 MILLILITER(S): at 17:15

## 2020-06-11 RX ADMIN — Medication 100 MILLIGRAM(S): at 22:28

## 2020-06-11 RX ADMIN — Medication 5 MILLILITER(S): at 19:57

## 2020-06-11 RX ADMIN — Medication 100 MILLIGRAM(S): at 13:05

## 2020-06-11 RX ADMIN — Medication 5 MILLILITER(S): at 09:04

## 2020-06-11 RX ADMIN — Medication 20 MILLIGRAM(S): at 05:37

## 2020-06-11 RX ADMIN — Medication 1 APPLICATION(S): at 22:30

## 2020-06-11 RX ADMIN — Medication 667 MILLIGRAM(S): at 09:04

## 2020-06-11 RX ADMIN — Medication 5 MILLIGRAM(S): at 05:36

## 2020-06-11 RX ADMIN — Medication 3 MILLILITER(S): at 23:06

## 2020-06-11 RX ADMIN — Medication 667 MILLIGRAM(S): at 12:39

## 2020-06-11 RX ADMIN — Medication 40 MILLIGRAM(S): at 17:14

## 2020-06-11 RX ADMIN — Medication 5 MILLIGRAM(S): at 17:15

## 2020-06-11 RX ADMIN — Medication 1 SPRAY(S): at 13:13

## 2020-06-11 RX ADMIN — Medication 2: at 12:39

## 2020-06-11 RX ADMIN — Medication 667 MILLIGRAM(S): at 17:16

## 2020-06-11 RX ADMIN — Medication 1 SPRAY(S): at 22:29

## 2020-06-11 RX ADMIN — Medication 1: at 17:53

## 2020-06-11 RX ADMIN — Medication 5 MILLIGRAM(S): at 22:28

## 2020-06-11 RX ADMIN — Medication 1 SPRAY(S): at 05:37

## 2020-06-11 RX ADMIN — AER TRAVELER 1 APPLICATION(S): 0.5 SOLUTION RECTAL; TOPICAL at 17:16

## 2020-06-11 RX ADMIN — Medication 667 MILLIGRAM(S): at 22:28

## 2020-06-11 RX ADMIN — Medication 3 MILLILITER(S): at 12:40

## 2020-06-11 RX ADMIN — PANTOPRAZOLE SODIUM 40 MILLIGRAM(S): 20 TABLET, DELAYED RELEASE ORAL at 09:03

## 2020-06-11 RX ADMIN — Medication 1 APPLICATION(S): at 05:38

## 2020-06-11 RX ADMIN — Medication 5 MILLILITER(S): at 12:39

## 2020-06-11 RX ADMIN — ATENOLOL 25 MILLIGRAM(S): 25 TABLET ORAL at 17:25

## 2020-06-11 RX ADMIN — Medication 3 MILLILITER(S): at 05:36

## 2020-06-11 RX ADMIN — AER TRAVELER 1 APPLICATION(S): 0.5 SOLUTION RECTAL; TOPICAL at 05:37

## 2020-06-11 RX ADMIN — Medication 3 MILLILITER(S): at 17:14

## 2020-06-11 RX ADMIN — SODIUM CHLORIDE 25 MILLILITER(S): 9 INJECTION INTRAMUSCULAR; INTRAVENOUS; SUBCUTANEOUS at 05:36

## 2020-06-11 RX ADMIN — ARSENIC TRIOXIDE 130.5 MILLIGRAM(S): 2 INJECTION, SOLUTION INTRAVENOUS at 15:00

## 2020-06-11 RX ADMIN — Medication 1 APPLICATION(S): at 05:37

## 2020-06-11 RX ADMIN — Medication 1: at 09:05

## 2020-06-11 RX ADMIN — Medication 5 MILLILITER(S): at 23:06

## 2020-06-11 RX ADMIN — TRETINOIN 40 MILLIGRAM(S): 10 CAPSULE, LIQUID FILLED ORAL at 09:04

## 2020-06-11 NOTE — PROGRESS NOTE ADULT - ATTENDING COMMENTS
61 y/o Wolof speaking F who is transferred for newly diagnosed low/interm risk APL.  Presented with pancytopenia, flow cytometry/bone marrow biopsy/FISH from 5/26- consistent with acute promyelocytic leukemia with PML-AWA translocation.    She was started on ATRA 5/23- day +18, GALA 5/27- day +15    -CT chest 6/6 c/w granulomatous disease (hx of TB in 1980's) pt had SOB on 6/4, now resolved.  Daily weights, diurese. wbc>10 -will give Hydrea 500mg po x 1 dose again today. Give Prednisone 20mg x 5 more days starting 6/8. If pt becomes SOB again, change to Dexa 10mg q12hrs  -tremors improved, unclear etiology, electrolytes WNL. Cefepime held, given Baclofen -they improved  -Check CBC, coags/fibrinogen once daily.  Goal plt >40-50, fibrinogen >150; however, pt has been plt refractory and unable to maintain these plt goals. Being monitored closely for any signs of bleeding -none at this time. On 6/4, pt had ABO-matched platelets given over 3 hrs in 1/2 unit infusions -->she responded very well, plt count >100.   -monitor lytes daily, keep K>4, Mg>2.  EKG twice a week  -pt had HA in May -had  CT head no contrast on 5/22 with no bleeding.  No more headaches, just a sensation of heat on the top of the head. MRI was not suggestive of any bleeding. Heat on the head is worse with platelet transfusion, on benadryl prior to platelets and PRN Fioricet   -off antibiotics 59 y/o Divehi speaking F who is transferred for newly diagnosed low/interm risk APL.  Presented with pancytopenia, flow cytometry/bone marrow biopsy/FISH from 5/26- consistent with acute promyelocytic leukemia with PML-AWA translocation.    She was started on ATRA 5/23- day +19, GALA 5/27- day +16    -CT chest 6/6 c/w granulomatous disease (hx of TB in 1980's) pt had SOB on 6/4, now resolved.  Daily weights, diurese. Will give Hydrea 500mg if wbc>10. On Prednisone 20mg x 5 more days starting 6/8, ends on 5/12. If pt becomes SOB again, change to Dexa 10mg q12hrs  -tremors improved, unclear etiology, electrolytes WNL. Cefepime held, given Baclofen -they improved  -Check CBC, coags/fibrinogen once daily.  Goal plt >40-50, fibrinogen >150; responding to ABO-matched platelets given over 3 hrs in 1/2 unit infusions   -monitor lytes daily, keep K>4, Mg>2.  EKG twice a week  -pt had HA in May -had  CT head no contrast on 5/22 with no bleeding.  No more headaches, just a sensation of heat on the top of the head. MRI was not suggestive of any bleeding. Heat on the head is worse with platelet transfusion, on benadryl prior to platelets and PRN Fioricet   -off antibiotics

## 2020-06-11 NOTE — PROGRESS NOTE ADULT - SUBJECTIVE AND OBJECTIVE BOX
Diagnosis:    Protocol/Chemo Regimen:    Day:     Pt endorsed:    Review of Systems:     Pain scale:     Diet:     Allergies    No Known Allergies    Intolerances        ANTIMICROBIALS      HEME/ONC MEDICATIONS  arsenic trioxide IVPB (eMAR) 11 milliGRAM(s) IV Intermittent every 24 hours  tretinoin 40 milliGRAM(s) Oral every 12 hours      STANDING MEDICATIONS  albuterol/ipratropium for Nebulization 3 milliLiter(s) Nebulizer every 6 hours  baclofen 5 milliGRAM(s) Oral every 12 hours  benzonatate 100 milliGRAM(s) Oral every 8 hours  Biotene Dry Mouth Oral Rinse 5 milliLiter(s) Swish and Spit five times a day  bisacodyl 5 milliGRAM(s) Oral every 12 hours  calcium acetate 667 milliGRAM(s) Oral four times a day with meals  chlorhexidine 4% Liquid 1 Application(s) Topical <User Schedule>  dextrose 5%. 1000 milliLiter(s) IV Continuous <Continuous>  dextrose 50% Injectable 12.5 Gram(s) IV Push once  dextrose 50% Injectable 25 Gram(s) IV Push once  dextrose 50% Injectable 25 Gram(s) IV Push once  hydrocortisone 2.5% Rectal Cream 1 Application(s) Rectal every 8 hours  insulin lispro (HumaLOG) corrective regimen sliding scale   SubCutaneous three times a day before meals  insulin lispro (HumaLOG) corrective regimen sliding scale   SubCutaneous at bedtime  pantoprazole    Tablet 40 milliGRAM(s) Oral before breakfast  petrolatum white Ointment 1 Application(s) Topical three times a day  polyethylene glycol 3350 17 Gram(s) Oral daily  predniSONE   Tablet 20 milliGRAM(s) Oral daily  sodium chloride 0.65% Nasal 1 Spray(s) Both Nostrils every 8 hours  sodium chloride 0.9%. 1000 milliLiter(s) IV Continuous <Continuous>  witch hazel Pads 1 Application(s) Topical every 12 hours      PRN MEDICATIONS  acetaminophen   Tablet .. 650 milliGRAM(s) Oral every 6 hours PRN  acetaminophen 325 mG/butalbital 50 mG/caffeine 40 mG 1 Tablet(s) Oral every 4 hours PRN  aluminum hydroxide/magnesium hydroxide/simethicone Suspension 30 milliLiter(s) Oral every 6 hours PRN  AQUAPHOR (petrolatum Ointment) 1 Application(s) Topical every 3 hours PRN  dextrose 40% Gel 15 Gram(s) Oral once PRN  diphenhydrAMINE   Injectable 25 milliGRAM(s) IV Push every 6 hours PRN  glucagon  Injectable 1 milliGRAM(s) IntraMuscular once PRN  hemorrhoidal Ointment 1 Application(s) Rectal every 8 hours PRN  ondansetron Injectable 8 milliGRAM(s) IV Push every 8 hours PRN  sodium chloride 0.9% lock flush 10 milliLiter(s) IV Push every 1 hour PRN        Vital Signs Last 24 Hrs  T(C): 36.2 (11 Jun 2020 05:24), Max: 37.1 (10 Luis 2020 13:13)  T(F): 97.1 (11 Jun 2020 05:24), Max: 98.7 (10 Luis 2020 13:13)  HR: 79 (11 Jun 2020 05:24) (73 - 105)  BP: 137/72 (11 Jun 2020 05:24) (113/69 - 159/73)  BP(mean): --  RR: 18 (11 Jun 2020 05:24) (18 - 20)  SpO2: 99% (11 Jun 2020 05:24) (90% - 100%)    PHYSICAL EXAM  General: NAD  HEENT: PERRLA, EOMI, clear oropharynx  Neck: supple  CV: (+) S1/S2 RRR  Lungs: clear to auscultation, no wheezes or rales  Abdomen: soft, non-tender, non-distended (+) BS  Ext: no edema  Skin: no rashes   Neuro: alert and oriented X 3, no focal deficits  Central Line:     RECENT CULTURES:        LABS:                        8.4    8.95  )-----------( 55       ( 11 Jun 2020 07:07 )             25.8         Mean Cell Volume : 86.0 fl  Mean Cell Hemoglobin : 28.0 pg  Mean Cell Hemoglobin Concentration : 32.6 gm/dL  Auto Neutrophil # : x  Auto Lymphocyte # : x  Auto Monocyte # : x  Auto Eosinophil # : x  Auto Basophil # : x  Auto Neutrophil % : x  Auto Lymphocyte % : x  Auto Monocyte % : x  Auto Eosinophil % : x  Auto Basophil % : x      06-11    140  |  103  |  40<H>  ----------------------------<  123<H>  4.1   |  22  |  0.96    Ca    10.3      11 Jun 2020 07:07  Phos  4.6     06-11  Mg     2.1     06-11    TPro  7.8  /  Alb  4.8  /  TBili  0.4  /  DBili  x   /  AST  64<H>  /  ALT  80<H>  /  AlkPhos  117  06-11      Mg 2.1  Phos 4.6      PT/INR - ( 10 Luis 2020 06:58 )   PT: 13.3 sec;   INR: 1.15 ratio               Uric Acid 3.9        RADIOLOGY & ADDITIONAL STUDIES: Diagnosis: Acute Promyelocytic leukemia     Protocol/Chemo Regimen: ATRA/Arsenic     Day: ATRA Day 20 and Arsenic Day 16    Patient French speaking refusing  service. Patient's son called by patient    Patient endorses: shortness of breath at times    Review of Systems: Patient denies headache, dizziness, visual changes, chest pain, palpitations, abdominal pain, nausea, vomiting, diarrhea or dysuria.    Pain scale: 0     Diet: Regular     Allergies: No Known Allergies      HEME/ONC MEDICATIONS  arsenic trioxide IVPB (eMAR) 11 milliGRAM(s) IV Intermittent every 24 hours  tretinoin 40 milliGRAM(s) Oral every 12 hours      STANDING MEDICATIONS  albuterol/ipratropium for Nebulization 3 milliLiter(s) Nebulizer every 6 hours  baclofen 5 milliGRAM(s) Oral every 12 hours  benzonatate 100 milliGRAM(s) Oral every 8 hours  Biotene Dry Mouth Oral Rinse 5 milliLiter(s) Swish and Spit five times a day  bisacodyl 5 milliGRAM(s) Oral every 12 hours  calcium acetate 667 milliGRAM(s) Oral four times a day with meals  chlorhexidine 4% Liquid 1 Application(s) Topical <User Schedule>  dextrose 5%. 1000 milliLiter(s) IV Continuous <Continuous>  dextrose 50% Injectable 12.5 Gram(s) IV Push once  dextrose 50% Injectable 25 Gram(s) IV Push once  dextrose 50% Injectable 25 Gram(s) IV Push once  hydrocortisone 2.5% Rectal Cream 1 Application(s) Rectal every 8 hours  insulin lispro (HumaLOG) corrective regimen sliding scale   SubCutaneous three times a day before meals  insulin lispro (HumaLOG) corrective regimen sliding scale   SubCutaneous at bedtime  pantoprazole    Tablet 40 milliGRAM(s) Oral before breakfast  petrolatum white Ointment 1 Application(s) Topical three times a day  polyethylene glycol 3350 17 Gram(s) Oral daily  predniSONE   Tablet 20 milliGRAM(s) Oral daily  sodium chloride 0.65% Nasal 1 Spray(s) Both Nostrils every 8 hours  sodium chloride 0.9%. 1000 milliLiter(s) IV Continuous <Continuous>  witch hazel Pads 1 Application(s) Topical every 12 hours      PRN MEDICATIONS  acetaminophen   Tablet .. 650 milliGRAM(s) Oral every 6 hours PRN  acetaminophen 325 mG/butalbital 50 mG/caffeine 40 mG 1 Tablet(s) Oral every 4 hours PRN  aluminum hydroxide/magnesium hydroxide/simethicone Suspension 30 milliLiter(s) Oral every 6 hours PRN  AQUAPHOR (petrolatum Ointment) 1 Application(s) Topical every 3 hours PRN  dextrose 40% Gel 15 Gram(s) Oral once PRN  diphenhydrAMINE   Injectable 25 milliGRAM(s) IV Push every 6 hours PRN  glucagon  Injectable 1 milliGRAM(s) IntraMuscular once PRN  hemorrhoidal Ointment 1 Application(s) Rectal every 8 hours PRN  ondansetron Injectable 8 milliGRAM(s) IV Push every 8 hours PRN  sodium chloride 0.9% lock flush 10 milliLiter(s) IV Push every 1 hour PRN      Vital Signs Last 24 Hrs  T(C): 36.2 (11 Jun 2020 05:24), Max: 37.1 (10 Luis 2020 13:13)  T(F): 97.1 (11 Jun 2020 05:24), Max: 98.7 (10 Luis 2020 13:13)  HR: 79 (11 Jun 2020 05:24) (73 - 105)  BP: 137/72 (11 Jun 2020 05:24) (113/69 - 159/73)  BP(mean): --  RR: 18 (11 Jun 2020 05:24) (18 - 20)  SpO2: 99% (11 Jun 2020 05:24) (90% - 100%)        PHYSICAL EXAM  General: NAD  HEENT: clear oropharynx  CV: (+) S1/S2 RRR  Lungs: clear to auscultation, no wheezes or rales  Abdomen: soft, non-tender, non-distended (+) BS  Ext: no edema  Skin: no rashes   Neuro: alert and oriented X 3, no focal deficits  Central Line: C/D/I        LABS:                             8.4    8.95  )-----------( 55       ( 11 Jun 2020 07:07 )             25.8         Mean Cell Volume : 86.0 fl  Mean Cell Hemoglobin : 28.0 pg  Mean Cell Hemoglobin Concentration : 32.6 gm/dL  Auto Neutrophil # : 2.51 K/uL  Auto Lymphocyte # : 3.85 K/uL  Auto Monocyte # : 0.45 K/uL  Auto Eosinophil # : 0.09 K/uL  Auto Basophil # : 0.00 K/uL  Auto Neutrophil % : 26.0 %  Auto Lymphocyte % : 43.0 %  Auto Monocyte % : 5.0 %  Auto Eosinophil % : 1.0 %  Auto Basophil % : 0.0 %      06-11    140  |  103  |  40<H>  ----------------------------<  123<H>  4.1   |  22  |  0.96    Ca    10.3      11 Jun 2020 07:07  Phos  4.6     06-11  Mg     2.1     06-11    TPro  7.8  /  Alb  4.8  /  TBili  0.4  /  DBili  x   /  AST  64<H>  /  ALT  80<H>  /  AlkPhos  117  06-11      Mg 2.1  Phos 4.6      PT/INR - ( 10 Luis 2020 06:58 )   PT: 13.3 sec;   INR: 1.15 ratio              Uric Acid 3.9          RADIOLOGY & ADDITIONAL STUDIES:    EXAM:  CT CHEST                        PROCEDURE DATE:  06/07/2020    IMPRESSION:Biapical pleural-parenchymal scarring with calcification noted, likely from prior granulomatous disease. No consolidations, edema, effusion or pneumothorax. Apparent mild groundglass opacity noted bilaterally likely due to expiratory phase of scanning. No definite consolidations.

## 2020-06-11 NOTE — PROGRESS NOTE ADULT - PROBLEM SELECTOR PLAN 2
The patient is not neutropenic, afebrile  If febrile Pan Cx and CXR  Previous COVID-19 (+) outpatient  5/22, 5/26 and 6/2 COVID (-). 6/9 Results pending The patient is not neutropenic, afebrile  If febrile Pan Cx and CXR  Previous COVID-19 (+) outpatient  5/22, 5/26, 6/2 and 6/9 COVID (-)

## 2020-06-11 NOTE — PROGRESS NOTE ADULT - PROBLEM SELECTOR PLAN 5
Mild transaminitis Grade 1, likely due to treatment. Stable  Monitor daily CMP  Avoid further hepatotoxic medications

## 2020-06-11 NOTE — PROGRESS NOTE ADULT - PROBLEM SELECTOR PLAN 1
Continue ATRA 40mg BID (started 5/22) and Arsenic Trioxide daily (started on 5/27)  Monitor for differentiation syndrome and check EKG for QTc prolongation every Tuesday and Friday. Keep K > 4 and Mg > 2  KCL 40 mEq PO x 1  Status post Hydrea 500mg PO x 1 on 6/8 and 6/9 for WBC >10  Follow up CBC with diff, CMP, TLS and DIC panel daily  Keep PLTs >50 K. If unable to achieve goal continue with platelets infusion (single donor) 1/2 units over 3 hours every 8 hours until HLA platelets available   Prednisone 20mg PO daily x 5 more days (through 6/12)  Keep Fibrinogen >150 if less give Cryo  Strict Is and Os/Daily weights/Mouth Care  Lasix 40mg IV x 1 Continue ATRA 40mg BID (started 5/22) and Arsenic Trioxide daily (started on 5/27)  Monitor for differentiation syndrome and check EKG for QTc prolongation every Tuesday and Friday. Keep K > 4 and Mg > 2  Status post Hydrea 500mg PO x 1 on 6/8 and 6/9 for WBC >10  Follow up CBC with diff, CMP, TLS and DIC panel daily  Hyperphosphatemia: Phoslo 4 tabs PO today  Keep PLTs >50 K. If unable to achieve goal continue with platelets infusion (single donor) 1/2 units over 3 hours every 8 hours until HLA platelets available   Prednisone 20mg PO daily x 5 more days (through 6/12)  Keep Fibrinogen >150 if less give Cryo  Strict Is and Os/Daily weights/Mouth Care  Lasix 40mg IV x 1

## 2020-06-11 NOTE — CHART NOTE - NSCHARTNOTEFT_GEN_A_CORE
Nutrition Follow Up Note  Patient seen for: Nutrition follow up. Pt with APL receiving treatment.     Source: Pt, Son via phone called by patient, pt is primarily Mongolian speaking, declined  and preferred to call her son    Diet : Halal, Consistent carbohydrate diet with evening snack with glucerna 3 daily     Patient reports: Nausea at times, no episodes of vomiting, last BM today per pt     PO intake : Per son pt's appetite is improved and pt continues to enjoy lighter foods such as oatmeal and banana which she wishes to receive at all meals, pt enjoys pasta and rice dishes and per son if pt enjoys her meal she will eat everything if she does not like her meal she will leave some but overall intake is improved. Pt enjoys glucerna shakes and will take 3 per day.       Weight: 168.2 lbs (5/27), 171.5 lbs (6/3), 170.8 lbs (6/10), weight elevated slightly from admission.    Pertinent Medications: MEDICATIONS  (STANDING):  albuterol/ipratropium for Nebulization 3 milliLiter(s) Nebulizer every 6 hours  arsenic trioxide IVPB (eMAR) 11 milliGRAM(s) IV Intermittent every 24 hours  baclofen 5 milliGRAM(s) Oral every 12 hours  benzonatate 100 milliGRAM(s) Oral every 8 hours  Biotene Dry Mouth Oral Rinse 5 milliLiter(s) Swish and Spit five times a day  bisacodyl 5 milliGRAM(s) Oral every 12 hours  calcium acetate 667 milliGRAM(s) Oral four times a day with meals  chlorhexidine 4% Liquid 1 Application(s) Topical <User Schedule>  dextrose 5%. 1000 milliLiter(s) (50 mL/Hr) IV Continuous <Continuous>  dextrose 50% Injectable 12.5 Gram(s) IV Push once  dextrose 50% Injectable 25 Gram(s) IV Push once  dextrose 50% Injectable 25 Gram(s) IV Push once  hydrocortisone 2.5% Rectal Cream 1 Application(s) Rectal every 8 hours  insulin lispro (HumaLOG) corrective regimen sliding scale   SubCutaneous three times a day before meals  insulin lispro (HumaLOG) corrective regimen sliding scale   SubCutaneous at bedtime  pantoprazole    Tablet 40 milliGRAM(s) Oral before breakfast  petrolatum white Ointment 1 Application(s) Topical three times a day  polyethylene glycol 3350 17 Gram(s) Oral daily  predniSONE   Tablet 20 milliGRAM(s) Oral daily  sodium chloride 0.65% Nasal 1 Spray(s) Both Nostrils every 8 hours  sodium chloride 0.9%. 1000 milliLiter(s) (25 mL/Hr) IV Continuous <Continuous>  tretinoin 40 milliGRAM(s) Oral every 12 hours  witch hazel Pads 1 Application(s) Topical every 12 hours    MEDICATIONS  (PRN):  acetaminophen   Tablet .. 650 milliGRAM(s) Oral every 6 hours PRN Temp greater or equal to 38C (100.4F), Mild Pain (1 - 3)  acetaminophen 325 mG/butalbital 50 mG/caffeine 40 mG 1 Tablet(s) Oral every 4 hours PRN headache  aluminum hydroxide/magnesium hydroxide/simethicone Suspension 30 milliLiter(s) Oral every 6 hours PRN Dyspepsia  AQUAPHOR (petrolatum Ointment) 1 Application(s) Topical every 3 hours PRN Dry skin  dextrose 40% Gel 15 Gram(s) Oral once PRN Blood Glucose LESS THAN 70 milliGRAM(s)/deciliter  diphenhydrAMINE   Injectable 25 milliGRAM(s) IV Push every 6 hours PRN Allergy symptoms  glucagon  Injectable 1 milliGRAM(s) IntraMuscular once PRN Glucose LESS THAN 70 milligrams/deciliter  hemorrhoidal Ointment 1 Application(s) Rectal every 8 hours PRN Hemorrhoids  hydrocodone/homatropine Syrup 5 milliLiter(s) Oral every 8 hours PRN Cough  ondansetron Injectable 8 milliGRAM(s) IV Push every 8 hours PRN Nausea and/or Vomiting  sodium chloride 0.9% lock flush 10 milliLiter(s) IV Push every 1 hour PRN Pre/post blood products, medications, blood draw, and to maintain line patency    Pertinent Labs: 06-11 @ 07:07: Na 140, BUN 40<H>, Cr 0.96, <H>, K+ 4.1, Phos 4.6<H>, Mg 2.1, Alk Phos 117, ALT/SGPT 80<H>, AST/SGOT 64<H>, HbA1c --    Finger Sticks:  POCT Blood Glucose.: 214 mg/dL (06-11 @ 12:16)  POCT Blood Glucose.: 167 mg/dL (06-11 @ 08:54)  POCT Blood Glucose.: 186 mg/dL (06-10 @ 21:29)  POCT Blood Glucose.: 160 mg/dL (06-10 @ 17:11)      Skin per nursing documentation: free of pressure injury   Edema: none     Estimated Needs:   [ x] no change since previous assessment  [ ] recalculated:     Previous Nutrition Diagnosis: Predicted suboptimal energy intake  Nutrition Diagnosis is: ongoing, addressed with supplements       New Nutrition Diagnosis:  Related to:    As evidenced by:      Interventions:     Recommend  1) Continue diet and supplements as ordered  2) Continue to encourage po intake and obtain/honor food preferences as able    Monitoring and Evaluation:     Continue to monitor Nutritional intake, Tolerance to diet prescription, weights, labs, skin integrity    RD remains available upon request and will follow up per protocol    Alma Jacobson R.D., [facility], Pager #[phone]

## 2020-06-11 NOTE — PROGRESS NOTE ADULT - PROBLEM SELECTOR PLAN 6
Home regimen with Atenolol held on admission  If BP increases can consider restarting Home regimen with Atenolol held on admission  Will restart at 25mg PO daily for increased BP and increase if needed

## 2020-06-11 NOTE — ADVANCED PRACTICE NURSE CONSULT - ASSESSMENT
Pt. a/ox4,sitting in the chair,denies any discomfort .Lab results reviewed by Dr. John during rounds, Reinforced teachings to patient about her chemo regimen well understood. Right arm basilic double lumen PICC both ports,w/ + blood return flushes easily, chemo TX. checked & verified w/ Primary Nurse,. Arsenic trioxide 11mg in 250ml NSS started at 1500 pm, x 2 hours infusion via lowest port of NSS line into purple port from PICC line right arm through Alaris IV pump. . Primary RN aware of plan of care, Safety maintained.

## 2020-06-11 NOTE — PROGRESS NOTE ADULT - ASSESSMENT
This is a 59 yo Sinhala speaking F with PMHx of HTN, HLD, T2DM, COVID-19 infection admitted for management of newly diagnosed APL. The patient is currently reciving ATRA and Arsenic. syndrome. Patients hospital course has been complicated by transaminitis and refractory thrombocytopenia. The patient has pancytopenia 2/2 disease and/or ATRA/Arsenic.

## 2020-06-11 NOTE — PROGRESS NOTE ADULT - PROBLEM SELECTOR PLAN 3
Patient previously with c/o SOB with stable O2 sat and concern for ATRA syndrome, now intermittent  6/5 Patient received one dose of Dexamethasone   CXR (-) for acute infiltrates and (+) atelectasis   Continue Incentive Spirometer   Lasix PRN  6/8 and 6/9 Hydrea 500mg x 1 given  6/7 CT chest with biapical pleural-parenchymal scarring with calcification, likely from prior granulomatous disease. No consolidation. (Son reports a history of TB in 1980s) Patient previously with c/o cough and SOB with stable O2 sat and concern for differentiation syndrome, now intermittent  6/5 Patient received one dose of Dexamethasone   CXR (-) for acute infiltrates and (+) atelectasis   Continue Incentive Spirometer   Lasix PRN  6/8 and 6/9 Hydrea 500mg x 1 given  6/7 CT chest with biapical pleural-parenchymal scarring with calcification, likely from prior granulomatous disease. No consolidation. (Son reports a history of TB in 1980s)  Nasal Cannula PRN  Hycodan/Tessalon perles

## 2020-06-11 NOTE — PROGRESS NOTE ADULT - PROBLEM SELECTOR PLAN 4
new onset essential tremor  Improving with Baclofen 5 mg q 12h for x 5 days (through 6/10)  Monitor progress and renew as needed new onset essential tremor  Improving with Baclofen 5 mg q 12h  Monitor progress

## 2020-06-12 LAB
ALBUMIN SERPL ELPH-MCNC: 4.5 G/DL — SIGNIFICANT CHANGE UP (ref 3.3–5)
ALP SERPL-CCNC: 103 U/L — SIGNIFICANT CHANGE UP (ref 40–120)
ALT FLD-CCNC: 66 U/L — HIGH (ref 10–45)
ANION GAP SERPL CALC-SCNC: 13 MMOL/L — SIGNIFICANT CHANGE UP (ref 5–17)
APTT BLD: 28.6 SEC — SIGNIFICANT CHANGE UP (ref 27.5–36.3)
AST SERPL-CCNC: 48 U/L — HIGH (ref 10–40)
BASOPHILS # BLD AUTO: 0 K/UL — SIGNIFICANT CHANGE UP (ref 0–0.2)
BASOPHILS NFR BLD AUTO: 0 % — SIGNIFICANT CHANGE UP (ref 0–2)
BILIRUB SERPL-MCNC: 0.4 MG/DL — SIGNIFICANT CHANGE UP (ref 0.2–1.2)
BLD GP AB SCN SERPL QL: NEGATIVE — SIGNIFICANT CHANGE UP
BUN SERPL-MCNC: 34 MG/DL — HIGH (ref 7–23)
CALCIUM SERPL-MCNC: 10 MG/DL — SIGNIFICANT CHANGE UP (ref 8.4–10.5)
CHLORIDE SERPL-SCNC: 102 MMOL/L — SIGNIFICANT CHANGE UP (ref 96–108)
CO2 SERPL-SCNC: 25 MMOL/L — SIGNIFICANT CHANGE UP (ref 22–31)
CREAT SERPL-MCNC: 0.78 MG/DL — SIGNIFICANT CHANGE UP (ref 0.5–1.3)
D DIMER BLD IA.RAPID-MCNC: 581 NG/ML DDU — HIGH
EOSINOPHIL # BLD AUTO: 0 K/UL — SIGNIFICANT CHANGE UP (ref 0–0.5)
EOSINOPHIL NFR BLD AUTO: 0 % — SIGNIFICANT CHANGE UP (ref 0–6)
FIBRINOGEN PPP-MCNC: 286 MG/DL — LOW (ref 350–510)
GLUCOSE BLDC GLUCOMTR-MCNC: 127 MG/DL — HIGH (ref 70–99)
GLUCOSE BLDC GLUCOMTR-MCNC: 145 MG/DL — HIGH (ref 70–99)
GLUCOSE BLDC GLUCOMTR-MCNC: 165 MG/DL — HIGH (ref 70–99)
GLUCOSE BLDC GLUCOMTR-MCNC: 206 MG/DL — HIGH (ref 70–99)
GLUCOSE SERPL-MCNC: 109 MG/DL — HIGH (ref 70–99)
HCT VFR BLD CALC: 24.1 % — LOW (ref 34.5–45)
HGB BLD-MCNC: 7.9 G/DL — LOW (ref 11.5–15.5)
INR BLD: 1.14 RATIO — SIGNIFICANT CHANGE UP (ref 0.88–1.16)
LDH SERPL L TO P-CCNC: 224 U/L — SIGNIFICANT CHANGE UP (ref 50–242)
LYMPHOCYTES # BLD AUTO: 2.65 K/UL — SIGNIFICANT CHANGE UP (ref 1–3.3)
LYMPHOCYTES # BLD AUTO: 42 % — SIGNIFICANT CHANGE UP (ref 13–44)
MAGNESIUM SERPL-MCNC: 2 MG/DL — SIGNIFICANT CHANGE UP (ref 1.6–2.6)
MANUAL SMEAR VERIFICATION: SIGNIFICANT CHANGE UP
MCHC RBC-ENTMCNC: 28.1 PG — SIGNIFICANT CHANGE UP (ref 27–34)
MCHC RBC-ENTMCNC: 32.8 GM/DL — SIGNIFICANT CHANGE UP (ref 32–36)
MCV RBC AUTO: 85.8 FL — SIGNIFICANT CHANGE UP (ref 80–100)
METAMYELOCYTES # FLD: 4 % — HIGH (ref 0–0)
MONOCYTES # BLD AUTO: 0.88 K/UL — SIGNIFICANT CHANGE UP (ref 0–0.9)
MONOCYTES NFR BLD AUTO: 14 % — SIGNIFICANT CHANGE UP (ref 2–14)
MYELOCYTES NFR BLD: 3 % — HIGH (ref 0–0)
NEUTROPHILS # BLD AUTO: 1.89 K/UL — SIGNIFICANT CHANGE UP (ref 1.8–7.4)
NEUTROPHILS NFR BLD AUTO: 27 % — LOW (ref 43–77)
NEUTS BAND # BLD: 3 % — SIGNIFICANT CHANGE UP (ref 0–8)
NRBC # BLD: 0 /100 — SIGNIFICANT CHANGE UP (ref 0–0)
PHOSPHATE SERPL-MCNC: 4.8 MG/DL — HIGH (ref 2.5–4.5)
PLAT MORPH BLD: NORMAL — SIGNIFICANT CHANGE UP
PLATELET # BLD AUTO: 30 K/UL — LOW (ref 150–400)
POTASSIUM SERPL-MCNC: 3.8 MMOL/L — SIGNIFICANT CHANGE UP (ref 3.5–5.3)
POTASSIUM SERPL-SCNC: 3.8 MMOL/L — SIGNIFICANT CHANGE UP (ref 3.5–5.3)
PROMYELOCYTES # FLD: 7 % — HIGH (ref 0–0)
PROT SERPL-MCNC: 7.4 G/DL — SIGNIFICANT CHANGE UP (ref 6–8.3)
PROTHROM AB SERPL-ACNC: 13.1 SEC — HIGH (ref 10–12.9)
RBC # BLD: 2.81 M/UL — LOW (ref 3.8–5.2)
RBC # FLD: 17.3 % — HIGH (ref 10.3–14.5)
RBC BLD AUTO: SIGNIFICANT CHANGE UP
RH IG SCN BLD-IMP: POSITIVE — SIGNIFICANT CHANGE UP
SODIUM SERPL-SCNC: 140 MMOL/L — SIGNIFICANT CHANGE UP (ref 135–145)
URATE SERPL-MCNC: 3.9 MG/DL — SIGNIFICANT CHANGE UP (ref 2.5–7)
WBC # BLD: 6.3 K/UL — SIGNIFICANT CHANGE UP (ref 3.8–10.5)
WBC # FLD AUTO: 6.3 K/UL — SIGNIFICANT CHANGE UP (ref 3.8–10.5)

## 2020-06-12 PROCEDURE — 99232 SBSQ HOSP IP/OBS MODERATE 35: CPT | Mod: GC

## 2020-06-12 PROCEDURE — 93010 ELECTROCARDIOGRAM REPORT: CPT

## 2020-06-12 RX ORDER — POTASSIUM CHLORIDE 20 MEQ
40 PACKET (EA) ORAL ONCE
Refills: 0 | Status: COMPLETED | OUTPATIENT
Start: 2020-06-12 | End: 2020-06-12

## 2020-06-12 RX ADMIN — ATENOLOL 25 MILLIGRAM(S): 25 TABLET ORAL at 05:55

## 2020-06-12 RX ADMIN — Medication 5 MILLILITER(S): at 12:05

## 2020-06-12 RX ADMIN — Medication 3 MILLILITER(S): at 17:18

## 2020-06-12 RX ADMIN — Medication 3 MILLILITER(S): at 05:54

## 2020-06-12 RX ADMIN — Medication 40 MILLIEQUIVALENT(S): at 08:06

## 2020-06-12 RX ADMIN — Medication 1 APPLICATION(S): at 05:56

## 2020-06-12 RX ADMIN — Medication 1 SPRAY(S): at 21:39

## 2020-06-12 RX ADMIN — Medication 1 APPLICATION(S): at 14:26

## 2020-06-12 RX ADMIN — SODIUM CHLORIDE 25 MILLILITER(S): 9 INJECTION INTRAMUSCULAR; INTRAVENOUS; SUBCUTANEOUS at 05:54

## 2020-06-12 RX ADMIN — AER TRAVELER 1 APPLICATION(S): 0.5 SOLUTION RECTAL; TOPICAL at 17:19

## 2020-06-12 RX ADMIN — Medication 650 MILLIGRAM(S): at 20:30

## 2020-06-12 RX ADMIN — Medication 0: at 18:21

## 2020-06-12 RX ADMIN — Medication 100 MILLIGRAM(S): at 21:38

## 2020-06-12 RX ADMIN — Medication 3 MILLILITER(S): at 12:05

## 2020-06-12 RX ADMIN — Medication 2: at 12:04

## 2020-06-12 RX ADMIN — Medication 20 MILLIGRAM(S): at 05:55

## 2020-06-12 RX ADMIN — Medication 100 MILLIGRAM(S): at 05:55

## 2020-06-12 RX ADMIN — CHLORHEXIDINE GLUCONATE 1 APPLICATION(S): 213 SOLUTION TOPICAL at 10:32

## 2020-06-12 RX ADMIN — PANTOPRAZOLE SODIUM 40 MILLIGRAM(S): 20 TABLET, DELAYED RELEASE ORAL at 08:04

## 2020-06-12 RX ADMIN — Medication 5 MILLIGRAM(S): at 17:18

## 2020-06-12 RX ADMIN — Medication 5 MILLILITER(S): at 08:02

## 2020-06-12 RX ADMIN — SODIUM CHLORIDE 25 MILLILITER(S): 9 INJECTION INTRAMUSCULAR; INTRAVENOUS; SUBCUTANEOUS at 17:19

## 2020-06-12 RX ADMIN — ARSENIC TRIOXIDE 130.5 MILLIGRAM(S): 2 INJECTION, SOLUTION INTRAVENOUS at 13:57

## 2020-06-12 RX ADMIN — Medication 1 APPLICATION(S): at 05:57

## 2020-06-12 RX ADMIN — Medication 100 MILLIGRAM(S): at 14:25

## 2020-06-12 RX ADMIN — Medication 3 MILLILITER(S): at 23:16

## 2020-06-12 RX ADMIN — Medication 1 SPRAY(S): at 05:56

## 2020-06-12 RX ADMIN — Medication 1 APPLICATION(S): at 14:23

## 2020-06-12 RX ADMIN — Medication 5 MILLILITER(S): at 20:31

## 2020-06-12 RX ADMIN — Medication 1 APPLICATION(S): at 21:39

## 2020-06-12 RX ADMIN — AER TRAVELER 1 APPLICATION(S): 0.5 SOLUTION RECTAL; TOPICAL at 05:56

## 2020-06-12 RX ADMIN — TRETINOIN 40 MILLIGRAM(S): 10 CAPSULE, LIQUID FILLED ORAL at 08:04

## 2020-06-12 RX ADMIN — Medication 5 MILLILITER(S): at 17:18

## 2020-06-12 RX ADMIN — Medication 1 APPLICATION(S): at 21:40

## 2020-06-12 RX ADMIN — Medication 25 MILLIGRAM(S): at 20:30

## 2020-06-12 RX ADMIN — Medication 5 MILLIGRAM(S): at 21:38

## 2020-06-12 RX ADMIN — Medication 1 SPRAY(S): at 14:23

## 2020-06-12 RX ADMIN — Medication 5 MILLIGRAM(S): at 05:55

## 2020-06-12 RX ADMIN — TRETINOIN 40 MILLIGRAM(S): 10 CAPSULE, LIQUID FILLED ORAL at 20:32

## 2020-06-12 NOTE — ADVANCED PRACTICE NURSE CONSULT - ASSESSMENT
Pt. a/ox4,sitting in the chair,denies any discomfort .Lab results reviewed by Dr. John during rounds, Reinforced teachings to patient about her chemo regimen well understood. Right arm basilic double lumen PICC both ports,w/ + blood return flushes easily, chemo TX. checked & verified w/ Primary Nurse . EKG done. Arsenic trioxide 11mg in 250ml NSS started at 1357, x 2 hours infusion via lowest port of NSS line into purple port from PICC line right arm through Alaris IV pump. . Primary RN aware of plan of care, Safety maintained.

## 2020-06-12 NOTE — PROGRESS NOTE ADULT - ATTENDING COMMENTS
59 y/o Khmer speaking F who is transferred for newly diagnosed low/interm risk APL.  Presented with pancytopenia, flow cytometry/bone marrow biopsy/FISH from 5/26- consistent with acute promyelocytic leukemia with PML-AWA translocation.    She was started on ATRA 5/23- day +19, GALA 5/27- day +16    -CT chest 6/6 c/w granulomatous disease (hx of TB in 1980's) pt had SOB on 6/4, now resolved.  Daily weights, diurese. Will give Hydrea 500mg if wbc>10. On Prednisone 20mg x 5 more days starting 6/8, ends on 5/12. If pt becomes SOB again, change to Dexa 10mg q12hrs  -tremors improved, unclear etiology, electrolytes WNL. Cefepime held, given Baclofen -they improved  -Check CBC, coags/fibrinogen once daily.  Goal plt >40-50, fibrinogen >150; responding to ABO-matched platelets given over 3 hrs in 1/2 unit infusions   -monitor lytes daily, keep K>4, Mg>2.  EKG twice a week  -pt had HA in May -had  CT head no contrast on 5/22 with no bleeding.  No more headaches, just a sensation of heat on the top of the head. MRI was not suggestive of any bleeding. Heat on the head is worse with platelet transfusion, on benadryl prior to platelets and PRN Fioricet   -off antibiotics 59 y/o Kazakh speaking F who is transferred for newly diagnosed low/interm risk APL.  Presented with pancytopenia, flow cytometry/bone marrow biopsy/FISH from 5/26- consistent with acute promyelocytic leukemia with PML-AWA translocation.    She was started on ATRA 5/23- day +20, GALA 5/27- day +17    -CT chest 6/6 c/w granulomatous disease (hx of TB in 1980's) pt had SOB on 6/4, now resolved.  Daily weights, diurese. Will give Hydrea 500mg if wbc>10. On Prednisone 20mg through 5/12. If pt becomes SOB again, may restart  -tremors improved, unclear etiology, electrolytes WNL. Cefepime held, given Baclofen -they improved  -Check CBC, coags/fibrinogen once daily.  Goal plt >40-50, fibrinogen >150; responding to ABO-matched platelets given over 3 hrs in 1/2 unit infusions   -monitor lytes daily, keep K>4, Mg>2.  EKG twice a week  -off antibiotics  -plan on BM bx around day 29 post GALA

## 2020-06-12 NOTE — PROGRESS NOTE ADULT - PROBLEM SELECTOR PLAN 3
Patient previously with c/o cough and SOB with stable O2 sat and concern for differentiation syndrome, now intermittent  6/5 Patient received one dose of Dexamethasone   CXR (-) for acute infiltrates and (+) atelectasis   Continue Incentive Spirometer   Lasix PRN  6/8 and 6/9 Hydrea 500mg x 1 given  6/7 CT chest with biapical pleural-parenchymal scarring with calcification, likely from prior granulomatous disease. No consolidation. (Son reports a history of TB in 1980s)  Nasal Cannula PRN  Hycodan/Tessalon perles

## 2020-06-12 NOTE — PROGRESS NOTE ADULT - PROBLEM SELECTOR PLAN 2
The patient is not neutropenic, afebrile  If febrile Pan Cx and CXR  Previous COVID-19 (+) outpatient  5/22, 5/26, 6/2 and 6/9 COVID (-)

## 2020-06-12 NOTE — PROGRESS NOTE ADULT - PROBLEM SELECTOR PLAN 6
Home regimen with Atenolol held on admission  Will restart at 25mg PO daily for increased BP and increase if needed

## 2020-06-12 NOTE — PROGRESS NOTE ADULT - PROBLEM SELECTOR PLAN 1
Continue ATRA 40mg BID (started 5/22) and Arsenic Trioxide daily (started on 5/27)  Monitor for differentiation syndrome and check EKG for QTc prolongation every Tuesday and Friday. Keep K > 4 and Mg > 2  Status post Hydrea 500mg PO x 1 on 6/8 and 6/9 for WBC >10  Follow up CBC with diff, CMP, TLS and DIC panel daily  Hyperphosphatemia: Phoslo 4 tabs PO today  Keep PLTs >50 K. If unable to achieve goal continue with platelets infusion (single donor) 1/2 units over 3 hours every 8 hours until HLA platelets available   Prednisone 20mg PO daily x 5 more days (through 6/12)  Keep Fibrinogen >150 if less give Cryo  Strict Is and Os/Daily weights/Mouth Care  Lasix 40mg IV x 1

## 2020-06-12 NOTE — PROGRESS NOTE ADULT - SUBJECTIVE AND OBJECTIVE BOX
Diagnosis: Acute Promyelocytic leukemia     Protocol/Chemo Regimen: ATRA/Arsenic     Day: ATRA Day 21 and Arsenic Day 17    Patient Slovenian speaking refusing  service. Patient's son called by patient    Patient endorses: shortness of breath at times    Review of Systems: Patient denies headache, dizziness, visual changes, chest pain, palpitations, abdominal pain, nausea, vomiting, diarrhea or dysuria.    Pain scale: 0     Diet: Regular     Allergies: No Known Allergies    HEME/ONC MEDICATIONS  arsenic trioxide IVPB (eMAR) 11 milliGRAM(s) IV Intermittent every 24 hours  tretinoin 40 milliGRAM(s) Oral every 12 hours      STANDING MEDICATIONS  albuterol/ipratropium for Nebulization 3 milliLiter(s) Nebulizer every 6 hours  ATENolol  Tablet 25 milliGRAM(s) Oral daily  baclofen 5 milliGRAM(s) Oral every 12 hours  benzonatate 100 milliGRAM(s) Oral every 8 hours  Biotene Dry Mouth Oral Rinse 5 milliLiter(s) Swish and Spit five times a day  bisacodyl 5 milliGRAM(s) Oral every 12 hours  chlorhexidine 4% Liquid 1 Application(s) Topical <User Schedule>  dextrose 5%. 1000 milliLiter(s) IV Continuous <Continuous>  dextrose 50% Injectable 12.5 Gram(s) IV Push once  dextrose 50% Injectable 25 Gram(s) IV Push once  dextrose 50% Injectable 25 Gram(s) IV Push once  hydrocortisone 2.5% Rectal Cream 1 Application(s) Rectal every 8 hours  insulin lispro (HumaLOG) corrective regimen sliding scale   SubCutaneous three times a day before meals  insulin lispro (HumaLOG) corrective regimen sliding scale   SubCutaneous at bedtime  pantoprazole    Tablet 40 milliGRAM(s) Oral before breakfast  petrolatum white Ointment 1 Application(s) Topical three times a day  polyethylene glycol 3350 17 Gram(s) Oral daily  predniSONE   Tablet 20 milliGRAM(s) Oral daily  sodium chloride 0.65% Nasal 1 Spray(s) Both Nostrils every 8 hours  sodium chloride 0.9%. 1000 milliLiter(s) IV Continuous <Continuous>  witch hazel Pads 1 Application(s) Topical every 12 hours      PRN MEDICATIONS  acetaminophen   Tablet .. 650 milliGRAM(s) Oral every 6 hours PRN  acetaminophen 325 mG/butalbital 50 mG/caffeine 40 mG 1 Tablet(s) Oral every 4 hours PRN  aluminum hydroxide/magnesium hydroxide/simethicone Suspension 30 milliLiter(s) Oral every 6 hours PRN  AQUAPHOR (petrolatum Ointment) 1 Application(s) Topical every 3 hours PRN  dextrose 40% Gel 15 Gram(s) Oral once PRN  diphenhydrAMINE   Injectable 25 milliGRAM(s) IV Push every 6 hours PRN  glucagon  Injectable 1 milliGRAM(s) IntraMuscular once PRN  hemorrhoidal Ointment 1 Application(s) Rectal every 8 hours PRN  hydrocodone/homatropine Syrup 5 milliLiter(s) Oral every 8 hours PRN  ondansetron Injectable 8 milliGRAM(s) IV Push every 8 hours PRN  sodium chloride 0.9% lock flush 10 milliLiter(s) IV Push every 1 hour PRN      Vital Signs Last 24 Hrs  T(C): 36.1 (12 Jun 2020 05:05), Max: 36.6 (11 Jun 2020 17:00)  T(F): 97 (12 Jun 2020 05:05), Max: 97.9 (11 Jun 2020 17:00)  HR: 61 (12 Jun 2020 05:05) (61 - 91)  BP: 135/70 (12 Jun 2020 05:05) (135/69 - 155/74)  RR: 16 (12 Jun 2020 05:05) (16 - 18)  SpO2: 100% (12 Jun 2020 05:05) (97% - 100%)      PHYSICAL EXAM  General: NAD  HEENT: clear oropharynx  CV: (+) S1/S2 RRR  Lungs: clear to auscultation, no wheezes or rales  Abdomen: soft, non-tender, non-distended (+) BS  Ext: no edema  Skin: no rashes   Neuro: alert and oriented X 3, no focal deficits  Central Line: C/D/I    Cultures:     Culture - Blood (05.23.20 @ 09:09)    Specimen Source: .Blood Blood-Venous    Culture Results:   No Growth Final    Culture - Blood (05.23.20 @ 09:09)    Specimen Source: .Blood Blood-Peripheral    Culture Results:   No Growth Final    LABS:                        7.9    6.30  )-----------( 30       ( 12 Jun 2020 06:49 )             24.1       Mean Cell Volume : 85.8 fl  Mean Cell Hemoglobin : 28.1 pg  Mean Cell Hemoglobin Concentration : 32.8 gm/dL  Auto Neutrophil # : x  Auto Lymphocyte # : x  Auto Monocyte # : x  Auto Eosinophil # : x  Auto Basophil # : x  Auto Neutrophil % : x  Auto Lymphocyte % : x  Auto Monocyte % : x  Auto Eosinophil % : x  Auto Basophil % : x      06-12    140  |  102  |  34<H>  ----------------------------<  109<H>  3.8   |  25  |  0.78    Ca    10.0      12 Jun 2020 06:47  Phos  4.8     06-12  Mg     2.0     06-12    TPro  7.4  /  Alb  4.5  /  TBili  0.4  /  DBili  x   /  AST  48<H>  /  ALT  66<H>  /  AlkPhos  103  06-12      PT/INR - ( 12 Jun 2020 06:48 )   PT: 13.1 sec;   INR: 1.14 ratio    PTT - ( 12 Jun 2020 06:48 )  PTT:28.6 sec      Uric Acid 3.9      RADIOLOGY & ADDITIONAL STUDIES:    from: Xray Chest 1 View- PORTABLE-Urgent (06.09.20 @ 15:22)   Impression:  Poor inspiratory effort. The heart is slightly enlarged. The lungs are clear. A PICC line was placed on the right and the tip is in superior vena cava. No pneumothorax.

## 2020-06-12 NOTE — PROGRESS NOTE ADULT - ASSESSMENT
This is a 59 yo Romanian speaking F with PMHx of HTN, HLD, T2DM, COVID-19 infection admitted for management of newly diagnosed APL. The patient is currently reciving ATRA and Arsenic.  Patients hospital course has been complicated by transaminitis and refractory thrombocytopenia. The patient has pancytopenia 2/2 disease and/or ATRA/Arsenic.

## 2020-06-13 LAB
ALBUMIN SERPL ELPH-MCNC: 4.4 G/DL — SIGNIFICANT CHANGE UP (ref 3.3–5)
ALP SERPL-CCNC: 109 U/L — SIGNIFICANT CHANGE UP (ref 40–120)
ALT FLD-CCNC: 78 U/L — HIGH (ref 10–45)
ANION GAP SERPL CALC-SCNC: 11 MMOL/L — SIGNIFICANT CHANGE UP (ref 5–17)
AST SERPL-CCNC: 56 U/L — HIGH (ref 10–40)
BASOPHILS # BLD AUTO: 0 K/UL — SIGNIFICANT CHANGE UP (ref 0–0.2)
BASOPHILS NFR BLD AUTO: 0 % — SIGNIFICANT CHANGE UP (ref 0–2)
BILIRUB SERPL-MCNC: 0.4 MG/DL — SIGNIFICANT CHANGE UP (ref 0.2–1.2)
BUN SERPL-MCNC: 30 MG/DL — HIGH (ref 7–23)
CALCIUM SERPL-MCNC: 9.6 MG/DL — SIGNIFICANT CHANGE UP (ref 8.4–10.5)
CHLORIDE SERPL-SCNC: 106 MMOL/L — SIGNIFICANT CHANGE UP (ref 96–108)
CO2 SERPL-SCNC: 23 MMOL/L — SIGNIFICANT CHANGE UP (ref 22–31)
CREAT SERPL-MCNC: 0.75 MG/DL — SIGNIFICANT CHANGE UP (ref 0.5–1.3)
D DIMER BLD IA.RAPID-MCNC: 452 NG/ML DDU — HIGH
EOSINOPHIL # BLD AUTO: 0.04 K/UL — SIGNIFICANT CHANGE UP (ref 0–0.5)
EOSINOPHIL NFR BLD AUTO: 0.9 % — SIGNIFICANT CHANGE UP (ref 0–6)
FIBRINOGEN PPP-MCNC: 296 MG/DL — LOW (ref 350–510)
GLUCOSE BLDC GLUCOMTR-MCNC: 119 MG/DL — HIGH (ref 70–99)
GLUCOSE BLDC GLUCOMTR-MCNC: 129 MG/DL — HIGH (ref 70–99)
GLUCOSE BLDC GLUCOMTR-MCNC: 147 MG/DL — HIGH (ref 70–99)
GLUCOSE BLDC GLUCOMTR-MCNC: 172 MG/DL — HIGH (ref 70–99)
GLUCOSE SERPL-MCNC: 120 MG/DL — HIGH (ref 70–99)
HCT VFR BLD CALC: 23 % — LOW (ref 34.5–45)
HGB BLD-MCNC: 7.3 G/DL — LOW (ref 11.5–15.5)
LDH SERPL L TO P-CCNC: 211 U/L — SIGNIFICANT CHANGE UP (ref 50–242)
LYMPHOCYTES # BLD AUTO: 2.27 K/UL — SIGNIFICANT CHANGE UP (ref 1–3.3)
LYMPHOCYTES # BLD AUTO: 53.1 % — HIGH (ref 13–44)
MAGNESIUM SERPL-MCNC: 2 MG/DL — SIGNIFICANT CHANGE UP (ref 1.6–2.6)
MANUAL SMEAR VERIFICATION: SIGNIFICANT CHANGE UP
MCHC RBC-ENTMCNC: 27.7 PG — SIGNIFICANT CHANGE UP (ref 27–34)
MCHC RBC-ENTMCNC: 31.7 GM/DL — LOW (ref 32–36)
MCV RBC AUTO: 87.1 FL — SIGNIFICANT CHANGE UP (ref 80–100)
METAMYELOCYTES # FLD: 3.5 % — HIGH (ref 0–0)
MONOCYTES # BLD AUTO: 0.08 K/UL — SIGNIFICANT CHANGE UP (ref 0–0.9)
MONOCYTES NFR BLD AUTO: 1.8 % — LOW (ref 2–14)
MYELOCYTES NFR BLD: 7.1 % — HIGH (ref 0–0)
NEUTROPHILS # BLD AUTO: 1.44 K/UL — LOW (ref 1.8–7.4)
NEUTROPHILS NFR BLD AUTO: 31.8 % — LOW (ref 43–77)
NEUTS BAND # BLD: 1.8 % — SIGNIFICANT CHANGE UP (ref 0–8)
PHOSPHATE SERPL-MCNC: 4.5 MG/DL — SIGNIFICANT CHANGE UP (ref 2.5–4.5)
PLAT MORPH BLD: NORMAL — SIGNIFICANT CHANGE UP
PLATELET # BLD AUTO: 64 K/UL — LOW (ref 150–400)
POTASSIUM SERPL-MCNC: 4.5 MMOL/L — SIGNIFICANT CHANGE UP (ref 3.5–5.3)
POTASSIUM SERPL-SCNC: 4.5 MMOL/L — SIGNIFICANT CHANGE UP (ref 3.5–5.3)
PROT SERPL-MCNC: 7.4 G/DL — SIGNIFICANT CHANGE UP (ref 6–8.3)
RBC # BLD: 2.64 M/UL — LOW (ref 3.8–5.2)
RBC # FLD: 17.3 % — HIGH (ref 10.3–14.5)
RBC BLD AUTO: SIGNIFICANT CHANGE UP
SODIUM SERPL-SCNC: 140 MMOL/L — SIGNIFICANT CHANGE UP (ref 135–145)
URATE SERPL-MCNC: 3.8 MG/DL — SIGNIFICANT CHANGE UP (ref 2.5–7)
WBC # BLD: 4.28 K/UL — SIGNIFICANT CHANGE UP (ref 3.8–10.5)
WBC # FLD AUTO: 4.28 K/UL — SIGNIFICANT CHANGE UP (ref 3.8–10.5)

## 2020-06-13 PROCEDURE — 99232 SBSQ HOSP IP/OBS MODERATE 35: CPT

## 2020-06-13 RX ADMIN — TRETINOIN 40 MILLIGRAM(S): 10 CAPSULE, LIQUID FILLED ORAL at 19:42

## 2020-06-13 RX ADMIN — AER TRAVELER 1 APPLICATION(S): 0.5 SOLUTION RECTAL; TOPICAL at 05:36

## 2020-06-13 RX ADMIN — Medication 1 APPLICATION(S): at 21:20

## 2020-06-13 RX ADMIN — Medication 1 SPRAY(S): at 05:40

## 2020-06-13 RX ADMIN — AER TRAVELER 1 APPLICATION(S): 0.5 SOLUTION RECTAL; TOPICAL at 17:08

## 2020-06-13 RX ADMIN — Medication 5 MILLILITER(S): at 15:54

## 2020-06-13 RX ADMIN — Medication 3 MILLILITER(S): at 23:27

## 2020-06-13 RX ADMIN — Medication 5 MILLILITER(S): at 11:14

## 2020-06-13 RX ADMIN — Medication 5 MILLIGRAM(S): at 09:06

## 2020-06-13 RX ADMIN — Medication 1: at 16:39

## 2020-06-13 RX ADMIN — Medication 100 MILLIGRAM(S): at 13:41

## 2020-06-13 RX ADMIN — Medication 100 MILLIGRAM(S): at 05:38

## 2020-06-13 RX ADMIN — ARSENIC TRIOXIDE 130.5 MILLIGRAM(S): 2 INJECTION, SOLUTION INTRAVENOUS at 13:58

## 2020-06-13 RX ADMIN — Medication 1 APPLICATION(S): at 13:42

## 2020-06-13 RX ADMIN — Medication 5 MILLILITER(S): at 19:41

## 2020-06-13 RX ADMIN — TRETINOIN 40 MILLIGRAM(S): 10 CAPSULE, LIQUID FILLED ORAL at 09:29

## 2020-06-13 RX ADMIN — Medication 1 APPLICATION(S): at 05:40

## 2020-06-13 RX ADMIN — Medication 5 MILLIGRAM(S): at 05:38

## 2020-06-13 RX ADMIN — PANTOPRAZOLE SODIUM 40 MILLIGRAM(S): 20 TABLET, DELAYED RELEASE ORAL at 05:42

## 2020-06-13 RX ADMIN — Medication 1 APPLICATION(S): at 21:21

## 2020-06-13 RX ADMIN — Medication 3 MILLILITER(S): at 05:37

## 2020-06-13 RX ADMIN — ATENOLOL 25 MILLIGRAM(S): 25 TABLET ORAL at 05:38

## 2020-06-13 RX ADMIN — Medication 3 MILLILITER(S): at 17:08

## 2020-06-13 RX ADMIN — Medication 1 SPRAY(S): at 21:20

## 2020-06-13 RX ADMIN — Medication 3 MILLILITER(S): at 11:14

## 2020-06-13 RX ADMIN — Medication 1 APPLICATION(S): at 05:41

## 2020-06-13 RX ADMIN — Medication 5 MILLIGRAM(S): at 17:08

## 2020-06-13 RX ADMIN — Medication 1 SPRAY(S): at 13:42

## 2020-06-13 RX ADMIN — SODIUM CHLORIDE 25 MILLILITER(S): 9 INJECTION INTRAMUSCULAR; INTRAVENOUS; SUBCUTANEOUS at 05:40

## 2020-06-13 RX ADMIN — Medication 20 MILLIGRAM(S): at 05:38

## 2020-06-13 RX ADMIN — Medication 100 MILLIGRAM(S): at 21:13

## 2020-06-13 NOTE — PROGRESS NOTE ADULT - PROBLEM SELECTOR PLAN 1
Continue ATRA 40mg BID (started 5/22) and Arsenic Trioxide daily (started on 5/27)  Monitor for differentiation syndrome and check EKG for QTc prolongation every Tuesday and Friday. Keep K > 4 and Mg > 2  Status post Hydrea 500mg PO x 1 on 6/8 and 6/9 for WBC >10  Follow up CBC with diff, CMP, TLS and DIC panel daily  Hyperphosphatemia: Phoslo 4 tabs PO today  Keep PLTs >50 K. If unable to achieve goal continue with platelets infusion (single donor) 1/2 units over 3 hours every 8 hours until HLA platelets available   Prednisone 20mg PO daily x 5 more days (through 6/12)  Keep Fibrinogen >150 if less give Cryo  Strict Is and Os/Daily weights/Mouth Care  Lasix 40mg IV x 1 Continue ATRA 40mg BID (started 5/22) and Arsenic Trioxide daily (started on 5/27)  Monitor for differentiation syndrome and check EKG for QTc prolongation every Tuesday and Friday. Keep K > 4 and Mg > 2  Status post Hydrea 500mg PO x 1 on 6/8 and 6/9 for WBC >10  Follow up CBC with diff, CMP, TLS and DIC panel daily  Hyperphosphatemia: Phoslo 4 tabs PO   Keep PLTs >50 K. If unable to achieve goal continue with platelets infusion (single donor) 1/2 units over 3 hours every 8 hours until HLA platelets available   Prednisone 20mg PO daily x 5 more days (through 6/12)  Keep Fibrinogen >150 if less give Cryo  Strict Is and Os/Daily weights/Mouth Care

## 2020-06-13 NOTE — ADVANCED PRACTICE NURSE CONSULT - ASSESSMENT
Lab results reviwed by Dr. Hurd. Reinforced teachings to patient about her chemo regimen well understood. Right arm ac red port accessed,patent. Denies chestpain,nausea. 2 RNs verification completed. Arsenic 11mg in 250ml D5W started at 1358 x 2 hours infusion at 131ml/hr via lowest port of NSS line through red port of right arm PICC line. Patent. Primary RN aware of plan of care. Safety maintained.

## 2020-06-13 NOTE — PROGRESS NOTE ADULT - SUBJECTIVE AND OBJECTIVE BOX
Diagnosis: Acute Promyelocytic leukemia     Protocol/Chemo Regimen: ATRA/Arsenic     Day: ATRA Day 22 and Arsenic Day 18    Patient Polish speaking refusing  service. Patient's son called by patient    Patient endorses: shortness of breath at times    Review of Systems: Patient denies headache, dizziness, visual changes, chest pain, palpitations, abdominal pain, nausea, vomiting, diarrhea or dysuria.    Pain scale: 0     Diet: Regular     Allergies: No Known Allergies      HEME/ONC MEDICATIONS  arsenic trioxide IVPB (eMAR) 11 milliGRAM(s) IV Intermittent every 24 hours  tretinoin 40 milliGRAM(s) Oral every 12 hours      STANDING MEDICATIONS  albuterol/ipratropium for Nebulization 3 milliLiter(s) Nebulizer every 6 hours  ATENolol  Tablet 25 milliGRAM(s) Oral daily  baclofen 5 milliGRAM(s) Oral every 12 hours  benzonatate 100 milliGRAM(s) Oral every 8 hours  Biotene Dry Mouth Oral Rinse 5 milliLiter(s) Swish and Spit five times a day  bisacodyl 5 milliGRAM(s) Oral every 12 hours  chlorhexidine 4% Liquid 1 Application(s) Topical <User Schedule>  dextrose 5%. 1000 milliLiter(s) IV Continuous <Continuous>  dextrose 50% Injectable 12.5 Gram(s) IV Push once  dextrose 50% Injectable 25 Gram(s) IV Push once  dextrose 50% Injectable 25 Gram(s) IV Push once  hydrocortisone 2.5% Rectal Cream 1 Application(s) Rectal every 8 hours  insulin lispro (HumaLOG) corrective regimen sliding scale   SubCutaneous three times a day before meals  insulin lispro (HumaLOG) corrective regimen sliding scale   SubCutaneous at bedtime  pantoprazole    Tablet 40 milliGRAM(s) Oral before breakfast  petrolatum white Ointment 1 Application(s) Topical three times a day  polyethylene glycol 3350 17 Gram(s) Oral daily  sodium chloride 0.65% Nasal 1 Spray(s) Both Nostrils every 8 hours  sodium chloride 0.9%. 1000 milliLiter(s) IV Continuous <Continuous>  witch hazel Pads 1 Application(s) Topical every 12 hours      PRN MEDICATIONS  acetaminophen   Tablet .. 650 milliGRAM(s) Oral every 6 hours PRN  acetaminophen 325 mG/butalbital 50 mG/caffeine 40 mG 1 Tablet(s) Oral every 4 hours PRN  aluminum hydroxide/magnesium hydroxide/simethicone Suspension 30 milliLiter(s) Oral every 6 hours PRN  AQUAPHOR (petrolatum Ointment) 1 Application(s) Topical every 3 hours PRN  dextrose 40% Gel 15 Gram(s) Oral once PRN  diphenhydrAMINE   Injectable 25 milliGRAM(s) IV Push every 6 hours PRN  glucagon  Injectable 1 milliGRAM(s) IntraMuscular once PRN  hemorrhoidal Ointment 1 Application(s) Rectal every 8 hours PRN  hydrocodone/homatropine Syrup 5 milliLiter(s) Oral every 8 hours PRN  ondansetron Injectable 8 milliGRAM(s) IV Push every 8 hours PRN  sodium chloride 0.9% lock flush 10 milliLiter(s) IV Push every 1 hour PRN      Vital Signs Last 24 Hrs  T(C): 35.8 (13 Jun 2020 05:25), Max: 36.4 (12 Jun 2020 09:18)  T(F): 96.5 (13 Jun 2020 05:25), Max: 97.6 (12 Jun 2020 09:18)  HR: 67 (13 Jun 2020 05:25) (60 - 73)  BP: 120/68 (13 Jun 2020 05:25) (115/69 - 141/70)  RR: 18 (13 Jun 2020 05:25) (18 - 18)  SpO2: 98% (13 Jun 2020 05:25) (98% - 100%)    PHYSICAL EXAM  General: NAD  HEENT: clear oropharynx  CV: (+) S1/S2 RRR  Lungs: clear to auscultation, no wheezes or rales  Abdomen: soft, non-tender, non-distended (+) BS  Ext: no edema  Skin: no rashes   Neuro: alert and oriented X 3, no focal deficits  Central Line: C/D/I    Cultures:     Culture - Blood (05.23.20 @ 09:09)    Specimen Source: .Blood Blood-Venous    Culture Results:   No Growth Final    Culture - Blood (05.23.20 @ 09:09)    Specimen Source: .Blood Blood-Peripheral    Culture Results:   No Growth Final    LABS:                     RADIOLOGY & ADDITIONAL STUDIES:    from: Xray Chest 1 View- PORTABLE-Urgent (06.09.20 @ 15:22)   Impression:  Poor inspiratory effort. The heart is slightly enlarged. The lungs are clear. A PICC line was placed on the right and the tip is in superior vena cava. No pneumothorax.

## 2020-06-13 NOTE — PROGRESS NOTE ADULT - ATTENDING COMMENTS
59 y/o Albanian speaking F who is transferred for newly diagnosed low/interm risk APL.  Presented with pancytopenia, flow cytometry/bone marrow biopsy/FISH from 5/26- consistent with acute promyelocytic leukemia with PML-AWA translocation.    She was started on ATRA 5/23- day +20, GALA 5/27- day +17    -CT chest 6/6 c/w granulomatous disease (hx of TB in 1980's) pt had SOB on 6/4, now resolved.  Daily weights, diurese. Will give Hydrea 500mg if wbc>10. On Prednisone 20mg through 5/12. If pt becomes SOB again, may restart  -tremors improved, unclear etiology, electrolytes WNL. Cefepime held, given Baclofen -they improved  -Check CBC, coags/fibrinogen once daily.  Goal plt >40-50, fibrinogen >150; responding to ABO-matched platelets given over 3 hrs in 1/2 unit infusions   -monitor lytes daily, keep K>4, Mg>2.  EKG twice a week  -off antibiotics  -plan on BM bx around day 29 post GALA 59 y/o Lithuanian speaking F who is transferred for newly diagnosed low/interm risk APL.  Presented with pancytopenia, flow cytometry/bone marrow biopsy/FISH from 5/26- consistent with acute promyelocytic leukemia with PML-AWA translocation.    She was started on ATRA 5/23- day +21, GALA 5/27- day +18.    -CT chest 6/6 c/w granulomatous disease (hx of TB in 1980's) pt had SOB on 6/4, now resolved.  Daily weights, diurese. Will give Hydrea 500mg if wbc>10. On Prednisone 20mg through 5/12- now off.  If pt becomes SOB again, may restart  -tremors improved, unclear etiology, electrolytes WNL. Cefepime held, given Baclofen -they improved  -Check CBC, coags/fibrinogen once daily.  Goal plt >40-50, fibrinogen >150; responding to ABO-matched platelets given over 3 hrs in 1/2 unit infusions   -monitor lytes daily, keep K>4, Mg>2.  EKG twice a week  -off antibiotics  -plan on BM bx around day 29 post GALA

## 2020-06-13 NOTE — PROGRESS NOTE ADULT - ASSESSMENT
This is a 59 yo Ukrainian speaking F with PMHx of HTN, HLD, T2DM, COVID-19 infection admitted for management of newly diagnosed APL. The patient is currently reciving ATRA and Arsenic.  Patients hospital course has been complicated by transaminitis and refractory thrombocytopenia. The patient has pancytopenia 2/2 disease and/or ATRA/Arsenic.

## 2020-06-14 LAB
ALBUMIN SERPL ELPH-MCNC: 4.3 G/DL — SIGNIFICANT CHANGE UP (ref 3.3–5)
ALP SERPL-CCNC: 101 U/L — SIGNIFICANT CHANGE UP (ref 40–120)
ALT FLD-CCNC: 71 U/L — HIGH (ref 10–45)
ANION GAP SERPL CALC-SCNC: 13 MMOL/L — SIGNIFICANT CHANGE UP (ref 5–17)
AST SERPL-CCNC: 44 U/L — HIGH (ref 10–40)
BASOPHILS # BLD AUTO: 0 K/UL — SIGNIFICANT CHANGE UP (ref 0–0.2)
BASOPHILS NFR BLD AUTO: 0 % — SIGNIFICANT CHANGE UP (ref 0–2)
BILIRUB SERPL-MCNC: 0.3 MG/DL — SIGNIFICANT CHANGE UP (ref 0.2–1.2)
BUN SERPL-MCNC: 24 MG/DL — HIGH (ref 7–23)
CALCIUM SERPL-MCNC: 9.8 MG/DL — SIGNIFICANT CHANGE UP (ref 8.4–10.5)
CHLORIDE SERPL-SCNC: 106 MMOL/L — SIGNIFICANT CHANGE UP (ref 96–108)
CO2 SERPL-SCNC: 21 MMOL/L — LOW (ref 22–31)
CREAT SERPL-MCNC: 0.64 MG/DL — SIGNIFICANT CHANGE UP (ref 0.5–1.3)
D DIMER BLD IA.RAPID-MCNC: 594 NG/ML DDU — HIGH
EOSINOPHIL # BLD AUTO: 0 K/UL — SIGNIFICANT CHANGE UP (ref 0–0.5)
EOSINOPHIL NFR BLD AUTO: 0 % — SIGNIFICANT CHANGE UP (ref 0–6)
FIBRINOGEN PPP-MCNC: 282 MG/DL — LOW (ref 350–510)
GLUCOSE BLDC GLUCOMTR-MCNC: 102 MG/DL — HIGH (ref 70–99)
GLUCOSE BLDC GLUCOMTR-MCNC: 113 MG/DL — HIGH (ref 70–99)
GLUCOSE BLDC GLUCOMTR-MCNC: 154 MG/DL — HIGH (ref 70–99)
GLUCOSE BLDC GLUCOMTR-MCNC: 157 MG/DL — HIGH (ref 70–99)
GLUCOSE SERPL-MCNC: 108 MG/DL — HIGH (ref 70–99)
HCT VFR BLD CALC: 22.4 % — LOW (ref 34.5–45)
HCT VFR BLD CALC: 23.3 % — LOW (ref 34.5–45)
HGB BLD-MCNC: 7.4 G/DL — LOW (ref 11.5–15.5)
HGB BLD-MCNC: 7.7 G/DL — LOW (ref 11.5–15.5)
LDH SERPL L TO P-CCNC: 200 U/L — SIGNIFICANT CHANGE UP (ref 50–242)
LYMPHOCYTES # BLD AUTO: 2.48 K/UL — SIGNIFICANT CHANGE UP (ref 1–3.3)
LYMPHOCYTES # BLD AUTO: 57 % — HIGH (ref 13–44)
MAGNESIUM SERPL-MCNC: 2 MG/DL — SIGNIFICANT CHANGE UP (ref 1.6–2.6)
MANUAL SMEAR VERIFICATION: SIGNIFICANT CHANGE UP
MCHC RBC-ENTMCNC: 28.4 PG — SIGNIFICANT CHANGE UP (ref 27–34)
MCHC RBC-ENTMCNC: 28.4 PG — SIGNIFICANT CHANGE UP (ref 27–34)
MCHC RBC-ENTMCNC: 33 GM/DL — SIGNIFICANT CHANGE UP (ref 32–36)
MCHC RBC-ENTMCNC: 33 GM/DL — SIGNIFICANT CHANGE UP (ref 32–36)
MCV RBC AUTO: 85.8 FL — SIGNIFICANT CHANGE UP (ref 80–100)
MCV RBC AUTO: 86 FL — SIGNIFICANT CHANGE UP (ref 80–100)
METAMYELOCYTES # FLD: 5 % — HIGH (ref 0–0)
MONOCYTES # BLD AUTO: 0.17 K/UL — SIGNIFICANT CHANGE UP (ref 0–0.9)
MONOCYTES NFR BLD AUTO: 4 % — SIGNIFICANT CHANGE UP (ref 2–14)
MYELOCYTES NFR BLD: 2 % — HIGH (ref 0–0)
NEUTROPHILS # BLD AUTO: 1.35 K/UL — LOW (ref 1.8–7.4)
NEUTROPHILS NFR BLD AUTO: 29 % — LOW (ref 43–77)
NEUTS BAND # BLD: 2 % — SIGNIFICANT CHANGE UP (ref 0–8)
NRBC # BLD: 0 /100 WBCS — SIGNIFICANT CHANGE UP (ref 0–0)
NRBC # BLD: 0 /100 — SIGNIFICANT CHANGE UP (ref 0–0)
PHOSPHATE SERPL-MCNC: 4.2 MG/DL — SIGNIFICANT CHANGE UP (ref 2.5–4.5)
PLAT MORPH BLD: NORMAL — SIGNIFICANT CHANGE UP
PLATELET # BLD AUTO: 37 K/UL — LOW (ref 150–400)
PLATELET # BLD AUTO: 37 K/UL — LOW (ref 150–400)
POTASSIUM SERPL-MCNC: 4.1 MMOL/L — SIGNIFICANT CHANGE UP (ref 3.5–5.3)
POTASSIUM SERPL-SCNC: 4.1 MMOL/L — SIGNIFICANT CHANGE UP (ref 3.5–5.3)
PROT SERPL-MCNC: 7 G/DL — SIGNIFICANT CHANGE UP (ref 6–8.3)
RBC # BLD: 2.61 M/UL — LOW (ref 3.8–5.2)
RBC # BLD: 2.71 M/UL — LOW (ref 3.8–5.2)
RBC # FLD: 17.2 % — HIGH (ref 10.3–14.5)
RBC # FLD: 17.2 % — HIGH (ref 10.3–14.5)
RBC BLD AUTO: SIGNIFICANT CHANGE UP
SODIUM SERPL-SCNC: 140 MMOL/L — SIGNIFICANT CHANGE UP (ref 135–145)
URATE SERPL-MCNC: 3.4 MG/DL — SIGNIFICANT CHANGE UP (ref 2.5–7)
VARIANT LYMPHS # BLD: 1 % — SIGNIFICANT CHANGE UP (ref 0–6)
WBC # BLD: 4.34 K/UL — SIGNIFICANT CHANGE UP (ref 3.8–10.5)
WBC # BLD: 4.35 K/UL — SIGNIFICANT CHANGE UP (ref 3.8–10.5)
WBC # FLD AUTO: 4.34 K/UL — SIGNIFICANT CHANGE UP (ref 3.8–10.5)
WBC # FLD AUTO: 4.35 K/UL — SIGNIFICANT CHANGE UP (ref 3.8–10.5)

## 2020-06-14 PROCEDURE — 99232 SBSQ HOSP IP/OBS MODERATE 35: CPT

## 2020-06-14 RX ADMIN — AER TRAVELER 1 APPLICATION(S): 0.5 SOLUTION RECTAL; TOPICAL at 05:35

## 2020-06-14 RX ADMIN — Medication 5 MILLIGRAM(S): at 18:16

## 2020-06-14 RX ADMIN — TRETINOIN 40 MILLIGRAM(S): 10 CAPSULE, LIQUID FILLED ORAL at 20:07

## 2020-06-14 RX ADMIN — AER TRAVELER 1 APPLICATION(S): 0.5 SOLUTION RECTAL; TOPICAL at 17:03

## 2020-06-14 RX ADMIN — Medication 1 APPLICATION(S): at 05:38

## 2020-06-14 RX ADMIN — Medication 1 SPRAY(S): at 13:53

## 2020-06-14 RX ADMIN — Medication 3 MILLILITER(S): at 05:37

## 2020-06-14 RX ADMIN — Medication 25 MILLIGRAM(S): at 10:36

## 2020-06-14 RX ADMIN — PANTOPRAZOLE SODIUM 40 MILLIGRAM(S): 20 TABLET, DELAYED RELEASE ORAL at 05:37

## 2020-06-14 RX ADMIN — ATENOLOL 25 MILLIGRAM(S): 25 TABLET ORAL at 05:37

## 2020-06-14 RX ADMIN — Medication 100 MILLIGRAM(S): at 05:38

## 2020-06-14 RX ADMIN — ARSENIC TRIOXIDE 130.5 MILLIGRAM(S): 2 INJECTION, SOLUTION INTRAVENOUS at 13:53

## 2020-06-14 RX ADMIN — Medication 5 MILLILITER(S): at 11:01

## 2020-06-14 RX ADMIN — Medication 1 SPRAY(S): at 21:32

## 2020-06-14 RX ADMIN — Medication 1 APPLICATION(S): at 21:32

## 2020-06-14 RX ADMIN — Medication 5 MILLIGRAM(S): at 05:37

## 2020-06-14 RX ADMIN — Medication 1 APPLICATION(S): at 13:53

## 2020-06-14 RX ADMIN — Medication 5 MILLILITER(S): at 20:06

## 2020-06-14 RX ADMIN — Medication 1: at 17:37

## 2020-06-14 RX ADMIN — Medication 1 SPRAY(S): at 05:38

## 2020-06-14 RX ADMIN — TRETINOIN 40 MILLIGRAM(S): 10 CAPSULE, LIQUID FILLED ORAL at 07:54

## 2020-06-14 RX ADMIN — Medication 5 MILLILITER(S): at 07:54

## 2020-06-14 RX ADMIN — Medication 1 APPLICATION(S): at 05:39

## 2020-06-14 RX ADMIN — Medication 100 MILLIGRAM(S): at 21:31

## 2020-06-14 RX ADMIN — Medication 100 MILLIGRAM(S): at 13:52

## 2020-06-14 RX ADMIN — Medication 5 MILLILITER(S): at 17:03

## 2020-06-14 RX ADMIN — Medication 650 MILLIGRAM(S): at 21:33

## 2020-06-14 RX ADMIN — SODIUM CHLORIDE 25 MILLILITER(S): 9 INJECTION INTRAMUSCULAR; INTRAVENOUS; SUBCUTANEOUS at 05:39

## 2020-06-14 RX ADMIN — Medication 3 MILLILITER(S): at 17:05

## 2020-06-14 RX ADMIN — Medication 25 MILLIGRAM(S): at 21:34

## 2020-06-14 RX ADMIN — Medication 3 MILLILITER(S): at 11:02

## 2020-06-14 RX ADMIN — Medication 650 MILLIGRAM(S): at 08:56

## 2020-06-14 RX ADMIN — Medication 1 APPLICATION(S): at 21:36

## 2020-06-14 NOTE — PROGRESS NOTE ADULT - SUBJECTIVE AND OBJECTIVE BOX
Diagnosis: Acute Promyelocytic leukemia     Protocol/Chemo Regimen: ATRA/Arsenic     Day: ATRA Day 23 and Arsenic Day 19    Patient Czech speaking refusing  service. Patient's son called by patient    Patient endorses: shortness of breath at times    Review of Systems: Patient denies headache, dizziness, visual changes, chest pain, palpitations, abdominal pain, nausea, vomiting, diarrhea or dysuria.    Pain scale: 0     Diet: Regular     Allergies: No Known Allergies    HEME/ONC MEDICATIONS  arsenic trioxide IVPB (eMAR) 11 milliGRAM(s) IV Intermittent every 24 hours  tretinoin 40 milliGRAM(s) Oral every 12 hours      STANDING MEDICATIONS  albuterol/ipratropium for Nebulization 3 milliLiter(s) Nebulizer every 6 hours  ATENolol  Tablet 25 milliGRAM(s) Oral daily  baclofen 5 milliGRAM(s) Oral every 12 hours  benzonatate 100 milliGRAM(s) Oral every 8 hours  Biotene Dry Mouth Oral Rinse 5 milliLiter(s) Swish and Spit five times a day  bisacodyl 5 milliGRAM(s) Oral every 12 hours  dextrose 5%. 1000 milliLiter(s) IV Continuous <Continuous>  dextrose 50% Injectable 12.5 Gram(s) IV Push once  dextrose 50% Injectable 25 Gram(s) IV Push once  dextrose 50% Injectable 25 Gram(s) IV Push once  hydrocortisone 2.5% Rectal Cream 1 Application(s) Rectal every 8 hours  insulin lispro (HumaLOG) corrective regimen sliding scale   SubCutaneous three times a day before meals  insulin lispro (HumaLOG) corrective regimen sliding scale   SubCutaneous at bedtime  pantoprazole    Tablet 40 milliGRAM(s) Oral before breakfast  petrolatum white Ointment 1 Application(s) Topical three times a day  polyethylene glycol 3350 17 Gram(s) Oral daily  sodium chloride 0.65% Nasal 1 Spray(s) Both Nostrils every 8 hours  sodium chloride 0.9%. 1000 milliLiter(s) IV Continuous <Continuous>  witch hazel Pads 1 Application(s) Topical every 12 hours      PRN MEDICATIONS  acetaminophen   Tablet .. 650 milliGRAM(s) Oral every 6 hours PRN  acetaminophen 325 mG/butalbital 50 mG/caffeine 40 mG 1 Tablet(s) Oral every 4 hours PRN  aluminum hydroxide/magnesium hydroxide/simethicone Suspension 30 milliLiter(s) Oral every 6 hours PRN  AQUAPHOR (petrolatum Ointment) 1 Application(s) Topical every 3 hours PRN  dextrose 40% Gel 15 Gram(s) Oral once PRN  diphenhydrAMINE   Injectable 25 milliGRAM(s) IV Push every 6 hours PRN  glucagon  Injectable 1 milliGRAM(s) IntraMuscular once PRN  hemorrhoidal Ointment 1 Application(s) Rectal every 8 hours PRN  hydrocodone/homatropine Syrup 5 milliLiter(s) Oral every 8 hours PRN  ondansetron Injectable 8 milliGRAM(s) IV Push every 8 hours PRN  sodium chloride 0.9% lock flush 10 milliLiter(s) IV Push every 1 hour PRN      Vital Signs Last 24 Hrs  T(C): 36.2 (14 Jun 2020 05:33), Max: 36.5 (13 Jun 2020 09:50)  T(F): 97.2 (14 Jun 2020 05:33), Max: 97.7 (13 Jun 2020 09:50)  HR: 69 (14 Jun 2020 05:33) (65 - 91)  BP: 150/78 (14 Jun 2020 05:33) (128/73 - 151/79)  RR: 18 (14 Jun 2020 05:33) (18 - 20)  SpO2: 98% (14 Jun 2020 05:33) (95% - 98%)    PHYSICAL EXAM  General: NAD  HEENT: clear oropharynx  CV: (+) S1/S2 RRR  Lungs: clear to auscultation, no wheezes or rales  Abdomen: soft, non-tender, non-distended (+) BS  Ext: no edema  Skin: no rashes   Neuro: alert and oriented X 3, no focal deficits  Central Line: C/D/I    Cultures:     Culture - Blood (05.23.20 @ 09:09)    Specimen Source: .Blood Blood-Venous    Culture Results:   No Growth Final    Culture - Blood (05.23.20 @ 09:09)    Specimen Source: .Blood Blood-Peripheral    Culture Results:   No Growth Final    LABS:                                   7.3    4.28  )-----------( 64       ( 13 Jun 2020 07:11 )             23.0         Mean Cell Volume : 87.1 fl  Mean Cell Hemoglobin : 27.7 pg  Mean Cell Hemoglobin Concentration : 31.7 gm/dL  Auto Neutrophil # : 1.44 K/uL  Auto Lymphocyte # : 2.27 K/uL  Auto Monocyte # : 0.08 K/uL  Auto Eosinophil # : 0.04 K/uL  Auto Basophil # : 0.00 K/uL  Auto Neutrophil % : 31.8 %  Auto Lymphocyte % : 53.1 %  Auto Monocyte % : 1.8 %  Auto Eosinophil % : 0.9 %  Auto Basophil % : 0.0 %      06-13    140  |  106  |  30<H>  ----------------------------<  120<H>  4.5   |  23  |  0.75    Ca    9.6      13 Jun 2020 07:11  Phos  4.5     06-13  Mg     2.0     06-13    TPro  7.4  /  Alb  4.4  /  TBili  0.4  /  DBili  x   /  AST  56<H>  /  ALT  78<H>  /  AlkPhos  109  06-13                  RADIOLOGY & ADDITIONAL STUDIES:    from: Xray Chest 1 View- PORTABLE-Urgent (06.09.20 @ 15:22)   Indication: PICC line placement.  Impression:  Poor inspiratory effort. The heart is slightly enlarged. The lungs are clear. A PICC line was placed on the right and the tip is in superior vena cava. No pneumothorax. Diagnosis: Acute Promyelocytic leukemia     Protocol/Chemo Regimen: ATRA/Arsenic     Day: ATRA Day 23 and Arsenic Day 19    Patient Luxembourgish speaking refusing  service. Patient's son called by patient    Patient endorses: shortness of breath at times    Review of Systems: Patient denies headache, dizziness, visual changes, chest pain, palpitations, abdominal pain, nausea, vomiting, diarrhea or dysuria.    Pain scale: 0     Diet: Regular     Allergies: No Known Allergies    HEME/ONC MEDICATIONS  arsenic trioxide IVPB (eMAR) 11 milliGRAM(s) IV Intermittent every 24 hours  tretinoin 40 milliGRAM(s) Oral every 12 hours      STANDING MEDICATIONS  albuterol/ipratropium for Nebulization 3 milliLiter(s) Nebulizer every 6 hours  ATENolol  Tablet 25 milliGRAM(s) Oral daily  baclofen 5 milliGRAM(s) Oral every 12 hours  benzonatate 100 milliGRAM(s) Oral every 8 hours  Biotene Dry Mouth Oral Rinse 5 milliLiter(s) Swish and Spit five times a day  bisacodyl 5 milliGRAM(s) Oral every 12 hours  dextrose 5%. 1000 milliLiter(s) IV Continuous <Continuous>  dextrose 50% Injectable 12.5 Gram(s) IV Push once  dextrose 50% Injectable 25 Gram(s) IV Push once  dextrose 50% Injectable 25 Gram(s) IV Push once  hydrocortisone 2.5% Rectal Cream 1 Application(s) Rectal every 8 hours  insulin lispro (HumaLOG) corrective regimen sliding scale   SubCutaneous three times a day before meals  insulin lispro (HumaLOG) corrective regimen sliding scale   SubCutaneous at bedtime  pantoprazole    Tablet 40 milliGRAM(s) Oral before breakfast  petrolatum white Ointment 1 Application(s) Topical three times a day  polyethylene glycol 3350 17 Gram(s) Oral daily  sodium chloride 0.65% Nasal 1 Spray(s) Both Nostrils every 8 hours  sodium chloride 0.9%. 1000 milliLiter(s) IV Continuous <Continuous>  witch hazel Pads 1 Application(s) Topical every 12 hours      PRN MEDICATIONS  acetaminophen   Tablet .. 650 milliGRAM(s) Oral every 6 hours PRN  acetaminophen 325 mG/butalbital 50 mG/caffeine 40 mG 1 Tablet(s) Oral every 4 hours PRN  aluminum hydroxide/magnesium hydroxide/simethicone Suspension 30 milliLiter(s) Oral every 6 hours PRN  AQUAPHOR (petrolatum Ointment) 1 Application(s) Topical every 3 hours PRN  dextrose 40% Gel 15 Gram(s) Oral once PRN  diphenhydrAMINE   Injectable 25 milliGRAM(s) IV Push every 6 hours PRN  glucagon  Injectable 1 milliGRAM(s) IntraMuscular once PRN  hemorrhoidal Ointment 1 Application(s) Rectal every 8 hours PRN  hydrocodone/homatropine Syrup 5 milliLiter(s) Oral every 8 hours PRN  ondansetron Injectable 8 milliGRAM(s) IV Push every 8 hours PRN  sodium chloride 0.9% lock flush 10 milliLiter(s) IV Push every 1 hour PRN      Vital Signs Last 24 Hrs  T(C): 36.2 (14 Jun 2020 05:33), Max: 36.5 (13 Jun 2020 09:50)  T(F): 97.2 (14 Jun 2020 05:33), Max: 97.7 (13 Jun 2020 09:50)  HR: 69 (14 Jun 2020 05:33) (65 - 91)  BP: 150/78 (14 Jun 2020 05:33) (128/73 - 151/79)  RR: 18 (14 Jun 2020 05:33) (18 - 20)  SpO2: 98% (14 Jun 2020 05:33) (95% - 98%)    PHYSICAL EXAM  General: NAD  HEENT: clear oropharynx  CV: (+) S1/S2 RRR  Lungs: clear to auscultation, no wheezes or rales  Abdomen: soft, non-tender, non-distended (+) BS  Ext: no edema  Skin: no rashes   Neuro: alert and oriented X 3, no focal deficits  Central Line: C/D/I    Cultures:     Culture - Blood (05.23.20 @ 09:09)    Specimen Source: .Blood Blood-Venous    Culture Results:   No Growth Final    Culture - Blood (05.23.20 @ 09:09)    Specimen Source: .Blood Blood-Peripheral    Culture Results:   No Growth Final    LABS:                               7.4    4.35  )-----------( 37       ( 14 Jun 2020 07:36 )             22.4     14 Jun 2020 07:36    140    |  106    |  24     ----------------------------<  108    4.1     |  21     |  0.64     Ca    9.8        14 Jun 2020 07:36  Phos  4.2       14 Jun 2020 07:36  Mg     2.0       14 Jun 2020 07:36    TPro  7.0    /  Alb  4.3    /  TBili  0.3    /  DBili  x      /  AST  44     /  ALT  71     /  AlkPhos  101    14 Jun 2020 07:36      CAPILLARY BLOOD GLUCOSE  POCT Blood Glucose.: 113 mg/dL (14 Jun 2020 08:38)  POCT Blood Glucose.: 129 mg/dL (13 Jun 2020 21:32)  POCT Blood Glucose.: 172 mg/dL (13 Jun 2020 16:19)  POCT Blood Glucose.: 147 mg/dL (13 Jun 2020 12:12)    LIVER FUNCTIONS - ( 14 Jun 2020 07:36 )  Alb: 4.3 g/dL / Pro: 7.0 g/dL / ALK PHOS: 101 U/L / ALT: 71 U/L / AST: 44 U/L / GGT: x             RADIOLOGY & ADDITIONAL STUDIES:    from: Xray Chest 1 View- PORTABLE-Urgent (06.09.20 @ 15:22)   Indication: PICC line placement.  Impression:  Poor inspiratory effort. The heart is slightly enlarged. The lungs are clear. A PICC line was placed on the right and the tip is in superior vena cava. No pneumothorax. Diagnosis: Acute Promyelocytic leukemia     Protocol/Chemo Regimen: ATRA/Arsenic     Day: ATRA Day 23 and Arsenic Day 19    Patient Occitan speaking refusing  service. Patient's son called by patient    Patient endorses: no complaints    Review of Systems: Patient denies headache, dizziness, visual changes, chest pain, palpitations, abdominal pain, nausea, vomiting, diarrhea or dysuria.    Pain scale: 0     Diet: Regular     Allergies: No Known Allergies    HEME/ONC MEDICATIONS  arsenic trioxide IVPB (eMAR) 11 milliGRAM(s) IV Intermittent every 24 hours  tretinoin 40 milliGRAM(s) Oral every 12 hours      STANDING MEDICATIONS  albuterol/ipratropium for Nebulization 3 milliLiter(s) Nebulizer every 6 hours  ATENolol  Tablet 25 milliGRAM(s) Oral daily  baclofen 5 milliGRAM(s) Oral every 12 hours  benzonatate 100 milliGRAM(s) Oral every 8 hours  Biotene Dry Mouth Oral Rinse 5 milliLiter(s) Swish and Spit five times a day  bisacodyl 5 milliGRAM(s) Oral every 12 hours  dextrose 5%. 1000 milliLiter(s) IV Continuous <Continuous>  dextrose 50% Injectable 12.5 Gram(s) IV Push once  dextrose 50% Injectable 25 Gram(s) IV Push once  dextrose 50% Injectable 25 Gram(s) IV Push once  hydrocortisone 2.5% Rectal Cream 1 Application(s) Rectal every 8 hours  insulin lispro (HumaLOG) corrective regimen sliding scale   SubCutaneous three times a day before meals  insulin lispro (HumaLOG) corrective regimen sliding scale   SubCutaneous at bedtime  pantoprazole    Tablet 40 milliGRAM(s) Oral before breakfast  petrolatum white Ointment 1 Application(s) Topical three times a day  polyethylene glycol 3350 17 Gram(s) Oral daily  sodium chloride 0.65% Nasal 1 Spray(s) Both Nostrils every 8 hours  sodium chloride 0.9%. 1000 milliLiter(s) IV Continuous <Continuous>  witch hazel Pads 1 Application(s) Topical every 12 hours      PRN MEDICATIONS  acetaminophen   Tablet .. 650 milliGRAM(s) Oral every 6 hours PRN  acetaminophen 325 mG/butalbital 50 mG/caffeine 40 mG 1 Tablet(s) Oral every 4 hours PRN  aluminum hydroxide/magnesium hydroxide/simethicone Suspension 30 milliLiter(s) Oral every 6 hours PRN  AQUAPHOR (petrolatum Ointment) 1 Application(s) Topical every 3 hours PRN  dextrose 40% Gel 15 Gram(s) Oral once PRN  diphenhydrAMINE   Injectable 25 milliGRAM(s) IV Push every 6 hours PRN  glucagon  Injectable 1 milliGRAM(s) IntraMuscular once PRN  hemorrhoidal Ointment 1 Application(s) Rectal every 8 hours PRN  hydrocodone/homatropine Syrup 5 milliLiter(s) Oral every 8 hours PRN  ondansetron Injectable 8 milliGRAM(s) IV Push every 8 hours PRN  sodium chloride 0.9% lock flush 10 milliLiter(s) IV Push every 1 hour PRN      Vital Signs Last 24 Hrs  T(C): 36.2 (14 Jun 2020 05:33), Max: 36.5 (13 Jun 2020 09:50)  T(F): 97.2 (14 Jun 2020 05:33), Max: 97.7 (13 Jun 2020 09:50)  HR: 69 (14 Jun 2020 05:33) (65 - 91)  BP: 150/78 (14 Jun 2020 05:33) (128/73 - 151/79)  RR: 18 (14 Jun 2020 05:33) (18 - 20)  SpO2: 98% (14 Jun 2020 05:33) (95% - 98%)    PHYSICAL EXAM  General: NAD  HEENT: clear oropharynx  CV: (+) S1/S2 RRR  Lungs: clear to auscultation, no wheezes or rales  Abdomen: soft, non-tender, non-distended (+) BS  Ext: no edema  Skin: no rashes   Neuro: alert and oriented X 3, no focal deficits  Central Line: C/D/I    Cultures:     Culture - Blood (05.23.20 @ 09:09)    Specimen Source: .Blood Blood-Venous    Culture Results:   No Growth Final    Culture - Blood (05.23.20 @ 09:09)    Specimen Source: .Blood Blood-Peripheral    Culture Results:   No Growth Final    LABS:                               7.4    4.35  )-----------( 37       ( 14 Jun 2020 07:36 )             22.4     14 Jun 2020 07:36    140    |  106    |  24     ----------------------------<  108    4.1     |  21     |  0.64     Ca    9.8        14 Jun 2020 07:36  Phos  4.2       14 Jun 2020 07:36  Mg     2.0       14 Jun 2020 07:36    TPro  7.0    /  Alb  4.3    /  TBili  0.3    /  DBili  x      /  AST  44     /  ALT  71     /  AlkPhos  101    14 Jun 2020 07:36      CAPILLARY BLOOD GLUCOSE  POCT Blood Glucose.: 113 mg/dL (14 Jun 2020 08:38)  POCT Blood Glucose.: 129 mg/dL (13 Jun 2020 21:32)  POCT Blood Glucose.: 172 mg/dL (13 Jun 2020 16:19)  POCT Blood Glucose.: 147 mg/dL (13 Jun 2020 12:12)    LIVER FUNCTIONS - ( 14 Jun 2020 07:36 )  Alb: 4.3 g/dL / Pro: 7.0 g/dL / ALK PHOS: 101 U/L / ALT: 71 U/L / AST: 44 U/L / GGT: x             RADIOLOGY & ADDITIONAL STUDIES:    from: Xray Chest 1 View- PORTABLE-Urgent (06.09.20 @ 15:22)   Indication: PICC line placement.  Impression:  Poor inspiratory effort. The heart is slightly enlarged. The lungs are clear. A PICC line was placed on the right and the tip is in superior vena cava. No pneumothorax.

## 2020-06-14 NOTE — PROGRESS NOTE ADULT - ATTENDING COMMENTS
59 y/o Uzbek speaking F who is transferred for newly diagnosed low/interm risk APL.  Presented with pancytopenia, flow cytometry/bone marrow biopsy/FISH from 5/26- consistent with acute promyelocytic leukemia with PML-AWA translocation.    She was started on ATRA 5/23- day +21, GALA 5/27- day +18.    -CT chest 6/6 c/w granulomatous disease (hx of TB in 1980's) pt had SOB on 6/4, now resolved.  Daily weights, diurese. Will give Hydrea 500mg if wbc>10. On Prednisone 20mg through 5/12- now off.  If pt becomes SOB again, may restart  -tremors improved, unclear etiology, electrolytes WNL. Cefepime held, given Baclofen -they improved  -Check CBC, coags/fibrinogen once daily.  Goal plt >40-50, fibrinogen >150; responding to ABO-matched platelets given over 3 hrs in 1/2 unit infusions   -monitor lytes daily, keep K>4, Mg>2.  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  Presented with pancytopenia, flow cytometry/bone marrow biopsy/FISH from 5/26- consistent with acute promyelocytic leukemia with PML-AWA translocation.    She was started on ATRA 5/23- day +21, GALA 5/27- day +19.    -CT chest 6/6 c/w granulomatous disease (hx of TB in 1980's) pt had SOB on 6/4, now resolved.  Daily weights, diurese. Will give Hydrea 500mg if wbc>10. On Prednisone 20mg through 5/12- now off.  If pt becomes SOB again, may restart  -tremors improved, unclear etiology, electrolytes WNL. Cefepime held, given Baclofen -they improved  -Check CBC, coags/fibrinogen once daily.  Goal plt >40-50, fibrinogen >150; responding to ABO-matched platelets given over 3 hrs in 1/2 unit infusions   -monitor lytes daily, keep K>4, Mg>2.  EKG twice a week  -off antibiotics  -plan on BM bx around day 29 post GALA

## 2020-06-14 NOTE — PROGRESS NOTE ADULT - ASSESSMENT
This is a 59 yo Divehi speaking F with PMHx of HTN, HLD, T2DM, COVID-19 infection admitted for management of newly diagnosed APL. The patient is currently reciving ATRA and Arsenic.  Patients hospital course has been complicated by transaminitis and refractory thrombocytopenia. The patient has pancytopenia 2/2 disease and/or ATRA/Arsenic.

## 2020-06-14 NOTE — ADVANCED PRACTICE NURSE CONSULT - ASSESSMENT
Lab. results reviewed by Dr. Hurd. Reinforced teachings to patient about her chemo regimen well understood. Right arm ac red port accessed,patent. site with no s/s of redness,swelling or pain.With positive blood return noted and flushing easily with 10 ML NS.Denies chestpain,nausea. 2 RNs verification completed. Arsenic 11mg in 250ml D5W started at 1353  x 2 hours infusion at 135ml/hr via lowest port of NSS line through red port of right arm PICC line. Patent. Primary RN aware of plan of care. Safety maintained.

## 2020-06-15 DIAGNOSIS — E83.39 OTHER DISORDERS OF PHOSPHORUS METABOLISM: ICD-10-CM

## 2020-06-15 LAB
ALBUMIN SERPL ELPH-MCNC: 4.4 G/DL — SIGNIFICANT CHANGE UP (ref 3.3–5)
ALBUMIN SERPL ELPH-MCNC: 4.6 G/DL — SIGNIFICANT CHANGE UP (ref 3.3–5)
ALP SERPL-CCNC: 104 U/L — SIGNIFICANT CHANGE UP (ref 40–120)
ALP SERPL-CCNC: 107 U/L — SIGNIFICANT CHANGE UP (ref 40–120)
ALT FLD-CCNC: 64 U/L — HIGH (ref 10–45)
ALT FLD-CCNC: 67 U/L — HIGH (ref 10–45)
ANION GAP SERPL CALC-SCNC: 10 MMOL/L — SIGNIFICANT CHANGE UP (ref 5–17)
ANION GAP SERPL CALC-SCNC: 13 MMOL/L — SIGNIFICANT CHANGE UP (ref 5–17)
APTT BLD: 30.1 SEC — SIGNIFICANT CHANGE UP (ref 27.5–36.3)
AST SERPL-CCNC: 38 U/L — SIGNIFICANT CHANGE UP (ref 10–40)
AST SERPL-CCNC: 41 U/L — HIGH (ref 10–40)
BASOPHILS # BLD AUTO: 0 K/UL — SIGNIFICANT CHANGE UP (ref 0–0.2)
BASOPHILS NFR BLD AUTO: 0 % — SIGNIFICANT CHANGE UP (ref 0–2)
BILIRUB SERPL-MCNC: 0.4 MG/DL — SIGNIFICANT CHANGE UP (ref 0.2–1.2)
BILIRUB SERPL-MCNC: 0.4 MG/DL — SIGNIFICANT CHANGE UP (ref 0.2–1.2)
BLD GP AB SCN SERPL QL: NEGATIVE — SIGNIFICANT CHANGE UP
BUN SERPL-MCNC: 24 MG/DL — HIGH (ref 7–23)
BUN SERPL-MCNC: 26 MG/DL — HIGH (ref 7–23)
CALCIUM SERPL-MCNC: 9.7 MG/DL — SIGNIFICANT CHANGE UP (ref 8.4–10.5)
CALCIUM SERPL-MCNC: 9.8 MG/DL — SIGNIFICANT CHANGE UP (ref 8.4–10.5)
CHLORIDE SERPL-SCNC: 104 MMOL/L — SIGNIFICANT CHANGE UP (ref 96–108)
CHLORIDE SERPL-SCNC: 104 MMOL/L — SIGNIFICANT CHANGE UP (ref 96–108)
CO2 SERPL-SCNC: 23 MMOL/L — SIGNIFICANT CHANGE UP (ref 22–31)
CO2 SERPL-SCNC: 23 MMOL/L — SIGNIFICANT CHANGE UP (ref 22–31)
CREAT SERPL-MCNC: 0.65 MG/DL — SIGNIFICANT CHANGE UP (ref 0.5–1.3)
CREAT SERPL-MCNC: 0.65 MG/DL — SIGNIFICANT CHANGE UP (ref 0.5–1.3)
D DIMER BLD IA.RAPID-MCNC: 488 NG/ML DDU — HIGH
EOSINOPHIL # BLD AUTO: 0.13 K/UL — SIGNIFICANT CHANGE UP (ref 0–0.5)
EOSINOPHIL NFR BLD AUTO: 3.5 % — SIGNIFICANT CHANGE UP (ref 0–6)
FIBRINOGEN PPP-MCNC: 313 MG/DL — LOW (ref 350–510)
GLUCOSE BLDC GLUCOMTR-MCNC: 123 MG/DL — HIGH (ref 70–99)
GLUCOSE BLDC GLUCOMTR-MCNC: 128 MG/DL — HIGH (ref 70–99)
GLUCOSE BLDC GLUCOMTR-MCNC: 166 MG/DL — HIGH (ref 70–99)
GLUCOSE SERPL-MCNC: 113 MG/DL — HIGH (ref 70–99)
GLUCOSE SERPL-MCNC: 168 MG/DL — HIGH (ref 70–99)
HCT VFR BLD CALC: 22.5 % — LOW (ref 34.5–45)
HGB BLD-MCNC: 7.6 G/DL — LOW (ref 11.5–15.5)
INR BLD: 1.08 RATIO — SIGNIFICANT CHANGE UP (ref 0.88–1.16)
LDH SERPL L TO P-CCNC: 235 U/L — SIGNIFICANT CHANGE UP (ref 50–242)
LDH SERPL L TO P-CCNC: 235 U/L — SIGNIFICANT CHANGE UP (ref 50–242)
LYMPHOCYTES # BLD AUTO: 2.53 K/UL — SIGNIFICANT CHANGE UP (ref 1–3.3)
LYMPHOCYTES # BLD AUTO: 70.4 % — HIGH (ref 13–44)
MAGNESIUM SERPL-MCNC: 1.9 MG/DL — SIGNIFICANT CHANGE UP (ref 1.6–2.6)
MAGNESIUM SERPL-MCNC: 2 MG/DL — SIGNIFICANT CHANGE UP (ref 1.6–2.6)
MANUAL SMEAR VERIFICATION: SIGNIFICANT CHANGE UP
MCHC RBC-ENTMCNC: 28.9 PG — SIGNIFICANT CHANGE UP (ref 27–34)
MCHC RBC-ENTMCNC: 33.8 GM/DL — SIGNIFICANT CHANGE UP (ref 32–36)
MCV RBC AUTO: 85.6 FL — SIGNIFICANT CHANGE UP (ref 80–100)
MONOCYTES # BLD AUTO: 0 K/UL — SIGNIFICANT CHANGE UP (ref 0–0.9)
MONOCYTES NFR BLD AUTO: 0 % — LOW (ref 2–14)
NEUTROPHILS # BLD AUTO: 0.94 K/UL — LOW (ref 1.8–7.4)
NEUTROPHILS NFR BLD AUTO: 25.2 % — LOW (ref 43–77)
NEUTS BAND # BLD: 0.9 % — SIGNIFICANT CHANGE UP (ref 0–8)
PHOSPHATE SERPL-MCNC: 4.2 MG/DL — SIGNIFICANT CHANGE UP (ref 2.5–4.5)
PHOSPHATE SERPL-MCNC: 5.5 MG/DL — HIGH (ref 2.5–4.5)
PLAT MORPH BLD: NORMAL — SIGNIFICANT CHANGE UP
PLATELET # BLD AUTO: 33 K/UL — LOW (ref 150–400)
PLATELET # BLD AUTO: 38 K/UL — LOW (ref 150–400)
POTASSIUM SERPL-MCNC: 4.1 MMOL/L — SIGNIFICANT CHANGE UP (ref 3.5–5.3)
POTASSIUM SERPL-MCNC: 4.2 MMOL/L — SIGNIFICANT CHANGE UP (ref 3.5–5.3)
POTASSIUM SERPL-SCNC: 4.1 MMOL/L — SIGNIFICANT CHANGE UP (ref 3.5–5.3)
POTASSIUM SERPL-SCNC: 4.2 MMOL/L — SIGNIFICANT CHANGE UP (ref 3.5–5.3)
PROT SERPL-MCNC: 7.4 G/DL — SIGNIFICANT CHANGE UP (ref 6–8.3)
PROT SERPL-MCNC: 7.7 G/DL — SIGNIFICANT CHANGE UP (ref 6–8.3)
PROTHROM AB SERPL-ACNC: 12.4 SEC — SIGNIFICANT CHANGE UP (ref 10–12.9)
RBC # BLD: 2.63 M/UL — LOW (ref 3.8–5.2)
RBC # FLD: 16.6 % — HIGH (ref 10.3–14.5)
RBC BLD AUTO: SIGNIFICANT CHANGE UP
RH IG SCN BLD-IMP: POSITIVE — SIGNIFICANT CHANGE UP
SODIUM SERPL-SCNC: 137 MMOL/L — SIGNIFICANT CHANGE UP (ref 135–145)
SODIUM SERPL-SCNC: 140 MMOL/L — SIGNIFICANT CHANGE UP (ref 135–145)
URATE SERPL-MCNC: 3.8 MG/DL — SIGNIFICANT CHANGE UP (ref 2.5–7)
URATE SERPL-MCNC: 3.9 MG/DL — SIGNIFICANT CHANGE UP (ref 2.5–7)
WBC # BLD: 3.6 K/UL — LOW (ref 3.8–10.5)
WBC # FLD AUTO: 3.6 K/UL — LOW (ref 3.8–10.5)

## 2020-06-15 PROCEDURE — 99232 SBSQ HOSP IP/OBS MODERATE 35: CPT | Mod: GC

## 2020-06-15 RX ORDER — CALCIUM ACETATE 667 MG
667 TABLET ORAL
Refills: 0 | Status: COMPLETED | OUTPATIENT
Start: 2020-06-15 | End: 2020-06-16

## 2020-06-15 RX ADMIN — TRETINOIN 40 MILLIGRAM(S): 10 CAPSULE, LIQUID FILLED ORAL at 08:54

## 2020-06-15 RX ADMIN — ATENOLOL 25 MILLIGRAM(S): 25 TABLET ORAL at 05:52

## 2020-06-15 RX ADMIN — TRETINOIN 40 MILLIGRAM(S): 10 CAPSULE, LIQUID FILLED ORAL at 19:45

## 2020-06-15 RX ADMIN — Medication 1 APPLICATION(S): at 05:53

## 2020-06-15 RX ADMIN — Medication 100 MILLIGRAM(S): at 21:59

## 2020-06-15 RX ADMIN — Medication 650 MILLIGRAM(S): at 10:26

## 2020-06-15 RX ADMIN — Medication 100 MILLIGRAM(S): at 14:13

## 2020-06-15 RX ADMIN — Medication 5 MILLILITER(S): at 19:44

## 2020-06-15 RX ADMIN — Medication 1 APPLICATION(S): at 14:14

## 2020-06-15 RX ADMIN — Medication 1 SPRAY(S): at 22:00

## 2020-06-15 RX ADMIN — Medication 3 MILLILITER(S): at 00:18

## 2020-06-15 RX ADMIN — Medication 100 MILLIGRAM(S): at 05:52

## 2020-06-15 RX ADMIN — Medication 5 MILLIGRAM(S): at 09:00

## 2020-06-15 RX ADMIN — Medication 1: at 11:58

## 2020-06-15 RX ADMIN — Medication 1 APPLICATION(S): at 22:01

## 2020-06-15 RX ADMIN — Medication 1 APPLICATION(S): at 22:00

## 2020-06-15 RX ADMIN — POLYETHYLENE GLYCOL 3350 17 GRAM(S): 17 POWDER, FOR SOLUTION ORAL at 12:00

## 2020-06-15 RX ADMIN — Medication 5 MILLILITER(S): at 00:18

## 2020-06-15 RX ADMIN — Medication 667 MILLIGRAM(S): at 21:59

## 2020-06-15 RX ADMIN — Medication 25 MILLIGRAM(S): at 19:43

## 2020-06-15 RX ADMIN — Medication 5 MILLIGRAM(S): at 21:59

## 2020-06-15 RX ADMIN — AER TRAVELER 1 APPLICATION(S): 0.5 SOLUTION RECTAL; TOPICAL at 05:53

## 2020-06-15 RX ADMIN — Medication 5 MILLILITER(S): at 08:19

## 2020-06-15 RX ADMIN — ARSENIC TRIOXIDE 130.5 MILLIGRAM(S): 2 INJECTION, SOLUTION INTRAVENOUS at 14:27

## 2020-06-15 RX ADMIN — Medication 5 MILLIGRAM(S): at 17:17

## 2020-06-15 RX ADMIN — Medication 5 MILLILITER(S): at 17:08

## 2020-06-15 RX ADMIN — SODIUM CHLORIDE 25 MILLILITER(S): 9 INJECTION INTRAMUSCULAR; INTRAVENOUS; SUBCUTANEOUS at 17:19

## 2020-06-15 RX ADMIN — Medication 1 APPLICATION(S): at 05:55

## 2020-06-15 RX ADMIN — Medication 3 MILLILITER(S): at 17:15

## 2020-06-15 RX ADMIN — Medication 3 MILLILITER(S): at 05:53

## 2020-06-15 RX ADMIN — Medication 5 MILLIGRAM(S): at 05:55

## 2020-06-15 RX ADMIN — AER TRAVELER 1 APPLICATION(S): 0.5 SOLUTION RECTAL; TOPICAL at 17:18

## 2020-06-15 RX ADMIN — Medication 25 MILLIGRAM(S): at 10:26

## 2020-06-15 RX ADMIN — Medication 650 MILLIGRAM(S): at 19:43

## 2020-06-15 RX ADMIN — Medication 5 MILLILITER(S): at 11:59

## 2020-06-15 RX ADMIN — Medication 1 SPRAY(S): at 05:54

## 2020-06-15 RX ADMIN — Medication 3 MILLILITER(S): at 11:59

## 2020-06-15 RX ADMIN — Medication 1 SPRAY(S): at 14:13

## 2020-06-15 RX ADMIN — Medication 667 MILLIGRAM(S): at 09:00

## 2020-06-15 RX ADMIN — Medication 1 APPLICATION(S): at 14:13

## 2020-06-15 RX ADMIN — PANTOPRAZOLE SODIUM 40 MILLIGRAM(S): 20 TABLET, DELAYED RELEASE ORAL at 05:52

## 2020-06-15 RX ADMIN — Medication 667 MILLIGRAM(S): at 17:19

## 2020-06-15 NOTE — PROGRESS NOTE ADULT - ATTENDING COMMENTS
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  Presented with pancytopenia, flow cytometry/bone marrow biopsy/FISH from 5/26- consistent with acute promyelocytic leukemia with PML-AWA translocation.    She was started on ATRA 5/23- day +21, GALA 5/27- day +19.    -CT chest 6/6 c/w granulomatous disease (hx of TB in 1980's) pt had SOB on 6/4, now resolved.  Daily weights, diurese. Will give Hydrea 500mg if wbc>10. On Prednisone 20mg through 5/12- now off.  If pt becomes SOB again, may restart  -tremors improved, unclear etiology, electrolytes WNL. Cefepime held, given Baclofen -they improved  -Check CBC, coags/fibrinogen once daily.  Goal plt >40-50, fibrinogen >150; responding to ABO-matched platelets given over 3 hrs in 1/2 unit infusions   -monitor lytes daily, keep K>4, Mg>2.  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  Presented with pancytopenia, flow cytometry/bone marrow biopsy/FISH from 5/26- consistent with acute promyelocytic leukemia with PML-AWA translocation.    She was started on ATRA 5/23- day +21, GALA 5/27- day +20.    -CT chest 6/6 c/w granulomatous disease (hx of TB in 1980's) pt had SOB on 6/4, now resolved.  Daily weights, diurese. Will give Hydrea 500mg if wbc>10. On Prednisone 20mg through 5/12- now off.  If pt becomes SOB again, may restart  -tremors improved, unclear etiology, electrolytes WNL. Cefepime held, given Baclofen -they improved  -Check CBC, coags/fibrinogen once daily.  Goal plt >40-50, fibrinogen >150; responding to ABO-matched platelets given over 3 hrs in 1/2 unit infusions   -monitor lytes daily, keep K>4, Mg>2.  EKG twice a week  -off antibiotics  -plan on BM bx around day 29 post GALA

## 2020-06-15 NOTE — ADVANCED PRACTICE NURSE CONSULT - ASSESSMENT
Lab. results reviewed by Dr. Hurd. Reinforced teachings to patient about her chemo regimen well understood. Right arm ac red port accessed,patent. site with no s/s of redness,swelling or pain.With positive blood return noted and flushing easily with 10 ML NS.Denies chestpain,nausea.     Two license professionals perform the following independent verification prior to administration Drug name; Drug dose; Infusion volume of bag or syringe; Rate of administration Route of administration Expiration date/time; Appearance and integrity of Compatibility of Solution;  Rate set on infusion pump.  In the presence of the patient, verify the patient's identification using two patient identifiers.    Arsenic 11mg in 250ml D5W started at 1430  x 2 hours infusion at 135ml/hr via lowest port of NSS line through red port of right arm PICC line. Patent. Primary RN aware of plan of care. Safety maintained. Lab. results reviewed by Dr. Goldberg. Reinforced teachings to patient about her chemo regimen well understood. Right arm ac red port accessed,patent. site with no s/s of redness,swelling or pain.With positive blood return noted and flushing easily with 10 ML NS.Denies chestpain,nausea.     Two license professionals perform the following independent verification prior to administration Drug name; Drug dose; Infusion volume of bag or syringe; Rate of administration Route of administration Expiration date/time; Appearance and integrity of Compatibility of Solution;  Rate set on infusion pump.  In the presence of the patient, verify the patient's identification using two patient identifiers.    Arsenic 11mg in 250ml D5W started at 1430  x 2 hours infusion at 135ml/hr via lowest port of NSS line through red port of right arm PICC line. Patent. Primary RN aware of plan of care. Safety maintained. Lab. results reviewed by Dr. Goldberg. Reinforced teachings to patient about her chemo regimen well understood. Right arm ac red port accessed,patent. site with no s/s of redness,swelling or pain.With positive blood return noted and flushing easily with 10 ML NS.Denies chestpain,nausea.     Two license professionals perform the following independent verification prior to administration Drug name; Drug dose; Infusion volume of bag or syringe; Rate of administration Route of administration Expiration date/time; Appearance and integrity of Compatibility of Solution;  Rate set on infusion pump.  In the presence of the patient, verify the patient's identification using two patient identifiers.    Arsenic 11mg in 250ml D5W started at 1430  x 2 hours infusion at 135ml/hr via lowest port of NSS line through red port of right arm PICC line. Patent. Primary RN aware of plan of care. Safety maintained.    Tolerated treatment without reaction or complaints.

## 2020-06-15 NOTE — PROGRESS NOTE ADULT - SUBJECTIVE AND OBJECTIVE BOX
Diagnosis:    Protocol/Chemo Regimen:    Day:     Pt endorsed:    Review of Systems:     Pain scale:     Diet:     Allergies    No Known Allergies    Intolerances        ANTIMICROBIALS      HEME/ONC MEDICATIONS  arsenic trioxide IVPB (eMAR) 11 milliGRAM(s) IV Intermittent every 24 hours  tretinoin 40 milliGRAM(s) Oral every 12 hours      STANDING MEDICATIONS  albuterol/ipratropium for Nebulization 3 milliLiter(s) Nebulizer every 6 hours  ATENolol  Tablet 25 milliGRAM(s) Oral daily  baclofen 5 milliGRAM(s) Oral every 12 hours  benzonatate 100 milliGRAM(s) Oral every 8 hours  Biotene Dry Mouth Oral Rinse 5 milliLiter(s) Swish and Spit five times a day  bisacodyl 5 milliGRAM(s) Oral every 12 hours  dextrose 5%. 1000 milliLiter(s) IV Continuous <Continuous>  dextrose 50% Injectable 12.5 Gram(s) IV Push once  dextrose 50% Injectable 25 Gram(s) IV Push once  dextrose 50% Injectable 25 Gram(s) IV Push once  hydrocortisone 2.5% Rectal Cream 1 Application(s) Rectal every 8 hours  insulin lispro (HumaLOG) corrective regimen sliding scale   SubCutaneous three times a day before meals  insulin lispro (HumaLOG) corrective regimen sliding scale   SubCutaneous at bedtime  pantoprazole    Tablet 40 milliGRAM(s) Oral before breakfast  petrolatum white Ointment 1 Application(s) Topical three times a day  polyethylene glycol 3350 17 Gram(s) Oral daily  sodium chloride 0.65% Nasal 1 Spray(s) Both Nostrils every 8 hours  sodium chloride 0.9%. 1000 milliLiter(s) IV Continuous <Continuous>  witch hazel Pads 1 Application(s) Topical every 12 hours      PRN MEDICATIONS  acetaminophen   Tablet .. 650 milliGRAM(s) Oral every 6 hours PRN  AQUAPHOR (petrolatum Ointment) 1 Application(s) Topical every 3 hours PRN  dextrose 40% Gel 15 Gram(s) Oral once PRN  diphenhydrAMINE   Injectable 25 milliGRAM(s) IV Push every 6 hours PRN  glucagon  Injectable 1 milliGRAM(s) IntraMuscular once PRN  hemorrhoidal Ointment 1 Application(s) Rectal every 8 hours PRN  ondansetron Injectable 8 milliGRAM(s) IV Push every 8 hours PRN  sodium chloride 0.9% lock flush 10 milliLiter(s) IV Push every 1 hour PRN        Vital Signs Last 24 Hrs  T(C): 36.7 (15 Luis 2020 05:33), Max: 36.7 (15 Luis 2020 05:33)  T(F): 98 (15 Luis 2020 05:33), Max: 98 (15 Luis 2020 05:33)  HR: 66 (15 Luis 2020 05:33) (66 - 74)  BP: 146/78 (15 Luis 2020 05:33) (130/80 - 151/76)  BP(mean): --  RR: 18 (15 Luis 2020 05:33) (18 - 18)  SpO2: 97% (15 Luis 2020 05:33) (95% - 98%)    PHYSICAL EXAM  General: NAD  HEENT: PERRLA, EOMOI, clear oropharynx, anicteric sclera, pink conjunctiva  Neck: supple  CV: (+) S1/S2 RRR  Lungs: clear to auscultation, no wheezes or rales  Abdomen: soft, non-tender, non-distended (+) BS  Ext: no clubbing, cyanosis or edema  Skin: no rashes and no petechiae  Neuro: alert and oriented X 3, no focal deficits  Central Line:     RECENT CULTURES:        LABS:                        7.7    4.34  )-----------( 37       ( 14 Jun 2020 16:27 )             23.3         Mean Cell Volume : 86.0 fl  Mean Cell Hemoglobin : 28.4 pg  Mean Cell Hemoglobin Concentration : 33.0 gm/dL  Auto Neutrophil # : x  Auto Lymphocyte # : x  Auto Monocyte # : x  Auto Eosinophil # : x  Auto Basophil # : x  Auto Neutrophil % : x  Auto Lymphocyte % : x  Auto Monocyte % : x  Auto Eosinophil % : x  Auto Basophil % : x      06-14    140  |  106  |  24<H>  ----------------------------<  108<H>  4.1   |  21<L>  |  0.64    Ca    9.8      14 Jun 2020 07:36  Phos  4.2     06-14  Mg     2.0     06-14    TPro  7.0  /  Alb  4.3  /  TBili  0.3  /  DBili  x   /  AST  44<H>  /  ALT  71<H>  /  AlkPhos  101  06-14      Mg 2.0  Phos 4.2            Uric Acid 3.4        RADIOLOGY & ADDITIONAL STUDIES: Diagnosis: Acute Promyelocytic leukemia     Protocol/Chemo Regimen: ATRA/Arsenic     Day: ATRA Day 24  and Arsenic Day 20    Patient Faroese speaking refusing  service. Patient's son called by patient    Patient endorses: no complaints    Review of Systems: Patient denies headache, dizziness, visual changes, chest pain, palpitations, abdominal pain, nausea, vomiting, diarrhea or dysuria.    Pain scale: 0     Diet: Regular     Allergies: No Known Allergies    HEME/ONC MEDICATIONS  arsenic trioxide IVPB (eMAR) 11 milliGRAM(s) IV Intermittent every 24 hours  tretinoin 40 milliGRAM(s) Oral every 12 hours      STANDING MEDICATIONS  albuterol/ipratropium for Nebulization 3 milliLiter(s) Nebulizer every 6 hours  ATENolol  Tablet 25 milliGRAM(s) Oral daily  baclofen 5 milliGRAM(s) Oral every 12 hours  benzonatate 100 milliGRAM(s) Oral every 8 hours  Biotene Dry Mouth Oral Rinse 5 milliLiter(s) Swish and Spit five times a day  bisacodyl 5 milliGRAM(s) Oral every 12 hours  dextrose 5%. 1000 milliLiter(s) IV Continuous <Continuous>  dextrose 50% Injectable 12.5 Gram(s) IV Push once  dextrose 50% Injectable 25 Gram(s) IV Push once  dextrose 50% Injectable 25 Gram(s) IV Push once  hydrocortisone 2.5% Rectal Cream 1 Application(s) Rectal every 8 hours  insulin lispro (HumaLOG) corrective regimen sliding scale   SubCutaneous three times a day before meals  insulin lispro (HumaLOG) corrective regimen sliding scale   SubCutaneous at bedtime  pantoprazole    Tablet 40 milliGRAM(s) Oral before breakfast  petrolatum white Ointment 1 Application(s) Topical three times a day  polyethylene glycol 3350 17 Gram(s) Oral daily  sodium chloride 0.65% Nasal 1 Spray(s) Both Nostrils every 8 hours  sodium chloride 0.9%. 1000 milliLiter(s) IV Continuous <Continuous>  witch hazel Pads 1 Application(s) Topical every 12 hours      PRN MEDICATIONS  acetaminophen   Tablet .. 650 milliGRAM(s) Oral every 6 hours PRN  AQUAPHOR (petrolatum Ointment) 1 Application(s) Topical every 3 hours PRN  dextrose 40% Gel 15 Gram(s) Oral once PRN  diphenhydrAMINE   Injectable 25 milliGRAM(s) IV Push every 6 hours PRN  glucagon  Injectable 1 milliGRAM(s) IntraMuscular once PRN  hemorrhoidal Ointment 1 Application(s) Rectal every 8 hours PRN  ondansetron Injectable 8 milliGRAM(s) IV Push every 8 hours PRN  sodium chloride 0.9% lock flush 10 milliLiter(s) IV Push every 1 hour PRN        Vital Signs Last 24 Hrs  T(C): 36.7 (15 Luis 2020 05:33), Max: 36.7 (15 Luis 2020 05:33)  T(F): 98 (15 Luis 2020 05:33), Max: 98 (15 Luis 2020 05:33)  HR: 66 (15 Luis 2020 05:33) (66 - 74)  BP: 146/78 (15 Luis 2020 05:33) (130/80 - 151/76)  BP(mean): --  RR: 18 (15 Luis 2020 05:33) (18 - 18)  SpO2: 97% (15 Luis 2020 05:33) (95% - 98%)    PHYSICAL EXAM  General: NAD  HEENT: clear oropharynx  CV: (+) S1/S2 RRR  Lungs: clear to auscultation, no wheezes or rales  Abdomen: soft, non-tender, non-distended (+) BS  Ext: no edema  Skin: no rashes   Neuro: alert and oriented X 3, no focal deficits  Central Line: C/D/I    RECENT CULTURES:  Culture - Blood (05.23.20 @ 09:09)    Specimen Source: .Blood Blood-Venous    Culture Results:   No Growth Final          LABS:                        7.7    4.34  )-----------( 37       ( 14 Jun 2020 16:27 )             23.3         Mean Cell Volume : 86.0 fl  Mean Cell Hemoglobin : 28.4 pg  Mean Cell Hemoglobin Concentration : 33.0 gm/dL  Auto Neutrophil # : x  Auto Lymphocyte # : x  Auto Monocyte # : x  Auto Eosinophil # : x  Auto Basophil # : x  Auto Neutrophil % : x  Auto Lymphocyte % : x  Auto Monocyte % : x  Auto Eosinophil % : x  Auto Basophil % : x      06-14    140  |  106  |  24<H>  ----------------------------<  108<H>  4.1   |  21<L>  |  0.64    Ca    9.8      14 Jun 2020 07:36  Phos  4.2     06-14  Mg     2.0     06-14    TPro  7.0  /  Alb  4.3  /  TBili  0.3  /  DBili  x   /  AST  44<H>  /  ALT  71<H>  /  AlkPhos  101  06-14      Mg 2.0  Phos 4.2            Uric Acid 3.4        RADIOLOGY & ADDITIONAL STUDIES: Diagnosis: Acute Promyelocytic leukemia     Protocol/Chemo Regimen: ATRA/Arsenic     Day: ATRA Day 24  and Arsenic Day 20    Patient Ukrainian speaking refusing  service. Patient's son called by patient    Patient endorses: + fatigue.    Review of Systems: Patient denies headache, dizziness, visual changes, chest pain, palpitations, abdominal pain, nausea, vomiting, diarrhea or dysuria.    Pain scale: 0     Diet: Regular     Allergies: No Known Allergies    HEME/ONC MEDICATIONS  arsenic trioxide IVPB (eMAR) 11 milliGRAM(s) IV Intermittent every 24 hours  tretinoin 40 milliGRAM(s) Oral every 12 hours    STANDING MEDICATIONS  albuterol/ipratropium for Nebulization 3 milliLiter(s) Nebulizer every 6 hours  ATENolol  Tablet 25 milliGRAM(s) Oral daily  baclofen 5 milliGRAM(s) Oral every 12 hours  benzonatate 100 milliGRAM(s) Oral every 8 hours  Biotene Dry Mouth Oral Rinse 5 milliLiter(s) Swish and Spit five times a day  bisacodyl 5 milliGRAM(s) Oral every 12 hours  dextrose 5%. 1000 milliLiter(s) IV Continuous <Continuous>  dextrose 50% Injectable 12.5 Gram(s) IV Push once  dextrose 50% Injectable 25 Gram(s) IV Push once  dextrose 50% Injectable 25 Gram(s) IV Push once  hydrocortisone 2.5% Rectal Cream 1 Application(s) Rectal every 8 hours  insulin lispro (HumaLOG) corrective regimen sliding scale   SubCutaneous three times a day before meals  insulin lispro (HumaLOG) corrective regimen sliding scale   SubCutaneous at bedtime  pantoprazole    Tablet 40 milliGRAM(s) Oral before breakfast  petrolatum white Ointment 1 Application(s) Topical three times a day  polyethylene glycol 3350 17 Gram(s) Oral daily  sodium chloride 0.65% Nasal 1 Spray(s) Both Nostrils every 8 hours  sodium chloride 0.9%. 1000 milliLiter(s) IV Continuous <Continuous>  witch hazel Pads 1 Application(s) Topical every 12 hours    PRN MEDICATIONS  acetaminophen   Tablet .. 650 milliGRAM(s) Oral every 6 hours PRN  AQUAPHOR (petrolatum Ointment) 1 Application(s) Topical every 3 hours PRN  dextrose 40% Gel 15 Gram(s) Oral once PRN  diphenhydrAMINE   Injectable 25 milliGRAM(s) IV Push every 6 hours PRN  glucagon  Injectable 1 milliGRAM(s) IntraMuscular once PRN  hemorrhoidal Ointment 1 Application(s) Rectal every 8 hours PRN  ondansetron Injectable 8 milliGRAM(s) IV Push every 8 hours PRN  sodium chloride 0.9% lock flush 10 milliLiter(s) IV Push every 1 hour PRN    Vital Signs Last 24 Hrs  T(C): 36.7 (15 Luis 2020 05:33), Max: 36.7 (15 Luis 2020 05:33)  T(F): 98 (15 Luis 2020 05:33), Max: 98 (15 Luis 2020 05:33)  HR: 66 (15 Luis 2020 05:33) (66 - 74)  BP: 146/78 (15 Luis 2020 05:33) (130/80 - 151/76)  BP(mean): --  RR: 18 (15 Luis 2020 05:33) (18 - 18)  SpO2: 97% (15 Luis 2020 05:33) (95% - 98%)    PHYSICAL EXAM  General: NAD  HEENT: clear oropharynx  CV: (+) S1/S2 RRR  Lungs: clear to auscultation, no wheezes or rales  Abdomen: soft, non-tender, non-distended (+) BS  Ext: no edema  Skin: no rashes   Neuro: alert and oriented X 3, no focal deficits  Central Line: C/D/I    RECENT CULTURES:  Culture - Blood (05.23.20 @ 09:09)    Specimen Source: .Blood Blood-Venous    Culture Results:   No Growth Final    LABS:                          7.6    3.60  )-----------( 38       ( 15 Luis 2020 07:01 )             22.5   Mean Cell Volume : 85.6 fl  Mean Cell Hemoglobin : 28.9 pg  Mean Cell Hemoglobin Concentration : 33.8 gm/dL  Auto Neutrophil # : 0.94 K/uL  Auto Lymphocyte # : 2.53 K/uL  Auto Monocyte # : 0.00 K/uL  Auto Eosinophil # : 0.13 K/uL  Auto Basophil # : 0.00 K/uL  Auto Neutrophil % : 25.2 %  Auto Lymphocyte % : 70.4 %  Auto Monocyte % : 0.0 %  Auto Eosinophil % : 3.5 %  Auto Basophil % : 0.0 %      06-15    140  |  104  |  26<H>  ----------------------------<  113<H>  4.1   |  23  |  0.65    Ca    9.7      15 Luis 2020 06:45  Phos  5.5     06-15  Mg     2.0     06-15    TPro  7.4  /  Alb  4.4  /  TBili  0.4  /  DBili  x   /  AST  41<H>  /  ALT  67<H>  /  AlkPhos  104  06-15      Mg 2.0  Phos 5.5  PT/INR - ( 15 Luis 2020 06:46 )   PT: 12.4 sec;   INR: 1.08 ratio    PTT - ( 15 Luis 2020 06:46 )  PTT:30.1 sec    Uric Acid 3.8    RADIOLOGY & ADDITIONAL STUDIES:   Xray Chest 1 View- PORTABLE-Urgent (06.09.20 @ 15:22) >  Poor inspiratory effort. The heart is slightly enlarged. The lungs are clear. A PICC line was placed on the right and the tip is in superior vena cava. No pneumothorax.

## 2020-06-15 NOTE — PROGRESS NOTE ADULT - PROBLEM SELECTOR PLAN 1
Continue ATRA 40mg BID (started 5/22) and Arsenic Trioxide daily (started on 5/27)  Monitor for differentiation syndrome and check EKG for QTc prolongation every Tuesday and Friday. Keep K > 4 and Mg > 2  Status post Hydrea 500mg PO x 1 on 6/8 and 6/9 for WBC >10  Follow up CBC with diff, CMP, TLS and DIC panel daily  Keep PLTs >50 K. If unable to achieve goal continue with platelets infusion (single donor) 1/2 units over 3 hours every 8 hours until HLA platelets available   s/p Prednisone 20mg PO daily x 5 days (through 6/12)  Keep Fibrinogen >150 if less give Cryo  Strict Is and Os/Daily weights/Mouth Care Continue ATRA 40mg BID (started 5/22) and Arsenic Trioxide daily (started on 5/27)  Monitor for differentiation syndrome and check EKG for QTc prolongation every Tuesday and Friday. Keep K > 4 and Mg > 2  Status post Hydrea 500mg PO x 1 on 6/8 and 6/9 for WBC >10  Follow up CBC with diff, CMP, TLS and DIC panel daily  Keep PLTs >50 K. If unable to achieve goal continue with platelets infusion (single donor) 1/2 units over 3 hours every 12 hours.  Platelets 38 today, transfuse half unit infuse over 3 hrs Q 12hrs.  s/p Prednisone 20mg PO daily x 5 days (through 6/12) due to platelet antibody.  Keep Fibrinogen >150 if less give Cryo  Strict Is and Os/Daily weights/Mouth Care

## 2020-06-15 NOTE — PROGRESS NOTE ADULT - ASSESSMENT
This is a 61 yo Upper sorbian speaking F with PMHx of HTN, HLD, T2DM, COVID-19 infection admitted for management of newly diagnosed APL. The patient is currently reciving ATRA and Arsenic.  Patients hospital course has been complicated by transaminitis and refractory thrombocytopenia. The patient has pancytopenia 2/2 disease and/or ATRA/Arsenic.

## 2020-06-16 LAB
ALBUMIN SERPL ELPH-MCNC: 4.5 G/DL — SIGNIFICANT CHANGE UP (ref 3.3–5)
ALP SERPL-CCNC: 97 U/L — SIGNIFICANT CHANGE UP (ref 40–120)
ALT FLD-CCNC: 54 U/L — HIGH (ref 10–45)
ANION GAP SERPL CALC-SCNC: 12 MMOL/L — SIGNIFICANT CHANGE UP (ref 5–17)
AST SERPL-CCNC: 32 U/L — SIGNIFICANT CHANGE UP (ref 10–40)
BASOPHILS # BLD AUTO: 0 K/UL — SIGNIFICANT CHANGE UP (ref 0–0.2)
BASOPHILS NFR BLD AUTO: 0 % — SIGNIFICANT CHANGE UP (ref 0–2)
BILIRUB SERPL-MCNC: 0.4 MG/DL — SIGNIFICANT CHANGE UP (ref 0.2–1.2)
BUN SERPL-MCNC: 22 MG/DL — SIGNIFICANT CHANGE UP (ref 7–23)
CALCIUM SERPL-MCNC: 10.2 MG/DL — SIGNIFICANT CHANGE UP (ref 8.4–10.5)
CHLORIDE SERPL-SCNC: 103 MMOL/L — SIGNIFICANT CHANGE UP (ref 96–108)
CO2 SERPL-SCNC: 24 MMOL/L — SIGNIFICANT CHANGE UP (ref 22–31)
CREAT SERPL-MCNC: 0.64 MG/DL — SIGNIFICANT CHANGE UP (ref 0.5–1.3)
D DIMER BLD IA.RAPID-MCNC: 464 NG/ML DDU — HIGH
EOSINOPHIL # BLD AUTO: 0.03 K/UL — SIGNIFICANT CHANGE UP (ref 0–0.5)
EOSINOPHIL NFR BLD AUTO: 1 % — SIGNIFICANT CHANGE UP (ref 0–6)
FIBRINOGEN PPP-MCNC: 338 MG/DL — LOW (ref 350–510)
GLUCOSE BLDC GLUCOMTR-MCNC: 124 MG/DL — HIGH (ref 70–99)
GLUCOSE BLDC GLUCOMTR-MCNC: 126 MG/DL — HIGH (ref 70–99)
GLUCOSE BLDC GLUCOMTR-MCNC: 207 MG/DL — HIGH (ref 70–99)
GLUCOSE BLDC GLUCOMTR-MCNC: 97 MG/DL — SIGNIFICANT CHANGE UP (ref 70–99)
GLUCOSE SERPL-MCNC: 112 MG/DL — HIGH (ref 70–99)
HCT VFR BLD CALC: 22.1 % — LOW (ref 34.5–45)
HGB BLD-MCNC: 7.4 G/DL — LOW (ref 11.5–15.5)
LDH SERPL L TO P-CCNC: 187 U/L — SIGNIFICANT CHANGE UP (ref 50–242)
LYMPHOCYTES # BLD AUTO: 1.24 K/UL — SIGNIFICANT CHANGE UP (ref 1–3.3)
LYMPHOCYTES # BLD AUTO: 38 % — SIGNIFICANT CHANGE UP (ref 13–44)
MAGNESIUM SERPL-MCNC: 1.8 MG/DL — SIGNIFICANT CHANGE UP (ref 1.6–2.6)
MANUAL SMEAR VERIFICATION: SIGNIFICANT CHANGE UP
MCHC RBC-ENTMCNC: 28.5 PG — SIGNIFICANT CHANGE UP (ref 27–34)
MCHC RBC-ENTMCNC: 33.5 GM/DL — SIGNIFICANT CHANGE UP (ref 32–36)
MCV RBC AUTO: 85 FL — SIGNIFICANT CHANGE UP (ref 80–100)
METAMYELOCYTES # FLD: 2 % — HIGH (ref 0–0)
MONOCYTES # BLD AUTO: 0.07 K/UL — SIGNIFICANT CHANGE UP (ref 0–0.9)
MONOCYTES NFR BLD AUTO: 2 % — SIGNIFICANT CHANGE UP (ref 2–14)
MYELOCYTES NFR BLD: 1 % — HIGH (ref 0–0)
NEUTROPHILS # BLD AUTO: 1.8 K/UL — SIGNIFICANT CHANGE UP (ref 1.8–7.4)
NEUTROPHILS NFR BLD AUTO: 55 % — SIGNIFICANT CHANGE UP (ref 43–77)
NRBC # BLD: 0 /100 — SIGNIFICANT CHANGE UP (ref 0–0)
PHOSPHATE SERPL-MCNC: 4.7 MG/DL — HIGH (ref 2.5–4.5)
PLAT MORPH BLD: NORMAL — SIGNIFICANT CHANGE UP
PLATELET # BLD AUTO: 42 K/UL — LOW (ref 150–400)
POTASSIUM SERPL-MCNC: 4.1 MMOL/L — SIGNIFICANT CHANGE UP (ref 3.5–5.3)
POTASSIUM SERPL-SCNC: 4.1 MMOL/L — SIGNIFICANT CHANGE UP (ref 3.5–5.3)
PROT SERPL-MCNC: 7.1 G/DL — SIGNIFICANT CHANGE UP (ref 6–8.3)
RBC # BLD: 2.6 M/UL — LOW (ref 3.8–5.2)
RBC # FLD: 16.4 % — HIGH (ref 10.3–14.5)
RBC BLD AUTO: SIGNIFICANT CHANGE UP
SODIUM SERPL-SCNC: 139 MMOL/L — SIGNIFICANT CHANGE UP (ref 135–145)
URATE SERPL-MCNC: 3.9 MG/DL — SIGNIFICANT CHANGE UP (ref 2.5–7)
VARIANT LYMPHS # BLD: 1 % — SIGNIFICANT CHANGE UP (ref 0–6)
WBC # BLD: 3.27 K/UL — LOW (ref 3.8–10.5)
WBC # FLD AUTO: 3.27 K/UL — LOW (ref 3.8–10.5)

## 2020-06-16 PROCEDURE — 71045 X-RAY EXAM CHEST 1 VIEW: CPT | Mod: 26

## 2020-06-16 PROCEDURE — 99232 SBSQ HOSP IP/OBS MODERATE 35: CPT | Mod: GC

## 2020-06-16 PROCEDURE — 93010 ELECTROCARDIOGRAM REPORT: CPT

## 2020-06-16 RX ORDER — MAGNESIUM SULFATE 500 MG/ML
2 VIAL (ML) INJECTION ONCE
Refills: 0 | Status: COMPLETED | OUTPATIENT
Start: 2020-06-16 | End: 2020-06-16

## 2020-06-16 RX ADMIN — Medication 1 APPLICATION(S): at 22:00

## 2020-06-16 RX ADMIN — Medication 100 MILLIGRAM(S): at 21:55

## 2020-06-16 RX ADMIN — Medication 667 MILLIGRAM(S): at 17:23

## 2020-06-16 RX ADMIN — Medication 100 MILLIGRAM(S): at 05:32

## 2020-06-16 RX ADMIN — TRETINOIN 40 MILLIGRAM(S): 10 CAPSULE, LIQUID FILLED ORAL at 20:13

## 2020-06-16 RX ADMIN — Medication 1 SPRAY(S): at 05:30

## 2020-06-16 RX ADMIN — Medication 5 MILLILITER(S): at 11:29

## 2020-06-16 RX ADMIN — ARSENIC TRIOXIDE 130.5 MILLIGRAM(S): 2 INJECTION, SOLUTION INTRAVENOUS at 13:35

## 2020-06-16 RX ADMIN — PANTOPRAZOLE SODIUM 40 MILLIGRAM(S): 20 TABLET, DELAYED RELEASE ORAL at 05:32

## 2020-06-16 RX ADMIN — Medication 1 APPLICATION(S): at 21:58

## 2020-06-16 RX ADMIN — Medication 50 GRAM(S): at 13:27

## 2020-06-16 RX ADMIN — Medication 1 SPRAY(S): at 13:28

## 2020-06-16 RX ADMIN — Medication 3 MILLILITER(S): at 00:17

## 2020-06-16 RX ADMIN — Medication 1 APPLICATION(S): at 13:28

## 2020-06-16 RX ADMIN — Medication 5 MILLIGRAM(S): at 17:23

## 2020-06-16 RX ADMIN — Medication 667 MILLIGRAM(S): at 09:00

## 2020-06-16 RX ADMIN — Medication 1 APPLICATION(S): at 05:30

## 2020-06-16 RX ADMIN — Medication 667 MILLIGRAM(S): at 13:27

## 2020-06-16 RX ADMIN — TRETINOIN 40 MILLIGRAM(S): 10 CAPSULE, LIQUID FILLED ORAL at 08:59

## 2020-06-16 RX ADMIN — ATENOLOL 25 MILLIGRAM(S): 25 TABLET ORAL at 05:31

## 2020-06-16 RX ADMIN — Medication 5 MILLIGRAM(S): at 05:31

## 2020-06-16 RX ADMIN — Medication 5 MILLIGRAM(S): at 21:55

## 2020-06-16 RX ADMIN — Medication 25 MILLIGRAM(S): at 09:08

## 2020-06-16 RX ADMIN — Medication 1 SPRAY(S): at 21:58

## 2020-06-16 RX ADMIN — Medication 25 MILLIGRAM(S): at 20:54

## 2020-06-16 RX ADMIN — Medication 5 MILLILITER(S): at 20:17

## 2020-06-16 RX ADMIN — Medication 5 MILLIGRAM(S): at 09:07

## 2020-06-16 RX ADMIN — Medication 5 MILLILITER(S): at 08:59

## 2020-06-16 RX ADMIN — Medication 3 MILLILITER(S): at 11:30

## 2020-06-16 RX ADMIN — Medication 3 MILLILITER(S): at 17:23

## 2020-06-16 RX ADMIN — Medication 650 MILLIGRAM(S): at 20:53

## 2020-06-16 RX ADMIN — AER TRAVELER 1 APPLICATION(S): 0.5 SOLUTION RECTAL; TOPICAL at 05:31

## 2020-06-16 RX ADMIN — Medication 5 MILLILITER(S): at 00:17

## 2020-06-16 RX ADMIN — Medication 3 MILLILITER(S): at 05:31

## 2020-06-16 RX ADMIN — Medication 1 APPLICATION(S): at 13:29

## 2020-06-16 RX ADMIN — Medication 650 MILLIGRAM(S): at 09:04

## 2020-06-16 RX ADMIN — Medication 5 MILLILITER(S): at 17:22

## 2020-06-16 RX ADMIN — AER TRAVELER 1 APPLICATION(S): 0.5 SOLUTION RECTAL; TOPICAL at 17:24

## 2020-06-16 RX ADMIN — Medication 100 MILLIGRAM(S): at 13:28

## 2020-06-16 RX ADMIN — SODIUM CHLORIDE 25 MILLILITER(S): 9 INJECTION INTRAMUSCULAR; INTRAVENOUS; SUBCUTANEOUS at 05:32

## 2020-06-16 NOTE — PROGRESS NOTE ADULT - SUBJECTIVE AND OBJECTIVE BOX
Diagnosis:    Protocol/Chemo Regimen:    Day:     Pt endorsed:    Review of Systems:     Pain scale:     Diet:     Allergies    No Known Allergies    Intolerances        ANTIMICROBIALS      HEME/ONC MEDICATIONS  arsenic trioxide IVPB (eMAR) 11 milliGRAM(s) IV Intermittent every 24 hours  tretinoin 40 milliGRAM(s) Oral every 12 hours      STANDING MEDICATIONS  albuterol/ipratropium for Nebulization 3 milliLiter(s) Nebulizer every 6 hours  ATENolol  Tablet 25 milliGRAM(s) Oral daily  baclofen 5 milliGRAM(s) Oral every 12 hours  benzonatate 100 milliGRAM(s) Oral every 8 hours  Biotene Dry Mouth Oral Rinse 5 milliLiter(s) Swish and Spit five times a day  bisacodyl 5 milliGRAM(s) Oral every 12 hours  calcium acetate 667 milliGRAM(s) Oral four times a day with meals  dextrose 5%. 1000 milliLiter(s) IV Continuous <Continuous>  dextrose 50% Injectable 12.5 Gram(s) IV Push once  dextrose 50% Injectable 25 Gram(s) IV Push once  dextrose 50% Injectable 25 Gram(s) IV Push once  hydrocortisone 2.5% Rectal Cream 1 Application(s) Rectal every 8 hours  insulin lispro (HumaLOG) corrective regimen sliding scale   SubCutaneous three times a day before meals  insulin lispro (HumaLOG) corrective regimen sliding scale   SubCutaneous at bedtime  pantoprazole    Tablet 40 milliGRAM(s) Oral before breakfast  petrolatum white Ointment 1 Application(s) Topical three times a day  polyethylene glycol 3350 17 Gram(s) Oral daily  sodium chloride 0.65% Nasal 1 Spray(s) Both Nostrils every 8 hours  sodium chloride 0.9%. 1000 milliLiter(s) IV Continuous <Continuous>  witch hazel Pads 1 Application(s) Topical every 12 hours      PRN MEDICATIONS  acetaminophen   Tablet .. 650 milliGRAM(s) Oral every 6 hours PRN  AQUAPHOR (petrolatum Ointment) 1 Application(s) Topical every 3 hours PRN  dextrose 40% Gel 15 Gram(s) Oral once PRN  diphenhydrAMINE   Injectable 25 milliGRAM(s) IV Push every 6 hours PRN  glucagon  Injectable 1 milliGRAM(s) IntraMuscular once PRN  hemorrhoidal Ointment 1 Application(s) Rectal every 8 hours PRN  ondansetron Injectable 8 milliGRAM(s) IV Push every 8 hours PRN  sodium chloride 0.9% lock flush 10 milliLiter(s) IV Push every 1 hour PRN        Vital Signs Last 24 Hrs  T(C): 36.8 (16 Jun 2020 05:49), Max: 36.8 (16 Jun 2020 05:49)  T(F): 98.2 (16 Jun 2020 05:49), Max: 98.2 (16 Jun 2020 05:49)  HR: 70 (16 Jun 2020 05:49) (63 - 78)  BP: 130/72 (16 Jun 2020 05:49) (130/72 - 148/70)  BP(mean): --  RR: 18 (16 Jun 2020 05:49) (18 - 20)  SpO2: 98% (16 Jun 2020 05:49) (94% - 98%)    PHYSICAL EXAM  General: NAD  HEENT: PERRLA, EOMOI, clear oropharynx, anicteric sclera, pink conjunctiva  Neck: supple  CV: (+) S1/S2 RRR  Lungs: clear to auscultation, no wheezes or rales  Abdomen: soft, non-tender, non-distended (+) BS  Ext: no clubbing, cyanosis or edema  Skin: no rashes and no petechiae  Neuro: alert and oriented X 3, no focal deficits  Central Line:     RECENT CULTURES:        LABS:                        x      x     )-----------( 33       ( 15 Luis 2020 14:32 )             x            Mean Cell Volume : 85.6 fl  Mean Cell Hemoglobin : 28.9 pg  Mean Cell Hemoglobin Concentration : 33.8 gm/dL  Auto Neutrophil # : 0.94 K/uL  Auto Lymphocyte # : 2.53 K/uL  Auto Monocyte # : 0.00 K/uL  Auto Eosinophil # : 0.13 K/uL  Auto Basophil # : 0.00 K/uL  Auto Neutrophil % : 25.2 %  Auto Lymphocyte % : 70.4 %  Auto Monocyte % : 0.0 %  Auto Eosinophil % : 3.5 %  Auto Basophil % : 0.0 %      06-15    137  |  104  |  24<H>  ----------------------------<  168<H>  4.2   |  23  |  0.65    Ca    9.8      15 Luis 2020 18:38  Phos  4.2     06-15  Mg     1.9     06-15    TPro  7.7  /  Alb  4.6  /  TBili  0.4  /  DBili  x   /  AST  38  /  ALT  64<H>  /  AlkPhos  107  06-15      Mg 1.9  Phos 4.2      PT/INR - ( 15 Luis 2020 06:46 )   PT: 12.4 sec;   INR: 1.08 ratio         PTT - ( 15 Luis 2020 06:46 )  PTT:30.1 sec      Uric Acid 3.9        RADIOLOGY & ADDITIONAL STUDIES: Diagnosis: Acute Promyelocytic leukemia     Protocol/Chemo Regimen: ATRA/Arsenic     Day: ATRA Day 25  and Arsenic Day 21    Patient Telugu speaking refusing  service. Patient's son called by patient    Patient endorses: + fatigue  + scalp tenderness O/N while receiving platelets resolved this am.    Review of Systems: Patient denies headache, dizziness, visual changes, chest pain, palpitations, abdominal pain, nausea, vomiting, diarrhea or dysuria.    Pain scale: 0     Diet: Regular     Allergies: No Known Allergies    HEME/ONC MEDICATIONS  arsenic trioxide IVPB (eMAR) 11 milliGRAM(s) IV Intermittent every 24 hours  tretinoin 40 milliGRAM(s) Oral every 12 hours    STANDING MEDICATIONS  albuterol/ipratropium for Nebulization 3 milliLiter(s) Nebulizer every 6 hours  ATENolol  Tablet 25 milliGRAM(s) Oral daily  baclofen 5 milliGRAM(s) Oral every 12 hours  benzonatate 100 milliGRAM(s) Oral every 8 hours  Biotene Dry Mouth Oral Rinse 5 milliLiter(s) Swish and Spit five times a day  bisacodyl 5 milliGRAM(s) Oral every 12 hours  calcium acetate 667 milliGRAM(s) Oral four times a day with meals  dextrose 5%. 1000 milliLiter(s) IV Continuous <Continuous>  dextrose 50% Injectable 12.5 Gram(s) IV Push once  dextrose 50% Injectable 25 Gram(s) IV Push once  dextrose 50% Injectable 25 Gram(s) IV Push once  hydrocortisone 2.5% Rectal Cream 1 Application(s) Rectal every 8 hours  insulin lispro (HumaLOG) corrective regimen sliding scale   SubCutaneous three times a day before meals  insulin lispro (HumaLOG) corrective regimen sliding scale   SubCutaneous at bedtime  pantoprazole    Tablet 40 milliGRAM(s) Oral before breakfast  petrolatum white Ointment 1 Application(s) Topical three times a day  polyethylene glycol 3350 17 Gram(s) Oral daily  sodium chloride 0.65% Nasal 1 Spray(s) Both Nostrils every 8 hours  sodium chloride 0.9%. 1000 milliLiter(s) IV Continuous <Continuous>  witch hazel Pads 1 Application(s) Topical every 12 hours    PRN MEDICATIONS  acetaminophen   Tablet .. 650 milliGRAM(s) Oral every 6 hours PRN  AQUAPHOR (petrolatum Ointment) 1 Application(s) Topical every 3 hours PRN  dextrose 40% Gel 15 Gram(s) Oral once PRN  diphenhydrAMINE   Injectable 25 milliGRAM(s) IV Push every 6 hours PRN  glucagon  Injectable 1 milliGRAM(s) IntraMuscular once PRN  hemorrhoidal Ointment 1 Application(s) Rectal every 8 hours PRN  ondansetron Injectable 8 milliGRAM(s) IV Push every 8 hours PRN  sodium chloride 0.9% lock flush 10 milliLiter(s) IV Push every 1 hour PRN    Vital Signs Last 24 Hrs  T(C): 36.8 (16 Jun 2020 05:49), Max: 36.8 (16 Jun 2020 05:49)  T(F): 98.2 (16 Jun 2020 05:49), Max: 98.2 (16 Jun 2020 05:49)  HR: 70 (16 Jun 2020 05:49) (63 - 78)  BP: 130/72 (16 Jun 2020 05:49) (130/72 - 148/70)  BP(mean): --  RR: 18 (16 Jun 2020 05:49) (18 - 20)  SpO2: 98% (16 Jun 2020 05:49) (94% - 98%)    PHYSICAL EXAM  General: NAD  HEENT: clear oropharynx  CV: (+) S1/S2 RRR  Lungs: clear to auscultation, no wheezes or rales  Abdomen: soft, non-tender, non-distended (+) BS  Ext: no edema  Skin: no rashes   Neuro: alert and oriented X 3, no focal deficits  Central Line: C/D/I    RECENT CULTURES:  Culture - Blood (05.23.20 @ 09:09)    Specimen Source: .Blood Blood-Venous    Culture Results:   No Growth Final      LABS:                        7.4    3.27  )-----------( 42       ( 16 Jun 2020 07:23 )             22.1     Mean Cell Volume : 85.0 fl  Mean Cell Hemoglobin : 28.5 pg  Mean Cell Hemoglobin Concentration : 33.5 gm/dL  Auto Neutrophil # : 1.80 K/uL  Auto Lymphocyte # : 1.24 K/uL  Auto Monocyte # : 0.07 K/uL  Auto Eosinophil # : 0.03 K/uL  Auto Basophil # : 0.00 K/uL  Auto Neutrophil % : 55.0 %  Auto Lymphocyte % : 38.0 %  Auto Monocyte % : 2.0 %  Auto Eosinophil % : 1.0 %  Auto Basophil % : 0.0 %      06-16    139  |  103  |  22  ----------------------------<  112<H>  4.1   |  24  |  0.64    Ca    10.2      16 Jun 2020 07:23  Phos  4.7     06-16  Mg     1.8     06-16    TPro  7.1  /  Alb  4.5  /  TBili  0.4  /  DBili  x   /  AST  32  /  ALT  54<H>  /  AlkPhos  97  06-16    Mg 1.8  Phos 4.7  Mg 1.9  Phos 4.2  PT/INR - ( 15 Luis 2020 06:46 )   PT: 12.4 sec;   INR: 1.08 ratio    PTT - ( 15 Ulis 2020 06:46 )  PTT:30.1 sec    Uric Acid 3.9      Uric Acid 3.9    RADIOLOGY & ADDITIONAL STUDIES:   Xray Chest 1 View- PORTABLE-Urgent (06.16.20 @ 10:01) >  The heart is slightly enlarged. The lungs appear to be clear. No pleural effusion. No pneumothorax. A PICC line is seen on the right and the tip is in the superior vena cava. Diagnosis: Acute Promyelocytic leukemia     Protocol/Chemo Regimen: ATRA/Arsenic     Day: ATRA Day 25  and Arsenic Day 21    Patient Latvian speaking refusing  service. Patient's son called by patient    Patient endorses: + fatigue  + scalp tenderness O/N while receiving platelets resolved this am.  + cough    Review of Systems: Patient denies headache, dizziness, visual changes, chest pain, palpitations, abdominal pain, nausea, vomiting, diarrhea or dysuria.    Pain scale: 0     Diet: Regular     Allergies: No Known Allergies    HEME/ONC MEDICATIONS  arsenic trioxide IVPB (eMAR) 11 milliGRAM(s) IV Intermittent every 24 hours  tretinoin 40 milliGRAM(s) Oral every 12 hours    STANDING MEDICATIONS  albuterol/ipratropium for Nebulization 3 milliLiter(s) Nebulizer every 6 hours  ATENolol  Tablet 25 milliGRAM(s) Oral daily  baclofen 5 milliGRAM(s) Oral every 12 hours  benzonatate 100 milliGRAM(s) Oral every 8 hours  Biotene Dry Mouth Oral Rinse 5 milliLiter(s) Swish and Spit five times a day  bisacodyl 5 milliGRAM(s) Oral every 12 hours  calcium acetate 667 milliGRAM(s) Oral four times a day with meals  dextrose 5%. 1000 milliLiter(s) IV Continuous <Continuous>  dextrose 50% Injectable 12.5 Gram(s) IV Push once  dextrose 50% Injectable 25 Gram(s) IV Push once  dextrose 50% Injectable 25 Gram(s) IV Push once  hydrocortisone 2.5% Rectal Cream 1 Application(s) Rectal every 8 hours  insulin lispro (HumaLOG) corrective regimen sliding scale   SubCutaneous three times a day before meals  insulin lispro (HumaLOG) corrective regimen sliding scale   SubCutaneous at bedtime  pantoprazole    Tablet 40 milliGRAM(s) Oral before breakfast  petrolatum white Ointment 1 Application(s) Topical three times a day  polyethylene glycol 3350 17 Gram(s) Oral daily  sodium chloride 0.65% Nasal 1 Spray(s) Both Nostrils every 8 hours  sodium chloride 0.9%. 1000 milliLiter(s) IV Continuous <Continuous>  witch hazel Pads 1 Application(s) Topical every 12 hours    PRN MEDICATIONS  acetaminophen   Tablet .. 650 milliGRAM(s) Oral every 6 hours PRN  AQUAPHOR (petrolatum Ointment) 1 Application(s) Topical every 3 hours PRN  dextrose 40% Gel 15 Gram(s) Oral once PRN  diphenhydrAMINE   Injectable 25 milliGRAM(s) IV Push every 6 hours PRN  glucagon  Injectable 1 milliGRAM(s) IntraMuscular once PRN  hemorrhoidal Ointment 1 Application(s) Rectal every 8 hours PRN  ondansetron Injectable 8 milliGRAM(s) IV Push every 8 hours PRN  sodium chloride 0.9% lock flush 10 milliLiter(s) IV Push every 1 hour PRN    Vital Signs Last 24 Hrs  T(C): 36.8 (16 Jun 2020 05:49), Max: 36.8 (16 Jun 2020 05:49)  T(F): 98.2 (16 Jun 2020 05:49), Max: 98.2 (16 Jun 2020 05:49)  HR: 70 (16 Jun 2020 05:49) (63 - 78)  BP: 130/72 (16 Jun 2020 05:49) (130/72 - 148/70)  BP(mean): --  RR: 18 (16 Jun 2020 05:49) (18 - 20)  SpO2: 98% (16 Jun 2020 05:49) (94% - 98%)    PHYSICAL EXAM  General: NAD  HEENT: clear oropharynx  CV: (+) S1/S2 RRR  Lungs: clear to auscultation, no wheezes or rales  Abdomen: soft, non-tender, non-distended (+) BS  Ext: no edema  Skin: no rashes   Neuro: alert and oriented X 3, no focal deficits  Central Line: C/D/I    RECENT CULTURES:  Culture - Blood (05.23.20 @ 09:09)    Specimen Source: .Blood Blood-Venous    Culture Results:   No Growth Final      LABS:                        7.4    3.27  )-----------( 42       ( 16 Jun 2020 07:23 )             22.1     Mean Cell Volume : 85.0 fl  Mean Cell Hemoglobin : 28.5 pg  Mean Cell Hemoglobin Concentration : 33.5 gm/dL  Auto Neutrophil # : 1.80 K/uL  Auto Lymphocyte # : 1.24 K/uL  Auto Monocyte # : 0.07 K/uL  Auto Eosinophil # : 0.03 K/uL  Auto Basophil # : 0.00 K/uL  Auto Neutrophil % : 55.0 %  Auto Lymphocyte % : 38.0 %  Auto Monocyte % : 2.0 %  Auto Eosinophil % : 1.0 %  Auto Basophil % : 0.0 %      06-16    139  |  103  |  22  ----------------------------<  112<H>  4.1   |  24  |  0.64    Ca    10.2      16 Jun 2020 07:23  Phos  4.7     06-16  Mg     1.8     06-16    TPro  7.1  /  Alb  4.5  /  TBili  0.4  /  DBili  x   /  AST  32  /  ALT  54<H>  /  AlkPhos  97  06-16    Mg 1.8  Phos 4.7  Mg 1.9  Phos 4.2  PT/INR - ( 15 Luis 2020 06:46 )   PT: 12.4 sec;   INR: 1.08 ratio    PTT - ( 15 Luis 2020 06:46 )  PTT:30.1 sec    Uric Acid 3.9      Uric Acid 3.9    RADIOLOGY & ADDITIONAL STUDIES:   Xray Chest 1 View- PORTABLE-Urgent (06.16.20 @ 10:01) >  The heart is slightly enlarged. The lungs appear to be clear. No pleural effusion. No pneumothorax. A PICC line is seen on the right and the tip is in the superior vena cava.

## 2020-06-16 NOTE — PROGRESS NOTE ADULT - PROBLEM SELECTOR PLAN 1
Continue ATRA 40mg BID (started 5/22) and Arsenic Trioxide daily (started on 5/27)  Monitor for differentiation syndrome and check EKG for QTc prolongation every Tuesday and Friday. Keep K > 4 and Mg > 2  Status post Hydrea 500mg PO x 1 on 6/8 and 6/9 for WBC >10  Follow up CBC with diff, CMP, TLS and DIC panel daily  Keep PLTs >50 K. If unable to achieve goal continue with platelets infusion (single donor) 1/2 units over 3 hours every 12 hours.  Platelets 38 today, transfuse half unit infuse over 3 hrs Q 12hrs.  s/p Prednisone 20mg PO daily x 5 days (through 6/12) due to platelet antibody.  Keep Fibrinogen >150 if less give Cryo  Strict Is and Os/Daily weights/Mouth Care Continue ATRA 40mg BID (started 5/22) and Arsenic Trioxide daily (started on 5/27)  Monitor for differentiation syndrome and check EKG for QTc prolongation every Tuesday and Friday. Keep K > 4 and Mg > 2.  Magnesium- 1.8, Magnesium sulphate 2 gm IV x1 dose today.  Status post Hydrea 500mg PO x 1 on 6/8 and 6/9 for WBC >10  Follow up CBC with diff, CMP, TLS and DIC panel daily  Keep PLTs >50 K. If unable to achieve goal continue with platelets infusion (single donor) 1/2 units over 3 hours every 12 hours.  Platelets 42 today, transfuse half unit infuse over 3 hrs Q 12hrs.  s/p Prednisone 20mg PO daily x 5 days (through 6/12) due to platelet antibody.  Keep Fibrinogen >150 if less give Cryo  Strict Is and Os/Daily weights/Mouth Care

## 2020-06-16 NOTE — PROGRESS NOTE ADULT - PROBLEM SELECTOR PLAN 2
The patient is not neutropenic, afebrile  If febrile Pan Cx and CXR  Previous COVID-19 (+) outpatient  5/22, 5/26, 6/2 and 6/9 COVID (-) The patient is not neutropenic, afebrile  If febrile Pan Cx and CXR  Previous COVID-19 (+) outpatient  5/22, 5/26, 6/2 and 6/9 COVID (-)  6/16- Cough, f/u CXR. The patient is not neutropenic, afebrile  If febrile Pan Cx and CXR  Previous COVID-19 (+) outpatient  5/22, 5/26, 6/2 and 6/9 COVID (-)  6/16- Cough, f/u CXR (-).

## 2020-06-16 NOTE — PROGRESS NOTE ADULT - PROBLEM SELECTOR PLAN 5
Mild transaminitis Grade 1, likely due to treatment. Stable  Monitor daily CMP  Avoid further hepatotoxic medications Mild transaminitis Grade 1, ALT- 54 today, likely due to treatment. Stable  Monitor daily CMP  Avoid further hepatotoxic medications

## 2020-06-16 NOTE — PROGRESS NOTE ADULT - ASSESSMENT
This is a 59 yo Mohawk speaking F with PMHx of HTN, HLD, T2DM, COVID-19 infection admitted for management of newly diagnosed APL. The patient is currently reciving ATRA and Arsenic.  Patients hospital course has been complicated by transaminitis and refractory thrombocytopenia. The patient has pancytopenia 2/2 disease and/or ATRA/Arsenic.

## 2020-06-16 NOTE — PROGRESS NOTE ADULT - ATTENDING COMMENTS
61 y/o Azeri speaking F who is transferred for newly diagnosed low/interm risk APL.  Presented with pancytopenia, flow cytometry/bone marrow biopsy/FISH from 5/26- consistent with acute promyelocytic leukemia with PML-AWA translocation.    She was started on ATRA 5/23- day +21, GALA 5/27- day +20.    -CT chest 6/6 c/w granulomatous disease (hx of TB in 1980's) pt had SOB on 6/4, now resolved.  Daily weights, diurese. Will give Hydrea 500mg if wbc>10. On Prednisone 20mg through 5/12- now off.  If pt becomes SOB again, may restart  -tremors improved, unclear etiology, electrolytes WNL. Cefepime held, given Baclofen -they improved  -Check CBC, coags/fibrinogen once daily.  Goal plt >40-50, fibrinogen >150; responding to ABO-matched platelets given over 3 hrs in 1/2 unit infusions   -monitor lytes daily, keep K>4, Mg>2.  EKG twice a week  -off antibiotics  -plan on BM bx around day 29 post GALA 61 y/o Bulgarian speaking F who is transferred for newly diagnosed low/interm risk APL.  Presented with pancytopenia, flow cytometry/bone marrow biopsy/FISH from 5/26- consistent with acute promyelocytic leukemia with PML-AWA translocation.    She was started on ATRA 5/23- day +22, GALA 5/27- day +21.    -CT chest 6/6 c/w granulomatous disease (hx of TB in 1980's) pt had SOB on 6/4, now resolved.  Daily weights, diurese. Will give Hydrea 500mg if wbc>10. On Prednisone 20mg through 5/12- now off.  If pt becomes SOB again, may restart  -tremors improved, unclear etiology, electrolytes WNL. Cefepime held, given Baclofen -they improved  -Check CBC, coags/fibrinogen once daily.  Goal plt >40-50, fibrinogen >150; responding to ABO-matched platelets given over 3 hrs in 1/2 unit infusions   -monitor lytes daily, keep K>4, Mg>2.  EKG twice a week  -off antibiotics  -plan on BM bx around day 29 post GALA

## 2020-06-16 NOTE — ADVANCED PRACTICE NURSE CONSULT - ASSESSMENT
Lab. results reviewed by Dr. Goldberg.Chemotherapy teachings re inforced, well understood. Right arm ac double lumen PICC intact and patent.Dsg dry and intact, site with no s/s of redness,swelling or pain.With positive blood return noted and flushing easily with 10 ML NS.Denies chestpain or nausea,nausea. 2 RNs verification completed. Arsenic 11mg in 250ml D5W started at 1335  x 2 hours infusion at 135ml/hr via lowest port of NSS line through red port of right arm PICC line. Patent. Primary RN aware of plan of care. Safety maintained.

## 2020-06-17 LAB
ALBUMIN SERPL ELPH-MCNC: 4.8 G/DL — SIGNIFICANT CHANGE UP (ref 3.3–5)
ALP SERPL-CCNC: 110 U/L — SIGNIFICANT CHANGE UP (ref 40–120)
ALT FLD-CCNC: 58 U/L — HIGH (ref 10–45)
ANION GAP SERPL CALC-SCNC: 14 MMOL/L — SIGNIFICANT CHANGE UP (ref 5–17)
APTT BLD: 32.3 SEC — SIGNIFICANT CHANGE UP (ref 27.5–36.3)
AST SERPL-CCNC: 36 U/L — SIGNIFICANT CHANGE UP (ref 10–40)
BASOPHILS # BLD AUTO: 0 K/UL — SIGNIFICANT CHANGE UP (ref 0–0.2)
BASOPHILS NFR BLD AUTO: 0 % — SIGNIFICANT CHANGE UP (ref 0–2)
BILIRUB SERPL-MCNC: 0.5 MG/DL — SIGNIFICANT CHANGE UP (ref 0.2–1.2)
BUN SERPL-MCNC: 23 MG/DL — SIGNIFICANT CHANGE UP (ref 7–23)
CALCIUM SERPL-MCNC: 10.2 MG/DL — SIGNIFICANT CHANGE UP (ref 8.4–10.5)
CHLORIDE SERPL-SCNC: 102 MMOL/L — SIGNIFICANT CHANGE UP (ref 96–108)
CO2 SERPL-SCNC: 23 MMOL/L — SIGNIFICANT CHANGE UP (ref 22–31)
CREAT SERPL-MCNC: 0.73 MG/DL — SIGNIFICANT CHANGE UP (ref 0.5–1.3)
D DIMER BLD IA.RAPID-MCNC: 453 NG/ML DDU — HIGH
EOSINOPHIL # BLD AUTO: 0.24 K/UL — SIGNIFICANT CHANGE UP (ref 0–0.5)
EOSINOPHIL NFR BLD AUTO: 5.8 % — SIGNIFICANT CHANGE UP (ref 0–6)
FIBRINOGEN PPP-MCNC: 368 MG/DL — SIGNIFICANT CHANGE UP (ref 350–510)
GLUCOSE BLDC GLUCOMTR-MCNC: 115 MG/DL — HIGH (ref 70–99)
GLUCOSE BLDC GLUCOMTR-MCNC: 116 MG/DL — HIGH (ref 70–99)
GLUCOSE BLDC GLUCOMTR-MCNC: 124 MG/DL — HIGH (ref 70–99)
GLUCOSE BLDC GLUCOMTR-MCNC: 193 MG/DL — HIGH (ref 70–99)
GLUCOSE SERPL-MCNC: 130 MG/DL — HIGH (ref 70–99)
HCT VFR BLD CALC: 22.9 % — LOW (ref 34.5–45)
HGB BLD-MCNC: 7.6 G/DL — LOW (ref 11.5–15.5)
IMM GRANULOCYTES NFR BLD AUTO: 1.2 % — SIGNIFICANT CHANGE UP (ref 0–1.5)
INR BLD: 1.12 RATIO — SIGNIFICANT CHANGE UP (ref 0.88–1.16)
LDH SERPL L TO P-CCNC: 224 U/L — SIGNIFICANT CHANGE UP (ref 50–242)
LYMPHOCYTES # BLD AUTO: 1.91 K/UL — SIGNIFICANT CHANGE UP (ref 1–3.3)
LYMPHOCYTES # BLD AUTO: 46.5 % — HIGH (ref 13–44)
MAGNESIUM SERPL-MCNC: 2.1 MG/DL — SIGNIFICANT CHANGE UP (ref 1.6–2.6)
MANUAL SMEAR VERIFICATION: SIGNIFICANT CHANGE UP
MCHC RBC-ENTMCNC: 28.5 PG — SIGNIFICANT CHANGE UP (ref 27–34)
MCHC RBC-ENTMCNC: 33.2 GM/DL — SIGNIFICANT CHANGE UP (ref 32–36)
MCV RBC AUTO: 85.8 FL — SIGNIFICANT CHANGE UP (ref 80–100)
MONOCYTES # BLD AUTO: 0.02 K/UL — SIGNIFICANT CHANGE UP (ref 0–0.9)
MONOCYTES NFR BLD AUTO: 0.5 % — LOW (ref 2–14)
NEUTROPHILS # BLD AUTO: 1.89 K/UL — SIGNIFICANT CHANGE UP (ref 1.8–7.4)
NEUTROPHILS NFR BLD AUTO: 46 % — SIGNIFICANT CHANGE UP (ref 43–77)
NRBC # BLD: 0 /100 WBCS — SIGNIFICANT CHANGE UP (ref 0–0)
PHOSPHATE SERPL-MCNC: 5.1 MG/DL — HIGH (ref 2.5–4.5)
PLAT MORPH BLD: NORMAL — SIGNIFICANT CHANGE UP
PLATELET # BLD AUTO: 31 K/UL — LOW (ref 150–400)
POTASSIUM SERPL-MCNC: 4.1 MMOL/L — SIGNIFICANT CHANGE UP (ref 3.5–5.3)
POTASSIUM SERPL-SCNC: 4.1 MMOL/L — SIGNIFICANT CHANGE UP (ref 3.5–5.3)
PROT SERPL-MCNC: 8 G/DL — SIGNIFICANT CHANGE UP (ref 6–8.3)
PROTHROM AB SERPL-ACNC: 12.8 SEC — SIGNIFICANT CHANGE UP (ref 10–12.9)
RBC # BLD: 2.67 M/UL — LOW (ref 3.8–5.2)
RBC # FLD: 15.9 % — HIGH (ref 10.3–14.5)
RBC BLD AUTO: SIGNIFICANT CHANGE UP
SARS-COV-2 RNA SPEC QL NAA+PROBE: DETECTED
SODIUM SERPL-SCNC: 139 MMOL/L — SIGNIFICANT CHANGE UP (ref 135–145)
URATE SERPL-MCNC: 4.5 MG/DL — SIGNIFICANT CHANGE UP (ref 2.5–7)
WBC # BLD: 4.11 K/UL — SIGNIFICANT CHANGE UP (ref 3.8–10.5)
WBC # FLD AUTO: 4.11 K/UL — SIGNIFICANT CHANGE UP (ref 3.8–10.5)

## 2020-06-17 PROCEDURE — 99223 1ST HOSP IP/OBS HIGH 75: CPT

## 2020-06-17 PROCEDURE — 99232 SBSQ HOSP IP/OBS MODERATE 35: CPT | Mod: GC

## 2020-06-17 RX ORDER — CALCIUM ACETATE 667 MG
667 TABLET ORAL
Refills: 0 | Status: COMPLETED | OUTPATIENT
Start: 2020-06-17 | End: 2020-06-19

## 2020-06-17 RX ORDER — ARSENIC TRIOXIDE 2 MG/ML
11 INJECTION, SOLUTION INTRAVENOUS EVERY 24 HOURS
Refills: 0 | Status: DISCONTINUED | OUTPATIENT
Start: 2020-06-17 | End: 2020-06-23

## 2020-06-17 RX ADMIN — Medication 5 MILLILITER(S): at 08:04

## 2020-06-17 RX ADMIN — Medication 100 MILLIGRAM(S): at 05:33

## 2020-06-17 RX ADMIN — Medication 1 SPRAY(S): at 05:39

## 2020-06-17 RX ADMIN — Medication 1 SPRAY(S): at 21:35

## 2020-06-17 RX ADMIN — Medication 1 APPLICATION(S): at 21:36

## 2020-06-17 RX ADMIN — AER TRAVELER 1 APPLICATION(S): 0.5 SOLUTION RECTAL; TOPICAL at 05:43

## 2020-06-17 RX ADMIN — Medication 5 MILLILITER(S): at 00:57

## 2020-06-17 RX ADMIN — Medication 1 APPLICATION(S): at 13:06

## 2020-06-17 RX ADMIN — Medication 650 MILLIGRAM(S): at 19:45

## 2020-06-17 RX ADMIN — ATENOLOL 25 MILLIGRAM(S): 25 TABLET ORAL at 05:50

## 2020-06-17 RX ADMIN — TRETINOIN 40 MILLIGRAM(S): 10 CAPSULE, LIQUID FILLED ORAL at 19:51

## 2020-06-17 RX ADMIN — Medication 5 MILLIGRAM(S): at 17:46

## 2020-06-17 RX ADMIN — Medication 5 MILLILITER(S): at 11:21

## 2020-06-17 RX ADMIN — Medication 650 MILLIGRAM(S): at 08:04

## 2020-06-17 RX ADMIN — Medication 25 MILLIGRAM(S): at 19:45

## 2020-06-17 RX ADMIN — Medication 5 MILLIGRAM(S): at 05:33

## 2020-06-17 RX ADMIN — Medication 5 MILLIGRAM(S): at 21:30

## 2020-06-17 RX ADMIN — Medication 5 MILLILITER(S): at 17:46

## 2020-06-17 RX ADMIN — Medication 3 MILLILITER(S): at 05:32

## 2020-06-17 RX ADMIN — TRETINOIN 40 MILLIGRAM(S): 10 CAPSULE, LIQUID FILLED ORAL at 08:07

## 2020-06-17 RX ADMIN — Medication 667 MILLIGRAM(S): at 17:46

## 2020-06-17 RX ADMIN — Medication 100 MILLIGRAM(S): at 13:05

## 2020-06-17 RX ADMIN — Medication 3 MILLILITER(S): at 11:20

## 2020-06-17 RX ADMIN — Medication 1 SPRAY(S): at 13:06

## 2020-06-17 RX ADMIN — AER TRAVELER 1 APPLICATION(S): 0.5 SOLUTION RECTAL; TOPICAL at 17:38

## 2020-06-17 RX ADMIN — Medication 3 MILLILITER(S): at 00:58

## 2020-06-17 RX ADMIN — Medication 5 MILLILITER(S): at 19:50

## 2020-06-17 RX ADMIN — Medication 25 MILLIGRAM(S): at 08:03

## 2020-06-17 RX ADMIN — Medication 3 MILLILITER(S): at 17:46

## 2020-06-17 RX ADMIN — Medication 100 MILLIGRAM(S): at 21:30

## 2020-06-17 RX ADMIN — Medication 1 APPLICATION(S): at 05:43

## 2020-06-17 RX ADMIN — PANTOPRAZOLE SODIUM 40 MILLIGRAM(S): 20 TABLET, DELAYED RELEASE ORAL at 05:33

## 2020-06-17 RX ADMIN — Medication 667 MILLIGRAM(S): at 12:45

## 2020-06-17 RX ADMIN — Medication 1 APPLICATION(S): at 05:39

## 2020-06-17 RX ADMIN — ARSENIC TRIOXIDE 130.5 MILLIGRAM(S): 2 INJECTION, SOLUTION INTRAVENOUS at 13:32

## 2020-06-17 RX ADMIN — Medication 667 MILLIGRAM(S): at 21:30

## 2020-06-17 RX ADMIN — Medication 5 MILLIGRAM(S): at 11:20

## 2020-06-17 RX ADMIN — Medication 1 APPLICATION(S): at 21:34

## 2020-06-17 NOTE — PROGRESS NOTE ADULT - SUBJECTIVE AND OBJECTIVE BOX
Diagnosis:    Protocol/Chemo Regimen:    Day:     Pt endorsed:    Review of Systems:     Pain scale:     Diet:     Allergies    No Known Allergies    Intolerances        ANTIMICROBIALS      HEME/ONC MEDICATIONS  tretinoin 40 milliGRAM(s) Oral every 12 hours      STANDING MEDICATIONS  albuterol/ipratropium for Nebulization 3 milliLiter(s) Nebulizer every 6 hours  ATENolol  Tablet 25 milliGRAM(s) Oral daily  baclofen 5 milliGRAM(s) Oral every 12 hours  benzonatate 100 milliGRAM(s) Oral every 8 hours  Biotene Dry Mouth Oral Rinse 5 milliLiter(s) Swish and Spit five times a day  bisacodyl 5 milliGRAM(s) Oral every 12 hours  dextrose 5%. 1000 milliLiter(s) IV Continuous <Continuous>  dextrose 50% Injectable 12.5 Gram(s) IV Push once  dextrose 50% Injectable 25 Gram(s) IV Push once  dextrose 50% Injectable 25 Gram(s) IV Push once  hydrocortisone 2.5% Rectal Cream 1 Application(s) Rectal every 8 hours  insulin lispro (HumaLOG) corrective regimen sliding scale   SubCutaneous three times a day before meals  insulin lispro (HumaLOG) corrective regimen sliding scale   SubCutaneous at bedtime  pantoprazole    Tablet 40 milliGRAM(s) Oral before breakfast  petrolatum white Ointment 1 Application(s) Topical three times a day  polyethylene glycol 3350 17 Gram(s) Oral daily  sodium chloride 0.65% Nasal 1 Spray(s) Both Nostrils every 8 hours  sodium chloride 0.9%. 1000 milliLiter(s) IV Continuous <Continuous>  witch hazel Pads 1 Application(s) Topical every 12 hours      PRN MEDICATIONS  acetaminophen   Tablet .. 650 milliGRAM(s) Oral every 6 hours PRN  AQUAPHOR (petrolatum Ointment) 1 Application(s) Topical every 3 hours PRN  dextrose 40% Gel 15 Gram(s) Oral once PRN  diphenhydrAMINE   Injectable 25 milliGRAM(s) IV Push every 6 hours PRN  glucagon  Injectable 1 milliGRAM(s) IntraMuscular once PRN  hemorrhoidal Ointment 1 Application(s) Rectal every 8 hours PRN  ondansetron Injectable 8 milliGRAM(s) IV Push every 8 hours PRN  sodium chloride 0.9% lock flush 10 milliLiter(s) IV Push every 1 hour PRN        Vital Signs Last 24 Hrs  T(C): 36.7 (17 Jun 2020 05:12), Max: 36.7 (16 Jun 2020 09:47)  T(F): 98 (17 Jun 2020 05:12), Max: 98 (16 Jun 2020 09:47)  HR: 81 (17 Jun 2020 05:12) (65 - 81)  BP: 173/80 (17 Jun 2020 05:12) (121/75 - 173/80)  BP(mean): --  RR: 18 (17 Jun 2020 05:12) (18 - 18)  SpO2: 97% (17 Jun 2020 05:12) (97% - 100%)    PHYSICAL EXAM  General: NAD  HEENT: PERRLA, EOMOI, clear oropharynx, anicteric sclera, pink conjunctiva  Neck: supple  CV: (+) S1/S2 RRR  Lungs: clear to auscultation, no wheezes or rales  Abdomen: soft, non-tender, non-distended (+) BS  Ext: no clubbing, cyanosis or edema  Skin: no rashes and no petechiae  Neuro: alert and oriented X 3, no focal deficits  Central Line:     RECENT CULTURES:        LABS:                        7.6    4.11  )-----------( 31       ( 17 Jun 2020 06:47 )             22.9         Mean Cell Volume : 85.8 fl  Mean Cell Hemoglobin : 28.5 pg  Mean Cell Hemoglobin Concentration : 33.2 gm/dL  Auto Neutrophil # : x  Auto Lymphocyte # : x  Auto Monocyte # : x  Auto Eosinophil # : x  Auto Basophil # : x  Auto Neutrophil % : x  Auto Lymphocyte % : x  Auto Monocyte % : x  Auto Eosinophil % : x  Auto Basophil % : x      06-16    139  |  103  |  22  ----------------------------<  112<H>  4.1   |  24  |  0.64    Ca    10.2      16 Jun 2020 07:23  Phos  4.7     06-16  Mg     1.8     06-16    TPro  7.1  /  Alb  4.5  /  TBili  0.4  /  DBili  x   /  AST  32  /  ALT  54<H>  /  AlkPhos  97  06-16                  RADIOLOGY & ADDITIONAL STUDIES: Diagnosis: Acute Promyelocytic leukemia     Protocol/Chemo Regimen: ATRA/Arsenic     Day: ATRA Day 26  and Arsenic Day 22    Patient [place] speaking refusing  service. Patient's son called by patient    Patient endorses:   + scalp tenderness O/N before platelet transfusion.  + cough    Review of Systems: Patient denies dizziness, visual changes, chest pain, palpitations, abdominal pain, nausea, vomiting, diarrhea or dysuria.    Pain scale: 0     Diet: Regular     Allergies: No Known Allergies    HEME/ONC MEDICATIONS  tretinoin 40 milliGRAM(s) Oral every 12 hours    STANDING MEDICATIONS  albuterol/ipratropium for Nebulization 3 milliLiter(s) Nebulizer every 6 hours  ATENolol  Tablet 25 milliGRAM(s) Oral daily  baclofen 5 milliGRAM(s) Oral every 12 hours  benzonatate 100 milliGRAM(s) Oral every 8 hours  Biotene Dry Mouth Oral Rinse 5 milliLiter(s) Swish and Spit five times a day  bisacodyl 5 milliGRAM(s) Oral every 12 hours  dextrose 5%. 1000 milliLiter(s) IV Continuous <Continuous>  dextrose 50% Injectable 12.5 Gram(s) IV Push once  dextrose 50% Injectable 25 Gram(s) IV Push once  dextrose 50% Injectable 25 Gram(s) IV Push once  hydrocortisone 2.5% Rectal Cream 1 Application(s) Rectal every 8 hours  insulin lispro (HumaLOG) corrective regimen sliding scale   SubCutaneous three times a day before meals  insulin lispro (HumaLOG) corrective regimen sliding scale   SubCutaneous at bedtime  pantoprazole    Tablet 40 milliGRAM(s) Oral before breakfast  petrolatum white Ointment 1 Application(s) Topical three times a day  polyethylene glycol 3350 17 Gram(s) Oral daily  sodium chloride 0.65% Nasal 1 Spray(s) Both Nostrils every 8 hours  sodium chloride 0.9%. 1000 milliLiter(s) IV Continuous <Continuous>  witch hazel Pads 1 Application(s) Topical every 12 hours    PRN MEDICATIONS  acetaminophen   Tablet .. 650 milliGRAM(s) Oral every 6 hours PRN  AQUAPHOR (petrolatum Ointment) 1 Application(s) Topical every 3 hours PRN  dextrose 40% Gel 15 Gram(s) Oral once PRN  diphenhydrAMINE   Injectable 25 milliGRAM(s) IV Push every 6 hours PRN  glucagon  Injectable 1 milliGRAM(s) IntraMuscular once PRN  hemorrhoidal Ointment 1 Application(s) Rectal every 8 hours PRN  ondansetron Injectable 8 milliGRAM(s) IV Push every 8 hours PRN  sodium chloride 0.9% lock flush 10 milliLiter(s) IV Push every 1 hour PRN    Vital Signs Last 24 Hrs  T(C): 36.7 (17 Jun 2020 05:12), Max: 36.7 (16 Jun 2020 09:47)  T(F): 98 (17 Jun 2020 05:12), Max: 98 (16 Jun 2020 09:47)  HR: 81 (17 Jun 2020 05:12) (65 - 81)  BP: 173/80 (17 Jun 2020 05:12) (121/75 - 173/80)  BP(mean): --  RR: 18 (17 Jun 2020 05:12) (18 - 18)  SpO2: 97% (17 Jun 2020 05:12) (97% - 100%)    PHYSICAL EXAM  General: NAD  HEENT: clear oropharynx  CV: (+) S1/S2 RRR  Lungs: clear to auscultation, no wheezes or rales  Abdomen: soft, non-tender, non-distended (+) BS  Ext: no edema  Skin: no rashes   Neuro: alert and oriented X 3, no focal deficits  Central Line: C/D/I    RECENT CULTURES:  Culture - Blood (05.23.20 @ 09:09)    Specimen Source: .Blood Blood-Venous    Culture Results:   No Growth Final    Culture - Blood (05.23.20 @ 09:09)    Specimen Source: .Blood Blood-Peripheral    Culture Results:   No Growth Final    LABS:                        7.6    4.11  )-----------( 31       ( 17 Jun 2020 06:47 )             22.9     Mean Cell Volume : 85.8 fl  Mean Cell Hemoglobin : 28.5 pg  Mean Cell Hemoglobin Concentration : 33.2 gm/dL  Auto Neutrophil # : x  Auto Lymphocyte # : x  Auto Monocyte # : x  Auto Eosinophil # : x  Auto Basophil # : x  Auto Neutrophil % : x  Auto Lymphocyte % : x  Auto Monocyte % : x  Auto Eosinophil % : x  Auto Basophil % : x      06-16    139  |  103  |  22  ----------------------------<  112<H>  4.1   |  24  |  0.64    Ca    10.2      16 Jun 2020 07:23  Phos  4.7     06-16  Mg     1.8     06-16    TPro  7.1  /  Alb  4.5  /  TBili  0.4  /  DBili  x   /  AST  32  /  ALT  54<H>  /  AlkPhos  97  06-16      RADIOLOGY & ADDITIONAL STUDIES:  : Xray Chest 1 View- PORTABLE-Urgent (06.16.20 @ 10:01) >  The heart is slightly enlarged. The lungs appear to be clear. No pleural effusion. No pneumothorax. A PICC line is seen on the right and the tip is in the superior vena cava.

## 2020-06-17 NOTE — PROGRESS NOTE ADULT - PROBLEM SELECTOR PLAN 2
-known prior infection with positive PCR in April and May 2020; since then has had multiple negative PCR on 5/22, 5/26, 6/2, and 6/9  -Most recent test 6/16 PCR now recurrently positive. CXR 6/16 with clear lungs  -pt transferred to [address] [place] [address]; c/w airborne/droplet/contact precautions for now  -will send covid antibody testing, repeat pcr testing ordered for 6/18. Will need 2 consecutive pcr negatives to be considered for transfer back to [address] [place] [address]  -currently not sob, no distress, lungs clear. cont to monitor. If recurrently symptomatic, can consider ID eval. avoid plaquenil given no efficacy.

## 2020-06-17 NOTE — PROGRESS NOTE ADULT - PROBLEM SELECTOR PLAN 1
-Continue ATRA 40mg BID (started 5/22) and Arsenic Trioxide daily (started on 5/27)  -Monitor for differentiation syndrome and check EKG for QTc prolongation every Tuesday and Friday.   -Keep K > 4 and Mg > 2.  -Status post Hydrea 500mg PO x 1 on 6/8 and 6/9 for WBC >10  -Follow up CBC with diff, CMP, TLS and DIC panel daily  Keep PLTs >50 K. If unable to achieve goal continue with platelets infusion (single donor) 1/2 units over 3 hours every 12 hours. Last transfused this am for Platelets 31 today  -s/p Prednisone 20mg PO daily x 5 days (through 6/12) due to platelet antibody.  -Keep Fibrinogen >150 if less give Cryo  -Strict Is and Os/Daily weights/Mouth Care.  -plan on BM bx around day 29 post GALA .  -PICC line inrritation: c/w aquaphor and local skin care, can try cavilon per d/w RN.

## 2020-06-17 NOTE — PROGRESS NOTE ADULT - SUBJECTIVE AND OBJECTIVE BOX
MEDICINE ATTENDING ACCEPT NOTE    CC: Patient is a 60y old  Female who presents with a chief complaint of APML (17 Jun 2020 07:36)    HPI: 60 y.o. Romansh speaking  female hx of HTN on atenolol, HLD, DM on metformin presents to [facility] ED on 5/22/20 with fever, malaise, HA. Patient reported that she was hypothermic to 92 degrees intermittently and also febrile to 101-102F which improves with Tylenol. Patient has been complaining of head feeling hot, pleuritic chest pain that moves around w/o numbness tingling, paresthesias, weakness, a rash on bilateral LE x1 month, a tear drop clearing on her tongue, as well as a few drops of blood in her stool with hard bowel movements. Patient has been having poor PO intake for the past 3 days before admission 2/2 throat pain. Patient was COVID + on 4/16/2020, and retested and positive again 5/15/2020. 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 CT head was negative for acute intracranial hemorrhage. On 5/26 patient had a bone marrow biopsy, which confirmed the diagnosis of APL. Patient was previously started on Atra on 5/23 with flow cytometry results. Started on Arsenic trioxide on 5/27 after confirmation with BMBX. On 5/28 patient complained of occipital hyperthermia. Symptom was attributed to ATRA. An MR of the brain with contrast was done to rule out extraneous causes which revealed negative. Due to refractory thrombocytopenia short course of Prednisone 20mg x 3 days was started and platelets were given around the clock. HLA typing was completed. Patient reported dark stool and with concern for GIB IV Protonix was stared and CBC q 12. She was refractory to platelets maintained platelet count between 30-40 during the hospitalization, however transfused paltelets half units Q 12hrs.  Covid PCR was negative on 5/22, 5/26, 6/2, and 6/9, and became positive again on 6/16; subsequently patient is being transferred to medical covid unit.   Currently, pt endorsing _____.     REVIEW OF SYSTEMS:  CONSTITUTIONAL: No weakness. No fevers. No chills. No weight loss. Good appetite.  EYES: No visual changes. No eye pain.  ENT: No hearing difficulty. No vertigo. No dysphagia. No Sinusitis/rhinorrhea.  NECK: No pain. No stiffness/rigidity.  CARDIAC: No chest pain. No palpitations.  RESPIRATORY: No cough. No SOB. No hemoptysis.  GASTROINTESTINAL: No abdominal pain. No nausea. No vomiting. No hematemesis. No diarrhea. No constipation. No melena. No hematochezia.  GENITOURINARY: No dysuria. No frequency. No hesitancy. No hematuria.  NEUROLOGICAL: No numbness. No focal weakness. No incontinence. No headache.  BACK: No back pain.  EXTREMITIES: No lower extremity edema. Full ROM.  SKIN: No rashes. No itching. No other lesions.  PSYCHIATRIC: No depression. No anxiety. No SI/HI.  ALLERGIC: No lip swelling. No hives.  All other review of systems is negative unless indicated above.  [  ] Unable to assess ROS because    PAST MEDICAL & SURGICAL HISTORY:  HLD (hyperlipidemia)  Hypertension  Diabetes  No significant past surgical history    FAMILY HISTORY:  Family history of diabetes mellitus: Mother    Social History:    Marital Status:  (   )    (   ) Single    (   )    (  )   Occupation:   Lives with: (  ) alone  (  ) children   (  ) spouse   (  ) parents  (  ) other    Substance Use (street drugs): (  ) never used  (  ) other:  Tobacco Usage:  (   ) never smoked   (   ) former smoker   (   ) current smoker  (     ) pack year  (        ) last cigarette date  Alcohol Usage:  Sexual History:       MEDICATIONS  (STANDING):  albuterol/ipratropium for Nebulization 3 milliLiter(s) Nebulizer every 6 hours  arsenic trioxide IVPB (eMAR) 11 milliGRAM(s) IV Intermittent every 24 hours  ATENolol  Tablet 25 milliGRAM(s) Oral daily  baclofen 5 milliGRAM(s) Oral every 12 hours  benzonatate 100 milliGRAM(s) Oral every 8 hours  Biotene Dry Mouth Oral Rinse 5 milliLiter(s) Swish and Spit five times a day  bisacodyl 5 milliGRAM(s) Oral every 12 hours  calcium acetate 667 milliGRAM(s) Oral four times a day with meals  dextrose 5%. 1000 milliLiter(s) (50 mL/Hr) IV Continuous <Continuous>  dextrose 50% Injectable 12.5 Gram(s) IV Push once  dextrose 50% Injectable 25 Gram(s) IV Push once  dextrose 50% Injectable 25 Gram(s) IV Push once  hydrocortisone 2.5% Rectal Cream 1 Application(s) Rectal every 8 hours  insulin lispro (HumaLOG) corrective regimen sliding scale   SubCutaneous three times a day before meals  insulin lispro (HumaLOG) corrective regimen sliding scale   SubCutaneous at bedtime  pantoprazole    Tablet 40 milliGRAM(s) Oral before breakfast  petrolatum white Ointment 1 Application(s) Topical three times a day  polyethylene glycol 3350 17 Gram(s) Oral daily  sodium chloride 0.65% Nasal 1 Spray(s) Both Nostrils every 8 hours  sodium chloride 0.9%. 1000 milliLiter(s) (25 mL/Hr) IV Continuous <Continuous>  tretinoin 40 milliGRAM(s) Oral every 12 hours  witch hazel Pads 1 Application(s) Topical every 12 hours    MEDICATIONS  (PRN):  acetaminophen   Tablet .. 650 milliGRAM(s) Oral every 6 hours PRN Temp greater or equal to 38C (100.4F), Mild Pain (1 - 3)  AQUAPHOR (petrolatum Ointment) 1 Application(s) Topical every 3 hours PRN Dry skin  dextrose 40% Gel 15 Gram(s) Oral once PRN Blood Glucose LESS THAN 70 milliGRAM(s)/deciliter  diphenhydrAMINE   Injectable 25 milliGRAM(s) IV Push every 6 hours PRN Allergy symptoms  glucagon  Injectable 1 milliGRAM(s) IntraMuscular once PRN Glucose LESS THAN 70 milligrams/deciliter  hemorrhoidal Ointment 1 Application(s) Rectal every 8 hours PRN Hemorrhoids  ondansetron Injectable 8 milliGRAM(s) IV Push every 8 hours PRN Nausea and/or Vomiting  sodium chloride 0.9% lock flush 10 milliLiter(s) IV Push every 1 hour PRN Pre/post blood products, medications, blood draw, and to maintain line patency    Allergies    No Known Allergies    CAPILLARY BLOOD GLUCOSE  POCT Blood Glucose.: 193 mg/dL (17 Jun 2020 21:18)  POCT Blood Glucose.: 115 mg/dL (17 Jun 2020 17:32)  POCT Blood Glucose.: 124 mg/dL (17 Jun 2020 11:30)  POCT Blood Glucose.: 116 mg/dL (17 Jun 2020 07:58)    I&O's Summary    16 Jun 2020 07:01  -  17 Jun 2020 07:00  --------------------------------------------------------  IN: 1355 mL / OUT: 400 mL / NET: 955 mL    17 Jun 2020 07:01  -  17 Jun 2020 23:35  --------------------------------------------------------  IN: 3010 mL / OUT: 0 mL / NET: 3010 mL    PHYSICAL EXAM:  Vital Signs Last 24 Hrs  T(C): 36.5 (17 Jun 2020 20:15), Max: 37.2 (17 Jun 2020 17:36)  T(F): 97.7 (17 Jun 2020 20:15), Max: 99 (17 Jun 2020 17:36)  HR: 80 (17 Jun 2020 20:15) (70 - 81)  BP: 146/75 (17 Jun 2020 20:15) (121/75 - 173/80)  BP(mean): --  RR: 18 (17 Jun 2020 20:15) (18 - 20)  SpO2: 97% (17 Jun 2020 20:15) (96% - 98%)  CONSTITUTIONAL: NAD, well-developed, well-groomed  EYES: PERRLA; conjunctiva and sclera clear  ENMT: Moist oral mucosa, no pharyngeal injection or exudates; normal dentition  NECK: Supple, no palpable masses; no thyromegaly  RESPIRATORY: Normal respiratory effort; lungs are clear to auscultation bilaterally  CARDIOVASCULAR: Regular rate and rhythm, normal S1 and S2, no murmur/rub/gallop; No lower extremity edema; Peripheral pulses are 2+ bilaterally  ABDOMEN: Nontender to palpation, normoactive bowel sounds, no rebound/guarding; No hepatosplenomegaly  MUSCULOSKELETAL:  Normal gait; no clubbing or cyanosis of digits; no joint swelling or tenderness to palpation  PSYCH: A+O to person, place, and time; affect appropriate  NEUROLOGY: CN 2-12 are intact and symmetric; no gross sensory deficits   SKIN: No rashes; no palpable lesions    LABS: personally reviewed                         7.6    4.11  )-----------( 31       ( 17 Jun 2020 06:47 )             22.9     06-17    139  |  102  |  23  ----------------------------<  130<H>  4.1   |  23  |  0.73    Ca    10.2      17 Jun 2020 06:47  Phos  5.1     06-17  Mg     2.1     06-17    TPro  8.0  /  Alb  4.8  /  TBili  0.5  /  DBili  x   /  AST  36  /  ALT  58<H>  /  AlkPhos  110  06-17    PT/INR - ( 17 Jun 2020 06:47 )   PT: 12.8 sec;   INR: 1.12 ratio         PTT - ( 17 Jun 2020 06:47 )  PTT:32.3 sec    RADIOLOGY & ADDITIONAL TESTS:    < from: Xray Chest 1 View- PORTABLE-Urgent (06.16.20 @ 10:01) >  Impression:    The heart is slightly enlarged. The lungs appear to be clear. No pleural effusion. No pneumothorax. A PICC line is seen on the right and the tip is in the superior vena cava.    < from: CT Chest No Cont (06.07.20 @ 18:07) >  IMPRESSION:    Biapical pleural-parenchymal scarring with calcification noted, likely from prior granulomatous disease. No consolidations, edema, effusion or pneumothorax. Apparent mild groundglass opacity noted bilaterally likely due to expiratory phase of scanning. No definite consolidations.    < from: VA Duplex Upper Ext Vein Scan, Right (05.31.20 @ 14:47) >  IMPRESSION:   No evidence ofdeep venous thrombosis in the visualized right upper extremity.    < from: MR Head w/ IV Cont (05.29.20 @ 14:59) >  IMPRESSION: There is a small amount of fluid in the mastoid sinuses and middle ear cavities bilaterally. There is slightly prominent soft tissue in the left nasopharynx could be related to lymphoid hyperplasia versus lymphoma. No masses or abnormal enhancement of the brain parenchyma.    Results Reviewed: Yes  Imaging Personally Reviewed: Yes  Electrocardiogram Personally Reviewed: Yes    COORDINATION OF CARE:  Care Discussed with Consultants/Other Providers [Y/N]: Yes, heme/onc.  Prior or Outpatient Records Reviewed [Y/N]: Yes MEDICINE ATTENDING ACCEPT NOTE    CC: Patient is a 60y old  Female who presents with a chief complaint of APML (17 Jun 2020 07:36)    HPI: 60 y.o. Danish speaking  female hx of HTN on atenolol, HLD, DM on metformin presents to [facility] ED on 5/22/20 with fever, malaise, HA. Patient reported that she was hypothermic to 92 degrees intermittently and also febrile to 101-102F which improves with Tylenol. Patient has been complaining of head feeling hot, pleuritic chest pain that moves around w/o numbness tingling, paresthesias, weakness, a rash on bilateral LE x1 month, a tear drop clearing on her tongue, as well as a few drops of blood in her stool with hard bowel movements. Patient has been having poor PO intake for the past 3 days before admission 2/2 throat pain. Patient was COVID + on 4/16/2020, and retested and positive again 5/15/2020. 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 CT head was negative for acute intracranial hemorrhage. On 5/26 patient had a bone marrow biopsy, which confirmed the diagnosis of APL. Patient was previously started on Atra on 5/23 with flow cytometry results. Started on Arsenic trioxide on 5/27 after confirmation with BMBX. On 5/28 patient complained of occipital hyperthermia. Symptom was attributed to ATRA. An MR of the brain with contrast was done to rule out extraneous causes which revealed negative. Due to refractory thrombocytopenia short course of Prednisone 20mg x 3 days was started and platelets were given around the clock. HLA typing was completed. Patient reported dark stool and with concern for GIB IV Protonix was stared and CBC q 12. She was refractory to platelets maintained platelet count between 30-40 during the hospitalization, however transfused paltelets half units Q 12hrs.  Covid PCR was negative on 5/22, 5/26, 6/2, and 6/9, and became positive again on 6/16; subsequently patient is being transferred to medical covid unit.   Pt declines translatory phone for Danish translation, instead calls son Raman via her cell phone for translation. Currently, pt endorsing no acute sob or cp. +R arm irritation from dressing around PICC line. Otherwise no acute issue per son. No hematochezia/melena. Son endorsing hope that patient can get antibody testing for the coronavirus.     REVIEW OF SYSTEMS:  CONSTITUTIONAL: +weakness. No fevers. No chills. No weight loss. Good appetite.  EYES: No visual changes. No eye pain.  ENT: No hearing difficulty. No vertigo. No dysphagia. No Sinusitis/rhinorrhea.  NECK: No pain. No stiffness/rigidity.  CARDIAC: No chest pain. No palpitations.  RESPIRATORY: No cough. +intermittent SOB. No hemoptysis.  GASTROINTESTINAL: No abdominal pain. No nausea. No vomiting. No hematemesis. No diarrhea. No constipation. No melena. No hematochezia.  GENITOURINARY: No dysuria. No frequency. No hesitancy. No hematuria.  NEUROLOGICAL: No numbness. No focal weakness. No incontinence. No headache.  BACK: No back pain.  EXTREMITIES: No lower extremity edema. Full ROM.  SKIN: No rashes. No itching. No other lesions.  PSYCHIATRIC: No depression. No anxiety. No SI/HI.  ALLERGIC: No lip swelling. No hives.  All other review of systems is negative unless indicated above.      PAST MEDICAL & SURGICAL HISTORY:  HLD (hyperlipidemia)  Hypertension  Diabetes  No significant past surgical history    FAMILY HISTORY:  Family history of diabetes mellitus: Mother    Social History:    Occupation: not working  Lives with: (  ) alone  (x  ) children   (  ) spouse   (  ) parents  (  ) other    Substance Use (street drugs): (x  ) never used  (  ) other:  Tobacco Usage:  (  x ) never smoked   (   ) former smoker   (   ) current smoker  (     ) pack year  (        ) last cigarette date  Alcohol Usage: denies    MEDICATIONS  (STANDING):  albuterol/ipratropium for Nebulization 3 milliLiter(s) Nebulizer every 6 hours  arsenic trioxide IVPB (eMAR) 11 milliGRAM(s) IV Intermittent every 24 hours  ATENolol  Tablet 25 milliGRAM(s) Oral daily  baclofen 5 milliGRAM(s) Oral every 12 hours  benzonatate 100 milliGRAM(s) Oral every 8 hours  Biotene Dry Mouth Oral Rinse 5 milliLiter(s) Swish and Spit five times a day  bisacodyl 5 milliGRAM(s) Oral every 12 hours  calcium acetate 667 milliGRAM(s) Oral four times a day with meals  dextrose 5%. 1000 milliLiter(s) (50 mL/Hr) IV Continuous <Continuous>  dextrose 50% Injectable 12.5 Gram(s) IV Push once  dextrose 50% Injectable 25 Gram(s) IV Push once  dextrose 50% Injectable 25 Gram(s) IV Push once  hydrocortisone 2.5% Rectal Cream 1 Application(s) Rectal every 8 hours  insulin lispro (HumaLOG) corrective regimen sliding scale   SubCutaneous three times a day before meals  insulin lispro (HumaLOG) corrective regimen sliding scale   SubCutaneous at bedtime  pantoprazole    Tablet 40 milliGRAM(s) Oral before breakfast  petrolatum white Ointment 1 Application(s) Topical three times a day  polyethylene glycol 3350 17 Gram(s) Oral daily  sodium chloride 0.65% Nasal 1 Spray(s) Both Nostrils every 8 hours  sodium chloride 0.9%. 1000 milliLiter(s) (25 mL/Hr) IV Continuous <Continuous>  tretinoin 40 milliGRAM(s) Oral every 12 hours  witch hazel Pads 1 Application(s) Topical every 12 hours    MEDICATIONS  (PRN):  acetaminophen   Tablet .. 650 milliGRAM(s) Oral every 6 hours PRN Temp greater or equal to 38C (100.4F), Mild Pain (1 - 3)  AQUAPHOR (petrolatum Ointment) 1 Application(s) Topical every 3 hours PRN Dry skin  dextrose 40% Gel 15 Gram(s) Oral once PRN Blood Glucose LESS THAN 70 milliGRAM(s)/deciliter  diphenhydrAMINE   Injectable 25 milliGRAM(s) IV Push every 6 hours PRN Allergy symptoms  glucagon  Injectable 1 milliGRAM(s) IntraMuscular once PRN Glucose LESS THAN 70 milligrams/deciliter  hemorrhoidal Ointment 1 Application(s) Rectal every 8 hours PRN Hemorrhoids  ondansetron Injectable 8 milliGRAM(s) IV Push every 8 hours PRN Nausea and/or Vomiting  sodium chloride 0.9% lock flush 10 milliLiter(s) IV Push every 1 hour PRN Pre/post blood products, medications, blood draw, and to maintain line patency    Allergies    No Known Allergies    CAPILLARY BLOOD GLUCOSE  POCT Blood Glucose.: 193 mg/dL (17 Jun 2020 21:18)  POCT Blood Glucose.: 115 mg/dL (17 Jun 2020 17:32)  POCT Blood Glucose.: 124 mg/dL (17 Jun 2020 11:30)  POCT Blood Glucose.: 116 mg/dL (17 Jun 2020 07:58)    I&O's Summary    16 Jun 2020 07:01  -  17 Jun 2020 07:00  --------------------------------------------------------  IN: 1355 mL / OUT: 400 mL / NET: 955 mL    17 Jun 2020 07:01  -  17 Jun 2020 23:35  --------------------------------------------------------  IN: 3010 mL / OUT: 0 mL / NET: 3010 mL    PHYSICAL EXAM:  Vital Signs Last 24 Hrs  T(C): 36.5 (17 Jun 2020 20:15), Max: 37.2 (17 Jun 2020 17:36)  T(F): 97.7 (17 Jun 2020 20:15), Max: 99 (17 Jun 2020 17:36)  HR: 80 (17 Jun 2020 20:15) (70 - 81)  BP: 146/75 (17 Jun 2020 20:15) (121/75 - 173/80)  BP(mean): --  RR: 18 (17 Jun 2020 20:15) (18 - 20)  SpO2: 97% (17 Jun 2020 20:15) (96% - 98%)  CONSTITUTIONAL: NAD, well-developed, well-groomed  EYES: PERRLA; conjunctiva and sclera clear  ENMT: Moist oral mucosa, no pharyngeal injection or exudates; normal dentition  NECK: Supple, no palpable masses; no thyromegaly  RESPIRATORY: dec bs at bases.  no resp distress, laying flat without issue. no wheezing.   CARDIOVASCULAR: Regular rate and rhythm, normal S1 and S2, no murmur/rub/gallop; No lower extremity edema; Peripheral pulses are 2+ bilaterally  ABDOMEN: Nontender to palpation, normoactive bowel sounds, no rebound/guarding; No hepatosplenomegaly  MUSCULOSKELETAL:  Normal gait; no clubbing or cyanosis of digits; no joint swelling or tenderness to palpation  PSYCH: A+O to person, place, and time; affect appropriate  NEUROLOGY: CN 2-12 are intact and symmetric; no gross sensory deficits   SKIN: +irritation around picc line dressing. no petechiae.     LABS: personally reviewed                         7.6    4.11  )-----------( 31       ( 17 Jun 2020 06:47 )             22.9     06-17    139  |  102  |  23  ----------------------------<  130<H>  4.1   |  23  |  0.73    Ca    10.2      17 Jun 2020 06:47  Phos  5.1     06-17  Mg     2.1     06-17    TPro  8.0  /  Alb  4.8  /  TBili  0.5  /  DBili  x   /  AST  36  /  ALT  58<H>  /  AlkPhos  110  06-17    PT/INR - ( 17 Jun 2020 06:47 )   PT: 12.8 sec;   INR: 1.12 ratio         PTT - ( 17 Jun 2020 06:47 )  PTT:32.3 sec    RADIOLOGY & ADDITIONAL TESTS: personally reviewed     < from: Xray Chest 1 View- PORTABLE-Urgent (06.16.20 @ 10:01) >  Impression:    The heart is slightly enlarged. The lungs appear to be clear. No pleural effusion. No pneumothorax. A PICC line is seen on the right and the tip is in the superior vena cava.    < from: CT Chest No Cont (06.07.20 @ 18:07) >  IMPRESSION:    Biapical pleural-parenchymal scarring with calcification noted, likely from prior granulomatous disease. No consolidations, edema, effusion or pneumothorax. Apparent mild groundglass opacity noted bilaterally likely due to expiratory phase of scanning. No definite consolidations.    < from: VA Duplex Upper Ext Vein Scan, Right (05.31.20 @ 14:47) >  IMPRESSION:   No evidence ofdeep venous thrombosis in the visualized right upper extremity.    < from: MR Head w/ IV Cont (05.29.20 @ 14:59) >  IMPRESSION: There is a small amount of fluid in the mastoid sinuses and middle ear cavities bilaterally. There is slightly prominent soft tissue in the left nasopharynx could be related to lymphoid hyperplasia versus lymphoma. No masses or abnormal enhancement of the brain parenchyma.    Results Reviewed: Yes  Imaging Personally Reviewed: Yes  Electrocardiogram Personally Reviewed: Yes    COORDINATION OF CARE:  Care Discussed with Consultants/Other Providers [Y/N]: Yes, heme/onc.  Prior or Outpatient Records Reviewed [Y/N]: Yes

## 2020-06-17 NOTE — PROGRESS NOTE ADULT - ASSESSMENT
61 yo Bulgarian speaking F with PMHx of HTN, HLD, T2DM, COVID-19 infection admitted for management of newly diagnosed APL. The patient is currently reciving ATRA and Arsenic.  Patients hospital course has been complicated by transaminitis and refractory thrombocytopenia. The patient has pancytopenia 2/2 disease and/or ATRA/Arsenic. Course further c/b repeat covid PCR testing positive after interim multiple negative tests; now transferred to covid medicine unit.

## 2020-06-17 NOTE — PROGRESS NOTE ADULT - PROBLEM SELECTOR PLAN 1
Continue ATRA 40mg BID (started 5/22) and Arsenic Trioxide daily (started on 5/27)  Monitor for differentiation syndrome and check EKG for QTc prolongation every Tuesday and Friday. Keep K > 4 and Mg > 2.  Magnesium- 1.8, Magnesium sulphate 2 gm IV x1 dose today.  Status post Hydrea 500mg PO x 1 on 6/8 and 6/9 for WBC >10  Follow up CBC with diff, CMP, TLS and DIC panel daily  Keep PLTs >50 K. If unable to achieve goal continue with platelets infusion (single donor) 1/2 units over 3 hours every 12 hours.  Platelets 42 today, transfuse half unit infuse over 3 hrs Q 12hrs.  s/p Prednisone 20mg PO daily x 5 days (through 6/12) due to platelet antibody.  Keep Fibrinogen >150 if less give Cryo  Strict Is and Os/Daily weights/Mouth Care Continue ATRA 40mg BID (started 5/22) and Arsenic Trioxide daily (started on 5/27)  Monitor for differentiation syndrome and check EKG for QTc prolongation every Tuesday and Friday. Keep K > 4 and Mg > 2.  Status post Hydrea 500mg PO x 1 on 6/8 and 6/9 for WBC >10  Follow up CBC with diff, CMP, TLS and DIC panel daily  Keep PLTs >50 K. If unable to achieve goal continue with platelets infusion (single donor) 1/2 units over 3 hours every 12 hours.  Platelets 31 today, transfuse half unit infuse over 3 hrs Q 12hrs.  s/p Prednisone 20mg PO daily x 5 days (through 6/12) due to platelet antibody.  Keep Fibrinogen >150 if less give Cryo  Strict Is and Os/Daily weights/Mouth Care

## 2020-06-17 NOTE — PROGRESS NOTE ADULT - ATTENDING COMMENTS
61 y/o Yi speaking F who is transferred for newly diagnosed low/interm risk APL.  Presented with pancytopenia, flow cytometry/bone marrow biopsy/FISH from 5/26- consistent with acute promyelocytic leukemia with PML-AWA translocation.    She was started on ATRA 5/23- day +22, GALA 5/27- day +21.    -CT chest 6/6 c/w granulomatous disease (hx of TB in 1980's) pt had SOB on 6/4, now resolved.  Daily weights, diurese. Will give Hydrea 500mg if wbc>10. On Prednisone 20mg through 5/12- now off.  If pt becomes SOB again, may restart  -tremors improved, unclear etiology, electrolytes WNL. Cefepime held, given Baclofen -they improved  -Check CBC, coags/fibrinogen once daily.  Goal plt >40-50, fibrinogen >150; responding to ABO-matched platelets given over 3 hrs in 1/2 unit infusions   -monitor lytes daily, keep K>4, Mg>2.  EKG twice a week  -off antibiotics  -plan on BM bx around day 29 post GALA 61 y/o Czech speaking F who is transferred for newly diagnosed low/interm risk APL.  Presented with pancytopenia, flow cytometry/bone marrow biopsy/FISH from 5/26- consistent with acute promyelocytic leukemia with PML-AWA translocation.    She was started on ATRA 5/23- day +22, GALA 5/27- day +22.    -CT chest 6/6 c/w granulomatous disease (hx of TB in 1980's) pt had SOB on 6/4, now resolved.  Daily weights, diurese. Will give Hydrea 500mg if wbc>10. On Prednisone 20mg through 5/12- now off.  If pt becomes SOB again, may restart  -tremors improved, unclear etiology, electrolytes WNL. Cefepime held, given Baclofen -they improved  -Check CBC, coags/fibrinogen once daily.  Goal plt >40-50, fibrinogen >150; responding to ABO-matched platelets given over 3 hrs in 1/2 unit infusions   -monitor lytes daily, keep K>4, Mg>2.  EKG twice a week  -off antibiotics  -plan on BM bx around day 29 post GALA

## 2020-06-17 NOTE — PROGRESS NOTE ADULT - PROBLEM SELECTOR PLAN 3
-monitor counts as noted; hgb relatively stable, some concern over slow GI losses in addition to chemo effect, c/w ppi + preparation H.   -ongoing thrombocytopenia noted; s/p 1/2 units abo transfusion this am, goal 40-50, cont to trend cbc and monitor for clinical bleeding. may require repeat steroids

## 2020-06-17 NOTE — ADVANCED PRACTICE NURSE CONSULT - ASSESSMENT
Pt up in chair, A/Ox4. Pt with no complaints, denies nausea, SOB, CP. Right DL PICC easily flushed with brisk blood return, dressing C/D/I. Site without redness, swelling, pain. Labs reviewed by Dr Goldberg on rounds. 2 RN verification completed. Education reinforced with patient, able to verbalize understanding. At 13:32, Arsenic trioxide 0.15mg/kg = 11mg  in 250ml D5W over 2 hours infused via alaris pump to lowest port of NS line to purple lumen of PICC. Safety maintained.

## 2020-06-17 NOTE — PROGRESS NOTE ADULT - ASSESSMENT
This is a 61 yo Nepali speaking F with PMHx of HTN, HLD, T2DM, COVID-19 infection admitted for management of newly diagnosed APL. The patient is currently reciving ATRA and Arsenic.  Patients hospital course has been complicated by transaminitis and refractory thrombocytopenia. The patient has pancytopenia 2/2 disease and/or ATRA/Arsenic.

## 2020-06-17 NOTE — PROGRESS NOTE ADULT - PROBLEM SELECTOR PLAN 5
Mild transaminitis Grade 1, ALT- 54 today, likely due to treatment. Stable  Monitor daily CMP  Avoid further hepatotoxic medications Mild transaminitis Grade 1, ALT-  58 today, likely due to treatment. Stable  Monitor daily CMP  Avoid further hepatotoxic medications

## 2020-06-17 NOTE — PROGRESS NOTE ADULT - PROBLEM SELECTOR PLAN 2
The patient is not neutropenic, afebrile  If febrile Pan Cx and CXR  Previous COVID-19 (+) outpatient  5/22, 5/26, 6/2 and 6/9 COVID (-)  6/16- Cough, f/u CXR (-). The patient is not neutropenic, afebrile  If febrile Pan Cx and CXR  Previous COVID-19 (+) outpatient  5/22, 5/26, 6/2 and 6/9 COVID (-)  6/16- Cough, f/u CXR (-).  6/16- COVID PCR pending result.

## 2020-06-17 NOTE — CHART NOTE - NSCHARTNOTEFT_GEN_A_CORE
Nutrition Follow Up Note  Patient seen for: Pt seen for nutrition follow up. Pt with APL receiving treatment.     Source: Pt, son Joseluis via phone per pt preference, pt primarily Latvian speaking and prefers son to provide translation     Diet : Halal, Consistent carbohydrate diet with evening snack and glucerna 3 daily     Patient reports: No N+V, pt reports regular BMs     PO intake : Per son pt's appetite continues to improve and pt continues to consume lighter foods including oatmeal and hard boiled eggs, has been ordering items such as shrimp and rice or pastas with shrimp for lunch and dinner which pt has been enjoying. Pt will supplement with glucerna shakes 3 daily.       Weight: 164.9 lbs (5/26), 169.5 lbs (6/11), 167.5 lbs (6/16), weight remains elevated from admission but trending back towards admission.     Pertinent Medications: MEDICATIONS  (STANDING):  albuterol/ipratropium for Nebulization 3 milliLiter(s) Nebulizer every 6 hours  ATENolol  Tablet 25 milliGRAM(s) Oral daily  baclofen 5 milliGRAM(s) Oral every 12 hours  benzonatate 100 milliGRAM(s) Oral every 8 hours  Biotene Dry Mouth Oral Rinse 5 milliLiter(s) Swish and Spit five times a day  bisacodyl 5 milliGRAM(s) Oral every 12 hours  calcium acetate 667 milliGRAM(s) Oral four times a day with meals  dextrose 5%. 1000 milliLiter(s) (50 mL/Hr) IV Continuous <Continuous>  dextrose 50% Injectable 12.5 Gram(s) IV Push once  dextrose 50% Injectable 25 Gram(s) IV Push once  dextrose 50% Injectable 25 Gram(s) IV Push once  hydrocortisone 2.5% Rectal Cream 1 Application(s) Rectal every 8 hours  insulin lispro (HumaLOG) corrective regimen sliding scale   SubCutaneous three times a day before meals  insulin lispro (HumaLOG) corrective regimen sliding scale   SubCutaneous at bedtime  pantoprazole    Tablet 40 milliGRAM(s) Oral before breakfast  petrolatum white Ointment 1 Application(s) Topical three times a day  polyethylene glycol 3350 17 Gram(s) Oral daily  sodium chloride 0.65% Nasal 1 Spray(s) Both Nostrils every 8 hours  sodium chloride 0.9%. 1000 milliLiter(s) (25 mL/Hr) IV Continuous <Continuous>  tretinoin 40 milliGRAM(s) Oral every 12 hours  witch hazel Pads 1 Application(s) Topical every 12 hours    MEDICATIONS  (PRN):  acetaminophen   Tablet .. 650 milliGRAM(s) Oral every 6 hours PRN Temp greater or equal to 38C (100.4F), Mild Pain (1 - 3)  AQUAPHOR (petrolatum Ointment) 1 Application(s) Topical every 3 hours PRN Dry skin  dextrose 40% Gel 15 Gram(s) Oral once PRN Blood Glucose LESS THAN 70 milliGRAM(s)/deciliter  diphenhydrAMINE   Injectable 25 milliGRAM(s) IV Push every 6 hours PRN Allergy symptoms  glucagon  Injectable 1 milliGRAM(s) IntraMuscular once PRN Glucose LESS THAN 70 milligrams/deciliter  hemorrhoidal Ointment 1 Application(s) Rectal every 8 hours PRN Hemorrhoids  ondansetron Injectable 8 milliGRAM(s) IV Push every 8 hours PRN Nausea and/or Vomiting  sodium chloride 0.9% lock flush 10 milliLiter(s) IV Push every 1 hour PRN Pre/post blood products, medications, blood draw, and to maintain line patency    Pertinent Labs: 06-17 @ 06:47: Na 139, BUN 23, Cr 0.73, <H>, K+ 4.1, Phos 5.1<H>, Mg 2.1, Alk Phos 110, ALT/SGPT 58<H>, AST/SGOT 36, HbA1c --    Finger Sticks:  POCT Blood Glucose.: 116 mg/dL (06-17 @ 07:58)  POCT Blood Glucose.: 207 mg/dL (06-16 @ 21:46)  POCT Blood Glucose.: 126 mg/dL (06-16 @ 17:53)  POCT Blood Glucose.: 97 mg/dL (06-16 @ 11:42)      Skin per nursing documentation: free of pressure injury   Edema: none     Estimated Needs:   [x ] no change since previous assessment  [ ] recalculated:     Previous Nutrition Diagnosis: Predicted suboptimal energy intake   Nutrition Diagnosis is: ongoing, addressed with po intake and supplements     New Nutrition Diagnosis:  Related to:    As evidenced by:      Interventions:     Recommend  1) Continue diet and supplements as ordered  2) Continue to encourage po intake and obtain/honor food preferences as able     Monitoring and Evaluation:     Continue to monitor Nutritional intake, Tolerance to diet prescription, weights, labs, skin integrity    RD remains available upon request and will follow up per protocol    Alma Jacobson R.D., [facility], Pager #[phone]

## 2020-06-17 NOTE — PROGRESS NOTE ADULT - PROBLEM SELECTOR PLAN 4
-likely multifactorial; CT reviewed evidence of prior TB related changes, no acute infiltrate, most recent CXR 6/16 with clear lungs. +atelectasis, c/w incentive spirometer  -change duonebs to inhaler given covid+ status

## 2020-06-17 NOTE — CHART NOTE - NSCHARTNOTEFT_GEN_A_CORE
Patient tested for COVID on 6/16, results came back positive on 6/17. Currently stable, NAD, o2 sat 97% on RA.     - informed Dr. Camara heme/onc fellow of results, instructed to transfer patient to hospitalist service   - d/w with [facility] Dr. Santiago, will be moved to [address] [place] [address] d/w patient (Pashto speaking, refusing , has son speak for her)  - d/w patient's son / emergency contact, (initially called number listed on chart ([phone]), per patient number is ([phone]) D/W son extensively   - will inform heme/onc team in AM regarding transfer     Federico Benoit PA-C  Department of Medicine Patient tested for COVID on 6/16, results came back positive on 6/17. Currently stable, NAD, o2 sat 97% on RA.     - informed Dr. Camara heme/onc fellow of results, instructed to transfer patient to hospitalist service   - d/w with [facility] Dr. Santiago, will be moved to [address] [place] [address]   - d/w patient (Pashto speaking, refusing , has son speak for her)  - d/w patient's son / emergency contact, (initially called number listed on chart ([phone]), per patient number is ([phone]) D/W son extensively, both myself and nurse    - will inform heme/onc team in AM regarding transfer     Federico Benoit PA-C  Department of Medicine

## 2020-06-18 DIAGNOSIS — D61.818 OTHER PANCYTOPENIA: ICD-10-CM

## 2020-06-18 DIAGNOSIS — I10 ESSENTIAL (PRIMARY) HYPERTENSION: ICD-10-CM

## 2020-06-18 DIAGNOSIS — R06.00 DYSPNEA, UNSPECIFIED: ICD-10-CM

## 2020-06-18 DIAGNOSIS — E11.9 TYPE 2 DIABETES MELLITUS WITHOUT COMPLICATIONS: ICD-10-CM

## 2020-06-18 DIAGNOSIS — C92.40 ACUTE PROMYELOCYTIC LEUKEMIA, NOT HAVING ACHIEVED REMISSION: ICD-10-CM

## 2020-06-18 DIAGNOSIS — U07.1 COVID-19: ICD-10-CM

## 2020-06-18 LAB
ALBUMIN SERPL ELPH-MCNC: 4.5 G/DL — SIGNIFICANT CHANGE UP (ref 3.3–5)
ALP SERPL-CCNC: 107 U/L — SIGNIFICANT CHANGE UP (ref 40–120)
ALT FLD-CCNC: 43 U/L — SIGNIFICANT CHANGE UP (ref 10–45)
ANION GAP SERPL CALC-SCNC: 10 MMOL/L — SIGNIFICANT CHANGE UP (ref 5–17)
AST SERPL-CCNC: 25 U/L — SIGNIFICANT CHANGE UP (ref 10–40)
BASOPHILS # BLD AUTO: 0 K/UL — SIGNIFICANT CHANGE UP (ref 0–0.2)
BASOPHILS NFR BLD AUTO: 0 % — SIGNIFICANT CHANGE UP (ref 0–2)
BILIRUB SERPL-MCNC: 0.5 MG/DL — SIGNIFICANT CHANGE UP (ref 0.2–1.2)
BUN SERPL-MCNC: 20 MG/DL — SIGNIFICANT CHANGE UP (ref 7–23)
CALCIUM SERPL-MCNC: 10.2 MG/DL — SIGNIFICANT CHANGE UP (ref 8.4–10.5)
CHLORIDE SERPL-SCNC: 104 MMOL/L — SIGNIFICANT CHANGE UP (ref 96–108)
CO2 SERPL-SCNC: 24 MMOL/L — SIGNIFICANT CHANGE UP (ref 22–31)
CREAT SERPL-MCNC: 0.64 MG/DL — SIGNIFICANT CHANGE UP (ref 0.5–1.3)
D DIMER BLD IA.RAPID-MCNC: 427 NG/ML DDU — HIGH
EOSINOPHIL # BLD AUTO: 0.56 K/UL — HIGH (ref 0–0.5)
EOSINOPHIL NFR BLD AUTO: 13.4 % — HIGH (ref 0–6)
FIBRINOGEN PPP-MCNC: 383 MG/DL — SIGNIFICANT CHANGE UP (ref 350–510)
GLUCOSE BLDC GLUCOMTR-MCNC: 114 MG/DL — HIGH (ref 70–99)
GLUCOSE BLDC GLUCOMTR-MCNC: 115 MG/DL — HIGH (ref 70–99)
GLUCOSE BLDC GLUCOMTR-MCNC: 118 MG/DL — HIGH (ref 70–99)
GLUCOSE BLDC GLUCOMTR-MCNC: 136 MG/DL — HIGH (ref 70–99)
GLUCOSE SERPL-MCNC: 125 MG/DL — HIGH (ref 70–99)
HCT VFR BLD CALC: 22.2 % — LOW (ref 34.5–45)
HGB BLD-MCNC: 7.3 G/DL — LOW (ref 11.5–15.5)
IMM GRANULOCYTES NFR BLD AUTO: 3.6 % — HIGH (ref 0–1.5)
LDH SERPL L TO P-CCNC: 190 U/L — SIGNIFICANT CHANGE UP (ref 50–242)
LYMPHOCYTES # BLD AUTO: 1.32 K/UL — SIGNIFICANT CHANGE UP (ref 1–3.3)
LYMPHOCYTES # BLD AUTO: 31.5 % — SIGNIFICANT CHANGE UP (ref 13–44)
MAGNESIUM SERPL-MCNC: 1.8 MG/DL — SIGNIFICANT CHANGE UP (ref 1.6–2.6)
MANUAL SMEAR VERIFICATION: SIGNIFICANT CHANGE UP
MCHC RBC-ENTMCNC: 27.9 PG — SIGNIFICANT CHANGE UP (ref 27–34)
MCHC RBC-ENTMCNC: 32.9 GM/DL — SIGNIFICANT CHANGE UP (ref 32–36)
MCV RBC AUTO: 84.7 FL — SIGNIFICANT CHANGE UP (ref 80–100)
MONOCYTES # BLD AUTO: 0.03 K/UL — SIGNIFICANT CHANGE UP (ref 0–0.9)
MONOCYTES NFR BLD AUTO: 0.7 % — LOW (ref 2–14)
NEUTROPHILS # BLD AUTO: 2.13 K/UL — SIGNIFICANT CHANGE UP (ref 1.8–7.4)
NEUTROPHILS NFR BLD AUTO: 50.8 % — SIGNIFICANT CHANGE UP (ref 43–77)
NRBC # BLD: 0 /100 WBCS — SIGNIFICANT CHANGE UP (ref 0–0)
PHOSPHATE SERPL-MCNC: 4.8 MG/DL — HIGH (ref 2.5–4.5)
PLAT MORPH BLD: NORMAL — SIGNIFICANT CHANGE UP
PLATELET # BLD AUTO: 51 K/UL — LOW (ref 150–400)
POTASSIUM SERPL-MCNC: 4 MMOL/L — SIGNIFICANT CHANGE UP (ref 3.5–5.3)
POTASSIUM SERPL-SCNC: 4 MMOL/L — SIGNIFICANT CHANGE UP (ref 3.5–5.3)
PROT SERPL-MCNC: 7.2 G/DL — SIGNIFICANT CHANGE UP (ref 6–8.3)
RBC # BLD: 2.62 M/UL — LOW (ref 3.8–5.2)
RBC # FLD: 15.5 % — HIGH (ref 10.3–14.5)
RBC BLD AUTO: SIGNIFICANT CHANGE UP
SARS-COV-2 IGG SERPL IA-ACNC: 2.3 RATIO — HIGH
SARS-COV-2 IGG SERPL QL IA: POSITIVE
SARS-COV-2 IGG SERPL QL IA: POSITIVE
SARS-COV-2 IGM SERPL IA-ACNC: 5.56 RATIO — HIGH
SODIUM SERPL-SCNC: 138 MMOL/L — SIGNIFICANT CHANGE UP (ref 135–145)
WBC # BLD: 4.19 K/UL — SIGNIFICANT CHANGE UP (ref 3.8–10.5)
WBC # FLD AUTO: 4.19 K/UL — SIGNIFICANT CHANGE UP (ref 3.8–10.5)

## 2020-06-18 PROCEDURE — 99231 SBSQ HOSP IP/OBS SF/LOW 25: CPT | Mod: GC

## 2020-06-18 PROCEDURE — 99233 SBSQ HOSP IP/OBS HIGH 50: CPT

## 2020-06-18 PROCEDURE — 71045 X-RAY EXAM CHEST 1 VIEW: CPT | Mod: 26

## 2020-06-18 PROCEDURE — 93010 ELECTROCARDIOGRAM REPORT: CPT

## 2020-06-18 RX ORDER — ALBUTEROL 90 UG/1
2 AEROSOL, METERED ORAL EVERY 6 HOURS
Refills: 0 | Status: DISCONTINUED | OUTPATIENT
Start: 2020-06-18 | End: 2020-07-15

## 2020-06-18 RX ORDER — MAGNESIUM SULFATE 500 MG/ML
2 VIAL (ML) INJECTION ONCE
Refills: 0 | Status: COMPLETED | OUTPATIENT
Start: 2020-06-18 | End: 2020-06-18

## 2020-06-18 RX ADMIN — TRETINOIN 40 MILLIGRAM(S): 10 CAPSULE, LIQUID FILLED ORAL at 22:52

## 2020-06-18 RX ADMIN — Medication 100 MILLIGRAM(S): at 06:03

## 2020-06-18 RX ADMIN — Medication 5 MILLILITER(S): at 08:38

## 2020-06-18 RX ADMIN — ATENOLOL 25 MILLIGRAM(S): 25 TABLET ORAL at 06:07

## 2020-06-18 RX ADMIN — Medication 5 MILLIGRAM(S): at 06:03

## 2020-06-18 RX ADMIN — Medication 50 GRAM(S): at 14:53

## 2020-06-18 RX ADMIN — AER TRAVELER 1 APPLICATION(S): 0.5 SOLUTION RECTAL; TOPICAL at 08:39

## 2020-06-18 RX ADMIN — ARSENIC TRIOXIDE 130.5 MILLIGRAM(S): 2 INJECTION, SOLUTION INTRAVENOUS at 13:23

## 2020-06-18 RX ADMIN — Medication 1 APPLICATION(S): at 13:07

## 2020-06-18 RX ADMIN — Medication 1 APPLICATION(S): at 22:55

## 2020-06-18 RX ADMIN — Medication 1 APPLICATION(S): at 06:09

## 2020-06-18 RX ADMIN — AER TRAVELER 1 APPLICATION(S): 0.5 SOLUTION RECTAL; TOPICAL at 17:12

## 2020-06-18 RX ADMIN — Medication 5 MILLIGRAM(S): at 17:12

## 2020-06-18 RX ADMIN — Medication 100 MILLIGRAM(S): at 13:10

## 2020-06-18 RX ADMIN — Medication 667 MILLIGRAM(S): at 08:38

## 2020-06-18 RX ADMIN — TRETINOIN 40 MILLIGRAM(S): 10 CAPSULE, LIQUID FILLED ORAL at 08:38

## 2020-06-18 RX ADMIN — Medication 5 MILLILITER(S): at 13:06

## 2020-06-18 RX ADMIN — PANTOPRAZOLE SODIUM 40 MILLIGRAM(S): 20 TABLET, DELAYED RELEASE ORAL at 06:07

## 2020-06-18 RX ADMIN — Medication 667 MILLIGRAM(S): at 17:12

## 2020-06-18 RX ADMIN — Medication 667 MILLIGRAM(S): at 13:06

## 2020-06-18 RX ADMIN — Medication 5 MILLILITER(S): at 17:12

## 2020-06-18 RX ADMIN — Medication 1 APPLICATION(S): at 13:10

## 2020-06-18 RX ADMIN — Medication 667 MILLIGRAM(S): at 22:55

## 2020-06-18 RX ADMIN — Medication 1 APPLICATION(S): at 06:08

## 2020-06-18 RX ADMIN — Medication 100 MILLIGRAM(S): at 22:54

## 2020-06-18 NOTE — PROGRESS NOTE ADULT - ATTENDING COMMENTS
Derrek Cortes MD  Division of [facility] Medicine  Cell: [phone]  Pager: [phone]  Office: [phone]

## 2020-06-18 NOTE — PROGRESS NOTE ADULT - PROBLEM SELECTOR PLAN 3
Patient previously with c/o cough and SOB with stable O2 sat and concern for differentiation syndrome, now intermittent  6/5 Patient received one dose of Dexamethasone   CXR (-) for acute infiltrates and (+) atelectasis   Continue Incentive Spirometer   Lasix PRN  6/8 and 6/9 Hydrea 500mg x 1 given  6/7 CT chest with biapical pleural-parenchymal scarring with calcification, likely from prior granulomatous disease. No consolidation. (Son reports a history of TB in 1980s)  Nasal Cannula PRN  Hycodan/Tessalon perles Patient previously with c/o cough and SOB with stable O2 sat and concern for differentiation syndrome, now intermittent  6/5 Patient received one dose of Dexamethasone   CXR (-) for acute infiltrates and (+) atelectasis   Continue Incentive Spirometer   Lasix PRN  6/8 and 6/9 Hydrea 500mg x 1 given  6/7 CT chest with biapical pleural-parenchymal scarring with calcification, likely from prior granulomatous disease. No consolidation. (Son reports a history of TB in 1980s)  Nasal Cannula PRN  C/w Tessalon Perles

## 2020-06-18 NOTE — PROGRESS NOTE ADULT - PROBLEM SELECTOR PLAN 2
The patient is not neutropenic, afebrile  If febrile Pan Cx and CXR  Previous COVID-19 (+) outpatient. Then COVID (-) on 5/22, 5/26, 6/2 and 6/9.   On 6/16, patient w/ a cough. F/u CXR on 6/16 showed no acute pathology. COVID-19 PCR was repeated on 6/16 and it returned positive. Patient now transferred to [facility].   - COVID-19 Ab sent, follow-up results  - Monitor CBC and fever curve The patient is not neutropenic, afebrile  If febrile, Pan Cx and CXR  Previous COVID-19 (+) outpatient. Then COVID (-) on 5/22, 5/26, 6/2 and 6/9.   On 6/16, patient w/ a cough. F/u CXR on 6/16 showed no acute pathology. COVID-19 PCR was repeated (routinely) on 6/16 and it returned positive. Patient now transferred to [facility]. Appreciate care as per Medicine team  - COVID-19 Ab sent, follow-up results  - Monitor CBC and fever curve

## 2020-06-18 NOTE — PROGRESS NOTE ADULT - PROBLEM SELECTOR PLAN 3
-monitor counts as noted; hgb relatively stable, some concern over slow GI losses in addition to chemo effect, c/w ppi + preparation H.   -ongoing thrombocytopenia noted; goal 40-50, cont to trend cbc and monitor for clinical bleeding. may require repeat steroids.

## 2020-06-18 NOTE — PROGRESS NOTE ADULT - PROBLEM SELECTOR PLAN 4
-likely multifactorial; CT reviewed evidence of prior TB related changes, no acute infiltrate, most recent CXR 6/16 with clear lungs. +atelectasis, c/w incentive spirometer  -c/w inhaler given covid+ status

## 2020-06-18 NOTE — ADVANCED PRACTICE NURSE CONSULT - ASSESSMENT
Lab results reviewed  by Dr. Goldberg. Patient with language barrier but aware of her chemo regimen. Right arm ac double lumen PICC both in used patent. Denies nausea. Arsenic 11mg in 328ml D5W started at 1400 x 2 hours infusion at 130ml/hr via lowest port of NSS line into red port from PICC line. Patent. 2 RNs verification completed prior to start of chemotherapy. Primary RN aware of plan of care. Safety maintained.

## 2020-06-18 NOTE — PROGRESS NOTE ADULT - PROBLEM SELECTOR PLAN 8
DVT PPx: Holding pharmacologic AC 2/2 thrombocytopenia  Diet: Consistent Carb w/ Evening Snack  Dispo: Pending resolution of acute medical issues    Contact Information [phone] Phos = 4.8 this AM  Continue Phoslo

## 2020-06-18 NOTE — PROGRESS NOTE ADULT - SUBJECTIVE AND OBJECTIVE BOX
Patient is a 60y old  Female who presents with a chief complaint of APML (18 Jun 2020 12:17)        SUBJECTIVE / OVERNIGHT EVENTS: Patient's son translates over the phone, per her request. Patient reports intermittent chest pain after she coughs, but not at rest. She denies SOB.      MEDICATIONS  (STANDING):  arsenic trioxide IVPB (eMAR) 11 milliGRAM(s) IV Intermittent every 24 hours  ATENolol  Tablet 25 milliGRAM(s) Oral daily  baclofen 5 milliGRAM(s) Oral every 12 hours  benzonatate 100 milliGRAM(s) Oral every 8 hours  Biotene Dry Mouth Oral Rinse 5 milliLiter(s) Swish and Spit five times a day  bisacodyl 5 milliGRAM(s) Oral every 12 hours  calcium acetate 667 milliGRAM(s) Oral four times a day with meals  dextrose 5%. 1000 milliLiter(s) (50 mL/Hr) IV Continuous <Continuous>  dextrose 50% Injectable 12.5 Gram(s) IV Push once  dextrose 50% Injectable 25 Gram(s) IV Push once  dextrose 50% Injectable 25 Gram(s) IV Push once  hydrocortisone 2.5% Rectal Cream 1 Application(s) Rectal every 8 hours  insulin lispro (HumaLOG) corrective regimen sliding scale   SubCutaneous three times a day before meals  insulin lispro (HumaLOG) corrective regimen sliding scale   SubCutaneous at bedtime  pantoprazole    Tablet 40 milliGRAM(s) Oral before breakfast  petrolatum white Ointment 1 Application(s) Topical three times a day  polyethylene glycol 3350 17 Gram(s) Oral daily  sodium chloride 0.65% Nasal 1 Spray(s) Both Nostrils every 8 hours  sodium chloride 0.9%. 1000 milliLiter(s) (25 mL/Hr) IV Continuous <Continuous>  tretinoin 40 milliGRAM(s) Oral every 12 hours  witch hazel Pads 1 Application(s) Topical every 12 hours    MEDICATIONS  (PRN):  acetaminophen   Tablet .. 650 milliGRAM(s) Oral every 6 hours PRN Temp greater or equal to 38C (100.4F), Mild Pain (1 - 3)  ALBUTerol    90 MICROgram(s) HFA Inhaler 2 Puff(s) Inhalation every 6 hours PRN Shortness of Breath and/or Wheezing  AQUAPHOR (petrolatum Ointment) 1 Application(s) Topical every 3 hours PRN Dry skin  dextrose 40% Gel 15 Gram(s) Oral once PRN Blood Glucose LESS THAN 70 milliGRAM(s)/deciliter  diphenhydrAMINE   Injectable 25 milliGRAM(s) IV Push every 6 hours PRN Allergy symptoms  glucagon  Injectable 1 milliGRAM(s) IntraMuscular once PRN Glucose LESS THAN 70 milligrams/deciliter  guaiFENesin   Syrup  (Sugar-Free) 100 milliGRAM(s) Oral every 6 hours PRN Cough  hemorrhoidal Ointment 1 Application(s) Rectal every 8 hours PRN Hemorrhoids  ondansetron Injectable 8 milliGRAM(s) IV Push every 8 hours PRN Nausea and/or Vomiting  sodium chloride 0.9% lock flush 10 milliLiter(s) IV Push every 1 hour PRN Pre/post blood products, medications, blood draw, and to maintain line patency      Vital Signs Last 24 Hrs  T(C): 36.5 (18 Jun 2020 13:44), Max: 36.9 (18 Jun 2020 11:09)  T(F): 97.7 (18 Jun 2020 13:44), Max: 98.5 (18 Jun 2020 11:09)  HR: 87 (18 Jun 2020 13:44) (73 - 87)  BP: 117/68 (18 Jun 2020 13:44) (117/68 - 158/83)  BP(mean): --  RR: 18 (18 Jun 2020 13:44) (18 - 20)  SpO2: 96% (18 Jun 2020 13:44) (95% - 98%)  CAPILLARY BLOOD GLUCOSE      POCT Blood Glucose.: 114 mg/dL (18 Jun 2020 17:06)  POCT Blood Glucose.: 115 mg/dL (18 Jun 2020 12:36)  POCT Blood Glucose.: 118 mg/dL (18 Jun 2020 07:56)  POCT Blood Glucose.: 193 mg/dL (17 Jun 2020 21:18)    I&O's Summary    17 Jun 2020 07:01  -  18 Jun 2020 07:00  --------------------------------------------------------  IN: 3584 mL / OUT: 0 mL / NET: 3584 mL    18 Jun 2020 07:01  -  18 Jun 2020 17:45  --------------------------------------------------------  IN: 978 mL / OUT: 0 mL / NET: 978 mL          PHYSICAL EXAM:   GENERAL: NAD, well-developed  HEAD:  Atraumatic, Normocephalic  EYES: Conjunctiva and sclera clear  NECK: Supple   CHEST/LUNG: Clear to auscultation bilaterally; No wheeze; no chest wall tenderness to palpation.   HEART: S1S2 normal. Regular rate and rhythm; No murmurs, rubs, or gallops  ABDOMEN: Soft, Nontender, Nondistended; Bowel sounds present  EXTREMITIES:  No clubbing, cyanosis, or edema  PSYCH/Neuro: AAOx3. Non-focal.   SKIN: RUE PICC line area with some mild warmth and erythema.       LABS:                        7.3    4.19  )-----------( 51       ( 18 Jun 2020 08:17 )             22.2     06-18    138  |  104  |  20  ----------------------------<  125<H>  4.0   |  24  |  0.64    Ca    10.2      18 Jun 2020 08:17  Phos  4.8     06-18  Mg     1.8     06-18    TPro  7.2  /  Alb  4.5  /  TBili  0.5  /  DBili  x   /  AST  25  /  ALT  43  /  AlkPhos  107  06-18    PT/INR - ( 17 Jun 2020 06:47 )   PT: 12.8 sec;   INR: 1.12 ratio         PTT - ( 17 Jun 2020 06:47 )  PTT:32.3 sec      < from: Xray Chest 1 View- PORTABLE-Routine (06.18.20 @ 14:56) >  IMPRESSION:   Left upper lung linear atelectasis. Otherwise, clear lungs.     < end of copied text >        RADIOLOGY & ADDITIONAL TESTS:    Imaging Personally Reviewed: CXR report.   Consultant(s) Notes Reviewed:  Heme  Care Discussed with Consultants/Other Providers:

## 2020-06-18 NOTE — PROGRESS NOTE ADULT - ASSESSMENT
Patient is a 61 y/o Sami-speaking F w/ a PMHx of HTN, HLD, T2DM, and COVID-19 infection in 4/2020 who was admitted for management of newly diagnosed APL. The patient is currently receiving ATRA and Arsenic. Patients hospital course has been complicated by transaminitis, refractory thrombocytopenia, and she was found have a positive COVID-19 PCR on 6/16.. The patient has anemia and thrombocytopenia 2/2 disease and/or ATRA/Arsenic. Patient is a 61 y/o Greenlandic-speaking F w/ a PMHx of HTN, HLD, T2DM, and COVID-19 infection in 4/2020 who was admitted for management of newly diagnosed APL. The patient is currently receiving ATRA and Arsenic. Patients hospital course has been complicated by transaminitis, refractory thrombocytopenia, and she was found have a positive COVID-19 PCR on 6/16. The patient has anemia and thrombocytopenia 2/2 disease and/or ATRA/Arsenic.

## 2020-06-18 NOTE — PROGRESS NOTE ADULT - PROBLEM SELECTOR PLAN 1
-Continue ATRA 40mg BID (started 5/22) and Arsenic Trioxide daily (started on 5/27)  -Monitor for differentiation syndrome and check EKG for QTc prolongation every Tuesday and Friday.   -Keep K > 4 and Mg > 2.  -Status post Hydrea 500mg PO x 1 on 6/8 and 6/9 for WBC >10  -Follow up CBC with diff, CMP, TLS and DIC panel daily  Keep PLTs >50 K. If unable to achieve goal continue with platelets infusion (single donor) 1/2 units over 3 hours every 12 hours. Last transfused this am for Platelets 31 today  -s/p Prednisone 20mg PO daily x 5 days (through 6/12) due to platelet antibody.  -Keep Fibrinogen >150 if less give Cryo  -Strict Is and Os/Daily weights/Mouth Care.  -plan on BM bx around day 29 post GALA .  -PICC line irritation: c/w aquaphor and local skin care, can try cavilon per d/w RN. Warm compresses. PICC team f/u.  -Care plan as per hematology/oncology.

## 2020-06-18 NOTE — PROGRESS NOTE ADULT - ATTENDING COMMENTS
59 y/o Hungarian speaking F who is transferred for newly diagnosed low/interm risk APL.  Presented with pancytopenia, flow cytometry/bone marrow biopsy/FISH from 5/26- consistent with acute promyelocytic leukemia with PML-AWA translocation.    She was started on ATRA 5/23- day +22, GALA 5/27- day +22.    -CT chest 6/6 c/w granulomatous disease (hx of TB in 1980's) pt had SOB on 6/4, now resolved.  Daily weights, diurese. Will give Hydrea 500mg if wbc>10. On Prednisone 20mg through 5/12- now off.  If pt becomes SOB again, may restart  -tremors improved, unclear etiology, electrolytes WNL. Cefepime held, given Baclofen -they improved  -Check CBC, coags/fibrinogen once daily.  Goal plt >40-50, fibrinogen >150; responding to ABO-matched platelets given over 3 hrs in 1/2 unit infusions   -monitor lytes daily, keep K>4, Mg>2.  EKG twice a week  -off antibiotics  -plan on BM bx around day 29 post GALA 59 y/o Romansh speaking F who is transferred for newly diagnosed low/interm risk APL.  Presented with pancytopenia, flow cytometry/bone marrow biopsy/FISH from 5/26- consistent with acute promyelocytic leukemia with PML-AWA translocation.    She was started on ATRA 5/23- day +27, GALA 5/27- day +23.    -CT chest 6/6 c/w granulomatous disease (hx of TB in 1980's) pt had SOB on 6/4, now resolved.  Daily weights, diurese. Will give Hydrea 500mg if wbc>10. On Prednisone 20mg through 5/12- now off.  If pt becomes SOB again, may restart  -tremors improved, unclear etiology, electrolytes WNL. Cefepime held, given Baclofen -they improved  -Check CBC, coags/fibrinogen once daily.  Goal plt >40-50, fibrinogen >150; responding to ABO-matched platelets given over 3 hrs in 1/2 unit infusions   -monitor lytes daily, keep K>4, Mg>2.  EKG twice a week  -off antibiotics  -plan on BM bx around day 29 post GALA  -COVID19+ on 6/17, moved to 9

## 2020-06-18 NOTE — PROGRESS NOTE ADULT - PROBLEM SELECTOR PLAN 1
Continue ATRA 40mg BID (started 5/22) and Arsenic Trioxide daily (started on 5/27)  Monitor for differentiation syndrome and check EKG for QTc prolongation every Tuesday and Friday. Keep K > 4 and Mg > 2.  Status post Hydrea 500mg PO x 1 on 6/8 and 6/9 for WBC >10  Follow up CBC with diff, CMP, TLS and DIC panel daily  Keep PLTs >50 K. If unable to achieve goal continue with platelets infusion (single donor) 1/2 units over 3 hours every 12 hours.  s/p Prednisone 20mg PO daily x 5 days (through 6/12) due to platelet antibody.  Keep Fibrinogen >150 if less give Cryo  Strict Is and Os/Daily weights/Mouth Care Continue ATRA 40mg BID (started 5/22) and Arsenic Trioxide daily (started on 5/27)  Monitor for differentiation syndrome and check EKG for QTc prolongation every Tuesday and Friday. Keep K > 4 and Mg > 2. Mg = 1.8 today. Please replete.  Status post Hydrea 500mg PO x 1 on 6/8 and 6/9 for WBC >10  Follow up CBC with diff, CMP, TLS and DIC panel daily  Keep PLTs >50 K. If unable to achieve goal continue with platelets infusion (single donor) 1/2 units over 3 hours every 12 hours. PLT count = 51k today. Ok to hold platelet transfusion for now.  S/p Prednisone 20mg PO daily x 5 days (through 6/12) due to platelet antibody.  Keep Fibrinogen >150, if less give Cryo  Strict Is and Os/Daily weights/Mouth Care

## 2020-06-18 NOTE — PROGRESS NOTE ADULT - PROBLEM SELECTOR PLAN 9
Phos- 5.1, Continue Phoslo. DVT PPx: Holding pharmacologic AC 2/2 thrombocytopenia  Diet: Consistent Carb w/ Evening Snack  Dispo: Pending resolution of acute medical issues    Contact Information [phone]

## 2020-06-18 NOTE — PROGRESS NOTE ADULT - PROBLEM SELECTOR PLAN 6
Home regimen with Atenolol held on admission  Restart at 25mg PO daily for increased BP. Will increase if needed  Monitor BP Home regimen with Atenolol held on admission  Restarted at 25mg PO daily for increased BP  Monitor BP and adjust medications as needed

## 2020-06-18 NOTE — PROGRESS NOTE ADULT - SUBJECTIVE AND OBJECTIVE BOX
Diagnosis: Acute Promyelocytic leukemia     Protocol/Chemo Regimen: ATRA/Arsenic     Day: ATRA Day 27  and Arsenic Day 23    Patient Danish-speaking refusing  service. Patient's son called by patient    Patient endorses:       Review of Systems:     Pain scale:     Diet: Consistent Carb w/ Evening Snack    Allergies    No Known Allergies    Intolerances        ANTIMICROBIALS      HEME/ONC MEDICATIONS  arsenic trioxide IVPB (eMAR) 11 milliGRAM(s) IV Intermittent every 24 hours  tretinoin 40 milliGRAM(s) Oral every 12 hours      STANDING MEDICATIONS  ATENolol  Tablet 25 milliGRAM(s) Oral daily  baclofen 5 milliGRAM(s) Oral every 12 hours  benzonatate 100 milliGRAM(s) Oral every 8 hours  Biotene Dry Mouth Oral Rinse 5 milliLiter(s) Swish and Spit five times a day  bisacodyl 5 milliGRAM(s) Oral every 12 hours  calcium acetate 667 milliGRAM(s) Oral four times a day with meals  dextrose 5%. 1000 milliLiter(s) IV Continuous <Continuous>  dextrose 50% Injectable 12.5 Gram(s) IV Push once  dextrose 50% Injectable 25 Gram(s) IV Push once  dextrose 50% Injectable 25 Gram(s) IV Push once  hydrocortisone 2.5% Rectal Cream 1 Application(s) Rectal every 8 hours  insulin lispro (HumaLOG) corrective regimen sliding scale   SubCutaneous three times a day before meals  insulin lispro (HumaLOG) corrective regimen sliding scale   SubCutaneous at bedtime  pantoprazole    Tablet 40 milliGRAM(s) Oral before breakfast  petrolatum white Ointment 1 Application(s) Topical three times a day  polyethylene glycol 3350 17 Gram(s) Oral daily  sodium chloride 0.65% Nasal 1 Spray(s) Both Nostrils every 8 hours  sodium chloride 0.9%. 1000 milliLiter(s) IV Continuous <Continuous>  witch hazel Pads 1 Application(s) Topical every 12 hours      PRN MEDICATIONS  acetaminophen   Tablet .. 650 milliGRAM(s) Oral every 6 hours PRN  ALBUTerol    90 MICROgram(s) HFA Inhaler 2 Puff(s) Inhalation every 6 hours PRN  AQUAPHOR (petrolatum Ointment) 1 Application(s) Topical every 3 hours PRN  dextrose 40% Gel 15 Gram(s) Oral once PRN  diphenhydrAMINE   Injectable 25 milliGRAM(s) IV Push every 6 hours PRN  glucagon  Injectable 1 milliGRAM(s) IntraMuscular once PRN  hemorrhoidal Ointment 1 Application(s) Rectal every 8 hours PRN  ondansetron Injectable 8 milliGRAM(s) IV Push every 8 hours PRN  sodium chloride 0.9% lock flush 10 milliLiter(s) IV Push every 1 hour PRN        Vital Signs Last 24 Hrs  T(C): 36.7 (18 Jun 2020 05:49), Max: 37.2 (17 Jun 2020 17:36)  T(F): 98 (18 Jun 2020 05:49), Max: 99 (17 Jun 2020 17:36)  HR: 78 (18 Jun 2020 05:49) (74 - 81)  BP: 148/80 (18 Jun 2020 05:49) (130/71 - 158/83)  BP(mean): --  RR: 18 (18 Jun 2020 05:49) (18 - 20)  SpO2: 98% (18 Jun 2020 05:49) (96% - 98%)    PHYSICAL EXAM:  Constitutional: NAD  HEENT: NC/AT, MMM  Neck: Supple  Respiratory: Grossly CTAB; No wheeze appreciated  Cardiovascular: RRR; +S1/S2  Gastrointestinal: +BS, Soft, NT, No rigidity  Extremities: No LE edema  Neurological: Awake and alert, Answering questions and following commands appropriately  Skin: Warm and dry  Psychiatric: Calm and cooperative    RECENT CULTURES:        LABS:                        7.3    4.19  )-----------( 51       ( 18 Jun 2020 08:17 )             22.2         Mean Cell Volume : 84.7 fl  Mean Cell Hemoglobin : 27.9 pg  Mean Cell Hemoglobin Concentration : 32.9 gm/dL  Auto Neutrophil # : x  Auto Lymphocyte # : x  Auto Monocyte # : x  Auto Eosinophil # : x  Auto Basophil # : x  Auto Neutrophil % : x  Auto Lymphocyte % : x  Auto Monocyte % : x  Auto Eosinophil % : x  Auto Basophil % : x      06-17    139  |  102  |  23  ----------------------------<  130<H>  4.1   |  23  |  0.73    Ca    10.2      17 Jun 2020 06:47  Phos  5.1     06-17  Mg     2.1     06-17    TPro  8.0  /  Alb  4.8  /  TBili  0.5  /  DBili  x   /  AST  36  /  ALT  58<H>  /  AlkPhos  110  06-17          PT/INR - ( 17 Jun 2020 06:47 )   PT: 12.8 sec;   INR: 1.12 ratio         PTT - ( 17 Jun 2020 06:47 )  PTT:32.3 sec    RADIOLOGY & ADDITIONAL STUDIES:  Studies reviewed. Diagnosis: Acute Promyelocytic leukemia     Protocol/Chemo Regimen: ATRA/Arsenic     Day: ATRA Day 27  and Arsenic Day 23    Patient Georgian-speaking refusing  service. Patient's son called by patient    Patient endorses: Patient reports some discomfort in her RUE due to tape around the PICC line. No complaints of headache, chest pain, or shortness of breath currently.    Review of Systems: Patient denies headache, chest pain, shortness of breath, chest pain, shortness of breath, nausea, vomiting, or abdominal pain.    Pain scale: 0-1    Diet: Consistent Carb w/ Evening Snack    Allergies    No Known Allergies    Intolerances        ANTIMICROBIALS      HEME/ONC MEDICATIONS  arsenic trioxide IVPB (eMAR) 11 milliGRAM(s) IV Intermittent every 24 hours  tretinoin 40 milliGRAM(s) Oral every 12 hours      STANDING MEDICATIONS  ATENolol  Tablet 25 milliGRAM(s) Oral daily  baclofen 5 milliGRAM(s) Oral every 12 hours  benzonatate 100 milliGRAM(s) Oral every 8 hours  Biotene Dry Mouth Oral Rinse 5 milliLiter(s) Swish and Spit five times a day  bisacodyl 5 milliGRAM(s) Oral every 12 hours  calcium acetate 667 milliGRAM(s) Oral four times a day with meals  dextrose 5%. 1000 milliLiter(s) IV Continuous <Continuous>  dextrose 50% Injectable 12.5 Gram(s) IV Push once  dextrose 50% Injectable 25 Gram(s) IV Push once  dextrose 50% Injectable 25 Gram(s) IV Push once  hydrocortisone 2.5% Rectal Cream 1 Application(s) Rectal every 8 hours  insulin lispro (HumaLOG) corrective regimen sliding scale   SubCutaneous three times a day before meals  insulin lispro (HumaLOG) corrective regimen sliding scale   SubCutaneous at bedtime  pantoprazole    Tablet 40 milliGRAM(s) Oral before breakfast  petrolatum white Ointment 1 Application(s) Topical three times a day  polyethylene glycol 3350 17 Gram(s) Oral daily  sodium chloride 0.65% Nasal 1 Spray(s) Both Nostrils every 8 hours  sodium chloride 0.9%. 1000 milliLiter(s) IV Continuous <Continuous>  witch hazel Pads 1 Application(s) Topical every 12 hours      PRN MEDICATIONS  acetaminophen   Tablet .. 650 milliGRAM(s) Oral every 6 hours PRN  ALBUTerol    90 MICROgram(s) HFA Inhaler 2 Puff(s) Inhalation every 6 hours PRN  AQUAPHOR (petrolatum Ointment) 1 Application(s) Topical every 3 hours PRN  dextrose 40% Gel 15 Gram(s) Oral once PRN  diphenhydrAMINE   Injectable 25 milliGRAM(s) IV Push every 6 hours PRN  glucagon  Injectable 1 milliGRAM(s) IntraMuscular once PRN  hemorrhoidal Ointment 1 Application(s) Rectal every 8 hours PRN  ondansetron Injectable 8 milliGRAM(s) IV Push every 8 hours PRN  sodium chloride 0.9% lock flush 10 milliLiter(s) IV Push every 1 hour PRN        Vital Signs Last 24 Hrs  T(C): 36.7 (18 Jun 2020 05:49), Max: 37.2 (17 Jun 2020 17:36)  T(F): 98 (18 Jun 2020 05:49), Max: 99 (17 Jun 2020 17:36)  HR: 78 (18 Jun 2020 05:49) (74 - 81)  BP: 148/80 (18 Jun 2020 05:49) (130/71 - 158/83)  BP(mean): --  RR: 18 (18 Jun 2020 05:49) (18 - 20)  SpO2: 98% (18 Jun 2020 05:49) (96% - 98%)    PHYSICAL EXAM:  Constitutional: NAD  HEENT: NC/AT, MMM  Neck: Supple  Respiratory: Grossly CTAB; No wheeze appreciated  Cardiovascular: RRR; +S1/S2  Gastrointestinal: +BS, Soft, NT, No rigidity  Extremities: No LE edema, + RUE PICC line in place which appears c/d/i  Neurological: Awake and alert, Answering questions and following commands appropriately  Skin: Warm and dry  Psychiatric: Calm and cooperative    RECENT CULTURES:        LABS:                        7.3    4.19  )-----------( 51       ( 18 Jun 2020 08:17 )             22.2         Mean Cell Volume : 84.7 fl  Mean Cell Hemoglobin : 27.9 pg  Mean Cell Hemoglobin Concentration : 32.9 gm/dL  Auto Neutrophil # : x  Auto Lymphocyte # : x  Auto Monocyte # : x  Auto Eosinophil # : x  Auto Basophil # : x  Auto Neutrophil % : x  Auto Lymphocyte % : x  Auto Monocyte % : x  Auto Eosinophil % : x  Auto Basophil % : x      06-17    139  |  102  |  23  ----------------------------<  130<H>  4.1   |  23  |  0.73    Ca    10.2      17 Jun 2020 06:47  Phos  5.1     06-17  Mg     2.1     06-17    TPro  8.0  /  Alb  4.8  /  TBili  0.5  /  DBili  x   /  AST  36  /  ALT  58<H>  /  AlkPhos  110  06-17          PT/INR - ( 17 Jun 2020 06:47 )   PT: 12.8 sec;   INR: 1.12 ratio         PTT - ( 17 Jun 2020 06:47 )  PTT:32.3 sec    RADIOLOGY & ADDITIONAL STUDIES:  Studies reviewed.

## 2020-06-18 NOTE — PROGRESS NOTE ADULT - PROBLEM SELECTOR PLAN 7
5/23 HgA1c 7.0  FS AC & HS with HISS  Consistent Carbohydrate diet 5/23: HgA1c = 7.0  C/w HISS. FS are currently stable. Monitor FS AC & QHS   C/w Consistent Carbohydrate diet w/ Evening snack

## 2020-06-18 NOTE — PROGRESS NOTE ADULT - ASSESSMENT
59 yo Greek speaking F with PMHx of HTN, HLD, T2DM, COVID-19 infection admitted for management of newly diagnosed APL. The patient is currently reciving ATRA and Arsenic.  Patients hospital course has been complicated by transaminitis and refractory thrombocytopenia. The patient has pancytopenia 2/2 disease and/or ATRA/Arsenic. Course further c/b repeat covid PCR testing positive after interim multiple negative tests; now transferred to covid medicine unit.

## 2020-06-18 NOTE — PROGRESS NOTE ADULT - PROBLEM SELECTOR PLAN 2
-known prior infection with positive PCR in April and May 2020; since then has had multiple negative PCR on 5/22, 5/26, 6/2, and 6/9  -Most recent test 6/16 PCR now recurrently positive. CXR 6/16 with clear lungs  -pt transferred to [address] [place] [address]; c/w airborne/droplet/contact precautions for now  -will send covid antibody testing, repeat pcr testing ordered for 6/18. Will need 2 consecutive pcr negatives to be considered for transfer back to [address] [place] [address]  -currently not sob, no distress, lungs clear. cont to monitor. If recurrently symptomatic, can consider ID eval. avoid plaquenil given no efficacy.  -Supportive care for cough. Chest discomfort associated with cough. F/u CXR and EKG, though unlikely cardiac. Tylenol and cough meds PRN.

## 2020-06-18 NOTE — PROGRESS NOTE ADULT - PROBLEM SELECTOR PLAN 5
Improved  Monitor daily CMP  Avoid further hepatotoxic medications Resolved  Monitor daily CMP  Avoid further hepatotoxic medications

## 2020-06-19 DIAGNOSIS — M79.601 PAIN IN RIGHT ARM: ICD-10-CM

## 2020-06-19 LAB
ALBUMIN SERPL ELPH-MCNC: 4.7 G/DL — SIGNIFICANT CHANGE UP (ref 3.3–5)
ALP SERPL-CCNC: 123 U/L — HIGH (ref 40–120)
ALT FLD-CCNC: 42 U/L — SIGNIFICANT CHANGE UP (ref 10–45)
ANION GAP SERPL CALC-SCNC: 16 MMOL/L — SIGNIFICANT CHANGE UP (ref 5–17)
APTT BLD: 28.5 SEC — SIGNIFICANT CHANGE UP (ref 27.5–36.3)
AST SERPL-CCNC: 31 U/L — SIGNIFICANT CHANGE UP (ref 10–40)
BASOPHILS # BLD AUTO: 0 K/UL — SIGNIFICANT CHANGE UP (ref 0–0.2)
BASOPHILS NFR BLD AUTO: 0 % — SIGNIFICANT CHANGE UP (ref 0–2)
BILIRUB SERPL-MCNC: 0.6 MG/DL — SIGNIFICANT CHANGE UP (ref 0.2–1.2)
BLD GP AB SCN SERPL QL: NEGATIVE — SIGNIFICANT CHANGE UP
BUN SERPL-MCNC: 21 MG/DL — SIGNIFICANT CHANGE UP (ref 7–23)
CALCIUM SERPL-MCNC: 10.6 MG/DL — HIGH (ref 8.4–10.5)
CHLORIDE SERPL-SCNC: 100 MMOL/L — SIGNIFICANT CHANGE UP (ref 96–108)
CO2 SERPL-SCNC: 23 MMOL/L — SIGNIFICANT CHANGE UP (ref 22–31)
CREAT SERPL-MCNC: 0.71 MG/DL — SIGNIFICANT CHANGE UP (ref 0.5–1.3)
D DIMER BLD IA.RAPID-MCNC: 666 NG/ML DDU — HIGH
EOSINOPHIL # BLD AUTO: 0.08 K/UL — SIGNIFICANT CHANGE UP (ref 0–0.5)
EOSINOPHIL NFR BLD AUTO: 1.7 % — SIGNIFICANT CHANGE UP (ref 0–6)
FIBRINOGEN PPP-MCNC: 423 MG/DL — SIGNIFICANT CHANGE UP (ref 350–510)
GLUCOSE BLDC GLUCOMTR-MCNC: 131 MG/DL — HIGH (ref 70–99)
GLUCOSE BLDC GLUCOMTR-MCNC: 141 MG/DL — HIGH (ref 70–99)
GLUCOSE BLDC GLUCOMTR-MCNC: 158 MG/DL — HIGH (ref 70–99)
GLUCOSE BLDC GLUCOMTR-MCNC: 166 MG/DL — HIGH (ref 70–99)
GLUCOSE SERPL-MCNC: 135 MG/DL — HIGH (ref 70–99)
HCT VFR BLD CALC: 21.8 % — LOW (ref 34.5–45)
HCT VFR BLD CALC: 23.9 % — LOW (ref 34.5–45)
HGB BLD-MCNC: 7.2 G/DL — LOW (ref 11.5–15.5)
HGB BLD-MCNC: 7.9 G/DL — LOW (ref 11.5–15.5)
INR BLD: 1.13 RATIO — SIGNIFICANT CHANGE UP (ref 0.88–1.16)
LYMPHOCYTES # BLD AUTO: 1.9 K/UL — SIGNIFICANT CHANGE UP (ref 1–3.3)
LYMPHOCYTES # BLD AUTO: 39.5 % — SIGNIFICANT CHANGE UP (ref 13–44)
MAGNESIUM SERPL-MCNC: 2 MG/DL — SIGNIFICANT CHANGE UP (ref 1.6–2.6)
MANUAL SMEAR VERIFICATION: SIGNIFICANT CHANGE UP
MCHC RBC-ENTMCNC: 28.2 PG — SIGNIFICANT CHANGE UP (ref 27–34)
MCHC RBC-ENTMCNC: 28.3 PG — SIGNIFICANT CHANGE UP (ref 27–34)
MCHC RBC-ENTMCNC: 33 GM/DL — SIGNIFICANT CHANGE UP (ref 32–36)
MCHC RBC-ENTMCNC: 33.1 GM/DL — SIGNIFICANT CHANGE UP (ref 32–36)
MCV RBC AUTO: 85.5 FL — SIGNIFICANT CHANGE UP (ref 80–100)
MCV RBC AUTO: 85.7 FL — SIGNIFICANT CHANGE UP (ref 80–100)
MONOCYTES # BLD AUTO: 0 K/UL — SIGNIFICANT CHANGE UP (ref 0–0.9)
MONOCYTES NFR BLD AUTO: 0 % — LOW (ref 2–14)
MYELOCYTES NFR BLD: 0.9 % — HIGH (ref 0–0)
NEUTROPHILS # BLD AUTO: 2.78 K/UL — SIGNIFICANT CHANGE UP (ref 1.8–7.4)
NEUTROPHILS NFR BLD AUTO: 57.9 % — SIGNIFICANT CHANGE UP (ref 43–77)
NRBC # BLD: 0 /100 WBCS — SIGNIFICANT CHANGE UP (ref 0–0)
PHOSPHATE SERPL-MCNC: 4.9 MG/DL — HIGH (ref 2.5–4.5)
PLAT MORPH BLD: NORMAL — SIGNIFICANT CHANGE UP
PLATELET # BLD AUTO: 34 K/UL — LOW (ref 150–400)
PLATELET # BLD AUTO: 46 K/UL — LOW (ref 150–400)
POTASSIUM SERPL-MCNC: 4.3 MMOL/L — SIGNIFICANT CHANGE UP (ref 3.5–5.3)
POTASSIUM SERPL-SCNC: 4.3 MMOL/L — SIGNIFICANT CHANGE UP (ref 3.5–5.3)
PROT SERPL-MCNC: 8.4 G/DL — HIGH (ref 6–8.3)
PROTHROM AB SERPL-ACNC: 13.1 SEC — HIGH (ref 10–12.9)
RBC # BLD: 2.55 M/UL — LOW (ref 3.8–5.2)
RBC # BLD: 2.79 M/UL — LOW (ref 3.8–5.2)
RBC # FLD: 15.4 % — HIGH (ref 10.3–14.5)
RBC # FLD: 15.4 % — HIGH (ref 10.3–14.5)
RBC BLD AUTO: SIGNIFICANT CHANGE UP
RH IG SCN BLD-IMP: POSITIVE — SIGNIFICANT CHANGE UP
SODIUM SERPL-SCNC: 139 MMOL/L — SIGNIFICANT CHANGE UP (ref 135–145)
URATE SERPL-MCNC: 4.6 MG/DL — SIGNIFICANT CHANGE UP (ref 2.5–7)
WBC # BLD: 4.73 K/UL — SIGNIFICANT CHANGE UP (ref 3.8–10.5)
WBC # BLD: 4.81 K/UL — SIGNIFICANT CHANGE UP (ref 3.8–10.5)
WBC # FLD AUTO: 4.73 K/UL — SIGNIFICANT CHANGE UP (ref 3.8–10.5)
WBC # FLD AUTO: 4.81 K/UL — SIGNIFICANT CHANGE UP (ref 3.8–10.5)

## 2020-06-19 PROCEDURE — 93010 ELECTROCARDIOGRAM REPORT: CPT

## 2020-06-19 PROCEDURE — 99231 SBSQ HOSP IP/OBS SF/LOW 25: CPT | Mod: GC

## 2020-06-19 PROCEDURE — 99233 SBSQ HOSP IP/OBS HIGH 50: CPT

## 2020-06-19 RX ADMIN — ARSENIC TRIOXIDE 130.5 MILLIGRAM(S): 2 INJECTION, SOLUTION INTRAVENOUS at 12:54

## 2020-06-19 RX ADMIN — Medication 1 APPLICATION(S): at 07:00

## 2020-06-19 RX ADMIN — TRETINOIN 40 MILLIGRAM(S): 10 CAPSULE, LIQUID FILLED ORAL at 12:15

## 2020-06-19 RX ADMIN — Medication 1: at 17:07

## 2020-06-19 RX ADMIN — Medication 667 MILLIGRAM(S): at 12:14

## 2020-06-19 RX ADMIN — TRETINOIN 40 MILLIGRAM(S): 10 CAPSULE, LIQUID FILLED ORAL at 21:52

## 2020-06-19 RX ADMIN — Medication 5 MILLIGRAM(S): at 06:59

## 2020-06-19 RX ADMIN — Medication 100 MILLIGRAM(S): at 13:36

## 2020-06-19 RX ADMIN — Medication 1 APPLICATION(S): at 13:36

## 2020-06-19 RX ADMIN — Medication 5 MILLILITER(S): at 21:52

## 2020-06-19 RX ADMIN — Medication 5 MILLIGRAM(S): at 17:08

## 2020-06-19 RX ADMIN — Medication 1 APPLICATION(S): at 21:56

## 2020-06-19 RX ADMIN — Medication 100 MILLIGRAM(S): at 06:58

## 2020-06-19 RX ADMIN — Medication 5 MILLIGRAM(S): at 12:14

## 2020-06-19 RX ADMIN — Medication 5 MILLILITER(S): at 17:08

## 2020-06-19 RX ADMIN — POLYETHYLENE GLYCOL 3350 17 GRAM(S): 17 POWDER, FOR SOLUTION ORAL at 12:13

## 2020-06-19 RX ADMIN — Medication 5 MILLILITER(S): at 12:13

## 2020-06-19 RX ADMIN — ATENOLOL 25 MILLIGRAM(S): 25 TABLET ORAL at 06:58

## 2020-06-19 RX ADMIN — Medication 1: at 12:12

## 2020-06-19 RX ADMIN — AER TRAVELER 1 APPLICATION(S): 0.5 SOLUTION RECTAL; TOPICAL at 16:33

## 2020-06-19 RX ADMIN — PANTOPRAZOLE SODIUM 40 MILLIGRAM(S): 20 TABLET, DELAYED RELEASE ORAL at 06:59

## 2020-06-19 RX ADMIN — AER TRAVELER 1 APPLICATION(S): 0.5 SOLUTION RECTAL; TOPICAL at 07:01

## 2020-06-19 NOTE — PROGRESS NOTE ADULT - PROBLEM SELECTOR PLAN 9
DVT PPx: Holding pharmacologic AC 2/2 thrombocytopenia  Diet: Consistent Carb w/ Evening Snack  Dispo: Pending resolution of acute medical issues    Contact Information [phone]

## 2020-06-19 NOTE — PROGRESS NOTE ADULT - PROBLEM SELECTOR PLAN 6
Home regimen with Atenolol held on admission  Restarted at 25mg PO daily for increased BP  Monitor BP and adjust medications as needed

## 2020-06-19 NOTE — PROGRESS NOTE ADULT - PROBLEM SELECTOR PLAN 2
-known prior infection with positive PCR in April and May 2020; since then has had multiple negative PCR on 5/22, 5/26, 6/2, and 6/9  -Most recent test 6/16 PCR now recurrently positive. CXR 6/16 with clear lungs  -pt transferred to [address] [place] [address]; c/w airborne/droplet/contact precautions for now  -covid antibody testing positive, F/u repeat pcr test from 6/19. Will need 2 consecutive pcr negatives to be considered for transfer back to [address] [place] [address]  -currently not sob, no distress, lungs clear. cont to monitor. If recurrently symptomatic, can consider ID eval. avoid plaquenil given no efficacy.  -Supportive care for cough. Chest discomfort associated with cough. Tylenol and cough meds PRN.

## 2020-06-19 NOTE — ADVANCED PRACTICE NURSE CONSULT - ASSESSMENT
Patient located in room 902.  Patient presented with OX4 and no complaints of pain or discomfort.  Labs reviewed and treatment approved by Dr. Goldberg.  EKG completed today and QTc measured 421ms.  Target platelet is 50, PLT count today is 46.  Patient receving PLTS at this time.  Target levels for Mg and K+ are met.    Right picc placed in right upper extremity.  Flushed with 0.9NS 10ml with no resistance and excellent blood return.  Arsenic running through red line.      Two license professionals perform the following independent verification prior to administration Drug name; Drug dose; Infusion volume of bag or syringe; Rate of administration Route of administration Expiration date/time; Appearance and integrity of Compatability of Solution;  Rate set on infusion pump.  In the presence of the patient, verify the patient's identification using two patient identifiers.    1330:  Arsenic 0.15mg/kg totaling 11mg over 2 hours. Patient located in room 902.  Patient presented with OX4 and no complaints of pain or discomfort.  Labs reviewed and treatment approved by Dr. Goldberg.  EKG completed today and QTc measured 421ms.  Target platelet is 50, PLT count today is 46.  Patient receving PLTS at this time.  Target levels for Mg and K+ are met.    Right picc placed in right upper extremity.  Flushed with 0.9NS 10ml with no resistance and excellent blood return.  Arsenic running through red line.      Two license professionals perform the following independent verification prior to administration Drug name; Drug dose; Infusion volume of bag or syringe; Rate of administration Route of administration Expiration date/time; Appearance and integrity of Compatability of Solution;  Rate set on infusion pump.  In the presence of the patient, verify the patient's identification using two patient identifiers.    1330:  Arsenic 0.15mg/kg totaling 11mg over 2 hours.    1530:  Tolerated treatment without reaction or complaints.

## 2020-06-19 NOTE — PROGRESS NOTE ADULT - PROBLEM SELECTOR PLAN 1
-Continue ATRA 40mg BID (started 5/22) and Arsenic Trioxide daily (started on 5/27), per heme.   -Monitor for differentiation syndrome and check EKG for QTc prolongation every Tuesday and Friday.   -Keep K > 4 and Mg > 2.  -Status post Hydrea 500mg PO x 1 on 6/8 and 6/9 for WBC >10  -Follow up CBC with diff, CMP, INR, fibrinogen, TLS and DIC panel daily  Keep PLTs >50 K. If unable to achieve goal continue with platelets infusion (single donor) 1/2 units over 3 hours every 12 hours. -Last transfused 6/19.   -s/p Prednisone 20mg PO daily x 5 days (through 6/12) due to platelet antibody.  -Keep Fibrinogen >150 if less give Cryo  -Strict Is and Os/Daily weights/Mouth Care.  -plan on BM bx around day 29 post GALA .  -PICC line irritation: Seems to be non-infected, but some skin irritation probably from the tape. Line is flushing normally. PICC RN changed the dressing 6/19, needs at least weekly. Wound nurse consult to see if any further recs.   -Care plan as per hematology/oncology.

## 2020-06-19 NOTE — PROGRESS NOTE ADULT - SUBJECTIVE AND OBJECTIVE BOX
Diagnosis: Acute Promyelocytic leukemia     Protocol/Chemo Regimen: ATRA/Arsenic     Day: ATRA Day 28  and Arsenic Day 24    Patient Sami-speaking refusing  service. Patient's son called by patient    Patient endorses:    Review of Systems:     Pain scale:     Diet: Consistent Carb w/ Evening Snack    Allergies    No Known Allergies    Intolerances        ANTIMICROBIALS      HEME/ONC MEDICATIONS  arsenic trioxide IVPB (eMAR) 11 milliGRAM(s) IV Intermittent every 24 hours  tretinoin 40 milliGRAM(s) Oral every 12 hours      STANDING MEDICATIONS  ATENolol  Tablet 25 milliGRAM(s) Oral daily  baclofen 5 milliGRAM(s) Oral every 12 hours  benzonatate 100 milliGRAM(s) Oral every 8 hours  Biotene Dry Mouth Oral Rinse 5 milliLiter(s) Swish and Spit five times a day  bisacodyl 5 milliGRAM(s) Oral every 12 hours  calcium acetate 667 milliGRAM(s) Oral four times a day with meals  dextrose 5%. 1000 milliLiter(s) IV Continuous <Continuous>  dextrose 50% Injectable 12.5 Gram(s) IV Push once  dextrose 50% Injectable 25 Gram(s) IV Push once  dextrose 50% Injectable 25 Gram(s) IV Push once  hydrocortisone 2.5% Rectal Cream 1 Application(s) Rectal every 8 hours  insulin lispro (HumaLOG) corrective regimen sliding scale   SubCutaneous three times a day before meals  insulin lispro (HumaLOG) corrective regimen sliding scale   SubCutaneous at bedtime  pantoprazole    Tablet 40 milliGRAM(s) Oral before breakfast  petrolatum white Ointment 1 Application(s) Topical three times a day  polyethylene glycol 3350 17 Gram(s) Oral daily  sodium chloride 0.65% Nasal 1 Spray(s) Both Nostrils every 8 hours  sodium chloride 0.9%. 1000 milliLiter(s) IV Continuous <Continuous>  witch hazel Pads 1 Application(s) Topical every 12 hours      PRN MEDICATIONS  acetaminophen   Tablet .. 650 milliGRAM(s) Oral every 6 hours PRN  ALBUTerol    90 MICROgram(s) HFA Inhaler 2 Puff(s) Inhalation every 6 hours PRN  AQUAPHOR (petrolatum Ointment) 1 Application(s) Topical every 3 hours PRN  dextrose 40% Gel 15 Gram(s) Oral once PRN  diphenhydrAMINE   Injectable 25 milliGRAM(s) IV Push every 6 hours PRN  glucagon  Injectable 1 milliGRAM(s) IntraMuscular once PRN  guaiFENesin   Syrup  (Sugar-Free) 100 milliGRAM(s) Oral every 6 hours PRN  hemorrhoidal Ointment 1 Application(s) Rectal every 8 hours PRN  ondansetron Injectable 8 milliGRAM(s) IV Push every 8 hours PRN  sodium chloride 0.9% lock flush 10 milliLiter(s) IV Push every 1 hour PRN        Vital Signs Last 24 Hrs  T(C): 36.6 (19 Jun 2020 06:45), Max: 37.1 (18 Jun 2020 21:34)  T(F): 97.8 (19 Jun 2020 06:45), Max: 98.8 (18 Jun 2020 21:34)  HR: 92 (19 Jun 2020 06:45) (73 - 92)  BP: 125/69 (19 Jun 2020 06:45) (117/68 - 155/74)  BP(mean): --  RR: 18 (19 Jun 2020 06:45) (18 - 18)  SpO2: 95% (19 Jun 2020 06:45) (94% - 96%)    PHYSICAL EXAM:  Constitutional: NAD  HEENT: NC/AT, MMM  Neck: Supple  Respiratory: Grossly CTAB; No wheeze appreciated  Cardiovascular: RRR; +S1/S2  Gastrointestinal: +BS, Soft, NT, No rigidity  Extremities: No LE edema, + RUE PICC line  Neurological: Awake and alert, Answering questions and following commands appropriately  Skin: Warm and dry  Psychiatric: Calm and cooperative    RECENT CULTURES:        LABS:                        7.9    4.81  )-----------( 46       ( 19 Jun 2020 07:09 )             23.9         Mean Cell Volume : 85.7 fl  Mean Cell Hemoglobin : 28.3 pg  Mean Cell Hemoglobin Concentration : 33.1 gm/dL  Auto Neutrophil # : x  Auto Lymphocyte # : x  Auto Monocyte # : x  Auto Eosinophil # : x  Auto Basophil # : x  Auto Neutrophil % : x  Auto Lymphocyte % : x  Auto Monocyte % : x  Auto Eosinophil % : x  Auto Basophil % : x      06-19    139  |  100  |  21  ----------------------------<  135<H>  4.3   |  23  |  0.71    Ca    10.6<H>      19 Jun 2020 07:09  Phos  4.9     06-19  Mg     2.0     06-19    TPro  8.4<H>  /  Alb  4.7  /  TBili  0.6  /  DBili  x   /  AST  31  /  ALT  42  /  AlkPhos  123<H>  06-19      Mg 2.0  Phos 4.9      PT/INR - ( 19 Jun 2020 07:09 )   PT: 13.1 sec;   INR: 1.13 ratio         PTT - ( 19 Jun 2020 07:09 )  PTT:28.5 sec    LDH --  Uric Acid 4.6        RADIOLOGY & ADDITIONAL STUDIES:  Studies reviewed. Diagnosis: Acute Promyelocytic leukemia     Protocol/Chemo Regimen: ATRA/Arsenic     Day: ATRA Day 28  and Arsenic Day 24    Patient Swedish-speaking refusing  service. Patient's son called by patient    Patient endorses: Patient reports a lingering non-productive cough which is stable. She also endorses redness and irritation near her RUE PICC line. No complaints of chest pain or shortness of breath currently.    Review of Systems: Patient denies headache, chest pain, shortness of breath, nausea, vomiting, or abdominal pain.    Pain scale: 0-1    Diet: Consistent Carb w/ Evening Snack    Allergies    No Known Allergies    Intolerances        ANTIMICROBIALS      HEME/ONC MEDICATIONS  arsenic trioxide IVPB (eMAR) 11 milliGRAM(s) IV Intermittent every 24 hours  tretinoin 40 milliGRAM(s) Oral every 12 hours      STANDING MEDICATIONS  ATENolol  Tablet 25 milliGRAM(s) Oral daily  baclofen 5 milliGRAM(s) Oral every 12 hours  benzonatate 100 milliGRAM(s) Oral every 8 hours  Biotene Dry Mouth Oral Rinse 5 milliLiter(s) Swish and Spit five times a day  bisacodyl 5 milliGRAM(s) Oral every 12 hours  calcium acetate 667 milliGRAM(s) Oral four times a day with meals  dextrose 5%. 1000 milliLiter(s) IV Continuous <Continuous>  dextrose 50% Injectable 12.5 Gram(s) IV Push once  dextrose 50% Injectable 25 Gram(s) IV Push once  dextrose 50% Injectable 25 Gram(s) IV Push once  hydrocortisone 2.5% Rectal Cream 1 Application(s) Rectal every 8 hours  insulin lispro (HumaLOG) corrective regimen sliding scale   SubCutaneous three times a day before meals  insulin lispro (HumaLOG) corrective regimen sliding scale   SubCutaneous at bedtime  pantoprazole    Tablet 40 milliGRAM(s) Oral before breakfast  petrolatum white Ointment 1 Application(s) Topical three times a day  polyethylene glycol 3350 17 Gram(s) Oral daily  sodium chloride 0.65% Nasal 1 Spray(s) Both Nostrils every 8 hours  sodium chloride 0.9%. 1000 milliLiter(s) IV Continuous <Continuous>  witch hazel Pads 1 Application(s) Topical every 12 hours      PRN MEDICATIONS  acetaminophen   Tablet .. 650 milliGRAM(s) Oral every 6 hours PRN  ALBUTerol    90 MICROgram(s) HFA Inhaler 2 Puff(s) Inhalation every 6 hours PRN  AQUAPHOR (petrolatum Ointment) 1 Application(s) Topical every 3 hours PRN  dextrose 40% Gel 15 Gram(s) Oral once PRN  diphenhydrAMINE   Injectable 25 milliGRAM(s) IV Push every 6 hours PRN  glucagon  Injectable 1 milliGRAM(s) IntraMuscular once PRN  guaiFENesin   Syrup  (Sugar-Free) 100 milliGRAM(s) Oral every 6 hours PRN  hemorrhoidal Ointment 1 Application(s) Rectal every 8 hours PRN  ondansetron Injectable 8 milliGRAM(s) IV Push every 8 hours PRN  sodium chloride 0.9% lock flush 10 milliLiter(s) IV Push every 1 hour PRN        Vital Signs Last 24 Hrs  T(C): 36.6 (19 Jun 2020 06:45), Max: 37.1 (18 Jun 2020 21:34)  T(F): 97.8 (19 Jun 2020 06:45), Max: 98.8 (18 Jun 2020 21:34)  HR: 92 (19 Jun 2020 06:45) (73 - 92)  BP: 125/69 (19 Jun 2020 06:45) (117/68 - 155/74)  BP(mean): --  RR: 18 (19 Jun 2020 06:45) (18 - 18)  SpO2: 95% (19 Jun 2020 06:45) (94% - 96%)    PHYSICAL EXAM:  Constitutional: NAD  HEENT: NC/AT, MMM  Neck: Supple  Respiratory: Grossly CTAB; No wheeze appreciated  Cardiovascular: RRR; +S1/S2  Gastrointestinal: +BS, Soft, NT, No rigidity  Extremities: No LE edema, + RUE PICC line  Neurological: Awake and alert, Answering questions and following commands appropriately  Skin: Warm and dry, Erythema noted around RUE PICC line without significant TTP  Psychiatric: Calm and cooperative    RECENT CULTURES:        LABS:                        7.9    4.81  )-----------( 46       ( 19 Jun 2020 07:09 )             23.9         Mean Cell Volume : 85.7 fl  Mean Cell Hemoglobin : 28.3 pg  Mean Cell Hemoglobin Concentration : 33.1 gm/dL  Auto Neutrophil # : x  Auto Lymphocyte # : x  Auto Monocyte # : x  Auto Eosinophil # : x  Auto Basophil # : x  Auto Neutrophil % : x  Auto Lymphocyte % : x  Auto Monocyte % : x  Auto Eosinophil % : x  Auto Basophil % : x      06-19    139  |  100  |  21  ----------------------------<  135<H>  4.3   |  23  |  0.71    Ca    10.6<H>      19 Jun 2020 07:09  Phos  4.9     06-19  Mg     2.0     06-19    TPro  8.4<H>  /  Alb  4.7  /  TBili  0.6  /  DBili  x   /  AST  31  /  ALT  42  /  AlkPhos  123<H>  06-19      Mg 2.0  Phos 4.9      PT/INR - ( 19 Jun 2020 07:09 )   PT: 13.1 sec;   INR: 1.13 ratio         PTT - ( 19 Jun 2020 07:09 )  PTT:28.5 sec    LDH --  Uric Acid 4.6        RADIOLOGY & ADDITIONAL STUDIES:  Studies reviewed.

## 2020-06-19 NOTE — PROGRESS NOTE ADULT - ATTENDING COMMENTS
Derrek Cortes MD  Division of [facility] Medicine  Cell: [phone]  Pager: [phone]  Office: [phone]

## 2020-06-19 NOTE — PROGRESS NOTE ADULT - PROBLEM SELECTOR PLAN 5
Resolved  Monitor daily CMP  Avoid further hepatotoxic medications New onset essential tremor which has improved with Baclofen 5mg q12h  Continue to monitor

## 2020-06-19 NOTE — PROGRESS NOTE ADULT - PROBLEM SELECTOR PLAN 3
Patient previously with c/o cough and SOB with stable O2 sat and concern for differentiation syndrome, now intermittent  6/5 Patient received one dose of Dexamethasone   CXR (-) for acute infiltrates and (+) atelectasis   Continue Incentive Spirometer   Lasix PRN  6/8 and 6/9 Hydrea 500mg x 1 given  6/7 CT chest with biapical pleural-parenchymal scarring with calcification, likely from prior granulomatous disease. No consolidation. (Son reports a history of TB in 1980s)  Nasal Cannula PRN  C/w Tessalon Perles The patient is not neutropenic, afebrile  If febrile, Pan Cx and CXR  Previous COVID-19 (+) outpatient. Then COVID (-) on 5/22, 5/26, 6/2 and 6/9.   On 6/16, patient w/ a cough. F/u CXR on 6/16 showed no acute pathology. COVID-19 PCR was repeated (routinely) on 6/16 and it returned positive. Patient now transferred to [facility]. Appreciate care as per Medicine team  - COVID-19 Abs noted to be positive on 6/18. Agree with repeating COVID-19 PCR. Patient will need 2 consecutive negative PCRs prior to transfer back to [facility].  - Monitor CBC and fever curve

## 2020-06-19 NOTE — PROVIDER CONTACT NOTE (OTHER) - ASSESSMENT
VSS. Pt w/ no acute signs of distress. Both lumen has positive blood return and flushed w/o difficulty. Redness noted at the site. Pt c/o discomfort at the site.

## 2020-06-19 NOTE — PROGRESS NOTE ADULT - PROBLEM SELECTOR PLAN 7
5/23: HgA1c = 7.0  C/w HISS. FS are currently stable. Monitor FS AC & QHS   C/w Consistent Carbohydrate diet w/ Evening snack

## 2020-06-19 NOTE — PROGRESS NOTE ADULT - PROBLEM SELECTOR PLAN 1
Continue ATRA 40mg BID (started 5/22) and Arsenic Trioxide daily (started on 5/27)  Monitor for differentiation syndrome and check EKG for QTc prolongation every Tuesday and Friday. Keep K > 4 and Mg > 2. Mg = 1.8 today. Please replete.  Status post Hydrea 500mg PO x 1 on 6/8 and 6/9 for WBC >10  Follow up CBC with diff, CMP, TLS and DIC panel daily  Keep PLTs >50 K. If unable to achieve goal continue with platelets infusion (single donor) 1/2 units over 3 hours every 12 hours.   S/p Prednisone 20mg PO daily x 5 days (through 6/12) due to platelet antibody.  Keep Fibrinogen >150, if less give Cryo  Strict Is and Os/Daily weights/Mouth Care Continue ATRA 40mg BID (started 5/22) and Arsenic Trioxide daily (started on 5/27)  Monitor for differentiation syndrome and check EKG for QTc prolongation every Tuesday and Friday. Patient is due for an EKG today. Keep K > 4 and Mg > 2.  Status post Hydrea 500mg PO x 1 on 6/8 and 6/9 for WBC >10  Follow up CBC with diff, CMP, TLS and DIC panel daily  Keep PLTs >50 K. If unable to achieve goal, continue with platelets infusion (single donor) 1/2 units over 3 hours every 12 hours. PLT count = 46k today. Please transfuse 1/2 unit over 3hrs and check a post-transfusion CBC.  S/p Prednisone 20mg PO daily x 5 days (through 6/12) due to platelet antibody.  Keep Fibrinogen >150, if less give Cryo  Strict Is and Os/Daily weights/Mouth Care

## 2020-06-19 NOTE — PROGRESS NOTE ADULT - SUBJECTIVE AND OBJECTIVE BOX
Patient is a 60y old  Female who presents with a chief complaint of APML (19 Jun 2020 12:23)        SUBJECTIVE / OVERNIGHT EVENTS: Patient's son translates per her request. Patient reports some irritation at the [facility] site. She still has some intermittent cough. She denies any other complaints.       MEDICATIONS  (STANDING):  arsenic trioxide IVPB (eMAR) 11 milliGRAM(s) IV Intermittent every 24 hours  ATENolol  Tablet 25 milliGRAM(s) Oral daily  baclofen 5 milliGRAM(s) Oral every 12 hours  benzonatate 100 milliGRAM(s) Oral every 8 hours  Biotene Dry Mouth Oral Rinse 5 milliLiter(s) Swish and Spit five times a day  bisacodyl 5 milliGRAM(s) Oral every 12 hours  dextrose 5%. 1000 milliLiter(s) (50 mL/Hr) IV Continuous <Continuous>  dextrose 50% Injectable 12.5 Gram(s) IV Push once  dextrose 50% Injectable 25 Gram(s) IV Push once  dextrose 50% Injectable 25 Gram(s) IV Push once  hydrocortisone 2.5% Rectal Cream 1 Application(s) Rectal every 8 hours  insulin lispro (HumaLOG) corrective regimen sliding scale   SubCutaneous three times a day before meals  insulin lispro (HumaLOG) corrective regimen sliding scale   SubCutaneous at bedtime  pantoprazole    Tablet 40 milliGRAM(s) Oral before breakfast  petrolatum white Ointment 1 Application(s) Topical three times a day  polyethylene glycol 3350 17 Gram(s) Oral daily  sodium chloride 0.65% Nasal 1 Spray(s) Both Nostrils every 8 hours  sodium chloride 0.9%. 1000 milliLiter(s) (25 mL/Hr) IV Continuous <Continuous>  tretinoin 40 milliGRAM(s) Oral every 12 hours  witch hazel Pads 1 Application(s) Topical every 12 hours    MEDICATIONS  (PRN):  acetaminophen   Tablet .. 650 milliGRAM(s) Oral every 6 hours PRN Temp greater or equal to 38C (100.4F), Mild Pain (1 - 3)  ALBUTerol    90 MICROgram(s) HFA Inhaler 2 Puff(s) Inhalation every 6 hours PRN Shortness of Breath and/or Wheezing  AQUAPHOR (petrolatum Ointment) 1 Application(s) Topical every 3 hours PRN Dry skin  dextrose 40% Gel 15 Gram(s) Oral once PRN Blood Glucose LESS THAN 70 milliGRAM(s)/deciliter  diphenhydrAMINE   Injectable 25 milliGRAM(s) IV Push every 6 hours PRN Allergy symptoms  glucagon  Injectable 1 milliGRAM(s) IntraMuscular once PRN Glucose LESS THAN 70 milligrams/deciliter  guaiFENesin   Syrup  (Sugar-Free) 100 milliGRAM(s) Oral every 6 hours PRN Cough  hemorrhoidal Ointment 1 Application(s) Rectal every 8 hours PRN Hemorrhoids  ondansetron Injectable 8 milliGRAM(s) IV Push every 8 hours PRN Nausea and/or Vomiting  sodium chloride 0.9% lock flush 10 milliLiter(s) IV Push every 1 hour PRN Pre/post blood products, medications, blood draw, and to maintain line patency      Vital Signs Last 24 Hrs  T(C): 36.9 (19 Jun 2020 13:30), Max: 37.1 (18 Jun 2020 21:34)  T(F): 98.5 (19 Jun 2020 13:30), Max: 98.8 (18 Jun 2020 21:34)  HR: 71 (19 Jun 2020 13:30) (71 - 92)  BP: 116/76 (19 Jun 2020 13:30) (116/76 - 155/74)  BP(mean): --  RR: 18 (19 Jun 2020 13:30) (18 - 18)  SpO2: 96% (19 Jun 2020 13:30) (94% - 96%)  CAPILLARY BLOOD GLUCOSE      POCT Blood Glucose.: 158 mg/dL (19 Jun 2020 16:37)  POCT Blood Glucose.: 166 mg/dL (19 Jun 2020 12:02)  POCT Blood Glucose.: 131 mg/dL (19 Jun 2020 07:57)  POCT Blood Glucose.: 136 mg/dL (18 Jun 2020 21:12)    I&O's Summary    18 Jun 2020 07:01  -  19 Jun 2020 07:00  --------------------------------------------------------  IN: 1338 mL / OUT: 0 mL / NET: 1338 mL    19 Jun 2020 07:01  -  19 Jun 2020 18:02  --------------------------------------------------------  IN: 800 mL / OUT: 0 mL / NET: 800 mL          PHYSICAL EXAM:   GENERAL: NAD, well-developed  HEAD:  Atraumatic, Normocephalic  EYES: Conjunctiva and sclera clear  NECK: Supple   CHEST/LUNG: Clear to auscultation bilaterally; No wheeze  HEART: S1S2 normal. Regular rate and rhythm; No murmurs, rubs, or gallops  ABDOMEN: Soft, Nontender, Nondistended; Bowel sounds present  EXTREMITIES:  No clubbing, cyanosis, or edema. RUE PICC line skin with some erythema and mild breakdown, not warm.   PSYCH/Neuro: AAOx3. Non-focal.   SKIN: As above.       LABS:                        7.2    4.73  )-----------( 34       ( 19 Jun 2020 17:21 )             21.8     06-19    139  |  100  |  21  ----------------------------<  135<H>  4.3   |  23  |  0.71    Ca    10.6<H>      19 Jun 2020 07:09  Phos  4.9     06-19  Mg     2.0     06-19    TPro  8.4<H>  /  Alb  4.7  /  TBili  0.6  /  DBili  x   /  AST  31  /  ALT  42  /  AlkPhos  123<H>  06-19    PT/INR - ( 19 Jun 2020 07:09 )   PT: 13.1 sec;   INR: 1.13 ratio         PTT - ( 19 Jun 2020 07:09 )  PTT:28.5 sec        < from: Xray Chest 1 View- PORTABLE-Routine (06.18.20 @ 14:56) >  IMPRESSION:   Left upper lung linear atelectasis. Otherwise, clear lungs.     < end of copied text >      RADIOLOGY & ADDITIONAL TESTS:    Imaging Personally Reviewed: CXR report.   Consultant(s) Notes Reviewed:  Heme/onco  Care Discussed with Consultants/Other Providers:

## 2020-06-19 NOTE — PROGRESS NOTE ADULT - ATTENDING COMMENTS
59 y/o Turkish speaking F who is transferred for newly diagnosed low/interm risk APL.  Presented with pancytopenia, flow cytometry/bone marrow biopsy/FISH from 5/26- consistent with acute promyelocytic leukemia with PML-AWA translocation.    She was started on ATRA 5/23- day +27, GALA 5/27- day +23.    -CT chest 6/6 c/w granulomatous disease (hx of TB in 1980's) pt had SOB on 6/4, now resolved.  Daily weights, diurese. Will give Hydrea 500mg if wbc>10. On Prednisone 20mg through 5/12- now off.  If pt becomes SOB again, may restart  -tremors improved, unclear etiology, electrolytes WNL. Cefepime held, given Baclofen -they improved  -Check CBC, coags/fibrinogen once daily.  Goal plt >40-50, fibrinogen >150; responding to ABO-matched platelets given over 3 hrs in 1/2 unit infusions   -monitor lytes daily, keep K>4, Mg>2.  EKG twice a week  -off antibiotics  -plan on BM bx around day 29 post GALA  -COVID19+ on 6/17, moved to 9 59 y/o Kiswahili speaking F who is transferred for newly diagnosed low/interm risk APL.  Presented with pancytopenia, flow cytometry/bone marrow biopsy/FISH from 5/26- consistent with acute promyelocytic leukemia with PML-AWA translocation.    She was started on ATRA 5/23- day +28, GALA 5/27- day +24.    -CT chest 6/6 c/w granulomatous disease (hx of TB in 1980's) pt had SOB on 6/4, now resolved.  Daily weights, diurese. Will give Hydrea 500mg if wbc>10. On Prednisone 20mg through 5/12- now off.  If pt becomes SOB again, may restart  -tremors improved, unclear etiology, electrolytes WNL. Cefepime held, given Baclofen -they improved  -Check CBC, coags/fibrinogen once daily.  Goal plt >40-50, fibrinogen >150; responding to ABO-matched platelets given over 3 hrs in 1/2 unit infusions   -monitor lytes daily, keep K>4, Mg>2.  EKG twice a week  -off antibiotics  -plan on BM bx around day 29 post GALA  -COVID19+ on 6/17, moved to 9

## 2020-06-19 NOTE — PROGRESS NOTE ADULT - ASSESSMENT
Patient is a 61 y/o Lao-speaking F w/ a PMHx of HTN, HLD, T2DM, and COVID-19 infection in 4/2020 who was admitted for management of newly diagnosed APL. The patient is currently receiving ATRA and Arsenic. Patients hospital course has been complicated by transaminitis, refractory thrombocytopenia, and she was found have a positive COVID-19 PCR on 6/16. The patient has anemia and thrombocytopenia 2/2 disease and/or ATRA/Arsenic.

## 2020-06-19 NOTE — PROGRESS NOTE ADULT - PROBLEM SELECTOR PLAN 4
New onset essential tremor which has improved with Baclofen 5mg q12h  Monitor progress Patient previously with c/o cough and SOB with stable O2 sat and concern for differentiation syndrome, now intermittent  6/5 Patient received one dose of Dexamethasone   CXR (-) for acute infiltrates and (+) atelectasis   Continue Incentive Spirometer   Lasix PRN  6/8 and 6/9 Hydrea 500mg x 1 given  6/7 CT chest with biapical pleural-parenchymal scarring with calcification, likely from prior granulomatous disease. No consolidation. (Son reports a history of TB in 1980s)  Nasal Cannula PRN  C/w Tessalon Perles and Robitussin PRN

## 2020-06-19 NOTE — PROGRESS NOTE ADULT - ASSESSMENT
61 yo Polish speaking F with PMHx of HTN, HLD, T2DM, COVID-19 infection admitted for management of newly diagnosed APL. The patient is currently reciving ATRA and Arsenic.  Patients hospital course has been complicated by transaminitis and refractory thrombocytopenia. The patient has pancytopenia 2/2 disease and/or ATRA/Arsenic. Course further c/b repeat covid PCR testing positive after interim multiple negative tests; now transferred to covid medicine unit.

## 2020-06-19 NOTE — PROGRESS NOTE ADULT - PROBLEM SELECTOR PLAN 2
The patient is not neutropenic, afebrile  If febrile, Pan Cx and CXR  Previous COVID-19 (+) outpatient. Then COVID (-) on 5/22, 5/26, 6/2 and 6/9.   On 6/16, patient w/ a cough. F/u CXR on 6/16 showed no acute pathology. COVID-19 PCR was repeated (routinely) on 6/16 and it returned positive. Patient now transferred to [facility]. Appreciate care as per Medicine team  - COVID-19 Ab sent, follow-up results  - Monitor CBC and fever curve Patient noted to have erythema and irritation near her RUE PICC line. PICC appears to be working appropriately per d/w nurse. Skin changes possibly 2/2 adhesive tape around PICC? Recommend changing the bandage around the PICC site.  IV Nurse to assess PICC site today. Follow-up recs  Continue to closely monitor

## 2020-06-20 LAB
ANION GAP SERPL CALC-SCNC: 13 MMOL/L — SIGNIFICANT CHANGE UP (ref 5–17)
APPEARANCE UR: CLEAR — SIGNIFICANT CHANGE UP
BILIRUB UR-MCNC: NEGATIVE — SIGNIFICANT CHANGE UP
BUN SERPL-MCNC: 19 MG/DL — SIGNIFICANT CHANGE UP (ref 7–23)
CALCIUM SERPL-MCNC: 9.8 MG/DL — SIGNIFICANT CHANGE UP (ref 8.4–10.5)
CHLORIDE SERPL-SCNC: 103 MMOL/L — SIGNIFICANT CHANGE UP (ref 96–108)
CO2 SERPL-SCNC: 24 MMOL/L — SIGNIFICANT CHANGE UP (ref 22–31)
COLOR SPEC: SIGNIFICANT CHANGE UP
CREAT SERPL-MCNC: 0.69 MG/DL — SIGNIFICANT CHANGE UP (ref 0.5–1.3)
DIFF PNL FLD: NEGATIVE — SIGNIFICANT CHANGE UP
GLUCOSE BLDC GLUCOMTR-MCNC: 112 MG/DL — HIGH (ref 70–99)
GLUCOSE BLDC GLUCOMTR-MCNC: 127 MG/DL — HIGH (ref 70–99)
GLUCOSE BLDC GLUCOMTR-MCNC: 128 MG/DL — HIGH (ref 70–99)
GLUCOSE BLDC GLUCOMTR-MCNC: 198 MG/DL — HIGH (ref 70–99)
GLUCOSE SERPL-MCNC: 136 MG/DL — HIGH (ref 70–99)
GLUCOSE UR QL: NEGATIVE — SIGNIFICANT CHANGE UP
HCT VFR BLD CALC: 21 % — CRITICAL LOW (ref 34.5–45)
HCT VFR BLD CALC: 22.5 % — LOW (ref 34.5–45)
HGB BLD-MCNC: 6.9 G/DL — CRITICAL LOW (ref 11.5–15.5)
HGB BLD-MCNC: 7.3 G/DL — LOW (ref 11.5–15.5)
KETONES UR-MCNC: NEGATIVE — SIGNIFICANT CHANGE UP
LEUKOCYTE ESTERASE UR-ACNC: NEGATIVE — SIGNIFICANT CHANGE UP
MCHC RBC-ENTMCNC: 28 PG — SIGNIFICANT CHANGE UP (ref 27–34)
MCHC RBC-ENTMCNC: 28.4 PG — SIGNIFICANT CHANGE UP (ref 27–34)
MCHC RBC-ENTMCNC: 32.4 GM/DL — SIGNIFICANT CHANGE UP (ref 32–36)
MCHC RBC-ENTMCNC: 32.9 GM/DL — SIGNIFICANT CHANGE UP (ref 32–36)
MCV RBC AUTO: 86.2 FL — SIGNIFICANT CHANGE UP (ref 80–100)
MCV RBC AUTO: 86.4 FL — SIGNIFICANT CHANGE UP (ref 80–100)
NITRITE UR-MCNC: NEGATIVE — SIGNIFICANT CHANGE UP
NRBC # BLD: 0 /100 WBCS — SIGNIFICANT CHANGE UP (ref 0–0)
NRBC # BLD: 0 /100 WBCS — SIGNIFICANT CHANGE UP (ref 0–0)
PH UR: 6 — SIGNIFICANT CHANGE UP (ref 5–8)
PLATELET # BLD AUTO: 35 K/UL — LOW (ref 150–400)
PLATELET # BLD AUTO: 42 K/UL — LOW (ref 150–400)
PLATELET # BLD AUTO: 42 K/UL — LOW (ref 150–400)
POTASSIUM SERPL-MCNC: 3.8 MMOL/L — SIGNIFICANT CHANGE UP (ref 3.5–5.3)
POTASSIUM SERPL-SCNC: 3.8 MMOL/L — SIGNIFICANT CHANGE UP (ref 3.5–5.3)
PROT UR-MCNC: ABNORMAL
RBC # BLD: 2.43 M/UL — LOW (ref 3.8–5.2)
RBC # BLD: 2.61 M/UL — LOW (ref 3.8–5.2)
RBC # FLD: 15.1 % — HIGH (ref 10.3–14.5)
RBC # FLD: 15.2 % — HIGH (ref 10.3–14.5)
SARS-COV-2 RNA SPEC QL NAA+PROBE: SIGNIFICANT CHANGE UP
SODIUM SERPL-SCNC: 140 MMOL/L — SIGNIFICANT CHANGE UP (ref 135–145)
SP GR SPEC: 1.01 — SIGNIFICANT CHANGE UP (ref 1.01–1.02)
UROBILINOGEN FLD QL: NEGATIVE — SIGNIFICANT CHANGE UP
WBC # BLD: 3.63 K/UL — LOW (ref 3.8–10.5)
WBC # BLD: 4.03 K/UL — SIGNIFICANT CHANGE UP (ref 3.8–10.5)
WBC # FLD AUTO: 3.63 K/UL — LOW (ref 3.8–10.5)
WBC # FLD AUTO: 4.03 K/UL — SIGNIFICANT CHANGE UP (ref 3.8–10.5)

## 2020-06-20 PROCEDURE — 76882 US LMTD JT/FCL EVL NVASC XTR: CPT | Mod: 26,RT

## 2020-06-20 PROCEDURE — 71045 X-RAY EXAM CHEST 1 VIEW: CPT | Mod: 26

## 2020-06-20 PROCEDURE — 99233 SBSQ HOSP IP/OBS HIGH 50: CPT

## 2020-06-20 RX ORDER — VANCOMYCIN HCL 1 G
VIAL (EA) INTRAVENOUS
Refills: 0 | Status: DISCONTINUED | OUTPATIENT
Start: 2020-06-20 | End: 2020-06-24

## 2020-06-20 RX ORDER — POTASSIUM CHLORIDE 20 MEQ
40 PACKET (EA) ORAL ONCE
Refills: 0 | Status: COMPLETED | OUTPATIENT
Start: 2020-06-20 | End: 2020-06-20

## 2020-06-20 RX ORDER — VANCOMYCIN HCL 1 G
1000 VIAL (EA) INTRAVENOUS EVERY 12 HOURS
Refills: 0 | Status: DISCONTINUED | OUTPATIENT
Start: 2020-06-21 | End: 2020-06-24

## 2020-06-20 RX ORDER — VANCOMYCIN HCL 1 G
1000 VIAL (EA) INTRAVENOUS ONCE
Refills: 0 | Status: COMPLETED | OUTPATIENT
Start: 2020-06-20 | End: 2020-06-20

## 2020-06-20 RX ORDER — ACETAMINOPHEN 500 MG
650 TABLET ORAL ONCE
Refills: 0 | Status: COMPLETED | OUTPATIENT
Start: 2020-06-20 | End: 2020-06-20

## 2020-06-20 RX ADMIN — Medication 100 MILLIGRAM(S): at 22:25

## 2020-06-20 RX ADMIN — Medication 5 MILLILITER(S): at 12:26

## 2020-06-20 RX ADMIN — Medication 5 MILLIGRAM(S): at 16:49

## 2020-06-20 RX ADMIN — ARSENIC TRIOXIDE 130.5 MILLIGRAM(S): 2 INJECTION, SOLUTION INTRAVENOUS at 13:42

## 2020-06-20 RX ADMIN — TRETINOIN 40 MILLIGRAM(S): 10 CAPSULE, LIQUID FILLED ORAL at 22:24

## 2020-06-20 RX ADMIN — Medication 1 APPLICATION(S): at 13:10

## 2020-06-20 RX ADMIN — Medication 40 MILLIEQUIVALENT(S): at 16:44

## 2020-06-20 RX ADMIN — Medication 5 MILLIGRAM(S): at 05:09

## 2020-06-20 RX ADMIN — Medication 1: at 12:27

## 2020-06-20 RX ADMIN — ONDANSETRON 8 MILLIGRAM(S): 8 TABLET, FILM COATED ORAL at 08:17

## 2020-06-20 RX ADMIN — AER TRAVELER 1 APPLICATION(S): 0.5 SOLUTION RECTAL; TOPICAL at 05:10

## 2020-06-20 RX ADMIN — Medication 1 APPLICATION(S): at 22:26

## 2020-06-20 RX ADMIN — ATENOLOL 25 MILLIGRAM(S): 25 TABLET ORAL at 05:08

## 2020-06-20 RX ADMIN — Medication 650 MILLIGRAM(S): at 14:46

## 2020-06-20 RX ADMIN — ONDANSETRON 8 MILLIGRAM(S): 8 TABLET, FILM COATED ORAL at 16:49

## 2020-06-20 RX ADMIN — Medication 5 MILLILITER(S): at 16:42

## 2020-06-20 RX ADMIN — Medication 1 APPLICATION(S): at 05:10

## 2020-06-20 RX ADMIN — Medication 250 MILLIGRAM(S): at 16:45

## 2020-06-20 RX ADMIN — TRETINOIN 40 MILLIGRAM(S): 10 CAPSULE, LIQUID FILLED ORAL at 08:19

## 2020-06-20 RX ADMIN — PANTOPRAZOLE SODIUM 40 MILLIGRAM(S): 20 TABLET, DELAYED RELEASE ORAL at 05:08

## 2020-06-20 NOTE — PROGRESS NOTE ADULT - PROBLEM SELECTOR PLAN 6
Home regimen with Atenolol held on admission  Restarted at 25mg PO daily for increased BP  Monitor BP and adjust medications as needed 5/23: HgA1c = 7.0  C/w HISS. FS are currently stable. Monitor FS AC & QHS   C/w Consistent Carbohydrate diet w/ Evening snack

## 2020-06-20 NOTE — CHART NOTE - NSCHARTNOTEFT_GEN_A_CORE
Medicine NP note    Notified by RN that resistance met upon flushing picc line port with no blood return. Evalauted the pta t bedside and examined the site. Right Ue picc , flushing with no resistance, good blood return now  CXR with PICC in place  Rt UE 2+ swelling and erythema around picc site, per RN swelling is improving  Consider Doppler Rt UE to R/O DVT if swelling worsening/vascular compromise  Will follow    Jackson Corrigan P BC  [id number]

## 2020-06-20 NOTE — PROGRESS NOTE ADULT - PROBLEM SELECTOR PLAN 7
5/23: HgA1c = 7.0  C/w HISS. FS are currently stable. Monitor FS AC & QHS   C/w Consistent Carbohydrate diet w/ Evening snack DVT PPx: Holding pharmacologic AC 2/2 thrombocytopenia  Diet: Consistent Carb w/ Evening Snack  Dispo: Pending resolution of acute medical issues    Contact Information [phone]

## 2020-06-20 NOTE — PROGRESS NOTE ADULT - ASSESSMENT
61 yo Upper sorbian speaking F with PMHx of HTN, HLD, T2DM, COVID-19 infection admitted for management of newly diagnosed APL. The patient is currently reciving ATRA and Arsenic.  Patients hospital course has been complicated by transaminitis and refractory thrombocytopenia. The patient has pancytopenia 2/2 disease and/or ATRA/Arsenic. Course further c/b repeat covid PCR testing positive after interim multiple negative tests; now transferred to covid medicine unit. 59 yo Icelandic speaking F with PMHx of HTN, HLD, T2DM, COVID-19 infection admitted for management of newly diagnosed APL. The patient is currently reciving ATRA and Arsenic.  Patients hospital course has been complicated by transaminitis and refractory thrombocytopenia. The patient has pancytopenia 2/2 disease and/or ATRA/Arsenic. Course further c/b repeat covid PCR testing positive after interim multiple negative tests; now transferred to covid medicine unit but repeat COVID test is negative x1

## 2020-06-20 NOTE — PROGRESS NOTE ADULT - ATTENDING COMMENTS
INCOMPLETE NOTE d/w floor NP   d/w patient's son  If PICC line becomes painful; fever spike then will do blood culture and start on vancomycin   give K to keep k above 4.  EKG 4/19 with QTC of 427

## 2020-06-20 NOTE — PROGRESS NOTE ADULT - PROBLEM SELECTOR PLAN 7
-Hold oral hypoglycemics  -C/w HISS, check fsg qac/qhs  -a1c 7.0  -consistent carb diet -Hold oral hypoglycemics  -C/w HISS,  fsg between 100-150  -a1c 7.0  -consistent carb diet

## 2020-06-20 NOTE — PROGRESS NOTE ADULT - PROBLEM SELECTOR PLAN 2
Patient noted to have erythema and irritation near her RUE PICC line. PICC appears to be working appropriately per d/w nurse. Skin changes possibly 2/2 adhesive tape around PICC? Recommend changing the bandage around the PICC site.  IV Nurse to assess PICC site today. Follow-up recs  Continue to closely monitor The patient is not neutropenic, afebrile  If febrile, Pan Cx and CXR  Previous COVID-19 (+) outpatient. Then COVID (-) on 5/22, 5/26, 6/2 and 6/9.   On 6/16, patient w/ a cough. F/u CXR on 6/16 showed no acute pathology. COVID-19 PCR was repeated (routinely) on 6/16 and it returned positive. Patient now transferred to [facility]. Appreciate care as per Medicine team  - COVID-19 Abs noted to be positive on 6/18.  Patient will need 2 consecutive negative PCRs prior to transfer back to [facility].  - Monitor CBC and fever curve

## 2020-06-20 NOTE — PROGRESS NOTE ADULT - PROBLEM SELECTOR PLAN 9
DVT PPx: Holding pharmacologic AC 2/2 thrombocytopenia  Diet: Consistent Carb w/ Evening Snack  Dispo: Pending resolution of acute medical issues    Contact Information [phone]

## 2020-06-20 NOTE — ADVANCED PRACTICE NURSE CONSULT - ASSESSMENT
Patient received in bed, alert and oriented x 3, speaks Slovak, capable of expressing all needs to staff.  Day ,Height and weight verified.  Consent in chart. Lab results as per Md courtney aware of same. Vital signs stable prior to chemotherapy and  within acceptable parameters, see sunrise. Pt educated on the importance of saving urine, verbalizes good understanding. Pt education done re chemo regimen, drug effects and potential side effects, written materials provided, pt states understanding. Reports that he/she has been tolerating chemotherapy well without side effects. Pt with  line, site free from signs and symptoms of infection, good blood return obtained. Pre- medicated with Patient received in bed, alert and oriented x 3, speaks Irish, capable of expressing basic needs to staff. Height and weight verified.  Consent in chart. Lab results as per Md courtney aware of same. Vital signs stable prior to chemotherapy and  within acceptable parameters, see sunrise. Ptre- educated on the importance of saving urine, verbalizes good understanding. Pt re-education done re chemo regimen, drug effects and potential side effects, pt states understanding. Reports that she has been tolerating chemotherapy well without side effects. Pt with  right upper arm PICC line, site reddened, flushed without difficulty with good blood return. Pre- medicated with Patient received in bed, alert and oriented x 3, speaks Luxembourgish, capable of expressing basic needs to staff. Height and weight verified.  Consent in chart. Lab results as per Md courtney aware of same. Vital signs stable prior to chemotherapy and  within acceptable parameters, see sunrise. Ptre- educated on the importance of saving urine, verbalizes good understanding. Pt re-education done re chemo regimen, drug effects and potential side effects, pt states understanding. Reports that she has been tolerating chemotherapy well without side effects. Pt with  right upper arm PICC line, site slightly swollen, flushed without difficulty with good blood return. Arsenic 0.15mg/kg=11mg IVSS over 2 hours, same initiated at 1350 however at 1415 patient developed rigors, NP Tori informed, Arsenic held x 1 hour Patient received in bed, alert and oriented x 3, speaks Belarusian, capable of expressing basic needs to staff. Height and weight verified.  Consent in chart. Lab results as per Md courtney aware of same. Vital signs stable prior to chemotherapy and  within acceptable parameters, see sunrise. Ptre- educated on the importance of saving urine, verbalizes good understanding. Pt re-education done re chemo regimen, drug effects and potential side effects, pt states understanding. Reports that she has been tolerating chemotherapy well without side effects. Pt with  right upper arm PICC line, site slightly swollen, flushed without difficulty with good blood return. Arsenic 0.15mg/kg=11mg IVSS over 2 hours, same initiated at 1350 however at 1415 patient developed rigors, NP Tori informed, Arsenic held x 1 hour  for site and line evaluation. Vital signs T96.7, P92, RR20, B/p 142/73 & sat 100 % on RA. Blood and urine culture sent, Tylenol given. Writer started new  peripheral  line to left antecubital area, Arsenic completed with the use of peripheral line, site free from signs and symptoms of adverse reactions.

## 2020-06-20 NOTE — CHART NOTE - NSCHARTNOTEFT_GEN_A_CORE
Called to see pt for rigors , at bedside pt sitting in chair with shaking chills and chemo infusing via rt PICC line. PICC line site redness with indurated areas.  Denies chest pain, sob, palpitations and dizziness. Chemotherapy infusion paused for site and line evaluation. Vital signs T96.7, P92, RR20, B/p 142/73 & sat 100 % on RA. Blood and urine culture sent, Tylenol given and peripheral iv line inserted. Chemo nurse called and attended infusion completed via PIV, Vancomycin and US ordered, PICC line also called for line evaluation. Patient feeling and rigors subsided, spoke to pt son about the event. Dr. Rasmussen aware and agreed with plan of care. Called to see pt for rigors , at bedside pt sitting in chair with shaking chills and chemo infusing via rt PICC line. PICC line site redness with indurated areas.  Denies chest pain, sob, palpitations and dizziness. Chemotherapy infusion paused for site and line evaluation. Vital signs T96.7, P92, RR20, B/p 142/73 & sat 100 % on RA. Blood and urine culture sent, Tylenol given and peripheral iv line inserted. Chemo nurse called and attended infusion completed via PIV, Vancomycin and US ordered, PICC line also called for line evaluation. Patient feeling and rigors subsided, spoke to pt son about the event. Dr. Au aware and agreed with plan of care.

## 2020-06-20 NOTE — PROGRESS NOTE ADULT - ATTENDING COMMENTS
61 y/o Estonian speaking F who is transferred for newly diagnosed low/interm risk APL.  Presented with pancytopenia, flow cytometry/bone marrow biopsy/FISH from 5/26- consistent with acute promyelocytic leukemia with PML-AWA translocation.    She was started on ATRA 5/23- day +28, GALA 5/27- day +24.    -CT chest 6/6 c/w granulomatous disease (hx of TB in 1980's) pt had SOB on 6/4, now resolved.  Daily weights, diurese. Will give Hydrea 500mg if wbc>10. On Prednisone 20mg through 5/12- now off.  If pt becomes SOB again, may restart  -tremors improved, unclear etiology, electrolytes WNL. Cefepime held, given Baclofen -they improved  -Check CBC, coags/fibrinogen once daily.  Goal plt >40-50, fibrinogen >150; responding to ABO-matched platelets given over 3 hrs in 1/2 unit infusions   -monitor lytes daily, keep K>4, Mg>2.  EKG twice a week  -off antibiotics  -plan on BM bx around day 29 post GALA  -COVID19+ on 6/17, moved to 9

## 2020-06-20 NOTE — PROGRESS NOTE ADULT - PROBLEM SELECTOR PLAN 5
New onset essential tremor which has improved with Baclofen 5mg q12h  Continue to monitor Home regimen with Atenolol held on admission  Restarted at 25mg PO daily for increased BP  Monitor BP and adjust medications as needed

## 2020-06-20 NOTE — PROGRESS NOTE ADULT - PROBLEM SELECTOR PLAN 4
Patient previously with c/o cough and SOB with stable O2 sat and concern for differentiation syndrome, now intermittent  6/5 Patient received one dose of Dexamethasone   CXR (-) for acute infiltrates and (+) atelectasis   Continue Incentive Spirometer   Lasix PRN  6/8 and 6/9 Hydrea 500mg x 1 given  6/7 CT chest with biapical pleural-parenchymal scarring with calcification, likely from prior granulomatous disease. No consolidation. (Son reports a history of TB in 1980s)  Nasal Cannula PRN  C/w Tessalon Perles and Robitussin PRN New onset essential tremor which has improved with Baclofen 5mg q12h  Continue to monitor

## 2020-06-20 NOTE — PROVIDER CONTACT NOTE (OTHER) - ASSESSMENT
pt vss, see flow sheet, rigors noted, no sob, right arm picc with increase swelling and redness, pt c/o discomfort to right shoulder down to elbow. Hard to touch to upper picc site and around picc site. picc team aware and will assess, Viridiana Np at bedside. pt vss, see flow sheet, rigors noted, no sob, right arm picc with increase swelling and redness, pt c/o discomfort to right shoulder down to elbow. Hard to touch to upper picc site and around picc site. picc team aware and will assess, Viridiana Gregory Np at bedside.

## 2020-06-20 NOTE — PROGRESS NOTE ADULT - PROBLEM SELECTOR PLAN 3
-monitor counts as noted; hgb relatively stable, some concern over slow GI losses in addition to chemo effect, c/w ppi + preparation H.   -ongoing thrombocytopenia noted; goal 40-50, cont to trend cbc and monitor for clinical bleeding. may require repeat steroids. - likely due to chemo   -monitor counts as noted; hgb relatively stable,   -ongoing thrombocytopenia noted and she is getting platelets today    - monitor CBC

## 2020-06-20 NOTE — PROGRESS NOTE ADULT - PROBLEM SELECTOR PLAN 3
The patient is not neutropenic, afebrile  If febrile, Pan Cx and CXR  Previous COVID-19 (+) outpatient. Then COVID (-) on 5/22, 5/26, 6/2 and 6/9.   On 6/16, patient w/ a cough. F/u CXR on 6/16 showed no acute pathology. COVID-19 PCR was repeated (routinely) on 6/16 and it returned positive. Patient now transferred to [facility]. Appreciate care as per Medicine team  - COVID-19 Abs noted to be positive on 6/18. Agree with repeating COVID-19 PCR. Patient will need 2 consecutive negative PCRs prior to transfer back to [facility].  - Monitor CBC and fever curve Patient previously with c/o cough and SOB with stable O2 sat and concern for differentiation syndrome, now intermittent  6/5 Patient received one dose of Dexamethasone   CXR (-) for acute infiltrates and (+) atelectasis   Continue Incentive Spirometer   Lasix PRN  6/8 and 6/9 Hydrea 500mg x 1 given  6/7 CT chest with biapical pleural-parenchymal scarring with calcification, likely from prior granulomatous disease. No consolidation. (Son reports a history of TB in 1980s)  Nasal Cannula PRN  C/w Tessalon Perles and Robitussin PRN

## 2020-06-20 NOTE — PROGRESS NOTE ADULT - PROBLEM SELECTOR PLAN 4
-likely multifactorial; CT reviewed evidence of prior TB related changes, no acute infiltrate, most recent CXR 6/16 with clear lungs. +atelectasis, c/w incentive spirometer  -c/w inhaler given covid+ status -likely multifactorial; CT reviewed evidence of prior TB related changes, no acute infiltrate, most recent CXR 6/20 with clear lungs. +atelectasis, c/w incentive spirometer  -c/w inhaler given covid+ status

## 2020-06-20 NOTE — PROGRESS NOTE ADULT - SUBJECTIVE AND OBJECTIVE BOX
Diagnosis: Acute Promyelocytic leukemia     Protocol/Chemo Regimen: ATRA/Arsenic     Day: ATRA Day 29  and Arsenic Day 25    Patient Pashto-speaking refusing  service. Patient's son called by patient    Patient endorses: Patient reports a lingering non-productive cough which is stable. She also endorses redness and irritation near her RUE PICC line. No complaints of chest pain or shortness of breath currently.    Review of Systems: Patient denies headache, chest pain, shortness of breath, nausea, vomiting, or abdominal pain.    Pain scale: 0-1    Diet: Consistent Carb w/ Evening Snack  Allergies    No Known Allergies    Intolerances        ANTIMICROBIALS      HEME/ONC MEDICATIONS  arsenic trioxide IVPB (eMAR) 11 milliGRAM(s) IV Intermittent every 24 hours  tretinoin 40 milliGRAM(s) Oral every 12 hours      STANDING MEDICATIONS  ATENolol  Tablet 25 milliGRAM(s) Oral daily  baclofen 5 milliGRAM(s) Oral every 12 hours  benzonatate 100 milliGRAM(s) Oral every 8 hours  Biotene Dry Mouth Oral Rinse 5 milliLiter(s) Swish and Spit five times a day  bisacodyl 5 milliGRAM(s) Oral every 12 hours  dextrose 5%. 1000 milliLiter(s) IV Continuous <Continuous>  dextrose 50% Injectable 12.5 Gram(s) IV Push once  dextrose 50% Injectable 25 Gram(s) IV Push once  dextrose 50% Injectable 25 Gram(s) IV Push once  hydrocortisone 2.5% Rectal Cream 1 Application(s) Rectal every 8 hours  insulin lispro (HumaLOG) corrective regimen sliding scale   SubCutaneous three times a day before meals  insulin lispro (HumaLOG) corrective regimen sliding scale   SubCutaneous at bedtime  pantoprazole    Tablet 40 milliGRAM(s) Oral before breakfast  petrolatum white Ointment 1 Application(s) Topical three times a day  polyethylene glycol 3350 17 Gram(s) Oral daily  sodium chloride 0.65% Nasal 1 Spray(s) Both Nostrils every 8 hours  sodium chloride 0.9%. 1000 milliLiter(s) IV Continuous <Continuous>  witch hazel Pads 1 Application(s) Topical every 12 hours      PRN MEDICATIONS  acetaminophen   Tablet .. 650 milliGRAM(s) Oral every 6 hours PRN  ALBUTerol    90 MICROgram(s) HFA Inhaler 2 Puff(s) Inhalation every 6 hours PRN  AQUAPHOR (petrolatum Ointment) 1 Application(s) Topical every 3 hours PRN  dextrose 40% Gel 15 Gram(s) Oral once PRN  diphenhydrAMINE   Injectable 25 milliGRAM(s) IV Push every 6 hours PRN  glucagon  Injectable 1 milliGRAM(s) IntraMuscular once PRN  guaiFENesin   Syrup  (Sugar-Free) 100 milliGRAM(s) Oral every 6 hours PRN  hemorrhoidal Ointment 1 Application(s) Rectal every 8 hours PRN  ondansetron Injectable 8 milliGRAM(s) IV Push every 8 hours PRN  sodium chloride 0.9% lock flush 10 milliLiter(s) IV Push every 1 hour PRN        Vital Signs Last 24 Hrs  T(C): 36.7 (20 Jun 2020 05:07), Max: 36.9 (19 Jun 2020 13:30)  T(F): 98 (20 Jun 2020 05:07), Max: 98.5 (19 Jun 2020 13:30)  HR: 79 (20 Jun 2020 05:07) (70 - 83)  BP: 127/69 (20 Jun 2020 05:07) (116/76 - 134/73)  BP(mean): --  RR: 19 (20 Jun 2020 05:07) (18 - 19)  SpO2: 96% (20 Jun 2020 05:07) (95% - 97%)     PHYSICAL EXAM:  Constitutional: NAD  HEENT: NC/AT, MMM  Neck: Supple  Respiratory: Grossly CTAB; No wheeze appreciated  Cardiovascular: RRR; +S1/S2  Gastrointestinal: +BS, Soft, NT, No rigidity  Extremities: No LE edema, + RUE PICC line  Neurological: Awake and alert, Answering questions and following commands appropriately  Skin: Warm and dry, Erythema noted around RUE PICC line without significant TTP  Psychiatric: Calm and cooperative    RECENT CULTURES:        LABS:                        7.3    4.03  )-----------( 42       ( 20 Jun 2020 05:31 )             22.5         Mean Cell Volume : 86.2 fl  Mean Cell Hemoglobin : 28.0 pg  Mean Cell Hemoglobin Concentration : 32.4 gm/dL  Auto Neutrophil # : x  Auto Lymphocyte # : x  Auto Monocyte # : x  Auto Eosinophil # : x  Auto Basophil # : x  Auto Neutrophil % : x  Auto Lymphocyte % : x  Auto Monocyte % : x  Auto Eosinophil % : x  Auto Basophil % : x      06-20    140  |  103  |  19  ----------------------------<  136<H>  3.8   |  24  |  0.69    Ca    9.8      20 Jun 2020 05:31  Phos  4.9     06-19  Mg     2.0     06-19    TPro  8.4<H>  /  Alb  4.7  /  TBili  0.6  /  DBili  x   /  AST  31  /  ALT  42  /  AlkPhos  123<H>  06-19          PT/INR - ( 19 Jun 2020 07:09 )   PT: 13.1 sec;   INR: 1.13 ratio         PTT - ( 19 Jun 2020 07:09 )  PTT:28.5 sec        RADIOLOGY & ADDITIONAL STUDIES: Diagnosis: Acute Promyelocytic leukemia     Protocol/Chemo Regimen: ATRA/Arsenic     Day: ATRA Day 29  and Arsenic Day 25    Review of Systems: Patient denies headache, chest pain, shortness of breath, nausea, vomiting, or abdominal pain.    Pain scale: 0-1    Diet: Consistent Carb w/ Evening Snack  Allergies    No Known Allergies    Intolerances        ANTIMICROBIALS      HEME/ONC MEDICATIONS  arsenic trioxide IVPB (eMAR) 11 milliGRAM(s) IV Intermittent every 24 hours  tretinoin 40 milliGRAM(s) Oral every 12 hours      STANDING MEDICATIONS  ATENolol  Tablet 25 milliGRAM(s) Oral daily  baclofen 5 milliGRAM(s) Oral every 12 hours  benzonatate 100 milliGRAM(s) Oral every 8 hours  Biotene Dry Mouth Oral Rinse 5 milliLiter(s) Swish and Spit five times a day  bisacodyl 5 milliGRAM(s) Oral every 12 hours  dextrose 5%. 1000 milliLiter(s) IV Continuous <Continuous>  dextrose 50% Injectable 12.5 Gram(s) IV Push once  dextrose 50% Injectable 25 Gram(s) IV Push once  dextrose 50% Injectable 25 Gram(s) IV Push once  hydrocortisone 2.5% Rectal Cream 1 Application(s) Rectal every 8 hours  insulin lispro (HumaLOG) corrective regimen sliding scale   SubCutaneous three times a day before meals  insulin lispro (HumaLOG) corrective regimen sliding scale   SubCutaneous at bedtime  pantoprazole    Tablet 40 milliGRAM(s) Oral before breakfast  petrolatum white Ointment 1 Application(s) Topical three times a day  polyethylene glycol 3350 17 Gram(s) Oral daily  sodium chloride 0.65% Nasal 1 Spray(s) Both Nostrils every 8 hours  sodium chloride 0.9%. 1000 milliLiter(s) IV Continuous <Continuous>  witch hazel Pads 1 Application(s) Topical every 12 hours      PRN MEDICATIONS  acetaminophen   Tablet .. 650 milliGRAM(s) Oral every 6 hours PRN  ALBUTerol    90 MICROgram(s) HFA Inhaler 2 Puff(s) Inhalation every 6 hours PRN  AQUAPHOR (petrolatum Ointment) 1 Application(s) Topical every 3 hours PRN  dextrose 40% Gel 15 Gram(s) Oral once PRN  diphenhydrAMINE   Injectable 25 milliGRAM(s) IV Push every 6 hours PRN  glucagon  Injectable 1 milliGRAM(s) IntraMuscular once PRN  guaiFENesin   Syrup  (Sugar-Free) 100 milliGRAM(s) Oral every 6 hours PRN  hemorrhoidal Ointment 1 Application(s) Rectal every 8 hours PRN  ondansetron Injectable 8 milliGRAM(s) IV Push every 8 hours PRN  sodium chloride 0.9% lock flush 10 milliLiter(s) IV Push every 1 hour PRN        Vital Signs Last 24 Hrs  T(C): 36.7 (20 Jun 2020 05:07), Max: 36.9 (19 Jun 2020 13:30)  T(F): 98 (20 Jun 2020 05:07), Max: 98.5 (19 Jun 2020 13:30)  HR: 79 (20 Jun 2020 05:07) (70 - 83)  BP: 127/69 (20 Jun 2020 05:07) (116/76 - 134/73)  BP(mean): --  RR: 19 (20 Jun 2020 05:07) (18 - 19)  SpO2: 96% (20 Jun 2020 05:07) (95% - 97%)     PHYSICAL EXAM:  Constitutional: NAD  HEENT: NC/AT, MMM  Respiratory: Grossly CTAB; No wheeze appreciated  Cardiovascular: RRR; +S1/S2  Gastrointestinal: +BS, Soft, NT, No rigidity  Extremities: No LE edema, + RUE PICC line  Neurological: Awake and alert, Answering questions and following commands appropriately  Skin: Warm and dry, Erythema noted around RUE PICC line without significant TTP  Psychiatric: Calm and cooperative    RECENT CULTURES:        LABS:                        7.3    4.03  )-----------( 42       ( 20 Jun 2020 05:31 )             22.5         Mean Cell Volume : 86.2 fl  Mean Cell Hemoglobin : 28.0 pg  Mean Cell Hemoglobin Concentration : 32.4 gm/dL  Auto Neutrophil # : x  Auto Lymphocyte # : x  Auto Monocyte # : x  Auto Eosinophil # : x  Auto Basophil # : x  Auto Neutrophil % : x  Auto Lymphocyte % : x  Auto Monocyte % : x  Auto Eosinophil % : x  Auto Basophil % : x      06-20    140  |  103  |  19  ----------------------------<  136<H>  3.8   |  24  |  0.69    Ca    9.8      20 Jun 2020 05:31  Phos  4.9     06-19  Mg     2.0     06-19    TPro  8.4<H>  /  Alb  4.7  /  TBili  0.6  /  DBili  x   /  AST  31  /  ALT  42  /  AlkPhos  123<H>  06-19          PT/INR - ( 19 Jun 2020 07:09 )   PT: 13.1 sec;   INR: 1.13 ratio         PTT - ( 19 Jun 2020 07:09 )  PTT:28.5 sec        RADIOLOGY & ADDITIONAL STUDIES: Diagnosis: Acute Promyelocytic leukemia     Protocol/Chemo Regimen: ATRA/Arsenic     Day: ATRA Day 29  and Arsenic Day 25    Review of Systems: Patient denies headache, chest pain, shortness of breath, nausea, vomiting, or abdominal pain.    Pain scale: 0-1    Diet: Consistent Carb w/ Evening Snack  Allergies    No Known Allergies    Intolerances        ANTIMICROBIALS      HEME/ONC MEDICATIONS  arsenic trioxide IVPB (eMAR) 11 milliGRAM(s) IV Intermittent every 24 hours  tretinoin 40 milliGRAM(s) Oral every 12 hours      STANDING MEDICATIONS  ATENolol  Tablet 25 milliGRAM(s) Oral daily  baclofen 5 milliGRAM(s) Oral every 12 hours  benzonatate 100 milliGRAM(s) Oral every 8 hours  Biotene Dry Mouth Oral Rinse 5 milliLiter(s) Swish and Spit five times a day  bisacodyl 5 milliGRAM(s) Oral every 12 hours  dextrose 5%. 1000 milliLiter(s) IV Continuous <Continuous>  dextrose 50% Injectable 12.5 Gram(s) IV Push once  dextrose 50% Injectable 25 Gram(s) IV Push once  dextrose 50% Injectable 25 Gram(s) IV Push once  hydrocortisone 2.5% Rectal Cream 1 Application(s) Rectal every 8 hours  insulin lispro (HumaLOG) corrective regimen sliding scale   SubCutaneous three times a day before meals  insulin lispro (HumaLOG) corrective regimen sliding scale   SubCutaneous at bedtime  pantoprazole    Tablet 40 milliGRAM(s) Oral before breakfast  petrolatum white Ointment 1 Application(s) Topical three times a day  polyethylene glycol 3350 17 Gram(s) Oral daily  sodium chloride 0.65% Nasal 1 Spray(s) Both Nostrils every 8 hours  sodium chloride 0.9%. 1000 milliLiter(s) IV Continuous <Continuous>  witch hazel Pads 1 Application(s) Topical every 12 hours      PRN MEDICATIONS  acetaminophen   Tablet .. 650 milliGRAM(s) Oral every 6 hours PRN  ALBUTerol    90 MICROgram(s) HFA Inhaler 2 Puff(s) Inhalation every 6 hours PRN  AQUAPHOR (petrolatum Ointment) 1 Application(s) Topical every 3 hours PRN  dextrose 40% Gel 15 Gram(s) Oral once PRN  diphenhydrAMINE   Injectable 25 milliGRAM(s) IV Push every 6 hours PRN  glucagon  Injectable 1 milliGRAM(s) IntraMuscular once PRN  guaiFENesin   Syrup  (Sugar-Free) 100 milliGRAM(s) Oral every 6 hours PRN  hemorrhoidal Ointment 1 Application(s) Rectal every 8 hours PRN  ondansetron Injectable 8 milliGRAM(s) IV Push every 8 hours PRN  sodium chloride 0.9% lock flush 10 milliLiter(s) IV Push every 1 hour PRN        Vital Signs Last 24 Hrs  T(C): 36.7 (20 Jun 2020 05:07), Max: 36.9 (19 Jun 2020 13:30)  T(F): 98 (20 Jun 2020 05:07), Max: 98.5 (19 Jun 2020 13:30)  HR: 79 (20 Jun 2020 05:07) (70 - 83)  BP: 127/69 (20 Jun 2020 05:07) (116/76 - 134/73)  BP(mean): --  RR: 19 (20 Jun 2020 05:07) (18 - 19)  SpO2: 96% (20 Jun 2020 05:07) (95% - 97%)     PHYSICAL EXAM: Per Primary team      RECENT CULTURES:        LABS:                        7.3    4.03  )-----------( 42       ( 20 Jun 2020 05:31 )             22.5         Mean Cell Volume : 86.2 fl  Mean Cell Hemoglobin : 28.0 pg  Mean Cell Hemoglobin Concentration : 32.4 gm/dL  Auto Neutrophil # : x  Auto Lymphocyte # : x  Auto Monocyte # : x  Auto Eosinophil # : x  Auto Basophil # : x  Auto Neutrophil % : x  Auto Lymphocyte % : x  Auto Monocyte % : x  Auto Eosinophil % : x  Auto Basophil % : x      06-20    140  |  103  |  19  ----------------------------<  136<H>  3.8   |  24  |  0.69    Ca    9.8      20 Jun 2020 05:31  Phos  4.9     06-19  Mg     2.0     06-19    TPro  8.4<H>  /  Alb  4.7  /  TBili  0.6  /  DBili  x   /  AST  31  /  ALT  42  /  AlkPhos  123<H>  06-19          PT/INR - ( 19 Jun 2020 07:09 )   PT: 13.1 sec;   INR: 1.13 ratio         PTT - ( 19 Jun 2020 07:09 )  PTT:28.5 sec        RADIOLOGY & ADDITIONAL STUDIES:

## 2020-06-20 NOTE — PROGRESS NOTE ADULT - PROBLEM SELECTOR PLAN 1
Continue ATRA 40mg BID (started 5/22) and Arsenic Trioxide daily (started on 5/27)  Monitor for differentiation syndrome and check EKG for QTc prolongation every Tuesday and Friday. Patient is due for an EKG today. Keep K > 4 and Mg > 2.  Status post Hydrea 500mg PO x 1 on 6/8 and 6/9 for WBC >10  Follow up CBC with diff, CMP, TLS and DIC panel daily  Keep PLTs >50 K. If unable to achieve goal, continue with platelets infusion (single donor) 1/2 units over 3 hours every 12 hours. PLT count = 46k today. Please transfuse 1/2 unit over 3hrs and check a post-transfusion CBC.  S/p Prednisone 20mg PO daily x 5 days (through 6/12) due to platelet antibody.  Keep Fibrinogen >150, if less give Cryo  Strict Is and Os/Daily weights/Mouth Care Continue ATRA 40mg BID (started 5/22) and Arsenic Trioxide daily (started on 5/27)  Monitor for differentiation syndrome and check EKG for QTc prolongation every Tuesday and Friday. Keep K > 4 and Mg > 2.  hypokalemia-replace K today.   Status post Hydrea 500mg PO x 1 on 6/8 and 6/9 for WBC >10  Follow up CBC with diff, CMP, TLS and DIC panel daily  Keep PLTs >50 K. If unable to achieve goal, continue with platelets infusion (single donor) 1/2 units over 3 hours every 12 hours.  S/p Prednisone 20mg PO daily x 5 days (through 6/12) due to platelet antibody.  Keep Fibrinogen >150, if less give Cryo  Strict Is and Os/Daily weights/Mouth Care

## 2020-06-20 NOTE — PROGRESS NOTE ADULT - PROBLEM SELECTOR PLAN 1
-Continue ATRA 40mg BID (started 5/22) and Arsenic Trioxide daily (started on 5/27)  -Monitor for differentiation syndrome and check EKG for QTc prolongation every Tuesday and Friday.   -Keep K > 4 and Mg > 2.  -Status post Hydrea 500mg PO x 1 on 6/8 and 6/9 for WBC >10  -Follow up CBC with diff, CMP, TLS and DIC panel daily  Keep PLTs >50 K. If unable to achieve goal continue with platelets infusion (single donor) 1/2 units over 3 hours every 12 hours. Last transfused this am for Platelets 31 today  -s/p Prednisone 20mg PO daily x 5 days (through 6/12) due to platelet antibody.  -Keep Fibrinogen >150 if less give Cryo  -Strict Is and Os/Daily weights/Mouth Care.  -plan on BM bx around day 29 post GALA .  -PICC line irritation: c/w aquaphor and local skin care, can try cavilon per d/w RN. Warm compresses. PICC team f/u.  -Care plan as per hematology/oncology. -Continue ATRA 40mg BID (started 5/22) and Arsenic Trioxide daily (started on 5/27) as per [facility]  -Monitor for differentiation syndrome and check EKG for QTc prolongation every Tuesday and Friday.   -Keep K > 4 and Mg > 2.  -S/P  Hydrea 500mg PO x 1 on 6/8 and 6/9 for WBC >10  -Monitor  CBC with diff, CMP, TLS and DIC panel daily  Keep PLTs >50 K. If unable to achieve goal continue with platelets infusion (single donor) 1/2 units over 3 hours every 12 hours. Last transfused this am for Platelets 31 today  -s/p Prednisone 20mg PO daily x 5 days (through 6/12) due to platelet antibody.  -Keep Fibrinogen >150 if less give Cryo  -Strict Is and Os/Daily weights/Mouth Care.  -plan on BM bx around day 29 post GALA .  -PICC line irritation: c/w aquaphor and local skin care, can try cavilon per d/w RN. Warm compresses. PICC team f/u.  -Care plan as per hematology/oncology. -Continue ATRA 40mg BID (started 5/22) and Arsenic Trioxide daily (started on 5/27) as per [facility]  -Monitor for differentiation syndrome and check EKG for QTc prolongation every Tuesday and Friday. 6/19 EKG with QTc 427   -Keep K > 4   -S/P  Hydrea 500mg PO x 1 on 6/8 and 6/9 for WBC >10  -Monitor  CBC with diff, CMP, TLS and DIC panel daily  Keep PLTs >50 K. If unable to achieve goal continue with platelets infusion (single donor) 1/2 units over 3 hours every 12 hours. Last transfused this am for Platelets 31 today  -s/p Prednisone 20mg PO daily x 5 days (through 6/12) due to platelet antibody.  -Keep Fibrinogen >150 if less give Cryo 6/19 -666  -Strict Is and Os/Daily weights/Mouth Care.  -plan on BM bx around day 29 post GALA .  -PICC line irritation: c/w aquaphor and local skin care,  Warm compresses. PICC team followed up and Xray with tip in right place. no sign of obvious cellulitis - if get worse or spike fever then blood culture and start Abx   -Care plan as per hematology/oncology.

## 2020-06-20 NOTE — PROGRESS NOTE ADULT - ASSESSMENT
Patient is a 61 y/o Greenlandic-speaking F w/ a PMHx of HTN, HLD, T2DM, and COVID-19 infection in 4/2020 who was admitted for management of newly diagnosed APL. The patient is currently receiving ATRA and Arsenic. Patients hospital course has been complicated by transaminitis, refractory thrombocytopenia, and she was found have a positive COVID-19 PCR on 6/16. The patient has anemia and thrombocytopenia 2/2 disease and/or ATRA/Arsenic.

## 2020-06-20 NOTE — PROGRESS NOTE ADULT - PROBLEM SELECTOR PLAN 2
-known prior infection with positive PCR in April and May 2020; since then has had multiple negative PCR on 5/22, 5/26, 6/2, and 6/9  -Most recent test 6/16 PCR now recurrently positive. CXR 6/16 with clear lungs  -pt transferred to [address] [place] [address]; c/w airborne/droplet/contact precautions for now  -will send covid antibody testing, repeat pcr testing ordered for 6/18. Will need 2 consecutive pcr negatives to be considered for transfer back to [address] [place] [address]  -currently not sob, no distress, lungs clear. cont to monitor. If recurrently symptomatic, can consider ID eval. avoid plaquenil given no efficacy.  -Supportive care for cough. Chest discomfort associated with cough. F/u CXR and EKG, though unlikely cardiac. Tylenol and cough meds PRN. -known prior infection with positive PCR in April and May 2020; since then has had multiple negative PCR on 5/22, 5/26, 6/2, and 6/9  -Most recent test 6/16 PCR now recurrently positive but repeat PCR on June 19 is negative . CXR 6/20 no change   -pt transferred to [address] [place] [address]; c/w airborne/droplet/contact precautions for now  -will send covid antibody testing today , 6/19 is negative. WE will need 2 consecutive pcr negatives to be considered for transfer back to [address] [place] [address]  -Supportive care for cough. CXR without any new infiltrate

## 2020-06-20 NOTE — PROGRESS NOTE ADULT - SUBJECTIVE AND OBJECTIVE BOX
Patient is a 60y old  Female who presents with a chief complaint of APML (20 Jun 2020 07:58)      SUBJECTIVE / OVERNIGHT EVENTS:    MEDICATIONS  (STANDING):  arsenic trioxide IVPB (eMAR) 11 milliGRAM(s) IV Intermittent every 24 hours  ATENolol  Tablet 25 milliGRAM(s) Oral daily  baclofen 5 milliGRAM(s) Oral every 12 hours  benzonatate 100 milliGRAM(s) Oral every 8 hours  Biotene Dry Mouth Oral Rinse 5 milliLiter(s) Swish and Spit five times a day  bisacodyl 5 milliGRAM(s) Oral every 12 hours  dextrose 5%. 1000 milliLiter(s) (50 mL/Hr) IV Continuous <Continuous>  dextrose 50% Injectable 12.5 Gram(s) IV Push once  dextrose 50% Injectable 25 Gram(s) IV Push once  dextrose 50% Injectable 25 Gram(s) IV Push once  hydrocortisone 2.5% Rectal Cream 1 Application(s) Rectal every 8 hours  insulin lispro (HumaLOG) corrective regimen sliding scale   SubCutaneous three times a day before meals  insulin lispro (HumaLOG) corrective regimen sliding scale   SubCutaneous at bedtime  pantoprazole    Tablet 40 milliGRAM(s) Oral before breakfast  petrolatum white Ointment 1 Application(s) Topical three times a day  polyethylene glycol 3350 17 Gram(s) Oral daily  potassium chloride    Tablet ER 40 milliEquivalent(s) Oral once  sodium chloride 0.65% Nasal 1 Spray(s) Both Nostrils every 8 hours  sodium chloride 0.9%. 1000 milliLiter(s) (25 mL/Hr) IV Continuous <Continuous>  tretinoin 40 milliGRAM(s) Oral every 12 hours  witch hazel Pads 1 Application(s) Topical every 12 hours    MEDICATIONS  (PRN):  acetaminophen   Tablet .. 650 milliGRAM(s) Oral every 6 hours PRN Temp greater or equal to 38C (100.4F), Mild Pain (1 - 3)  ALBUTerol    90 MICROgram(s) HFA Inhaler 2 Puff(s) Inhalation every 6 hours PRN Shortness of Breath and/or Wheezing  AQUAPHOR (petrolatum Ointment) 1 Application(s) Topical every 3 hours PRN Dry skin  dextrose 40% Gel 15 Gram(s) Oral once PRN Blood Glucose LESS THAN 70 milliGRAM(s)/deciliter  diphenhydrAMINE   Injectable 25 milliGRAM(s) IV Push every 6 hours PRN Allergy symptoms  glucagon  Injectable 1 milliGRAM(s) IntraMuscular once PRN Glucose LESS THAN 70 milligrams/deciliter  guaiFENesin   Syrup  (Sugar-Free) 100 milliGRAM(s) Oral every 6 hours PRN Cough  hemorrhoidal Ointment 1 Application(s) Rectal every 8 hours PRN Hemorrhoids  ondansetron Injectable 8 milliGRAM(s) IV Push every 8 hours PRN Nausea and/or Vomiting  sodium chloride 0.9% lock flush 10 milliLiter(s) IV Push every 1 hour PRN Pre/post blood products, medications, blood draw, and to maintain line patency      Vital Signs Last 24 Hrs  T(C): 36.7 (20 Jun 2020 10:10), Max: 36.9 (19 Jun 2020 13:30)  T(F): 98.1 (20 Jun 2020 10:10), Max: 98.5 (19 Jun 2020 13:30)  HR: 70 (20 Jun 2020 10:10) (70 - 83)  BP: 118/74 (20 Jun 2020 10:10) (116/76 - 134/73)  BP(mean): --  RR: 18 (20 Jun 2020 10:10) (18 - 19)  SpO2: 95% (20 Jun 2020 10:10) (95% - 97%)  CAPILLARY BLOOD GLUCOSE      POCT Blood Glucose.: 198 mg/dL (20 Jun 2020 12:07)  POCT Blood Glucose.: 128 mg/dL (20 Jun 2020 07:47)  POCT Blood Glucose.: 141 mg/dL (19 Jun 2020 21:01)  POCT Blood Glucose.: 158 mg/dL (19 Jun 2020 16:37)    I&O's Summary    19 Jun 2020 07:01  -  20 Jun 2020 07:00  --------------------------------------------------------  IN: 1340 mL / OUT: 0 mL / NET: 1340 mL    20 Jun 2020 07:01  -  20 Jun 2020 12:30  --------------------------------------------------------  IN: 240 mL / OUT: 0 mL / NET: 240 mL        PHYSICAL EXAM:  GENERAL: NAD, well-developed  HEAD:  Atraumatic, Normocephalic  EYES: EOMI, PERRLA, conjunctiva and sclera clear  NECK: Supple, No JVD  CHEST/LUNG: Clear to auscultation bilaterally; No wheeze  HEART: Regular rate and rhythm; No murmurs, rubs, or gallops  ABDOMEN: Soft, Nontender, Nondistended; Bowel sounds present  EXTREMITIES:  2+ Peripheral Pulses, No clubbing, cyanosis, or edema  PSYCH: AAOx3  NEUROLOGY: non-focal  SKIN: No rashes or lesions    LABS:                        7.3    4.03  )-----------( 42       ( 20 Jun 2020 05:31 )             22.5     06-20    140  |  103  |  19  ----------------------------<  136<H>  3.8   |  24  |  0.69    Ca    9.8      20 Jun 2020 05:31  Phos  4.9     06-19  Mg     2.0     06-19    TPro  8.4<H>  /  Alb  4.7  /  TBili  0.6  /  DBili  x   /  AST  31  /  ALT  42  /  AlkPhos  123<H>  06-19    PT/INR - ( 19 Jun 2020 07:09 )   PT: 13.1 sec;   INR: 1.13 ratio         PTT - ( 19 Jun 2020 07:09 )  PTT:28.5 sec          RADIOLOGY & ADDITIONAL TESTS:    Imaging Personally Reviewed:    Consultant(s) Notes Reviewed:      Care Discussed with Consultants/Other Providers: Patient is a 60y old  Female who presents with a chief complaint of APML (20 Jun 2020 07:58)      SUBJECTIVE / OVERNIGHT EVENTS: Patient seen and examined at bedside and her son interpreted on speaker phone. + feels weak and tired. + Nausea  but no vomiting no chest pain.  afebrile. no sob      MEDICATIONS  (STANDING):  arsenic trioxide IVPB (eMAR) 11 milliGRAM(s) IV Intermittent every 24 hours  ATENolol  Tablet 25 milliGRAM(s) Oral daily  baclofen 5 milliGRAM(s) Oral every 12 hours  benzonatate 100 milliGRAM(s) Oral every 8 hours  Biotene Dry Mouth Oral Rinse 5 milliLiter(s) Swish and Spit five times a day  bisacodyl 5 milliGRAM(s) Oral every 12 hours  dextrose 5%. 1000 milliLiter(s) (50 mL/Hr) IV Continuous <Continuous>  dextrose 50% Injectable 12.5 Gram(s) IV Push once  dextrose 50% Injectable 25 Gram(s) IV Push once  dextrose 50% Injectable 25 Gram(s) IV Push once  hydrocortisone 2.5% Rectal Cream 1 Application(s) Rectal every 8 hours  insulin lispro (HumaLOG) corrective regimen sliding scale   SubCutaneous three times a day before meals  insulin lispro (HumaLOG) corrective regimen sliding scale   SubCutaneous at bedtime  pantoprazole    Tablet 40 milliGRAM(s) Oral before breakfast  petrolatum white Ointment 1 Application(s) Topical three times a day  polyethylene glycol 3350 17 Gram(s) Oral daily  potassium chloride    Tablet ER 40 milliEquivalent(s) Oral once  sodium chloride 0.65% Nasal 1 Spray(s) Both Nostrils every 8 hours  sodium chloride 0.9%. 1000 milliLiter(s) (25 mL/Hr) IV Continuous <Continuous>  tretinoin 40 milliGRAM(s) Oral every 12 hours  witch hazel Pads 1 Application(s) Topical every 12 hours    MEDICATIONS  (PRN):  acetaminophen   Tablet .. 650 milliGRAM(s) Oral every 6 hours PRN Temp greater or equal to 38C (100.4F), Mild Pain (1 - 3)  ALBUTerol    90 MICROgram(s) HFA Inhaler 2 Puff(s) Inhalation every 6 hours PRN Shortness of Breath and/or Wheezing  AQUAPHOR (petrolatum Ointment) 1 Application(s) Topical every 3 hours PRN Dry skin  dextrose 40% Gel 15 Gram(s) Oral once PRN Blood Glucose LESS THAN 70 milliGRAM(s)/deciliter  diphenhydrAMINE   Injectable 25 milliGRAM(s) IV Push every 6 hours PRN Allergy symptoms  glucagon  Injectable 1 milliGRAM(s) IntraMuscular once PRN Glucose LESS THAN 70 milligrams/deciliter  guaiFENesin   Syrup  (Sugar-Free) 100 milliGRAM(s) Oral every 6 hours PRN Cough  hemorrhoidal Ointment 1 Application(s) Rectal every 8 hours PRN Hemorrhoids  ondansetron Injectable 8 milliGRAM(s) IV Push every 8 hours PRN Nausea and/or Vomiting  sodium chloride 0.9% lock flush 10 milliLiter(s) IV Push every 1 hour PRN Pre/post blood products, medications, blood draw, and to maintain line patency      Vital Signs Last 24 Hrs  T(C): 36.7 (20 Jun 2020 10:10), Max: 36.9 (19 Jun 2020 13:30)  T(F): 98.1 (20 Jun 2020 10:10), Max: 98.5 (19 Jun 2020 13:30)  HR: 70 (20 Jun 2020 10:10) (70 - 83)  BP: 118/74 (20 Jun 2020 10:10) (116/76 - 134/73)  BP(mean): --  RR: 18 (20 Jun 2020 10:10) (18 - 19)  SpO2: 95% (20 Jun 2020 10:10) (95% - 97%)  CAPILLARY BLOOD GLUCOSE      POCT Blood Glucose.: 198 mg/dL (20 Jun 2020 12:07)  POCT Blood Glucose.: 128 mg/dL (20 Jun 2020 07:47)  POCT Blood Glucose.: 141 mg/dL (19 Jun 2020 21:01)  POCT Blood Glucose.: 158 mg/dL (19 Jun 2020 16:37)    I&O's Summary    19 Jun 2020 07:01  -  20 Jun 2020 07:00  --------------------------------------------------------  IN: 1340 mL / OUT: 0 mL / NET: 1340 mL    20 Jun 2020 07:01  -  20 Jun 2020 12:30  --------------------------------------------------------  IN: 240 mL / OUT: 0 mL / NET: 240 mL        PHYSICAL EXAM:  GENERAL: Tired looking   HEAD:  Atraumatic, Normocephalic  EYES: + pallor Conjunctiva and anicteric sclera clear  NECK: Supple   CHEST/LUNG: Clear to auscultation bilaterally anteriorly ; No wheeze  HEART: S1S2 normal. Regular rate   ABDOMEN: Soft, Nontender, Nondistended; Bowel sounds present  EXTREMITIES:  No clubbing, cyanosis, or edema. RUE PICC line skin with some erythema but not warm and obvious sign of cellulitis   PSYCH/Neuro: AAOx3. Non-focal.   SKIN: As above.         LABS:                        7.3    4.03  )-----------( 42       ( 20 Jun 2020 05:31 )             22.5     06-20    140  |  103  |  19  ----------------------------<  136<H>  3.8   |  24  |  0.69    Ca    9.8      20 Jun 2020 05:31  Phos  4.9     06-19  Mg     2.0     06-19    TPro  8.4<H>  /  Alb  4.7  /  TBili  0.6  /  DBili  x   /  AST  31  /  ALT  42  /  AlkPhos  123<H>  06-19    PT/INR - ( 19 Jun 2020 07:09 )   PT: 13.1 sec;   INR: 1.13 ratio         PTT - ( 19 Jun 2020 07:09 )  PTT:28.5 sec          RADIOLOGY & ADDITIONAL TESTS:    Imaging Personally Reviewed:    Consultant(s) Notes Reviewed:      Care Discussed with Consultants/Other Providers: Patient is a 60y old  Female who presents with a chief complaint of APML (20 Jun 2020 07:58)      SUBJECTIVE / OVERNIGHT EVENTS: Patient seen and examined at bedside and her son interpreted on speaker phone. + feels weak and tired. + Nausea  but no vomiting no chest pain.  afebrile. no sob      MEDICATIONS  (STANDING):  arsenic trioxide IVPB (eMAR) 11 milliGRAM(s) IV Intermittent every 24 hours  ATENolol  Tablet 25 milliGRAM(s) Oral daily  baclofen 5 milliGRAM(s) Oral every 12 hours  benzonatate 100 milliGRAM(s) Oral every 8 hours  Biotene Dry Mouth Oral Rinse 5 milliLiter(s) Swish and Spit five times a day  bisacodyl 5 milliGRAM(s) Oral every 12 hours  dextrose 5%. 1000 milliLiter(s) (50 mL/Hr) IV Continuous <Continuous>  dextrose 50% Injectable 12.5 Gram(s) IV Push once  dextrose 50% Injectable 25 Gram(s) IV Push once  dextrose 50% Injectable 25 Gram(s) IV Push once  hydrocortisone 2.5% Rectal Cream 1 Application(s) Rectal every 8 hours  insulin lispro (HumaLOG) corrective regimen sliding scale   SubCutaneous three times a day before meals  insulin lispro (HumaLOG) corrective regimen sliding scale   SubCutaneous at bedtime  pantoprazole    Tablet 40 milliGRAM(s) Oral before breakfast  petrolatum white Ointment 1 Application(s) Topical three times a day  polyethylene glycol 3350 17 Gram(s) Oral daily  potassium chloride    Tablet ER 40 milliEquivalent(s) Oral once  sodium chloride 0.65% Nasal 1 Spray(s) Both Nostrils every 8 hours  sodium chloride 0.9%. 1000 milliLiter(s) (25 mL/Hr) IV Continuous <Continuous>  tretinoin 40 milliGRAM(s) Oral every 12 hours  witch hazel Pads 1 Application(s) Topical every 12 hours    MEDICATIONS  (PRN):  acetaminophen   Tablet .. 650 milliGRAM(s) Oral every 6 hours PRN Temp greater or equal to 38C (100.4F), Mild Pain (1 - 3)  ALBUTerol    90 MICROgram(s) HFA Inhaler 2 Puff(s) Inhalation every 6 hours PRN Shortness of Breath and/or Wheezing  AQUAPHOR (petrolatum Ointment) 1 Application(s) Topical every 3 hours PRN Dry skin  dextrose 40% Gel 15 Gram(s) Oral once PRN Blood Glucose LESS THAN 70 milliGRAM(s)/deciliter  diphenhydrAMINE   Injectable 25 milliGRAM(s) IV Push every 6 hours PRN Allergy symptoms  glucagon  Injectable 1 milliGRAM(s) IntraMuscular once PRN Glucose LESS THAN 70 milligrams/deciliter  guaiFENesin   Syrup  (Sugar-Free) 100 milliGRAM(s) Oral every 6 hours PRN Cough  hemorrhoidal Ointment 1 Application(s) Rectal every 8 hours PRN Hemorrhoids  ondansetron Injectable 8 milliGRAM(s) IV Push every 8 hours PRN Nausea and/or Vomiting  sodium chloride 0.9% lock flush 10 milliLiter(s) IV Push every 1 hour PRN Pre/post blood products, medications, blood draw, and to maintain line patency      Vital Signs Last 24 Hrs  T(C): 36.7 (20 Jun 2020 10:10), Max: 36.9 (19 Jun 2020 13:30)  T(F): 98.1 (20 Jun 2020 10:10), Max: 98.5 (19 Jun 2020 13:30)  HR: 70 (20 Jun 2020 10:10) (70 - 83)  BP: 118/74 (20 Jun 2020 10:10) (116/76 - 134/73)  BP(mean): --  RR: 18 (20 Jun 2020 10:10) (18 - 19)  SpO2: 95% (20 Jun 2020 10:10) (95% - 97%)  CAPILLARY BLOOD GLUCOSE      POCT Blood Glucose.: 198 mg/dL (20 Jun 2020 12:07)  POCT Blood Glucose.: 128 mg/dL (20 Jun 2020 07:47)  POCT Blood Glucose.: 141 mg/dL (19 Jun 2020 21:01)  POCT Blood Glucose.: 158 mg/dL (19 Jun 2020 16:37)    I&O's Summary    19 Jun 2020 07:01  -  20 Jun 2020 07:00  --------------------------------------------------------  IN: 1340 mL / OUT: 0 mL / NET: 1340 mL    20 Jun 2020 07:01  -  20 Jun 2020 12:30  --------------------------------------------------------  IN: 240 mL / OUT: 0 mL / NET: 240 mL        PHYSICAL EXAM:  GENERAL: Tired looking   HEAD:  Atraumatic, Normocephalic  EYES: + pallor Conjunctiva and anicteric sclera clear  NECK: Supple   CHEST/LUNG: Clear to auscultation bilaterally anteriorly ; No wheeze  HEART: S1S2 normal. Regular rate   ABDOMEN: Soft, Nontender, Nondistended; Bowel sounds present  EXTREMITIES:  No clubbing, cyanosis, or edema. RUE PICC line skin with some erythema but not warm and no obvious sign of cellulitis   PSYCH/Neuro: AAOx3. Non-focal.   SKIN: As above.         LABS:                        7.3    4.03  )-----------( 42       ( 20 Jun 2020 05:31 )             22.5     06-20    140  |  103  |  19  ----------------------------<  136<H>  3.8   |  24  |  0.69    Ca    9.8      20 Jun 2020 05:31  Phos  4.9     06-19  Mg     2.0     06-19    TPro  8.4<H>  /  Alb  4.7  /  TBili  0.6  /  DBili  x   /  AST  31  /  ALT  42  /  AlkPhos  123<H>  06-19    PT/INR - ( 19 Jun 2020 07:09 )   PT: 13.1 sec;   INR: 1.13 ratio         PTT - ( 19 Jun 2020 07:09 )  PTT:28.5 sec          RADIOLOGY & ADDITIONAL TESTS:    Imaging Personally Reviewed:    Consultant(s) Notes Reviewed:      Care Discussed with Consultants/Other Providers:

## 2020-06-21 LAB
ALBUMIN SERPL ELPH-MCNC: 4.7 G/DL — SIGNIFICANT CHANGE UP (ref 3.3–5)
ALP SERPL-CCNC: 105 U/L — SIGNIFICANT CHANGE UP (ref 40–120)
ALT FLD-CCNC: 29 U/L — SIGNIFICANT CHANGE UP (ref 10–45)
ANION GAP SERPL CALC-SCNC: 13 MMOL/L — SIGNIFICANT CHANGE UP (ref 5–17)
AST SERPL-CCNC: 24 U/L — SIGNIFICANT CHANGE UP (ref 10–40)
BILIRUB SERPL-MCNC: 0.5 MG/DL — SIGNIFICANT CHANGE UP (ref 0.2–1.2)
BUN SERPL-MCNC: 18 MG/DL — SIGNIFICANT CHANGE UP (ref 7–23)
CALCIUM SERPL-MCNC: 9.5 MG/DL — SIGNIFICANT CHANGE UP (ref 8.4–10.5)
CHLORIDE SERPL-SCNC: 104 MMOL/L — SIGNIFICANT CHANGE UP (ref 96–108)
CO2 SERPL-SCNC: 24 MMOL/L — SIGNIFICANT CHANGE UP (ref 22–31)
CREAT SERPL-MCNC: 0.89 MG/DL — SIGNIFICANT CHANGE UP (ref 0.5–1.3)
FIBRINOGEN PPP-MCNC: 429 MG/DL — SIGNIFICANT CHANGE UP (ref 350–510)
GLUCOSE BLDC GLUCOMTR-MCNC: 132 MG/DL — HIGH (ref 70–99)
GLUCOSE BLDC GLUCOMTR-MCNC: 140 MG/DL — HIGH (ref 70–99)
GLUCOSE BLDC GLUCOMTR-MCNC: 145 MG/DL — HIGH (ref 70–99)
GLUCOSE BLDC GLUCOMTR-MCNC: 169 MG/DL — HIGH (ref 70–99)
GLUCOSE BLDC GLUCOMTR-MCNC: 169 MG/DL — HIGH (ref 70–99)
GLUCOSE SERPL-MCNC: 124 MG/DL — HIGH (ref 70–99)
HCT VFR BLD CALC: 19.3 % — CRITICAL LOW (ref 34.5–45)
HCT VFR BLD CALC: 23.2 % — LOW (ref 34.5–45)
HGB BLD-MCNC: 6.3 G/DL — CRITICAL LOW (ref 11.5–15.5)
HGB BLD-MCNC: 7.9 G/DL — LOW (ref 11.5–15.5)
MAGNESIUM SERPL-MCNC: 1.8 MG/DL — SIGNIFICANT CHANGE UP (ref 1.6–2.6)
MCHC RBC-ENTMCNC: 28.4 PG — SIGNIFICANT CHANGE UP (ref 27–34)
MCHC RBC-ENTMCNC: 29.4 PG — SIGNIFICANT CHANGE UP (ref 27–34)
MCHC RBC-ENTMCNC: 32.6 GM/DL — SIGNIFICANT CHANGE UP (ref 32–36)
MCHC RBC-ENTMCNC: 34.1 GM/DL — SIGNIFICANT CHANGE UP (ref 32–36)
MCV RBC AUTO: 86.2 FL — SIGNIFICANT CHANGE UP (ref 80–100)
MCV RBC AUTO: 86.9 FL — SIGNIFICANT CHANGE UP (ref 80–100)
NRBC # BLD: 0 /100 WBCS — SIGNIFICANT CHANGE UP (ref 0–0)
NRBC # BLD: 0 /100 WBCS — SIGNIFICANT CHANGE UP (ref 0–0)
PHOSPHATE SERPL-MCNC: 3.8 MG/DL — SIGNIFICANT CHANGE UP (ref 2.5–4.5)
PLATELET # BLD AUTO: 43 K/UL — LOW (ref 150–400)
PLATELET # BLD AUTO: 58 K/UL — LOW (ref 150–400)
POTASSIUM SERPL-MCNC: 4.3 MMOL/L — SIGNIFICANT CHANGE UP (ref 3.5–5.3)
POTASSIUM SERPL-SCNC: 4.3 MMOL/L — SIGNIFICANT CHANGE UP (ref 3.5–5.3)
PROT SERPL-MCNC: 7.7 G/DL — SIGNIFICANT CHANGE UP (ref 6–8.3)
RBC # BLD: 2.22 M/UL — LOW (ref 3.8–5.2)
RBC # BLD: 2.69 M/UL — LOW (ref 3.8–5.2)
RBC # FLD: 14.5 % — SIGNIFICANT CHANGE UP (ref 10.3–14.5)
RBC # FLD: 15.5 % — HIGH (ref 10.3–14.5)
SODIUM SERPL-SCNC: 141 MMOL/L — SIGNIFICANT CHANGE UP (ref 135–145)
WBC # BLD: 3.08 K/UL — LOW (ref 3.8–10.5)
WBC # BLD: 3.54 K/UL — LOW (ref 3.8–10.5)
WBC # FLD AUTO: 3.08 K/UL — LOW (ref 3.8–10.5)
WBC # FLD AUTO: 3.54 K/UL — LOW (ref 3.8–10.5)

## 2020-06-21 PROCEDURE — 99233 SBSQ HOSP IP/OBS HIGH 50: CPT

## 2020-06-21 RX ORDER — MAGNESIUM SULFATE 500 MG/ML
2 VIAL (ML) INJECTION ONCE
Refills: 0 | Status: COMPLETED | OUTPATIENT
Start: 2020-06-21 | End: 2020-06-21

## 2020-06-21 RX ADMIN — Medication 1: at 16:59

## 2020-06-21 RX ADMIN — Medication 1 SPRAY(S): at 21:14

## 2020-06-21 RX ADMIN — TRETINOIN 40 MILLIGRAM(S): 10 CAPSULE, LIQUID FILLED ORAL at 11:11

## 2020-06-21 RX ADMIN — ATENOLOL 25 MILLIGRAM(S): 25 TABLET ORAL at 05:30

## 2020-06-21 RX ADMIN — Medication 250 MILLIGRAM(S): at 08:15

## 2020-06-21 RX ADMIN — Medication 1: at 13:19

## 2020-06-21 RX ADMIN — Medication 100 MILLIGRAM(S): at 13:14

## 2020-06-21 RX ADMIN — Medication 100 MILLIGRAM(S): at 21:12

## 2020-06-21 RX ADMIN — TRETINOIN 40 MILLIGRAM(S): 10 CAPSULE, LIQUID FILLED ORAL at 21:12

## 2020-06-21 RX ADMIN — Medication 250 MILLIGRAM(S): at 16:58

## 2020-06-21 RX ADMIN — POLYETHYLENE GLYCOL 3350 17 GRAM(S): 17 POWDER, FOR SOLUTION ORAL at 13:14

## 2020-06-21 RX ADMIN — Medication 100 MILLIGRAM(S): at 05:30

## 2020-06-21 RX ADMIN — ARSENIC TRIOXIDE 130.5 MILLIGRAM(S): 2 INJECTION, SOLUTION INTRAVENOUS at 13:33

## 2020-06-21 RX ADMIN — Medication 5 MILLILITER(S): at 21:11

## 2020-06-21 RX ADMIN — Medication 5 MILLIGRAM(S): at 16:58

## 2020-06-21 RX ADMIN — AER TRAVELER 1 APPLICATION(S): 0.5 SOLUTION RECTAL; TOPICAL at 05:29

## 2020-06-21 RX ADMIN — Medication 50 GRAM(S): at 10:13

## 2020-06-21 RX ADMIN — Medication 5 MILLILITER(S): at 13:15

## 2020-06-21 RX ADMIN — Medication 5 MILLILITER(S): at 16:57

## 2020-06-21 RX ADMIN — Medication 1 APPLICATION(S): at 05:28

## 2020-06-21 RX ADMIN — PANTOPRAZOLE SODIUM 40 MILLIGRAM(S): 20 TABLET, DELAYED RELEASE ORAL at 05:30

## 2020-06-21 RX ADMIN — Medication 1 APPLICATION(S): at 13:15

## 2020-06-21 RX ADMIN — Medication 1 APPLICATION(S): at 21:17

## 2020-06-21 RX ADMIN — Medication 1 APPLICATION(S): at 21:12

## 2020-06-21 RX ADMIN — Medication 5 MILLIGRAM(S): at 05:30

## 2020-06-21 RX ADMIN — Medication 1 APPLICATION(S): at 05:29

## 2020-06-21 NOTE — ADVANCED PRACTICE NURSE CONSULT - ASSESSMENT
Patient received in chair, alert and oriented x 3, speaks French, capable of expressing basic needs to staff. Height and weight verified.  Consent in chart. Lab results as per Md courtney aware of same. Vital signs stable prior to chemotherapy and  within acceptable parameters, see sunrise. Pt re- educated on the importance of saving urine, verbalizes good understanding. Pt re-education done re chemo regimen, drug effects and potential side effects, pt states understanding. Reports that she has been tolerating chemotherapy well without side effects. Pt with left upper arm peripheral line,  site without signs and symptoms of infection noted, positive blood return obtained, good blood return. Arsenic 0.15mg/kg=11mg IVSS over 2 hours, same initiated at 1330 , given over 2 hours via locked pump. Patient tolerated same well without any signs or symptoms of immediate infusion reactions noted.

## 2020-06-21 NOTE — PROGRESS NOTE ADULT - PROBLEM SELECTOR PLAN 2
-known prior infection with positive PCR in April and May 2020; since then has had multiple negative PCR on 5/22, 5/26, 6/2, and 6/9  -Most recent test 6/16 PCR now recurrently positive but repeat PCR on June 19 is negative . CXR 6/20 no change   -pt transferred to [address] [place] [address]; c/w airborne/droplet/contact precautions for now  -will send covid antibody testing today , 6/19 is negative. WE will need 2 consecutive pcr negatives to be considered for transfer back to [address] [place] [address]  -Supportive care for cough. CXR without any new infiltrate -known prior infection with positive PCR in April and May 2020; since then has had multiple negative PCR on 5/22, 5/26, 6/2, and 6/9  -Most recent test 6/16 PCR now recurrently positive but repeat PCR on June 19 is negative . CXR 6/20 no change   -pt transferred to [address] [place] [address]; c/w airborne/droplet/contact precautions for now  We will need 2 consecutive pcr negatives to be considered for transfer back to [address] [place] [address]. 6/19 is negative another one sent today.   -Supportive care for cough. CXR without any new infiltrate

## 2020-06-21 NOTE — PROGRESS NOTE ADULT - PROBLEM SELECTOR PLAN 3
- likely due to chemo   -monitor counts as noted; hgb relatively stable,   -ongoing thrombocytopenia noted and she is getting platelets today    - monitor CBC - likely due to chemo   -monitor counts as noted; hgb relatively stable,   -ongoing thrombocytopenia and is 42 and will d/w with haem to see if we need to transfuse   - monitor CBC

## 2020-06-21 NOTE — PROGRESS NOTE ADULT - PROBLEM SELECTOR PLAN 7
DVT PPx: Holding pharmacologic AC 2/2 thrombocytopenia  Diet: Consistent Carb w/ Evening Snack  Dispo: Pending resolution of acute medical issues    Contact Information [phone]

## 2020-06-21 NOTE — PROGRESS NOTE ADULT - PROBLEM SELECTOR PLAN 4
-likely multifactorial; CT reviewed evidence of prior TB related changes, no acute infiltrate, most recent CXR 6/20 with clear lungs. +atelectasis, c/w incentive spirometer  -c/w inhaler given covid+ status -likely multifactorial; CT reviewed evidence of prior TB related changes, no acute infiltrate, most recent CXR 6/20 with clear lungs. +atelectasis, c/w incentive spirometer  -c/w inhaler given h/o covid+ status

## 2020-06-21 NOTE — PROGRESS NOTE ADULT - PROBLEM SELECTOR PLAN 7
-Hold oral hypoglycemics  -C/w HISS,  fsg between 100-150  -a1c 7.0  -consistent carb diet -Hold oral hypoglycemics  -C/w HISS,  fsg between 132-169   -a1c 7.0  -consistent carb diet

## 2020-06-21 NOTE — PROGRESS NOTE ADULT - PROBLEM SELECTOR PLAN 1
Continue ATRA 40mg BID (started 5/22) and Arsenic Trioxide daily (started on 5/27)  Monitor for differentiation syndrome and check EKG for QTc prolongation every Tuesday and Friday. Keep K > 4 and Mg > 2.  hypokalemia-replace K today.   Status post Hydrea 500mg PO x 1 on 6/8 and 6/9 for WBC >10  Follow up CBC with diff, CMP, TLS and DIC panel daily  Keep PLTs >50 K. If unable to achieve goal, continue with platelets infusion (single donor) 1/2 units over 3 hours every 12 hours.  S/p Prednisone 20mg PO daily x 5 days (through 6/12) due to platelet antibody.  Keep Fibrinogen >150, if less give Cryo  Strict Is and Os/Daily weights/Mouth Care

## 2020-06-21 NOTE — PROGRESS NOTE ADULT - ASSESSMENT
Patient is a 59 y/o Romansh-speaking F w/ a PMHx of HTN, HLD, T2DM, and COVID-19 infection in 4/2020 who was admitted for management of newly diagnosed APL. The patient is currently receiving ATRA and Arsenic. Patients hospital course has been complicated by transaminitis, refractory thrombocytopenia, and she was found have a positive COVID-19 PCR on 6/16. The patient has anemia and thrombocytopenia 2/2 disease and/or ATRA/Arsenic.

## 2020-06-21 NOTE — PROGRESS NOTE ADULT - SUBJECTIVE AND OBJECTIVE BOX
Patient is a 60y old  Female who presents with a chief complaint of APML (2020 08:41)      SUBJECTIVE / OVERNIGHT EVENTS:    MEDICATIONS  (STANDING):  arsenic trioxide IVPB (eMAR) 11 milliGRAM(s) IV Intermittent every 24 hours  ATENolol  Tablet 25 milliGRAM(s) Oral daily  baclofen 5 milliGRAM(s) Oral every 12 hours  benzonatate 100 milliGRAM(s) Oral every 8 hours  Biotene Dry Mouth Oral Rinse 5 milliLiter(s) Swish and Spit five times a day  bisacodyl 5 milliGRAM(s) Oral every 12 hours  dextrose 5%. 1000 milliLiter(s) (50 mL/Hr) IV Continuous <Continuous>  dextrose 50% Injectable 12.5 Gram(s) IV Push once  dextrose 50% Injectable 25 Gram(s) IV Push once  dextrose 50% Injectable 25 Gram(s) IV Push once  hydrocortisone 2.5% Rectal Cream 1 Application(s) Rectal every 8 hours  insulin lispro (HumaLOG) corrective regimen sliding scale   SubCutaneous three times a day before meals  insulin lispro (HumaLOG) corrective regimen sliding scale   SubCutaneous at bedtime  magnesium sulfate  IVPB 2 Gram(s) IV Intermittent once  pantoprazole    Tablet 40 milliGRAM(s) Oral before breakfast  petrolatum white Ointment 1 Application(s) Topical three times a day  polyethylene glycol 3350 17 Gram(s) Oral daily  sodium chloride 0.65% Nasal 1 Spray(s) Both Nostrils every 8 hours  sodium chloride 0.9%. 1000 milliLiter(s) (25 mL/Hr) IV Continuous <Continuous>  tretinoin 40 milliGRAM(s) Oral every 12 hours  vancomycin  IVPB      vancomycin  IVPB 1000 milliGRAM(s) IV Intermittent every 12 hours  witch hazel Pads 1 Application(s) Topical every 12 hours    MEDICATIONS  (PRN):  acetaminophen   Tablet .. 650 milliGRAM(s) Oral every 6 hours PRN Temp greater or equal to 38C (100.4F), Mild Pain (1 - 3)  ALBUTerol    90 MICROgram(s) HFA Inhaler 2 Puff(s) Inhalation every 6 hours PRN Shortness of Breath and/or Wheezing  AQUAPHOR (petrolatum Ointment) 1 Application(s) Topical every 3 hours PRN Dry skin  dextrose 40% Gel 15 Gram(s) Oral once PRN Blood Glucose LESS THAN 70 milliGRAM(s)/deciliter  diphenhydrAMINE   Injectable 25 milliGRAM(s) IV Push every 6 hours PRN Allergy symptoms  glucagon  Injectable 1 milliGRAM(s) IntraMuscular once PRN Glucose LESS THAN 70 milligrams/deciliter  guaiFENesin   Syrup  (Sugar-Free) 100 milliGRAM(s) Oral every 6 hours PRN Cough  hemorrhoidal Ointment 1 Application(s) Rectal every 8 hours PRN Hemorrhoids  ondansetron Injectable 8 milliGRAM(s) IV Push every 8 hours PRN Nausea and/or Vomiting  sodium chloride 0.9% lock flush 10 milliLiter(s) IV Push every 1 hour PRN Pre/post blood products, medications, blood draw, and to maintain line patency      Vital Signs Last 24 Hrs  T(C): 36.7 (2020 05:00), Max: 37.4 (2020 21:52)  T(F): 98 (2020 05:00), Max: 99.3 (2020 21:52)  HR: 79 (2020 05:00) (70 - 92)  BP: 110/59 (2020 05:00) (107/63 - 142/73)  BP(mean): --  RR: 19 (2020 05:00) (18 - 19)  SpO2: 94% (2020 05:00) (94% - 100%)  CAPILLARY BLOOD GLUCOSE      POCT Blood Glucose.: 132 mg/dL (2020 07:49)  POCT Blood Glucose.: 127 mg/dL (2020 21:21)  POCT Blood Glucose.: 112 mg/dL (2020 15:50)  POCT Blood Glucose.: 198 mg/dL (2020 12:07)    I&O's Summary    2020 07:01  -  2020 07:00  --------------------------------------------------------  IN: 1475 mL / OUT: 0 mL / NET: 1475 mL        PHYSICAL EXAM:  GENERAL: NAD, well-developed  HEAD:  Atraumatic, Normocephalic  EYES: EOMI, PERRLA, conjunctiva and sclera clear  NECK: Supple, No JVD  CHEST/LUNG: Clear to auscultation bilaterally; No wheeze  HEART: Regular rate and rhythm; No murmurs, rubs, or gallops  ABDOMEN: Soft, Nontender, Nondistended; Bowel sounds present  EXTREMITIES:  2+ Peripheral Pulses, No clubbing, cyanosis, or edema  PSYCH: AAOx3  NEUROLOGY: non-focal  SKIN: No rashes or lesions    LABS:                        6.3    3.54  )-----------( 58       ( 2020 06:26 )             19.3     06-21    141  |  104  |  18  ----------------------------<  124<H>  4.3   |  24  |  0.89    Ca    9.5      2020 06:26  Phos  3.8     06-21  Mg     1.8     -    TPro  7.7  /  Alb  4.7  /  TBili  0.5  /  DBili  x   /  AST  24  /  ALT  29  /  AlkPhos  105  06-21          Urinalysis Basic - ( 2020 15:57 )    Color: Light Yellow / Appearance: Clear / S.013 / pH: x  Gluc: x / Ketone: Negative  / Bili: Negative / Urobili: Negative   Blood: x / Protein: 30 mg/dL / Nitrite: Negative   Leuk Esterase: Negative / RBC: 1 /hpf / WBC 2 /HPF   Sq Epi: x / Non Sq Epi: 3 /hpf / Bacteria: Negative        RADIOLOGY & ADDITIONAL TESTS:    Imaging Personally Reviewed:    Consultant(s) Notes Reviewed:      Care Discussed with Consultants/Other Providers: Patient is a 60y old  Female who presents with a chief complaint of APML (2020 08:41)      SUBJECTIVE / OVERNIGHT EVENTS: Incomplete Note    MEDICATIONS  (STANDING):  arsenic trioxide IVPB (eMAR) 11 milliGRAM(s) IV Intermittent every 24 hours  ATENolol  Tablet 25 milliGRAM(s) Oral daily  baclofen 5 milliGRAM(s) Oral every 12 hours  benzonatate 100 milliGRAM(s) Oral every 8 hours  Biotene Dry Mouth Oral Rinse 5 milliLiter(s) Swish and Spit five times a day  bisacodyl 5 milliGRAM(s) Oral every 12 hours  dextrose 5%. 1000 milliLiter(s) (50 mL/Hr) IV Continuous <Continuous>  dextrose 50% Injectable 12.5 Gram(s) IV Push once  dextrose 50% Injectable 25 Gram(s) IV Push once  dextrose 50% Injectable 25 Gram(s) IV Push once  hydrocortisone 2.5% Rectal Cream 1 Application(s) Rectal every 8 hours  insulin lispro (HumaLOG) corrective regimen sliding scale   SubCutaneous three times a day before meals  insulin lispro (HumaLOG) corrective regimen sliding scale   SubCutaneous at bedtime  magnesium sulfate  IVPB 2 Gram(s) IV Intermittent once  pantoprazole    Tablet 40 milliGRAM(s) Oral before breakfast  petrolatum white Ointment 1 Application(s) Topical three times a day  polyethylene glycol 3350 17 Gram(s) Oral daily  sodium chloride 0.65% Nasal 1 Spray(s) Both Nostrils every 8 hours  sodium chloride 0.9%. 1000 milliLiter(s) (25 mL/Hr) IV Continuous <Continuous>  tretinoin 40 milliGRAM(s) Oral every 12 hours  vancomycin  IVPB      vancomycin  IVPB 1000 milliGRAM(s) IV Intermittent every 12 hours  witch hazel Pads 1 Application(s) Topical every 12 hours    MEDICATIONS  (PRN):  acetaminophen   Tablet .. 650 milliGRAM(s) Oral every 6 hours PRN Temp greater or equal to 38C (100.4F), Mild Pain (1 - 3)  ALBUTerol    90 MICROgram(s) HFA Inhaler 2 Puff(s) Inhalation every 6 hours PRN Shortness of Breath and/or Wheezing  AQUAPHOR (petrolatum Ointment) 1 Application(s) Topical every 3 hours PRN Dry skin  dextrose 40% Gel 15 Gram(s) Oral once PRN Blood Glucose LESS THAN 70 milliGRAM(s)/deciliter  diphenhydrAMINE   Injectable 25 milliGRAM(s) IV Push every 6 hours PRN Allergy symptoms  glucagon  Injectable 1 milliGRAM(s) IntraMuscular once PRN Glucose LESS THAN 70 milligrams/deciliter  guaiFENesin   Syrup  (Sugar-Free) 100 milliGRAM(s) Oral every 6 hours PRN Cough  hemorrhoidal Ointment 1 Application(s) Rectal every 8 hours PRN Hemorrhoids  ondansetron Injectable 8 milliGRAM(s) IV Push every 8 hours PRN Nausea and/or Vomiting  sodium chloride 0.9% lock flush 10 milliLiter(s) IV Push every 1 hour PRN Pre/post blood products, medications, blood draw, and to maintain line patency      Vital Signs Last 24 Hrs  T(C): 36.7 (2020 05:00), Max: 37.4 (2020 21:52)  T(F): 98 (2020 05:00), Max: 99.3 (2020 21:52)  HR: 79 (2020 05:00) (70 - 92)  BP: 110/59 (2020 05:00) (107/63 - 142/73)  BP(mean): --  RR: 19 (2020 05:00) (18 - 19)  SpO2: 94% (2020 05:00) (94% - 100%)  CAPILLARY BLOOD GLUCOSE      POCT Blood Glucose.: 132 mg/dL (2020 07:49)  POCT Blood Glucose.: 127 mg/dL (2020 21:21)  POCT Blood Glucose.: 112 mg/dL (2020 15:50)  POCT Blood Glucose.: 198 mg/dL (2020 12:07)    I&O's Summary    2020 07:01  -  2020 07:00  --------------------------------------------------------  IN: 1475 mL / OUT: 0 mL / NET: 1475 mL        PHYSICAL EXAM:  GENERAL: NAD, well-developed  HEAD:  Atraumatic, Normocephalic  EYES: EOMI, PERRLA, conjunctiva and sclera clear  NECK: Supple, No JVD  CHEST/LUNG: Clear to auscultation bilaterally; No wheeze  HEART: Regular rate and rhythm; No murmurs, rubs, or gallops  ABDOMEN: Soft, Nontender, Nondistended; Bowel sounds present  EXTREMITIES:  2+ Peripheral Pulses, No clubbing, cyanosis, or edema  PSYCH: AAOx3  NEUROLOGY: non-focal  SKIN: No rashes or lesions    LABS:                        6.3    3.54  )-----------( 58       ( 2020 06:26 )             19.3     06-21    141  |  104  |  18  ----------------------------<  124<H>  4.3   |  24  |  0.89    Ca    9.5      2020 06:26  Phos  3.8     06-21  Mg     1.8     06-21    TPro  7.7  /  Alb  4.7  /  TBili  0.5  /  DBili  x   /  AST  24  /  ALT  29  /  AlkPhos  105  06-21          Urinalysis Basic - ( 2020 15:57 )    Color: Light Yellow / Appearance: Clear / S.013 / pH: x  Gluc: x / Ketone: Negative  / Bili: Negative / Urobili: Negative   Blood: x / Protein: 30 mg/dL / Nitrite: Negative   Leuk Esterase: Negative / RBC: 1 /hpf / WBC 2 /HPF   Sq Epi: x / Non Sq Epi: 3 /hpf / Bacteria: Negative        RADIOLOGY & ADDITIONAL TESTS:    Imaging Personally Reviewed:    Consultant(s) Notes Reviewed:      Care Discussed with Consultants/Other Providers: Patient is a 60y old  Female who presents with a chief complaint of APML (2020 08:41)      SUBJECTIVE / OVERNIGHT EVENTS: Patient seen and examined this AM with her son on speaker phone. She feels better today, No rigors, no fever and the pain at the PICC line site is also improved. no nausea or vomiting.     MEDICATIONS  (STANDING):  arsenic trioxide IVPB (eMAR) 11 milliGRAM(s) IV Intermittent every 24 hours  ATENolol  Tablet 25 milliGRAM(s) Oral daily  baclofen 5 milliGRAM(s) Oral every 12 hours  benzonatate 100 milliGRAM(s) Oral every 8 hours  Biotene Dry Mouth Oral Rinse 5 milliLiter(s) Swish and Spit five times a day  bisacodyl 5 milliGRAM(s) Oral every 12 hours  dextrose 5%. 1000 milliLiter(s) (50 mL/Hr) IV Continuous <Continuous>  dextrose 50% Injectable 12.5 Gram(s) IV Push once  dextrose 50% Injectable 25 Gram(s) IV Push once  dextrose 50% Injectable 25 Gram(s) IV Push once  hydrocortisone 2.5% Rectal Cream 1 Application(s) Rectal every 8 hours  insulin lispro (HumaLOG) corrective regimen sliding scale   SubCutaneous three times a day before meals  insulin lispro (HumaLOG) corrective regimen sliding scale   SubCutaneous at bedtime  magnesium sulfate  IVPB 2 Gram(s) IV Intermittent once  pantoprazole    Tablet 40 milliGRAM(s) Oral before breakfast  petrolatum white Ointment 1 Application(s) Topical three times a day  polyethylene glycol 3350 17 Gram(s) Oral daily  sodium chloride 0.65% Nasal 1 Spray(s) Both Nostrils every 8 hours  sodium chloride 0.9%. 1000 milliLiter(s) (25 mL/Hr) IV Continuous <Continuous>  tretinoin 40 milliGRAM(s) Oral every 12 hours  vancomycin  IVPB      vancomycin  IVPB 1000 milliGRAM(s) IV Intermittent every 12 hours  witch hazel Pads 1 Application(s) Topical every 12 hours    MEDICATIONS  (PRN):  acetaminophen   Tablet .. 650 milliGRAM(s) Oral every 6 hours PRN Temp greater or equal to 38C (100.4F), Mild Pain (1 - 3)  ALBUTerol    90 MICROgram(s) HFA Inhaler 2 Puff(s) Inhalation every 6 hours PRN Shortness of Breath and/or Wheezing  AQUAPHOR (petrolatum Ointment) 1 Application(s) Topical every 3 hours PRN Dry skin  dextrose 40% Gel 15 Gram(s) Oral once PRN Blood Glucose LESS THAN 70 milliGRAM(s)/deciliter  diphenhydrAMINE   Injectable 25 milliGRAM(s) IV Push every 6 hours PRN Allergy symptoms  glucagon  Injectable 1 milliGRAM(s) IntraMuscular once PRN Glucose LESS THAN 70 milligrams/deciliter  guaiFENesin   Syrup  (Sugar-Free) 100 milliGRAM(s) Oral every 6 hours PRN Cough  hemorrhoidal Ointment 1 Application(s) Rectal every 8 hours PRN Hemorrhoids  ondansetron Injectable 8 milliGRAM(s) IV Push every 8 hours PRN Nausea and/or Vomiting  sodium chloride 0.9% lock flush 10 milliLiter(s) IV Push every 1 hour PRN Pre/post blood products, medications, blood draw, and to maintain line patency      Vital Signs Last 24 Hrs  T(C): 36.7 (2020 05:00), Max: 37.4 (2020 21:52)  T(F): 98 (2020 05:00), Max: 99.3 (2020 21:52)  HR: 79 (2020 05:00) (70 - 92)  BP: 110/59 (2020 05:00) (107/63 - 142/73)  BP(mean): --  RR: 19 (2020 05:00) (18 - 19)  SpO2: 94% (2020 05:00) (94% - 100%)  CAPILLARY BLOOD GLUCOSE      POCT Blood Glucose.: 132 mg/dL (2020 07:49)  POCT Blood Glucose.: 127 mg/dL (2020 21:21)  POCT Blood Glucose.: 112 mg/dL (2020 15:50)  POCT Blood Glucose.: 198 mg/dL (2020 12:07)    I&O's Summary    2020 07:01  -  2020 07:00  --------------------------------------------------------  IN: 1475 mL / OUT: 0 mL / NET: 1475 mL        PHYSICAL EXAM:  ENERAL: Tired looking   HEAD:  Atraumatic, Normocephalic  EYES: + pallor Conjunctiva and anicteric sclera clear  NECK: Supple   CHEST/LUNG: Clear to auscultation bilaterally anteriorly ; No wheeze NO crackles   HEART: S1S2 normal. Regular rate   ABDOMEN: Soft, Nontender, Nondistended; Bowel sounds present  EXTREMITIES:  No clubbing, cyanosis, or edema. RUE PICC line site - skin with some erythema but not warm. looks better today   PSYCH/Neuro: AAOx3. Non-focal.   SKIN: As above.     LABS:                        7.9    3.08  )-----------( 43       ( 2020 11:02 )             23.2                           6.3    3.54  )-----------( 58       ( 2020 06:26 )             19.3     06-21    141  |  104  |  18  ----------------------------<  124<H>  4.3   |  24  |  0.89    Ca    9.5      2020 06:26  Phos  3.8     06-21  Mg     1.8     06-21    TPro  7.7  /  Alb  4.7  /  TBili  0.5  /  DBili  x   /  AST  24  /  ALT  29  /  AlkPhos  105  06-21          Urinalysis Basic - ( 2020 15:57 )    Color: Light Yellow / Appearance: Clear / S.013 / pH: x  Gluc: x / Ketone: Negative  / Bili: Negative / Urobili: Negative   Blood: x / Protein: 30 mg/dL / Nitrite: Negative   Leuk Esterase: Negative / RBC: 1 /hpf / WBC 2 /HPF   Sq Epi: x / Non Sq Epi: 3 /hpf / Bacteria: Negative        RADIOLOGY & ADDITIONAL TESTS:    Imaging Personally Reviewed:    Consultant(s) Notes Reviewed:      Care Discussed with Consultants/Other Providers:

## 2020-06-21 NOTE — PROGRESS NOTE ADULT - ASSESSMENT
61 yo Greenlandic speaking F with PMHx of HTN, HLD, T2DM, COVID-19 infection admitted for management of newly diagnosed APL. The patient is currently reciving ATRA and Arsenic.  Patients hospital course has been complicated by transaminitis and refractory thrombocytopenia. The patient has pancytopenia 2/2 disease and/or ATRA/Arsenic. Course further c/b repeat covid PCR testing positive after interim multiple negative tests; now transferred to covid medicine unit but repeat COVID test is negative x1 61 yo Polish speaking F with PMHx of HTN, HLD, T2DM, COVID-19 infection admitted for management of newly diagnosed APL. The patient is currently reciving ATRA and Arsenic.  Patients hospital course has been complicated by transaminitis and refractory thrombocytopenia. The patient has pancytopenia 2/2 disease and/or ATRA/Arsenic. Course further c/b repeat covid PCR testing positive after interim multiple negative tests; now transferred to covid medicine unit but repeat COVID test is negative x1 and to be repeated another one. She received PRBC and platelets and had rigors, pain and erythema at the PICC line site so the cultures were done started on IV vancomycin pending blood culture.

## 2020-06-21 NOTE — PROGRESS NOTE ADULT - ATTENDING COMMENTS
59 y/o Amharic speaking F who is transferred for newly diagnosed low/interm risk APL.  Presented with pancytopenia, flow cytometry/bone marrow biopsy/FISH from 5/26- consistent with acute promyelocytic leukemia with PML-AWA translocation.    She was started on ATRA 5/23- day +28, GALA 5/27- day +24.    -CT chest 6/6 c/w granulomatous disease (hx of TB in 1980's) pt had SOB on 6/4, now resolved.  Daily weights, diurese. Will give Hydrea 500mg if wbc>10. On Prednisone 20mg through 5/12- now off.  If pt becomes SOB again, may restart  -tremors improved, unclear etiology, electrolytes WNL. Cefepime held, given Baclofen -they improved  -Check CBC, coags/fibrinogen once daily.  Goal plt >40-50, fibrinogen >150; responding to ABO-matched platelets given over 3 hrs in 1/2 unit infusions   -monitor lytes daily, keep K>4, Mg>2.  EKG twice a week  -off antibiotics  -plan on BM bx around day 29 post GALA  -COVID19+ on 6/17, moved to 9

## 2020-06-21 NOTE — PROGRESS NOTE ADULT - PROBLEM SELECTOR PLAN 2
The patient is not neutropenic, afebrile  If febrile, Pan Cx and CXR  Previous COVID-19 (+) outpatient. Then COVID (-) on 5/22, 5/26, 6/2 and 6/9.   On 6/16, patient w/ a cough. F/u CXR on 6/16 showed no acute pathology. COVID-19 PCR was repeated (routinely) on 6/16 and it returned positive. Patient now transferred to [facility]. Appreciate care as per Medicine team  - COVID-19 Abs noted to be positive on 6/18.  Patient will need 2 consecutive negative PCRs prior to transfer back to [facility].  - Monitor CBC and fever curve

## 2020-06-21 NOTE — PROGRESS NOTE ADULT - PROBLEM SELECTOR PLAN 3
Patient previously with c/o cough and SOB with stable O2 sat and concern for differentiation syndrome, now intermittent  6/5 Patient received one dose of Dexamethasone   CXR (-) for acute infiltrates and (+) atelectasis   Continue Incentive Spirometer   Lasix PRN  6/8 and 6/9 Hydrea 500mg x 1 given  6/7 CT chest with biapical pleural-parenchymal scarring with calcification, likely from prior granulomatous disease. No consolidation. (Son reports a history of TB in 1980s)  Nasal Cannula PRN  C/w Tessalon Perles and Robitussin PRN

## 2020-06-21 NOTE — PROGRESS NOTE ADULT - PROBLEM SELECTOR PLAN 1
-Continue ATRA 40mg BID (started 5/22) and Arsenic Trioxide daily (started on 5/27) as per Haem  -Monitor for differentiation syndrome and check EKG for QTc prolongation every Tuesday and Friday. 6/19 EKG with QTc 427   -Keep K > 4   -S/P  Hydrea 500mg PO x 1 on 6/8 and 6/9 for WBC >10  -Monitor  CBC with diff, CMP, TLS and DIC panel daily  Keep PLTs >50 K. If unable to achieve goal continue with platelets infusion (single donor) 1/2 units over 3 hours every 12 hours. Last transfused this am for Platelets 31 today  -s/p Prednisone 20mg PO daily x 5 days (through 6/12) due to platelet antibody.  -Keep Fibrinogen >150 if less give Cryo 6/19 -666  -Strict Is and Os/Daily weights/Mouth Care.  -plan on BM bx around day 29 post GALA .  -PICC line irritation: c/w aquaphor and local skin care,  Warm compresses. PICC team followed up and Xray with tip in right place. no sign of obvious cellulitis - if get worse or spike fever then blood culture and start Abx   -Care plan as per hematology/oncology. -Continue ATRA 40mg BID (started 5/22) and Arsenic Trioxide daily (started on 5/27) as per Haem  -Monitor for differentiation syndrome and check EKG for QTc prolongation every Tuesday and Friday. 6/19 EKG with QTc 427   -Keep K > 4   -S/P  Hydrea 500mg PO x 1 on 6/8 and 6/9 for WBC >10  -Monitor  CBC with diff, CMP, TLS and DIC panel daily  Keep PLTs >50 K. If unable to achieve goal continue with platelets infusion (single donor) 1/2 units over 3 hours every 12 hours. Last transfused yesterday Platelets 43 today  -s/p Prednisone 20mg PO daily x 5 days (through 6/12) due to platelet antibody.  -Keep Fibrinogen >150 if less give Cryo 6/19 -666  -Strict Is and Os/Daily weights/Mouth Care.  -plan on BM bx around day 29 post GALA .  -PICC line erythema and induration to rule infection on IV Vancomycin pending culture   -Care plan as per hematology/oncology.

## 2020-06-21 NOTE — PROGRESS NOTE ADULT - SUBJECTIVE AND OBJECTIVE BOX
Diagnosis: Acute Promyelocytic leukemia     Protocol/Chemo Regimen: ATRA/Arsenic     Day: ATRA Day 30 and Arsenic Day 26    Review of Systems: Patient denies headache, chest pain, shortness of breath, nausea, vomiting, or abdominal pain.    Pain scale: 0-1    Diet: Consistent Carb w/ Evening Snack    Allergies    No Known Allergies    Intolerances        ANTIMICROBIALS  vancomycin  IVPB      vancomycin  IVPB 1000 milliGRAM(s) IV Intermittent every 12 hours      HEME/ONC MEDICATIONS  arsenic trioxide IVPB (eMAR) 11 milliGRAM(s) IV Intermittent every 24 hours  tretinoin 40 milliGRAM(s) Oral every 12 hours      STANDING MEDICATIONS  ATENolol  Tablet 25 milliGRAM(s) Oral daily  baclofen 5 milliGRAM(s) Oral every 12 hours  benzonatate 100 milliGRAM(s) Oral every 8 hours  Biotene Dry Mouth Oral Rinse 5 milliLiter(s) Swish and Spit five times a day  bisacodyl 5 milliGRAM(s) Oral every 12 hours  dextrose 5%. 1000 milliLiter(s) IV Continuous <Continuous>  dextrose 50% Injectable 12.5 Gram(s) IV Push once  dextrose 50% Injectable 25 Gram(s) IV Push once  dextrose 50% Injectable 25 Gram(s) IV Push once  hydrocortisone 2.5% Rectal Cream 1 Application(s) Rectal every 8 hours  insulin lispro (HumaLOG) corrective regimen sliding scale   SubCutaneous three times a day before meals  insulin lispro (HumaLOG) corrective regimen sliding scale   SubCutaneous at bedtime  pantoprazole    Tablet 40 milliGRAM(s) Oral before breakfast  petrolatum white Ointment 1 Application(s) Topical three times a day  polyethylene glycol 3350 17 Gram(s) Oral daily  sodium chloride 0.65% Nasal 1 Spray(s) Both Nostrils every 8 hours  sodium chloride 0.9%. 1000 milliLiter(s) IV Continuous <Continuous>  witch hazel Pads 1 Application(s) Topical every 12 hours      PRN MEDICATIONS  acetaminophen   Tablet .. 650 milliGRAM(s) Oral every 6 hours PRN  ALBUTerol    90 MICROgram(s) HFA Inhaler 2 Puff(s) Inhalation every 6 hours PRN  AQUAPHOR (petrolatum Ointment) 1 Application(s) Topical every 3 hours PRN  dextrose 40% Gel 15 Gram(s) Oral once PRN  diphenhydrAMINE   Injectable 25 milliGRAM(s) IV Push every 6 hours PRN  glucagon  Injectable 1 milliGRAM(s) IntraMuscular once PRN  guaiFENesin   Syrup  (Sugar-Free) 100 milliGRAM(s) Oral every 6 hours PRN  hemorrhoidal Ointment 1 Application(s) Rectal every 8 hours PRN  ondansetron Injectable 8 milliGRAM(s) IV Push every 8 hours PRN  sodium chloride 0.9% lock flush 10 milliLiter(s) IV Push every 1 hour PRN        Vital Signs Last 24 Hrs  T(C): 36.7 (21 Jun 2020 05:00), Max: 37.4 (20 Jun 2020 21:52)  T(F): 98 (21 Jun 2020 05:00), Max: 99.3 (20 Jun 2020 21:52)  HR: 79 (21 Jun 2020 05:00) (70 - 92)  BP: 110/59 (21 Jun 2020 05:00) (107/63 - 142/73)  BP(mean): --  RR: 19 (21 Jun 2020 05:00) (18 - 19)  SpO2: 94% (21 Jun 2020 05:00) (94% - 100%)     PHYSICAL EXAM:  Constitutional: NAD  HEENT: NC/AT, MMM  Respiratory: Grossly CTAB; No wheeze appreciated  Cardiovascular: RRR; +S1/S2  Gastrointestinal: +BS, Soft, NT, No rigidity  Extremities: No LE edema, + RUE PICC line  Neurological: Awake and alert, Answering questions and following commands appropriately  Skin: Warm and dry, Erythema noted around RUE PICC line without significant TTP  Psychiatric: Calm and cooperative    RECENT CULTURES:        LABS:                        6.3    3.54  )-----------( 58       ( 21 Jun 2020 06:26 )             19.3         Mean Cell Volume : 86.9 fl  Mean Cell Hemoglobin : 28.4 pg  Mean Cell Hemoglobin Concentration : 32.6 gm/dL  Auto Neutrophil # : x  Auto Lymphocyte # : x  Auto Monocyte # : x  Auto Eosinophil # : x  Auto Basophil # : x  Auto Neutrophil % : x  Auto Lymphocyte % : x  Auto Monocyte % : x  Auto Eosinophil % : x  Auto Basophil % : x      06-21    141  |  104  |  18  ----------------------------<  124<H>  4.3   |  24  |  0.89    Ca    9.5      21 Jun 2020 06:26  Phos  3.8     06-21  Mg     1.8     06-21    TPro  7.7  /  Alb  4.7  /  TBili  0.5  /  DBili  x   /  AST  24  /  ALT  29  /  AlkPhos  105  06-21      Mg 1.8  Phos 3.8              RADIOLOGY & ADDITIONAL STUDIES:

## 2020-06-22 DIAGNOSIS — R21 RASH AND OTHER NONSPECIFIC SKIN ERUPTION: ICD-10-CM

## 2020-06-22 LAB
-  AMPICILLIN: SIGNIFICANT CHANGE UP
-  CIPROFLOXACIN: SIGNIFICANT CHANGE UP
-  LEVOFLOXACIN: SIGNIFICANT CHANGE UP
-  NITROFURANTOIN: SIGNIFICANT CHANGE UP
-  TETRACYCLINE: SIGNIFICANT CHANGE UP
-  VANCOMYCIN: SIGNIFICANT CHANGE UP
ALBUMIN SERPL ELPH-MCNC: 4.6 G/DL — SIGNIFICANT CHANGE UP (ref 3.3–5)
ALP SERPL-CCNC: 100 U/L — SIGNIFICANT CHANGE UP (ref 40–120)
ALT FLD-CCNC: 30 U/L — SIGNIFICANT CHANGE UP (ref 10–45)
ANION GAP SERPL CALC-SCNC: 13 MMOL/L — SIGNIFICANT CHANGE UP (ref 5–17)
APTT BLD: 29.5 SEC — SIGNIFICANT CHANGE UP (ref 27.5–36.3)
AST SERPL-CCNC: 26 U/L — SIGNIFICANT CHANGE UP (ref 10–40)
BASOPHILS # BLD AUTO: 0.02 K/UL — SIGNIFICANT CHANGE UP (ref 0–0.2)
BASOPHILS NFR BLD AUTO: 0.9 % — SIGNIFICANT CHANGE UP (ref 0–2)
BILIRUB SERPL-MCNC: 0.5 MG/DL — SIGNIFICANT CHANGE UP (ref 0.2–1.2)
BLD GP AB SCN SERPL QL: NEGATIVE — SIGNIFICANT CHANGE UP
BUN SERPL-MCNC: 14 MG/DL — SIGNIFICANT CHANGE UP (ref 7–23)
CALCIUM SERPL-MCNC: 9.6 MG/DL — SIGNIFICANT CHANGE UP (ref 8.4–10.5)
CHLORIDE SERPL-SCNC: 104 MMOL/L — SIGNIFICANT CHANGE UP (ref 96–108)
CO2 SERPL-SCNC: 24 MMOL/L — SIGNIFICANT CHANGE UP (ref 22–31)
CREAT SERPL-MCNC: 0.8 MG/DL — SIGNIFICANT CHANGE UP (ref 0.5–1.3)
CULTURE RESULTS: SIGNIFICANT CHANGE UP
D DIMER BLD IA.RAPID-MCNC: 1109 NG/ML DDU — HIGH
EOSINOPHIL # BLD AUTO: 0.07 K/UL — SIGNIFICANT CHANGE UP (ref 0–0.5)
EOSINOPHIL NFR BLD AUTO: 2.6 % — SIGNIFICANT CHANGE UP (ref 0–6)
FIBRINOGEN PPP-MCNC: 385 MG/DL — SIGNIFICANT CHANGE UP (ref 350–510)
GLUCOSE BLDC GLUCOMTR-MCNC: 132 MG/DL — HIGH (ref 70–99)
GLUCOSE BLDC GLUCOMTR-MCNC: 148 MG/DL — HIGH (ref 70–99)
GLUCOSE BLDC GLUCOMTR-MCNC: 158 MG/DL — HIGH (ref 70–99)
GLUCOSE BLDC GLUCOMTR-MCNC: 160 MG/DL — HIGH (ref 70–99)
GLUCOSE SERPL-MCNC: 105 MG/DL — HIGH (ref 70–99)
HCT VFR BLD CALC: 22.3 % — LOW (ref 34.5–45)
HGB BLD-MCNC: 7.4 G/DL — LOW (ref 11.5–15.5)
INR BLD: 1.19 RATIO — HIGH (ref 0.88–1.16)
LYMPHOCYTES # BLD AUTO: 1.06 K/UL — SIGNIFICANT CHANGE UP (ref 1–3.3)
LYMPHOCYTES # BLD AUTO: 41.7 % — SIGNIFICANT CHANGE UP (ref 13–44)
MAGNESIUM SERPL-MCNC: 2.1 MG/DL — SIGNIFICANT CHANGE UP (ref 1.6–2.6)
MCHC RBC-ENTMCNC: 29.1 PG — SIGNIFICANT CHANGE UP (ref 27–34)
MCHC RBC-ENTMCNC: 33.2 GM/DL — SIGNIFICANT CHANGE UP (ref 32–36)
MCV RBC AUTO: 87.8 FL — SIGNIFICANT CHANGE UP (ref 80–100)
METHOD TYPE: SIGNIFICANT CHANGE UP
MONOCYTES # BLD AUTO: 0 K/UL — SIGNIFICANT CHANGE UP (ref 0–0.9)
MONOCYTES NFR BLD AUTO: 0 % — LOW (ref 2–14)
NEUTROPHILS # BLD AUTO: 1.39 K/UL — LOW (ref 1.8–7.4)
NEUTROPHILS NFR BLD AUTO: 54.8 % — SIGNIFICANT CHANGE UP (ref 43–77)
ORGANISM # SPEC MICROSCOPIC CNT: SIGNIFICANT CHANGE UP
ORGANISM # SPEC MICROSCOPIC CNT: SIGNIFICANT CHANGE UP
PHOSPHATE SERPL-MCNC: 4.2 MG/DL — SIGNIFICANT CHANGE UP (ref 2.5–4.5)
PLATELET # BLD AUTO: 46 K/UL — LOW (ref 150–400)
POTASSIUM SERPL-MCNC: 3.9 MMOL/L — SIGNIFICANT CHANGE UP (ref 3.5–5.3)
POTASSIUM SERPL-SCNC: 3.9 MMOL/L — SIGNIFICANT CHANGE UP (ref 3.5–5.3)
PROT SERPL-MCNC: 7.5 G/DL — SIGNIFICANT CHANGE UP (ref 6–8.3)
PROTHROM AB SERPL-ACNC: 13.6 SEC — HIGH (ref 10–12.9)
RBC # BLD: 2.54 M/UL — LOW (ref 3.8–5.2)
RBC # FLD: 14.7 % — HIGH (ref 10.3–14.5)
RH IG SCN BLD-IMP: POSITIVE — SIGNIFICANT CHANGE UP
SARS-COV-2 RNA SPEC QL NAA+PROBE: SIGNIFICANT CHANGE UP
SODIUM SERPL-SCNC: 141 MMOL/L — SIGNIFICANT CHANGE UP (ref 135–145)
SPECIMEN SOURCE: SIGNIFICANT CHANGE UP
VANCOMYCIN TROUGH SERPL-MCNC: 13.1 UG/ML — SIGNIFICANT CHANGE UP (ref 10–20)
WBC # BLD: 2.54 K/UL — LOW (ref 3.8–10.5)
WBC # FLD AUTO: 2.54 K/UL — LOW (ref 3.8–10.5)

## 2020-06-22 PROCEDURE — 99233 SBSQ HOSP IP/OBS HIGH 50: CPT

## 2020-06-22 PROCEDURE — 99233 SBSQ HOSP IP/OBS HIGH 50: CPT | Mod: GC

## 2020-06-22 RX ORDER — POTASSIUM CHLORIDE 20 MEQ
20 PACKET (EA) ORAL ONCE
Refills: 0 | Status: COMPLETED | OUTPATIENT
Start: 2020-06-22 | End: 2020-06-22

## 2020-06-22 RX ORDER — GABAPENTIN 400 MG/1
100 CAPSULE ORAL DAILY
Refills: 0 | Status: DISCONTINUED | OUTPATIENT
Start: 2020-06-22 | End: 2020-06-23

## 2020-06-22 RX ORDER — VALACYCLOVIR 500 MG/1
1000 TABLET, FILM COATED ORAL EVERY 8 HOURS
Refills: 0 | Status: COMPLETED | OUTPATIENT
Start: 2020-06-22 | End: 2020-06-29

## 2020-06-22 RX ORDER — ONDANSETRON 8 MG/1
4 TABLET, FILM COATED ORAL ONCE
Refills: 0 | Status: COMPLETED | OUTPATIENT
Start: 2020-06-22 | End: 2020-06-22

## 2020-06-22 RX ORDER — SOD,AMMONIUM,POTASSIUM LACTATE
1 CREAM (GRAM) TOPICAL
Refills: 0 | Status: DISCONTINUED | OUTPATIENT
Start: 2020-06-22 | End: 2020-07-17

## 2020-06-22 RX ADMIN — Medication 25 MILLIGRAM(S): at 21:33

## 2020-06-22 RX ADMIN — Medication 1 SPRAY(S): at 14:10

## 2020-06-22 RX ADMIN — GABAPENTIN 100 MILLIGRAM(S): 400 CAPSULE ORAL at 14:10

## 2020-06-22 RX ADMIN — Medication 5 MILLIGRAM(S): at 17:49

## 2020-06-22 RX ADMIN — AER TRAVELER 1 APPLICATION(S): 0.5 SOLUTION RECTAL; TOPICAL at 05:29

## 2020-06-22 RX ADMIN — Medication 5 MILLIGRAM(S): at 22:10

## 2020-06-22 RX ADMIN — POLYETHYLENE GLYCOL 3350 17 GRAM(S): 17 POWDER, FOR SOLUTION ORAL at 11:23

## 2020-06-22 RX ADMIN — TRETINOIN 40 MILLIGRAM(S): 10 CAPSULE, LIQUID FILLED ORAL at 20:19

## 2020-06-22 RX ADMIN — PANTOPRAZOLE SODIUM 40 MILLIGRAM(S): 20 TABLET, DELAYED RELEASE ORAL at 05:26

## 2020-06-22 RX ADMIN — Medication 1 APPLICATION(S): at 05:27

## 2020-06-22 RX ADMIN — Medication 5 MILLIGRAM(S): at 05:26

## 2020-06-22 RX ADMIN — Medication 1 APPLICATION(S): at 22:00

## 2020-06-22 RX ADMIN — Medication 1 SPRAY(S): at 22:00

## 2020-06-22 RX ADMIN — VALACYCLOVIR 1000 MILLIGRAM(S): 500 TABLET, FILM COATED ORAL at 21:32

## 2020-06-22 RX ADMIN — ONDANSETRON 8 MILLIGRAM(S): 8 TABLET, FILM COATED ORAL at 22:10

## 2020-06-22 RX ADMIN — Medication 1: at 08:29

## 2020-06-22 RX ADMIN — Medication 5 MILLILITER(S): at 21:31

## 2020-06-22 RX ADMIN — Medication 250 MILLIGRAM(S): at 17:50

## 2020-06-22 RX ADMIN — VALACYCLOVIR 1000 MILLIGRAM(S): 500 TABLET, FILM COATED ORAL at 14:10

## 2020-06-22 RX ADMIN — Medication 1 APPLICATION(S): at 14:10

## 2020-06-22 RX ADMIN — Medication 20 MILLIEQUIVALENT(S): at 16:04

## 2020-06-22 RX ADMIN — Medication 650 MILLIGRAM(S): at 21:32

## 2020-06-22 RX ADMIN — Medication 250 MILLIGRAM(S): at 06:58

## 2020-06-22 RX ADMIN — Medication 100 MILLIGRAM(S): at 21:31

## 2020-06-22 RX ADMIN — Medication 100 MILLIGRAM(S): at 14:10

## 2020-06-22 RX ADMIN — Medication 5 MILLILITER(S): at 11:26

## 2020-06-22 RX ADMIN — ONDANSETRON 4 MILLIGRAM(S): 8 TABLET, FILM COATED ORAL at 11:22

## 2020-06-22 RX ADMIN — AER TRAVELER 1 APPLICATION(S): 0.5 SOLUTION RECTAL; TOPICAL at 17:50

## 2020-06-22 RX ADMIN — Medication 100 MILLIGRAM(S): at 05:26

## 2020-06-22 RX ADMIN — TRETINOIN 40 MILLIGRAM(S): 10 CAPSULE, LIQUID FILLED ORAL at 08:29

## 2020-06-22 RX ADMIN — Medication 1 SPRAY(S): at 05:27

## 2020-06-22 RX ADMIN — ATENOLOL 25 MILLIGRAM(S): 25 TABLET ORAL at 05:27

## 2020-06-22 NOTE — PROGRESS NOTE ADULT - PROBLEM SELECTOR PLAN 4
New onset essential tremor which has improved with Baclofen 5mg q12h  Continue to monitor - New onset essential tremor which has improved with Baclofen 5mg q12h  - Continue to monitor

## 2020-06-22 NOTE — PROGRESS NOTE ADULT - ATTENDING COMMENTS
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  Presented with pancytopenia, flow cytometry/bone marrow biopsy/FISH from 5/26- consistent with acute promyelocytic leukemia with PML-AWA translocation.    She was started on ATRA 5/23- day +28, GALA 5/27- day +24.    -CT chest 6/6 c/w granulomatous disease (hx of TB in 1980's) pt had SOB on 6/4, now resolved.  Daily weights, diurese. Will give Hydrea 500mg if wbc>10. On Prednisone 20mg through 5/12- now off.  If pt becomes SOB again, may restart  -tremors improved, unclear etiology, electrolytes WNL. Cefepime held, given Baclofen -they improved  -Check CBC, coags/fibrinogen once daily.  Goal plt >40-50, fibrinogen >150; responding to ABO-matched platelets given over 3 hrs in 1/2 unit infusions   -monitor lytes daily, keep K>4, Mg>2.  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  Presented with pancytopenia, flow cytometry/bone marrow biopsy/FISH from 5/26- consistent with acute promyelocytic leukemia with PML-AWA translocation.    She was started on ATRA 5/23- day +29, GALA 5/27- day +25.    -CT chest 6/6 c/w granulomatous disease (hx of TB in 1980's) pt had SOB on 6/4, now resolved.  Daily weights, diurese. Will give Hydrea 500mg if wbc>10. On Prednisone 20mg through 5/12- now off.  If pt becomes SOB again, may restart  -tremors improved, unclear etiology, electrolytes WNL. Cefepime held, given Baclofen -they improved  -Check CBC, coags/fibrinogen once daily.  Goal plt >40-50, fibrinogen >150; responding to ABO-matched platelets given over 3 hrs in 1/2 unit infusions   -monitor lytes daily, keep K>4, Mg>2.  EKG twice a week  -off antibiotics  -plan on BM bx around day 29 post GALA  -COVID19+ on 6/17, moved to 9M  -Rash on R buttock and back of RLE. Called derm, holding GALA for now.

## 2020-06-22 NOTE — PROGRESS NOTE ADULT - PROBLEM SELECTOR PLAN 2
vesicular buttock lesion (hemorraghic) with vesicular posterior thigh lesion highly suspicious of herpes zoster (not crossing midline)  - d/w heme/onc no contraindication (with recent chemo) will start valacyclovir 1 g Q8h x7days   - gabapentin for neuropathic pain    - derm consult called f/u recs   - monitor rash daily  - monitor CMP

## 2020-06-22 NOTE — PROGRESS NOTE ADULT - PROBLEM SELECTOR PLAN 2
- Patient noted to have a shingles-like rash on her R buttock and R posterior thigh today. Given this concern, will hold Arsenic for now. No objection to starting Valtrex.  - Dermatology consulted for further evaluation of new rash. Follow-up recs  - The patient has mild neutropenia today (ANC= 1390), but is afebrile  - If febrile, Pan Cx and CXR  - Blood cultures were checked on 6/20 due to an episode of rigors. No fevers were noted during this episode. BCx have had NGTD. Continue to monitor  - Some hyperpigmentation noted around RUE PICC site (possibly related to sensitivity to prior adhesive tape?). RUE U/S (6/20) showed mild subcutaneous edema, but no evidence of hematoma or fluid collection. Continue to monitor  - Previous COVID-19 (+) outpatient. Then COVID (-) on 5/22, 5/26, 6/2 and 6/9.   On 6/16, patient w/ a cough. F/u CXR on 6/16 showed no acute pathology. COVID-19 PCR was repeated (routinely) on 6/16 and it returned positive. Patient now transferred to [facility]. Appreciate care as per Medicine team  - COVID-19 Abs noted to be positive on 6/18. Repeat COVID-19 PCRs on 6/19 and 6/21 have been negative. Infection control to evaluate if patient is eligible to be removed from isolation  - Monitor CBC WIFF DIFF and fever curve - Patient noted to have a shingles-like rash on her R buttock and R posterior thigh today. Given this concern, will hold Arsenic for now. No objection to starting Valtrex.  - Dermatology consulted for further evaluation of new rash. Follow-up recs  - The patient has mild neutropenia today (ANC= 1390), but is afebrile  - If febrile, Pan Cx and CXR  - Blood cultures were checked on 6/20 due to an episode of rigors. No fevers were noted during this episode. BCx have had NGTD. Continue to monitor  - Some hyperpigmentation noted around RUE PICC site (possibly related to sensitivity to prior adhesive tape?). RUE U/S (6/20) showed mild subcutaneous edema, but no evidence of hematoma or fluid collection. Continue to monitor  - Previous COVID-19 (+) outpatient. Then COVID (-) on 5/22, 5/26, 6/2 and 6/9.   On 6/16, patient w/ a cough. F/u CXR on 6/16 showed no acute pathology. COVID-19 PCR was repeated (routinely) on 6/16 and it returned positive. Patient now transferred to [facility]. Appreciate care as per Medicine team  - COVID-19 Abs noted to be positive on 6/18. Repeat COVID-19 PCRs on 6/19 and 6/21 have been negative. Infection control to evaluate if patient is eligible to be removed from isolation  - Monitor CBC WITH DIFF and fever curve

## 2020-06-22 NOTE — PROGRESS NOTE ADULT - ASSESSMENT
Patient is a 61 y/o Portuguese-speaking F w/ a PMHx of HTN, HLD, T2DM, and COVID-19 infection in 4/2020 who was admitted for management of newly diagnosed APL. The patient is currently receiving ATRA and Arsenic. Patients hospital course has been complicated by transaminitis, refractory thrombocytopenia, and she was found have a positive COVID-19 PCR on 6/16. The patient has pancytopenia 2/2 disease and/or ATRA/Arsenic. Hospital course c/b R buttock/posterior thigh rash concerning for shingles. Arsenic held on 6/22 (Day 27).

## 2020-06-22 NOTE — PROGRESS NOTE ADULT - SUBJECTIVE AND OBJECTIVE BOX
Diagnosis: Acute Promyelocytic leukemia     Protocol/Chemo Regimen: ATRA/Arsenic     Day: ATRA Day 31 and Arsenic Day 27    Review of Systems:     Pain scale    Diet: Consistent Carb w/ Evening Snack    Allergies    No Known Allergies    Intolerances        ANTIMICROBIALS  vancomycin  IVPB      vancomycin  IVPB 1000 milliGRAM(s) IV Intermittent every 12 hours      HEME/ONC MEDICATIONS  arsenic trioxide IVPB (eMAR) 11 milliGRAM(s) IV Intermittent every 24 hours  tretinoin 40 milliGRAM(s) Oral every 12 hours      STANDING MEDICATIONS  ATENolol  Tablet 25 milliGRAM(s) Oral daily  baclofen 5 milliGRAM(s) Oral every 12 hours  benzonatate 100 milliGRAM(s) Oral every 8 hours  Biotene Dry Mouth Oral Rinse 5 milliLiter(s) Swish and Spit five times a day  bisacodyl 5 milliGRAM(s) Oral every 12 hours  dextrose 5%. 1000 milliLiter(s) IV Continuous <Continuous>  dextrose 50% Injectable 12.5 Gram(s) IV Push once  dextrose 50% Injectable 25 Gram(s) IV Push once  dextrose 50% Injectable 25 Gram(s) IV Push once  hydrocortisone 2.5% Rectal Cream 1 Application(s) Rectal every 8 hours  insulin lispro (HumaLOG) corrective regimen sliding scale   SubCutaneous three times a day before meals  insulin lispro (HumaLOG) corrective regimen sliding scale   SubCutaneous at bedtime  pantoprazole    Tablet 40 milliGRAM(s) Oral before breakfast  petrolatum white Ointment 1 Application(s) Topical three times a day  polyethylene glycol 3350 17 Gram(s) Oral daily  sodium chloride 0.65% Nasal 1 Spray(s) Both Nostrils every 8 hours  sodium chloride 0.9%. 1000 milliLiter(s) IV Continuous <Continuous>  witch hazel Pads 1 Application(s) Topical every 12 hours      PRN MEDICATIONS  acetaminophen   Tablet .. 650 milliGRAM(s) Oral every 6 hours PRN  ALBUTerol    90 MICROgram(s) HFA Inhaler 2 Puff(s) Inhalation every 6 hours PRN  AQUAPHOR (petrolatum Ointment) 1 Application(s) Topical every 3 hours PRN  dextrose 40% Gel 15 Gram(s) Oral once PRN  diphenhydrAMINE   Injectable 25 milliGRAM(s) IV Push every 6 hours PRN  glucagon  Injectable 1 milliGRAM(s) IntraMuscular once PRN  guaiFENesin   Syrup  (Sugar-Free) 100 milliGRAM(s) Oral every 6 hours PRN  hemorrhoidal Ointment 1 Application(s) Rectal every 8 hours PRN  ondansetron Injectable 8 milliGRAM(s) IV Push every 8 hours PRN  sodium chloride 0.9% lock flush 10 milliLiter(s) IV Push every 1 hour PRN        Vital Signs Last 24 Hrs  T(C): 36.8 (22 Jun 2020 08:26), Max: 37 (21 Jun 2020 10:16)  T(F): 98.3 (22 Jun 2020 08:26), Max: 98.6 (21 Jun 2020 10:16)  HR: 61 (22 Jun 2020 08:26) (61 - 79)  BP: 119/73 (22 Jun 2020 08:26) (113/75 - 135/79)  BP(mean): --  RR: 18 (22 Jun 2020 08:26) (18 - 19)  SpO2: 96% (22 Jun 2020 08:26) (92% - 98%)    PHYSICAL EXAM  General: NAD  Oral: no erythema or ulcers  HEENT: PERRLA, EOMI, clear oropharynx  Neck: supple  CV: (+) S1/S2 RRR  Lungs: clear to auscultation, no wheezes or rales  Abdomen: soft, non-tender, non-distended (+) BS  Ext: no edema  Skin: no rash  Neuro: alert and oriented X 3, no focal deficits  Central Line:     RECENT CULTURES:  06-20 @ 21:18  .Blood PICC/PERC Double Lumen BLUE  --  --  --    No growth to date.  --  06-20 @ 21:14  .Blood Blood-Peripheral  --  --  --    No growth to date.  --  06-20 @ 20:49  .Urine Clean Catch (Midstream)  --  --  --    10,000 - 49,000 CFU/mL Gram Positive Cocci  Normal Urogenital tika present  --        LABS:                        7.4    2.54  )-----------( 46       ( 22 Jun 2020 06:46 )             22.3         Mean Cell Volume : 87.8 fl  Mean Cell Hemoglobin : 29.1 pg  Mean Cell Hemoglobin Concentration : 33.2 gm/dL  Auto Neutrophil # : 1.39 K/uL  Auto Lymphocyte # : 1.06 K/uL  Auto Monocyte # : 0.00 K/uL  Auto Eosinophil # : 0.07 K/uL  Auto Basophil # : 0.02 K/uL  Auto Neutrophil % : 54.8 %  Auto Lymphocyte % : 41.7 %  Auto Monocyte % : 0.0 %  Auto Eosinophil % : 2.6 %  Auto Basophil % : 0.9 %      06-22    141  |  104  |  14  ----------------------------<  105<H>  3.9   |  24  |  0.80    Ca    9.6      22 Jun 2020 06:45  Phos  4.2     06-22  Mg     2.1     06-22    TPro  7.5  /  Alb  4.6  /  TBili  0.5  /  DBili  x   /  AST  26  /  ALT  30  /  AlkPhos  100  06-22      Mg 2.1  Phos 4.2      PT/INR - ( 22 Jun 2020 06:46 )   PT: 13.6 sec;   INR: 1.19 ratio         PTT - ( 22 Jun 2020 06:46 )  PTT:29.5 sec        RADIOLOGY & ADDITIONAL STUDIES:  Studies reviewed. Diagnosis: Acute Promyelocytic leukemia     Protocol/Chemo Regimen: ATRA/Arsenic     Day: ATRA Day 31 and Arsenic Day 27 (Holding ATRA starting today)    Patient Syriac-speaking and is refusing  service. Patient's son called by patient    Patient endorses: Patient reports irritation near her RUE PICC line. She also states that she has pain her RUE when the PICC line is flushed. In addition, patient reports that she has had a rash on her R buttock and R posterior thigh which has been bothering her the past few days. Her cough is improved currently. No complaints of chest pain or shortness of breath.    Review of Systems: Patient denies headache, chest pain, shortness of breath, nausea, vomiting, or abdominal pain.    Pain scale: 0-1    Diet: Consistent Carb w/ Evening Snack    Allergies    No Known Allergies    Intolerances        ANTIMICROBIALS  vancomycin  IVPB      vancomycin  IVPB 1000 milliGRAM(s) IV Intermittent every 12 hours      HEME/ONC MEDICATIONS  arsenic trioxide IVPB (eMAR) 11 milliGRAM(s) IV Intermittent every 24 hours  tretinoin 40 milliGRAM(s) Oral every 12 hours      STANDING MEDICATIONS  ATENolol  Tablet 25 milliGRAM(s) Oral daily  baclofen 5 milliGRAM(s) Oral every 12 hours  benzonatate 100 milliGRAM(s) Oral every 8 hours  Biotene Dry Mouth Oral Rinse 5 milliLiter(s) Swish and Spit five times a day  bisacodyl 5 milliGRAM(s) Oral every 12 hours  dextrose 5%. 1000 milliLiter(s) IV Continuous <Continuous>  dextrose 50% Injectable 12.5 Gram(s) IV Push once  dextrose 50% Injectable 25 Gram(s) IV Push once  dextrose 50% Injectable 25 Gram(s) IV Push once  hydrocortisone 2.5% Rectal Cream 1 Application(s) Rectal every 8 hours  insulin lispro (HumaLOG) corrective regimen sliding scale   SubCutaneous three times a day before meals  insulin lispro (HumaLOG) corrective regimen sliding scale   SubCutaneous at bedtime  pantoprazole    Tablet 40 milliGRAM(s) Oral before breakfast  petrolatum white Ointment 1 Application(s) Topical three times a day  polyethylene glycol 3350 17 Gram(s) Oral daily  sodium chloride 0.65% Nasal 1 Spray(s) Both Nostrils every 8 hours  sodium chloride 0.9%. 1000 milliLiter(s) IV Continuous <Continuous>  witch hazel Pads 1 Application(s) Topical every 12 hours      PRN MEDICATIONS  acetaminophen   Tablet .. 650 milliGRAM(s) Oral every 6 hours PRN  ALBUTerol    90 MICROgram(s) HFA Inhaler 2 Puff(s) Inhalation every 6 hours PRN  AQUAPHOR (petrolatum Ointment) 1 Application(s) Topical every 3 hours PRN  dextrose 40% Gel 15 Gram(s) Oral once PRN  diphenhydrAMINE   Injectable 25 milliGRAM(s) IV Push every 6 hours PRN  glucagon  Injectable 1 milliGRAM(s) IntraMuscular once PRN  guaiFENesin   Syrup  (Sugar-Free) 100 milliGRAM(s) Oral every 6 hours PRN  hemorrhoidal Ointment 1 Application(s) Rectal every 8 hours PRN  ondansetron Injectable 8 milliGRAM(s) IV Push every 8 hours PRN  sodium chloride 0.9% lock flush 10 milliLiter(s) IV Push every 1 hour PRN    Vital Signs Last 24 Hrs  T(C): 36.8 (22 Jun 2020 08:26), Max: 37 (21 Jun 2020 10:16)  T(F): 98.3 (22 Jun 2020 08:26), Max: 98.6 (21 Jun 2020 10:16)  HR: 61 (22 Jun 2020 08:26) (61 - 79)  BP: 119/73 (22 Jun 2020 08:26) (113/75 - 135/79)  BP(mean): --  RR: 18 (22 Jun 2020 08:26) (18 - 19)  SpO2: 96% (22 Jun 2020 08:26) (92% - 98%)    PHYSICAL EXAM:  Constitutional: NAD  HEENT: NC/AT, MMM  Neck: Supple  Respiratory: Grossly CTAB; No wheeze appreciated  Cardiovascular: RRR; +S1/S2  Gastrointestinal: +BS, Soft, NT, No rigidity  Extremities: No LE edema, + RUE PICC line  Neurological: Awake and alert, Answering questions and following commands appropriately  Skin: Warm and dry, Some hyperpigmentation noted around RUE PICC line, Shingles-like rash noted on R buttock (appears crusting over) and R posterior thigh (appears to be developing)  Psychiatric: Calm and cooperative    RECENT CULTURES:  06-20 @ 21:18  .Blood PICC/PERC Double Lumen BLUE  --  --  --    No growth to date.  --  06-20 @ 21:14  .Blood Blood-Peripheral  --  --  --    No growth to date.  --  06-20 @ 20:49  .Urine Clean Catch (Midstream)  --  --  --    10,000 - 49,000 CFU/mL Gram Positive Cocci  Normal Urogenital tika present  --        LABS:                        7.4    2.54  )-----------( 46       ( 22 Jun 2020 06:46 )             22.3         Mean Cell Volume : 87.8 fl  Mean Cell Hemoglobin : 29.1 pg  Mean Cell Hemoglobin Concentration : 33.2 gm/dL  Auto Neutrophil # : 1.39 K/uL  Auto Lymphocyte # : 1.06 K/uL  Auto Monocyte # : 0.00 K/uL  Auto Eosinophil # : 0.07 K/uL  Auto Basophil # : 0.02 K/uL  Auto Neutrophil % : 54.8 %  Auto Lymphocyte % : 41.7 %  Auto Monocyte % : 0.0 %  Auto Eosinophil % : 2.6 %  Auto Basophil % : 0.9 %      06-22    141  |  104  |  14  ----------------------------<  105<H>  3.9   |  24  |  0.80    Ca    9.6      22 Jun 2020 06:45  Phos  4.2     06-22  Mg     2.1     06-22    TPro  7.5  /  Alb  4.6  /  TBili  0.5  /  DBili  x   /  AST  26  /  ALT  30  /  AlkPhos  100  06-22      Mg 2.1  Phos 4.2      PT/INR - ( 22 Jun 2020 06:46 )   PT: 13.6 sec;   INR: 1.19 ratio         PTT - ( 22 Jun 2020 06:46 )  PTT:29.5 sec        RADIOLOGY & ADDITIONAL STUDIES:  Studies reviewed. Diagnosis: Acute Promyelocytic leukemia     Protocol/Chemo Regimen: ATRA/Arsenic     Day: ATRA Day 31 and Arsenic Day 27 (Holding ATRA starting today)    Patient Turkmen-speaking and is refusing  service. Patient's son called by patient    Patient endorses: Patient reports irritation near her RUE PICC line. She also states that she has pain her RUE when the PICC line is flushed. In addition, patient reports that she has had a rash on her R buttock and R posterior thigh which has been bothering her the past few days. Her cough is improved currently. No complaints of chest pain or shortness of breath.    Review of Systems: Patient denies headache, chest pain, shortness of breath, nausea, vomiting, or abdominal pain.    Pain scale: 4/10    Diet: Consistent Carb w/ Evening Snack    Allergies    No Known Allergies    Intolerances        ANTIMICROBIALS  vancomycin  IVPB      vancomycin  IVPB 1000 milliGRAM(s) IV Intermittent every 12 hours      HEME/ONC MEDICATIONS  arsenic trioxide IVPB (eMAR) 11 milliGRAM(s) IV Intermittent every 24 hours  tretinoin 40 milliGRAM(s) Oral every 12 hours      STANDING MEDICATIONS  ATENolol  Tablet 25 milliGRAM(s) Oral daily  baclofen 5 milliGRAM(s) Oral every 12 hours  benzonatate 100 milliGRAM(s) Oral every 8 hours  Biotene Dry Mouth Oral Rinse 5 milliLiter(s) Swish and Spit five times a day  bisacodyl 5 milliGRAM(s) Oral every 12 hours  dextrose 5%. 1000 milliLiter(s) IV Continuous <Continuous>  dextrose 50% Injectable 12.5 Gram(s) IV Push once  dextrose 50% Injectable 25 Gram(s) IV Push once  dextrose 50% Injectable 25 Gram(s) IV Push once  hydrocortisone 2.5% Rectal Cream 1 Application(s) Rectal every 8 hours  insulin lispro (HumaLOG) corrective regimen sliding scale   SubCutaneous three times a day before meals  insulin lispro (HumaLOG) corrective regimen sliding scale   SubCutaneous at bedtime  pantoprazole    Tablet 40 milliGRAM(s) Oral before breakfast  petrolatum white Ointment 1 Application(s) Topical three times a day  polyethylene glycol 3350 17 Gram(s) Oral daily  sodium chloride 0.65% Nasal 1 Spray(s) Both Nostrils every 8 hours  sodium chloride 0.9%. 1000 milliLiter(s) IV Continuous <Continuous>  witch hazel Pads 1 Application(s) Topical every 12 hours      PRN MEDICATIONS  acetaminophen   Tablet .. 650 milliGRAM(s) Oral every 6 hours PRN  ALBUTerol    90 MICROgram(s) HFA Inhaler 2 Puff(s) Inhalation every 6 hours PRN  AQUAPHOR (petrolatum Ointment) 1 Application(s) Topical every 3 hours PRN  dextrose 40% Gel 15 Gram(s) Oral once PRN  diphenhydrAMINE   Injectable 25 milliGRAM(s) IV Push every 6 hours PRN  glucagon  Injectable 1 milliGRAM(s) IntraMuscular once PRN  guaiFENesin   Syrup  (Sugar-Free) 100 milliGRAM(s) Oral every 6 hours PRN  hemorrhoidal Ointment 1 Application(s) Rectal every 8 hours PRN  ondansetron Injectable 8 milliGRAM(s) IV Push every 8 hours PRN  sodium chloride 0.9% lock flush 10 milliLiter(s) IV Push every 1 hour PRN    Vital Signs Last 24 Hrs  T(C): 36.8 (22 Jun 2020 08:26), Max: 37 (21 Jun 2020 10:16)  T(F): 98.3 (22 Jun 2020 08:26), Max: 98.6 (21 Jun 2020 10:16)  HR: 61 (22 Jun 2020 08:26) (61 - 79)  BP: 119/73 (22 Jun 2020 08:26) (113/75 - 135/79)  BP(mean): --  RR: 18 (22 Jun 2020 08:26) (18 - 19)  SpO2: 96% (22 Jun 2020 08:26) (92% - 98%)    PHYSICAL EXAM:  Constitutional: NAD  HEENT: NC/AT, MMM  Neck: Supple  Respiratory: Grossly CTAB; No wheeze appreciated  Cardiovascular: RRR; +S1/S2  Gastrointestinal: +BS, Soft, NT, No rigidity  Extremities: No LE edema, + RUE PICC line  Neurological: Awake and alert, Answering questions and following commands appropriately  Skin: Warm and dry, Some hyperpigmentation noted around RUE PICC line, Shingles-like rash noted on R buttock (appears crusting over) and R posterior thigh (appears to be developing)  Psychiatric: Calm and cooperative    RECENT CULTURES:  06-20 @ 21:18  .Blood PICC/PERC Double Lumen BLUE  --  --  --    No growth to date.  --  06-20 @ 21:14  .Blood Blood-Peripheral  --  --  --    No growth to date.  --  06-20 @ 20:49  .Urine Clean Catch (Midstream)  --  --  --    10,000 - 49,000 CFU/mL Gram Positive Cocci  Normal Urogenital tika present  --        LABS:                        7.4    2.54  )-----------( 46       ( 22 Jun 2020 06:46 )             22.3         Mean Cell Volume : 87.8 fl  Mean Cell Hemoglobin : 29.1 pg  Mean Cell Hemoglobin Concentration : 33.2 gm/dL  Auto Neutrophil # : 1.39 K/uL  Auto Lymphocyte # : 1.06 K/uL  Auto Monocyte # : 0.00 K/uL  Auto Eosinophil # : 0.07 K/uL  Auto Basophil # : 0.02 K/uL  Auto Neutrophil % : 54.8 %  Auto Lymphocyte % : 41.7 %  Auto Monocyte % : 0.0 %  Auto Eosinophil % : 2.6 %  Auto Basophil % : 0.9 %      06-22    141  |  104  |  14  ----------------------------<  105<H>  3.9   |  24  |  0.80    Ca    9.6      22 Jun 2020 06:45  Phos  4.2     06-22  Mg     2.1     06-22    TPro  7.5  /  Alb  4.6  /  TBili  0.5  /  DBili  x   /  AST  26  /  ALT  30  /  AlkPhos  100  06-22      Mg 2.1  Phos 4.2      PT/INR - ( 22 Jun 2020 06:46 )   PT: 13.6 sec;   INR: 1.19 ratio         PTT - ( 22 Jun 2020 06:46 )  PTT:29.5 sec        RADIOLOGY & ADDITIONAL STUDIES:  Studies reviewed. Diagnosis: Acute Promyelocytic leukemia     Protocol/Chemo Regimen: ATRA/Arsenic     Day: ATRA Day 31 and Arsenic Day 27 (Holding Arsenic starting today)    Patient Kiswahili-speaking and is refusing  service. Patient's son called by patient    Patient endorses: Patient reports irritation near her RUE PICC line. She also states that she has RUE pain when the PICC line is flushed. In addition, patient reports that she has had a rash on her R buttock and R posterior thigh which has been bothering her the past few days. Her cough is improved currently. No complaints of chest pain or shortness of breath.    Review of Systems: Patient denies headache, chest pain, shortness of breath, nausea, vomiting, or abdominal pain.    Pain scale: 4/10    Diet: Consistent Carb w/ Evening Snack    Allergies    No Known Allergies    Intolerances        ANTIMICROBIALS  vancomycin  IVPB      vancomycin  IVPB 1000 milliGRAM(s) IV Intermittent every 12 hours      HEME/ONC MEDICATIONS  arsenic trioxide IVPB (eMAR) 11 milliGRAM(s) IV Intermittent every 24 hours  tretinoin 40 milliGRAM(s) Oral every 12 hours      STANDING MEDICATIONS  ATENolol  Tablet 25 milliGRAM(s) Oral daily  baclofen 5 milliGRAM(s) Oral every 12 hours  benzonatate 100 milliGRAM(s) Oral every 8 hours  Biotene Dry Mouth Oral Rinse 5 milliLiter(s) Swish and Spit five times a day  bisacodyl 5 milliGRAM(s) Oral every 12 hours  dextrose 5%. 1000 milliLiter(s) IV Continuous <Continuous>  dextrose 50% Injectable 12.5 Gram(s) IV Push once  dextrose 50% Injectable 25 Gram(s) IV Push once  dextrose 50% Injectable 25 Gram(s) IV Push once  hydrocortisone 2.5% Rectal Cream 1 Application(s) Rectal every 8 hours  insulin lispro (HumaLOG) corrective regimen sliding scale   SubCutaneous three times a day before meals  insulin lispro (HumaLOG) corrective regimen sliding scale   SubCutaneous at bedtime  pantoprazole    Tablet 40 milliGRAM(s) Oral before breakfast  petrolatum white Ointment 1 Application(s) Topical three times a day  polyethylene glycol 3350 17 Gram(s) Oral daily  sodium chloride 0.65% Nasal 1 Spray(s) Both Nostrils every 8 hours  sodium chloride 0.9%. 1000 milliLiter(s) IV Continuous <Continuous>  witch hazel Pads 1 Application(s) Topical every 12 hours      PRN MEDICATIONS  acetaminophen   Tablet .. 650 milliGRAM(s) Oral every 6 hours PRN  ALBUTerol    90 MICROgram(s) HFA Inhaler 2 Puff(s) Inhalation every 6 hours PRN  AQUAPHOR (petrolatum Ointment) 1 Application(s) Topical every 3 hours PRN  dextrose 40% Gel 15 Gram(s) Oral once PRN  diphenhydrAMINE   Injectable 25 milliGRAM(s) IV Push every 6 hours PRN  glucagon  Injectable 1 milliGRAM(s) IntraMuscular once PRN  guaiFENesin   Syrup  (Sugar-Free) 100 milliGRAM(s) Oral every 6 hours PRN  hemorrhoidal Ointment 1 Application(s) Rectal every 8 hours PRN  ondansetron Injectable 8 milliGRAM(s) IV Push every 8 hours PRN  sodium chloride 0.9% lock flush 10 milliLiter(s) IV Push every 1 hour PRN    Vital Signs Last 24 Hrs  T(C): 36.8 (22 Jun 2020 08:26), Max: 37 (21 Jun 2020 10:16)  T(F): 98.3 (22 Jun 2020 08:26), Max: 98.6 (21 Jun 2020 10:16)  HR: 61 (22 Jun 2020 08:26) (61 - 79)  BP: 119/73 (22 Jun 2020 08:26) (113/75 - 135/79)  BP(mean): --  RR: 18 (22 Jun 2020 08:26) (18 - 19)  SpO2: 96% (22 Jun 2020 08:26) (92% - 98%)    PHYSICAL EXAM:  Constitutional: NAD  HEENT: NC/AT, MMM  Neck: Supple  Respiratory: Grossly CTAB; No wheeze appreciated  Cardiovascular: RRR; +S1/S2  Gastrointestinal: +BS, Soft, NT, No rigidity  Extremities: No LE edema, + RUE PICC line  Neurological: Awake and alert, Answering questions and following commands appropriately  Skin: Warm and dry, Some hyperpigmentation noted around RUE PICC line, Shingles-like rash noted on R buttock (appears crusting over) and R posterior thigh (appears to be developing)  Psychiatric: Calm and cooperative    RECENT CULTURES:  06-20 @ 21:18  .Blood PICC/PERC Double Lumen BLUE  --  --  --    No growth to date.  --  06-20 @ 21:14  .Blood Blood-Peripheral  --  --  --    No growth to date.  --  06-20 @ 20:49  .Urine Clean Catch (Midstream)  --  --  --    10,000 - 49,000 CFU/mL Gram Positive Cocci  Normal Urogenital tika present  --        LABS:                        7.4    2.54  )-----------( 46       ( 22 Jun 2020 06:46 )             22.3         Mean Cell Volume : 87.8 fl  Mean Cell Hemoglobin : 29.1 pg  Mean Cell Hemoglobin Concentration : 33.2 gm/dL  Auto Neutrophil # : 1.39 K/uL  Auto Lymphocyte # : 1.06 K/uL  Auto Monocyte # : 0.00 K/uL  Auto Eosinophil # : 0.07 K/uL  Auto Basophil # : 0.02 K/uL  Auto Neutrophil % : 54.8 %  Auto Lymphocyte % : 41.7 %  Auto Monocyte % : 0.0 %  Auto Eosinophil % : 2.6 %  Auto Basophil % : 0.9 %      06-22    141  |  104  |  14  ----------------------------<  105<H>  3.9   |  24  |  0.80    Ca    9.6      22 Jun 2020 06:45  Phos  4.2     06-22  Mg     2.1     06-22    TPro  7.5  /  Alb  4.6  /  TBili  0.5  /  DBili  x   /  AST  26  /  ALT  30  /  AlkPhos  100  06-22      Mg 2.1  Phos 4.2      PT/INR - ( 22 Jun 2020 06:46 )   PT: 13.6 sec;   INR: 1.19 ratio         PTT - ( 22 Jun 2020 06:46 )  PTT:29.5 sec        RADIOLOGY & ADDITIONAL STUDIES:  Studies reviewed.

## 2020-06-22 NOTE — PROGRESS NOTE ADULT - PROBLEM SELECTOR PLAN 6
5/23: HgA1c = 7.0  C/w HISS. FS are currently stable. Monitor FS AC & QHS   C/w Consistent Carbohydrate diet w/ Evening snack - 5/23: HgA1c = 7.0  - C/w HISS. FS are currently stable. Monitor FS AC & QHS   - C/w Consistent Carbohydrate diet w/ Evening snack

## 2020-06-22 NOTE — PROGRESS NOTE ADULT - PROBLEM SELECTOR PLAN 4
- likely due to chemo   -monitor counts as noted; hgb relatively stable,   -ongoing thrombocytopenia and is 42 and will d/w with haem to see if we need to transfuse   - monitor CBC

## 2020-06-22 NOTE — PROGRESS NOTE ADULT - SUBJECTIVE AND OBJECTIVE BOX
Patient is a 60y old  Female who presents with a chief complaint of APML (2020 09:45)      SUBJECTIVE / OVERNIGHT EVENTS: Patient seen and examined at beside, with son and dtr-in-law over speaker phone. Pt refused language line  services wanted son to translate for her. She is complaining of right buttock and right posterior thigh rash. She states that she first noticed the buttock rash when she came to the hospital on "chemo floor", was too shy to show it anyone before due to its location. She states the buttock rash is tender to touch. States she didn't have it prior to hospitalization. She also complains of right PICC line site pain, 4/10, sharp, and TTP, elevating arm helps with pain.     ROS:  All other review of systems negative    Allergies    No Known Allergies    Intolerances        MEDICATIONS  (STANDING):  arsenic trioxide IVPB (eMAR) 11 milliGRAM(s) IV Intermittent every 24 hours  ATENolol  Tablet 25 milliGRAM(s) Oral daily  baclofen 5 milliGRAM(s) Oral every 12 hours  benzonatate 100 milliGRAM(s) Oral every 8 hours  Biotene Dry Mouth Oral Rinse 5 milliLiter(s) Swish and Spit five times a day  bisacodyl 5 milliGRAM(s) Oral every 12 hours  dextrose 5%. 1000 milliLiter(s) (50 mL/Hr) IV Continuous <Continuous>  dextrose 50% Injectable 12.5 Gram(s) IV Push once  dextrose 50% Injectable 25 Gram(s) IV Push once  dextrose 50% Injectable 25 Gram(s) IV Push once  hydrocortisone 2.5% Rectal Cream 1 Application(s) Rectal every 8 hours  insulin lispro (HumaLOG) corrective regimen sliding scale   SubCutaneous three times a day before meals  insulin lispro (HumaLOG) corrective regimen sliding scale   SubCutaneous at bedtime  pantoprazole    Tablet 40 milliGRAM(s) Oral before breakfast  petrolatum white Ointment 1 Application(s) Topical three times a day  polyethylene glycol 3350 17 Gram(s) Oral daily  sodium chloride 0.65% Nasal 1 Spray(s) Both Nostrils every 8 hours  sodium chloride 0.9%. 1000 milliLiter(s) (25 mL/Hr) IV Continuous <Continuous>  tretinoin 40 milliGRAM(s) Oral every 12 hours  vancomycin  IVPB      vancomycin  IVPB 1000 milliGRAM(s) IV Intermittent every 12 hours  witch hazel Pads 1 Application(s) Topical every 12 hours    MEDICATIONS  (PRN):  acetaminophen   Tablet .. 650 milliGRAM(s) Oral every 6 hours PRN Temp greater or equal to 38C (100.4F), Mild Pain (1 - 3)  ALBUTerol    90 MICROgram(s) HFA Inhaler 2 Puff(s) Inhalation every 6 hours PRN Shortness of Breath and/or Wheezing  AQUAPHOR (petrolatum Ointment) 1 Application(s) Topical every 3 hours PRN Dry skin  dextrose 40% Gel 15 Gram(s) Oral once PRN Blood Glucose LESS THAN 70 milliGRAM(s)/deciliter  diphenhydrAMINE   Injectable 25 milliGRAM(s) IV Push every 6 hours PRN Allergy symptoms  glucagon  Injectable 1 milliGRAM(s) IntraMuscular once PRN Glucose LESS THAN 70 milligrams/deciliter  guaiFENesin   Syrup  (Sugar-Free) 100 milliGRAM(s) Oral every 6 hours PRN Cough  hemorrhoidal Ointment 1 Application(s) Rectal every 8 hours PRN Hemorrhoids  ondansetron Injectable 8 milliGRAM(s) IV Push every 8 hours PRN Nausea and/or Vomiting  sodium chloride 0.9% lock flush 10 milliLiter(s) IV Push every 1 hour PRN Pre/post blood products, medications, blood draw, and to maintain line patency      Vital Signs Last 24 Hrs  T(C): 36.7 (2020 11:25), Max: 36.8 (2020 01:10)  T(F): 98 (2020 11:25), Max: 98.3 (2020 01:10)  HR: 63 (2020 11:25) (61 - 79)  BP: 110/66 (2020 11:25) (110/66 - 135/79)  BP(mean): --  RR: 20 (2020 11:25) (18 - 20)  SpO2: 98% (2020 11:25) (92% - 98%)  CAPILLARY BLOOD GLUCOSE      POCT Blood Glucose.: 160 mg/dL (2020 08:13)  POCT Blood Glucose.: 145 mg/dL (2020 21:05)  POCT Blood Glucose.: 140 mg/dL (2020 20:57)  POCT Blood Glucose.: 169 mg/dL (2020 16:15)  POCT Blood Glucose.: 169 mg/dL (2020 12:09)    I&O's Summary    2020 07:01  -  2020 07:00  --------------------------------------------------------  IN: 0 mL / OUT: 0 mL / NET: 2050 mL    2020 07:01  -  2020 12:02  --------------------------------------------------------  IN: 240 mL / OUT: 0 mL / NET: 240 mL        PHYSICAL EXAM:  GENERAL: NAD, well-developed  HEAD:  Atraumatic, Normocephalic  EYES: EOMI, PERRLA, conjunctiva and sclera clear  NECK: Supple, No JVD  CHEST/LUNG: Clear to auscultation bilaterally; No wheeze  HEART: Regular rate and rhythm; No murmurs, rubs, or gallops  ABDOMEN: Soft, Nontender, Nondistended; Bowel sounds present  EXTREMITIES:  2+ Peripheral Pulses, No clubbing, cyanosis, or edema  NEUROLOGY: AAOx3, non-focal  SKIN: vesicular buttock lesion (hemorraghic) with vesicular posterior thigh lesion highly suspicious of shingles (not crossing midline)    LABS:                        7.4    2.54  )-----------( 46       ( 2020 06:46 )             22.3     06-22    141  |  104  |  14  ----------------------------<  105<H>  3.9   |  24  |  0.80    Ca    9.6      2020 06:45  Phos  4.2     06-22  Mg     2.1         TPro  7.5  /  Alb  4.6  /  TBili  0.5  /  DBili  x   /  AST  26  /  ALT  30  /  AlkPhos  100  06-22    PT/INR - ( 2020 06:46 )   PT: 13.6 sec;   INR: 1.19 ratio         PTT - ( 2020 06:46 )  PTT:29.5 sec      Urinalysis Basic - ( 2020 15:57 )    Color: Light Yellow / Appearance: Clear / S.013 / pH: x  Gluc: x / Ketone: Negative  / Bili: Negative / Urobili: Negative   Blood: x / Protein: 30 mg/dL / Nitrite: Negative   Leuk Esterase: Negative / RBC: 1 /hpf / WBC 2 /HPF   Sq Epi: x / Non Sq Epi: 3 /hpf / Bacteria: Negative        RADIOLOGY & ADDITIONAL TESTS:    Care Discussed with Consultants/Other Providers: Heme/onc rec noted    Case Discussed with Son and dtr-in-law

## 2020-06-22 NOTE — PROGRESS NOTE ADULT - PROBLEM SELECTOR PLAN 5
Home regimen with Atenolol held on admission  Restarted at 25mg PO daily for increased BP  BP is currently stable. Continue to monitor - Home regimen with Atenolol held on admission  - Restarted at 25mg PO daily for increased BP  - BP is currently stable. Continue to monitor

## 2020-06-22 NOTE — PROGRESS NOTE ADULT - PROBLEM SELECTOR PLAN 7
DVT PPx: Holding pharmacologic AC 2/2 thrombocytopenia  Diet: Consistent Carb w/ Evening Snack  Dispo: Pending resolution of acute medical issues    Contact Information [phone] - DVT PPx: Holding pharmacologic AC 2/2 thrombocytopenia  - Diet: Consistent Carb w/ Evening Snack  - Dispo: Pending resolution of acute medical issues    - Contact Information [phone]

## 2020-06-22 NOTE — CHART NOTE - NSCHARTNOTEFT_GEN_A_CORE
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 Received PRBC and platelets which resulted in rigors and redness at PICC line and patient started on Vancomycin. Received hydroxyurea 6/8 and 6/9. Dermatology consulted for rash on buttocks and legs which started an unknown amount of days ago after starting chemo, tender on the buttocks, but no symptoms on legs.     PE: grouped vesicles on left buttocks and left posterior thigh     DDX: Zoster on S1 Distribution     Plan:     1. Swab of VZV PCR, will f/u results   2. Continue Valtrex 7-10 days, depending on immunocompromised status, may need longer course.   3. For superficial desquamation unrelated to zoster, can apply ammonium lactate lotion BID all over the body as moisturizer and exfoliant     Discussed with attending Dr. Staci Hart     Patient can follow-up at our outpatient office:  [address], [place] [address], phone number for appointment: [phone]    If any questions, please page [phone]

## 2020-06-22 NOTE — CHART NOTE - NSCHARTNOTEFT_GEN_A_CORE
PICC line removed for right arm redness and swelling. right above PICC insertion site. Direct pressure applied to PICC removal site for > 5 min. No hematoma or bleeding noted. Will continue Vancomycin for cellulitis for now, continue to monitor RUE and follow up blood culture results.     Susan WADEC  Department of Medicine   #[id number]

## 2020-06-22 NOTE — PROGRESS NOTE ADULT - PROBLEM SELECTOR PLAN 3
Patient previously with c/o cough and SOB with stable O2 sat and concern for differentiation syndrome, now intermittent  6/5 Patient received one dose of Dexamethasone   CXR (-) for acute infiltrates and (+) atelectasis   Continue Incentive Spirometer   Lasix PRN  6/8 and 6/9 Hydrea 500mg x 1 given  6/7 CT chest with biapical pleural-parenchymal scarring with calcification, likely from prior granulomatous disease. No consolidation. 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 No consolidation. 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

## 2020-06-22 NOTE — PROGRESS NOTE ADULT - PROBLEM SELECTOR PLAN 5
-likely multifactorial; CT reviewed evidence of prior TB related changes, no acute infiltrate, most recent CXR 6/20 with clear lungs. +atelectasis, c/w incentive spirometer  -c/w inhaler given h/o covid+ status

## 2020-06-22 NOTE — PROGRESS NOTE ADULT - ASSESSMENT
61 yo Japanese speaking F with PMHx of HTN, HLD, T2DM, COVID-19 infection admitted for management of newly diagnosed APL. The patient is currently reciving ATRA and Arsenic.  Patients hospital course has been complicated by transaminitis and refractory thrombocytopenia. The patient has pancytopenia 2/2 disease and/or ATRA/Arsenic. Course further c/b repeat covid PCR testing positive after interim multiple negative tests; now transferred to covid medicine unit but repeat COVID test is negative x1 and to be repeated another one. She received PRBC and platelets and had rigors, pain and erythema at the PICC line site so the cultures were done started on IV vancomycin pending blood culture.

## 2020-06-22 NOTE — PROGRESS NOTE ADULT - PROBLEM SELECTOR PLAN 3
-known prior infection with positive PCR in April and May 2020; since then has had multiple negative PCR on 5/22, 5/26, 6/2, and 6/9  -Most recent test 6/16 PCR now recurrently positive but repeat PCR on June 19 is negative . CXR 6/20 no change   -pt transferred to [address] [place] [address]; c/w airborne/droplet/contact precautions for now  We will need 2 consecutive pcr negatives to be considered for transfer back to [address] [place] [address]. 6/19 is negative another one sent today.   -Supportive care for cough. CXR without any new infiltrate

## 2020-06-22 NOTE — PROGRESS NOTE ADULT - PROBLEM SELECTOR PLAN 1
-Continue ATRA 40mg BID (started 5/22)   - Arsenic Trioxide daily on hold her heme/onc due to shingles see below   -Monitor for differentiation syndrome and check EKG for QTc prolongation every Tuesday and Friday. 6/19 EKG with QTc 427   -Keep K > 4 will replete today   -S/P  Hydrea 500mg PO x 1 on 6/8 and 6/9 for WBC >10  -Monitor  CBC with diff, CMP, TLS and DIC panel daily  Keep PLTs >50 K. If unable to achieve goal continue with platelets infusion (single donor) 1/2 units over 3 hours every 12 hours. Last transfused yesterday Platelets 46 today, due to plts today   -s/p Prednisone 20mg PO daily x 5 days (through 6/12) due to platelet antibody.  -Keep Fibrinogen >150 if less give Cryo 6/19 -666  -Strict Is and Os/Daily weights/Mouth Care.  -plan on BM bx around day 29 post GALA .  -PICC line erythema and induration:  on IV Vancomycin, blood cx NGTD 24hrs, will d/c vanco if blood cx remain neg at 48hrs, remove PICC line today, skin changes likely due to inflammatory changes   -Care plan as per hematology/oncology.

## 2020-06-22 NOTE — PROGRESS NOTE ADULT - PROBLEM SELECTOR PLAN 1
Continue ATRA 40mg BID (started 5/22), but hold Arsenic Trioxide for now (started on 5/27) given concern for shingles on R buttock/posterior thigh  Monitor for differentiation syndrome and check EKG for QTc prolongation every Tuesday and Friday. Keep K > 4 and Mg > 2.  K+ = 3.9 today. Please replete  Status post Hydrea 500mg PO x 1 on 6/8 and 6/9 for WBC >10  Follow up CBC with diff, CMP, TLS and DIC panel daily  Keep PLTs >50 K. If unable to achieve goal, continue with platelets infusion (single donor) 1/2 units over 3 hours every 12 hours.  PLT = 46k today. Please transfuse 1/2U PLTs over 3 hours every 12 hours today  S/p Prednisone 20mg PO daily x 5 days (through 6/12) due to platelet antibody.  Keep Fibrinogen >150, if less give Cryo  Strict Is and Os/Daily weights/Mouth Care - Continue ATRA 40mg BID (started 5/22), but hold Arsenic Trioxide for now (started on 5/27) given concern for shingles on R buttock/posterior thigh  - Monitor for differentiation syndrome and check EKG for QTc prolongation every Tuesday and Friday. Keep K > 4 and Mg > 2.  - K+ = 3.9 today. Please replete  - Status post Hydrea 500mg PO x 1 on 6/8 and 6/9 for WBC >10  - Follow up CBC with diff, CMP, TLS and DIC panel daily  - Keep PLTs >50 K. If unable to achieve goal, continue with platelets infusion (single donor) 1/2 units over 3 hours every 12 hours.  - PLT = 46k today. Please transfuse 1/2U PLTs over 3 hours every 12 hours today  - S/p Prednisone 20mg PO daily x 5 days (through 6/12) due to platelet antibody.  - Keep Fibrinogen >150, if less give Cryo  - Strict Is and Os/Daily weights/Mouth Care - Continue ATRA 40mg BID (started 5/22), but hold Arsenic Trioxide for now (started on 5/27) given concern for shingles on R buttock/posterior thigh  - Monitor for differentiation syndrome and check EKG for QTc prolongation every Tuesday and Friday. Keep K > 4 and Mg > 2.  - K+ = 3.9 today. Please replete  - Status post Hydrea 500mg PO x 1 on 6/8 and 6/9 for WBC >10  - Follow up CBC with diff, CMP, TLS and DIC panel daily  - Keep PLTs >50 K. If unable to achieve goal, continue with platelets infusion (single donor) 1/2 units over 3 hours every 12 hours.  - PLT = 46k today. Please transfuse 1/2U PLTs over 3 hours every 12 hours x2 today  - S/p Prednisone 20mg PO daily x 5 days (through 6/12) due to platelet antibody.  - Keep Fibrinogen >150, if less give Cryo  - Strict Is and Os/Daily weights/Mouth Care

## 2020-06-22 NOTE — ADVANCED PRACTICE NURSE CONSULT - ASSESSMENT
As per MD Camara hold arsenic for today 6/22.  Pt developed rash want to evaluate for 24 hrs then reassess administration on 6/23.

## 2020-06-23 DIAGNOSIS — B02.9 ZOSTER WITHOUT COMPLICATIONS: ICD-10-CM

## 2020-06-23 LAB
ALBUMIN SERPL ELPH-MCNC: 4.5 G/DL — SIGNIFICANT CHANGE UP (ref 3.3–5)
ALP SERPL-CCNC: 113 U/L — SIGNIFICANT CHANGE UP (ref 40–120)
ALT FLD-CCNC: 25 U/L — SIGNIFICANT CHANGE UP (ref 10–45)
ANION GAP SERPL CALC-SCNC: 13 MMOL/L — SIGNIFICANT CHANGE UP (ref 5–17)
AST SERPL-CCNC: 28 U/L — SIGNIFICANT CHANGE UP (ref 10–40)
BASOPHILS # BLD AUTO: 0.01 K/UL — SIGNIFICANT CHANGE UP (ref 0–0.2)
BASOPHILS NFR BLD AUTO: 0.4 % — SIGNIFICANT CHANGE UP (ref 0–2)
BILIRUB SERPL-MCNC: 0.4 MG/DL — SIGNIFICANT CHANGE UP (ref 0.2–1.2)
BUN SERPL-MCNC: 11 MG/DL — SIGNIFICANT CHANGE UP (ref 7–23)
CALCIUM SERPL-MCNC: 9.7 MG/DL — SIGNIFICANT CHANGE UP (ref 8.4–10.5)
CHLORIDE SERPL-SCNC: 103 MMOL/L — SIGNIFICANT CHANGE UP (ref 96–108)
CO2 SERPL-SCNC: 23 MMOL/L — SIGNIFICANT CHANGE UP (ref 22–31)
CREAT SERPL-MCNC: 0.86 MG/DL — SIGNIFICANT CHANGE UP (ref 0.5–1.3)
D DIMER BLD IA.RAPID-MCNC: 736 NG/ML DDU — HIGH
EOSINOPHIL # BLD AUTO: 0.15 K/UL — SIGNIFICANT CHANGE UP (ref 0–0.5)
EOSINOPHIL NFR BLD AUTO: 5.9 % — SIGNIFICANT CHANGE UP (ref 0–6)
FIBRINOGEN PPP-MCNC: 392 MG/DL — SIGNIFICANT CHANGE UP (ref 350–510)
GLUCOSE BLDC GLUCOMTR-MCNC: 122 MG/DL — HIGH (ref 70–99)
GLUCOSE BLDC GLUCOMTR-MCNC: 132 MG/DL — HIGH (ref 70–99)
GLUCOSE BLDC GLUCOMTR-MCNC: 161 MG/DL — HIGH (ref 70–99)
GLUCOSE BLDC GLUCOMTR-MCNC: 164 MG/DL — HIGH (ref 70–99)
GLUCOSE SERPL-MCNC: 153 MG/DL — HIGH (ref 70–99)
HCT VFR BLD CALC: 22.3 % — LOW (ref 34.5–45)
HGB BLD-MCNC: 7.4 G/DL — LOW (ref 11.5–15.5)
HSV+VZV DNA SPEC QL NAA+PROBE: ABNORMAL
IMM GRANULOCYTES NFR BLD AUTO: 0.4 % — SIGNIFICANT CHANGE UP (ref 0–1.5)
LDH SERPL L TO P-CCNC: 212 U/L — SIGNIFICANT CHANGE UP (ref 50–242)
LYMPHOCYTES # BLD AUTO: 1.24 K/UL — SIGNIFICANT CHANGE UP (ref 1–3.3)
LYMPHOCYTES # BLD AUTO: 48.4 % — HIGH (ref 13–44)
MAGNESIUM SERPL-MCNC: 1.8 MG/DL — SIGNIFICANT CHANGE UP (ref 1.6–2.6)
MANUAL SMEAR VERIFICATION: SIGNIFICANT CHANGE UP
MCHC RBC-ENTMCNC: 29.4 PG — SIGNIFICANT CHANGE UP (ref 27–34)
MCHC RBC-ENTMCNC: 33.2 GM/DL — SIGNIFICANT CHANGE UP (ref 32–36)
MCV RBC AUTO: 88.5 FL — SIGNIFICANT CHANGE UP (ref 80–100)
MONOCYTES # BLD AUTO: 0.08 K/UL — SIGNIFICANT CHANGE UP (ref 0–0.9)
MONOCYTES NFR BLD AUTO: 3.1 % — SIGNIFICANT CHANGE UP (ref 2–14)
NEUTROPHILS # BLD AUTO: 1.07 K/UL — LOW (ref 1.8–7.4)
NEUTROPHILS NFR BLD AUTO: 41.8 % — LOW (ref 43–77)
NRBC # BLD: 0 /100 WBCS — SIGNIFICANT CHANGE UP (ref 0–0)
PHOSPHATE SERPL-MCNC: 4.1 MG/DL — SIGNIFICANT CHANGE UP (ref 2.5–4.5)
PLAT MORPH BLD: NORMAL — SIGNIFICANT CHANGE UP
PLATELET # BLD AUTO: 51 K/UL — LOW (ref 150–400)
POTASSIUM SERPL-MCNC: 4.1 MMOL/L — SIGNIFICANT CHANGE UP (ref 3.5–5.3)
POTASSIUM SERPL-SCNC: 4.1 MMOL/L — SIGNIFICANT CHANGE UP (ref 3.5–5.3)
PROT SERPL-MCNC: 7.3 G/DL — SIGNIFICANT CHANGE UP (ref 6–8.3)
RBC # BLD: 2.52 M/UL — LOW (ref 3.8–5.2)
RBC # FLD: 14.8 % — HIGH (ref 10.3–14.5)
RBC BLD AUTO: SIGNIFICANT CHANGE UP
SODIUM SERPL-SCNC: 139 MMOL/L — SIGNIFICANT CHANGE UP (ref 135–145)
SPECIMEN SOURCE: SIGNIFICANT CHANGE UP
URATE SERPL-MCNC: 5.1 MG/DL — SIGNIFICANT CHANGE UP (ref 2.5–7)
WBC # BLD: 2.56 K/UL — LOW (ref 3.8–10.5)
WBC # FLD AUTO: 2.56 K/UL — LOW (ref 3.8–10.5)

## 2020-06-23 PROCEDURE — 99232 SBSQ HOSP IP/OBS MODERATE 35: CPT

## 2020-06-23 PROCEDURE — 99232 SBSQ HOSP IP/OBS MODERATE 35: CPT | Mod: GC

## 2020-06-23 PROCEDURE — 93971 EXTREMITY STUDY: CPT | Mod: 26

## 2020-06-23 PROCEDURE — 93010 ELECTROCARDIOGRAM REPORT: CPT

## 2020-06-23 RX ORDER — GABAPENTIN 400 MG/1
100 CAPSULE ORAL THREE TIMES A DAY
Refills: 0 | Status: DISCONTINUED | OUTPATIENT
Start: 2020-06-23 | End: 2020-07-06

## 2020-06-23 RX ORDER — HEPARIN SODIUM 5000 [USP'U]/ML
INJECTION INTRAVENOUS; SUBCUTANEOUS
Qty: 25000 | Refills: 0 | Status: DISCONTINUED | OUTPATIENT
Start: 2020-06-23 | End: 2020-06-25

## 2020-06-23 RX ORDER — HEPARIN SODIUM 5000 [USP'U]/ML
6500 INJECTION INTRAVENOUS; SUBCUTANEOUS EVERY 6 HOURS
Refills: 0 | Status: DISCONTINUED | OUTPATIENT
Start: 2020-06-23 | End: 2020-06-25

## 2020-06-23 RX ORDER — HEPARIN SODIUM 5000 [USP'U]/ML
3000 INJECTION INTRAVENOUS; SUBCUTANEOUS EVERY 6 HOURS
Refills: 0 | Status: DISCONTINUED | OUTPATIENT
Start: 2020-06-23 | End: 2020-06-25

## 2020-06-23 RX ORDER — LORATADINE 10 MG/1
10 TABLET ORAL DAILY
Refills: 0 | Status: COMPLETED | OUTPATIENT
Start: 2020-06-23 | End: 2020-06-29

## 2020-06-23 RX ORDER — MAGNESIUM SULFATE 500 MG/ML
2 VIAL (ML) INJECTION ONCE
Refills: 0 | Status: COMPLETED | OUTPATIENT
Start: 2020-06-23 | End: 2020-06-23

## 2020-06-23 RX ADMIN — Medication 1 APPLICATION(S): at 21:12

## 2020-06-23 RX ADMIN — Medication 1: at 08:03

## 2020-06-23 RX ADMIN — Medication 100 MILLIGRAM(S): at 05:45

## 2020-06-23 RX ADMIN — Medication 1 APPLICATION(S): at 05:43

## 2020-06-23 RX ADMIN — Medication 1 APPLICATION(S): at 21:13

## 2020-06-23 RX ADMIN — Medication 1 APPLICATION(S): at 13:40

## 2020-06-23 RX ADMIN — Medication 5 MILLILITER(S): at 23:52

## 2020-06-23 RX ADMIN — TRETINOIN 40 MILLIGRAM(S): 10 CAPSULE, LIQUID FILLED ORAL at 21:10

## 2020-06-23 RX ADMIN — TRETINOIN 40 MILLIGRAM(S): 10 CAPSULE, LIQUID FILLED ORAL at 08:00

## 2020-06-23 RX ADMIN — Medication 1 SPRAY(S): at 05:59

## 2020-06-23 RX ADMIN — Medication 50 GRAM(S): at 13:40

## 2020-06-23 RX ADMIN — Medication 100 MILLIGRAM(S): at 13:39

## 2020-06-23 RX ADMIN — AER TRAVELER 1 APPLICATION(S): 0.5 SOLUTION RECTAL; TOPICAL at 05:59

## 2020-06-23 RX ADMIN — Medication 5 MILLILITER(S): at 08:00

## 2020-06-23 RX ADMIN — Medication 5 MILLIGRAM(S): at 21:10

## 2020-06-23 RX ADMIN — Medication 5 MILLIGRAM(S): at 05:45

## 2020-06-23 RX ADMIN — Medication 5 MILLILITER(S): at 20:44

## 2020-06-23 RX ADMIN — Medication 1 APPLICATION(S): at 17:36

## 2020-06-23 RX ADMIN — Medication 250 MILLIGRAM(S): at 05:43

## 2020-06-23 RX ADMIN — VALACYCLOVIR 1000 MILLIGRAM(S): 500 TABLET, FILM COATED ORAL at 21:10

## 2020-06-23 RX ADMIN — ATENOLOL 25 MILLIGRAM(S): 25 TABLET ORAL at 05:45

## 2020-06-23 RX ADMIN — GABAPENTIN 100 MILLIGRAM(S): 400 CAPSULE ORAL at 14:24

## 2020-06-23 RX ADMIN — GABAPENTIN 100 MILLIGRAM(S): 400 CAPSULE ORAL at 11:48

## 2020-06-23 RX ADMIN — VALACYCLOVIR 1000 MILLIGRAM(S): 500 TABLET, FILM COATED ORAL at 13:39

## 2020-06-23 RX ADMIN — Medication 1 APPLICATION(S): at 13:42

## 2020-06-23 RX ADMIN — PANTOPRAZOLE SODIUM 40 MILLIGRAM(S): 20 TABLET, DELAYED RELEASE ORAL at 05:45

## 2020-06-23 RX ADMIN — Medication 5 MILLIGRAM(S): at 17:36

## 2020-06-23 RX ADMIN — AER TRAVELER 1 APPLICATION(S): 0.5 SOLUTION RECTAL; TOPICAL at 17:37

## 2020-06-23 RX ADMIN — Medication 100 MILLIGRAM(S): at 21:10

## 2020-06-23 RX ADMIN — LORATADINE 10 MILLIGRAM(S): 10 TABLET ORAL at 14:24

## 2020-06-23 RX ADMIN — Medication 250 MILLIGRAM(S): at 17:34

## 2020-06-23 RX ADMIN — Medication 1 APPLICATION(S): at 06:03

## 2020-06-23 RX ADMIN — Medication 5 MILLILITER(S): at 17:36

## 2020-06-23 RX ADMIN — VALACYCLOVIR 1000 MILLIGRAM(S): 500 TABLET, FILM COATED ORAL at 05:46

## 2020-06-23 RX ADMIN — Medication 1 SPRAY(S): at 13:40

## 2020-06-23 RX ADMIN — GABAPENTIN 100 MILLIGRAM(S): 400 CAPSULE ORAL at 21:10

## 2020-06-23 RX ADMIN — Medication 5 MILLILITER(S): at 11:48

## 2020-06-23 RX ADMIN — Medication 1 SPRAY(S): at 21:10

## 2020-06-23 NOTE — CHART NOTE - NSCHARTNOTEFT_GEN_A_CORE
S: Called by Radiology with findings of an acute DVT   patient denies any acute complaints, denies chest pain, shortness of breath.     O: Vital signs  Vital Signs Last 24 Hrs  T(C): 36 (23 Jun 2020 17:31), Max: 37.2 (22 Jun 2020 22:20)  T(F): 96.8 (23 Jun 2020 17:31), Max: 99 (22 Jun 2020 22:20)  HR: 71 (23 Jun 2020 17:31) (65 - 78)  BP: 131/75 (23 Jun 2020 17:31) (106/61 - 134/72)  BP(mean): --  RR: 18 (23 Jun 2020 17:31) (18 - 20)  SpO2: 96% (23 Jun 2020 17:31) (95% - 98%)    Labs:                               7.4    2.56  )-----------( 51       ( 23 Jun 2020 09:47 )             22.3     23 Jun 2020 09:47    139    |  103    |  11     ----------------------------<  153    4.1     |  23     |  0.86     Ca    9.7        23 Jun 2020 09:47  Phos  4.1       23 Jun 2020 09:47  Mg     1.8       23 Jun 2020 09:47    TPro  7.3    /  Alb  4.5    /  TBili  0.4    /  DBili  x      /  AST  28     /  ALT  25     /  AlkPhos  113    23 Jun 2020 09:47    PT/INR - ( 22 Jun 2020 06:46 )   PT: 13.6 sec;   INR: 1.19 ratio         PTT - ( 22 Jun 2020 06:46 )  PTT:29.5 sec  CAPILLARY BLOOD GLUCOSE    LIVER FUNCTIONS - ( 23 Jun 2020 09:47 )  Alb: 4.5 g/dL / Pro: 7.3 g/dL / ALK PHOS: 113 U/L / ALT: 25 U/L / AST: 28 U/L / GGT: x             Physical Exam     Awake alert and oriented x3 in no acute respiratory distress on room air   Lungs: clear to auscultation b/l   Cor: S1/S2 RRR   Abd: ND/NT/+BS   Ext: Right upper extremity redness and mild swelling       A/P   Patient is a 60 year old female with MHx of HTN, DM2, s/p COVID 19 infection  admitted for the management of newly diagnosed APL . Patient has been receiving treatment with ATRA and   Arsenic trioxide. GALA currently on hold secondary to Shingles, with a new Right upper extremity Axillary vein DVT     # Acute Axillary DVT  VA Duplex (+) findings   discussed with Dr.Goldberg   started on Heparin gtt   platelet count today 51   will give a bag of platelet over 3 hours   will repeat a platelet count after infusion   Heparin gtt started - full anticoagulation dose   no Heparin bolus   monitor mental status or acute change   if any change to obtain a CT   findings discussed with Dr.Goldberg plan per PMD  discussed findings and plan with son.   monitor closely.

## 2020-06-23 NOTE — PROGRESS NOTE ADULT - SUBJECTIVE AND OBJECTIVE BOX
Diagnosis: Acute Promyelocytic leukemia     Protocol/Chemo Regimen: ATRA/Arsenic     Day: ATRA Day 31 and Arsenic Day 27 (Holding Arsenic from 6/22/20)    Pt endorsed:  She states that she has RUE pain when the PICC line is flushed. In addition, patient reports that she has had a rash on her R buttock and R posterior thigh which has been bothering her the past few days. Her cough is improved currently. No complaints of chest pain or shortness of breath.    Review of Systems:  Patient denies headache, chest pain, shortness of breath, nausea, vomiting, or abdominal pain.    Pain scale: 3/10    Diet: Consistent Carb w/ Evening Snack    Allergies    No Known Allergies    Intolerances        ANTIMICROBIALS  valACYclovir 1000 milliGRAM(s) Oral every 8 hours  vancomycin  IVPB      vancomycin  IVPB 1000 milliGRAM(s) IV Intermittent every 12 hours      HEME/ONC MEDICATIONS  arsenic trioxide IVPB (eMAR) 11 milliGRAM(s) IV Intermittent every 24 hours  tretinoin 40 milliGRAM(s) Oral every 12 hours      STANDING MEDICATIONS  ammonium lactate 12% Lotion 1 Application(s) Topical two times a day  ATENolol  Tablet 25 milliGRAM(s) Oral daily  baclofen 5 milliGRAM(s) Oral every 12 hours  benzonatate 100 milliGRAM(s) Oral every 8 hours  Biotene Dry Mouth Oral Rinse 5 milliLiter(s) Swish and Spit five times a day  bisacodyl 5 milliGRAM(s) Oral every 12 hours  dextrose 5%. 1000 milliLiter(s) IV Continuous <Continuous>  dextrose 50% Injectable 12.5 Gram(s) IV Push once  dextrose 50% Injectable 25 Gram(s) IV Push once  dextrose 50% Injectable 25 Gram(s) IV Push once  gabapentin 100 milliGRAM(s) Oral daily  hydrocortisone 2.5% Rectal Cream 1 Application(s) Rectal every 8 hours  insulin lispro (HumaLOG) corrective regimen sliding scale   SubCutaneous three times a day before meals  insulin lispro (HumaLOG) corrective regimen sliding scale   SubCutaneous at bedtime  pantoprazole    Tablet 40 milliGRAM(s) Oral before breakfast  petrolatum white Ointment 1 Application(s) Topical three times a day  polyethylene glycol 3350 17 Gram(s) Oral daily  sodium chloride 0.65% Nasal 1 Spray(s) Both Nostrils every 8 hours  sodium chloride 0.9%. 1000 milliLiter(s) IV Continuous <Continuous>  witch hazel Pads 1 Application(s) Topical every 12 hours      PRN MEDICATIONS  acetaminophen   Tablet .. 650 milliGRAM(s) Oral every 6 hours PRN  ALBUTerol    90 MICROgram(s) HFA Inhaler 2 Puff(s) Inhalation every 6 hours PRN  AQUAPHOR (petrolatum Ointment) 1 Application(s) Topical every 3 hours PRN  dextrose 40% Gel 15 Gram(s) Oral once PRN  diphenhydrAMINE   Injectable 25 milliGRAM(s) IV Push every 6 hours PRN  glucagon  Injectable 1 milliGRAM(s) IntraMuscular once PRN  guaiFENesin   Syrup  (Sugar-Free) 100 milliGRAM(s) Oral every 6 hours PRN  hemorrhoidal Ointment 1 Application(s) Rectal every 8 hours PRN  ondansetron Injectable 8 milliGRAM(s) IV Push every 8 hours PRN  sodium chloride 0.9% lock flush 10 milliLiter(s) IV Push every 1 hour PRN        Vital Signs Last 24 Hrs  T(C): 36.2 (23 Jun 2020 09:02), Max: 37.2 (22 Jun 2020 22:20)  T(F): 97.1 (23 Jun 2020 09:02), Max: 99 (22 Jun 2020 22:20)  HR: 65 (23 Jun 2020 09:02) (63 - 78)  BP: 111/64 (23 Jun 2020 09:02) (106/61 - 134/72)  BP(mean): --  RR: 20 (23 Jun 2020 09:02) (18 - 20)  SpO2: 95% (23 Jun 2020 09:02) (95% - 99%)    PHYSICAL EXAM  Constitutional: NAD  HEENT: NC/AT, MMM  Neck: Supple  Respiratory: Grossly CTAB; No wheeze appreciated  Cardiovascular: RRR; +S1/S2  Gastrointestinal: +BS, Soft, NT, No rigidity  Extremities: No LE edema, + RUE PICC line  Neurological: Awake and alert, Answering questions and following commands appropriately  Skin: Warm and dry, Some hyperpigmentation noted around RUE PICC line, Shingles-like rash noted on R buttock (appears crusting over) and R posterior thigh (appears to be developing)  Psychiatric: Calm and cooperative    RECENT CULTURES:  06-20 @ 21:18  .Blood PICC/PERC Double Lumen BLUE  No growth to date.    06-20 @ 21:14  .Blood Blood-Peripheral  No growth to date.    06-20 @ 20:49  .Urine Clean Catch (Midstream)  Enterococcus faecalis  Enterococcus faecalis  ARTHUR    10,000 - 49,000 CFU/mL Enterococcus faecalis  Normal Urogenital tika present          LABS:                        7.4    2.54  )-----------( 46       ( 22 Jun 2020 06:46 )             22.3         Mean Cell Volume : 87.8 fl  Mean Cell Hemoglobin : 29.1 pg  Mean Cell Hemoglobin Concentration : 33.2 gm/dL  Auto Neutrophil # : 1.39 K/uL  Auto Lymphocyte # : 1.06 K/uL  Auto Monocyte # : 0.00 K/uL  Auto Eosinophil # : 0.07 K/uL  Auto Basophil # : 0.02 K/uL  Auto Neutrophil % : 54.8 %  Auto Lymphocyte % : 41.7 %  Auto Monocyte % : 0.0 %  Auto Eosinophil % : 2.6 %  Auto Basophil % : 0.9 %      06-22    141  |  104  |  14  ----------------------------<  105<H>  3.9   |  24  |  0.80    Ca    9.6      22 Jun 2020 06:45  Phos  4.2     06-22  Mg     2.1     06-22    TPro  7.5  /  Alb  4.6  /  TBili  0.5  /  DBili  x   /  AST  26  /  ALT  30  /  AlkPhos  100  06-22          PT/INR - ( 22 Jun 2020 06:46 )   PT: 13.6 sec;   INR: 1.19 ratio         PTT - ( 22 Jun 2020 06:46 )  PTT:29.5 sec        RADIOLOGY & ADDITIONAL STUDIES:  US Extremity Nonvasc Limited, Right (06.20.20 @ 18:53) >  Ultrasound evaluation of the right arm demonstrates mild subcutaneous edema. There is no evidence of hematoma or fluid collection. Diagnosis: Acute Promyelocytic leukemia     Protocol/Chemo Regimen: ATRA/Arsenic     Day: ATRA Day 31 and Arsenic Day 27 (Holding Arsenic from 6/22/20)    Pt endorsed:  Right upper inner arm pain and redness. c/o Rash on her Right buttock and Right posterior thigh     Review of Systems:  Patient denies headache, chest pain, shortness of breath, nausea, vomiting, or abdominal pain.    Pain scale: 3/10    Diet: Consistent Carb w/ Evening Snack    Allergies    No Known Allergies    Intolerances        ANTIMICROBIALS  valACYclovir 1000 milliGRAM(s) Oral every 8 hours  vancomycin  IVPB      vancomycin  IVPB 1000 milliGRAM(s) IV Intermittent every 12 hours      HEME/ONC MEDICATIONS  arsenic trioxide IVPB (eMAR) 11 milliGRAM(s) IV Intermittent every 24 hours  tretinoin 40 milliGRAM(s) Oral every 12 hours      STANDING MEDICATIONS  ammonium lactate 12% Lotion 1 Application(s) Topical two times a day  ATENolol  Tablet 25 milliGRAM(s) Oral daily  baclofen 5 milliGRAM(s) Oral every 12 hours  benzonatate 100 milliGRAM(s) Oral every 8 hours  Biotene Dry Mouth Oral Rinse 5 milliLiter(s) Swish and Spit five times a day  bisacodyl 5 milliGRAM(s) Oral every 12 hours  dextrose 5%. 1000 milliLiter(s) IV Continuous <Continuous>  dextrose 50% Injectable 12.5 Gram(s) IV Push once  dextrose 50% Injectable 25 Gram(s) IV Push once  dextrose 50% Injectable 25 Gram(s) IV Push once  gabapentin 100 milliGRAM(s) Oral daily  hydrocortisone 2.5% Rectal Cream 1 Application(s) Rectal every 8 hours  insulin lispro (HumaLOG) corrective regimen sliding scale   SubCutaneous three times a day before meals  insulin lispro (HumaLOG) corrective regimen sliding scale   SubCutaneous at bedtime  pantoprazole    Tablet 40 milliGRAM(s) Oral before breakfast  petrolatum white Ointment 1 Application(s) Topical three times a day  polyethylene glycol 3350 17 Gram(s) Oral daily  sodium chloride 0.65% Nasal 1 Spray(s) Both Nostrils every 8 hours  sodium chloride 0.9%. 1000 milliLiter(s) IV Continuous <Continuous>  witch hazel Pads 1 Application(s) Topical every 12 hours      PRN MEDICATIONS  acetaminophen   Tablet .. 650 milliGRAM(s) Oral every 6 hours PRN  ALBUTerol    90 MICROgram(s) HFA Inhaler 2 Puff(s) Inhalation every 6 hours PRN  AQUAPHOR (petrolatum Ointment) 1 Application(s) Topical every 3 hours PRN  dextrose 40% Gel 15 Gram(s) Oral once PRN  diphenhydrAMINE   Injectable 25 milliGRAM(s) IV Push every 6 hours PRN  glucagon  Injectable 1 milliGRAM(s) IntraMuscular once PRN  guaiFENesin   Syrup  (Sugar-Free) 100 milliGRAM(s) Oral every 6 hours PRN  hemorrhoidal Ointment 1 Application(s) Rectal every 8 hours PRN  ondansetron Injectable 8 milliGRAM(s) IV Push every 8 hours PRN  sodium chloride 0.9% lock flush 10 milliLiter(s) IV Push every 1 hour PRN        Vital Signs Last 24 Hrs  T(C): 36.2 (23 Jun 2020 09:02), Max: 37.2 (22 Jun 2020 22:20)  T(F): 97.1 (23 Jun 2020 09:02), Max: 99 (22 Jun 2020 22:20)  HR: 65 (23 Jun 2020 09:02) (63 - 78)  BP: 111/64 (23 Jun 2020 09:02) (106/61 - 134/72)  BP(mean): --  RR: 20 (23 Jun 2020 09:02) (18 - 20)  SpO2: 95% (23 Jun 2020 09:02) (95% - 99%)    PHYSICAL EXAM  Constitutional: NAD  HEENT: NC/AT, MMM  Neck: Supple  Respiratory: Grossly CTAB; No wheeze appreciated  Cardiovascular: RRR; +S1/S2  Gastrointestinal: +BS, Soft, NT, No rigidity  Extremities: No LE edema, + RUE PICC line  Neurological: Awake and alert, Answering questions and following commands appropriately  Skin: Warm and dry, Some hyperpigmentation noted around RUE PICC line, Shingles-like rash noted on R buttock (appears crusting over) and R posterior thigh (appears to be developing)  Psychiatric: Calm and cooperative    RECENT CULTURES:  06-20 @ 21:18  .Blood PICC/PERC Double Lumen BLUE  No growth to date.    06-20 @ 21:14  .Blood Blood-Peripheral  No growth to date.    06-20 @ 20:49  .Urine Clean Catch (Midstream)  Enterococcus faecalis  Enterococcus faecalis  ARTHUR    10,000 - 49,000 CFU/mL Enterococcus faecalis  Normal Urogenital tika present          LABS:                        7.4    2.54  )-----------( 46       ( 22 Jun 2020 06:46 )             22.3         Mean Cell Volume : 87.8 fl  Mean Cell Hemoglobin : 29.1 pg  Mean Cell Hemoglobin Concentration : 33.2 gm/dL  Auto Neutrophil # : 1.39 K/uL  Auto Lymphocyte # : 1.06 K/uL  Auto Monocyte # : 0.00 K/uL  Auto Eosinophil # : 0.07 K/uL  Auto Basophil # : 0.02 K/uL  Auto Neutrophil % : 54.8 %  Auto Lymphocyte % : 41.7 %  Auto Monocyte % : 0.0 %  Auto Eosinophil % : 2.6 %  Auto Basophil % : 0.9 %      06-22    141  |  104  |  14  ----------------------------<  105<H>  3.9   |  24  |  0.80    Ca    9.6      22 Jun 2020 06:45  Phos  4.2     06-22  Mg     2.1     06-22    TPro  7.5  /  Alb  4.6  /  TBili  0.5  /  DBili  x   /  AST  26  /  ALT  30  /  AlkPhos  100  06-22          PT/INR - ( 22 Jun 2020 06:46 )   PT: 13.6 sec;   INR: 1.19 ratio         PTT - ( 22 Jun 2020 06:46 )  PTT:29.5 sec        RADIOLOGY & ADDITIONAL STUDIES:  US Extremity Nonvasc Limited, Right (06.20.20 @ 18:53) >  Ultrasound evaluation of the right arm demonstrates mild subcutaneous edema. There is no evidence of hematoma or fluid collection. Diagnosis: Acute Promyelocytic leukemia     Protocol/Chemo Regimen: ATRA/Arsenic     Day: ATRA Day 31 and Arsenic Day 27 (Holding Arsenic from Day 26 --> 6/22/20)    Pt endorsed:  Right upper inner arm pain and redness. c/o Rash on her Right buttock and Right posterior thigh     Review of Systems:  Patient denies headache, chest pain, shortness of breath, nausea, vomiting, or abdominal pain.    Pain scale: 3/10    Diet: Consistent Carb w/ Evening Snack    Allergies    No Known Allergies    Intolerances        ANTIMICROBIALS  valACYclovir 1000 milliGRAM(s) Oral every 8 hours  vancomycin  IVPB      vancomycin  IVPB 1000 milliGRAM(s) IV Intermittent every 12 hours      HEME/ONC MEDICATIONS  arsenic trioxide IVPB (eMAR) 11 milliGRAM(s) IV Intermittent every 24 hours  tretinoin 40 milliGRAM(s) Oral every 12 hours      STANDING MEDICATIONS  ammonium lactate 12% Lotion 1 Application(s) Topical two times a day  ATENolol  Tablet 25 milliGRAM(s) Oral daily  baclofen 5 milliGRAM(s) Oral every 12 hours  benzonatate 100 milliGRAM(s) Oral every 8 hours  Biotene Dry Mouth Oral Rinse 5 milliLiter(s) Swish and Spit five times a day  bisacodyl 5 milliGRAM(s) Oral every 12 hours  dextrose 5%. 1000 milliLiter(s) IV Continuous <Continuous>  dextrose 50% Injectable 12.5 Gram(s) IV Push once  dextrose 50% Injectable 25 Gram(s) IV Push once  dextrose 50% Injectable 25 Gram(s) IV Push once  gabapentin 100 milliGRAM(s) Oral daily  hydrocortisone 2.5% Rectal Cream 1 Application(s) Rectal every 8 hours  insulin lispro (HumaLOG) corrective regimen sliding scale   SubCutaneous three times a day before meals  insulin lispro (HumaLOG) corrective regimen sliding scale   SubCutaneous at bedtime  pantoprazole    Tablet 40 milliGRAM(s) Oral before breakfast  petrolatum white Ointment 1 Application(s) Topical three times a day  polyethylene glycol 3350 17 Gram(s) Oral daily  sodium chloride 0.65% Nasal 1 Spray(s) Both Nostrils every 8 hours  sodium chloride 0.9%. 1000 milliLiter(s) IV Continuous <Continuous>  witch hazel Pads 1 Application(s) Topical every 12 hours      PRN MEDICATIONS  acetaminophen   Tablet .. 650 milliGRAM(s) Oral every 6 hours PRN  ALBUTerol    90 MICROgram(s) HFA Inhaler 2 Puff(s) Inhalation every 6 hours PRN  AQUAPHOR (petrolatum Ointment) 1 Application(s) Topical every 3 hours PRN  dextrose 40% Gel 15 Gram(s) Oral once PRN  diphenhydrAMINE   Injectable 25 milliGRAM(s) IV Push every 6 hours PRN  glucagon  Injectable 1 milliGRAM(s) IntraMuscular once PRN  guaiFENesin   Syrup  (Sugar-Free) 100 milliGRAM(s) Oral every 6 hours PRN  hemorrhoidal Ointment 1 Application(s) Rectal every 8 hours PRN  ondansetron Injectable 8 milliGRAM(s) IV Push every 8 hours PRN  sodium chloride 0.9% lock flush 10 milliLiter(s) IV Push every 1 hour PRN        Vital Signs Last 24 Hrs  T(C): 36.2 (23 Jun 2020 09:02), Max: 37.2 (22 Jun 2020 22:20)  T(F): 97.1 (23 Jun 2020 09:02), Max: 99 (22 Jun 2020 22:20)  HR: 65 (23 Jun 2020 09:02) (63 - 78)  BP: 111/64 (23 Jun 2020 09:02) (106/61 - 134/72)  BP(mean): --  RR: 20 (23 Jun 2020 09:02) (18 - 20)  SpO2: 95% (23 Jun 2020 09:02) (95% - 99%)    PHYSICAL EXAM  Constitutional: NAD  HEENT: NC/AT, MMM  Neck: Supple  Respiratory: Grossly CTAB; No wheeze appreciated  Cardiovascular: RRR; +S1/S2  Gastrointestinal: +BS, Soft, NT, No rigidity  Extremities: No LE edema, + RUE PICC line  Neurological: Awake and alert, Answering questions and following commands appropriately  Skin: Warm and dry, Some hyperpigmentation noted around RUE PICC line, Shingles-like rash noted on R buttock (appears crusting over) and R posterior thigh (appears to be developing)  Psychiatric: Calm and cooperative    RECENT CULTURES:  06-20 @ 21:18  .Blood PICC/PERC Double Lumen BLUE  No growth to date.    06-20 @ 21:14  .Blood Blood-Peripheral  No growth to date.    06-20 @ 20:49  .Urine Clean Catch (Midstream)  Enterococcus faecalis  Enterococcus faecalis  ARTHUR    10,000 - 49,000 CFU/mL Enterococcus faecalis  Normal Urogenital tika present          LABS:                        7.4    2.54  )-----------( 46       ( 22 Jun 2020 06:46 )             22.3         Mean Cell Volume : 87.8 fl  Mean Cell Hemoglobin : 29.1 pg  Mean Cell Hemoglobin Concentration : 33.2 gm/dL  Auto Neutrophil # : 1.39 K/uL  Auto Lymphocyte # : 1.06 K/uL  Auto Monocyte # : 0.00 K/uL  Auto Eosinophil # : 0.07 K/uL  Auto Basophil # : 0.02 K/uL  Auto Neutrophil % : 54.8 %  Auto Lymphocyte % : 41.7 %  Auto Monocyte % : 0.0 %  Auto Eosinophil % : 2.6 %  Auto Basophil % : 0.9 %      06-22    141  |  104  |  14  ----------------------------<  105<H>  3.9   |  24  |  0.80    Ca    9.6      22 Jun 2020 06:45  Phos  4.2     06-22  Mg     2.1     06-22    TPro  7.5  /  Alb  4.6  /  TBili  0.5  /  DBili  x   /  AST  26  /  ALT  30  /  AlkPhos  100  06-22          PT/INR - ( 22 Jun 2020 06:46 )   PT: 13.6 sec;   INR: 1.19 ratio         PTT - ( 22 Jun 2020 06:46 )  PTT:29.5 sec        RADIOLOGY & ADDITIONAL STUDIES:  US Extremity Nonvasc Limited, Right (06.20.20 @ 18:53) >  Ultrasound evaluation of the right arm demonstrates mild subcutaneous edema. There is no evidence of hematoma or fluid collection.

## 2020-06-23 NOTE — PROGRESS NOTE ADULT - ATTENDING COMMENTS
59 y/o Romanian speaking F who is transferred for newly diagnosed low/interm risk APL.  Presented with pancytopenia, flow cytometry/bone marrow biopsy/FISH from 5/26- consistent with acute promyelocytic leukemia with PML-AWA translocation.    She was started on ATRA 5/23- day +29, GALA 5/27- day +25.    -CT chest 6/6 c/w granulomatous disease (hx of TB in 1980's) pt had SOB on 6/4, now resolved.  Daily weights, diurese. Will give Hydrea 500mg if wbc>10. On Prednisone 20mg through 5/12- now off.  If pt becomes SOB again, may restart  -tremors improved, unclear etiology, electrolytes WNL. Cefepime held, given Baclofen -they improved  -Check CBC, coags/fibrinogen once daily.  Goal plt >40-50, fibrinogen >150; responding to ABO-matched platelets given over 3 hrs in 1/2 unit infusions   -monitor lytes daily, keep K>4, Mg>2.  EKG twice a week  -off antibiotics  -plan on BM bx around day 29 post GALA  -COVID19+ on 6/17, moved to 9M  -Rash on R buttock and back of RLE. Called derm, holding GALA for now. 59 y/o Indonesian speaking F who is transferred for newly diagnosed low/interm risk APL.  Presented with pancytopenia, flow cytometry/bone marrow biopsy/FISH from 5/26- consistent with acute promyelocytic leukemia with PML-AWA translocation.    She was started on ATRA 5/23- day +30, GALA 5/27- day +26.    -CT chest 6/6 c/w granulomatous disease (hx of TB in 1980's) pt had SOB on 6/4, now resolved.  Daily weights, diurese. Will give Hydrea 500mg if wbc>10. On Prednisone 20mg through 5/12- now off.  If pt becomes SOB again, may restart  -tremors improved, unclear etiology, electrolytes WNL. Cefepime held, given Baclofen -they improved  -Check CBC, coags/fibrinogen once daily.  Goal plt >40-50, fibrinogen >150; responding to ABO-matched platelets given over 3 hrs in 1/2 unit infusions   -monitor lytes daily, keep K>4, Mg>2.  EKG twice a week  -off antibiotics  -plan on BM bx around day 29 post GALA  -COVID19+ on 6/17, moved to 9M  -Rash on R buttock and back of RLE. Called derm, holding GALA for now. Awaiting VZV swab, started valtrex. Still with vesicular component on back of R leg, will restart when resolving. 61 y/o Pashto speaking F who is transferred for newly diagnosed low/interm risk APL.  Presented with pancytopenia, flow cytometry/bone marrow biopsy/FISH from 5/26- consistent with acute promyelocytic leukemia with PML-AWA translocation.    She was started on ATRA 5/23- day +30, GALA 5/27- day +26.    -CT chest 6/6 c/w granulomatous disease (hx of TB in 1980's) pt had SOB on 6/4, now resolved.  Daily weights, diurese. Will give Hydrea 500mg if wbc>10. On Prednisone 20mg through 5/12- now off.  If pt becomes SOB again, may restart  -tremors improved, unclear etiology, electrolytes WNL. Cefepime held, given Baclofen -they improved  -Check CBC, coags/fibrinogen once daily.  Goal plt >40-50, fibrinogen >150; responding to ABO-matched platelets given over 3 hrs in 1/2 unit infusions   -monitor lytes daily, keep K>4, Mg>2.  EKG twice a week  -off antibiotics  -plan on BM bx around day 29 post GALA  -COVID19+ on 6/17, moved to 9M  -Rash on R buttock and back of RLE. Called derm, holding GALA for now. Awaiting VZV swab, started valtrex. Still with vesicular component on back of R leg, will restart when resolving. Pain present, will increase gabapentin to 100 mg TID.   -RUE redness and itching at previous PICC site, line removed. In area of adhesive, possibly allergic reaction. Will perform doppler RUE and give claritin. No evidence of infection, cultures negative and no tenderness or induration.

## 2020-06-23 NOTE — PROGRESS NOTE ADULT - PROBLEM SELECTOR PLAN 1
Continue ATRA 40mg BID (started 5/22),   Arsenic Trioxide on hold for now (started on 5/27) given concern for shingles on R buttock/posterior thigh  Monitor for differentiation syndrome and check EKG for QTc prolongation every Tuesday and Friday. Keep K > 4 and Mg > 2.  Status post Hydrea 500mg PO x 1 on 6/8 and 6/9 for WBC >10  Follow up CBC with diff, CMP, TLS and DIC panel daily  Keep PLTs >50 K. If unable to achieve goal, continue with platelets infusion (single donor) 1/2 units over 3 hours every 12 hours.  S/p Prednisone 20mg PO daily x 5 days (through 6/12) due to platelet antibody.  Keep Fibrinogen >150, if less give Cryo  Strict Is and Os/Daily weights/Mouth Care Continue ATRA 40mg BID (started 5/22),   Arsenic Trioxide which was started on 5/27 now on hold (since 6/22) 2/2 shingles  Monitor for differentiation syndrome and check EKG for QTc prolongation every Tuesday and Friday. Keep K > 4 and Mg > 2.  Status post Hydrea 500mg PO x 1 on 6/8 and 6/9 for WBC >10  Follow up CBC with diff, CMP, TLS and DIC panel daily  Keep PLTs >50 K. If unable to achieve goal, continue with platelets infusion (single donor) 1/2 units over 3 hours every 12 hours.  S/p Prednisone 20mg PO daily x 5 days (through 6/12) due to platelet antibody.  Keep Fibrinogen >150, if less give Cryo  Strict Is and Os/Daily weights/Mouth Care

## 2020-06-23 NOTE — PROGRESS NOTE ADULT - PROBLEM SELECTOR PLAN 4
New onset essential tremor which has improved with Baclofen 5mg q daily Patient previously with c/o cough and SOB with stable O2 sat and concern for differentiation syndrome, now intermittent  6/5 Patient received one dose of Dexamethasone   Continue Incentive Spirometer   Continue diuresis as needed   6/7 CT chest with biapical pleural-parenchymal scarring with calcification, likely from prior granulomatous disease. No consolidation. (Son reports a history of TB in 1980s)  C/w Tessalon Perles and Robitussin PRN

## 2020-06-23 NOTE — PROGRESS NOTE ADULT - PROBLEM SELECTOR PLAN 3
Patient previously with c/o cough and SOB with stable O2 sat and concern for differentiation syndrome, now intermittent  6/5 Patient received one dose of Dexamethasone   Continue Incentive Spirometer   Continue diuresis as needed   6/7 CT chest with biapical pleural-parenchymal scarring with calcification, likely from prior granulomatous disease. No consolidation. (Son reports a history of TB in 1980s)  C/w Tessalon Perles and Robitussin PRN Shingles of the Right buttock and Right upper thigh rash   6/22 seen by Dermatology   continue Valtrex and Lac hydrin as per Dermatology recommendations   Neurontin 100 mg TID for herpetic neuralgia.

## 2020-06-23 NOTE — PROGRESS NOTE ADULT - PROBLEM SELECTOR PLAN 7
DVT PPx: Holding pharmacologic AC 2/2 thrombocytopenia  Diet: Consistent Carb w/ Evening Snack  Dispo: Pending resolution of acute medical issues    Contact Information [phone] 5/23: HgA1c = 7.0  C/w HISS. FS are currently stable. Monitor FS AC & QHS   C/w Consistent Carbohydrate diet w/ Evening snack

## 2020-06-23 NOTE — PROGRESS NOTE ADULT - PROBLEM SELECTOR PLAN 6
5/23: HgA1c = 7.0  C/w HISS. FS are currently stable. Monitor FS AC & QHS   C/w Consistent Carbohydrate diet w/ Evening snack Home regimen with Atenolol held on admission  Restarted at 25mg PO daily for increased BP  BP is currently stable. Continue to monitor

## 2020-06-23 NOTE — CHART NOTE - NSCHARTNOTEFT_GEN_A_CORE
Dermatology Resident Brief Note    VZV positive.   c/w valtrex x 7-10 days, may need longer course if not crusted over at 7 days.   Dermatology to sign off, if worsening or other skin concerns, please reconsult.     Aga Browne MD   PGY-3, Dermatology.

## 2020-06-23 NOTE — CONSULT NOTE ADULT - ASSESSMENT
Impression:    Right upper arm former PICC line site with healing puncture site    Recommend:  1.) topical therapy: right upper arm former PICC line site - cleanse with CHG, cover with allevyn foam dressing daily  2.) Right arm elevation  3.) Antibiotics per medicine/primary team    Care as per medicine. Will not follow. Please recall for new issues  Upon discharge [facility] [address] [place] [address] [phone]  Seen with Dr. Ramos and discussed with clinical nurse  Thank you for this consult  Nichole Baez, NP-C, CWOCN [id number]

## 2020-06-23 NOTE — CHART NOTE - NSCHARTNOTEFT_GEN_A_CORE
Nutrition Follow Up Note  Patient seen for: Nutrition follow up. Pt with APL receiving treatment, concern now for shingles.     Source: Pt, pt preferred to call Son Joseluis for translation, pt is primarily Setswana speaking    Diet : Consistent carbohydrate diet with evening snack, Halal with glucerna 3 daily     Patient reports: Per son pt with some nausea but no episodes of vomiting-controlled with medication, last BM today     PO intake : Per son pt with decreased appetite and intake from last week which he attributes to medication reaction, during this time pt has been taking mostly oatmeal and fruits and not much other foods. Pt receptive to salmon and rice this afternoon and to including hard boiled eggs on meals in the future. Pt has been supplementing with 2-3 glucerna shakes and continues to enjoy them.         Weight: 165.7 lbs (6/17), 166.2 lbs (6/23), decreased slightly from admission but seems to be stabilizing.     Pertinent Medications: MEDICATIONS  (STANDING):  ammonium lactate 12% Lotion 1 Application(s) Topical two times a day  arsenic trioxide IVPB (eMAR) 11 milliGRAM(s) IV Intermittent every 24 hours  ATENolol  Tablet 25 milliGRAM(s) Oral daily  baclofen 5 milliGRAM(s) Oral every 12 hours  benzonatate 100 milliGRAM(s) Oral every 8 hours  Biotene Dry Mouth Oral Rinse 5 milliLiter(s) Swish and Spit five times a day  bisacodyl 5 milliGRAM(s) Oral every 12 hours  dextrose 5%. 1000 milliLiter(s) (50 mL/Hr) IV Continuous <Continuous>  dextrose 50% Injectable 12.5 Gram(s) IV Push once  dextrose 50% Injectable 25 Gram(s) IV Push once  dextrose 50% Injectable 25 Gram(s) IV Push once  gabapentin 100 milliGRAM(s) Oral daily  hydrocortisone 2.5% Rectal Cream 1 Application(s) Rectal every 8 hours  insulin lispro (HumaLOG) corrective regimen sliding scale   SubCutaneous three times a day before meals  insulin lispro (HumaLOG) corrective regimen sliding scale   SubCutaneous at bedtime  pantoprazole    Tablet 40 milliGRAM(s) Oral before breakfast  petrolatum white Ointment 1 Application(s) Topical three times a day  polyethylene glycol 3350 17 Gram(s) Oral daily  sodium chloride 0.65% Nasal 1 Spray(s) Both Nostrils every 8 hours  sodium chloride 0.9%. 1000 milliLiter(s) (25 mL/Hr) IV Continuous <Continuous>  tretinoin 40 milliGRAM(s) Oral every 12 hours  valACYclovir 1000 milliGRAM(s) Oral every 8 hours  vancomycin  IVPB      vancomycin  IVPB 1000 milliGRAM(s) IV Intermittent every 12 hours  witch hazel Pads 1 Application(s) Topical every 12 hours    MEDICATIONS  (PRN):  acetaminophen   Tablet .. 650 milliGRAM(s) Oral every 6 hours PRN Temp greater or equal to 38C (100.4F), Mild Pain (1 - 3)  ALBUTerol    90 MICROgram(s) HFA Inhaler 2 Puff(s) Inhalation every 6 hours PRN Shortness of Breath and/or Wheezing  AQUAPHOR (petrolatum Ointment) 1 Application(s) Topical every 3 hours PRN Dry skin  dextrose 40% Gel 15 Gram(s) Oral once PRN Blood Glucose LESS THAN 70 milliGRAM(s)/deciliter  diphenhydrAMINE   Injectable 25 milliGRAM(s) IV Push every 6 hours PRN Allergy symptoms  glucagon  Injectable 1 milliGRAM(s) IntraMuscular once PRN Glucose LESS THAN 70 milligrams/deciliter  guaiFENesin   Syrup  (Sugar-Free) 100 milliGRAM(s) Oral every 6 hours PRN Cough  hemorrhoidal Ointment 1 Application(s) Rectal every 8 hours PRN Hemorrhoids  ondansetron Injectable 8 milliGRAM(s) IV Push every 8 hours PRN Nausea and/or Vomiting  sodium chloride 0.9% lock flush 10 milliLiter(s) IV Push every 1 hour PRN Pre/post blood products, medications, blood draw, and to maintain line patency    Pertinent Labs: 06-23 @ 09:47: Na 139, BUN 11, Cr 0.86, <H>, K+ 4.1, Phos 4.1, Mg 1.8, Alk Phos 113, ALT/SGPT 25, AST/SGOT 28, HbA1c --    Finger Sticks:  POCT Blood Glucose.: 164 mg/dL (06-23 @ 07:51)  POCT Blood Glucose.: 158 mg/dL (06-22 @ 21:41)  POCT Blood Glucose.: 132 mg/dL (06-22 @ 17:19)  POCT Blood Glucose.: 148 mg/dL (06-22 @ 12:23)      Skin per nursing documentation: free of pressure injury-rash noted  Edema: 1+ andrew foot edema     Estimated Needs:   [x ] no change since previous assessment  [ ] recalculated:     Previous Nutrition Diagnosis: Predicted suboptimal energy intake   Nutrition Diagnosis is: escalated to diagnosis below     New Nutrition Diagnosis: Inadequate oral intake   Related to: decreased appetite    As evidenced by: consuming <75% estimated needs     Interventions:     Recommend  1) Continue diet and supplements as ordered  2) Continue to encourage po intake and obtain/honor food preferences as able, RD reviewed Halal, protein containing foods available in house and importance of adequate intake at this time    Monitoring and Evaluation:     Continue to monitor Nutritional intake, Tolerance to diet prescription, weights, labs, skin integrity    RD remains available upon request and will follow up per protocol    Alma Jacobson R.D., [facility], Pager #[phone]

## 2020-06-23 NOTE — PROGRESS NOTE ADULT - ASSESSMENT
Patient is a 61 y/o Croatian-speaking F w/ a PMHx of HTN, HLD, T2DM, and COVID-19 infection in 4/2020 who was admitted for management of newly diagnosed APL. The patient is currently receiving ATRA and Arsenic. Patients hospital course has been complicated by transaminitis, refractory thrombocytopenia, and she was found have a positive COVID-19 PCR on 6/16. The patient has pancytopenia 2/2 disease and/or ATRA/Arsenic. Hospital course c/b R buttock/posterior thigh rash concerning for shingles. Arsenic held on 6/22 (Day 27). Patient is a 59 y/o Slovak-speaking F w/ a PMHx of HTN, HLD, T2DM, and COVID-19 infection in 4/2020 who was admitted for management of newly diagnosed APL. The patient is currently receiving ATRA and Arsenic. Patients hospital course has been complicated by transaminitis, refractory thrombocytopenia, and she was found have a positive COVID-19 PCR on 6/16. The patient has pancytopenia 2/2 disease and/or ATRA/Arsenic. Hospital course c/b R buttock/posterior thigh rash concerning for shingles. Arsenic on hold since 6/22 (Day 27). Patient is a 59 y/o Faroese-speaking F w/ a PMHx of HTN, HLD, T2DM, and COVID-19 infection in 4/2020 who was admitted for management of newly diagnosed APL. The patient has received treatment with ATRA and Arsenic. Arsenic has been on hold since 6/22 secondary to Herpes Zoster infection. Patient's hospital course has been complicated by transaminitis, refractory thrombocytopenia, COVID-19 (+) state (6/16) and Right buttock and Right posterior thigh shingles, currently receiving treatment. The patient has pancytopenia 2/2 disease and/or ATRA/Arsenic. Patient is a 59 y/o Korean-speaking F w/ a PMHx of HTN, HLD, T2DM, and COVID-19 infection in 4/2020 who was admitted for management of newly diagnosed APL. The patient has received treatment with ATRA and Arsenic. Arsenic has been on hold since 6/22 secondary to Herpes Zoster infection. Patient's hospital course has been complicated by transaminitis, refractory thrombocytopenia, COVID-19 (+) state (6/16) and Shingles The patient has pancytopenia 2/2 disease and/or ATRA/Arsenic. Patient is a 61 y/o Macedonian-speaking F w/ a PMHx of HTN, HLD, T2DM, and COVID-19 infection in 4/2020 who was admitted for management of newly diagnosed APL. The patient has been receiving  treatment with ATRA and Arsenic ( ATRA since 5/22 and Arsenic 5/27). Arsenic has been on hold since 6/22 secondary to Herpes Zoster infection. Patient's hospital course has been complicated by transaminitis, refractory thrombocytopenia, COVID-19 (+) state (6/16) and Shingles The patient has pancytopenia 2/2 disease and/or ATRA/Arsenic.

## 2020-06-23 NOTE — PROGRESS NOTE ADULT - PROBLEM SELECTOR PLAN 2
Patient noted to have a shingles-like rash on her R buttock and R posterior thigh today. Given this concern, will hold Arsenic for now. No objection to starting Valtrex.  6/22 seen by Dermatology - continue Valtrex  If febrile, Pan Cx and CXR  6/20 Pan cultures (-)   Some hyperpigmentation noted around RUE PICC site (possibly related to sensitivity to prior adhesive tape?).   RUE U/S (6/20) showed mild subcutaneous edema, no evidence of thrombus or infection.   Previous COVID-19 (+) outpatient.   Then COVID (-) on 5/22, 5/26, 6/2 and 6/9.   6/16 COVID 19 (+)   6/18 COVID 19 AB (+)  6/19 Repeat COVID-19 PCRs (-)   - Monitor CBC WITH DIFF and fever curve Patient noted to have a shingles-like rash on her R buttock and R posterior thigh today. Given this concern, will hold Arsenic for now.   6/22 seen by Dermatology - continue Valtrex treatment with Lac Hydrin  If febrile, Pan Cx and CXR  6/20 Pan cultures (-)   Some hyperpigmentation noted around RUE PICC site (possibly related to sensitivity to prior adhesive tape?).   RUE U/S (6/20) showed mild subcutaneous edema, no evidence of thrombus or infection.   Previous COVID-19 (+) outpatient.   Then COVID (-) on 5/22, 5/26, 6/2 and 6/9.   6/16 COVID 19 (+)   6/18 COVID 19 AB (+)  6/19 Repeat COVID-19 PCRs (-)   - Monitor CBC WITH DIFF and fever curve Afebrile, not neutropenic   6/23 Persistent right upper inner arm pain/swelling - Limited VA duplex inclonclusive will order a complete VA Duplex of RUE  6/22 Right buttock and upper thigh shingles   6/22 seen by Dermatology - continue Valtrex treatment with Lac Hydrin  If febrile, Pan Cx and CXR  6/20 Pan cultures (-)   Some hyperpigmentation noted around RUE PICC site (possibly related to sensitivity to prior adhesive tape?).   Previous COVID-19 (+) outpatient.   Then COVID (-) on 5/22, 5/26, 6/2 and 6/9.   6/16 COVID 19 (+)   6/18 COVID 19 AB (+)  6/19 Repeat COVID-19 PCRs (-)   - Monitor CBC WITH DIFF and fever curve Afebrile, not neutropenic   6/23 Persistent right upper inner arm pain/swelling - Limited VA duplex inclonclusive will order a complete VA Duplex of RUE  6/22 Right buttock and upper thigh shingles continue Valtrex treatment with Lac Hydrin  If febrile, Pan Cx and CXR  6/20 Pan cultures (-)   Some hyperpigmentation noted around RUE PICC site (possibly related to sensitivity to prior adhesive tape?).   Previous COVID-19 (+) outpatient.   Then COVID (-) on 5/22, 5/26, 6/2 and 6/9.   6/16 COVID 19 (+)   6/18 COVID 19 AB (+)  6/19 Repeat COVID-19 PCRs (-)   - Monitor CBC WITH DIFF and fever curve

## 2020-06-23 NOTE — PROGRESS NOTE ADULT - PROBLEM SELECTOR PLAN 5
Home regimen with Atenolol held on admission  Restarted at 25mg PO daily for increased BP  BP is currently stable. Continue to monitor New onset essential tremor which has improved with Baclofen 5mg q daily

## 2020-06-23 NOTE — PROGRESS NOTE ADULT - PROBLEM SELECTOR PLAN 8
DVT PPx: Holding pharmacologic AC 2/2 thrombocytopenia  Diet: Consistent Carb w/ Evening Snack  Dispo: Pending resolution of acute medical issues    Contact Information [phone]

## 2020-06-23 NOTE — CONSULT NOTE ADULT - SUBJECTIVE AND OBJECTIVE BOX
Wound Surgery Consult Note:    HPI:    60 y.o. Kyrgyz speaking  female hx of HTN on atenolol, HLD, DM on metformin presents to [facility] ED on 5/22/20 with fever, malaise, HA. Patient reported that she was hypothermic to 92 degrees intermittently and also febrile to 101-102F which improves with Tylenol. Patient has been complaining of head feeling hot, pleuritic chest pain that moves around w/o numbness tingling, paresthesias, weakness, a rash on bilateral LE x1 month, a tear drop clearing on her tongue, as well as a few drops of blood in her stool with hard bowel movements. Patient has been having poor PO intake for the past 3 days before admission 2/2 throat pain. Patient was COVID + on 4/16/2020, and retested and positive again 5/15/2020.  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 CT head was negative for bleeding.    She was admitted for management of newly diagnosed APL. The patient has been receiving  treatment with ATRA and Arsenic ( ATRA since 5/22 and Arsenic 5/27). Arsenic has been on hold since 6/22 secondary to Herpes Zoster infection. Patient's hospital course has been complicated by transaminitis, refractory thrombocytopenia, COVID-19 (+) state (6/16) and Shingles The patient has pancytopenia 2/2 disease and/or ATRA/Arsenic.     Request for wound care consult for pain at former site of a PICC line now removed on her right upper arm. The puncture site appears to be healing well, however there is erythema to the side of the site and it remains TTP. She was seen with her son acting at  at her preference via phone. She was seen with Dr. Ramos.    PAST MEDICAL & SURGICAL HISTORY:  HLD (hyperlipidemia)  Hypertension  Diabetes  No significant past surgical history    REVIEW OF SYSTEMS  General:	fevers  Respiratory and Thorax: +SOB with COVID	  Cardiovascular:	CP prior to admission  Gastrointestinal:	painful hemmoroids  Genitourinary:	no dysuria  Musculoskeletal:	 ambulatory  Neurological:	no LOC, + shingles  Hematology/Lymphatics:	 APL being treated with arsenic and ATRA  Endocrine:	CM  Skin: pain at right arm PICC line site    MEDICATIONS  (STANDING):  ammonium lactate 12% Lotion 1 Application(s) Topical two times a day  ATENolol  Tablet 25 milliGRAM(s) Oral daily  baclofen 5 milliGRAM(s) Oral every 12 hours  benzonatate 100 milliGRAM(s) Oral every 8 hours  Biotene Dry Mouth Oral Rinse 5 milliLiter(s) Swish and Spit five times a day  bisacodyl 5 milliGRAM(s) Oral every 12 hours  dextrose 5%. 1000 milliLiter(s) (50 mL/Hr) IV Continuous <Continuous>  dextrose 50% Injectable 12.5 Gram(s) IV Push once  dextrose 50% Injectable 25 Gram(s) IV Push once  dextrose 50% Injectable 25 Gram(s) IV Push once  gabapentin 100 milliGRAM(s) Oral three times a day  hydrocortisone 2.5% Rectal Cream 1 Application(s) Rectal every 8 hours  insulin lispro (HumaLOG) corrective regimen sliding scale   SubCutaneous three times a day before meals  insulin lispro (HumaLOG) corrective regimen sliding scale   SubCutaneous at bedtime  loratadine 10 milliGRAM(s) Oral daily  pantoprazole    Tablet 40 milliGRAM(s) Oral before breakfast  petrolatum white Ointment 1 Application(s) Topical three times a day  polyethylene glycol 3350 17 Gram(s) Oral daily  sodium chloride 0.65% Nasal 1 Spray(s) Both Nostrils every 8 hours  sodium chloride 0.9%. 1000 milliLiter(s) (25 mL/Hr) IV Continuous <Continuous>  tretinoin 40 milliGRAM(s) Oral every 12 hours  valACYclovir 1000 milliGRAM(s) Oral every 8 hours  vancomycin  IVPB      vancomycin  IVPB 1000 milliGRAM(s) IV Intermittent every 12 hours  witch hazel Pads 1 Application(s) Topical every 12 hours    MEDICATIONS  (PRN):  acetaminophen   Tablet .. 650 milliGRAM(s) Oral every 6 hours PRN Temp greater or equal to 38C (100.4F), Mild Pain (1 - 3)  ALBUTerol    90 MICROgram(s) HFA Inhaler 2 Puff(s) Inhalation every 6 hours PRN Shortness of Breath and/or Wheezing  AQUAPHOR (petrolatum Ointment) 1 Application(s) Topical every 3 hours PRN Dry skin  dextrose 40% Gel 15 Gram(s) Oral once PRN Blood Glucose LESS THAN 70 milliGRAM(s)/deciliter  diphenhydrAMINE   Injectable 25 milliGRAM(s) IV Push every 6 hours PRN Allergy symptoms  glucagon  Injectable 1 milliGRAM(s) IntraMuscular once PRN Glucose LESS THAN 70 milligrams/deciliter  guaiFENesin   Syrup  (Sugar-Free) 100 milliGRAM(s) Oral every 6 hours PRN Cough  hemorrhoidal Ointment 1 Application(s) Rectal every 8 hours PRN Hemorrhoids  ondansetron Injectable 8 milliGRAM(s) IV Push every 8 hours PRN Nausea and/or Vomiting  sodium chloride 0.9% lock flush 10 milliLiter(s) IV Push every 1 hour PRN Pre/post blood products, medications, blood draw, and to maintain line patency    Allergies    No Known Allergies    Intolerances    SOCIAL HISTORY:  never smoker    FAMILY HISTORY:  Family history of diabetes mellitus: Mother    Vital Signs Last 24 Hrs  T(C): 36.8 (23 Jun 2020 12:59), Max: 37.2 (22 Jun 2020 22:20)  T(F): 98.3 (23 Jun 2020 12:59), Max: 99 (22 Jun 2020 22:20)  HR: 67 (23 Jun 2020 12:59) (65 - 78)  BP: 110/66 (23 Jun 2020 12:59) (106/61 - 134/72)  BP(mean): --  RR: 18 (23 Jun 2020 12:59) (18 - 20)  SpO2: 97% (23 Jun 2020 12:59) (95% - 99%)    Physical Exam:  General: A&Ox3, Obese  Respiratory: no SOB on room air  Gastrointestinal: soft NT/ND  Neurology: weakened strength & sensation grossly intact  Musculoskeletal: no contractures or deformities  Vascular: no BLE edema, BLE equally warm  Skin:  Right upper arm former PICC line site - puncture site healing, no necrotic tissue, no drainage, 3 inches away there is erythema and TTP,  No odor, increased warmth, tenderness, induration, fluctuance    LABS:  06-23    139  |  103  |  11  ----------------------------<  153<H>  4.1   |  23  |  0.86    Ca    9.7      23 Jun 2020 09:47  Phos  4.1     06-23  Mg     1.8     06-23    TPro  7.3  /  Alb  4.5  /  TBili  0.4  /  DBili  x   /  AST  28  /  ALT  25  /  AlkPhos  113  06-23                          7.4    2.56  )-----------( 51       ( 23 Jun 2020 09:47 )             22.3     PT/INR - ( 22 Jun 2020 06:46 )   PT: 13.6 sec;   INR: 1.19 ratio         PTT - ( 22 Jun 2020 06:46 )  PTT:29.5 sec      RADIOLOGY & ADDITIONAL STUDIES:    EXAM:  US NONVASC EXT LTD RT                        PROCEDURE DATE:  06/20/2020    INTERPRETATION:  Clinical indication: Patient status post PICC line placement with redness and swelling in the right upper arm region. Rule out hematoma.    Ultrasound evaluation of the right arm at the site of the PICC line insertion was performed.    Comparison is made to right upper extremity ultrasound dated 05/31/2020.    IMPRESSION:    Ultrasound evaluation of the right arm demonstrates mild subcutaneous edema. There is no evidence of hematoma or fluid collection.    EXAM:  DUPLEX EXT VEINS UPPER RT                        PROCEDURE DATE:  05/31/2020    INTERPRETATION:  CLINICAL INFORMATION: Right upper extremity swelling.    COMPARISON: None available.    TECHNIQUE: Duplex sonography of the RIGHT UPPER extremity veins with color and spectral Doppler, with and without compression.      FINDINGS:  The right internal jugular, subclavian, axillary veins are patent and compressible where applicable. The entire brachial vein is not imaged due to overlying bandages. A catheter is partially imaged in the subclavian vein. The basilic and cephalic veins (superficial veins) are patent and without thrombus.     Doppler examination shows normal spontaneous and phasic flow.    IMPRESSION:   No evidence of deep venous thrombosis in the visualized right upper extremity.

## 2020-06-24 DIAGNOSIS — M79.601 PAIN IN RIGHT ARM: ICD-10-CM

## 2020-06-24 DIAGNOSIS — I82.409 ACUTE EMBOLISM AND THROMBOSIS OF UNSPECIFIED DEEP VEINS OF UNSPECIFIED LOWER EXTREMITY: ICD-10-CM

## 2020-06-24 LAB
ALBUMIN SERPL ELPH-MCNC: 4.4 G/DL — SIGNIFICANT CHANGE UP (ref 3.3–5)
ALP SERPL-CCNC: 110 U/L — SIGNIFICANT CHANGE UP (ref 40–120)
ALT FLD-CCNC: 21 U/L — SIGNIFICANT CHANGE UP (ref 10–45)
ANION GAP SERPL CALC-SCNC: 12 MMOL/L — SIGNIFICANT CHANGE UP (ref 5–17)
APTT BLD: 166.5 SEC — CRITICAL HIGH (ref 27.5–36.3)
APTT BLD: 30 SEC — SIGNIFICANT CHANGE UP (ref 27.5–36.3)
APTT BLD: >200 SEC — CRITICAL HIGH (ref 27.5–36.3)
AST SERPL-CCNC: 23 U/L — SIGNIFICANT CHANGE UP (ref 10–40)
BASOPHILS # BLD AUTO: 0 K/UL — SIGNIFICANT CHANGE UP (ref 0–0.2)
BASOPHILS NFR BLD AUTO: 0 % — SIGNIFICANT CHANGE UP (ref 0–2)
BILIRUB SERPL-MCNC: 0.3 MG/DL — SIGNIFICANT CHANGE UP (ref 0.2–1.2)
BUN SERPL-MCNC: 12 MG/DL — SIGNIFICANT CHANGE UP (ref 7–23)
CALCIUM SERPL-MCNC: 9.5 MG/DL — SIGNIFICANT CHANGE UP (ref 8.4–10.5)
CHLORIDE SERPL-SCNC: 105 MMOL/L — SIGNIFICANT CHANGE UP (ref 96–108)
CO2 SERPL-SCNC: 24 MMOL/L — SIGNIFICANT CHANGE UP (ref 22–31)
CREAT SERPL-MCNC: 0.86 MG/DL — SIGNIFICANT CHANGE UP (ref 0.5–1.3)
D DIMER BLD IA.RAPID-MCNC: 639 NG/ML DDU — HIGH
EOSINOPHIL # BLD AUTO: 0.03 K/UL — SIGNIFICANT CHANGE UP (ref 0–0.5)
EOSINOPHIL NFR BLD AUTO: 0.9 % — SIGNIFICANT CHANGE UP (ref 0–6)
FIBRINOGEN PPP-MCNC: 373 MG/DL — SIGNIFICANT CHANGE UP (ref 350–510)
GIANT PLATELETS BLD QL SMEAR: PRESENT — SIGNIFICANT CHANGE UP
GLUCOSE BLDC GLUCOMTR-MCNC: 114 MG/DL — HIGH (ref 70–99)
GLUCOSE BLDC GLUCOMTR-MCNC: 150 MG/DL — HIGH (ref 70–99)
GLUCOSE BLDC GLUCOMTR-MCNC: 153 MG/DL — HIGH (ref 70–99)
GLUCOSE BLDC GLUCOMTR-MCNC: 166 MG/DL — HIGH (ref 70–99)
GLUCOSE SERPL-MCNC: 125 MG/DL — HIGH (ref 70–99)
HCT VFR BLD CALC: 20.5 % — CRITICAL LOW (ref 34.5–45)
HCT VFR BLD CALC: 25.1 % — LOW (ref 34.5–45)
HGB BLD-MCNC: 6.8 G/DL — CRITICAL LOW (ref 11.5–15.5)
HGB BLD-MCNC: 8.4 G/DL — LOW (ref 11.5–15.5)
INR BLD: 1.29 RATIO — HIGH (ref 0.88–1.16)
LDH SERPL L TO P-CCNC: 181 U/L — SIGNIFICANT CHANGE UP (ref 50–242)
LYMPHOCYTES # BLD AUTO: 2.26 K/UL — SIGNIFICANT CHANGE UP (ref 1–3.3)
LYMPHOCYTES # BLD AUTO: 65.8 % — HIGH (ref 13–44)
MAGNESIUM SERPL-MCNC: 2.3 MG/DL — SIGNIFICANT CHANGE UP (ref 1.6–2.6)
MANUAL SMEAR VERIFICATION: SIGNIFICANT CHANGE UP
MCHC RBC-ENTMCNC: 29.1 PG — SIGNIFICANT CHANGE UP (ref 27–34)
MCHC RBC-ENTMCNC: 29.6 PG — SIGNIFICANT CHANGE UP (ref 27–34)
MCHC RBC-ENTMCNC: 33.2 GM/DL — SIGNIFICANT CHANGE UP (ref 32–36)
MCHC RBC-ENTMCNC: 33.5 GM/DL — SIGNIFICANT CHANGE UP (ref 32–36)
MCV RBC AUTO: 87.6 FL — SIGNIFICANT CHANGE UP (ref 80–100)
MCV RBC AUTO: 88.4 FL — SIGNIFICANT CHANGE UP (ref 80–100)
MONOCYTES # BLD AUTO: 0.15 K/UL — SIGNIFICANT CHANGE UP (ref 0–0.9)
MONOCYTES NFR BLD AUTO: 4.4 % — SIGNIFICANT CHANGE UP (ref 2–14)
NEUTROPHILS # BLD AUTO: 0.96 K/UL — LOW (ref 1.8–7.4)
NEUTROPHILS NFR BLD AUTO: 28 % — LOW (ref 43–77)
NRBC # BLD: 0 /100 WBCS — SIGNIFICANT CHANGE UP (ref 0–0)
NRBC # BLD: 1 /100 — HIGH (ref 0–0)
PHOSPHATE SERPL-MCNC: 4.4 MG/DL — SIGNIFICANT CHANGE UP (ref 2.5–4.5)
PLAT MORPH BLD: ABNORMAL
PLATELET # BLD AUTO: 70 K/UL — LOW (ref 150–400)
PLATELET # BLD AUTO: 72 K/UL — LOW (ref 150–400)
PLATELET # BLD AUTO: 74 K/UL — LOW (ref 150–400)
PLATELET # BLD AUTO: 95 K/UL — LOW (ref 150–400)
POTASSIUM SERPL-MCNC: 3.7 MMOL/L — SIGNIFICANT CHANGE UP (ref 3.5–5.3)
POTASSIUM SERPL-SCNC: 3.7 MMOL/L — SIGNIFICANT CHANGE UP (ref 3.5–5.3)
PROT SERPL-MCNC: 7 G/DL — SIGNIFICANT CHANGE UP (ref 6–8.3)
PROTHROM AB SERPL-ACNC: 14.9 SEC — HIGH (ref 10–12.9)
RBC # BLD: 2.34 M/UL — LOW (ref 3.8–5.2)
RBC # BLD: 2.84 M/UL — LOW (ref 3.8–5.2)
RBC # FLD: 14.8 % — HIGH (ref 10.3–14.5)
RBC # FLD: 15.4 % — HIGH (ref 10.3–14.5)
RBC BLD AUTO: SIGNIFICANT CHANGE UP
SODIUM SERPL-SCNC: 141 MMOL/L — SIGNIFICANT CHANGE UP (ref 135–145)
URATE SERPL-MCNC: 5.5 MG/DL — SIGNIFICANT CHANGE UP (ref 2.5–7)
VARIANT LYMPHS # BLD: 0.9 % — SIGNIFICANT CHANGE UP (ref 0–6)
WBC # BLD: 2.75 K/UL — LOW (ref 3.8–10.5)
WBC # BLD: 3.43 K/UL — LOW (ref 3.8–10.5)
WBC # FLD AUTO: 2.75 K/UL — LOW (ref 3.8–10.5)
WBC # FLD AUTO: 3.43 K/UL — LOW (ref 3.8–10.5)

## 2020-06-24 PROCEDURE — 73201 CT UPPER EXTREMITY W/DYE: CPT | Mod: 26,RT

## 2020-06-24 PROCEDURE — 99232 SBSQ HOSP IP/OBS MODERATE 35: CPT | Mod: GC

## 2020-06-24 RX ORDER — MORPHINE SULFATE 50 MG/1
2 CAPSULE, EXTENDED RELEASE ORAL ONCE
Refills: 0 | Status: DISCONTINUED | OUTPATIENT
Start: 2020-06-24 | End: 2020-06-24

## 2020-06-24 RX ORDER — MORPHINE SULFATE 50 MG/1
3 CAPSULE, EXTENDED RELEASE ORAL EVERY 6 HOURS
Refills: 0 | Status: DISCONTINUED | OUTPATIENT
Start: 2020-06-24 | End: 2020-06-30

## 2020-06-24 RX ADMIN — GABAPENTIN 100 MILLIGRAM(S): 400 CAPSULE ORAL at 14:21

## 2020-06-24 RX ADMIN — Medication 5 MILLILITER(S): at 11:50

## 2020-06-24 RX ADMIN — Medication 5 MILLIGRAM(S): at 21:41

## 2020-06-24 RX ADMIN — Medication 5 MILLILITER(S): at 15:30

## 2020-06-24 RX ADMIN — MORPHINE SULFATE 3 MILLIGRAM(S): 50 CAPSULE, EXTENDED RELEASE ORAL at 21:30

## 2020-06-24 RX ADMIN — Medication 0: at 17:29

## 2020-06-24 RX ADMIN — Medication 1 APPLICATION(S): at 17:30

## 2020-06-24 RX ADMIN — Medication 1 APPLICATION(S): at 06:20

## 2020-06-24 RX ADMIN — AER TRAVELER 1 APPLICATION(S): 0.5 SOLUTION RECTAL; TOPICAL at 06:22

## 2020-06-24 RX ADMIN — Medication 1 SPRAY(S): at 06:19

## 2020-06-24 RX ADMIN — VALACYCLOVIR 1000 MILLIGRAM(S): 500 TABLET, FILM COATED ORAL at 14:21

## 2020-06-24 RX ADMIN — MORPHINE SULFATE 2 MILLIGRAM(S): 50 CAPSULE, EXTENDED RELEASE ORAL at 15:30

## 2020-06-24 RX ADMIN — Medication 1 APPLICATION(S): at 06:21

## 2020-06-24 RX ADMIN — VALACYCLOVIR 1000 MILLIGRAM(S): 500 TABLET, FILM COATED ORAL at 06:19

## 2020-06-24 RX ADMIN — Medication 1 SPRAY(S): at 14:23

## 2020-06-24 RX ADMIN — GABAPENTIN 100 MILLIGRAM(S): 400 CAPSULE ORAL at 21:41

## 2020-06-24 RX ADMIN — Medication 650 MILLIGRAM(S): at 01:58

## 2020-06-24 RX ADMIN — HEPARIN SODIUM 0 UNIT(S)/HR: 5000 INJECTION INTRAVENOUS; SUBCUTANEOUS at 10:17

## 2020-06-24 RX ADMIN — Medication 1 APPLICATION(S): at 17:39

## 2020-06-24 RX ADMIN — Medication 1: at 12:20

## 2020-06-24 RX ADMIN — VALACYCLOVIR 1000 MILLIGRAM(S): 500 TABLET, FILM COATED ORAL at 21:41

## 2020-06-24 RX ADMIN — HEPARIN SODIUM 0 UNIT(S)/HR: 5000 INJECTION INTRAVENOUS; SUBCUTANEOUS at 21:00

## 2020-06-24 RX ADMIN — HEPARIN SODIUM 1100 UNIT(S)/HR: 5000 INJECTION INTRAVENOUS; SUBCUTANEOUS at 11:43

## 2020-06-24 RX ADMIN — Medication 5 MILLIGRAM(S): at 10:20

## 2020-06-24 RX ADMIN — TRETINOIN 40 MILLIGRAM(S): 10 CAPSULE, LIQUID FILLED ORAL at 20:08

## 2020-06-24 RX ADMIN — Medication 1 APPLICATION(S): at 21:50

## 2020-06-24 RX ADMIN — Medication 5 MILLIGRAM(S): at 17:29

## 2020-06-24 RX ADMIN — Medication 1 SPRAY(S): at 21:48

## 2020-06-24 RX ADMIN — Medication 1 APPLICATION(S): at 14:23

## 2020-06-24 RX ADMIN — Medication 1: at 08:06

## 2020-06-24 RX ADMIN — PANTOPRAZOLE SODIUM 40 MILLIGRAM(S): 20 TABLET, DELAYED RELEASE ORAL at 06:19

## 2020-06-24 RX ADMIN — Medication 5 MILLIGRAM(S): at 06:19

## 2020-06-24 RX ADMIN — Medication 25 MILLIGRAM(S): at 01:58

## 2020-06-24 RX ADMIN — LORATADINE 10 MILLIGRAM(S): 10 TABLET ORAL at 12:04

## 2020-06-24 RX ADMIN — Medication 100 MILLIGRAM(S): at 06:19

## 2020-06-24 RX ADMIN — GABAPENTIN 100 MILLIGRAM(S): 400 CAPSULE ORAL at 06:19

## 2020-06-24 RX ADMIN — Medication 5 MILLILITER(S): at 08:06

## 2020-06-24 RX ADMIN — Medication 5 MILLILITER(S): at 20:08

## 2020-06-24 RX ADMIN — POLYETHYLENE GLYCOL 3350 17 GRAM(S): 17 POWDER, FOR SOLUTION ORAL at 12:03

## 2020-06-24 RX ADMIN — Medication 250 MILLIGRAM(S): at 06:21

## 2020-06-24 RX ADMIN — TRETINOIN 40 MILLIGRAM(S): 10 CAPSULE, LIQUID FILLED ORAL at 08:07

## 2020-06-24 RX ADMIN — HEPARIN SODIUM 1400 UNIT(S)/HR: 5000 INJECTION INTRAVENOUS; SUBCUTANEOUS at 03:09

## 2020-06-24 RX ADMIN — Medication 100 MILLIGRAM(S): at 21:41

## 2020-06-24 RX ADMIN — ATENOLOL 25 MILLIGRAM(S): 25 TABLET ORAL at 06:19

## 2020-06-24 NOTE — PROGRESS NOTE ADULT - PROBLEM SELECTOR PLAN 7
Home regimen with Atenolol held on admission  Restarted at 25mg PO daily for increased BP  BP is currently stable. Continue to monitor New onset essential tremor which has improved with Baclofen 5mg q daily 5/23: HgA1c = 7.0  C/w HISS. FS are currently stable. Monitor FS AC & QHS   C/w Consistent Carbohydrate diet w/ Evening snack

## 2020-06-24 NOTE — PROGRESS NOTE ADULT - PROBLEM SELECTOR PLAN 2
Afebrile, not neutropenic   6/23 Persistent right upper inner arm pain/swelling - Limited VA duplex inclonclusive will order a complete VA Duplex of RUE  6/22 Right buttock and upper thigh shingles continue Valtrex treatment with Lac Hydrin  If febrile, Pan Cx and CXR  6/20 Pan cultures (-)   Some hyperpigmentation noted around RUE PICC site (possibly related to sensitivity to prior adhesive tape?).   Previous COVID-19 (+) outpatient.   Then COVID (-) on 5/22, 5/26, 6/2 and 6/9.   6/16 COVID 19 (+)   6/18 COVID 19 AB (+)  6/19 Repeat COVID-19 PCRs (-)   - Monitor CBC WITH DIFF and fever curve Afebrile, not neutropenic   6/23 Persistent right upper inner arm pain/swelling - Limited VA duplex inclonclusive will order a complete VA Duplex of RUE  6/22 Right buttock and upper thigh shingles continue Valtrex treatment with Lac Hydrin  If febrile, Pan Cx and CXR  6/20 Pan cultures (-)   Some hyperpigmentation noted around RUE PICC site (possibly related to sensitivity to prior adhesive tape?).   Previous COVID-19 (+) outpatient.   Then COVID (-) on 5/22, 5/26, 6/2 and 6/9.   6/16 COVID 19 (+)   6/18 COVID 19 AB (+)  6/19 Repeat COVID-19 PCRs (-)   Monitor CBC WITH DIFF and fever curve Afebrile, not neutropenic   6/23 Persistent right upper inner arm pain/swelling - Limited VA duplex inclonclusive will order a complete VA Duplex of RUE  6/22 Right buttock and upper thigh shingles continue Valtrex treatment with Lac Hydrin  If febrile, Pan Cx and CXR  6/20 Pan cultures (-)   Some hyperpigmentation noted around RUE PICC site (possibly related to sensitivity to prior adhesive tape?).   Previous COVID-19 (+) outpatient.   Then COVID (-) on 5/22, 5/26, 6/2 and 6/9.   6/16 COVID 19 (+)   6/18 COVID 19 AB (+)  6/19 Repeat COVID-19 PCRs (-)

## 2020-06-24 NOTE — PROGRESS NOTE ADULT - PROBLEM SELECTOR PLAN 8
5/23: HgA1c = 7.0  C/w HISS. FS are currently stable. Monitor FS AC & QHS   C/w Consistent Carbohydrate diet w/ Evening snack Home regimen with Atenolol held on admission  Restarted at 25mg PO daily for increased BP  BP is currently stable. Continue to monitor DVT PPx: Holding pharmacologic AC 2/2 thrombocytopenia  Diet: Consistent Carb w/ Evening Snack  Dispo: Pending resolution of acute medical issues    Contact Information [phone]

## 2020-06-24 NOTE — PROGRESS NOTE ADULT - PROBLEM SELECTOR PLAN 9
DVT PPx: Holding pharmacologic AC 2/2 thrombocytopenia  Diet: Consistent Carb w/ Evening Snack  Dispo: Pending resolution of acute medical issues    Contact Information [phone] 5/23: HgA1c = 7.0  C/w HISS. FS are currently stable. Monitor FS AC & QHS   C/w Consistent Carbohydrate diet w/ Evening snack

## 2020-06-24 NOTE — PROGRESS NOTE ADULT - PROBLEM SELECTOR PLAN 4
Shingles of the Right buttock and Right upper thigh rash   6/22 seen by Dermatology   continue Valtrex and Lac hydrin as per Dermatology recommendations   Neurontin 100 mg TID for herpetic neuralgia. Diffuse right upper inner arm pain  previous PICC line site   6/23 Right axillary vein DVT.   on heparin gtt   Dermatology consult requested   possible contact dermatitis vs allergic dermatitis from the adhesive   clobetasol cream to the affected area.

## 2020-06-24 NOTE — PROGRESS NOTE ADULT - PROBLEM SELECTOR PLAN 6
New onset essential tremor which has improved with Baclofen 5mg q daily Patient previously with c/o cough and SOB with stable O2 sat and concern for differentiation syndrome, now intermittent  6/5 Patient received one dose of Dexamethasone   Continue Incentive Spirometer   Continue diuresis as needed   6/7 CT chest with biapical pleural-parenchymal scarring with calcification, likely from prior granulomatous disease. No consolidation. (Son reports a history of TB in 1980s)  C/w Tessalon Perles and Robitussin PRN Home regimen with Atenolol held on admission  Restarted at 25mg PO daily for increased BP  BP is currently stable. Continue to monitor

## 2020-06-24 NOTE — PROGRESS NOTE ADULT - ATTENDING COMMENTS
59 y/o Amharic speaking F who is transferred for newly diagnosed low/interm risk APL.  Presented with pancytopenia, flow cytometry/bone marrow biopsy/FISH from 5/26- consistent with acute promyelocytic leukemia with PML-AWA translocation.    She was started on ATRA 5/23- day +30, GALA 5/27- day +26.    -CT chest 6/6 c/w granulomatous disease (hx of TB in 1980's) pt had SOB on 6/4, now resolved.  Daily weights, diurese. Will give Hydrea 500mg if wbc>10. On Prednisone 20mg through 5/12- now off.  If pt becomes SOB again, may restart  -tremors improved, unclear etiology, electrolytes WNL. Cefepime held, given Baclofen -they improved  -Check CBC, coags/fibrinogen once daily.  Goal plt >40-50, fibrinogen >150; responding to ABO-matched platelets given over 3 hrs in 1/2 unit infusions   -monitor lytes daily, keep K>4, Mg>2.  EKG twice a week  -off antibiotics  -plan on BM bx around day 29 post GAAL  -COVID19+ on 6/17, moved to 9M  -Rash on R buttock and back of RLE. Called derm, holding GALA for now. Awaiting VZV swab, started valtrex. Still with vesicular component on back of R leg, will restart when resolving. Pain present, will increase gabapentin to 100 mg TID.   -RUE redness and itching at previous PICC site, line removed. In area of adhesive, possibly allergic reaction. Will perform doppler RUE and give claritin. No evidence of infection, cultures negative and no tenderness or induration. 59 y/o Yi speaking F who is transferred for newly diagnosed low/interm risk APL.  Presented with pancytopenia, flow cytometry/bone marrow biopsy/FISH from 5/26- consistent with acute promyelocytic leukemia with PML-AWA translocation.    She was started on ATRA 5/23- day +31, GALA 5/27- day +27.    -CT chest 6/6 c/w granulomatous disease (hx of TB in 1980's) pt had SOB on 6/4, now resolved.  Daily weights, diurese. Will give Hydrea 500mg if wbc>10. On Prednisone 20mg through 5/12- now off.  If pt becomes SOB again, may restart  -tremors improved, unclear etiology, electrolytes WNL. Cefepime held, given Baclofen -they improved  -Check CBC, coags/fibrinogen once daily.  Goal plt >40-50, fibrinogen >150; responding to ABO-matched platelets given over 3 hrs in 1/2 unit infusions   -monitor lytes daily, keep K>4, Mg>2.  EKG twice a week  -off antibiotics  -plan on BM bx around day 29 post GALA  -COVID19+ on 6/17, moved to 9M  -Rash on R buttock and back of RLE. Called derm, holding GALA for now. Awaiting VZV swab, started valtrex. Still with vesicular component on back of R leg, will restart when resolving. Pain present, on gabapentin 100 mg TID.   -RUE redness and itching at previous PICC site, line removed. In area of adhesive, possibly allergic reaction. RUE doppler showing axillary partially occlusive clot. On heparin infusion now given low platelets / APL. Will switch to lovenox when platelets stabilizing.

## 2020-06-24 NOTE — CHART NOTE - NSCHARTNOTEFT_GEN_A_CORE
Dermatology Brief Chart Note       --- Reconsulted 6/24/20    Rash right upper arm, where she had a PICC line, has an acute axillary DVT, pain radiates down arm.     PE: erythematous patch right upper arm with few blisters    DDX: Allergic Contact Dermatitis from PICC dressing, could also have some erythema from DVT in right axilla    Plan:  1.	Triamcinolone ointment 0.1% BID to affected area      Discussed with attending Dr. Staci Hart     Patient can follow-up at our outpatient office:  [address], [place] [address], phone number for appointment: [phone]    If any questions, please page [phone]

## 2020-06-24 NOTE — PROGRESS NOTE ADULT - PROBLEM SELECTOR PLAN 10
Transitions of Care Status:  1.  Name of PCP:  2.  PCP Contacted on Admission: [ ] Y    [ ] N    3.  PCP contacted at Discharge: [ ] Y    [ ] N    [ ] N/A  4.  Post-Discharge Appointment Date and Location:  5.  Summary of Handoff given to PCP:
DVT PPx: Holding pharmacologic AC 2/2 thrombocytopenia  Diet: Consistent Carb w/ Evening Snack  Dispo: Pending resolution of acute medical issues    Contact Information [phone]

## 2020-06-24 NOTE — PROGRESS NOTE ADULT - ASSESSMENT
Patient is a 61 y/o Occitan-speaking F w/ a PMHx of HTN, HLD, T2DM, and COVID-19 infection in 4/2020 who was admitted for management of newly diagnosed APL. The patient has been receiving  treatment with ATRA and Arsenic ( ATRA since 5/22 and Arsenic 5/27). Arsenic has been on hold since 6/22 secondary to Herpes Zoster infection. Patient's hospital course has been complicated by transaminitis, refractory thrombocytopenia, COVID-19 (+) state (6/16) and Shingles The patient has pancytopenia 2/2 disease and/or ATRA/Arsenic.

## 2020-06-24 NOTE — PROGRESS NOTE ADULT - PROBLEM SELECTOR PLAN 3
Patient with right upper arm pain and swelling  s/p PICC line removal   6/23 [facility] Duplex (+) for acute partially occlusive DVT affecting right axillary vein   started on a Heparin gtt., monitor PTT and adjust dose.   monitor platelets, keep platelets >50,000

## 2020-06-24 NOTE — PROGRESS NOTE ADULT - SUBJECTIVE AND OBJECTIVE BOX
Diagnosis: Acute Promyelocytic leukemia     Protocol/Chemo Regimen: ATRA/Arsenic     Day: ATRA Day 32 and Arsenic Day 28 (Holding Arsenic from Day 26 --> 6/22/20)    Pt endorsed:  Right upper inner arm pain and redness. c/o Rash on her Right buttock and Right posterior thigh     Review of Systems:  Patient denies headache, chest pain, shortness of breath, nausea, vomiting, or abdominal pain.    Pain scale: 3/10    Diet: Consistent Carb w/ Evening Snack    Allergies    No Known Allergies    Intolerances      ANTIMICROBIALS  valACYclovir 1000 milliGRAM(s) Oral every 8 hours      HEME/ONC MEDICATIONS  heparin   Injectable 6500 Unit(s) IV Push every 6 hours PRN  heparin   Injectable 3000 Unit(s) IV Push every 6 hours PRN  heparin  Infusion.  Unit(s)/Hr IV Continuous <Continuous>  tretinoin 40 milliGRAM(s) Oral every 12 hours      STANDING MEDICATIONS  ammonium lactate 12% Lotion 1 Application(s) Topical two times a day  ATENolol  Tablet 25 milliGRAM(s) Oral daily  baclofen 5 milliGRAM(s) Oral every 12 hours  Biotene Dry Mouth Oral Rinse 5 milliLiter(s) Swish and Spit five times a day  bisacodyl 5 milliGRAM(s) Oral every 12 hours  dextrose 5%. 1000 milliLiter(s) IV Continuous <Continuous>  dextrose 50% Injectable 12.5 Gram(s) IV Push once  dextrose 50% Injectable 25 Gram(s) IV Push once  dextrose 50% Injectable 25 Gram(s) IV Push once  gabapentin 100 milliGRAM(s) Oral three times a day  insulin lispro (HumaLOG) corrective regimen sliding scale   SubCutaneous three times a day before meals  insulin lispro (HumaLOG) corrective regimen sliding scale   SubCutaneous at bedtime  loratadine 10 milliGRAM(s) Oral daily  pantoprazole    Tablet 40 milliGRAM(s) Oral before breakfast  petrolatum white Ointment 1 Application(s) Topical three times a day  polyethylene glycol 3350 17 Gram(s) Oral daily  sodium chloride 0.65% Nasal 1 Spray(s) Both Nostrils every 8 hours  sodium chloride 0.9%. 1000 milliLiter(s) IV Continuous <Continuous>      PRN MEDICATIONS  acetaminophen   Tablet .. 650 milliGRAM(s) Oral every 6 hours PRN  ALBUTerol    90 MICROgram(s) HFA Inhaler 2 Puff(s) Inhalation every 6 hours PRN  AQUAPHOR (petrolatum Ointment) 1 Application(s) Topical every 3 hours PRN  dextrose 40% Gel 15 Gram(s) Oral once PRN  diphenhydrAMINE   Injectable 25 milliGRAM(s) IV Push every 6 hours PRN  glucagon  Injectable 1 milliGRAM(s) IntraMuscular once PRN  guaiFENesin   Syrup  (Sugar-Free) 100 milliGRAM(s) Oral every 6 hours PRN  hemorrhoidal Ointment 1 Application(s) Rectal every 8 hours PRN  ondansetron Injectable 8 milliGRAM(s) IV Push every 8 hours PRN  sodium chloride 0.9% lock flush 10 milliLiter(s) IV Push every 1 hour PRN        Vital Signs Last 24 Hrs  T(C): 36 (24 Jun 2020 09:45), Max: 36.8 (23 Jun 2020 12:59)  T(F): 96.8 (24 Jun 2020 09:45), Max: 98.3 (23 Jun 2020 12:59)  HR: 64 (24 Jun 2020 09:45) (64 - 83)  BP: 146/87 (24 Jun 2020 09:45) (110/66 - 146/87)  BP(mean): --  RR: 18 (24 Jun 2020 09:45) (18 - 18)  SpO2: 99% (24 Jun 2020 09:45) (94% - 99%)    PHYSICAL EXAM  Constitutional: NAD  HEENT: NC/AT, MMM  Neck: Supple  Respiratory: Grossly CTAB; No wheeze appreciated  Cardiovascular: RRR; +S1/S2  Gastrointestinal: +BS, Soft, NT, No rigidity  Extremities: No LE edema, + RUE PICC line  Neurological: Awake and alert, Answering questions and following commands appropriately  Skin: Warm and dry, Some hyperpigmentation noted around RUE PICC line, Shingles-like rash noted on R buttock (appears crusting over) and R posterior thigh (appears to be developing)  Psychiatric: Calm and cooperative    RECENT CULTURES:  06-20 @ 21:18  .Blood PICC/PERC Double Lumen BLUE  No growth to date.    06-20 @ 21:14  .Blood Blood-Peripheral  No growth to date.    06-20 @ 20:49  Urine Clean Catch (Midstream)  Enterococcus faecalis  Enterococcus faecalis  ARTHUR    10,000 - 49,000 CFU/mL Enterococcus faecalis  Normal Urogenital tika present          LABS:                        8.4    3.43  )-----------( 72       ( 24 Jun 2020 09:05 )             25.1         Mean Cell Volume : 88.4 fl  Mean Cell Hemoglobin : 29.6 pg  Mean Cell Hemoglobin Concentration : 33.5 gm/dL  Auto Neutrophil # : 0.96 K/uL  Auto Lymphocyte # : 2.26 K/uL  Auto Monocyte # : 0.15 K/uL  Auto Eosinophil # : 0.03 K/uL  Auto Basophil # : 0.00 K/uL  Auto Neutrophil % : 28.0 %  Auto Lymphocyte % : 65.8 %  Auto Monocyte % : 4.4 %  Auto Eosinophil % : 0.9 %  Auto Basophil % : 0.0 %      06-24    141  |  105  |  12  ----------------------------<  125<H>  3.7   |  24  |  0.86    Ca    9.5      24 Jun 2020 09:05  Phos  4.4     06-24  Mg     2.3     06-24    TPro  7.0  /  Alb  4.4  /  TBili  0.3  /  DBili  x   /  AST  23  /  ALT  21  /  AlkPhos  110  06-24      Mg 2.3  Phos 4.4      PT/INR - ( 24 Jun 2020 09:05 )   PT: 14.9 sec;   INR: 1.29 ratio         PTT - ( 24 Jun 2020 09:05 )  PTT:>200.0 sec      Uric Acid 5.5        RADIOLOGY & ADDITIONAL STUDIES:    [facility] Duplex Upper Ext Vein Scan, Right (06.23.20 @ 17:19)    There is acute, partially occlusive DVT affecting the right axillary vein.

## 2020-06-24 NOTE — PROVIDER CONTACT NOTE (CRITICAL VALUE NOTIFICATION) - NS PROVIDER READ BACK TO LAB
A/P: 49yFemale s/p R SINCERE  - Stable  - Pain/Nausea Control  - Home meds  - DVT ppx: LVX in house followed by Xarelto as outpatient  - WBS: WBAT RLE  - PT: home w HPT  - Cardiology recs: continue tele, metoprolol and obtain echo  - d/c pending cardiac stability and progression with PT   - Likely d/c today    Ortho Pager [phone] yes

## 2020-06-24 NOTE — PROGRESS NOTE ADULT - PROBLEM SELECTOR PLAN 1
Continue ATRA 40mg BID (started 5/22),   Arsenic Trioxide which was started on 5/27 now on hold (since 6/22) 2/2 shingles  Monitor for differentiation syndrome and check EKG for QTc prolongation every Tuesday and Friday. Keep K > 4 and Mg > 2.  Status post Hydrea 500mg PO x 1 on 6/8 and 6/9 for WBC >10  Follow up CBC with diff, CMP, TLS and DIC panel daily  Keep PLTs >50 K. If unable to achieve goal, continue with platelets infusion (single donor) 1/2 units over 3 hours every 12 hours.  S/p Prednisone 20mg PO daily x 5 days (through 6/12) due to platelet antibody.  Keep Fibrinogen >150, if less give Cryo  Strict Is and Os/Daily weights/Mouth Care Continue ATRA 40mg BID (started 5/22),   Arsenic Trioxide which was started on 5/27 now on hold (since 6/22) 2/2 shingles  Monitor for differentiation syndrome and check EKG for QTc prolongation every Tuesday and Friday. Keep K > 4 and Mg > 2.  Status post Hydrea 500mg PO x 1 on 6/8 and 6/9 for WBC >10  Follow up CBC with diff, CMP, TLS and DIC panel daily  Keep PLTs >50 K. If unable to achieve goal to infuse 1/2 unit platelets over 3 hours every 12 hrs.   S/p Prednisone 20mg PO daily x 5 days (through 6/12) due to platelet antibody.  Keep Fibrinogen >150, if less give Cryo  Strict Is and Os/Daily weights/Mouth Care

## 2020-06-25 LAB
ALBUMIN SERPL ELPH-MCNC: 4.6 G/DL — SIGNIFICANT CHANGE UP (ref 3.3–5)
ALP SERPL-CCNC: 122 U/L — HIGH (ref 40–120)
ALT FLD-CCNC: 22 U/L — SIGNIFICANT CHANGE UP (ref 10–45)
ANION GAP SERPL CALC-SCNC: 17 MMOL/L — SIGNIFICANT CHANGE UP (ref 5–17)
APTT BLD: 144.5 SEC — CRITICAL HIGH (ref 27.5–36.3)
AST SERPL-CCNC: 27 U/L — SIGNIFICANT CHANGE UP (ref 10–40)
BILIRUB SERPL-MCNC: 0.3 MG/DL — SIGNIFICANT CHANGE UP (ref 0.2–1.2)
BUN SERPL-MCNC: 13 MG/DL — SIGNIFICANT CHANGE UP (ref 7–23)
CALCIUM SERPL-MCNC: 9.6 MG/DL — SIGNIFICANT CHANGE UP (ref 8.4–10.5)
CHLORIDE SERPL-SCNC: 100 MMOL/L — SIGNIFICANT CHANGE UP (ref 96–108)
CO2 SERPL-SCNC: 22 MMOL/L — SIGNIFICANT CHANGE UP (ref 22–31)
CREAT SERPL-MCNC: 0.77 MG/DL — SIGNIFICANT CHANGE UP (ref 0.5–1.3)
CULTURE RESULTS: SIGNIFICANT CHANGE UP
CULTURE RESULTS: SIGNIFICANT CHANGE UP
D DIMER BLD IA.RAPID-MCNC: 732 NG/ML DDU — HIGH
FIBRINOGEN PPP-MCNC: 337 MG/DL — LOW (ref 350–510)
GLUCOSE BLDC GLUCOMTR-MCNC: 107 MG/DL — HIGH (ref 70–99)
GLUCOSE BLDC GLUCOMTR-MCNC: 115 MG/DL — HIGH (ref 70–99)
GLUCOSE BLDC GLUCOMTR-MCNC: 122 MG/DL — HIGH (ref 70–99)
GLUCOSE BLDC GLUCOMTR-MCNC: 148 MG/DL — HIGH (ref 70–99)
GLUCOSE SERPL-MCNC: 127 MG/DL — HIGH (ref 70–99)
HCT VFR BLD CALC: 25.8 % — LOW (ref 34.5–45)
HCT VFR BLD CALC: 26 % — LOW (ref 34.5–45)
HGB BLD-MCNC: 8.7 G/DL — LOW (ref 11.5–15.5)
HGB BLD-MCNC: 8.8 G/DL — LOW (ref 11.5–15.5)
LDH SERPL L TO P-CCNC: 253 U/L — HIGH (ref 50–242)
MAGNESIUM SERPL-MCNC: 2.1 MG/DL — SIGNIFICANT CHANGE UP (ref 1.6–2.6)
MCHC RBC-ENTMCNC: 29.6 PG — SIGNIFICANT CHANGE UP (ref 27–34)
MCHC RBC-ENTMCNC: 30.4 PG — SIGNIFICANT CHANGE UP (ref 27–34)
MCHC RBC-ENTMCNC: 33.5 GM/DL — SIGNIFICANT CHANGE UP (ref 32–36)
MCHC RBC-ENTMCNC: 34.1 GM/DL — SIGNIFICANT CHANGE UP (ref 32–36)
MCV RBC AUTO: 88.4 FL — SIGNIFICANT CHANGE UP (ref 80–100)
MCV RBC AUTO: 89.3 FL — SIGNIFICANT CHANGE UP (ref 80–100)
NRBC # BLD: 0 /100 WBCS — SIGNIFICANT CHANGE UP (ref 0–0)
PHOSPHATE SERPL-MCNC: 4.1 MG/DL — SIGNIFICANT CHANGE UP (ref 2.5–4.5)
PLATELET # BLD AUTO: 100 K/UL — LOW (ref 150–400)
PLATELET # BLD AUTO: 106 K/UL — LOW (ref 150–400)
POTASSIUM SERPL-MCNC: 4.5 MMOL/L — SIGNIFICANT CHANGE UP (ref 3.5–5.3)
POTASSIUM SERPL-SCNC: 4.5 MMOL/L — SIGNIFICANT CHANGE UP (ref 3.5–5.3)
PROT SERPL-MCNC: 7.6 G/DL — SIGNIFICANT CHANGE UP (ref 6–8.3)
RBC # BLD: 2.89 M/UL — LOW (ref 3.8–5.2)
RBC # BLD: 2.94 M/UL — LOW (ref 3.8–5.2)
RBC # FLD: 15.2 % — HIGH (ref 10.3–14.5)
RBC # FLD: 15.9 % — HIGH (ref 10.3–14.5)
SODIUM SERPL-SCNC: 139 MMOL/L — SIGNIFICANT CHANGE UP (ref 135–145)
SPECIMEN SOURCE: SIGNIFICANT CHANGE UP
SPECIMEN SOURCE: SIGNIFICANT CHANGE UP
URATE SERPL-MCNC: 5.5 MG/DL — SIGNIFICANT CHANGE UP (ref 2.5–7)
WBC # BLD: 3.81 K/UL — SIGNIFICANT CHANGE UP (ref 3.8–10.5)
WBC # BLD: 3.94 K/UL — SIGNIFICANT CHANGE UP (ref 3.8–10.5)
WBC # FLD AUTO: 3.81 K/UL — SIGNIFICANT CHANGE UP (ref 3.8–10.5)
WBC # FLD AUTO: 3.94 K/UL — SIGNIFICANT CHANGE UP (ref 3.8–10.5)

## 2020-06-25 PROCEDURE — 99232 SBSQ HOSP IP/OBS MODERATE 35: CPT | Mod: GC

## 2020-06-25 RX ORDER — ENOXAPARIN SODIUM 100 MG/ML
80 INJECTION SUBCUTANEOUS EVERY 12 HOURS
Refills: 0 | Status: DISCONTINUED | OUTPATIENT
Start: 2020-06-25 | End: 2020-06-30

## 2020-06-25 RX ADMIN — TRETINOIN 40 MILLIGRAM(S): 10 CAPSULE, LIQUID FILLED ORAL at 20:29

## 2020-06-25 RX ADMIN — VALACYCLOVIR 1000 MILLIGRAM(S): 500 TABLET, FILM COATED ORAL at 13:02

## 2020-06-25 RX ADMIN — Medication 5 MILLIGRAM(S): at 18:32

## 2020-06-25 RX ADMIN — SODIUM CHLORIDE 25 MILLILITER(S): 9 INJECTION INTRAMUSCULAR; INTRAVENOUS; SUBCUTANEOUS at 06:07

## 2020-06-25 RX ADMIN — Medication 1 APPLICATION(S): at 06:12

## 2020-06-25 RX ADMIN — VALACYCLOVIR 1000 MILLIGRAM(S): 500 TABLET, FILM COATED ORAL at 06:11

## 2020-06-25 RX ADMIN — Medication 1 APPLICATION(S): at 06:16

## 2020-06-25 RX ADMIN — ENOXAPARIN SODIUM 80 MILLIGRAM(S): 100 INJECTION SUBCUTANEOUS at 18:32

## 2020-06-25 RX ADMIN — Medication 5 MILLILITER(S): at 08:52

## 2020-06-25 RX ADMIN — GABAPENTIN 100 MILLIGRAM(S): 400 CAPSULE ORAL at 13:02

## 2020-06-25 RX ADMIN — Medication 1 APPLICATION(S): at 18:31

## 2020-06-25 RX ADMIN — GABAPENTIN 100 MILLIGRAM(S): 400 CAPSULE ORAL at 06:11

## 2020-06-25 RX ADMIN — Medication 1 APPLICATION(S): at 22:00

## 2020-06-25 RX ADMIN — Medication 1 APPLICATION(S): at 18:32

## 2020-06-25 RX ADMIN — Medication 5 MILLIGRAM(S): at 08:52

## 2020-06-25 RX ADMIN — Medication 1 SPRAY(S): at 22:00

## 2020-06-25 RX ADMIN — VALACYCLOVIR 1000 MILLIGRAM(S): 500 TABLET, FILM COATED ORAL at 21:50

## 2020-06-25 RX ADMIN — Medication 5 MILLIGRAM(S): at 21:50

## 2020-06-25 RX ADMIN — GABAPENTIN 100 MILLIGRAM(S): 400 CAPSULE ORAL at 21:50

## 2020-06-25 RX ADMIN — Medication 1 APPLICATION(S): at 06:13

## 2020-06-25 RX ADMIN — ATENOLOL 25 MILLIGRAM(S): 25 TABLET ORAL at 06:12

## 2020-06-25 RX ADMIN — PANTOPRAZOLE SODIUM 40 MILLIGRAM(S): 20 TABLET, DELAYED RELEASE ORAL at 06:11

## 2020-06-25 RX ADMIN — POLYETHYLENE GLYCOL 3350 17 GRAM(S): 17 POWDER, FOR SOLUTION ORAL at 13:02

## 2020-06-25 RX ADMIN — HEPARIN SODIUM 800 UNIT(S)/HR: 5000 INJECTION INTRAVENOUS; SUBCUTANEOUS at 05:50

## 2020-06-25 RX ADMIN — Medication 5 MILLILITER(S): at 13:02

## 2020-06-25 RX ADMIN — LORATADINE 10 MILLIGRAM(S): 10 TABLET ORAL at 13:02

## 2020-06-25 RX ADMIN — Medication 1 SPRAY(S): at 06:14

## 2020-06-25 RX ADMIN — MORPHINE SULFATE 3 MILLIGRAM(S): 50 CAPSULE, EXTENDED RELEASE ORAL at 22:00

## 2020-06-25 RX ADMIN — Medication 5 MILLIGRAM(S): at 06:11

## 2020-06-25 RX ADMIN — Medication 5 MILLILITER(S): at 20:28

## 2020-06-25 RX ADMIN — TRETINOIN 40 MILLIGRAM(S): 10 CAPSULE, LIQUID FILLED ORAL at 08:52

## 2020-06-25 NOTE — PROGRESS NOTE ADULT - PROBLEM SELECTOR PLAN 4
Diffuse right upper inner arm pain  previous PICC line site   6/23 Right axillary vein DVT.   on heparin gtt   Dermatology consult requested   possible contact dermatitis vs allergic dermatitis from the adhesive   clobetasol cream to the affected area.

## 2020-06-25 NOTE — PROGRESS NOTE ADULT - PROBLEM SELECTOR PLAN 3
Patient with right upper arm pain and swelling  s/p PICC line removal   6/23 [facility] Duplex (+) for acute partially occlusive DVT affecting right axillary vein   6/24 CT consistent with DVT, no abscess   started on a Heparin gtt., monitor PTT and adjust dose.   monitor platelets, keep platelets >50,000 Patient with right upper arm pain and swelling  s/p PICC line removal   6/23 [facility] Duplex (+) for acute partially occlusive DVT affecting right axillary vein   6/24 CT consistent with DVT, no abscess   started on a Heparin gtt, now changed to SQ Lovenox q12. keep platelets >50,000

## 2020-06-25 NOTE — PROGRESS NOTE ADULT - PROBLEM SELECTOR PLAN 2
Afebrile, not neutropenic   6/23 Persistent right upper inner arm pain/swelling - CT no abscess, + DVT  6/22 Right buttock and upper thigh shingles continue Valtrex treatment with Lac Hydrin  If febrile, Pan Cx and CXR  6/20 Pan cultures (-)   Previous COVID-19 (+) outpatient  Then COVID (-) on 5/22, 5/26, 6/2 and 6/9.   6/16 COVID 19 (+)   6/18 COVID 19 AB (+)  6/19 Repeat COVID-19 PCR (-) Afebrile, not neutropenic   6/23 Persistent right upper inner arm pain/swelling - CT no abscess, + DVT  6/22 Right buttock and upper thigh shingles continue Valtrex treatment with Lac Hydrin  If febrile, Pan Cx and CXR  6/20 Pan cultures (-)   Previous COVID-19 (+) outpatient  Then COVID (-) on 5/22, 5/26, 6/2 and 6/9.   6/16 COVID 19 (+)   6/18 COVID 19 Ab (+)  6/19 Repeat COVID-19 PCR (-)

## 2020-06-25 NOTE — PROVIDER CONTACT NOTE (CRITICAL VALUE NOTIFICATION) - ASSESSMENT
pt in no acute signs of distress
No acute distress noted
No signs of bleeding, all other VSS
Pt A&Ox4. VSS, afebrile. No signs of symptoms of bleeding, bruising, swelling. Pt resting comfortably.
Pt alert and oriented x4, pt denies SOB, chest pain, N/V. VSS
Pt alert and oriented. No acute distress noted. Pt with hemorrhoids. NP aware.
Pt resting comfortable in bed. Denies any pain or any discomfort @ this time, No S/S of anemia noted night @ this time.
Pt resting comfortable in bed. No active bleeding noted.
Pt resting comfortable in bed. No sign of active bleeding noted.
VSS, afebrile. Pt alert and oriented. No signs or symptoms of bleeding, bruising. Pt experienced nose bleeds yesterday but had none overnight or this morning.
no bleeding
pt denies any complains. pt is alert, awake and oriented x4. VSS
pt in chair, appear week, receiving chemo restarted via Left 20g piv. will receive 2nd unit of platelets after chemo complete. no c/o sob or dizziness at this time.

## 2020-06-25 NOTE — PROGRESS NOTE ADULT - PROBLEM SELECTOR PLAN 6
Home regimen with Atenolol held on admission  Restarted at 25mg PO daily for increased BP  BP is currently stable. Continue to monitor

## 2020-06-25 NOTE — PROGRESS NOTE ADULT - PROBLEM SELECTOR PLAN 8
on Heparin drip for DVT      Contact Information [phone] on Lovenox SQ BID for DVT treatment      Contact Information [phone]

## 2020-06-25 NOTE — PROGRESS NOTE ADULT - ATTENDING COMMENTS
61 y/o Telugu speaking F who is transferred for newly diagnosed low/interm risk APL.  Presented with pancytopenia, flow cytometry/bone marrow biopsy/FISH from 5/26- consistent with acute promyelocytic leukemia with PML-AWA translocation.    She was started on ATRA 5/23- day +31, GALA 5/27- day +27.    -CT chest 6/6 c/w granulomatous disease (hx of TB in 1980's) pt had SOB on 6/4, now resolved.  Daily weights, diurese. Will give Hydrea 500mg if wbc>10. On Prednisone 20mg through 5/12- now off.  If pt becomes SOB again, may restart  -tremors improved, unclear etiology, electrolytes WNL. Cefepime held, given Baclofen -they improved  -Check CBC, coags/fibrinogen once daily.  Goal plt >40-50, fibrinogen >150; responding to ABO-matched platelets given over 3 hrs in 1/2 unit infusions   -monitor lytes daily, keep K>4, Mg>2.  EKG twice a week  -off antibiotics  -plan on BM bx around day 29 post GALA  -COVID19+ on 6/17, moved to 9M  -Rash on R buttock and back of RLE. Called derm, holding GALA for now. Awaiting VZV swab, started valtrex. Still with vesicular component on back of R leg, will restart when resolving. Pain present, on gabapentin 100 mg TID.   -RUE redness and itching at previous PICC site, line removed. In area of adhesive, possibly allergic reaction. RUE doppler showing axillary partially occlusive clot. On heparin infusion now given low platelets / APL. Will switch to lovenox when platelets stabilizing. 59 y/o Macedonian speaking F who is transferred for newly diagnosed low/interm risk APL.  Presented with pancytopenia, flow cytometry/bone marrow biopsy/FISH from 5/26- consistent with acute promyelocytic leukemia with PML-AWA translocation.    She was started on ATRA 5/23- day +32, GALA 5/27- day +28.    -CT chest 6/6 c/w granulomatous disease (hx of TB in 1980's) pt had SOB on 6/4, now resolved.  Daily weights, diurese. Will give Hydrea 500mg if wbc>10. On Prednisone 20mg through 5/12- now off.  If pt becomes SOB again, may restart  -tremors improved, unclear etiology, electrolytes WNL. Cefepime held, given Baclofen -they improved  -Check CBC, coags/fibrinogen once daily.  Goal plt >40-50, fibrinogen >150; responding to ABO-matched platelets given over 3 hrs in 1/2 unit infusions   -monitor lytes daily, keep K>4, Mg>2.  EKG twice a week  -off antibiotics  -COVID19+ on 6/17, moved to 9M  -Rash on R buttock and back of RLE. Called derm, holding GALA for now. Awaiting VZV swab, started valtrex. Still with vesicular component on back of R leg, will restart when resolving. Pain present, on gabapentin 100 mg TID.   -RUE redness and itching at previous PICC site, line removed. In area of adhesive, possibly allergic reaction. RUE doppler showing axillary partially occlusive clot. On heparin infusion up until 6/25 given low platelets / APL. Will switch to lovenox. CT scan confirms blood clot, no abscesses or other etiology for RUE pain.  -Plan for bone marrow biopsy tomorrow.

## 2020-06-25 NOTE — PROGRESS NOTE ADULT - PROBLEM SELECTOR PLAN 5
Shingles of the Right buttock and Right upper thigh rash   6/22 seen by Dermatology   continue Valtrex and Lac hydrin as per Dermatology recommendations   Neurontin 100 mg TID for herpetic neuralgia.

## 2020-06-25 NOTE — PROGRESS NOTE ADULT - PROBLEM SELECTOR PLAN 1
Continue ATRA 40mg BID (started 5/22),   Arsenic Trioxide which was started on 5/27 now on hold (since 6/22) 2/2 shingles  Monitor for differentiation syndrome and check EKG for QTc prolongation every Tuesday and Friday. Keep K > 4 and Mg > 2.  Status post Hydrea 500mg PO x 1 on 6/8 and 6/9 for WBC >10  Follow up CBC with diff, CMP, TLS and DIC panel daily  Keep PLTs >50 K. If unable to achieve goal to infuse 1/2 unit platelets over 3 hours every 12 hrs.   S/p Prednisone 20mg PO daily x 5 days (through 6/12) due to platelet antibody.  Keep Fibrinogen >150, if less give Cryo  Strict Is and Os/Daily weights/Mouth Care Continue ATRA 40mg BID (started 5/22),   Arsenic Trioxide which was started on 5/27 now on hold (since 6/22) 2/2 shingles  Monitor for differentiation syndrome and check EKG for QTc prolongation every Tuesday and Friday. Keep K > 4 and Mg > 2.  Status post Hydrea 500mg PO x 1 on 6/8 and 6/9 for WBC >10  Follow up CBC with diff, CMP, TLS and DIC panel daily  Keep PLTs >50 K. If unable to achieve goal to infuse 1/2 unit platelets over 3 hours every 12 hrs.   S/p Prednisone 20mg PO daily x 5 days (through 6/12) due to platelet antibody  Keep Fibrinogen >150, if less give Cryo  Strict Is and Os/Daily weights/Mouth Care

## 2020-06-25 NOTE — PROVIDER CONTACT NOTE (CRITICAL VALUE NOTIFICATION) - ACTION/TREATMENT ORDERED:
1 unit PRBC's ordered
airborne & contact precautions
held Heparin drip for 1 hour
1 unit PRBC, continue to monitor
1 unit PRBCs
1 unit Platelets
1 unit of PRBCs transfusion ordered and administered
1/2 unit platelets ordered every 6 hours.
2units of PRBC'S. will continues to monitor.
Awaiting orders.
Heparin drip stopped for one hour. From 2005 to 2105. Resume heparin drip @ 10cc/hr.   Will continue to monitor.
NP agreed with recomendations
Platelet transfusions ordered every 6 hours.
cont to monitor, cont with plan to given platelet after chemo complete.
repeat cbc 2 hours post transfusion
stopped heparin drip for one hour, from 0445 to 0545. Will resume heparin drip @ 0545  Will contiune to monitor.

## 2020-06-25 NOTE — PROVIDER CONTACT NOTE (CRITICAL VALUE NOTIFICATION) - RECOMMENDATIONS
airborne & contact precautions
1 unit PRBC
1 unit of PRBCs transfusion recommended
Caleb Riggs
Caleb Riggs made aware.
N/a
Pa Federico made aware. new order noted. to transfuse 2units of PRBC's.
Platelet transfusion.
Platelet transfusion.
Plt transfusion, continue to monitor
pt currently being transfused with 1 unit PRBC
repeat cbc after platelet complete. consult with hemonc

## 2020-06-25 NOTE — PROGRESS NOTE ADULT - ASSESSMENT
Ms. Gant is a 61 y/o Danish-speaking female  w/ a PMHx of HTN, HLD, T2DM, and COVID-19 infection in 4/2020 who was admitted for management of newly diagnosed APL. The patient has been receiving  treatment with ATRA and Arsenic ( ATRA since 5/22 and Arsenic 5/27). Arsenic has been on hold since 6/22 secondary to Herpes Zoster infection. Patient's hospital course has been complicated by transaminitis, refractory thrombocytopenia, COVID-19 (+) state (6/16), shingles, and right axillary DVT.  The patient has pancytopenia 2/2 disease and/or ATRA/Arsenic.

## 2020-06-25 NOTE — PROGRESS NOTE ADULT - SUBJECTIVE AND OBJECTIVE BOX
Diagnosis: Acute Promyelocytic leukemia     Protocol/Chemo Regimen: ATRA/Arsenic Trioxide    Day: ATRA Day 34 and Arsenic Day 40 (Holding Arsenic since Day 26 --> 6/22/20)    Pt endorsed:  Right upper inner arm pain and redness. c/o Rash on her Right buttock and Right posterior thigh     Review of Systems:  Patient denies headache, chest pain, shortness of breath, nausea, vomiting, or abdominal pain.    Pain scale: 3/10    Diet: Consistent Carb w/ Evening Snack    Allergies: No Known Allergies    ANTIMICROBIALS  valACYclovir 1000 milliGRAM(s) Oral every 8 hours      HEME/ONC MEDICATIONS  heparin   Injectable 6500 Unit(s) IV Push every 6 hours PRN  heparin   Injectable 3000 Unit(s) IV Push every 6 hours PRN  heparin  Infusion.  Unit(s)/Hr IV Continuous <Continuous>  tretinoin 40 milliGRAM(s) Oral every 12 hours      STANDING MEDICATIONS  ammonium lactate 12% Lotion 1 Application(s) Topical two times a day  ATENolol  Tablet 25 milliGRAM(s) Oral daily  baclofen 5 milliGRAM(s) Oral every 12 hours  Biotene Dry Mouth Oral Rinse 5 milliLiter(s) Swish and Spit five times a day  bisacodyl 5 milliGRAM(s) Oral every 12 hours  dextrose 5%. 1000 milliLiter(s) IV Continuous <Continuous>  dextrose 50% Injectable 12.5 Gram(s) IV Push once  dextrose 50% Injectable 25 Gram(s) IV Push once  dextrose 50% Injectable 25 Gram(s) IV Push once  gabapentin 100 milliGRAM(s) Oral three times a day  insulin lispro (HumaLOG) corrective regimen sliding scale   SubCutaneous three times a day before meals  insulin lispro (HumaLOG) corrective regimen sliding scale   SubCutaneous at bedtime  loratadine 10 milliGRAM(s) Oral daily  pantoprazole    Tablet 40 milliGRAM(s) Oral before breakfast  petrolatum white Ointment 1 Application(s) Topical three times a day  polyethylene glycol 3350 17 Gram(s) Oral daily  sodium chloride 0.65% Nasal 1 Spray(s) Both Nostrils every 8 hours  sodium chloride 0.9%. 1000 milliLiter(s) IV Continuous <Continuous>  triamcinolone 0.1% Ointment 1 Application(s) Topical every 12 hours      PRN MEDICATIONS  acetaminophen   Tablet .. 650 milliGRAM(s) Oral every 6 hours PRN  ALBUTerol    90 MICROgram(s) HFA Inhaler 2 Puff(s) Inhalation every 6 hours PRN  AQUAPHOR (petrolatum Ointment) 1 Application(s) Topical every 3 hours PRN  dextrose 40% Gel 15 Gram(s) Oral once PRN  diphenhydrAMINE   Injectable 25 milliGRAM(s) IV Push every 6 hours PRN  glucagon  Injectable 1 milliGRAM(s) IntraMuscular once PRN  guaiFENesin   Syrup  (Sugar-Free) 100 milliGRAM(s) Oral every 6 hours PRN  hemorrhoidal Ointment 1 Application(s) Rectal every 8 hours PRN  morphine  - Injectable 3 milliGRAM(s) IV Push every 6 hours PRN  ondansetron Injectable 8 milliGRAM(s) IV Push every 8 hours PRN  sodium chloride 0.9% lock flush 10 milliLiter(s) IV Push every 1 hour PRN      Vital Signs Last 24 Hrs  T(C): 36.2 (25 Jun 2020 05:38), Max: 36.6 (24 Jun 2020 13:54)  T(F): 97.1 (25 Jun 2020 05:38), Max: 97.8 (24 Jun 2020 13:54)  HR: 78 (25 Jun 2020 05:38) (64 - 79)  BP: 129/76 (25 Jun 2020 05:38) (120/67 - 146/87)  RR: 18 (25 Jun 2020 05:38) (16 - 18)  SpO2: 98% (25 Jun 2020 05:38) (95% - 99%)    PHYSICAL EXAM  Constitutional: NAD  HEENT: NC/AT, MMM  Neck: Supple  Respiratory: Grossly CTAB; No wheeze appreciated  Cardiovascular: RRR; +S1/S2  Gastrointestinal: +BS, Soft, NT, No rigidity  Extremities: No LE edema, + RUE PICC line  Neurological: Awake and alert, Answering questions and following commands appropriately  Skin: Warm and dry, Some hyperpigmentation noted around RUE PICC line, Shingles-like rash noted on R buttock (appears crusting over) and R posterior thigh (appears to be developing)  Psychiatric: Calm and cooperative    Cultures:   Culture - Blood (06.20.20 @ 21:18)    Specimen Source: .Blood PICC/PERC Double Lumen BLUE    Culture Results:   No growth to date.      Culture - Blood (06.20.20 @ 21:14)    Specimen Source: .Blood Blood-Peripheral    Culture Results:   No growth to date.    Culture - Urine (06.20.20 @ 20:49)    -  Ampicillin: S <=2 Predicts results to ampicillin/sulbactam, amoxacillin-clavulanate and  piperacillin-tazobactam.    -  Ciprofloxacin: S <=1    -  Levofloxacin: S <=1    -  Nitrofurantoin: S <=32 Should not be used to treat pyelonephritis.    -  Tetra/Doxy: R >8    -  Vancomycin: S 2    Specimen Source: .Urine Clean Catch (Midstream)    Culture Results:   10,000 - 49,000 CFU/mL Enterococcus faecalis  Normal Urogenital tika present    Organism Identification: Enterococcus faecalis    Organism: Enterococcus faecalis    Method Type: ARTHUR    LABS:                          8.8    3.81  )-----------( 100      ( 25 Jun 2020 07:14 )             25.8         Mean Cell Volume : 89.3 fl  Mean Cell Hemoglobin : 30.4 pg  Mean Cell Hemoglobin Concentration : 34.1 gm/dL  Auto Neutrophil # : x  Auto Lymphocyte # : x  Auto Monocyte # : x  Auto Eosinophil # : x  Auto Basophil # : x  Auto Neutrophil % : x  Auto Lymphocyte % : x  Auto Monocyte % : x  Auto Eosinophil % : x  Auto Basophil % : x       PTT - ( 25 Jun 2020 04:07 )  PTT:144.5 sec        RADIOLOGY & ADDITIONAL STUDIES:    from: CT Upper Extremity w/ IV Cont, Right (06.24.20 @ 18:25) >  Findings:  There is partially occlusive acute thrombus within the right axillary vein beginning at the level of the proximal humeral shaft. There is fat stranding within the medial subcutaneous tissues of the upper arm and posteriorly at the elbow. There is no rim-enhancing fluid collection. There is no cortical erosion or aggressive osseous destruction to suggest osteomyelitis.  There is spurring at the sublime tubercle and at the triceps insertion. There is a bone island within the distal radius dorsally. Right apical pleural/parenchymal scarring with focal calcification is again noted.  Impression: Partially occlusive acute thrombus within the right axillary vein as noted previously. Subcutaneous edema. No abscess. Diagnosis: Acute Promyelocytic leukemia     Protocol/Chemo Regimen: ATRA/Arsenic Trioxide    Day: ATRA Day 34 and Arsenic Day 40 (Holding Arsenic since Day 26 --> 6/22/20)    Pt endorsed:  Right upper inner arm pain and redness. c/o Rash on her Right buttock and Right posterior thigh     Review of Systems:  Patient denies headache, chest pain, shortness of breath, nausea, vomiting, or abdominal pain.    Pain scale: 3/10    Diet: Consistent Carb w/ Evening Snack    Allergies: No Known Allergies    ANTIMICROBIALS  valACYclovir 1000 milliGRAM(s) Oral every 8 hours      HEME/ONC MEDICATIONS  heparin  Infusion.  Unit(s)/Hr IV Continuous <Continuous>  tretinoin 40 milliGRAM(s) Oral every 12 hours      STANDING MEDICATIONS  ammonium lactate 12% Lotion 1 Application(s) Topical two times a day  ATENolol  Tablet 25 milliGRAM(s) Oral daily  baclofen 5 milliGRAM(s) Oral every 12 hours  Biotene Dry Mouth Oral Rinse 5 milliLiter(s) Swish and Spit five times a day  bisacodyl 5 milliGRAM(s) Oral every 12 hours  dextrose 5%. 1000 milliLiter(s) IV Continuous <Continuous>  dextrose 50% Injectable 12.5 Gram(s) IV Push once  dextrose 50% Injectable 25 Gram(s) IV Push once  dextrose 50% Injectable 25 Gram(s) IV Push once  gabapentin 100 milliGRAM(s) Oral three times a day  insulin lispro (HumaLOG) corrective regimen sliding scale   SubCutaneous three times a day before meals  insulin lispro (HumaLOG) corrective regimen sliding scale   SubCutaneous at bedtime  loratadine 10 milliGRAM(s) Oral daily  pantoprazole    Tablet 40 milliGRAM(s) Oral before breakfast  petrolatum white Ointment 1 Application(s) Topical three times a day  polyethylene glycol 3350 17 Gram(s) Oral daily  sodium chloride 0.65% Nasal 1 Spray(s) Both Nostrils every 8 hours  sodium chloride 0.9%. 1000 milliLiter(s) IV Continuous <Continuous>  triamcinolone 0.1% Ointment 1 Application(s) Topical every 12 hours      PRN MEDICATIONS  acetaminophen   Tablet .. 650 milliGRAM(s) Oral every 6 hours PRN  ALBUTerol    90 MICROgram(s) HFA Inhaler 2 Puff(s) Inhalation every 6 hours PRN  AQUAPHOR (petrolatum Ointment) 1 Application(s) Topical every 3 hours PRN  dextrose 40% Gel 15 Gram(s) Oral once PRN  diphenhydrAMINE   Injectable 25 milliGRAM(s) IV Push every 6 hours PRN  glucagon  Injectable 1 milliGRAM(s) IntraMuscular once PRN  guaiFENesin   Syrup  (Sugar-Free) 100 milliGRAM(s) Oral every 6 hours PRN  hemorrhoidal Ointment 1 Application(s) Rectal every 8 hours PRN  morphine  - Injectable 3 milliGRAM(s) IV Push every 6 hours PRN  ondansetron Injectable 8 milliGRAM(s) IV Push every 8 hours PRN  sodium chloride 0.9% lock flush 10 milliLiter(s) IV Push every 1 hour PRN      Vital Signs Last 24 Hrs  T(C): 36.2 (25 Jun 2020 05:38), Max: 36.6 (24 Jun 2020 13:54)  T(F): 97.1 (25 Jun 2020 05:38), Max: 97.8 (24 Jun 2020 13:54)  HR: 78 (25 Jun 2020 05:38) (64 - 79)  BP: 129/76 (25 Jun 2020 05:38) (120/67 - 146/87)  RR: 18 (25 Jun 2020 05:38) (16 - 18)  SpO2: 98% (25 Jun 2020 05:38) (95% - 99%)      PHYSICAL EXAM  Constitutional: NAD  HEENT: NC/AT, MMM  Neck: Supple  Respiratory: Grossly CTAB; No wheeze appreciated  Cardiovascular: RRR; +S1/S2  Gastrointestinal: +BS, Soft, NT, No rigidity  Extremities: No LE edema, + RUE PICC line  Neurological: Awake and alert, Answering questions and following commands appropriately  Skin: Warm and dry, Some hyperpigmentation noted around RUE PICC line, Shingles-like rash noted on R buttock (appears crusting over) and R posterior thigh (appears to be developing)  Psychiatric: Calm and cooperative    Cultures:   Culture - Blood (06.20.20 @ 21:18)    Specimen Source: .Blood PICC/PERC Double Lumen BLUE    Culture Results:   No growth to date.      Culture - Blood (06.20.20 @ 21:14)    Specimen Source: .Blood Blood-Peripheral    Culture Results:   No growth to date.    Culture - Urine (06.20.20 @ 20:49)    -  Ampicillin: S <=2 Predicts results to ampicillin/sulbactam, amoxacillin-clavulanate and  piperacillin-tazobactam.    -  Ciprofloxacin: S <=1    -  Levofloxacin: S <=1    -  Nitrofurantoin: S <=32 Should not be used to treat pyelonephritis.    -  Tetra/Doxy: R >8    -  Vancomycin: S 2    Specimen Source: .Urine Clean Catch (Midstream)    Culture Results:   10,000 - 49,000 CFU/mL Enterococcus faecalis  Normal Urogenital tika present    Organism Identification: Enterococcus faecalis    Organism: Enterococcus faecalis    Method Type: ARTHUR    LABS:                          8.8    3.81  )-----------( 100      ( 25 Jun 2020 07:14 )             25.8         Mean Cell Volume : 89.3 fl  Mean Cell Hemoglobin : 30.4 pg  Mean Cell Hemoglobin Concentration : 34.1 gm/dL  Auto Neutrophil # : x  Auto Lymphocyte # : x  Auto Monocyte # : x  Auto Eosinophil # : x  Auto Basophil # : x  Auto Neutrophil % : x  Auto Lymphocyte % : x  Auto Monocyte % : x  Auto Eosinophil % : x  Auto Basophil % : x    25 Jun 2020 07:14    139    |  100    |  13     ----------------------------<  127    4.5     |  22     |  0.77     Ca    9.6        25 Jun 2020 07:14  Phos  4.1       25 Jun 2020 07:14  Mg     2.1       25 Jun 2020 07:14    TPro  7.6    /  Alb  4.6    /  TBili  0.3    /  DBili  x      /  AST  27     /  ALT  22     /  AlkPhos  122    25 Jun 2020 07:14    PT/INR - ( 24 Jun 2020 09:05 )   PT: 14.9 sec;   INR: 1.29 ratio    PTT - ( 25 Jun 2020 04:07 )  PTT:144.5 sec    CAPILLARY BLOOD GLUCOSE  POCT Blood Glucose.: 122 mg/dL (25 Jun 2020 07:54)  POCT Blood Glucose.: 114 mg/dL (24 Jun 2020 21:33)  POCT Blood Glucose.: 150 mg/dL (24 Jun 2020 17:19)  POCT Blood Glucose.: 166 mg/dL (24 Jun 2020 12:17)    LIVER FUNCTIONS - ( 25 Jun 2020 07:14 )  Alb: 4.6 g/dL / Pro: 7.6 g/dL / ALK PHOS: 122 U/L / ALT: 22 U/L / AST: 27 U/L / GGT: x             RADIOLOGY & ADDITIONAL STUDIES:    from: CT Upper Extremity w/ IV Cont, Right (06.24.20 @ 18:25) >  Findings:  There is partially occlusive acute thrombus within the right axillary vein beginning at the level of the proximal humeral shaft. There is fat stranding within the medial subcutaneous tissues of the upper arm and posteriorly at the elbow. There is no rim-enhancing fluid collection. There is no cortical erosion or aggressive osseous destruction to suggest osteomyelitis.  There is spurring at the sublime tubercle and at the triceps insertion. There is a bone island within the distal radius dorsally. Right apical pleural/parenchymal scarring with focal calcification is again noted.  Impression: Partially occlusive acute thrombus within the right axillary vein as noted previously. Subcutaneous edema. No abscess.

## 2020-06-26 ENCOUNTER — RESULT REVIEW (OUTPATIENT)
Age: 60
End: 2020-06-26

## 2020-06-26 LAB
ALBUMIN SERPL ELPH-MCNC: 4.4 G/DL — SIGNIFICANT CHANGE UP (ref 3.3–5)
ALP SERPL-CCNC: 119 U/L — SIGNIFICANT CHANGE UP (ref 40–120)
ALT FLD-CCNC: 20 U/L — SIGNIFICANT CHANGE UP (ref 10–45)
ANION GAP SERPL CALC-SCNC: 11 MMOL/L — SIGNIFICANT CHANGE UP (ref 5–17)
APTT BLD: 46.2 SEC — HIGH (ref 27.5–36.3)
AST SERPL-CCNC: 26 U/L — SIGNIFICANT CHANGE UP (ref 10–40)
BASOPHILS # BLD AUTO: 0 K/UL — SIGNIFICANT CHANGE UP (ref 0–0.2)
BASOPHILS NFR BLD AUTO: 0 % — SIGNIFICANT CHANGE UP (ref 0–2)
BILIRUB SERPL-MCNC: 0.4 MG/DL — SIGNIFICANT CHANGE UP (ref 0.2–1.2)
BLD GP AB SCN SERPL QL: NEGATIVE — SIGNIFICANT CHANGE UP
BUN SERPL-MCNC: 12 MG/DL — SIGNIFICANT CHANGE UP (ref 7–23)
CALCIUM SERPL-MCNC: 9.7 MG/DL — SIGNIFICANT CHANGE UP (ref 8.4–10.5)
CHLORIDE SERPL-SCNC: 103 MMOL/L — SIGNIFICANT CHANGE UP (ref 96–108)
CO2 SERPL-SCNC: 26 MMOL/L — SIGNIFICANT CHANGE UP (ref 22–31)
CREAT SERPL-MCNC: 0.78 MG/DL — SIGNIFICANT CHANGE UP (ref 0.5–1.3)
D DIMER BLD IA.RAPID-MCNC: 519 NG/ML DDU — HIGH
EOSINOPHIL # BLD AUTO: 0.06 K/UL — SIGNIFICANT CHANGE UP (ref 0–0.5)
EOSINOPHIL NFR BLD AUTO: 1.8 % — SIGNIFICANT CHANGE UP (ref 0–6)
FIBRINOGEN PPP-MCNC: 359 MG/DL — SIGNIFICANT CHANGE UP (ref 350–510)
GIANT PLATELETS BLD QL SMEAR: PRESENT — SIGNIFICANT CHANGE UP
GLUCOSE BLDC GLUCOMTR-MCNC: 114 MG/DL — HIGH (ref 70–99)
GLUCOSE BLDC GLUCOMTR-MCNC: 150 MG/DL — HIGH (ref 70–99)
GLUCOSE BLDC GLUCOMTR-MCNC: 93 MG/DL — SIGNIFICANT CHANGE UP (ref 70–99)
GLUCOSE BLDC GLUCOMTR-MCNC: 98 MG/DL — SIGNIFICANT CHANGE UP (ref 70–99)
GLUCOSE SERPL-MCNC: 100 MG/DL — HIGH (ref 70–99)
HCT VFR BLD CALC: 26.8 % — LOW (ref 34.5–45)
HGB BLD-MCNC: 8.8 G/DL — LOW (ref 11.5–15.5)
INR BLD: 1.29 RATIO — HIGH (ref 0.88–1.16)
LDH SERPL L TO P-CCNC: 193 U/L — SIGNIFICANT CHANGE UP (ref 50–242)
LYMPHOCYTES # BLD AUTO: 1.24 K/UL — SIGNIFICANT CHANGE UP (ref 1–3.3)
LYMPHOCYTES # BLD AUTO: 40.2 % — SIGNIFICANT CHANGE UP (ref 13–44)
MAGNESIUM SERPL-MCNC: 2.1 MG/DL — SIGNIFICANT CHANGE UP (ref 1.6–2.6)
MCHC RBC-ENTMCNC: 29.4 PG — SIGNIFICANT CHANGE UP (ref 27–34)
MCHC RBC-ENTMCNC: 32.8 GM/DL — SIGNIFICANT CHANGE UP (ref 32–36)
MCV RBC AUTO: 89.6 FL — SIGNIFICANT CHANGE UP (ref 80–100)
MONOCYTES # BLD AUTO: 0.25 K/UL — SIGNIFICANT CHANGE UP (ref 0–0.9)
MONOCYTES NFR BLD AUTO: 8 % — SIGNIFICANT CHANGE UP (ref 2–14)
NEUTROPHILS # BLD AUTO: 1.49 K/UL — LOW (ref 1.8–7.4)
NEUTROPHILS NFR BLD AUTO: 48.2 % — SIGNIFICANT CHANGE UP (ref 43–77)
PHOSPHATE SERPL-MCNC: 3.4 MG/DL — SIGNIFICANT CHANGE UP (ref 2.5–4.5)
PLAT MORPH BLD: NORMAL — SIGNIFICANT CHANGE UP
PLATELET # BLD AUTO: 138 K/UL — LOW (ref 150–400)
POTASSIUM SERPL-MCNC: 4.1 MMOL/L — SIGNIFICANT CHANGE UP (ref 3.5–5.3)
POTASSIUM SERPL-SCNC: 4.1 MMOL/L — SIGNIFICANT CHANGE UP (ref 3.5–5.3)
PROT SERPL-MCNC: 7.2 G/DL — SIGNIFICANT CHANGE UP (ref 6–8.3)
PROTHROM AB SERPL-ACNC: 15 SEC — HIGH (ref 10–12.9)
RBC # BLD: 2.99 M/UL — LOW (ref 3.8–5.2)
RBC # FLD: 15.5 % — HIGH (ref 10.3–14.5)
RBC BLD AUTO: SIGNIFICANT CHANGE UP
RH IG SCN BLD-IMP: POSITIVE — SIGNIFICANT CHANGE UP
SODIUM SERPL-SCNC: 140 MMOL/L — SIGNIFICANT CHANGE UP (ref 135–145)
URATE SERPL-MCNC: 5.5 MG/DL — SIGNIFICANT CHANGE UP (ref 2.5–7)
VARIANT LYMPHS # BLD: 1.8 % — SIGNIFICANT CHANGE UP (ref 0–6)
WBC # BLD: 3.09 K/UL — LOW (ref 3.8–10.5)
WBC # FLD AUTO: 3.09 K/UL — LOW (ref 3.8–10.5)

## 2020-06-26 PROCEDURE — 85097 BONE MARROW INTERPRETATION: CPT

## 2020-06-26 PROCEDURE — 93010 ELECTROCARDIOGRAM REPORT: CPT

## 2020-06-26 PROCEDURE — 88305 TISSUE EXAM BY PATHOLOGIST: CPT | Mod: 26

## 2020-06-26 PROCEDURE — 88313 SPECIAL STAINS GROUP 2: CPT | Mod: 26

## 2020-06-26 PROCEDURE — 88342 IMHCHEM/IMCYTCHM 1ST ANTB: CPT | Mod: 26

## 2020-06-26 PROCEDURE — 88341 IMHCHEM/IMCYTCHM EA ADD ANTB: CPT | Mod: 26

## 2020-06-26 PROCEDURE — 38222 DX BONE MARROW BX & ASPIR: CPT | Mod: AS

## 2020-06-26 PROCEDURE — 88291 CYTO/MOLECULAR REPORT: CPT

## 2020-06-26 PROCEDURE — 88189 FLOWCYTOMETRY/READ 16 & >: CPT | Mod: 59

## 2020-06-26 PROCEDURE — 99232 SBSQ HOSP IP/OBS MODERATE 35: CPT | Mod: GC

## 2020-06-26 RX ORDER — PHYTONADIONE (VIT K1) 5 MG
10 TABLET ORAL DAILY
Refills: 0 | Status: COMPLETED | OUTPATIENT
Start: 2020-06-26 | End: 2020-06-28

## 2020-06-26 RX ADMIN — Medication 5 MILLIGRAM(S): at 11:29

## 2020-06-26 RX ADMIN — GABAPENTIN 100 MILLIGRAM(S): 400 CAPSULE ORAL at 21:00

## 2020-06-26 RX ADMIN — Medication 10 MILLIGRAM(S): at 11:28

## 2020-06-26 RX ADMIN — Medication 1 SPRAY(S): at 05:54

## 2020-06-26 RX ADMIN — GABAPENTIN 100 MILLIGRAM(S): 400 CAPSULE ORAL at 05:55

## 2020-06-26 RX ADMIN — GABAPENTIN 100 MILLIGRAM(S): 400 CAPSULE ORAL at 13:33

## 2020-06-26 RX ADMIN — PANTOPRAZOLE SODIUM 40 MILLIGRAM(S): 20 TABLET, DELAYED RELEASE ORAL at 05:55

## 2020-06-26 RX ADMIN — Medication 1 APPLICATION(S): at 21:00

## 2020-06-26 RX ADMIN — Medication 1 APPLICATION(S): at 05:58

## 2020-06-26 RX ADMIN — Medication 1 APPLICATION(S): at 06:04

## 2020-06-26 RX ADMIN — ENOXAPARIN SODIUM 80 MILLIGRAM(S): 100 INJECTION SUBCUTANEOUS at 05:55

## 2020-06-26 RX ADMIN — TRETINOIN 40 MILLIGRAM(S): 10 CAPSULE, LIQUID FILLED ORAL at 07:50

## 2020-06-26 RX ADMIN — Medication 1 APPLICATION(S): at 05:54

## 2020-06-26 RX ADMIN — Medication 1 APPLICATION(S): at 18:20

## 2020-06-26 RX ADMIN — Medication 5 MILLIGRAM(S): at 18:19

## 2020-06-26 RX ADMIN — LORATADINE 10 MILLIGRAM(S): 10 TABLET ORAL at 11:28

## 2020-06-26 RX ADMIN — VALACYCLOVIR 1000 MILLIGRAM(S): 500 TABLET, FILM COATED ORAL at 05:56

## 2020-06-26 RX ADMIN — ATENOLOL 25 MILLIGRAM(S): 25 TABLET ORAL at 05:55

## 2020-06-26 RX ADMIN — VALACYCLOVIR 1000 MILLIGRAM(S): 500 TABLET, FILM COATED ORAL at 21:00

## 2020-06-26 RX ADMIN — TRETINOIN 40 MILLIGRAM(S): 10 CAPSULE, LIQUID FILLED ORAL at 21:00

## 2020-06-26 RX ADMIN — Medication 5 MILLIGRAM(S): at 05:57

## 2020-06-26 RX ADMIN — VALACYCLOVIR 1000 MILLIGRAM(S): 500 TABLET, FILM COATED ORAL at 13:33

## 2020-06-26 RX ADMIN — SODIUM CHLORIDE 25 MILLILITER(S): 9 INJECTION INTRAMUSCULAR; INTRAVENOUS; SUBCUTANEOUS at 06:04

## 2020-06-26 RX ADMIN — Medication 1 SPRAY(S): at 21:01

## 2020-06-26 RX ADMIN — POLYETHYLENE GLYCOL 3350 17 GRAM(S): 17 POWDER, FOR SOLUTION ORAL at 11:28

## 2020-06-26 RX ADMIN — ENOXAPARIN SODIUM 80 MILLIGRAM(S): 100 INJECTION SUBCUTANEOUS at 18:19

## 2020-06-26 NOTE — CHART NOTE - NSCHARTNOTEFT_GEN_A_CORE
Hematology/Oncology Procedure Note    Bone Marrow Aspiration/Biopsy    Indication:    Bone marrow aspiration and biopsy procedure description, risks, and benefits were discussed in detail with the patient.  All questions were answered.  Informed consent was obtained and time-out performed.      The area of the left posterior iliac crest was prepped and draped using sterile technique. Local anesthetic with  2% Lidocaine.    Bone marrow aspiration and biopsy  was performed using sterile technique  by Dr melgar with my assist and supervision. Specimens were obtained.    The procedure was well tolerated and no local bleeding or other complications were observed.  Pressure was applied to the procedure site and a wound dressing was placed.  The patient and nursing staff were advised that the patient is to lie flat for 30 minutes post procedure. Tylenol may be used if no contraindications for pain at the procedure site.

## 2020-06-26 NOTE — PROGRESS NOTE ADULT - PROBLEM SELECTOR PLAN 6
Home regimen with Atenolol held on admission  Restarted at 25mg PO daily for increased BP  BP is currently stable. Continue to monitor 5/23: HgA1c = 7.0  C/w HISS. FS are currently stable. Monitor FS AC & QHS   C/w Consistent Carbohydrate diet w/ Evening snack

## 2020-06-26 NOTE — PROGRESS NOTE ADULT - ASSESSMENT
Ms. Gant is a 59 y/o Kiswahili-speaking female  w/ a PMHx of HTN, HLD, T2DM, and COVID-19 infection in 4/2020 who was admitted for management of newly diagnosed APL. The patient has been receiving  treatment with ATRA and Arsenic ( ATRA since 5/22 and Arsenic 5/27). Arsenic has been on hold since 6/22 secondary to Herpes Zoster infection. Patient's hospital course has been complicated by transaminitis, refractory thrombocytopenia, COVID-19 (+) state (6/16), shingles, and right axillary DVT.  The patient has pancytopenia 2/2 disease and/or ATRA/Arsenic. Ms. Gant is a 59 y/o Turkmen-speaking female  w/ a PMHx of HTN, HLD, T2DM, and COVID-19 infection in 4/2020 who was admitted for management of newly diagnosed APL. The patient has been receiving treatment with ATRA and Arsenic (ATRA since 5/22 and Arsenic 5/27). Arsenic has been on hold since 6/22 secondary to Herpes Zoster infection. Patient's hospital course has been complicated by transaminitis, refractory thrombocytopenia, COVID-19 (+) state (6/16), shingles, and right axillary DVT. The patient has pancytopenia 2/2 disease and/or ATRA/Arsenic.

## 2020-06-26 NOTE — PROGRESS NOTE ADULT - ATTENDING COMMENTS
59 y/o Welsh speaking F who is transferred for newly diagnosed low/interm risk APL.  Presented with pancytopenia, flow cytometry/bone marrow biopsy/FISH from 5/26- consistent with acute promyelocytic leukemia with PML-AWA translocation.    She was started on ATRA 5/23- day +32, GALA 5/27- day +28.    -CT chest 6/6 c/w granulomatous disease (hx of TB in 1980's) pt had SOB on 6/4, now resolved.  Daily weights, diurese. Will give Hydrea 500mg if wbc>10. On Prednisone 20mg through 5/12- now off.  If pt becomes SOB again, may restart  -tremors improved, unclear etiology, electrolytes WNL. Cefepime held, given Baclofen -they improved  -Check CBC, coags/fibrinogen once daily.  Goal plt >40-50, fibrinogen >150; responding to ABO-matched platelets given over 3 hrs in 1/2 unit infusions   -monitor lytes daily, keep K>4, Mg>2.  EKG twice a week  -off antibiotics  -COVID19+ on 6/17, moved to 9M  -Rash on R buttock and back of RLE. Called derm, holding GALA for now. Awaiting VZV swab, started valtrex. Still with vesicular component on back of R leg, will restart when resolving. Pain present, on gabapentin 100 mg TID.   -RUE redness and itching at previous PICC site, line removed. In area of adhesive, possibly allergic reaction. RUE doppler showing axillary partially occlusive clot. On heparin infusion up until 6/25 given low platelets / APL. Will switch to lovenox. CT scan confirms blood clot, no abscesses or other etiology for RUE pain.  -Plan for bone marrow biopsy tomorrow. 59 y/o Maori speaking F who is transferred for newly diagnosed low/interm risk APL.  Presented with pancytopenia, flow cytometry/bone marrow biopsy/FISH from 5/26- consistent with acute promyelocytic leukemia with PML-AWA translocation.    She was started on ATRA 5/23- day +33, GALA 5/27- day +29.    -CT chest 6/6 c/w granulomatous disease (hx of TB in 1980's) pt had SOB on 6/4, now resolved.  Daily weights, diurese. Will give Hydrea 500mg if wbc>10. On Prednisone 20mg through 5/12- now off.  If pt becomes SOB again, may restart  -tremors improved, unclear etiology, electrolytes WNL. Cefepime held, given Baclofen -they improved  -Check CBC, coags/fibrinogen once daily.  Goal plt >40-50, fibrinogen >150; responding to ABO-matched platelets given over 3 hrs in 1/2 unit infusions   -monitor lytes daily, keep K>4, Mg>2.  EKG twice a week  -off antibiotics  -COVID19+ on 6/17, moved to 9M  -Rash on R buttock and back of RLE. Called derm, holding GALA for now. Awaiting VZV swab, started valtrex. Still with vesicular component on back of R leg, will restart when resolving. Pain present, on gabapentin 100 mg TID.   -RUE redness and itching at previous PICC site, line removed. In area of adhesive, possibly allergic reaction. RUE doppler showing axillary partially occlusive clot. On heparin infusion up until 6/25 given low platelets / APL. Switched to lovenox 6/25. CT scan confirms blood clot, no abscesses or other etiology for RUE pain.  -Plan for bone marrow biopsy today as counts have recovered.

## 2020-06-26 NOTE — PROGRESS NOTE ADULT - PROBLEM SELECTOR PLAN 7
5/23: HgA1c = 7.0  C/w HISS. FS are currently stable. Monitor FS AC & QHS   C/w Consistent Carbohydrate diet w/ Evening snack on Lovenox SQ BID for DVT treatment. Discontinue if platelets are < 50,000       Contact Information [phone]

## 2020-06-26 NOTE — PROGRESS NOTE ADULT - PROBLEM SELECTOR PLAN 4
Diffuse right upper inner arm pain  previous PICC line site   6/23 Right axillary vein DVT.   on heparin gtt   Dermatology consult requested   possible contact dermatitis vs allergic dermatitis from the adhesive   clobetasol cream to the affected area. Shingles of the Right buttock and Right upper thigh rash   6/22 seen by Dermatology   continue Valtrex and Lac hydrin as per Dermatology recommendations   Neurontin 100 mg TID for herpetic neuralgia

## 2020-06-26 NOTE — PROGRESS NOTE ADULT - PROBLEM SELECTOR PLAN 2
Afebrile, not neutropenic   6/23 Persistent right upper inner arm pain/swelling - CT no abscess, + DVT  6/22 Right buttock and upper thigh shingles continue Valtrex treatment with Lac Hydrin  If febrile, Pan Cx and CXR  6/20 Pan cultures (-)   Previous COVID-19 (+) outpatient  Then COVID (-) on 5/22, 5/26, 6/2 and 6/9.   6/16 COVID 19 (+)   6/18 COVID 19 Ab (+)  6/19 Repeat COVID-19 PCR (-) Patient is not neutropenic, afebrile   6/23 Persistent right upper inner arm pain/swelling - CT no abscess, + DVT  6/22 Right buttock and upper thigh shingles continue Valtrex treatment with Lac Hydrin  If febrile, Pan Cx and CXR  6/20 Pan cultures (-)   Previous COVID-19 (+) outpatient  Then COVID (-) on 5/22, 5/26, 6/2 and 6/9 6/16 COVID 19 (+)   6/18 COVID 19 Ab (+)  6/19 Repeat COVID-19 PCR (-)

## 2020-06-26 NOTE — PROGRESS NOTE ADULT - PROBLEM SELECTOR PLAN 1
Continue ATRA 40mg BID (started 5/22),   Arsenic Trioxide which was started on 5/27 now on hold (since 6/22) 2/2 shingles  Monitor for differentiation syndrome and check EKG for QTc prolongation every Tuesday and Friday. Keep K > 4 and Mg > 2.  Status post Hydrea 500mg PO x 1 on 6/8 and 6/9 for WBC >10  Follow up CBC with diff, CMP, TLS and DIC panel daily  Keep PLTs >50 K. If unable to achieve goal to infuse 1/2 unit platelets over 3 hours every 12 hrs.   S/p Prednisone 20mg PO daily x 5 days (through 6/12) due to platelet antibody  Keep Fibrinogen >150, if less give Cryo  Strict Is and Os/Daily weights/Mouth Care Continue ATRA 40mg BID (started 5/22)   Arsenic Trioxide which was started on 5/27 now on hold (since 6/22) 2/2 shingles  Monitor for differentiation syndrome and check EKG for QTc prolongation every Tuesday and Friday. Keep K > 4 and Mg > 2   Status post Hydrea 500mg PO x 1 on 6/8 and 6/9 for WBC >10  Follow up CBC with diff, CMP, TLS and DIC panel daily  Keep PLTs >50 K. If unable to achieve goal to infuse 1/2 unit platelets over 3 hours every 12 hrs   S/p Prednisone 20mg PO daily x 5 days (through 6/12) due to platelet antibody  Keep Fibrinogen >150, if less give Cryo  Strict Is and Os/Daily weights/Mouth Care

## 2020-06-26 NOTE — PROGRESS NOTE ADULT - SUBJECTIVE AND OBJECTIVE BOX
Diagnosis: Acute Promyelocytic leukemia     Protocol/Chemo Regimen: ATRA/Arsenic Trioxide    Day: ATRA Day 35 and Arsenic Day 41 (Holding Arsenic since Day 26 --> 6/22/20)    Pt endorsed:  Right upper inner arm pain and redness. c/o Rash on her Right buttock and Right posterior thigh     Review of Systems: Denies nausea, vomiting, diarrhea, chest pain, SOB     Pain scale: 3/10    Diet: Consistent Carb w/ Evening Snack    Allergies: No Known Allergies    ------------------------- Diagnosis: Acute Promyelocytic leukemia     Protocol/Chemo Regimen: ATRA/Arsenic Trioxide    Day: ATRA Day 35 and Arsenic Day 41 (Holding Arsenic since Day 26 --> 6/22/20)    Pt endorsed:  Right upper inner arm pain and redness. c/o Rash on her Right buttock and Right posterior thigh     Review of Systems: Denies nausea, vomiting, diarrhea, chest pain, SOB     Pain scale: 3/10    Diet: Consistent Carb w/ Evening Snack    Allergies: No Known Allergies    ANTIMICROBIALS  valACYclovir 1000 milliGRAM(s) Oral every 8 hours    HEME/ONC MEDICATIONS  enoxaparin Injectable 80 milliGRAM(s) SubCutaneous every 12 hours  tretinoin 40 milliGRAM(s) Oral every 12 hours    STANDING MEDICATIONS  ammonium lactate 12% Lotion 1 Application(s) Topical two times a day  ATENolol  Tablet 25 milliGRAM(s) Oral daily  baclofen 5 milliGRAM(s) Oral every 12 hours  Biotene Dry Mouth Oral Rinse 5 milliLiter(s) Swish and Spit five times a day  bisacodyl 5 milliGRAM(s) Oral every 12 hours  dextrose 5%. 1000 milliLiter(s) IV Continuous <Continuous>  dextrose 50% Injectable 12.5 Gram(s) IV Push once  dextrose 50% Injectable 25 Gram(s) IV Push once  dextrose 50% Injectable 25 Gram(s) IV Push once  gabapentin 100 milliGRAM(s) Oral three times a day  insulin lispro (HumaLOG) corrective regimen sliding scale   SubCutaneous three times a day before meals  insulin lispro (HumaLOG) corrective regimen sliding scale   SubCutaneous at bedtime  loratadine 10 milliGRAM(s) Oral daily  pantoprazole    Tablet 40 milliGRAM(s) Oral before breakfast  petrolatum white Ointment 1 Application(s) Topical three times a day  phytonadione   Solution 10 milliGRAM(s) Oral daily  polyethylene glycol 3350 17 Gram(s) Oral daily  sodium chloride 0.65% Nasal 1 Spray(s) Both Nostrils every 8 hours  sodium chloride 0.9%. 1000 milliLiter(s) IV Continuous <Continuous>  triamcinolone 0.1% Ointment 1 Application(s) Topical every 12 hours    PRN MEDICATIONS  acetaminophen   Tablet .. 650 milliGRAM(s) Oral every 6 hours PRN  ALBUTerol    90 MICROgram(s) HFA Inhaler 2 Puff(s) Inhalation every 6 hours PRN  AQUAPHOR (petrolatum Ointment) 1 Application(s) Topical every 3 hours PRN  dextrose 40% Gel 15 Gram(s) Oral once PRN  diphenhydrAMINE   Injectable 25 milliGRAM(s) IV Push every 6 hours PRN  glucagon  Injectable 1 milliGRAM(s) IntraMuscular once PRN  guaiFENesin   Syrup  (Sugar-Free) 100 milliGRAM(s) Oral every 6 hours PRN  hemorrhoidal Ointment 1 Application(s) Rectal every 8 hours PRN  morphine  - Injectable 3 milliGRAM(s) IV Push every 6 hours PRN  ondansetron Injectable 8 milliGRAM(s) IV Push every 8 hours PRN  sodium chloride 0.9% lock flush 10 milliLiter(s) IV Push every 1 hour PRN    Vital Signs Last 24 Hrs  T(C): 36.4 (26 Jun 2020 13:48), Max: 36.7 (25 Jun 2020 17:44)  T(F): 97.6 (26 Jun 2020 13:48), Max: 98.1 (25 Jun 2020 17:44)  HR: 76 (26 Jun 2020 13:48) (70 - 80)  BP: 149/75 (26 Jun 2020 13:48) (116/71 - 149/75)  BP(mean): --  RR: 18 (26 Jun 2020 13:48) (16 - 20)  SpO2: 98% (26 Jun 2020 13:48) (95% - 98%)    PHYSICAL EXAM  General: adult in NAD  HEENT: clear oropharynx, no erythema, no ulcers  Neck: supple  CV: normal S1, S2, RRR  Lungs: clear to auscultation, no wheezes, no rales  Abdomen: soft, nontender, nondistended, normal BS  Ext: no edema  Skin: Warm and dry, Some hyperpigmentation noted around RUE PICC line, Shingles-like rash noted on R buttock (appears crusting over) and R posterior thigh (appears to be developing)  Neuro: alert and oriented x 3  Central line: normal     LABS:                        8.8    3.09  )-----------( 138      ( 26 Jun 2020 09:02 )             26.8         Mean Cell Volume : 89.6 fl  Mean Cell Hemoglobin : 29.4 pg  Mean Cell Hemoglobin Concentration : 32.8 gm/dL  Auto Neutrophil # : 1.49 K/uL  Auto Lymphocyte # : 1.24 K/uL  Auto Monocyte # : 0.25 K/uL  Auto Eosinophil # : 0.06 K/uL  Auto Basophil # : 0.00 K/uL  Auto Neutrophil % : 48.2 %  Auto Lymphocyte % : 40.2 %  Auto Monocyte % : 8.0 %  Auto Eosinophil % : 1.8 %  Auto Basophil % : 0.0 %    06-26    140  |  103  |  12  ----------------------------<  100<H>  4.1   |  26  |  0.78    Ca    9.7      26 Jun 2020 09:02  Phos  3.4     06-26  Mg     2.1     06-26    TPro  7.2  /  Alb  4.4  /  TBili  0.4  /  DBili  x   /  AST  26  /  ALT  20  /  AlkPhos  119  06-26    Mg 2.1  Phos 3.4    PT/INR - ( 26 Jun 2020 09:02 )   PT: 15.0 sec;   INR: 1.29 ratio    PTT - ( 26 Jun 2020 09:02 )  PTT:46.2 sec    Uric Acid 5.5    RADIOLOGY & ADDITIONAL STUDIES:  < from: CT Upper Extremity w/ IV Cont, Right (06.24.20 @ 18:25) >  Impression: Partially occlusive acute thrombus within the right axillary vein as noted previously. Subcutaneous edema. No abscess.

## 2020-06-26 NOTE — PROGRESS NOTE ADULT - PROBLEM SELECTOR PLAN 3
Patient with right upper arm pain and swelling  s/p PICC line removal   6/23 [facility] Dup[facility] (+) for acute partially occlusive DVT affecting right axillary vein   6/24 CT consistent with DVT, no abscess   started on a Heparin gtt, now changed to SQ Lovenox q12. keep platelets >50,000 Patient with right upper arm pain and swelling s/p PICC line removal   6/23 [facility] Duplex (+) for acute partially occlusive DVT affecting right axillary vein   6/24 CT consistent with DVT, no abscess   started on a Heparin gtt, now changed to SQ Lovenox q 12. Keep platelets >50,000  With associated rash. Dermatology consulted. Possible contact dermatitis vs allergic dermatitis from the adhesive. Clobetasol cream to the affected area

## 2020-06-26 NOTE — PROGRESS NOTE ADULT - PROBLEM SELECTOR PLAN 5
Shingles of the Right buttock and Right upper thigh rash   6/22 seen by Dermatology   continue Valtrex and Lac hydrin as per Dermatology recommendations   Neurontin 100 mg TID for herpetic neuralgia. Home regimen with Atenolol held on admission  Restarted at 25mg PO daily for increased BP  BP is currently stable. Continue to monitor

## 2020-06-27 LAB
ALBUMIN SERPL ELPH-MCNC: 4.3 G/DL — SIGNIFICANT CHANGE UP (ref 3.3–5)
ALP SERPL-CCNC: 121 U/L — HIGH (ref 40–120)
ALT FLD-CCNC: 25 U/L — SIGNIFICANT CHANGE UP (ref 10–45)
ANION GAP SERPL CALC-SCNC: 12 MMOL/L — SIGNIFICANT CHANGE UP (ref 5–17)
AST SERPL-CCNC: 33 U/L — SIGNIFICANT CHANGE UP (ref 10–40)
BASOPHILS # BLD AUTO: 0 K/UL — SIGNIFICANT CHANGE UP (ref 0–0.2)
BASOPHILS NFR BLD AUTO: 0 % — SIGNIFICANT CHANGE UP (ref 0–2)
BILIRUB SERPL-MCNC: 0.4 MG/DL — SIGNIFICANT CHANGE UP (ref 0.2–1.2)
BUN SERPL-MCNC: 11 MG/DL — SIGNIFICANT CHANGE UP (ref 7–23)
CALCIUM SERPL-MCNC: 9.7 MG/DL — SIGNIFICANT CHANGE UP (ref 8.4–10.5)
CHLORIDE SERPL-SCNC: 104 MMOL/L — SIGNIFICANT CHANGE UP (ref 96–108)
CO2 SERPL-SCNC: 25 MMOL/L — SIGNIFICANT CHANGE UP (ref 22–31)
CREAT SERPL-MCNC: 0.78 MG/DL — SIGNIFICANT CHANGE UP (ref 0.5–1.3)
D DIMER BLD IA.RAPID-MCNC: 503 NG/ML DDU — HIGH
EOSINOPHIL # BLD AUTO: 0.11 K/UL — SIGNIFICANT CHANGE UP (ref 0–0.5)
EOSINOPHIL NFR BLD AUTO: 4.8 % — SIGNIFICANT CHANGE UP (ref 0–6)
FIBRINOGEN PPP-MCNC: 358 MG/DL — SIGNIFICANT CHANGE UP (ref 350–510)
GLUCOSE BLDC GLUCOMTR-MCNC: 105 MG/DL — HIGH (ref 70–99)
GLUCOSE BLDC GLUCOMTR-MCNC: 107 MG/DL — HIGH (ref 70–99)
GLUCOSE BLDC GLUCOMTR-MCNC: 126 MG/DL — HIGH (ref 70–99)
GLUCOSE BLDC GLUCOMTR-MCNC: 182 MG/DL — HIGH (ref 70–99)
GLUCOSE SERPL-MCNC: 109 MG/DL — HIGH (ref 70–99)
HCT VFR BLD CALC: 28.5 % — LOW (ref 34.5–45)
HGB BLD-MCNC: 9.6 G/DL — LOW (ref 11.5–15.5)
LDH SERPL L TO P-CCNC: 191 U/L — SIGNIFICANT CHANGE UP (ref 50–242)
LYMPHOCYTES # BLD AUTO: 1.39 K/UL — SIGNIFICANT CHANGE UP (ref 1–3.3)
LYMPHOCYTES # BLD AUTO: 60.7 % — HIGH (ref 13–44)
MAGNESIUM SERPL-MCNC: 2 MG/DL — SIGNIFICANT CHANGE UP (ref 1.6–2.6)
MANUAL SMEAR VERIFICATION: SIGNIFICANT CHANGE UP
MCHC RBC-ENTMCNC: 30 PG — SIGNIFICANT CHANGE UP (ref 27–34)
MCHC RBC-ENTMCNC: 33.7 GM/DL — SIGNIFICANT CHANGE UP (ref 32–36)
MCV RBC AUTO: 89.1 FL — SIGNIFICANT CHANGE UP (ref 80–100)
MONOCYTES # BLD AUTO: 0.16 K/UL — SIGNIFICANT CHANGE UP (ref 0–0.9)
MONOCYTES NFR BLD AUTO: 7 % — SIGNIFICANT CHANGE UP (ref 2–14)
NEUTROPHILS # BLD AUTO: 0.63 K/UL — LOW (ref 1.8–7.4)
NEUTROPHILS NFR BLD AUTO: 27.5 % — LOW (ref 43–77)
NRBC # BLD: 0 /100 WBCS — SIGNIFICANT CHANGE UP (ref 0–0)
PHOSPHATE SERPL-MCNC: 3.7 MG/DL — SIGNIFICANT CHANGE UP (ref 2.5–4.5)
PLAT MORPH BLD: NORMAL — SIGNIFICANT CHANGE UP
PLATELET # BLD AUTO: 155 K/UL — SIGNIFICANT CHANGE UP (ref 150–400)
POTASSIUM SERPL-MCNC: 3.9 MMOL/L — SIGNIFICANT CHANGE UP (ref 3.5–5.3)
POTASSIUM SERPL-SCNC: 3.9 MMOL/L — SIGNIFICANT CHANGE UP (ref 3.5–5.3)
PROT SERPL-MCNC: 7.2 G/DL — SIGNIFICANT CHANGE UP (ref 6–8.3)
RBC # BLD: 3.2 M/UL — LOW (ref 3.8–5.2)
RBC # FLD: 15.5 % — HIGH (ref 10.3–14.5)
RBC BLD AUTO: SIGNIFICANT CHANGE UP
SODIUM SERPL-SCNC: 141 MMOL/L — SIGNIFICANT CHANGE UP (ref 135–145)
URATE SERPL-MCNC: 5.3 MG/DL — SIGNIFICANT CHANGE UP (ref 2.5–7)
WBC # BLD: 2.29 K/UL — LOW (ref 3.8–10.5)
WBC # FLD AUTO: 2.29 K/UL — LOW (ref 3.8–10.5)

## 2020-06-27 PROCEDURE — 99232 SBSQ HOSP IP/OBS MODERATE 35: CPT

## 2020-06-27 RX ADMIN — Medication 1 SPRAY(S): at 21:38

## 2020-06-27 RX ADMIN — Medication 5 MILLIGRAM(S): at 05:12

## 2020-06-27 RX ADMIN — GABAPENTIN 100 MILLIGRAM(S): 400 CAPSULE ORAL at 13:01

## 2020-06-27 RX ADMIN — Medication 1 APPLICATION(S): at 05:11

## 2020-06-27 RX ADMIN — Medication 5 MILLIGRAM(S): at 05:11

## 2020-06-27 RX ADMIN — ATENOLOL 25 MILLIGRAM(S): 25 TABLET ORAL at 05:10

## 2020-06-27 RX ADMIN — ENOXAPARIN SODIUM 80 MILLIGRAM(S): 100 INJECTION SUBCUTANEOUS at 17:59

## 2020-06-27 RX ADMIN — Medication 1 APPLICATION(S): at 17:58

## 2020-06-27 RX ADMIN — TRETINOIN 40 MILLIGRAM(S): 10 CAPSULE, LIQUID FILLED ORAL at 09:09

## 2020-06-27 RX ADMIN — VALACYCLOVIR 1000 MILLIGRAM(S): 500 TABLET, FILM COATED ORAL at 13:01

## 2020-06-27 RX ADMIN — Medication 10 MILLIGRAM(S): at 11:14

## 2020-06-27 RX ADMIN — VALACYCLOVIR 1000 MILLIGRAM(S): 500 TABLET, FILM COATED ORAL at 05:11

## 2020-06-27 RX ADMIN — VALACYCLOVIR 1000 MILLIGRAM(S): 500 TABLET, FILM COATED ORAL at 21:38

## 2020-06-27 RX ADMIN — Medication 5 MILLILITER(S): at 15:43

## 2020-06-27 RX ADMIN — Medication 5 MILLIGRAM(S): at 17:59

## 2020-06-27 RX ADMIN — LORATADINE 10 MILLIGRAM(S): 10 TABLET ORAL at 11:13

## 2020-06-27 RX ADMIN — Medication 5 MILLILITER(S): at 20:00

## 2020-06-27 RX ADMIN — ENOXAPARIN SODIUM 80 MILLIGRAM(S): 100 INJECTION SUBCUTANEOUS at 05:11

## 2020-06-27 RX ADMIN — GABAPENTIN 100 MILLIGRAM(S): 400 CAPSULE ORAL at 05:10

## 2020-06-27 RX ADMIN — Medication 1 APPLICATION(S): at 05:10

## 2020-06-27 RX ADMIN — POLYETHYLENE GLYCOL 3350 17 GRAM(S): 17 POWDER, FOR SOLUTION ORAL at 11:14

## 2020-06-27 RX ADMIN — TRETINOIN 40 MILLIGRAM(S): 10 CAPSULE, LIQUID FILLED ORAL at 20:00

## 2020-06-27 RX ADMIN — Medication 5 MILLILITER(S): at 09:08

## 2020-06-27 RX ADMIN — Medication 1 SPRAY(S): at 05:10

## 2020-06-27 RX ADMIN — GABAPENTIN 100 MILLIGRAM(S): 400 CAPSULE ORAL at 21:38

## 2020-06-27 RX ADMIN — Medication 1 APPLICATION(S): at 21:39

## 2020-06-27 RX ADMIN — Medication 1: at 12:40

## 2020-06-27 RX ADMIN — PANTOPRAZOLE SODIUM 40 MILLIGRAM(S): 20 TABLET, DELAYED RELEASE ORAL at 05:10

## 2020-06-27 RX ADMIN — Medication 5 MILLILITER(S): at 11:13

## 2020-06-27 NOTE — PROGRESS NOTE ADULT - PROBLEM SELECTOR PLAN 3
Patient with right upper arm pain and swelling s/p PICC line removal   6/23 [facility] Duplex (+) for acute partially occlusive DVT affecting right axillary vein   6/24 CT consistent with DVT, no abscess   started on a Heparin gtt, now changed to SQ Lovenox q 12. Keep platelets >50,000  With associated rash. Dermatology consulted. Possible contact dermatitis vs allergic dermatitis from the adhesive. Clobetasol cream to the affected area

## 2020-06-27 NOTE — PROGRESS NOTE ADULT - PROBLEM SELECTOR PLAN 7
on Lovenox SQ BID for DVT treatment. Discontinue if platelets are < 50,000       Contact Information [phone] On Lovenox SQ BID for DVT treatment. Discontinue if platelets are < 50,000       Contact Information [phone]

## 2020-06-27 NOTE — PROGRESS NOTE ADULT - ATTENDING COMMENTS
61 y/o Ukrainian speaking F who is transferred for newly diagnosed low/interm risk APL.  Presented with pancytopenia, flow cytometry/bone marrow biopsy/FISH from 5/26- consistent with acute promyelocytic leukemia with PML-AWA translocation.    She was started on ATRA 5/23- day +33, GALA 5/27- day +29.    -CT chest 6/6 c/w granulomatous disease (hx of TB in 1980's) pt had SOB on 6/4, now resolved.  Daily weights, diurese. Will give Hydrea 500mg if wbc>10. On Prednisone 20mg through 5/12- now off.  If pt becomes SOB again, may restart  -tremors improved, unclear etiology, electrolytes WNL. Cefepime held, given Baclofen -they improved  -Check CBC, coags/fibrinogen once daily.  Goal plt >40-50, fibrinogen >150; responding to ABO-matched platelets given over 3 hrs in 1/2 unit infusions   -monitor lytes daily, keep K>4, Mg>2.  EKG twice a week  -off antibiotics  -COVID19+ on 6/17, moved to 9M  -Rash on R buttock and back of RLE. Called derm, holding GALA for now. Awaiting VZV swab, started valtrex. Still with vesicular component on back of R leg, will restart when resolving. Pain present, on gabapentin 100 mg TID.   -RUE redness and itching at previous PICC site, line removed. In area of adhesive, possibly allergic reaction. RUE doppler showing axillary partially occlusive clot. On heparin infusion up until 6/25 given low platelets / APL. Switched to lovenox 6/25. CT scan confirms blood clot, no abscesses or other etiology for RUE pain.  -Plan for bone marrow biopsy today as counts have recovered. 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  Presented with pancytopenia, flow cytometry/bone marrow biopsy/FISH from 5/26- consistent with acute promyelocytic leukemia with PML-AWA translocation.    She was started on ATRA 5/23- day +36, GALA 5/27- day +32 (arsenic currently on hold from 6/22).    -CT chest 6/6 c/w granulomatous disease (hx of TB in 1980's) pt had SOB on 6/4, now resolved.  Daily weights, diurese. Will give Hydrea 500mg if wbc>10. On Prednisone 20mg through 5/12- now off.  If pt becomes SOB again, may restart  -tremors improved, unclear etiology, electrolytes WNL. Cefepime held, given Baclofen -they improved  -Check CBC, coags/fibrinogen once daily.  Goal plt >40-50, fibrinogen >150; responding to ABO-matched platelets given over 3 hrs in 1/2 unit infusions   -monitor lytes daily, keep K>4, Mg>2.  EKG twice a week  -off antibiotics  -COVID19+ on 6/17, moved to 9M  -Rash on R buttock and back of RLE. Called derm, holding GALA for now. Awaiting VZV swab, started valtrex. Still with vesicular component on back of R leg, will restart when resolving. Pain present, on gabapentin 100 mg TID.   -RUE redness and itching at previous PICC site, line removed. In area of adhesive, possibly allergic reaction. RUE doppler showing axillary partially occlusive clot. On heparin infusion up until 6/25 given low platelets / APL. Switched to lovenox 6/25. CT scan confirms blood clot, no abscesses or other etiology for RUE pain.  -s/p bone marrow biopsy on 6/26 -f/u results

## 2020-06-27 NOTE — PROGRESS NOTE ADULT - ASSESSMENT
Ms. Gant is a 59 y/o Wolof-speaking female  w/ a PMHx of HTN, HLD, T2DM, and COVID-19 infection in 4/2020 who was admitted for management of newly diagnosed APL. The patient has been receiving treatment with ATRA and Arsenic (ATRA since 5/22 and Arsenic 5/27). Arsenic has been on hold since 6/22 secondary to Herpes Zoster infection. Patient's hospital course has been complicated by transaminitis, refractory thrombocytopenia, COVID-19 (+) state (6/16), shingles, and right axillary DVT. The patient has pancytopenia 2/2 disease and/or ATRA/Arsenic. Ms. Gant is a 61 y/o Icelandic-speaking female w/ a PMHx of HTN, HLD, T2DM, and COVID-19 infection in 4/2020 who was admitted for management of newly diagnosed APL. The patient has been receiving treatment with ATRA and Arsenic (ATRA since 5/22 and Arsenic 5/27). Arsenic has been on hold since 6/22 secondary to Herpes Zoster infection. Patient's hospital course has been complicated by transaminitis, refractory thrombocytopenia, COVID-19 (+) state (6/16), shingles, and right axillary DVT. The patient has pancytopenia 2/2 disease and/or ATRA/Arsenic.

## 2020-06-27 NOTE — PROGRESS NOTE ADULT - SUBJECTIVE AND OBJECTIVE BOX
Diagnosis: Acute Promyelocytic leukemia     Protocol/Chemo Regimen: ATRA/Arsenic Trioxide    Day: ATRA Day 36 and Arsenic Day 42 (Holding Arsenic since Day 26 --> 6/22/20)    Pt endorsed:  Right upper inner arm pain and redness. c/o Rash on her Right buttock and Right posterior thigh     Review of Systems: Denies nausea, vomiting, diarrhea, chest pain, SOB     Pain scale: 3/10    Diet: Consistent Carb w/ Evening Snack    Allergies: No Known Allergies    ----------- Diagnosis: Acute Promyelocytic leukemia     Protocol/Chemo Regimen: ATRA/Arsenic Trioxide    Day: ATRA Day 36 and Arsenic Day 42 (Holding Arsenic since Day 26 --> 6/22/20)    Pt endorsed:  Right upper inner arm pain and redness. c/o Rash on her Right buttock and Right posterior thigh     Review of Systems: Denies nausea, vomiting, diarrhea, chest pain, SOB     Pain scale: 3/10    Diet: Consistent Carb w/ Evening Snack    Allergies: No Known Allergies    ANTIMICROBIALS  valACYclovir 1000 milliGRAM(s) Oral every 8 hours    HEME/ONC MEDICATIONS  enoxaparin Injectable 80 milliGRAM(s) SubCutaneous every 12 hours  tretinoin 40 milliGRAM(s) Oral every 12 hours    STANDING MEDICATIONS  ammonium lactate 12% Lotion 1 Application(s) Topical two times a day  ATENolol  Tablet 25 milliGRAM(s) Oral daily  baclofen 5 milliGRAM(s) Oral every 12 hours  Biotene Dry Mouth Oral Rinse 5 milliLiter(s) Swish and Spit five times a day  bisacodyl 5 milliGRAM(s) Oral every 12 hours  dextrose 5%. 1000 milliLiter(s) IV Continuous <Continuous>  dextrose 50% Injectable 12.5 Gram(s) IV Push once  dextrose 50% Injectable 25 Gram(s) IV Push once  dextrose 50% Injectable 25 Gram(s) IV Push once  gabapentin 100 milliGRAM(s) Oral three times a day  insulin lispro (HumaLOG) corrective regimen sliding scale   SubCutaneous three times a day before meals  insulin lispro (HumaLOG) corrective regimen sliding scale   SubCutaneous at bedtime  loratadine 10 milliGRAM(s) Oral daily  pantoprazole    Tablet 40 milliGRAM(s) Oral before breakfast  petrolatum white Ointment 1 Application(s) Topical three times a day  phytonadione   Solution 10 milliGRAM(s) Oral daily  polyethylene glycol 3350 17 Gram(s) Oral daily  sodium chloride 0.65% Nasal 1 Spray(s) Both Nostrils every 8 hours  sodium chloride 0.9%. 1000 milliLiter(s) IV Continuous <Continuous>  triamcinolone 0.1% Ointment 1 Application(s) Topical every 12 hours    PRN MEDICATIONS  acetaminophen   Tablet .. 650 milliGRAM(s) Oral every 6 hours PRN  ALBUTerol    90 MICROgram(s) HFA Inhaler 2 Puff(s) Inhalation every 6 hours PRN  AQUAPHOR (petrolatum Ointment) 1 Application(s) Topical every 3 hours PRN  dextrose 40% Gel 15 Gram(s) Oral once PRN  diphenhydrAMINE   Injectable 25 milliGRAM(s) IV Push every 6 hours PRN  glucagon  Injectable 1 milliGRAM(s) IntraMuscular once PRN  guaiFENesin   Syrup  (Sugar-Free) 100 milliGRAM(s) Oral every 6 hours PRN  hemorrhoidal Ointment 1 Application(s) Rectal every 8 hours PRN  morphine  - Injectable 3 milliGRAM(s) IV Push every 6 hours PRN  ondansetron Injectable 8 milliGRAM(s) IV Push every 8 hours PRN  sodium chloride 0.9% lock flush 10 milliLiter(s) IV Push every 1 hour PRN    Vital Signs Last 24 Hrs  T(C): 36.4 (27 Jun 2020 05:21), Max: 36.7 (26 Jun 2020 17:46)  T(F): 97.6 (27 Jun 2020 05:21), Max: 98 (26 Jun 2020 17:46)  HR: 74 (27 Jun 2020 05:21) (73 - 78)  BP: 115/76 (27 Jun 2020 05:21) (115/76 - 154/80)  BP(mean): --  RR: 18 (27 Jun 2020 05:21) (16 - 189)  SpO2: 94% (27 Jun 2020 05:21) (94% - 99%)    PHYSICAL EXAM  General: adult in NAD  HEENT: clear oropharynx, no erythema, no ulcers  Neck: supple  CV: normal S1, S2, RRR  Lungs: clear to auscultation, no wheezes, no rales  Abdomen: soft, nontender, nondistended, normal BS  Ext: no edema  Skin: no rash  Neuro: alert and oriented x 3  Central line: normal     LABS:                        9.6    2.29  )-----------( 155      ( 27 Jun 2020 09:22 )             28.5     Mean Cell Volume : 89.1 fl  Mean Cell Hemoglobin : 30.0 pg  Mean Cell Hemoglobin Concentration : 33.7 gm/dL  Auto Neutrophil # : 0.63 K/uL  Auto Lymphocyte # : 1.39 K/uL  Auto Monocyte # : 0.16 K/uL  Auto Eosinophil # : 0.11 K/uL  Auto Basophil # : 0.00 K/uL  Auto Neutrophil % : 27.5 %  Auto Lymphocyte % : 60.7 %  Auto Monocyte % : 7.0 %  Auto Eosinophil % : 4.8 %  Auto Basophil % : 0.0 %    06-27    141  |  104  |  11  ----------------------------<  109<H>  3.9   |  25  |  0.78    Ca    9.7      27 Jun 2020 09:22  Phos  3.7     06-27  Mg     2.0     06-27    TPro  7.2  /  Alb  4.3  /  TBili  0.4  /  DBili  x   /  AST  33  /  ALT  25  /  AlkPhos  121<H>  06-27    Mg 2.0  Phos 3.7    PT/INR - ( 26 Jun 2020 09:02 )   PT: 15.0 sec;   INR: 1.29 ratio      PTT - ( 26 Jun 2020 09:02 )  PTT:46.2 sec    Uric Acid 5.3    RADIOLOGY & ADDITIONAL STUDIES:  < from: CT Upper Extremity w/ IV Cont, Right (06.24.20 @ 18:25) >  Impression: Partially occlusive acute thrombus within the right axillary vein as noted previously. Subcutaneous edema. No abscess. Diagnosis: Acute Promyelocytic Leukemia     Protocol/Chemo Regimen: ATRA/Arsenic Trioxide    Day: ATRA Day 36 and Arsenic Day 42 (Holding Arsenic since Day 26 --> 6/22/20)    Pt endorsed: Right upper inner arm pain and redness. c/o Rash on her Right buttock and Right posterior thigh     Review of Systems: Denies nausea, vomiting, diarrhea, chest pain, SOB     Translated by patient's son via phone call     Pain scale: 3/10    Diet: Consistent Carb w/ Evening Snack    Allergies: No Known Allergies    ANTIMICROBIALS  valACYclovir 1000 milliGRAM(s) Oral every 8 hours    HEME/ONC MEDICATIONS  enoxaparin Injectable 80 milliGRAM(s) SubCutaneous every 12 hours  tretinoin 40 milliGRAM(s) Oral every 12 hours    STANDING MEDICATIONS  ammonium lactate 12% Lotion 1 Application(s) Topical two times a day  ATENolol  Tablet 25 milliGRAM(s) Oral daily  baclofen 5 milliGRAM(s) Oral every 12 hours  Biotene Dry Mouth Oral Rinse 5 milliLiter(s) Swish and Spit five times a day  bisacodyl 5 milliGRAM(s) Oral every 12 hours  dextrose 5%. 1000 milliLiter(s) IV Continuous <Continuous>  dextrose 50% Injectable 12.5 Gram(s) IV Push once  dextrose 50% Injectable 25 Gram(s) IV Push once  dextrose 50% Injectable 25 Gram(s) IV Push once  gabapentin 100 milliGRAM(s) Oral three times a day  insulin lispro (HumaLOG) corrective regimen sliding scale   SubCutaneous three times a day before meals  insulin lispro (HumaLOG) corrective regimen sliding scale   SubCutaneous at bedtime  loratadine 10 milliGRAM(s) Oral daily  pantoprazole    Tablet 40 milliGRAM(s) Oral before breakfast  petrolatum white Ointment 1 Application(s) Topical three times a day  phytonadione   Solution 10 milliGRAM(s) Oral daily  polyethylene glycol 3350 17 Gram(s) Oral daily  sodium chloride 0.65% Nasal 1 Spray(s) Both Nostrils every 8 hours  sodium chloride 0.9%. 1000 milliLiter(s) IV Continuous <Continuous>  triamcinolone 0.1% Ointment 1 Application(s) Topical every 12 hours    PRN MEDICATIONS  acetaminophen   Tablet .. 650 milliGRAM(s) Oral every 6 hours PRN  ALBUTerol    90 MICROgram(s) HFA Inhaler 2 Puff(s) Inhalation every 6 hours PRN  AQUAPHOR (petrolatum Ointment) 1 Application(s) Topical every 3 hours PRN  dextrose 40% Gel 15 Gram(s) Oral once PRN  diphenhydrAMINE   Injectable 25 milliGRAM(s) IV Push every 6 hours PRN  glucagon  Injectable 1 milliGRAM(s) IntraMuscular once PRN  guaiFENesin   Syrup  (Sugar-Free) 100 milliGRAM(s) Oral every 6 hours PRN  hemorrhoidal Ointment 1 Application(s) Rectal every 8 hours PRN  morphine  - Injectable 3 milliGRAM(s) IV Push every 6 hours PRN  ondansetron Injectable 8 milliGRAM(s) IV Push every 8 hours PRN  sodium chloride 0.9% lock flush 10 milliLiter(s) IV Push every 1 hour PRN    Vital Signs Last 24 Hrs  T(C): 36.4 (27 Jun 2020 05:21), Max: 36.7 (26 Jun 2020 17:46)  T(F): 97.6 (27 Jun 2020 05:21), Max: 98 (26 Jun 2020 17:46)  HR: 74 (27 Jun 2020 05:21) (73 - 78)  BP: 115/76 (27 Jun 2020 05:21) (115/76 - 154/80)  BP(mean): --  RR: 18 (27 Jun 2020 05:21) (16 - 189)  SpO2: 94% (27 Jun 2020 05:21) (94% - 99%)    PHYSICAL EXAM  General: adult in NAD  HEENT: clear oropharynx, no erythema, no ulcers  Neck: supple  CV: normal S1, S2, RRR  Lungs: clear to auscultation, no wheezes, no rales  Abdomen: soft, nontender, nondistended, normal BS  Ext: no edema  Skin: no rash  Neuro: alert and oriented x 3  Central line: normal     LABS:                        9.6    2.29  )-----------( 155      ( 27 Jun 2020 09:22 )             28.5     Mean Cell Volume : 89.1 fl  Mean Cell Hemoglobin : 30.0 pg  Mean Cell Hemoglobin Concentration : 33.7 gm/dL  Auto Neutrophil # : 0.63 K/uL  Auto Lymphocyte # : 1.39 K/uL  Auto Monocyte # : 0.16 K/uL  Auto Eosinophil # : 0.11 K/uL  Auto Basophil # : 0.00 K/uL  Auto Neutrophil % : 27.5 %  Auto Lymphocyte % : 60.7 %  Auto Monocyte % : 7.0 %  Auto Eosinophil % : 4.8 %  Auto Basophil % : 0.0 %    06-27    141  |  104  |  11  ----------------------------<  109<H>  3.9   |  25  |  0.78    Ca    9.7      27 Jun 2020 09:22  Phos  3.7     06-27  Mg     2.0     06-27    TPro  7.2  /  Alb  4.3  /  TBili  0.4  /  DBili  x   /  AST  33  /  ALT  25  /  AlkPhos  121<H>  06-27    Mg 2.0  Phos 3.7    PT/INR - ( 26 Jun 2020 09:02 )   PT: 15.0 sec;   INR: 1.29 ratio      PTT - ( 26 Jun 2020 09:02 )  PTT:46.2 sec    Uric Acid 5.3    RADIOLOGY & ADDITIONAL STUDIES:  < from: CT Upper Extremity w/ IV Cont, Right (06.24.20 @ 18:25) >  Impression: Partially occlusive acute thrombus within the right axillary vein as noted previously. Subcutaneous edema. No abscess.

## 2020-06-27 NOTE — PROGRESS NOTE ADULT - PROBLEM SELECTOR PLAN 1
Continue ATRA 40mg BID (started 5/22)   Arsenic Trioxide which was started on 5/27 now on hold (since 6/22) 2/2 shingles  Monitor for differentiation syndrome and check EKG for QTc prolongation every Tuesday and Friday. Keep K > 4 and Mg > 2   Status post Hydrea 500mg PO x 1 on 6/8 and 6/9 for WBC >10  Follow up CBC with diff, CMP, TLS and DIC panel daily  Keep PLTs >50 K. If unable to achieve goal to infuse 1/2 unit platelets over 3 hours every 12 hrs   S/p Prednisone 20mg PO daily x 5 days (through 6/12) due to platelet antibody  Keep Fibrinogen >150, if less give Cryo  Strict Is and Os/Daily weights/Mouth Care  Follow up bone marrow biopsy results (performed 6/26) Continue ATRA 40mg BID (started 5/22)   Arsenic Trioxide which was started on 5/27 now on hold (since 6/22) 2/2 shingles  Monitor for differentiation syndrome and check EKG for QTc prolongation every Tuesday and Friday. Keep K > 4 and Mg > 2   Status post Hydrea 500mg PO x 1 on 6/8 and 6/9 for WBC > 10  Follow up CBC with diff, CMP, TLS and DIC panel daily  Keep PLTs >50 K. If unable to achieve goal to infuse 1/2 unit platelets over 3 hours every 12 hrs   S/p Prednisone 20mg PO daily x 5 days (through 6/12) due to platelet antibody  Keep Fibrinogen > 150, if less give Cryo  Strict Is and Os/Daily weights/Mouth Care  Follow up bone marrow biopsy results (performed 6/26)

## 2020-06-27 NOTE — PROGRESS NOTE ADULT - PROBLEM SELECTOR PLAN 4
Shingles of the Right buttock and Right upper thigh rash   6/22 seen by Dermatology   continue Valtrex and Lac hydrin as per Dermatology recommendations   Neurontin 100 mg TID for herpetic neuralgia Shingles of the Right buttock and Right upper thigh rash   6/22 seen by Dermatology   Continue Valtrex and Lac hydrin as per Dermatology recommendations   Neurontin 100 mg TID for herpetic neuralgia

## 2020-06-27 NOTE — PROGRESS NOTE ADULT - PROBLEM SELECTOR PLAN 2
Patient is not neutropenic, afebrile   6/23 Persistent right upper inner arm pain/swelling - CT no abscess, + DVT  6/22 Right buttock and upper thigh shingles continue Valtrex treatment with Lac Hydrin  If febrile, Pan Cx and CXR  6/20 Pan cultures (-)   Previous COVID-19 (+) outpatient  Then COVID (-) on 5/22, 5/26, 6/2 and 6/9 6/16 COVID 19 (+)   6/18 COVID 19 Ab (+)  6/19 Repeat COVID-19 PCR (-)

## 2020-06-28 LAB
ALBUMIN SERPL ELPH-MCNC: 4.7 G/DL — SIGNIFICANT CHANGE UP (ref 3.3–5)
ALP SERPL-CCNC: 131 U/L — HIGH (ref 40–120)
ALT FLD-CCNC: 34 U/L — SIGNIFICANT CHANGE UP (ref 10–45)
ANION GAP SERPL CALC-SCNC: 14 MMOL/L — SIGNIFICANT CHANGE UP (ref 5–17)
AST SERPL-CCNC: 41 U/L — HIGH (ref 10–40)
BASOPHILS # BLD AUTO: 0.01 K/UL — SIGNIFICANT CHANGE UP (ref 0–0.2)
BASOPHILS NFR BLD AUTO: 0.4 % — SIGNIFICANT CHANGE UP (ref 0–2)
BILIRUB SERPL-MCNC: 0.4 MG/DL — SIGNIFICANT CHANGE UP (ref 0.2–1.2)
BUN SERPL-MCNC: 10 MG/DL — SIGNIFICANT CHANGE UP (ref 7–23)
CALCIUM SERPL-MCNC: 10 MG/DL — SIGNIFICANT CHANGE UP (ref 8.4–10.5)
CHLORIDE SERPL-SCNC: 103 MMOL/L — SIGNIFICANT CHANGE UP (ref 96–108)
CO2 SERPL-SCNC: 24 MMOL/L — SIGNIFICANT CHANGE UP (ref 22–31)
CREAT SERPL-MCNC: 0.75 MG/DL — SIGNIFICANT CHANGE UP (ref 0.5–1.3)
D DIMER BLD IA.RAPID-MCNC: 399 NG/ML DDU — HIGH
EOSINOPHIL # BLD AUTO: 0.02 K/UL — SIGNIFICANT CHANGE UP (ref 0–0.5)
EOSINOPHIL NFR BLD AUTO: 0.9 % — SIGNIFICANT CHANGE UP (ref 0–6)
FIBRINOGEN PPP-MCNC: 369 MG/DL — SIGNIFICANT CHANGE UP (ref 350–510)
GLUCOSE BLDC GLUCOMTR-MCNC: 105 MG/DL — HIGH (ref 70–99)
GLUCOSE BLDC GLUCOMTR-MCNC: 109 MG/DL — HIGH (ref 70–99)
GLUCOSE BLDC GLUCOMTR-MCNC: 130 MG/DL — HIGH (ref 70–99)
GLUCOSE BLDC GLUCOMTR-MCNC: 132 MG/DL — HIGH (ref 70–99)
GLUCOSE SERPL-MCNC: 101 MG/DL — HIGH (ref 70–99)
HCT VFR BLD CALC: 28.7 % — LOW (ref 34.5–45)
HGB BLD-MCNC: 9.6 G/DL — LOW (ref 11.5–15.5)
IMM GRANULOCYTES NFR BLD AUTO: 0.4 % — SIGNIFICANT CHANGE UP (ref 0–1.5)
LDH SERPL L TO P-CCNC: 204 U/L — SIGNIFICANT CHANGE UP (ref 50–242)
LYMPHOCYTES # BLD AUTO: 1.57 K/UL — SIGNIFICANT CHANGE UP (ref 1–3.3)
LYMPHOCYTES # BLD AUTO: 69.2 % — HIGH (ref 13–44)
MAGNESIUM SERPL-MCNC: 2 MG/DL — SIGNIFICANT CHANGE UP (ref 1.6–2.6)
MCHC RBC-ENTMCNC: 30.3 PG — SIGNIFICANT CHANGE UP (ref 27–34)
MCHC RBC-ENTMCNC: 33.4 GM/DL — SIGNIFICANT CHANGE UP (ref 32–36)
MCV RBC AUTO: 90.5 FL — SIGNIFICANT CHANGE UP (ref 80–100)
MONOCYTES # BLD AUTO: 0.18 K/UL — SIGNIFICANT CHANGE UP (ref 0–0.9)
MONOCYTES NFR BLD AUTO: 7.9 % — SIGNIFICANT CHANGE UP (ref 2–14)
NEUTROPHILS # BLD AUTO: 0.48 K/UL — LOW (ref 1.8–7.4)
NEUTROPHILS NFR BLD AUTO: 21.2 % — LOW (ref 43–77)
NRBC # BLD: 0 /100 WBCS — SIGNIFICANT CHANGE UP (ref 0–0)
PHOSPHATE SERPL-MCNC: 3.6 MG/DL — SIGNIFICANT CHANGE UP (ref 2.5–4.5)
PLATELET # BLD AUTO: 138 K/UL — LOW (ref 150–400)
POTASSIUM SERPL-MCNC: 4.2 MMOL/L — SIGNIFICANT CHANGE UP (ref 3.5–5.3)
POTASSIUM SERPL-SCNC: 4.2 MMOL/L — SIGNIFICANT CHANGE UP (ref 3.5–5.3)
PROT SERPL-MCNC: 7.9 G/DL — SIGNIFICANT CHANGE UP (ref 6–8.3)
RBC # BLD: 3.17 M/UL — LOW (ref 3.8–5.2)
RBC # FLD: 15.9 % — HIGH (ref 10.3–14.5)
SODIUM SERPL-SCNC: 141 MMOL/L — SIGNIFICANT CHANGE UP (ref 135–145)
URATE SERPL-MCNC: 5.3 MG/DL — SIGNIFICANT CHANGE UP (ref 2.5–7)
WBC # BLD: 2.27 K/UL — LOW (ref 3.8–10.5)
WBC # FLD AUTO: 2.27 K/UL — LOW (ref 3.8–10.5)

## 2020-06-28 PROCEDURE — 99232 SBSQ HOSP IP/OBS MODERATE 35: CPT

## 2020-06-28 RX ORDER — CALAMINE AND ZINC OXIDE AND PHENOL 160; 10 MG/ML; MG/ML
1 LOTION TOPICAL ONCE
Refills: 0 | Status: COMPLETED | OUTPATIENT
Start: 2020-06-28 | End: 2020-06-28

## 2020-06-28 RX ORDER — TRAMADOL HYDROCHLORIDE 50 MG/1
25 TABLET ORAL ONCE
Refills: 0 | Status: DISCONTINUED | OUTPATIENT
Start: 2020-06-28 | End: 2020-06-28

## 2020-06-28 RX ADMIN — GABAPENTIN 100 MILLIGRAM(S): 400 CAPSULE ORAL at 13:12

## 2020-06-28 RX ADMIN — Medication 1 SPRAY(S): at 06:02

## 2020-06-28 RX ADMIN — VALACYCLOVIR 1000 MILLIGRAM(S): 500 TABLET, FILM COATED ORAL at 21:21

## 2020-06-28 RX ADMIN — Medication 1 SPRAY(S): at 13:12

## 2020-06-28 RX ADMIN — ENOXAPARIN SODIUM 80 MILLIGRAM(S): 100 INJECTION SUBCUTANEOUS at 17:49

## 2020-06-28 RX ADMIN — TRETINOIN 40 MILLIGRAM(S): 10 CAPSULE, LIQUID FILLED ORAL at 19:37

## 2020-06-28 RX ADMIN — Medication 1 APPLICATION(S): at 17:49

## 2020-06-28 RX ADMIN — PANTOPRAZOLE SODIUM 40 MILLIGRAM(S): 20 TABLET, DELAYED RELEASE ORAL at 06:03

## 2020-06-28 RX ADMIN — Medication 5 MILLIGRAM(S): at 17:49

## 2020-06-28 RX ADMIN — POLYETHYLENE GLYCOL 3350 17 GRAM(S): 17 POWDER, FOR SOLUTION ORAL at 11:06

## 2020-06-28 RX ADMIN — Medication 5 MILLILITER(S): at 19:37

## 2020-06-28 RX ADMIN — GABAPENTIN 100 MILLIGRAM(S): 400 CAPSULE ORAL at 06:03

## 2020-06-28 RX ADMIN — Medication 1 APPLICATION(S): at 21:23

## 2020-06-28 RX ADMIN — Medication 1 APPLICATION(S): at 06:04

## 2020-06-28 RX ADMIN — VALACYCLOVIR 1000 MILLIGRAM(S): 500 TABLET, FILM COATED ORAL at 13:11

## 2020-06-28 RX ADMIN — TRETINOIN 40 MILLIGRAM(S): 10 CAPSULE, LIQUID FILLED ORAL at 09:20

## 2020-06-28 RX ADMIN — ATENOLOL 25 MILLIGRAM(S): 25 TABLET ORAL at 06:03

## 2020-06-28 RX ADMIN — Medication 5 MILLILITER(S): at 11:07

## 2020-06-28 RX ADMIN — ENOXAPARIN SODIUM 80 MILLIGRAM(S): 100 INJECTION SUBCUTANEOUS at 06:02

## 2020-06-28 RX ADMIN — Medication 1 SPRAY(S): at 21:22

## 2020-06-28 RX ADMIN — TRAMADOL HYDROCHLORIDE 25 MILLIGRAM(S): 50 TABLET ORAL at 21:15

## 2020-06-28 RX ADMIN — Medication 5 MILLIGRAM(S): at 06:03

## 2020-06-28 RX ADMIN — Medication 1 APPLICATION(S): at 17:51

## 2020-06-28 RX ADMIN — VALACYCLOVIR 1000 MILLIGRAM(S): 500 TABLET, FILM COATED ORAL at 06:03

## 2020-06-28 RX ADMIN — Medication 10 MILLIGRAM(S): at 11:06

## 2020-06-28 RX ADMIN — GABAPENTIN 100 MILLIGRAM(S): 400 CAPSULE ORAL at 21:21

## 2020-06-28 RX ADMIN — Medication 1 APPLICATION(S): at 13:12

## 2020-06-28 RX ADMIN — Medication 5 MILLILITER(S): at 09:22

## 2020-06-28 RX ADMIN — LORATADINE 10 MILLIGRAM(S): 10 TABLET ORAL at 11:08

## 2020-06-28 RX ADMIN — Medication 5 MILLIGRAM(S): at 17:51

## 2020-06-28 RX ADMIN — CALAMINE AND ZINC OXIDE AND PHENOL 1 APPLICATION(S): 160; 10 LOTION TOPICAL at 21:30

## 2020-06-28 NOTE — PROGRESS NOTE ADULT - PROBLEM SELECTOR PLAN 4
Shingles of the Right buttock and Right upper thigh rash   6/22 seen by Dermatology   Continue Valtrex and Lac hydrin as per Dermatology recommendations   Neurontin 100 mg TID for herpetic neuralgia

## 2020-06-28 NOTE — PROGRESS NOTE ADULT - ATTENDING COMMENTS
61 y/o Romanian speaking F who is transferred for newly diagnosed low/interm risk APL.  Presented with pancytopenia, flow cytometry/bone marrow biopsy/FISH from 5/26- consistent with acute promyelocytic leukemia with PML-AWA translocation.    She was started on ATRA 5/23- day +36, GALA 5/27- day +32 (arsenic currently on hold from 6/22).    -CT chest 6/6 c/w granulomatous disease (hx of TB in 1980's) pt had SOB on 6/4, now resolved.  Daily weights, diurese. Will give Hydrea 500mg if wbc>10. On Prednisone 20mg through 5/12- now off.  If pt becomes SOB again, may restart  -tremors improved, unclear etiology, electrolytes WNL. Cefepime held, given Baclofen -they improved  -Check CBC, coags/fibrinogen once daily.  Goal plt >40-50, fibrinogen >150; responding to ABO-matched platelets given over 3 hrs in 1/2 unit infusions   -monitor lytes daily, keep K>4, Mg>2.  EKG twice a week  -off antibiotics  -COVID19+ on 6/17, moved to 9M  -Rash on R buttock and back of RLE. Called derm, holding GALA for now. Awaiting VZV swab, started valtrex. Still with vesicular component on back of R leg, will restart when resolving. Pain present, on gabapentin 100 mg TID.   -RUE redness and itching at previous PICC site, line removed. In area of adhesive, possibly allergic reaction. RUE doppler showing axillary partially occlusive clot. On heparin infusion up until 6/25 given low platelets / APL. Switched to lovenox 6/25. CT scan confirms blood clot, no abscesses or other etiology for RUE pain.  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  Presented with pancytopenia, flow cytometry/bone marrow biopsy/FISH from 5/26- consistent with acute promyelocytic leukemia with PML-AWA translocation.    She was started on ATRA 5/23- day +37, GALA 5/27- day +33 (arsenic currently on hold from 6/22).    -CT chest 6/6 c/w granulomatous disease (hx of TB in 1980's) pt had SOB on 6/4, now resolved.  Daily weights, diurese. Will give Hydrea 500mg if wbc>10. On Prednisone 20mg through 5/12- now off.  If pt becomes SOB again, may restart  -tremors improved, unclear etiology, electrolytes WNL. Cefepime held, given Baclofen -they improved  -Check CBC, coags/fibrinogen once daily.  Goal plt >40-50, fibrinogen >150; responding to ABO-matched platelets given over 3 hrs in 1/2 unit infusions   -monitor lytes daily, keep K>4, Mg>2.  EKG twice a week  -off antibiotics  -COVID19+ on 6/17, moved to 9M  -Rash on R buttock and back of RLE. Called derm, holding GALA for now. Awaiting VZV swab, started valtrex. Still with vesicular component on back of R leg, will restart when resolving. Pain present, on gabapentin 100 mg TID.   -RUE redness and itching at previous PICC site, line removed. In area of adhesive, possibly allergic reaction. RUE doppler showing axillary partially occlusive clot. On heparin infusion up until 6/25 given low platelets / APL. Switched to lovenox 6/25. CT scan confirms blood clot, no abscesses or other etiology for RUE pain.  -s/p bone marrow biopsy on 6/26 -f/u results  -son reports that patient has hearing loss for the last week or so, also noted by his sister. ENT evaluation.

## 2020-06-28 NOTE — PROGRESS NOTE ADULT - ASSESSMENT
Ms. Gant is a 61 y/o Kyrgyz-speaking female w/ a PMHx of HTN, HLD, T2DM, and COVID-19 infection in 4/2020 who was admitted for management of newly diagnosed APL. The patient has been receiving treatment with ATRA and Arsenic (ATRA since 5/22 and Arsenic 5/27). Arsenic has been on hold since 6/22 secondary to Herpes Zoster infection. Patient's hospital course has been complicated by transaminitis, refractory thrombocytopenia, COVID-19 (+) state (6/16), shingles, and right axillary DVT. The patient has pancytopenia 2/2 disease and/or ATRA/Arsenic.

## 2020-06-28 NOTE — PROGRESS NOTE ADULT - SUBJECTIVE AND OBJECTIVE BOX
Diagnosis: Acute Promyelocytic Leukemia     Protocol/Chemo Regimen: ATRA/Arsenic Trioxide    Day: ATRA Day 37 and Arsenic Day 43 (Holding Arsenic since Day 26 --> 6/22/20)    Pt endorsed: Right upper inner arm pain and redness. c/o Rash on her Right buttock and Right posterior thigh     Review of Systems: Denies nausea, vomiting, diarrhea, chest pain, SOB     Translated by patient's son via phone call per patient request     Pain scale: 3/10    Diet: Consistent Carb w/ Evening Snack    Allergies: No Known Allergies    -------------------- Diagnosis: Acute Promyelocytic Leukemia     Protocol/Chemo Regimen: ATRA/Arsenic Trioxide    Day: ATRA Day 37 and Arsenic Day 43 (Holding Arsenic since Day 26 --> 6/22/20)    Pt endorsed: Right upper inner arm pain and redness. c/o Rash on her Right buttock and Right posterior thigh, mild hearing loss     Review of Systems: Denies nausea, vomiting, diarrhea, chest pain, SOB     Translated by patient's son via phone call per patient request     Pain scale: 3/10    Diet: Consistent Carb w/ Evening Snack    Allergies: No Known Allergies    ANTIMICROBIALS  valACYclovir 1000 milliGRAM(s) Oral every 8 hours    HEME/ONC MEDICATIONS  enoxaparin Injectable 80 milliGRAM(s) SubCutaneous every 12 hours  tretinoin 40 milliGRAM(s) Oral every 12 hours    STANDING MEDICATIONS  ammonium lactate 12% Lotion 1 Application(s) Topical two times a day  ATENolol  Tablet 25 milliGRAM(s) Oral daily  baclofen 5 milliGRAM(s) Oral every 12 hours  Biotene Dry Mouth Oral Rinse 5 milliLiter(s) Swish and Spit five times a day  bisacodyl 5 milliGRAM(s) Oral every 12 hours  dextrose 5%. 1000 milliLiter(s) IV Continuous <Continuous>  dextrose 50% Injectable 12.5 Gram(s) IV Push once  dextrose 50% Injectable 25 Gram(s) IV Push once  dextrose 50% Injectable 25 Gram(s) IV Push once  gabapentin 100 milliGRAM(s) Oral three times a day  insulin lispro (HumaLOG) corrective regimen sliding scale   SubCutaneous three times a day before meals  insulin lispro (HumaLOG) corrective regimen sliding scale   SubCutaneous at bedtime  loratadine 10 milliGRAM(s) Oral daily  pantoprazole    Tablet 40 milliGRAM(s) Oral before breakfast  petrolatum white Ointment 1 Application(s) Topical three times a day  polyethylene glycol 3350 17 Gram(s) Oral daily  sodium chloride 0.65% Nasal 1 Spray(s) Both Nostrils every 8 hours  triamcinolone 0.1% Ointment 1 Application(s) Topical every 12 hours    PRN MEDICATIONS  acetaminophen   Tablet .. 650 milliGRAM(s) Oral every 6 hours PRN  ALBUTerol    90 MICROgram(s) HFA Inhaler 2 Puff(s) Inhalation every 6 hours PRN  AQUAPHOR (petrolatum Ointment) 1 Application(s) Topical every 3 hours PRN  dextrose 40% Gel 15 Gram(s) Oral once PRN  diphenhydrAMINE   Injectable 25 milliGRAM(s) IV Push every 6 hours PRN  glucagon  Injectable 1 milliGRAM(s) IntraMuscular once PRN  guaiFENesin   Syrup  (Sugar-Free) 100 milliGRAM(s) Oral every 6 hours PRN  hemorrhoidal Ointment 1 Application(s) Rectal every 8 hours PRN  morphine  - Injectable 3 milliGRAM(s) IV Push every 6 hours PRN  ondansetron Injectable 8 milliGRAM(s) IV Push every 8 hours PRN  sodium chloride 0.9% lock flush 10 milliLiter(s) IV Push every 1 hour PRN    Vital Signs Last 24 Hrs  T(C): 36.3 (28 Jun 2020 08:42), Max: 36.6 (27 Jun 2020 21:55)  T(F): 97.3 (28 Jun 2020 08:42), Max: 97.9 (27 Jun 2020 21:55)  HR: 63 (28 Jun 2020 08:42) (63 - 73)  BP: 150/79 (28 Jun 2020 08:42) (129/68 - 157/77)  BP(mean): --  RR: 18 (28 Jun 2020 08:42) (18 - 18)  SpO2: 98% (28 Jun 2020 08:42) (95% - 99%)    PHYSICAL EXAM  General: adult in NAD  HEENT: clear oropharynx, no erythema, no ulcers  Neck: supple  CV: normal S1, S2, RRR  Lungs: clear to auscultation, no wheezes, no rales  Abdomen: soft, nontender, nondistended, normal BS  Ext: no edema  Skin: Warm and dry, Some hyperpigmentation noted around RUE PICC line, Shingles-like rash noted on R buttock (appears crusting over) and R posterior thigh  Neuro: alert and oriented x 3         LABS:                        9.6    2.27  )-----------( 138      ( 28 Jun 2020 08:50 )             28.7         Mean Cell Volume : 90.5 fl  Mean Cell Hemoglobin : 30.3 pg  Mean Cell Hemoglobin Concentration : 33.4 gm/dL  Auto Neutrophil # : 0.48 K/uL  Auto Lymphocyte # : 1.57 K/uL  Auto Monocyte # : 0.18 K/uL  Auto Eosinophil # : 0.02 K/uL  Auto Basophil # : 0.01 K/uL  Auto Neutrophil % : 21.2 %  Auto Lymphocyte % : 69.2 %  Auto Monocyte % : 7.9 %  Auto Eosinophil % : 0.9 %  Auto Basophil % : 0.4 %    06-28    141  |  103  |  10  ----------------------------<  101<H>  4.2   |  24  |  0.75    Ca    10.0      28 Jun 2020 08:50  Phos  3.6     06-28  Mg     2.0     06-28    TPro  7.9  /  Alb  4.7  /  TBili  0.4  /  DBili  x   /  AST  41<H>  /  ALT  34  /  AlkPhos  131<H>  06-28    Mg 2.0  Phos 3.6        Uric Acid 5.3      RADIOLOGY & ADDITIONAL STUDIES:  < from: CT Upper Extremity w/ IV Cont, Right (06.24.20 @ 18:25) >  Impression: Partially occlusive acute thrombus within the right axillary vein as noted previously. Subcutaneous edema. No abscess.

## 2020-06-28 NOTE — PROGRESS NOTE ADULT - PROBLEM SELECTOR PLAN 1
Continue ATRA 40mg BID (started 5/22)   Arsenic Trioxide which was started on 5/27 now on hold (since 6/22) 2/2 shingles  Monitor for differentiation syndrome and check EKG for QTc prolongation every Tuesday and Friday. Keep K > 4 and Mg > 2   Status post Hydrea 500mg PO x 1 on 6/8 and 6/9 for WBC > 10  Follow up CBC with diff, CMP, TLS and DIC panel daily  Keep PLTs >50 K. If unable to achieve goal to infuse 1/2 unit platelets over 3 hours every 12 hrs   S/p Prednisone 20mg PO daily x 5 days (through 6/12) due to platelet antibody  Keep Fibrinogen > 150, if less give Cryo  Strict Is and Os/Daily weights/Mouth Care  Follow up bone marrow biopsy results (performed 6/26)

## 2020-06-28 NOTE — PROGRESS NOTE ADULT - PROBLEM SELECTOR PLAN 7
On Lovenox SQ BID for DVT treatment. Discontinue if platelets are < 50,000       Contact Information [phone]

## 2020-06-29 DIAGNOSIS — H91.90 UNSPECIFIED HEARING LOSS, UNSPECIFIED EAR: ICD-10-CM

## 2020-06-29 DIAGNOSIS — H93.239 HYPERACUSIS, UNSPECIFIED EAR: ICD-10-CM

## 2020-06-29 DIAGNOSIS — H61.21 IMPACTED CERUMEN, RIGHT EAR: ICD-10-CM

## 2020-06-29 DIAGNOSIS — H69.83 OTHER SPECIFIED DISORDERS OF EUSTACHIAN TUBE, BILATERAL: ICD-10-CM

## 2020-06-29 LAB
ALBUMIN SERPL ELPH-MCNC: 4.7 G/DL — SIGNIFICANT CHANGE UP (ref 3.3–5)
ALP SERPL-CCNC: 128 U/L — HIGH (ref 40–120)
ALT FLD-CCNC: 39 U/L — SIGNIFICANT CHANGE UP (ref 10–45)
ANION GAP SERPL CALC-SCNC: 12 MMOL/L — SIGNIFICANT CHANGE UP (ref 5–17)
APTT BLD: 40.7 SEC — HIGH (ref 27.5–36.3)
AST SERPL-CCNC: 39 U/L — SIGNIFICANT CHANGE UP (ref 10–40)
BASOPHILS # BLD AUTO: 0 K/UL — SIGNIFICANT CHANGE UP (ref 0–0.2)
BASOPHILS NFR BLD AUTO: 0 % — SIGNIFICANT CHANGE UP (ref 0–2)
BILIRUB SERPL-MCNC: 0.4 MG/DL — SIGNIFICANT CHANGE UP (ref 0.2–1.2)
BLD GP AB SCN SERPL QL: NEGATIVE — SIGNIFICANT CHANGE UP
BUN SERPL-MCNC: 12 MG/DL — SIGNIFICANT CHANGE UP (ref 7–23)
CALCIUM SERPL-MCNC: 9.8 MG/DL — SIGNIFICANT CHANGE UP (ref 8.4–10.5)
CHLORIDE SERPL-SCNC: 104 MMOL/L — SIGNIFICANT CHANGE UP (ref 96–108)
CHROM ANALY INTERPHASE BLD FISH-IMP: SIGNIFICANT CHANGE UP
CO2 SERPL-SCNC: 26 MMOL/L — SIGNIFICANT CHANGE UP (ref 22–31)
CREAT SERPL-MCNC: 0.77 MG/DL — SIGNIFICANT CHANGE UP (ref 0.5–1.3)
D DIMER BLD IA.RAPID-MCNC: 360 NG/ML DDU — HIGH
EOSINOPHIL # BLD AUTO: 0.04 K/UL — SIGNIFICANT CHANGE UP (ref 0–0.5)
EOSINOPHIL NFR BLD AUTO: 1.8 % — SIGNIFICANT CHANGE UP (ref 0–6)
FIBRINOGEN PPP-MCNC: 371 MG/DL — SIGNIFICANT CHANGE UP (ref 350–510)
GLUCOSE BLDC GLUCOMTR-MCNC: 103 MG/DL — HIGH (ref 70–99)
GLUCOSE BLDC GLUCOMTR-MCNC: 126 MG/DL — HIGH (ref 70–99)
GLUCOSE BLDC GLUCOMTR-MCNC: 182 MG/DL — HIGH (ref 70–99)
GLUCOSE BLDC GLUCOMTR-MCNC: 90 MG/DL — SIGNIFICANT CHANGE UP (ref 70–99)
GLUCOSE SERPL-MCNC: 98 MG/DL — SIGNIFICANT CHANGE UP (ref 70–99)
HCT VFR BLD CALC: 29.5 % — LOW (ref 34.5–45)
HGB BLD-MCNC: 9.8 G/DL — LOW (ref 11.5–15.5)
INR BLD: 1.14 RATIO — SIGNIFICANT CHANGE UP (ref 0.88–1.16)
LDH SERPL L TO P-CCNC: 198 U/L — SIGNIFICANT CHANGE UP (ref 50–242)
LYMPHOCYTES # BLD AUTO: 1.62 K/UL — SIGNIFICANT CHANGE UP (ref 1–3.3)
LYMPHOCYTES # BLD AUTO: 72.6 % — HIGH (ref 13–44)
MAGNESIUM SERPL-MCNC: 2.2 MG/DL — SIGNIFICANT CHANGE UP (ref 1.6–2.6)
MCHC RBC-ENTMCNC: 29.9 PG — SIGNIFICANT CHANGE UP (ref 27–34)
MCHC RBC-ENTMCNC: 33.2 GM/DL — SIGNIFICANT CHANGE UP (ref 32–36)
MCV RBC AUTO: 89.9 FL — SIGNIFICANT CHANGE UP (ref 80–100)
MONOCYTES # BLD AUTO: 0.25 K/UL — SIGNIFICANT CHANGE UP (ref 0–0.9)
MONOCYTES NFR BLD AUTO: 11.2 % — SIGNIFICANT CHANGE UP (ref 2–14)
NEUTROPHILS # BLD AUTO: 0.32 K/UL — LOW (ref 1.8–7.4)
NEUTROPHILS NFR BLD AUTO: 14.4 % — LOW (ref 43–77)
NRBC # BLD: 0 /100 WBCS — SIGNIFICANT CHANGE UP (ref 0–0)
PHOSPHATE SERPL-MCNC: 3.9 MG/DL — SIGNIFICANT CHANGE UP (ref 2.5–4.5)
PLATELET # BLD AUTO: 221 K/UL — SIGNIFICANT CHANGE UP (ref 150–400)
POTASSIUM SERPL-MCNC: 3.9 MMOL/L — SIGNIFICANT CHANGE UP (ref 3.5–5.3)
POTASSIUM SERPL-SCNC: 3.9 MMOL/L — SIGNIFICANT CHANGE UP (ref 3.5–5.3)
PROT SERPL-MCNC: 7.8 G/DL — SIGNIFICANT CHANGE UP (ref 6–8.3)
PROTHROM AB SERPL-ACNC: 13.2 SEC — HIGH (ref 10–12.9)
RBC # BLD: 3.28 M/UL — LOW (ref 3.8–5.2)
RBC # FLD: 15.6 % — HIGH (ref 10.3–14.5)
RH IG SCN BLD-IMP: POSITIVE — SIGNIFICANT CHANGE UP
SODIUM SERPL-SCNC: 142 MMOL/L — SIGNIFICANT CHANGE UP (ref 135–145)
URATE SERPL-MCNC: 5.4 MG/DL — SIGNIFICANT CHANGE UP (ref 2.5–7)
WBC # BLD: 2.23 K/UL — LOW (ref 3.8–10.5)
WBC # FLD AUTO: 2.23 K/UL — LOW (ref 3.8–10.5)

## 2020-06-29 PROCEDURE — 99232 SBSQ HOSP IP/OBS MODERATE 35: CPT

## 2020-06-29 PROCEDURE — 99233 SBSQ HOSP IP/OBS HIGH 50: CPT | Mod: 25

## 2020-06-29 PROCEDURE — 69210 REMOVE IMPACTED EAR WAX UNI: CPT

## 2020-06-29 RX ORDER — VALACYCLOVIR 500 MG/1
1000 TABLET, FILM COATED ORAL EVERY 8 HOURS
Refills: 0 | Status: COMPLETED | OUTPATIENT
Start: 2020-06-29 | End: 2020-07-06

## 2020-06-29 RX ORDER — POSACONAZOLE 100 MG/1
300 TABLET, DELAYED RELEASE ORAL DAILY
Refills: 0 | Status: DISCONTINUED | OUTPATIENT
Start: 2020-06-29 | End: 2020-07-14

## 2020-06-29 RX ORDER — CARBAMIDE PEROXIDE 81.86 MG/ML
5 SOLUTION/ DROPS AURICULAR (OTIC)
Refills: 0 | Status: COMPLETED | OUTPATIENT
Start: 2020-06-29 | End: 2020-07-03

## 2020-06-29 RX ORDER — POTASSIUM CHLORIDE 20 MEQ
20 PACKET (EA) ORAL ONCE
Refills: 0 | Status: COMPLETED | OUTPATIENT
Start: 2020-06-29 | End: 2020-06-29

## 2020-06-29 RX ADMIN — Medication 1 APPLICATION(S): at 17:17

## 2020-06-29 RX ADMIN — POSACONAZOLE 300 MILLIGRAM(S): 100 TABLET, DELAYED RELEASE ORAL at 12:19

## 2020-06-29 RX ADMIN — Medication 0: at 12:19

## 2020-06-29 RX ADMIN — ENOXAPARIN SODIUM 80 MILLIGRAM(S): 100 INJECTION SUBCUTANEOUS at 05:53

## 2020-06-29 RX ADMIN — ENOXAPARIN SODIUM 80 MILLIGRAM(S): 100 INJECTION SUBCUTANEOUS at 17:19

## 2020-06-29 RX ADMIN — Medication 1 SPRAY(S): at 14:13

## 2020-06-29 RX ADMIN — Medication 1 SPRAY(S): at 22:00

## 2020-06-29 RX ADMIN — GABAPENTIN 100 MILLIGRAM(S): 400 CAPSULE ORAL at 14:12

## 2020-06-29 RX ADMIN — Medication 5 MILLILITER(S): at 21:59

## 2020-06-29 RX ADMIN — Medication 1 APPLICATION(S): at 05:54

## 2020-06-29 RX ADMIN — VALACYCLOVIR 1000 MILLIGRAM(S): 500 TABLET, FILM COATED ORAL at 14:12

## 2020-06-29 RX ADMIN — Medication 5 MILLIGRAM(S): at 17:18

## 2020-06-29 RX ADMIN — Medication 1 APPLICATION(S): at 21:59

## 2020-06-29 RX ADMIN — Medication 1 APPLICATION(S): at 17:19

## 2020-06-29 RX ADMIN — TRETINOIN 40 MILLIGRAM(S): 10 CAPSULE, LIQUID FILLED ORAL at 21:57

## 2020-06-29 RX ADMIN — Medication 1 APPLICATION(S): at 14:14

## 2020-06-29 RX ADMIN — Medication 5 MILLIGRAM(S): at 17:19

## 2020-06-29 RX ADMIN — POLYETHYLENE GLYCOL 3350 17 GRAM(S): 17 POWDER, FOR SOLUTION ORAL at 12:16

## 2020-06-29 RX ADMIN — TRETINOIN 40 MILLIGRAM(S): 10 CAPSULE, LIQUID FILLED ORAL at 10:09

## 2020-06-29 RX ADMIN — Medication 5 MILLILITER(S): at 12:15

## 2020-06-29 RX ADMIN — ATENOLOL 25 MILLIGRAM(S): 25 TABLET ORAL at 05:52

## 2020-06-29 RX ADMIN — Medication 5 MILLIGRAM(S): at 05:53

## 2020-06-29 RX ADMIN — PANTOPRAZOLE SODIUM 40 MILLIGRAM(S): 20 TABLET, DELAYED RELEASE ORAL at 05:53

## 2020-06-29 RX ADMIN — Medication 20 MILLIEQUIVALENT(S): at 17:17

## 2020-06-29 RX ADMIN — Medication 1 APPLICATION(S): at 05:53

## 2020-06-29 RX ADMIN — Medication 1: at 17:17

## 2020-06-29 RX ADMIN — LORATADINE 10 MILLIGRAM(S): 10 TABLET ORAL at 12:15

## 2020-06-29 RX ADMIN — Medication 1 APPLICATION(S): at 05:52

## 2020-06-29 RX ADMIN — VALACYCLOVIR 1000 MILLIGRAM(S): 500 TABLET, FILM COATED ORAL at 21:57

## 2020-06-29 RX ADMIN — CARBAMIDE PEROXIDE 5 DROP(S): 81.86 SOLUTION/ DROPS AURICULAR (OTIC) at 17:20

## 2020-06-29 RX ADMIN — Medication 5 MILLILITER(S): at 08:42

## 2020-06-29 RX ADMIN — GABAPENTIN 100 MILLIGRAM(S): 400 CAPSULE ORAL at 05:52

## 2020-06-29 RX ADMIN — Medication 5 MILLILITER(S): at 17:16

## 2020-06-29 RX ADMIN — VALACYCLOVIR 1000 MILLIGRAM(S): 500 TABLET, FILM COATED ORAL at 05:53

## 2020-06-29 RX ADMIN — Medication 1 SPRAY(S): at 05:52

## 2020-06-29 RX ADMIN — Medication 5 MILLIGRAM(S): at 05:52

## 2020-06-29 RX ADMIN — GABAPENTIN 100 MILLIGRAM(S): 400 CAPSULE ORAL at 21:57

## 2020-06-29 NOTE — PROGRESS NOTE ADULT - PROBLEM SELECTOR PLAN 7
On Lovenox SQ BID for DVT treatment. Discontinue if platelets are < 50,000       Contact Information [phone] 5/23: HgA1c = 7.0  C/w HISS. FS are currently stable. Monitor FS AC & QHS   C/w Consistent Carbohydrate diet w/ Evening snack

## 2020-06-29 NOTE — CHART NOTE - NSCHARTNOTEFT_GEN_A_CORE
Nutrition Follow Up Note  Patient seen for: LOS follow up     Interim events noted, chart reviewed. Pt c APL, arsenic held since 6/22 due to Shingles. Noted pt c some hearing loss, seen by ENT.     Source:  pt, son on the phone; pt Turkish speaking, RD able to communicate with pt in Turkish, pt also preferred for RD to speak c son     Diet : Diet, Consistent Carbohydrate w/Evening Snack:   Halal  Supplement Feeding Modality:  Oral  Glucerna Shake Cans or Servings Per Day:  3       Frequency:  Daily (06-05-20 @ 15:11)    Patient reports: she would like to continue to take lighter foods for breakfast and dinner and take different foods for lunch daily. Reports she has been taking her Glucerna shakes daily as well. Pt denies any other GI distress at this time. Pt and son agreeable to discussion about meal choices. Provided son c phone number and menu (via text message on pt's phone as per pt and son's request) for [facility] so he may order meals for pt.    Daily Weight: 6/17: 165.7->6/28: 164.9->6/29: 163.1 pounds, wt has stabilized at this time, would continue to monitor     Pertinent Medications: MEDICATIONS  (STANDING):  ammonium lactate 12% Lotion 1 Application(s) Topical two times a day  ATENolol  Tablet 25 milliGRAM(s) Oral daily  baclofen 5 milliGRAM(s) Oral every 12 hours  Biotene Dry Mouth Oral Rinse 5 milliLiter(s) Swish and Spit five times a day  bisacodyl 5 milliGRAM(s) Oral every 12 hours  carbamide peroxide Otic Solution 5 Drop(s) Right Ear two times a day  dextrose 5%. 1000 milliLiter(s) (50 mL/Hr) IV Continuous <Continuous>  dextrose 50% Injectable 12.5 Gram(s) IV Push once  dextrose 50% Injectable 25 Gram(s) IV Push once  dextrose 50% Injectable 25 Gram(s) IV Push once  enoxaparin Injectable 80 milliGRAM(s) SubCutaneous every 12 hours  gabapentin 100 milliGRAM(s) Oral three times a day  insulin lispro (HumaLOG) corrective regimen sliding scale   SubCutaneous three times a day before meals  insulin lispro (HumaLOG) corrective regimen sliding scale   SubCutaneous at bedtime  levoFLOXacin  Tablet 500 milliGRAM(s) Oral every 24 hours  pantoprazole    Tablet 40 milliGRAM(s) Oral before breakfast  petrolatum white Ointment 1 Application(s) Topical three times a day  polyethylene glycol 3350 17 Gram(s) Oral daily  posaconazole DR Tablet 300 milliGRAM(s) Oral daily  sodium chloride 0.65% Nasal 1 Spray(s) Both Nostrils every 8 hours  tretinoin 40 milliGRAM(s) Oral every 12 hours  triamcinolone 0.1% Ointment 1 Application(s) Topical every 12 hours  valACYclovir 1000 milliGRAM(s) Oral every 8 hours    MEDICATIONS  (PRN):  acetaminophen   Tablet .. 650 milliGRAM(s) Oral every 6 hours PRN Temp greater or equal to 38C (100.4F), Mild Pain (1 - 3)  ALBUTerol    90 MICROgram(s) HFA Inhaler 2 Puff(s) Inhalation every 6 hours PRN Shortness of Breath and/or Wheezing  AQUAPHOR (petrolatum Ointment) 1 Application(s) Topical every 3 hours PRN Dry skin  dextrose 40% Gel 15 Gram(s) Oral once PRN Blood Glucose LESS THAN 70 milliGRAM(s)/deciliter  diphenhydrAMINE   Injectable 25 milliGRAM(s) IV Push every 6 hours PRN Allergy symptoms  glucagon  Injectable 1 milliGRAM(s) IntraMuscular once PRN Glucose LESS THAN 70 milligrams/deciliter  guaiFENesin   Syrup  (Sugar-Free) 100 milliGRAM(s) Oral every 6 hours PRN Cough  hemorrhoidal Ointment 1 Application(s) Rectal every 8 hours PRN Hemorrhoids  morphine  - Injectable 3 milliGRAM(s) IV Push every 6 hours PRN Moderate Pain (4 - 6)  ondansetron Injectable 8 milliGRAM(s) IV Push every 8 hours PRN Nausea and/or Vomiting  sodium chloride 0.9% lock flush 10 milliLiter(s) IV Push every 1 hour PRN Pre/post blood products, medications, blood draw, and to maintain line patency    Pertinent Labs: 06-29 @ 06:58: Na 142, BUN 12, Cr 0.77, BG 98, K+ 3.9, Phos 3.9, Mg 2.2, Alk Phos 128<H>, ALT/SGPT 39, AST/SGOT 39, HbA1c --    Finger Sticks:  POCT Blood Glucose.: 126 mg/dL (06-29 @ 11:58)  POCT Blood Glucose.: 103 mg/dL (06-29 @ 08:17)  POCT Blood Glucose.: 109 mg/dL (06-28 @ 21:53)  POCT Blood Glucose.: 130 mg/dL (06-28 @ 17:50)      Skin per nursing documentation: no pressure injuries   Edema: +1 andrew. ankle, right arm     Estimated Needs:   [x] no change since previous assessment    Previous Nutrition Diagnosis: inadequate PO intake   Nutrition Diagnosis continues at this time, addressed c supplements and gradually improving intake     New Nutrition Diagnosis: none at this time     Recommend  1) Continue c current diet.   2) Continue w/ Glucerna shakes.   3) RD to provide food preferences.   4) Discussed importance of PO intake, taking protein c all meals and continuing to take supplements.      Monitoring and Evaluation:     Continue to monitor Nutritional intake, Tolerance to diet prescription, weights, labs, skin integrity    RD remains available upon request and will follow up per protocol  Kristin Copeland MS RD CDN [facility],  #[phone]

## 2020-06-29 NOTE — CONSULT NOTE ADULT - ATTENDING COMMENTS
c/o discomfort at site of former PIC line, right medial upper arm  No cellulitis , swelling, or wound discharge  Given APL Dx advise repeat venous duplex exam  D/w son by phone at patient preference
suspect cholesteatoma vs perforation alone, will obtain audiogram and arrange for f/up with otology.

## 2020-06-29 NOTE — PROGRESS NOTE ADULT - PROBLEM SELECTOR PLAN 2
Patient is not neutropenic, afebrile   6/23 Persistent right upper inner arm pain/swelling - CT no abscess, + DVT  6/22 Right buttock and upper thigh shingles continue Valtrex treatment with Lac Hydrin  If febrile, Pan Cx and CXR  6/20 Pan cultures (-)   Previous COVID-19 (+) outpatient  Then COVID (-) on 5/22, 5/26, 6/2 and 6/9 6/16 COVID 19 (+)   6/18 COVID 19 Ab (+)  6/19 Repeat COVID-19 PCR (-) Patient is neutropenic, afebrile   6/23 Persistent right upper inner arm pain/swelling - CT no abscess, + DVT  6/22 Right buttock and upper thigh shingles continue Valtrex treatment with Lac Hydrin  If febrile, Pan Cx and CXR  6/20 Pan cultures (-)   Previous COVID-19 (+) outpatient  Then COVID (-) on 5/22, 5/26, 6/2 and 6/9 6/16 COVID 19 (+)   6/18 COVID 19 Ab (+)  6/19 Repeat COVID-19 PCR (-) Patient is neutropenic, afebrile   6/29 Started Levaquin and Posaconazole for PPX  If spikes pan culture, CXR and change Levaquin to cefepime   6/23 Persistent right upper inner arm pain/swelling - CT no abscess, + DVT  6/22 Right buttock and upper thigh shingles continue Valtrex treatment with Lac Hydrin  Previous COVID-19 (+) outpatient  Then COVID (-) on 5/22, 5/26, 6/2 and 6/9 6/16 COVID 19 (+)   6/18 COVID 19 Ab (+)  6/19 Repeat COVID-19 PCR (-)

## 2020-06-29 NOTE — PROGRESS NOTE ADULT - ATTENDING COMMENTS
59 y/o Greenlandic speaking F who is transferred for newly diagnosed low/interm risk APL.  Presented with pancytopenia, flow cytometry/bone marrow biopsy/FISH from 5/26- consistent with acute promyelocytic leukemia with PML-AWA translocation.    She was started on ATRA 5/23- day +37, GALA 5/27- day +33 (arsenic currently on hold from 6/22).    -CT chest 6/6 c/w granulomatous disease (hx of TB in 1980's) pt had SOB on 6/4, now resolved.  Daily weights, diurese. Will give Hydrea 500mg if wbc>10. On Prednisone 20mg through 5/12- now off.  If pt becomes SOB again, may restart  -tremors improved, unclear etiology, electrolytes WNL. Cefepime held, given Baclofen -they improved  -Check CBC, coags/fibrinogen once daily.  Goal plt >40-50, fibrinogen >150; responding to ABO-matched platelets given over 3 hrs in 1/2 unit infusions   -monitor lytes daily, keep K>4, Mg>2.  EKG twice a week  -off antibiotics  -COVID19+ on 6/17, moved to 9M  -Rash on R buttock and back of RLE. Called derm, holding GALA for now. Awaiting VZV swab, started valtrex. Still with vesicular component on back of R leg, will restart when resolving. Pain present, on gabapentin 100 mg TID.   -RUE redness and itching at previous PICC site, line removed. In area of adhesive, possibly allergic reaction. RUE doppler showing axillary partially occlusive clot. On heparin infusion up until 6/25 given low platelets / APL. Switched to lovenox 6/25. CT scan confirms blood clot, no abscesses or other etiology for RUE pain.  -s/p bone marrow biopsy on 6/26 -f/u results  -son reports that patient has hearing loss for the last week or so, also noted by his sister. ENT evaluation. 61 y/o Armenian speaking F with newly diagnosed low/interm risk APL.  Presented with pancytopenia, flow cytometry/bone marrow biopsy/FISH done 5/26- c/w acute promyelocytic leukemia with PML-AWA translocation.    She was started on ATRA 5/23- day +38, GALA 5/27- day +34 (arsenic currently on hold from 6/22).    -CT chest 6/6 c/w granulomatous disease (hx of TB in 1980's) . Monitor daily weights, diurese prn.   -hx tremors , unclear etiology. Cefepime held, s/p Baclofen - now improved  - CBC, coags/fibrinogen daily.  Goal plt >40-50, fibrinogen >150  - responding to ABO-matched platelets given over 3 hrs in 1/2 unit infusions   -monitor lytes daily, keep K>4, Mg>2.  EKG twice a week  -COVID19+ on 6/17, now neg  -Zoster rash on R buttock and back of RLE. Holding GALA for now. Awaiting scabbing of all lesions prior to restarting GALA  - Pain present, on gabapentin 100 mg TID.   -RUE redness and itching at previous PICC site, cont topical triamcinolone BID, no improvement with calamine lotion  -RUE doppler showing axillary partially occlusive clot. On lovenox since 6/25.   -s/p bone marrow biopsy on 6/26 - await results  -son reports that patient has hearing loss x 1 week. ENT evaluation pending.

## 2020-06-29 NOTE — CONSULT NOTE ADULT - ASSESSMENT
60y with APL on chemo, with right sided hearing loss found to have cerumen in EAC. also c/o crackles b/l with minimal effusion noted on L TM, likely 2/2 eustachian tube dysfunction, continue with nasal saline spray.

## 2020-06-29 NOTE — CONSULT NOTE ADULT - SUBJECTIVE AND OBJECTIVE BOX
CC: R hearing loss, crackles b/l     HPI: Ms. Gant is a 59 y/o [place]-speaking female w/ a PMHx of HTN, HLD, T2DM, and COVID-19 infection in 4/2020 who was admitted for management of newly diagnosed APL. The patient has been receiving treatment with ATRA and Arsenic (ATRA since 5/22 and Arsenic 5/27). Arsenic has been on hold since 6/22 secondary to Herpes Zoster infection. Patient's hospital course has been complicated by transaminitis, refractory thrombocytopenia, COVID-19 (+) state (6/16), shingles, and right axillary DVT. The patient has pancytopenia 2/2 disease and/or ATRA/Arsenic. ENT called for Right hearing loss x 2 weeks, with b/l crackles already started ocean nasal spray with minimal relief. Per family pt also noted clear d/c on pillow from left ear after shower. Pt denies any other issues n/v, tinnitus, dizziness, ear pain, congestion, recent URI, otorrhea, hx of sx or trauma or recent travel.     PAST MEDICAL & SURGICAL HISTORY:  HLD (hyperlipidemia)  Hypertension  Diabetes  No significant past surgical history    Allergies    No Known Allergies    Intolerances      MEDICATIONS  (STANDING):  ammonium lactate 12% Lotion 1 Application(s) Topical two times a day  ATENolol  Tablet 25 milliGRAM(s) Oral daily  baclofen 5 milliGRAM(s) Oral every 12 hours  Biotene Dry Mouth Oral Rinse 5 milliLiter(s) Swish and Spit five times a day  bisacodyl 5 milliGRAM(s) Oral every 12 hours  dextrose 5%. 1000 milliLiter(s) (50 mL/Hr) IV Continuous <Continuous>  dextrose 50% Injectable 12.5 Gram(s) IV Push once  dextrose 50% Injectable 25 Gram(s) IV Push once  dextrose 50% Injectable 25 Gram(s) IV Push once  enoxaparin Injectable 80 milliGRAM(s) SubCutaneous every 12 hours  gabapentin 100 milliGRAM(s) Oral three times a day  insulin lispro (HumaLOG) corrective regimen sliding scale   SubCutaneous three times a day before meals  insulin lispro (HumaLOG) corrective regimen sliding scale   SubCutaneous at bedtime  levoFLOXacin  Tablet 500 milliGRAM(s) Oral every 24 hours  loratadine 10 milliGRAM(s) Oral daily  pantoprazole    Tablet 40 milliGRAM(s) Oral before breakfast  petrolatum white Ointment 1 Application(s) Topical three times a day  polyethylene glycol 3350 17 Gram(s) Oral daily  posaconazole DR Tablet 300 milliGRAM(s) Oral daily  sodium chloride 0.65% Nasal 1 Spray(s) Both Nostrils every 8 hours  tretinoin 40 milliGRAM(s) Oral every 12 hours  triamcinolone 0.1% Ointment 1 Application(s) Topical every 12 hours  valACYclovir 1000 milliGRAM(s) Oral every 8 hours    MEDICATIONS  (PRN):  acetaminophen   Tablet .. 650 milliGRAM(s) Oral every 6 hours PRN Temp greater or equal to 38C (100.4F), Mild Pain (1 - 3)  ALBUTerol    90 MICROgram(s) HFA Inhaler 2 Puff(s) Inhalation every 6 hours PRN Shortness of Breath and/or Wheezing  AQUAPHOR (petrolatum Ointment) 1 Application(s) Topical every 3 hours PRN Dry skin  dextrose 40% Gel 15 Gram(s) Oral once PRN Blood Glucose LESS THAN 70 milliGRAM(s)/deciliter  diphenhydrAMINE   Injectable 25 milliGRAM(s) IV Push every 6 hours PRN Allergy symptoms  glucagon  Injectable 1 milliGRAM(s) IntraMuscular once PRN Glucose LESS THAN 70 milligrams/deciliter  guaiFENesin   Syrup  (Sugar-Free) 100 milliGRAM(s) Oral every 6 hours PRN Cough  hemorrhoidal Ointment 1 Application(s) Rectal every 8 hours PRN Hemorrhoids  morphine  - Injectable 3 milliGRAM(s) IV Push every 6 hours PRN Moderate Pain (4 - 6)  ondansetron Injectable 8 milliGRAM(s) IV Push every 8 hours PRN Nausea and/or Vomiting  sodium chloride 0.9% lock flush 10 milliLiter(s) IV Push every 1 hour PRN Pre/post blood products, medications, blood draw, and to maintain line patency      Social History: no tobacco, no etoh     Family history: Pt denies any sign FHx    ROS:   ENT: all negative except as noted in HPI   CV: denies palpitations  Pulm: denies SOB, cough, hemoptysis  GI: denies change in apetite, indigestion, n/v  : denies pertinent urinary symptoms, urgency  Neuro: denies numbness/tingling, loss of sensation  Psych: denies anxiety  MS: denies muscle weakness, instability  Heme: denies easy bruising or bleeding  Endo: denies heat/cold intolerance, excessive sweating  Vascular: denies LE edema    Vital Signs Last 24 Hrs  T(C): 37.2 (29 Jun 2020 09:00), Max: 37.4 (29 Jun 2020 05:20)  T(F): 98.9 (29 Jun 2020 09:00), Max: 99.3 (29 Jun 2020 05:20)  HR: 65 (29 Jun 2020 09:00) (63 - 83)  BP: 150/75 (29 Jun 2020 09:00) (120/67 - 155/77)  BP(mean): --  RR: 18 (29 Jun 2020 09:00) (18 - 18)  SpO2: 98% (29 Jun 2020 09:00) (97% - 99%)                          9.8    2.23  )-----------( 221      ( 29 Jun 2020 06:58 )             29.5    06-29    142  |  104  |  12  ----------------------------<  98  3.9   |  26  |  0.77    Ca    9.8      29 Jun 2020 06:58  Phos  3.9     06-29  Mg     2.2     06-29    TPro  7.8  /  Alb  4.7  /  TBili  0.4  /  DBili  x   /  AST  39  /  ALT  39  /  AlkPhos  128<H>  06-29   PT/INR - ( 29 Jun 2020 06:58 )   PT: 13.2 sec;   INR: 1.14 ratio         PTT - ( 29 Jun 2020 06:58 )  PTT:40.7 sec    PHYSICAL EXAM:  Gen: NAD  Skin: No rashes, bruises, or lesions  Head: Normocephalic, Atraumatic  Face: no edema, erythema, or fluctuance. Parotid glands soft without mass  Eyes: no scleral injection  Ears: Right - ear canal with packed hard cerumen. No mastoid tenderness, erythema, or ear bulging            Left - ear canal clear, TM intact with effusion, no erythema. No evidence of any fluid drainage. No mastoid tenderness, erythema, or ear bulging  Nose: Nares bilaterally patent, no discharge  Mouth: No Stridor / Drooling / Trismus.  Mucosa moist, tongue/uvula midline, oropharynx clear  Neck: Flat, supple, no lymphadenopathy, trachea midline, no masses  Lymphatic: No lymphadenopathy  Resp: breathing easily, no stridor  CV: no peripheral edema/cyanosis  GI: nondistended   Peripheral vascular: no JVD or edema  Neuro: facial nerve intact, no facial droop        Diagnostic Nasal Endoscopy: (Scope #2 used)    Fiberoptic Indirect laryngoscopy:  (Scope #2 used)        IMAGING/ADDITIONAL STUDIES: CC: R hearing loss, crackles b/l     HPI: Ms. Gant is a 59 y/o [place]-speaking female w/ a PMHx of HTN, HLD, T2DM, and COVID-19 infection in 4/2020 who was admitted for management of newly diagnosed APL. The patient has been receiving treatment with ATRA and Arsenic (ATRA since 5/22 and Arsenic 5/27). Arsenic has been on hold since 6/22 secondary to Herpes Zoster infection. Patient's hospital course has been complicated by transaminitis, refractory thrombocytopenia, COVID-19 (+) state (6/16), shingles, and right axillary DVT. The patient has pancytopenia 2/2 disease and/or ATRA/Arsenic. ENT called for Right hearing loss x 2 weeks, with b/l crackles already started ocean nasal spray with minimal relief. Per family pt also noted clear d/c on pillow from left ear after shower. Pt denies any other issues n/v, tinnitus, dizziness, ear pain, congestion, recent URI, otorrhea, hx of sx or trauma or recent travel.     PAST MEDICAL & SURGICAL HISTORY:  HLD (hyperlipidemia)  Hypertension  Diabetes  No significant past surgical history    Allergies    No Known Allergies    Intolerances      MEDICATIONS  (STANDING):  ammonium lactate 12% Lotion 1 Application(s) Topical two times a day  ATENolol  Tablet 25 milliGRAM(s) Oral daily  baclofen 5 milliGRAM(s) Oral every 12 hours  Biotene Dry Mouth Oral Rinse 5 milliLiter(s) Swish and Spit five times a day  bisacodyl 5 milliGRAM(s) Oral every 12 hours  dextrose 5%. 1000 milliLiter(s) (50 mL/Hr) IV Continuous <Continuous>  dextrose 50% Injectable 12.5 Gram(s) IV Push once  dextrose 50% Injectable 25 Gram(s) IV Push once  dextrose 50% Injectable 25 Gram(s) IV Push once  enoxaparin Injectable 80 milliGRAM(s) SubCutaneous every 12 hours  gabapentin 100 milliGRAM(s) Oral three times a day  insulin lispro (HumaLOG) corrective regimen sliding scale   SubCutaneous three times a day before meals  insulin lispro (HumaLOG) corrective regimen sliding scale   SubCutaneous at bedtime  levoFLOXacin  Tablet 500 milliGRAM(s) Oral every 24 hours  loratadine 10 milliGRAM(s) Oral daily  pantoprazole    Tablet 40 milliGRAM(s) Oral before breakfast  petrolatum white Ointment 1 Application(s) Topical three times a day  polyethylene glycol 3350 17 Gram(s) Oral daily  posaconazole DR Tablet 300 milliGRAM(s) Oral daily  sodium chloride 0.65% Nasal 1 Spray(s) Both Nostrils every 8 hours  tretinoin 40 milliGRAM(s) Oral every 12 hours  triamcinolone 0.1% Ointment 1 Application(s) Topical every 12 hours  valACYclovir 1000 milliGRAM(s) Oral every 8 hours    MEDICATIONS  (PRN):  acetaminophen   Tablet .. 650 milliGRAM(s) Oral every 6 hours PRN Temp greater or equal to 38C (100.4F), Mild Pain (1 - 3)  ALBUTerol    90 MICROgram(s) HFA Inhaler 2 Puff(s) Inhalation every 6 hours PRN Shortness of Breath and/or Wheezing  AQUAPHOR (petrolatum Ointment) 1 Application(s) Topical every 3 hours PRN Dry skin  dextrose 40% Gel 15 Gram(s) Oral once PRN Blood Glucose LESS THAN 70 milliGRAM(s)/deciliter  diphenhydrAMINE   Injectable 25 milliGRAM(s) IV Push every 6 hours PRN Allergy symptoms  glucagon  Injectable 1 milliGRAM(s) IntraMuscular once PRN Glucose LESS THAN 70 milligrams/deciliter  guaiFENesin   Syrup  (Sugar-Free) 100 milliGRAM(s) Oral every 6 hours PRN Cough  hemorrhoidal Ointment 1 Application(s) Rectal every 8 hours PRN Hemorrhoids  morphine  - Injectable 3 milliGRAM(s) IV Push every 6 hours PRN Moderate Pain (4 - 6)  ondansetron Injectable 8 milliGRAM(s) IV Push every 8 hours PRN Nausea and/or Vomiting  sodium chloride 0.9% lock flush 10 milliLiter(s) IV Push every 1 hour PRN Pre/post blood products, medications, blood draw, and to maintain line patency      Social History: no tobacco, no etoh     Family history: Pt denies any sign FHx    ROS:   ENT: all negative except as noted in HPI   CV: denies palpitations  Pulm: denies SOB, cough, hemoptysis  GI: denies change in apetite, indigestion, n/v  : denies pertinent urinary symptoms, urgency  Neuro: denies numbness/tingling, loss of sensation  Psych: denies anxiety  MS: denies muscle weakness, instability  Heme: denies easy bruising or bleeding  Endo: denies heat/cold intolerance, excessive sweating  Vascular: denies LE edema    Vital Signs Last 24 Hrs  T(C): 37.2 (29 Jun 2020 09:00), Max: 37.4 (29 Jun 2020 05:20)  T(F): 98.9 (29 Jun 2020 09:00), Max: 99.3 (29 Jun 2020 05:20)  HR: 65 (29 Jun 2020 09:00) (63 - 83)  BP: 150/75 (29 Jun 2020 09:00) (120/67 - 155/77)  BP(mean): --  RR: 18 (29 Jun 2020 09:00) (18 - 18)  SpO2: 98% (29 Jun 2020 09:00) (97% - 99%)                          9.8    2.23  )-----------( 221      ( 29 Jun 2020 06:58 )             29.5    06-29    142  |  104  |  12  ----------------------------<  98  3.9   |  26  |  0.77    Ca    9.8      29 Jun 2020 06:58  Phos  3.9     06-29  Mg     2.2     06-29    TPro  7.8  /  Alb  4.7  /  TBili  0.4  /  DBili  x   /  AST  39  /  ALT  39  /  AlkPhos  128<H>  06-29   PT/INR - ( 29 Jun 2020 06:58 )   PT: 13.2 sec;   INR: 1.14 ratio         PTT - ( 29 Jun 2020 06:58 )  PTT:40.7 sec    PHYSICAL EXAM:  Gen: NAD  Skin: No rashes, bruises, or lesions  Head: Normocephalic, Atraumatic  Face: no edema, erythema, or fluctuance. Parotid glands soft without mass  Eyes: no scleral injection  Ears: Right - ear canal with packed hard cerumen, suctioned without trauma. Erythematous TM ,anterior inferior 20% possible perforation vs retraction with anterior superior crusting. No mastoid tenderness, erythema, or ear bulging            Left - ear canal clear, TM intact with effusion, no erythema. No evidence of any fluid drainage. No mastoid tenderness, erythema, or ear bulging  Nose: Nares bilaterally patent, no discharge  Mouth: No Stridor / Drooling / Trismus.  Mucosa moist, tongue/uvula midline, oropharynx clear  Neck: Flat, supple, no lymphadenopathy, trachea midline, no masses  Lymphatic: No lymphadenopathy  Resp: breathing easily, no stridor  CV: no peripheral edema/cyanosis  GI: nondistended   Peripheral vascular: no JVD or edema  Neuro: facial nerve intact, no facial droop        Diagnostic Nasal Endoscopy: (Scope #2 used)    Fiberoptic Indirect laryngoscopy:  (Scope #2 used)        IMAGING/ADDITIONAL STUDIES: CC: R hearing loss, crackles b/l     HPI: Ms. Gant is a 59 y/o Japanese-speaking female w/ a PMHx of HTN, HLD, T2DM, and COVID-19 infection in 4/2020 who was admitted for management of newly diagnosed APL. The patient has been receiving treatment with ATRA and Arsenic (ATRA since 5/22 and Arsenic 5/27). Arsenic has been on hold since 6/22 secondary to Herpes Zoster infection. Patient's hospital course has been complicated by transaminitis, refractory thrombocytopenia, COVID-19 (+) state (6/16), shingles, and right axillary DVT. The patient has pancytopenia 2/2 disease and/or ATRA/Arsenic. ENT called for Right hearing loss x 2 weeks, with b/l crackles already started ocean nasal spray with minimal relief. Per family pt also noted clear d/c on pillow from left ear after shower. Pt denies any other issues n/v, tinnitus, dizziness, ear pain, congestion, recent URI, otorrhea, hx of sx or trauma or recent travel.     PAST MEDICAL & SURGICAL HISTORY:  HLD (hyperlipidemia)  Hypertension  Diabetes  No significant past surgical history    Allergies    No Known Allergies    Intolerances      MEDICATIONS  (STANDING):  ammonium lactate 12% Lotion 1 Application(s) Topical two times a day  ATENolol  Tablet 25 milliGRAM(s) Oral daily  baclofen 5 milliGRAM(s) Oral every 12 hours  Biotene Dry Mouth Oral Rinse 5 milliLiter(s) Swish and Spit five times a day  bisacodyl 5 milliGRAM(s) Oral every 12 hours  dextrose 5%. 1000 milliLiter(s) (50 mL/Hr) IV Continuous <Continuous>  dextrose 50% Injectable 12.5 Gram(s) IV Push once  dextrose 50% Injectable 25 Gram(s) IV Push once  dextrose 50% Injectable 25 Gram(s) IV Push once  enoxaparin Injectable 80 milliGRAM(s) SubCutaneous every 12 hours  gabapentin 100 milliGRAM(s) Oral three times a day  insulin lispro (HumaLOG) corrective regimen sliding scale   SubCutaneous three times a day before meals  insulin lispro (HumaLOG) corrective regimen sliding scale   SubCutaneous at bedtime  levoFLOXacin  Tablet 500 milliGRAM(s) Oral every 24 hours  loratadine 10 milliGRAM(s) Oral daily  pantoprazole    Tablet 40 milliGRAM(s) Oral before breakfast  petrolatum white Ointment 1 Application(s) Topical three times a day  polyethylene glycol 3350 17 Gram(s) Oral daily  posaconazole DR Tablet 300 milliGRAM(s) Oral daily  sodium chloride 0.65% Nasal 1 Spray(s) Both Nostrils every 8 hours  tretinoin 40 milliGRAM(s) Oral every 12 hours  triamcinolone 0.1% Ointment 1 Application(s) Topical every 12 hours  valACYclovir 1000 milliGRAM(s) Oral every 8 hours    MEDICATIONS  (PRN):  acetaminophen   Tablet .. 650 milliGRAM(s) Oral every 6 hours PRN Temp greater or equal to 38C (100.4F), Mild Pain (1 - 3)  ALBUTerol    90 MICROgram(s) HFA Inhaler 2 Puff(s) Inhalation every 6 hours PRN Shortness of Breath and/or Wheezing  AQUAPHOR (petrolatum Ointment) 1 Application(s) Topical every 3 hours PRN Dry skin  dextrose 40% Gel 15 Gram(s) Oral once PRN Blood Glucose LESS THAN 70 milliGRAM(s)/deciliter  diphenhydrAMINE   Injectable 25 milliGRAM(s) IV Push every 6 hours PRN Allergy symptoms  glucagon  Injectable 1 milliGRAM(s) IntraMuscular once PRN Glucose LESS THAN 70 milligrams/deciliter  guaiFENesin   Syrup  (Sugar-Free) 100 milliGRAM(s) Oral every 6 hours PRN Cough  hemorrhoidal Ointment 1 Application(s) Rectal every 8 hours PRN Hemorrhoids  morphine  - Injectable 3 milliGRAM(s) IV Push every 6 hours PRN Moderate Pain (4 - 6)  ondansetron Injectable 8 milliGRAM(s) IV Push every 8 hours PRN Nausea and/or Vomiting  sodium chloride 0.9% lock flush 10 milliLiter(s) IV Push every 1 hour PRN Pre/post blood products, medications, blood draw, and to maintain line patency      Social History: no tobacco, no etoh     Family history: Pt denies any sign FHx    ROS:   ENT: all negative except as noted in HPI   CV: denies palpitations  Pulm: denies SOB, cough, hemoptysis  GI: denies change in apetite, indigestion, n/v  : denies pertinent urinary symptoms, urgency  Neuro: denies numbness/tingling, loss of sensation  Psych: denies anxiety  MS: denies muscle weakness, instability  Heme: denies easy bruising or bleeding  Endo: denies heat/cold intolerance, excessive sweating  Vascular: denies LE edema    Vital Signs Last 24 Hrs  T(C): 37.2 (29 Jun 2020 09:00), Max: 37.4 (29 Jun 2020 05:20)  T(F): 98.9 (29 Jun 2020 09:00), Max: 99.3 (29 Jun 2020 05:20)  HR: 65 (29 Jun 2020 09:00) (63 - 83)  BP: 150/75 (29 Jun 2020 09:00) (120/67 - 155/77)  BP(mean): --  RR: 18 (29 Jun 2020 09:00) (18 - 18)  SpO2: 98% (29 Jun 2020 09:00) (97% - 99%)                          9.8    2.23  )-----------( 221      ( 29 Jun 2020 06:58 )             29.5    06-29    142  |  104  |  12  ----------------------------<  98  3.9   |  26  |  0.77    Ca    9.8      29 Jun 2020 06:58  Phos  3.9     06-29  Mg     2.2     06-29    TPro  7.8  /  Alb  4.7  /  TBili  0.4  /  DBili  x   /  AST  39  /  ALT  39  /  AlkPhos  128<H>  06-29   PT/INR - ( 29 Jun 2020 06:58 )   PT: 13.2 sec;   INR: 1.14 ratio         PTT - ( 29 Jun 2020 06:58 )  PTT:40.7 sec    PHYSICAL EXAM:  Gen: NAD  Skin: No rashes, bruises, or lesions  Head: Normocephalic, Atraumatic  Face: no edema, erythema, or fluctuance. Parotid glands soft without mass  Eyes: no scleral injection  Ears: Right - ear canal with packed hard cerumen, suctioned without trauma, removed with alligator forceps Erythematous TM ,anterior inferior 20% possible perforation vs retraction with anterior superior crusting. No mastoid tenderness, erythema, or ear bulging            Left - ear canal clear, TM intact with effusion, no erythema. No evidence of any fluid drainage. No mastoid tenderness, erythema, or ear bulging  Nose: Nares bilaterally patent, no discharge  Mouth: No Stridor / Drooling / Trismus.  Mucosa moist, tongue/uvula midline, oropharynx clear  Neck: Flat, supple, no lymphadenopathy, trachea midline, no masses  Lymphatic: No lymphadenopathy  Resp: breathing easily, no stridor  CV: no peripheral edema/cyanosis  GI: nondistended   Peripheral vascular: no JVD or edema  Neuro: facial nerve intact, no facial droop        Diagnostic Nasal Endoscopy: (Scope #2 used)    Fiberoptic Indirect laryngoscopy:  (Scope #2 used)        IMAGING/ADDITIONAL STUDIES:

## 2020-06-29 NOTE — PROGRESS NOTE ADULT - PROBLEM SELECTOR PLAN 5
Home regimen with Atenolol held on admission  Restarted at 25mg PO daily for increased BP  BP is currently stable. Continue to monitor Most likely due to Atra  Follow up ENT consult Most likely due to eustachian tube dysfunction  Continue Ocean spray and Debrox   ENT following

## 2020-06-29 NOTE — PROGRESS NOTE ADULT - PROBLEM SELECTOR PLAN 1
Continue ATRA 40mg BID (started 5/22)   Arsenic Trioxide which was started on 5/27 now on hold (since 6/22) 2/2 shingles  Monitor for differentiation syndrome and check EKG for QTc prolongation every Tuesday and Friday. Keep K > 4 and Mg > 2   Status post Hydrea 500mg PO x 1 on 6/8 and 6/9 for WBC > 10  Follow up CBC with diff, CMP, TLS and DIC panel daily  Keep PLTs >50 K. If unable to achieve goal to infuse 1/2 unit platelets over 3 hours every 12 hrs   S/p Prednisone 20mg PO daily x 5 days (through 6/12) due to platelet antibody  Keep Fibrinogen > 150, if less give Cryo  Strict Is and Os/Daily weights/Mouth Care  Follow up bone marrow biopsy results (performed 6/26) Continue ATRA 40mg BID (started 5/22)   Arsenic Trioxide which was started on 5/27 now on hold (since 6/22) 2/2 shingles  Monitor for differentiation syndrome    check EKG for QTc prolongation every Tuesday and Friday while on arsenic   Keep K > 4 and Mg > 2 while on arsenic   Status post Hydrea 500mg PO x 1 on 6/8 and 6/9 for WBC > 10  Follow up CBC with diff, CMP, TLS and DIC panel daily  Keep PLTs >50 K. If unable to achieve goal to infuse 1/2 unit platelets over 3 hours every 12 hrs   S/p Prednisone 20mg PO daily x 5 days (through 6/12) due to platelet antibody  Keep Fibrinogen > 150, if less give Cryo  Strict Is and Os/Daily weights/Mouth Care  Follow up bone marrow biopsy results (performed 6/26)

## 2020-06-29 NOTE — PROGRESS NOTE ADULT - PROBLEM SELECTOR PLAN 8
On Lovenox SQ BID for DVT treatment. Discontinue if platelets are < 50,000       Contact Information [phone]

## 2020-06-29 NOTE — CONSULT NOTE ADULT - PROBLEM SELECTOR RECOMMENDATION 2
continue with nasal saline spray qid No need for debrox as R ear manually disimpacted  -Please obtain audiogram  -Pt to keep ear dry  -ENT will continue to follow

## 2020-06-29 NOTE — PROGRESS NOTE ADULT - SUBJECTIVE AND OBJECTIVE BOX
Diagnosis: Acute Promyelocytic Leukemia     Protocol/Chemo Regimen: ATRA/Arsenic Trioxide    Day: ATRA Day 38 and Arsenic Day 44 (Holding Arsenic since Day 26 --> 6/22/20 due to shingles)    Pt endorsed: Right upper inner arm pain and redness. c/o Rash on her Right buttock and Right posterior thigh, mild hearing loss     Review of Systems: Denies nausea, vomiting, diarrhea, chest pain, SOB, headache      Translated by patient's son via phone call per patient request     Pain scale: 3/10    Diet: Consistent Carb w/ Evening Snack    Allergies    No Known Allergies    Intolerances        ANTIMICROBIALS      HEME/ONC MEDICATIONS  enoxaparin Injectable 80 milliGRAM(s) SubCutaneous every 12 hours  tretinoin 40 milliGRAM(s) Oral every 12 hours      STANDING MEDICATIONS  ammonium lactate 12% Lotion 1 Application(s) Topical two times a day  ATENolol  Tablet 25 milliGRAM(s) Oral daily  baclofen 5 milliGRAM(s) Oral every 12 hours  Biotene Dry Mouth Oral Rinse 5 milliLiter(s) Swish and Spit five times a day  bisacodyl 5 milliGRAM(s) Oral every 12 hours  dextrose 5%. 1000 milliLiter(s) IV Continuous <Continuous>  dextrose 50% Injectable 12.5 Gram(s) IV Push once  dextrose 50% Injectable 25 Gram(s) IV Push once  dextrose 50% Injectable 25 Gram(s) IV Push once  gabapentin 100 milliGRAM(s) Oral three times a day  insulin lispro (HumaLOG) corrective regimen sliding scale   SubCutaneous three times a day before meals  insulin lispro (HumaLOG) corrective regimen sliding scale   SubCutaneous at bedtime  loratadine 10 milliGRAM(s) Oral daily  pantoprazole    Tablet 40 milliGRAM(s) Oral before breakfast  petrolatum white Ointment 1 Application(s) Topical three times a day  polyethylene glycol 3350 17 Gram(s) Oral daily  sodium chloride 0.65% Nasal 1 Spray(s) Both Nostrils every 8 hours  triamcinolone 0.1% Ointment 1 Application(s) Topical every 12 hours      PRN MEDICATIONS  acetaminophen   Tablet .. 650 milliGRAM(s) Oral every 6 hours PRN  ALBUTerol    90 MICROgram(s) HFA Inhaler 2 Puff(s) Inhalation every 6 hours PRN  AQUAPHOR (petrolatum Ointment) 1 Application(s) Topical every 3 hours PRN  dextrose 40% Gel 15 Gram(s) Oral once PRN  diphenhydrAMINE   Injectable 25 milliGRAM(s) IV Push every 6 hours PRN  glucagon  Injectable 1 milliGRAM(s) IntraMuscular once PRN  guaiFENesin   Syrup  (Sugar-Free) 100 milliGRAM(s) Oral every 6 hours PRN  hemorrhoidal Ointment 1 Application(s) Rectal every 8 hours PRN  morphine  - Injectable 3 milliGRAM(s) IV Push every 6 hours PRN  ondansetron Injectable 8 milliGRAM(s) IV Push every 8 hours PRN  sodium chloride 0.9% lock flush 10 milliLiter(s) IV Push every 1 hour PRN        Vital Signs Last 24 Hrs  T(C): 37.4 (29 Jun 2020 05:20), Max: 37.4 (29 Jun 2020 05:20)  T(F): 99.3 (29 Jun 2020 05:20), Max: 99.3 (29 Jun 2020 05:20)  HR: 83 (29 Jun 2020 05:20) (63 - 83)  BP: 155/77 (29 Jun 2020 05:20) (120/67 - 155/77)  BP(mean): --  RR: 18 (29 Jun 2020 05:20) (18 - 18)  SpO2: 97% (29 Jun 2020 05:20) (97% - 99%)     PHYSICAL EXAM  General: adult in NAD  HEENT: clear oropharynx, no erythema, no ulcers  CV: normal S1, S2, RRR  Lungs: clear to auscultation, no wheezes, no rales  Abdomen: soft, nontender, nondistended, normal BS  Ext: no edema  Skin: Warm and dry, Some hyperpigmentation noted around RUE PICC line, Shingles-like rash noted on R buttock (appears crusting over) and R posterior thigh  Neuro: alert and oriented x 3      RADIOLOGY & ADDITIONAL STUDIES:    CT Upper Extremity w/ IV Cont, Right (06.24.20 @ 18:25) >  There is partially occlusive acute thrombus within the right axillary vein beginning at the level of the proximal humeral shaft. There is fat stranding within the medial subcutaneous tissues of the upper arm and posteriorly at the elbow. There is no rim-enhancing fluid collection. There is no cortical erosion or aggressive osseous destruction to suggest osteomyelitis.    There is spurring at the sublime tubercle and at the triceps insertion. There is a bone island within the distal radius dorsally. Right apical pleural/parenchymal scarring with focal calcification is again noted.    Impression: Partially occlusive acute thrombus within the right axillary vein as noted previously. Subcutaneous edema. No abscess.     VA Duplex Upper Ext Vein Scan, Right (06.23.20 @ 17:19) >  IMPRESSION: There is acute, partially occlusive DVT affecting the right axillary vein.     US Extremity Nonvasc Limited, Right (06.20.20 @ 18:53) >  IMPRESSION:    Ultrasound evaluation of the right arm demonstrates mild subcutaneous edema. There is no evidence of hematoma or fluid collection.  Xray Chest 1 View- PORTABLE-Urgent (06.20.20 @ 05:15) >  IMPRESSION:    Right PICC tip within SVC..    Linear opacity within the left upper lung is unchanged. No pleural effusion or pneumothorax. Diagnosis: Acute Promyelocytic Leukemia     Protocol/Chemo Regimen: ATRA/Arsenic Trioxide    Day: ATRA Day 38 and Arsenic Day 44 (Holding Arsenic since Day 26 --> 6/22/20 due to shingles)    Pt endorsed: Right upper inner arm pain and redness. c/o Rash on her Right buttock and Right posterior thigh, mild hearing loss     Review of Systems: Denies nausea, vomiting, diarrhea, chest pain, SOB, headache      Translated by patient's son via phone call per patient request     Pain scale: 3/10    Diet: Consistent Carb w/ Evening Snack    Allergies    No Known Allergies    Intolerances        ANTIMICROBIALS      HEME/ONC MEDICATIONS  enoxaparin Injectable 80 milliGRAM(s) SubCutaneous every 12 hours  tretinoin 40 milliGRAM(s) Oral every 12 hours      STANDING MEDICATIONS  ammonium lactate 12% Lotion 1 Application(s) Topical two times a day  ATENolol  Tablet 25 milliGRAM(s) Oral daily  baclofen 5 milliGRAM(s) Oral every 12 hours  Biotene Dry Mouth Oral Rinse 5 milliLiter(s) Swish and Spit five times a day  bisacodyl 5 milliGRAM(s) Oral every 12 hours  dextrose 5%. 1000 milliLiter(s) IV Continuous <Continuous>  dextrose 50% Injectable 12.5 Gram(s) IV Push once  dextrose 50% Injectable 25 Gram(s) IV Push once  dextrose 50% Injectable 25 Gram(s) IV Push once  gabapentin 100 milliGRAM(s) Oral three times a day  insulin lispro (HumaLOG) corrective regimen sliding scale   SubCutaneous three times a day before meals  insulin lispro (HumaLOG) corrective regimen sliding scale   SubCutaneous at bedtime  loratadine 10 milliGRAM(s) Oral daily  pantoprazole    Tablet 40 milliGRAM(s) Oral before breakfast  petrolatum white Ointment 1 Application(s) Topical three times a day  polyethylene glycol 3350 17 Gram(s) Oral daily  sodium chloride 0.65% Nasal 1 Spray(s) Both Nostrils every 8 hours  triamcinolone 0.1% Ointment 1 Application(s) Topical every 12 hours      PRN MEDICATIONS  acetaminophen   Tablet .. 650 milliGRAM(s) Oral every 6 hours PRN  ALBUTerol    90 MICROgram(s) HFA Inhaler 2 Puff(s) Inhalation every 6 hours PRN  AQUAPHOR (petrolatum Ointment) 1 Application(s) Topical every 3 hours PRN  dextrose 40% Gel 15 Gram(s) Oral once PRN  diphenhydrAMINE   Injectable 25 milliGRAM(s) IV Push every 6 hours PRN  glucagon  Injectable 1 milliGRAM(s) IntraMuscular once PRN  guaiFENesin   Syrup  (Sugar-Free) 100 milliGRAM(s) Oral every 6 hours PRN  hemorrhoidal Ointment 1 Application(s) Rectal every 8 hours PRN  morphine  - Injectable 3 milliGRAM(s) IV Push every 6 hours PRN  ondansetron Injectable 8 milliGRAM(s) IV Push every 8 hours PRN  sodium chloride 0.9% lock flush 10 milliLiter(s) IV Push every 1 hour PRN        Vital Signs Last 24 Hrs  T(C): 37.4 (29 Jun 2020 05:20), Max: 37.4 (29 Jun 2020 05:20)  T(F): 99.3 (29 Jun 2020 05:20), Max: 99.3 (29 Jun 2020 05:20)  HR: 83 (29 Jun 2020 05:20) (63 - 83)  BP: 155/77 (29 Jun 2020 05:20) (120/67 - 155/77)  BP(mean): --  RR: 18 (29 Jun 2020 05:20) (18 - 18)  SpO2: 97% (29 Jun 2020 05:20) (97% - 99%)     PHYSICAL EXAM  General: adult in NAD  HEENT: clear oropharynx, no erythema, no ulcers  CV: normal S1, S2, RRR  Lungs: clear to auscultation, no wheezes, no rales  Abdomen: soft, nontender, nondistended, normal BS  Ext: no edema  Skin: Warm and dry, Some hyperpigmentation noted around RUE PICC line, Shingles-like rash noted on R buttock (appears crusting over) and R posterior thigh  Neuro: alert and oriented x 3  LABS:                          9.8    2.23  )-----------( 221      ( 29 Jun 2020 06:58 )             29.5         Mean Cell Volume : 89.9 fl  Mean Cell Hemoglobin : 29.9 pg  Mean Cell Hemoglobin Concentration : 33.2 gm/dL  Auto Neutrophil # : 0.32 K/uL  Auto Lymphocyte # : 1.62 K/uL  Auto Monocyte # : 0.25 K/uL  Auto Eosinophil # : 0.04 K/uL  Auto Basophil # : 0.00 K/uL  Auto Neutrophil % : 14.4 %  Auto Lymphocyte % : 72.6 %  Auto Monocyte % : 11.2 %  Auto Eosinophil % : 1.8 %  Auto Basophil % : 0.0 %      06-29    142  |  104  |  12  ----------------------------<  98  3.9   |  26  |  0.77    Ca    9.8      29 Jun 2020 06:58  Phos  3.9     06-29  Mg     2.2     06-29    TPro  7.8  /  Alb  4.7  /  TBili  0.4  /  DBili  x   /  AST  39  /  ALT  39  /  AlkPhos  128<H>  06-29      PT/INR - ( 29 Jun 2020 06:58 )   PT: 13.2 sec;   INR: 1.14 ratio         PTT - ( 29 Jun 2020 06:58 )  PTT:40.7 sec      Uric Acid 5.4      RADIOLOGY & ADDITIONAL STUDIES:    CT Upper Extremity w/ IV Cont, Right (06.24.20 @ 18:25) >  There is partially occlusive acute thrombus within the right axillary vein beginning at the level of the proximal humeral shaft. There is fat stranding within the medial subcutaneous tissues of the upper arm and posteriorly at the elbow. There is no rim-enhancing fluid collection. There is no cortical erosion or aggressive osseous destruction to suggest osteomyelitis.    There is spurring at the sublime tubercle and at the triceps insertion. There is a bone island within the distal radius dorsally. Right apical pleural/parenchymal scarring with focal calcification is again noted.    Impression: Partially occlusive acute thrombus within the right axillary vein as noted previously. Subcutaneous edema. No abscess.     VA Duplex Upper Ext Vein Scan, Right (06.23.20 @ 17:19) >  IMPRESSION: There is acute, partially occlusive DVT affecting the right axillary vein.     US Extremity Nonvasc Limited, Right (06.20.20 @ 18:53) >  IMPRESSION:    Ultrasound evaluation of the right arm demonstrates mild subcutaneous edema. There is no evidence of hematoma or fluid collection.  Xray Chest 1 View- PORTABLE-Urgent (06.20.20 @ 05:15) >  IMPRESSION:    Right PICC tip within SVC..    Linear opacity within the left upper lung is unchanged. No pleural effusion or pneumothorax. Diagnosis: Acute Promyelocytic Leukemia     Protocol/Chemo Regimen: ATRA/Arsenic Trioxide    Day: ATRA Day 38 and Arsenic Day 44 (Holding Arsenic since Day 26 --> 6/22/20 due to shingles)    Pt endorsed: Right upper inner arm pain and redness. c/o Rash on her Right buttock and Right posterior thigh, mild hearing loss , right arm itching     Review of Systems: Denies nausea, vomiting, diarrhea, chest pain, SOB, headache      Translated by patient's son via phone call per patient request     Pain scale: 3/10    Diet: Consistent Carb w/ Evening Snack    Allergies    No Known Allergies    Intolerances        ANTIMICROBIALS      HEME/ONC MEDICATIONS  enoxaparin Injectable 80 milliGRAM(s) SubCutaneous every 12 hours  tretinoin 40 milliGRAM(s) Oral every 12 hours      STANDING MEDICATIONS  ammonium lactate 12% Lotion 1 Application(s) Topical two times a day  ATENolol  Tablet 25 milliGRAM(s) Oral daily  baclofen 5 milliGRAM(s) Oral every 12 hours  Biotene Dry Mouth Oral Rinse 5 milliLiter(s) Swish and Spit five times a day  bisacodyl 5 milliGRAM(s) Oral every 12 hours  dextrose 5%. 1000 milliLiter(s) IV Continuous <Continuous>  dextrose 50% Injectable 12.5 Gram(s) IV Push once  dextrose 50% Injectable 25 Gram(s) IV Push once  dextrose 50% Injectable 25 Gram(s) IV Push once  gabapentin 100 milliGRAM(s) Oral three times a day  insulin lispro (HumaLOG) corrective regimen sliding scale   SubCutaneous three times a day before meals  insulin lispro (HumaLOG) corrective regimen sliding scale   SubCutaneous at bedtime  loratadine 10 milliGRAM(s) Oral daily  pantoprazole    Tablet 40 milliGRAM(s) Oral before breakfast  petrolatum white Ointment 1 Application(s) Topical three times a day  polyethylene glycol 3350 17 Gram(s) Oral daily  sodium chloride 0.65% Nasal 1 Spray(s) Both Nostrils every 8 hours  triamcinolone 0.1% Ointment 1 Application(s) Topical every 12 hours      PRN MEDICATIONS  acetaminophen   Tablet .. 650 milliGRAM(s) Oral every 6 hours PRN  ALBUTerol    90 MICROgram(s) HFA Inhaler 2 Puff(s) Inhalation every 6 hours PRN  AQUAPHOR (petrolatum Ointment) 1 Application(s) Topical every 3 hours PRN  dextrose 40% Gel 15 Gram(s) Oral once PRN  diphenhydrAMINE   Injectable 25 milliGRAM(s) IV Push every 6 hours PRN  glucagon  Injectable 1 milliGRAM(s) IntraMuscular once PRN  guaiFENesin   Syrup  (Sugar-Free) 100 milliGRAM(s) Oral every 6 hours PRN  hemorrhoidal Ointment 1 Application(s) Rectal every 8 hours PRN  morphine  - Injectable 3 milliGRAM(s) IV Push every 6 hours PRN  ondansetron Injectable 8 milliGRAM(s) IV Push every 8 hours PRN  sodium chloride 0.9% lock flush 10 milliLiter(s) IV Push every 1 hour PRN        Vital Signs Last 24 Hrs  T(C): 37.4 (29 Jun 2020 05:20), Max: 37.4 (29 Jun 2020 05:20)  T(F): 99.3 (29 Jun 2020 05:20), Max: 99.3 (29 Jun 2020 05:20)  HR: 83 (29 Jun 2020 05:20) (63 - 83)  BP: 155/77 (29 Jun 2020 05:20) (120/67 - 155/77)  BP(mean): --  RR: 18 (29 Jun 2020 05:20) (18 - 18)  SpO2: 97% (29 Jun 2020 05:20) (97% - 99%)     PHYSICAL EXAM  General: adult in NAD  HEENT: clear oropharynx, no erythema, no ulcers  CV: normal S1, S2, RRR  Lungs: clear to auscultation, no wheezes, no rales  Abdomen: soft, nontender, nondistended, normal BS  Ext: no edema  Skin: Warm and dry, Some hyperpigmentation noted around RUE PICC line, Shingles-like rash noted on R buttock (appears crusting over)  R posterior thigh and right leg   Neuro: alert and oriented x 3  LABS:                          9.8    2.23  )-----------( 221      ( 29 Jun 2020 06:58 )             29.5         Mean Cell Volume : 89.9 fl  Mean Cell Hemoglobin : 29.9 pg  Mean Cell Hemoglobin Concentration : 33.2 gm/dL  Auto Neutrophil # : 0.32 K/uL  Auto Lymphocyte # : 1.62 K/uL  Auto Monocyte # : 0.25 K/uL  Auto Eosinophil # : 0.04 K/uL  Auto Basophil # : 0.00 K/uL  Auto Neutrophil % : 14.4 %  Auto Lymphocyte % : 72.6 %  Auto Monocyte % : 11.2 %  Auto Eosinophil % : 1.8 %  Auto Basophil % : 0.0 %      06-29    142  |  104  |  12  ----------------------------<  98  3.9   |  26  |  0.77    Ca    9.8      29 Jun 2020 06:58  Phos  3.9     06-29  Mg     2.2     06-29    TPro  7.8  /  Alb  4.7  /  TBili  0.4  /  DBili  x   /  AST  39  /  ALT  39  /  AlkPhos  128<H>  06-29      PT/INR - ( 29 Jun 2020 06:58 )   PT: 13.2 sec;   INR: 1.14 ratio         PTT - ( 29 Jun 2020 06:58 )  PTT:40.7 sec      Uric Acid 5.4      RADIOLOGY & ADDITIONAL STUDIES:    CT Upper Extremity w/ IV Cont, Right (06.24.20 @ 18:25) >  There is partially occlusive acute thrombus within the right axillary vein beginning at the level of the proximal humeral shaft. There is fat stranding within the medial subcutaneous tissues of the upper arm and posteriorly at the elbow. There is no rim-enhancing fluid collection. There is no cortical erosion or aggressive osseous destruction to suggest osteomyelitis.    There is spurring at the sublime tubercle and at the triceps insertion. There is a bone island within the distal radius dorsally. Right apical pleural/parenchymal scarring with focal calcification is again noted.    Impression: Partially occlusive acute thrombus within the right axillary vein as noted previously. Subcutaneous edema. No abscess.     VA Duplex Upper Ext Vein Scan, Right (06.23.20 @ 17:19) >  IMPRESSION: There is acute, partially occlusive DVT affecting the right axillary vein.     US Extremity Nonvasc Limited, Right (06.20.20 @ 18:53) >  IMPRESSION:    Ultrasound evaluation of the right arm demonstrates mild subcutaneous edema. There is no evidence of hematoma or fluid collection.  Xray Chest 1 View- PORTABLE-Urgent (06.20.20 @ 05:15) >  IMPRESSION:    Right PICC tip within SVC..    Linear opacity within the left upper lung is unchanged. No pleural effusion or pneumothorax.

## 2020-06-30 LAB
ALBUMIN SERPL ELPH-MCNC: 4.6 G/DL — SIGNIFICANT CHANGE UP (ref 3.3–5)
ALP SERPL-CCNC: 126 U/L — HIGH (ref 40–120)
ALT FLD-CCNC: 32 U/L — SIGNIFICANT CHANGE UP (ref 10–45)
ANION GAP SERPL CALC-SCNC: 11 MMOL/L — SIGNIFICANT CHANGE UP (ref 5–17)
AST SERPL-CCNC: 31 U/L — SIGNIFICANT CHANGE UP (ref 10–40)
BASOPHILS # BLD AUTO: 0.04 K/UL — SIGNIFICANT CHANGE UP (ref 0–0.2)
BASOPHILS NFR BLD AUTO: 1.8 % — SIGNIFICANT CHANGE UP (ref 0–2)
BILIRUB SERPL-MCNC: 0.4 MG/DL — SIGNIFICANT CHANGE UP (ref 0.2–1.2)
BUN SERPL-MCNC: 14 MG/DL — SIGNIFICANT CHANGE UP (ref 7–23)
CALCIUM SERPL-MCNC: 10.3 MG/DL — SIGNIFICANT CHANGE UP (ref 8.4–10.5)
CHLORIDE SERPL-SCNC: 102 MMOL/L — SIGNIFICANT CHANGE UP (ref 96–108)
CO2 SERPL-SCNC: 26 MMOL/L — SIGNIFICANT CHANGE UP (ref 22–31)
CREAT SERPL-MCNC: 0.96 MG/DL — SIGNIFICANT CHANGE UP (ref 0.5–1.3)
D DIMER BLD IA.RAPID-MCNC: 490 NG/ML DDU — HIGH
EOSINOPHIL # BLD AUTO: 0.06 K/UL — SIGNIFICANT CHANGE UP (ref 0–0.5)
EOSINOPHIL NFR BLD AUTO: 2.7 % — SIGNIFICANT CHANGE UP (ref 0–6)
FIBRINOGEN PPP-MCNC: 379 MG/DL — SIGNIFICANT CHANGE UP (ref 350–510)
GIANT PLATELETS BLD QL SMEAR: PRESENT — SIGNIFICANT CHANGE UP
GLUCOSE BLDC GLUCOMTR-MCNC: 112 MG/DL — HIGH (ref 70–99)
GLUCOSE BLDC GLUCOMTR-MCNC: 121 MG/DL — HIGH (ref 70–99)
GLUCOSE BLDC GLUCOMTR-MCNC: 129 MG/DL — HIGH (ref 70–99)
GLUCOSE BLDC GLUCOMTR-MCNC: 141 MG/DL — HIGH (ref 70–99)
GLUCOSE SERPL-MCNC: 157 MG/DL — HIGH (ref 70–99)
HCT VFR BLD CALC: 28.7 % — LOW (ref 34.5–45)
HEMATOPATHOLOGY REPORT: SIGNIFICANT CHANGE UP
HGB BLD-MCNC: 9.5 G/DL — LOW (ref 11.5–15.5)
LDH SERPL L TO P-CCNC: 174 U/L — SIGNIFICANT CHANGE UP (ref 50–242)
LYMPHOCYTES # BLD AUTO: 1.52 K/UL — SIGNIFICANT CHANGE UP (ref 1–3.3)
LYMPHOCYTES # BLD AUTO: 69 % — HIGH (ref 13–44)
MAGNESIUM SERPL-MCNC: 1.9 MG/DL — SIGNIFICANT CHANGE UP (ref 1.6–2.6)
MANUAL SMEAR VERIFICATION: SIGNIFICANT CHANGE UP
MCHC RBC-ENTMCNC: 29.6 PG — SIGNIFICANT CHANGE UP (ref 27–34)
MCHC RBC-ENTMCNC: 33.1 GM/DL — SIGNIFICANT CHANGE UP (ref 32–36)
MCV RBC AUTO: 89.4 FL — SIGNIFICANT CHANGE UP (ref 80–100)
MONOCYTES # BLD AUTO: 0.19 K/UL — SIGNIFICANT CHANGE UP (ref 0–0.9)
MONOCYTES NFR BLD AUTO: 8.8 % — SIGNIFICANT CHANGE UP (ref 2–14)
NEUTROPHILS # BLD AUTO: 0.39 K/UL — LOW (ref 1.8–7.4)
NEUTROPHILS NFR BLD AUTO: 17.7 % — LOW (ref 43–77)
PHOSPHATE SERPL-MCNC: 3.9 MG/DL — SIGNIFICANT CHANGE UP (ref 2.5–4.5)
PLAT MORPH BLD: ABNORMAL
PLATELET # BLD AUTO: 214 K/UL — SIGNIFICANT CHANGE UP (ref 150–400)
POTASSIUM SERPL-MCNC: 4.2 MMOL/L — SIGNIFICANT CHANGE UP (ref 3.5–5.3)
POTASSIUM SERPL-SCNC: 4.2 MMOL/L — SIGNIFICANT CHANGE UP (ref 3.5–5.3)
PROT SERPL-MCNC: 7.4 G/DL — SIGNIFICANT CHANGE UP (ref 6–8.3)
RBC # BLD: 3.21 M/UL — LOW (ref 3.8–5.2)
RBC # FLD: 15.7 % — HIGH (ref 10.3–14.5)
RBC BLD AUTO: SIGNIFICANT CHANGE UP
SODIUM SERPL-SCNC: 139 MMOL/L — SIGNIFICANT CHANGE UP (ref 135–145)
TM INTERPRETATION: SIGNIFICANT CHANGE UP
URATE SERPL-MCNC: 5.3 MG/DL — SIGNIFICANT CHANGE UP (ref 2.5–7)
WBC # BLD: 2.21 K/UL — LOW (ref 3.8–10.5)
WBC # FLD AUTO: 2.21 K/UL — LOW (ref 3.8–10.5)

## 2020-06-30 PROCEDURE — 93010 ELECTROCARDIOGRAM REPORT: CPT

## 2020-06-30 PROCEDURE — 99232 SBSQ HOSP IP/OBS MODERATE 35: CPT

## 2020-06-30 RX ORDER — OFLOXACIN 0.3 %
4 DROPS OPHTHALMIC (EYE)
Refills: 0 | Status: COMPLETED | OUTPATIENT
Start: 2020-06-30 | End: 2020-07-07

## 2020-06-30 RX ORDER — ARSENIC TRIOXIDE 2 MG/ML
11 INJECTION, SOLUTION INTRAVENOUS EVERY 24 HOURS
Refills: 0 | Status: COMPLETED | OUTPATIENT
Start: 2020-06-30 | End: 2020-07-06

## 2020-06-30 RX ORDER — DIPHENHYDRAMINE HCL 50 MG
25 CAPSULE ORAL EVERY 4 HOURS
Refills: 0 | Status: DISCONTINUED | OUTPATIENT
Start: 2020-06-30 | End: 2020-07-05

## 2020-06-30 RX ORDER — MORPHINE SULFATE 50 MG/1
3 CAPSULE, EXTENDED RELEASE ORAL EVERY 6 HOURS
Refills: 0 | Status: DISCONTINUED | OUTPATIENT
Start: 2020-06-30 | End: 2020-07-06

## 2020-06-30 RX ORDER — ENOXAPARIN SODIUM 100 MG/ML
80 INJECTION SUBCUTANEOUS
Qty: 4800 | Refills: 2
Start: 2020-06-30 | End: 2020-09-27

## 2020-06-30 RX ORDER — MAGNESIUM SULFATE 500 MG/ML
2 VIAL (ML) INJECTION ONCE
Refills: 0 | Status: COMPLETED | OUTPATIENT
Start: 2020-06-30 | End: 2020-06-30

## 2020-06-30 RX ADMIN — Medication 1 SPRAY(S): at 06:26

## 2020-06-30 RX ADMIN — TRETINOIN 40 MILLIGRAM(S): 10 CAPSULE, LIQUID FILLED ORAL at 09:43

## 2020-06-30 RX ADMIN — CARBAMIDE PEROXIDE 5 DROP(S): 81.86 SOLUTION/ DROPS AURICULAR (OTIC) at 17:54

## 2020-06-30 RX ADMIN — Medication 4 DROP(S): at 17:55

## 2020-06-30 RX ADMIN — Medication 50 GRAM(S): at 17:59

## 2020-06-30 RX ADMIN — Medication 5 MILLIGRAM(S): at 17:55

## 2020-06-30 RX ADMIN — Medication 5 MILLILITER(S): at 21:52

## 2020-06-30 RX ADMIN — VALACYCLOVIR 1000 MILLIGRAM(S): 500 TABLET, FILM COATED ORAL at 21:52

## 2020-06-30 RX ADMIN — POSACONAZOLE 300 MILLIGRAM(S): 100 TABLET, DELAYED RELEASE ORAL at 12:51

## 2020-06-30 RX ADMIN — CARBAMIDE PEROXIDE 5 DROP(S): 81.86 SOLUTION/ DROPS AURICULAR (OTIC) at 06:37

## 2020-06-30 RX ADMIN — Medication 5 MILLIGRAM(S): at 06:27

## 2020-06-30 RX ADMIN — Medication 5 MILLILITER(S): at 17:54

## 2020-06-30 RX ADMIN — Medication 1 APPLICATION(S): at 22:38

## 2020-06-30 RX ADMIN — Medication 25 MILLIGRAM(S): at 13:04

## 2020-06-30 RX ADMIN — GABAPENTIN 100 MILLIGRAM(S): 400 CAPSULE ORAL at 12:51

## 2020-06-30 RX ADMIN — Medication 1 APPLICATION(S): at 06:29

## 2020-06-30 RX ADMIN — PANTOPRAZOLE SODIUM 40 MILLIGRAM(S): 20 TABLET, DELAYED RELEASE ORAL at 06:28

## 2020-06-30 RX ADMIN — Medication 5 MILLILITER(S): at 12:50

## 2020-06-30 RX ADMIN — Medication 1 APPLICATION(S): at 06:28

## 2020-06-30 RX ADMIN — ARSENIC TRIOXIDE 130.5 MILLIGRAM(S): 2 INJECTION, SOLUTION INTRAVENOUS at 14:45

## 2020-06-30 RX ADMIN — Medication 650 MILLIGRAM(S): at 13:04

## 2020-06-30 RX ADMIN — Medication 1 APPLICATION(S): at 06:37

## 2020-06-30 RX ADMIN — GABAPENTIN 100 MILLIGRAM(S): 400 CAPSULE ORAL at 21:52

## 2020-06-30 RX ADMIN — ATENOLOL 25 MILLIGRAM(S): 25 TABLET ORAL at 06:29

## 2020-06-30 RX ADMIN — TRETINOIN 40 MILLIGRAM(S): 10 CAPSULE, LIQUID FILLED ORAL at 21:52

## 2020-06-30 RX ADMIN — POLYETHYLENE GLYCOL 3350 17 GRAM(S): 17 POWDER, FOR SOLUTION ORAL at 12:51

## 2020-06-30 RX ADMIN — Medication 1 APPLICATION(S): at 17:53

## 2020-06-30 RX ADMIN — ENOXAPARIN SODIUM 80 MILLIGRAM(S): 100 INJECTION SUBCUTANEOUS at 06:25

## 2020-06-30 RX ADMIN — Medication 1 SPRAY(S): at 12:51

## 2020-06-30 RX ADMIN — GABAPENTIN 100 MILLIGRAM(S): 400 CAPSULE ORAL at 06:27

## 2020-06-30 RX ADMIN — VALACYCLOVIR 1000 MILLIGRAM(S): 500 TABLET, FILM COATED ORAL at 06:26

## 2020-06-30 RX ADMIN — VALACYCLOVIR 1000 MILLIGRAM(S): 500 TABLET, FILM COATED ORAL at 12:51

## 2020-06-30 RX ADMIN — Medication 1 SPRAY(S): at 21:52

## 2020-06-30 NOTE — PROGRESS NOTE ADULT - SUBJECTIVE AND OBJECTIVE BOX
ENT ISSUE/POD: R hearing loss    HPI: 61 yo female c/o R hearing loss x1 week, no new complaints. Notes difficulty with holding phone to R ear.         PAST MEDICAL & SURGICAL HISTORY:  HLD (hyperlipidemia)  Hypertension  Diabetes  No significant past surgical history    Allergies    No Known Allergies    Intolerances      MEDICATIONS  (STANDING):  ammonium lactate 12% Lotion 1 Application(s) Topical two times a day  arsenic trioxide IVPB (eMAR) 11 milliGRAM(s) IV Intermittent every 24 hours  ATENolol  Tablet 25 milliGRAM(s) Oral daily  baclofen 5 milliGRAM(s) Oral every 12 hours  Biotene Dry Mouth Oral Rinse 5 milliLiter(s) Swish and Spit five times a day  bisacodyl 5 milliGRAM(s) Oral every 12 hours  carbamide peroxide Otic Solution 5 Drop(s) Right Ear two times a day  dextrose 5%. 1000 milliLiter(s) (50 mL/Hr) IV Continuous <Continuous>  dextrose 50% Injectable 12.5 Gram(s) IV Push once  dextrose 50% Injectable 25 Gram(s) IV Push once  dextrose 50% Injectable 25 Gram(s) IV Push once  gabapentin 100 milliGRAM(s) Oral three times a day  insulin lispro (HumaLOG) corrective regimen sliding scale   SubCutaneous three times a day before meals  insulin lispro (HumaLOG) corrective regimen sliding scale   SubCutaneous at bedtime  levoFLOXacin  Tablet 500 milliGRAM(s) Oral every 24 hours  magnesium sulfate  IVPB 2 Gram(s) IV Intermittent once  ofloxacin 0.3% Solution 4 Drop(s) Right Ear two times a day  pantoprazole    Tablet 40 milliGRAM(s) Oral before breakfast  petrolatum white Ointment 1 Application(s) Topical three times a day  polyethylene glycol 3350 17 Gram(s) Oral daily  posaconazole DR Tablet 300 milliGRAM(s) Oral daily  sodium chloride 0.65% Nasal 1 Spray(s) Both Nostrils every 8 hours  tretinoin 40 milliGRAM(s) Oral every 12 hours  triamcinolone 0.1% Ointment 1 Application(s) Topical every 12 hours  valACYclovir 1000 milliGRAM(s) Oral every 8 hours    MEDICATIONS  (PRN):  acetaminophen   Tablet .. 650 milliGRAM(s) Oral every 6 hours PRN Temp greater or equal to 38C (100.4F), Mild Pain (1 - 3)  ALBUTerol    90 MICROgram(s) HFA Inhaler 2 Puff(s) Inhalation every 6 hours PRN Shortness of Breath and/or Wheezing  AQUAPHOR (petrolatum Ointment) 1 Application(s) Topical every 3 hours PRN Dry skin  dextrose 40% Gel 15 Gram(s) Oral once PRN Blood Glucose LESS THAN 70 milliGRAM(s)/deciliter  diphenhydrAMINE 25 milliGRAM(s) Oral every 4 hours PRN Rash and/or Itching  diphenhydrAMINE   Injectable 25 milliGRAM(s) IV Push every 6 hours PRN Allergy symptoms  glucagon  Injectable 1 milliGRAM(s) IntraMuscular once PRN Glucose LESS THAN 70 milligrams/deciliter  guaiFENesin   Syrup  (Sugar-Free) 100 milliGRAM(s) Oral every 6 hours PRN Cough  hemorrhoidal Ointment 1 Application(s) Rectal every 8 hours PRN Hemorrhoids  morphine  - Injectable 3 milliGRAM(s) IV Push every 6 hours PRN Moderate Pain (4 - 6)  ondansetron Injectable 8 milliGRAM(s) IV Push every 8 hours PRN Nausea and/or Vomiting  sodium chloride 0.9% lock flush 10 milliLiter(s) IV Push every 1 hour PRN Pre/post blood products, medications, blood draw, and to maintain line patency      ROS:   ENT: all negative except as noted in HPI   Pulm: denies SOB, cough, hemoptysis  Neuro: denies numbness/tingling, loss of sensation  Endo: denies heat/cold intolerance, excessive sweating      Vital Signs Last 24 Hrs  T(C): 36.6 (30 Jun 2020 13:21), Max: 36.6 (29 Jun 2020 17:16)  T(F): 97.8 (30 Jun 2020 13:21), Max: 97.8 (29 Jun 2020 17:16)  HR: 61 (30 Jun 2020 13:21) (61 - 75)  BP: 136/79 (30 Jun 2020 13:21) (124/68 - 145/84)  BP(mean): --  RR: 18 (30 Jun 2020 13:21) (18 - 18)  SpO2: 98% (30 Jun 2020 13:21) (97% - 100%)                          9.5    2.21  )-----------( 214      ( 30 Jun 2020 09:27 )             28.7    06-30    139  |  102  |  14  ----------------------------<  157<H>  4.2   |  26  |  0.96    Ca    10.3      30 Jun 2020 09:37  Phos  3.9     06-30  Mg     1.9     06-30    TPro  7.4  /  Alb  4.6  /  TBili  0.4  /  DBili  x   /  AST  31  /  ALT  32  /  AlkPhos  126<H>  06-30   PT/INR - ( 29 Jun 2020 06:58 )   PT: 13.2 sec;   INR: 1.14 ratio         PTT - ( 29 Jun 2020 06:58 )  PTT:40.7 sec    PHYSICAL EXAM:  Gen: NAD  Skin: No rashes, bruises, or lesions  Head: Normocephalic, Atraumatic  Face: no edema, erythema, or fluctuance. Parotid glands soft without mass  Eyes: no scleral injection  Ears: Right - ear canal with clear thin fluid, TM with central perforation and underlying exudate and granulation, no erythema. No mastoid tenderness, erythema, or ear bulging            Left - ear canal clear, TM intact without effusion or erythema. No evidence of any fluid drainage. No mastoid tenderness, erythema, or ear bulging  Nose: Nares bilaterally patent, no discharge  Mouth: No Stridor / Drooling / Trismus.  Mucosa moist, tongue/uvula midline, oropharynx clear  Neck: Flat, supple, no lymphadenopathy, trachea midline, no masses  Lymphatic: No lymphadenopathy  Resp: breathing easily, no stridor  Neuro: facial nerve intact, no facial droop ENT ISSUE/POD: R hearing loss    HPI: 59 yo female c/o R>L hearing loss x1 week, no new complaints. Notes difficulty with holding phone to R ear.         PAST MEDICAL & SURGICAL HISTORY:  HLD (hyperlipidemia)  Hypertension  Diabetes  No significant past surgical history    Allergies    No Known Allergies    Intolerances      MEDICATIONS  (STANDING):  ammonium lactate 12% Lotion 1 Application(s) Topical two times a day  arsenic trioxide IVPB (eMAR) 11 milliGRAM(s) IV Intermittent every 24 hours  ATENolol  Tablet 25 milliGRAM(s) Oral daily  baclofen 5 milliGRAM(s) Oral every 12 hours  Biotene Dry Mouth Oral Rinse 5 milliLiter(s) Swish and Spit five times a day  bisacodyl 5 milliGRAM(s) Oral every 12 hours  carbamide peroxide Otic Solution 5 Drop(s) Right Ear two times a day  dextrose 5%. 1000 milliLiter(s) (50 mL/Hr) IV Continuous <Continuous>  dextrose 50% Injectable 12.5 Gram(s) IV Push once  dextrose 50% Injectable 25 Gram(s) IV Push once  dextrose 50% Injectable 25 Gram(s) IV Push once  gabapentin 100 milliGRAM(s) Oral three times a day  insulin lispro (HumaLOG) corrective regimen sliding scale   SubCutaneous three times a day before meals  insulin lispro (HumaLOG) corrective regimen sliding scale   SubCutaneous at bedtime  levoFLOXacin  Tablet 500 milliGRAM(s) Oral every 24 hours  magnesium sulfate  IVPB 2 Gram(s) IV Intermittent once  ofloxacin 0.3% Solution 4 Drop(s) Right Ear two times a day  pantoprazole    Tablet 40 milliGRAM(s) Oral before breakfast  petrolatum white Ointment 1 Application(s) Topical three times a day  polyethylene glycol 3350 17 Gram(s) Oral daily  posaconazole DR Tablet 300 milliGRAM(s) Oral daily  sodium chloride 0.65% Nasal 1 Spray(s) Both Nostrils every 8 hours  tretinoin 40 milliGRAM(s) Oral every 12 hours  triamcinolone 0.1% Ointment 1 Application(s) Topical every 12 hours  valACYclovir 1000 milliGRAM(s) Oral every 8 hours    MEDICATIONS  (PRN):  acetaminophen   Tablet .. 650 milliGRAM(s) Oral every 6 hours PRN Temp greater or equal to 38C (100.4F), Mild Pain (1 - 3)  ALBUTerol    90 MICROgram(s) HFA Inhaler 2 Puff(s) Inhalation every 6 hours PRN Shortness of Breath and/or Wheezing  AQUAPHOR (petrolatum Ointment) 1 Application(s) Topical every 3 hours PRN Dry skin  dextrose 40% Gel 15 Gram(s) Oral once PRN Blood Glucose LESS THAN 70 milliGRAM(s)/deciliter  diphenhydrAMINE 25 milliGRAM(s) Oral every 4 hours PRN Rash and/or Itching  diphenhydrAMINE   Injectable 25 milliGRAM(s) IV Push every 6 hours PRN Allergy symptoms  glucagon  Injectable 1 milliGRAM(s) IntraMuscular once PRN Glucose LESS THAN 70 milligrams/deciliter  guaiFENesin   Syrup  (Sugar-Free) 100 milliGRAM(s) Oral every 6 hours PRN Cough  hemorrhoidal Ointment 1 Application(s) Rectal every 8 hours PRN Hemorrhoids  morphine  - Injectable 3 milliGRAM(s) IV Push every 6 hours PRN Moderate Pain (4 - 6)  ondansetron Injectable 8 milliGRAM(s) IV Push every 8 hours PRN Nausea and/or Vomiting  sodium chloride 0.9% lock flush 10 milliLiter(s) IV Push every 1 hour PRN Pre/post blood products, medications, blood draw, and to maintain line patency      ROS:   ENT: all negative except as noted in HPI   Pulm: denies SOB, cough, hemoptysis  Neuro: denies numbness/tingling, loss of sensation  Endo: denies heat/cold intolerance, excessive sweating      Vital Signs Last 24 Hrs  T(C): 36.6 (30 Jun 2020 13:21), Max: 36.6 (29 Jun 2020 17:16)  T(F): 97.8 (30 Jun 2020 13:21), Max: 97.8 (29 Jun 2020 17:16)  HR: 61 (30 Jun 2020 13:21) (61 - 75)  BP: 136/79 (30 Jun 2020 13:21) (124/68 - 145/84)  BP(mean): --  RR: 18 (30 Jun 2020 13:21) (18 - 18)  SpO2: 98% (30 Jun 2020 13:21) (97% - 100%)                          9.5    2.21  )-----------( 214      ( 30 Jun 2020 09:27 )             28.7    06-30    139  |  102  |  14  ----------------------------<  157<H>  4.2   |  26  |  0.96    Ca    10.3      30 Jun 2020 09:37  Phos  3.9     06-30  Mg     1.9     06-30    TPro  7.4  /  Alb  4.6  /  TBili  0.4  /  DBili  x   /  AST  31  /  ALT  32  /  AlkPhos  126<H>  06-30   PT/INR - ( 29 Jun 2020 06:58 )   PT: 13.2 sec;   INR: 1.14 ratio         PTT - ( 29 Jun 2020 06:58 )  PTT:40.7 sec    PHYSICAL EXAM:  Gen: NAD  Skin: No rashes, bruises, or lesions  Head: Normocephalic, Atraumatic  Face: no edema, erythema, or fluctuance. Parotid glands soft without mass  Eyes: no scleral injection  Ears: Right - ear canal with clear thin fluid, TM with central perforation and underlying exudate and granulation, no erythema. No mastoid tenderness, erythema, or ear bulging            Left - ear canal clear, TM intact without effusion or erythema. No evidence of any fluid drainage. No mastoid tenderness, erythema, or ear bulging  Nose: Nares bilaterally patent, no discharge  Mouth: No Stridor / Drooling / Trismus.  Mucosa moist, tongue/uvula midline, oropharynx clear  Neck: Flat, supple, no lymphadenopathy, trachea midline, no masses  Lymphatic: No lymphadenopathy  Resp: breathing easily, no stridor  Neuro: facial nerve intact, no facial droop      Audiological Assessment:    General Information:  · Method	Standard	  · Reliability	Good	  · Audiometer	MA41	    Air Conduction:     Right Ear:   · 250 Hz - Unmasked	85	  · 500 Hz - Unmasked	95	  · 1000 Hz - Unmasked	100	  · 2000 Hz - Unmasked	115	  · 3000 Hz - Unmasked	105	  · 4000 Hz - Unmasked	110	  · 6000 Hz - Unmasked	100	  · 8000 Hz - Unmasked	100	      Left Ear:   · 250 Hz - Unmasked	90	  · 500 Hz - Unmasked	95	  · 1000 Hz - Unmasked	90	  · 2000 Hz - Unmasked	80	  · 3000 Hz - Unmasked	80	  · 4000 Hz - Unmasked	85	  · 6000 Hz - Unmasked	85	  · 8000 Hz - Unmasked	90	    Bone Conduction:     Right Ear:   · 250 Hz - Unmasked	35	  · 500 Hz - Unmasked	65	  · 1000 Hz - Unmasked	65	  · 2000 Hz - Unmasked	75	  · 4000 Hz - Unmasked	65	      Comments:   · Comments		  Essentially severe hearing loss in the left ear and essentially profound hearing loss in the right ear, 250-8kHz; asymmetry noted 2k-6kHz, right ear poorer than left. Unmasked bone conduction suggests mixed hearing loss in at least one ear (unable to mask).

## 2020-06-30 NOTE — PROGRESS NOTE ADULT - SUBJECTIVE AND OBJECTIVE BOX
Diagnosis:    Protocol/Chemo Regimen:    Day:     Pt endorsed:    Review of Systems:     Pain scale:     Diet:     Allergies    No Known Allergies    Intolerances        ANTIMICROBIALS  levoFLOXacin  Tablet 500 milliGRAM(s) Oral every 24 hours  posaconazole DR Tablet 300 milliGRAM(s) Oral daily  valACYclovir 1000 milliGRAM(s) Oral every 8 hours      HEME/ONC MEDICATIONS  enoxaparin Injectable 80 milliGRAM(s) SubCutaneous every 12 hours  tretinoin 40 milliGRAM(s) Oral every 12 hours      STANDING MEDICATIONS  ammonium lactate 12% Lotion 1 Application(s) Topical two times a day  ATENolol  Tablet 25 milliGRAM(s) Oral daily  baclofen 5 milliGRAM(s) Oral every 12 hours  Biotene Dry Mouth Oral Rinse 5 milliLiter(s) Swish and Spit five times a day  bisacodyl 5 milliGRAM(s) Oral every 12 hours  carbamide peroxide Otic Solution 5 Drop(s) Right Ear two times a day  dextrose 5%. 1000 milliLiter(s) IV Continuous <Continuous>  dextrose 50% Injectable 12.5 Gram(s) IV Push once  dextrose 50% Injectable 25 Gram(s) IV Push once  dextrose 50% Injectable 25 Gram(s) IV Push once  gabapentin 100 milliGRAM(s) Oral three times a day  insulin lispro (HumaLOG) corrective regimen sliding scale   SubCutaneous three times a day before meals  insulin lispro (HumaLOG) corrective regimen sliding scale   SubCutaneous at bedtime  pantoprazole    Tablet 40 milliGRAM(s) Oral before breakfast  petrolatum white Ointment 1 Application(s) Topical three times a day  polyethylene glycol 3350 17 Gram(s) Oral daily  sodium chloride 0.65% Nasal 1 Spray(s) Both Nostrils every 8 hours  triamcinolone 0.1% Ointment 1 Application(s) Topical every 12 hours      PRN MEDICATIONS  acetaminophen   Tablet .. 650 milliGRAM(s) Oral every 6 hours PRN  ALBUTerol    90 MICROgram(s) HFA Inhaler 2 Puff(s) Inhalation every 6 hours PRN  AQUAPHOR (petrolatum Ointment) 1 Application(s) Topical every 3 hours PRN  dextrose 40% Gel 15 Gram(s) Oral once PRN  diphenhydrAMINE   Injectable 25 milliGRAM(s) IV Push every 6 hours PRN  glucagon  Injectable 1 milliGRAM(s) IntraMuscular once PRN  guaiFENesin   Syrup  (Sugar-Free) 100 milliGRAM(s) Oral every 6 hours PRN  hemorrhoidal Ointment 1 Application(s) Rectal every 8 hours PRN  morphine  - Injectable 3 milliGRAM(s) IV Push every 6 hours PRN  ondansetron Injectable 8 milliGRAM(s) IV Push every 8 hours PRN  sodium chloride 0.9% lock flush 10 milliLiter(s) IV Push every 1 hour PRN        Vital Signs Last 24 Hrs  T(C): 36.4 (30 Jun 2020 05:52), Max: 37.2 (29 Jun 2020 09:00)  T(F): 97.5 (30 Jun 2020 05:52), Max: 98.9 (29 Jun 2020 09:00)  HR: 75 (30 Jun 2020 05:52) (58 - 75)  BP: 137/72 (30 Jun 2020 05:52) (124/68 - 150/75)  BP(mean): --  RR: 18 (30 Jun 2020 05:52) (18 - 18)  SpO2: 100% (30 Jun 2020 05:52) (95% - 100%)    PHYSICAL EXAM  General: NAD  HEENT: PERRLA, EOMOI, clear oropharynx, anicteric sclera, pink conjunctiva  Neck: supple  CV: (+) S1/S2 RRR  Lungs: clear to auscultation, no wheezes or rales  Abdomen: soft, non-tender, non-distended (+) BS  Ext: no clubbing, cyanosis or edema  Skin: no rashes and no petechiae  Neuro: alert and oriented X 3, no focal deficits  Central Line:     RECENT CULTURES:        LABS:                        9.8    2.23  )-----------( 221      ( 29 Jun 2020 06:58 )             29.5         Mean Cell Volume : 89.9 fl  Mean Cell Hemoglobin : 29.9 pg  Mean Cell Hemoglobin Concentration : 33.2 gm/dL  Auto Neutrophil # : 0.32 K/uL  Auto Lymphocyte # : 1.62 K/uL  Auto Monocyte # : 0.25 K/uL  Auto Eosinophil # : 0.04 K/uL  Auto Basophil # : 0.00 K/uL  Auto Neutrophil % : 14.4 %  Auto Lymphocyte % : 72.6 %  Auto Monocyte % : 11.2 %  Auto Eosinophil % : 1.8 %  Auto Basophil % : 0.0 %      06-29    142  |  104  |  12  ----------------------------<  98  3.9   |  26  |  0.77    Ca    9.8      29 Jun 2020 06:58  Phos  3.9     06-29  Mg     2.2     06-29    TPro  7.8  /  Alb  4.7  /  TBili  0.4  /  DBili  x   /  AST  39  /  ALT  39  /  AlkPhos  128<H>  06-29          PT/INR - ( 29 Jun 2020 06:58 )   PT: 13.2 sec;   INR: 1.14 ratio         PTT - ( 29 Jun 2020 06:58 )  PTT:40.7 sec        RADIOLOGY & ADDITIONAL STUDIES: Diagnosis: Acute Promyelocytic Leukemia     Protocol/Chemo Regimen: ATRA/Arsenic Trioxide    Day: ATRA Day 39 and Arsenic  (Holding Arsenic since Day 26 --> 6/22/20 due to shingles)    Pt endorsed: Right upper inner arm pain and redness.  + Rash on her Right buttock and Right posterior thigh,   + mild hearing loss , right arm itching     Review of Systems: Denies nausea, vomiting, diarrhea, chest pain, SOB, headache      Translated by patient's son via phone call per patient request     Pain scale: 5/10    Diet: Consistent Carb w/ Evening Snack    Allergies: No Known Allergies    ANTIMICROBIALS  levoFLOXacin  Tablet 500 milliGRAM(s) Oral every 24 hours  posaconazole DR Tablet 300 milliGRAM(s) Oral daily  valACYclovir 1000 milliGRAM(s) Oral every 8 hours    HEME/ONC MEDICATIONS  enoxaparin Injectable 80 milliGRAM(s) SubCutaneous every 12 hours  tretinoin 40 milliGRAM(s) Oral every 12 hours    STANDING MEDICATIONS  ammonium lactate 12% Lotion 1 Application(s) Topical two times a day  ATENolol  Tablet 25 milliGRAM(s) Oral daily  baclofen 5 milliGRAM(s) Oral every 12 hours  Biotene Dry Mouth Oral Rinse 5 milliLiter(s) Swish and Spit five times a day  bisacodyl 5 milliGRAM(s) Oral every 12 hours  carbamide peroxide Otic Solution 5 Drop(s) Right Ear two times a day  dextrose 5%. 1000 milliLiter(s) IV Continuous <Continuous>  dextrose 50% Injectable 12.5 Gram(s) IV Push once  dextrose 50% Injectable 25 Gram(s) IV Push once  dextrose 50% Injectable 25 Gram(s) IV Push once  gabapentin 100 milliGRAM(s) Oral three times a day  insulin lispro (HumaLOG) corrective regimen sliding scale   SubCutaneous three times a day before meals  insulin lispro (HumaLOG) corrective regimen sliding scale   SubCutaneous at bedtime  pantoprazole    Tablet 40 milliGRAM(s) Oral before breakfast  petrolatum white Ointment 1 Application(s) Topical three times a day  polyethylene glycol 3350 17 Gram(s) Oral daily  sodium chloride 0.65% Nasal 1 Spray(s) Both Nostrils every 8 hours  triamcinolone 0.1% Ointment 1 Application(s) Topical every 12 hours    PRN MEDICATIONS  acetaminophen   Tablet .. 650 milliGRAM(s) Oral every 6 hours PRN  ALBUTerol    90 MICROgram(s) HFA Inhaler 2 Puff(s) Inhalation every 6 hours PRN  AQUAPHOR (petrolatum Ointment) 1 Application(s) Topical every 3 hours PRN  dextrose 40% Gel 15 Gram(s) Oral once PRN  diphenhydrAMINE   Injectable 25 milliGRAM(s) IV Push every 6 hours PRN  glucagon  Injectable 1 milliGRAM(s) IntraMuscular once PRN  guaiFENesin   Syrup  (Sugar-Free) 100 milliGRAM(s) Oral every 6 hours PRN  hemorrhoidal Ointment 1 Application(s) Rectal every 8 hours PRN  morphine  - Injectable 3 milliGRAM(s) IV Push every 6 hours PRN  ondansetron Injectable 8 milliGRAM(s) IV Push every 8 hours PRN  sodium chloride 0.9% lock flush 10 milliLiter(s) IV Push every 1 hour PRN    Vital Signs Last 24 Hrs  T(C): 36.4 (30 Jun 2020 05:52), Max: 37.2 (29 Jun 2020 09:00)  T(F): 97.5 (30 Jun 2020 05:52), Max: 98.9 (29 Jun 2020 09:00)  HR: 75 (30 Jun 2020 05:52) (58 - 75)  BP: 137/72 (30 Jun 2020 05:52) (124/68 - 150/75)  BP(mean): --  RR: 18 (30 Jun 2020 05:52) (18 - 18)  SpO2: 100% (30 Jun 2020 05:52) (95% - 100%)    PHYSICAL EXAM  General: adult in NAD  HEENT: clear oropharynx, no erythema, no ulcers  CV: normal S1, S2, RRR  Lungs: clear to auscultation, no wheezes, no rales  Abdomen: soft, nontender, nondistended, normal BS  Ext: no edema  Skin: Warm and dry, Some hyperpigmentation noted around RUE PICC line, Shingles-like rash noted on R buttock (appears crusting over)  R posterior thigh and right leg   Neuro: alert and oriented x 3    RECENT CULTURES:  Culture - Blood (06.20.20 @ 21:18)    Specimen Source: .Blood PICC/PERC Double Lumen BLUE    Culture Results:   No Growth Final    Culture - Blood (06.20.20 @ 21:14)    Specimen Source: .Blood Blood-Peripheral    Culture Results:   No Growth Final        LABS:                        9.8      2.23  )-----------( 221      ( 29 Jun 2020 06:58 )             29.5     Mean Cell Volume : 89.9 fl  Mean Cell Hemoglobin : 29.9 pg  Mean Cell Hemoglobin Concentration : 33.2 gm/dL  Auto Neutrophil # : 0.32 K/uL  Auto Lymphocyte # : 1.62 K/uL  Auto Monocyte # : 0.25 K/uL  Auto Eosinophil # : 0.04 K/uL  Auto Basophil # : 0.00 K/uL  Auto Neutrophil % : 14.4 %  Auto Lymphocyte % : 72.6 %  Auto Monocyte % : 11.2 %  Auto Eosinophil % : 1.8 %  Auto Basophil % : 0.0 %      06-29    142  |  104  |  12  ----------------------------<  98  3.9   |  26  |  0.77    Ca    9.8      29 Jun 2020 06:58  Phos  3.9     06-29  Mg     2.2     06-29    TPro  7.8  /  Alb  4.7  /  TBili  0.4  /  DBili  x   /  AST  39  /  ALT  39  /  AlkPhos  128<H>  06-29  PT/INR - ( 29 Jun 2020 06:58 )   PT: 13.2 sec;   INR: 1.14 ratio    PTT - ( 29 Jun 2020 06:58 )  PTT:40.7 sec    RADIOLOGY & ADDITIONAL STUDIES:   CT Upper Extremity w/ IV Cont, Right (06.24.20 @ 18:25) >   Partially occlusive acute thrombus within the right axillary vein as noted previously. Subcutaneous edema. No abscess. Diagnosis: Acute Promyelocytic Leukemia     Protocol/Chemo Regimen: ATRA/Arsenic Trioxide    Day: ATRA Day 39 and Arsenic 35 (resumed Arsenic on 6/30/2020)    Pt endorsed:   + Right upper inner arm pain and redness. and itching.  + Rash on her Right buttock and Right posterior thigh,   + mild hearing loss , right arm itching     Review of Systems: Denies nausea, vomiting, diarrhea, chest pain, SOB, headache      Translated by patient's son via phone call per patient request     Pain scale: 5/10    Diet: Consistent Carb w/ Evening Snack    Allergies: No Known Allergies    ANTIMICROBIALS  levoFLOXacin  Tablet 500 milliGRAM(s) Oral every 24 hours  posaconazole DR Tablet 300 milliGRAM(s) Oral daily  valACYclovir 1000 milliGRAM(s) Oral every 8 hours    HEME/ONC MEDICATIONS  enoxaparin Injectable 80 milliGRAM(s) SubCutaneous every 12 hours  tretinoin 40 milliGRAM(s) Oral every 12 hours    STANDING MEDICATIONS  ammonium lactate 12% Lotion 1 Application(s) Topical two times a day  ATENolol  Tablet 25 milliGRAM(s) Oral daily  baclofen 5 milliGRAM(s) Oral every 12 hours  Biotene Dry Mouth Oral Rinse 5 milliLiter(s) Swish and Spit five times a day  bisacodyl 5 milliGRAM(s) Oral every 12 hours  carbamide peroxide Otic Solution 5 Drop(s) Right Ear two times a day  dextrose 5%. 1000 milliLiter(s) IV Continuous <Continuous>  dextrose 50% Injectable 12.5 Gram(s) IV Push once  dextrose 50% Injectable 25 Gram(s) IV Push once  dextrose 50% Injectable 25 Gram(s) IV Push once  gabapentin 100 milliGRAM(s) Oral three times a day  insulin lispro (HumaLOG) corrective regimen sliding scale   SubCutaneous three times a day before meals  insulin lispro (HumaLOG) corrective regimen sliding scale   SubCutaneous at bedtime  pantoprazole    Tablet 40 milliGRAM(s) Oral before breakfast  petrolatum white Ointment 1 Application(s) Topical three times a day  polyethylene glycol 3350 17 Gram(s) Oral daily  sodium chloride 0.65% Nasal 1 Spray(s) Both Nostrils every 8 hours  triamcinolone 0.1% Ointment 1 Application(s) Topical every 12 hours    PRN MEDICATIONS  acetaminophen   Tablet .. 650 milliGRAM(s) Oral every 6 hours PRN  ALBUTerol    90 MICROgram(s) HFA Inhaler 2 Puff(s) Inhalation every 6 hours PRN  AQUAPHOR (petrolatum Ointment) 1 Application(s) Topical every 3 hours PRN  dextrose 40% Gel 15 Gram(s) Oral once PRN  diphenhydrAMINE   Injectable 25 milliGRAM(s) IV Push every 6 hours PRN  glucagon  Injectable 1 milliGRAM(s) IntraMuscular once PRN  guaiFENesin   Syrup  (Sugar-Free) 100 milliGRAM(s) Oral every 6 hours PRN  hemorrhoidal Ointment 1 Application(s) Rectal every 8 hours PRN  morphine  - Injectable 3 milliGRAM(s) IV Push every 6 hours PRN  ondansetron Injectable 8 milliGRAM(s) IV Push every 8 hours PRN  sodium chloride 0.9% lock flush 10 milliLiter(s) IV Push every 1 hour PRN    Vital Signs Last 24 Hrs  T(C): 36.4 (30 Jun 2020 05:52), Max: 37.2 (29 Jun 2020 09:00)  T(F): 97.5 (30 Jun 2020 05:52), Max: 98.9 (29 Jun 2020 09:00)  HR: 75 (30 Jun 2020 05:52) (58 - 75)  BP: 137/72 (30 Jun 2020 05:52) (124/68 - 150/75)  BP(mean): --  RR: 18 (30 Jun 2020 05:52) (18 - 18)  SpO2: 100% (30 Jun 2020 05:52) (95% - 100%)    PHYSICAL EXAM  General: adult in NAD  HEENT: clear oropharynx, no erythema, no ulcers  CV: normal S1, S2, RRR  Lungs: clear to auscultation, no wheezes, no rales  Abdomen: soft, nontender, nondistended, normal BS  Ext: no edema  Skin: Warm and dry, Some hyperpigmentation noted around RUE PICC line, Shingles-like rash noted on R buttock (appears crusting over)  R posterior thigh and right leg   Neuro: alert and oriented x 3    RECENT CULTURES:  Culture - Blood (06.20.20 @ 21:18)    Specimen Source: .Blood PICC/PERC Double Lumen BLUE    Culture Results:   No Growth Final    Culture - Blood (06.20.20 @ 21:14)    Specimen Source: .Blood Blood-Peripheral    Culture Results:   No Growth Final        LABS:                        9.8      2.23  )-----------( 221      ( 29 Jun 2020 06:58 )             29.5     Mean Cell Volume : 89.9 fl  Mean Cell Hemoglobin : 29.9 pg  Mean Cell Hemoglobin Concentration : 33.2 gm/dL  Auto Neutrophil # : 0.32 K/uL  Auto Lymphocyte # : 1.62 K/uL  Auto Monocyte # : 0.25 K/uL  Auto Eosinophil # : 0.04 K/uL  Auto Basophil # : 0.00 K/uL  Auto Neutrophil % : 14.4 %  Auto Lymphocyte % : 72.6 %  Auto Monocyte % : 11.2 %  Auto Eosinophil % : 1.8 %  Auto Basophil % : 0.0 %      06-29    142  |  104  |  12  ----------------------------<  98  3.9   |  26  |  0.77    Ca    9.8      29 Jun 2020 06:58  Phos  3.9     06-29  Mg     2.2     06-29    TPro  7.8  /  Alb  4.7  /  TBili  0.4  /  DBili  x   /  AST  39  /  ALT  39  /  AlkPhos  128<H>  06-29  PT/INR - ( 29 Jun 2020 06:58 )   PT: 13.2 sec;   INR: 1.14 ratio    PTT - ( 29 Jun 2020 06:58 )  PTT:40.7 sec    RADIOLOGY & ADDITIONAL STUDIES:   CT Upper Extremity w/ IV Cont, Right (06.24.20 @ 18:25) >   Partially occlusive acute thrombus within the right axillary vein as noted previously. Subcutaneous edema. No abscess. Diagnosis: Acute Promyelocytic Leukemia     Protocol/Chemo Regimen: ATRA/Arsenic Trioxide    Day: ATRA Day 39 and Arsenic 35 (resumed Arsenic on 6/30/2020)    Pt endorsed:   + Right upper inner arm pain and redness. and itching.  + Rash on her Right buttock and Right posterior thigh,   + mild hearing loss , right arm itching     Review of Systems: Denies nausea, vomiting, diarrhea, chest pain, SOB, headache      Translated by patient's son via phone call per patient request     Pain scale: 5/10    Diet: Consistent Carb w/ Evening Snack    Allergies: No Known Allergies    ANTIMICROBIALS  levoFLOXacin  Tablet 500 milliGRAM(s) Oral every 24 hours  posaconazole DR Tablet 300 milliGRAM(s) Oral daily  valACYclovir 1000 milliGRAM(s) Oral every 8 hours    HEME/ONC MEDICATIONS  enoxaparin Injectable 80 milliGRAM(s) SubCutaneous every 12 hours  tretinoin 40 milliGRAM(s) Oral every 12 hours    STANDING MEDICATIONS  ammonium lactate 12% Lotion 1 Application(s) Topical two times a day  ATENolol  Tablet 25 milliGRAM(s) Oral daily  baclofen 5 milliGRAM(s) Oral every 12 hours  Biotene Dry Mouth Oral Rinse 5 milliLiter(s) Swish and Spit five times a day  bisacodyl 5 milliGRAM(s) Oral every 12 hours  carbamide peroxide Otic Solution 5 Drop(s) Right Ear two times a day  dextrose 5%. 1000 milliLiter(s) IV Continuous <Continuous>  dextrose 50% Injectable 12.5 Gram(s) IV Push once  dextrose 50% Injectable 25 Gram(s) IV Push once  dextrose 50% Injectable 25 Gram(s) IV Push once  gabapentin 100 milliGRAM(s) Oral three times a day  insulin lispro (HumaLOG) corrective regimen sliding scale   SubCutaneous three times a day before meals  insulin lispro (HumaLOG) corrective regimen sliding scale   SubCutaneous at bedtime  pantoprazole    Tablet 40 milliGRAM(s) Oral before breakfast  petrolatum white Ointment 1 Application(s) Topical three times a day  polyethylene glycol 3350 17 Gram(s) Oral daily  sodium chloride 0.65% Nasal 1 Spray(s) Both Nostrils every 8 hours  triamcinolone 0.1% Ointment 1 Application(s) Topical every 12 hours    PRN MEDICATIONS  acetaminophen   Tablet .. 650 milliGRAM(s) Oral every 6 hours PRN  ALBUTerol    90 MICROgram(s) HFA Inhaler 2 Puff(s) Inhalation every 6 hours PRN  AQUAPHOR (petrolatum Ointment) 1 Application(s) Topical every 3 hours PRN  dextrose 40% Gel 15 Gram(s) Oral once PRN  diphenhydrAMINE   Injectable 25 milliGRAM(s) IV Push every 6 hours PRN  glucagon  Injectable 1 milliGRAM(s) IntraMuscular once PRN  guaiFENesin   Syrup  (Sugar-Free) 100 milliGRAM(s) Oral every 6 hours PRN  hemorrhoidal Ointment 1 Application(s) Rectal every 8 hours PRN  morphine  - Injectable 3 milliGRAM(s) IV Push every 6 hours PRN  ondansetron Injectable 8 milliGRAM(s) IV Push every 8 hours PRN  sodium chloride 0.9% lock flush 10 milliLiter(s) IV Push every 1 hour PRN    Vital Signs Last 24 Hrs  T(C): 36.4 (30 Jun 2020 05:52), Max: 37.2 (29 Jun 2020 09:00)  T(F): 97.5 (30 Jun 2020 05:52), Max: 98.9 (29 Jun 2020 09:00)  HR: 75 (30 Jun 2020 05:52) (58 - 75)  BP: 137/72 (30 Jun 2020 05:52) (124/68 - 150/75)  BP(mean): --  RR: 18 (30 Jun 2020 05:52) (18 - 18)  SpO2: 100% (30 Jun 2020 05:52) (95% - 100%)    PHYSICAL EXAM  General: adult in NAD  HEENT: clear oropharynx, no erythema, no ulcers  CV: normal S1, S2, RRR  Lungs: clear to auscultation, no wheezes, no rales  Abdomen: soft, nontender, nondistended, normal BS  Ext: no edema  Skin: Warm and dry, Some hyperpigmentation noted around RUE PICC line, Shingles-like rash noted on R buttock (appears crusting over)  R posterior thigh and right leg   Neuro: alert and oriented x 3, speaks Hebrew.    RECENT CULTURES:  Culture - Blood (06.20.20 @ 21:18)    Specimen Source: .Blood PICC/PERC Double Lumen BLUE    Culture Results:   No Growth Final    Culture - Blood (06.20.20 @ 21:14)    Specimen Source: .Blood Blood-Peripheral    Culture Results:   No Growth Final        LABS:                                   9.5    2.21  )-----------( 214      ( 30 Jun 2020 09:27 )             28.7     Mean Cell Volume : 89.4 fl  Mean Cell Hemoglobin : 29.6 pg  Mean Cell Hemoglobin Concentration : 33.1 gm/dL  Auto Neutrophil # : 0.39 K/uL  Auto Lymphocyte # : 1.52 K/uL  Auto Monocyte # : 0.19 K/uL  Auto Eosinophil # : 0.06 K/uL  Auto Basophil # : 0.04 K/uL  Auto Neutrophil % : 17.7 %  Auto Lymphocyte % : 69.0 %  Auto Monocyte % : 8.8 %  Auto Eosinophil % : 2.7 %  Auto Basophil % : 1.8 %      06-30    139  |  102  |  14  ----------------------------<  157<H>  4.2   |  26  |  0.96    Ca    10.3      30 Jun 2020 09:37  Phos  3.9     06-30  Mg     1.9     06-30    TPro  7.4  /  Alb  4.6  /  TBili  0.4  /  DBili  x   /  AST  31  /  ALT  32  /  AlkPhos  126<H>  06-30      Mg 1.9  Phos 3.9  PT/INR - ( 29 Jun 2020 06:58 )   PT: 13.2 sec;   INR: 1.14 ratio    PTT - ( 29 Jun 2020 06:58 )  PTT:40.7 sec    Uric Acid 5.3    RADIOLOGY & ADDITIONAL STUDIES:   CT Upper Extremity w/ IV Cont, Right (06.24.20 @ 18:25) >   Partially occlusive acute thrombus within the right axillary vein as noted previously. Subcutaneous edema. No abscess.

## 2020-06-30 NOTE — PROGRESS NOTE ADULT - PROBLEM SELECTOR PLAN 2
Patient is neutropenic, afebrile   6/29 Started Levaquin and Posaconazole for PPX  If spikes pan culture, CXR and change Levaquin to cefepime   6/23 Persistent right upper inner arm pain/swelling - CT no abscess, + DVT  6/22 Right buttock and upper thigh shingles continue Valtrex treatment with Lac Hydrin  Previous COVID-19 (+) outpatient  Then COVID (-) on 5/22, 5/26, 6/2 and 6/9 6/16 COVID 19 (+)   6/18 COVID 19 Ab (+)  6/19 Repeat COVID-19 PCR (-)

## 2020-06-30 NOTE — PROGRESS NOTE ADULT - PROBLEM SELECTOR PLAN 1
Pending audiogram  -Will start pt on floxin drops  ENT will continue to follow Pt to follow up with Otologist Dr. Goodson upon discharge call [phone]  -Will start pt on floxin drops Pt to follow up with Otologist Dr. Goodson upon discharge call [phone]  -Will start pt on floxin drops  - discussed possible Dx and pathology as well as need for Tx with pt, will dry up ear and likely will need further imaging

## 2020-06-30 NOTE — ADVANCED PRACTICE NURSE CONSULT - ASSESSMENT
Patient found sitting quietly on couch.  No complaints of pain.  Reeducated patient and eldest son re Patient found sitting quietly on couch.  No complaints of pain.  Reeducated patient and eldest son regarding chemotherapy.  PIV left arm placed 6-.  Excellent blood return noted.      Arensic checked by two RN's   Aresenic:  0.15mg/m2 dose totaling 11mg over 2hours. Arsenic X7 days renewed today.  EKG completed today QTc measures 426ms.  Patient found sitting quietly on couch, OX4.  No complaints of pain.  Reeducated patient and eldest son regarding chemotherapy regimen.  PIV left arm placed 6-.  Excellent blood return noted.  Order to use PIV noted.  Patient awaiting placement.      Arensic checked by two RN's   Aresenic:  0.15mg/m2 dose totaling 11mg over 2hours.      Tolerated treatment without reaction or complaints. Arsenic X7 days renewed today.  EKG completed today QTc measures 426ms.  Patient found sitting quietly on couch, OX4.  No complaints of pain.  Reeducated patient and eldest son regarding chemotherapy regimen.  PIV left arm placed 6-.  Excellent blood return noted.  Order to use PIV noted.  Patient awaiting PICC  placement.      Arensic checked by two RN's   Aresenic:  0.15mg/m2 dose totaling 11mg over 2hours.      Tolerated treatment without reaction or complaints. Arsenic X7 days renewed today.  EKG completed today QTc measures 426ms.  Patient found sitting quietly on couch, OX4.  No complaints of pain.  Reeducated patient and eldest son regarding chemotherapy regimen.  PIV left arm placed 6- 22 guage.  Excellent blood return noted.  Dressing dry and intact.  Order to use PIV noted.  Patient awaiting PICC  placement.      Arensic checked by two RN's   Aresenic:  0.15mg/m2 dose totaling 11mg over 2hours through Alaris pump.    Tolerated treatment without reaction or complaints.  Report given to primary nurse.

## 2020-06-30 NOTE — PROGRESS NOTE ADULT - ASSESSMENT
Ms. Gant is a 61 y/o Moh[place]-speaking female w/ a PMHx of HTN, HLD, T2DM, and COVID-19 infection in 4/2020 who was admitted for management of newly diagnosed APL. The patient has been receiving treatment with ATRA and Arsenic (ATRA since 5/22 and Arsenic 5/27). Arsenic has been on hold since 6/22 secondary to Herpes Zoster infection. Patient's hospital course has been complicated by transaminitis, refractory thrombocytopenia, COVID-19 (+) state (6/16), shingles, and right axillary DVT. The patient has pancytopenia 2/2 disease and/or ATRA/Arsenic.

## 2020-06-30 NOTE — PROGRESS NOTE ADULT - PROBLEM SELECTOR PLAN 1
Continue ATRA 40mg BID (started 5/22)   Arsenic Trioxide which was started on 5/27 now on hold (since 6/22) 2/2 shingles  Monitor for differentiation syndrome    check EKG for QTc prolongation every Tuesday and Friday while on arsenic   Keep K > 4 and Mg > 2 while on arsenic   Status post Hydrea 500mg PO x 1 on 6/8 and 6/9 for WBC > 10  Follow up CBC with diff, CMP, TLS and DIC panel daily  Keep PLTs >50 K. If unable to achieve goal to infuse 1/2 unit platelets over 3 hours every 12 hrs   S/p Prednisone 20mg PO daily x 5 days (through 6/12) due to platelet antibody  Keep Fibrinogen > 150, if less give Cryo  Strict Is and Os/Daily weights/Mouth Care  Follow up bone marrow biopsy results (performed 6/26) Continue ATRA 40mg BID (started 5/22)   Arsenic Trioxide which was started on 5/27 was on hold (since 6/22- 6/29), now  shingles crusted resumed Arsenic on 6/30.  Monitor for differentiation syndrome   check EKG for QTc prolongation every Tuesday and Friday while on arsenic   Keep K > 4 and Mg > 2 while on arsenic   Status post Hydrea 500mg PO x 1 on 6/8 and 6/9 for WBC > 10  Follow up CBC with diff, CMP, TLS and DIC panel daily  Keep PLTs >50 K. If unable to achieve goal to infuse 1/2 unit platelets over 3 hours every 12 hrs   S/p Prednisone 20mg PO daily x 5 days (through 6/12) due to platelet antibody  Keep Fibrinogen > 150, if less give Cryo  Strict Is and Os/Daily weights/Mouth Care  Follow up bone marrow biopsy results (performed 6/26) Continue ATRA 40mg BID (started 5/22)   Arsenic Trioxide which was started on 5/27 was on hold (since 6/22- 6/29), now  shingles crusted resumed Arsenic on 6/30.  Monitor for differentiation syndrome   check EKG for QTc prolongation every Tuesday and Friday while on arsenic   Keep K > 4 and Mg > 2 while on arsenic   Status post Hydrea 500mg PO x 1 on 6/8 and 6/9 for WBC > 10  Follow up CBC with diff, CMP, TLS and DIC panel daily  Keep PLTs >50 K. If unable to achieve goal to infuse 1/2 unit platelets over 3 hours every 12 hrs   S/p Prednisone 20mg PO daily x 5 days (through 6/12) due to platelet antibody  Keep Fibrinogen > 150, if less give Cryo  Strict Is and Os/Daily weights/Mouth Care  Follow up bone marrow biopsy results (performed 6/26).  Poor IV access, will need PICC line placement.

## 2020-06-30 NOTE — PROGRESS NOTE ADULT - ASSESSMENT
61 yo female with reduced R hearing, thin clear fluid and perforation concerning for possible cholesteatoma 59 yo female with reduced R>L hearing, thin clear fluid and perforation concerning for possible cholesteatoma in R ear. 61 yo female with reduced R>L hearing, thin clear fluid and perforation concerning for possible cholesteatoma in R ear. 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

## 2020-06-30 NOTE — PROGRESS NOTE ADULT - PROBLEM SELECTOR PLAN 8
On Lovenox SQ BID for DVT treatment. Discontinue if platelets are < 50,000       Contact Information [phone]

## 2020-06-30 NOTE — PROGRESS NOTE ADULT - PROBLEM SELECTOR PLAN 5
Most likely due to eustachian tube dysfunction  Continue Ocean spray and Debrox   ENT following Most likely due to eustachian tube dysfunction  Continue Ocean spray and Debrox .  Audiogram.  ENT following

## 2020-06-30 NOTE — PROGRESS NOTE ADULT - ATTENDING COMMENTS
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  Presented with pancytopenia, flow cytometry/bone marrow biopsy/FISH done 5/26- c/w acute promyelocytic leukemia with PML-AWA translocation.    She was started on ATRA 5/23- day +38, GALA 5/27- day +34 (arsenic currently on hold from 6/22).    -CT chest 6/6 c/w granulomatous disease (hx of TB in 1980's) . Monitor daily weights, diurese prn.   -hx tremors , unclear etiology. Cefepime held, s/p Baclofen - now improved  - CBC, coags/fibrinogen daily.  Goal plt >40-50, fibrinogen >150  - responding to ABO-matched platelets given over 3 hrs in 1/2 unit infusions   -monitor lytes daily, keep K>4, Mg>2.  EKG twice a week  -COVID19+ on 6/17, now neg  -Zoster rash on R buttock and back of RLE. Holding GALA for now. Awaiting scabbing of all lesions prior to restarting GALA  - Pain present, on gabapentin 100 mg TID.   -RUE redness and itching at previous PICC site, cont topical triamcinolone BID, no improvement with calamine lotion  -RUE doppler showing axillary partially occlusive clot. On lovenox since 6/25.   -s/p bone marrow biopsy on 6/26 - await results  -son reports that patient has hearing loss x 1 week. ENT evaluation pending. 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  Presented with pancytopenia, flow cytometry/bone marrow biopsy/FISH done 5/26- c/w acute promyelocytic leukemia with PML-AWA translocation.    She was started on ATRA 5/23- day +38, GALA 5/27- day +35 (arsenic currently on hold from 6/22).    -CT chest 6/6 c/w granulomatous disease (hx of TB in 1980's) . Monitor daily weights, diurese prn.   -hx tremors , unclear etiology. Cefepime held, s/p Baclofen - now improved  - CBC, coags/fibrinogen daily.  Goal plt >40-50, fibrinogen >150  - responding to ABO-matched platelets given over 3 hrs in 1/2 unit infusions   -monitor lytes daily, keep K>4, Mg>2.  EKG twice a week  -COVID19+ on 6/17, now neg  -Zoster rash on R buttock and back of RLE. Holding GALA for now. Awaiting scabbing of all lesions prior to restarting GALA  - Pain present, on gabapentin 100 mg TID.   -RUE redness and itching at previous PICC site, cont topical triamcinolone BID, no improvement with calamine lotion  -RUE doppler showing axillary partially occlusive clot. On lovenox since 6/25.   - c/o pain in RUE, refusing morphine prn pain, start tylenol prn  -s/p bone marrow biopsy on 6/26 - await results  - hearing loss x 1 week. ENT evaluation apprec s/p debrox soln for impacted R ear, audiology testing pending. 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  Presented with pancytopenia, flow cytometry/bone marrow biopsy/FISH done 5/26- c/w acute promyelocytic leukemia with PML-AWA translocation.    She was started on ATRA 5/23- day +38, GALA 5/27- day +35 (arsenic currently on hold from 6/22).    -CT chest 6/6 c/w granulomatous disease (hx of TB in 1980's) . Monitor daily weights, diurese prn.   -hx tremors , unclear etiology. Cefepime held, s/p Baclofen - now improved  - CBC, coags/fibrinogen daily.  Goal plt >40-50, fibrinogen >150  - responding to ABO-matched platelets given over 3 hrs in 1/2 unit infusions   -monitor lytes daily, keep K>4, Mg>2.  EKG twice a week  -COVID19+ on 6/17, now neg  -Zoster rash on R buttock and back of RLE, crusted over. Will resume GALA   - Pain present, on gabapentin 100 mg TID, d/c morphine, tylenol prn  - trial or oral benadryl for RUE itching   -RUE redness and itching at previous PICC site, cont topical triamcinolone BID, no improvement with calamine lotion  -RUE doppler showing axillary partially occlusive clot. On lovenox since 6/25.   -s/p bone marrow biopsy on 6/26 - await results  - hearing loss x 1 week. ENT evaluation apprec s/p debrox soln for impacted R ear, audiology testing pending.

## 2020-07-01 LAB
ALBUMIN SERPL ELPH-MCNC: 4.8 G/DL — SIGNIFICANT CHANGE UP (ref 3.3–5)
ALP SERPL-CCNC: 135 U/L — HIGH (ref 40–120)
ALT FLD-CCNC: 32 U/L — SIGNIFICANT CHANGE UP (ref 10–45)
ANION GAP SERPL CALC-SCNC: 13 MMOL/L — SIGNIFICANT CHANGE UP (ref 5–17)
APTT BLD: 38.6 SEC — HIGH (ref 27.5–35.5)
AST SERPL-CCNC: 26 U/L — SIGNIFICANT CHANGE UP (ref 10–40)
BASOPHILS # BLD AUTO: 0.02 K/UL — SIGNIFICANT CHANGE UP (ref 0–0.2)
BASOPHILS NFR BLD AUTO: 1 % — SIGNIFICANT CHANGE UP (ref 0–2)
BILIRUB SERPL-MCNC: 0.4 MG/DL — SIGNIFICANT CHANGE UP (ref 0.2–1.2)
BUN SERPL-MCNC: 16 MG/DL — SIGNIFICANT CHANGE UP (ref 7–23)
CALCIUM SERPL-MCNC: 10.5 MG/DL — SIGNIFICANT CHANGE UP (ref 8.4–10.5)
CHLORIDE SERPL-SCNC: 103 MMOL/L — SIGNIFICANT CHANGE UP (ref 96–108)
CO2 SERPL-SCNC: 24 MMOL/L — SIGNIFICANT CHANGE UP (ref 22–31)
CREAT SERPL-MCNC: 1.04 MG/DL — SIGNIFICANT CHANGE UP (ref 0.5–1.3)
D DIMER BLD IA.RAPID-MCNC: 487 NG/ML DDU — HIGH
EOSINOPHIL # BLD AUTO: 0.04 K/UL — SIGNIFICANT CHANGE UP (ref 0–0.5)
EOSINOPHIL NFR BLD AUTO: 2 % — SIGNIFICANT CHANGE UP (ref 0–6)
FIBRINOGEN PPP-MCNC: 423 MG/DL — SIGNIFICANT CHANGE UP (ref 350–510)
GLUCOSE BLDC GLUCOMTR-MCNC: 114 MG/DL — HIGH (ref 70–99)
GLUCOSE BLDC GLUCOMTR-MCNC: 119 MG/DL — HIGH (ref 70–99)
GLUCOSE BLDC GLUCOMTR-MCNC: 126 MG/DL — HIGH (ref 70–99)
GLUCOSE BLDC GLUCOMTR-MCNC: 141 MG/DL — HIGH (ref 70–99)
GLUCOSE SERPL-MCNC: 119 MG/DL — HIGH (ref 70–99)
HCT VFR BLD CALC: 29.5 % — LOW (ref 34.5–45)
HGB BLD-MCNC: 9.9 G/DL — LOW (ref 11.5–15.5)
IMM GRANULOCYTES NFR BLD AUTO: 1.5 % — SIGNIFICANT CHANGE UP (ref 0–1.5)
INR BLD: 1.21 RATIO — HIGH (ref 0.88–1.16)
LDH SERPL L TO P-CCNC: 182 U/L — SIGNIFICANT CHANGE UP (ref 50–242)
LYMPHOCYTES # BLD AUTO: 1.58 K/UL — SIGNIFICANT CHANGE UP (ref 1–3.3)
LYMPHOCYTES # BLD AUTO: 77.1 % — HIGH (ref 13–44)
MAGNESIUM SERPL-MCNC: 2.1 MG/DL — SIGNIFICANT CHANGE UP (ref 1.6–2.6)
MCHC RBC-ENTMCNC: 30 PG — SIGNIFICANT CHANGE UP (ref 27–34)
MCHC RBC-ENTMCNC: 33.6 GM/DL — SIGNIFICANT CHANGE UP (ref 32–36)
MCV RBC AUTO: 89.4 FL — SIGNIFICANT CHANGE UP (ref 80–100)
MONOCYTES # BLD AUTO: 0.19 K/UL — SIGNIFICANT CHANGE UP (ref 0–0.9)
MONOCYTES NFR BLD AUTO: 9.3 % — SIGNIFICANT CHANGE UP (ref 2–14)
NEUTROPHILS # BLD AUTO: 0.19 K/UL — LOW (ref 1.8–7.4)
NEUTROPHILS NFR BLD AUTO: 9.1 % — LOW (ref 43–77)
NRBC # BLD: 0 /100 WBCS — SIGNIFICANT CHANGE UP (ref 0–0)
PHOSPHATE SERPL-MCNC: 4.8 MG/DL — HIGH (ref 2.5–4.5)
PLATELET # BLD AUTO: 243 K/UL — SIGNIFICANT CHANGE UP (ref 150–400)
POTASSIUM SERPL-MCNC: 4.5 MMOL/L — SIGNIFICANT CHANGE UP (ref 3.5–5.3)
POTASSIUM SERPL-SCNC: 4.5 MMOL/L — SIGNIFICANT CHANGE UP (ref 3.5–5.3)
PROT SERPL-MCNC: 8.6 G/DL — HIGH (ref 6–8.3)
PROTHROM AB SERPL-ACNC: 13.7 SEC — HIGH (ref 10.6–13.6)
RBC # BLD: 3.3 M/UL — LOW (ref 3.8–5.2)
RBC # FLD: 15.8 % — HIGH (ref 10.3–14.5)
SODIUM SERPL-SCNC: 140 MMOL/L — SIGNIFICANT CHANGE UP (ref 135–145)
URATE SERPL-MCNC: 5.3 MG/DL — SIGNIFICANT CHANGE UP (ref 2.5–7)
WBC # BLD: 2.05 K/UL — LOW (ref 3.8–10.5)
WBC # FLD AUTO: 2.05 K/UL — LOW (ref 3.8–10.5)

## 2020-07-01 PROCEDURE — 71045 X-RAY EXAM CHEST 1 VIEW: CPT | Mod: 26

## 2020-07-01 PROCEDURE — 99232 SBSQ HOSP IP/OBS MODERATE 35: CPT

## 2020-07-01 RX ORDER — CHLORHEXIDINE GLUCONATE 213 G/1000ML
1 SOLUTION TOPICAL
Refills: 0 | Status: DISCONTINUED | OUTPATIENT
Start: 2020-07-01 | End: 2020-07-17

## 2020-07-01 RX ORDER — CALCIUM ACETATE 667 MG
667 TABLET ORAL
Refills: 0 | Status: COMPLETED | OUTPATIENT
Start: 2020-07-01 | End: 2020-07-03

## 2020-07-01 RX ORDER — ENOXAPARIN SODIUM 100 MG/ML
80 INJECTION SUBCUTANEOUS EVERY 12 HOURS
Refills: 0 | Status: DISCONTINUED | OUTPATIENT
Start: 2020-07-02 | End: 2020-07-09

## 2020-07-01 RX ORDER — SODIUM CHLORIDE 9 MG/ML
10 INJECTION INTRAMUSCULAR; INTRAVENOUS; SUBCUTANEOUS
Refills: 0 | Status: DISCONTINUED | OUTPATIENT
Start: 2020-07-01 | End: 2020-07-17

## 2020-07-01 RX ADMIN — Medication 5 MILLILITER(S): at 01:08

## 2020-07-01 RX ADMIN — Medication 5 MILLILITER(S): at 12:28

## 2020-07-01 RX ADMIN — Medication 667 MILLIGRAM(S): at 22:03

## 2020-07-01 RX ADMIN — TRETINOIN 40 MILLIGRAM(S): 10 CAPSULE, LIQUID FILLED ORAL at 19:54

## 2020-07-01 RX ADMIN — Medication 667 MILLIGRAM(S): at 17:31

## 2020-07-01 RX ADMIN — Medication 5 MILLILITER(S): at 20:01

## 2020-07-01 RX ADMIN — Medication 4 DROP(S): at 06:29

## 2020-07-01 RX ADMIN — POSACONAZOLE 300 MILLIGRAM(S): 100 TABLET, DELAYED RELEASE ORAL at 12:29

## 2020-07-01 RX ADMIN — CARBAMIDE PEROXIDE 5 DROP(S): 81.86 SOLUTION/ DROPS AURICULAR (OTIC) at 06:07

## 2020-07-01 RX ADMIN — Medication 1 SPRAY(S): at 22:08

## 2020-07-01 RX ADMIN — Medication 5 MILLIGRAM(S): at 06:11

## 2020-07-01 RX ADMIN — GABAPENTIN 100 MILLIGRAM(S): 400 CAPSULE ORAL at 22:04

## 2020-07-01 RX ADMIN — VALACYCLOVIR 1000 MILLIGRAM(S): 500 TABLET, FILM COATED ORAL at 13:22

## 2020-07-01 RX ADMIN — GABAPENTIN 100 MILLIGRAM(S): 400 CAPSULE ORAL at 13:25

## 2020-07-01 RX ADMIN — Medication 1 APPLICATION(S): at 17:34

## 2020-07-01 RX ADMIN — Medication 5 MILLILITER(S): at 17:31

## 2020-07-01 RX ADMIN — Medication 1 APPLICATION(S): at 22:07

## 2020-07-01 RX ADMIN — Medication 4 DROP(S): at 17:34

## 2020-07-01 RX ADMIN — Medication 1 APPLICATION(S): at 13:25

## 2020-07-01 RX ADMIN — Medication 1 SPRAY(S): at 13:24

## 2020-07-01 RX ADMIN — Medication 5 MILLIGRAM(S): at 17:33

## 2020-07-01 RX ADMIN — VALACYCLOVIR 1000 MILLIGRAM(S): 500 TABLET, FILM COATED ORAL at 06:01

## 2020-07-01 RX ADMIN — VALACYCLOVIR 1000 MILLIGRAM(S): 500 TABLET, FILM COATED ORAL at 22:05

## 2020-07-01 RX ADMIN — Medication 1 APPLICATION(S): at 06:01

## 2020-07-01 RX ADMIN — ATENOLOL 25 MILLIGRAM(S): 25 TABLET ORAL at 06:03

## 2020-07-01 RX ADMIN — ARSENIC TRIOXIDE 130.5 MILLIGRAM(S): 2 INJECTION, SOLUTION INTRAVENOUS at 13:47

## 2020-07-01 RX ADMIN — CARBAMIDE PEROXIDE 5 DROP(S): 81.86 SOLUTION/ DROPS AURICULAR (OTIC) at 17:33

## 2020-07-01 RX ADMIN — Medication 5 MILLIGRAM(S): at 17:32

## 2020-07-01 RX ADMIN — GABAPENTIN 100 MILLIGRAM(S): 400 CAPSULE ORAL at 06:02

## 2020-07-01 RX ADMIN — Medication 5 MILLILITER(S): at 23:59

## 2020-07-01 RX ADMIN — Medication 1 SPRAY(S): at 06:00

## 2020-07-01 RX ADMIN — Medication 1 APPLICATION(S): at 06:07

## 2020-07-01 RX ADMIN — Medication 5 MILLIGRAM(S): at 06:00

## 2020-07-01 RX ADMIN — TRETINOIN 40 MILLIGRAM(S): 10 CAPSULE, LIQUID FILLED ORAL at 08:41

## 2020-07-01 RX ADMIN — PANTOPRAZOLE SODIUM 40 MILLIGRAM(S): 20 TABLET, DELAYED RELEASE ORAL at 06:02

## 2020-07-01 NOTE — PROGRESS NOTE ADULT - SUBJECTIVE AND OBJECTIVE BOX
Diagnosis:    Protocol/Chemo Regimen:    Day:     Pt endorsed:    Review of Systems:     Pain scale:     Diet:     Allergies    No Known Allergies    Intolerances        ANTIMICROBIALS  levoFLOXacin  Tablet 500 milliGRAM(s) Oral every 24 hours  posaconazole DR Tablet 300 milliGRAM(s) Oral daily  valACYclovir 1000 milliGRAM(s) Oral every 8 hours      HEME/ONC MEDICATIONS  arsenic trioxide IVPB (eMAR) 11 milliGRAM(s) IV Intermittent every 24 hours  tretinoin 40 milliGRAM(s) Oral every 12 hours      STANDING MEDICATIONS  ammonium lactate 12% Lotion 1 Application(s) Topical two times a day  ATENolol  Tablet 25 milliGRAM(s) Oral daily  baclofen 5 milliGRAM(s) Oral every 12 hours  Biotene Dry Mouth Oral Rinse 5 milliLiter(s) Swish and Spit five times a day  bisacodyl 5 milliGRAM(s) Oral every 12 hours  carbamide peroxide Otic Solution 5 Drop(s) Right Ear two times a day  dextrose 5%. 1000 milliLiter(s) IV Continuous <Continuous>  dextrose 50% Injectable 12.5 Gram(s) IV Push once  dextrose 50% Injectable 25 Gram(s) IV Push once  dextrose 50% Injectable 25 Gram(s) IV Push once  gabapentin 100 milliGRAM(s) Oral three times a day  insulin lispro (HumaLOG) corrective regimen sliding scale   SubCutaneous three times a day before meals  insulin lispro (HumaLOG) corrective regimen sliding scale   SubCutaneous at bedtime  ofloxacin 0.3% Solution 4 Drop(s) Right Ear two times a day  pantoprazole    Tablet 40 milliGRAM(s) Oral before breakfast  petrolatum white Ointment 1 Application(s) Topical three times a day  polyethylene glycol 3350 17 Gram(s) Oral daily  sodium chloride 0.65% Nasal 1 Spray(s) Both Nostrils every 8 hours  triamcinolone 0.1% Ointment 1 Application(s) Topical every 12 hours      PRN MEDICATIONS  acetaminophen   Tablet .. 650 milliGRAM(s) Oral every 6 hours PRN  ALBUTerol    90 MICROgram(s) HFA Inhaler 2 Puff(s) Inhalation every 6 hours PRN  AQUAPHOR (petrolatum Ointment) 1 Application(s) Topical every 3 hours PRN  dextrose 40% Gel 15 Gram(s) Oral once PRN  diphenhydrAMINE 25 milliGRAM(s) Oral every 4 hours PRN  diphenhydrAMINE   Injectable 25 milliGRAM(s) IV Push every 6 hours PRN  glucagon  Injectable 1 milliGRAM(s) IntraMuscular once PRN  guaiFENesin   Syrup  (Sugar-Free) 100 milliGRAM(s) Oral every 6 hours PRN  hemorrhoidal Ointment 1 Application(s) Rectal every 8 hours PRN  morphine  - Injectable 3 milliGRAM(s) IV Push every 6 hours PRN  ondansetron Injectable 8 milliGRAM(s) IV Push every 8 hours PRN  sodium chloride 0.9% lock flush 10 milliLiter(s) IV Push every 1 hour PRN        Vital Signs Last 24 Hrs  T(C): 36.7 (01 Jul 2020 05:52), Max: 36.8 (30 Jun 2020 16:50)  T(F): 98 (01 Jul 2020 05:52), Max: 98.2 (30 Jun 2020 16:50)  HR: 76 (01 Jul 2020 05:52) (61 - 76)  BP: 126/75 (01 Jul 2020 05:52) (113/67 - 136/79)  BP(mean): --  RR: 18 (01 Jul 2020 05:52) (18 - 18)  SpO2: 99% (01 Jul 2020 05:52) (97% - 99%)    PHYSICAL EXAM  General: NAD  HEENT: PERRLA, EOMOI, clear oropharynx, anicteric sclera, pink conjunctiva  Neck: supple  CV: (+) S1/S2 RRR  Lungs: clear to auscultation, no wheezes or rales  Abdomen: soft, non-tender, non-distended (+) BS  Ext: no clubbing, cyanosis or edema  Skin: no rashes and no petechiae  Neuro: alert and oriented X 3, no focal deficits  Central Line:     RECENT CULTURES:        LABS:                        9.5    2.21  )-----------( 214      ( 30 Jun 2020 09:27 )             28.7         Mean Cell Volume : 89.4 fl  Mean Cell Hemoglobin : 29.6 pg  Mean Cell Hemoglobin Concentration : 33.1 gm/dL  Auto Neutrophil # : 0.39 K/uL  Auto Lymphocyte # : 1.52 K/uL  Auto Monocyte # : 0.19 K/uL  Auto Eosinophil # : 0.06 K/uL  Auto Basophil # : 0.04 K/uL  Auto Neutrophil % : 17.7 %  Auto Lymphocyte % : 69.0 %  Auto Monocyte % : 8.8 %  Auto Eosinophil % : 2.7 %  Auto Basophil % : 1.8 %      06-30    139  |  102  |  14  ----------------------------<  157<H>  4.2   |  26  |  0.96    Ca    10.3      30 Jun 2020 09:37  Phos  3.9     06-30  Mg     1.9     06-30    TPro  7.4  /  Alb  4.6  /  TBili  0.4  /  DBili  x   /  AST  31  /  ALT  32  /  AlkPhos  126<H>  06-30      Mg 1.9  Phos 3.9            Uric Acid 5.3        RADIOLOGY & ADDITIONAL STUDIES: Diagnosis: Acute Promyelocytic Leukemia     Protocol/Chemo Regimen: ATRA/Arsenic Trioxide    Day: ATRA Day 40 and Arsenic 36 (resumed Arsenic on 6/30/2020)    Pt endorsed:   + Right upper inner arm pain and and itching.  + Rash on her Right buttock and Right posterior thigh,   + mild hearing loss , right arm itching     Review of Systems: Denies nausea, vomiting, diarrhea, chest pain, SOB, headache      Translated by patient's son via phone call per patient request     Pain scale: 5/10    Diet: Consistent Carb w/ Evening Snack    Allergies: No Known Allergies    ANTIMICROBIALS  levoFLOXacin  Tablet 500 milliGRAM(s) Oral every 24 hours  posaconazole DR Tablet 300 milliGRAM(s) Oral daily  valACYclovir 1000 milliGRAM(s) Oral every 8 hours    HEME/ONC MEDICATIONS  arsenic trioxide IVPB (eMAR) 11 milliGRAM(s) IV Intermittent every 24 hours  tretinoin 40 milliGRAM(s) Oral every 12 hours    STANDING MEDICATIONS  ammonium lactate 12% Lotion 1 Application(s) Topical two times a day  ATENolol  Tablet 25 milliGRAM(s) Oral daily  baclofen 5 milliGRAM(s) Oral every 12 hours  Biotene Dry Mouth Oral Rinse 5 milliLiter(s) Swish and Spit five times a day  bisacodyl 5 milliGRAM(s) Oral every 12 hours  carbamide peroxide Otic Solution 5 Drop(s) Right Ear two times a day  dextrose 5%. 1000 milliLiter(s) IV Continuous <Continuous>  dextrose 50% Injectable 12.5 Gram(s) IV Push once  dextrose 50% Injectable 25 Gram(s) IV Push once  dextrose 50% Injectable 25 Gram(s) IV Push once  gabapentin 100 milliGRAM(s) Oral three times a day  insulin lispro (HumaLOG) corrective regimen sliding scale   SubCutaneous three times a day before meals  insulin lispro (HumaLOG) corrective regimen sliding scale   SubCutaneous at bedtime  ofloxacin 0.3% Solution 4 Drop(s) Right Ear two times a day  pantoprazole    Tablet 40 milliGRAM(s) Oral before breakfast  petrolatum white Ointment 1 Application(s) Topical three times a day  polyethylene glycol 3350 17 Gram(s) Oral daily  sodium chloride 0.65% Nasal 1 Spray(s) Both Nostrils every 8 hours  triamcinolone 0.1% Ointment 1 Application(s) Topical every 12 hours    PRN MEDICATIONS  acetaminophen   Tablet .. 650 milliGRAM(s) Oral every 6 hours PRN  ALBUTerol    90 MICROgram(s) HFA Inhaler 2 Puff(s) Inhalation every 6 hours PRN  AQUAPHOR (petrolatum Ointment) 1 Application(s) Topical every 3 hours PRN  dextrose 40% Gel 15 Gram(s) Oral once PRN  diphenhydrAMINE 25 milliGRAM(s) Oral every 4 hours PRN  diphenhydrAMINE   Injectable 25 milliGRAM(s) IV Push every 6 hours PRN  glucagon  Injectable 1 milliGRAM(s) IntraMuscular once PRN  guaiFENesin   Syrup  (Sugar-Free) 100 milliGRAM(s) Oral every 6 hours PRN  hemorrhoidal Ointment 1 Application(s) Rectal every 8 hours PRN  morphine  - Injectable 3 milliGRAM(s) IV Push every 6 hours PRN  ondansetron Injectable 8 milliGRAM(s) IV Push every 8 hours PRN  sodium chloride 0.9% lock flush 10 milliLiter(s) IV Push every 1 hour PRN    Vital Signs Last 24 Hrs  T(C): 36.7 (01 Jul 2020 05:52), Max: 36.8 (30 Jun 2020 16:50)  T(F): 98 (01 Jul 2020 05:52), Max: 98.2 (30 Jun 2020 16:50)  HR: 76 (01 Jul 2020 05:52) (61 - 76)  BP: 126/75 (01 Jul 2020 05:52) (113/67 - 136/79)  BP(mean): --  RR: 18 (01 Jul 2020 05:52) (18 - 18)  SpO2: 99% (01 Jul 2020 05:52) (97% - 99%)    PHYSICAL EXAM  General: adult in NAD  HEENT: clear oropharynx, no erythema, no ulcers  CV: normal S1, S2, RRR  Lungs: clear to auscultation, no wheezes, no rales  Abdomen: soft, nontender, nondistended, normal BS  Ext: no edema  Skin: Warm and dry, Some hyperpigmentation noted around RUE PICC line, Shingles-like rash noted on R buttock (appears crusting over)  R posterior thigh and right leg   Neuro: alert and oriented x 3, speaks Slovak.    RECENT CULTURES:  Culture - Blood (06.20.20 @ 21:18)    Specimen Source: .Blood PICC/PERC Double Lumen BLUE    Culture Results:   No Growth Final    Culture - Urine (06.20.20 @ 20:49)    -  Ampicillin: S <=2 Predicts results to ampicillin/sulbactam, amoxacillin-clavulanate and  piperacillin-tazobactam.    -  Ciprofloxacin: S <=1    -  Levofloxacin: S <=1    -  Nitrofurantoin: S <=32 Should not be used to treat pyelonephritis.    -  Tetra/Doxy: R >8    -  Vancomycin: S 2    Specimen Source: .Urine Clean Catch (Midstream)    Culture Results:   10,000 - 49,000 CFU/mL Enterococcus faecalis  Normal Urogenital tika present    Organism Identification: Enterococcus faecalis    Organism: Enterococcus faecalis    Method Type: [facility]    LABS:                        9.9    2.05  )-----------( 243      ( 01 Jul 2020 08:56 )             29.5     Mean Cell Volume : 89.4 fl  Mean Cell Hemoglobin : 30.0 pg  Mean Cell Hemoglobin Concentration : 33.6 gm/dL  Auto Neutrophil # : 0.19 K/uL  Auto Lymphocyte # : 1.58 K/uL  Auto Monocyte # : 0.19 K/uL  Auto Eosinophil # : 0.04 K/uL  Auto Basophil # : 0.02 K/uL  Auto Neutrophil % : 9.1 %  Auto Lymphocyte % : 77.1 %  Auto Monocyte % : 9.3 %  Auto Eosinophil % : 2.0 %  Auto Basophil % : 1.0 %      07-01    140  |  103  |  16  ----------------------------<  119<H>  4.5   |  24  |  1.04    Ca    10.5      01 Jul 2020 08:56  Phos  4.8     07-01  Mg     2.1     07-01    TPro  8.6<H>  /  Alb  4.8  /  TBili  0.4  /  DBili  x   /  AST  26  /  ALT  32  /  AlkPhos  135<H>  07-01  Mg 2.1  Phos 4.8  PT/INR - ( 01 Jul 2020 08:56 )   PT: 13.7 sec;   INR: 1.21 ratio    PTT - ( 01 Jul 2020 08:56 )  PTT:38.6 sec      Uric Acid 5.3    RADIOLOGY & ADDITIONAL STUDIES:   CT Upper Extremity w/ IV Cont, Right (06.24.20 @ 18:25) >  Partially occlusive acute thrombus within the right axillary vein as noted previously. Subcutaneous edema. No abscess. Diagnosis: Acute Promyelocytic Leukemia     Protocol/Chemo Regimen: ATRA/Arsenic Trioxide    Day: ATRA Day 40 and Arsenic 36 (resumed Arsenic on 6/30/2020)    Pt endorsed:   + Right upper inner arm pain and and itching.  + Rash on her Right buttock and Right posterior thigh,   + mild hearing loss , right arm itching     Review of Systems: Denies nausea, vomiting, diarrhea, chest pain, SOB, headache      Translated by patient's son via phone call per patient request     Pain scale: 4- 5/10    Diet: Consistent Carb w/ Evening Snack    Allergies: No Known Allergies    ANTIMICROBIALS  levoFLOXacin  Tablet 500 milliGRAM(s) Oral every 24 hours  posaconazole DR Tablet 300 milliGRAM(s) Oral daily  valACYclovir 1000 milliGRAM(s) Oral every 8 hours    HEME/ONC MEDICATIONS  arsenic trioxide IVPB (eMAR) 11 milliGRAM(s) IV Intermittent every 24 hours  tretinoin 40 milliGRAM(s) Oral every 12 hours    STANDING MEDICATIONS  ammonium lactate 12% Lotion 1 Application(s) Topical two times a day  ATENolol  Tablet 25 milliGRAM(s) Oral daily  baclofen 5 milliGRAM(s) Oral every 12 hours  Biotene Dry Mouth Oral Rinse 5 milliLiter(s) Swish and Spit five times a day  bisacodyl 5 milliGRAM(s) Oral every 12 hours  carbamide peroxide Otic Solution 5 Drop(s) Right Ear two times a day  dextrose 5%. 1000 milliLiter(s) IV Continuous <Continuous>  dextrose 50% Injectable 12.5 Gram(s) IV Push once  dextrose 50% Injectable 25 Gram(s) IV Push once  dextrose 50% Injectable 25 Gram(s) IV Push once  gabapentin 100 milliGRAM(s) Oral three times a day  insulin lispro (HumaLOG) corrective regimen sliding scale   SubCutaneous three times a day before meals  insulin lispro (HumaLOG) corrective regimen sliding scale   SubCutaneous at bedtime  ofloxacin 0.3% Solution 4 Drop(s) Right Ear two times a day  pantoprazole    Tablet 40 milliGRAM(s) Oral before breakfast  petrolatum white Ointment 1 Application(s) Topical three times a day  polyethylene glycol 3350 17 Gram(s) Oral daily  sodium chloride 0.65% Nasal 1 Spray(s) Both Nostrils every 8 hours  triamcinolone 0.1% Ointment 1 Application(s) Topical every 12 hours    PRN MEDICATIONS  acetaminophen   Tablet .. 650 milliGRAM(s) Oral every 6 hours PRN  ALBUTerol    90 MICROgram(s) HFA Inhaler 2 Puff(s) Inhalation every 6 hours PRN  AQUAPHOR (petrolatum Ointment) 1 Application(s) Topical every 3 hours PRN  dextrose 40% Gel 15 Gram(s) Oral once PRN  diphenhydrAMINE 25 milliGRAM(s) Oral every 4 hours PRN  diphenhydrAMINE   Injectable 25 milliGRAM(s) IV Push every 6 hours PRN  glucagon  Injectable 1 milliGRAM(s) IntraMuscular once PRN  guaiFENesin   Syrup  (Sugar-Free) 100 milliGRAM(s) Oral every 6 hours PRN  hemorrhoidal Ointment 1 Application(s) Rectal every 8 hours PRN  morphine  - Injectable 3 milliGRAM(s) IV Push every 6 hours PRN  ondansetron Injectable 8 milliGRAM(s) IV Push every 8 hours PRN  sodium chloride 0.9% lock flush 10 milliLiter(s) IV Push every 1 hour PRN    Vital Signs Last 24 Hrs  T(C): 36.7 (01 Jul 2020 05:52), Max: 36.8 (30 Jun 2020 16:50)  T(F): 98 (01 Jul 2020 05:52), Max: 98.2 (30 Jun 2020 16:50)  HR: 76 (01 Jul 2020 05:52) (61 - 76)  BP: 126/75 (01 Jul 2020 05:52) (113/67 - 136/79)  BP(mean): --  RR: 18 (01 Jul 2020 05:52) (18 - 18)  SpO2: 99% (01 Jul 2020 05:52) (97% - 99%)    PHYSICAL EXAM  General: adult in NAD  HEENT: clear oropharynx, no erythema, no ulcers  CV: normal S1, S2, RRR  Lungs: clear to auscultation, no wheezes, no rales  Abdomen: soft, nontender, nondistended, normal BS  Ext: no edema, RUE hyperpigmented.  Skin: Warm and dry, Some hyperpigmentation noted around RUE PICC line, Shingles-like rash noted on R buttock (appears crusting over)  R posterior thigh and right leg   Neuro: alert and oriented x 3, speaks Japanese.    RECENT CULTURES:  Culture - Blood (06.20.20 @ 21:18)    Specimen Source: .Blood PICC/PERC Double Lumen BLUE    Culture Results:   No Growth Final    Culture - Urine (06.20.20 @ 20:49)    -  Ampicillin: S <=2 Predicts results to ampicillin/sulbactam, amoxacillin-clavulanate and  piperacillin-tazobactam.    -  Ciprofloxacin: S <=1    -  Levofloxacin: S <=1    -  Nitrofurantoin: S <=32 Should not be used to treat pyelonephritis.    -  Tetra/Doxy: R >8    -  Vancomycin: S 2    Specimen Source: .Urine Clean Catch (Midstream)    Culture Results:   10,000 - 49,000 CFU/mL Enterococcus faecalis  Normal Urogenital tika present    Organism Identification: Enterococcus faecalis    Organism: Enterococcus faecalis    Method Type: [facility]    LABS:                        9.9    2.05  )-----------( 243      ( 01 Jul 2020 08:56 )             29.5     Mean Cell Volume : 89.4 fl  Mean Cell Hemoglobin : 30.0 pg  Mean Cell Hemoglobin Concentration : 33.6 gm/dL  Auto Neutrophil # : 0.19 K/uL  Auto Lymphocyte # : 1.58 K/uL  Auto Monocyte # : 0.19 K/uL  Auto Eosinophil # : 0.04 K/uL  Auto Basophil # : 0.02 K/uL  Auto Neutrophil % : 9.1 %  Auto Lymphocyte % : 77.1 %  Auto Monocyte % : 9.3 %  Auto Eosinophil % : 2.0 %  Auto Basophil % : 1.0 %      07-01    140  |  103  |  16  ----------------------------<  119<H>  4.5   |  24  |  1.04    Ca    10.5      01 Jul 2020 08:56  Phos  4.8     07-01  Mg     2.1     07-01    TPro  8.6<H>  /  Alb  4.8  /  TBili  0.4  /  DBili  x   /  AST  26  /  ALT  32  /  AlkPhos  135<H>  07-01  Mg 2.1  Phos 4.8  PT/INR - ( 01 Jul 2020 08:56 )   PT: 13.7 sec;   INR: 1.21 ratio    PTT - ( 01 Jul 2020 08:56 )  PTT:38.6 sec      Uric Acid 5.3    RADIOLOGY & ADDITIONAL STUDIES:   CT Upper Extremity w/ IV Cont, Right (06.24.20 @ 18:25) >  Partially occlusive acute thrombus within the right axillary vein as noted previously. Subcutaneous edema. No abscess.

## 2020-07-01 NOTE — PROGRESS NOTE ADULT - PROBLEM SELECTOR PLAN 8
On Lovenox SQ BID for DVT treatment. Discontinue if platelets are < 50,000       Contact Information [phone]

## 2020-07-01 NOTE — PROGRESS NOTE ADULT - PROBLEM SELECTOR PLAN 5
Most likely due to eustachian tube dysfunction  Continue Ocean spray and Debrox .  Audiogram.  ENT following Most likely due to eustachian tube dysfunction  Continue Ocean spray and Debrox .  Audiogram done on 6/30 shows severe hearing loss in the left ear and essentially profound hearing loss in the right ear, 250-8kHz; asymmetry noted 2k-6kHz, right ear poorer than left. Unmasked bone conduction suggests mixed hearing loss in at least one ear (unable to mask), f/u with Otology.  ENT following

## 2020-07-01 NOTE — PROGRESS NOTE ADULT - ATTENDING COMMENTS
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  Presented with pancytopenia, flow cytometry/bone marrow biopsy/FISH done 5/26- c/w acute promyelocytic leukemia with PML-AWA translocation.    She was started on ATRA 5/23- day +38, GALA 5/27- day +35 (arsenic currently on hold from 6/22).    -CT chest 6/6 c/w granulomatous disease (hx of TB in 1980's) . Monitor daily weights, diurese prn.   -hx tremors , unclear etiology. Cefepime held, s/p Baclofen - now improved  - CBC, coags/fibrinogen daily.  Goal plt >40-50, fibrinogen >150  - responding to ABO-matched platelets given over 3 hrs in 1/2 unit infusions   -monitor lytes daily, keep K>4, Mg>2.  EKG twice a week  -COVID19+ on 6/17, now neg  -Zoster rash on R buttock and back of RLE, crusted over. Will resume GALA   - Pain present, on gabapentin 100 mg TID, d/c morphine, tylenol prn  - trial or oral benadryl for RUE itching   -RUE redness and itching at previous PICC site, cont topical triamcinolone BID, no improvement with calamine lotion  -RUE doppler showing axillary partially occlusive clot. On lovenox since 6/25.   -s/p bone marrow biopsy on 6/26 - await results  - hearing loss x 1 week. ENT evaluation apprec s/p debrox soln for impacted R ear, audiology testing pending. 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  Presented with pancytopenia, flow cytometry/bone marrow biopsy/FISH done 5/26- c/w acute promyelocytic leukemia with PML-AWA translocation.    She was started on ATRA 5/23- day +38, GALA 5/27- day +36 (arsenic currently on hold from 6/22).    -CT chest 6/6 c/w granulomatous disease (hx of TB in 1980's) . Monitor daily weights, diurese prn.   -hx tremors , unclear etiology. Cefepime held, s/p Baclofen - now improved  - CBC, coags/fibrinogen daily.  Goal plt >40-50, fibrinogen >150  - responding to ABO-matched platelets given over 3 hrs in 1/2 unit infusions   -monitor lytes daily, keep K>4, Mg>2.  EKG twice a week  -COVID19+ on 6/17, now neg  -Zoster rash on R buttock and back of RLE, crusted over. Resumed GALA 6/30, will need PICC placed today  - Pain present, on gabapentin 100 mg TID, d/c morphine, tylenol prn  - s/p oral benadryl for RUE itching   -RUE redness and itching at previous PICC site, cont topical triamcinolone BID, no improvement with calamine lotion  -RUE doppler showing axillary partially occlusive clot. On lovenox since 6/25. (held x 24 hrs for PICC placement today)  -s/p bone marrow biopsy on 6/26 - c/w residual APL 35% marrow involvement  - hearing loss x 1 week. ENT evaluation apprec s/p debrox soln for impacted R ear, audiology testing pending.

## 2020-07-01 NOTE — PROGRESS NOTE ADULT - PROBLEM SELECTOR PLAN 1
Continue ATRA 40mg BID (started 5/22)   Arsenic Trioxide which was started on 5/27 was on hold (since 6/22- 6/29), now  shingles crusted resumed Arsenic on 6/30.  Monitor for differentiation syndrome   check EKG for QTc prolongation every Tuesday and Friday while on arsenic   Keep K > 4 and Mg > 2 while on arsenic   Status post Hydrea 500mg PO x 1 on 6/8 and 6/9 for WBC > 10  Follow up CBC with diff, CMP, TLS and DIC panel daily  Keep PLTs >50 K. If unable to achieve goal to infuse 1/2 unit platelets over 3 hours every 12 hrs   S/p Prednisone 20mg PO daily x 5 days (through 6/12) due to platelet antibody  Keep Fibrinogen > 150, if less give Cryo  Strict Is and Os/Daily weights/Mouth Care  Follow up bone marrow biopsy results (performed 6/26).  Poor IV access, will need PICC line placement. Continue ATRA 40mg BID (started 5/22)   Arsenic Trioxide which was started on 5/27 was on hold (since 6/22- 6/29), now  shingles crusted resumed Arsenic on 6/30.  Monitor for differentiation syndrome   check EKG for QTc prolongation every Tuesday and Friday while on arsenic   Keep K > 4 and Mg > 2 while on arsenic   Status post Hydrea 500mg PO x 1 on 6/8 and 6/9 for WBC > 10  Follow up CBC with diff, CMP, TLS and DIC panel daily  Keep PLTs >50 K. If unable to achieve goal to infuse 1/2 unit platelets over 3 hours every 12 hrs   S/p Prednisone 20mg PO daily x 5 days (through 6/12) due to platelet antibody  Keep Fibrinogen > 150, if less give Cryo  Strict Is and Os/Daily weights/Mouth Care  Bone marrow biopsy performed on 6/26 shows persistent disease, continue Arsenic trioxide and ATRA.  PICC line placement today, patient and her son verbalized understanding about  the importance of   Poor IV access, will need PICC line placement. Continue ATRA 40mg BID (started 5/22)   Arsenic Trioxide which was started on 5/27 was on hold (since 6/22- 6/29), now  shingles crusted resumed Arsenic on 6/30.  Monitor for differentiation syndrome   check EKG for QTc prolongation every Tuesday and Friday while on arsenic   Keep K > 4 and Mg > 2 while on arsenic   Status post Hydrea 500mg PO x 1 on 6/8 and 6/9 for WBC > 10  Follow up CBC with diff, CMP, TLS and DIC panel daily  Keep PLTs >50 K. If unable to achieve goal to infuse 1/2 unit platelets over 3 hours every 12 hrs   S/p Prednisone 20mg PO daily x 5 days (through 6/12) due to platelet antibody  Keep Fibrinogen > 150, if less give Cryo  Strict Is and Os/Daily weights/Mouth Care  Bone marrow biopsy performed on 6/26 shows persistent disease, continue Arsenic trioxide and ATRA.  PICC line placement today, patient and her son verbalized understanding about  the importance of PICC placement for chemotherapy. Continue ATRA 40mg BID (started 5/22)   Arsenic Trioxide which was started on 5/27 was on hold (since 6/22- 6/29), now  shingles crusted resumed Arsenic on 6/30.  Monitor for differentiation syndrome   check EKG for QTc prolongation every Tuesday and Friday while on arsenic   Keep K > 4 and Mg > 2 while on arsenic   Status post Hydrea 500mg PO x 1 on 6/8 and 6/9 for WBC > 10  Follow up CBC with diff, CMP, TLS and DIC panel daily  Keep PLTs >50 K. If unable to achieve goal to infuse 1/2 unit platelets over 3 hours every 12 hrs   S/p Prednisone 20mg PO daily x 5 days (through 6/12) due to platelet antibody  Keep Fibrinogen > 150, if less give Cryo  Strict Is and Os/Daily weights/Mouth Care  Bone marrow biopsy performed on 6/26 shows persistent disease, continue Arsenic trioxide and ATRA.  PICC line placement today, patient and her son verbalized understanding about  the importance of PICC placement , consent obtained from son and patient.

## 2020-07-01 NOTE — PROGRESS NOTE ADULT - ASSESSMENT
Ms. Gant is a 59 y/o Upper sorbian-speaking female w/ a PMHx of HTN, HLD, T2DM, and COVID-19 infection in 4/2020 who was admitted for management of newly diagnosed APL. The patient has been receiving treatment with ATRA and Arsenic (ATRA since 5/22 and Arsenic 5/27). Arsenic has been on hold since 6/22 secondary to Herpes Zoster infection. Patient's hospital course has been complicated by transaminitis, refractory thrombocytopenia, COVID-19 (+) state (6/16), shingles, and right axillary DVT. The patient has pancytopenia 2/2 disease and/or ATRA/Arsenic.

## 2020-07-01 NOTE — ADVANCED PRACTICE NURSE CONSULT - ASSESSMENT
Lab results seen by Dr. Baldwin. Seen by PICC line RNs for PICC line placement. Son consented for procedure. Consent on chart. CXR post PICC line placement done confirmed for placement by NP(Beatrice). Both ports from left upper arm PICC line patent,intact. No signs of bleeding. 2 RNs verification completed prior to start of Arsenic. Arsenic trioxide 11mg in 250ml NSS started at 1400 x 2 hours infusion at 130ml/hr via lowest port of NSS line into red port of PICC line left arm ac. Primary RN aware of plan of care. Safety maintained.

## 2020-07-01 NOTE — PROGRESS NOTE ADULT - PROBLEM SELECTOR PLAN 3
Patient with right upper arm pain and swelling s/p PICC line removal   6/23 [facility] Dup[facility] (+) for acute partially occlusive DVT affecting right axillary vein   6/24 CT consistent with DVT, no abscess   started on a Heparin gtt, now changed to SQ Lovenox q 12. Keep platelets >50,000  With associated rash. Dermatology consulted. Possible contact dermatitis vs allergic dermatitis from the adhesive. Clobetasol cream to the affected area Patient with right upper arm pain and swelling s/p PICC line removal   6/23 [facility] Duplex (+) for acute partially occlusive DVT affecting right axillary vein   6/24 CT consistent with DVT, no abscess   Was on a Heparin gtt, now changed to SQ Lovenox q 12. Keep platelets >50,000  With associated rash.  6/30 - Lovenox on hold for PICC insertion will restart on 7/2.   Dermatology consulted. Possible contact dermatitis vs allergic dermatitis from the adhesive. Clobetasol cream to the affected area Patient with right upper arm pain and swelling s/p PICC line removal   6/23 [facility] Duplex (+) for acute partially occlusive DVT affecting right axillary vein   6/24 CT consistent with DVT, no abscess   Was on a Heparin gtt, now changed to SQ Lovenox q 12. Keep platelets >50,000  With associated rash.  6/30 - Lovenox on hold for PICC insertion will restart on 7/2.   Dermatology consulted. Possible contact dermatitis vs allergic dermatitis from the adhesive. Clobetasol cream to the affected area.  7/1- F/u DVT screen in RUE as patient continue to have pain.

## 2020-07-01 NOTE — PROGRESS NOTE ADULT - PROBLEM SELECTOR PLAN 2
Patient is neutropenic, afebrile   6/29 Started Levaquin and Posaconazole for PPX  If spikes pan culture, CXR and change Levaquin to cefepime   6/23 Persistent right upper inner arm pain/swelling - CT no abscess, + DVT  6/22 Right buttock and upper thigh shingles continue Valtrex treatment with Lac Hydrin  Previous COVID-19 (+) outpatient  Then COVID (-) on 5/22, 5/26, 6/2 and 6/9 6/16 COVID 19 (+)   6/18 COVID 19 Ab (+)  6/19 Repeat COVID-19 PCR (-) Patient is neutropenic, afebrile   6/29 Started Levaquin and Posaconazole for PPX  If spikes pan culture, CXR and change Levaquin to cefepime   6/23 Persistent right upper inner arm pain/swelling - CT no abscess, + DVT, will repeat Doppler today.  6/22 Right buttock and upper thigh shingles continue Valtrex treatment with Lac Hydrin  Previous COVID-19 (+) outpatient  Then COVID (-) on 5/22, 5/26, 6/2 and 6/9 6/16 COVID 19 (+)   6/18 COVID 19 Ab (+)  6/19 Repeat COVID-19 PCR (-)

## 2020-07-02 LAB
ALBUMIN SERPL ELPH-MCNC: 4.5 G/DL — SIGNIFICANT CHANGE UP (ref 3.3–5)
ALP SERPL-CCNC: 125 U/L — HIGH (ref 40–120)
ALT FLD-CCNC: 26 U/L — SIGNIFICANT CHANGE UP (ref 10–45)
ANION GAP SERPL CALC-SCNC: 9 MMOL/L — SIGNIFICANT CHANGE UP (ref 5–17)
AST SERPL-CCNC: 23 U/L — SIGNIFICANT CHANGE UP (ref 10–40)
BASOPHILS # BLD AUTO: 0.03 K/UL — SIGNIFICANT CHANGE UP (ref 0–0.2)
BASOPHILS NFR BLD AUTO: 1.8 % — SIGNIFICANT CHANGE UP (ref 0–2)
BILIRUB SERPL-MCNC: 0.3 MG/DL — SIGNIFICANT CHANGE UP (ref 0.2–1.2)
BLASTS # FLD: 0.9 % — HIGH (ref 0–0)
BUN SERPL-MCNC: 16 MG/DL — SIGNIFICANT CHANGE UP (ref 7–23)
CALCIUM SERPL-MCNC: 10.1 MG/DL — SIGNIFICANT CHANGE UP (ref 8.4–10.5)
CHLORIDE SERPL-SCNC: 105 MMOL/L — SIGNIFICANT CHANGE UP (ref 96–108)
CO2 SERPL-SCNC: 27 MMOL/L — SIGNIFICANT CHANGE UP (ref 22–31)
CREAT SERPL-MCNC: 0.99 MG/DL — SIGNIFICANT CHANGE UP (ref 0.5–1.3)
D DIMER BLD IA.RAPID-MCNC: 509 NG/ML DDU — HIGH
EOSINOPHIL # BLD AUTO: 0.04 K/UL — SIGNIFICANT CHANGE UP (ref 0–0.5)
EOSINOPHIL NFR BLD AUTO: 2.6 % — SIGNIFICANT CHANGE UP (ref 0–6)
FIBRINOGEN PPP-MCNC: 401 MG/DL — SIGNIFICANT CHANGE UP (ref 350–510)
GIANT PLATELETS BLD QL SMEAR: PRESENT — SIGNIFICANT CHANGE UP
GLUCOSE BLDC GLUCOMTR-MCNC: 120 MG/DL — HIGH (ref 70–99)
GLUCOSE BLDC GLUCOMTR-MCNC: 125 MG/DL — HIGH (ref 70–99)
GLUCOSE BLDC GLUCOMTR-MCNC: 182 MG/DL — HIGH (ref 70–99)
GLUCOSE BLDC GLUCOMTR-MCNC: 99 MG/DL — SIGNIFICANT CHANGE UP (ref 70–99)
GLUCOSE SERPL-MCNC: 124 MG/DL — HIGH (ref 70–99)
HCT VFR BLD CALC: 27.4 % — LOW (ref 34.5–45)
HGB BLD-MCNC: 9 G/DL — LOW (ref 11.5–15.5)
LDH SERPL L TO P-CCNC: 163 U/L — SIGNIFICANT CHANGE UP (ref 50–242)
LYMPHOCYTES # BLD AUTO: 1.14 K/UL — SIGNIFICANT CHANGE UP (ref 1–3.3)
LYMPHOCYTES # BLD AUTO: 71.9 % — HIGH (ref 13–44)
MAGNESIUM SERPL-MCNC: 2 MG/DL — SIGNIFICANT CHANGE UP (ref 1.6–2.6)
MANUAL SMEAR VERIFICATION: SIGNIFICANT CHANGE UP
MCHC RBC-ENTMCNC: 29.9 PG — SIGNIFICANT CHANGE UP (ref 27–34)
MCHC RBC-ENTMCNC: 32.8 GM/DL — SIGNIFICANT CHANGE UP (ref 32–36)
MCV RBC AUTO: 91 FL — SIGNIFICANT CHANGE UP (ref 80–100)
MONOCYTES # BLD AUTO: 0.14 K/UL — SIGNIFICANT CHANGE UP (ref 0–0.9)
MONOCYTES NFR BLD AUTO: 8.8 % — SIGNIFICANT CHANGE UP (ref 2–14)
NEUTROPHILS # BLD AUTO: 0.22 K/UL — LOW (ref 1.8–7.4)
NEUTROPHILS NFR BLD AUTO: 14 % — LOW (ref 43–77)
PHOSPHATE SERPL-MCNC: 4.5 MG/DL — SIGNIFICANT CHANGE UP (ref 2.5–4.5)
PLAT MORPH BLD: NORMAL — SIGNIFICANT CHANGE UP
PLATELET # BLD AUTO: 207 K/UL — SIGNIFICANT CHANGE UP (ref 150–400)
POTASSIUM SERPL-MCNC: 4 MMOL/L — SIGNIFICANT CHANGE UP (ref 3.5–5.3)
POTASSIUM SERPL-SCNC: 4 MMOL/L — SIGNIFICANT CHANGE UP (ref 3.5–5.3)
PROT SERPL-MCNC: 7.8 G/DL — SIGNIFICANT CHANGE UP (ref 6–8.3)
RBC # BLD: 3.01 M/UL — LOW (ref 3.8–5.2)
RBC # FLD: 15.8 % — HIGH (ref 10.3–14.5)
RBC BLD AUTO: SIGNIFICANT CHANGE UP
SODIUM SERPL-SCNC: 141 MMOL/L — SIGNIFICANT CHANGE UP (ref 135–145)
URATE SERPL-MCNC: 5.1 MG/DL — SIGNIFICANT CHANGE UP (ref 2.5–7)
WBC # BLD: 1.58 K/UL — LOW (ref 3.8–10.5)
WBC # FLD AUTO: 1.58 K/UL — LOW (ref 3.8–10.5)

## 2020-07-02 PROCEDURE — 93010 ELECTROCARDIOGRAM REPORT: CPT

## 2020-07-02 PROCEDURE — 93971 EXTREMITY STUDY: CPT | Mod: 26

## 2020-07-02 PROCEDURE — 99233 SBSQ HOSP IP/OBS HIGH 50: CPT | Mod: GC

## 2020-07-02 RX ADMIN — GABAPENTIN 100 MILLIGRAM(S): 400 CAPSULE ORAL at 13:27

## 2020-07-02 RX ADMIN — Medication 1 APPLICATION(S): at 17:41

## 2020-07-02 RX ADMIN — Medication 667 MILLIGRAM(S): at 17:41

## 2020-07-02 RX ADMIN — Medication 5 MILLIGRAM(S): at 06:17

## 2020-07-02 RX ADMIN — CHLORHEXIDINE GLUCONATE 1 APPLICATION(S): 213 SOLUTION TOPICAL at 06:15

## 2020-07-02 RX ADMIN — POLYETHYLENE GLYCOL 3350 17 GRAM(S): 17 POWDER, FOR SOLUTION ORAL at 13:26

## 2020-07-02 RX ADMIN — VALACYCLOVIR 1000 MILLIGRAM(S): 500 TABLET, FILM COATED ORAL at 13:27

## 2020-07-02 RX ADMIN — GABAPENTIN 100 MILLIGRAM(S): 400 CAPSULE ORAL at 06:15

## 2020-07-02 RX ADMIN — Medication 1 SPRAY(S): at 22:02

## 2020-07-02 RX ADMIN — PANTOPRAZOLE SODIUM 40 MILLIGRAM(S): 20 TABLET, DELAYED RELEASE ORAL at 06:16

## 2020-07-02 RX ADMIN — Medication 1 APPLICATION(S): at 14:00

## 2020-07-02 RX ADMIN — Medication 5 MILLIGRAM(S): at 17:42

## 2020-07-02 RX ADMIN — GABAPENTIN 100 MILLIGRAM(S): 400 CAPSULE ORAL at 22:02

## 2020-07-02 RX ADMIN — Medication 1 APPLICATION(S): at 06:28

## 2020-07-02 RX ADMIN — Medication 1 SPRAY(S): at 06:24

## 2020-07-02 RX ADMIN — Medication 1 APPLICATION(S): at 20:28

## 2020-07-02 RX ADMIN — VALACYCLOVIR 1000 MILLIGRAM(S): 500 TABLET, FILM COATED ORAL at 22:02

## 2020-07-02 RX ADMIN — Medication 667 MILLIGRAM(S): at 22:02

## 2020-07-02 RX ADMIN — ENOXAPARIN SODIUM 80 MILLIGRAM(S): 100 INJECTION SUBCUTANEOUS at 22:02

## 2020-07-02 RX ADMIN — Medication 1 SPRAY(S): at 14:00

## 2020-07-02 RX ADMIN — Medication 1 APPLICATION(S): at 06:25

## 2020-07-02 RX ADMIN — Medication 1 APPLICATION(S): at 06:26

## 2020-07-02 RX ADMIN — Medication 4 DROP(S): at 06:28

## 2020-07-02 RX ADMIN — TRETINOIN 40 MILLIGRAM(S): 10 CAPSULE, LIQUID FILLED ORAL at 20:27

## 2020-07-02 RX ADMIN — CARBAMIDE PEROXIDE 5 DROP(S): 81.86 SOLUTION/ DROPS AURICULAR (OTIC) at 06:26

## 2020-07-02 RX ADMIN — ENOXAPARIN SODIUM 80 MILLIGRAM(S): 100 INJECTION SUBCUTANEOUS at 08:58

## 2020-07-02 RX ADMIN — ATENOLOL 25 MILLIGRAM(S): 25 TABLET ORAL at 06:21

## 2020-07-02 RX ADMIN — Medication 5 MILLILITER(S): at 20:29

## 2020-07-02 RX ADMIN — Medication 5 MILLILITER(S): at 08:59

## 2020-07-02 RX ADMIN — Medication 667 MILLIGRAM(S): at 13:26

## 2020-07-02 RX ADMIN — Medication 5 MILLILITER(S): at 23:36

## 2020-07-02 RX ADMIN — Medication 5 MILLIGRAM(S): at 17:43

## 2020-07-02 RX ADMIN — TRETINOIN 40 MILLIGRAM(S): 10 CAPSULE, LIQUID FILLED ORAL at 09:01

## 2020-07-02 RX ADMIN — POSACONAZOLE 300 MILLIGRAM(S): 100 TABLET, DELAYED RELEASE ORAL at 13:26

## 2020-07-02 RX ADMIN — ARSENIC TRIOXIDE 130.5 MILLIGRAM(S): 2 INJECTION, SOLUTION INTRAVENOUS at 12:58

## 2020-07-02 RX ADMIN — VALACYCLOVIR 1000 MILLIGRAM(S): 500 TABLET, FILM COATED ORAL at 06:16

## 2020-07-02 RX ADMIN — Medication 5 MILLILITER(S): at 16:36

## 2020-07-02 RX ADMIN — Medication 1 APPLICATION(S): at 22:02

## 2020-07-02 RX ADMIN — Medication 667 MILLIGRAM(S): at 08:59

## 2020-07-02 RX ADMIN — CARBAMIDE PEROXIDE 5 DROP(S): 81.86 SOLUTION/ DROPS AURICULAR (OTIC) at 20:29

## 2020-07-02 RX ADMIN — Medication 4 DROP(S): at 20:29

## 2020-07-02 NOTE — PROGRESS NOTE ADULT - PROBLEM SELECTOR PLAN 3
Patient with right upper arm pain and swelling s/p PICC line removal   6/23 [facility] Duplex (+) for acute partially occlusive DVT affecting right axillary vein   6/24 CT consistent with DVT, no abscess   Was on a Heparin gtt, now changed to SQ Lovenox q 12. Keep platelets >50,000  With associated rash.  6/30 - Lovenox on hold for PICC insertion will restart on 7/2.   Dermatology consulted. Possible contact dermatitis vs allergic dermatitis from the adhesive. Clobetasol cream to the affected area.  7/1- F/u DVT screen in RUE as patient continue to have pain. Patient with right upper arm pain and swelling s/p PICC line removal   6/23 [facility] Duplex (+) for acute partially occlusive DVT affecting right axillary vein   6/24 CT consistent with DVT, no abscess   Was on a Heparin gtt, now changed to SQ Lovenox q 12. Keep platelets >50,000  With associated rash.  6/30 - Lovenox on hold for PICC insertion will restart on 7/2.   Dermatology consulted. Possible contact dermatitis vs allergic dermatitis from the adhesive. Clobetasol cream to the affected area.  7/1- repeat doppler -Right axillary DVT appears resolved.  Decreased phasicity in the right subclavian vein suggests proximal obstruction.

## 2020-07-02 NOTE — ADVANCED PRACTICE NURSE CONSULT - ASSESSMENT
Pt up in chair, on phone with son, A/Ox4. Pt with no complaints. Left DL PICC easily flushed with brisk blood return, dressing C/D/I. Site without redness, swelling, pain. Labs reviewed by Dr. Gardner on rounds. EKG done and reviewed by NP Joslyn Garcia. 2 RN verification completed. Education reinforced to patient/son, able to verbalize understanding. At 12:58, Arsenion 0.15mg/kg = 11mg IV infused to lowest port of NSS line to red lumen of left DL PICC via alaris pump as a 2 hour infusion. Safety maintained.

## 2020-07-02 NOTE — PROGRESS NOTE ADULT - PROBLEM SELECTOR PLAN 8
On Lovenox SQ BID for DVT treatment. Discontinue if platelets are < 50,000       Contact Information [phone]

## 2020-07-02 NOTE — PROGRESS NOTE ADULT - PROBLEM SELECTOR PLAN 5
Most likely due to eustachian tube dysfunction  Continue Ocean spray and Debrox .  Audiogram done on 6/30 shows severe hearing loss in the left ear and essentially profound hearing loss in the right ear, 250-8kHz; asymmetry noted 2k-6kHz, right ear poorer than left. Unmasked bone conduction suggests mixed hearing loss in at least one ear (unable to mask), follow up  with Otology.  ENT following

## 2020-07-02 NOTE — PROGRESS NOTE ADULT - SUBJECTIVE AND OBJECTIVE BOX
Diagnosis: Acute Promyelocytic Leukemia     Protocol/Chemo Regimen: ATRA/Arsenic Trioxide    Day: ATRA Day 41 and Arsenic 37 (resumed Arsenic on 6/30/2020)    Pt endorsed:   + Right upper inner arm pain and and itching.  + Rash on her Right buttock and Right posterior thigh,   + mild hearing loss , right arm itching     Review of Systems: Denies nausea, vomiting, diarrhea, chest pain, SOB, headache      Translated by patient's son via phone call per patient request     Pain scale: 4- 5/10    Diet: Consistent Carb w/ Evening Snack    Allergies    No Known Allergies    Intolerances        ANTIMICROBIALS  levoFLOXacin  Tablet 500 milliGRAM(s) Oral every 24 hours  posaconazole DR Tablet 300 milliGRAM(s) Oral daily  valACYclovir 1000 milliGRAM(s) Oral every 8 hours      HEME/ONC MEDICATIONS  arsenic trioxide IVPB (eMAR) 11 milliGRAM(s) IV Intermittent every 24 hours  enoxaparin Injectable 80 milliGRAM(s) SubCutaneous every 12 hours  tretinoin 40 milliGRAM(s) Oral every 12 hours      STANDING MEDICATIONS  ammonium lactate 12% Lotion 1 Application(s) Topical two times a day  ATENolol  Tablet 25 milliGRAM(s) Oral daily  baclofen 5 milliGRAM(s) Oral every 12 hours  Biotene Dry Mouth Oral Rinse 5 milliLiter(s) Swish and Spit five times a day  bisacodyl 5 milliGRAM(s) Oral every 12 hours  calcium acetate 667 milliGRAM(s) Oral four times a day with meals  carbamide peroxide Otic Solution 5 Drop(s) Right Ear two times a day  chlorhexidine 4% Liquid 1 Application(s) Topical <User Schedule>  dextrose 5%. 1000 milliLiter(s) IV Continuous <Continuous>  dextrose 50% Injectable 12.5 Gram(s) IV Push once  dextrose 50% Injectable 25 Gram(s) IV Push once  dextrose 50% Injectable 25 Gram(s) IV Push once  gabapentin 100 milliGRAM(s) Oral three times a day  insulin lispro (HumaLOG) corrective regimen sliding scale   SubCutaneous three times a day before meals  insulin lispro (HumaLOG) corrective regimen sliding scale   SubCutaneous at bedtime  ofloxacin 0.3% Solution 4 Drop(s) Right Ear two times a day  pantoprazole    Tablet 40 milliGRAM(s) Oral before breakfast  petrolatum white Ointment 1 Application(s) Topical three times a day  polyethylene glycol 3350 17 Gram(s) Oral daily  sodium chloride 0.65% Nasal 1 Spray(s) Both Nostrils every 8 hours  triamcinolone 0.1% Ointment 1 Application(s) Topical every 12 hours      PRN MEDICATIONS  acetaminophen   Tablet .. 650 milliGRAM(s) Oral every 6 hours PRN  ALBUTerol    90 MICROgram(s) HFA Inhaler 2 Puff(s) Inhalation every 6 hours PRN  AQUAPHOR (petrolatum Ointment) 1 Application(s) Topical every 3 hours PRN  dextrose 40% Gel 15 Gram(s) Oral once PRN  diphenhydrAMINE 25 milliGRAM(s) Oral every 4 hours PRN  diphenhydrAMINE   Injectable 25 milliGRAM(s) IV Push every 6 hours PRN  glucagon  Injectable 1 milliGRAM(s) IntraMuscular once PRN  guaiFENesin   Syrup  (Sugar-Free) 100 milliGRAM(s) Oral every 6 hours PRN  hemorrhoidal Ointment 1 Application(s) Rectal every 8 hours PRN  morphine  - Injectable 3 milliGRAM(s) IV Push every 6 hours PRN  ondansetron Injectable 8 milliGRAM(s) IV Push every 8 hours PRN  sodium chloride 0.9% lock flush 10 milliLiter(s) IV Push every 1 hour PRN  sodium chloride 0.9% lock flush 10 milliLiter(s) IV Push every 1 hour PRN        Vital Signs Last 24 Hrs  T(C): 36.1 (02 Jul 2020 06:19), Max: 36.6 (01 Jul 2020 18:08)  T(F): 97 (02 Jul 2020 06:19), Max: 97.8 (01 Jul 2020 18:08)  HR: 63 (02 Jul 2020 06:19) (62 - 70)  BP: 154/72 (02 Jul 2020 06:19) (115/63 - 157/86)  BP(mean): --  RR: 16 (02 Jul 2020 06:19) (16 - 18)  SpO2: 97% (02 Jul 2020 06:19) (96% - 100%)    Physical Exam    General: adult in NAD  HEENT: clear oropharynx, no erythema, no ulcers  CV: normal S1, S2, RRR  Lungs: clear to auscultation, no wheezes, no rales  Abdomen: soft, nontender, nondistended, normal BS  Ext: no edema, RUE hyperpigmented.  Skin: Warm and dry, Some hyperpigmentation noted around RUE PICC line, Shingles-like rash noted on R buttock (appears crusting over)  R posterior thigh and right leg   Neuro: alert and oriented x 3        LABS:                        9.0    1.58  )-----------( 207      ( 02 Jul 2020 07:07 )             27.4         Mean Cell Volume : 91.0 fl  Mean Cell Hemoglobin : 29.9 pg  Mean Cell Hemoglobin Concentration : 32.8 gm/dL  Auto Neutrophil # : x  Auto Lymphocyte # : x  Auto Monocyte # : x  Auto Eosinophil # : x  Auto Basophil # : x  Auto Neutrophil % : x  Auto Lymphocyte % : x  Auto Monocyte % : x  Auto Eosinophil % : x  Auto Basophil % : x      07-02    141  |  105  |  16  ----------------------------<  124<H>  4.0   |  27  |  0.99    Culture - Urine (06.20.20 @ 20:49)    -  Ampicillin: S <=2 Predicts results to ampicillin/sulbactam, amoxacillin-clavulanate and  piperacillin-tazobactam.    -  Ciprofloxacin: S <=1    -  Levofloxacin: S <=1    -  Nitrofurantoin: S <=32 Should not be used to treat pyelonephritis.    -  Tetra/Doxy: R >8    -  Vancomycin: S 2    Specimen Source: .Urine Clean Catch (Midstream)    Culture Results:   10,000 - 49,000 CFU/mL Enterococcus faecalis  Normal Urogenital tika present    Organism Identification: Enterococcus faecalis    Organism: Enterococcus faecalis    Method Type: ARTHUR    Ca    10.1      02 Jul 2020 07:05  Phos  4.5     07-02  Mg     2.0     07-02    TPro  7.8  /  Alb  4.5  /  TBili  0.3  /  DBili  x   /  AST  23  /  ALT  26  /  AlkPhos  125<H>  07-02      PT/INR - ( 01 Jul 2020 08:56 )   PT: 13.7 sec;   INR: 1.21 ratio         PTT - ( 01 Jul 2020 08:56 )  PTT:38.6 sec      Uric Acid 5.1        RECENT CULTURES:  Culture - Blood (06.20.20 @ 21:18)    Specimen Source: .Blood PICC/PERC Double Lumen BLUE    Culture Results:   No Growth Final    Culture - Blood (06.20.20 @ 21:14)    Specimen Source: .Blood Blood-Peripheral    Culture Results:   No Growth Final  Culture - Urine (06.20.20 @ 20:49)    -  Ampicillin: S <=2 Predicts results to ampicillin/sulbactam, amoxacillin-clavulanate and  piperacillin-tazobactam.    -  Ciprofloxacin: S <=1    -  Levofloxacin: S <=1    -  Nitrofurantoin: S <=32 Should not be used to treat pyelonephritis.    -  Tetra/Doxy: R >8    -  Vancomycin: S 2    Specimen Source: .Urine Clean Catch (Midstream)    Culture Results:   10,000 - 49,000 CFU/mL Enterococcus faecalis  Normal Urogenital tika present    Organism Identification: Enterococcus faecalis    Organism: Enterococcus faecalis    Method Type: ARTHUR      RADIOLOGY & ADDITIONAL STUDIES:      Xray Chest 1 View- PORTABLE-Urgent (07.01.20)  Left-sided PICC line with the tip in the SVC.    Linear opacity in the left upper lung is unchanged. Lungs are otherwise clear.     CT Upper Extremity w/ IV Cont, Right (06.24.20 @ 18:25)   Findings:    There is partially occlusive acute thrombus within the right axillary vein beginning at the level of the proximal humeral shaft. There is fat stranding within the medial subcutaneous tissues of the upper arm and posteriorly at the elbow. There is no rim-enhancing fluid collection. There is no cortical erosion or aggressive osseous destruction to suggest osteomyelitis.    There is spurring at the sublime tubercle and at the triceps insertion. There is a bone island within the distal radius dorsally. Right apical pleural/parenchymal scarring with focal calcification is again noted.    Impression: Partially occlusive acute thrombus within the right axillary vein as noted previously. Subcutaneous edema. No abscess.  VA Duplex Upper Ext Vein Scan, Right (06.23.20 @ 17:19)   IMPRESSION: There is acute, partially occlusive DVT affecting the right axillary vein. Diagnosis: Acute Promyelocytic Leukemia     Protocol/Chemo Regimen: ATRA/Arsenic Trioxide    Day: ATRA Day 41 and Arsenic 37 (resumed Arsenic on 6/30/2020)    Pt endorsed:   + Right upper inner arm pain and and itching.  + Rash on her Right buttock and Right posterior thigh- improved   + mild hearing loss , right arm itching     Review of Systems: Denies nausea, vomiting, diarrhea, chest pain, SOB, headache      Translated by patient's son via phone call per patient request     Pain scale: 4- 5/10    Diet: Consistent Carb w/ Evening Snack    Allergies    No Known Allergies    Intolerances        ANTIMICROBIALS  levoFLOXacin  Tablet 500 milliGRAM(s) Oral every 24 hours  posaconazole DR Tablet 300 milliGRAM(s) Oral daily  valACYclovir 1000 milliGRAM(s) Oral every 8 hours      HEME/ONC MEDICATIONS  arsenic trioxide IVPB (eMAR) 11 milliGRAM(s) IV Intermittent every 24 hours  enoxaparin Injectable 80 milliGRAM(s) SubCutaneous every 12 hours  tretinoin 40 milliGRAM(s) Oral every 12 hours      STANDING MEDICATIONS  ammonium lactate 12% Lotion 1 Application(s) Topical two times a day  ATENolol  Tablet 25 milliGRAM(s) Oral daily  baclofen 5 milliGRAM(s) Oral every 12 hours  Biotene Dry Mouth Oral Rinse 5 milliLiter(s) Swish and Spit five times a day  bisacodyl 5 milliGRAM(s) Oral every 12 hours  calcium acetate 667 milliGRAM(s) Oral four times a day with meals  carbamide peroxide Otic Solution 5 Drop(s) Right Ear two times a day  chlorhexidine 4% Liquid 1 Application(s) Topical <User Schedule>  dextrose 5%. 1000 milliLiter(s) IV Continuous <Continuous>  dextrose 50% Injectable 12.5 Gram(s) IV Push once  dextrose 50% Injectable 25 Gram(s) IV Push once  dextrose 50% Injectable 25 Gram(s) IV Push once  gabapentin 100 milliGRAM(s) Oral three times a day  insulin lispro (HumaLOG) corrective regimen sliding scale   SubCutaneous three times a day before meals  insulin lispro (HumaLOG) corrective regimen sliding scale   SubCutaneous at bedtime  ofloxacin 0.3% Solution 4 Drop(s) Right Ear two times a day  pantoprazole    Tablet 40 milliGRAM(s) Oral before breakfast  petrolatum white Ointment 1 Application(s) Topical three times a day  polyethylene glycol 3350 17 Gram(s) Oral daily  sodium chloride 0.65% Nasal 1 Spray(s) Both Nostrils every 8 hours  triamcinolone 0.1% Ointment 1 Application(s) Topical every 12 hours      PRN MEDICATIONS  acetaminophen   Tablet .. 650 milliGRAM(s) Oral every 6 hours PRN  ALBUTerol    90 MICROgram(s) HFA Inhaler 2 Puff(s) Inhalation every 6 hours PRN  AQUAPHOR (petrolatum Ointment) 1 Application(s) Topical every 3 hours PRN  dextrose 40% Gel 15 Gram(s) Oral once PRN  diphenhydrAMINE 25 milliGRAM(s) Oral every 4 hours PRN  diphenhydrAMINE   Injectable 25 milliGRAM(s) IV Push every 6 hours PRN  glucagon  Injectable 1 milliGRAM(s) IntraMuscular once PRN  guaiFENesin   Syrup  (Sugar-Free) 100 milliGRAM(s) Oral every 6 hours PRN  hemorrhoidal Ointment 1 Application(s) Rectal every 8 hours PRN  morphine  - Injectable 3 milliGRAM(s) IV Push every 6 hours PRN  ondansetron Injectable 8 milliGRAM(s) IV Push every 8 hours PRN  sodium chloride 0.9% lock flush 10 milliLiter(s) IV Push every 1 hour PRN  sodium chloride 0.9% lock flush 10 milliLiter(s) IV Push every 1 hour PRN        Vital Signs Last 24 Hrs  T(C): 36.1 (02 Jul 2020 06:19), Max: 36.6 (01 Jul 2020 18:08)  T(F): 97 (02 Jul 2020 06:19), Max: 97.8 (01 Jul 2020 18:08)  HR: 63 (02 Jul 2020 06:19) (62 - 70)  BP: 154/72 (02 Jul 2020 06:19) (115/63 - 157/86)  BP(mean): --  RR: 16 (02 Jul 2020 06:19) (16 - 18)  SpO2: 97% (02 Jul 2020 06:19) (96% - 100%)    Physical Exam    General: adult in NAD  HEENT: clear oropharynx, no erythema, no ulcers  CV: normal S1, S2, RRR  Lungs: clear to auscultation, no wheezes, no rales  Abdomen: soft, nontender, nondistended, normal BS  Ext: no edema, RUE hyperpigmented.  Skin: Warm and dry, Some hyperpigmentation noted around RUE PICC line, Shingles-like rash noted on R buttock (appears crusting over)  R posterior thigh and right leg   Neuro: alert and oriented x 3  Central line Left PICC CDI         LABS:                        9.0    1.58  )-----------( 207      ( 02 Jul 2020 07:07 )             27.4         Mean Cell Volume : 91.0 fl  Mean Cell Hemoglobin : 29.9 pg  Mean Cell Hemoglobin Concentration : 32.8 gm/dL  Auto Neutrophil # : x  Auto Lymphocyte # : x  Auto Monocyte # : x  Auto Eosinophil # : x  Auto Basophil # : x  Auto Neutrophil % : x  Auto Lymphocyte % : x  Auto Monocyte % : x  Auto Eosinophil % : x  Auto Basophil % : x      07-02    141  |  105  |  16  ----------------------------<  124<H>  4.0   |  27  |  0.99    Culture - Urine (06.20.20 @ 20:49)    -  Ampicillin: S <=2 Predicts results to ampicillin/sulbactam, amoxacillin-clavulanate and  piperacillin-tazobactam.    -  Ciprofloxacin: S <=1    -  Levofloxacin: S <=1    -  Nitrofurantoin: S <=32 Should not be used to treat pyelonephritis.    -  Tetra/Doxy: R >8    -  Vancomycin: S 2    Specimen Source: .Urine Clean Catch (Midstream)    Culture Results:   10,000 - 49,000 CFU/mL Enterococcus faecalis  Normal Urogenital tika present    Organism Identification: Enterococcus faecalis    Organism: Enterococcus faecalis    Method Type: ARTHUR    Ca    10.1      02 Jul 2020 07:05  Phos  4.5     07-02  Mg     2.0     07-02    TPro  7.8  /  Alb  4.5  /  TBili  0.3  /  DBili  x   /  AST  23  /  ALT  26  /  AlkPhos  125<H>  07-02      PT/INR - ( 01 Jul 2020 08:56 )   PT: 13.7 sec;   INR: 1.21 ratio         PTT - ( 01 Jul 2020 08:56 )  PTT:38.6 sec      Uric Acid 5.1        RECENT CULTURES:  Culture - Blood (06.20.20 @ 21:18)    Specimen Source: .Blood PICC/PERC Double Lumen BLUE    Culture Results:   No Growth Final    Culture - Blood (06.20.20 @ 21:14)    Specimen Source: .Blood Blood-Peripheral    Culture Results:   No Growth Final  Culture - Urine (06.20.20 @ 20:49)    -  Ampicillin: S <=2 Predicts results to ampicillin/sulbactam, amoxacillin-clavulanate and  piperacillin-tazobactam.    -  Ciprofloxacin: S <=1    -  Levofloxacin: S <=1    -  Nitrofurantoin: S <=32 Should not be used to treat pyelonephritis.    -  Tetra/Doxy: R >8    -  Vancomycin: S 2    Specimen Source: .Urine Clean Catch (Midstream)    Culture Results:   10,000 - 49,000 CFU/mL Enterococcus faecalis  Normal Urogenital tika present    Organism Identification: Enterococcus faecalis    Organism: Enterococcus faecalis    Method Type: ARTHUR      RADIOLOGY & ADDITIONAL STUDIES:  < from: [facility] Duplex Upper Ext Vein Scan, Right (07.02.20 @ 14:37) >    IMPRESSION:   No evidence of right upper extremity deep venous thrombosis. Right axillary DVT appears resolved.    Decreased phasicity in the right subclavian vein suggests proximal obstruction.            Xray Chest 1 View- PORTABLE-Urgent (07.01.20)  Left-sided PICC line with the tip in the SVC.    Linear opacity in the left upper lung is unchanged. Lungs are otherwise clear.     CT Upper Extremity w/ IV Cont, Right (06.24.20 @ 18:25)   Findings:    There is partially occlusive acute thrombus within the right axillary vein beginning at the level of the proximal humeral shaft. There is fat stranding within the medial subcutaneous tissues of the upper arm and posteriorly at the elbow. There is no rim-enhancing fluid collection. There is no cortical erosion or aggressive osseous destruction to suggest osteomyelitis.    There is spurring at the sublime tubercle and at the triceps insertion. There is a bone island within the distal radius dorsally. Right apical pleural/parenchymal scarring with focal calcification is again noted.    Impression: Partially occlusive acute thrombus within the right axillary vein as noted previously. Subcutaneous edema. No abscess.  VA Duplex Upper Ext Vein Scan, Right (06.23.20 @ 17:19)   IMPRESSION: There is acute, partially occlusive DVT affecting the right axillary vein.

## 2020-07-02 NOTE — PROGRESS NOTE ADULT - ASSESSMENT
Ms. Gant is a 61 y/o Amharic-speaking female w/ a PMHx of HTN, HLD, T2DM, and COVID-19 infection in 4/2020 who was admitted for management of newly diagnosed APL. The patient has been receiving treatment with ATRA and Arsenic (ATRA since 5/22 and Arsenic 5/27). Arsenic has been on hold since 6/22 secondary to Herpes Zoster infection; resumed on 6/30.  Patient's hospital course has been complicated by transaminitis, refractory thrombocytopenia, COVID-19 (+) state (6/16), shingles, and right axillary DVT. The patient has pancytopenia 2/2 disease and/or ATRA/Arsenic.

## 2020-07-02 NOTE — PROGRESS NOTE ADULT - ATTENDING COMMENTS
61 y/o Belarusian speaking F with newly diagnosed low/interm risk APL.  Presented with pancytopenia, flow cytometry/bone marrow biopsy/FISH done 5/26- c/w acute promyelocytic leukemia with PML-AWA translocation.    She was started on ATRA 5/23- day +38, GALA 5/27- day +36 (arsenic currently on hold from 6/22).    -CT chest 6/6 c/w granulomatous disease (hx of TB in 1980's) . Monitor daily weights, diurese prn.   -hx tremors , unclear etiology. Cefepime held, s/p Baclofen - now improved  - CBC, coags/fibrinogen daily.  Goal plt >40-50, fibrinogen >150  - responding to ABO-matched platelets given over 3 hrs in 1/2 unit infusions   -monitor lytes daily, keep K>4, Mg>2.  EKG twice a week  -COVID19+ on 6/17, now neg  -Zoster rash on R buttock and back of RLE, crusted over. Resumed GALA 6/30, will need PICC placed today  - Pain present, on gabapentin 100 mg TID, d/c morphine, tylenol prn  - s/p oral benadryl for RUE itching   -RUE redness and itching at previous PICC site, cont topical triamcinolone BID, no improvement with calamine lotion  -RUE doppler showing axillary partially occlusive clot. On lovenox since 6/25. (held x 24 hrs for PICC placement today)  -s/p bone marrow biopsy on 6/26 - c/w residual APL 35% marrow involvement  - hearing loss x 1 week. ENT evaluation apprec s/p debrox soln for impacted R ear, audiology testing pending. 61 y/o Latvian speaking F with newly diagnosed low/interm risk APL.  Presented with pancytopenia, flow cytometry/bone marrow biopsy/FISH done 5/26- c/w acute promyelocytic leukemia with PML-AWA translocation.    She was started on ATRA 5/23- day +38, GALA 5/27- day +37 (arsenic currently on hold from 6/22).    -CT chest 6/6 c/w granulomatous disease (hx of TB in 1980's) . Monitor daily weights, diurese prn.   -hx tremors , unclear etiology. Cefepime held, s/p Baclofen - now improved  - CBC, coags/fibrinogen daily.  Goal plt >40-50, fibrinogen >150  - responding to ABO-matched platelets given over 3 hrs in 1/2 unit infusions   -monitor lytes daily, keep K>4, Mg>2.  EKG twice a week  -COVID19+ on 6/17, now neg  -Zoster rash on R buttock and back of RLE, crusted over. Resumed GALA 6/30, will need PICC placed today  - Pain present, on gabapentin 100 mg TID, d/c morphine, tylenol prn  -RUE redness and itching at previous PICC site, cont topical triamcinolone BID   -RUE doppler showing axillary partially occlusive clot. On lovenox since 6/25. (held x 24 hrs for PICC placement today)  -s/p bone marrow biopsy on 6/26 - c/w residual APL 35% marrow involvement  - L PICC line placed 7/1  - hearing loss x 1 week. ENT evaluation apprec s/p debrox soln for impacted R ear, audiology testing pending.

## 2020-07-02 NOTE — ADVANCED PRACTICE NURSE CONSULT - RECOMMEDATIONS
Full report given to area nurse, aware to monitor
Report given to primary RN.
Report given to primary RN.
Full report given to area nurse, aware to monitor.

## 2020-07-02 NOTE — PROGRESS NOTE ADULT - PROBLEM SELECTOR PLAN 2
Patient is neutropenic, afebrile   6/29 Started Levaquin and Posaconazole for PPX  If spikes pan culture, CXR and change Levaquin to cefepime   6/23 Persistent right upper inner arm pain/swelling - CT no abscess, + DVT, will repeat Doppler today.  6/22 Right buttock and upper thigh shingles continue Valtrex treatment with Lac Hydrin  Previous COVID-19 (+) outpatient  Then COVID (-) on 5/22, 5/26, 6/2 and 6/9 6/16 COVID 19 (+)   6/18 COVID 19 Ab (+)  6/19 Repeat COVID-19 PCR (-) Patient is neutropenic, afebrile   6/29 Started Levaquin and Posaconazole for PPX  If spikes pan culture, CXR and change Levaquin to cefepime   6/23 Persistent right upper inner arm pain/swelling - CT no abscess, + DVT, 7/2 repeat Doppler - No infectious sources   6/22 Right buttock and upper thigh shingles continue Valtrex treatment with Lac Hydrin  Previous COVID-19 (+) outpatient  Then COVID (-) on 5/22, 5/26, 6/2 and 6/9 6/16 COVID 19 (+)   6/18 COVID 19 Ab (+)  6/19 Repeat COVID-19 PCR (-)

## 2020-07-02 NOTE — PROGRESS NOTE ADULT - PROBLEM SELECTOR PLAN 1
Continue ATRA 40mg BID (started 5/22)   Arsenic Trioxide which was started on 5/27 was on hold (since 6/22- 6/29), now  shingles crusted resumed Arsenic on 6/30.  Monitor for differentiation syndrome   check EKG for QTc prolongation every Tuesday and Friday while on arsenic   Keep K > 4 and Mg > 2 while on arsenic   Status post Hydrea 500mg PO x 1 on 6/8 and 6/9 for WBC > 10  Follow up CBC with diff, CMP, TLS and DIC panel daily  Keep PLTs >50 K. If unable to achieve goal to infuse 1/2 unit platelets over 3 hours every 12 hrs   S/p Prednisone 20mg PO daily x 5 days (through 6/12) due to platelet antibody  Keep Fibrinogen > 150, if less give Cryo  Strict Is and Os/Daily weights/Mouth Care  Bone marrow biopsy performed on 6/26 shows persistent disease, continue Arsenic trioxide and ATRA.  PICC line placement on 7/1

## 2020-07-03 LAB
ALBUMIN SERPL ELPH-MCNC: 4.2 G/DL — SIGNIFICANT CHANGE UP (ref 3.3–5)
ALP SERPL-CCNC: 123 U/L — HIGH (ref 40–120)
ALT FLD-CCNC: 21 U/L — SIGNIFICANT CHANGE UP (ref 10–45)
ANION GAP SERPL CALC-SCNC: 14 MMOL/L — SIGNIFICANT CHANGE UP (ref 5–17)
AST SERPL-CCNC: 22 U/L — SIGNIFICANT CHANGE UP (ref 10–40)
BASOPHILS # BLD AUTO: 0.01 K/UL — SIGNIFICANT CHANGE UP (ref 0–0.2)
BASOPHILS NFR BLD AUTO: 0.6 % — SIGNIFICANT CHANGE UP (ref 0–2)
BILIRUB SERPL-MCNC: 0.2 MG/DL — SIGNIFICANT CHANGE UP (ref 0.2–1.2)
BLD GP AB SCN SERPL QL: NEGATIVE — SIGNIFICANT CHANGE UP
BUN SERPL-MCNC: 17 MG/DL — SIGNIFICANT CHANGE UP (ref 7–23)
CALCIUM SERPL-MCNC: 9.8 MG/DL — SIGNIFICANT CHANGE UP (ref 8.4–10.5)
CHLORIDE SERPL-SCNC: 106 MMOL/L — SIGNIFICANT CHANGE UP (ref 96–108)
CO2 SERPL-SCNC: 21 MMOL/L — LOW (ref 22–31)
CREAT SERPL-MCNC: 0.98 MG/DL — SIGNIFICANT CHANGE UP (ref 0.5–1.3)
EOSINOPHIL # BLD AUTO: 0.02 K/UL — SIGNIFICANT CHANGE UP (ref 0–0.5)
EOSINOPHIL NFR BLD AUTO: 1.2 % — SIGNIFICANT CHANGE UP (ref 0–6)
GLUCOSE BLDC GLUCOMTR-MCNC: 133 MG/DL — HIGH (ref 70–99)
GLUCOSE BLDC GLUCOMTR-MCNC: 159 MG/DL — HIGH (ref 70–99)
GLUCOSE BLDC GLUCOMTR-MCNC: 165 MG/DL — HIGH (ref 70–99)
GLUCOSE SERPL-MCNC: 132 MG/DL — HIGH (ref 70–99)
HCT VFR BLD CALC: 26.1 % — LOW (ref 34.5–45)
HGB BLD-MCNC: 8.5 G/DL — LOW (ref 11.5–15.5)
INR BLD: 1.32 RATIO — HIGH (ref 0.88–1.16)
LDH SERPL L TO P-CCNC: 162 U/L — SIGNIFICANT CHANGE UP (ref 50–242)
LYMPHOCYTES # BLD AUTO: 1.24 K/UL — SIGNIFICANT CHANGE UP (ref 1–3.3)
LYMPHOCYTES # BLD AUTO: 76.1 % — HIGH (ref 13–44)
MAGNESIUM SERPL-MCNC: 1.7 MG/DL — SIGNIFICANT CHANGE UP (ref 1.6–2.6)
MCHC RBC-ENTMCNC: 29.8 PG — SIGNIFICANT CHANGE UP (ref 27–34)
MCHC RBC-ENTMCNC: 32.6 GM/DL — SIGNIFICANT CHANGE UP (ref 32–36)
MCV RBC AUTO: 91.6 FL — SIGNIFICANT CHANGE UP (ref 80–100)
MONOCYTES # BLD AUTO: 0.22 K/UL — SIGNIFICANT CHANGE UP (ref 0–0.9)
MONOCYTES NFR BLD AUTO: 13.5 % — SIGNIFICANT CHANGE UP (ref 2–14)
NEUTROPHILS # BLD AUTO: 0.14 K/UL — LOW (ref 1.8–7.4)
NEUTROPHILS NFR BLD AUTO: 8.6 % — LOW (ref 43–77)
NRBC # BLD: 0 /100 WBCS — SIGNIFICANT CHANGE UP (ref 0–0)
PHOSPHATE SERPL-MCNC: 3.7 MG/DL — SIGNIFICANT CHANGE UP (ref 2.5–4.5)
PLATELET # BLD AUTO: 187 K/UL — SIGNIFICANT CHANGE UP (ref 150–400)
POTASSIUM SERPL-MCNC: 3.7 MMOL/L — SIGNIFICANT CHANGE UP (ref 3.5–5.3)
POTASSIUM SERPL-SCNC: 3.7 MMOL/L — SIGNIFICANT CHANGE UP (ref 3.5–5.3)
PROT SERPL-MCNC: 7 G/DL — SIGNIFICANT CHANGE UP (ref 6–8.3)
PROTHROM AB SERPL-ACNC: 15 SEC — HIGH (ref 10.6–13.6)
RBC # BLD: 2.85 M/UL — LOW (ref 3.8–5.2)
RBC # FLD: 15.9 % — HIGH (ref 10.3–14.5)
RH IG SCN BLD-IMP: POSITIVE — SIGNIFICANT CHANGE UP
SODIUM SERPL-SCNC: 141 MMOL/L — SIGNIFICANT CHANGE UP (ref 135–145)
URATE SERPL-MCNC: 4.8 MG/DL — SIGNIFICANT CHANGE UP (ref 2.5–7)
WBC # BLD: 1.63 K/UL — LOW (ref 3.8–10.5)
WBC # FLD AUTO: 1.63 K/UL — LOW (ref 3.8–10.5)

## 2020-07-03 PROCEDURE — 99233 SBSQ HOSP IP/OBS HIGH 50: CPT | Mod: GC

## 2020-07-03 PROCEDURE — 71045 X-RAY EXAM CHEST 1 VIEW: CPT | Mod: 26

## 2020-07-03 PROCEDURE — 93010 ELECTROCARDIOGRAM REPORT: CPT

## 2020-07-03 RX ADMIN — TRETINOIN 40 MILLIGRAM(S): 10 CAPSULE, LIQUID FILLED ORAL at 20:17

## 2020-07-03 RX ADMIN — PANTOPRAZOLE SODIUM 40 MILLIGRAM(S): 20 TABLET, DELAYED RELEASE ORAL at 06:29

## 2020-07-03 RX ADMIN — VALACYCLOVIR 1000 MILLIGRAM(S): 500 TABLET, FILM COATED ORAL at 12:56

## 2020-07-03 RX ADMIN — Medication 5 MILLILITER(S): at 12:27

## 2020-07-03 RX ADMIN — ATENOLOL 25 MILLIGRAM(S): 25 TABLET ORAL at 06:28

## 2020-07-03 RX ADMIN — Medication 1 APPLICATION(S): at 20:18

## 2020-07-03 RX ADMIN — Medication 5 MILLIGRAM(S): at 16:40

## 2020-07-03 RX ADMIN — Medication 1 SPRAY(S): at 22:47

## 2020-07-03 RX ADMIN — ARSENIC TRIOXIDE 130.5 MILLIGRAM(S): 2 INJECTION, SOLUTION INTRAVENOUS at 13:08

## 2020-07-03 RX ADMIN — Medication 30 MILLILITER(S): at 12:57

## 2020-07-03 RX ADMIN — GABAPENTIN 100 MILLIGRAM(S): 400 CAPSULE ORAL at 12:56

## 2020-07-03 RX ADMIN — VALACYCLOVIR 1000 MILLIGRAM(S): 500 TABLET, FILM COATED ORAL at 22:46

## 2020-07-03 RX ADMIN — Medication 1 APPLICATION(S): at 22:47

## 2020-07-03 RX ADMIN — Medication 5 MILLIGRAM(S): at 06:31

## 2020-07-03 RX ADMIN — POSACONAZOLE 300 MILLIGRAM(S): 100 TABLET, DELAYED RELEASE ORAL at 12:55

## 2020-07-03 RX ADMIN — Medication 1 APPLICATION(S): at 06:30

## 2020-07-03 RX ADMIN — TRETINOIN 40 MILLIGRAM(S): 10 CAPSULE, LIQUID FILLED ORAL at 09:32

## 2020-07-03 RX ADMIN — ENOXAPARIN SODIUM 80 MILLIGRAM(S): 100 INJECTION SUBCUTANEOUS at 22:46

## 2020-07-03 RX ADMIN — Medication 667 MILLIGRAM(S): at 12:55

## 2020-07-03 RX ADMIN — Medication 4 DROP(S): at 06:35

## 2020-07-03 RX ADMIN — VALACYCLOVIR 1000 MILLIGRAM(S): 500 TABLET, FILM COATED ORAL at 06:31

## 2020-07-03 RX ADMIN — Medication 5 MILLIGRAM(S): at 06:29

## 2020-07-03 RX ADMIN — Medication 5 MILLILITER(S): at 09:33

## 2020-07-03 RX ADMIN — GABAPENTIN 100 MILLIGRAM(S): 400 CAPSULE ORAL at 22:46

## 2020-07-03 RX ADMIN — CARBAMIDE PEROXIDE 5 DROP(S): 81.86 SOLUTION/ DROPS AURICULAR (OTIC) at 06:35

## 2020-07-03 RX ADMIN — POLYETHYLENE GLYCOL 3350 17 GRAM(S): 17 POWDER, FOR SOLUTION ORAL at 12:55

## 2020-07-03 RX ADMIN — Medication 1 APPLICATION(S): at 15:42

## 2020-07-03 RX ADMIN — Medication 667 MILLIGRAM(S): at 09:33

## 2020-07-03 RX ADMIN — CHLORHEXIDINE GLUCONATE 1 APPLICATION(S): 213 SOLUTION TOPICAL at 06:29

## 2020-07-03 RX ADMIN — Medication 1 APPLICATION(S): at 06:31

## 2020-07-03 RX ADMIN — Medication 1: at 16:38

## 2020-07-03 RX ADMIN — Medication 1 APPLICATION(S): at 16:40

## 2020-07-03 RX ADMIN — Medication 5 MILLILITER(S): at 20:17

## 2020-07-03 RX ADMIN — GABAPENTIN 100 MILLIGRAM(S): 400 CAPSULE ORAL at 06:29

## 2020-07-03 RX ADMIN — Medication 4 DROP(S): at 20:17

## 2020-07-03 RX ADMIN — Medication 1 SPRAY(S): at 06:30

## 2020-07-03 RX ADMIN — ENOXAPARIN SODIUM 80 MILLIGRAM(S): 100 INJECTION SUBCUTANEOUS at 09:32

## 2020-07-03 NOTE — PROGRESS NOTE ADULT - ASSESSMENT
Ms. Gant is a 61 y/o Serbian-speaking female w/ a PMHx of HTN, HLD, T2DM, and COVID-19 infection in 4/2020 who was admitted for management of newly diagnosed APL. The patient has been receiving treatment with ATRA and Arsenic (ATRA since 5/22 and Arsenic 5/27). Arsenic has been on hold since 6/22 secondary to Herpes Zoster infection; resumed on 6/30.  Patient's hospital course has been complicated by transaminitis, refractory thrombocytopenia, COVID-19 (+) state (6/16), shingles, and right axillary DVT. The patient has pancytopenia 2/2 disease and/or ATRA/Arsenic.

## 2020-07-03 NOTE — PROGRESS NOTE ADULT - PROBLEM SELECTOR PLAN 5
Most likely due to eustachian tube dysfunction  Continue Ocean spray and Debrox .  Audiogram done on 6/30 shows severe hearing loss in the left ear and essentially profound hearing loss in the right ear, 250-8kHz; asymmetry noted 2k-6kHz, right ear poorer than left. Unmasked bone conduction suggests mixed hearing loss in at least one ear (unable to mask), follow up  with Otology.  ENT following Most likely due to Eustachian tube dysfunction  Continue Ocean spray and Debrox .  Audiogram done on 6/30 shows severe hearing loss in the left ear and essentially profound hearing loss in the right ear, 250-8kHz; asymmetry noted 2k-6kHz, right ear poorer than left. Unmasked bone conduction suggests mixed hearing loss in at least one ear (unable to mask), follow up  with Otology.  ENT following

## 2020-07-03 NOTE — ADVANCED PRACTICE NURSE CONSULT - ASSESSMENT
Patient seen sitting on couch and OX4 at time of chemotherapy administration.  EKG completed today and Qtc noted as 427ms, signed by MD.  Electrolytes within normal range and treatment approved by Dr. Gardner.  Dbl lumen `PICC located on right arm, both ports flushed with NS 0.9 with noted blood return in both.  Sensitive dressing noted dry and intact.  No swelling or bleeding at site of `PICC, patient verbalized no pain at site.  Patient re-educated on treatment plan.    Two license professionals perform the following independent verification prior to administration Drug name; Drug dose; Infusion volume of bag or syringe; Rate of administration Route of administration Expiration date/time; Appearance and integrity of Compatibility of Solution;  Rate set on alaris infusion pump.  In the presence of the patient, verify the patient's identification using two patient identifiers.    Arsenic 0.15mg/kg total dose 11mg to run over 2 hours concurrently with 0.9NS.    Report given to primary nurse. Patient seen sitting on couch and OX4 at time of chemotherapy administration.  EKG completed today and Qtc noted as 427ms, signed by MD.  Electrolytes within normal range and treatment approved by Dr. Gardner.  Dbl lumen `PICC located on right arm, both ports flushed with NS 0.9 with noted blood return in both.  Sensitive dressing noted dry and intact.  No swelling or bleeding at site of `PICC, patient verbalized no pain at site.  Patient re-educated on treatment plan.    Two license professionals perform the following independent verification prior to administration Drug name; Drug dose; Infusion volume of bag or syringe; Rate of administration Route of administration Expiration date/time; Appearance and integrity of Compatibility of Solution;  Rate set on alaris infusion pump.  In the presence of the patient, verify the patient's identification using two patient identifiers.    1308: Arsenic 0.15mg/kg total dose 11mg to run over 2 hours concurrently with 0.9NS.    Report given to primary nurse.

## 2020-07-03 NOTE — PROGRESS NOTE ADULT - PROBLEM SELECTOR PLAN 4
Shingles of the Right buttock and Right upper thigh rash   6/22 seen by Dermatology   Continue Valtrex and Lac hydrin as per Dermatology recommendations   Neurontin 100 mg TID for herpetic neuralgia Shingles of the Right buttock and Right upper thigh.   6/22 seen by Dermatology   Continue Valtrex and Lac hydrin as per Dermatology recommendations   Neurontin 100 mg TID for herpetic neuralgia  Lesions now crusted over - will d/c airborne isolation.

## 2020-07-03 NOTE — PROGRESS NOTE ADULT - PROBLEM SELECTOR PLAN 8
On Lovenox SQ BID for DVT treatment. Discontinue if platelets are < 50,000       Contact Information [phone]

## 2020-07-03 NOTE — PROGRESS NOTE ADULT - PROBLEM SELECTOR PLAN 2
Patient is neutropenic, afebrile   6/29 Started Levaquin and Posaconazole for PPX  If spikes pan culture, CXR and change Levaquin to cefepime   6/23 Persistent right upper inner arm pain/swelling - CT no abscess, + DVT, 7/2 repeat Doppler - No infectious sources   6/22 Right buttock and upper thigh shingles continue Valtrex treatment with Lac Hydrin  Previous COVID-19 (+) outpatient  Then COVID (-) on 5/22, 5/26, 6/2 and 6/9 6/16 COVID 19 (+)   6/18 COVID 19 Ab (+)  6/19 Repeat COVID-19 PCR (-) Patient is neutropenic, afebrile   6/29 Started Levaquin and Posaconazole for PPX  If spikes pan culture, CXR and change Levaquin to cefepime   6/23 Persistent right upper inner arm pain/swelling - CT no abscess, + DVT, 7/2 repeat Doppler - No infectious sources   6/22 Right buttock and upper thigh shingles continue Valtrex tx with Lac Hydrin  Previous COVID-19 (+) outpatient  Then COVID (-) on 5/22, 5/26, 6/2 and 6/9 6/16 COVID 19 (+)   6/18 COVID 19 Ab (+)  6/19 Repeat COVID-19 PCR (-) Patient is Neutropenic, afebrile   6/29 Started Levaquin and Posaconazole for PPX  If spikes pan culture, CXR and change Levaquin to cefepime   6/23 Persistent right upper inner arm pain/swelling - CT no abscess, + DVT, 7/2 repeat Doppler - No infectious sources   6/22 Right buttock and upper thigh shingles continue Valtrex tx with Lac Hydrin  Previous COVID-19 (+) outpatient  Then COVID (-) on 5/22, 5/26, 6/2 and 6/9 6/16 COVID 19 (+)   6/18 COVID 19 Ab (+)  6/19 Repeat COVID-19 PCR (-)

## 2020-07-03 NOTE — PROGRESS NOTE ADULT - PROBLEM SELECTOR PLAN 1
Continue ATRA 40mg BID (started 5/22)   Arsenic Trioxide which was started on 5/27 was on hold (since 6/22- 6/29), now  shingles crusted resumed Arsenic on 6/30.  Monitor for differentiation syndrome   check EKG for QTc prolongation every Tuesday and Friday while on arsenic   Keep K > 4 and Mg > 2 while on arsenic   Status post Hydrea 500mg PO x 1 on 6/8 and 6/9 for WBC > 10  Follow up CBC with diff, CMP, TLS and DIC panel daily  Keep PLTs >50 K. If unable to achieve goal to infuse 1/2 unit platelets over 3 hours every 12 hrs   S/p Prednisone 20mg PO daily x 5 days (through 6/12) due to platelet antibody  Keep Fibrinogen > 150, if less give Cryo  Strict Is and Os/Daily weights/Mouth Care  Bone marrow biopsy performed on 6/26 shows persistent disease, continue Arsenic trioxide and ATRA.  PICC line placement on 7/1 Continue ATRA 40mg BID (started 5/22)   Arsenic Trioxide which was started on 5/27 was on hold (since 6/22- 6/29), now  shingles crusted resumed Arsenic on 6/30.  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 If unable to achieve goal to infuse 1/2 unit plts over 3 hours q12  S/p Prednisone 20mg PO daily x 5 days (through 6/12) due to platelet antibody  Keep Fibrinogen > 150, if <150 give Cryoprecipitate  Strict Is and Os/Daily weights/Mouth Care  BMBx (6/26) (+) for persistent disease, continue Arsenic trioxide and ATRA.  PICC line placement on 7/1 Continue ATRA 40mg BID (started 5/22)   Arsenic Trioxide which was started on 5/27 was on hold (since 6/22- 6/29), now  shingles crusted resumed Arsenic on 6/30.  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 If unable to achieve goal to infuse 1/2 unit plts over 3 hours q12  S/p Prednisone 20mg PO daily x 5 days (through 6/12) due to platelet antibody  Keep Fibrinogen > 150, if <150 give Cryoprecipitate  Strict Is and Os/Daily weights/Mouth Care  BMBx (6/26) (+) for persistent disease, continue Arsenic trioxide and ATRA.  PICC line placement on 7/1  intermittent epigastric pain relieved with Maalox.

## 2020-07-03 NOTE — PROGRESS NOTE ADULT - PROBLEM SELECTOR PLAN 3
Patient with right upper arm pain and swelling s/p PICC line removal   6/23 [facility] (+) for acute partially occlusive DVT affecting right axillary vein   6/24 CT consistent with DVT, no abscess   Was on a Heparin gtt, now changed to SQ Lovenox q 12. Keep platelets >50,000  With associated rash.  6/30 - Lovenox on hold for PICC insertion will restart on 7/2.   Dermatology consulted. Possible contact dermatitis vs allergic dermatitis from the adhesive. Clobetasol cream to the affected area.  7/1- repeat doppler -Right axillary DVT appears resolved.  Decreased phasicity in the right subclavian vein suggests proximal obstruction. Patient with right upper arm pain and swelling s/p PICC line removal   6/23 [facility] Duplex (+) for acute partially occlusive DVT affecting right axillary vein   6/24 CT consistent with DVT, no abscess   Was on a Heparin gtt, now changed to SQ Lovenox q 12. Keep platelets >50,000  With associated rash suspected 2/2 adhesive, continue clobetasol per Derm  7/1- repeat doppler -Right axillary DVT appears resolved.  Decreased phasicity in the right subclavian vein suggests proximal obstruction.

## 2020-07-03 NOTE — PROGRESS NOTE ADULT - SUBJECTIVE AND OBJECTIVE BOX
Diagnosis: Acute Promyelocytic Leukemia     Protocol/Chemo Regimen: ATRA/Arsenic Trioxide    Day: ATRA Day 41 and Arsenic 37 (resumed Arsenic on 6/30/2020)    Pt endorsed:   + Right upper inner arm pain and and itching.  + Rash on her Right buttock and Right posterior thigh- improved   + mild hearing loss , right arm itching     Review of Systems: Denies nausea, vomiting, diarrhea, chest pain, SOB, headache      Translated by patient's son via phone call per patient request     Pain scale: 4- 5/10    Diet: Consistent Carb w/ Evening Snack    Allergies    No Known Allergies    Intolerances      ANTIMICROBIALS  levoFLOXacin  Tablet 500 milliGRAM(s) Oral every 24 hours  posaconazole DR Tablet 300 milliGRAM(s) Oral daily  valACYclovir 1000 milliGRAM(s) Oral every 8 hours      HEME/ONC MEDICATIONS  arsenic trioxide IVPB (eMAR) 11 milliGRAM(s) IV Intermittent every 24 hours  enoxaparin Injectable 80 milliGRAM(s) SubCutaneous every 12 hours  tretinoin 40 milliGRAM(s) Oral every 12 hours      STANDING MEDICATIONS  ammonium lactate 12% Lotion 1 Application(s) Topical two times a day  ATENolol  Tablet 25 milliGRAM(s) Oral daily  baclofen 5 milliGRAM(s) Oral every 12 hours  Biotene Dry Mouth Oral Rinse 5 milliLiter(s) Swish and Spit five times a day  bisacodyl 5 milliGRAM(s) Oral every 12 hours  calcium acetate 667 milliGRAM(s) Oral four times a day with meals  chlorhexidine 4% Liquid 1 Application(s) Topical <User Schedule>  dextrose 5%. 1000 milliLiter(s) IV Continuous <Continuous>  dextrose 50% Injectable 12.5 Gram(s) IV Push once  dextrose 50% Injectable 25 Gram(s) IV Push once  dextrose 50% Injectable 25 Gram(s) IV Push once  gabapentin 100 milliGRAM(s) Oral three times a day  insulin lispro (HumaLOG) corrective regimen sliding scale   SubCutaneous three times a day before meals  insulin lispro (HumaLOG) corrective regimen sliding scale   SubCutaneous at bedtime  ofloxacin 0.3% Solution 4 Drop(s) Right Ear two times a day  pantoprazole    Tablet 40 milliGRAM(s) Oral before breakfast  petrolatum white Ointment 1 Application(s) Topical three times a day  polyethylene glycol 3350 17 Gram(s) Oral daily  sodium chloride 0.65% Nasal 1 Spray(s) Both Nostrils every 8 hours  triamcinolone 0.1% Ointment 1 Application(s) Topical every 12 hours      PRN MEDICATIONS  acetaminophen   Tablet .. 650 milliGRAM(s) Oral every 6 hours PRN  ALBUTerol    90 MICROgram(s) HFA Inhaler 2 Puff(s) Inhalation every 6 hours PRN  AQUAPHOR (petrolatum Ointment) 1 Application(s) Topical every 3 hours PRN  dextrose 40% Gel 15 Gram(s) Oral once PRN  diphenhydrAMINE 25 milliGRAM(s) Oral every 4 hours PRN  diphenhydrAMINE   Injectable 25 milliGRAM(s) IV Push every 6 hours PRN  glucagon  Injectable 1 milliGRAM(s) IntraMuscular once PRN  guaiFENesin   Syrup  (Sugar-Free) 100 milliGRAM(s) Oral every 6 hours PRN  hemorrhoidal Ointment 1 Application(s) Rectal every 8 hours PRN  morphine  - Injectable 3 milliGRAM(s) IV Push every 6 hours PRN  ondansetron Injectable 8 milliGRAM(s) IV Push every 8 hours PRN  sodium chloride 0.9% lock flush 10 milliLiter(s) IV Push every 1 hour PRN  sodium chloride 0.9% lock flush 10 milliLiter(s) IV Push every 1 hour PRN        Vital Signs Last 24 Hrs  T(C): 36.8 (03 Jul 2020 05:42), Max: 36.8 (02 Jul 2020 17:30)  T(F): 98.3 (03 Jul 2020 05:42), Max: 98.3 (03 Jul 2020 05:42)  HR: 67 (03 Jul 2020 05:42) (64 - 86)  BP: 122/70 (03 Jul 2020 05:42) (116/62 - 158/80)  BP(mean): --  RR: 18 (03 Jul 2020 05:42) (18 - 18)  SpO2: 98% (03 Jul 2020 05:42) (98% - 99%)    PHYSICAL EXAM  General: adult in NAD  HEENT: clear oropharynx, no erythema, no ulcers  CV: normal S1, S2, RRR  Lungs: clear to auscultation, no wheezes, no rales  Abdomen: soft, nontender, nondistended, normal BS  Ext: no edema, RUE hyperpigmented.  Skin: Warm and dry, Some hyperpigmentation noted around RUE PICC line, Shingles-like rash noted on R buttock (appears crusting over)  R posterior thigh and right leg   Neuro: alert and oriented x 3  Central line Left PICC CDI     RECENT CULTURES:  Herpes Simplex Virus 1/2 VZV Lesions, PCR (06.22.20 @ 19:26)    Herpes Simplex Virus 1/2  VZV PCR Source: lesion    Herpes Simplex Virus 1/2  VZV PCR Result: VZV Detected HSV 1/2  and VZV assay is a Real-Time PCR test for the qualitative  detection and differentiation of herpes simplex virus type 1 (HSV1), 2  (HSV2) and varicella-zoster virus (VZV) DNA in lesion samples. The result  should be interpreted with consideration of all clinical and laboratory  findings.            LABS:                        8.5    1.63  )-----------( 187      ( 03 Jul 2020 07:15 )             26.1         Mean Cell Volume : 91.6 fl  Mean Cell Hemoglobin : 29.8 pg  Mean Cell Hemoglobin Concentration : 32.6 gm/dL  Auto Neutrophil # : 0.14 K/uL  Auto Lymphocyte # : 1.24 K/uL  Auto Monocyte # : 0.22 K/uL  Auto Eosinophil # : 0.02 K/uL  Auto Basophil # : 0.01 K/uL  Auto Neutrophil % : 8.6 %  Auto Lymphocyte % : 76.1 %  Auto Monocyte % : 13.5 %  Auto Eosinophil % : 1.2 %  Auto Basophil % : 0.6 %      07-03    141  |  106  |  17  ----------------------------<  132<H>  3.7   |  21<L>  |  0.98    Ca    9.8      03 Jul 2020 07:15  Phos  3.7     07-03  Mg     1.7     07-03    TPro  7.0  /  Alb  4.2  /  TBili  0.2  /  DBili  x   /  AST  22  /  ALT  21  /  AlkPhos  123<H>  07-03      Mg 1.7  Phos 3.7      Uric Acid 4.8        RADIOLOGY & ADDITIONAL STUDIES:  [facility] Duplex Upper Ext Vein Scan, Right (07.02.20 @ 14:37)   No evidence of right upper extremity deep venous thrombosis. Right axillary DVT appears resolved.  Decreased phasicity in the right subclavian vein suggests proximal obstruction. Diagnosis: Acute Promyelocytic Leukemia     Protocol/Chemo Regimen: ATRA/Arsenic Trioxide    Day: ATRA Day 41 and Arsenic 37 (resumed Arsenic on 6/30/2020)    Pt endorsed:   + epigastric pain intermittent   + Right upper inner arm pain and and itching.  + Rash on her Right buttock and Right posterior thigh- improved   + mild hearing loss , right arm itching     Review of Systems: Denies nausea, vomiting, diarrhea, chest pain, SOB, headache      Translated by patient's son via phone call per patient request     Pain scale: 4- 5/10    Diet: Consistent Carb w/ Evening Snack    Allergies    No Known Allergies    Intolerances      ANTIMICROBIALS  levoFLOXacin  Tablet 500 milliGRAM(s) Oral every 24 hours  posaconazole DR Tablet 300 milliGRAM(s) Oral daily  valACYclovir 1000 milliGRAM(s) Oral every 8 hours      HEME/ONC MEDICATIONS  arsenic trioxide IVPB (eMAR) 11 milliGRAM(s) IV Intermittent every 24 hours  enoxaparin Injectable 80 milliGRAM(s) SubCutaneous every 12 hours  tretinoin 40 milliGRAM(s) Oral every 12 hours      STANDING MEDICATIONS  ammonium lactate 12% Lotion 1 Application(s) Topical two times a day  ATENolol  Tablet 25 milliGRAM(s) Oral daily  baclofen 5 milliGRAM(s) Oral every 12 hours  Biotene Dry Mouth Oral Rinse 5 milliLiter(s) Swish and Spit five times a day  bisacodyl 5 milliGRAM(s) Oral every 12 hours  calcium acetate 667 milliGRAM(s) Oral four times a day with meals  chlorhexidine 4% Liquid 1 Application(s) Topical <User Schedule>  dextrose 5%. 1000 milliLiter(s) IV Continuous <Continuous>  dextrose 50% Injectable 12.5 Gram(s) IV Push once  dextrose 50% Injectable 25 Gram(s) IV Push once  dextrose 50% Injectable 25 Gram(s) IV Push once  gabapentin 100 milliGRAM(s) Oral three times a day  insulin lispro (HumaLOG) corrective regimen sliding scale   SubCutaneous three times a day before meals  insulin lispro (HumaLOG) corrective regimen sliding scale   SubCutaneous at bedtime  ofloxacin 0.3% Solution 4 Drop(s) Right Ear two times a day  pantoprazole    Tablet 40 milliGRAM(s) Oral before breakfast  petrolatum white Ointment 1 Application(s) Topical three times a day  polyethylene glycol 3350 17 Gram(s) Oral daily  sodium chloride 0.65% Nasal 1 Spray(s) Both Nostrils every 8 hours  triamcinolone 0.1% Ointment 1 Application(s) Topical every 12 hours      PRN MEDICATIONS  acetaminophen   Tablet .. 650 milliGRAM(s) Oral every 6 hours PRN  ALBUTerol    90 MICROgram(s) HFA Inhaler 2 Puff(s) Inhalation every 6 hours PRN  AQUAPHOR (petrolatum Ointment) 1 Application(s) Topical every 3 hours PRN  dextrose 40% Gel 15 Gram(s) Oral once PRN  diphenhydrAMINE 25 milliGRAM(s) Oral every 4 hours PRN  diphenhydrAMINE   Injectable 25 milliGRAM(s) IV Push every 6 hours PRN  glucagon  Injectable 1 milliGRAM(s) IntraMuscular once PRN  guaiFENesin   Syrup  (Sugar-Free) 100 milliGRAM(s) Oral every 6 hours PRN  hemorrhoidal Ointment 1 Application(s) Rectal every 8 hours PRN  morphine  - Injectable 3 milliGRAM(s) IV Push every 6 hours PRN  ondansetron Injectable 8 milliGRAM(s) IV Push every 8 hours PRN  sodium chloride 0.9% lock flush 10 milliLiter(s) IV Push every 1 hour PRN  sodium chloride 0.9% lock flush 10 milliLiter(s) IV Push every 1 hour PRN        Vital Signs Last 24 Hrs  T(C): 36.8 (03 Jul 2020 05:42), Max: 36.8 (02 Jul 2020 17:30)  T(F): 98.3 (03 Jul 2020 05:42), Max: 98.3 (03 Jul 2020 05:42)  HR: 67 (03 Jul 2020 05:42) (64 - 86)  BP: 122/70 (03 Jul 2020 05:42) (116/62 - 158/80)  BP(mean): --  RR: 18 (03 Jul 2020 05:42) (18 - 18)  SpO2: 98% (03 Jul 2020 05:42) (98% - 99%)    PHYSICAL EXAM  General: adult in NAD  HEENT: clear oropharynx, no erythema, no ulcers  CV: normal S1, S2, RRR  Lungs: clear to auscultation, no wheezes, no rales  Abdomen: soft, nontender, nondistended, normal BS  Ext: no edema, RUE hyperpigmented.  Skin: Warm and dry, Some hyperpigmentation noted around RUE PICC line, Shingles-like rash noted on R buttock (appears crusting over)  R posterior thigh and right leg   Neuro: alert and oriented x 3  Central line Left PICC CDI     RECENT CULTURES:  Herpes Simplex Virus 1/2 VZV Lesions, PCR (06.22.20 @ 19:26)    Herpes Simplex Virus 1/2  VZV PCR Source: lesion    Herpes Simplex Virus 1/2  VZV PCR Result: VZV Detected HSV 1/2  and VZV assay is a Real-Time PCR test for the qualitative  detection and differentiation of herpes simplex virus type 1 (HSV1), 2  (HSV2) and varicella-zoster virus (VZV) DNA in lesion samples. The result  should be interpreted with consideration of all clinical and laboratory  findings.            LABS:                        8.5    1.63  )-----------( 187      ( 03 Jul 2020 07:15 )             26.1         Mean Cell Volume : 91.6 fl  Mean Cell Hemoglobin : 29.8 pg  Mean Cell Hemoglobin Concentration : 32.6 gm/dL  Auto Neutrophil # : 0.14 K/uL  Auto Lymphocyte # : 1.24 K/uL  Auto Monocyte # : 0.22 K/uL  Auto Eosinophil # : 0.02 K/uL  Auto Basophil # : 0.01 K/uL  Auto Neutrophil % : 8.6 %  Auto Lymphocyte % : 76.1 %  Auto Monocyte % : 13.5 %  Auto Eosinophil % : 1.2 %  Auto Basophil % : 0.6 %      07-03    141  |  106  |  17  ----------------------------<  132<H>  3.7   |  21<L>  |  0.98    Ca    9.8      03 Jul 2020 07:15  Phos  3.7     07-03  Mg     1.7     07-03    TPro  7.0  /  Alb  4.2  /  TBili  0.2  /  DBili  x   /  AST  22  /  ALT  21  /  AlkPhos  123<H>  07-03      Mg 1.7  Phos 3.7      Uric Acid 4.8        RADIOLOGY & ADDITIONAL STUDIES:  [facility] Duplex Upper Ext Vein Scan, Right (07.02.20 @ 14:37)   No evidence of right upper extremity deep venous thrombosis. Right axillary DVT appears resolved.  Decreased phasicity in the right subclavian vein suggests proximal obstruction.

## 2020-07-03 NOTE — PROGRESS NOTE ADULT - ATTENDING COMMENTS
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  Presented with pancytopenia, flow cytometry/bone marrow biopsy/FISH done 5/26- c/w acute promyelocytic leukemia with PML-AWA translocation.    She was started on ATRA 5/23- day +38, GALA 5/27- day +37 (arsenic currently on hold from 6/22).    -CT chest 6/6 c/w granulomatous disease (hx of TB in 1980's) . Monitor daily weights, diurese prn.   -hx tremors , unclear etiology. Cefepime held, s/p Baclofen - now improved  - CBC, coags/fibrinogen daily.  Goal plt >40-50, fibrinogen >150  - responding to ABO-matched platelets given over 3 hrs in 1/2 unit infusions   -monitor lytes daily, keep K>4, Mg>2.  EKG twice a week  -COVID19+ on 6/17, now neg  -Zoster rash on R buttock and back of RLE, crusted over. Resumed GALA 6/30, will need PICC placed today  - Pain present, on gabapentin 100 mg TID, d/c morphine, tylenol prn  -RUE redness and itching at previous PICC site, cont topical triamcinolone BID   -RUE doppler showing axillary partially occlusive clot. On lovenox since 6/25. (held x 24 hrs for PICC placement today)  -s/p bone marrow biopsy on 6/26 - c/w residual APL 35% marrow involvement  - L PICC line placed 7/1  - hearing loss x 1 week. ENT evaluation apprec s/p debrox soln for impacted R ear, audiology testing pending. 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  Presented with pancytopenia, flow cytometry/bone marrow biopsy/FISH done 5/26- c/w acute promyelocytic leukemia with PML-AWA translocation.    She was started on ATRA 5/23- day +38, GALA 5/27- day +38 (arsenic currently on hold from 6/22).    -CT chest 6/6 c/w granulomatous disease (hx of TB in 1980's) . Monitor daily weights, diurese prn.   -hx tremors , unclear etiology. Cefepime held, s/p Baclofen - now improved  - CBC, coags/fibrinogen daily.  Goal plt >40-50, fibrinogen >150  - responding to ABO-matched platelets given over 3 hrs in 1/2 unit infusions   -monitor lytes daily, keep K>4, Mg>2.  EKG twice a week  -COVID19+ on 6/17, now neg  -Zoster rash on R buttock and back of RLE, crusted over. Resumed GALA 6/30  - Pain present, on gabapentin 100 mg TID, d/c morphine, tylenol prn  -RUE redness and itching at previous PICC site, cont topical triamcinolone BID   -RUE doppler showing axillary partially occlusive clot. On lovenox since 6/25. Repeat RUE doppler done 7/2, R subclavian vein with obstruction  -s/p bone marrow biopsy on 6/26 - c/w residual APL 35% marrow involvement, repeat BMbx on 7/9  - L PICC line placed 7/1, no mediport due to cytopenias  - hearing loss. 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  Presented with pancytopenia, flow cytometry/bone marrow biopsy/FISH done 5/26- c/w acute promyelocytic leukemia with PML-AWA translocation.    She was started on ATRA 5/23- day +38, GALA 5/27- day +38 (arsenic currently on hold from 6/22).    -CT chest 6/6 c/w granulomatous disease (hx of TB in 1980's) . Monitor daily weights, diurese prn.   -hx tremors , unclear etiology. Cefepime held, s/p Baclofen - now improved  - CBC, coags/fibrinogen daily.  Goal plt >40-50, fibrinogen >150  - responding to ABO-matched platelets given over 3 hrs in 1/2 unit infusions   -monitor lytes daily, keep K>4, Mg>2.  EKG twice a week  -COVID19+ on 6/17, now neg  -Zoster rash on R buttock and back of RLE, crusted over. Resumed GALA 6/30  - Pain present, on gabapentin 100 mg TID, d/c morphine, tylenol prn  -RUE redness and itching at previous PICC site, cont topical triamcinolone BID   -RUE doppler showing axillary partially occlusive clot. On lovenox since 6/25. Repeat RUE doppler done 7/2, R subclavian vein with obstruction  -s/p bone marrow biopsy on 6/26 - c/w residual APL 35% marrow involvement, repeat BMbx on 7/9  - L PICC line placed 7/1, no mediport due to cytopenias  - hearing loss. ENT evaluation apprec s/p debrox soln for impacted R ear, audiology testing c/w severe chronic hearing loss R>L, recc outpt audiology f/u for assessment of hearing aid device  - c/o chest /epigastric pain o/n 7/2, will check CXR, give maalox x1, ECG done this am wnl. 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  Presented with pancytopenia, flow cytometry/bone marrow biopsy/FISH done 5/26- c/w acute promyelocytic leukemia with PML-AWA translocation.    She was started on ATRA 5/23- day +41, GALA 5/27- day +37 (arsenic currently on hold from 6/22).    -CT chest 6/6 c/w granulomatous disease (hx of TB in 1980's) . Monitor daily weights, diurese prn.   -hx tremors , unclear etiology. Cefepime held, s/p Baclofen - now improved  - CBC, coags/fibrinogen daily.  Goal plt >40-50, fibrinogen >150  - responding to ABO-matched platelets given over 3 hrs in 1/2 unit infusions   -monitor lytes daily, keep K>4, Mg>2.  EKG twice a week  -COVID19+ on 6/17, now neg  -Zoster rash on R buttock and back of RLE, crusted over. Resumed GALA 6/30  - Pain present, on gabapentin 100 mg TID, d/c morphine, tylenol prn  -RUE redness and itching at previous PICC site, cont topical triamcinolone BID   -RUE doppler showing axillary partially occlusive clot. On lovenox since 6/25. Repeat RUE doppler done 7/2, R subclavian vein with obstruction  -s/p bone marrow biopsy on 6/26 - c/w residual APL 35% marrow involvement, repeat BMbx on 7/9  - L PICC line placed 7/1, no mediport due to cytopenias  - hearing loss. ENT evaluation apprec s/p debrox soln for impacted R ear, audiology testing c/w severe chronic hearing loss R>L, recc outpt audiology f/u for assessment of hearing aid device  - c/o chest /epigastric pain o/n 7/2, will check CXR, give maalox x1, ECG done this am wnl.

## 2020-07-04 LAB
ALBUMIN SERPL ELPH-MCNC: 4.3 G/DL — SIGNIFICANT CHANGE UP (ref 3.3–5)
ALP SERPL-CCNC: 127 U/L — HIGH (ref 40–120)
ALT FLD-CCNC: 20 U/L — SIGNIFICANT CHANGE UP (ref 10–45)
ANION GAP SERPL CALC-SCNC: 15 MMOL/L — SIGNIFICANT CHANGE UP (ref 5–17)
AST SERPL-CCNC: 21 U/L — SIGNIFICANT CHANGE UP (ref 10–40)
BASOPHILS # BLD AUTO: 0.03 K/UL — SIGNIFICANT CHANGE UP (ref 0–0.2)
BASOPHILS NFR BLD AUTO: 1.8 % — SIGNIFICANT CHANGE UP (ref 0–2)
BILIRUB SERPL-MCNC: 0.2 MG/DL — SIGNIFICANT CHANGE UP (ref 0.2–1.2)
BUN SERPL-MCNC: 15 MG/DL — SIGNIFICANT CHANGE UP (ref 7–23)
CALCIUM SERPL-MCNC: 9.8 MG/DL — SIGNIFICANT CHANGE UP (ref 8.4–10.5)
CHLORIDE SERPL-SCNC: 107 MMOL/L — SIGNIFICANT CHANGE UP (ref 96–108)
CO2 SERPL-SCNC: 20 MMOL/L — LOW (ref 22–31)
CREAT SERPL-MCNC: 0.9 MG/DL — SIGNIFICANT CHANGE UP (ref 0.5–1.3)
D DIMER BLD IA.RAPID-MCNC: 315 NG/ML DDU — HIGH
EOSINOPHIL # BLD AUTO: 0.02 K/UL — SIGNIFICANT CHANGE UP (ref 0–0.5)
EOSINOPHIL NFR BLD AUTO: 1.2 % — SIGNIFICANT CHANGE UP (ref 0–6)
FIBRINOGEN PPP-MCNC: 460 MG/DL — SIGNIFICANT CHANGE UP (ref 350–510)
GLUCOSE BLDC GLUCOMTR-MCNC: 127 MG/DL — HIGH (ref 70–99)
GLUCOSE BLDC GLUCOMTR-MCNC: 160 MG/DL — HIGH (ref 70–99)
GLUCOSE BLDC GLUCOMTR-MCNC: 179 MG/DL — HIGH (ref 70–99)
GLUCOSE BLDC GLUCOMTR-MCNC: 98 MG/DL — SIGNIFICANT CHANGE UP (ref 70–99)
GLUCOSE SERPL-MCNC: 120 MG/DL — HIGH (ref 70–99)
HCT VFR BLD CALC: 26.8 % — LOW (ref 34.5–45)
HGB BLD-MCNC: 9 G/DL — LOW (ref 11.5–15.5)
LDH SERPL L TO P-CCNC: 184 U/L — SIGNIFICANT CHANGE UP (ref 50–242)
LYMPHOCYTES # BLD AUTO: 1.26 K/UL — SIGNIFICANT CHANGE UP (ref 1–3.3)
LYMPHOCYTES # BLD AUTO: 75.4 % — HIGH (ref 13–44)
MAGNESIUM SERPL-MCNC: 1.8 MG/DL — SIGNIFICANT CHANGE UP (ref 1.6–2.6)
MANUAL SMEAR VERIFICATION: SIGNIFICANT CHANGE UP
MCHC RBC-ENTMCNC: 30.5 PG — SIGNIFICANT CHANGE UP (ref 27–34)
MCHC RBC-ENTMCNC: 33.6 GM/DL — SIGNIFICANT CHANGE UP (ref 32–36)
MCV RBC AUTO: 90.8 FL — SIGNIFICANT CHANGE UP (ref 80–100)
MONOCYTES # BLD AUTO: 0.24 K/UL — SIGNIFICANT CHANGE UP (ref 0–0.9)
MONOCYTES NFR BLD AUTO: 14.4 % — HIGH (ref 2–14)
NEUTROPHILS # BLD AUTO: 0.12 K/UL — LOW (ref 1.8–7.4)
NEUTROPHILS NFR BLD AUTO: 7.2 % — LOW (ref 43–77)
NRBC # BLD: 0 /100 WBCS — SIGNIFICANT CHANGE UP (ref 0–0)
PHOSPHATE SERPL-MCNC: 3.9 MG/DL — SIGNIFICANT CHANGE UP (ref 2.5–4.5)
PLAT MORPH BLD: NORMAL — SIGNIFICANT CHANGE UP
PLATELET # BLD AUTO: 193 K/UL — SIGNIFICANT CHANGE UP (ref 150–400)
POTASSIUM SERPL-MCNC: 3.9 MMOL/L — SIGNIFICANT CHANGE UP (ref 3.5–5.3)
POTASSIUM SERPL-SCNC: 3.9 MMOL/L — SIGNIFICANT CHANGE UP (ref 3.5–5.3)
PROT SERPL-MCNC: 7.3 G/DL — SIGNIFICANT CHANGE UP (ref 6–8.3)
RBC # BLD: 2.95 M/UL — LOW (ref 3.8–5.2)
RBC # FLD: 16.2 % — HIGH (ref 10.3–14.5)
RBC BLD AUTO: SIGNIFICANT CHANGE UP
SODIUM SERPL-SCNC: 142 MMOL/L — SIGNIFICANT CHANGE UP (ref 135–145)
URATE SERPL-MCNC: 5.1 MG/DL — SIGNIFICANT CHANGE UP (ref 2.5–7)
WBC # BLD: 1.67 K/UL — LOW (ref 3.8–10.5)
WBC # FLD AUTO: 1.67 K/UL — LOW (ref 3.8–10.5)

## 2020-07-04 PROCEDURE — 99233 SBSQ HOSP IP/OBS HIGH 50: CPT

## 2020-07-04 RX ORDER — POTASSIUM CHLORIDE 20 MEQ
20 PACKET (EA) ORAL ONCE
Refills: 0 | Status: COMPLETED | OUTPATIENT
Start: 2020-07-04 | End: 2020-07-04

## 2020-07-04 RX ORDER — MAGNESIUM SULFATE 500 MG/ML
2 VIAL (ML) INJECTION ONCE
Refills: 0 | Status: COMPLETED | OUTPATIENT
Start: 2020-07-04 | End: 2020-07-04

## 2020-07-04 RX ADMIN — ENOXAPARIN SODIUM 80 MILLIGRAM(S): 100 INJECTION SUBCUTANEOUS at 11:22

## 2020-07-04 RX ADMIN — CHLORHEXIDINE GLUCONATE 1 APPLICATION(S): 213 SOLUTION TOPICAL at 07:49

## 2020-07-04 RX ADMIN — POLYETHYLENE GLYCOL 3350 17 GRAM(S): 17 POWDER, FOR SOLUTION ORAL at 11:23

## 2020-07-04 RX ADMIN — GABAPENTIN 100 MILLIGRAM(S): 400 CAPSULE ORAL at 13:00

## 2020-07-04 RX ADMIN — Medication 4 DROP(S): at 06:56

## 2020-07-04 RX ADMIN — Medication 5 MILLIGRAM(S): at 06:52

## 2020-07-04 RX ADMIN — TRETINOIN 40 MILLIGRAM(S): 10 CAPSULE, LIQUID FILLED ORAL at 20:29

## 2020-07-04 RX ADMIN — Medication 1 APPLICATION(S): at 22:31

## 2020-07-04 RX ADMIN — Medication 1 APPLICATION(S): at 17:29

## 2020-07-04 RX ADMIN — GABAPENTIN 100 MILLIGRAM(S): 400 CAPSULE ORAL at 06:54

## 2020-07-04 RX ADMIN — Medication 1 APPLICATION(S): at 06:56

## 2020-07-04 RX ADMIN — TRETINOIN 40 MILLIGRAM(S): 10 CAPSULE, LIQUID FILLED ORAL at 07:41

## 2020-07-04 RX ADMIN — Medication 5 MILLIGRAM(S): at 17:33

## 2020-07-04 RX ADMIN — Medication 5 MILLILITER(S): at 17:26

## 2020-07-04 RX ADMIN — Medication 4 DROP(S): at 20:30

## 2020-07-04 RX ADMIN — ENOXAPARIN SODIUM 80 MILLIGRAM(S): 100 INJECTION SUBCUTANEOUS at 22:29

## 2020-07-04 RX ADMIN — VALACYCLOVIR 1000 MILLIGRAM(S): 500 TABLET, FILM COATED ORAL at 13:01

## 2020-07-04 RX ADMIN — ATENOLOL 25 MILLIGRAM(S): 25 TABLET ORAL at 06:52

## 2020-07-04 RX ADMIN — GABAPENTIN 100 MILLIGRAM(S): 400 CAPSULE ORAL at 22:29

## 2020-07-04 RX ADMIN — Medication 1 SPRAY(S): at 22:29

## 2020-07-04 RX ADMIN — Medication 1: at 12:57

## 2020-07-04 RX ADMIN — Medication 5 MILLILITER(S): at 20:29

## 2020-07-04 RX ADMIN — VALACYCLOVIR 1000 MILLIGRAM(S): 500 TABLET, FILM COATED ORAL at 22:29

## 2020-07-04 RX ADMIN — VALACYCLOVIR 1000 MILLIGRAM(S): 500 TABLET, FILM COATED ORAL at 06:53

## 2020-07-04 RX ADMIN — Medication 5 MILLILITER(S): at 11:23

## 2020-07-04 RX ADMIN — Medication 5 MILLILITER(S): at 00:19

## 2020-07-04 RX ADMIN — Medication 1 SPRAY(S): at 06:54

## 2020-07-04 RX ADMIN — Medication 1 APPLICATION(S): at 20:28

## 2020-07-04 RX ADMIN — POSACONAZOLE 300 MILLIGRAM(S): 100 TABLET, DELAYED RELEASE ORAL at 11:22

## 2020-07-04 RX ADMIN — ARSENIC TRIOXIDE 130.5 MILLIGRAM(S): 2 INJECTION, SOLUTION INTRAVENOUS at 12:58

## 2020-07-04 RX ADMIN — Medication 5 MILLIGRAM(S): at 17:25

## 2020-07-04 RX ADMIN — Medication 20 MILLIEQUIVALENT(S): at 11:22

## 2020-07-04 RX ADMIN — Medication 5 MILLILITER(S): at 07:42

## 2020-07-04 RX ADMIN — Medication 50 GRAM(S): at 11:22

## 2020-07-04 RX ADMIN — Medication 1 APPLICATION(S): at 13:01

## 2020-07-04 RX ADMIN — PANTOPRAZOLE SODIUM 40 MILLIGRAM(S): 20 TABLET, DELAYED RELEASE ORAL at 06:52

## 2020-07-04 RX ADMIN — Medication 1 SPRAY(S): at 13:00

## 2020-07-04 RX ADMIN — Medication 1 APPLICATION(S): at 06:54

## 2020-07-04 NOTE — PROGRESS NOTE ADULT - SUBJECTIVE AND OBJECTIVE BOX
Diagnosis: Acute Promyelocytic Leukemia     Protocol/Chemo Regimen: ATRA/Arsenic Trioxide    Day: ATRA Day 41 and Arsenic 37 (resumed Arsenic on 6/30/2020)    Pt endorsed:   + epigastric pain intermittent   + Right upper inner arm pain and and itching.  + Rash on her Right buttock and Right posterior thigh- improved   + mild hearing loss , right arm itching     Review of Systems: Denies nausea, vomiting, diarrhea, chest pain, SOB, headache      Translated by patient's son via phone call per patient request     Pain scale: 4- 5/10    Diet: Consistent Carb w/ Evening Snack    Allergies    No Known Allergies    Intolerances    ANTIMICROBIALS  levoFLOXacin  Tablet 500 milliGRAM(s) Oral every 24 hours  posaconazole DR Tablet 300 milliGRAM(s) Oral daily  valACYclovir 1000 milliGRAM(s) Oral every 8 hours      HEME/ONC MEDICATIONS  arsenic trioxide IVPB (eMAR) 11 milliGRAM(s) IV Intermittent every 24 hours  enoxaparin Injectable 80 milliGRAM(s) SubCutaneous every 12 hours  tretinoin 40 milliGRAM(s) Oral every 12 hours      STANDING MEDICATIONS  ammonium lactate 12% Lotion 1 Application(s) Topical two times a day  ATENolol  Tablet 25 milliGRAM(s) Oral daily  baclofen 5 milliGRAM(s) Oral every 12 hours  Biotene Dry Mouth Oral Rinse 5 milliLiter(s) Swish and Spit five times a day  bisacodyl 5 milliGRAM(s) Oral every 12 hours  chlorhexidine 4% Liquid 1 Application(s) Topical <User Schedule>  dextrose 5%. 1000 milliLiter(s) IV Continuous <Continuous>  dextrose 50% Injectable 12.5 Gram(s) IV Push once  dextrose 50% Injectable 25 Gram(s) IV Push once  dextrose 50% Injectable 25 Gram(s) IV Push once  gabapentin 100 milliGRAM(s) Oral three times a day  insulin lispro (HumaLOG) corrective regimen sliding scale   SubCutaneous three times a day before meals  insulin lispro (HumaLOG) corrective regimen sliding scale   SubCutaneous at bedtime  ofloxacin 0.3% Solution 4 Drop(s) Right Ear two times a day  pantoprazole    Tablet 40 milliGRAM(s) Oral before breakfast  petrolatum white Ointment 1 Application(s) Topical three times a day  polyethylene glycol 3350 17 Gram(s) Oral daily  sodium chloride 0.65% Nasal 1 Spray(s) Both Nostrils every 8 hours  triamcinolone 0.1% Ointment 1 Application(s) Topical every 12 hours      PRN MEDICATIONS  acetaminophen   Tablet .. 650 milliGRAM(s) Oral every 6 hours PRN  ALBUTerol    90 MICROgram(s) HFA Inhaler 2 Puff(s) Inhalation every 6 hours PRN  aluminum hydroxide/magnesium hydroxide/simethicone Suspension 30 milliLiter(s) Oral every 6 hours PRN  AQUAPHOR (petrolatum Ointment) 1 Application(s) Topical every 3 hours PRN  dextrose 40% Gel 15 Gram(s) Oral once PRN  diphenhydrAMINE 25 milliGRAM(s) Oral every 4 hours PRN  diphenhydrAMINE   Injectable 25 milliGRAM(s) IV Push every 6 hours PRN  glucagon  Injectable 1 milliGRAM(s) IntraMuscular once PRN  guaiFENesin   Syrup  (Sugar-Free) 100 milliGRAM(s) Oral every 6 hours PRN  hemorrhoidal Ointment 1 Application(s) Rectal every 8 hours PRN  morphine  - Injectable 3 milliGRAM(s) IV Push every 6 hours PRN  ondansetron Injectable 8 milliGRAM(s) IV Push every 8 hours PRN  sodium chloride 0.9% lock flush 10 milliLiter(s) IV Push every 1 hour PRN  sodium chloride 0.9% lock flush 10 milliLiter(s) IV Push every 1 hour PRN        Vital Signs Last 24 Hrs  T(C): 36.2 (04 Jul 2020 06:14), Max: 37.5 (03 Jul 2020 22:07)  T(F): 97.2 (04 Jul 2020 06:14), Max: 99.5 (03 Jul 2020 22:07)  HR: 71 (04 Jul 2020 06:14) (67 - 80)  BP: 136/72 (04 Jul 2020 06:14) (123/68 - 160/92)  BP(mean): --  RR: 16 (04 Jul 2020 06:14) (16 - 18)  SpO2: 98% (04 Jul 2020 06:14) (97% - 99%)      PHYSICAL EXAM  General: adult in NAD  HEENT: clear oropharynx, no erythema, no ulcers  CV: normal S1, S2, RRR  Lungs: clear to auscultation, no wheezes, no rales  Abdomen: soft, nontender, nondistended, normal BS  Ext: no edema, RUE hyperpigmented.  Skin: Warm and dry, Some hyperpigmentation noted around RUE PICC line, Shingles-like rash noted on R buttock (appears crusting over)  R posterior thigh and right leg   Neuro: alert and oriented x 3  Central line Left PICC CDI     RECENT CULTURES:  Herpes Simplex Virus 1/2 VZV Lesions, PCR (06.22.20 @ 19:26)    Herpes Simplex Virus 1/2  VZV PCR Source: lesion    Herpes Simplex Virus 1/2  VZV PCR Result: VZV Detected HSV 1/2  and VZV assay is a Real-Time PCR test for the qualitative  detection and differentiation of herpes simplex virus type 1 (HSV1), 2  (HSV2) and varicella-zoster virus (VZV) DNA in lesion samples. The result  should be interpreted with consideration of all clinical and laboratory  findings.    LABS:                        9.0    1.67  )-----------( 193      ( 04 Jul 2020 07:23 )             26.8         Mean Cell Volume : 90.8 fl  Mean Cell Hemoglobin : 30.5 pg  Mean Cell Hemoglobin Concentration : 33.6 gm/dL  Auto Neutrophil # : 0.12 K/uL  Auto Lymphocyte # : 1.26 K/uL  Auto Monocyte # : 0.24 K/uL  Auto Eosinophil # : 0.02 K/uL  Auto Basophil # : 0.03 K/uL  Auto Neutrophil % : 7.2 %  Auto Lymphocyte % : 75.4 %  Auto Monocyte % : 14.4 %  Auto Eosinophil % : 1.2 %  Auto Basophil % : 1.8 %      07-04    142  |  107  |  15  ----------------------------<  120<H>  3.9   |  20<L>  |  0.90    Ca    9.8      04 Jul 2020 07:23  Phos  3.9     07-04  Mg     1.8     07-04    TPro  7.3  /  Alb  4.3  /  TBili  0.2  /  DBili  x   /  AST  21  /  ALT  20  /  AlkPhos  127<H>  07-04      Mg 1.8  Phos 3.9      PT/INR - ( 03 Jul 2020 09:50 )   PT: 15.0 sec;   INR: 1.32 ratio         Uric Acid 5.1      RADIOLOGY & ADDITIONAL STUDIES:  VA Duplex Upper Ext Vein Scan, Right (07.02.20 @ 14:37)   No evidence of right upper extremity deep venous thrombosis. Right axillary DVT appears resolved.  Decreased phasicity in the right subclavian vein suggests proximal obstruction. Diagnosis: Acute Promyelocytic Leukemia     Protocol/Chemo Regimen: ATRA/Arsenic Trioxide    Day: ATRA Day 41 and Arsenic 37 (resumed Arsenic on 6/30/2020)    Pt endorsed:   + scalp hypersensitivity/tenderness   + Rash on her Right buttock and Right posterior thigh- improved   + mild hearing loss     Review of Systems: Denies nausea, vomiting, diarrhea, chest pain, SOB, headache      Translated by patient's son via phone call per patient request     Pain scale: 4- 5/10    Diet: Consistent Carb w/ Evening Snack    Allergies    No Known Allergies    Intolerances    ANTIMICROBIALS  levoFLOXacin  Tablet 500 milliGRAM(s) Oral every 24 hours  posaconazole DR Tablet 300 milliGRAM(s) Oral daily  valACYclovir 1000 milliGRAM(s) Oral every 8 hours      HEME/ONC MEDICATIONS  arsenic trioxide IVPB (eMAR) 11 milliGRAM(s) IV Intermittent every 24 hours  enoxaparin Injectable 80 milliGRAM(s) SubCutaneous every 12 hours  tretinoin 40 milliGRAM(s) Oral every 12 hours      STANDING MEDICATIONS  ammonium lactate 12% Lotion 1 Application(s) Topical two times a day  ATENolol  Tablet 25 milliGRAM(s) Oral daily  baclofen 5 milliGRAM(s) Oral every 12 hours  Biotene Dry Mouth Oral Rinse 5 milliLiter(s) Swish and Spit five times a day  bisacodyl 5 milliGRAM(s) Oral every 12 hours  chlorhexidine 4% Liquid 1 Application(s) Topical <User Schedule>  dextrose 5%. 1000 milliLiter(s) IV Continuous <Continuous>  dextrose 50% Injectable 12.5 Gram(s) IV Push once  dextrose 50% Injectable 25 Gram(s) IV Push once  dextrose 50% Injectable 25 Gram(s) IV Push once  gabapentin 100 milliGRAM(s) Oral three times a day  insulin lispro (HumaLOG) corrective regimen sliding scale   SubCutaneous three times a day before meals  insulin lispro (HumaLOG) corrective regimen sliding scale   SubCutaneous at bedtime  ofloxacin 0.3% Solution 4 Drop(s) Right Ear two times a day  pantoprazole    Tablet 40 milliGRAM(s) Oral before breakfast  petrolatum white Ointment 1 Application(s) Topical three times a day  polyethylene glycol 3350 17 Gram(s) Oral daily  sodium chloride 0.65% Nasal 1 Spray(s) Both Nostrils every 8 hours  triamcinolone 0.1% Ointment 1 Application(s) Topical every 12 hours      PRN MEDICATIONS  acetaminophen   Tablet .. 650 milliGRAM(s) Oral every 6 hours PRN  ALBUTerol    90 MICROgram(s) HFA Inhaler 2 Puff(s) Inhalation every 6 hours PRN  aluminum hydroxide/magnesium hydroxide/simethicone Suspension 30 milliLiter(s) Oral every 6 hours PRN  AQUAPHOR (petrolatum Ointment) 1 Application(s) Topical every 3 hours PRN  dextrose 40% Gel 15 Gram(s) Oral once PRN  diphenhydrAMINE 25 milliGRAM(s) Oral every 4 hours PRN  diphenhydrAMINE   Injectable 25 milliGRAM(s) IV Push every 6 hours PRN  glucagon  Injectable 1 milliGRAM(s) IntraMuscular once PRN  guaiFENesin   Syrup  (Sugar-Free) 100 milliGRAM(s) Oral every 6 hours PRN  hemorrhoidal Ointment 1 Application(s) Rectal every 8 hours PRN  morphine  - Injectable 3 milliGRAM(s) IV Push every 6 hours PRN  ondansetron Injectable 8 milliGRAM(s) IV Push every 8 hours PRN  sodium chloride 0.9% lock flush 10 milliLiter(s) IV Push every 1 hour PRN  sodium chloride 0.9% lock flush 10 milliLiter(s) IV Push every 1 hour PRN        Vital Signs Last 24 Hrs  T(C): 36.2 (04 Jul 2020 06:14), Max: 37.5 (03 Jul 2020 22:07)  T(F): 97.2 (04 Jul 2020 06:14), Max: 99.5 (03 Jul 2020 22:07)  HR: 71 (04 Jul 2020 06:14) (67 - 80)  BP: 136/72 (04 Jul 2020 06:14) (123/68 - 160/92)  BP(mean): --  RR: 16 (04 Jul 2020 06:14) (16 - 18)  SpO2: 98% (04 Jul 2020 06:14) (97% - 99%)      PHYSICAL EXAM  General: adult in NAD  HEENT: clear oropharynx, no erythema, no ulcers  CV: normal S1, S2, RRR  Lungs: clear to auscultation, no wheezes, no rales  Abdomen: soft, nontender, nondistended, normal BS  Ext: no edema, RUE hyperpigmented.  Skin: Warm and dry, Some hyperpigmentation noted around RUE PICC line, Shingles-like rash noted on R buttock (appears crusting over)  R posterior thigh and right leg   Neuro: alert and oriented x 3  Central line Left PICC CDI     RECENT CULTURES:  Herpes Simplex Virus 1/2 VZV Lesions, PCR (06.22.20 @ 19:26)    Herpes Simplex Virus 1/2  VZV PCR Source: lesion    Herpes Simplex Virus 1/2  VZV PCR Result: VZV Detected HSV 1/2  and VZV assay is a Real-Time PCR test for the qualitative  detection and differentiation of herpes simplex virus type 1 (HSV1), 2  (HSV2) and varicella-zoster virus (VZV) DNA in lesion samples. The result  should be interpreted with consideration of all clinical and laboratory  findings.    LABS:                        9.0    1.67  )-----------( 193      ( 04 Jul 2020 07:23 )             26.8         Mean Cell Volume : 90.8 fl  Mean Cell Hemoglobin : 30.5 pg  Mean Cell Hemoglobin Concentration : 33.6 gm/dL  Auto Neutrophil # : 0.12 K/uL  Auto Lymphocyte # : 1.26 K/uL  Auto Monocyte # : 0.24 K/uL  Auto Eosinophil # : 0.02 K/uL  Auto Basophil # : 0.03 K/uL  Auto Neutrophil % : 7.2 %  Auto Lymphocyte % : 75.4 %  Auto Monocyte % : 14.4 %  Auto Eosinophil % : 1.2 %  Auto Basophil % : 1.8 %      07-04    142  |  107  |  15  ----------------------------<  120<H>  3.9   |  20<L>  |  0.90    Ca    9.8      04 Jul 2020 07:23  Phos  3.9     07-04  Mg     1.8     07-04    TPro  7.3  /  Alb  4.3  /  TBili  0.2  /  DBili  x   /  AST  21  /  ALT  20  /  AlkPhos  127<H>  07-04      Mg 1.8  Phos 3.9      PT/INR - ( 03 Jul 2020 09:50 )   PT: 15.0 sec;   INR: 1.32 ratio         Uric Acid 5.1      RADIOLOGY & ADDITIONAL STUDIES:  VA Duplex Upper Ext Vein Scan, Right (07.02.20 @ 14:37)   No evidence of right upper extremity deep venous thrombosis. Right axillary DVT appears resolved.  Decreased phasicity in the right subclavian vein suggests proximal obstruction. Diagnosis: Acute Promyelocytic Leukemia     Protocol/Chemo Regimen: ATRA/Arsenic Trioxide    Day: ATRA Day 42 and Arsenic 38 (resumed Arsenic on 6/30/2020)    Pt endorsed:   + scalp hypersensitivity/tenderness   + Rash on her Right buttock and Right posterior thigh- improved   + mild hearing loss     Review of Systems: Denies nausea, vomiting, diarrhea, chest pain, SOB, headache      Translated by patient's son via phone call per patient request     Pain scale: 4- 5/10    Diet: Consistent Carb w/ Evening Snack    Allergies    No Known Allergies    Intolerances    ANTIMICROBIALS  levoFLOXacin  Tablet 500 milliGRAM(s) Oral every 24 hours  posaconazole DR Tablet 300 milliGRAM(s) Oral daily  valACYclovir 1000 milliGRAM(s) Oral every 8 hours      HEME/ONC MEDICATIONS  arsenic trioxide IVPB (eMAR) 11 milliGRAM(s) IV Intermittent every 24 hours  enoxaparin Injectable 80 milliGRAM(s) SubCutaneous every 12 hours  tretinoin 40 milliGRAM(s) Oral every 12 hours      STANDING MEDICATIONS  ammonium lactate 12% Lotion 1 Application(s) Topical two times a day  ATENolol  Tablet 25 milliGRAM(s) Oral daily  baclofen 5 milliGRAM(s) Oral every 12 hours  Biotene Dry Mouth Oral Rinse 5 milliLiter(s) Swish and Spit five times a day  bisacodyl 5 milliGRAM(s) Oral every 12 hours  chlorhexidine 4% Liquid 1 Application(s) Topical <User Schedule>  dextrose 5%. 1000 milliLiter(s) IV Continuous <Continuous>  dextrose 50% Injectable 12.5 Gram(s) IV Push once  dextrose 50% Injectable 25 Gram(s) IV Push once  dextrose 50% Injectable 25 Gram(s) IV Push once  gabapentin 100 milliGRAM(s) Oral three times a day  insulin lispro (HumaLOG) corrective regimen sliding scale   SubCutaneous three times a day before meals  insulin lispro (HumaLOG) corrective regimen sliding scale   SubCutaneous at bedtime  ofloxacin 0.3% Solution 4 Drop(s) Right Ear two times a day  pantoprazole    Tablet 40 milliGRAM(s) Oral before breakfast  petrolatum white Ointment 1 Application(s) Topical three times a day  polyethylene glycol 3350 17 Gram(s) Oral daily  sodium chloride 0.65% Nasal 1 Spray(s) Both Nostrils every 8 hours  triamcinolone 0.1% Ointment 1 Application(s) Topical every 12 hours      PRN MEDICATIONS  acetaminophen   Tablet .. 650 milliGRAM(s) Oral every 6 hours PRN  ALBUTerol    90 MICROgram(s) HFA Inhaler 2 Puff(s) Inhalation every 6 hours PRN  aluminum hydroxide/magnesium hydroxide/simethicone Suspension 30 milliLiter(s) Oral every 6 hours PRN  AQUAPHOR (petrolatum Ointment) 1 Application(s) Topical every 3 hours PRN  dextrose 40% Gel 15 Gram(s) Oral once PRN  diphenhydrAMINE 25 milliGRAM(s) Oral every 4 hours PRN  diphenhydrAMINE   Injectable 25 milliGRAM(s) IV Push every 6 hours PRN  glucagon  Injectable 1 milliGRAM(s) IntraMuscular once PRN  guaiFENesin   Syrup  (Sugar-Free) 100 milliGRAM(s) Oral every 6 hours PRN  hemorrhoidal Ointment 1 Application(s) Rectal every 8 hours PRN  morphine  - Injectable 3 milliGRAM(s) IV Push every 6 hours PRN  ondansetron Injectable 8 milliGRAM(s) IV Push every 8 hours PRN  sodium chloride 0.9% lock flush 10 milliLiter(s) IV Push every 1 hour PRN  sodium chloride 0.9% lock flush 10 milliLiter(s) IV Push every 1 hour PRN        Vital Signs Last 24 Hrs  T(C): 36.2 (04 Jul 2020 06:14), Max: 37.5 (03 Jul 2020 22:07)  T(F): 97.2 (04 Jul 2020 06:14), Max: 99.5 (03 Jul 2020 22:07)  HR: 71 (04 Jul 2020 06:14) (67 - 80)  BP: 136/72 (04 Jul 2020 06:14) (123/68 - 160/92)  BP(mean): --  RR: 16 (04 Jul 2020 06:14) (16 - 18)  SpO2: 98% (04 Jul 2020 06:14) (97% - 99%)      PHYSICAL EXAM  General: adult in NAD  HEENT: clear oropharynx, no erythema, no ulcers  CV: normal S1, S2, RRR  Lungs: clear to auscultation, no wheezes, no rales  Abdomen: soft, nontender, nondistended, normal BS  Ext: no edema, RUE hyperpigmented.  Skin: Warm and dry, Some hyperpigmentation noted around RUE PICC line, Shingles-like rash noted on R buttock (appears crusting over)  R posterior thigh and right leg   Neuro: alert and oriented x 3  Central line Left PICC CDI     RECENT CULTURES:  Herpes Simplex Virus 1/2 VZV Lesions, PCR (06.22.20 @ 19:26)    Herpes Simplex Virus 1/2  VZV PCR Source: lesion    Herpes Simplex Virus 1/2  VZV PCR Result: VZV Detected HSV 1/2  and VZV assay is a Real-Time PCR test for the qualitative  detection and differentiation of herpes simplex virus type 1 (HSV1), 2  (HSV2) and varicella-zoster virus (VZV) DNA in lesion samples. The result  should be interpreted with consideration of all clinical and laboratory  findings.    LABS:                        9.0    1.67  )-----------( 193      ( 04 Jul 2020 07:23 )             26.8         Mean Cell Volume : 90.8 fl  Mean Cell Hemoglobin : 30.5 pg  Mean Cell Hemoglobin Concentration : 33.6 gm/dL  Auto Neutrophil # : 0.12 K/uL  Auto Lymphocyte # : 1.26 K/uL  Auto Monocyte # : 0.24 K/uL  Auto Eosinophil # : 0.02 K/uL  Auto Basophil # : 0.03 K/uL  Auto Neutrophil % : 7.2 %  Auto Lymphocyte % : 75.4 %  Auto Monocyte % : 14.4 %  Auto Eosinophil % : 1.2 %  Auto Basophil % : 1.8 %      07-04    142  |  107  |  15  ----------------------------<  120<H>  3.9   |  20<L>  |  0.90    Ca    9.8      04 Jul 2020 07:23  Phos  3.9     07-04  Mg     1.8     07-04    TPro  7.3  /  Alb  4.3  /  TBili  0.2  /  DBili  x   /  AST  21  /  ALT  20  /  AlkPhos  127<H>  07-04      Mg 1.8  Phos 3.9      PT/INR - ( 03 Jul 2020 09:50 )   PT: 15.0 sec;   INR: 1.32 ratio         Uric Acid 5.1      RADIOLOGY & ADDITIONAL STUDIES:  VA Duplex Upper Ext Vein Scan, Right (07.02.20 @ 14:37)   No evidence of right upper extremity deep venous thrombosis. Right axillary DVT appears resolved.  Decreased phasicity in the right subclavian vein suggests proximal obstruction.

## 2020-07-04 NOTE — PROGRESS NOTE ADULT - PROBLEM SELECTOR PLAN 4
Shingles of the Right buttock and Right upper thigh.   6/22 seen by Dermatology   Continue Valtrex and Lac hydrin as per Dermatology recommendations   Neurontin 100 mg TID for herpetic neuralgia  Lesions now crusted over - will d/c airborne isolation.

## 2020-07-04 NOTE — ADVANCED PRACTICE NURSE CONSULT - ASSESSMENT
Labs today WBC 1.6 HGB 9 HCT 26.8 PLTS 193 CR. 0.90 TBILI. 0.2,k 3.9,mg 1.8,EKG done. Pt found in chair, alert and oriented x4, v/s stable afebrile, n/c offered. Left picc line in place. Site without redness or swelling. Lumen previously accessed with + blood return flushes well with NS. Chemotherapy verified by 2 RNs prior to administration. at 1110 treated with arsenic 0.15mg/m2= 11 mg ivss over 2 hours. Pt remains in chair with n/c offered. Primary RN aware of treatment plan.

## 2020-07-04 NOTE — PROGRESS NOTE ADULT - PROBLEM SELECTOR PLAN 1
Continue ATRA 40mg BID (started 5/22)   Arsenic Trioxide which was started on 5/27 was on hold (since 6/22- 6/29), now  shingles crusted resumed Arsenic on 6/30.  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 If unable to achieve goal to infuse 1/2 unit plts over 3 hours q12  S/p Prednisone 20mg PO daily x 5 days (through 6/12) due to platelet antibody  Keep Fibrinogen > 150, if <150 give Cryoprecipitate  Strict Is and Os/Daily weights/Mouth Care  BMBx (6/26) (+) for persistent disease, continue Arsenic trioxide and ATRA.  PICC line placement on 7/1  intermittent epigastric pain relieved with Maalox. Continue ATRA 40mg BID (started 5/22)   Arsenic Trioxide which was started on 5/27 was on hold (since 6/22- 6/29), now  shingles crusted resumed Arsenic on 6/30.  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 If unable to achieve goal to infuse 1/2 unit plts over 3 hours q12  S/p Prednisone 20mg PO daily x 5 days (through 6/12) due to platelet antibody  Keep Fibrinogen > 150, if <150 give Cryoprecipitate  Strict Is and Os/Daily weights/Mouth Care  BMBx (6/26) (+) for persistent disease, continue Arsenic trioxide and ATRA.  PICC line placement on 7/1 7/5 Hypersensitivity/tenderness of the scalp - lidocaine jelly to scalp.

## 2020-07-04 NOTE — ADVANCED PRACTICE NURSE CONSULT - ASSESSMENT
Labs today WBC 1.6 HGB 9 HCT 26.8 PLTS 193 CR. 0.90 TBILI. 0.2,k 3.9,mg 1.8,EKG done. Pt found in chair, alert and oriented x4, v/s stable afebrile, n/c offered. Left picc line in place. Site without redness or swelling. Lumen previously accessed with + blood return flushes well with NS. Chemotherapy verified by 2 RNs prior to administration. at 1259 treated with arsenic 0.15mg/m2= 11 mg ivss over 2 hours. Pt remains in chair with n/c offered. Primary RN aware of treatment plan.

## 2020-07-04 NOTE — PROGRESS NOTE ADULT - PROBLEM SELECTOR PLAN 2
Patient is Neutropenic, afebrile   6/29 Started Levaquin and Posaconazole for PPX  If spikes pan culture, CXR and change Levaquin to cefepime   6/23 Persistent right upper inner arm pain/swelling - CT no abscess, + DVT, 7/2 repeat Doppler - No infectious sources   6/22 Right buttock and upper thigh shingles continue Valtrex tx with Lac Hydrin  Previous COVID-19 (+) outpatient  Then COVID (-) on 5/22, 5/26, 6/2 and 6/9 6/16 COVID 19 (+)   6/18 COVID 19 Ab (+)  6/19 Repeat COVID-19 PCR (-)

## 2020-07-04 NOTE — PROGRESS NOTE ADULT - PROBLEM SELECTOR PLAN 8
On Lovenox SQ BID for DVT treatment. Discontinue if platelets are < 50,000       Contact Information [phone]

## 2020-07-04 NOTE — PROGRESS NOTE ADULT - ASSESSMENT
Ms. Gant is a 59 y/o Estonian-speaking female w/ a PMHx of HTN, HLD, T2DM, and COVID-19 infection in 4/2020 who was admitted for management of newly diagnosed APL. The patient has been receiving treatment with ATRA and Arsenic (ATRA since 5/22 and Arsenic 5/27). Arsenic has been on hold since 6/22 secondary to Herpes Zoster infection; resumed on 6/30.  Patient's hospital course has been complicated by transaminitis, refractory thrombocytopenia, COVID-19 (+) state (6/16), shingles, and right axillary DVT. The patient has pancytopenia 2/2 disease and/or ATRA/Arsenic. Ms. Gant is a 59 y/o Yoruba-speaking female w/ a PMHx of HTN, HLD, T2DM, and COVID-19 infection in 4/2020 who was admitted for management of newly diagnosed APL. The patient has been receiving treatment with ATRA and Arsenic (ATRA since 5/22 and Arsenic 5/27). Arsenic which was on hold since 6/22 secondary to Herpes Zoster infection, resumed on 6/30.  Patient's hospital course has been complicated by transaminitis, refractory thrombocytopenia, COVID-19 (+) state (6/16), shingles, and right axillary DVT. The patient has pancytopenia 2/2 disease and/or ATRA/Arsenic.

## 2020-07-04 NOTE — PROGRESS NOTE ADULT - ATTENDING COMMENTS
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  Presented with pancytopenia, flow cytometry/bone marrow biopsy/FISH done 5/26- c/w acute promyelocytic leukemia with PML-AWA translocation.    She was started on ATRA 5/23- day +38, GALA 5/27- day +38 (arsenic currently on hold from 6/22).    -CT chest 6/6 c/w granulomatous disease (hx of TB in 1980's) . Monitor daily weights, diurese prn.   -hx tremors , unclear etiology. Cefepime held, s/p Baclofen - now improved  - CBC, coags/fibrinogen daily.  Goal plt >40-50, fibrinogen >150  - responding to ABO-matched platelets given over 3 hrs in 1/2 unit infusions   -monitor lytes daily, keep K>4, Mg>2.  EKG twice a week  -COVID19+ on 6/17, now neg  -Zoster rash on R buttock and back of RLE, crusted over. Resumed GALA 6/30  - Pain present, on gabapentin 100 mg TID, d/c morphine, tylenol prn  -RUE redness and itching at previous PICC site, cont topical triamcinolone BID   -RUE doppler showing axillary partially occlusive clot. On lovenox since 6/25. Repeat RUE doppler done 7/2, R subclavian vein with obstruction  -s/p bone marrow biopsy on 6/26 - c/w residual APL 35% marrow involvement, repeat BMbx on 7/9  - L PICC line placed 7/1, no mediport due to cytopenias  - hearing loss. ENT evaluation apprec s/p debrox soln for impacted R ear, audiology testing c/w severe chronic hearing loss R>L, recc outpt audiology f/u for assessment of hearing aid device  - c/o chest /epigastric pain o/n 7/2, will check CXR, give maalox x1, ECG done this am wnl. 59 y/o Urdu speaking F with newly diagnosed low/interm risk APL.  Presented with pancytopenia, flow cytometry/bone marrow biopsy/FISH done 5/26- c/w acute promyelocytic leukemia with PML-AWA translocation.    She was started on ATRA 5/23- day +41, GALA 5/27- day +37 (arsenic currently on hold from 6/22).    -CT chest 6/6 c/w granulomatous disease (hx of TB in 1980's) . Monitor daily weights, diurese prn.   -hx tremors , unclear etiology. Cefepime held, s/p Baclofen - now improved  - CBC, coags/fibrinogen daily.  Goal plt >40-50, fibrinogen >150  - responding to ABO-matched platelets given over 3 hrs in 1/2 unit infusions   -monitor lytes daily, keep K>4, Mg>2.  EKG twice a week  -COVID19+ on 6/17, now neg  -Zoster rash on R buttock and back of RLE, crusted over. Resumed GALA 6/30  - Pain present, on gabapentin 100 mg TID, d/c morphine, tylenol prn  -RUE redness and itching at previous PICC site, pt refuses use of topical triamcinolone - states it makes itching worse  -RUE doppler showing axillary partially occlusive clot. On lovenox since 6/25. Repeat RUE doppler done 7/2, R subclavian vein with obstruction  -s/p bone marrow biopsy on 6/26 - c/w residual APL 35% marrow involvement, repeat BMbx on 7/9  - L PICC line placed 7/1, no mediport due to cytopenias  - hearing loss. ENT evaluation apprec s/p debrox soln for impacted R ear, audiology testing c/w severe chronic hearing loss R>L, recc outpt audiology f/u for assessment of hearing aid device  - c/o chest /epigastric pain o/n 7/2, stable CXR, no acute infiltrates, sx improved after maalox 61 y/o Azeri speaking F with newly diagnosed low/interm risk APL.  Presented with pancytopenia, flow cytometry/bone marrow biopsy/FISH done 5/26- c/w acute promyelocytic leukemia with PML-AWA translocation.    She was started on ATRA 5/23- day +42, GALA 5/27- day +38 (arsenic currently on hold from 6/22).    -CT chest 6/6 c/w granulomatous disease (hx of TB in 1980's) . Monitor daily weights, diurese prn.   -hx tremors , unclear etiology. Cefepime held, s/p Baclofen - now improved  - CBC, coags/fibrinogen daily.  Goal plt >40-50, fibrinogen >150  - responding to ABO-matched platelets given over 3 hrs in 1/2 unit infusions   -monitor lytes daily, keep K>4, Mg>2.  EKG twice a week  -COVID19+ on 6/17, now neg  -Zoster rash on R buttock and back of RLE, crusted over. Resumed GALA 6/30  - Pain present, on gabapentin 100 mg TID, d/c morphine, tylenol prn  -RUE redness and itching at previous PICC site, pt refuses use of topical triamcinolone - states it makes itching worse  -RUE doppler showing axillary partially occlusive clot. On lovenox since 6/25. Repeat RUE doppler done 7/2, R subclavian vein with obstruction  -s/p bone marrow biopsy on 6/26 - c/w residual APL 35% marrow involvement, repeat BMbx on 7/9  - L PICC line placed 7/1, no mediport due to cytopenias  - hearing loss. ENT evaluation apprec s/p debrox soln for impacted R ear, audiology testing c/w severe chronic hearing loss R>L, recc outpt audiology f/u for assessment of hearing aid device  - c/o chest /epigastric pain o/n 7/2, stable CXR, no acute infiltrates, sx improved after maalox

## 2020-07-04 NOTE — PROGRESS NOTE ADULT - PROBLEM SELECTOR PLAN 3
Patient with right upper arm pain and swelling s/p PICC line removal   6/23 [facility] Dup[facility] (+) for acute partially occlusive DVT affecting right axillary vein   6/24 CT consistent with DVT, no abscess   Was on a Heparin gtt, now changed to SQ Lovenox q 12. Keep platelets >50,000  With associated rash suspected 2/2 adhesive, continue clobetasol per Derm  7/1- repeat doppler -Right axillary DVT appears resolved.  Decreased phasicity in the right subclavian vein suggests proximal obstruction.

## 2020-07-05 LAB
ALBUMIN SERPL ELPH-MCNC: 4.2 G/DL — SIGNIFICANT CHANGE UP (ref 3.3–5)
ALP SERPL-CCNC: 122 U/L — HIGH (ref 40–120)
ALT FLD-CCNC: 19 U/L — SIGNIFICANT CHANGE UP (ref 10–45)
ANION GAP SERPL CALC-SCNC: 12 MMOL/L — SIGNIFICANT CHANGE UP (ref 5–17)
AST SERPL-CCNC: 19 U/L — SIGNIFICANT CHANGE UP (ref 10–40)
BASOPHILS # BLD AUTO: 0.02 K/UL — SIGNIFICANT CHANGE UP (ref 0–0.2)
BASOPHILS NFR BLD AUTO: 1.1 % — SIGNIFICANT CHANGE UP (ref 0–2)
BILIRUB SERPL-MCNC: 0.2 MG/DL — SIGNIFICANT CHANGE UP (ref 0.2–1.2)
BUN SERPL-MCNC: 14 MG/DL — SIGNIFICANT CHANGE UP (ref 7–23)
CALCIUM SERPL-MCNC: 9.8 MG/DL — SIGNIFICANT CHANGE UP (ref 8.4–10.5)
CHLORIDE SERPL-SCNC: 106 MMOL/L — SIGNIFICANT CHANGE UP (ref 96–108)
CO2 SERPL-SCNC: 21 MMOL/L — LOW (ref 22–31)
CREAT SERPL-MCNC: 0.95 MG/DL — SIGNIFICANT CHANGE UP (ref 0.5–1.3)
D DIMER BLD IA.RAPID-MCNC: 284 NG/ML DDU — HIGH
EOSINOPHIL # BLD AUTO: 0.02 K/UL — SIGNIFICANT CHANGE UP (ref 0–0.5)
EOSINOPHIL NFR BLD AUTO: 1.1 % — SIGNIFICANT CHANGE UP (ref 0–6)
FIBRINOGEN PPP-MCNC: 451 MG/DL — SIGNIFICANT CHANGE UP (ref 350–510)
GLUCOSE BLDC GLUCOMTR-MCNC: 106 MG/DL — HIGH (ref 70–99)
GLUCOSE BLDC GLUCOMTR-MCNC: 127 MG/DL — HIGH (ref 70–99)
GLUCOSE BLDC GLUCOMTR-MCNC: 129 MG/DL — HIGH (ref 70–99)
GLUCOSE BLDC GLUCOMTR-MCNC: 145 MG/DL — HIGH (ref 70–99)
GLUCOSE SERPL-MCNC: 119 MG/DL — HIGH (ref 70–99)
HCT VFR BLD CALC: 26.4 % — LOW (ref 34.5–45)
HGB BLD-MCNC: 8.7 G/DL — LOW (ref 11.5–15.5)
IMM GRANULOCYTES NFR BLD AUTO: 1.1 % — SIGNIFICANT CHANGE UP (ref 0–1.5)
LDH SERPL L TO P-CCNC: 151 U/L — SIGNIFICANT CHANGE UP (ref 50–242)
LYMPHOCYTES # BLD AUTO: 1.33 K/UL — SIGNIFICANT CHANGE UP (ref 1–3.3)
LYMPHOCYTES # BLD AUTO: 76.4 % — HIGH (ref 13–44)
MAGNESIUM SERPL-MCNC: 2.1 MG/DL — SIGNIFICANT CHANGE UP (ref 1.6–2.6)
MCHC RBC-ENTMCNC: 30.1 PG — SIGNIFICANT CHANGE UP (ref 27–34)
MCHC RBC-ENTMCNC: 33 GM/DL — SIGNIFICANT CHANGE UP (ref 32–36)
MCV RBC AUTO: 91.3 FL — SIGNIFICANT CHANGE UP (ref 80–100)
MONOCYTES # BLD AUTO: 0.23 K/UL — SIGNIFICANT CHANGE UP (ref 0–0.9)
MONOCYTES NFR BLD AUTO: 13.2 % — SIGNIFICANT CHANGE UP (ref 2–14)
NEUTROPHILS # BLD AUTO: 0.12 K/UL — LOW (ref 1.8–7.4)
NEUTROPHILS NFR BLD AUTO: 7.1 % — LOW (ref 43–77)
NRBC # BLD: 0 /100 WBCS — SIGNIFICANT CHANGE UP (ref 0–0)
PHOSPHATE SERPL-MCNC: 4.5 MG/DL — SIGNIFICANT CHANGE UP (ref 2.5–4.5)
PLATELET # BLD AUTO: 182 K/UL — SIGNIFICANT CHANGE UP (ref 150–400)
POTASSIUM SERPL-MCNC: 4 MMOL/L — SIGNIFICANT CHANGE UP (ref 3.5–5.3)
POTASSIUM SERPL-SCNC: 4 MMOL/L — SIGNIFICANT CHANGE UP (ref 3.5–5.3)
PROT SERPL-MCNC: 7.2 G/DL — SIGNIFICANT CHANGE UP (ref 6–8.3)
RBC # BLD: 2.89 M/UL — LOW (ref 3.8–5.2)
RBC # FLD: 16.1 % — HIGH (ref 10.3–14.5)
SODIUM SERPL-SCNC: 139 MMOL/L — SIGNIFICANT CHANGE UP (ref 135–145)
URATE SERPL-MCNC: 5 MG/DL — SIGNIFICANT CHANGE UP (ref 2.5–7)
WBC # BLD: 1.74 K/UL — LOW (ref 3.8–10.5)
WBC # FLD AUTO: 1.74 K/UL — LOW (ref 3.8–10.5)

## 2020-07-05 PROCEDURE — 99233 SBSQ HOSP IP/OBS HIGH 50: CPT

## 2020-07-05 RX ORDER — LIDOCAINE 4 G/100G
1 CREAM TOPICAL EVERY 8 HOURS
Refills: 0 | Status: DISCONTINUED | OUTPATIENT
Start: 2020-07-05 | End: 2020-07-14

## 2020-07-05 RX ORDER — LIDOCAINE 4 G/100G
1 CREAM TOPICAL DAILY
Refills: 0 | Status: DISCONTINUED | OUTPATIENT
Start: 2020-07-05 | End: 2020-07-05

## 2020-07-05 RX ADMIN — PANTOPRAZOLE SODIUM 40 MILLIGRAM(S): 20 TABLET, DELAYED RELEASE ORAL at 06:22

## 2020-07-05 RX ADMIN — GABAPENTIN 100 MILLIGRAM(S): 400 CAPSULE ORAL at 06:22

## 2020-07-05 RX ADMIN — VALACYCLOVIR 1000 MILLIGRAM(S): 500 TABLET, FILM COATED ORAL at 06:22

## 2020-07-05 RX ADMIN — Medication 1 APPLICATION(S): at 06:26

## 2020-07-05 RX ADMIN — TRETINOIN 40 MILLIGRAM(S): 10 CAPSULE, LIQUID FILLED ORAL at 21:10

## 2020-07-05 RX ADMIN — GABAPENTIN 100 MILLIGRAM(S): 400 CAPSULE ORAL at 13:20

## 2020-07-05 RX ADMIN — Medication 1 APPLICATION(S): at 13:20

## 2020-07-05 RX ADMIN — Medication 1 APPLICATION(S): at 17:22

## 2020-07-05 RX ADMIN — Medication 1 APPLICATION(S): at 06:24

## 2020-07-05 RX ADMIN — Medication 5 MILLIGRAM(S): at 06:22

## 2020-07-05 RX ADMIN — Medication 5 MILLILITER(S): at 21:13

## 2020-07-05 RX ADMIN — VALACYCLOVIR 1000 MILLIGRAM(S): 500 TABLET, FILM COATED ORAL at 21:13

## 2020-07-05 RX ADMIN — Medication 5 MILLILITER(S): at 08:06

## 2020-07-05 RX ADMIN — Medication 4 DROP(S): at 17:21

## 2020-07-05 RX ADMIN — Medication 5 MILLILITER(S): at 01:34

## 2020-07-05 RX ADMIN — LIDOCAINE 1 APPLICATION(S): 4 CREAM TOPICAL at 21:14

## 2020-07-05 RX ADMIN — ENOXAPARIN SODIUM 80 MILLIGRAM(S): 100 INJECTION SUBCUTANEOUS at 21:13

## 2020-07-05 RX ADMIN — Medication 1 SPRAY(S): at 06:22

## 2020-07-05 RX ADMIN — Medication 1 APPLICATION(S): at 06:23

## 2020-07-05 RX ADMIN — Medication 650 MILLIGRAM(S): at 11:48

## 2020-07-05 RX ADMIN — ENOXAPARIN SODIUM 80 MILLIGRAM(S): 100 INJECTION SUBCUTANEOUS at 11:48

## 2020-07-05 RX ADMIN — ATENOLOL 25 MILLIGRAM(S): 25 TABLET ORAL at 06:22

## 2020-07-05 RX ADMIN — Medication 5 MILLIGRAM(S): at 17:21

## 2020-07-05 RX ADMIN — Medication 1 APPLICATION(S): at 21:16

## 2020-07-05 RX ADMIN — Medication 5 MILLIGRAM(S): at 06:44

## 2020-07-05 RX ADMIN — Medication 1 SPRAY(S): at 13:21

## 2020-07-05 RX ADMIN — GABAPENTIN 100 MILLIGRAM(S): 400 CAPSULE ORAL at 21:13

## 2020-07-05 RX ADMIN — Medication 4 DROP(S): at 06:23

## 2020-07-05 RX ADMIN — TRETINOIN 40 MILLIGRAM(S): 10 CAPSULE, LIQUID FILLED ORAL at 08:37

## 2020-07-05 RX ADMIN — Medication 1 SPRAY(S): at 21:39

## 2020-07-05 RX ADMIN — CHLORHEXIDINE GLUCONATE 1 APPLICATION(S): 213 SOLUTION TOPICAL at 08:00

## 2020-07-05 RX ADMIN — Medication 5 MILLILITER(S): at 12:18

## 2020-07-05 RX ADMIN — VALACYCLOVIR 1000 MILLIGRAM(S): 500 TABLET, FILM COATED ORAL at 13:21

## 2020-07-05 RX ADMIN — Medication 5 MILLILITER(S): at 17:23

## 2020-07-05 RX ADMIN — POSACONAZOLE 300 MILLIGRAM(S): 100 TABLET, DELAYED RELEASE ORAL at 11:48

## 2020-07-05 RX ADMIN — ARSENIC TRIOXIDE 130.5 MILLIGRAM(S): 2 INJECTION, SOLUTION INTRAVENOUS at 11:29

## 2020-07-05 NOTE — ADVANCED PRACTICE NURSE CONSULT - ASSESSMENT
Labs today WBC 1.74 HGB 8.7 HCT 26.4 PLTS 182 CR. 0.95 TBILI. 0.2,k 4,mg 2.1,EKG done. Pt found in chair, alert and oriented x4, v/s stable afebrile, n/c offered. Left picc line in place. Site without redness or swelling. Lumen previously accessed with + blood return flushes well with NS. Chemotherapy verified by 2 RNs prior to administration. at 1231 treated with arsenic 0.15mg/m2= 11 mg ivss over 2 hours. Pt remains in chair with n/c offered. Primary RN aware of treatment plan.

## 2020-07-05 NOTE — PROGRESS NOTE ADULT - ATTENDING COMMENTS
61 y/o English speaking F with newly diagnosed low/interm risk APL.  Presented with pancytopenia, flow cytometry/bone marrow biopsy/FISH done 5/26- c/w acute promyelocytic leukemia with PML-AWA translocation.    She was started on ATRA 5/23- day +41, GALA 5/27- day +37 (arsenic currently on hold from 6/22).    -CT chest 6/6 c/w granulomatous disease (hx of TB in 1980's) . Monitor daily weights, diurese prn.   -hx tremors , unclear etiology. Cefepime held, s/p Baclofen - now improved  - CBC, coags/fibrinogen daily.  Goal plt >40-50, fibrinogen >150  - responding to ABO-matched platelets given over 3 hrs in 1/2 unit infusions   -monitor lytes daily, keep K>4, Mg>2.  EKG twice a week  -COVID19+ on 6/17, now neg  -Zoster rash on R buttock and back of RLE, crusted over. Resumed GALA 6/30  - Pain present, on gabapentin 100 mg TID, d/c morphine, tylenol prn  -RUE redness and itching at previous PICC site, pt refuses use of topical triamcinolone - states it makes itching worse  -RUE doppler showing axillary partially occlusive clot. On lovenox since 6/25. Repeat RUE doppler done 7/2, R subclavian vein with obstruction  -s/p bone marrow biopsy on 6/26 - c/w residual APL 35% marrow involvement, repeat BMbx on 7/9  - L PICC line placed 7/1, no mediport due to cytopenias  - hearing loss. ENT evaluation apprec s/p debrox soln for impacted R ear, audiology testing c/w severe chronic hearing loss R>L, recc outpt audiology f/u for assessment of hearing aid device  - c/o chest /epigastric pain o/n 7/2, stable CXR, no acute infiltrates, sx improved after maalox 59 y/o Uzbek speaking F with newly diagnosed low/interm risk APL.  Presented with pancytopenia, flow cytometry/bone marrow biopsy/FISH done 5/26- c/w acute promyelocytic leukemia with PML-AWA translocation.    She was started on ATRA 5/23- day +42, GALA 5/27- day +38 (arsenic currently on hold from 6/22).    -CT chest 6/6 c/w granulomatous disease (hx of TB in 1980's) . Monitor daily weights, diurese prn.   -hx tremors , unclear etiology. Cefepime held, s/p Baclofen - now improved  - CBC, coags/fibrinogen daily.  Goal plt >40-50, fibrinogen >150  - responding to ABO-matched platelets given over 3 hrs in 1/2 unit infusions   -monitor lytes daily, keep K>4, Mg>2.  EKG twice a week  -COVID19+ on 6/17, now neg  -Zoster rash on R buttock and back of RLE, crusted over. Resumed GALA 6/30  - Pain present, on gabapentin 100 mg TID, d/c morphine, tylenol prn  -RUE redness and itching at previous PICC site, pt refuses use of topical triamcinolone - states it makes itching worse  -RUE doppler showing axillary partially occlusive clot. On lovenox since 6/25. Repeat RUE doppler done 7/2, R subclavian vein with obstruction  -s/p bone marrow biopsy on 6/26 - c/w residual APL 35% marrow involvement, recc repeat BMbx on 7/9  - L PICC line placed 7/1, no mediport due to cytopenias  - hearing loss. ENT evaluation apprec s/p debrox soln for impacted R ear, audiology testing c/w severe chronic hearing loss R>L, recc outpt audiology f/u for assessment of hearing aid device  - c/o chest /epigastric pain o/n 7/2, stable CXR, no acute infiltrates, sx improved after maalox 59 y/o Frisian speaking F with newly diagnosed low/interm risk APL.  Presented with pancytopenia, flow cytometry/bone marrow biopsy/FISH done 5/26- c/w acute promyelocytic leukemia with PML-AWA translocation.    She was started on ATRA 5/23- day +44, GALA 5/27- day +39 (arsenic currently on hold from 6/22).    -CT chest 6/6 c/w granulomatous disease (hx of TB in 1980's) . Monitor daily weights, diurese prn.   -hx tremors , unclear etiology. Cefepime held, s/p Baclofen - now improved  - CBC, coags/fibrinogen daily.  Goal plt >40-50, fibrinogen >150  - responding to ABO-matched platelets given over 3 hrs in 1/2 unit infusions   -monitor lytes daily, keep K>4, Mg>2.  EKG twice a week  -COVID19+ on 6/17, now neg  -Zoster rash on R buttock and back of RLE, crusted over. Resumed GALA 6/30  - Pain present, on gabapentin 100 mg TID, d/c morphine, tylenol prn  -RUE redness and itching at previous PICC site, pt refuses use of topical triamcinolone - states it makes itching worse  -RUE doppler showing axillary partially occlusive clot. On lovenox since 6/25. Repeat RUE doppler done 7/2, R subclavian vein with obstruction  -s/p bone marrow biopsy on 6/26 - c/w residual APL 35% marrow involvement, recc repeat BMbx on 7/9  - L PICC line placed 7/1, no mediport due to cytopenias  - hearing loss. ENT evaluation apprec s/p debrox soln for impacted R ear, audiology testing c/w severe chronic hearing loss R>L, recc outpt audiology f/u for assessment of hearing aid device  - c/o chest /epigastric pain o/n 7/2, stable CXR, no acute infiltrates, sx improved after maalox

## 2020-07-05 NOTE — PROGRESS NOTE ADULT - PROBLEM SELECTOR PLAN 1
Continue ATRA 40mg BID (started 5/22)   Arsenic Trioxide which was started on 5/27 was on hold (since 6/22- 6/29), now  shingles crusted resumed Arsenic on 6/30.  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 If unable to achieve goal to infuse 1/2 unit plts over 3 hours q12  S/p Prednisone 20mg PO daily x 5 days (through 6/12) due to platelet antibody  Keep Fibrinogen > 150, if <150 give Cryoprecipitate  Strict Is and Os/Daily weights/Mouth Care  BMBx (6/26) (+) for persistent disease, continue Arsenic trioxide and ATRA.  PICC line placement on 7/1  intermittent epigastric pain relieved with Maalox.  Repeat Bone marrow biopsy on 7/9/20 Continue ATRA 40mg BID (started 5/22)   Arsenic Trioxide which was started on 5/27 was on hold (since 6/22- 6/29), now  shingles crusted resumed Arsenic on 6/30.  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 If unable to achieve goal to infuse 1/2 unit plts over 3 hours q12  S/p Prednisone 20mg PO daily x 5 days (through 6/12) due to platelet antibody  Keep Fibrinogen > 150, if <150 give Cryoprecipitate  Strict Is and Os/Daily weights/Mouth Care  BMBx (6/26) (+) for persistent disease, continue Arsenic trioxide and ATRA.  PICC line placement on 7/1  intermittent epigastric pain relieved with Maalox.  Repeat Bone marrow biopsy on 7/9/20 7/5/20 scalp hypersensitivity - started on lidocaine solution to the scalp.

## 2020-07-05 NOTE — PROGRESS NOTE ADULT - ASSESSMENT
Ms. aGnt is a 61 y/o Romanian-speaking female w/ a PMHx of HTN, HLD, T2DM, and COVID-19 infection in 4/2020 admitted for management of newly diagnosed APL. The patient has been receiving treatment with ATRA and Arsenic (ATRA since 5/22 and Arsenic 5/27). Arsenic which was on hold since 6/22 secondary to Herpes Zoster infection, resumed on 6/30.  Patient's hospital course has been complicated by transaminitis, refractory thrombocytopenia, COVID-19 (+) state (6/16), shingles, right axillary DVT superficial and occipital hypersensitivity.  The patient has pancytopenia 2/2 disease and/or ATRA/Arsenic.

## 2020-07-05 NOTE — PROGRESS NOTE ADULT - PROBLEM SELECTOR PLAN 4
Shingles of the Right buttock and Right upper thigh.   6/22 seen by Dermatology   Continue Valtrex and Lac hydrin as per Dermatology recommendations   Neurontin 100 mg TID for herpetic neuralgia  Lesions now crusted over - will d/c airborne isolation.  Continue Valtrex until neutropenia resolves.

## 2020-07-05 NOTE — CHART NOTE - NSCHARTNOTEFT_GEN_A_CORE
Nutrition Follow Up Note  Patient seen for: routine follow up     Source: Comprehensive chart review and RN    Chart reviewed, events noted.      Diet : Diet, Consistent Carbohydrate w/Evening Snack:   Halal  Supplement Feeding Modality:  Oral  Glucerna Shake Cans or Servings Per Day:  3       Frequency:  Daily (20 @ 15:11)      Subjective: Pt asleep during interview and unable to be aroused. Interview deferred to RN who reports pt with good appetite/PO intake this morning. Pt documented to consume 100% of breakfast per flowsheet. RN endorses pt tolerating supplements. RN denies any acute GI issues at this time. Last BM  per flowsheet.        Enteral /Parenteral Nutrition: n/a      Daily Weight in k.9 (), Weight in k.9 (), Weight in k.6 (), Weight in k.8 (), Weight in k.6 (), Weight in k.3 (), Weight in k ()  % Weight Change - stable weights in-house.     Pertinent Medications: MEDICATIONS  (STANDING):  ammonium lactate 12% Lotion 1 Application(s) Topical two times a day  arsenic trioxide IVPB (eMAR) 11 milliGRAM(s) IV Intermittent every 24 hours  ATENolol  Tablet 25 milliGRAM(s) Oral daily  baclofen 5 milliGRAM(s) Oral every 12 hours  Biotene Dry Mouth Oral Rinse 5 milliLiter(s) Swish and Spit five times a day  bisacodyl 5 milliGRAM(s) Oral every 12 hours  chlorhexidine 4% Liquid 1 Application(s) Topical <User Schedule>  dextrose 5%. 1000 milliLiter(s) (50 mL/Hr) IV Continuous <Continuous>  dextrose 50% Injectable 12.5 Gram(s) IV Push once  dextrose 50% Injectable 25 Gram(s) IV Push once  dextrose 50% Injectable 25 Gram(s) IV Push once  enoxaparin Injectable 80 milliGRAM(s) SubCutaneous every 12 hours  gabapentin 100 milliGRAM(s) Oral three times a day  insulin lispro (HumaLOG) corrective regimen sliding scale   SubCutaneous three times a day before meals  insulin lispro (HumaLOG) corrective regimen sliding scale   SubCutaneous at bedtime  levoFLOXacin  Tablet 500 milliGRAM(s) Oral every 24 hours  ofloxacin 0.3% Solution 4 Drop(s) Right Ear two times a day  pantoprazole    Tablet 40 milliGRAM(s) Oral before breakfast  petrolatum white Ointment 1 Application(s) Topical three times a day  polyethylene glycol 3350 17 Gram(s) Oral daily  posaconazole DR Tablet 300 milliGRAM(s) Oral daily  sodium chloride 0.65% Nasal 1 Spray(s) Both Nostrils every 8 hours  tretinoin 40 milliGRAM(s) Oral every 12 hours  triamcinolone 0.1% Ointment 1 Application(s) Topical every 12 hours  valACYclovir 1000 milliGRAM(s) Oral every 8 hours    MEDICATIONS  (PRN):  acetaminophen   Tablet .. 650 milliGRAM(s) Oral every 6 hours PRN Temp greater or equal to 38C (100.4F), Mild Pain (1 - 3)  ALBUTerol    90 MICROgram(s) HFA Inhaler 2 Puff(s) Inhalation every 6 hours PRN Shortness of Breath and/or Wheezing  aluminum hydroxide/magnesium hydroxide/simethicone Suspension 30 milliLiter(s) Oral every 6 hours PRN Dyspepsia  AQUAPHOR (petrolatum Ointment) 1 Application(s) Topical every 3 hours PRN Dry skin  dextrose 40% Gel 15 Gram(s) Oral once PRN Blood Glucose LESS THAN 70 milliGRAM(s)/deciliter  diphenhydrAMINE 25 milliGRAM(s) Oral every 4 hours PRN Rash and/or Itching  diphenhydrAMINE   Injectable 25 milliGRAM(s) IV Push every 6 hours PRN Allergy symptoms  glucagon  Injectable 1 milliGRAM(s) IntraMuscular once PRN Glucose LESS THAN 70 milligrams/deciliter  guaiFENesin   Syrup  (Sugar-Free) 100 milliGRAM(s) Oral every 6 hours PRN Cough  hemorrhoidal Ointment 1 Application(s) Rectal every 8 hours PRN Hemorrhoids  morphine  - Injectable 3 milliGRAM(s) IV Push every 6 hours PRN Moderate Pain (4 - 6)  ondansetron Injectable 8 milliGRAM(s) IV Push every 8 hours PRN Nausea and/or Vomiting  sodium chloride 0.9% lock flush 10 milliLiter(s) IV Push every 1 hour PRN Pre/post blood products, medications, blood draw, and to maintain line patency  sodium chloride 0.9% lock flush 10 milliLiter(s) IV Push every 1 hour PRN Pre/post blood products, medications, blood draw, and to maintain line patency    Pertinent Labs:  @ 06:47: Na 139, BUN 14, Cr 0.95, <H>, K+ 4.0, Phos 4.5, Mg 2.1, Alk Phos 122<H>, ALT/SGPT 19, AST/SGOT 19, HbA1c --    Finger Sticks:  POCT Blood Glucose.: 106 mg/dL ( @ 07:50)  POCT Blood Glucose.: 160 mg/dL ( @ 22:02)  POCT Blood Glucose.: 98 mg/dL ( @ 17:15)  POCT Blood Glucose.: 179 mg/dL ( @ 12:30)      Skin per nursing documentation: no pressure injury documented   Edema: 2+ (right arm), 1+(bilateral feet)    Estimated Needs:   [X] no change since previous assessment  [ ] recalculated:     Previous Nutrition Diagnosis: inadequate oral intake  Nutrition Diagnosis is: ongoing; addressed with improving appetite and supplements.        Interventions:     Recommend  1) Continue current diet and supplement order as tolerate.   2) Continue to encourage po intake and obtain/honor food preferences as able.     Monitoring and Evaluation:     Continue to monitor Nutritional intake, Tolerance to diet prescription, weights, labs, skin integrity    RD remains available upon request and will follow up per protocol    Roma Lacy RD pager # [phone]

## 2020-07-05 NOTE — PROGRESS NOTE ADULT - PROBLEM SELECTOR PLAN 8
On Lovenox SQ BID for DVT treatment. Discontinue if platelets are < 50,000       Contact Information [phone]

## 2020-07-05 NOTE — PROGRESS NOTE ADULT - SUBJECTIVE AND OBJECTIVE BOX
Diagnosis: Acute Promyelocytic Leukemia     Protocol/Chemo Regimen: ATRA/Arsenic Trioxide    Day: ATRA Day 41 and Arsenic 37 (resumed Arsenic on 6/30/2020)    Pt endorsed:   + epigastric pain intermittent   + Right upper inner arm pain and and itching.  + Rash on her Right buttock and Right posterior thigh- improved   + mild hearing loss , right arm itching     Review of Systems: Denies nausea, vomiting, diarrhea, chest pain, SOB, headache      Translated by patient's son via phone call per patient request     Pain scale: 4- 5/10    Diet: Consistent Carb w/ Evening Snack    Allergies    No Known Allergies    Intolerances        ANTIMICROBIALS  levoFLOXacin  Tablet 500 milliGRAM(s) Oral every 24 hours  posaconazole DR Tablet 300 milliGRAM(s) Oral daily  valACYclovir 1000 milliGRAM(s) Oral every 8 hours      HEME/ONC MEDICATIONS  arsenic trioxide IVPB (eMAR) 11 milliGRAM(s) IV Intermittent every 24 hours  enoxaparin Injectable 80 milliGRAM(s) SubCutaneous every 12 hours  tretinoin 40 milliGRAM(s) Oral every 12 hours      STANDING MEDICATIONS  ammonium lactate 12% Lotion 1 Application(s) Topical two times a day  ATENolol  Tablet 25 milliGRAM(s) Oral daily  baclofen 5 milliGRAM(s) Oral every 12 hours  Biotene Dry Mouth Oral Rinse 5 milliLiter(s) Swish and Spit five times a day  bisacodyl 5 milliGRAM(s) Oral every 12 hours  chlorhexidine 4% Liquid 1 Application(s) Topical <User Schedule>  dextrose 5%. 1000 milliLiter(s) IV Continuous <Continuous>  dextrose 50% Injectable 12.5 Gram(s) IV Push once  dextrose 50% Injectable 25 Gram(s) IV Push once  dextrose 50% Injectable 25 Gram(s) IV Push once  gabapentin 100 milliGRAM(s) Oral three times a day  insulin lispro (HumaLOG) corrective regimen sliding scale   SubCutaneous three times a day before meals  insulin lispro (HumaLOG) corrective regimen sliding scale   SubCutaneous at bedtime  ofloxacin 0.3% Solution 4 Drop(s) Right Ear two times a day  pantoprazole    Tablet 40 milliGRAM(s) Oral before breakfast  petrolatum white Ointment 1 Application(s) Topical three times a day  polyethylene glycol 3350 17 Gram(s) Oral daily  sodium chloride 0.65% Nasal 1 Spray(s) Both Nostrils every 8 hours  triamcinolone 0.1% Ointment 1 Application(s) Topical every 12 hours      PRN MEDICATIONS  acetaminophen   Tablet .. 650 milliGRAM(s) Oral every 6 hours PRN  ALBUTerol    90 MICROgram(s) HFA Inhaler 2 Puff(s) Inhalation every 6 hours PRN  aluminum hydroxide/magnesium hydroxide/simethicone Suspension 30 milliLiter(s) Oral every 6 hours PRN  AQUAPHOR (petrolatum Ointment) 1 Application(s) Topical every 3 hours PRN  dextrose 40% Gel 15 Gram(s) Oral once PRN  diphenhydrAMINE 25 milliGRAM(s) Oral every 4 hours PRN  diphenhydrAMINE   Injectable 25 milliGRAM(s) IV Push every 6 hours PRN  glucagon  Injectable 1 milliGRAM(s) IntraMuscular once PRN  guaiFENesin   Syrup  (Sugar-Free) 100 milliGRAM(s) Oral every 6 hours PRN  hemorrhoidal Ointment 1 Application(s) Rectal every 8 hours PRN  morphine  - Injectable 3 milliGRAM(s) IV Push every 6 hours PRN  ondansetron Injectable 8 milliGRAM(s) IV Push every 8 hours PRN  sodium chloride 0.9% lock flush 10 milliLiter(s) IV Push every 1 hour PRN  sodium chloride 0.9% lock flush 10 milliLiter(s) IV Push every 1 hour PRN        Vital Signs Last 24 Hrs  T(C): 36.2 (05 Jul 2020 05:48), Max: 36.7 (05 Jul 2020 01:14)  T(F): 97.1 (05 Jul 2020 05:48), Max: 98 (05 Jul 2020 01:14)  HR: 71 (05 Jul 2020 05:48) (71 - 76)  BP: 132/62 (05 Jul 2020 05:48) (116/70 - 160/82)  BP(mean): --  RR: 18 (05 Jul 2020 05:48) (18 - 18)  SpO2: 98% (05 Jul 2020 05:48) (98% - 99%)      PHYSICAL EXAM  General: adult in NAD  HEENT: clear oropharynx, no erythema, no ulcers  CV: normal S1, S2, RRR  Lungs: clear to auscultation, no wheezes, no rales  Abdomen: soft, nontender, nondistended, normal BS  Ext: no edema, RUE hyperpigmented.  Skin: Warm and dry, Some hyperpigmentation noted around RUE PICC line, Shingles-like rash noted on R buttock (appears crusting over)  R posterior thigh and right leg   Neuro: alert and oriented x 3  Central line Left PICC CDI     RECENT CULTURES:  Herpes Simplex Virus 1/2 VZV Lesions, PCR (06.22.20 @ 19:26)    Herpes Simplex Virus 1/2  VZV PCR Source: lesion    Herpes Simplex Virus 1/2  VZV PCR Result: VZV Detected HSV 1/2  and VZV assay is a Real-Time PCR test for the qualitative  detection and differentiation of herpes simplex virus type 1 (HSV1), 2  (HSV2) and varicella-zoster virus (VZV) DNA in lesion samples. The result  should be interpreted with consideration of all clinical and laboratory  findings.            LABS:                        8.7    1.74  )-----------( 182      ( 05 Jul 2020 06:47 )             26.4         Mean Cell Volume : 91.3 fl  Mean Cell Hemoglobin : 30.1 pg  Mean Cell Hemoglobin Concentration : 33.0 gm/dL  Auto Neutrophil # : 0.12 K/uL  Auto Lymphocyte # : 1.33 K/uL  Auto Monocyte # : 0.23 K/uL  Auto Eosinophil # : 0.02 K/uL  Auto Basophil # : 0.02 K/uL  Auto Neutrophil % : 7.1 %  Auto Lymphocyte % : 76.4 %  Auto Monocyte % : 13.2 %  Auto Eosinophil % : 1.1 %  Auto Basophil % : 1.1 %      07-05    139  |  106  |  14  ----------------------------<  119<H>  4.0   |  21<L>  |  0.95    Ca    9.8      05 Jul 2020 06:47  Phos  4.5     07-05  Mg     2.1     07-05    TPro  7.2  /  Alb  4.2  /  TBili  0.2  /  DBili  x   /  AST  19  /  ALT  19  /  AlkPhos  122<H>  07-05      Mg 2.1  Phos 4.5      PT/INR - ( 03 Jul 2020 09:50 )   PT: 15.0 sec;   INR: 1.32 ratio               Uric Acid 5.0        RADIOLOGY & ADDITIONAL STUDIES:    Xray Chest 1 View- PORTABLE-Urgent (07.03.20 @ 12:09)    The heart size cannot be adequately assessed on this single view. There is a left upper extremity PICC line with tip overlying the superior vena cava. There is linear scarring versus atelectasis in the left upper lobe, unchanged. There is no pneumothorax. There are no focal consolidations or pleural effusions.  Impression: Linear scarring versus atelectasis in the left upper lobe, unchanged. Otherwise, clear lungs. No pneumothorax is seen. Diagnosis: Acute Promyelocytic Leukemia     Protocol/Chemo Regimen: ATRA/Arsenic Trioxide    Day: ATRA Day 41 and Arsenic 37 (resumed Arsenic on 6/30/2020)    Pt endorsed:   + scalp hypersensitivity   + Rash on her Right buttock and Right posterior thigh- improved       Review of Systems: Denies nausea, vomiting, diarrhea, chest pain, SOB, headache      Translated by patient's son via phone call per patient request     Pain scale: 4- 5/10    Diet: Consistent Carb w/ Evening Snack    Allergies    No Known Allergies    Intolerances        ANTIMICROBIALS  levoFLOXacin  Tablet 500 milliGRAM(s) Oral every 24 hours  posaconazole DR Tablet 300 milliGRAM(s) Oral daily  valACYclovir 1000 milliGRAM(s) Oral every 8 hours      HEME/ONC MEDICATIONS  arsenic trioxide IVPB (eMAR) 11 milliGRAM(s) IV Intermittent every 24 hours  enoxaparin Injectable 80 milliGRAM(s) SubCutaneous every 12 hours  tretinoin 40 milliGRAM(s) Oral every 12 hours      STANDING MEDICATIONS  ammonium lactate 12% Lotion 1 Application(s) Topical two times a day  ATENolol  Tablet 25 milliGRAM(s) Oral daily  baclofen 5 milliGRAM(s) Oral every 12 hours  Biotene Dry Mouth Oral Rinse 5 milliLiter(s) Swish and Spit five times a day  bisacodyl 5 milliGRAM(s) Oral every 12 hours  chlorhexidine 4% Liquid 1 Application(s) Topical <User Schedule>  dextrose 5%. 1000 milliLiter(s) IV Continuous <Continuous>  dextrose 50% Injectable 12.5 Gram(s) IV Push once  dextrose 50% Injectable 25 Gram(s) IV Push once  dextrose 50% Injectable 25 Gram(s) IV Push once  gabapentin 100 milliGRAM(s) Oral three times a day  insulin lispro (HumaLOG) corrective regimen sliding scale   SubCutaneous three times a day before meals  insulin lispro (HumaLOG) corrective regimen sliding scale   SubCutaneous at bedtime  ofloxacin 0.3% Solution 4 Drop(s) Right Ear two times a day  pantoprazole    Tablet 40 milliGRAM(s) Oral before breakfast  petrolatum white Ointment 1 Application(s) Topical three times a day  polyethylene glycol 3350 17 Gram(s) Oral daily  sodium chloride 0.65% Nasal 1 Spray(s) Both Nostrils every 8 hours  triamcinolone 0.1% Ointment 1 Application(s) Topical every 12 hours      PRN MEDICATIONS  acetaminophen   Tablet .. 650 milliGRAM(s) Oral every 6 hours PRN  ALBUTerol    90 MICROgram(s) HFA Inhaler 2 Puff(s) Inhalation every 6 hours PRN  aluminum hydroxide/magnesium hydroxide/simethicone Suspension 30 milliLiter(s) Oral every 6 hours PRN  AQUAPHOR (petrolatum Ointment) 1 Application(s) Topical every 3 hours PRN  dextrose 40% Gel 15 Gram(s) Oral once PRN  diphenhydrAMINE 25 milliGRAM(s) Oral every 4 hours PRN  diphenhydrAMINE   Injectable 25 milliGRAM(s) IV Push every 6 hours PRN  glucagon  Injectable 1 milliGRAM(s) IntraMuscular once PRN  guaiFENesin   Syrup  (Sugar-Free) 100 milliGRAM(s) Oral every 6 hours PRN  hemorrhoidal Ointment 1 Application(s) Rectal every 8 hours PRN  morphine  - Injectable 3 milliGRAM(s) IV Push every 6 hours PRN  ondansetron Injectable 8 milliGRAM(s) IV Push every 8 hours PRN  sodium chloride 0.9% lock flush 10 milliLiter(s) IV Push every 1 hour PRN  sodium chloride 0.9% lock flush 10 milliLiter(s) IV Push every 1 hour PRN        Vital Signs Last 24 Hrs  T(C): 36.2 (05 Jul 2020 05:48), Max: 36.7 (05 Jul 2020 01:14)  T(F): 97.1 (05 Jul 2020 05:48), Max: 98 (05 Jul 2020 01:14)  HR: 71 (05 Jul 2020 05:48) (71 - 76)  BP: 132/62 (05 Jul 2020 05:48) (116/70 - 160/82)  BP(mean): --  RR: 18 (05 Jul 2020 05:48) (18 - 18)  SpO2: 98% (05 Jul 2020 05:48) (98% - 99%)      PHYSICAL EXAM  General: adult in NAD  HEENT: clear oropharynx, no erythema, no ulcers  CV: normal S1, S2, RRR  Lungs: clear to auscultation, no wheezes, no rales  Abdomen: soft, nontender, nondistended, normal BS  Ext: no edema, RUE hyperpigmented.  Skin: Warm and dry, Some hyperpigmentation noted around RUE PICC line, Shingles-like rash noted on R buttock (appears crusting over)  R posterior thigh and right leg   Neuro: alert and oriented x 3  Central line Left PICC CDI     RECENT CULTURES:  Herpes Simplex Virus 1/2 VZV Lesions, PCR (06.22.20 @ 19:26)    Herpes Simplex Virus 1/2  VZV PCR Source: lesion    Herpes Simplex Virus 1/2  VZV PCR Result: VZV Detected HSV 1/2  and VZV assay is a Real-Time PCR test for the qualitative  detection and differentiation of herpes simplex virus type 1 (HSV1), 2  (HSV2) and varicella-zoster virus (VZV) DNA in lesion samples. The result  should be interpreted with consideration of all clinical and laboratory  findings.            LABS:                        8.7    1.74  )-----------( 182      ( 05 Jul 2020 06:47 )             26.4         Mean Cell Volume : 91.3 fl  Mean Cell Hemoglobin : 30.1 pg  Mean Cell Hemoglobin Concentration : 33.0 gm/dL  Auto Neutrophil # : 0.12 K/uL  Auto Lymphocyte # : 1.33 K/uL  Auto Monocyte # : 0.23 K/uL  Auto Eosinophil # : 0.02 K/uL  Auto Basophil # : 0.02 K/uL  Auto Neutrophil % : 7.1 %  Auto Lymphocyte % : 76.4 %  Auto Monocyte % : 13.2 %  Auto Eosinophil % : 1.1 %  Auto Basophil % : 1.1 %      07-05    139  |  106  |  14  ----------------------------<  119<H>  4.0   |  21<L>  |  0.95    Ca    9.8      05 Jul 2020 06:47  Phos  4.5     07-05  Mg     2.1     07-05    TPro  7.2  /  Alb  4.2  /  TBili  0.2  /  DBili  x   /  AST  19  /  ALT  19  /  AlkPhos  122<H>  07-05      Mg 2.1  Phos 4.5      PT/INR - ( 03 Jul 2020 09:50 )   PT: 15.0 sec;   INR: 1.32 ratio               Uric Acid 5.0        RADIOLOGY & ADDITIONAL STUDIES:    Xray Chest 1 View- PORTABLE-Urgent (07.03.20 @ 12:09)    The heart size cannot be adequately assessed on this single view. There is a left upper extremity PICC line with tip overlying the superior vena cava. There is linear scarring versus atelectasis in the left upper lobe, unchanged. There is no pneumothorax. There are no focal consolidations or pleural effusions.  Impression: Linear scarring versus atelectasis in the left upper lobe, unchanged. Otherwise, clear lungs. No pneumothorax is seen. Diagnosis: Acute Promyelocytic Leukemia     Protocol/Chemo Regimen: ATRA/Arsenic Trioxide    Day: ATRA Day 40 and Arsenic 35 (resumed Arsenic on 6/30/2020)    Pt endorsed:   + scalp hypersensitivity   + Rash on her Right buttock and Right posterior thigh- improved       Review of Systems: Denies nausea, vomiting, diarrhea, chest pain, SOB, headache      Translated by patient's son via phone call per patient request     Pain scale: 4- 5/10    Diet: Consistent Carb w/ Evening Snack    Allergies    No Known Allergies    Intolerances        ANTIMICROBIALS  levoFLOXacin  Tablet 500 milliGRAM(s) Oral every 24 hours  posaconazole DR Tablet 300 milliGRAM(s) Oral daily  valACYclovir 1000 milliGRAM(s) Oral every 8 hours      HEME/ONC MEDICATIONS  arsenic trioxide IVPB (eMAR) 11 milliGRAM(s) IV Intermittent every 24 hours  enoxaparin Injectable 80 milliGRAM(s) SubCutaneous every 12 hours  tretinoin 40 milliGRAM(s) Oral every 12 hours      STANDING MEDICATIONS  ammonium lactate 12% Lotion 1 Application(s) Topical two times a day  ATENolol  Tablet 25 milliGRAM(s) Oral daily  baclofen 5 milliGRAM(s) Oral every 12 hours  Biotene Dry Mouth Oral Rinse 5 milliLiter(s) Swish and Spit five times a day  bisacodyl 5 milliGRAM(s) Oral every 12 hours  chlorhexidine 4% Liquid 1 Application(s) Topical <User Schedule>  dextrose 5%. 1000 milliLiter(s) IV Continuous <Continuous>  dextrose 50% Injectable 12.5 Gram(s) IV Push once  dextrose 50% Injectable 25 Gram(s) IV Push once  dextrose 50% Injectable 25 Gram(s) IV Push once  gabapentin 100 milliGRAM(s) Oral three times a day  insulin lispro (HumaLOG) corrective regimen sliding scale   SubCutaneous three times a day before meals  insulin lispro (HumaLOG) corrective regimen sliding scale   SubCutaneous at bedtime  ofloxacin 0.3% Solution 4 Drop(s) Right Ear two times a day  pantoprazole    Tablet 40 milliGRAM(s) Oral before breakfast  petrolatum white Ointment 1 Application(s) Topical three times a day  polyethylene glycol 3350 17 Gram(s) Oral daily  sodium chloride 0.65% Nasal 1 Spray(s) Both Nostrils every 8 hours  triamcinolone 0.1% Ointment 1 Application(s) Topical every 12 hours      PRN MEDICATIONS  acetaminophen   Tablet .. 650 milliGRAM(s) Oral every 6 hours PRN  ALBUTerol    90 MICROgram(s) HFA Inhaler 2 Puff(s) Inhalation every 6 hours PRN  aluminum hydroxide/magnesium hydroxide/simethicone Suspension 30 milliLiter(s) Oral every 6 hours PRN  AQUAPHOR (petrolatum Ointment) 1 Application(s) Topical every 3 hours PRN  dextrose 40% Gel 15 Gram(s) Oral once PRN  diphenhydrAMINE 25 milliGRAM(s) Oral every 4 hours PRN  diphenhydrAMINE   Injectable 25 milliGRAM(s) IV Push every 6 hours PRN  glucagon  Injectable 1 milliGRAM(s) IntraMuscular once PRN  guaiFENesin   Syrup  (Sugar-Free) 100 milliGRAM(s) Oral every 6 hours PRN  hemorrhoidal Ointment 1 Application(s) Rectal every 8 hours PRN  morphine  - Injectable 3 milliGRAM(s) IV Push every 6 hours PRN  ondansetron Injectable 8 milliGRAM(s) IV Push every 8 hours PRN  sodium chloride 0.9% lock flush 10 milliLiter(s) IV Push every 1 hour PRN  sodium chloride 0.9% lock flush 10 milliLiter(s) IV Push every 1 hour PRN        Vital Signs Last 24 Hrs  T(C): 36.2 (05 Jul 2020 05:48), Max: 36.7 (05 Jul 2020 01:14)  T(F): 97.1 (05 Jul 2020 05:48), Max: 98 (05 Jul 2020 01:14)  HR: 71 (05 Jul 2020 05:48) (71 - 76)  BP: 132/62 (05 Jul 2020 05:48) (116/70 - 160/82)  BP(mean): --  RR: 18 (05 Jul 2020 05:48) (18 - 18)  SpO2: 98% (05 Jul 2020 05:48) (98% - 99%)      PHYSICAL EXAM  General: adult in NAD  HEENT: clear oropharynx, no erythema, no ulcers  CV: normal S1, S2, RRR  Lungs: clear to auscultation, no wheezes, no rales  Abdomen: soft, nontender, nondistended, normal BS  Ext: no edema, RUE hyperpigmented.  Skin: Warm and dry, Some hyperpigmentation noted around RUE PICC line, Shingles-like rash noted on R buttock (appears crusting over)  R posterior thigh and right leg   Neuro: alert and oriented x 3  Central line Left PICC CDI     RECENT CULTURES:  Herpes Simplex Virus 1/2 VZV Lesions, PCR (06.22.20 @ 19:26)    Herpes Simplex Virus 1/2  VZV PCR Source: lesion    Herpes Simplex Virus 1/2  VZV PCR Result: VZV Detected HSV 1/2  and VZV assay is a Real-Time PCR test for the qualitative  detection and differentiation of herpes simplex virus type 1 (HSV1), 2  (HSV2) and varicella-zoster virus (VZV) DNA in lesion samples. The result  should be interpreted with consideration of all clinical and laboratory  findings.            LABS:                        8.7    1.74  )-----------( 182      ( 05 Jul 2020 06:47 )             26.4         Mean Cell Volume : 91.3 fl  Mean Cell Hemoglobin : 30.1 pg  Mean Cell Hemoglobin Concentration : 33.0 gm/dL  Auto Neutrophil # : 0.12 K/uL  Auto Lymphocyte # : 1.33 K/uL  Auto Monocyte # : 0.23 K/uL  Auto Eosinophil # : 0.02 K/uL  Auto Basophil # : 0.02 K/uL  Auto Neutrophil % : 7.1 %  Auto Lymphocyte % : 76.4 %  Auto Monocyte % : 13.2 %  Auto Eosinophil % : 1.1 %  Auto Basophil % : 1.1 %      07-05    139  |  106  |  14  ----------------------------<  119<H>  4.0   |  21<L>  |  0.95    Ca    9.8      05 Jul 2020 06:47  Phos  4.5     07-05  Mg     2.1     07-05    TPro  7.2  /  Alb  4.2  /  TBili  0.2  /  DBili  x   /  AST  19  /  ALT  19  /  AlkPhos  122<H>  07-05      Mg 2.1  Phos 4.5      PT/INR - ( 03 Jul 2020 09:50 )   PT: 15.0 sec;   INR: 1.32 ratio               Uric Acid 5.0        RADIOLOGY & ADDITIONAL STUDIES:    Xray Chest 1 View- PORTABLE-Urgent (07.03.20 @ 12:09)    The heart size cannot be adequately assessed on this single view. There is a left upper extremity PICC line with tip overlying the superior vena cava. There is linear scarring versus atelectasis in the left upper lobe, unchanged. There is no pneumothorax. There are no focal consolidations or pleural effusions.  Impression: Linear scarring versus atelectasis in the left upper lobe, unchanged. Otherwise, clear lungs. No pneumothorax is seen. Diagnosis: Acute Promyelocytic Leukemia     Protocol/Chemo Regimen: ATRA/Arsenic Trioxide    Day: ATRA Day 44 and Arsenic 39 (resumed Arsenic on 6/30/2020)    Pt endorsed:   + scalp hypersensitivity   + Rash on her Right buttock and Right posterior thigh- improved       Review of Systems: Denies nausea, vomiting, diarrhea, chest pain, SOB, headache      Translated by patient's son via phone call per patient request     Pain scale: 4- 5/10    Diet: Consistent Carb w/ Evening Snack    Allergies    No Known Allergies    Intolerances        ANTIMICROBIALS  levoFLOXacin  Tablet 500 milliGRAM(s) Oral every 24 hours  posaconazole DR Tablet 300 milliGRAM(s) Oral daily  valACYclovir 1000 milliGRAM(s) Oral every 8 hours      HEME/ONC MEDICATIONS  arsenic trioxide IVPB (eMAR) 11 milliGRAM(s) IV Intermittent every 24 hours  enoxaparin Injectable 80 milliGRAM(s) SubCutaneous every 12 hours  tretinoin 40 milliGRAM(s) Oral every 12 hours      STANDING MEDICATIONS  ammonium lactate 12% Lotion 1 Application(s) Topical two times a day  ATENolol  Tablet 25 milliGRAM(s) Oral daily  baclofen 5 milliGRAM(s) Oral every 12 hours  Biotene Dry Mouth Oral Rinse 5 milliLiter(s) Swish and Spit five times a day  bisacodyl 5 milliGRAM(s) Oral every 12 hours  chlorhexidine 4% Liquid 1 Application(s) Topical <User Schedule>  dextrose 5%. 1000 milliLiter(s) IV Continuous <Continuous>  dextrose 50% Injectable 12.5 Gram(s) IV Push once  dextrose 50% Injectable 25 Gram(s) IV Push once  dextrose 50% Injectable 25 Gram(s) IV Push once  gabapentin 100 milliGRAM(s) Oral three times a day  insulin lispro (HumaLOG) corrective regimen sliding scale   SubCutaneous three times a day before meals  insulin lispro (HumaLOG) corrective regimen sliding scale   SubCutaneous at bedtime  ofloxacin 0.3% Solution 4 Drop(s) Right Ear two times a day  pantoprazole    Tablet 40 milliGRAM(s) Oral before breakfast  petrolatum white Ointment 1 Application(s) Topical three times a day  polyethylene glycol 3350 17 Gram(s) Oral daily  sodium chloride 0.65% Nasal 1 Spray(s) Both Nostrils every 8 hours  triamcinolone 0.1% Ointment 1 Application(s) Topical every 12 hours      PRN MEDICATIONS  acetaminophen   Tablet .. 650 milliGRAM(s) Oral every 6 hours PRN  ALBUTerol    90 MICROgram(s) HFA Inhaler 2 Puff(s) Inhalation every 6 hours PRN  aluminum hydroxide/magnesium hydroxide/simethicone Suspension 30 milliLiter(s) Oral every 6 hours PRN  AQUAPHOR (petrolatum Ointment) 1 Application(s) Topical every 3 hours PRN  dextrose 40% Gel 15 Gram(s) Oral once PRN  diphenhydrAMINE 25 milliGRAM(s) Oral every 4 hours PRN  diphenhydrAMINE   Injectable 25 milliGRAM(s) IV Push every 6 hours PRN  glucagon  Injectable 1 milliGRAM(s) IntraMuscular once PRN  guaiFENesin   Syrup  (Sugar-Free) 100 milliGRAM(s) Oral every 6 hours PRN  hemorrhoidal Ointment 1 Application(s) Rectal every 8 hours PRN  morphine  - Injectable 3 milliGRAM(s) IV Push every 6 hours PRN  ondansetron Injectable 8 milliGRAM(s) IV Push every 8 hours PRN  sodium chloride 0.9% lock flush 10 milliLiter(s) IV Push every 1 hour PRN  sodium chloride 0.9% lock flush 10 milliLiter(s) IV Push every 1 hour PRN        Vital Signs Last 24 Hrs  T(C): 36.2 (05 Jul 2020 05:48), Max: 36.7 (05 Jul 2020 01:14)  T(F): 97.1 (05 Jul 2020 05:48), Max: 98 (05 Jul 2020 01:14)  HR: 71 (05 Jul 2020 05:48) (71 - 76)  BP: 132/62 (05 Jul 2020 05:48) (116/70 - 160/82)  BP(mean): --  RR: 18 (05 Jul 2020 05:48) (18 - 18)  SpO2: 98% (05 Jul 2020 05:48) (98% - 99%)      PHYSICAL EXAM  General: adult in NAD  HEENT: clear oropharynx, no erythema, no ulcers  CV: normal S1, S2, RRR  Lungs: clear to auscultation, no wheezes, no rales  Abdomen: soft, nontender, nondistended, normal BS  Ext: no edema, RUE hyperpigmented.  Skin: Warm and dry, Some hyperpigmentation noted around RUE PICC line, Shingles-like rash noted on R buttock (appears crusting over)  R posterior thigh and right leg   Neuro: alert and oriented x 3  Central line Left PICC CDI     RECENT CULTURES:  Herpes Simplex Virus 1/2 VZV Lesions, PCR (06.22.20 @ 19:26)    Herpes Simplex Virus 1/2  VZV PCR Source: lesion    Herpes Simplex Virus 1/2  VZV PCR Result: VZV Detected HSV 1/2  and VZV assay is a Real-Time PCR test for the qualitative  detection and differentiation of herpes simplex virus type 1 (HSV1), 2  (HSV2) and varicella-zoster virus (VZV) DNA in lesion samples. The result  should be interpreted with consideration of all clinical and laboratory  findings.            LABS:                        8.7    1.74  )-----------( 182      ( 05 Jul 2020 06:47 )             26.4         Mean Cell Volume : 91.3 fl  Mean Cell Hemoglobin : 30.1 pg  Mean Cell Hemoglobin Concentration : 33.0 gm/dL  Auto Neutrophil # : 0.12 K/uL  Auto Lymphocyte # : 1.33 K/uL  Auto Monocyte # : 0.23 K/uL  Auto Eosinophil # : 0.02 K/uL  Auto Basophil # : 0.02 K/uL  Auto Neutrophil % : 7.1 %  Auto Lymphocyte % : 76.4 %  Auto Monocyte % : 13.2 %  Auto Eosinophil % : 1.1 %  Auto Basophil % : 1.1 %      07-05    139  |  106  |  14  ----------------------------<  119<H>  4.0   |  21<L>  |  0.95    Ca    9.8      05 Jul 2020 06:47  Phos  4.5     07-05  Mg     2.1     07-05    TPro  7.2  /  Alb  4.2  /  TBili  0.2  /  DBili  x   /  AST  19  /  ALT  19  /  AlkPhos  122<H>  07-05      Mg 2.1  Phos 4.5      PT/INR - ( 03 Jul 2020 09:50 )   PT: 15.0 sec;   INR: 1.32 ratio               Uric Acid 5.0        RADIOLOGY & ADDITIONAL STUDIES:    Xray Chest 1 View- PORTABLE-Urgent (07.03.20 @ 12:09)    The heart size cannot be adequately assessed on this single view. There is a left upper extremity PICC line with tip overlying the superior vena cava. There is linear scarring versus atelectasis in the left upper lobe, unchanged. There is no pneumothorax. There are no focal consolidations or pleural effusions.  Impression: Linear scarring versus atelectasis in the left upper lobe, unchanged. Otherwise, clear lungs. No pneumothorax is seen.

## 2020-07-06 LAB
ALBUMIN SERPL ELPH-MCNC: 4.1 G/DL — SIGNIFICANT CHANGE UP (ref 3.3–5)
ALP SERPL-CCNC: 119 U/L — SIGNIFICANT CHANGE UP (ref 40–120)
ALT FLD-CCNC: 24 U/L — SIGNIFICANT CHANGE UP (ref 10–45)
ANION GAP SERPL CALC-SCNC: 11 MMOL/L — SIGNIFICANT CHANGE UP (ref 5–17)
APTT BLD: 50.3 SEC — HIGH (ref 27.5–35.5)
AST SERPL-CCNC: 23 U/L — SIGNIFICANT CHANGE UP (ref 10–40)
BASOPHILS # BLD AUTO: 0.02 K/UL — SIGNIFICANT CHANGE UP (ref 0–0.2)
BASOPHILS NFR BLD AUTO: 1.2 % — SIGNIFICANT CHANGE UP (ref 0–2)
BILIRUB SERPL-MCNC: 0.2 MG/DL — SIGNIFICANT CHANGE UP (ref 0.2–1.2)
BLD GP AB SCN SERPL QL: NEGATIVE — SIGNIFICANT CHANGE UP
BUN SERPL-MCNC: 14 MG/DL — SIGNIFICANT CHANGE UP (ref 7–23)
CALCIUM SERPL-MCNC: 10 MG/DL — SIGNIFICANT CHANGE UP (ref 8.4–10.5)
CHLORIDE SERPL-SCNC: 109 MMOL/L — HIGH (ref 96–108)
CO2 SERPL-SCNC: 22 MMOL/L — SIGNIFICANT CHANGE UP (ref 22–31)
CREAT SERPL-MCNC: 0.96 MG/DL — SIGNIFICANT CHANGE UP (ref 0.5–1.3)
D DIMER BLD IA.RAPID-MCNC: 289 NG/ML DDU — HIGH
EOSINOPHIL # BLD AUTO: 0.03 K/UL — SIGNIFICANT CHANGE UP (ref 0–0.5)
EOSINOPHIL NFR BLD AUTO: 1.8 % — SIGNIFICANT CHANGE UP (ref 0–6)
FIBRINOGEN PPP-MCNC: 466 MG/DL — SIGNIFICANT CHANGE UP (ref 350–510)
GLUCOSE BLDC GLUCOMTR-MCNC: 101 MG/DL — HIGH (ref 70–99)
GLUCOSE BLDC GLUCOMTR-MCNC: 126 MG/DL — HIGH (ref 70–99)
GLUCOSE BLDC GLUCOMTR-MCNC: 138 MG/DL — HIGH (ref 70–99)
GLUCOSE BLDC GLUCOMTR-MCNC: 150 MG/DL — HIGH (ref 70–99)
GLUCOSE SERPL-MCNC: 112 MG/DL — HIGH (ref 70–99)
HCT VFR BLD CALC: 26.3 % — LOW (ref 34.5–45)
HGB BLD-MCNC: 8.6 G/DL — LOW (ref 11.5–15.5)
INR BLD: 1.32 RATIO — HIGH (ref 0.88–1.16)
LDH SERPL L TO P-CCNC: 161 U/L — SIGNIFICANT CHANGE UP (ref 50–242)
LYMPHOCYTES # BLD AUTO: 1.28 K/UL — SIGNIFICANT CHANGE UP (ref 1–3.3)
LYMPHOCYTES # BLD AUTO: 74.9 % — HIGH (ref 13–44)
MAGNESIUM SERPL-MCNC: 2 MG/DL — SIGNIFICANT CHANGE UP (ref 1.6–2.6)
MCHC RBC-ENTMCNC: 30.1 PG — SIGNIFICANT CHANGE UP (ref 27–34)
MCHC RBC-ENTMCNC: 32.7 GM/DL — SIGNIFICANT CHANGE UP (ref 32–36)
MCV RBC AUTO: 92 FL — SIGNIFICANT CHANGE UP (ref 80–100)
MONOCYTES # BLD AUTO: 0.22 K/UL — SIGNIFICANT CHANGE UP (ref 0–0.9)
MONOCYTES NFR BLD AUTO: 12.9 % — SIGNIFICANT CHANGE UP (ref 2–14)
NEUTROPHILS # BLD AUTO: 0.16 K/UL — LOW (ref 1.8–7.4)
NEUTROPHILS NFR BLD AUTO: 9.2 % — LOW (ref 43–77)
NRBC # BLD: 0 /100 WBCS — SIGNIFICANT CHANGE UP (ref 0–0)
PHOSPHATE SERPL-MCNC: 4.9 MG/DL — HIGH (ref 2.5–4.5)
PLATELET # BLD AUTO: 202 K/UL — SIGNIFICANT CHANGE UP (ref 150–400)
POTASSIUM SERPL-MCNC: 4 MMOL/L — SIGNIFICANT CHANGE UP (ref 3.5–5.3)
POTASSIUM SERPL-SCNC: 4 MMOL/L — SIGNIFICANT CHANGE UP (ref 3.5–5.3)
PROT SERPL-MCNC: 7.2 G/DL — SIGNIFICANT CHANGE UP (ref 6–8.3)
PROTHROM AB SERPL-ACNC: 15 SEC — HIGH (ref 10.6–13.6)
RBC # BLD: 2.86 M/UL — LOW (ref 3.8–5.2)
RBC # FLD: 16.3 % — HIGH (ref 10.3–14.5)
RH IG SCN BLD-IMP: POSITIVE — SIGNIFICANT CHANGE UP
SODIUM SERPL-SCNC: 142 MMOL/L — SIGNIFICANT CHANGE UP (ref 135–145)
URATE SERPL-MCNC: 5.5 MG/DL — SIGNIFICANT CHANGE UP (ref 2.5–7)
WBC # BLD: 1.71 K/UL — LOW (ref 3.8–10.5)
WBC # FLD AUTO: 1.71 K/UL — LOW (ref 3.8–10.5)

## 2020-07-06 PROCEDURE — 99233 SBSQ HOSP IP/OBS HIGH 50: CPT

## 2020-07-06 RX ORDER — FUROSEMIDE 40 MG
40 TABLET ORAL ONCE
Refills: 0 | Status: COMPLETED | OUTPATIENT
Start: 2020-07-06 | End: 2020-07-06

## 2020-07-06 RX ADMIN — Medication 1 SPRAY(S): at 05:11

## 2020-07-06 RX ADMIN — Medication 1 APPLICATION(S): at 05:10

## 2020-07-06 RX ADMIN — ARSENIC TRIOXIDE 130.5 MILLIGRAM(S): 2 INJECTION, SOLUTION INTRAVENOUS at 10:26

## 2020-07-06 RX ADMIN — PANTOPRAZOLE SODIUM 40 MILLIGRAM(S): 20 TABLET, DELAYED RELEASE ORAL at 07:09

## 2020-07-06 RX ADMIN — Medication 1 SPRAY(S): at 12:40

## 2020-07-06 RX ADMIN — Medication 5 MILLILITER(S): at 08:38

## 2020-07-06 RX ADMIN — Medication 5 MILLILITER(S): at 12:41

## 2020-07-06 RX ADMIN — Medication 1 APPLICATION(S): at 05:11

## 2020-07-06 RX ADMIN — Medication 1 APPLICATION(S): at 12:47

## 2020-07-06 RX ADMIN — Medication 650 MILLIGRAM(S): at 08:41

## 2020-07-06 RX ADMIN — GABAPENTIN 100 MILLIGRAM(S): 400 CAPSULE ORAL at 12:43

## 2020-07-06 RX ADMIN — TRETINOIN 40 MILLIGRAM(S): 10 CAPSULE, LIQUID FILLED ORAL at 08:38

## 2020-07-06 RX ADMIN — Medication 4 DROP(S): at 18:31

## 2020-07-06 RX ADMIN — Medication 4 DROP(S): at 05:11

## 2020-07-06 RX ADMIN — Medication 5 MILLIGRAM(S): at 05:09

## 2020-07-06 RX ADMIN — ATENOLOL 25 MILLIGRAM(S): 25 TABLET ORAL at 05:09

## 2020-07-06 RX ADMIN — VALACYCLOVIR 1000 MILLIGRAM(S): 500 TABLET, FILM COATED ORAL at 05:09

## 2020-07-06 RX ADMIN — Medication 1 APPLICATION(S): at 21:23

## 2020-07-06 RX ADMIN — LIDOCAINE 1 APPLICATION(S): 4 CREAM TOPICAL at 12:46

## 2020-07-06 RX ADMIN — Medication 5 MILLILITER(S): at 18:33

## 2020-07-06 RX ADMIN — POSACONAZOLE 300 MILLIGRAM(S): 100 TABLET, DELAYED RELEASE ORAL at 12:39

## 2020-07-06 RX ADMIN — ENOXAPARIN SODIUM 80 MILLIGRAM(S): 100 INJECTION SUBCUTANEOUS at 20:47

## 2020-07-06 RX ADMIN — ENOXAPARIN SODIUM 80 MILLIGRAM(S): 100 INJECTION SUBCUTANEOUS at 08:41

## 2020-07-06 RX ADMIN — Medication 1 APPLICATION(S): at 18:31

## 2020-07-06 RX ADMIN — Medication 650 MILLIGRAM(S): at 21:00

## 2020-07-06 RX ADMIN — Medication 40 MILLIGRAM(S): at 15:40

## 2020-07-06 RX ADMIN — Medication 1 APPLICATION(S): at 05:12

## 2020-07-06 RX ADMIN — GABAPENTIN 100 MILLIGRAM(S): 400 CAPSULE ORAL at 05:10

## 2020-07-06 RX ADMIN — TRETINOIN 40 MILLIGRAM(S): 10 CAPSULE, LIQUID FILLED ORAL at 20:47

## 2020-07-06 RX ADMIN — Medication 5 MILLILITER(S): at 20:47

## 2020-07-06 RX ADMIN — LIDOCAINE 1 APPLICATION(S): 4 CREAM TOPICAL at 05:11

## 2020-07-06 RX ADMIN — LIDOCAINE 1 APPLICATION(S): 4 CREAM TOPICAL at 20:48

## 2020-07-06 RX ADMIN — Medication 5 MILLIGRAM(S): at 05:10

## 2020-07-06 NOTE — PROGRESS NOTE ADULT - SUBJECTIVE AND OBJECTIVE BOX
Diagnosis: Acute Promyelocytic Leukemia     Protocol/Chemo Regimen: ATRA/Arsenic Trioxide    Day: ATRA Day 45 and Arsenic 40 (Held from 6/22/20 to 6/30/2020 and resumed on 6/30/20)    Pt endorsed:   + scalp hypersensitivity improved with lidocaine gel.   + Rash on her Right buttock and Right posterior thigh- improved       Review of Systems: Denies nausea, vomiting, diarrhea, chest pain, SOB, headache      Translated by patient's son via phone call per patient request     Pain scale: 4- 5/10    Diet: Consistent Carb w/ Evening Snack    Allergies    No Known Allergies    Intolerances      ANTIMICROBIALS  levoFLOXacin  Tablet 500 milliGRAM(s) Oral every 24 hours  posaconazole DR Tablet 300 milliGRAM(s) Oral daily      HEME/ONC MEDICATIONS  enoxaparin Injectable 80 milliGRAM(s) SubCutaneous every 12 hours  tretinoin 40 milliGRAM(s) Oral every 12 hours      STANDING MEDICATIONS  ammonium lactate 12% Lotion 1 Application(s) Topical two times a day  ATENolol  Tablet 25 milliGRAM(s) Oral daily  baclofen 5 milliGRAM(s) Oral every 12 hours  Biotene Dry Mouth Oral Rinse 5 milliLiter(s) Swish and Spit five times a day  bisacodyl 5 milliGRAM(s) Oral every 12 hours  chlorhexidine 4% Liquid 1 Application(s) Topical <User Schedule>  dextrose 5%. 1000 milliLiter(s) IV Continuous <Continuous>  dextrose 50% Injectable 12.5 Gram(s) IV Push once  dextrose 50% Injectable 25 Gram(s) IV Push once  dextrose 50% Injectable 25 Gram(s) IV Push once  gabapentin 100 milliGRAM(s) Oral three times a day  insulin lispro (HumaLOG) corrective regimen sliding scale   SubCutaneous three times a day before meals  insulin lispro (HumaLOG) corrective regimen sliding scale   SubCutaneous at bedtime  lidocaine 2% Gel 1 Application(s) Topical every 8 hours  ofloxacin 0.3% Solution 4 Drop(s) Right Ear two times a day  pantoprazole    Tablet 40 milliGRAM(s) Oral before breakfast  petrolatum white Ointment 1 Application(s) Topical three times a day  polyethylene glycol 3350 17 Gram(s) Oral daily  sodium chloride 0.65% Nasal 1 Spray(s) Both Nostrils every 8 hours  triamcinolone 0.1% Ointment 1 Application(s) Topical every 12 hours      PRN MEDICATIONS  acetaminophen   Tablet .. 650 milliGRAM(s) Oral every 6 hours PRN  ALBUTerol    90 MICROgram(s) HFA Inhaler 2 Puff(s) Inhalation every 6 hours PRN  aluminum hydroxide/magnesium hydroxide/simethicone Suspension 30 milliLiter(s) Oral every 6 hours PRN  AQUAPHOR (petrolatum Ointment) 1 Application(s) Topical every 3 hours PRN  dextrose 40% Gel 15 Gram(s) Oral once PRN  diphenhydrAMINE   Injectable 25 milliGRAM(s) IV Push every 6 hours PRN  glucagon  Injectable 1 milliGRAM(s) IntraMuscular once PRN  guaiFENesin   Syrup  (Sugar-Free) 100 milliGRAM(s) Oral every 6 hours PRN  hemorrhoidal Ointment 1 Application(s) Rectal every 8 hours PRN  morphine  - Injectable 3 milliGRAM(s) IV Push every 6 hours PRN  ondansetron Injectable 8 milliGRAM(s) IV Push every 8 hours PRN  sodium chloride 0.9% lock flush 10 milliLiter(s) IV Push every 1 hour PRN  sodium chloride 0.9% lock flush 10 milliLiter(s) IV Push every 1 hour PRN        Vital Signs Last 24 Hrs  T(C): 36.8 (06 Jul 2020 09:26), Max: 36.8 (06 Jul 2020 09:26)  T(F): 98.2 (06 Jul 2020 09:26), Max: 98.2 (06 Jul 2020 09:26)  HR: 66 (06 Jul 2020 09:26) (66 - 79)  BP: 148/84 (06 Jul 2020 09:26) (111/67 - 158/99)  BP(mean): --  RR: 18 (06 Jul 2020 09:26) (18 - 18)  SpO2: 97% (06 Jul 2020 09:26) (97% - 99%)    PHYSICAL EXAM  General: adult in NAD  HEENT: clear oropharynx, no erythema, no ulcers  CV: normal S1, S2, RRR  Lungs: clear to auscultation, no wheezes, no rales  Abdomen: soft, nontender, nondistended, normal BS  Ext: no edema, RUE hyperpigmented.  Skin: Warm and dry, Some hyperpigmentation noted around RUE PICC line, Shingles-like rash noted on R buttock (appears crusting over)  R posterior thigh and right leg   Neuro: alert and oriented x 3  Central line Left PICC CDI     RECENT CULTURES:  Herpes Simplex Virus 1/2 VZV Lesions, PCR (06.22.20 @ 19:26)    Herpes Simplex Virus 1/2  VZV PCR Source: lesion    Herpes Simplex Virus 1/2  VZV PCR Result: VZV Detected HSV 1/2  and VZV assay is a Real-Time PCR test for the qualitative  detection and differentiation of herpes simplex virus type 1 (HSV1), 2  (HSV2) and varicella-zoster virus (VZV) DNA in lesion samples. The result  should be interpreted with consideration of all clinical and laboratory  findings.            LABS:                        8.6    1.71  )-----------( 202      ( 06 Jul 2020 06:54 )             26.3         Mean Cell Volume : 92.0 fl  Mean Cell Hemoglobin : 30.1 pg  Mean Cell Hemoglobin Concentration : 32.7 gm/dL  Auto Neutrophil # : 0.16 K/uL  Auto Lymphocyte # : 1.28 K/uL  Auto Monocyte # : 0.22 K/uL  Auto Eosinophil # : 0.03 K/uL  Auto Basophil # : 0.02 K/uL  Auto Neutrophil % : 9.2 %  Auto Lymphocyte % : 74.9 %  Auto Monocyte % : 12.9 %  Auto Eosinophil % : 1.8 %  Auto Basophil % : 1.2 %      07-06    142  |  109<H>  |  14  ----------------------------<  112<H>  4.0   |  22  |  0.96    Ca    10.0      06 Jul 2020 06:54  Phos  4.9     07-06  Mg     2.0     07-06    TPro  7.2  /  Alb  4.1  /  TBili  0.2  /  DBili  x   /  AST  23  /  ALT  24  /  AlkPhos  119  07-06      Mg 2.0  Phos 4.9      PT/INR - ( 06 Jul 2020 06:54 )   PT: 15.0 sec;   INR: 1.32 ratio         PTT - ( 06 Jul 2020 06:54 )  PTT:50.3 sec      Uric Acid 5.5        RADIOLOGY & ADDITIONAL STUDIES:   Xray Chest 1 View- PORTABLE-Urgent (07.03.20 @ 12:09)   Findings: The heart size cannot be adequately assessed on this single view. There is a left upper extremity PICC line with tip overlying the superior vena cava. There is linear scarring versus atelectasis in the left upper lobe, unchanged. There is no pneumothorax. There are no focal consolidations or pleural effusions.  Impression: Linear scarring versus atelectasis in the left upper lobe, unchanged. Otherwise, clear lungs. No pneumothorax is seen.

## 2020-07-06 NOTE — PROGRESS NOTE ADULT - PROBLEM SELECTOR PLAN 1
Continue ATRA 40mg BID (started 5/22)   Arsenic Trioxide which was started on 5/27 was on hold (since 6/22- 6/29), now  shingles crusted resumed Arsenic on 6/30.  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 If unable to achieve goal to infuse 1/2 unit plts over 3 hours q12  S/p Prednisone 20mg PO daily x 5 days (through 6/12) due to platelet antibody  Keep Fibrinogen > 150, if <150 give Cryoprecipitate  Strict Is and Os/Daily weights/Mouth Care  BMBx (6/26) (+) for persistent disease, continue Arsenic trioxide and ATRA.  PICC line placement on 7/1  intermittent epigastric pain relieved with Maalox.  Repeat Bone marrow biopsy on 7/9/20 7/5/20 scalp hypersensitivity - started on lidocaine solution to the scalp. Continue ATRA 40mg BID (started 5/22)   Arsenic Trioxide which was started on 5/27 was on hold (since 6/22- 6/29), now  shingles crusted resumed Arsenic on 6/30.  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 If unable to achieve goal to infuse 1/2 unit plts over 3 hours q12  S/p Prednisone 20mg PO daily x 5 days (through 6/12) due to platelet antibody  Keep Fibrinogen > 150, if <150 give Cryoprecipitate  Strict Is and Os/Daily weights/Mouth Care  BMBx (6/26) (+) for persistent disease, continue Arsenic trioxide and ATRA.  PICC line placement on 7/1  intermittent epigastric pain relieved with Maalox.  Repeat Bone marrow biopsy on 7/9/20 7/5/20 scalp hypersensitivity - started on lidocaine gel to the scalp.

## 2020-07-06 NOTE — PROGRESS NOTE ADULT - ATTENDING COMMENTS
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  Presented with pancytopenia, flow cytometry/bone marrow biopsy/FISH done 5/26- c/w acute promyelocytic leukemia with PML-AWA translocation.    She was started on ATRA 5/23- day +44, GALA 5/27- day +39 (arsenic currently on hold from 6/22).    -CT chest 6/6 c/w granulomatous disease (hx of TB in 1980's) . Monitor daily weights, diurese prn.   -hx tremors , unclear etiology. Cefepime held, s/p Baclofen - now improved  - CBC, coags/fibrinogen daily.  Goal plt >40-50, fibrinogen >150  - responding to ABO-matched platelets given over 3 hrs in 1/2 unit infusions   -monitor lytes daily, keep K>4, Mg>2.  EKG twice a week  -COVID19+ on 6/17, now neg  -Zoster rash on R buttock and back of RLE, crusted over. Resumed GALA 6/30  - Pain present, on gabapentin 100 mg TID, d/c morphine, tylenol prn  -RUE redness and itching at previous PICC site, pt refuses use of topical triamcinolone - states it makes itching worse  -RUE doppler showing axillary partially occlusive clot. On lovenox since 6/25. Repeat RUE doppler done 7/2, R subclavian vein with obstruction  -s/p bone marrow biopsy on 6/26 - c/w residual APL 35% marrow involvement, recc repeat BMbx on 7/9  - L PICC line placed 7/1, no mediport due to cytopenias  - hearing loss. 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  Presented with pancytopenia, flow cytometry/bone marrow biopsy/FISH done 5/26- c/w acute promyelocytic leukemia with PML-AWA translocation.    She was started on ATRA 5/23- day +45, GALA 5/27- day +40 (arsenic currently on hold from 6/22).    -CT chest 6/6 c/w granulomatous disease (hx of TB in 1980's) . Monitor daily weights, diurese prn.   -hx tremors , unclear etiology. Cefepime held, s/p Baclofen - now improved  - CBC, coags/fibrinogen daily.  Goal plt >40-50, fibrinogen >150  - responding to ABO-matched platelets given over 3 hrs in 1/2 unit infusions   -monitor lytes daily, keep K>4, Mg>2.  EKG twice a week  -COVID19+ on 6/17, now neg  -Zoster rash on R buttock and back of RLE, crusted over. Resumed GALA 6/30  - Pain present, on gabapentin 100 mg TID, d/c morphine, tylenol prn  -RUE redness and itching at previous PICC site, pt refuses use of topical triamcinolone - states it makes itching worse  -RUE doppler showing axillary partially occlusive clot. On lovenox since 6/25. Repeat RUE doppler done 7/2, R subclavian vein with obstruction  -s/p bone marrow biopsy on 6/26 - c/w residual APL 35% marrow involvement, recc repeat BMbx on 7/9  - L PICC line placed 7/1, no mediport due to cytopenias  - hearing loss. ENT evaluation apprec s/p debrox soln for impacted R ear, audiology testing c/w severe chronic hearing loss R>L, recc outpt audiology f/u for assessment of hearing aid device  - c/o chest /epigastric pain o/n 7/2, stable CXR, no acute infiltrates, sx improved after maalox

## 2020-07-06 NOTE — PROGRESS NOTE ADULT - PROBLEM SELECTOR PLAN 8
On Lovenox SQ BID for DVT treatment. Discontinue if platelets are < 50,000       Contact Information [phone]

## 2020-07-06 NOTE — PROGRESS NOTE ADULT - PROBLEM SELECTOR PLAN 2
Patient is Neutropenic, afebrile   6/29 Started Levaquin and Posaconazole for PPX  If spikes pan culture, CXR and change Levaquin to cefepime   6/23 Persistent right upper inner arm pain/swelling - CT no abscess, + DVT, 7/2 repeat Doppler - No infectious sources   6/22 Right buttock and upper thigh shingles continue Valtrex tx with Lac Hydrin  Previous COVID-19 (+) outpatient  Then COVID (-) on 5/22, 5/26, 6/2 and 6/9 6/16 COVID 19 (+)   6/18 COVID 19 Ab (+)  6/19 Repeat COVID-19 PCR (-) Patient is Neutropenic, afebrile   6/29 Started Levaquin and Posaconazole for PPX  If spikes pan culture, CXR and change Levaquin to cefepime   6/23 Persistent right upper inner arm pain/swelling - CT no abscess, + DVT,   7/2 repeat Doppler - No infectious sources   6/22 Right buttock and upper thigh shingles continue Valtrex tx with Lac Hydrin  Previous COVID-19 (+) outpatient  Then COVID (-) on 5/22, 5/26, 6/2 and 6/9 6/16 COVID 19 (+)   6/18 COVID 19 Ab (+)  6/19 Repeat COVID-19 PCR (-)

## 2020-07-06 NOTE — ADVANCED PRACTICE NURSE CONSULT - ASSESSMENT
Pt found in chair, alert and oriented x4, n/c offered.Chemotherapy teachings done . Lab reviewed on rounds by Dr. Baldwin . With son as an . Left picc line in place. Site without redness or swelling. Lumen previously accessed with + blood return flushes well with NS. Chemotherapy verified by 2 RNs prior to administration. at 1035  treated with arsenic 0.15mg/m2= 11 mg ivss over 2 hours connected to the lowest port of NS thru alaris pump . Pt remains in chair with n/c offered. Primary RN aware of treatment plan.

## 2020-07-06 NOTE — PROGRESS NOTE ADULT - ASSESSMENT
Ms. Gant is a 59 y/o Kazakh-speaking female w/ a PMHx of HTN, HLD, T2DM, and COVID-19 infection in 4/2020 admitted for management of newly diagnosed APL. The patient has been receiving treatment with ATRA and Arsenic (ATRA since 5/22 and Arsenic 5/27). Arsenic which was on hold since 6/22 secondary to Herpes Zoster infection, resumed on 6/30.  Patient's hospital course has been complicated by transaminitis, refractory thrombocytopenia, COVID-19 (+) state (6/16), shingles, right axillary DVT superficial and occipital hypersensitivity.  The patient has pancytopenia 2/2 disease and/or ATRA/Arsenic.

## 2020-07-07 LAB
ALBUMIN SERPL ELPH-MCNC: 4.2 G/DL — SIGNIFICANT CHANGE UP (ref 3.3–5)
ALP SERPL-CCNC: 120 U/L — SIGNIFICANT CHANGE UP (ref 40–120)
ALT FLD-CCNC: 23 U/L — SIGNIFICANT CHANGE UP (ref 10–45)
ANION GAP SERPL CALC-SCNC: 20 MMOL/L — HIGH (ref 5–17)
APPEARANCE UR: CLEAR — SIGNIFICANT CHANGE UP
AST SERPL-CCNC: 24 U/L — SIGNIFICANT CHANGE UP (ref 10–40)
BASOPHILS # BLD AUTO: 0.02 K/UL — SIGNIFICANT CHANGE UP (ref 0–0.2)
BASOPHILS NFR BLD AUTO: 1 % — SIGNIFICANT CHANGE UP (ref 0–2)
BILIRUB SERPL-MCNC: 0.2 MG/DL — SIGNIFICANT CHANGE UP (ref 0.2–1.2)
BILIRUB UR-MCNC: NEGATIVE — SIGNIFICANT CHANGE UP
BUN SERPL-MCNC: 16 MG/DL — SIGNIFICANT CHANGE UP (ref 7–23)
CALCIUM SERPL-MCNC: 9.8 MG/DL — SIGNIFICANT CHANGE UP (ref 8.4–10.5)
CHLORIDE SERPL-SCNC: 106 MMOL/L — SIGNIFICANT CHANGE UP (ref 96–108)
CO2 SERPL-SCNC: 16 MMOL/L — LOW (ref 22–31)
COLOR SPEC: COLORLESS — SIGNIFICANT CHANGE UP
CREAT SERPL-MCNC: 0.99 MG/DL — SIGNIFICANT CHANGE UP (ref 0.5–1.3)
D DIMER BLD IA.RAPID-MCNC: 331 NG/ML DDU — HIGH
DIFF PNL FLD: NEGATIVE — SIGNIFICANT CHANGE UP
EOSINOPHIL # BLD AUTO: 0.01 K/UL — SIGNIFICANT CHANGE UP (ref 0–0.5)
EOSINOPHIL NFR BLD AUTO: 0.5 % — SIGNIFICANT CHANGE UP (ref 0–6)
FIBRINOGEN PPP-MCNC: 471 MG/DL — SIGNIFICANT CHANGE UP (ref 350–510)
GLUCOSE BLDC GLUCOMTR-MCNC: 107 MG/DL — HIGH (ref 70–99)
GLUCOSE BLDC GLUCOMTR-MCNC: 109 MG/DL — HIGH (ref 70–99)
GLUCOSE BLDC GLUCOMTR-MCNC: 126 MG/DL — HIGH (ref 70–99)
GLUCOSE BLDC GLUCOMTR-MCNC: 127 MG/DL — HIGH (ref 70–99)
GLUCOSE SERPL-MCNC: 109 MG/DL — HIGH (ref 70–99)
GLUCOSE UR QL: NEGATIVE — SIGNIFICANT CHANGE UP
HCT VFR BLD CALC: 25.7 % — LOW (ref 34.5–45)
HGB BLD-MCNC: 8.5 G/DL — LOW (ref 11.5–15.5)
IMM GRANULOCYTES NFR BLD AUTO: 0.5 % — SIGNIFICANT CHANGE UP (ref 0–1.5)
KETONES UR-MCNC: NEGATIVE — SIGNIFICANT CHANGE UP
LDH SERPL L TO P-CCNC: 176 U/L — SIGNIFICANT CHANGE UP (ref 50–242)
LEUKOCYTE ESTERASE UR-ACNC: NEGATIVE — SIGNIFICANT CHANGE UP
LYMPHOCYTES # BLD AUTO: 1.45 K/UL — SIGNIFICANT CHANGE UP (ref 1–3.3)
LYMPHOCYTES # BLD AUTO: 74.7 % — HIGH (ref 13–44)
MAGNESIUM SERPL-MCNC: 1.7 MG/DL — SIGNIFICANT CHANGE UP (ref 1.6–2.6)
MCHC RBC-ENTMCNC: 30.4 PG — SIGNIFICANT CHANGE UP (ref 27–34)
MCHC RBC-ENTMCNC: 33.1 GM/DL — SIGNIFICANT CHANGE UP (ref 32–36)
MCV RBC AUTO: 91.8 FL — SIGNIFICANT CHANGE UP (ref 80–100)
MONOCYTES # BLD AUTO: 0.28 K/UL — SIGNIFICANT CHANGE UP (ref 0–0.9)
MONOCYTES NFR BLD AUTO: 14.4 % — HIGH (ref 2–14)
NEUTROPHILS # BLD AUTO: 0.17 K/UL — LOW (ref 1.8–7.4)
NEUTROPHILS NFR BLD AUTO: 8.9 % — LOW (ref 43–77)
NITRITE UR-MCNC: NEGATIVE — SIGNIFICANT CHANGE UP
NRBC # BLD: 0 /100 WBCS — SIGNIFICANT CHANGE UP (ref 0–0)
PH UR: 6 — SIGNIFICANT CHANGE UP (ref 5–8)
PHOSPHATE SERPL-MCNC: 4.9 MG/DL — HIGH (ref 2.5–4.5)
PLATELET # BLD AUTO: 202 K/UL — SIGNIFICANT CHANGE UP (ref 150–400)
POTASSIUM SERPL-MCNC: 3.6 MMOL/L — SIGNIFICANT CHANGE UP (ref 3.5–5.3)
POTASSIUM SERPL-SCNC: 3.6 MMOL/L — SIGNIFICANT CHANGE UP (ref 3.5–5.3)
PROT SERPL-MCNC: 7.5 G/DL — SIGNIFICANT CHANGE UP (ref 6–8.3)
PROT UR-MCNC: NEGATIVE — SIGNIFICANT CHANGE UP
RBC # BLD: 2.8 M/UL — LOW (ref 3.8–5.2)
RBC # FLD: 16.3 % — HIGH (ref 10.3–14.5)
SODIUM SERPL-SCNC: 142 MMOL/L — SIGNIFICANT CHANGE UP (ref 135–145)
SP GR SPEC: 1.01 — LOW (ref 1.01–1.02)
URATE SERPL-MCNC: 6.3 MG/DL — SIGNIFICANT CHANGE UP (ref 2.5–7)
UROBILINOGEN FLD QL: NEGATIVE — SIGNIFICANT CHANGE UP
WBC # BLD: 1.94 K/UL — LOW (ref 3.8–10.5)
WBC # FLD AUTO: 1.94 K/UL — LOW (ref 3.8–10.5)

## 2020-07-07 PROCEDURE — 99233 SBSQ HOSP IP/OBS HIGH 50: CPT

## 2020-07-07 PROCEDURE — 93010 ELECTROCARDIOGRAM REPORT: CPT

## 2020-07-07 RX ORDER — CALCIUM ACETATE 667 MG
667 TABLET ORAL
Refills: 0 | Status: COMPLETED | OUTPATIENT
Start: 2020-07-07 | End: 2020-07-09

## 2020-07-07 RX ORDER — POTASSIUM CHLORIDE 20 MEQ
20 PACKET (EA) ORAL
Refills: 0 | Status: COMPLETED | OUTPATIENT
Start: 2020-07-07 | End: 2020-07-07

## 2020-07-07 RX ORDER — ARSENIC TRIOXIDE 2 MG/ML
11 INJECTION, SOLUTION INTRAVENOUS EVERY 24 HOURS
Refills: 0 | Status: DISCONTINUED | OUTPATIENT
Start: 2020-07-07 | End: 2020-07-14

## 2020-07-07 RX ORDER — MAGNESIUM SULFATE 500 MG/ML
2 VIAL (ML) INJECTION ONCE
Refills: 0 | Status: COMPLETED | OUTPATIENT
Start: 2020-07-07 | End: 2020-07-07

## 2020-07-07 RX ADMIN — Medication 5 MILLILITER(S): at 08:51

## 2020-07-07 RX ADMIN — Medication 1 APPLICATION(S): at 14:47

## 2020-07-07 RX ADMIN — Medication 1 APPLICATION(S): at 20:52

## 2020-07-07 RX ADMIN — ENOXAPARIN SODIUM 80 MILLIGRAM(S): 100 INJECTION SUBCUTANEOUS at 09:22

## 2020-07-07 RX ADMIN — Medication 5 MILLIGRAM(S): at 05:25

## 2020-07-07 RX ADMIN — Medication 1 APPLICATION(S): at 17:21

## 2020-07-07 RX ADMIN — Medication 1 APPLICATION(S): at 05:27

## 2020-07-07 RX ADMIN — Medication 4 DROP(S): at 05:26

## 2020-07-07 RX ADMIN — Medication 1 APPLICATION(S): at 05:26

## 2020-07-07 RX ADMIN — Medication 1 APPLICATION(S): at 17:20

## 2020-07-07 RX ADMIN — Medication 667 MILLIGRAM(S): at 20:51

## 2020-07-07 RX ADMIN — Medication 667 MILLIGRAM(S): at 17:20

## 2020-07-07 RX ADMIN — Medication 50 GRAM(S): at 08:50

## 2020-07-07 RX ADMIN — ENOXAPARIN SODIUM 80 MILLIGRAM(S): 100 INJECTION SUBCUTANEOUS at 20:51

## 2020-07-07 RX ADMIN — TRETINOIN 40 MILLIGRAM(S): 10 CAPSULE, LIQUID FILLED ORAL at 20:51

## 2020-07-07 RX ADMIN — LIDOCAINE 1 APPLICATION(S): 4 CREAM TOPICAL at 05:26

## 2020-07-07 RX ADMIN — Medication 5 MILLILITER(S): at 15:19

## 2020-07-07 RX ADMIN — Medication 50 MILLIEQUIVALENT(S): at 14:46

## 2020-07-07 RX ADMIN — Medication 50 MILLIEQUIVALENT(S): at 11:08

## 2020-07-07 RX ADMIN — POSACONAZOLE 300 MILLIGRAM(S): 100 TABLET, DELAYED RELEASE ORAL at 11:11

## 2020-07-07 RX ADMIN — Medication 5 MILLILITER(S): at 20:51

## 2020-07-07 RX ADMIN — LIDOCAINE 1 APPLICATION(S): 4 CREAM TOPICAL at 20:52

## 2020-07-07 RX ADMIN — ARSENIC TRIOXIDE 130.5 MILLIGRAM(S): 2 INJECTION, SOLUTION INTRAVENOUS at 13:41

## 2020-07-07 RX ADMIN — Medication 667 MILLIGRAM(S): at 11:13

## 2020-07-07 RX ADMIN — PANTOPRAZOLE SODIUM 40 MILLIGRAM(S): 20 TABLET, DELAYED RELEASE ORAL at 05:25

## 2020-07-07 RX ADMIN — Medication 5 MILLILITER(S): at 11:09

## 2020-07-07 RX ADMIN — TRETINOIN 40 MILLIGRAM(S): 10 CAPSULE, LIQUID FILLED ORAL at 09:20

## 2020-07-07 RX ADMIN — ATENOLOL 25 MILLIGRAM(S): 25 TABLET ORAL at 05:25

## 2020-07-07 NOTE — PROGRESS NOTE ADULT - PROBLEM SELECTOR PLAN 1
Continue ATRA 40mg BID (started 5/22)   Arsenic Trioxide which was started on 5/27 was on hold (since 6/22- 6/29), now  shingles crusted resumed Arsenic on 6/30.  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 If unable to achieve goal to infuse 1/2 unit plts over 3 hours q12  S/p Prednisone 20mg PO daily x 5 days (through 6/12) due to platelet antibody  Keep Fibrinogen > 150, if <150 give Cryoprecipitate  Strict Is and Os/Daily weights/Mouth Care  BMBx (6/26) (+) for persistent disease, continue Arsenic trioxide and ATRA.  PICC line placement on 7/1  intermittent epigastric pain relieved with Maalox.  Repeat Bone marrow biopsy on 7/9/20 7/5/20 scalp hypersensitivity - started on lidocaine gel to the scalp.

## 2020-07-07 NOTE — PROGRESS NOTE ADULT - ATTENDING COMMENTS
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  Presented with pancytopenia, flow cytometry/bone marrow biopsy/FISH done 5/26- c/w acute promyelocytic leukemia with PML-AWA translocation.    She was started on ATRA 5/23- day +45, GALA 5/27- day +40 (arsenic currently on hold from 6/22).    -CT chest 6/6 c/w granulomatous disease (hx of TB in 1980's) . Monitor daily weights, diurese prn.   -hx tremors , unclear etiology. Cefepime held, s/p Baclofen - now improved  - CBC, coags/fibrinogen daily.  Goal plt >40-50, fibrinogen >150  - responding to ABO-matched platelets given over 3 hrs in 1/2 unit infusions   -monitor lytes daily, keep K>4, Mg>2.  EKG twice a week  -COVID19+ on 6/17, now neg  -Zoster rash on R buttock and back of RLE, crusted over. Resumed GALA 6/30  - Pain present, on gabapentin 100 mg TID, d/c morphine, tylenol prn  -RUE redness and itching at previous PICC site, pt refuses use of topical triamcinolone - states it makes itching worse  -RUE doppler showing axillary partially occlusive clot. On lovenox since 6/25. Repeat RUE doppler done 7/2, R subclavian vein with obstruction  -s/p bone marrow biopsy on 6/26 - c/w residual APL 35% marrow involvement, recc repeat BMbx on 7/9  - L PICC line placed 7/1, no mediport due to cytopenias  - hearing loss. 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  Presented with pancytopenia, flow cytometry/bone marrow biopsy/FISH done 5/26- c/w acute promyelocytic leukemia with PML-AWA translocation.    She was started on ATRA 5/23- day +46, GALA 5/27- day +41 (arsenic currently on hold from 6/22).    -CT chest 6/6 c/w granulomatous disease (hx of TB in 1980's) . Monitor daily weights, diurese prn.   -hx tremors , unclear etiology. Cefepime held, s/p Baclofen - now improved  - CBC, coags/fibrinogen daily.  Goal plt >40-50, fibrinogen >150  - responding to ABO-matched platelets given over 3 hrs in 1/2 unit infusions   -monitor lytes daily, keep K>4, Mg>2.  EKG twice a week  -COVID19+ on 6/17, now neg  -Zoster rash on R buttock and back of RLE, crusted over. Resumed GALA 6/30  - Pain present, on gabapentin 100 mg TID, d/c morphine, tylenol prn  -RUE redness and itching at previous PICC site, pt refuses use of topical triamcinolone - states it makes itching worse  -RUE doppler showing axillary partially occlusive clot. On lovenox since 6/25. Repeat RUE doppler done 7/2, R subclavian vein with obstruction  -s/p bone marrow biopsy on 6/26 - c/w residual APL 35% marrow involvement, recc repeat BMbx on 7/9  - L PICC line placed 7/1, no mediport due to cytopenias  - hearing loss. 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  Presented with pancytopenia, flow cytometry/bone marrow biopsy/FISH done 5/26- c/w acute promyelocytic leukemia with PML-AWA translocation.    She was started on ATRA 5/23- day +46, GALA 5/27- day +41 (arsenic currently on hold from 6/22).    -CT chest 6/6 c/w granulomatous disease (hx of TB in 1980's) . Monitor daily weights, diurese prn.   -hx tremors , unclear etiology. Cefepime held, s/p Baclofen - now improved  - CBC, coags/fibrinogen daily.  Goal plt >40-50, fibrinogen >150  - responding to ABO-matched platelets given over 3 hrs in 1/2 unit infusions   -monitor lytes daily, keep K>4, Mg>2.  EKG twice a week  -COVID19+ on 6/17, now neg  -Zoster rash on R buttock and back of RLE, crusted over. Resumed GALA 6/30  - Pain present, on gabapentin 100 mg TID, d/c morphine, tylenol prn  -RUE redness and itching at previous PICC site, pt refuses use of topical triamcinolone - states it makes itching worse  -RUE doppler showing axillary partially occlusive clot. On lovenox since 6/25. Repeat RUE doppler done 7/2, R subclavian vein with obstruction  -s/p bone marrow biopsy on 6/26 - c/w residual APL 35% marrow involvement, recc repeat BMbx on 7/9  - L PICC line placed 7/1, no mediport due to cytopenias  - hearing loss. ENT evaluation apprec s/p debrox soln for impacted R ear, audiology testing c/w severe chronic hearing loss R>L, recc outpt audiology f/u for assessment of hearing aid device  - c/o chest /epigastric pain o/n 7/2, stable CXR, no acute infiltrates, sx improved after maalox  - c/o dysuria today - check UA and Ucx r/o infection

## 2020-07-07 NOTE — PROGRESS NOTE ADULT - ASSESSMENT
Ms. Gant is a 61 y/o French-speaking female w/ a PMHx of HTN, HLD, T2DM, and COVID-19 infection in 4/2020 admitted for management of newly diagnosed APL. The patient has been receiving treatment with ATRA and Arsenic (ATRA since 5/22 and Arsenic 5/27). Arsenic which was on hold since 6/22 secondary to Herpes Zoster infection, resumed on 6/30.  Patient's hospital course has been complicated by transaminitis, refractory thrombocytopenia, COVID-19 (+) state (6/16), shingles, right axillary DVT superficial and occipital hypersensitivity.  The patient has pancytopenia 2/2 disease and/or ATRA/Arsenic.

## 2020-07-07 NOTE — PROGRESS NOTE ADULT - PROBLEM SELECTOR PLAN 2
Patient is Neutropenic, afebrile   6/29 Started Levaquin and Posaconazole for PPX  If spikes pan culture, CXR and change Levaquin to cefepime   6/23 Persistent right upper inner arm pain/swelling - CT no abscess, + DVT,   7/2 repeat Doppler - No infectious sources   6/22 Right buttock and upper thigh shingles continue Valtrex tx with Lac Hydrin  Previous COVID-19 (+) outpatient  Then COVID (-) on 5/22, 5/26, 6/2 and 6/9 6/16 COVID 19 (+)   6/18 COVID 19 Ab (+)  6/19 Repeat COVID-19 PCR (-) Patient is Neutropenic, afebrile   6/29 Started Levaquin and Posaconazole for PPX  If spikes pan culture, CXR and change Levaquin to cefepime   6/23 Persistent right upper inner arm pain/swelling - CT no abscess, + DVT,   7/2 repeat Doppler - No infectious sources   6/22 Right buttock and upper thigh shingles continue Valtrex tx with Lac Hydrin  Previous COVID-19 (+) outpatient  Then COVID (-) on 5/22, 5/26, 6/2 and 6/9 6/16 COVID 19 (+)   6/18 COVID 19 Ab (+)  6/19 Repeat COVID-19 PCR (-)  7/7- burning urination will check UA, C&S.

## 2020-07-07 NOTE — PROGRESS NOTE ADULT - SUBJECTIVE AND OBJECTIVE BOX
Diagnosis:    Protocol/Chemo Regimen:    Day:     Pt endorsed:    Review of Systems:     Pain scale:     Diet:     Allergies    No Known Allergies    Intolerances        ANTIMICROBIALS  levoFLOXacin  Tablet 500 milliGRAM(s) Oral every 24 hours  posaconazole DR Tablet 300 milliGRAM(s) Oral daily      HEME/ONC MEDICATIONS  enoxaparin Injectable 80 milliGRAM(s) SubCutaneous every 12 hours  tretinoin 40 milliGRAM(s) Oral every 12 hours      STANDING MEDICATIONS  ammonium lactate 12% Lotion 1 Application(s) Topical two times a day  ATENolol  Tablet 25 milliGRAM(s) Oral daily  Biotene Dry Mouth Oral Rinse 5 milliLiter(s) Swish and Spit five times a day  bisacodyl 5 milliGRAM(s) Oral every 12 hours  chlorhexidine 4% Liquid 1 Application(s) Topical <User Schedule>  dextrose 5%. 1000 milliLiter(s) IV Continuous <Continuous>  dextrose 50% Injectable 12.5 Gram(s) IV Push once  dextrose 50% Injectable 25 Gram(s) IV Push once  dextrose 50% Injectable 25 Gram(s) IV Push once  insulin lispro (HumaLOG) corrective regimen sliding scale   SubCutaneous three times a day before meals  insulin lispro (HumaLOG) corrective regimen sliding scale   SubCutaneous at bedtime  lidocaine 2% Gel 1 Application(s) Topical every 8 hours  pantoprazole    Tablet 40 milliGRAM(s) Oral before breakfast  petrolatum white Ointment 1 Application(s) Topical three times a day  polyethylene glycol 3350 17 Gram(s) Oral daily  triamcinolone 0.1% Ointment 1 Application(s) Topical every 12 hours      PRN MEDICATIONS  acetaminophen   Tablet .. 650 milliGRAM(s) Oral every 6 hours PRN  ALBUTerol    90 MICROgram(s) HFA Inhaler 2 Puff(s) Inhalation every 6 hours PRN  aluminum hydroxide/magnesium hydroxide/simethicone Suspension 30 milliLiter(s) Oral every 6 hours PRN  AQUAPHOR (petrolatum Ointment) 1 Application(s) Topical every 3 hours PRN  dextrose 40% Gel 15 Gram(s) Oral once PRN  diphenhydrAMINE   Injectable 25 milliGRAM(s) IV Push every 6 hours PRN  glucagon  Injectable 1 milliGRAM(s) IntraMuscular once PRN  ondansetron Injectable 8 milliGRAM(s) IV Push every 8 hours PRN  sodium chloride 0.9% lock flush 10 milliLiter(s) IV Push every 1 hour PRN  sodium chloride 0.9% lock flush 10 milliLiter(s) IV Push every 1 hour PRN        Vital Signs Last 24 Hrs  T(C): 36.3 (07 Jul 2020 05:22), Max: 36.8 (06 Jul 2020 09:26)  T(F): 97.4 (07 Jul 2020 05:22), Max: 98.2 (06 Jul 2020 09:26)  HR: 73 (07 Jul 2020 05:22) (66 - 74)  BP: 136/75 (07 Jul 2020 05:22) (110/68 - 160/90)  BP(mean): --  RR: 18 (07 Jul 2020 05:22) (16 - 18)  SpO2: 95% (07 Jul 2020 05:22) (95% - 98%)    PHYSICAL EXAM  General: NAD  HEENT: PERRLA, EOMOI, clear oropharynx, anicteric sclera, pink conjunctiva  Neck: supple  CV: (+) S1/S2 RRR  Lungs: clear to auscultation, no wheezes or rales  Abdomen: soft, non-tender, non-distended (+) BS  Ext: no clubbing, cyanosis or edema  Skin: no rashes and no petechiae  Neuro: alert and oriented X 3, no focal deficits  Central Line:     RECENT CULTURES:        LABS:                        8.5    1.94  )-----------( 202      ( 07 Jul 2020 06:53 )             25.7         Mean Cell Volume : 91.8 fl  Mean Cell Hemoglobin : 30.4 pg  Mean Cell Hemoglobin Concentration : 33.1 gm/dL  Auto Neutrophil # : 0.17 K/uL  Auto Lymphocyte # : 1.45 K/uL  Auto Monocyte # : 0.28 K/uL  Auto Eosinophil # : 0.01 K/uL  Auto Basophil # : 0.02 K/uL  Auto Neutrophil % : 8.9 %  Auto Lymphocyte % : 74.7 %  Auto Monocyte % : 14.4 %  Auto Eosinophil % : 0.5 %  Auto Basophil % : 1.0 %      07-07    142  |  106  |  16  ----------------------------<  109<H>  3.6   |  16<L>  |  0.99    Ca    9.8      07 Jul 2020 06:52  Phos  4.9     07-07  Mg     1.7     07-07    TPro  7.5  /  Alb  4.2  /  TBili  0.2  /  DBili  x   /  AST  24  /  ALT  23  /  AlkPhos  120  07-07      Mg 1.7  Phos 4.9      PT/INR - ( 06 Jul 2020 06:54 )   PT: 15.0 sec;   INR: 1.32 ratio         PTT - ( 06 Jul 2020 06:54 )  PTT:50.3 sec      Uric Acid 6.3        RADIOLOGY & ADDITIONAL STUDIES: Protocol/Chemo Regimen: ATRA/Arsenic Trioxide    Day: ATRA Day 45 and Arsenic 40 (Held from 6/22/20 to 6/30/2020 and resumed on 6/30/20)    Pt endorsed:   + scalp hypersensitivity improved with lidocaine gel.   + Rash on her Right buttock and Right posterior thigh- improved       Review of Systems: Denies nausea, vomiting, diarrhea, chest pain, SOB, headache      Translated by patient's son via phone call per patient request     Pain scale: 4- 5/10    Diet: Consistent Carb w/ Evening Snack    Allergies    No Known Allergies    Intolerances      ANTIMICROBIALS  levoFLOXacin  Tablet 500 milliGRAM(s) Oral every 24 hours  posaconazole DR Tablet 300 milliGRAM(s) Oral daily      HEME/ONC MEDICATIONS  enoxaparin Injectable 80 milliGRAM(s) SubCutaneous every 12 hours  tretinoin 40 milliGRAM(s) Oral every 12 hours      STANDING MEDICATIONS  ammonium lactate 12% Lotion 1 Application(s) Topical two times a day  ATENolol  Tablet 25 milliGRAM(s) Oral daily  Biotene Dry Mouth Oral Rinse 5 milliLiter(s) Swish and Spit five times a day  bisacodyl 5 milliGRAM(s) Oral every 12 hours  chlorhexidine 4% Liquid 1 Application(s) Topical <User Schedule>  dextrose 5%. 1000 milliLiter(s) IV Continuous <Continuous>  dextrose 50% Injectable 12.5 Gram(s) IV Push once  dextrose 50% Injectable 25 Gram(s) IV Push once  dextrose 50% Injectable 25 Gram(s) IV Push once  insulin lispro (HumaLOG) corrective regimen sliding scale   SubCutaneous three times a day before meals  insulin lispro (HumaLOG) corrective regimen sliding scale   SubCutaneous at bedtime  lidocaine 2% Gel 1 Application(s) Topical every 8 hours  pantoprazole    Tablet 40 milliGRAM(s) Oral before breakfast  petrolatum white Ointment 1 Application(s) Topical three times a day  polyethylene glycol 3350 17 Gram(s) Oral daily  triamcinolone 0.1% Ointment 1 Application(s) Topical every 12 hours      PRN MEDICATIONS  acetaminophen   Tablet .. 650 milliGRAM(s) Oral every 6 hours PRN  ALBUTerol    90 MICROgram(s) HFA Inhaler 2 Puff(s) Inhalation every 6 hours PRN  aluminum hydroxide/magnesium hydroxide/simethicone Suspension 30 milliLiter(s) Oral every 6 hours PRN  AQUAPHOR (petrolatum Ointment) 1 Application(s) Topical every 3 hours PRN  dextrose 40% Gel 15 Gram(s) Oral once PRN  diphenhydrAMINE   Injectable 25 milliGRAM(s) IV Push every 6 hours PRN  glucagon  Injectable 1 milliGRAM(s) IntraMuscular once PRN  ondansetron Injectable 8 milliGRAM(s) IV Push every 8 hours PRN  sodium chloride 0.9% lock flush 10 milliLiter(s) IV Push every 1 hour PRN  sodium chloride 0.9% lock flush 10 milliLiter(s) IV Push every 1 hour PRN        Vital Signs Last 24 Hrs  T(C): 36.3 (07 Jul 2020 05:22), Max: 36.8 (06 Jul 2020 09:26)  T(F): 97.4 (07 Jul 2020 05:22), Max: 98.2 (06 Jul 2020 09:26)  HR: 73 (07 Jul 2020 05:22) (66 - 74)  BP: 136/75 (07 Jul 2020 05:22) (110/68 - 160/90)  BP(mean): --  RR: 18 (07 Jul 2020 05:22) (16 - 18)  SpO2: 95% (07 Jul 2020 05:22) (95% - 98%)    PHYSICAL EXAM  General: NAD  HEENT: PERRLA, EOMOI, clear oropharynx, anicteric sclera, pink conjunctiva  Neck: supple  CV: (+) S1/S2 RRR  Lungs: clear to auscultation, no wheezes or rales  Abdomen: soft, non-tender, non-distended (+) BS  Ext: no clubbing, cyanosis or edema  Skin: no rashes and no petechiae  Neuro: alert and oriented X 3, no focal deficits  Central Line:     RECENT CULTURES:        LABS:                        8.5    1.94  )-----------( 202      ( 07 Jul 2020 06:53 )             25.7         Mean Cell Volume : 91.8 fl  Mean Cell Hemoglobin : 30.4 pg  Mean Cell Hemoglobin Concentration : 33.1 gm/dL  Auto Neutrophil # : 0.17 K/uL  Auto Lymphocyte # : 1.45 K/uL  Auto Monocyte # : 0.28 K/uL  Auto Eosinophil # : 0.01 K/uL  Auto Basophil # : 0.02 K/uL  Auto Neutrophil % : 8.9 %  Auto Lymphocyte % : 74.7 %  Auto Monocyte % : 14.4 %  Auto Eosinophil % : 0.5 %  Auto Basophil % : 1.0 %      07-07    142  |  106  |  16  ----------------------------<  109<H>  3.6   |  16<L>  |  0.99    Ca    9.8      07 Jul 2020 06:52  Phos  4.9     07-07  Mg     1.7     07-07    TPro  7.5  /  Alb  4.2  /  TBili  0.2  /  DBili  x   /  AST  24  /  ALT  23  /  AlkPhos  120  07-07      Mg 1.7  Phos 4.9      PT/INR - ( 06 Jul 2020 06:54 )   PT: 15.0 sec;   INR: 1.32 ratio         PTT - ( 06 Jul 2020 06:54 )  PTT:50.3 sec      Uric Acid 6.3        RADIOLOGY & ADDITIONAL STUDIES: Protocol/Chemo Regimen: ATRA/Arsenic Trioxide    Day: ATRA Day 46 and Arsenic 41 (Held from 6/22/20 to 6/30/2020 and resumed on 6/30/20)    Pt endorsed:   + scalp hypersensitivity /tenderness    Review of Systems: Denies nausea, vomiting, diarrhea, chest pain, SOB, headache      Translated by patient's son via phone call per patient request     Pain scale: 4- 5/10    Diet: Consistent Carb w/ Evening Snack    Allergies: No Known Allergies    ANTIMICROBIALS  levoFLOXacin  Tablet 500 milliGRAM(s) Oral every 24 hours  posaconazole DR Tablet 300 milliGRAM(s) Oral daily    HEME/ONC MEDICATIONS  enoxaparin Injectable 80 milliGRAM(s) SubCutaneous every 12 hours  tretinoin 40 milliGRAM(s) Oral every 12 hours    STANDING MEDICATIONS  ammonium lactate 12% Lotion 1 Application(s) Topical two times a day  ATENolol  Tablet 25 milliGRAM(s) Oral daily  Biotene Dry Mouth Oral Rinse 5 milliLiter(s) Swish and Spit five times a day  bisacodyl 5 milliGRAM(s) Oral every 12 hours  chlorhexidine 4% Liquid 1 Application(s) Topical <User Schedule>  dextrose 5%. 1000 milliLiter(s) IV Continuous <Continuous>  dextrose 50% Injectable 12.5 Gram(s) IV Push once  dextrose 50% Injectable 25 Gram(s) IV Push once  dextrose 50% Injectable 25 Gram(s) IV Push once  insulin lispro (HumaLOG) corrective regimen sliding scale   SubCutaneous three times a day before meals  insulin lispro (HumaLOG) corrective regimen sliding scale   SubCutaneous at bedtime  lidocaine 2% Gel 1 Application(s) Topical every 8 hours  pantoprazole    Tablet 40 milliGRAM(s) Oral before breakfast  petrolatum white Ointment 1 Application(s) Topical three times a day  polyethylene glycol 3350 17 Gram(s) Oral daily  triamcinolone 0.1% Ointment 1 Application(s) Topical every 12 hours    PRN MEDICATIONS  acetaminophen   Tablet .. 650 milliGRAM(s) Oral every 6 hours PRN  ALBUTerol    90 MICROgram(s) HFA Inhaler 2 Puff(s) Inhalation every 6 hours PRN  aluminum hydroxide/magnesium hydroxide/simethicone Suspension 30 milliLiter(s) Oral every 6 hours PRN  AQUAPHOR (petrolatum Ointment) 1 Application(s) Topical every 3 hours PRN  dextrose 40% Gel 15 Gram(s) Oral once PRN  diphenhydrAMINE   Injectable 25 milliGRAM(s) IV Push every 6 hours PRN  glucagon  Injectable 1 milliGRAM(s) IntraMuscular once PRN  ondansetron Injectable 8 milliGRAM(s) IV Push every 8 hours PRN  sodium chloride 0.9% lock flush 10 milliLiter(s) IV Push every 1 hour PRN  sodium chloride 0.9% lock flush 10 milliLiter(s) IV Push every 1 hour PRN    Vital Signs Last 24 Hrs  T(C): 36.3 (07 Jul 2020 05:22), Max: 36.8 (06 Jul 2020 09:26)  T(F): 97.4 (07 Jul 2020 05:22), Max: 98.2 (06 Jul 2020 09:26)  HR: 73 (07 Jul 2020 05:22) (66 - 74)  BP: 136/75 (07 Jul 2020 05:22) (110/68 - 160/90)  BP(mean): --  RR: 18 (07 Jul 2020 05:22) (16 - 18)  SpO2: 95% (07 Jul 2020 05:22) (95% - 98%)    PHYSICAL EXAM  General: adult in NAD  HEENT: clear oropharynx, no erythema, no ulcers  CV: normal S1, S2, RRR  Lungs: clear to auscultation, no wheezes, no rales  Abdomen: soft, nontender, nondistended, normal BS  Ext: no edema, RUE hyperpigmented.  Skin: Warm and dry, Some hyperpigmentation noted around RUE PICC line, Shingles-like rash noted on R buttock (appears crusting over)  R posterior thigh and right leg   Neuro: alert and oriented x 3  Central line Left PICC CDI     RECENT CULTURES:  Culture - Blood (06.20.20 @ 21:18)    Specimen Source: .Blood PICC/PERC Double Lumen BLUE    Culture Results:   No Growth Final    Culture - Urine (06.20.20 @ 20:49)    -  Ampicillin: S <=2 Predicts results to ampicillin/sulbactam, amoxacillin-clavulanate and  piperacillin-tazobactam.    -  Ciprofloxacin: S <=1    -  Levofloxacin: S <=1    -  Nitrofurantoin: S <=32 Should not be used to treat pyelonephritis.    -  Tetra/Doxy: R >8    -  Vancomycin: S 2    Specimen Source: .Urine Clean Catch (Midstream)    Culture Results:   10,000 - 49,000 CFU/mL Enterococcus faecalis  Normal Urogenital tika present    Organism Identification: Enterococcus faecalis    Organism: Enterococcus faecalis    Method Type: ARTHUR    LABS:                        8.5    1.94  )-----------( 202      ( 07 Jul 2020 06:53 )             25.7   Mean Cell Volume : 91.8 fl  Mean Cell Hemoglobin : 30.4 pg  Mean Cell Hemoglobin Concentration : 33.1 gm/dL  Auto Neutrophil # : 0.17 K/uL  Auto Lymphocyte # : 1.45 K/uL  Auto Monocyte # : 0.28 K/uL  Auto Eosinophil # : 0.01 K/uL  Auto Basophil # : 0.02 K/uL  Auto Neutrophil % : 8.9 %  Auto Lymphocyte % : 74.7 %  Auto Monocyte % : 14.4 %  Auto Eosinophil % : 0.5 %  Auto Basophil % : 1.0 %      07-07    142  |  106  |  16  ----------------------------<  109<H>  3.6   |  16<L>  |  0.99    Ca    9.8      07 Jul 2020 06:52  Phos  4.9     07-07  Mg     1.7     07-07    TPro  7.5  /  Alb  4.2  /  TBili  0.2  /  DBili  x   /  AST  24  /  ALT  23  /  AlkPhos  120  07-07  Mg 1.7  Phos 4.9  PT/INR - ( 06 Jul 2020 06:54 )   PT: 15.0 sec;   INR: 1.32 ratio    PTT - ( 06 Jul 2020 06:54 )  PTT:50.3 sec    Uric Acid 6.3    RADIOLOGY & ADDITIONAL STUDIES:   Xray Chest 1 View- PORTABLE-Urgent (07.03.20 @ 12:09) >  Linear scarring versus atelectasis in the left upper lobe, unchanged. Otherwise, clear lungs. No pneumothorax is seen. Protocol/Chemo Regimen: ATRA/Arsenic Trioxide    Day: ATRA Day 46 and Arsenic 41 (Held from 6/22/20 to 6/30/2020 and resumed on 6/30/20)    Pt endorsed:   + scalp hypersensitivity /tenderness  + burning urination.    Review of Systems: Denies nausea, vomiting, diarrhea, chest pain, SOB, headache      Translated by patient's son via phone call per patient request     Pain scale: 4- 5/10    Diet: Consistent Carb w/ Evening Snack    Allergies: No Known Allergies    ANTIMICROBIALS  levoFLOXacin  Tablet 500 milliGRAM(s) Oral every 24 hours  posaconazole DR Tablet 300 milliGRAM(s) Oral daily    HEME/ONC MEDICATIONS  enoxaparin Injectable 80 milliGRAM(s) SubCutaneous every 12 hours  tretinoin 40 milliGRAM(s) Oral every 12 hours    STANDING MEDICATIONS  ammonium lactate 12% Lotion 1 Application(s) Topical two times a day  ATENolol  Tablet 25 milliGRAM(s) Oral daily  Biotene Dry Mouth Oral Rinse 5 milliLiter(s) Swish and Spit five times a day  bisacodyl 5 milliGRAM(s) Oral every 12 hours  chlorhexidine 4% Liquid 1 Application(s) Topical <User Schedule>  dextrose 5%. 1000 milliLiter(s) IV Continuous <Continuous>  dextrose 50% Injectable 12.5 Gram(s) IV Push once  dextrose 50% Injectable 25 Gram(s) IV Push once  dextrose 50% Injectable 25 Gram(s) IV Push once  insulin lispro (HumaLOG) corrective regimen sliding scale   SubCutaneous three times a day before meals  insulin lispro (HumaLOG) corrective regimen sliding scale   SubCutaneous at bedtime  lidocaine 2% Gel 1 Application(s) Topical every 8 hours  pantoprazole    Tablet 40 milliGRAM(s) Oral before breakfast  petrolatum white Ointment 1 Application(s) Topical three times a day  polyethylene glycol 3350 17 Gram(s) Oral daily  triamcinolone 0.1% Ointment 1 Application(s) Topical every 12 hours    PRN MEDICATIONS  acetaminophen   Tablet .. 650 milliGRAM(s) Oral every 6 hours PRN  ALBUTerol    90 MICROgram(s) HFA Inhaler 2 Puff(s) Inhalation every 6 hours PRN  aluminum hydroxide/magnesium hydroxide/simethicone Suspension 30 milliLiter(s) Oral every 6 hours PRN  AQUAPHOR (petrolatum Ointment) 1 Application(s) Topical every 3 hours PRN  dextrose 40% Gel 15 Gram(s) Oral once PRN  diphenhydrAMINE   Injectable 25 milliGRAM(s) IV Push every 6 hours PRN  glucagon  Injectable 1 milliGRAM(s) IntraMuscular once PRN  ondansetron Injectable 8 milliGRAM(s) IV Push every 8 hours PRN  sodium chloride 0.9% lock flush 10 milliLiter(s) IV Push every 1 hour PRN  sodium chloride 0.9% lock flush 10 milliLiter(s) IV Push every 1 hour PRN    Vital Signs Last 24 Hrs  T(C): 36.3 (07 Jul 2020 05:22), Max: 36.8 (06 Jul 2020 09:26)  T(F): 97.4 (07 Jul 2020 05:22), Max: 98.2 (06 Jul 2020 09:26)  HR: 73 (07 Jul 2020 05:22) (66 - 74)  BP: 136/75 (07 Jul 2020 05:22) (110/68 - 160/90)  BP(mean): --  RR: 18 (07 Jul 2020 05:22) (16 - 18)  SpO2: 95% (07 Jul 2020 05:22) (95% - 98%)    PHYSICAL EXAM  General: adult in NAD  HEENT: clear oropharynx, no erythema, no ulcers  CV: normal S1, S2, RRR  Lungs: clear to auscultation, no wheezes, no rales  Abdomen: soft, nontender, nondistended, normal BS  Ext: no edema, RUE hyperpigmented.  Skin: Warm and dry, Some hyperpigmentation noted around RUE PICC line, Shingles-like rash noted on R buttock (appears crusting over)  R posterior thigh and right leg   Neuro: alert and oriented x 3  Central line Left PICC CDI     RECENT CULTURES:  Culture - Blood (06.20.20 @ 21:18)    Specimen Source: .Blood PICC/PERC Double Lumen BLUE    Culture Results:   No Growth Final    Culture - Urine (06.20.20 @ 20:49)    -  Ampicillin: S <=2 Predicts results to ampicillin/sulbactam, amoxacillin-clavulanate and  piperacillin-tazobactam.    -  Ciprofloxacin: S <=1    -  Levofloxacin: S <=1    -  Nitrofurantoin: S <=32 Should not be used to treat pyelonephritis.    -  Tetra/Doxy: R >8    -  Vancomycin: S 2    Specimen Source: .Urine Clean Catch (Midstream)    Culture Results:   10,000 - 49,000 CFU/mL Enterococcus faecalis  Normal Urogenital tika present    Organism Identification: Enterococcus faecalis    Organism: Enterococcus faecalis    Method Type: [facility]    LABS:                        8.5    1.94  )-----------( 202      ( 07 Jul 2020 06:53 )             25.7   Mean Cell Volume : 91.8 fl  Mean Cell Hemoglobin : 30.4 pg  Mean Cell Hemoglobin Concentration : 33.1 gm/dL  Auto Neutrophil # : 0.17 K/uL  Auto Lymphocyte # : 1.45 K/uL  Auto Monocyte # : 0.28 K/uL  Auto Eosinophil # : 0.01 K/uL  Auto Basophil # : 0.02 K/uL  Auto Neutrophil % : 8.9 %  Auto Lymphocyte % : 74.7 %  Auto Monocyte % : 14.4 %  Auto Eosinophil % : 0.5 %  Auto Basophil % : 1.0 %      07-07    142  |  106  |  16  ----------------------------<  109<H>  3.6   |  16<L>  |  0.99    Ca    9.8      07 Jul 2020 06:52  Phos  4.9     07-07  Mg     1.7     07-07    TPro  7.5  /  Alb  4.2  /  TBili  0.2  /  DBili  x   /  AST  24  /  ALT  23  /  AlkPhos  120  07-07  Mg 1.7  Phos 4.9  PT/INR - ( 06 Jul 2020 06:54 )   PT: 15.0 sec;   INR: 1.32 ratio    PTT - ( 06 Jul 2020 06:54 )  PTT:50.3 sec    Uric Acid 6.3    RADIOLOGY & ADDITIONAL STUDIES:   Xray Chest 1 View- PORTABLE-Urgent (07.03.20 @ 12:09) >  Linear scarring versus atelectasis in the left upper lobe, unchanged. Otherwise, clear lungs. No pneumothorax is seen.

## 2020-07-07 NOTE — ADVANCED PRACTICE NURSE CONSULT - ASSESSMENT
Lab results reviewed by Dr. Baldwin. Patient aware of renewal of her treatment,aware of common side effects to watch. Left arm double lumen PICC patent,intact. 2 RNs verification completed prior to start of treatment. Arsenic trioxide 11mg in 261ml D5W started at 1341 as 2 hours infusion at 131ml/hr via lowest port of NSS line through purple port from PICC line left arm. Primary RN aware of plan of care. Safety maintained.

## 2020-07-07 NOTE — PROGRESS NOTE ADULT - PROBLEM SELECTOR PLAN 8
On Lovenox SQ BID for DVT treatment. Discontinue if platelets are < 50,000       Contact Information [phone]

## 2020-07-08 LAB
ALBUMIN SERPL ELPH-MCNC: 4.5 G/DL — SIGNIFICANT CHANGE UP (ref 3.3–5)
ALP SERPL-CCNC: 123 U/L — HIGH (ref 40–120)
ALT FLD-CCNC: 26 U/L — SIGNIFICANT CHANGE UP (ref 10–45)
ANION GAP SERPL CALC-SCNC: 13 MMOL/L — SIGNIFICANT CHANGE UP (ref 5–17)
ANISOCYTOSIS BLD QL: SLIGHT — SIGNIFICANT CHANGE UP
APTT BLD: 48.3 SEC — HIGH (ref 27.5–35.5)
AST SERPL-CCNC: 30 U/L — SIGNIFICANT CHANGE UP (ref 10–40)
BASOPHILS # BLD AUTO: 0.08 K/UL — SIGNIFICANT CHANGE UP (ref 0–0.2)
BASOPHILS NFR BLD AUTO: 3.5 % — HIGH (ref 0–2)
BILIRUB SERPL-MCNC: 0.3 MG/DL — SIGNIFICANT CHANGE UP (ref 0.2–1.2)
BUN SERPL-MCNC: 16 MG/DL — SIGNIFICANT CHANGE UP (ref 7–23)
CALCIUM SERPL-MCNC: 10.6 MG/DL — HIGH (ref 8.4–10.5)
CHLORIDE SERPL-SCNC: 104 MMOL/L — SIGNIFICANT CHANGE UP (ref 96–108)
CHROM ANALY OVERALL INTERP SPEC-IMP: SIGNIFICANT CHANGE UP
CK MB BLD-MCNC: 5.2 % — HIGH (ref 0–3.5)
CK MB CFR SERPL CALC: 1.3 NG/ML — SIGNIFICANT CHANGE UP (ref 0–3.8)
CK SERPL-CCNC: 25 U/L — SIGNIFICANT CHANGE UP (ref 25–170)
CO2 SERPL-SCNC: 24 MMOL/L — SIGNIFICANT CHANGE UP (ref 22–31)
CREAT SERPL-MCNC: 0.96 MG/DL — SIGNIFICANT CHANGE UP (ref 0.5–1.3)
CULTURE RESULTS: SIGNIFICANT CHANGE UP
D DIMER BLD IA.RAPID-MCNC: 248 NG/ML DDU — HIGH
DACRYOCYTES BLD QL SMEAR: SLIGHT — SIGNIFICANT CHANGE UP
ELLIPTOCYTES BLD QL SMEAR: SLIGHT — SIGNIFICANT CHANGE UP
EOSINOPHIL # BLD AUTO: 0.02 K/UL — SIGNIFICANT CHANGE UP (ref 0–0.5)
EOSINOPHIL NFR BLD AUTO: 0.9 % — SIGNIFICANT CHANGE UP (ref 0–6)
FIBRINOGEN PPP-MCNC: 500 MG/DL — SIGNIFICANT CHANGE UP (ref 350–510)
GIANT PLATELETS BLD QL SMEAR: PRESENT — SIGNIFICANT CHANGE UP
GLUCOSE BLDC GLUCOMTR-MCNC: 101 MG/DL — HIGH (ref 70–99)
GLUCOSE BLDC GLUCOMTR-MCNC: 120 MG/DL — HIGH (ref 70–99)
GLUCOSE BLDC GLUCOMTR-MCNC: 130 MG/DL — HIGH (ref 70–99)
GLUCOSE BLDC GLUCOMTR-MCNC: 98 MG/DL — SIGNIFICANT CHANGE UP (ref 70–99)
GLUCOSE SERPL-MCNC: 109 MG/DL — HIGH (ref 70–99)
HCT VFR BLD CALC: 26.7 % — LOW (ref 34.5–45)
HGB BLD-MCNC: 8.8 G/DL — LOW (ref 11.5–15.5)
HYPOCHROMIA BLD QL: SIGNIFICANT CHANGE UP
INR BLD: 1.27 RATIO — HIGH (ref 0.88–1.16)
LDH SERPL L TO P-CCNC: 178 U/L — SIGNIFICANT CHANGE UP (ref 50–242)
LYMPHOCYTES # BLD AUTO: 1.51 K/UL — SIGNIFICANT CHANGE UP (ref 1–3.3)
LYMPHOCYTES # BLD AUTO: 69.3 % — HIGH (ref 13–44)
MAGNESIUM SERPL-MCNC: 2 MG/DL — SIGNIFICANT CHANGE UP (ref 1.6–2.6)
MANUAL SMEAR VERIFICATION: SIGNIFICANT CHANGE UP
MCHC RBC-ENTMCNC: 30.1 PG — SIGNIFICANT CHANGE UP (ref 27–34)
MCHC RBC-ENTMCNC: 33 GM/DL — SIGNIFICANT CHANGE UP (ref 32–36)
MCV RBC AUTO: 91.4 FL — SIGNIFICANT CHANGE UP (ref 80–100)
MICROCYTES BLD QL: SLIGHT — SIGNIFICANT CHANGE UP
MONOCYTES # BLD AUTO: 0.23 K/UL — SIGNIFICANT CHANGE UP (ref 0–0.9)
MONOCYTES NFR BLD AUTO: 10.5 % — SIGNIFICANT CHANGE UP (ref 2–14)
NEUTROPHILS # BLD AUTO: 0.34 K/UL — LOW (ref 1.8–7.4)
NEUTROPHILS NFR BLD AUTO: 14.9 % — LOW (ref 43–77)
NEUTS BAND # BLD: 0.9 % — SIGNIFICANT CHANGE UP (ref 0–8)
NRBC # BLD: 1 /100 — HIGH (ref 0–0)
NT-PROBNP SERPL-SCNC: 786 PG/ML — HIGH (ref 0–300)
PHOSPHATE SERPL-MCNC: 4.5 MG/DL — SIGNIFICANT CHANGE UP (ref 2.5–4.5)
PLAT MORPH BLD: ABNORMAL
PLATELET # BLD AUTO: 245 K/UL — SIGNIFICANT CHANGE UP (ref 150–400)
POIKILOCYTOSIS BLD QL AUTO: SLIGHT — SIGNIFICANT CHANGE UP
POLYCHROMASIA BLD QL SMEAR: SLIGHT — SIGNIFICANT CHANGE UP
POTASSIUM SERPL-MCNC: 4.1 MMOL/L — SIGNIFICANT CHANGE UP (ref 3.5–5.3)
POTASSIUM SERPL-SCNC: 4.1 MMOL/L — SIGNIFICANT CHANGE UP (ref 3.5–5.3)
PROT SERPL-MCNC: 7.5 G/DL — SIGNIFICANT CHANGE UP (ref 6–8.3)
PROTHROM AB SERPL-ACNC: 14.3 SEC — HIGH (ref 10.6–13.6)
RBC # BLD: 2.92 M/UL — LOW (ref 3.8–5.2)
RBC # FLD: 16.5 % — HIGH (ref 10.3–14.5)
RBC BLD AUTO: ABNORMAL
SMUDGE CELLS # BLD: PRESENT — SIGNIFICANT CHANGE UP
SODIUM SERPL-SCNC: 141 MMOL/L — SIGNIFICANT CHANGE UP (ref 135–145)
SPECIMEN SOURCE: SIGNIFICANT CHANGE UP
TARGETS BLD QL SMEAR: SLIGHT — SIGNIFICANT CHANGE UP
TROPONIN T, HIGH SENSITIVITY RESULT: 8 NG/L — SIGNIFICANT CHANGE UP (ref 0–51)
URATE SERPL-MCNC: 6.1 MG/DL — SIGNIFICANT CHANGE UP (ref 2.5–7)
WBC # BLD: 2.18 K/UL — LOW (ref 3.8–10.5)
WBC # FLD AUTO: 2.18 K/UL — LOW (ref 3.8–10.5)

## 2020-07-08 PROCEDURE — 71045 X-RAY EXAM CHEST 1 VIEW: CPT | Mod: 26,77

## 2020-07-08 PROCEDURE — 99233 SBSQ HOSP IP/OBS HIGH 50: CPT

## 2020-07-08 PROCEDURE — 93010 ELECTROCARDIOGRAM REPORT: CPT

## 2020-07-08 PROCEDURE — 71045 X-RAY EXAM CHEST 1 VIEW: CPT | Mod: 26

## 2020-07-08 RX ADMIN — Medication 30 MILLILITER(S): at 14:05

## 2020-07-08 RX ADMIN — ENOXAPARIN SODIUM 80 MILLIGRAM(S): 100 INJECTION SUBCUTANEOUS at 20:46

## 2020-07-08 RX ADMIN — Medication 1 APPLICATION(S): at 05:59

## 2020-07-08 RX ADMIN — Medication 1 APPLICATION(S): at 17:38

## 2020-07-08 RX ADMIN — LIDOCAINE 1 APPLICATION(S): 4 CREAM TOPICAL at 20:46

## 2020-07-08 RX ADMIN — Medication 1 APPLICATION(S): at 17:36

## 2020-07-08 RX ADMIN — Medication 5 MILLIGRAM(S): at 05:59

## 2020-07-08 RX ADMIN — LIDOCAINE 1 APPLICATION(S): 4 CREAM TOPICAL at 12:56

## 2020-07-08 RX ADMIN — Medication 1 APPLICATION(S): at 06:00

## 2020-07-08 RX ADMIN — PANTOPRAZOLE SODIUM 40 MILLIGRAM(S): 20 TABLET, DELAYED RELEASE ORAL at 05:59

## 2020-07-08 RX ADMIN — Medication 5 MILLILITER(S): at 12:55

## 2020-07-08 RX ADMIN — Medication 5 MILLILITER(S): at 20:46

## 2020-07-08 RX ADMIN — POSACONAZOLE 300 MILLIGRAM(S): 100 TABLET, DELAYED RELEASE ORAL at 12:55

## 2020-07-08 RX ADMIN — ENOXAPARIN SODIUM 80 MILLIGRAM(S): 100 INJECTION SUBCUTANEOUS at 09:21

## 2020-07-08 RX ADMIN — TRETINOIN 40 MILLIGRAM(S): 10 CAPSULE, LIQUID FILLED ORAL at 20:46

## 2020-07-08 RX ADMIN — LIDOCAINE 1 APPLICATION(S): 4 CREAM TOPICAL at 05:59

## 2020-07-08 RX ADMIN — Medication 1 APPLICATION(S): at 12:56

## 2020-07-08 RX ADMIN — TRETINOIN 40 MILLIGRAM(S): 10 CAPSULE, LIQUID FILLED ORAL at 09:21

## 2020-07-08 RX ADMIN — ATENOLOL 25 MILLIGRAM(S): 25 TABLET ORAL at 05:59

## 2020-07-08 RX ADMIN — Medication 5 MILLILITER(S): at 17:36

## 2020-07-08 RX ADMIN — Medication 5 MILLILITER(S): at 09:20

## 2020-07-08 RX ADMIN — POLYETHYLENE GLYCOL 3350 17 GRAM(S): 17 POWDER, FOR SOLUTION ORAL at 12:55

## 2020-07-08 RX ADMIN — Medication 1 APPLICATION(S): at 20:47

## 2020-07-08 NOTE — PROGRESS NOTE ADULT - ASSESSMENT
Ms. Gant is a 59 y/o Serbian-speaking female w/ a PMHx of HTN, HLD, T2DM, and COVID-19 infection in 4/2020 admitted for management of newly diagnosed APL. The patient has been receiving treatment with ATRA and Arsenic (ATRA since 5/22 and Arsenic 5/27). Arsenic which was on hold since 6/22 secondary to Herpes Zoster infection, resumed on 6/30.  Patient's hospital course has been complicated by transaminitis, refractory thrombocytopenia, COVID-19 (+) state (6/16), shingles, right axillary DVT superficial and occipital hypersensitivity.  The patient has pancytopenia 2/2 disease and/or ATRA/Arsenic.

## 2020-07-08 NOTE — PROGRESS NOTE ADULT - PROBLEM SELECTOR PLAN 8
On Lovenox SQ BID for DVT treatment. Discontinue if platelets are < 50,000       Contact Information [phone]

## 2020-07-08 NOTE — PROGRESS NOTE ADULT - ATTENDING COMMENTS
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  Presented with pancytopenia, flow cytometry/bone marrow biopsy/FISH done 5/26- c/w acute promyelocytic leukemia with PML-AWA translocation.    She was started on ATRA 5/23- day +46, GALA 5/27- day +41 (arsenic currently on hold from 6/22).    -CT chest 6/6 c/w granulomatous disease (hx of TB in 1980's) . Monitor daily weights, diurese prn.   -hx tremors , unclear etiology. Cefepime held, s/p Baclofen - now improved  - CBC, coags/fibrinogen daily.  Goal plt >40-50, fibrinogen >150  - responding to ABO-matched platelets given over 3 hrs in 1/2 unit infusions   -monitor lytes daily, keep K>4, Mg>2.  EKG twice a week  -COVID19+ on 6/17, now neg  -Zoster rash on R buttock and back of RLE, crusted over. Resumed GALA 6/30  - Pain present, on gabapentin 100 mg TID, d/c morphine, tylenol prn  -RUE redness and itching at previous PICC site, pt refuses use of topical triamcinolone - states it makes itching worse  -RUE doppler showing axillary partially occlusive clot. On lovenox since 6/25. Repeat RUE doppler done 7/2, R subclavian vein with obstruction  -s/p bone marrow biopsy on 6/26 - c/w residual APL 35% marrow involvement, recc repeat BMbx on 7/9  - L PICC line placed 7/1, no mediport due to cytopenias  - hearing loss. 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  Presented with pancytopenia, flow cytometry/bone marrow biopsy/FISH done 5/26- c/w acute promyelocytic leukemia with PML-AWA translocation.    She was started on ATRA 5/23- day +47, GALA 5/27- day +42 (arsenic currently on hold from 6/22).    -CT chest 6/6 c/w granulomatous disease (hx of TB in 1980's) . Monitor daily weights, diurese prn.   -hx tremors , unclear etiology. Cefepime held, s/p Baclofen - now improved  - CBC, coags/fibrinogen daily.  Goal plt >40-50, fibrinogen >150  - responding to ABO-matched platelets given over 3 hrs in 1/2 unit infusions   -monitor lytes daily, keep K>4, Mg>2.  EKG twice a week  -COVID19+ on 6/17, now neg  -Zoster rash on R buttock and back of RLE, crusted over. Resumed GALA 6/30  - Pain present, on gabapentin 100 mg TID, d/c morphine, tylenol prn  -RUE redness and itching at previous PICC site, pt refuses use of topical triamcinolone - states it makes itching worse  -RUE doppler showing axillary partially occlusive clot. On lovenox since 6/25. Repeat RUE doppler done 7/2, R subclavian vein with obstruction  -s/p bone marrow biopsy on 6/26 - c/w residual APL 35% marrow involvement, recc repeat BMbx on 7/9  - L PICC line placed 7/1, no mediport due to cytopenias  - hearing loss. ENT evaluation apprec s/p debrox soln for impacted R ear, audiology testing c/w severe chronic hearing loss R>L, recc outpt audiology f/u for assessment of hearing aid device  - c/o chest /epigastric pain o/n 7/2, and again today, stable CXR, no acute infiltrates, ECG done QTc 442, NSR, will try repeat dose of maalox  - c/o dysuria 7/7 - check UA no signs of infection

## 2020-07-08 NOTE — PROGRESS NOTE ADULT - SUBJECTIVE AND OBJECTIVE BOX
Diagnosis: APL    Protocol/Chemo Regimen: ATRA/Arsenic Trioxide    Day: ATRA Day 47 and Arsenic 42 (Held from 6/22/20 to 6/30/2020 and resumed on 6/30/20)    Pt endorsed:   + scalp hypersensitivity /tenderness  + burning urination.    Review of Systems: Denies nausea, vomiting, diarrhea, chest pain, SOB, headache      Translated by patient's son via phone call per patient request     Pain scale: 4- 5/10    Diet: Consistent Carb w/ Evening Snack    Allergies: No Known Allergies    ANTIMICROBIALS  levoFLOXacin  Tablet 500 milliGRAM(s) Oral every 24 hours  posaconazole DR Tablet 300 milliGRAM(s) Oral daily      HEME/ONC MEDICATIONS  arsenic trioxide IVPB (eMAR) 11 milliGRAM(s) IV Intermittent every 24 hours  enoxaparin Injectable 80 milliGRAM(s) SubCutaneous every 12 hours  tretinoin 40 milliGRAM(s) Oral every 12 hours      STANDING MEDICATIONS  ammonium lactate 12% Lotion 1 Application(s) Topical two times a day  ATENolol  Tablet 25 milliGRAM(s) Oral daily  Biotene Dry Mouth Oral Rinse 5 milliLiter(s) Swish and Spit five times a day  bisacodyl 5 milliGRAM(s) Oral every 12 hours  calcium acetate 667 milliGRAM(s) Oral four times a day with meals  chlorhexidine 4% Liquid 1 Application(s) Topical <User Schedule>  dextrose 5%. 1000 milliLiter(s) IV Continuous <Continuous>  dextrose 50% Injectable 12.5 Gram(s) IV Push once  dextrose 50% Injectable 25 Gram(s) IV Push once  dextrose 50% Injectable 25 Gram(s) IV Push once  insulin lispro (HumaLOG) corrective regimen sliding scale   SubCutaneous three times a day before meals  insulin lispro (HumaLOG) corrective regimen sliding scale   SubCutaneous at bedtime  lidocaine 2% Gel 1 Application(s) Topical every 8 hours  pantoprazole    Tablet 40 milliGRAM(s) Oral before breakfast  petrolatum white Ointment 1 Application(s) Topical three times a day  polyethylene glycol 3350 17 Gram(s) Oral daily  triamcinolone 0.1% Ointment 1 Application(s) Topical every 12 hours      PRN MEDICATIONS  acetaminophen   Tablet .. 650 milliGRAM(s) Oral every 6 hours PRN  ALBUTerol    90 MICROgram(s) HFA Inhaler 2 Puff(s) Inhalation every 6 hours PRN  aluminum hydroxide/magnesium hydroxide/simethicone Suspension 30 milliLiter(s) Oral every 6 hours PRN  AQUAPHOR (petrolatum Ointment) 1 Application(s) Topical every 3 hours PRN  dextrose 40% Gel 15 Gram(s) Oral once PRN  diphenhydrAMINE   Injectable 25 milliGRAM(s) IV Push every 6 hours PRN  glucagon  Injectable 1 milliGRAM(s) IntraMuscular once PRN  ondansetron Injectable 8 milliGRAM(s) IV Push every 8 hours PRN  sodium chloride 0.9% lock flush 10 milliLiter(s) IV Push every 1 hour PRN  sodium chloride 0.9% lock flush 10 milliLiter(s) IV Push every 1 hour PRN    Vital Signs Last 24 Hrs  T(C): 36.7 (08 Jul 2020 05:40), Max: 37 (08 Jul 2020 01:00)  T(F): 98 (08 Jul 2020 05:40), Max: 98.6 (08 Jul 2020 01:00)  HR: 83 (08 Jul 2020 05:40) (65 - 83)  BP: 136/63 (08 Jul 2020 05:40) (109/50 - 164/99)  RR: 18 (08 Jul 2020 05:40) (18 - 18)  SpO2: 97% (08 Jul 2020 05:40) (94% - 98%)    PHYSICAL EXAM  General: adult in NAD  HEENT: clear oropharynx, no erythema, no ulcers  CV: normal S1, S2, RRR  Lungs: clear to auscultation, no wheezes, no rales  Abdomen: soft, nontender, nondistended, normal BS  Ext: no edema, RUE hyperpigmented.  Skin: Warm and dry, Some hyperpigmentation noted around RUE PICC line, Shingles-like rash noted on R buttock (appears crusting over)  R posterior thigh and right leg   Neuro: alert and oriented x 3  Central line Left PICC CDI         Cultures:     Culture - Blood (06.20.20 @ 21:18)    Specimen Source: .Blood PICC/PERC Double Lumen BLUE    Culture Results:   No Growth Final    Culture - Blood (06.20.20 @ 21:14)    Specimen Source: .Blood Blood-Peripheral    Culture Results:   No Growth Final    Culture - Urine (06.20.20 @ 20:49)    -  Ampicillin: S <=2 Predicts results to ampicillin/sulbactam, amoxacillin-clavulanate and  piperacillin-tazobactam.    -  Ciprofloxacin: S <=1    -  Levofloxacin: S <=1    -  Nitrofurantoin: S <=32 Should not be used to treat pyelonephritis.    -  Tetra/Doxy: R >8    -  Vancomycin: S 2    Specimen Source: .Urine Clean Catch (Midstream)    Culture Results:   10,000 - 49,000 CFU/mL Enterococcus faecalis  Normal Urogenital tika present    Organism Identification: Enterococcus faecalis    Organism: Enterococcus faecalis    Method Type: ARTHUR      LABS:                              RADIOLOGY & ADDITIONAL STUDIES:  from: Xray Chest 1 View- PORTABLE-Urgent (07.03.20 @ 12:09)   Impression: Linear scarring versus atelectasis in the left upper lobe, unchanged. Otherwise, clear lungs. No pneumothorax is seen. Diagnosis: APL    Protocol/Chemo Regimen: ATRA/Arsenic Trioxide    Day: ATRA Day 47 and Arsenic 42 (Held from 20 to 2020 and resumed on 20)    Pt endorsed:  chest pain     Review of Systems: Denies nausea, vomiting, diarrhea, chest pain, SOB, headache      Translated by patient's son via phone call per patient request     Pain scale: 5/10 (chest)    Diet: Consistent Carb w/ Evening Snack    Allergies: No Known Allergies    ANTIMICROBIALS  levoFLOXacin  Tablet 500 milliGRAM(s) Oral every 24 hours  posaconazole DR Tablet 300 milliGRAM(s) Oral daily      HEME/ONC MEDICATIONS  arsenic trioxide IVPB (eMAR) 11 milliGRAM(s) IV Intermittent every 24 hours  enoxaparin Injectable 80 milliGRAM(s) SubCutaneous every 12 hours  tretinoin 40 milliGRAM(s) Oral every 12 hours      STANDING MEDICATIONS  ammonium lactate 12% Lotion 1 Application(s) Topical two times a day  ATENolol  Tablet 25 milliGRAM(s) Oral daily  Biotene Dry Mouth Oral Rinse 5 milliLiter(s) Swish and Spit five times a day  bisacodyl 5 milliGRAM(s) Oral every 12 hours  calcium acetate 667 milliGRAM(s) Oral four times a day with meals  chlorhexidine 4% Liquid 1 Application(s) Topical <User Schedule>  dextrose 5%. 1000 milliLiter(s) IV Continuous <Continuous>  dextrose 50% Injectable 12.5 Gram(s) IV Push once  dextrose 50% Injectable 25 Gram(s) IV Push once  dextrose 50% Injectable 25 Gram(s) IV Push once  insulin lispro (HumaLOG) corrective regimen sliding scale   SubCutaneous three times a day before meals  insulin lispro (HumaLOG) corrective regimen sliding scale   SubCutaneous at bedtime  lidocaine 2% Gel 1 Application(s) Topical every 8 hours  pantoprazole    Tablet 40 milliGRAM(s) Oral before breakfast  petrolatum white Ointment 1 Application(s) Topical three times a day  polyethylene glycol 3350 17 Gram(s) Oral daily  triamcinolone 0.1% Ointment 1 Application(s) Topical every 12 hours      PRN MEDICATIONS  acetaminophen   Tablet .. 650 milliGRAM(s) Oral every 6 hours PRN  ALBUTerol    90 MICROgram(s) HFA Inhaler 2 Puff(s) Inhalation every 6 hours PRN  aluminum hydroxide/magnesium hydroxide/simethicone Suspension 30 milliLiter(s) Oral every 6 hours PRN  AQUAPHOR (petrolatum Ointment) 1 Application(s) Topical every 3 hours PRN  dextrose 40% Gel 15 Gram(s) Oral once PRN  diphenhydrAMINE   Injectable 25 milliGRAM(s) IV Push every 6 hours PRN  glucagon  Injectable 1 milliGRAM(s) IntraMuscular once PRN  ondansetron Injectable 8 milliGRAM(s) IV Push every 8 hours PRN  sodium chloride 0.9% lock flush 10 milliLiter(s) IV Push every 1 hour PRN  sodium chloride 0.9% lock flush 10 milliLiter(s) IV Push every 1 hour PRN    Vital Signs Last 24 Hrs  T(C): 36.7 (2020 05:40), Max: 37 (2020 01:00)  T(F): 98 (2020 05:40), Max: 98.6 (2020 01:00)  HR: 83 (2020 05:40) (65 - 83)  BP: 136/63 (2020 05:40) (109/50 - 164/99)  RR: 18 (2020 05:40) (18 - 18)  SpO2: 97% (2020 05:40) (94% - 98%)    PHYSICAL EXAM  General: adult in NAD  HEENT: clear oropharynx, no erythema, no ulcers  CV: normal S1, S2, RRR  Lungs: clear to auscultation, no wheezes, no rales  Abdomen: soft, nontender, nondistended, normal BS  Ext: no edema, RUE hyperpigmented.  Skin: Warm and dry, Some hyperpigmentation noted around RUE PICC line, Shingles-like rash noted on R buttock (appears crusting over)  R posterior thigh and right leg   Neuro: alert and oriented x 3  Central line Left PICC CDI         Cultures:     Culture - Blood (20 @ 21:18)    Specimen Source: .Blood PICC/PERC Double Lumen BLUE    Culture Results:   No Growth Final    Culture - Blood (20 @ 21:14)    Specimen Source: .Blood Blood-Peripheral    Culture Results:   No Growth Final    Culture - Urine (20 @ 20:49)    -  Ampicillin: S <=2 Predicts results to ampicillin/sulbactam, amoxacillin-clavulanate and  piperacillin-tazobactam.    -  Ciprofloxacin: S <=1    -  Levofloxacin: S <=1    -  Nitrofurantoin: S <=32 Should not be used to treat pyelonephritis.    -  Tetra/Doxy: R >8    -  Vancomycin: S 2    Specimen Source: .Urine Clean Catch (Midstream)    Culture Results:   10,000 - 49,000 CFU/mL Enterococcus faecalis  Normal Urogenital tika present    Organism Identification: Enterococcus faecalis    Organism: Enterococcus faecalis    Method Type: ARTHUR      LABS:    CARDIAC MARKERS ( 2020 09:10 )  x     / x     / 25 U/L / x     / 1.3 ng/mL                        8.8    2.18  )-----------( 245      ( 2020 07:30 )             26.7     2020 07:30    141    |  104    |  16     ----------------------------<  109    4.1     |  24     |  0.96     Ca    10.6       2020 07:30  Phos  4.5       2020 07:30  Mg     2.0       2020 07:30    TPro  7.5    /  Alb  4.5    /  TBili  0.3    /  DBili  x      /  AST  30     /  ALT  26     /  AlkPhos  123    2020 07:30    PT/INR - ( 2020 07:30 )   PT: 14.3 sec;   INR: 1.27 ratio    PTT - ( 2020 07:30 )  PTT:48.3 sec    CAPILLARY BLOOD GLUCOSE  POCT Blood Glucose.: 101 mg/dL (2020 08:52)  POCT Blood Glucose.: 126 mg/dL (2020 21:13)  POCT Blood Glucose.: 109 mg/dL (2020 16:58)  POCT Blood Glucose.: 127 mg/dL (2020 12:36)    LIVER FUNCTIONS - ( 2020 07:30 )  Alb: 4.5 g/dL / Pro: 7.5 g/dL / ALK PHOS: 123 U/L / ALT: 26 U/L / AST: 30 U/L / GGT: x           Urinalysis Basic - ( 2020 15:12 )    Color: Colorless / Appearance: Clear / S.009 / pH: x  Gluc: x / Ketone: Negative  / Bili: Negative / Urobili: Negative   Blood: x / Protein: Negative / Nitrite: Negative   Leuk Esterase: Negative / RBC: x / WBC x   Sq Epi: x / Non Sq Epi: x / Bacteria: x      RADIOLOGY & ADDITIONAL STUDIES:  from: Xray Chest 1 View- PORTABLE-Urgent (20 @ 12:09)   Impression: Linear scarring versus atelectasis in the left upper lobe, unchanged. Otherwise, clear lungs. No pneumothorax is seen.

## 2020-07-08 NOTE — ADVANCED PRACTICE NURSE CONSULT - ASSESSMENT
Lab results reviewed by Dr. Baldwin. Patient aware of renewal of her treatment,aware of common side effects to watch. Left arm double lumen PICC patent,intact. 2 RNs verification completed prior to start of treatment. Arsenic trioxide 11mg in 261ml D5W started at 1419 as 2 hours infusion at 131ml/hr via lowest port of NSS line through purple port from PICC line left arm. Primary RN aware of plan of care. Patient looks comfortable denies chesdtpain and back pain. Got a dose of maalox earlier,denies having heartburn. Safety maintained.

## 2020-07-08 NOTE — PROGRESS NOTE ADULT - PROBLEM SELECTOR PLAN 2
Patient is Neutropenic, afebrile   6/29 Started Levaquin and Posaconazole for PPX  If spikes pan culture, CXR and change Levaquin to cefepime   6/23 Persistent right upper inner arm pain/swelling - CT no abscess, + DVT,   7/2 repeat Doppler - No infectious sources   6/22 Right buttock and upper thigh shingles continue Valtrex tx with Lac Hydrin  Previous COVID-19 (+) outpatient  Then COVID (-) on 5/22, 5/26, 6/2 and 6/9 6/16 COVID 19 (+)   6/18 COVID 19 Ab (+)  6/19 Repeat COVID-19 PCR (-)  7/7- burning urination will check UA, C&S. Patient is Neutropenic, afebrile   6/29 Started Levaquin and Posaconazole for PPX  If spikes pan culture, CXR and change Levaquin to cefepime   6/23 Persistent right upper inner arm pain/swelling - CT no abscess, + DVT,   7/2 repeat Doppler - No infectious sources   6/22 Right buttock and upper thigh shingles continue Valtrex tx with Lac Hydrin  Previous COVID-19 (+) outpatient  Then COVID (-) on 5/22, 5/26, 6/2 and 6/9 6/16 COVID 19 (+)   6/18 COVID 19 Ab (+)  6/19 Repeat COVID-19 PCR (-)  7/7- burning urination Urinalysis negative, culture pending

## 2020-07-08 NOTE — PROGRESS NOTE ADULT - PROBLEM SELECTOR PLAN 1
Continue ATRA 40mg BID (started 5/22)   Arsenic Trioxide which was started on 5/27 was on hold (since 6/22- 6/29), now  shingles crusted resumed Arsenic on 6/30.  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 If unable to achieve goal to infuse 1/2 unit plts over 3 hours q12  S/p Prednisone 20mg PO daily x 5 days (through 6/12) due to platelet antibody  Keep Fibrinogen > 150, if <150 give Cryoprecipitate  Strict Is and Os/Daily weights/Mouth Care  BMBx (6/26) (+) for persistent disease, continue Arsenic trioxide and ATRA.  PICC line placement on 7/1  intermittent epigastric pain relieved with Maalox.  Repeat Bone marrow biopsy on 7/9/20 7/5/20 scalp hypersensitivity - started on lidocaine gel to the scalp. Continue ATRA 40mg BID (started 5/22)   Arsenic Trioxide which was started on 5/27 was on hold (since 6/22- 6/29), now  shingles crusted resumed Arsenic on 6/30.  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 If unable to achieve goal to infuse 1/2 unit plts over 3 hours q12  S/p Prednisone 20mg PO daily x 5 days (through 6/12) due to platelet antibody  Keep Fibrinogen > 150, if <150 give Cryoprecipitate  Strict Is and Os/Daily weights/Mouth Care  BMBx (6/26) (+) for persistent disease, continue Arsenic trioxide and ATRA.  PICC line placement on 7/1  intermittent epigastric pain relieved with Maalox.  Repeat Bone marrow biopsy on 7/9/20 7/5/20 scalp hypersensitivity - started on lidocaine gel to the scalp.  7/8 episode of chest pain - EKG no ectopy, cardiac biomarkers negative, CXR unchanged

## 2020-07-09 LAB
ALBUMIN SERPL ELPH-MCNC: 4.3 G/DL — SIGNIFICANT CHANGE UP (ref 3.3–5)
ALP SERPL-CCNC: 114 U/L — SIGNIFICANT CHANGE UP (ref 40–120)
ALT FLD-CCNC: 25 U/L — SIGNIFICANT CHANGE UP (ref 10–45)
ANION GAP SERPL CALC-SCNC: 11 MMOL/L — SIGNIFICANT CHANGE UP (ref 5–17)
AST SERPL-CCNC: 34 U/L — SIGNIFICANT CHANGE UP (ref 10–40)
BASOPHILS # BLD AUTO: 0.03 K/UL — SIGNIFICANT CHANGE UP (ref 0–0.2)
BASOPHILS NFR BLD AUTO: 1.4 % — SIGNIFICANT CHANGE UP (ref 0–2)
BILIRUB SERPL-MCNC: 0.3 MG/DL — SIGNIFICANT CHANGE UP (ref 0.2–1.2)
BUN SERPL-MCNC: 12 MG/DL — SIGNIFICANT CHANGE UP (ref 7–23)
CALCIUM SERPL-MCNC: 10.4 MG/DL — SIGNIFICANT CHANGE UP (ref 8.4–10.5)
CHLORIDE SERPL-SCNC: 102 MMOL/L — SIGNIFICANT CHANGE UP (ref 96–108)
CO2 SERPL-SCNC: 25 MMOL/L — SIGNIFICANT CHANGE UP (ref 22–31)
CREAT SERPL-MCNC: 0.88 MG/DL — SIGNIFICANT CHANGE UP (ref 0.5–1.3)
D DIMER BLD IA.RAPID-MCNC: 219 NG/ML DDU — SIGNIFICANT CHANGE UP
EOSINOPHIL # BLD AUTO: 0.05 K/UL — SIGNIFICANT CHANGE UP (ref 0–0.5)
EOSINOPHIL NFR BLD AUTO: 2.3 % — SIGNIFICANT CHANGE UP (ref 0–6)
FIBRINOGEN PPP-MCNC: 501 MG/DL — SIGNIFICANT CHANGE UP (ref 350–510)
GLUCOSE BLDC GLUCOMTR-MCNC: 113 MG/DL — HIGH (ref 70–99)
GLUCOSE BLDC GLUCOMTR-MCNC: 125 MG/DL — HIGH (ref 70–99)
GLUCOSE BLDC GLUCOMTR-MCNC: 145 MG/DL — HIGH (ref 70–99)
GLUCOSE BLDC GLUCOMTR-MCNC: 178 MG/DL — HIGH (ref 70–99)
GLUCOSE SERPL-MCNC: 121 MG/DL — HIGH (ref 70–99)
HCT VFR BLD CALC: 25.8 % — LOW (ref 34.5–45)
HGB BLD-MCNC: 8.6 G/DL — LOW (ref 11.5–15.5)
IMM GRANULOCYTES NFR BLD AUTO: 0.9 % — SIGNIFICANT CHANGE UP (ref 0–1.5)
LDH SERPL L TO P-CCNC: 163 U/L — SIGNIFICANT CHANGE UP (ref 50–242)
LYMPHOCYTES # BLD AUTO: 1.2 K/UL — SIGNIFICANT CHANGE UP (ref 1–3.3)
LYMPHOCYTES # BLD AUTO: 55 % — HIGH (ref 13–44)
MAGNESIUM SERPL-MCNC: 2 MG/DL — SIGNIFICANT CHANGE UP (ref 1.6–2.6)
MCHC RBC-ENTMCNC: 30.3 PG — SIGNIFICANT CHANGE UP (ref 27–34)
MCHC RBC-ENTMCNC: 33.3 GM/DL — SIGNIFICANT CHANGE UP (ref 32–36)
MCV RBC AUTO: 90.8 FL — SIGNIFICANT CHANGE UP (ref 80–100)
MONOCYTES # BLD AUTO: 0.27 K/UL — SIGNIFICANT CHANGE UP (ref 0–0.9)
MONOCYTES NFR BLD AUTO: 12.4 % — SIGNIFICANT CHANGE UP (ref 2–14)
NEUTROPHILS # BLD AUTO: 0.61 K/UL — LOW (ref 1.8–7.4)
NEUTROPHILS NFR BLD AUTO: 28 % — LOW (ref 43–77)
NRBC # BLD: 0 /100 WBCS — SIGNIFICANT CHANGE UP (ref 0–0)
PHOSPHATE SERPL-MCNC: 5 MG/DL — HIGH (ref 2.5–4.5)
PLATELET # BLD AUTO: 229 K/UL — SIGNIFICANT CHANGE UP (ref 150–400)
POTASSIUM SERPL-MCNC: 3.7 MMOL/L — SIGNIFICANT CHANGE UP (ref 3.5–5.3)
POTASSIUM SERPL-SCNC: 3.7 MMOL/L — SIGNIFICANT CHANGE UP (ref 3.5–5.3)
PROT SERPL-MCNC: 7.3 G/DL — SIGNIFICANT CHANGE UP (ref 6–8.3)
RBC # BLD: 2.84 M/UL — LOW (ref 3.8–5.2)
RBC # FLD: 16.6 % — HIGH (ref 10.3–14.5)
SODIUM SERPL-SCNC: 138 MMOL/L — SIGNIFICANT CHANGE UP (ref 135–145)
URATE SERPL-MCNC: 5.6 MG/DL — SIGNIFICANT CHANGE UP (ref 2.5–7)
WBC # BLD: 2.18 K/UL — LOW (ref 3.8–10.5)
WBC # FLD AUTO: 2.18 K/UL — LOW (ref 3.8–10.5)

## 2020-07-09 PROCEDURE — 36584 COMPL RPLCMT PICC RS&I: CPT

## 2020-07-09 PROCEDURE — 99233 SBSQ HOSP IP/OBS HIGH 50: CPT

## 2020-07-09 RX ORDER — CALCIUM ACETATE 667 MG
667 TABLET ORAL
Refills: 0 | Status: COMPLETED | OUTPATIENT
Start: 2020-07-09 | End: 2020-07-10

## 2020-07-09 RX ORDER — POTASSIUM CHLORIDE 20 MEQ
20 PACKET (EA) ORAL ONCE
Refills: 0 | Status: COMPLETED | OUTPATIENT
Start: 2020-07-09 | End: 2020-07-09

## 2020-07-09 RX ORDER — ONDANSETRON 8 MG/1
8 TABLET, FILM COATED ORAL ONCE
Refills: 0 | Status: COMPLETED | OUTPATIENT
Start: 2020-07-09 | End: 2020-07-09

## 2020-07-09 RX ADMIN — Medication 1 APPLICATION(S): at 17:10

## 2020-07-09 RX ADMIN — Medication 5 MILLIGRAM(S): at 17:11

## 2020-07-09 RX ADMIN — LIDOCAINE 1 APPLICATION(S): 4 CREAM TOPICAL at 05:45

## 2020-07-09 RX ADMIN — Medication 650 MILLIGRAM(S): at 21:06

## 2020-07-09 RX ADMIN — Medication 5 MILLILITER(S): at 14:28

## 2020-07-09 RX ADMIN — LIDOCAINE 1 APPLICATION(S): 4 CREAM TOPICAL at 15:57

## 2020-07-09 RX ADMIN — TRETINOIN 40 MILLIGRAM(S): 10 CAPSULE, LIQUID FILLED ORAL at 19:37

## 2020-07-09 RX ADMIN — ATENOLOL 25 MILLIGRAM(S): 25 TABLET ORAL at 05:45

## 2020-07-09 RX ADMIN — Medication 1 APPLICATION(S): at 05:45

## 2020-07-09 RX ADMIN — ONDANSETRON 8 MILLIGRAM(S): 8 TABLET, FILM COATED ORAL at 08:38

## 2020-07-09 RX ADMIN — Medication 667 MILLIGRAM(S): at 21:06

## 2020-07-09 RX ADMIN — TRETINOIN 40 MILLIGRAM(S): 10 CAPSULE, LIQUID FILLED ORAL at 08:13

## 2020-07-09 RX ADMIN — Medication 1 APPLICATION(S): at 17:11

## 2020-07-09 RX ADMIN — Medication 5 MILLIGRAM(S): at 05:45

## 2020-07-09 RX ADMIN — Medication 1: at 17:10

## 2020-07-09 RX ADMIN — PANTOPRAZOLE SODIUM 40 MILLIGRAM(S): 20 TABLET, DELAYED RELEASE ORAL at 05:45

## 2020-07-09 RX ADMIN — POSACONAZOLE 300 MILLIGRAM(S): 100 TABLET, DELAYED RELEASE ORAL at 14:29

## 2020-07-09 RX ADMIN — Medication 5 MILLILITER(S): at 08:13

## 2020-07-09 RX ADMIN — LIDOCAINE 1 APPLICATION(S): 4 CREAM TOPICAL at 21:07

## 2020-07-09 RX ADMIN — Medication 1 APPLICATION(S): at 05:46

## 2020-07-09 RX ADMIN — Medication 5 MILLILITER(S): at 19:36

## 2020-07-09 RX ADMIN — Medication 5 MILLILITER(S): at 23:15

## 2020-07-09 RX ADMIN — Medication 5 MILLILITER(S): at 17:10

## 2020-07-09 RX ADMIN — POLYETHYLENE GLYCOL 3350 17 GRAM(S): 17 POWDER, FOR SOLUTION ORAL at 14:28

## 2020-07-09 RX ADMIN — Medication 667 MILLIGRAM(S): at 17:09

## 2020-07-09 RX ADMIN — Medication 20 MILLIEQUIVALENT(S): at 14:29

## 2020-07-09 RX ADMIN — Medication 1 APPLICATION(S): at 14:30

## 2020-07-09 RX ADMIN — Medication 1 APPLICATION(S): at 21:08

## 2020-07-09 RX ADMIN — CHLORHEXIDINE GLUCONATE 1 APPLICATION(S): 213 SOLUTION TOPICAL at 08:04

## 2020-07-09 NOTE — CHART NOTE - NSCHARTNOTEFT_GEN_A_CORE
Interventional Radiology Pre-Procedure Note    This is a 60y Female y/o [place]ic-speaking female w/ a PMHx of HTN, HLD, T2DM, and COVID-19 infection in 4/2020 admitted for management of newly diagnosed APL. Patient with left double lumen PICC placed on 7/1 with tip flipped in the right brachiocephlic vein. IR requested for PICC exchange.     PAST MEDICAL & SURGICAL HISTORY:  HLD (hyperlipidemia)  Hypertension  Diabetes  No significant past surgical history    Vital Signs Last 24 Hrs  T(C): 36.3 (09 Jul 2020 09:41), Max: 37.1 (09 Jul 2020 00:30)  T(F): 97.4 (09 Jul 2020 09:41), Max: 98.7 (09 Jul 2020 00:30)  HR: 70 (09 Jul 2020 09:41) (70 - 81)  BP: 123/64 (09 Jul 2020 09:41) (113/62 - 143/77)  RR: 18 (09 Jul 2020 09:41) (16 - 18)  SpO2: 94% (09 Jul 2020 09:41) (94% - 99%)    Allergies: No Known Allergies    Physical Exam: Gen: NAD, A&Ox3    Labs:                         8.6    2.18  )-----------( 229      ( 09 Jul 2020 09:54 )             25.8     07-09    138  |  102  |  12  ----------------------------<  121<H>  3.7   |  25  |  0.88    Ca    10.4      09 Jul 2020 09:54  Phos  5.0     07-09  Mg     2.0     07-09    TPro  7.3  /  Alb  4.3  /  TBili  0.3  /  DBili  x   /  AST  34  /  ALT  25  /  AlkPhos  114  07-09    PT/INR - ( 08 Jul 2020 07:30 )   PT: 14.3 sec;   INR: 1.27 ratio         PTT - ( 08 Jul 2020 07:30 )  PTT:48.3 sec    Plan is for PICC exchange. Informed consent obtained. All questions and concerns have been addressed at this time. Interventional Radiology Pre-Procedure Note    This is a 60y Female y/o Amharic-speaking female w/ a PMHx of HTN, HLD, T2DM, and COVID-19 infection in 4/2020 admitted for management of newly diagnosed APL. Patient with left double lumen PICC placed on 7/1 with tip flipped in the right brachiocephlic vein. IR requested for PICC exchange.     PAST MEDICAL & SURGICAL HISTORY:  HLD (hyperlipidemia)  Hypertension  Diabetes  No significant past surgical history    Vital Signs Last 24 Hrs  T(C): 36.3 (09 Jul 2020 09:41), Max: 37.1 (09 Jul 2020 00:30)  T(F): 97.4 (09 Jul 2020 09:41), Max: 98.7 (09 Jul 2020 00:30)  HR: 70 (09 Jul 2020 09:41) (70 - 81)  BP: 123/64 (09 Jul 2020 09:41) (113/62 - 143/77)  RR: 18 (09 Jul 2020 09:41) (16 - 18)  SpO2: 94% (09 Jul 2020 09:41) (94% - 99%)    Allergies: No Known Allergies    Physical Exam: Gen: NAD, A&Ox3    Labs:                         8.6    2.18  )-----------( 229      ( 09 Jul 2020 09:54 )             25.8     07-09    138  |  102  |  12  ----------------------------<  121<H>  3.7   |  25  |  0.88    Ca    10.4      09 Jul 2020 09:54  Phos  5.0     07-09  Mg     2.0     07-09    TPro  7.3  /  Alb  4.3  /  TBili  0.3  /  DBili  x   /  AST  34  /  ALT  25  /  AlkPhos  114  07-09    PT/INR - ( 08 Jul 2020 07:30 )   PT: 14.3 sec;   INR: 1.27 ratio         PTT - ( 08 Jul 2020 07:30 )  PTT:48.3 sec    Plan is for PICC exchange. Informed consent obtained via Amharic  ID #[id number]. All questions and concerns have been addressed at this time.

## 2020-07-09 NOTE — CHART NOTE - NSCHARTNOTEFT_GEN_A_CORE
Called by RN to evaluate Pt for pain, swelling, numbness, coolness in left upper ext. s/p PICC line placement.  Pt. seen and evaluated at bedside.  Pt's son utilized via phone at patient's request.  Pt. A&O x 3, non-toxic, in no acute distress. Called by RN to evaluate Pt for pain, swelling, numbness, coolness in left upper ext. s/p PICC line placement.  Pt. seen and evaluated at bedside.  Pt's son utilized as  via phone at patient's request.  Pt. A&O x 3, non-toxic, in no acute distress. Pt. c/o left arm "feeling cold, numb and painful after PICC put in".  PICC site CDI, no erythema, no bleeding from site.  Left upper ext cooler to touch compared to right upper ext.  + Right radial pulse; brisk capillary refill noted. Spoke to radiology, recommend right upper ext ultrasound.  Ultrasound ordered.  Hem/onc fellow on floor; made aware; agrees with ultrasound and monitoring overnight.  Hold using PICC line. Pt. and patient's son made aware of plan.    F/u ultrasound results; f/u with primary team in am.    AMADO Domingo ext [phone]

## 2020-07-09 NOTE — PROCEDURE NOTE - NSPROCDETAILS_GEN_ALL_CORE
supine position/sterile technique, catheter placed/Successful exchange of a 5Fr double lumen PICC, 43cm in length/location identified, draped/prepped, sterile technique used/sterile dressing applied

## 2020-07-09 NOTE — PROVIDER CONTACT NOTE (OTHER) - DATE AND TIME:
08-Jul-2020 08:10
18-Jun-2020 22:30
20-Jun-2020 14:15
24-Jun-2020 01:55
28-May-2020 00:56
29-May-2020 20:20
29-May-2020 22:10
09-Jul-2020 21:05

## 2020-07-09 NOTE — PROVIDER CONTACT NOTE (OTHER) - RECOMMENDATIONS
Ultrasound to check PICC line
Call IV Nurse to assess PICC
EKG  cardiac enzymes drawn  Chest xray  continue to monitor
Peripheral IV to begin transfusing plts, Call IV nurse to assess PICC, doppler, continue to monitor
Tylenol, BCx2, UA/UC, RVP, Chest x-ray
Ultra sound of arm, obtain Blood culture, d/c picc and obtain culture of tip, and start antibiotic.
plt transfusion, IV Mg supplement, continue to monitor

## 2020-07-09 NOTE — PROGRESS NOTE ADULT - PROBLEM SELECTOR PLAN 1
Continue ATRA 40mg BID (started 5/22)   Arsenic Trioxide which was started on 5/27 was on hold (since 6/22- 6/29), now  shingles crusted resumed Arsenic on 6/30.  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 If unable to achieve goal to infuse 1/2 unit plts over 3 hours q12  S/p Prednisone 20mg PO daily x 5 days (through 6/12) due to platelet antibody  Keep Fibrinogen > 150, if <150 give Cryoprecipitate  Strict Is and Os/Daily weights/Mouth Care  BMBx (6/26) (+) for persistent disease, continue Arsenic trioxide and ATRA.  PICC line placement on 7/1  intermittent epigastric pain relieved with Maalox.  Repeat Bone marrow biopsy on 7/9/20 7/5/20 scalp hypersensitivity - started on lidocaine gel to the scalp.  7/8 episode of chest pain - EKG no ectopy, cardiac biomarkers negative, CXR unchanged Continue ATRA 40mg BID (started 5/22)   Arsenic Trioxide which was started on 5/27 was on hold (since 6/22- 6/29), now  shingles crusted resumed Arsenic on 6/30.  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 If unable to achieve goal to infuse 1/2 unit plts over 3 hours q12  S/p Prednisone 20mg PO daily x 5 days (through 6/12) due to platelet antibody  Keep Fibrinogen > 150, if <150 give Cryoprecipitate  Strict Is and Os/Daily weights/Mouth Care  BMBx (6/26) (+) for persistent disease, continue Arsenic trioxide and ATRA.  s/p PICC line placement on 7/1  Repeat Bone marrow biopsy on 7/9/20 7/5/20 scalp hypersensitivity - started on lidocaine gel to the scalp.  7/8 episode of chest pain - EKG no ectopy, cardiac biomarkers negative, CXR unchanged  7/9 PICC in Left Brachiocephalic vein, for PICC exchange today. Continue ATRA 40mg BID (started 5/22)   Arsenic Trioxide which was started on 5/27 was on hold (since 6/22- 6/29), now  shingles crusted resumed Arsenic on 6/30.  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 If unable to achieve goal to infuse 1/2 unit plts over 3 hours q12  S/p Prednisone 20mg PO daily x 5 days (through 6/12) due to platelet antibody  Keep Fibrinogen > 150, if <150 give Cryoprecipitate  Strict Is and Os/Daily weights/Mouth Care  BMBx (6/26) (+) for persistent disease, continue Arsenic trioxide and ATRA.  s/p PICC line placement on 7/1 7/5/20 scalp hypersensitivity - started on lidocaine gel to the scalp.  7/8 episode of chest pain - EKG no ectopy, cardiac biomarkers negative, CXR unchanged  7/9 PICC in Left Brachiocephalic vein, for PICC exchange today.  Patient refused Bone marrow biopsy on 7/9 plan for BMBX on 7/13

## 2020-07-09 NOTE — PROGRESS NOTE ADULT - SUBJECTIVE AND OBJECTIVE BOX
Diagnosis: APL    Protocol/Chemo Regimen: ATRA/Arsenic Trioxide    Day: ATRA Day 48 and Arsenic 43 (Held from 6/22/20 to 6/30/2020 and resumed on 6/30/20)    Pt endorsed:  chest pain     Review of Systems: Denies nausea, vomiting, diarrhea, chest pain, SOB, headache      Translated by patient's son via phone call per patient request     Pain scale:     Diet: Consistent Carb w/ Evening Snack    Allergies: No Known Allergies    ANTIMICROBIALS  levoFLOXacin  Tablet 500 milliGRAM(s) Oral every 24 hours  posaconazole DR Tablet 300 milliGRAM(s) Oral daily      HEME/ONC MEDICATIONS  arsenic trioxide IVPB (eMAR) 11 milliGRAM(s) IV Intermittent every 24 hours  enoxaparin Injectable 80 milliGRAM(s) SubCutaneous every 12 hours  tretinoin 40 milliGRAM(s) Oral every 12 hours      STANDING MEDICATIONS  ammonium lactate 12% Lotion 1 Application(s) Topical two times a day  ATENolol  Tablet 25 milliGRAM(s) Oral daily  Biotene Dry Mouth Oral Rinse 5 milliLiter(s) Swish and Spit five times a day  bisacodyl 5 milliGRAM(s) Oral every 12 hours  chlorhexidine 4% Liquid 1 Application(s) Topical <User Schedule>  dextrose 5%. 1000 milliLiter(s) IV Continuous <Continuous>  dextrose 50% Injectable 12.5 Gram(s) IV Push once  dextrose 50% Injectable 25 Gram(s) IV Push once  dextrose 50% Injectable 25 Gram(s) IV Push once  insulin lispro (HumaLOG) corrective regimen sliding scale   SubCutaneous three times a day before meals  insulin lispro (HumaLOG) corrective regimen sliding scale   SubCutaneous at bedtime  lidocaine 2% Gel 1 Application(s) Topical every 8 hours  pantoprazole    Tablet 40 milliGRAM(s) Oral before breakfast  petrolatum white Ointment 1 Application(s) Topical three times a day  polyethylene glycol 3350 17 Gram(s) Oral daily  triamcinolone 0.1% Ointment 1 Application(s) Topical every 12 hours      PRN MEDICATIONS  acetaminophen   Tablet .. 650 milliGRAM(s) Oral every 6 hours PRN  ALBUTerol    90 MICROgram(s) HFA Inhaler 2 Puff(s) Inhalation every 6 hours PRN  aluminum hydroxide/magnesium hydroxide/simethicone Suspension 30 milliLiter(s) Oral every 6 hours PRN  AQUAPHOR (petrolatum Ointment) 1 Application(s) Topical every 3 hours PRN  dextrose 40% Gel 15 Gram(s) Oral once PRN  diphenhydrAMINE   Injectable 25 milliGRAM(s) IV Push every 6 hours PRN  glucagon  Injectable 1 milliGRAM(s) IntraMuscular once PRN  ondansetron Injectable 8 milliGRAM(s) IV Push every 8 hours PRN  sodium chloride 0.9% lock flush 10 milliLiter(s) IV Push every 1 hour PRN  sodium chloride 0.9% lock flush 10 milliLiter(s) IV Push every 1 hour PRN        Vital Signs Last 24 Hrs  T(C): 36.7 (09 Jul 2020 05:30), Max: 37.1 (09 Jul 2020 00:30)  T(F): 98 (09 Jul 2020 05:30), Max: 98.7 (09 Jul 2020 00:30)  HR: 79 (09 Jul 2020 05:30) (74 - 81)  BP: 129/75 (09 Jul 2020 05:30) (113/62 - 143/77)  BP(mean): --  RR: 18 (09 Jul 2020 05:30) (16 - 18)  SpO2: 96% (09 Jul 2020 05:30) (96% - 99%)    PHYSICAL EXAM  General: adult in NAD  HEENT: clear oropharynx, no erythema, no ulcers  CV: normal S1, S2, RRR  Lungs: clear to auscultation, no wheezes, no rales  Abdomen: soft, nontender, nondistended, normal BS  Ext: no edema, RUE hyperpigmented.  Skin: Warm and dry, Some hyperpigmentation noted around RUE PICC line, Shingles-like rash noted on R buttock (appears crusting over)  R posterior thigh and right leg   Neuro: alert and oriented x 3  Central line Left PICC CDI       RECENT CULTURES:  07-07 @ 21:58  .Urine Clean Catch (Midstream)  <10,000 CFU/mL Normal Urogenital Krista              LABS:                        8.8    2.18  )-----------( 245      ( 08 Jul 2020 07:30 )             26.7         Mean Cell Volume : 91.4 fl  Mean Cell Hemoglobin : 30.1 pg  Mean Cell Hemoglobin Concentration : 33.0 gm/dL  Auto Neutrophil # : 0.34 K/uL  Auto Lymphocyte # : 1.51 K/uL  Auto Monocyte # : 0.23 K/uL  Auto Eosinophil # : 0.02 K/uL  Auto Basophil # : 0.08 K/uL  Auto Neutrophil % : 14.9 %  Auto Lymphocyte % : 69.3 %  Auto Monocyte % : 10.5 %  Auto Eosinophil % : 0.9 %  Auto Basophil % : 3.5 %      07-08    141  |  104  |  16  ----------------------------<  109<H>  4.1   |  24  |  0.96    Ca    10.6<H>      08 Jul 2020 07:30  Phos  4.5     07-08  Mg     2.0     07-08    TPro  7.5  /  Alb  4.5  /  TBili  0.3  /  DBili  x   /  AST  30  /  ALT  26  /  AlkPhos  123<H>  07-08          PT/INR - ( 08 Jul 2020 07:30 )   PT: 14.3 sec;   INR: 1.27 ratio         PTT - ( 08 Jul 2020 07:30 )  PTT:48.3 sec        RADIOLOGY & ADDITIONAL STUDIES: Diagnosis: APL    Protocol/Chemo Regimen: ATRA/Arsenic Trioxide    Day: ATRA Day 48 and Arsenic 43 (Held from 6/22/20 to 6/30/2020 and resumed on 6/30/20)    Pt endorsed: generalized fatigue.     Review of Systems: Denies nausea, vomiting, diarrhea, chest pain, SOB, headache      Translated by patient's son via phone call per patient request     Pain scale:     Diet: Consistent Carb w/ Evening Snack    Allergies: No Known Allergies    ANTIMICROBIALS  levoFLOXacin  Tablet 500 milliGRAM(s) Oral every 24 hours  posaconazole DR Tablet 300 milliGRAM(s) Oral daily      HEME/ONC MEDICATIONS  arsenic trioxide IVPB (eMAR) 11 milliGRAM(s) IV Intermittent every 24 hours  enoxaparin Injectable 80 milliGRAM(s) SubCutaneous every 12 hours  tretinoin 40 milliGRAM(s) Oral every 12 hours      STANDING MEDICATIONS  ammonium lactate 12% Lotion 1 Application(s) Topical two times a day  ATENolol  Tablet 25 milliGRAM(s) Oral daily  Biotene Dry Mouth Oral Rinse 5 milliLiter(s) Swish and Spit five times a day  bisacodyl 5 milliGRAM(s) Oral every 12 hours  chlorhexidine 4% Liquid 1 Application(s) Topical <User Schedule>  dextrose 5%. 1000 milliLiter(s) IV Continuous <Continuous>  dextrose 50% Injectable 12.5 Gram(s) IV Push once  dextrose 50% Injectable 25 Gram(s) IV Push once  dextrose 50% Injectable 25 Gram(s) IV Push once  insulin lispro (HumaLOG) corrective regimen sliding scale   SubCutaneous three times a day before meals  insulin lispro (HumaLOG) corrective regimen sliding scale   SubCutaneous at bedtime  lidocaine 2% Gel 1 Application(s) Topical every 8 hours  pantoprazole    Tablet 40 milliGRAM(s) Oral before breakfast  petrolatum white Ointment 1 Application(s) Topical three times a day  polyethylene glycol 3350 17 Gram(s) Oral daily  triamcinolone 0.1% Ointment 1 Application(s) Topical every 12 hours      PRN MEDICATIONS  acetaminophen   Tablet .. 650 milliGRAM(s) Oral every 6 hours PRN  ALBUTerol    90 MICROgram(s) HFA Inhaler 2 Puff(s) Inhalation every 6 hours PRN  aluminum hydroxide/magnesium hydroxide/simethicone Suspension 30 milliLiter(s) Oral every 6 hours PRN  AQUAPHOR (petrolatum Ointment) 1 Application(s) Topical every 3 hours PRN  dextrose 40% Gel 15 Gram(s) Oral once PRN  diphenhydrAMINE   Injectable 25 milliGRAM(s) IV Push every 6 hours PRN  glucagon  Injectable 1 milliGRAM(s) IntraMuscular once PRN  ondansetron Injectable 8 milliGRAM(s) IV Push every 8 hours PRN  sodium chloride 0.9% lock flush 10 milliLiter(s) IV Push every 1 hour PRN  sodium chloride 0.9% lock flush 10 milliLiter(s) IV Push every 1 hour PRN        Vital Signs Last 24 Hrs  T(C): 36.7 (09 Jul 2020 05:30), Max: 37.1 (09 Jul 2020 00:30)  T(F): 98 (09 Jul 2020 05:30), Max: 98.7 (09 Jul 2020 00:30)  HR: 79 (09 Jul 2020 05:30) (74 - 81)  BP: 129/75 (09 Jul 2020 05:30) (113/62 - 143/77)  BP(mean): --  RR: 18 (09 Jul 2020 05:30) (16 - 18)  SpO2: 96% (09 Jul 2020 05:30) (96% - 99%)    PHYSICAL EXAM  General: adult in NAD  HEENT: clear oropharynx, no erythema, no ulcers  CV: normal S1, S2, RRR  Lungs: clear to auscultation, no wheezes, no rales  Abdomen: soft, nontender, nondistended, normal BS  Ext: no edema, RUE hyperpigmented.  Skin: Warm and dry, Some hyperpigmentation noted around RUE PICC line, Shingles-like rash noted on R buttock (appears crusting over)  R posterior thigh and right leg   Neuro: alert and oriented x 3  Central line Left PICC CDI       RECENT CULTURES:  07-07 @ 21:58  .Urine Clean Catch (Midstream)  <10,000 CFU/mL Normal Urogenital Krista              LABS:                        8.8    2.18  )-----------( 245      ( 08 Jul 2020 07:30 )             26.7         Mean Cell Volume : 91.4 fl  Mean Cell Hemoglobin : 30.1 pg  Mean Cell Hemoglobin Concentration : 33.0 gm/dL  Auto Neutrophil # : 0.34 K/uL  Auto Lymphocyte # : 1.51 K/uL  Auto Monocyte # : 0.23 K/uL  Auto Eosinophil # : 0.02 K/uL  Auto Basophil # : 0.08 K/uL  Auto Neutrophil % : 14.9 %  Auto Lymphocyte % : 69.3 %  Auto Monocyte % : 10.5 %  Auto Eosinophil % : 0.9 %  Auto Basophil % : 3.5 %      07-08    141  |  104  |  16  ----------------------------<  109<H>  4.1   |  24  |  0.96    Ca    10.6<H>      08 Jul 2020 07:30  Phos  4.5     07-08  Mg     2.0     07-08    TPro  7.5  /  Alb  4.5  /  TBili  0.3  /  DBili  x   /  AST  30  /  ALT  26  /  AlkPhos  123<H>  07-08          PT/INR - ( 08 Jul 2020 07:30 )   PT: 14.3 sec;   INR: 1.27 ratio         PTT - ( 08 Jul 2020 07:30 )  PTT:48.3 sec        RADIOLOGY & ADDITIONAL STUDIES:   Xray Chest 1 View- PORTABLE-Urgent (07.08.20 @ 11:52) >  Left upper extremity PICC line is malpositioned with the tip now in the right brachiocephalic vein.  Redemonstration of linear scarring versus atelectasis in the left upper lobe, unchanged. Lungs are otherwise clear.  The cardiomediastinal silhouette cannot be accurately assessed on this projection.  The visualized osseous and soft tissue structures demonstrate no acute pathology.

## 2020-07-09 NOTE — ADVANCED PRACTICE NURSE CONSULT - ASSESSMENT
Lab results reviewed by Dr. Baldwin. Patient aware of renewal of her treatment,aware of common side effects to watch. S/p left upper arm PICC insertion.Site with no s/s of redness,swelling ,redness or pain, with positive blood return noted and flushing easily with 10 ML NS.Dsg dry and intact. 2 RNs verification completed prior to start of treatment. Arsenic trioxide 11mg connected to secondary line of  normal saline line , started at 1445 as 2 hours infusion at 131ml/hr  through purple port of  PICC line via alaris pump.Primary RN aware of plan of care. Patient looks comfortable denies chestpain and back pain.

## 2020-07-09 NOTE — PROVIDER CONTACT NOTE (OTHER) - SITUATION
Pt c/o back and chest pain
Temp-100.6F
aPTT 30 pre heparin initiation
clear/crusted drainage at PICC site
plt= 35, Mg=2
pt c/o chills
pt reports pain and itching around PICC line site. plts being prepared to be transfused
Called NP to evaluate Pt for pain, swelling, numbness, coolness in left upper ext. s/p PICC line placement.

## 2020-07-09 NOTE — PROGRESS NOTE ADULT - PROBLEM SELECTOR PLAN 8
On Lovenox SQ BID for DVT treatment. Discontinue if platelets are < 50,000       Contact Information [phone]

## 2020-07-09 NOTE — PROVIDER CONTACT NOTE (OTHER) - ACTION/TREATMENT ORDERED:
NP came to evaluate pt at bedside.  Pt's son utilized via phone at patient's request. Ultrasound ordered to check PICC line placement.
No pre initiation bolus or PRN bolus for aPTT 30 per PA.
As per provider, call IV Nurse to assess PICC
Bldculture x2, Ua, Urine culture, cbc, done & sent. U/S of RT arm picc area pending. Np suggest 20g piv to Lft arm for completion of chemo and then can request 2nd 1/2 plt. chemo in progress @4658.
EKG  cardiac enzymes drawn  Chest xray  continue to monitor  Maalox given x1
Peripheral IV to begin transfusing plts, Call IV nurse to assess PICC, doppler, continue to monitor
Tylenol, BCx2, UA/UC, RVP, Chest x-ray, continue to monitor
plt transfusion, IV Mg supplement, continue to monitor

## 2020-07-09 NOTE — PROGRESS NOTE ADULT - ASSESSMENT
Ms. Gant is a 59 y/o Arabic-speaking female w/ a PMHx of HTN, HLD, T2DM, and COVID-19 infection in 4/2020 admitted for management of newly diagnosed APL. The patient has been receiving treatment with ATRA and Arsenic (ATRA since 5/22 and Arsenic 5/27). Arsenic which was on hold since 6/22 secondary to Herpes Zoster infection, resumed on 6/30.  Patient's hospital course has been complicated by transaminitis, refractory thrombocytopenia, COVID-19 (+) state (6/16), shingles, right axillary DVT superficial and occipital hypersensitivity.  The patient has pancytopenia 2/2 disease and/or ATRA/Arsenic.

## 2020-07-09 NOTE — PROGRESS NOTE ADULT - PROBLEM SELECTOR PLAN 2
Patient is Neutropenic, afebrile   6/29 Started Levaquin and Posaconazole for PPX  If spikes pan culture, CXR and change Levaquin to cefepime   6/23 Persistent right upper inner arm pain/swelling - CT no abscess, + DVT,   7/2 repeat Doppler - No infectious sources   6/22 Right buttock and upper thigh shingles continue Valtrex tx with Lac Hydrin  Previous COVID-19 (+) outpatient  Then COVID (-) on 5/22, 5/26, 6/2 and 6/9 6/16 COVID 19 (+)   6/18 COVID 19 Ab (+)  6/19 Repeat COVID-19 PCR (-)  7/7- burning urination Urinalysis negative, culture pending Patient is Neutropenic, afebrile   6/29 Started Levaquin and Posaconazole for PPX  If spikes pan culture, CXR and change Levaquin to cefepime   6/23 Persistent right upper inner arm pain/swelling - CT no abscess, + DVT,   7/2 repeat Doppler - No infectious sources   6/22 Right buttock and upper thigh shingles continue Valtrex tx with Lac Hydrin  Previous COVID-19 (+) outpatient  Then COVID (-) on 5/22, 5/26, 6/2 and 6/9 6/16 COVID 19 (+)   6/18 COVID 19 Ab (+)  6/19 Repeat COVID-19 PCR (-)  7/7- burning urination Urinalysis negative, Urine cultures (-)

## 2020-07-09 NOTE — PROGRESS NOTE ADULT - ATTENDING COMMENTS
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  Presented with pancytopenia, flow cytometry/bone marrow biopsy/FISH done 5/26- c/w acute promyelocytic leukemia with PML-AWA translocation.    She was started on ATRA 5/23- day +47, GALA 5/27- day +42 (arsenic currently on hold from 6/22).    -CT chest 6/6 c/w granulomatous disease (hx of TB in 1980's) . Monitor daily weights, diurese prn.   -hx tremors , unclear etiology. Cefepime held, s/p Baclofen - now improved  - CBC, coags/fibrinogen daily.  Goal plt >40-50, fibrinogen >150  - responding to ABO-matched platelets given over 3 hrs in 1/2 unit infusions   -monitor lytes daily, keep K>4, Mg>2.  EKG twice a week  -COVID19+ on 6/17, now neg  -Zoster rash on R buttock and back of RLE, crusted over. Resumed GALA 6/30  - Pain present, on gabapentin 100 mg TID, d/c morphine, tylenol prn  -RUE redness and itching at previous PICC site, pt refuses use of topical triamcinolone - states it makes itching worse  -RUE doppler showing axillary partially occlusive clot. On lovenox since 6/25. Repeat RUE doppler done 7/2, R subclavian vein with obstruction  -s/p bone marrow biopsy on 6/26 - c/w residual APL 35% marrow involvement, recc repeat BMbx on 7/9  - L PICC line placed 7/1, no mediport due to cytopenias  - hearing loss. ENT evaluation apprec s/p debrox soln for impacted R ear, audiology testing c/w severe chronic hearing loss R>L, recc outpt audiology f/u for assessment of hearing aid device  - c/o chest /epigastric pain o/n 7/2, and again today, stable CXR, no acute infiltrates, ECG done QTc 442, NSR, will try repeat dose of maalox  - c/o dysuria 7/7 - check UA no signs of infection 59 y/o Hungarian speaking F with newly diagnosed low/interm risk APL.  Presented with pancytopenia, flow cytometry/bone marrow biopsy/FISH done 5/26- c/w acute promyelocytic leukemia with PML-AWA translocation.    She was started on ATRA 5/23- day +48, GALA 5/27- day +43 (arsenic currently on hold from 6/22).    -CT chest 6/6 c/w granulomatous disease (hx of TB in 1980's) . Monitor daily weights, diurese prn.   -hx tremors , unclear etiology. Cefepime held, s/p Baclofen - now improved  - CBC, coags/fibrinogen daily.  Goal plt >40-50, fibrinogen >150  - responding to ABO-matched platelets given over 3 hrs in 1/2 unit infusions   -monitor lytes daily, keep K>4, Mg>2.  EKG twice a week  -COVID19+ on 6/17, now neg  -Zoster rash on R buttock and back of RLE, crusted over. Resumed GALA 6/30  - Pain present, on gabapentin 100 mg TID, d/c morphine, tylenol prn  -RUE redness and itching at previous PICC site, pt refuses use of topical triamcinolone - states it makes itching worse  -RUE doppler showing axillary partially occlusive clot. On lovenox since 6/25. Repeat RUE doppler done 7/2, R subclavian vein with obstruction  -s/p bone marrow biopsy on 6/26 - c/w residual APL 35% marrow involvement, repeat BMbx planned today  - L PICC line placed 7/1, no mediport due to cytopenias  - hearing loss. ENT evaluation apprec s/p debrox soln for impacted R ear, audiology testing c/w severe chronic hearing loss R>L, recc outpt audiology f/u for assessment of hearing aid device  - c/o chest /epigastric pain o/n 7/2, and again today, stable CXR, no acute infiltrates, ECG done QTc 442, NSR, s/p repeat dose of maalox w/ improvement  - c/o dysuria 7/7 - check UA no signs of infection  - PICC line adjustment planned today

## 2020-07-10 LAB
ALBUMIN SERPL ELPH-MCNC: 4.2 G/DL — SIGNIFICANT CHANGE UP (ref 3.3–5)
ALP SERPL-CCNC: 113 U/L — SIGNIFICANT CHANGE UP (ref 40–120)
ALT FLD-CCNC: 25 U/L — SIGNIFICANT CHANGE UP (ref 10–45)
ANION GAP SERPL CALC-SCNC: 10 MMOL/L — SIGNIFICANT CHANGE UP (ref 5–17)
APTT BLD: 34.9 SEC — SIGNIFICANT CHANGE UP (ref 27.5–35.5)
AST SERPL-CCNC: 33 U/L — SIGNIFICANT CHANGE UP (ref 10–40)
BASOPHILS # BLD AUTO: 0.03 K/UL — SIGNIFICANT CHANGE UP (ref 0–0.2)
BASOPHILS NFR BLD AUTO: 1.2 % — SIGNIFICANT CHANGE UP (ref 0–2)
BILIRUB SERPL-MCNC: 0.3 MG/DL — SIGNIFICANT CHANGE UP (ref 0.2–1.2)
BLD GP AB SCN SERPL QL: NEGATIVE — SIGNIFICANT CHANGE UP
BUN SERPL-MCNC: 13 MG/DL — SIGNIFICANT CHANGE UP (ref 7–23)
CALCIUM SERPL-MCNC: 10.4 MG/DL — SIGNIFICANT CHANGE UP (ref 8.4–10.5)
CHLORIDE SERPL-SCNC: 104 MMOL/L — SIGNIFICANT CHANGE UP (ref 96–108)
CO2 SERPL-SCNC: 26 MMOL/L — SIGNIFICANT CHANGE UP (ref 22–31)
CREAT SERPL-MCNC: 0.91 MG/DL — SIGNIFICANT CHANGE UP (ref 0.5–1.3)
D DIMER BLD IA.RAPID-MCNC: 335 NG/ML DDU — HIGH
EOSINOPHIL # BLD AUTO: 0.1 K/UL — SIGNIFICANT CHANGE UP (ref 0–0.5)
EOSINOPHIL NFR BLD AUTO: 4 % — SIGNIFICANT CHANGE UP (ref 0–6)
FIBRINOGEN PPP-MCNC: 467 MG/DL — SIGNIFICANT CHANGE UP (ref 350–510)
GLUCOSE BLDC GLUCOMTR-MCNC: 105 MG/DL — HIGH (ref 70–99)
GLUCOSE BLDC GLUCOMTR-MCNC: 118 MG/DL — HIGH (ref 70–99)
GLUCOSE BLDC GLUCOMTR-MCNC: 142 MG/DL — HIGH (ref 70–99)
GLUCOSE BLDC GLUCOMTR-MCNC: 146 MG/DL — HIGH (ref 70–99)
GLUCOSE SERPL-MCNC: 113 MG/DL — HIGH (ref 70–99)
HCT VFR BLD CALC: 26.7 % — LOW (ref 34.5–45)
HGB BLD-MCNC: 8.7 G/DL — LOW (ref 11.5–15.5)
IMM GRANULOCYTES NFR BLD AUTO: 0.4 % — SIGNIFICANT CHANGE UP (ref 0–1.5)
INR BLD: 1.15 RATIO — SIGNIFICANT CHANGE UP (ref 0.88–1.16)
LDH SERPL L TO P-CCNC: 168 U/L — SIGNIFICANT CHANGE UP (ref 50–242)
LYMPHOCYTES # BLD AUTO: 1.38 K/UL — SIGNIFICANT CHANGE UP (ref 1–3.3)
LYMPHOCYTES # BLD AUTO: 55.4 % — HIGH (ref 13–44)
MAGNESIUM SERPL-MCNC: 2 MG/DL — SIGNIFICANT CHANGE UP (ref 1.6–2.6)
MCHC RBC-ENTMCNC: 30.4 PG — SIGNIFICANT CHANGE UP (ref 27–34)
MCHC RBC-ENTMCNC: 32.6 GM/DL — SIGNIFICANT CHANGE UP (ref 32–36)
MCV RBC AUTO: 93.4 FL — SIGNIFICANT CHANGE UP (ref 80–100)
MONOCYTES # BLD AUTO: 0.24 K/UL — SIGNIFICANT CHANGE UP (ref 0–0.9)
MONOCYTES NFR BLD AUTO: 9.6 % — SIGNIFICANT CHANGE UP (ref 2–14)
NEUTROPHILS # BLD AUTO: 0.73 K/UL — LOW (ref 1.8–7.4)
NEUTROPHILS NFR BLD AUTO: 29.4 % — LOW (ref 43–77)
NRBC # BLD: 0 /100 WBCS — SIGNIFICANT CHANGE UP (ref 0–0)
PHOSPHATE SERPL-MCNC: 5.1 MG/DL — HIGH (ref 2.5–4.5)
PLATELET # BLD AUTO: 256 K/UL — SIGNIFICANT CHANGE UP (ref 150–400)
POTASSIUM SERPL-MCNC: 4.2 MMOL/L — SIGNIFICANT CHANGE UP (ref 3.5–5.3)
POTASSIUM SERPL-SCNC: 4.2 MMOL/L — SIGNIFICANT CHANGE UP (ref 3.5–5.3)
PROT SERPL-MCNC: 7.3 G/DL — SIGNIFICANT CHANGE UP (ref 6–8.3)
PROTHROM AB SERPL-ACNC: 13 SEC — SIGNIFICANT CHANGE UP (ref 10.6–13.6)
RBC # BLD: 2.86 M/UL — LOW (ref 3.8–5.2)
RBC # FLD: 16.8 % — HIGH (ref 10.3–14.5)
RH IG SCN BLD-IMP: POSITIVE — SIGNIFICANT CHANGE UP
SODIUM SERPL-SCNC: 140 MMOL/L — SIGNIFICANT CHANGE UP (ref 135–145)
URATE SERPL-MCNC: 5.5 MG/DL — SIGNIFICANT CHANGE UP (ref 2.5–7)
WBC # BLD: 2.49 K/UL — LOW (ref 3.8–10.5)
WBC # FLD AUTO: 2.49 K/UL — LOW (ref 3.8–10.5)

## 2020-07-10 PROCEDURE — 93010 ELECTROCARDIOGRAM REPORT: CPT

## 2020-07-10 PROCEDURE — 71045 X-RAY EXAM CHEST 1 VIEW: CPT | Mod: 26

## 2020-07-10 PROCEDURE — 99233 SBSQ HOSP IP/OBS HIGH 50: CPT

## 2020-07-10 PROCEDURE — 73200 CT UPPER EXTREMITY W/O DYE: CPT | Mod: 26,LT

## 2020-07-10 PROCEDURE — 93971 EXTREMITY STUDY: CPT | Mod: 26

## 2020-07-10 RX ORDER — DIPHENHYDRAMINE HCL 50 MG
25 CAPSULE ORAL ONCE
Refills: 0 | Status: COMPLETED | OUTPATIENT
Start: 2020-07-10 | End: 2020-07-10

## 2020-07-10 RX ORDER — ONDANSETRON 8 MG/1
8 TABLET, FILM COATED ORAL ONCE
Refills: 0 | Status: COMPLETED | OUTPATIENT
Start: 2020-07-10 | End: 2020-07-10

## 2020-07-10 RX ADMIN — LIDOCAINE 1 APPLICATION(S): 4 CREAM TOPICAL at 21:27

## 2020-07-10 RX ADMIN — Medication 1 APPLICATION(S): at 21:27

## 2020-07-10 RX ADMIN — Medication 1 APPLICATION(S): at 05:28

## 2020-07-10 RX ADMIN — Medication 5 MILLILITER(S): at 15:37

## 2020-07-10 RX ADMIN — TRETINOIN 40 MILLIGRAM(S): 10 CAPSULE, LIQUID FILLED ORAL at 19:07

## 2020-07-10 RX ADMIN — Medication 667 MILLIGRAM(S): at 12:53

## 2020-07-10 RX ADMIN — LIDOCAINE 1 APPLICATION(S): 4 CREAM TOPICAL at 13:17

## 2020-07-10 RX ADMIN — Medication 5 MILLIGRAM(S): at 05:28

## 2020-07-10 RX ADMIN — POSACONAZOLE 300 MILLIGRAM(S): 100 TABLET, DELAYED RELEASE ORAL at 12:14

## 2020-07-10 RX ADMIN — PANTOPRAZOLE SODIUM 40 MILLIGRAM(S): 20 TABLET, DELAYED RELEASE ORAL at 05:28

## 2020-07-10 RX ADMIN — LIDOCAINE 1 APPLICATION(S): 4 CREAM TOPICAL at 05:27

## 2020-07-10 RX ADMIN — Medication 1 APPLICATION(S): at 17:35

## 2020-07-10 RX ADMIN — Medication 5 MILLILITER(S): at 23:16

## 2020-07-10 RX ADMIN — Medication 1 DROP(S): at 21:24

## 2020-07-10 RX ADMIN — TRETINOIN 40 MILLIGRAM(S): 10 CAPSULE, LIQUID FILLED ORAL at 07:52

## 2020-07-10 RX ADMIN — Medication 5 MILLILITER(S): at 19:07

## 2020-07-10 RX ADMIN — Medication 667 MILLIGRAM(S): at 07:49

## 2020-07-10 RX ADMIN — Medication 1 DROP(S): at 09:57

## 2020-07-10 RX ADMIN — ONDANSETRON 8 MILLIGRAM(S): 8 TABLET, FILM COATED ORAL at 09:57

## 2020-07-10 RX ADMIN — Medication 650 MILLIGRAM(S): at 21:24

## 2020-07-10 RX ADMIN — Medication 5 MILLIGRAM(S): at 17:36

## 2020-07-10 RX ADMIN — Medication 1 APPLICATION(S): at 05:29

## 2020-07-10 RX ADMIN — POLYETHYLENE GLYCOL 3350 17 GRAM(S): 17 POWDER, FOR SOLUTION ORAL at 12:14

## 2020-07-10 RX ADMIN — Medication 5 MILLILITER(S): at 12:14

## 2020-07-10 RX ADMIN — Medication 1 APPLICATION(S): at 13:17

## 2020-07-10 RX ADMIN — Medication 25 MILLIGRAM(S): at 21:32

## 2020-07-10 RX ADMIN — Medication 5 MILLILITER(S): at 07:49

## 2020-07-10 RX ADMIN — Medication 25 MILLIGRAM(S): at 14:35

## 2020-07-10 NOTE — PROGRESS NOTE ADULT - ATTENDING COMMENTS
59 y/o Telugu speaking F with newly diagnosed low/interm risk APL.  Presented with pancytopenia, flow cytometry/bone marrow biopsy/FISH done 5/26- c/w acute promyelocytic leukemia with PML-AWA translocation.    She was started on ATRA 5/23- day +48, GALA 5/27- day +43 (arsenic currently on hold from 6/22).    -CT chest 6/6 c/w granulomatous disease (hx of TB in 1980's) . Monitor daily weights, diurese prn.   -hx tremors , unclear etiology. Cefepime held, s/p Baclofen - now improved  - CBC, coags/fibrinogen daily.  Goal plt >40-50, fibrinogen >150  - responding to ABO-matched platelets given over 3 hrs in 1/2 unit infusions   -monitor lytes daily, keep K>4, Mg>2.  EKG twice a week  -COVID19+ on 6/17, now neg  -Zoster rash on R buttock and back of RLE, crusted over. Resumed GALA 6/30  - Pain present, on gabapentin 100 mg TID, d/c morphine, tylenol prn  -RUE redness and itching at previous PICC site, pt refuses use of topical triamcinolone - states it makes itching worse  -RUE doppler showing axillary partially occlusive clot. On lovenox since 6/25. Repeat RUE doppler done 7/2, R subclavian vein with obstruction  -s/p bone marrow biopsy on 6/26 - c/w residual APL 35% marrow involvement, repeat BMbx planned today  - L PICC line placed 7/1, no mediport due to cytopenias  - hearing loss. ENT evaluation apprec s/p debrox soln for impacted R ear, audiology testing c/w severe chronic hearing loss R>L, recc outpt audiology f/u for assessment of hearing aid device  - c/o chest /epigastric pain o/n 7/2, and again today, stable CXR, no acute infiltrates, ECG done QTc 442, NSR, s/p repeat dose of maalox w/ improvement  - c/o dysuria 7/7 - check UA no signs of infection  - PICC line adjustment planned today 61 y/o Armenian speaking F with newly diagnosed low/interm risk APL.  Presented with pancytopenia, flow cytometry/bone marrow biopsy/FISH done 5/26- c/w acute promyelocytic leukemia with PML-AWA translocation.    She was started on ATRA 5/23- day +49, GALA 5/27- day +44 (arsenic currently on hold from 6/22).    -CT chest 6/6 c/w granulomatous disease (hx of TB in 1980's) . Monitor daily weights, diurese prn.   -hx tremors , unclear etiology. Cefepime held, s/p Baclofen - now improved  - CBC, coags/fibrinogen daily.  Goal plt >40-50, fibrinogen >150  - responding to ABO-matched platelets given over 3 hrs in 1/2 unit infusions   -monitor lytes daily, keep K>4, Mg>2.  EKG twice a week  -COVID19+ on 6/17, now neg  -Zoster rash on R buttock and back of RLE, crusted over. Resumed GALA 6/30  - Pain present, on gabapentin 100 mg TID, d/c morphine, tylenol prn  -RUE redness and itching at previous PICC site, pt refuses use of topical triamcinolone - states it makes itching worse  -RUE doppler showing axillary partially occlusive clot. On lovenox since 6/25. Repeat RUE doppler done 7/2, R subclavian vein with obstruction  -s/p bone marrow biopsy on 6/26 - c/w residual APL 35% marrow involvement, repeat BMbx deferred by pt/son until 7/13  - L PICC line placed 7/1, no mediport due to cytopenias  - hearing loss. ENT evaluation apprec s/p debrox soln for impacted R ear, audiology testing c/w severe chronic hearing loss R>L, recc outpt audiology f/u for assessment of hearing aid device  - c/o chest /epigastric pain o/n 7/2, and again today, stable CXR, no acute infiltrates, ECG done QTc 442, NSR, s/p repeat dose of maalox w/ improvement  - c/o dysuria 7/7 - check UA no signs of infection  - PICC line adjustment done 7/9, c/b edema, LUE sono neg for DVT 61 y/o Latvian speaking F with newly diagnosed low/interm risk APL.  Presented with pancytopenia, flow cytometry/bone marrow biopsy/FISH done 5/26- c/w acute promyelocytic leukemia with PML-AWA translocation.    She was started on ATRA 5/23- day +49, GALA 5/27- day +44 (arsenic currently on hold from 6/22).    -CT chest 6/6 c/w granulomatous disease (hx of TB in 1980's) . Monitor daily weights, diurese prn.   -hx tremors , unclear etiology. Cefepime held, s/p Baclofen - now improved  - CBC, coags/fibrinogen daily.  Goal plt >40-50, fibrinogen >150  - responding to ABO-matched platelets given over 3 hrs in 1/2 unit infusions   -monitor lytes daily, keep K>4, Mg>2.  EKG twice a week  -COVID19+ on 6/17, now neg  -Zoster rash on R buttock and back of RLE, crusted over. Resumed GALA 6/30  - Pain present, on gabapentin 100 mg TID, d/c morphine, tylenol prn  -RUE redness and itching at previous PICC site, pt refuses use of topical triamcinolone - states it makes itching worse  -RUE doppler showing axillary partially occlusive clot. On lovenox since 6/25. Repeat RUE doppler done 7/2, R subclavian vein with obstruction  -s/p bone marrow biopsy on 6/26 - c/w residual APL 35% marrow involvement, repeat BMbx deferred by pt/son until 7/13  - L PICC line placed 7/1, no mediport due to cytopenias  - hearing loss. ENT evaluation apprec s/p debrox soln for impacted R ear, audiology testing c/w severe chronic hearing loss R>L, recc outpt audiology f/u for assessment of hearing aid device  - c/o chest /epigastric pain o/n 7/2, and again today, stable CXR, no acute infiltrates, ECG done QTc 442, NSR, s/p repeat dose of maalox w/ improvement  - c/o dysuria 7/7 - check UA no signs of infection  - PICC line adjustment done 7/9, c/b edema, LUE sono neg for DVT, pt reports numbness and pain with use of picc line   - recc IR for TLC placement, d/c PICC

## 2020-07-10 NOTE — PROGRESS NOTE ADULT - PROBLEM SELECTOR PLAN 1
Continue ATRA 40mg BID (started 5/22)   Arsenic Trioxide which was started on 5/27 was on hold (since 6/22- 6/29), now  shingles crusted resumed Arsenic on 6/30.  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 If unable to achieve goal to infuse 1/2 unit plts over 3 hours q12  S/p Prednisone 20mg PO daily x 5 days (through 6/12) due to platelet antibody  Keep Fibrinogen > 150, if <150 give Cryoprecipitate  Strict Is and Os/Daily weights/Mouth Care  BMBx (6/26) (+) for persistent disease, continue Arsenic trioxide and ATRA.  s/p PICC line placement on 7/1 7/5/20 scalp hypersensitivity - started on lidocaine gel to the scalp.  7/8 episode of chest pain - EKG no ectopy, cardiac biomarkers negative, CXR unchanged  7/9 PICC in Left Brachiocephalic vein, s/p PICC exchange   Patient refused Bone marrow biopsy on 7/9 plan for BMBX on 7/13 Continue ATRA 40mg BID (started 5/22)   Arsenic Trioxide which was started on 5/27 was on hold (since 6/22- 6/29). shingles crusted over, resumed Arsenic on 6/30.  Hold Arsenic today, no IV access, patient refusing IV/Arsenic today.  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 If unable to achieve goal to infuse 1/2 unit plts over 3 hours q12  S/p Prednisone 20mg PO daily x 5 days (through 6/12) due to platelet antibody  Keep Fibrinogen > 150, if <150 give Cryoprecipitate  Strict Is and Os/Daily weights/Mouth Care  BMBx (6/26) (+) for persistent disease, continue Arsenic trioxide and ATRA.  s/p PICC line placement on 7/1 7/5/20 scalp hypersensitivity - started on lidocaine gel to the scalp.  7/8 episode of chest pain - EKG no ectopy, cardiac biomarkers negative, CXR unchanged  7/9 PICC in Left Brachiocephalic vein, s/p PICC exchange - now removed due to pain/swelling LUE. Doppler negative, however, leaking from entry site and continued discomfort with infusions.  Patient refused Bone marrow biopsy on 7/9 plan for BMBx on 7/13

## 2020-07-10 NOTE — PROGRESS NOTE ADULT - PROBLEM SELECTOR PLAN 8
On Lovenox SQ BID for DVT treatment. Discontinue if platelets are < 50,000       Contact Information [phone]

## 2020-07-10 NOTE — PROGRESS NOTE ADULT - SUBJECTIVE AND OBJECTIVE BOX
Diagnosis: APL    Protocol/Chemo Regimen: ATRA/Arsenic Trioxide    Day: ATRA Day 49 and Arsenic 44 (Held from 6/22/20 to 6/30/2020 and resumed on 6/30/20)    Pt endorsed: generalized fatigue    Review of Systems: Denies nausea, vomiting, diarrhea, chest pain, SOB, headache      Translated by patient's son via phone call per patient request     Pain scale:     Diet: Consistent Carb w/ Evening Snack    Allergies: No Known Allergies      ANTIMICROBIALS  levoFLOXacin  Tablet 500 milliGRAM(s) Oral every 24 hours  posaconazole DR Tablet 300 milliGRAM(s) Oral daily      HEME/ONC MEDICATIONS  arsenic trioxide IVPB (eMAR) 11 milliGRAM(s) IV Intermittent every 24 hours  tretinoin 40 milliGRAM(s) Oral every 12 hours      STANDING MEDICATIONS  ammonium lactate 12% Lotion 1 Application(s) Topical two times a day  ATENolol  Tablet 25 milliGRAM(s) Oral daily  Biotene Dry Mouth Oral Rinse 5 milliLiter(s) Swish and Spit five times a day  bisacodyl 5 milliGRAM(s) Oral every 12 hours  calcium acetate 667 milliGRAM(s) Oral four times a day with meals  chlorhexidine 4% Liquid 1 Application(s) Topical <User Schedule>  dextrose 5%. 1000 milliLiter(s) IV Continuous <Continuous>  dextrose 50% Injectable 12.5 Gram(s) IV Push once  dextrose 50% Injectable 25 Gram(s) IV Push once  dextrose 50% Injectable 25 Gram(s) IV Push once  insulin lispro (HumaLOG) corrective regimen sliding scale   SubCutaneous three times a day before meals  insulin lispro (HumaLOG) corrective regimen sliding scale   SubCutaneous at bedtime  lidocaine 2% Gel 1 Application(s) Topical every 8 hours  pantoprazole    Tablet 40 milliGRAM(s) Oral before breakfast  petrolatum white Ointment 1 Application(s) Topical three times a day  polyethylene glycol 3350 17 Gram(s) Oral daily  triamcinolone 0.1% Ointment 1 Application(s) Topical every 12 hours      PRN MEDICATIONS  acetaminophen   Tablet .. 650 milliGRAM(s) Oral every 6 hours PRN  ALBUTerol    90 MICROgram(s) HFA Inhaler 2 Puff(s) Inhalation every 6 hours PRN  aluminum hydroxide/magnesium hydroxide/simethicone Suspension 30 milliLiter(s) Oral every 6 hours PRN  AQUAPHOR (petrolatum Ointment) 1 Application(s) Topical every 3 hours PRN  dextrose 40% Gel 15 Gram(s) Oral once PRN  diphenhydrAMINE   Injectable 25 milliGRAM(s) IV Push every 6 hours PRN  glucagon  Injectable 1 milliGRAM(s) IntraMuscular once PRN  ondansetron Injectable 8 milliGRAM(s) IV Push every 8 hours PRN  sodium chloride 0.9% lock flush 10 milliLiter(s) IV Push every 1 hour PRN  sodium chloride 0.9% lock flush 10 milliLiter(s) IV Push every 1 hour PRN      Vital Signs Last 24 Hrs  T(C): 36.7 (10 Jul 2020 04:55), Max: 36.8 (09 Jul 2020 21:26)  T(F): 98.1 (10 Jul 2020 04:55), Max: 98.2 (09 Jul 2020 21:26)  HR: 80 (10 Jul 2020 04:55) (70 - 80)  BP: 102/59 (10 Jul 2020 04:55) (102/59 - 157/99)  RR: 18 (10 Jul 2020 04:55) (18 - 20)  SpO2: 95% (10 Jul 2020 04:55) (94% - 97%)    PHYSICAL EXAM  General: adult in NAD  HEENT: clear oropharynx, no erythema, no ulcers  CV: normal S1, S2, RRR  Lungs: clear to auscultation, no wheezes, no rales  Abdomen: soft, nontender, nondistended, normal BS  Ext: no edema, RUE hyperpigmented.  Skin: Warm and dry, Some hyperpigmentation noted around RUE PICC line, Shingles-like rash noted on R buttock (appears crusting over)  R posterior thigh and right leg   Neuro: alert and oriented x 3  Central line Left PICC CDI     Cultures:     Culture - Urine (07.07.20 @ 21:58)    Specimen Source: .Urine Clean Catch (Midstream)    Culture Results:   <10,000 CFU/mL Normal Urogenital Krista      LABS:                            8.7    2.49  )-----------( 256      ( 10 Jul 2020 07:01 )             26.7         Mean Cell Volume : 93.4 fl  Mean Cell Hemoglobin : 30.4 pg  Mean Cell Hemoglobin Concentration : 32.6 gm/dL  Auto Neutrophil # : 0.73 K/uL  Auto Lymphocyte # : 1.38 K/uL  Auto Monocyte # : 0.24 K/uL  Auto Eosinophil # : 0.10 K/uL  Auto Basophil # : 0.03 K/uL  Auto Neutrophil % : 29.4 %  Auto Lymphocyte % : 55.4 %  Auto Monocyte % : 9.6 %  Auto Eosinophil % : 4.0 %  Auto Basophil % : 1.2 %        RADIOLOGY & ADDITIONAL STUDIES: Diagnosis: APL    Protocol/Chemo Regimen: ATRA/Arsenic Trioxide    Day: ATRA Day 49 and Arsenic 44 (Held from 6/22/20 to 6/30/2020 and resumed on 6/30/20)    Pt endorsed: generalized fatigue    Review of Systems: Denies nausea, vomiting, diarrhea, chest pain, SOB, headache      Translated by patient's son via phone call per patient request     Pain scale:     Diet: Consistent Carb w/ Evening Snack    Allergies: No Known Allergies      ANTIMICROBIALS  levoFLOXacin  Tablet 500 milliGRAM(s) Oral every 24 hours  posaconazole DR Tablet 300 milliGRAM(s) Oral daily      HEME/ONC MEDICATIONS  arsenic trioxide IVPB (eMAR) 11 milliGRAM(s) IV Intermittent every 24 hours  tretinoin 40 milliGRAM(s) Oral every 12 hours      STANDING MEDICATIONS  ammonium lactate 12% Lotion 1 Application(s) Topical two times a day  ATENolol  Tablet 25 milliGRAM(s) Oral daily  Biotene Dry Mouth Oral Rinse 5 milliLiter(s) Swish and Spit five times a day  bisacodyl 5 milliGRAM(s) Oral every 12 hours  calcium acetate 667 milliGRAM(s) Oral four times a day with meals  chlorhexidine 4% Liquid 1 Application(s) Topical <User Schedule>  dextrose 5%. 1000 milliLiter(s) IV Continuous <Continuous>  dextrose 50% Injectable 12.5 Gram(s) IV Push once  dextrose 50% Injectable 25 Gram(s) IV Push once  dextrose 50% Injectable 25 Gram(s) IV Push once  insulin lispro (HumaLOG) corrective regimen sliding scale   SubCutaneous three times a day before meals  insulin lispro (HumaLOG) corrective regimen sliding scale   SubCutaneous at bedtime  lidocaine 2% Gel 1 Application(s) Topical every 8 hours  pantoprazole    Tablet 40 milliGRAM(s) Oral before breakfast  petrolatum white Ointment 1 Application(s) Topical three times a day  polyethylene glycol 3350 17 Gram(s) Oral daily  triamcinolone 0.1% Ointment 1 Application(s) Topical every 12 hours      PRN MEDICATIONS  acetaminophen   Tablet .. 650 milliGRAM(s) Oral every 6 hours PRN  ALBUTerol    90 MICROgram(s) HFA Inhaler 2 Puff(s) Inhalation every 6 hours PRN  aluminum hydroxide/magnesium hydroxide/simethicone Suspension 30 milliLiter(s) Oral every 6 hours PRN  AQUAPHOR (petrolatum Ointment) 1 Application(s) Topical every 3 hours PRN  dextrose 40% Gel 15 Gram(s) Oral once PRN  diphenhydrAMINE   Injectable 25 milliGRAM(s) IV Push every 6 hours PRN  glucagon  Injectable 1 milliGRAM(s) IntraMuscular once PRN  ondansetron Injectable 8 milliGRAM(s) IV Push every 8 hours PRN  sodium chloride 0.9% lock flush 10 milliLiter(s) IV Push every 1 hour PRN  sodium chloride 0.9% lock flush 10 milliLiter(s) IV Push every 1 hour PRN      Vital Signs Last 24 Hrs  T(C): 36.7 (10 Jul 2020 04:55), Max: 36.8 (09 Jul 2020 21:26)  T(F): 98.1 (10 Jul 2020 04:55), Max: 98.2 (09 Jul 2020 21:26)  HR: 80 (10 Jul 2020 04:55) (70 - 80)  BP: 102/59 (10 Jul 2020 04:55) (102/59 - 157/99)  RR: 18 (10 Jul 2020 04:55) (18 - 20)  SpO2: 95% (10 Jul 2020 04:55) (94% - 97%)    PHYSICAL EXAM  General: adult in NAD  HEENT: clear oropharynx, no erythema, no ulcers  CV: normal S1, S2, RRR  Lungs: clear to auscultation, no wheezes, no rales  Abdomen: soft, nontender, nondistended, normal BS  Ext: no edema, RUE hyperpigmented.  Skin: Warm and dry, Some hyperpigmentation noted around RUE PICC line, Shingles-like rash noted on R buttock (appears crusting over)  R posterior thigh and right leg   Neuro: alert and oriented x 3  Central line Left PICC CDI     Cultures:     Culture - Urine (07.07.20 @ 21:58)    Specimen Source: .Urine Clean Catch (Midstream)    Culture Results:   <10,000 CFU/mL Normal Urogenital Krista      LABS:                        8.7    2.49  )-----------( 256      ( 10 Jul 2020 07:01 )             26.7         Mean Cell Volume : 93.4 fl  Mean Cell Hemoglobin : 30.4 pg  Mean Cell Hemoglobin Concentration : 32.6 gm/dL  Auto Neutrophil # : 0.73 K/uL  Auto Lymphocyte # : 1.38 K/uL  Auto Monocyte # : 0.24 K/uL  Auto Eosinophil # : 0.10 K/uL  Auto Basophil # : 0.03 K/uL  Auto Neutrophil % : 29.4 %  Auto Lymphocyte % : 55.4 %  Auto Monocyte % : 9.6 %  Auto Eosinophil % : 4.0 %  Auto Basophil % : 1.2 %      10 Jul 2020 07:01    140    |  104    |  13     ----------------------------<  113    4.2     |  26     |  0.91     Ca    10.4       10 Jul 2020 07:01  Phos  5.1       10 Jul 2020 07:01  Mg     2.0       10 Jul 2020 07:01    TPro  7.3    /  Alb  4.2    /  TBili  0.3    /  DBili  x      /  AST  33     /  ALT  25     /  AlkPhos  113    10 Jul 2020 07:01    PT/INR - ( 10 Jul 2020 07:01 )   PT: 13.0 sec;   INR: 1.15 ratio    PTT - ( 10 Jul 2020 07:01 )  PTT:34.9 sec    CAPILLARY BLOOD GLUCOSE  POCT Blood Glucose.: 118 mg/dL (10 Jul 2020 07:45)  POCT Blood Glucose.: 145 mg/dL (09 Jul 2020 21:15)  POCT Blood Glucose.: 178 mg/dL (09 Jul 2020 17:00)  POCT Blood Glucose.: 113 mg/dL (09 Jul 2020 14:30)    LIVER FUNCTIONS - ( 10 Jul 2020 07:01 )  Alb: 4.2 g/dL / Pro: 7.3 g/dL / ALK PHOS: 113 U/L / ALT: 25 U/L / AST: 33 U/L / GGT: x             RADIOLOGY & ADDITIONAL STUDIES:    from: [facility] Duplex Upper Ext Vein Scan, Left (07.10.20 @ 01:12)   IMPRESSION:   Left upper extremity PICC line in place.  No evidence of left upper extremity deep venous thrombosis.

## 2020-07-10 NOTE — PROGRESS NOTE ADULT - PROBLEM SELECTOR PLAN 2
Patient is Neutropenic, afebrile   6/29 Started Levaquin and Posaconazole for PPX  If spikes pan culture, CXR and change Levaquin to cefepime   6/23 Persistent right upper inner arm pain/swelling - CT no abscess, + DVT,   7/2 repeat Doppler - No infectious sources   6/22 Right buttock and upper thigh shingles continue Valtrex tx with Lac Hydrin  Previous COVID-19 (+) outpatient  Then COVID (-) on 5/22, 5/26, 6/2 and 6/9 6/16 COVID 19 (+)   6/18 COVID 19 Ab (+)  6/19 Repeat COVID-19 PCR (-)  7/7- burning urination Urinalysis negative, Urine cultures (-) Patient is neutropenic, afebrile   6/29 Started Levaquin and Posaconazole for PPX  If spikes pan culture, CXR and change Levaquin to cefepime   6/23 Persistent right upper inner arm pain/swelling - CT no abscess, + DVT,   7/2 repeat Doppler - No infectious sources   6/22 Right buttock and upper thigh shingles continue Valtrex tx with Lac Hydrin  Previous COVID-19 (+) outpatient  Then COVID (-) on 5/22, 5/26, 6/2 and 6/9 6/16 COVID 19 (+)   6/18 COVID 19 Ab (+)  6/19 Repeat COVID-19 PCR (-)  7/7- burning urination Urinalysis negative, Urine cultures (-)

## 2020-07-10 NOTE — PROGRESS NOTE ADULT - ASSESSMENT
Ms. Gant is a 61 y/o Hungarian-speaking female w/ a PMHx of HTN, HLD, T2DM, and COVID-19 infection in 4/2020 admitted for management of newly diagnosed APL. The patient has been receiving treatment with ATRA and Arsenic (ATRA since 5/22 and Arsenic 5/27). Arsenic which was on hold since 6/22 secondary to Herpes Zoster infection, resumed on 6/30.  Patient's hospital course has been complicated by transaminitis, refractory thrombocytopenia, COVID-19 (+) state (6/16), shingles, right axillary DVT superficial and occipital hypersensitivity.  The patient has pancytopenia 2/2 disease and/or ATRA/Arsenic.

## 2020-07-10 NOTE — ADVANCED PRACTICE NURSE CONSULT - ASSESSMENT
left upper arm PICC insertion. Site c/o pain  during iv infusion ,providers aware. Picc line DC'd.    as  per order Arsenic trioxide day 4 of 7 is on hold

## 2020-07-10 NOTE — CHART NOTE - NSCHARTNOTEFT_GEN_A_CORE
Called by RN to evaluate Pt. with c/o of itching in Left upper ext. Called by RN to evaluate Pt. with c/o of itching, pain in Left upper ext. Pt. sitting up in chair. In no acute distress. Pt's son utilized as  via phone per Pt's request. Pt. c/o pain and itching in left arm at site of removed PICC line insertion, and left antecubital area.  Both areas tender to palpation.  Left AC reddened and swollen.  Left forearm with reddened, swollen area, approx 3 in x 3 in wide.  Benadryl 25 mg PO ordered for itching.  CT left upper ext ordered without IV contrast due to lack of IV access.  Spoke to hem/onc fellow. Agrees with plan.  Continue to monitor closely. F/u with primary team in am.    Gaye Rea, ANP ext [phone]

## 2020-07-11 LAB
ALBUMIN SERPL ELPH-MCNC: 4.5 G/DL — SIGNIFICANT CHANGE UP (ref 3.3–5)
ALP SERPL-CCNC: 116 U/L — SIGNIFICANT CHANGE UP (ref 40–120)
ALT FLD-CCNC: 25 U/L — SIGNIFICANT CHANGE UP (ref 10–45)
ANION GAP SERPL CALC-SCNC: 13 MMOL/L — SIGNIFICANT CHANGE UP (ref 5–17)
AST SERPL-CCNC: 34 U/L — SIGNIFICANT CHANGE UP (ref 10–40)
BASOPHILS # BLD AUTO: 0.04 K/UL — SIGNIFICANT CHANGE UP (ref 0–0.2)
BASOPHILS NFR BLD AUTO: 1.1 % — SIGNIFICANT CHANGE UP (ref 0–2)
BILIRUB SERPL-MCNC: 0.3 MG/DL — SIGNIFICANT CHANGE UP (ref 0.2–1.2)
BUN SERPL-MCNC: 12 MG/DL — SIGNIFICANT CHANGE UP (ref 7–23)
CALCIUM SERPL-MCNC: 10.4 MG/DL — SIGNIFICANT CHANGE UP (ref 8.4–10.5)
CHLORIDE SERPL-SCNC: 103 MMOL/L — SIGNIFICANT CHANGE UP (ref 96–108)
CO2 SERPL-SCNC: 24 MMOL/L — SIGNIFICANT CHANGE UP (ref 22–31)
CREAT SERPL-MCNC: 0.97 MG/DL — SIGNIFICANT CHANGE UP (ref 0.5–1.3)
D DIMER BLD IA.RAPID-MCNC: 465 NG/ML DDU — HIGH
EOSINOPHIL # BLD AUTO: 0.3 K/UL — SIGNIFICANT CHANGE UP (ref 0–0.5)
EOSINOPHIL NFR BLD AUTO: 8.3 % — HIGH (ref 0–6)
FIBRINOGEN PPP-MCNC: 500 MG/DL — SIGNIFICANT CHANGE UP (ref 350–510)
GLUCOSE BLDC GLUCOMTR-MCNC: 109 MG/DL — HIGH (ref 70–99)
GLUCOSE BLDC GLUCOMTR-MCNC: 135 MG/DL — HIGH (ref 70–99)
GLUCOSE BLDC GLUCOMTR-MCNC: 140 MG/DL — HIGH (ref 70–99)
GLUCOSE BLDC GLUCOMTR-MCNC: 156 MG/DL — HIGH (ref 70–99)
GLUCOSE SERPL-MCNC: 114 MG/DL — HIGH (ref 70–99)
HCT VFR BLD CALC: 28.6 % — LOW (ref 34.5–45)
HGB BLD-MCNC: 9.2 G/DL — LOW (ref 11.5–15.5)
IMM GRANULOCYTES NFR BLD AUTO: 0.8 % — SIGNIFICANT CHANGE UP (ref 0–1.5)
LDH SERPL L TO P-CCNC: 175 U/L — SIGNIFICANT CHANGE UP (ref 50–242)
LYMPHOCYTES # BLD AUTO: 1.67 K/UL — SIGNIFICANT CHANGE UP (ref 1–3.3)
LYMPHOCYTES # BLD AUTO: 46.4 % — HIGH (ref 13–44)
MAGNESIUM SERPL-MCNC: 2 MG/DL — SIGNIFICANT CHANGE UP (ref 1.6–2.6)
MCHC RBC-ENTMCNC: 30.1 PG — SIGNIFICANT CHANGE UP (ref 27–34)
MCHC RBC-ENTMCNC: 32.2 GM/DL — SIGNIFICANT CHANGE UP (ref 32–36)
MCV RBC AUTO: 93.5 FL — SIGNIFICANT CHANGE UP (ref 80–100)
MONOCYTES # BLD AUTO: 0.2 K/UL — SIGNIFICANT CHANGE UP (ref 0–0.9)
MONOCYTES NFR BLD AUTO: 5.6 % — SIGNIFICANT CHANGE UP (ref 2–14)
NEUTROPHILS # BLD AUTO: 1.36 K/UL — LOW (ref 1.8–7.4)
NEUTROPHILS NFR BLD AUTO: 37.8 % — LOW (ref 43–77)
NRBC # BLD: 0 /100 WBCS — SIGNIFICANT CHANGE UP (ref 0–0)
PHOSPHATE SERPL-MCNC: 4.6 MG/DL — HIGH (ref 2.5–4.5)
PLATELET # BLD AUTO: 271 K/UL — SIGNIFICANT CHANGE UP (ref 150–400)
POTASSIUM SERPL-MCNC: 4.3 MMOL/L — SIGNIFICANT CHANGE UP (ref 3.5–5.3)
POTASSIUM SERPL-SCNC: 4.3 MMOL/L — SIGNIFICANT CHANGE UP (ref 3.5–5.3)
PROT SERPL-MCNC: 7.6 G/DL — SIGNIFICANT CHANGE UP (ref 6–8.3)
RBC # BLD: 3.06 M/UL — LOW (ref 3.8–5.2)
RBC # FLD: 17.1 % — HIGH (ref 10.3–14.5)
SODIUM SERPL-SCNC: 140 MMOL/L — SIGNIFICANT CHANGE UP (ref 135–145)
URATE SERPL-MCNC: 5.7 MG/DL — SIGNIFICANT CHANGE UP (ref 2.5–7)
WBC # BLD: 3.6 K/UL — LOW (ref 3.8–10.5)
WBC # FLD AUTO: 3.6 K/UL — LOW (ref 3.8–10.5)

## 2020-07-11 PROCEDURE — 99232 SBSQ HOSP IP/OBS MODERATE 35: CPT | Mod: GC

## 2020-07-11 RX ORDER — VALACYCLOVIR 500 MG/1
500 TABLET, FILM COATED ORAL DAILY
Refills: 0 | Status: DISCONTINUED | OUTPATIENT
Start: 2020-07-11 | End: 2020-07-17

## 2020-07-11 RX ORDER — ENOXAPARIN SODIUM 100 MG/ML
80 INJECTION SUBCUTANEOUS EVERY 12 HOURS
Refills: 0 | Status: DISCONTINUED | OUTPATIENT
Start: 2020-07-11 | End: 2020-07-12

## 2020-07-11 RX ADMIN — TRETINOIN 40 MILLIGRAM(S): 10 CAPSULE, LIQUID FILLED ORAL at 08:18

## 2020-07-11 RX ADMIN — Medication 1 APPLICATION(S): at 05:11

## 2020-07-11 RX ADMIN — Medication 5 MILLILITER(S): at 20:44

## 2020-07-11 RX ADMIN — PANTOPRAZOLE SODIUM 40 MILLIGRAM(S): 20 TABLET, DELAYED RELEASE ORAL at 05:10

## 2020-07-11 RX ADMIN — TRETINOIN 40 MILLIGRAM(S): 10 CAPSULE, LIQUID FILLED ORAL at 20:52

## 2020-07-11 RX ADMIN — Medication 1 DROP(S): at 14:58

## 2020-07-11 RX ADMIN — Medication 1 DROP(S): at 05:10

## 2020-07-11 RX ADMIN — LIDOCAINE 1 APPLICATION(S): 4 CREAM TOPICAL at 22:08

## 2020-07-11 RX ADMIN — LIDOCAINE 1 APPLICATION(S): 4 CREAM TOPICAL at 14:57

## 2020-07-11 RX ADMIN — Medication 5 MILLIGRAM(S): at 05:10

## 2020-07-11 RX ADMIN — LIDOCAINE 1 APPLICATION(S): 4 CREAM TOPICAL at 05:11

## 2020-07-11 RX ADMIN — Medication 5 MILLILITER(S): at 12:27

## 2020-07-11 RX ADMIN — Medication 1 APPLICATION(S): at 05:12

## 2020-07-11 RX ADMIN — POSACONAZOLE 300 MILLIGRAM(S): 100 TABLET, DELAYED RELEASE ORAL at 12:27

## 2020-07-11 RX ADMIN — VALACYCLOVIR 500 MILLIGRAM(S): 500 TABLET, FILM COATED ORAL at 18:00

## 2020-07-11 RX ADMIN — Medication 5 MILLILITER(S): at 16:45

## 2020-07-11 RX ADMIN — Medication 5 MILLILITER(S): at 08:18

## 2020-07-11 RX ADMIN — Medication 5 MILLILITER(S): at 23:34

## 2020-07-11 RX ADMIN — Medication 1 APPLICATION(S): at 18:00

## 2020-07-11 RX ADMIN — Medication 1 APPLICATION(S): at 22:09

## 2020-07-11 RX ADMIN — ENOXAPARIN SODIUM 80 MILLIGRAM(S): 100 INJECTION SUBCUTANEOUS at 17:59

## 2020-07-11 RX ADMIN — Medication 1 DROP(S): at 22:08

## 2020-07-11 RX ADMIN — Medication 1 APPLICATION(S): at 17:59

## 2020-07-11 RX ADMIN — Medication 1 APPLICATION(S): at 14:57

## 2020-07-11 RX ADMIN — ATENOLOL 25 MILLIGRAM(S): 25 TABLET ORAL at 05:10

## 2020-07-11 NOTE — PROGRESS NOTE ADULT - PROBLEM SELECTOR PLAN 1
Continue ATRA 40mg BID (started 5/22)   Arsenic Trioxide which was started on 5/27 was on hold (since 6/22- 6/29). shingles crusted over, resumed Arsenic on 6/30.  Hold Arsenic today, no IV access, patient refusing IV/Arsenic today.  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 If unable to achieve goal to infuse 1/2 unit plts over 3 hours q12  S/p Prednisone 20mg PO daily x 5 days (through 6/12) due to platelet antibody  Keep Fibrinogen > 150, if <150 give Cryoprecipitate  Strict Is and Os/Daily weights/Mouth Care  BMBx (6/26) (+) for persistent disease, continue Arsenic trioxide and ATRA.  s/p PICC line placement on 7/1 7/5/20 scalp hypersensitivity - started on lidocaine gel to the scalp.  7/8 episode of chest pain - EKG no ectopy, cardiac biomarkers negative, CXR unchanged  7/9 PICC in Left Brachiocephalic vein, s/p PICC exchange - now removed due to pain/swelling LUE. Doppler negative, however, leaking from entry site and continued discomfort with infusions.  Patient refused Bone marrow biopsy on 7/9 plan for BMBx on 7/13 Repeat BMBx (6/26)(+) for persistent disease, continue Arsenic trioxide and ATRA.  Continue ATRA 40mg BID (started 5/22)   Arsenic Trioxide started on 5/27.  Held 6/22- 6/29 due to Zoster infection, resumed Arsenic on 6/30.  Held Arsenic x 1 on 7/10, no IV access. Resumed again on 7/11  Monitor for differentiation syndrome   check EKG for QTc prolongation every Tuesday and Friday while on arsenic   Keep K > 4 and Mg > 2 while on arsenic   Follow up CBC with diff, CMP, TLS and DIC panel daily  Keep PLTs >50 K. If unable to achieve goal to infuse 1/2 unit plts over 3 hours q12  S/p Prednisone 20mg PO daily x 5 days (through 6/12) due to platelet antibody  Keep Fibrinogen > 150, if <150 give Cryoprecipitate  Strict Is and Os/Daily weights/Mouth Care  7/5/20 scalp hypersensitivity - started on lidocaine gel to the scalp.  7/8 episode of chest pain - EKG no ectopy, cardiac biomarkers negative, CXR unchanged  7/9 PICC in Left Brachiocephalic vein, s/p PICC exchange - now removed due to pain/swelling LUE. Doppler negative, however, leaking from entry site and continued discomfort with infusions.  Patient refused Bone marrow biopsy on 7/9 plan for BMBx on 7/13  Arrange for Mediport with IR on Monday 7/13, best option for long term access

## 2020-07-11 NOTE — PROGRESS NOTE ADULT - PROBLEM SELECTOR PLAN 2
Patient is neutropenic, afebrile   6/29 Started Levaquin and Posaconazole for PPX  If spikes pan culture, CXR and change Levaquin to cefepime   6/23 Persistent right upper inner arm pain/swelling - CT no abscess, + DVT,   7/2 repeat Doppler - No infectious sources   6/22 Right buttock and upper thigh shingles continue Valtrex tx with Lac Hydrin  Previous COVID-19 (+) outpatient  Then COVID (-) on 5/22, 5/26, 6/2 and 6/9 6/16 COVID 19 (+)   6/18 COVID 19 Ab (+)  6/19 Repeat COVID-19 PCR (-)  7/7- burning urination Urinalysis negative, Urine cultures (-) Patient is neutropenic, afebrile   6/29 Started Levaquin and Posaconazole for PPX  If spikes pan culture, CXR and change Levaquin to cefepime   6/23 Persistent right upper inner arm pain/swelling - CT no abscess, + DVT,   7/2 repeat Doppler - No infectious sources, R Axilla DVT resolved  6/22 Right buttock and upper thigh shingles continue Valtrex tx with Lac Hydrin  Previous COVID-19 (+) outpatient  Then COVID (-) on 5/22, 5/26, 6/2 and 6/9 6/16 COVID 19 (+)   6/18 COVID 19 Ab (+)  6/19 Repeat COVID-19 PCR (-)  7/7- burning urination Urinalysis negative, Urine cultures (-)

## 2020-07-11 NOTE — ADVANCED PRACTICE NURSE CONSULT - ASSESSMENT
Pt. seen in bed a/ox4 ,denies any discomfort at this time. Son as the , Chemotherapy teachings done .Both verbalized understanding. IV team inserted g#22 to right lower arm . Dsg dry and intact. Site with no s/s of redness, swelling or pain ,with positive blood return noted and flushing easily with 10 ML NS. Lab values reviewed on rounds by Dr. Solis . EKG done friday. At 1627 pt. treated with arsenic trioxide IVPB (eMAR) {Known as TRISENOX (eMAR)}  11 milliGRAM(s) in dextrose 5% 250 milliLiter(s), IV Intermittent, every 24 hours, infuse over 2 Hour(s), connected to normal saline line thru PIV via alaris pump. Left pt.comfortable in bed.Primary RN aware of present treatment.  .

## 2020-07-11 NOTE — PROGRESS NOTE ADULT - SUBJECTIVE AND OBJECTIVE BOX
Diagnosis: APL    Protocol/Chemo Regimen: ATRA/Arsenic Trioxide    Day: ATRA Day 50 and Arsenic 45 (Held from 6/22/20 to 6/30/2020 and resumed on 6/30/20, held on 7/10)    Pt endorsed: generalized fatigue    Review of Systems: Denies nausea, vomiting, diarrhea, chest pain, SOB, headache      Translated by patient's son via phone call per patient request     Pain scale:     Diet: Consistent Carb w/ Evening Snack    Allergies: No Known Allergies    ANTIMICROBIALS  levoFLOXacin  Tablet 500 milliGRAM(s) Oral every 24 hours  posaconazole DR Tablet 300 milliGRAM(s) Oral daily      HEME/ONC MEDICATIONS  arsenic trioxide IVPB (eMAR) 11 milliGRAM(s) IV Intermittent every 24 hours  tretinoin 40 milliGRAM(s) Oral every 12 hours      STANDING MEDICATIONS  ammonium lactate 12% Lotion 1 Application(s) Topical two times a day  artificial  tears Solution 1 Drop(s) Both EYES three times a day  ATENolol  Tablet 25 milliGRAM(s) Oral daily  Biotene Dry Mouth Oral Rinse 5 milliLiter(s) Swish and Spit five times a day  bisacodyl 5 milliGRAM(s) Oral every 12 hours  chlorhexidine 4% Liquid 1 Application(s) Topical <User Schedule>  dextrose 5%. 1000 milliLiter(s) IV Continuous <Continuous>  dextrose 50% Injectable 12.5 Gram(s) IV Push once  dextrose 50% Injectable 25 Gram(s) IV Push once  dextrose 50% Injectable 25 Gram(s) IV Push once  insulin lispro (HumaLOG) corrective regimen sliding scale   SubCutaneous three times a day before meals  insulin lispro (HumaLOG) corrective regimen sliding scale   SubCutaneous at bedtime  lidocaine 2% Gel 1 Application(s) Topical every 8 hours  pantoprazole    Tablet 40 milliGRAM(s) Oral before breakfast  petrolatum white Ointment 1 Application(s) Topical three times a day  polyethylene glycol 3350 17 Gram(s) Oral daily  triamcinolone 0.1% Ointment 1 Application(s) Topical every 12 hours      PRN MEDICATIONS  acetaminophen   Tablet .. 650 milliGRAM(s) Oral every 6 hours PRN  ALBUTerol    90 MICROgram(s) HFA Inhaler 2 Puff(s) Inhalation every 6 hours PRN  aluminum hydroxide/magnesium hydroxide/simethicone Suspension 30 milliLiter(s) Oral every 6 hours PRN  AQUAPHOR (petrolatum Ointment) 1 Application(s) Topical every 3 hours PRN  dextrose 40% Gel 15 Gram(s) Oral once PRN  diphenhydrAMINE   Injectable 25 milliGRAM(s) IV Push every 6 hours PRN  glucagon  Injectable 1 milliGRAM(s) IntraMuscular once PRN  ondansetron Injectable 8 milliGRAM(s) IV Push every 8 hours PRN  sodium chloride 0.9% lock flush 10 milliLiter(s) IV Push every 1 hour PRN  sodium chloride 0.9% lock flush 10 milliLiter(s) IV Push every 1 hour PRN        Vital Signs Last 24 Hrs  T(C): 36.7 (11 Jul 2020 05:17), Max: 37 (10 Jul 2020 22:04)  T(F): 98 (11 Jul 2020 05:17), Max: 98.6 (10 Jul 2020 22:04)  HR: 89 (11 Jul 2020 05:17) (78 - 89)  BP: 122/62 (11 Jul 2020 05:17) (106/52 - 157/80)  BP(mean): --  RR: 18 (11 Jul 2020 05:17) (18 - 18)  SpO2: 94% (11 Jul 2020 05:17) (94% - 98%)    PHYSICAL EXAM  General: adult in NAD  HEENT: clear oropharynx, anicteric sclera, pink conjunctiva  Neck: supple  CV: normal S1/S2 RRR  Lungs: positive air movement b/l ant lungs,clear to auscultation, no wheezes, no rales  Abdomen: soft non-tender non-distended, no hepatosplenomegaly  Ext: no clubbing cyanosis or edema  Skin: no rashes and no petechiae  Neuro: alert and oriented X 4, no focal deficits  Central Line: normal    LABS:    Blood Cultures:                           8.7    2.49  )-----------( 256      ( 10 Jul 2020 07:01 )             26.7         Mean Cell Volume : 93.4 fl  Mean Cell Hemoglobin : 30.4 pg  Mean Cell Hemoglobin Concentration : 32.6 gm/dL  Auto Neutrophil # : 0.73 K/uL  Auto Lymphocyte # : 1.38 K/uL  Auto Monocyte # : 0.24 K/uL  Auto Eosinophil # : 0.10 K/uL  Auto Basophil # : 0.03 K/uL  Auto Neutrophil % : 29.4 %  Auto Lymphocyte % : 55.4 %  Auto Monocyte % : 9.6 %  Auto Eosinophil % : 4.0 %  Auto Basophil % : 1.2 %      07-10    140  |  104  |  13  ----------------------------<  113<H>  4.2   |  26  |  0.91    Ca    10.4      10 Jul 2020 07:01  Phos  5.1     07-10  Mg     2.0     07-10    TPro  7.3  /  Alb  4.2  /  TBili  0.3  /  DBili  x   /  AST  33  /  ALT  25  /  AlkPhos  113  07-10          PT/INR - ( 10 Jul 2020 07:01 )   PT: 13.0 sec;   INR: 1.15 ratio         PTT - ( 10 Jul 2020 07:01 )  PTT:34.9 sec        RADIOLOGY & ADDITIONAL STUDIES: Diagnosis: APL    Protocol/Chemo Regimen: ATRA/Arsenic Trioxide    Day: ATRA Day 50 and Arsenic 45 (Held from 6/22/20 to 6/30/2020 and resumed on 6/30/20, held on 7/10)    Pt endorsed: generalized fatigue    Review of Systems: Denies nausea, vomiting, diarrhea, chest pain, SOB, headache      Translated by patient's son via phone call per patient request     Pain scale:     Diet: Consistent Carb w/ Evening Snack    Allergies: No Known Allergies    ANTIMICROBIALS  levoFLOXacin  Tablet 500 milliGRAM(s) Oral every 24 hours  posaconazole DR Tablet 300 milliGRAM(s) Oral daily      HEME/ONC MEDICATIONS  arsenic trioxide IVPB (eMAR) 11 milliGRAM(s) IV Intermittent every 24 hours  tretinoin 40 milliGRAM(s) Oral every 12 hours      STANDING MEDICATIONS  ammonium lactate 12% Lotion 1 Application(s) Topical two times a day  artificial  tears Solution 1 Drop(s) Both EYES three times a day  ATENolol  Tablet 25 milliGRAM(s) Oral daily  Biotene Dry Mouth Oral Rinse 5 milliLiter(s) Swish and Spit five times a day  bisacodyl 5 milliGRAM(s) Oral every 12 hours  chlorhexidine 4% Liquid 1 Application(s) Topical <User Schedule>  dextrose 5%. 1000 milliLiter(s) IV Continuous <Continuous>  dextrose 50% Injectable 12.5 Gram(s) IV Push once  dextrose 50% Injectable 25 Gram(s) IV Push once  dextrose 50% Injectable 25 Gram(s) IV Push once  insulin lispro (HumaLOG) corrective regimen sliding scale   SubCutaneous three times a day before meals  insulin lispro (HumaLOG) corrective regimen sliding scale   SubCutaneous at bedtime  lidocaine 2% Gel 1 Application(s) Topical every 8 hours  pantoprazole    Tablet 40 milliGRAM(s) Oral before breakfast  petrolatum white Ointment 1 Application(s) Topical three times a day  polyethylene glycol 3350 17 Gram(s) Oral daily  triamcinolone 0.1% Ointment 1 Application(s) Topical every 12 hours      PRN MEDICATIONS  acetaminophen   Tablet .. 650 milliGRAM(s) Oral every 6 hours PRN  ALBUTerol    90 MICROgram(s) HFA Inhaler 2 Puff(s) Inhalation every 6 hours PRN  aluminum hydroxide/magnesium hydroxide/simethicone Suspension 30 milliLiter(s) Oral every 6 hours PRN  AQUAPHOR (petrolatum Ointment) 1 Application(s) Topical every 3 hours PRN  dextrose 40% Gel 15 Gram(s) Oral once PRN  diphenhydrAMINE   Injectable 25 milliGRAM(s) IV Push every 6 hours PRN  glucagon  Injectable 1 milliGRAM(s) IntraMuscular once PRN  ondansetron Injectable 8 milliGRAM(s) IV Push every 8 hours PRN  sodium chloride 0.9% lock flush 10 milliLiter(s) IV Push every 1 hour PRN  sodium chloride 0.9% lock flush 10 milliLiter(s) IV Push every 1 hour PRN      Vital Signs Last 24 Hrs  T(C): 36.7 (11 Jul 2020 05:17), Max: 37 (10 Jul 2020 22:04)  T(F): 98 (11 Jul 2020 05:17), Max: 98.6 (10 Jul 2020 22:04)  HR: 89 (11 Jul 2020 05:17) (78 - 89)  BP: 122/62 (11 Jul 2020 05:17) (106/52 - 157/80)  RR: 18 (11 Jul 2020 05:17) (18 - 18)  SpO2: 94% (11 Jul 2020 05:17) (94% - 98%)      PHYSICAL EXAM  General: adult in NAD  HEENT: clear oropharynx, no erythema, no ulcers  CV: normal S1, S2, RRR  Lungs: clear to auscultation, no wheezes, no rales  Abdomen: soft, nontender, nondistended, normal BS  Ext: no edema, RUE hyperpigmented.  Skin: Warm and dry, Some hyperpigmentation noted around RUE PICC line, Shingles-like rash noted on R buttock (appears crusting over)  R posterior thigh and right leg   Neuro: alert and oriented x 3  Central line Left PICC CDI     Cultures:     Culture - Urine (07.07.20 @ 21:58)    Specimen Source: .Urine Clean Catch (Midstream)    Culture Results:   <10,000 CFU/mL Normal Urogenital Krista                                   9.2    3.60  )-----------( 271      ( 11 Jul 2020 10:14 )             28.6     11 Jul 2020 10:14    140    |  103    |  12     ----------------------------<  114    4.3     |  24     |  0.97     Ca    10.4       11 Jul 2020 10:14  Phos  4.6       11 Jul 2020 10:14  Mg     2.0       11 Jul 2020 10:14    TPro  7.6    /  Alb  4.5    /  TBili  0.3    /  DBili  x      /  AST  34     /  ALT  25     /  AlkPhos  116    11 Jul 2020 10:14    PT/INR - ( 10 Jul 2020 07:01 )   PT: 13.0 sec;   INR: 1.15 ratio    PTT - ( 10 Jul 2020 07:01 )  PTT:34.9 sec    CAPILLARY BLOOD GLUCOSE  POCT Blood Glucose.: 109 mg/dL (11 Jul 2020 08:43)  POCT Blood Glucose.: 146 mg/dL (10 Jul 2020 21:22)  POCT Blood Glucose.: 142 mg/dL (10 Jul 2020 16:46)  POCT Blood Glucose.: 105 mg/dL (10 Jul 2020 12:35)    LIVER FUNCTIONS - ( 11 Jul 2020 10:14 )  Alb: 4.5 g/dL / Pro: 7.6 g/dL / ALK PHOS: 116 U/L / ALT: 25 U/L / AST: 34 U/L / GGT: x           RADIOLOGY & ADDITIONAL STUDIES:    from: CT Upper Extremity No Cont, Left (07.10.20 @ 23:05)   IMPRESSION:  No fluid collection. Mild skin thickening and subcutaneous edemaanterolaterally at the level of the distal humeral shaft. Diagnosis: APL    Protocol/Chemo Regimen: ATRA/Arsenic Trioxide    Day: ATRA Day 50 and Arsenic 45 (Held from 6/22/20 to 6/30/2020 and resumed on 6/30/20, held on 7/10 x1)    Pt endorsed: generalized fatigue, left arm rash    Review of Systems: Denies nausea, vomiting, diarrhea, chest pain, SOB, headache      Translated by patient's son via phone call per patient request     Pain scale:     Diet: Consistent Carb w/ Evening Snack    Allergies: No Known Allergies    ANTIMICROBIALS  levoFLOXacin  Tablet 500 milliGRAM(s) Oral every 24 hours  posaconazole DR Tablet 300 milliGRAM(s) Oral daily      HEME/ONC MEDICATIONS  arsenic trioxide IVPB (eMAR) 11 milliGRAM(s) IV Intermittent every 24 hours  tretinoin 40 milliGRAM(s) Oral every 12 hours      STANDING MEDICATIONS  ammonium lactate 12% Lotion 1 Application(s) Topical two times a day  artificial  tears Solution 1 Drop(s) Both EYES three times a day  ATENolol  Tablet 25 milliGRAM(s) Oral daily  Biotene Dry Mouth Oral Rinse 5 milliLiter(s) Swish and Spit five times a day  bisacodyl 5 milliGRAM(s) Oral every 12 hours  chlorhexidine 4% Liquid 1 Application(s) Topical <User Schedule>  dextrose 5%. 1000 milliLiter(s) IV Continuous <Continuous>  dextrose 50% Injectable 12.5 Gram(s) IV Push once  dextrose 50% Injectable 25 Gram(s) IV Push once  dextrose 50% Injectable 25 Gram(s) IV Push once  insulin lispro (HumaLOG) corrective regimen sliding scale   SubCutaneous three times a day before meals  insulin lispro (HumaLOG) corrective regimen sliding scale   SubCutaneous at bedtime  lidocaine 2% Gel 1 Application(s) Topical every 8 hours  pantoprazole    Tablet 40 milliGRAM(s) Oral before breakfast  petrolatum white Ointment 1 Application(s) Topical three times a day  polyethylene glycol 3350 17 Gram(s) Oral daily  triamcinolone 0.1% Ointment 1 Application(s) Topical every 12 hours      PRN MEDICATIONS  acetaminophen   Tablet .. 650 milliGRAM(s) Oral every 6 hours PRN  ALBUTerol    90 MICROgram(s) HFA Inhaler 2 Puff(s) Inhalation every 6 hours PRN  aluminum hydroxide/magnesium hydroxide/simethicone Suspension 30 milliLiter(s) Oral every 6 hours PRN  AQUAPHOR (petrolatum Ointment) 1 Application(s) Topical every 3 hours PRN  dextrose 40% Gel 15 Gram(s) Oral once PRN  diphenhydrAMINE   Injectable 25 milliGRAM(s) IV Push every 6 hours PRN  glucagon  Injectable 1 milliGRAM(s) IntraMuscular once PRN  ondansetron Injectable 8 milliGRAM(s) IV Push every 8 hours PRN  sodium chloride 0.9% lock flush 10 milliLiter(s) IV Push every 1 hour PRN  sodium chloride 0.9% lock flush 10 milliLiter(s) IV Push every 1 hour PRN      Vital Signs Last 24 Hrs  T(C): 36.7 (11 Jul 2020 05:17), Max: 37 (10 Jul 2020 22:04)  T(F): 98 (11 Jul 2020 05:17), Max: 98.6 (10 Jul 2020 22:04)  HR: 89 (11 Jul 2020 05:17) (78 - 89)  BP: 122/62 (11 Jul 2020 05:17) (106/52 - 157/80)  RR: 18 (11 Jul 2020 05:17) (18 - 18)  SpO2: 94% (11 Jul 2020 05:17) (94% - 98%)      PHYSICAL EXAM  General: adult in NAD  HEENT: clear oropharynx, no erythema, no ulcers  CV: normal S1, S2, RRR  Lungs: clear to auscultation, no wheezes, no rales  Abdomen: soft, nontender, nondistended, normal BS  Ext: no edema, RUE hyperpigmented.  Skin: Warm and dry, Some hyperpigmentation noted around RUE PICC line, Shingles-like rash noted on R buttock (appears crusting over)  R posterior thigh and right leg   Neuro: alert and oriented x 3  Central line Left PICC CDI     Cultures:     Culture - Urine (07.07.20 @ 21:58)    Specimen Source: .Urine Clean Catch (Midstream)    Culture Results:   <10,000 CFU/mL Normal Urogenital Krista                                   9.2    3.60  )-----------( 271      ( 11 Jul 2020 10:14 )             28.6     11 Jul 2020 10:14    140    |  103    |  12     ----------------------------<  114    4.3     |  24     |  0.97     Ca    10.4       11 Jul 2020 10:14  Phos  4.6       11 Jul 2020 10:14  Mg     2.0       11 Jul 2020 10:14    TPro  7.6    /  Alb  4.5    /  TBili  0.3    /  DBili  x      /  AST  34     /  ALT  25     /  AlkPhos  116    11 Jul 2020 10:14    PT/INR - ( 10 Jul 2020 07:01 )   PT: 13.0 sec;   INR: 1.15 ratio    PTT - ( 10 Jul 2020 07:01 )  PTT:34.9 sec    CAPILLARY BLOOD GLUCOSE  POCT Blood Glucose.: 109 mg/dL (11 Jul 2020 08:43)  POCT Blood Glucose.: 146 mg/dL (10 Jul 2020 21:22)  POCT Blood Glucose.: 142 mg/dL (10 Jul 2020 16:46)  POCT Blood Glucose.: 105 mg/dL (10 Jul 2020 12:35)    LIVER FUNCTIONS - ( 11 Jul 2020 10:14 )  Alb: 4.5 g/dL / Pro: 7.6 g/dL / ALK PHOS: 116 U/L / ALT: 25 U/L / AST: 34 U/L / GGT: x           RADIOLOGY & ADDITIONAL STUDIES:    from: CT Upper Extremity No Cont, Left (07.10.20 @ 23:05)   IMPRESSION:  No fluid collection. Mild skin thickening and subcutaneous edemaanterolaterally at the level of the distal humeral shaft.

## 2020-07-11 NOTE — PROGRESS NOTE ADULT - ATTENDING COMMENTS
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  Presented with pancytopenia, flow cytometry/bone marrow biopsy/FISH done 5/26- c/w acute promyelocytic leukemia with PML-AWA translocation.    She was started on ATRA 5/23- day +49, GALA 5/27- day +44 (arsenic currently on hold from 6/22).    -CT chest 6/6 c/w granulomatous disease (hx of TB in 1980's) . Monitor daily weights, diurese prn.   -hx tremors , unclear etiology. Cefepime held, s/p Baclofen - now improved  - CBC, coags/fibrinogen daily.  Goal plt >40-50, fibrinogen >150  - responding to ABO-matched platelets given over 3 hrs in 1/2 unit infusions   -monitor lytes daily, keep K>4, Mg>2.  EKG twice a week  -COVID19+ on 6/17, now neg  -Zoster rash on R buttock and back of RLE, crusted over. Resumed GALA 6/30  - Pain present, on gabapentin 100 mg TID, d/c morphine, tylenol prn  -RUE redness and itching at previous PICC site, pt refuses use of topical triamcinolone - states it makes itching worse  -RUE doppler showing axillary partially occlusive clot. On lovenox since 6/25. Repeat RUE doppler done 7/2, R subclavian vein with obstruction  -s/p bone marrow biopsy on 6/26 - c/w residual APL 35% marrow involvement, repeat BMbx deferred by pt/son until 7/13  - L PICC line placed 7/1, no mediport due to cytopenias  - hearing loss. 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  Presented with pancytopenia, flow cytometry/bone marrow biopsy/FISH done 5/26- c/w acute promyelocytic leukemia with PML-AWA translocation.    She was started on ATRA 5/23- day +50, GALA 5/27- day +45 (arsenic held several times).    -pt had BM bx done on 6/26 showing persistent APL -had 14% blasts+Promyelo. Plan on repeat BM bx on 7/13, counts recovered, pt has had >30days GALA. If morphologically in CR, may stop GALA/ATRA and d/c home  -PICC out since 7/10, was leaking. Awaiting IV access, missed GALA on 7/10. Plan to do [facility] on 7/13, will HOLD Lovenox and make pt NPO  -monitor lytes daily, keep K>4, Mg>2.  EKG twice a week  -Zoster rash on R buttock and back of RLE, crusted over. Start prophylaxis with Valcyclovir and will continue on it  -RUE doppler showing axillary DVT. On therapeutic Lovenox since 6/25. Repeat RUE doppler done 7/2, R subclavian vein with obstruction

## 2020-07-11 NOTE — PROGRESS NOTE ADULT - PROBLEM SELECTOR PLAN 3
Patient with right upper arm pain and swelling s/p PICC line removal   6/23 [facility] Duplex (+) for acute partially occlusive DVT affecting right axillary vein   6/24 CT consistent with DVT, no abscess   Was on a Heparin gtt, now changed to SQ Lovenox q 12. Keep platelets >50,000  With associated rash suspected 2/2 adhesive, continue clobetasol per Derm  7/1- repeat doppler -Right axillary DVT appears resolved.  Decreased phasicity in the right subclavian vein suggests proximal obstruction. Patient with right upper arm pain and swelling s/p PICC line removal   6/23 [facility] Duplex (+) for acute partially occlusive DVT affecting right axillary vein   7/2- repeat doppler -Right axillary DVT appears resolved.  6/24 CT consistent with DVT, no abscess   Was on a Heparin gtt, now changed to SQ Lovenox q 12. Keep platelets >50,000  With associated rash suspected 2/2 adhesive, continue clobetasol per Derm  Decreased phasicity in the right subclavian vein suggests proximal obstruction.

## 2020-07-11 NOTE — PROGRESS NOTE ADULT - PROBLEM SELECTOR PLAN 8
On Lovenox SQ BID for DVT treatment. Discontinue if platelets are < 50,000       Contact Information [phone]

## 2020-07-11 NOTE — PROGRESS NOTE ADULT - ASSESSMENT
Ms. Gant is a 61 y/o Croatian-speaking female w/ a PMHx of HTN, HLD, T2DM, and COVID-19 infection in 4/2020 admitted for management of newly diagnosed APL. The patient has been receiving treatment with ATRA and Arsenic (ATRA since 5/22 and Arsenic 5/27). Arsenic which was on hold since 6/22 secondary to Herpes Zoster infection, resumed on 6/30.  Patient's hospital course has been complicated by transaminitis, refractory thrombocytopenia, COVID-19 (+) state (6/16), shingles, right axillary DVT superficial and occipital hypersensitivity.  The patient has pancytopenia 2/2 disease and/or ATRA/Arsenic.

## 2020-07-12 LAB
ALBUMIN SERPL ELPH-MCNC: 4.4 G/DL — SIGNIFICANT CHANGE UP (ref 3.3–5)
ALP SERPL-CCNC: 111 U/L — SIGNIFICANT CHANGE UP (ref 40–120)
ALT FLD-CCNC: 27 U/L — SIGNIFICANT CHANGE UP (ref 10–45)
ANION GAP SERPL CALC-SCNC: 11 MMOL/L — SIGNIFICANT CHANGE UP (ref 5–17)
AST SERPL-CCNC: 35 U/L — SIGNIFICANT CHANGE UP (ref 10–40)
BASOPHILS # BLD AUTO: 0.03 K/UL — SIGNIFICANT CHANGE UP (ref 0–0.2)
BASOPHILS NFR BLD AUTO: 0.8 % — SIGNIFICANT CHANGE UP (ref 0–2)
BILIRUB SERPL-MCNC: 0.3 MG/DL — SIGNIFICANT CHANGE UP (ref 0.2–1.2)
BUN SERPL-MCNC: 16 MG/DL — SIGNIFICANT CHANGE UP (ref 7–23)
CALCIUM SERPL-MCNC: 10.5 MG/DL — SIGNIFICANT CHANGE UP (ref 8.4–10.5)
CHLORIDE SERPL-SCNC: 103 MMOL/L — SIGNIFICANT CHANGE UP (ref 96–108)
CO2 SERPL-SCNC: 27 MMOL/L — SIGNIFICANT CHANGE UP (ref 22–31)
CREAT SERPL-MCNC: 1.01 MG/DL — SIGNIFICANT CHANGE UP (ref 0.5–1.3)
D DIMER BLD IA.RAPID-MCNC: 496 NG/ML DDU — HIGH
EOSINOPHIL # BLD AUTO: 0.21 K/UL — SIGNIFICANT CHANGE UP (ref 0–0.5)
EOSINOPHIL NFR BLD AUTO: 5.3 % — SIGNIFICANT CHANGE UP (ref 0–6)
FIBRINOGEN PPP-MCNC: 487 MG/DL — SIGNIFICANT CHANGE UP (ref 350–510)
GLUCOSE BLDC GLUCOMTR-MCNC: 114 MG/DL — HIGH (ref 70–99)
GLUCOSE BLDC GLUCOMTR-MCNC: 133 MG/DL — HIGH (ref 70–99)
GLUCOSE BLDC GLUCOMTR-MCNC: 176 MG/DL — HIGH (ref 70–99)
GLUCOSE BLDC GLUCOMTR-MCNC: 187 MG/DL — HIGH (ref 70–99)
GLUCOSE SERPL-MCNC: 139 MG/DL — HIGH (ref 70–99)
HCT VFR BLD CALC: 28.1 % — LOW (ref 34.5–45)
HGB BLD-MCNC: 9 G/DL — LOW (ref 11.5–15.5)
IMM GRANULOCYTES NFR BLD AUTO: 0.3 % — SIGNIFICANT CHANGE UP (ref 0–1.5)
LDH SERPL L TO P-CCNC: 176 U/L — SIGNIFICANT CHANGE UP (ref 50–242)
LYMPHOCYTES # BLD AUTO: 1.86 K/UL — SIGNIFICANT CHANGE UP (ref 1–3.3)
LYMPHOCYTES # BLD AUTO: 47.2 % — HIGH (ref 13–44)
MAGNESIUM SERPL-MCNC: 1.9 MG/DL — SIGNIFICANT CHANGE UP (ref 1.6–2.6)
MCHC RBC-ENTMCNC: 30.2 PG — SIGNIFICANT CHANGE UP (ref 27–34)
MCHC RBC-ENTMCNC: 32 GM/DL — SIGNIFICANT CHANGE UP (ref 32–36)
MCV RBC AUTO: 94.3 FL — SIGNIFICANT CHANGE UP (ref 80–100)
MONOCYTES # BLD AUTO: 0.27 K/UL — SIGNIFICANT CHANGE UP (ref 0–0.9)
MONOCYTES NFR BLD AUTO: 6.9 % — SIGNIFICANT CHANGE UP (ref 2–14)
NEUTROPHILS # BLD AUTO: 1.56 K/UL — LOW (ref 1.8–7.4)
NEUTROPHILS NFR BLD AUTO: 39.5 % — LOW (ref 43–77)
NRBC # BLD: 0 /100 WBCS — SIGNIFICANT CHANGE UP (ref 0–0)
PHOSPHATE SERPL-MCNC: 3.9 MG/DL — SIGNIFICANT CHANGE UP (ref 2.5–4.5)
PLATELET # BLD AUTO: 263 K/UL — SIGNIFICANT CHANGE UP (ref 150–400)
POTASSIUM SERPL-MCNC: 4.1 MMOL/L — SIGNIFICANT CHANGE UP (ref 3.5–5.3)
POTASSIUM SERPL-SCNC: 4.1 MMOL/L — SIGNIFICANT CHANGE UP (ref 3.5–5.3)
PROT SERPL-MCNC: 7.6 G/DL — SIGNIFICANT CHANGE UP (ref 6–8.3)
RBC # BLD: 2.98 M/UL — LOW (ref 3.8–5.2)
RBC # FLD: 17.2 % — HIGH (ref 10.3–14.5)
SODIUM SERPL-SCNC: 141 MMOL/L — SIGNIFICANT CHANGE UP (ref 135–145)
URATE SERPL-MCNC: 5.7 MG/DL — SIGNIFICANT CHANGE UP (ref 2.5–7)
WBC # BLD: 3.94 K/UL — SIGNIFICANT CHANGE UP (ref 3.8–10.5)
WBC # FLD AUTO: 3.94 K/UL — SIGNIFICANT CHANGE UP (ref 3.8–10.5)

## 2020-07-12 PROCEDURE — 99232 SBSQ HOSP IP/OBS MODERATE 35: CPT | Mod: GC

## 2020-07-12 RX ORDER — NYSTATIN CREAM 100000 [USP'U]/G
1 CREAM TOPICAL
Refills: 0 | Status: DISCONTINUED | OUTPATIENT
Start: 2020-07-12 | End: 2020-07-17

## 2020-07-12 RX ADMIN — Medication 1 APPLICATION(S): at 14:27

## 2020-07-12 RX ADMIN — Medication 1 APPLICATION(S): at 05:54

## 2020-07-12 RX ADMIN — LIDOCAINE 1 APPLICATION(S): 4 CREAM TOPICAL at 21:33

## 2020-07-12 RX ADMIN — Medication 1 APPLICATION(S): at 21:32

## 2020-07-12 RX ADMIN — TRETINOIN 40 MILLIGRAM(S): 10 CAPSULE, LIQUID FILLED ORAL at 08:19

## 2020-07-12 RX ADMIN — Medication 1: at 13:01

## 2020-07-12 RX ADMIN — LIDOCAINE 1 APPLICATION(S): 4 CREAM TOPICAL at 05:48

## 2020-07-12 RX ADMIN — Medication 1 APPLICATION(S): at 05:47

## 2020-07-12 RX ADMIN — Medication 1 APPLICATION(S): at 17:39

## 2020-07-12 RX ADMIN — NYSTATIN CREAM 1 APPLICATION(S): 100000 CREAM TOPICAL at 17:39

## 2020-07-12 RX ADMIN — POSACONAZOLE 300 MILLIGRAM(S): 100 TABLET, DELAYED RELEASE ORAL at 12:28

## 2020-07-12 RX ADMIN — LIDOCAINE 1 APPLICATION(S): 4 CREAM TOPICAL at 14:27

## 2020-07-12 RX ADMIN — Medication 5 MILLILITER(S): at 08:19

## 2020-07-12 RX ADMIN — ATENOLOL 25 MILLIGRAM(S): 25 TABLET ORAL at 05:48

## 2020-07-12 RX ADMIN — PANTOPRAZOLE SODIUM 40 MILLIGRAM(S): 20 TABLET, DELAYED RELEASE ORAL at 05:48

## 2020-07-12 RX ADMIN — TRETINOIN 40 MILLIGRAM(S): 10 CAPSULE, LIQUID FILLED ORAL at 21:27

## 2020-07-12 RX ADMIN — Medication 5 MILLILITER(S): at 12:28

## 2020-07-12 RX ADMIN — Medication 1 APPLICATION(S): at 05:49

## 2020-07-12 RX ADMIN — Medication 5 MILLIGRAM(S): at 05:48

## 2020-07-12 RX ADMIN — Medication 5 MILLILITER(S): at 16:43

## 2020-07-12 RX ADMIN — CHLORHEXIDINE GLUCONATE 1 APPLICATION(S): 213 SOLUTION TOPICAL at 08:20

## 2020-07-12 NOTE — ADVANCED PRACTICE NURSE CONSULT - ASSESSMENT
Lab results reviewed by Dr. John. Patient aware of her treatment,aware of common side effects to watch. Patient with right metatarsal vein peripheral IV gauge 22 patent intact. 2 RNs verification completed prior to start of treatment. Arsenic trioxide 11mg in 261ml D5W started at 1400 as 2 hours infusion at 131ml/hr via lowest port of NSS line through right hand peripheral IV. Primary RN aware of plan of care. Patient looks comfortable denies chesdtpain and back pain.  Safety maintained. Resting on bed with right arm resting on one pillow.

## 2020-07-12 NOTE — PROGRESS NOTE ADULT - SUBJECTIVE AND OBJECTIVE BOX
Diagnosis: APL    Protocol/Chemo Regimen: ATRA/Arsenic Trioxide    Day: ATRA Day 51 and Arsenic 46 (Held from 6/22/20 to 6/30/2020 and resumed on 6/30/20, held on 7/10 x1)    Pt endorsed: generalized fatigue, left arm rash    Review of Systems: Denies nausea, vomiting, diarrhea, chest pain, SOB, headache      Translated by patient's son via phone call per patient request     Pain scale:     Diet: Consistent Carb w/ Evening Snack    Allergies: No Known Allergies      ANTIMICROBIALS  levoFLOXacin  Tablet 500 milliGRAM(s) Oral every 24 hours  posaconazole DR Tablet 300 milliGRAM(s) Oral daily  valACYclovir 500 milliGRAM(s) Oral daily      HEME/ONC MEDICATIONS  arsenic trioxide IVPB (eMAR) 11 milliGRAM(s) IV Intermittent every 24 hours  enoxaparin Injectable 80 milliGRAM(s) SubCutaneous every 12 hours  tretinoin 40 milliGRAM(s) Oral every 12 hours      STANDING MEDICATIONS  ammonium lactate 12% Lotion 1 Application(s) Topical two times a day  artificial  tears Solution 1 Drop(s) Both EYES three times a day  ATENolol  Tablet 25 milliGRAM(s) Oral daily  Biotene Dry Mouth Oral Rinse 5 milliLiter(s) Swish and Spit five times a day  bisacodyl 5 milliGRAM(s) Oral every 12 hours  chlorhexidine 4% Liquid 1 Application(s) Topical <User Schedule>  dextrose 5%. 1000 milliLiter(s) IV Continuous <Continuous>  dextrose 50% Injectable 12.5 Gram(s) IV Push once  dextrose 50% Injectable 25 Gram(s) IV Push once  dextrose 50% Injectable 25 Gram(s) IV Push once  insulin lispro (HumaLOG) corrective regimen sliding scale   SubCutaneous three times a day before meals  insulin lispro (HumaLOG) corrective regimen sliding scale   SubCutaneous at bedtime  lidocaine 2% Gel 1 Application(s) Topical every 8 hours  pantoprazole    Tablet 40 milliGRAM(s) Oral before breakfast  petrolatum white Ointment 1 Application(s) Topical three times a day  polyethylene glycol 3350 17 Gram(s) Oral daily  triamcinolone 0.1% Ointment 1 Application(s) Topical every 12 hours      PRN MEDICATIONS  acetaminophen   Tablet .. 650 milliGRAM(s) Oral every 6 hours PRN  ALBUTerol    90 MICROgram(s) HFA Inhaler 2 Puff(s) Inhalation every 6 hours PRN  aluminum hydroxide/magnesium hydroxide/simethicone Suspension 30 milliLiter(s) Oral every 6 hours PRN  AQUAPHOR (petrolatum Ointment) 1 Application(s) Topical every 3 hours PRN  dextrose 40% Gel 15 Gram(s) Oral once PRN  diphenhydrAMINE   Injectable 25 milliGRAM(s) IV Push every 6 hours PRN  glucagon  Injectable 1 milliGRAM(s) IntraMuscular once PRN  ondansetron Injectable 8 milliGRAM(s) IV Push every 8 hours PRN  sodium chloride 0.9% lock flush 10 milliLiter(s) IV Push every 1 hour PRN  sodium chloride 0.9% lock flush 10 milliLiter(s) IV Push every 1 hour PRN      Vital Signs Last 24 Hrs  T(C): 36.1 (12 Jul 2020 05:47), Max: 36.9 (11 Jul 2020 17:15)  T(F): 97 (12 Jul 2020 05:47), Max: 98.5 (11 Jul 2020 17:15)  HR: 77 (12 Jul 2020 05:47) (73 - 78)  BP: 135/71 (12 Jul 2020 05:47) (133/77 - 148/78)\  RR: 18 (12 Jul 2020 05:47) (18 - 18)  SpO2: 99% (12 Jul 2020 05:47) (93% - 99%)    PHYSICAL EXAM  General: adult in NAD  HEENT: clear oropharynx, no erythema, no ulcers  CV: normal S1, S2, RRR  Lungs: clear to auscultation, no wheezes, no rales  Abdomen: soft, nontender, nondistended, normal BS  Ext: no edema, RUE hyperpigmented.  Skin: Warm and dry, Some hyperpigmentation noted around RUE PICC line, Shingles-like rash noted on R buttock (appears crusting over)  R posterior thigh and right leg   Neuro: alert and oriented x 3  Central line Left PICC CDI     Cultures:     Culture - Urine (07.07.20 @ 21:58)    Specimen Source: .Urine Clean Catch (Midstream)    Culture Results:   <10,000 CFU/mL Normal Urogenital Krista    LABS:                       RADIOLOGY & ADDITIONAL STUDIES:        from: CT Upper Extremity No Cont, Left (07.10.20 @ 23:05)   IMPRESSION:  No fluid collection. Mild skin thickening and subcutaneous edemaanterolaterally at the level of the distal humeral shaft.

## 2020-07-12 NOTE — PROGRESS NOTE ADULT - PROBLEM SELECTOR PLAN 2
Patient is neutropenic, afebrile   6/29 Started Levaquin and Posaconazole for PPX  If spikes pan culture, CXR and change Levaquin to cefepime   6/23 Persistent right upper inner arm pain/swelling - CT no abscess, + DVT,   7/2 repeat Doppler - No infectious sources, R Axilla DVT resolved  6/22 Right buttock and upper thigh shingles continue Valtrex tx with Lac Hydrin  Previous COVID-19 (+) outpatient  Then COVID (-) on 5/22, 5/26, 6/2 and 6/9 6/16 COVID 19 (+)   6/18 COVID 19 Ab (+)  6/19 Repeat COVID-19 PCR (-)  7/7- burning urination Urinalysis negative, Urine cultures (-)

## 2020-07-12 NOTE — PROGRESS NOTE ADULT - ATTENDING COMMENTS
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  Presented with pancytopenia, flow cytometry/bone marrow biopsy/FISH done 5/26- c/w acute promyelocytic leukemia with PML-AWA translocation.    She was started on ATRA 5/23- day +50, GALA 5/27- day +45 (arsenic held several times).    -pt had BM bx done on 6/26 showing persistent APL -had 14% blasts+Promyelo. Plan on repeat BM bx on 7/13, counts recovered, pt has had >30days GALA. If morphologically in CR, may stop GALA/ATRA and d/c home  -PICC out since 7/10, was leaking. Awaiting IV access, missed GALA on 7/10. Plan to do MediPort on 7/13, will HOLD Lovenox and make pt NPO  -monitor lytes daily, keep K>4, Mg>2.  EKG twice a week  -Zoster rash on R buttock and back of RLE, crusted over. Start prophylaxis with Valcyclovir and will continue on it  -RUE doppler showing axillary DVT. On therapeutic Lovenox since 6/25. 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  Presented with pancytopenia, flow cytometry/bone marrow biopsy/FISH done 5/26- c/w acute promyelocytic leukemia with PML-AWA translocation.    She was started on ATRA 5/23- day +51, GALA 5/27- day +46 (arsenic held several times).    -pt had BM bx done on 6/26 showing persistent APL -had 14% blasts+Promyelo. Plan on repeat BM bx on 7/13, counts recovered, pt has had >30days GALA. If morphologically in CR, may stop GALA/ATRA and d/c home  -PICC out since 7/10, was leaking. Pt has peripheral IV access as of 7/12, missed 1 GALA dose on 7/10. Plan to do MediPort on 7/13, will HOLD Lovenox and make pt NPO  -monitor lytes daily, keep K>4, Mg>2.  EKG twice a week  -Zoster rash on R buttock and back of RLE, crusted over. Cont and will remain on VZV prophylaxis of Valcyclovir   -RUE doppler showing axillary DVT on 6/25, on therapeutic Lovenox since 6/25. Repeat RUE doppler done 7/2 -clot cleared. Will give 3 months of A/C

## 2020-07-12 NOTE — ADVANCED PRACTICE NURSE CONSULT - REASON FOR CONSULT
Arsenic trioxide day 4 of 7 (renewed x 7 days. Consent on chart. (patient missed 2 days of Arsenic related to difficult veins and family refusing PICC line placement and TLC.

## 2020-07-12 NOTE — PROGRESS NOTE ADULT - PROBLEM SELECTOR PLAN 1
Repeat BMBx (6/26)(+) for persistent disease, continue Arsenic trioxide and ATRA.  Continue ATRA 40mg BID (started 5/22)   Arsenic Trioxide started on 5/27.  Held 6/22- 6/29 due to Zoster infection, resumed Arsenic on 6/30.  Held Arsenic x 1 on 7/10, no IV access. Resumed again on 7/11  Monitor for differentiation syndrome   check EKG for QTc prolongation every Tuesday and Friday while on arsenic   Keep K > 4 and Mg > 2 while on arsenic   Follow up CBC with diff, CMP, TLS and DIC panel daily  Keep PLTs >50 K. If unable to achieve goal to infuse 1/2 unit plts over 3 hours q12  S/p Prednisone 20mg PO daily x 5 days (through 6/12) due to platelet antibody  Keep Fibrinogen > 150, if <150 give Cryoprecipitate  Strict Is and Os/Daily weights/Mouth Care  7/5/20 scalp hypersensitivity - started on lidocaine gel to the scalp.  7/8 episode of chest pain - EKG no ectopy, cardiac biomarkers negative, CXR unchanged  7/9 PICC in Left Brachiocephalic vein, s/p PICC exchange - now removed due to pain/swelling LUE. Doppler negative, however, leaking from entry site and continued discomfort with infusions.  Patient refused Bone marrow biopsy on 7/9 plan for BMBx on 7/13  Arrange for Mediport with IR on Monday 7/13, best option for long term access

## 2020-07-12 NOTE — PROGRESS NOTE ADULT - PROBLEM SELECTOR PLAN 8
On Lovenox SQ BID for DVT treatment. Discontinue if platelets are < 50,000       Contact Information [phone]

## 2020-07-12 NOTE — PROGRESS NOTE ADULT - ASSESSMENT
Ms. Gant is a 59 y/o Setswana-speaking female w/ a PMHx of HTN, HLD, T2DM, and COVID-19 infection in 4/2020 admitted for management of newly diagnosed APL. The patient has been receiving treatment with ATRA and Arsenic (ATRA since 5/22 and Arsenic 5/27). Arsenic which was on hold since 6/22 secondary to Herpes Zoster infection, resumed on 6/30.  Patient's hospital course has been complicated by transaminitis, refractory thrombocytopenia, COVID-19 (+) state (6/16), shingles, right axillary DVT superficial and occipital hypersensitivity.  The patient has pancytopenia 2/2 disease and/or ATRA/Arsenic.

## 2020-07-12 NOTE — PROGRESS NOTE ADULT - PROBLEM SELECTOR PLAN 3
Patient with right upper arm pain and swelling s/p PICC line removal   6/23 [facility] Duplex (+) for acute partially occlusive DVT affecting right axillary vein   7/2- repeat doppler -Right axillary DVT appears resolved.  6/24 CT consistent with DVT, no abscess   Was on a Heparin gtt, now changed to SQ Lovenox q 12. Keep platelets >50,000  With associated rash suspected 2/2 adhesive, continue clobetasol per Derm  Decreased phasicity in the right subclavian vein suggests proximal obstruction.

## 2020-07-13 ENCOUNTER — RESULT REVIEW (OUTPATIENT)
Age: 60
End: 2020-07-13

## 2020-07-13 LAB
ALBUMIN SERPL ELPH-MCNC: 4.6 G/DL — SIGNIFICANT CHANGE UP (ref 3.3–5)
ALP SERPL-CCNC: 112 U/L — SIGNIFICANT CHANGE UP (ref 40–120)
ALT FLD-CCNC: 28 U/L — SIGNIFICANT CHANGE UP (ref 10–45)
ANION GAP SERPL CALC-SCNC: 15 MMOL/L — SIGNIFICANT CHANGE UP (ref 5–17)
APTT BLD: 35.8 SEC — HIGH (ref 27.5–35.5)
AST SERPL-CCNC: 32 U/L — SIGNIFICANT CHANGE UP (ref 10–40)
BASOPHILS # BLD AUTO: 0 K/UL — SIGNIFICANT CHANGE UP (ref 0–0.2)
BASOPHILS NFR BLD AUTO: 0 % — SIGNIFICANT CHANGE UP (ref 0–2)
BILIRUB SERPL-MCNC: 0.3 MG/DL — SIGNIFICANT CHANGE UP (ref 0.2–1.2)
BLD GP AB SCN SERPL QL: NEGATIVE — SIGNIFICANT CHANGE UP
BUN SERPL-MCNC: 20 MG/DL — SIGNIFICANT CHANGE UP (ref 7–23)
CALCIUM SERPL-MCNC: 10.5 MG/DL — SIGNIFICANT CHANGE UP (ref 8.4–10.5)
CHLORIDE SERPL-SCNC: 103 MMOL/L — SIGNIFICANT CHANGE UP (ref 96–108)
CO2 SERPL-SCNC: 22 MMOL/L — SIGNIFICANT CHANGE UP (ref 22–31)
CREAT SERPL-MCNC: 0.98 MG/DL — SIGNIFICANT CHANGE UP (ref 0.5–1.3)
D DIMER BLD IA.RAPID-MCNC: 374 NG/ML DDU — HIGH
EOSINOPHIL # BLD AUTO: 0.26 K/UL — SIGNIFICANT CHANGE UP (ref 0–0.5)
EOSINOPHIL NFR BLD AUTO: 8 % — HIGH (ref 0–6)
FIBRINOGEN PPP-MCNC: 472 MG/DL — SIGNIFICANT CHANGE UP (ref 350–510)
GLUCOSE BLDC GLUCOMTR-MCNC: 114 MG/DL — HIGH (ref 70–99)
GLUCOSE BLDC GLUCOMTR-MCNC: 136 MG/DL — HIGH (ref 70–99)
GLUCOSE BLDC GLUCOMTR-MCNC: 136 MG/DL — HIGH (ref 70–99)
GLUCOSE BLDC GLUCOMTR-MCNC: 143 MG/DL — HIGH (ref 70–99)
GLUCOSE SERPL-MCNC: 117 MG/DL — HIGH (ref 70–99)
HCT VFR BLD CALC: 28.3 % — LOW (ref 34.5–45)
HGB BLD-MCNC: 9.2 G/DL — LOW (ref 11.5–15.5)
INR BLD: 1.16 RATIO — SIGNIFICANT CHANGE UP (ref 0.88–1.16)
LDH SERPL L TO P-CCNC: 185 U/L — SIGNIFICANT CHANGE UP (ref 50–242)
LYMPHOCYTES # BLD AUTO: 1.36 K/UL — SIGNIFICANT CHANGE UP (ref 1–3.3)
LYMPHOCYTES # BLD AUTO: 41.6 % — SIGNIFICANT CHANGE UP (ref 13–44)
MAGNESIUM SERPL-MCNC: 2.1 MG/DL — SIGNIFICANT CHANGE UP (ref 1.6–2.6)
MANUAL SMEAR VERIFICATION: SIGNIFICANT CHANGE UP
MCHC RBC-ENTMCNC: 30.3 PG — SIGNIFICANT CHANGE UP (ref 27–34)
MCHC RBC-ENTMCNC: 32.5 GM/DL — SIGNIFICANT CHANGE UP (ref 32–36)
MCV RBC AUTO: 93.1 FL — SIGNIFICANT CHANGE UP (ref 80–100)
METAMYELOCYTES # FLD: 0.9 % — HIGH (ref 0–0)
MONOCYTES # BLD AUTO: 0.08 K/UL — SIGNIFICANT CHANGE UP (ref 0–0.9)
MONOCYTES NFR BLD AUTO: 2.6 % — SIGNIFICANT CHANGE UP (ref 2–14)
NEUTROPHILS # BLD AUTO: 1.5 K/UL — LOW (ref 1.8–7.4)
NEUTROPHILS NFR BLD AUTO: 46 % — SIGNIFICANT CHANGE UP (ref 43–77)
PHOSPHATE SERPL-MCNC: 4.7 MG/DL — HIGH (ref 2.5–4.5)
PLAT MORPH BLD: NORMAL — SIGNIFICANT CHANGE UP
PLATELET # BLD AUTO: 277 K/UL — SIGNIFICANT CHANGE UP (ref 150–400)
POTASSIUM SERPL-MCNC: 4.2 MMOL/L — SIGNIFICANT CHANGE UP (ref 3.5–5.3)
POTASSIUM SERPL-SCNC: 4.2 MMOL/L — SIGNIFICANT CHANGE UP (ref 3.5–5.3)
PROT SERPL-MCNC: 8.1 G/DL — SIGNIFICANT CHANGE UP (ref 6–8.3)
PROTHROM AB SERPL-ACNC: 13.2 SEC — SIGNIFICANT CHANGE UP (ref 10.6–13.6)
RBC # BLD: 3.04 M/UL — LOW (ref 3.8–5.2)
RBC # FLD: 17.2 % — HIGH (ref 10.3–14.5)
RBC BLD AUTO: SIGNIFICANT CHANGE UP
RH IG SCN BLD-IMP: POSITIVE — SIGNIFICANT CHANGE UP
SODIUM SERPL-SCNC: 140 MMOL/L — SIGNIFICANT CHANGE UP (ref 135–145)
URATE SERPL-MCNC: 5.6 MG/DL — SIGNIFICANT CHANGE UP (ref 2.5–7)
VARIANT LYMPHS # BLD: 0.9 % — SIGNIFICANT CHANGE UP (ref 0–6)
WBC # BLD: 3.26 K/UL — LOW (ref 3.8–10.5)
WBC # FLD AUTO: 3.26 K/UL — LOW (ref 3.8–10.5)

## 2020-07-13 PROCEDURE — 85097 BONE MARROW INTERPRETATION: CPT

## 2020-07-13 PROCEDURE — 88189 FLOWCYTOMETRY/READ 16 & >: CPT

## 2020-07-13 PROCEDURE — 88313 SPECIAL STAINS GROUP 2: CPT | Mod: 26

## 2020-07-13 PROCEDURE — 88291 CYTO/MOLECULAR REPORT: CPT

## 2020-07-13 PROCEDURE — 99233 SBSQ HOSP IP/OBS HIGH 50: CPT | Mod: GC

## 2020-07-13 PROCEDURE — 88305 TISSUE EXAM BY PATHOLOGIST: CPT | Mod: 26

## 2020-07-13 PROCEDURE — 38222 DX BONE MARROW BX & ASPIR: CPT | Mod: AS

## 2020-07-13 RX ORDER — ENOXAPARIN SODIUM 100 MG/ML
80 INJECTION SUBCUTANEOUS
Refills: 0 | Status: DISCONTINUED | OUTPATIENT
Start: 2020-07-13 | End: 2020-07-17

## 2020-07-13 RX ADMIN — Medication 1 APPLICATION(S): at 17:31

## 2020-07-13 RX ADMIN — Medication 5 MILLILITER(S): at 17:33

## 2020-07-13 RX ADMIN — Medication 1 APPLICATION(S): at 06:14

## 2020-07-13 RX ADMIN — CHLORHEXIDINE GLUCONATE 1 APPLICATION(S): 213 SOLUTION TOPICAL at 07:46

## 2020-07-13 RX ADMIN — Medication 5 MILLILITER(S): at 21:38

## 2020-07-13 RX ADMIN — Medication 1 APPLICATION(S): at 06:16

## 2020-07-13 RX ADMIN — LIDOCAINE 1 APPLICATION(S): 4 CREAM TOPICAL at 06:16

## 2020-07-13 RX ADMIN — Medication 1 APPLICATION(S): at 13:14

## 2020-07-13 RX ADMIN — Medication 5 MILLIGRAM(S): at 06:15

## 2020-07-13 RX ADMIN — NYSTATIN CREAM 1 APPLICATION(S): 100000 CREAM TOPICAL at 17:32

## 2020-07-13 RX ADMIN — TRETINOIN 40 MILLIGRAM(S): 10 CAPSULE, LIQUID FILLED ORAL at 09:10

## 2020-07-13 RX ADMIN — Medication 1 APPLICATION(S): at 21:40

## 2020-07-13 RX ADMIN — Medication 5 MILLILITER(S): at 11:39

## 2020-07-13 RX ADMIN — LIDOCAINE 1 APPLICATION(S): 4 CREAM TOPICAL at 13:14

## 2020-07-13 RX ADMIN — POSACONAZOLE 300 MILLIGRAM(S): 100 TABLET, DELAYED RELEASE ORAL at 11:40

## 2020-07-13 RX ADMIN — NYSTATIN CREAM 1 APPLICATION(S): 100000 CREAM TOPICAL at 06:17

## 2020-07-13 RX ADMIN — Medication 1 APPLICATION(S): at 17:32

## 2020-07-13 RX ADMIN — TRETINOIN 40 MILLIGRAM(S): 10 CAPSULE, LIQUID FILLED ORAL at 21:38

## 2020-07-13 RX ADMIN — Medication 5 MILLILITER(S): at 07:48

## 2020-07-13 RX ADMIN — ENOXAPARIN SODIUM 80 MILLIGRAM(S): 100 INJECTION SUBCUTANEOUS at 17:32

## 2020-07-13 RX ADMIN — PANTOPRAZOLE SODIUM 40 MILLIGRAM(S): 20 TABLET, DELAYED RELEASE ORAL at 06:15

## 2020-07-13 RX ADMIN — LIDOCAINE 1 APPLICATION(S): 4 CREAM TOPICAL at 21:40

## 2020-07-13 RX ADMIN — ATENOLOL 25 MILLIGRAM(S): 25 TABLET ORAL at 06:15

## 2020-07-13 NOTE — CHART NOTE - NSCHARTNOTEFT_GEN_A_CORE
Hematology/Oncology Procedure Note    Bone Marrow Aspiration/Biopsy    Indication:    Bone marrow aspiration and biopsy procedure description, risks, and benefits were discussed in detail with the patient.  All questions were answered.  Informed consent was obtained and time-out performed.      The area of the left posterior iliac crest was prepped and draped using sterile technique. Local anesthetic with  2% Lidocaine.    Bone marrow aspiration and biopsy  was performed using sterile technique  by Dr Brock with my assist and supervision. Specimens were obtained.    The procedure was well tolerated and no local bleeding or other complications were observed.  Pressure was applied to the procedure site and a wound dressing was placed.  The patient and nursing staff were advised that the patient is to lie flat for 30 minutes post procedure. Tylenol may be used if no contraindications for pain at the procedure site.

## 2020-07-13 NOTE — PROGRESS NOTE ADULT - ATTENDING COMMENTS
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  Presented with pancytopenia, flow cytometry/bone marrow biopsy/FISH done 5/26- c/w acute promyelocytic leukemia with PML-AWA translocation.    She was started on ATRA 5/23- day +51, GALA 5/27- day +46 (arsenic held several times).    -pt had BM bx done on 6/26 showing persistent APL -had 14% blasts+Promyelo. Plan on repeat BM bx on 7/13, counts recovered, pt has had >30days GALA. If morphologically in CR, may stop GALA/ATRA and d/c home  -PICC out since 7/10, was leaking. Pt has peripheral IV access as of 7/12, missed 1 GALA dose on 7/10. Plan to do MediPort on 7/13, will HOLD Lovenox and make pt NPO  -monitor lytes daily, keep K>4, Mg>2.  EKG twice a week  -Zoster rash on R buttock and back of RLE, crusted over. Cont and will remain on VZV prophylaxis of Valcyclovir   -RUE doppler showing axillary DVT on 6/25, on therapeutic Lovenox since 6/25. Repeat RUE doppler done 7/2 -clot cleared. 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  Presented with pancytopenia, flow cytometry/bone marrow biopsy/FISH done 5/26- c/w acute promyelocytic leukemia with PML-AWA translocation.    She was started on ATRA 5/23- day +52, GALA 5/27- day +47 (arsenic held several times).    -pt had BM bx done on 6/26 showing persistent APL -had 14% blasts+Promyelo. Plan on repeat BM bx today (7/13), counts recovered, pt has had >30days GALA. If morphologically in CR, may stop GALA/ATRA and d/c home  -PICC out since 7/10, was leaking. Pt has peripheral IV access as of 7/12, missed 1 GALA dose on 7/10. Plan to do MediPort on 7/13, but son today would like to defer for a few days as pt is uncomfortable with getting it done  -monitor lytes daily, keep K>4, Mg>2.  EKG twice a week  -Zoster rash on R buttock and back of RLE, crusted over. Cont and will remain on VZV prophylaxis of Valcyclovir   -RUE doppler showing axillary DVT on 6/25, on therapeutic Lovenox since 6/25. Repeat RUE doppler done 7/2 -clot cleared. Will give 3 months of A/C

## 2020-07-13 NOTE — PROGRESS NOTE ADULT - SUBJECTIVE AND OBJECTIVE BOX
Diagnosis:    Protocol/Chemo Regimen:    Day:     Pt endorsed:    Review of Systems:     Pain scale:     Diet:     Allergies    No Known Allergies    Intolerances        ANTIMICROBIALS  levoFLOXacin  Tablet 500 milliGRAM(s) Oral every 24 hours  posaconazole DR Tablet 300 milliGRAM(s) Oral daily  valACYclovir 500 milliGRAM(s) Oral daily      HEME/ONC MEDICATIONS  arsenic trioxide IVPB (eMAR) 11 milliGRAM(s) IV Intermittent every 24 hours  tretinoin 40 milliGRAM(s) Oral every 12 hours      STANDING MEDICATIONS  ammonium lactate 12% Lotion 1 Application(s) Topical two times a day  artificial  tears Solution 1 Drop(s) Both EYES three times a day  ATENolol  Tablet 25 milliGRAM(s) Oral daily  Biotene Dry Mouth Oral Rinse 5 milliLiter(s) Swish and Spit five times a day  bisacodyl 5 milliGRAM(s) Oral every 12 hours  chlorhexidine 4% Liquid 1 Application(s) Topical <User Schedule>  dextrose 5%. 1000 milliLiter(s) IV Continuous <Continuous>  dextrose 50% Injectable 12.5 Gram(s) IV Push once  dextrose 50% Injectable 25 Gram(s) IV Push once  dextrose 50% Injectable 25 Gram(s) IV Push once  insulin lispro (HumaLOG) corrective regimen sliding scale   SubCutaneous three times a day before meals  insulin lispro (HumaLOG) corrective regimen sliding scale   SubCutaneous at bedtime  lidocaine 2% Gel 1 Application(s) Topical every 8 hours  nystatin Powder 1 Application(s) Topical two times a day  pantoprazole    Tablet 40 milliGRAM(s) Oral before breakfast  petrolatum white Ointment 1 Application(s) Topical three times a day  polyethylene glycol 3350 17 Gram(s) Oral daily  triamcinolone 0.1% Ointment 1 Application(s) Topical every 12 hours      PRN MEDICATIONS  acetaminophen   Tablet .. 650 milliGRAM(s) Oral every 6 hours PRN  ALBUTerol    90 MICROgram(s) HFA Inhaler 2 Puff(s) Inhalation every 6 hours PRN  aluminum hydroxide/magnesium hydroxide/simethicone Suspension 30 milliLiter(s) Oral every 6 hours PRN  AQUAPHOR (petrolatum Ointment) 1 Application(s) Topical every 3 hours PRN  dextrose 40% Gel 15 Gram(s) Oral once PRN  diphenhydrAMINE   Injectable 25 milliGRAM(s) IV Push every 6 hours PRN  glucagon  Injectable 1 milliGRAM(s) IntraMuscular once PRN  ondansetron Injectable 8 milliGRAM(s) IV Push every 8 hours PRN  sodium chloride 0.9% lock flush 10 milliLiter(s) IV Push every 1 hour PRN  sodium chloride 0.9% lock flush 10 milliLiter(s) IV Push every 1 hour PRN        Vital Signs Last 24 Hrs  T(C): 36.6 (13 Jul 2020 05:36), Max: 36.9 (12 Jul 2020 18:24)  T(F): 97.9 (13 Jul 2020 05:36), Max: 98.4 (12 Jul 2020 18:24)  HR: 68 (13 Jul 2020 05:36) (66 - 79)  BP: 146/82 (13 Jul 2020 05:36) (105/62 - 146/82)  BP(mean): --  RR: 18 (13 Jul 2020 05:36) (18 - 20)  SpO2: 95% (13 Jul 2020 05:36) (94% - 99%)    PHYSICAL EXAM  General: NAD  HEENT: PERRLA, EOMOI, clear oropharynx, anicteric sclera, pink conjunctiva  Neck: supple  CV: (+) S1/S2 RRR  Lungs: clear to auscultation, no wheezes or rales  Abdomen: soft, non-tender, non-distended (+) BS  Ext: no clubbing, cyanosis or edema  Skin: no rashes and no petechiae  Neuro: alert and oriented X 3, no focal deficits  Central Line:     RECENT CULTURES:  07-07 @ 21:58  .Urine Clean Catch (Midstream)  --  --  --    <10,000 CFU/mL Normal Urogenital Krista  --        LABS:                        9.2    3.26  )-----------( 277      ( 13 Jul 2020 06:42 )             28.3         Mean Cell Volume : 93.1 fl  Mean Cell Hemoglobin : 30.3 pg  Mean Cell Hemoglobin Concentration : 32.5 gm/dL  Auto Neutrophil # : x  Auto Lymphocyte # : x  Auto Monocyte # : x  Auto Eosinophil # : x  Auto Basophil # : x  Auto Neutrophil % : x  Auto Lymphocyte % : x  Auto Monocyte % : x  Auto Eosinophil % : x  Auto Basophil % : x      07-12    141  |  103  |  16  ----------------------------<  139<H>  4.1   |  27  |  1.01    Ca    10.5      12 Jul 2020 09:36  Phos  3.9     07-12  Mg     1.9     07-12    TPro  7.6  /  Alb  4.4  /  TBili  0.3  /  DBili  x   /  AST  35  /  ALT  27  /  AlkPhos  111  07-12      Mg 1.9  Phos 3.9      PT/INR - ( 13 Jul 2020 06:42 )   PT: 13.2 sec;   INR: 1.16 ratio         PTT - ( 13 Jul 2020 06:42 )  PTT:35.8 sec      Uric Acid 5.7        RADIOLOGY & ADDITIONAL STUDIES: Diagnosis: APL    Protocol/Chemo Regimen: ATRA/Arsenic Trioxide    Day: ATRA Day 52 and Arsenic 47 (Held from 6/22/20 to 6/30/2020 and resumed on 6/30/20, held on 7/10 x1, held on 7/13 due to poor access.    Pt endorsed: generalized fatigue, left arm rash, scalp hypersensitivity.    Review of Systems: Denies nausea, vomiting, diarrhea, chest pain, SOB, headache      Translated by patient's son via phone call per patient request     Pain scale: 2/10.    Diet: Consistent Carb w/ Evening Snack    Allergies: No Known Allergies    ANTIMICROBIALS  levoFLOXacin  Tablet 500 milliGRAM(s) Oral every 24 hours  posaconazole DR Tablet 300 milliGRAM(s) Oral daily  valACYclovir 500 milliGRAM(s) Oral daily    HEME/ONC MEDICATIONS  arsenic trioxide IVPB (eMAR) 11 milliGRAM(s) IV Intermittent every 24 hours  tretinoin 40 milliGRAM(s) Oral every 12 hours    STANDING MEDICATIONS  ammonium lactate 12% Lotion 1 Application(s) Topical two times a day  artificial  tears Solution 1 Drop(s) Both EYES three times a day  ATENolol  Tablet 25 milliGRAM(s) Oral daily  Biotene Dry Mouth Oral Rinse 5 milliLiter(s) Swish and Spit five times a day  bisacodyl 5 milliGRAM(s) Oral every 12 hours  chlorhexidine 4% Liquid 1 Application(s) Topical <User Schedule>  dextrose 5%. 1000 milliLiter(s) IV Continuous <Continuous>  dextrose 50% Injectable 12.5 Gram(s) IV Push once  dextrose 50% Injectable 25 Gram(s) IV Push once  dextrose 50% Injectable 25 Gram(s) IV Push once  insulin lispro (HumaLOG) corrective regimen sliding scale   SubCutaneous three times a day before meals  insulin lispro (HumaLOG) corrective regimen sliding scale   SubCutaneous at bedtime  lidocaine 2% Gel 1 Application(s) Topical every 8 hours  nystatin Powder 1 Application(s) Topical two times a day  pantoprazole    Tablet 40 milliGRAM(s) Oral before breakfast  petrolatum white Ointment 1 Application(s) Topical three times a day  polyethylene glycol 3350 17 Gram(s) Oral daily  triamcinolone 0.1% Ointment 1 Application(s) Topical every 12 hours    PRN MEDICATIONS  acetaminophen   Tablet .. 650 milliGRAM(s) Oral every 6 hours PRN  ALBUTerol    90 MICROgram(s) HFA Inhaler 2 Puff(s) Inhalation every 6 hours PRN  aluminum hydroxide/magnesium hydroxide/simethicone Suspension 30 milliLiter(s) Oral every 6 hours PRN  AQUAPHOR (petrolatum Ointment) 1 Application(s) Topical every 3 hours PRN  dextrose 40% Gel 15 Gram(s) Oral once PRN  diphenhydrAMINE   Injectable 25 milliGRAM(s) IV Push every 6 hours PRN  glucagon  Injectable 1 milliGRAM(s) IntraMuscular once PRN  ondansetron Injectable 8 milliGRAM(s) IV Push every 8 hours PRN  sodium chloride 0.9% lock flush 10 milliLiter(s) IV Push every 1 hour PRN  sodium chloride 0.9% lock flush 10 milliLiter(s) IV Push every 1 hour PRN    Vital Signs Last 24 Hrs  T(C): 36.6 (13 Jul 2020 05:36), Max: 36.9 (12 Jul 2020 18:24)  T(F): 97.9 (13 Jul 2020 05:36), Max: 98.4 (12 Jul 2020 18:24)  HR: 68 (13 Jul 2020 05:36) (66 - 79)  BP: 146/82 (13 Jul 2020 05:36) (105/62 - 146/82)  BP(mean): --  RR: 18 (13 Jul 2020 05:36) (18 - 20)  SpO2: 95% (13 Jul 2020 05:36) (94% - 99%)    PHYSICAL EXAM  General: adult in NAD  HEENT: clear oropharynx, no erythema, no ulcers  CV: normal S1, S2, RRR  Lungs: clear to auscultation, no wheezes, no rales  Abdomen: soft, nontender, nondistended, normal BS  Ext: no edema, RUE hyperpigmented.  Skin: Warm and dry, Some hyperpigmentation noted around RUE PICC line, Shingles-like rash noted on R buttock (appears crusting over)  R posterior thigh and right leg   Neuro: alert and oriented x 3  Central line Left PICC CDI     RECENT CULTURES:  07-07 @ 21:58  .Urine Clean Catch (Midstream)  10,000 CFU/mL Normal Urogenital Krista    LABS:                           9.2    3.26  )-----------( 277      ( 13 Jul 2020 06:42 )             28.3     Mean Cell Volume : 93.1 fl  Mean Cell Hemoglobin : 30.3 pg  Mean Cell Hemoglobin Concentration : 32.5 gm/dL  Auto Neutrophil # : 1.50 K/uL  Auto Lymphocyte # : 1.36 K/uL  Auto Monocyte # : 0.08 K/uL  Auto Eosinophil # : 0.26 K/uL  Auto Basophil # : 0.00 K/uL  Auto Neutrophil % : 46.0 %  Auto Lymphocyte % : 41.6 %  Auto Monocyte % : 2.6 %  Auto Eosinophil % : 8.0 %  Auto Basophil % : 0.0 %      07-13    140  |  103  |  20  ----------------------------<  117<H>  4.2   |  22  |  0.98    Ca    10.5      13 Jul 2020 06:42  Phos  4.7     07-13  Mg     2.1     07-13    TPro  8.1  /  Alb  4.6  /  TBili  0.3  /  DBili  x   /  AST  32  /  ALT  28  /  AlkPhos  112  07-13  Mg 2.1  Phos 4.7  PT/INR - ( 13 Jul 2020 06:42 )   PT: 13.2 sec;   INR: 1.16 ratio    PTT - ( 13 Jul 2020 06:42 )  PTT:35.8 sec    Uric Acid 5.6    RADIOLOGY & ADDITIONAL STUDIES:   CT Upper Extremity No Cont, Left (07.10.20 @ 23:05) >  No fluid collection. Mild skin thickening and subcutaneous edemaanterolaterally at the level of the distal humeral shaft. Diagnosis: APL    Protocol/Chemo Regimen: ATRA/Arsenic Trioxide    Day: ATRA Day 52 and Arsenic 47 (Held from 6/22/20 to 6/30/2020 and resumed on 6/30/20, held on 7/10 x1)    Pt endorsed: generalized fatigue, left arm rash, scalp hypersensitivity.    Review of Systems: Denies nausea, vomiting, diarrhea, chest pain, SOB, headache      Translated by patient's son via phone call per patient request     Pain scale: 2/10.    Diet: Consistent Carb w/ Evening Snack    Allergies: No Known Allergies    ANTIMICROBIALS  levoFLOXacin  Tablet 500 milliGRAM(s) Oral every 24 hours  posaconazole DR Tablet 300 milliGRAM(s) Oral daily  valACYclovir 500 milliGRAM(s) Oral daily    HEME/ONC MEDICATIONS  arsenic trioxide IVPB (eMAR) 11 milliGRAM(s) IV Intermittent every 24 hours  tretinoin 40 milliGRAM(s) Oral every 12 hours    STANDING MEDICATIONS  ammonium lactate 12% Lotion 1 Application(s) Topical two times a day  artificial  tears Solution 1 Drop(s) Both EYES three times a day  ATENolol  Tablet 25 milliGRAM(s) Oral daily  Biotene Dry Mouth Oral Rinse 5 milliLiter(s) Swish and Spit five times a day  bisacodyl 5 milliGRAM(s) Oral every 12 hours  chlorhexidine 4% Liquid 1 Application(s) Topical <User Schedule>  dextrose 5%. 1000 milliLiter(s) IV Continuous <Continuous>  dextrose 50% Injectable 12.5 Gram(s) IV Push once  dextrose 50% Injectable 25 Gram(s) IV Push once  dextrose 50% Injectable 25 Gram(s) IV Push once  insulin lispro (HumaLOG) corrective regimen sliding scale   SubCutaneous three times a day before meals  insulin lispro (HumaLOG) corrective regimen sliding scale   SubCutaneous at bedtime  lidocaine 2% Gel 1 Application(s) Topical every 8 hours  nystatin Powder 1 Application(s) Topical two times a day  pantoprazole    Tablet 40 milliGRAM(s) Oral before breakfast  petrolatum white Ointment 1 Application(s) Topical three times a day  polyethylene glycol 3350 17 Gram(s) Oral daily  triamcinolone 0.1% Ointment 1 Application(s) Topical every 12 hours    PRN MEDICATIONS  acetaminophen   Tablet .. 650 milliGRAM(s) Oral every 6 hours PRN  ALBUTerol    90 MICROgram(s) HFA Inhaler 2 Puff(s) Inhalation every 6 hours PRN  aluminum hydroxide/magnesium hydroxide/simethicone Suspension 30 milliLiter(s) Oral every 6 hours PRN  AQUAPHOR (petrolatum Ointment) 1 Application(s) Topical every 3 hours PRN  dextrose 40% Gel 15 Gram(s) Oral once PRN  diphenhydrAMINE   Injectable 25 milliGRAM(s) IV Push every 6 hours PRN  glucagon  Injectable 1 milliGRAM(s) IntraMuscular once PRN  ondansetron Injectable 8 milliGRAM(s) IV Push every 8 hours PRN  sodium chloride 0.9% lock flush 10 milliLiter(s) IV Push every 1 hour PRN  sodium chloride 0.9% lock flush 10 milliLiter(s) IV Push every 1 hour PRN    Vital Signs Last 24 Hrs  T(C): 36.6 (13 Jul 2020 05:36), Max: 36.9 (12 Jul 2020 18:24)  T(F): 97.9 (13 Jul 2020 05:36), Max: 98.4 (12 Jul 2020 18:24)  HR: 68 (13 Jul 2020 05:36) (66 - 79)  BP: 146/82 (13 Jul 2020 05:36) (105/62 - 146/82)  BP(mean): --  RR: 18 (13 Jul 2020 05:36) (18 - 20)  SpO2: 95% (13 Jul 2020 05:36) (94% - 99%)    PHYSICAL EXAM  General: adult in NAD  HEENT: clear oropharynx, no erythema, no ulcers  CV: normal S1, S2, RRR  Lungs: clear to auscultation, no wheezes, no rales  Abdomen: soft, nontender, nondistended, normal BS  Ext: no edema, RUE hyperpigmented.  Skin: Warm and dry, Some hyperpigmentation noted around RUE PICC line, Shingles-like rash noted on R buttock (appears crusting over)  R posterior thigh and right leg   Neuro: alert and oriented x 3  Central line Left PICC CDI     RECENT CULTURES:  07-07 @ 21:58  .Urine Clean Catch (Midstream)  10,000 CFU/mL Normal Urogenital Krista    LABS:                           9.2    3.26  )-----------( 277      ( 13 Jul 2020 06:42 )             28.3     Mean Cell Volume : 93.1 fl  Mean Cell Hemoglobin : 30.3 pg  Mean Cell Hemoglobin Concentration : 32.5 gm/dL  Auto Neutrophil # : 1.50 K/uL  Auto Lymphocyte # : 1.36 K/uL  Auto Monocyte # : 0.08 K/uL  Auto Eosinophil # : 0.26 K/uL  Auto Basophil # : 0.00 K/uL  Auto Neutrophil % : 46.0 %  Auto Lymphocyte % : 41.6 %  Auto Monocyte % : 2.6 %  Auto Eosinophil % : 8.0 %  Auto Basophil % : 0.0 %      07-13    140  |  103  |  20  ----------------------------<  117<H>  4.2   |  22  |  0.98    Ca    10.5      13 Jul 2020 06:42  Phos  4.7     07-13  Mg     2.1     07-13    TPro  8.1  /  Alb  4.6  /  TBili  0.3  /  DBili  x   /  AST  32  /  ALT  28  /  AlkPhos  112  07-13  Mg 2.1  Phos 4.7  PT/INR - ( 13 Jul 2020 06:42 )   PT: 13.2 sec;   INR: 1.16 ratio    PTT - ( 13 Jul 2020 06:42 )  PTT:35.8 sec    Uric Acid 5.6    RADIOLOGY & ADDITIONAL STUDIES:   CT Upper Extremity No Cont, Left (07.10.20 @ 23:05) >  No fluid collection. Mild skin thickening and subcutaneous edemaanterolaterally at the level of the distal humeral shaft. Diagnosis: APL    Protocol/Chemo Regimen: ATRA/Arsenic Trioxide    Day: ATRA Day 52 and Arsenic 47 (Held from 6/22/20 to 6/30/2020 and resumed on 6/30/20, held on 7/10 x1)    Pt endorsed: generalized fatigue, left arm rash, scalp hypersensitivity.    Review of Systems: Denies nausea, vomiting, diarrhea, chest pain, SOB, headache      Translated by patient's son via phone call per patient request     Pain scale: 2/10.    Diet: Consistent Carb w/ Evening Snack    Allergies: No Known Allergies    ANTIMICROBIALS  levoFLOXacin  Tablet 500 milliGRAM(s) Oral every 24 hours  posaconazole DR Tablet 300 milliGRAM(s) Oral daily  valACYclovir 500 milliGRAM(s) Oral daily    HEME/ONC MEDICATIONS  arsenic trioxide IVPB (eMAR) 11 milliGRAM(s) IV Intermittent every 24 hours  tretinoin 40 milliGRAM(s) Oral every 12 hours    STANDING MEDICATIONS  ammonium lactate 12% Lotion 1 Application(s) Topical two times a day  artificial  tears Solution 1 Drop(s) Both EYES three times a day  ATENolol  Tablet 25 milliGRAM(s) Oral daily  Biotene Dry Mouth Oral Rinse 5 milliLiter(s) Swish and Spit five times a day  bisacodyl 5 milliGRAM(s) Oral every 12 hours  chlorhexidine 4% Liquid 1 Application(s) Topical <User Schedule>  dextrose 5%. 1000 milliLiter(s) IV Continuous <Continuous>  dextrose 50% Injectable 12.5 Gram(s) IV Push once  dextrose 50% Injectable 25 Gram(s) IV Push once  dextrose 50% Injectable 25 Gram(s) IV Push once  insulin lispro (HumaLOG) corrective regimen sliding scale   SubCutaneous three times a day before meals  insulin lispro (HumaLOG) corrective regimen sliding scale   SubCutaneous at bedtime  lidocaine 2% Gel 1 Application(s) Topical every 8 hours  nystatin Powder 1 Application(s) Topical two times a day  pantoprazole    Tablet 40 milliGRAM(s) Oral before breakfast  petrolatum white Ointment 1 Application(s) Topical three times a day  polyethylene glycol 3350 17 Gram(s) Oral daily  triamcinolone 0.1% Ointment 1 Application(s) Topical every 12 hours    PRN MEDICATIONS  acetaminophen   Tablet .. 650 milliGRAM(s) Oral every 6 hours PRN  ALBUTerol    90 MICROgram(s) HFA Inhaler 2 Puff(s) Inhalation every 6 hours PRN  aluminum hydroxide/magnesium hydroxide/simethicone Suspension 30 milliLiter(s) Oral every 6 hours PRN  AQUAPHOR (petrolatum Ointment) 1 Application(s) Topical every 3 hours PRN  dextrose 40% Gel 15 Gram(s) Oral once PRN  diphenhydrAMINE   Injectable 25 milliGRAM(s) IV Push every 6 hours PRN  glucagon  Injectable 1 milliGRAM(s) IntraMuscular once PRN  ondansetron Injectable 8 milliGRAM(s) IV Push every 8 hours PRN  sodium chloride 0.9% lock flush 10 milliLiter(s) IV Push every 1 hour PRN  sodium chloride 0.9% lock flush 10 milliLiter(s) IV Push every 1 hour PRN    Vital Signs Last 24 Hrs  T(C): 36.6 (13 Jul 2020 05:36), Max: 36.9 (12 Jul 2020 18:24)  T(F): 97.9 (13 Jul 2020 05:36), Max: 98.4 (12 Jul 2020 18:24)  HR: 68 (13 Jul 2020 05:36) (66 - 79)  BP: 146/82 (13 Jul 2020 05:36) (105/62 - 146/82)  BP(mean): --  RR: 18 (13 Jul 2020 05:36) (18 - 20)  SpO2: 95% (13 Jul 2020 05:36) (94% - 99%)    PHYSICAL EXAM  General: adult in NAD  HEENT: clear oropharynx, no erythema, no ulcers  CV: normal S1, S2, RRR  Lungs: clear to auscultation, no wheezes, no rales  Abdomen: soft, nontender, nondistended, normal BS  Ext: no edema, RUE hyperpigmented.  Skin: Warm and dry, left arm rash+, Shingles-like rash noted on R buttock (appears crusting over)  R posterior thigh and right leg   Neuro: alert and oriented x 3  Central line Left PICC CDI     RECENT CULTURES:  07-07 @ 21:58  .Urine Clean Catch (Midstream)  10,000 CFU/mL Normal Urogenital Krista    LABS:                           9.2    3.26  )-----------( 277      ( 13 Jul 2020 06:42 )             28.3     Mean Cell Volume : 93.1 fl  Mean Cell Hemoglobin : 30.3 pg  Mean Cell Hemoglobin Concentration : 32.5 gm/dL  Auto Neutrophil # : 1.50 K/uL  Auto Lymphocyte # : 1.36 K/uL  Auto Monocyte # : 0.08 K/uL  Auto Eosinophil # : 0.26 K/uL  Auto Basophil # : 0.00 K/uL  Auto Neutrophil % : 46.0 %  Auto Lymphocyte % : 41.6 %  Auto Monocyte % : 2.6 %  Auto Eosinophil % : 8.0 %  Auto Basophil % : 0.0 %      07-13    140  |  103  |  20  ----------------------------<  117<H>  4.2   |  22  |  0.98    Ca    10.5      13 Jul 2020 06:42  Phos  4.7     07-13  Mg     2.1     07-13    TPro  8.1  /  Alb  4.6  /  TBili  0.3  /  DBili  x   /  AST  32  /  ALT  28  /  AlkPhos  112  07-13  Mg 2.1  Phos 4.7  PT/INR - ( 13 Jul 2020 06:42 )   PT: 13.2 sec;   INR: 1.16 ratio    PTT - ( 13 Jul 2020 06:42 )  PTT:35.8 sec    Uric Acid 5.6    RADIOLOGY & ADDITIONAL STUDIES:   CT Upper Extremity No Cont, Left (07.10.20 @ 23:05) >  No fluid collection. Mild skin thickening and subcutaneous edemaanterolaterally at the level of the distal humeral shaft.

## 2020-07-13 NOTE — PROGRESS NOTE ADULT - PROBLEM SELECTOR PLAN 3
Patient with right upper arm pain and swelling s/p PICC line removal   6/23 [facility] Duplex (+) for acute partially occlusive DVT affecting right axillary vein   7/2- repeat doppler -Right axillary DVT appears resolved.  6/24 CT consistent with DVT, no abscess   Was on a Heparin gtt, now changed to SQ Lovenox q 12. Keep platelets >50,000  With associated rash suspected 2/2 adhesive, continue clobetasol per Derm  Decreased phasicity in the right subclavian vein suggests proximal obstruction. Patient with right upper arm pain and swelling s/p PICC line removal   6/23 [facility] Duplex (+) for acute partially occlusive DVT affecting right axillary vein   7/2- repeat doppler -Right axillary DVT appears resolved.  6/24 CT consistent with DVT, no abscess   Was on a Heparin gtt, now changed to SQ Lovenox q 12. Keep platelets >50,000  With associated rash suspected 2/2 adhesive, continue clobetasol per Derm  Decreased phasicity in the right subclavian vein suggests proximal obstruction.  7/13- Lovenox on hold due to Mediport placement, resumed on 713 as pt refusing  Mediport now.

## 2020-07-13 NOTE — PROGRESS NOTE ADULT - PROBLEM SELECTOR PLAN 8
On Lovenox SQ BID for DVT treatment. Discontinue if platelets are < 50,000       Contact Information [phone]

## 2020-07-13 NOTE — PROGRESS NOTE ADULT - PROBLEM SELECTOR PLAN 1
Repeat BMBx (6/26)(+) for persistent disease, continue Arsenic trioxide and ATRA.  Continue ATRA 40mg BID (started 5/22)   Arsenic Trioxide started on 5/27.  Held 6/22- 6/29 due to Zoster infection, resumed Arsenic on 6/30.  Held Arsenic x 1 on 7/10, no IV access. Resumed again on 7/11  Monitor for differentiation syndrome   check EKG for QTc prolongation every Tuesday and Friday while on arsenic   Keep K > 4 and Mg > 2 while on arsenic   Follow up CBC with diff, CMP, TLS and DIC panel daily  Keep PLTs >50 K. If unable to achieve goal to infuse 1/2 unit plts over 3 hours q12  S/p Prednisone 20mg PO daily x 5 days (through 6/12) due to platelet antibody  Keep Fibrinogen > 150, if <150 give Cryoprecipitate  Strict Is and Os/Daily weights/Mouth Care  7/5/20 scalp hypersensitivity - started on lidocaine gel to the scalp.  7/8 episode of chest pain - EKG no ectopy, cardiac biomarkers negative, CXR unchanged  7/9 PICC in Left Brachiocephalic vein, s/p PICC exchange - now removed due to pain/swelling LUE. Doppler negative, however, leaking from entry site and continued discomfort with infusions.  Patient refused Bone marrow biopsy on 7/9 plan for BMBx on 7/13  Arrange for Mediport with IR on Monday 7/13, best option for long term access Repeat BMBx (6/26)(+) for persistent disease, continue Arsenic trioxide and ATRA.  Continue ATRA 40mg BID (started 5/22)   Arsenic Trioxide started on 5/27.  Held 6/22- 6/29 due to Zoster infection, resumed Arsenic on 6/30.  Held Arsenic x 1 on 7/10, no IV access. Resumed again on 7/11.  7/13- Held As2O3 due to poor access.  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 If unable to achieve goal to infuse 1/2 unit plts over 3 hours q12  S/p Prednisone 20mg PO daily x 5 days (through 6/12) due to platelet antibody  Keep Fibrinogen > 150, if <150 give Cryoprecipitate  Strict Is and Os/Daily weights/Mouth Care  7/5/20 scalp hypersensitivity - started on lidocaine gel to the scalp.  7/8 episode of chest pain - EKG no ectopy, cardiac biomarkers negative, CXR unchanged  7/9 PICC in Left Brachiocephalic vein, s/p PICC exchange - now removed due to pain/swelling LUE. Doppler negative, however, leaking from entry site and continued discomfort with infusions.  Patient refused Bone marrow biopsy on 7/9 plan for BMBx today.  7/13- Refused Mediport placement for long term access. Repeat BMBx (6/26)(+) for persistent disease, continue Arsenic trioxide and ATRA.  Continue ATRA 40mg BID (started 5/22)   Arsenic Trioxide started on 5/27.  Held 6/22- 6/29 due to Zoster infection, resumed Arsenic on 6/30.  Held Arsenic x 1 on 7/10, no IV access. Resumed again on 7/11.  7/13- Held As2O3 due to poor access.  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 If unable to achieve goal to infuse 1/2 unit plts over 3 hours q12  S/p Prednisone 20mg PO daily x 5 days (through 6/12) due to platelet antibody  Keep Fibrinogen > 150, if <150 give Cryoprecipitate  Strict Is and Os/Daily weights/Mouth Care  7/5/20 scalp hypersensitivity - started on lidocaine gel to the scalp.  7/8 episode of chest pain - EKG no ectopy, cardiac biomarkers negative, CXR unchanged  7/9 PICC in Left Brachiocephalic vein, s/p PICC exchange - now removed due to pain/swelling LUE. Doppler negative, however, leaking from entry site and continued discomfort with infusions.  Patient refused Bone marrow biopsy on 7/9 plan for BMBx today.  F/u results.  7/13- Refused Mediport placement for long term access. Repeat BMBx (6/26)(+) for persistent disease, continue Arsenic trioxide and ATRA.  Continue ATRA 40mg BID (started 5/22)   Arsenic Trioxide started on 5/27.  Held 6/22- 6/29 due to Zoster infection, resumed Arsenic on 6/30.  Held Arsenic x 1 on 7/10, no IV access. Resumed again on 7/11.  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 If unable to achieve goal to infuse 1/2 unit plts over 3 hours q12  S/p Prednisone 20mg PO daily x 5 days (through 6/12) due to platelet antibody  Keep Fibrinogen > 150, if <150 give Cryoprecipitate  Strict Is and Os/Daily weights/Mouth Care  7/5/20 scalp hypersensitivity - started on lidocaine gel to the scalp.  7/8 episode of chest pain - EKG no ectopy, cardiac biomarkers negative, CXR unchanged  7/9 PICC in Left Brachiocephalic vein, s/p PICC exchange - now removed due to pain/swelling LUE. Doppler negative, however, leaking from entry site and continued discomfort with infusions.  Patient refused Bone marrow biopsy on 7/9 plan for BMBx today (7/13).  F/u results.  7/13- Refused Mediport placement today for long term access.

## 2020-07-13 NOTE — PROGRESS NOTE ADULT - ASSESSMENT
Ms. Gant is a 59 y/o Greek-speaking female w/ a PMHx of HTN, HLD, T2DM, and COVID-19 infection in 4/2020 admitted for management of newly diagnosed APL. The patient has been receiving treatment with ATRA and Arsenic (ATRA since 5/22 and Arsenic 5/27). Arsenic which was on hold since 6/22 secondary to Herpes Zoster infection, resumed on 6/30.  Patient's hospital course has been complicated by transaminitis, refractory thrombocytopenia, COVID-19 (+) state (6/16), shingles, right axillary DVT superficial and occipital hypersensitivity.  The patient has pancytopenia 2/2 disease and/or ATRA/Arsenic. Ms. Gant is a 61 y/o Croatian-speaking female w/ a PMHx of HTN, HLD, T2DM, and COVID-19 infection in 4/2020 admitted for management of newly diagnosed APL. The patient has been receiving treatment with ATRA and Arsenic (ATRA since 5/22 and Arsenic 5/27). Arsenic which was on hold since 6/22 secondary to Herpes Zoster infection, resumed on 6/30. Bone marrow biopsy on 6/26 with persistent disease.  Patient's hospital course has been complicated by transaminitis, refractory thrombocytopenia, COVID-19 (+) state (6/16), shingles, right axillary DVT superficial and occipital hypersensitivity.  The patient has pancytopenia 2/2 disease and/or ATRA/Arsenic.

## 2020-07-13 NOTE — ADVANCED PRACTICE NURSE CONSULT - ASSESSMENT
Pt. seen in bed a/ox4 ,denies any discomfort at this time.  Chemotherapy teachings done .Pt.  verbalized understanding. Pt. has  g#22 to right lower arm . Dsg dry and intact. Site with no s/s of redness, swelling or pain ,with positive blood return noted and flushing easily with 10 ML NS. Lab values reviewed on rounds by Dr. Adam . EKG done friday. At 1419 pt. treated with arsenic trioxide IVPB (eMAR) {Known as TRISENOX (eMAR)}  11 milliGRAM(s) in dextrose 5% 250 milliLiter(s), IV Intermittent, every 24 hours, infuse over 2 Hour(s), connected to normal saline line thru PIV via alaris pump. Left pt.comfortable in bed.Primary RN aware of present treatment.  .

## 2020-07-14 LAB
ALBUMIN SERPL ELPH-MCNC: 4.2 G/DL — SIGNIFICANT CHANGE UP (ref 3.3–5)
ALP SERPL-CCNC: 105 U/L — SIGNIFICANT CHANGE UP (ref 40–120)
ALT FLD-CCNC: 21 U/L — SIGNIFICANT CHANGE UP (ref 10–45)
ANION GAP SERPL CALC-SCNC: 13 MMOL/L — SIGNIFICANT CHANGE UP (ref 5–17)
ANISOCYTOSIS BLD QL: SLIGHT — SIGNIFICANT CHANGE UP
AST SERPL-CCNC: 33 U/L — SIGNIFICANT CHANGE UP (ref 10–40)
BASOPHILS # BLD AUTO: 0.06 K/UL — SIGNIFICANT CHANGE UP (ref 0–0.2)
BASOPHILS NFR BLD AUTO: 1.7 % — SIGNIFICANT CHANGE UP (ref 0–2)
BILIRUB SERPL-MCNC: 0.2 MG/DL — SIGNIFICANT CHANGE UP (ref 0.2–1.2)
BUN SERPL-MCNC: 19 MG/DL — SIGNIFICANT CHANGE UP (ref 7–23)
CALCIUM SERPL-MCNC: 10.1 MG/DL — SIGNIFICANT CHANGE UP (ref 8.4–10.5)
CHLORIDE SERPL-SCNC: 103 MMOL/L — SIGNIFICANT CHANGE UP (ref 96–108)
CHROM ANALY INTERPHASE BLD FISH-IMP: SIGNIFICANT CHANGE UP
CO2 SERPL-SCNC: 24 MMOL/L — SIGNIFICANT CHANGE UP (ref 22–31)
CREAT SERPL-MCNC: 0.99 MG/DL — SIGNIFICANT CHANGE UP (ref 0.5–1.3)
D DIMER BLD IA.RAPID-MCNC: 305 NG/ML DDU — HIGH
DACRYOCYTES BLD QL SMEAR: SLIGHT — SIGNIFICANT CHANGE UP
ELLIPTOCYTES BLD QL SMEAR: SLIGHT — SIGNIFICANT CHANGE UP
EOSINOPHIL # BLD AUTO: 0.26 K/UL — SIGNIFICANT CHANGE UP (ref 0–0.5)
EOSINOPHIL NFR BLD AUTO: 7 % — HIGH (ref 0–6)
FIBRINOGEN PPP-MCNC: 452 MG/DL — SIGNIFICANT CHANGE UP (ref 350–510)
GIANT PLATELETS BLD QL SMEAR: PRESENT — SIGNIFICANT CHANGE UP
GLUCOSE BLDC GLUCOMTR-MCNC: 121 MG/DL — HIGH (ref 70–99)
GLUCOSE BLDC GLUCOMTR-MCNC: 149 MG/DL — HIGH (ref 70–99)
GLUCOSE BLDC GLUCOMTR-MCNC: 149 MG/DL — HIGH (ref 70–99)
GLUCOSE BLDC GLUCOMTR-MCNC: 190 MG/DL — HIGH (ref 70–99)
GLUCOSE SERPL-MCNC: 121 MG/DL — HIGH (ref 70–99)
HCT VFR BLD CALC: 25.8 % — LOW (ref 34.5–45)
HGB BLD-MCNC: 8.4 G/DL — LOW (ref 11.5–15.5)
HYPOCHROMIA BLD QL: SLIGHT — SIGNIFICANT CHANGE UP
LDH SERPL L TO P-CCNC: 219 U/L — SIGNIFICANT CHANGE UP (ref 50–242)
LYMPHOCYTES # BLD AUTO: 1.15 K/UL — SIGNIFICANT CHANGE UP (ref 1–3.3)
LYMPHOCYTES # BLD AUTO: 31.6 % — SIGNIFICANT CHANGE UP (ref 13–44)
MACROCYTES BLD QL: SLIGHT — SIGNIFICANT CHANGE UP
MAGNESIUM SERPL-MCNC: 2 MG/DL — SIGNIFICANT CHANGE UP (ref 1.6–2.6)
MANUAL SMEAR VERIFICATION: SIGNIFICANT CHANGE UP
MCHC RBC-ENTMCNC: 30.3 PG — SIGNIFICANT CHANGE UP (ref 27–34)
MCHC RBC-ENTMCNC: 32.6 GM/DL — SIGNIFICANT CHANGE UP (ref 32–36)
MCV RBC AUTO: 93.1 FL — SIGNIFICANT CHANGE UP (ref 80–100)
MICROCYTES BLD QL: SLIGHT — SIGNIFICANT CHANGE UP
MONOCYTES # BLD AUTO: 0.29 K/UL — SIGNIFICANT CHANGE UP (ref 0–0.9)
MONOCYTES NFR BLD AUTO: 7.9 % — SIGNIFICANT CHANGE UP (ref 2–14)
NEUTROPHILS # BLD AUTO: 1.89 K/UL — SIGNIFICANT CHANGE UP (ref 1.8–7.4)
NEUTROPHILS NFR BLD AUTO: 51.8 % — SIGNIFICANT CHANGE UP (ref 43–77)
PHOSPHATE SERPL-MCNC: 4.6 MG/DL — HIGH (ref 2.5–4.5)
PLAT MORPH BLD: ABNORMAL
PLATELET # BLD AUTO: 248 K/UL — SIGNIFICANT CHANGE UP (ref 150–400)
POIKILOCYTOSIS BLD QL AUTO: SLIGHT — SIGNIFICANT CHANGE UP
POLYCHROMASIA BLD QL SMEAR: SLIGHT — SIGNIFICANT CHANGE UP
POTASSIUM SERPL-MCNC: 4.1 MMOL/L — SIGNIFICANT CHANGE UP (ref 3.5–5.3)
POTASSIUM SERPL-SCNC: 4.1 MMOL/L — SIGNIFICANT CHANGE UP (ref 3.5–5.3)
PROT SERPL-MCNC: 7.1 G/DL — SIGNIFICANT CHANGE UP (ref 6–8.3)
RBC # BLD: 2.77 M/UL — LOW (ref 3.8–5.2)
RBC # FLD: 17 % — HIGH (ref 10.3–14.5)
RBC BLD AUTO: ABNORMAL
SMUDGE CELLS # BLD: PRESENT — SIGNIFICANT CHANGE UP
SODIUM SERPL-SCNC: 140 MMOL/L — SIGNIFICANT CHANGE UP (ref 135–145)
TARGETS BLD QL SMEAR: SLIGHT — SIGNIFICANT CHANGE UP
URATE SERPL-MCNC: 5.5 MG/DL — SIGNIFICANT CHANGE UP (ref 2.5–7)
WBC # BLD: 3.65 K/UL — LOW (ref 3.8–10.5)
WBC # FLD AUTO: 3.65 K/UL — LOW (ref 3.8–10.5)

## 2020-07-14 PROCEDURE — 93010 ELECTROCARDIOGRAM REPORT: CPT

## 2020-07-14 PROCEDURE — 99233 SBSQ HOSP IP/OBS HIGH 50: CPT | Mod: GC

## 2020-07-14 RX ORDER — GABAPENTIN 400 MG/1
100 CAPSULE ORAL EVERY 8 HOURS
Refills: 0 | Status: DISCONTINUED | OUTPATIENT
Start: 2020-07-14 | End: 2020-07-17

## 2020-07-14 RX ADMIN — TRETINOIN 40 MILLIGRAM(S): 10 CAPSULE, LIQUID FILLED ORAL at 07:24

## 2020-07-14 RX ADMIN — ATENOLOL 25 MILLIGRAM(S): 25 TABLET ORAL at 06:33

## 2020-07-14 RX ADMIN — GABAPENTIN 100 MILLIGRAM(S): 400 CAPSULE ORAL at 21:19

## 2020-07-14 RX ADMIN — Medication 1 APPLICATION(S): at 21:21

## 2020-07-14 RX ADMIN — LIDOCAINE 1 APPLICATION(S): 4 CREAM TOPICAL at 06:35

## 2020-07-14 RX ADMIN — GABAPENTIN 100 MILLIGRAM(S): 400 CAPSULE ORAL at 15:05

## 2020-07-14 RX ADMIN — Medication 5 MILLILITER(S): at 12:43

## 2020-07-14 RX ADMIN — TRETINOIN 40 MILLIGRAM(S): 10 CAPSULE, LIQUID FILLED ORAL at 19:43

## 2020-07-14 RX ADMIN — Medication 5 MILLIGRAM(S): at 06:33

## 2020-07-14 RX ADMIN — Medication 5 MILLILITER(S): at 19:43

## 2020-07-14 RX ADMIN — Medication 5 MILLILITER(S): at 07:24

## 2020-07-14 RX ADMIN — NYSTATIN CREAM 1 APPLICATION(S): 100000 CREAM TOPICAL at 06:35

## 2020-07-14 RX ADMIN — ENOXAPARIN SODIUM 80 MILLIGRAM(S): 100 INJECTION SUBCUTANEOUS at 18:09

## 2020-07-14 RX ADMIN — Medication 1: at 12:43

## 2020-07-14 RX ADMIN — NYSTATIN CREAM 1 APPLICATION(S): 100000 CREAM TOPICAL at 18:09

## 2020-07-14 RX ADMIN — PANTOPRAZOLE SODIUM 40 MILLIGRAM(S): 20 TABLET, DELAYED RELEASE ORAL at 06:34

## 2020-07-14 RX ADMIN — Medication 1 APPLICATION(S): at 06:34

## 2020-07-14 RX ADMIN — ENOXAPARIN SODIUM 80 MILLIGRAM(S): 100 INJECTION SUBCUTANEOUS at 06:36

## 2020-07-14 RX ADMIN — Medication 5 MILLIGRAM(S): at 18:08

## 2020-07-14 RX ADMIN — Medication 1 APPLICATION(S): at 18:07

## 2020-07-14 RX ADMIN — Medication 1 APPLICATION(S): at 06:36

## 2020-07-14 RX ADMIN — Medication 1 APPLICATION(S): at 06:33

## 2020-07-14 RX ADMIN — Medication 5 MILLILITER(S): at 23:50

## 2020-07-14 RX ADMIN — LIDOCAINE 1 APPLICATION(S): 4 CREAM TOPICAL at 15:06

## 2020-07-14 RX ADMIN — Medication 1 APPLICATION(S): at 15:07

## 2020-07-14 RX ADMIN — Medication 5 MILLILITER(S): at 15:05

## 2020-07-14 RX ADMIN — Medication 1 APPLICATION(S): at 18:09

## 2020-07-14 NOTE — PROGRESS NOTE ADULT - PROBLEM SELECTOR PLAN 3
Patient with right upper arm pain and swelling s/p PICC line removal   6/23 [facility] Duplex (+) for acute partially occlusive DVT affecting right axillary vein   7/2- repeat doppler -Right axillary DVT appears resolved.  6/24 CT consistent with DVT, no abscess   Was on a Heparin gtt, now changed to SQ Lovenox q 12. Keep platelets >50,000  With associated rash suspected 2/2 adhesive, continue clobetasol per Derm  Decreased phasicity in the right subclavian vein suggests proximal obstruction.  7/13- Lovenox on hold due to Mediport placement, resumed on 713 as pt refusing  Mediport now. Patient with right upper arm pain and swelling s/p PICC line removal   6/23 [facility] Duplex (+) for acute partially occlusive DVT affecting right axillary vein   7/2- repeat doppler -Right axillary DVT appears resolved.  6/24 CT consistent with DVT, no abscess   Was on a Heparin gtt, now changed to SQ Lovenox q 12. Keep platelets >50,000  With associated rash suspected 2/2 adhesive, continue clobetasol per Derm  Decreased phasicity in the right subclavian vein suggests proximal obstruction.  7/13- Lovenox was on held due to Mediport placement, resumed on 713 as pt refusing  Mediport now.

## 2020-07-14 NOTE — ADVANCED PRACTICE NURSE CONSULT - ASSESSMENT
Pt. seen in bed a/ox4 ,denies any discomfort at this time.  Chemotherapy teachings done .Pt.  verbalized understanding. Pt. has  g#22 to right lower arm metacarpal vein inserted by Central line RN .Site with no s/s of redness,swelling or pain,with positive blood return noted and flushing easily with 10 ML NS . Dsg dry and intact.  Lab values reviewed on rounds by Dr. Adam . EKG done today QTC= 440 . At 1534 pt. treated with arsenic trioxide IVPB (eMAR) {Known as TRISENOX (eMAR)}  11 milliGRAM(s) in dextrose 5% 250 milliLiter(s), IV Intermittent, every 24 hours, infuse over 2 Hour(s), connected to normal saline line thru PIV via alaris pump. Left pt.comfortable in bed.Primary RN aware of present treatment.  .

## 2020-07-14 NOTE — PROGRESS NOTE ADULT - ASSESSMENT
Ms. Gant is a 61 y/o Nepali-speaking female w/ a PMHx of HTN, HLD, T2DM, and COVID-19 infection in 4/2020 admitted for management of newly diagnosed APL. The patient has been receiving treatment with ATRA and Arsenic (ATRA since 5/22 and Arsenic 5/27). Arsenic which was on hold since 6/22 secondary to Herpes Zoster infection, resumed on 6/30.  Patient's hospital course has been complicated by transaminitis, refractory thrombocytopenia, COVID-19 (+) state (6/16), shingles, right axillary DVT superficial and occipital hypersensitivity.  The patient has pancytopenia 2/2 disease and/or ATRA/Arsenic. Ms. Gant is a 61 y/o Turkmen-speaking female w/ a PMHx of HTN, HLD, T2DM, and COVID-19 infection in 4/2020 admitted for management of newly diagnosed APL. The patient has been receiving treatment with ATRA and Arsenic (ATRA since 5/22 and Arsenic 5/27). Arsenic which was on hold since 6/22 secondary to Herpes Zoster infection, resumed on 6/30.  Patient's hospital course has been complicated by transaminitis, refractory thrombocytopenia, COVID-19 (+) state (6/16), shingles, right axillary DVT superficial and occipital hypersensitivity. On 7/13 a bone marrow biopsy was performed which shows persistent disease.

## 2020-07-14 NOTE — PROGRESS NOTE ADULT - PROBLEM SELECTOR PLAN 8
On Lovenox SQ BID for DVT treatment. Discontinue if platelets are < 50,000       Contact Information [phone]

## 2020-07-14 NOTE — PROGRESS NOTE ADULT - ATTENDING COMMENTS
59 y/o Kazakh speaking F with newly diagnosed low/interm risk APL.  Presented with pancytopenia, flow cytometry/bone marrow biopsy/FISH done 5/26- c/w acute promyelocytic leukemia with PML-AWA translocation.    She was started on ATRA 5/23- day +52, GALA 5/27- day +47 (arsenic held several times).    -pt had BM bx done on 6/26 showing persistent APL -had 14% blasts+Promyelo. Plan on repeat BM bx today (7/13), counts recovered, pt has had >30days GALA. If morphologically in CR, may stop GALA/ATRA and d/c home  -PICC out since 7/10, was leaking. Pt has peripheral IV access as of 7/12, missed 1 GALA dose on 7/10. Plan to do MediPort on 7/13, but son today would like to defer for a few days as pt is uncomfortable with getting it done  -monitor lytes daily, keep K>4, Mg>2.  EKG twice a week  -Zoster rash on R buttock and back of RLE, crusted over. Cont and will remain on VZV prophylaxis of Valcyclovir   -RUE doppler showing axillary DVT on 6/25, on therapeutic Lovenox since 6/25. Repeat RUE doppler done 7/2 -clot cleared. Will give 3 months of A/C 61 y/o Chinese speaking F with newly diagnosed low/interm risk APL.  Presented with pancytopenia, flow cytometry/bone marrow biopsy/FISH done 5/26- c/w acute promyelocytic leukemia with PML-AWA translocation.    She was started on ATRA 5/23- day +53, GALA 5/27- day +48 (arsenic held several times).    -pt had BM bx done on 6/26 showing persistent APL -had 14% blasts+Promyelo. s/p repeat BM bx on 7/13, counts recovered, pt has had >30days GALA. If morphologically in CR, may stop GALA/ATRA and d/c home  -PICC out since 7/10, was leaking. Pt has peripheral IV access as of 7/12, missed 1 GALA dose on 7/10. Plan to do MediPort on 7/13, but now they wanted to defer as pt is uncomfortable with getting it done. She refused peripheral IV placement today - had long discussion with son about importance of continuing arsenic without missed doses, agreeable for IV placement  -monitor lytes daily, keep K>4, Mg>2.  EKG twice a week  -Zoster rash on R buttock and back of RLE, crusted over. Cont and will remain on VZV prophylaxis of Valcyclovir   -d/c Levaquin and Posaconazole as she is no longer neutropenic  -RUE doppler showing axillary DVT on 6/25, on therapeutic Lovenox since 6/25. Repeat RUE doppler done 7/2 -clot cleared. Will give 3 months of A/C

## 2020-07-14 NOTE — PROGRESS NOTE ADULT - PROBLEM SELECTOR PLAN 1
Repeat BMBx (6/26)(+) for persistent disease, continue Arsenic trioxide and ATRA.  Continue ATRA 40mg BID (started 5/22)   Arsenic Trioxide started on 5/27.  Held 6/22- 6/29 due to Zoster infection, resumed Arsenic on 6/30.  Held Arsenic x 1 on 7/10, no IV access. Resumed again on 7/11.  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 If unable to achieve goal to infuse 1/2 unit plts over 3 hours q12  S/p Prednisone 20mg PO daily x 5 days (through 6/12) due to platelet antibody  Keep Fibrinogen > 150, if <150 give Cryoprecipitate  Strict Is and Os/Daily weights/Mouth Care  7/5/20 scalp hypersensitivity - started on lidocaine gel to the scalp.  7/8 episode of chest pain - EKG no ectopy, cardiac biomarkers negative, CXR unchanged  7/9 PICC in Left Brachiocephalic vein, s/p PICC exchange - now removed due to pain/swelling LUE. Doppler negative, however, leaking from entry site and continued discomfort with infusions.  Patient refused Bone marrow biopsy on 7/9 plan for BMBx today (7/13).  F/u results.  7/13- Refused Mediport placement today for long term access. Repeat BMBx (6/26)(+) for persistent disease, continue Arsenic trioxide and ATRA.  Continue ATRA 40mg BID (started 5/22)   Arsenic Trioxide started on 5/27.  Held 6/22- 6/29 due to Zoster infection, resumed Arsenic on 6/30.  Held Arsenic x 1 on 7/10, no IV access. Resumed again on 7/11.  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  Follow up CBC with diff, CMP, TLS and DIC panel daily  Keep PLTs >50 K. If unable to achieve goal to infuse 1/2 unit plts over 3 hours q12  S/p Prednisone 20mg PO daily x 5 days (through 6/12) due to platelet antibody  Keep Fibrinogen > 150, if <150 give Cryoprecipitate  Strict Is and Os/Daily weights/Mouth Care  7/5/20 scalp hypersensitivity - started on lidocaine gel to the scalp.  7/8 episode of chest pain - EKG no ectopy, cardiac biomarkers negative, CXR unchanged  7/9 PICC in Left Brachiocephalic vein, s/p PICC exchange - now removed due to pain/swelling LUE. Doppler negative, however, leaking from entry site and continued discomfort with infusions.  Patient refused Bone marrow biopsy on 7/9 plan for BMBx  (7/13) shows persistent disease , plan to continue Arsenic trioxide.  F/u results.  7/13- Refused Mediport placement today for long term access. Repeat BMBx (6/26)(+) for persistent disease, continue Arsenic trioxide and ATRA.  Continue ATRA 40mg BID (started 5/22)   Arsenic Trioxide started on 5/27.  Held 6/22- 6/29 due to Zoster infection, resumed Arsenic on 6/30.  Held Arsenic x 1 on 7/10, no IV access. Resumed again on 7/11.  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  Follow up CBC with diff, CMP, TLS and DIC panel daily  Keep PLTs >50 K. If unable to achieve goal to infuse 1/2 unit plts over 3 hours q12  S/p Prednisone 20mg PO daily x 5 days (through 6/12) due to platelet antibody  Keep Fibrinogen > 150, if <150 give Cryoprecipitate  Strict Is and Os/Daily weights/Mouth Care  7/5/20 scalp hypersensitivity - started on lidocaine gel to the scalp.  7/8 episode of chest pain - EKG no ectopy, cardiac biomarkers negative, CXR unchanged  7/9 PICC in Left Brachiocephalic vein, s/p PICC exchange - now removed due to pain/swelling LUE. Doppler negative, however, leaking from entry site and continued discomfort with infusions, PICC line removed on 7/10.  Patient refused Bone marrow biopsy on 7/9  BMBx performed on  (7/13) shows persistent disease , plan to continue Arsenic trioxide.  7/13- Refused Mediport placement today for long term access.

## 2020-07-14 NOTE — CHART NOTE - NSCHARTNOTEFT_GEN_A_CORE
Nutrition Follow Up Note  Patient seen for: LOS follow up     Interim events noted, chart reviewed. Pt c APL, awaiting count recovery.     Source: pt fast asleep at this time, RN and PCA     Diet : halal, consistent CHO c snack, Glucerna shakes x 3 daily     As per PCA, pt ate food from home today, a good portion of rice and vegetables and possibly some protein as well. Pt has continued to take Glucerna shakes as well. As per RN, pt c issues c BM, had a regular BM earlier today.       Daily Weight: 7/1: 160->7/8: 161.8->7/14: 159.6 pounds, wt stable at this time, would continue to monitor     Pertinent Medications: MEDICATIONS  (STANDING):  ammonium lactate 12% Lotion 1 Application(s) Topical two times a day  arsenic trioxide IVPB (eMAR) 11 milliGRAM(s) IV Intermittent every 24 hours  artificial  tears Solution 1 Drop(s) Both EYES three times a day  ATENolol  Tablet 25 milliGRAM(s) Oral daily  Biotene Dry Mouth Oral Rinse 5 milliLiter(s) Swish and Spit five times a day  bisacodyl 5 milliGRAM(s) Oral every 12 hours  chlorhexidine 4% Liquid 1 Application(s) Topical <User Schedule>  dextrose 5%. 1000 milliLiter(s) (50 mL/Hr) IV Continuous <Continuous>  dextrose 50% Injectable 12.5 Gram(s) IV Push once  dextrose 50% Injectable 25 Gram(s) IV Push once  dextrose 50% Injectable 25 Gram(s) IV Push once  enoxaparin Injectable 80 milliGRAM(s) SubCutaneous <User Schedule>  gabapentin 100 milliGRAM(s) Oral every 8 hours  insulin lispro (HumaLOG) corrective regimen sliding scale   SubCutaneous three times a day before meals  insulin lispro (HumaLOG) corrective regimen sliding scale   SubCutaneous at bedtime  lidocaine 2% Gel 1 Application(s) Topical every 8 hours  loratadine 10 milliGRAM(s) Oral daily  nystatin Powder 1 Application(s) Topical two times a day  pantoprazole    Tablet 40 milliGRAM(s) Oral before breakfast  petrolatum white Ointment 1 Application(s) Topical three times a day  polyethylene glycol 3350 17 Gram(s) Oral daily  tretinoin 40 milliGRAM(s) Oral every 12 hours  triamcinolone 0.1% Ointment 1 Application(s) Topical every 12 hours  valACYclovir 500 milliGRAM(s) Oral daily    MEDICATIONS  (PRN):  acetaminophen   Tablet .. 650 milliGRAM(s) Oral every 6 hours PRN Temp greater or equal to 38C (100.4F), Mild Pain (1 - 3)  ALBUTerol    90 MICROgram(s) HFA Inhaler 2 Puff(s) Inhalation every 6 hours PRN Shortness of Breath and/or Wheezing  aluminum hydroxide/magnesium hydroxide/simethicone Suspension 30 milliLiter(s) Oral every 6 hours PRN Dyspepsia  AQUAPHOR (petrolatum Ointment) 1 Application(s) Topical every 3 hours PRN Dry skin  dextrose 40% Gel 15 Gram(s) Oral once PRN Blood Glucose LESS THAN 70 milliGRAM(s)/deciliter  diphenhydrAMINE   Injectable 25 milliGRAM(s) IV Push every 6 hours PRN Allergy symptoms  glucagon  Injectable 1 milliGRAM(s) IntraMuscular once PRN Glucose LESS THAN 70 milligrams/deciliter  ondansetron Injectable 8 milliGRAM(s) IV Push every 8 hours PRN Nausea and/or Vomiting  sodium chloride 0.9% lock flush 10 milliLiter(s) IV Push every 1 hour PRN Pre/post blood products, medications, blood draw, and to maintain line patency  sodium chloride 0.9% lock flush 10 milliLiter(s) IV Push every 1 hour PRN Pre/post blood products, medications, blood draw, and to maintain line patency    Pertinent Labs: 07-14 @ 06:59: Na 140, BUN 19, Cr 0.99, <H>, K+ 4.1, Phos 4.6<H>, Mg 2.0, Alk Phos 105, ALT/SGPT 21, AST/SGOT 33, HbA1c --    Finger Sticks:  POCT Blood Glucose.: 190 mg/dL (07-14 @ 12:36)  POCT Blood Glucose.: 121 mg/dL (07-14 @ 08:02)  POCT Blood Glucose.: 143 mg/dL (07-13 @ 21:41)  POCT Blood Glucose.: 136 mg/dL (07-13 @ 17:13)      Skin per nursing documentation: no pressure injuries   Edema: +1 adnrew. LE     Estimated Needs:   [x] no change since previous assessment    Previous Nutrition Diagnosis: inadequate PO intake   Nutrition Diagnosis is addressed c good PO intake, supplements     New Nutrition Diagnosis: none at this time       Recommend  1) Continue c current diet.   2) Continue w/ Glucerna shakes.   3) Continue to encourage PO intake as tolerated.     Monitoring and Evaluation:     Continue to monitor Nutritional intake, Tolerance to diet prescription, weights, labs, skin integrity    RD remains available upon request and will follow up per protocol  Kristin Copeland MS RD CDN [facility],  #[phone]

## 2020-07-14 NOTE — PROGRESS NOTE ADULT - SUBJECTIVE AND OBJECTIVE BOX
Diagnosis:    Protocol/Chemo Regimen:    Day:     Pt endorsed:    Review of Systems:     Pain scale:     Diet:     Allergies    No Known Allergies    Intolerances        ANTIMICROBIALS  levoFLOXacin  Tablet 500 milliGRAM(s) Oral every 24 hours  posaconazole DR Tablet 300 milliGRAM(s) Oral daily  valACYclovir 500 milliGRAM(s) Oral daily      HEME/ONC MEDICATIONS  arsenic trioxide IVPB (eMAR) 11 milliGRAM(s) IV Intermittent every 24 hours  enoxaparin Injectable 80 milliGRAM(s) SubCutaneous <User Schedule>  tretinoin 40 milliGRAM(s) Oral every 12 hours      STANDING MEDICATIONS  ammonium lactate 12% Lotion 1 Application(s) Topical two times a day  artificial  tears Solution 1 Drop(s) Both EYES three times a day  ATENolol  Tablet 25 milliGRAM(s) Oral daily  Biotene Dry Mouth Oral Rinse 5 milliLiter(s) Swish and Spit five times a day  bisacodyl 5 milliGRAM(s) Oral every 12 hours  chlorhexidine 4% Liquid 1 Application(s) Topical <User Schedule>  dextrose 5%. 1000 milliLiter(s) IV Continuous <Continuous>  dextrose 50% Injectable 12.5 Gram(s) IV Push once  dextrose 50% Injectable 25 Gram(s) IV Push once  dextrose 50% Injectable 25 Gram(s) IV Push once  insulin lispro (HumaLOG) corrective regimen sliding scale   SubCutaneous three times a day before meals  insulin lispro (HumaLOG) corrective regimen sliding scale   SubCutaneous at bedtime  lidocaine 2% Gel 1 Application(s) Topical every 8 hours  nystatin Powder 1 Application(s) Topical two times a day  pantoprazole    Tablet 40 milliGRAM(s) Oral before breakfast  petrolatum white Ointment 1 Application(s) Topical three times a day  polyethylene glycol 3350 17 Gram(s) Oral daily  triamcinolone 0.1% Ointment 1 Application(s) Topical every 12 hours      PRN MEDICATIONS  acetaminophen   Tablet .. 650 milliGRAM(s) Oral every 6 hours PRN  ALBUTerol    90 MICROgram(s) HFA Inhaler 2 Puff(s) Inhalation every 6 hours PRN  aluminum hydroxide/magnesium hydroxide/simethicone Suspension 30 milliLiter(s) Oral every 6 hours PRN  AQUAPHOR (petrolatum Ointment) 1 Application(s) Topical every 3 hours PRN  dextrose 40% Gel 15 Gram(s) Oral once PRN  diphenhydrAMINE   Injectable 25 milliGRAM(s) IV Push every 6 hours PRN  glucagon  Injectable 1 milliGRAM(s) IntraMuscular once PRN  ondansetron Injectable 8 milliGRAM(s) IV Push every 8 hours PRN  sodium chloride 0.9% lock flush 10 milliLiter(s) IV Push every 1 hour PRN  sodium chloride 0.9% lock flush 10 milliLiter(s) IV Push every 1 hour PRN        Vital Signs Last 24 Hrs  T(C): 36.1 (14 Jul 2020 05:20), Max: 37.1 (13 Jul 2020 21:37)  T(F): 97 (14 Jul 2020 05:20), Max: 98.7 (13 Jul 2020 21:37)  HR: 67 (14 Jul 2020 05:20) (63 - 83)  BP: 120/64 (14 Jul 2020 05:20) (114/69 - 146/80)  BP(mean): --  RR: 18 (14 Jul 2020 05:20) (16 - 19)  SpO2: 97% (14 Jul 2020 05:20) (94% - 98%)    PHYSICAL EXAM  General: NAD  HEENT: PERRLA, EOMOI, clear oropharynx, anicteric sclera, pink conjunctiva  Neck: supple  CV: (+) S1/S2 RRR  Lungs: clear to auscultation, no wheezes or rales  Abdomen: soft, non-tender, non-distended (+) BS  Ext: no clubbing, cyanosis or edema  Skin: no rashes and no petechiae  Neuro: alert and oriented X 3, no focal deficits  Central Line:     RECENT CULTURES:  07-07 @ 21:58  .Urine Clean Catch (Midstream)  --  --  --    <10,000 CFU/mL Normal Urogenital Krista  --        LABS:                        9.2    3.26  )-----------( 277      ( 13 Jul 2020 06:42 )             28.3         Mean Cell Volume : 93.1 fl  Mean Cell Hemoglobin : 30.3 pg  Mean Cell Hemoglobin Concentration : 32.5 gm/dL  Auto Neutrophil # : 1.50 K/uL  Auto Lymphocyte # : 1.36 K/uL  Auto Monocyte # : 0.08 K/uL  Auto Eosinophil # : 0.26 K/uL  Auto Basophil # : 0.00 K/uL  Auto Neutrophil % : 46.0 %  Auto Lymphocyte % : 41.6 %  Auto Monocyte % : 2.6 %  Auto Eosinophil % : 8.0 %  Auto Basophil % : 0.0 %      07-13    140  |  103  |  20  ----------------------------<  117<H>  4.2   |  22  |  0.98    Ca    10.5      13 Jul 2020 06:42  Phos  4.7     07-13  Mg     2.1     07-13    TPro  8.1  /  Alb  4.6  /  TBili  0.3  /  DBili  x   /  AST  32  /  ALT  28  /  AlkPhos  112  07-13          PT/INR - ( 13 Jul 2020 06:42 )   PT: 13.2 sec;   INR: 1.16 ratio         PTT - ( 13 Jul 2020 06:42 )  PTT:35.8 sec        RADIOLOGY & ADDITIONAL STUDIES: Diagnosis: APL    Protocol/Chemo Regimen: ATRA/Arsenic Trioxide    Day: ATRA Day 53 and Arsenic 48 (Held from 6/22/20 to 6/30/2020 and resumed on 6/30/20, held on 7/10 x1)    Pt endorsed: generalized fatigue, left arm rash,  eye irritation.    Review of Systems: Denies nausea, vomiting, diarrhea, chest pain, SOB, headache      Translated by patient's son via phone call per patient request     Pain scale: Denies.    Diet: Consistent Carb w/ Evening Snack    Allergies: No Known Allergies    ANTIMICROBIALS  levoFLOXacin  Tablet 500 milliGRAM(s) Oral every 24 hours  posaconazole DR Tablet 300 milliGRAM(s) Oral daily  valACYclovir 500 milliGRAM(s) Oral daily    HEME/ONC MEDICATIONS  arsenic trioxide IVPB (eMAR) 11 milliGRAM(s) IV Intermittent every 24 hours  enoxaparin Injectable 80 milliGRAM(s) SubCutaneous <User Schedule>  tretinoin 40 milliGRAM(s) Oral every 12 hours    STANDING MEDICATIONS  ammonium lactate 12% Lotion 1 Application(s) Topical two times a day  artificial  tears Solution 1 Drop(s) Both EYES three times a day  ATENolol  Tablet 25 milliGRAM(s) Oral daily  Biotene Dry Mouth Oral Rinse 5 milliLiter(s) Swish and Spit five times a day  bisacodyl 5 milliGRAM(s) Oral every 12 hours  chlorhexidine 4% Liquid 1 Application(s) Topical <User Schedule>  dextrose 5%. 1000 milliLiter(s) IV Continuous <Continuous>  dextrose 50% Injectable 12.5 Gram(s) IV Push once  dextrose 50% Injectable 25 Gram(s) IV Push once  dextrose 50% Injectable 25 Gram(s) IV Push once  insulin lispro (HumaLOG) corrective regimen sliding scale   SubCutaneous three times a day before meals  insulin lispro (HumaLOG) corrective regimen sliding scale   SubCutaneous at bedtime  lidocaine 2% Gel 1 Application(s) Topical every 8 hours  nystatin Powder 1 Application(s) Topical two times a day  pantoprazole    Tablet 40 milliGRAM(s) Oral before breakfast  petrolatum white Ointment 1 Application(s) Topical three times a day  polyethylene glycol 3350 17 Gram(s) Oral daily  triamcinolone 0.1% Ointment 1 Application(s) Topical every 12 hours    PRN MEDICATIONS  acetaminophen   Tablet .. 650 milliGRAM(s) Oral every 6 hours PRN  ALBUTerol    90 MICROgram(s) HFA Inhaler 2 Puff(s) Inhalation every 6 hours PRN  aluminum hydroxide/magnesium hydroxide/simethicone Suspension 30 milliLiter(s) Oral every 6 hours PRN  AQUAPHOR (petrolatum Ointment) 1 Application(s) Topical every 3 hours PRN  dextrose 40% Gel 15 Gram(s) Oral once PRN  diphenhydrAMINE   Injectable 25 milliGRAM(s) IV Push every 6 hours PRN  glucagon  Injectable 1 milliGRAM(s) IntraMuscular once PRN  ondansetron Injectable 8 milliGRAM(s) IV Push every 8 hours PRN  sodium chloride 0.9% lock flush 10 milliLiter(s) IV Push every 1 hour PRN  sodium chloride 0.9% lock flush 10 milliLiter(s) IV Push every 1 hour PRN    Vital Signs Last 24 Hrs  T(C): 36.1 (14 Jul 2020 05:20), Max: 37.1 (13 Jul 2020 21:37)  T(F): 97 (14 Jul 2020 05:20), Max: 98.7 (13 Jul 2020 21:37)  HR: 67 (14 Jul 2020 05:20) (63 - 83)  BP: 120/64 (14 Jul 2020 05:20) (114/69 - 146/80)  BP(mean): --  RR: 18 (14 Jul 2020 05:20) (16 - 19)  SpO2: 97% (14 Jul 2020 05:20) (94% - 98%)    PHYSICAL EXAM  General: adult in NAD  HEENT: clear oropharynx, no erythema, no ulcers  CV: normal S1, S2, RRR  Lungs: clear to auscultation, no wheezes, no rales  Abdomen: soft, nontender, nondistended, normal BS  Ext: no edema, RUE hyperpigmented.  Skin: Warm and dry, Some hyperpigmentation noted around RUE PICC line, Shingles-like rash noted on R buttock (appears crusting over)  R posterior thigh and right leg   Neuro: alert and oriented x 3  Central line Left PICC CDI     RECENT CULTURES:  07-07 @ 21:58  .Urine Clean Catch (Midstream)  <10,000 CFU/mL Normal Urogenital Krista    LABS:                        8.4    3.65  )-----------( 248      ( 14 Jul 2020 07:01 )             25.8     Mean Cell Volume : 93.1 fl  Mean Cell Hemoglobin : 30.3 pg  Mean Cell Hemoglobin Concentration : 32.6 gm/dL  Auto Neutrophil # : 1.89 K/uL  Auto Lymphocyte # : 1.15 K/uL  Auto Monocyte # : 0.29 K/uL  Auto Eosinophil # : 0.26 K/uL  Auto Basophil # : 0.06 K/uL  Auto Neutrophil % : 51.8 %  Auto Lymphocyte % : 31.6 %  Auto Monocyte % : 7.9 %  Auto Eosinophil % : 7.0 %  Auto Basophil % : 1.7 %      07-14    140  |  103  |  19  ----------------------------<  121<H>  4.1   |  24  |  0.99    Ca    10.1      14 Jul 2020 06:59  Phos  4.6     07-14  Mg     2.0     07-14  TPro  7.1  /  Alb  4.2  /  TBili  0.2  /  DBili  x   /  AST  33  /  ALT  21  /  AlkPhos  105  07-14  Mg 2.0  Phos 4.6  PT/INR - ( 13 Jul 2020 06:42 )   PT: 13.2 sec;   INR: 1.16 ratio    PTT - ( 13 Jul 2020 06:42 )  PTT:35.8 sec    Uric Acid 5.5    RADIOLOGY & ADDITIONAL STUDIES:   CT Upper Extremity No Cont, Left (07.10.20 @ 23:05) >  No fluid collection. Mild skin thickening and subcutaneous edemaanterolaterally at the level of the distal humeral shaft. Diagnosis: APL    Protocol/Chemo Regimen: ATRA/Arsenic Trioxide    Day: ATRA Day 53 and Arsenic 48 (Held from 6/22/20 to 6/30/2020 and resumed on 6/30/20, held on 7/10 x1)    Pt endorsed: generalized fatigue, left arm rash,  eye irritation.    Review of Systems: Denies nausea, vomiting, diarrhea, chest pain, SOB, headache      Translated by patient's son via phone call per patient request     Pain scale: Denies.    Diet: Consistent Carb w/ Evening Snack    Allergies: No Known Allergies    ANTIMICROBIALS  levoFLOXacin  Tablet 500 milliGRAM(s) Oral every 24 hours  posaconazole DR Tablet 300 milliGRAM(s) Oral daily  valACYclovir 500 milliGRAM(s) Oral daily    HEME/ONC MEDICATIONS  arsenic trioxide IVPB (eMAR) 11 milliGRAM(s) IV Intermittent every 24 hours  enoxaparin Injectable 80 milliGRAM(s) SubCutaneous <User Schedule>  tretinoin 40 milliGRAM(s) Oral every 12 hours    STANDING MEDICATIONS  ammonium lactate 12% Lotion 1 Application(s) Topical two times a day  artificial  tears Solution 1 Drop(s) Both EYES three times a day  ATENolol  Tablet 25 milliGRAM(s) Oral daily  Biotene Dry Mouth Oral Rinse 5 milliLiter(s) Swish and Spit five times a day  bisacodyl 5 milliGRAM(s) Oral every 12 hours  chlorhexidine 4% Liquid 1 Application(s) Topical <User Schedule>  dextrose 5%. 1000 milliLiter(s) IV Continuous <Continuous>  dextrose 50% Injectable 12.5 Gram(s) IV Push once  dextrose 50% Injectable 25 Gram(s) IV Push once  dextrose 50% Injectable 25 Gram(s) IV Push once  insulin lispro (HumaLOG) corrective regimen sliding scale   SubCutaneous three times a day before meals  insulin lispro (HumaLOG) corrective regimen sliding scale   SubCutaneous at bedtime  lidocaine 2% Gel 1 Application(s) Topical every 8 hours  nystatin Powder 1 Application(s) Topical two times a day  pantoprazole    Tablet 40 milliGRAM(s) Oral before breakfast  petrolatum white Ointment 1 Application(s) Topical three times a day  polyethylene glycol 3350 17 Gram(s) Oral daily  triamcinolone 0.1% Ointment 1 Application(s) Topical every 12 hours    PRN MEDICATIONS  acetaminophen   Tablet .. 650 milliGRAM(s) Oral every 6 hours PRN  ALBUTerol    90 MICROgram(s) HFA Inhaler 2 Puff(s) Inhalation every 6 hours PRN  aluminum hydroxide/magnesium hydroxide/simethicone Suspension 30 milliLiter(s) Oral every 6 hours PRN  AQUAPHOR (petrolatum Ointment) 1 Application(s) Topical every 3 hours PRN  dextrose 40% Gel 15 Gram(s) Oral once PRN  diphenhydrAMINE   Injectable 25 milliGRAM(s) IV Push every 6 hours PRN  glucagon  Injectable 1 milliGRAM(s) IntraMuscular once PRN  ondansetron Injectable 8 milliGRAM(s) IV Push every 8 hours PRN  sodium chloride 0.9% lock flush 10 milliLiter(s) IV Push every 1 hour PRN  sodium chloride 0.9% lock flush 10 milliLiter(s) IV Push every 1 hour PRN    Vital Signs Last 24 Hrs  T(C): 36.1 (14 Jul 2020 05:20), Max: 37.1 (13 Jul 2020 21:37)  T(F): 97 (14 Jul 2020 05:20), Max: 98.7 (13 Jul 2020 21:37)  HR: 67 (14 Jul 2020 05:20) (63 - 83)  BP: 120/64 (14 Jul 2020 05:20) (114/69 - 146/80)  BP(mean): --  RR: 18 (14 Jul 2020 05:20) (16 - 19)  SpO2: 97% (14 Jul 2020 05:20) (94% - 98%)    PHYSICAL EXAM  General: adult in NAD  HEENT: clear oropharynx, no erythema, no ulcers  CV: normal S1, S2, RRR  Lungs: clear to auscultation, no wheezes, no rales  Abdomen: soft, nontender, nondistended, normal BS  Ext: no edema, RUE hyperpigmented.  Skin: Warm and dry, left arm rash +,  Shingles-like rash noted on R buttock (appears crusting over)  R posterior thigh and right leg   Neuro: alert and oriented x 3  Central line Left PICC CDI     RECENT CULTURES:  07-07 @ 21:58  .Urine Clean Catch (Midstream)  <10,000 CFU/mL Normal Urogenital Krista    LABS:                        8.4    3.65  )-----------( 248      ( 14 Jul 2020 07:01 )             25.8     Mean Cell Volume : 93.1 fl  Mean Cell Hemoglobin : 30.3 pg  Mean Cell Hemoglobin Concentration : 32.6 gm/dL  Auto Neutrophil # : 1.89 K/uL  Auto Lymphocyte # : 1.15 K/uL  Auto Monocyte # : 0.29 K/uL  Auto Eosinophil # : 0.26 K/uL  Auto Basophil # : 0.06 K/uL  Auto Neutrophil % : 51.8 %  Auto Lymphocyte % : 31.6 %  Auto Monocyte % : 7.9 %  Auto Eosinophil % : 7.0 %  Auto Basophil % : 1.7 %      07-14    140  |  103  |  19  ----------------------------<  121<H>  4.1   |  24  |  0.99    Ca    10.1      14 Jul 2020 06:59  Phos  4.6     07-14  Mg     2.0     07-14  TPro  7.1  /  Alb  4.2  /  TBili  0.2  /  DBili  x   /  AST  33  /  ALT  21  /  AlkPhos  105  07-14  Mg 2.0  Phos 4.6  PT/INR - ( 13 Jul 2020 06:42 )   PT: 13.2 sec;   INR: 1.16 ratio    PTT - ( 13 Jul 2020 06:42 )  PTT:35.8 sec    Uric Acid 5.5    RADIOLOGY & ADDITIONAL STUDIES:   CT Upper Extremity No Cont, Left (07.10.20 @ 23:05) >  No fluid collection. Mild skin thickening and subcutaneous edemaanterolaterally at the level of the distal humeral shaft.

## 2020-07-14 NOTE — PROGRESS NOTE ADULT - PROBLEM SELECTOR PLAN 4
Shingles of the Right buttock and Right upper thigh.   6/22 seen by Dermatology   Continue Valtrex and Lac hydrin as per Dermatology recommendations   Neurontin 100 mg TID for herpetic neuralgia  Lesions now crusted over - will d/c airborne isolation.  Continue Valtrex until neutropenia resolves. Shingles of the Right buttock and Right upper thigh.   6/22 seen by Dermatology   Continue Valtrex and Lac hydrin as per Dermatology recommendations   Neurontin 100 mg TID for herpetic neuralgia  Lesions now crusted over - will d/c airborne isolation.

## 2020-07-14 NOTE — PROGRESS NOTE ADULT - PROBLEM SELECTOR PLAN 2
Patient is neutropenic, afebrile   6/29 Started Levaquin and Posaconazole for PPX  If spikes pan culture, CXR and change Levaquin to cefepime   6/23 Persistent right upper inner arm pain/swelling - CT no abscess, + DVT,   7/2 repeat Doppler - No infectious sources, R Axilla DVT resolved  6/22 Right buttock and upper thigh shingles continue Valtrex tx with Lac Hydrin  Previous COVID-19 (+) outpatient  Then COVID (-) on 5/22, 5/26, 6/2 and 6/9 6/16 COVID 19 (+)   6/18 COVID 19 Ab (+)  6/19 Repeat COVID-19 PCR (-)  7/7- burning urination Urinalysis negative, Urine cultures (-) Patient is not neutropenic, afebrile   6/29 Started Levaquin and Posaconazole for PPX, d/dorothy on 7/14/2020.  If febrile pan culture, CXR.  6/23 Persistent right upper inner arm pain/swelling - CT no abscess, + DVT,   7/2 repeat Doppler - No infectious sources, R Axilla DVT resolved  6/22 Right buttock and upper thigh shingles continue Valtrex tx with Lac Hydrin  Previous COVID-19 (+) outpatient  Then COVID (-) on 5/22, 5/26, 6/2 and 6/9 6/16 COVID 19 (+)   6/18 COVID 19 Ab (+)  6/19 Repeat COVID-19 PCR (-)  7/7- burning urination Urinalysis negative, Urine cultures (-) Patient is not neutropenic, afebrile   6/29 Started Levaquin and Posaconazole for PPX, d/dorothy on 7/14/2020. Continue Valtrex till completion of Arsenic.  If febrile pan culture, CXR.  6/23 Persistent right upper inner arm pain/swelling - CT no abscess, + DVT,   7/2 repeat Doppler - No infectious sources, R Axilla DVT resolved  6/22 Right buttock and upper thigh shingles continue Valtrex tx with Lac Hydrin  Previous COVID-19 (+) outpatient  Then COVID (-) on 5/22, 5/26, 6/2 and 6/9 6/16 COVID 19 (+)   6/18 COVID 19 Ab (+)  6/19 Repeat COVID-19 PCR (-)  7/7- burning urination Urinalysis negative, Urine cultures (-)

## 2020-07-15 LAB
ALBUMIN SERPL ELPH-MCNC: 3.9 G/DL — SIGNIFICANT CHANGE UP (ref 3.3–5)
ALP SERPL-CCNC: 98 U/L — SIGNIFICANT CHANGE UP (ref 40–120)
ALT FLD-CCNC: 22 U/L — SIGNIFICANT CHANGE UP (ref 10–45)
ANION GAP SERPL CALC-SCNC: 17 MMOL/L — SIGNIFICANT CHANGE UP (ref 5–17)
APTT BLD: 48.8 SEC — HIGH (ref 27.5–35.5)
AST SERPL-CCNC: 27 U/L — SIGNIFICANT CHANGE UP (ref 10–40)
BASOPHILS # BLD AUTO: 0.02 K/UL — SIGNIFICANT CHANGE UP (ref 0–0.2)
BASOPHILS NFR BLD AUTO: 0.6 % — SIGNIFICANT CHANGE UP (ref 0–2)
BILIRUB SERPL-MCNC: 0.3 MG/DL — SIGNIFICANT CHANGE UP (ref 0.2–1.2)
BUN SERPL-MCNC: 13 MG/DL — SIGNIFICANT CHANGE UP (ref 7–23)
CALCIUM SERPL-MCNC: 10.2 MG/DL — SIGNIFICANT CHANGE UP (ref 8.4–10.5)
CHLORIDE SERPL-SCNC: 104 MMOL/L — SIGNIFICANT CHANGE UP (ref 96–108)
CO2 SERPL-SCNC: 22 MMOL/L — SIGNIFICANT CHANGE UP (ref 22–31)
CREAT SERPL-MCNC: 0.97 MG/DL — SIGNIFICANT CHANGE UP (ref 0.5–1.3)
D DIMER BLD IA.RAPID-MCNC: 310 NG/ML DDU — HIGH
EOSINOPHIL # BLD AUTO: 0.19 K/UL — SIGNIFICANT CHANGE UP (ref 0–0.5)
EOSINOPHIL NFR BLD AUTO: 6.1 % — HIGH (ref 0–6)
FIBRINOGEN PPP-MCNC: 432 MG/DL — SIGNIFICANT CHANGE UP (ref 350–510)
GLUCOSE BLDC GLUCOMTR-MCNC: 106 MG/DL — HIGH (ref 70–99)
GLUCOSE BLDC GLUCOMTR-MCNC: 112 MG/DL — HIGH (ref 70–99)
GLUCOSE BLDC GLUCOMTR-MCNC: 152 MG/DL — HIGH (ref 70–99)
GLUCOSE BLDC GLUCOMTR-MCNC: 159 MG/DL — HIGH (ref 70–99)
GLUCOSE SERPL-MCNC: 108 MG/DL — HIGH (ref 70–99)
HCT VFR BLD CALC: 26.1 % — LOW (ref 34.5–45)
HEMATOPATHOLOGY REPORT: SIGNIFICANT CHANGE UP
HGB BLD-MCNC: 8.6 G/DL — LOW (ref 11.5–15.5)
IMM GRANULOCYTES NFR BLD AUTO: 0.3 % — SIGNIFICANT CHANGE UP (ref 0–1.5)
INR BLD: 1.24 RATIO — HIGH (ref 0.88–1.16)
LDH SERPL L TO P-CCNC: 163 U/L — SIGNIFICANT CHANGE UP (ref 50–242)
LYMPHOCYTES # BLD AUTO: 1.55 K/UL — SIGNIFICANT CHANGE UP (ref 1–3.3)
LYMPHOCYTES # BLD AUTO: 50 % — HIGH (ref 13–44)
MAGNESIUM SERPL-MCNC: 2 MG/DL — SIGNIFICANT CHANGE UP (ref 1.6–2.6)
MCHC RBC-ENTMCNC: 30.3 PG — SIGNIFICANT CHANGE UP (ref 27–34)
MCHC RBC-ENTMCNC: 33 GM/DL — SIGNIFICANT CHANGE UP (ref 32–36)
MCV RBC AUTO: 91.9 FL — SIGNIFICANT CHANGE UP (ref 80–100)
MONOCYTES # BLD AUTO: 0.14 K/UL — SIGNIFICANT CHANGE UP (ref 0–0.9)
MONOCYTES NFR BLD AUTO: 4.5 % — SIGNIFICANT CHANGE UP (ref 2–14)
NEUTROPHILS # BLD AUTO: 1.19 K/UL — LOW (ref 1.8–7.4)
NEUTROPHILS NFR BLD AUTO: 38.5 % — LOW (ref 43–77)
NRBC # BLD: 0 /100 WBCS — SIGNIFICANT CHANGE UP (ref 0–0)
PHOSPHATE SERPL-MCNC: 4.7 MG/DL — HIGH (ref 2.5–4.5)
PLATELET # BLD AUTO: 229 K/UL — SIGNIFICANT CHANGE UP (ref 150–400)
POTASSIUM SERPL-MCNC: 3.6 MMOL/L — SIGNIFICANT CHANGE UP (ref 3.5–5.3)
POTASSIUM SERPL-SCNC: 3.6 MMOL/L — SIGNIFICANT CHANGE UP (ref 3.5–5.3)
PROT SERPL-MCNC: 7.1 G/DL — SIGNIFICANT CHANGE UP (ref 6–8.3)
PROTHROM AB SERPL-ACNC: 14.6 SEC — HIGH (ref 10.6–13.6)
RBC # BLD: 2.84 M/UL — LOW (ref 3.8–5.2)
RBC # FLD: 17.1 % — HIGH (ref 10.3–14.5)
SODIUM SERPL-SCNC: 143 MMOL/L — SIGNIFICANT CHANGE UP (ref 135–145)
TM INTERPRETATION: SIGNIFICANT CHANGE UP
URATE SERPL-MCNC: 5.7 MG/DL — SIGNIFICANT CHANGE UP (ref 2.5–7)
WBC # BLD: 3.1 K/UL — LOW (ref 3.8–10.5)
WBC # FLD AUTO: 3.1 K/UL — LOW (ref 3.8–10.5)

## 2020-07-15 PROCEDURE — 99233 SBSQ HOSP IP/OBS HIGH 50: CPT | Mod: GC

## 2020-07-15 RX ORDER — POTASSIUM CHLORIDE 20 MEQ
40 PACKET (EA) ORAL ONCE
Refills: 0 | Status: COMPLETED | OUTPATIENT
Start: 2020-07-15 | End: 2020-07-15

## 2020-07-15 RX ADMIN — GABAPENTIN 100 MILLIGRAM(S): 400 CAPSULE ORAL at 05:15

## 2020-07-15 RX ADMIN — NYSTATIN CREAM 1 APPLICATION(S): 100000 CREAM TOPICAL at 17:38

## 2020-07-15 RX ADMIN — Medication 5 MILLILITER(S): at 23:04

## 2020-07-15 RX ADMIN — Medication 5 MILLILITER(S): at 11:53

## 2020-07-15 RX ADMIN — Medication 1 APPLICATION(S): at 05:15

## 2020-07-15 RX ADMIN — Medication 1 APPLICATION(S): at 14:14

## 2020-07-15 RX ADMIN — TRETINOIN 40 MILLIGRAM(S): 10 CAPSULE, LIQUID FILLED ORAL at 07:57

## 2020-07-15 RX ADMIN — ATENOLOL 25 MILLIGRAM(S): 25 TABLET ORAL at 05:14

## 2020-07-15 RX ADMIN — POLYETHYLENE GLYCOL 3350 17 GRAM(S): 17 POWDER, FOR SOLUTION ORAL at 11:53

## 2020-07-15 RX ADMIN — Medication 1: at 12:47

## 2020-07-15 RX ADMIN — Medication 5 MILLILITER(S): at 07:57

## 2020-07-15 RX ADMIN — Medication 1 APPLICATION(S): at 05:19

## 2020-07-15 RX ADMIN — GABAPENTIN 100 MILLIGRAM(S): 400 CAPSULE ORAL at 15:17

## 2020-07-15 RX ADMIN — Medication 5 MILLILITER(S): at 15:17

## 2020-07-15 RX ADMIN — PANTOPRAZOLE SODIUM 40 MILLIGRAM(S): 20 TABLET, DELAYED RELEASE ORAL at 05:14

## 2020-07-15 RX ADMIN — GABAPENTIN 100 MILLIGRAM(S): 400 CAPSULE ORAL at 21:17

## 2020-07-15 RX ADMIN — Medication 1 APPLICATION(S): at 17:37

## 2020-07-15 RX ADMIN — TRETINOIN 40 MILLIGRAM(S): 10 CAPSULE, LIQUID FILLED ORAL at 19:15

## 2020-07-15 RX ADMIN — Medication 1 APPLICATION(S): at 07:58

## 2020-07-15 RX ADMIN — Medication 1 APPLICATION(S): at 17:38

## 2020-07-15 RX ADMIN — ENOXAPARIN SODIUM 80 MILLIGRAM(S): 100 INJECTION SUBCUTANEOUS at 05:15

## 2020-07-15 RX ADMIN — Medication 5 MILLILITER(S): at 19:16

## 2020-07-15 RX ADMIN — Medication 5 MILLIGRAM(S): at 05:16

## 2020-07-15 RX ADMIN — ENOXAPARIN SODIUM 80 MILLIGRAM(S): 100 INJECTION SUBCUTANEOUS at 17:37

## 2020-07-15 RX ADMIN — Medication 40 MILLIEQUIVALENT(S): at 15:16

## 2020-07-15 RX ADMIN — NYSTATIN CREAM 1 APPLICATION(S): 100000 CREAM TOPICAL at 05:17

## 2020-07-15 NOTE — PROGRESS NOTE ADULT - PROBLEM SELECTOR PLAN 8
On Lovenox SQ BID for DVT treatment. Discontinue if platelets are < 50,000       Contact Information [phone]

## 2020-07-15 NOTE — PROGRESS NOTE ADULT - PROBLEM SELECTOR PLAN 1
Repeat BMBx (6/26)(+) for persistent disease, continue Arsenic trioxide and ATRA.  Continue ATRA 40mg BID (started 5/22)   Arsenic Trioxide started on 5/27.  Held 6/22- 6/29 due to Zoster infection, resumed Arsenic on 6/30.  Held Arsenic x 1 on 7/10, no IV access. Resumed again on 7/11.  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  Follow up CBC with diff, CMP, TLS and DIC panel daily  Keep PLTs >50 K. If unable to achieve goal to infuse 1/2 unit plts over 3 hours q12  S/p Prednisone 20mg PO daily x 5 days (through 6/12) due to platelet antibody  Keep Fibrinogen > 150, if <150 give Cryoprecipitate  Strict Is and Os/Daily weights/Mouth Care  7/5/20 scalp hypersensitivity - started on lidocaine gel to the scalp.  7/8 episode of chest pain - EKG no ectopy, cardiac biomarkers negative, CXR unchanged  7/9 PICC in Left Brachiocephalic vein, s/p PICC exchange - now removed due to pain/swelling LUE. Doppler negative, however, leaking from entry site and continued discomfort with infusions, PICC line removed on 7/10.  Patient refused Bone marrow biopsy on 7/9  BMBx performed on  (7/13) shows persistent disease , plan to continue Arsenic trioxide.  7/13- Refused Mediport placement Repeat BMBx (6/26)(+) for persistent disease, continue Arsenic trioxide and ATRA.  Continue ATRA 40mg BID (started 5/22)   Arsenic Trioxide started on 5/27.  Held 6/22- 6/29 due to Zoster infection, resumed Arsenic on 6/30.  Held Arsenic x 1 on 7/10, no IV access. Resumed again on 7/11.  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  Follow up CBC with diff, CMP, TLS and DIC panel daily  Keep PLTs >50 K. If unable to achieve goal to infuse 1/2 unit plts over 3 hours q12  S/p Prednisone 20mg PO daily x 5 days (through 6/12) due to platelet antibody  Keep Fibrinogen > 150, if <150 give Cryoprecipitate  Strict Is and Os/Daily weights/Mouth Care  7/8 episode of chest pain - EKG no ectopy, cardiac biomarkers negative, CXR unchanged  7/9 PICC in Left Brachiocephalic vein, s/p PICC exchange - now removed due to pain/swelling LUE. Doppler negative, however, leaking from entry site and continued discomfort with infusions, PICC line removed on 7/10.  Patient refused Bone marrow biopsy on 7/9  BMBx performed on  (7/13) shows persistent disease , plan to continue Arsenic trioxide.  7/13- Refused Mediport placement Repeat BMBx (6/26)(+) for persistent disease, continue Arsenic trioxide and ATRA.  ATRA 40mg BID (started 5/22)   Arsenic Trioxide started on 5/27.  Held 6/22- 6/29 due to Zoster infection, resumed Arsenic on 6/30.  Held Arsenic x 1 on 7/10, no IV access. Resumed again on 7/11.  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 Doppler negative, however, leaking from entry site and continued discomfort with infusions, PICC line removed on 7/10.  Patient refused Bone marrow biopsy on 7/9  BMBx performed on  (7/13) shows persistent disease , plan to continue Arsenic trioxide.  7/13- Refused Mediport placement

## 2020-07-15 NOTE — PROGRESS NOTE ADULT - PROBLEM SELECTOR PLAN 5
Most likely due to Eustachian tube dysfunction  Continue Ocean spray and Debrox .  Audiogram done on 6/30 shows severe hearing loss in the left ear and essentially profound hearing loss in the right ear, 250-8kHz; asymmetry noted 2k-6kHz, right ear poorer than left. Unmasked bone conduction suggests mixed hearing loss in at least one ear (unable to mask), follow up  with Otology.  ENT following Home regimen with Atenolol held on admission  Restarted at 25mg PO daily for increased BP  BP is currently stable. Continue to monitor

## 2020-07-15 NOTE — ADVANCED PRACTICE NURSE CONSULT - ASSESSMENT
Lab results reviewed by  Patient aware of her treatment,aware of common side effects to watch. Patient with right thumb peripheral IV gauge 22 patent intact. 2 RNs verification completed prior to start of treatment. Arsenic trioxide 11mg in 261ml D5W started at 1335 as 2 hours infusion at 131ml/hr via lowest port of NSS line through right thuimb peripheral IV. Primary RN aware of plan of care. Patient looks comfortable denies chesdtpain and back pain.  Safety maintained. Resting on bed with right arm resting on one pillow.

## 2020-07-15 NOTE — PROGRESS NOTE ADULT - ASSESSMENT
Ms. Gant is a 61 y/o [place]-speaking female w/ a PMHx of HTN, HLD, T2DM, and COVID-19 infection in 4/2020 admitted for management of newly diagnosed APL. The patient has been receiving treatment with ATRA and Arsenic (ATRA since 5/22 and Arsenic 5/27). Arsenic which was on hold since 6/22 secondary to Herpes Zoster infection, resumed on 6/30.  Patient's hospital course has been complicated by transaminitis, refractory thrombocytopenia, COVID-19 (+) state (6/16), shingles, right axillary DVT superficial and occipital hypersensitivity. On 7/13 a bone marrow biopsy was performed which showed persistent disease. Ms. Gant is a 59 y/o [place]-speaking female w/ a PMHx of HTN, HLD, T2DM, and COVID-19 infection in 4/2020 admitted for management of newly diagnosed APL. The patient has been receiving treatment with ATRA and Arsenic (ATRA since 5/22 and Arsenic 5/27). Arsenic which was on hold since 6/22 secondary to Herpes Zoster infection, resumed on 6/30.  Patient's hospital course has been complicated by transaminitis, refractory thrombocytopenia, COVID-19 (+) state (6/16), shingles, right axillary DVT superficial and occipital hypersensitivity. On 7/13 a bone marrow biopsy was performed which showed persistent disease continued on current treatment.

## 2020-07-15 NOTE — PROGRESS NOTE ADULT - PROBLEM SELECTOR PLAN 7
5/23: HgA1c = 7.0  C/w HISS. FS are currently stable. Monitor FS AC & QHS   C/w Consistent Carbohydrate diet w/ Evening snack On Lovenox SQ BID for DVT treatment. Discontinue if platelets are < 50,000       Contact Information [phone]

## 2020-07-15 NOTE — PROGRESS NOTE ADULT - ATTENDING COMMENTS
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  Presented with pancytopenia, flow cytometry/bone marrow biopsy/FISH done 5/26- c/w acute promyelocytic leukemia with PML-AWA translocation.    She was started on ATRA 5/23- day +53, GALA 5/27- day +48 (arsenic held several times).    -pt had BM bx done on 6/26 showing persistent APL -had 14% blasts+Promyelo. s/p repeat BM bx on 7/13, counts recovered, pt has had >30days GALA. If morphologically in CR, may stop GALA/ATRA and d/c home  -PICC out since 7/10, was leaking. Pt has peripheral IV access as of 7/12, missed 1 GALA dose on 7/10. Plan to do MediPort on 7/13, but now they wanted to defer as pt is uncomfortable with getting it done. She refused peripheral IV placement today - had long discussion with son about importance of continuing arsenic without missed doses, agreeable for IV placement  -monitor lytes daily, keep K>4, Mg>2.  EKG twice a week  -Zoster rash on R buttock and back of RLE, crusted over. Cont and will remain on VZV prophylaxis of Valcyclovir   -d/c Levaquin and Posaconazole as she is no longer neutropenic  -RUE doppler showing axillary DVT on 6/25, on therapeutic Lovenox since 6/25. Repeat RUE doppler done 7/2 -clot cleared. 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  Presented with pancytopenia, flow cytometry/bone marrow biopsy/FISH done 5/26- c/w acute promyelocytic leukemia with PML-AWA translocation.    She was started on ATRA 5/23- day +54, GALA 5/27- day +49 (arsenic held several times).    -pt had BM bx done on 6/26 showing persistent APL -had 14% blasts+Promyelo. s/p repeat BM bx on 7/13, counts recovered, pt has had >30days GALA. If morphologically in CR, may stop GALA/ATRA and d/c home  -PICC out since 7/10, was leaking. Pt has peripheral IV access as of 7/12, missed 1 GALA dose on 7/10. Plan to do MediPort on 7/13, but now they wanted to defer as pt is uncomfortable with getting it done.   -monitor lytes daily, keep K>4, Mg>2.  EKG twice a week  -Zoster rash on R buttock and back of RLE, crusted over. Cont and will remain on VZV prophylaxis of Valcyclovir   -d/c Levaquin and Posaconazole as she is no longer neutropenic  -RUE doppler showing axillary DVT on 6/25, on therapeutic Lovenox since 6/25. Repeat RUE doppler done 7/2 -clot cleared. Will give 3 months of A/C

## 2020-07-15 NOTE — PROGRESS NOTE ADULT - PROBLEM SELECTOR PLAN 3
Patient with right upper arm pain and swelling s/p PICC line removal   6/23 [facility] Dup[facility] (+) for acute partially occlusive DVT affecting right axillary vein   7/2- repeat doppler -Right axillary DVT appears resolved.  6/24 CT consistent with DVT, no abscess   Was on a Heparin gtt, now changed to SQ Lovenox q 12. Keep platelets >50,000  With associated rash suspected 2/2 adhesive, continue clobetasol per Derm  Decreased phasicity in the right subclavian vein suggests proximal obstruction.  7/13- Lovenox was on held due to Mediport placement, resumed on 713 as pt refusing  Mediport now. Patient with right upper arm pain and swelling s/p PICC line removal   6/23 [facility] Duplex (+) for acute partially occlusive DVT affecting right axillary vein   7/2- repeat doppler -Right axillary DVT appears resolved. with suggestion of proximal obstruction.   6/24 CT consistent with DVT, no abscess   initially treated with Heparin now on Lovenox.

## 2020-07-15 NOTE — PROGRESS NOTE ADULT - PROBLEM SELECTOR PLAN 2
Patient is not neutropenic, afebrile   6/29 Started Levaquin and Posaconazole for PPX, d/dorothy on 7/14/2020. Continue Valtrex till completion of Arsenic.  If febrile pan culture, CXR.  6/23 Persistent right upper inner arm pain/swelling - CT no abscess, + DVT,   7/2 repeat Doppler - No infectious sources, R Axilla DVT resolved  6/22 Right buttock and upper thigh shingles continue Valtrex tx with Lac Hydrin  Previous COVID-19 (+) outpatient  Then COVID (-) on 5/22, 5/26, 6/2 and 6/9 6/16 COVID 19 (+)   6/18 COVID 19 Ab (+)  6/19 Repeat COVID-19 PCR (-)  7/7- burning urination Urinalysis negative, Urine cultures (-) Patient is not neutropenic, afebrile   6/29 Started Levaquin and Posaconazole for PPX, d/dorothy on 7/14/2020. Continue Valtrex till completion of Arsenic.  If febrile pan culture, CXR.  6/23 Persistent right upper inner arm pain/swelling - CT no abscess, + DVT,   7/2 repeat Doppler - No infectious sources, R Axilla DVT resolved  6/22 Right buttock and upper thigh shingles continue Valtrex tx with Lac Hydrin  Previous COVID-19 (+) outpatient  Then COVID (-) on 5/22, 5/26, 6/2 and 6/9 6/16 COVID 19 (+)   6/18 COVID 19 Ab (+)  6/19 Repeat COVID-19 PCR (-) Patient is not neutropenic, afebrile   6/29 Started Levaquin and Posaconazole for PPX, d/dorothy on 7/14/2020. Continue Valtrex till completion of Arsenic.  6/23 Persistent right upper inner arm pain/swelling - CT no abscess, + DVT,   7/2 repeat Doppler - No infectious sources, R Axilla DVT resolved, proximal obst  6/22 Right buttock and upper thigh shingles - Valtrex, Lac hydrin   Previous COVID-19 (+) outpatient  Then COVID (-) on 5/22, 5/26, 6/2 and 6/9 6/16 COVID 19 (+)   6/18 COVID 19 Ab (+)  6/19 Repeat COVID-19 PCR (-)

## 2020-07-15 NOTE — PROGRESS NOTE ADULT - SUBJECTIVE AND OBJECTIVE BOX
Diagnosis: APL    Protocol/Chemo Regimen: ATRA/Arsenic Trioxide    Day: ATRA Day 54 and Arsenic 49 (Held from 6/22/20 to 6/30/2020 and resumed on 6/30/20, held on 7/10 x1)    Pt endorsed: generalized fatigue, left arm rash,  eye irritation.    Review of Systems: Denies nausea, vomiting, diarrhea, chest pain, SOB, headache      Translated by patient's son via phone call per patient request     Pain scale: Denies.    Diet: Consistent Carb w/ Evening Snack    Allergies: No Known Allergies  ANTIMICROBIALS  valACYclovir 500 milliGRAM(s) Oral daily      HEME/ONC MEDICATIONS  arsenic trioxide IVPB (eMAR) 11 milliGRAM(s) IV Intermittent every 24 hours  enoxaparin Injectable 80 milliGRAM(s) SubCutaneous <User Schedule>  tretinoin 40 milliGRAM(s) Oral every 12 hours      STANDING MEDICATIONS  ammonium lactate 12% Lotion 1 Application(s) Topical two times a day  artificial  tears Solution 1 Drop(s) Both EYES three times a day  ATENolol  Tablet 25 milliGRAM(s) Oral daily  Biotene Dry Mouth Oral Rinse 5 milliLiter(s) Swish and Spit five times a day  bisacodyl 5 milliGRAM(s) Oral every 12 hours  chlorhexidine 4% Liquid 1 Application(s) Topical <User Schedule>  dextrose 5%. 1000 milliLiter(s) IV Continuous <Continuous>  dextrose 50% Injectable 12.5 Gram(s) IV Push once  dextrose 50% Injectable 25 Gram(s) IV Push once  dextrose 50% Injectable 25 Gram(s) IV Push once  gabapentin 100 milliGRAM(s) Oral every 8 hours  insulin lispro (HumaLOG) corrective regimen sliding scale   SubCutaneous three times a day before meals  insulin lispro (HumaLOG) corrective regimen sliding scale   SubCutaneous at bedtime  loratadine 10 milliGRAM(s) Oral daily  nystatin Powder 1 Application(s) Topical two times a day  pantoprazole    Tablet 40 milliGRAM(s) Oral before breakfast  petrolatum white Ointment 1 Application(s) Topical three times a day  polyethylene glycol 3350 17 Gram(s) Oral daily  potassium chloride    Tablet ER 40 milliEquivalent(s) Oral once  triamcinolone 0.1% Ointment 1 Application(s) Topical every 12 hours      PRN MEDICATIONS  acetaminophen   Tablet .. 650 milliGRAM(s) Oral every 6 hours PRN  ALBUTerol    90 MICROgram(s) HFA Inhaler 2 Puff(s) Inhalation every 6 hours PRN  aluminum hydroxide/magnesium hydroxide/simethicone Suspension 30 milliLiter(s) Oral every 6 hours PRN  AQUAPHOR (petrolatum Ointment) 1 Application(s) Topical every 3 hours PRN  dextrose 40% Gel 15 Gram(s) Oral once PRN  diphenhydrAMINE   Injectable 25 milliGRAM(s) IV Push every 6 hours PRN  glucagon  Injectable 1 milliGRAM(s) IntraMuscular once PRN  ondansetron Injectable 8 milliGRAM(s) IV Push every 8 hours PRN  sodium chloride 0.9% lock flush 10 milliLiter(s) IV Push every 1 hour PRN  sodium chloride 0.9% lock flush 10 milliLiter(s) IV Push every 1 hour PRN        Vital Signs Last 24 Hrs  T(C): 36.6 (15 Jul 2020 08:45), Max: 36.8 (14 Jul 2020 13:58)  T(F): 97.8 (15 Jul 2020 08:45), Max: 98.2 (14 Jul 2020 13:58)  HR: 62 (15 Jul 2020 08:45) (59 - 70)  BP: 130/77 (15 Jul 2020 08:45) (117/63 - 134/75)  BP(mean): --  RR: 18 (15 Jul 2020 08:45) (18 - 18)  SpO2: 96% (15 Jul 2020 08:45) (96% - 97%)    PHYSICAL EXAM  General: NAD  Oral: no erythema or ulcers  HEENT: PERRLA, EOMI, clear oropharynx  Neck: supple  CV: (+) S1/S2 RRR  Lungs: clear to auscultation, no wheezes or rales  Abdomen: soft, non-tender, non-distended (+) BS  Ext: no edema  Skin: no rash  Neuro: alert and oriented X 3, no focal deficits  Central Line:     RECENT CULTURES:    Culture - Urine (07.07.20 @ 21:58)    Specimen Source: .Urine Clean Catch (Midstream)    Culture Results:   <10,000 CFU/mL Normal Urogenital Krista            LABS:                        8.6    3.10  )-----------( 229      ( 15 Jul 2020 06:45 )             26.1         Mean Cell Volume : 91.9 fl  Mean Cell Hemoglobin : 30.3 pg  Mean Cell Hemoglobin Concentration : 33.0 gm/dL  Auto Neutrophil # : 1.19 K/uL  Auto Lymphocyte # : 1.55 K/uL  Auto Monocyte # : 0.14 K/uL  Auto Eosinophil # : 0.19 K/uL  Auto Basophil # : 0.02 K/uL  Auto Neutrophil % : 38.5 %  Auto Lymphocyte % : 50.0 %  Auto Monocyte % : 4.5 %  Auto Eosinophil % : 6.1 %  Auto Basophil % : 0.6 %      07-15    143  |  104  |  13  ----------------------------<  108<H>  3.6   |  22  |  0.97    Ca    10.2      15 Jul 2020 06:44  Phos  4.7     07-15  Mg     2.0     07-15    TPro  7.1  /  Alb  3.9  /  TBili  0.3  /  DBili  x   /  AST  27  /  ALT  22  /  AlkPhos  98  07-15      Mg 2.0  Phos 4.7      PT/INR - ( 15 Jul 2020 06:44 )   PT: 14.6 sec;   INR: 1.24 ratio         PTT - ( 15 Jul 2020 06:44 )  PTT:48.8 sec      Uric Acid 5.7        RADIOLOGY & ADDITIONAL STUDIES:  CT Upper Extremity No Cont, Left (07.10.20 @ 23:05)   No fluid collection. Mild skin thickening and subcutaneous edemaanterolaterally at the level of the distal humeral shaft. Diagnosis: APL    Protocol/Chemo Regimen: ATRA/Arsenic Trioxide    Day: ATRA Day 54 and Arsenic 49 (Held from 6/22/20 to 6/30/2020 and resumed on 6/30/20, held on 7/10 x1)    Pt endorsed: generalized fatigue, left arm rash,  eye irritation.    Review of Systems: Denies nausea, vomiting, diarrhea, chest pain, SOB, headache      Translated by patient's son via phone call per patient request     Pain scale: Denies.    Diet: Consistent Carb w/ Evening Snack    Allergies: No Known Allergies  ANTIMICROBIALS  valACYclovir 500 milliGRAM(s) Oral daily      HEME/ONC MEDICATIONS  arsenic trioxide IVPB (eMAR) 11 milliGRAM(s) IV Intermittent every 24 hours  enoxaparin Injectable 80 milliGRAM(s) SubCutaneous <User Schedule>  tretinoin 40 milliGRAM(s) Oral every 12 hours      STANDING MEDICATIONS  ammonium lactate 12% Lotion 1 Application(s) Topical two times a day  artificial  tears Solution 1 Drop(s) Both EYES three times a day  ATENolol  Tablet 25 milliGRAM(s) Oral daily  Biotene Dry Mouth Oral Rinse 5 milliLiter(s) Swish and Spit five times a day  bisacodyl 5 milliGRAM(s) Oral every 12 hours  chlorhexidine 4% Liquid 1 Application(s) Topical <User Schedule>  dextrose 5%. 1000 milliLiter(s) IV Continuous <Continuous>  dextrose 50% Injectable 12.5 Gram(s) IV Push once  dextrose 50% Injectable 25 Gram(s) IV Push once  dextrose 50% Injectable 25 Gram(s) IV Push once  gabapentin 100 milliGRAM(s) Oral every 8 hours  insulin lispro (HumaLOG) corrective regimen sliding scale   SubCutaneous three times a day before meals  insulin lispro (HumaLOG) corrective regimen sliding scale   SubCutaneous at bedtime  loratadine 10 milliGRAM(s) Oral daily  nystatin Powder 1 Application(s) Topical two times a day  pantoprazole    Tablet 40 milliGRAM(s) Oral before breakfast  petrolatum white Ointment 1 Application(s) Topical three times a day  polyethylene glycol 3350 17 Gram(s) Oral daily  potassium chloride    Tablet ER 40 milliEquivalent(s) Oral once  triamcinolone 0.1% Ointment 1 Application(s) Topical every 12 hours      PRN MEDICATIONS  acetaminophen   Tablet .. 650 milliGRAM(s) Oral every 6 hours PRN  ALBUTerol    90 MICROgram(s) HFA Inhaler 2 Puff(s) Inhalation every 6 hours PRN  aluminum hydroxide/magnesium hydroxide/simethicone Suspension 30 milliLiter(s) Oral every 6 hours PRN  AQUAPHOR (petrolatum Ointment) 1 Application(s) Topical every 3 hours PRN  dextrose 40% Gel 15 Gram(s) Oral once PRN  diphenhydrAMINE   Injectable 25 milliGRAM(s) IV Push every 6 hours PRN  glucagon  Injectable 1 milliGRAM(s) IntraMuscular once PRN  ondansetron Injectable 8 milliGRAM(s) IV Push every 8 hours PRN  sodium chloride 0.9% lock flush 10 milliLiter(s) IV Push every 1 hour PRN  sodium chloride 0.9% lock flush 10 milliLiter(s) IV Push every 1 hour PRN        Vital Signs Last 24 Hrs  T(C): 36.6 (15 Jul 2020 08:45), Max: 36.8 (14 Jul 2020 13:58)  T(F): 97.8 (15 Jul 2020 08:45), Max: 98.2 (14 Jul 2020 13:58)  HR: 62 (15 Jul 2020 08:45) (59 - 70)  BP: 130/77 (15 Jul 2020 08:45) (117/63 - 134/75)  BP(mean): --  RR: 18 (15 Jul 2020 08:45) (18 - 18)  SpO2: 96% (15 Jul 2020 08:45) (96% - 97%)    PHYSICAL EXAM  General: NAD  Oral: no erythema or ulcers  HEENT: PERRLA, EOMI, clear oropharynx  Neck: supple  CV: (+) S1/S2 RRR  Lungs: clear to auscultation, no wheezes or rales  Abdomen: soft, non-tender, non-distended (+) BS  Ext: no edema  Skin: no rash  Neuro: alert and oriented X 3, no focal deficits  Central Line: None  Peripheral line: C/D/I     RECENT CULTURES:    Culture - Urine (07.07.20 @ 21:58)    Specimen Source: .Urine Clean Catch (Midstream)    Culture Results:   <10,000 CFU/mL Normal Urogenital Krista            LABS:                        8.6    3.10  )-----------( 229      ( 15 Jul 2020 06:45 )             26.1         Mean Cell Volume : 91.9 fl  Mean Cell Hemoglobin : 30.3 pg  Mean Cell Hemoglobin Concentration : 33.0 gm/dL  Auto Neutrophil # : 1.19 K/uL  Auto Lymphocyte # : 1.55 K/uL  Auto Monocyte # : 0.14 K/uL  Auto Eosinophil # : 0.19 K/uL  Auto Basophil # : 0.02 K/uL  Auto Neutrophil % : 38.5 %  Auto Lymphocyte % : 50.0 %  Auto Monocyte % : 4.5 %  Auto Eosinophil % : 6.1 %  Auto Basophil % : 0.6 %      07-15    143  |  104  |  13  ----------------------------<  108<H>  3.6   |  22  |  0.97    Ca    10.2      15 Jul 2020 06:44  Phos  4.7     07-15  Mg     2.0     07-15    TPro  7.1  /  Alb  3.9  /  TBili  0.3  /  DBili  x   /  AST  27  /  ALT  22  /  AlkPhos  98  07-15      Mg 2.0  Phos 4.7      PT/INR - ( 15 Jul 2020 06:44 )   PT: 14.6 sec;   INR: 1.24 ratio         PTT - ( 15 Jul 2020 06:44 )  PTT:48.8 sec      Uric Acid 5.7        RADIOLOGY & ADDITIONAL STUDIES:  CT Upper Extremity No Cont, Left (07.10.20 @ 23:05)   No fluid collection. Mild skin thickening and subcutaneous edemaanterolaterally at the level of the distal humeral shaft. Diagnosis: APL    Protocol/Chemo Regimen: ATRA/Arsenic Trioxide    Day: ATRA Day 54 and Arsenic 49 (Held from 6/22/20 to 6/30/2020 and resumed on 6/30/20, held on 7/10 x1 day and resumed on 7/11)    Pt endorsed: generalized fatigue, left arm rash,  eye irritation.    Review of Systems: Denies nausea, vomiting, diarrhea, chest pain, SOB, headache      Translated by patient's son via phone call per patient request     Pain scale: Denies.    Diet: Consistent Carb w/ Evening Snack    Allergies: No Known Allergies  ANTIMICROBIALS  valACYclovir 500 milliGRAM(s) Oral daily      HEME/ONC MEDICATIONS  arsenic trioxide IVPB (eMAR) 11 milliGRAM(s) IV Intermittent every 24 hours  enoxaparin Injectable 80 milliGRAM(s) SubCutaneous <User Schedule>  tretinoin 40 milliGRAM(s) Oral every 12 hours      STANDING MEDICATIONS  ammonium lactate 12% Lotion 1 Application(s) Topical two times a day  artificial  tears Solution 1 Drop(s) Both EYES three times a day  ATENolol  Tablet 25 milliGRAM(s) Oral daily  Biotene Dry Mouth Oral Rinse 5 milliLiter(s) Swish and Spit five times a day  bisacodyl 5 milliGRAM(s) Oral every 12 hours  chlorhexidine 4% Liquid 1 Application(s) Topical <User Schedule>  dextrose 5%. 1000 milliLiter(s) IV Continuous <Continuous>  dextrose 50% Injectable 12.5 Gram(s) IV Push once  dextrose 50% Injectable 25 Gram(s) IV Push once  dextrose 50% Injectable 25 Gram(s) IV Push once  gabapentin 100 milliGRAM(s) Oral every 8 hours  insulin lispro (HumaLOG) corrective regimen sliding scale   SubCutaneous three times a day before meals  insulin lispro (HumaLOG) corrective regimen sliding scale   SubCutaneous at bedtime  loratadine 10 milliGRAM(s) Oral daily  nystatin Powder 1 Application(s) Topical two times a day  pantoprazole    Tablet 40 milliGRAM(s) Oral before breakfast  petrolatum white Ointment 1 Application(s) Topical three times a day  polyethylene glycol 3350 17 Gram(s) Oral daily  potassium chloride    Tablet ER 40 milliEquivalent(s) Oral once  triamcinolone 0.1% Ointment 1 Application(s) Topical every 12 hours      PRN MEDICATIONS  acetaminophen   Tablet .. 650 milliGRAM(s) Oral every 6 hours PRN  ALBUTerol    90 MICROgram(s) HFA Inhaler 2 Puff(s) Inhalation every 6 hours PRN  aluminum hydroxide/magnesium hydroxide/simethicone Suspension 30 milliLiter(s) Oral every 6 hours PRN  AQUAPHOR (petrolatum Ointment) 1 Application(s) Topical every 3 hours PRN  dextrose 40% Gel 15 Gram(s) Oral once PRN  diphenhydrAMINE   Injectable 25 milliGRAM(s) IV Push every 6 hours PRN  glucagon  Injectable 1 milliGRAM(s) IntraMuscular once PRN  ondansetron Injectable 8 milliGRAM(s) IV Push every 8 hours PRN  sodium chloride 0.9% lock flush 10 milliLiter(s) IV Push every 1 hour PRN  sodium chloride 0.9% lock flush 10 milliLiter(s) IV Push every 1 hour PRN        Vital Signs Last 24 Hrs  T(C): 36.6 (15 Jul 2020 08:45), Max: 36.8 (14 Jul 2020 13:58)  T(F): 97.8 (15 Jul 2020 08:45), Max: 98.2 (14 Jul 2020 13:58)  HR: 62 (15 Jul 2020 08:45) (59 - 70)  BP: 130/77 (15 Jul 2020 08:45) (117/63 - 134/75)  BP(mean): --  RR: 18 (15 Jul 2020 08:45) (18 - 18)  SpO2: 96% (15 Jul 2020 08:45) (96% - 97%)    PHYSICAL EXAM  General: NAD  Oral: no erythema or ulcers  HEENT: PERRLA, EOMI, clear oropharynx  Neck: supple  CV: (+) S1/S2 RRR  Lungs: clear to auscultation, no wheezes or rales  Abdomen: soft, non-tender, non-distended (+) BS  Ext: no edema  Skin: no rash  Neuro: alert and oriented X 3, no focal deficits  Central Line: None  Peripheral line: C/D/I     RECENT CULTURES:    Culture - Urine (07.07.20 @ 21:58)    Specimen Source: .Urine Clean Catch (Midstream)    Culture Results:   <10,000 CFU/mL Normal Urogenital Krista            LABS:                        8.6    3.10  )-----------( 229      ( 15 Jul 2020 06:45 )             26.1         Mean Cell Volume : 91.9 fl  Mean Cell Hemoglobin : 30.3 pg  Mean Cell Hemoglobin Concentration : 33.0 gm/dL  Auto Neutrophil # : 1.19 K/uL  Auto Lymphocyte # : 1.55 K/uL  Auto Monocyte # : 0.14 K/uL  Auto Eosinophil # : 0.19 K/uL  Auto Basophil # : 0.02 K/uL  Auto Neutrophil % : 38.5 %  Auto Lymphocyte % : 50.0 %  Auto Monocyte % : 4.5 %  Auto Eosinophil % : 6.1 %  Auto Basophil % : 0.6 %      07-15    143  |  104  |  13  ----------------------------<  108<H>  3.6   |  22  |  0.97    Ca    10.2      15 Jul 2020 06:44  Phos  4.7     07-15  Mg     2.0     07-15    TPro  7.1  /  Alb  3.9  /  TBili  0.3  /  DBili  x   /  AST  27  /  ALT  22  /  AlkPhos  98  07-15      Mg 2.0  Phos 4.7      PT/INR - ( 15 Jul 2020 06:44 )   PT: 14.6 sec;   INR: 1.24 ratio         PTT - ( 15 Jul 2020 06:44 )  PTT:48.8 sec      Uric Acid 5.7        RADIOLOGY & ADDITIONAL STUDIES:  CT Upper Extremity No Cont, Left (07.10.20 @ 23:05)   No fluid collection. Mild skin thickening and subcutaneous edemaanterolaterally at the level of the distal humeral shaft.

## 2020-07-15 NOTE — PROGRESS NOTE ADULT - PROBLEM SELECTOR PLAN 4
Shingles of the Right buttock and Right upper thigh.   6/22 seen by Dermatology   Continue Valtrex and Lac hydrin as per Dermatology recommendations   Neurontin 100 mg TID for herpetic neuralgia  Lesions now crusted over - will d/c airborne isolation. Shingles of the Right buttock and Right upper thigh.   6/22 seen by Dermatology   treated with Valtrex and Lac hydrin as per Dermatology recommendations   Neurontin 100 mg TID for herpetic neuralgia  Continue Valtrex until neutropenia resolves. Most likely due to Eustachian tube dysfunction  Continue Ocean spray and Debrox .  Audiogram done on 6/30 shows severe hearing loss in the left ear and essentially profound hearing loss in the right ear, 250-8kHz; asymmetry noted 2k-6kHz, right ear poorer than left. Unmasked bone conduction suggests mixed hearing loss in at least one ear (unable to mask), follow up  with Otology.  ENT following

## 2020-07-16 LAB
ALBUMIN SERPL ELPH-MCNC: 4.3 G/DL — SIGNIFICANT CHANGE UP (ref 3.3–5)
ALP SERPL-CCNC: 104 U/L — SIGNIFICANT CHANGE UP (ref 40–120)
ALT FLD-CCNC: 21 U/L — SIGNIFICANT CHANGE UP (ref 10–45)
ANION GAP SERPL CALC-SCNC: 14 MMOL/L — SIGNIFICANT CHANGE UP (ref 5–17)
AST SERPL-CCNC: 29 U/L — SIGNIFICANT CHANGE UP (ref 10–40)
BASOPHILS # BLD AUTO: 0.03 K/UL — SIGNIFICANT CHANGE UP (ref 0–0.2)
BASOPHILS NFR BLD AUTO: 0.8 % — SIGNIFICANT CHANGE UP (ref 0–2)
BILIRUB SERPL-MCNC: 0.2 MG/DL — SIGNIFICANT CHANGE UP (ref 0.2–1.2)
BUN SERPL-MCNC: 15 MG/DL — SIGNIFICANT CHANGE UP (ref 7–23)
CALCIUM SERPL-MCNC: 10.1 MG/DL — SIGNIFICANT CHANGE UP (ref 8.4–10.5)
CHLORIDE SERPL-SCNC: 105 MMOL/L — SIGNIFICANT CHANGE UP (ref 96–108)
CO2 SERPL-SCNC: 22 MMOL/L — SIGNIFICANT CHANGE UP (ref 22–31)
CREAT SERPL-MCNC: 0.86 MG/DL — SIGNIFICANT CHANGE UP (ref 0.5–1.3)
D DIMER BLD IA.RAPID-MCNC: 230 NG/ML DDU — HIGH
EOSINOPHIL # BLD AUTO: 0.25 K/UL — SIGNIFICANT CHANGE UP (ref 0–0.5)
EOSINOPHIL NFR BLD AUTO: 6.7 % — HIGH (ref 0–6)
FIBRINOGEN PPP-MCNC: 477 MG/DL — SIGNIFICANT CHANGE UP (ref 350–510)
GLUCOSE BLDC GLUCOMTR-MCNC: 115 MG/DL — HIGH (ref 70–99)
GLUCOSE BLDC GLUCOMTR-MCNC: 120 MG/DL — HIGH (ref 70–99)
GLUCOSE BLDC GLUCOMTR-MCNC: 131 MG/DL — HIGH (ref 70–99)
GLUCOSE BLDC GLUCOMTR-MCNC: 173 MG/DL — HIGH (ref 70–99)
GLUCOSE SERPL-MCNC: 113 MG/DL — HIGH (ref 70–99)
HCT VFR BLD CALC: 27.1 % — LOW (ref 34.5–45)
HGB BLD-MCNC: 8.7 G/DL — LOW (ref 11.5–15.5)
IMM GRANULOCYTES NFR BLD AUTO: 0.5 % — SIGNIFICANT CHANGE UP (ref 0–1.5)
LDH SERPL L TO P-CCNC: 172 U/L — SIGNIFICANT CHANGE UP (ref 50–242)
LYMPHOCYTES # BLD AUTO: 1.75 K/UL — SIGNIFICANT CHANGE UP (ref 1–3.3)
LYMPHOCYTES # BLD AUTO: 46.7 % — HIGH (ref 13–44)
MAGNESIUM SERPL-MCNC: 2 MG/DL — SIGNIFICANT CHANGE UP (ref 1.6–2.6)
MCHC RBC-ENTMCNC: 30.1 PG — SIGNIFICANT CHANGE UP (ref 27–34)
MCHC RBC-ENTMCNC: 32.1 GM/DL — SIGNIFICANT CHANGE UP (ref 32–36)
MCV RBC AUTO: 93.8 FL — SIGNIFICANT CHANGE UP (ref 80–100)
MONOCYTES # BLD AUTO: 0.14 K/UL — SIGNIFICANT CHANGE UP (ref 0–0.9)
MONOCYTES NFR BLD AUTO: 3.7 % — SIGNIFICANT CHANGE UP (ref 2–14)
NEUTROPHILS # BLD AUTO: 1.56 K/UL — LOW (ref 1.8–7.4)
NEUTROPHILS NFR BLD AUTO: 41.6 % — LOW (ref 43–77)
NRBC # BLD: 0 /100 WBCS — SIGNIFICANT CHANGE UP (ref 0–0)
PHOSPHATE SERPL-MCNC: 4.3 MG/DL — SIGNIFICANT CHANGE UP (ref 2.5–4.5)
PLATELET # BLD AUTO: 240 K/UL — SIGNIFICANT CHANGE UP (ref 150–400)
POTASSIUM SERPL-MCNC: 4.2 MMOL/L — SIGNIFICANT CHANGE UP (ref 3.5–5.3)
POTASSIUM SERPL-SCNC: 4.2 MMOL/L — SIGNIFICANT CHANGE UP (ref 3.5–5.3)
PROT SERPL-MCNC: 7.4 G/DL — SIGNIFICANT CHANGE UP (ref 6–8.3)
RBC # BLD: 2.89 M/UL — LOW (ref 3.8–5.2)
RBC # FLD: 17.3 % — HIGH (ref 10.3–14.5)
SODIUM SERPL-SCNC: 141 MMOL/L — SIGNIFICANT CHANGE UP (ref 135–145)
URATE SERPL-MCNC: 5.3 MG/DL — SIGNIFICANT CHANGE UP (ref 2.5–7)
WBC # BLD: 3.75 K/UL — LOW (ref 3.8–10.5)
WBC # FLD AUTO: 3.75 K/UL — LOW (ref 3.8–10.5)

## 2020-07-16 PROCEDURE — 99233 SBSQ HOSP IP/OBS HIGH 50: CPT | Mod: GC

## 2020-07-16 PROCEDURE — 93971 EXTREMITY STUDY: CPT | Mod: 26

## 2020-07-16 RX ORDER — POLYETHYLENE GLYCOL 3350 17 G/17G
17 POWDER, FOR SOLUTION ORAL
Qty: 0 | Refills: 0 | DISCHARGE
Start: 2020-07-16

## 2020-07-16 RX ORDER — APIXABAN 2.5 MG/1
1 TABLET, FILM COATED ORAL
Qty: 60 | Refills: 2
Start: 2020-07-16 | End: 2020-10-13

## 2020-07-16 RX ORDER — APIXABAN 2.5 MG/1
1 TABLET, FILM COATED ORAL
Qty: 60 | Refills: 0
Start: 2020-07-16 | End: 2020-08-14

## 2020-07-16 RX ORDER — ATENOLOL 25 MG/1
1 TABLET ORAL
Qty: 30 | Refills: 0
Start: 2020-07-16 | End: 2020-08-14

## 2020-07-16 RX ORDER — SOD,AMMONIUM,POTASSIUM LACTATE
1 CREAM (GRAM) TOPICAL
Qty: 430 | Refills: 0
Start: 2020-07-16 | End: 2020-07-29

## 2020-07-16 RX ORDER — GABAPENTIN 400 MG/1
1 CAPSULE ORAL
Qty: 90 | Refills: 0
Start: 2020-07-16 | End: 2020-08-14

## 2020-07-16 RX ADMIN — Medication 5 MILLILITER(S): at 20:59

## 2020-07-16 RX ADMIN — Medication 1 APPLICATION(S): at 05:43

## 2020-07-16 RX ADMIN — Medication 5 MILLILITER(S): at 17:35

## 2020-07-16 RX ADMIN — Medication 1 APPLICATION(S): at 05:36

## 2020-07-16 RX ADMIN — Medication 5 MILLILITER(S): at 10:28

## 2020-07-16 RX ADMIN — GABAPENTIN 100 MILLIGRAM(S): 400 CAPSULE ORAL at 14:43

## 2020-07-16 RX ADMIN — ATENOLOL 25 MILLIGRAM(S): 25 TABLET ORAL at 05:35

## 2020-07-16 RX ADMIN — NYSTATIN CREAM 1 APPLICATION(S): 100000 CREAM TOPICAL at 05:39

## 2020-07-16 RX ADMIN — ENOXAPARIN SODIUM 80 MILLIGRAM(S): 100 INJECTION SUBCUTANEOUS at 17:35

## 2020-07-16 RX ADMIN — NYSTATIN CREAM 1 APPLICATION(S): 100000 CREAM TOPICAL at 17:36

## 2020-07-16 RX ADMIN — Medication 1: at 12:25

## 2020-07-16 RX ADMIN — Medication 1 APPLICATION(S): at 17:36

## 2020-07-16 RX ADMIN — CHLORHEXIDINE GLUCONATE 1 APPLICATION(S): 213 SOLUTION TOPICAL at 10:28

## 2020-07-16 RX ADMIN — ENOXAPARIN SODIUM 80 MILLIGRAM(S): 100 INJECTION SUBCUTANEOUS at 05:36

## 2020-07-16 RX ADMIN — GABAPENTIN 100 MILLIGRAM(S): 400 CAPSULE ORAL at 21:05

## 2020-07-16 RX ADMIN — GABAPENTIN 100 MILLIGRAM(S): 400 CAPSULE ORAL at 05:35

## 2020-07-16 RX ADMIN — Medication 5 MILLIGRAM(S): at 05:35

## 2020-07-16 RX ADMIN — Medication 5 MILLILITER(S): at 11:37

## 2020-07-16 NOTE — PROGRESS NOTE ADULT - PROBLEM SELECTOR PLAN 7
On Lovenox SQ BID for DVT treatment. Discontinue if platelets are < 50,000       Contact Information [phone]

## 2020-07-16 NOTE — PROGRESS NOTE ADULT - PROBLEM SELECTOR PLAN 3
Patient with right upper arm pain and swelling s/p PICC line removal   6/23 [facility] Duplex (+) for acute partially occlusive DVT affecting right axillary vein   6/24 CT consistent with DVT, no abscess   initially treated with Heparin now on Lovenox.  7/2- repeat doppler -Right axillary DVT appears resolved. with suggestion of proximal obstruction.   7/16 follow up [facility] Duplex of [facility] Patient with right upper arm pain and swelling s/p PICC line removal   6/23 [facility] Duplex (+) for acute partially occlusive DVT affecting right axillary vein   6/24 CT consistent with DVT, no abscess   initially treated with Heparin now on Lovenox.  7/2- repeat doppler -Right axillary DVT appears resolved. with suggestion of proximal obstruction.   7/16 follow up repeat [facility] Duplex of [facility]

## 2020-07-16 NOTE — PROGRESS NOTE ADULT - ATTENDING COMMENTS
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  Presented with pancytopenia, flow cytometry/bone marrow biopsy/FISH done 5/26- c/w acute promyelocytic leukemia with PML-AWA translocation.    She was started on ATRA 5/23- day +54, GALA 5/27- day +49 (arsenic held several times).    -pt had BM bx done on 6/26 showing persistent APL -had 14% blasts+Promyelo. s/p repeat BM bx on 7/13, counts recovered, pt has had >30days GALA. If morphologically in CR, may stop GALA/ATRA and d/c home  -PICC out since 7/10, was leaking. Pt has peripheral IV access as of 7/12, missed 1 GALA dose on 7/10. Plan to do MediPort on 7/13, but now they wanted to defer as pt is uncomfortable with getting it done.   -monitor lytes daily, keep K>4, Mg>2.  EKG twice a week  -Zoster rash on R buttock and back of RLE, crusted over. Cont and will remain on VZV prophylaxis of Valcyclovir   -d/c Levaquin and Posaconazole as she is no longer neutropenic  -RUE doppler showing axillary DVT on 6/25, on therapeutic Lovenox since 6/25. Repeat RUE doppler done 7/2 -clot cleared. 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  Presented with pancytopenia, flow cytometry/bone marrow biopsy/FISH done 5/26- c/w acute promyelocytic leukemia with PML-AWA translocation.    She was started on ATRA 5/23- day +55, GALA 5/27- day +50 (arsenic held several times).    -pt had BM bx done on 6/26 showing persistent APL -had 14% blasts+Promyelo. s/p repeat BM bx on 7/13, counts recovered, pt has had >30days GALA and is morphologically in CR - stop GALA/ATRA and d/c home. Results and plan discussed with son  -PICC out since 7/10, was leaking. Pt has peripheral IV access as of 7/12, missed 1 GALA dose on 7/10. Plan to do MediPort on 7/13, but now they wanted to defer as pt is uncomfortable with getting it done.   -monitor lytes daily, keep K>4, Mg>2.  EKG twice a week  -Zoster rash on R buttock and back of RLE, crusted over. Cont and will remain on VZV prophylaxis of Valcyclovir   -d/c Levaquin and Posaconazole as she is no longer neutropenic  -RUE doppler showing axillary DVT on 6/23, on therapeutic AC since 6/23 (initially with hep, now Lovenox). Repeat RUE doppler done 7/2 -clot cleared. Will give 3 months of A/C -cannot self administer Lovenox so will change to Eliquis for discharge

## 2020-07-16 NOTE — PROGRESS NOTE ADULT - SUBJECTIVE AND OBJECTIVE BOX
Diagnosis: APL    Protocol/Chemo Regimen: ATRA/Arsenic Trioxide    Day: ATRA Day 55 and Arsenic 50 (Held from 6/22/20 to 6/30/2020 and resumed on 6/30/20, held on 7/10 x1 day and resumed on 7/11)    Pt endorsed: No acute complaints.    Review of Systems: Denies nausea, vomiting, diarrhea, chest pain, SOB, headache      Translated by patient's son via phone call per patient request     Pain scale: Denies.    Diet: Consistent Carb w/ Evening Snack    Allergies: No Known Allergies      ANTIMICROBIALS  valACYclovir 500 milliGRAM(s) Oral daily      HEME/ONC MEDICATIONS  enoxaparin Injectable 80 milliGRAM(s) SubCutaneous       STANDING MEDICATIONS  ammonium lactate 12% Lotion 1 Application(s) Topical two times a day  artificial  tears Solution 1 Drop(s) Both EYES three times a day  ATENolol  Tablet 25 milliGRAM(s) Oral daily  Biotene Dry Mouth Oral Rinse 5 milliLiter(s) Swish and Spit five times a day  chlorhexidine 4% Liquid 1 Application(s) Topical <User Schedule>  dextrose 5%. 1000 milliLiter(s) IV Continuous <Continuous>  dextrose 50% Injectable 12.5 Gram(s) IV Push once  dextrose 50% Injectable 25 Gram(s) IV Push once  dextrose 50% Injectable 25 Gram(s) IV Push once  gabapentin 100 milliGRAM(s) Oral every 8 hours  insulin lispro (HumaLOG) corrective regimen sliding scale   SubCutaneous three times a day before meals  insulin lispro (HumaLOG) corrective regimen sliding scale   SubCutaneous at bedtime  nystatin Powder 1 Application(s) Topical two times a day  polyethylene glycol 3350 17 Gram(s) Oral daily  triamcinolone 0.1% Ointment 1 Application(s) Topical every 12 hours      PRN MEDICATIONS  acetaminophen   Tablet .. 650 milliGRAM(s) Oral every 6 hours PRN  dextrose 40% Gel 15 Gram(s) Oral once PRN  glucagon  Injectable 1 milliGRAM(s) IntraMuscular once PRN  ondansetron Injectable 8 milliGRAM(s) IV Push every 8 hours PRN  sodium chloride 0.9% lock flush 10 milliLiter(s) IV Push every 1 hour PRN  sodium chloride 0.9% lock flush 10 milliLiter(s) IV Push every 1 hour PRN        Vital Signs Last 24 Hrs  T(C): 37 (16 Jul 2020 12:50), Max: 37.3 (15 Jul 2020 17:30)  T(F): 98.6 (16 Jul 2020 12:50), Max: 99.2 (15 Jul 2020 17:30)  HR: 64 (16 Jul 2020 12:50) (55 - 67)  BP: 107/66 (16 Jul 2020 12:50) (107/66 - 146/76)  BP(mean): --  RR: 18 (16 Jul 2020 12:50) (18 - 18)  SpO2: 99% (16 Jul 2020 12:50) (95% - 99%)    PHYSICAL EXAM  General: NAD  Oral: no erythema or ulcers  HEENT: PERRLA, EOMI, clear oropharynx  Neck: supple  CV: (+) S1/S2 RRR  Lungs: clear to auscultation, no wheezes or rales  Abdomen: soft, non-tender, non-distended (+) BS  Ext: no edema  Skin: no rash  Neuro: alert and oriented X 3, no focal deficits  Central Line: None   Peripheral line: C/D/I     RECENT CULTURES:    Culture - Urine (07.07.20 @ 21:58)    Specimen Source: .Urine Clean Catch (Midstream)    Culture Results:   <10,000 CFU/mL Normal Urogenital Krista            LABS:                        8.7    3.75  )-----------( 240      ( 16 Jul 2020 06:46 )             27.1         Mean Cell Volume : 93.8 fl  Mean Cell Hemoglobin : 30.1 pg  Mean Cell Hemoglobin Concentration : 32.1 gm/dL  Auto Neutrophil # : 1.56 K/uL  Auto Lymphocyte # : 1.75 K/uL  Auto Monocyte # : 0.14 K/uL  Auto Eosinophil # : 0.25 K/uL  Auto Basophil # : 0.03 K/uL  Auto Neutrophil % : 41.6 %  Auto Lymphocyte % : 46.7 %  Auto Monocyte % : 3.7 %  Auto Eosinophil % : 6.7 %  Auto Basophil % : 0.8 %      07-16    141  |  105  |  15  ----------------------------<  113<H>  4.2   |  22  |  0.86    Ca    10.1      16 Jul 2020 06:46  Phos  4.3     07-16  Mg     2.0     07-16    TPro  7.4  /  Alb  4.3  /  TBili  0.2  /  DBili  x   /  AST  29  /  ALT  21  /  AlkPhos  104  07-16      Mg 2.0  Phos 4.3      PT/INR - ( 15 Jul 2020 06:44 )   PT: 14.6 sec;   INR: 1.24 ratio         PTT - ( 15 Jul 2020 06:44 )  PTT:48.8 sec      Uric Acid 5.3        RADIOLOGY & ADDITIONAL STUDIES:  CT Upper Extremity No Cont, Left (07.10.20 @ 23:05)   No fluid collection. Mild skin thickening and subcutaneous edemaanterolaterally at the level of the distal humeral shaft. Diagnosis: APL    Protocol/Chemo Regimen: ATRA/Arsenic Trioxide    Day: ATRA Day 55 and Arsenic 50 (Held from 6/22/20 to 6/30/2020 and resumed on 6/30/20, held on 7/10 x1 day and resumed on 7/11)    Pt endorsed: No acute complaints.    Review of Systems: Denies nausea, vomiting, diarrhea, chest pain, SOB, headache      Translated by patient's son via phone call per patient request     Pain scale: Denies.    Diet: Consistent Carb w/ Evening Snack    Allergies: No Known Allergies      ANTIMICROBIALS  valACYclovir 500 milliGRAM(s) Oral daily      HEME/ONC MEDICATIONS  enoxaparin Injectable 80 milliGRAM(s) SubCutaneous       STANDING MEDICATIONS  ammonium lactate 12% Lotion 1 Application(s) Topical two times a day  artificial  tears Solution 1 Drop(s) Both EYES three times a day  ATENolol  Tablet 25 milliGRAM(s) Oral daily  Biotene Dry Mouth Oral Rinse 5 milliLiter(s) Swish and Spit five times a day  chlorhexidine 4% Liquid 1 Application(s) Topical <User Schedule>  dextrose 5%. 1000 milliLiter(s) IV Continuous <Continuous>  dextrose 50% Injectable 12.5 Gram(s) IV Push once  dextrose 50% Injectable 25 Gram(s) IV Push once  dextrose 50% Injectable 25 Gram(s) IV Push once  gabapentin 100 milliGRAM(s) Oral every 8 hours  insulin lispro (HumaLOG) corrective regimen sliding scale   SubCutaneous three times a day before meals  insulin lispro (HumaLOG) corrective regimen sliding scale   SubCutaneous at bedtime  nystatin Powder 1 Application(s) Topical two times a day  polyethylene glycol 3350 17 Gram(s) Oral daily  triamcinolone 0.1% Ointment 1 Application(s) Topical every 12 hours      PRN MEDICATIONS  acetaminophen   Tablet .. 650 milliGRAM(s) Oral every 6 hours PRN  dextrose 40% Gel 15 Gram(s) Oral once PRN  glucagon  Injectable 1 milliGRAM(s) IntraMuscular once PRN  ondansetron Injectable 8 milliGRAM(s) IV Push every 8 hours PRN  sodium chloride 0.9% lock flush 10 milliLiter(s) IV Push every 1 hour PRN  sodium chloride 0.9% lock flush 10 milliLiter(s) IV Push every 1 hour PRN        Vital Signs Last 24 Hrs  T(C): 37 (16 Jul 2020 12:50), Max: 37.3 (15 Jul 2020 17:30)  T(F): 98.6 (16 Jul 2020 12:50), Max: 99.2 (15 Jul 2020 17:30)  HR: 64 (16 Jul 2020 12:50) (55 - 67)  BP: 107/66 (16 Jul 2020 12:50) (107/66 - 146/76)  BP(mean): --  RR: 18 (16 Jul 2020 12:50) (18 - 18)  SpO2: 99% (16 Jul 2020 12:50) (95% - 99%)    PHYSICAL EXAM  General: NAD  Oral: no erythema or ulcers  HEENT: PERRLA, EOMI, clear oropharynx  Neck: supple  CV: (+) S1/S2 RRR  Lungs: clear to auscultation, no wheezes or rales  Abdomen: soft, non-tender, non-distended (+) BS  Ext: no edema  Skin: no rash  Neuro: alert and oriented X 3, no focal deficits  Central Line: None   Peripheral line: C/D/I     RECENT CULTURES:    Culture - Urine (07.07.20 @ 21:58)    Specimen Source: .Urine Clean Catch (Midstream)    Culture Results:   <10,000 CFU/mL Normal Urogenital Krista            LABS:                        8.7    3.75  )-----------( 240      ( 16 Jul 2020 06:46 )             27.1         Mean Cell Volume : 93.8 fl  Mean Cell Hemoglobin : 30.1 pg  Mean Cell Hemoglobin Concentration : 32.1 gm/dL  Auto Neutrophil # : 1.56 K/uL  Auto Lymphocyte # : 1.75 K/uL  Auto Monocyte # : 0.14 K/uL  Auto Eosinophil # : 0.25 K/uL  Auto Basophil # : 0.03 K/uL  Auto Neutrophil % : 41.6 %  Auto Lymphocyte % : 46.7 %  Auto Monocyte % : 3.7 %  Auto Eosinophil % : 6.7 %  Auto Basophil % : 0.8 %      07-16    141  |  105  |  15  ----------------------------<  113<H>  4.2   |  22  |  0.86    Ca    10.1      16 Jul 2020 06:46  Phos  4.3     07-16  Mg     2.0     07-16    TPro  7.4  /  Alb  4.3  /  TBili  0.2  /  DBili  x   /  AST  29  /  ALT  21  /  AlkPhos  104  07-16      Mg 2.0  Phos 4.3      PT/INR - ( 15 Jul 2020 06:44 )   PT: 14.6 sec;   INR: 1.24 ratio         PTT - ( 15 Jul 2020 06:44 )  PTT:48.8 sec      Uric Acid 5.3        RADIOLOGY & ADDITIONAL STUDIES:  CT Upper Extremity No Cont, Left (07.10.20 @ 23:05)   No fluid collection. Mild skin thickening and subcutaneous edemaanterolaterally at the level of the distal humeral shaft.      VA Duplex Upper Ext Vein Scan, Right (07.16.20 @ 14:17)   The right internal jugular, subclavian, axillary and brachial veins are patent and compressible where applicable.  The basilic and cephalic veins (superficial veins) are patent and without thrombus.   Doppler examination shows normal spontaneous and phasic flow.

## 2020-07-16 NOTE — PROGRESS NOTE ADULT - PROBLEM SELECTOR PLAN 2
Patient is not neutropenic, afebrile   6/29 Started Levaquin and Posaconazole for PPX, d/dorothy on 7/14/2020. Continue Valtrex till completion of Arsenic.  6/23 Persistent right upper inner arm pain/swelling - CT no abscess, + DVT,   7/2 repeat Doppler - No infectious sources, R Axilla DVT resolved, proximal obst  6/22 Right buttock and upper thigh shingles - Valtrex, Lac hydrin   Previous COVID-19 (+) outpatient  Then COVID (-) on 5/22, 5/26, 6/2 and 6/9 6/16 COVID 19 (+)   6/18 COVID 19 Ab (+)  6/19 Repeat COVID-19 PCR (-) Patient is not neutropenic, afebrile   6/29 Started Levaquin and Posaconazole for PPX, d/dorothy on 7/14/2020.   received Valtrex until completion of Arsenic.   6/22 Right buttock and upper thigh shingles - Valtrex, Lac hydrin   6/23 Persistent right upper inner arm pain/swelling - CT no abscess, + DVT,   7/2 repeat Doppler - No infectious sources, R Axilla DVT resolved, proximal obst  Previous COVID-19 (+) outpatient  Then COVID (-) on 5/22, 5/26, 6/2 and 6/9 6/16 COVID 19 (+)   6/18 COVID 19 Ab (+)  6/19 Repeat COVID-19 PCR (-)

## 2020-07-16 NOTE — PROGRESS NOTE ADULT - PROBLEM SELECTOR PLAN 1
BMBx (6/26) after a month of treatment (+) for hypercellular marrow with erythroid predominance.   ATRA 40mg BID (started 5/22 completed 7/16  Arsenic Trioxide started on 5/27.  Held 6/22- 6/29 due to Zoster infection, resumed   Follow up CBC with diff, CMP, TLS labs   Strict Is and Os/Daily weights/Mouth Care  7/9 PICC in Left Brachiocephalic vein, s/p PICC exchange - now removed due to pain/swelling LUE. Doppler negative, however, leaking from entry site and continued discomfort with infusions, PICC line removed on 7/10.  Patient refused Bone marrow biopsy on 7/9  BMBx performed on  (7/13) shows chemotherapeutic   7/13- Refused Mediport placement BMBx (6/26) after a month of treatment (+) for hypercellular marrow with erythroid predominance.   ATRA 40mg BID (started 5/22 completed 7/16  Arsenic Trioxide started on 5/27.  Held 6/22- 6/29 due to Zoster infection, resumed   Follow up CBC with diff, CMP, TLS labs   Strict Is and Os/Daily weights/Mouth Care  7/9 PICC in Left Brachiocephalic vein, s/p PICC exchange - now removed due to pain/swelling LUE. Doppler negative, however, leaking from entry site and continued discomfort with infusions, PICC line removed on 7/10.  7/13 Bone marrow biopsy (+) hematopoetic trilineage with erythroid predominance  7/15 discharge planning to home.

## 2020-07-16 NOTE — PROGRESS NOTE ADULT - ASSESSMENT
Ms. Gant is a 59 y/o Luxembourgish-speaking female w/ a PMHx of HTN, HLD, T2DM, and COVID-19 infection in 4/2020 admitted for management of newly diagnosed APL. The patient has been receiving treatment with ATRA and Arsenic (ATRA since 5/22 and Arsenic 5/27). Arsenic which was on hold since 6/22 secondary to Herpes Zoster infection, resumed on 6/30.  Patient's hospital course has been complicated by transaminitis, refractory thrombocytopenia, COVID-19 (+) state (6/16), shingles, right axillary DVT superficial and occipital hypersensitivity. On 7/13 a bone marrow biopsy found to be chemotherapeutic. Ms. Gant is a 59 y/o English-speaking female w/ a PMHx of HTN, HLD, T2DM, and COVID-19 infection in 4/2020 admitted for management of newly diagnosed APL. The patient has been receiving treatment with ATRA and Arsenic (ATRA since 5/22 and Arsenic 5/27). Arsenic which was on hold since 6/22 secondary to Herpes Zoster infection, resumed on 6/30.  Patient's hospital course has been complicated by transaminitis, refractory thrombocytopenia, COVID-19 (+) state (6/16), shingles, right axillary DVT superficial and occipital/parietal hypersensitivity. On 7/13 a bone marrow biopsy found to be chemotherapeutic.

## 2020-07-17 ENCOUNTER — TRANSCRIPTION ENCOUNTER (OUTPATIENT)
Age: 60
End: 2020-07-17

## 2020-07-17 VITALS
RESPIRATION RATE: 18 BRPM | TEMPERATURE: 98 F | DIASTOLIC BLOOD PRESSURE: 77 MMHG | SYSTOLIC BLOOD PRESSURE: 136 MMHG | HEART RATE: 67 BPM | OXYGEN SATURATION: 96 %

## 2020-07-17 LAB
ALBUMIN SERPL ELPH-MCNC: 4.2 G/DL — SIGNIFICANT CHANGE UP (ref 3.3–5)
ALP SERPL-CCNC: 94 U/L — SIGNIFICANT CHANGE UP (ref 40–120)
ALT FLD-CCNC: 20 U/L — SIGNIFICANT CHANGE UP (ref 10–45)
ANION GAP SERPL CALC-SCNC: 13 MMOL/L — SIGNIFICANT CHANGE UP (ref 5–17)
ANISOCYTOSIS BLD QL: SLIGHT — SIGNIFICANT CHANGE UP
APTT BLD: 40.4 SEC — HIGH (ref 27.5–35.5)
AST SERPL-CCNC: 25 U/L — SIGNIFICANT CHANGE UP (ref 10–40)
BASOPHILS # BLD AUTO: 0.09 K/UL — SIGNIFICANT CHANGE UP (ref 0–0.2)
BASOPHILS NFR BLD AUTO: 2.6 % — HIGH (ref 0–2)
BILIRUB SERPL-MCNC: 0.2 MG/DL — SIGNIFICANT CHANGE UP (ref 0.2–1.2)
BLD GP AB SCN SERPL QL: NEGATIVE — SIGNIFICANT CHANGE UP
BUN SERPL-MCNC: 13 MG/DL — SIGNIFICANT CHANGE UP (ref 7–23)
CALCIUM SERPL-MCNC: 9.8 MG/DL — SIGNIFICANT CHANGE UP (ref 8.4–10.5)
CHLORIDE SERPL-SCNC: 108 MMOL/L — SIGNIFICANT CHANGE UP (ref 96–108)
CO2 SERPL-SCNC: 22 MMOL/L — SIGNIFICANT CHANGE UP (ref 22–31)
CREAT SERPL-MCNC: 0.87 MG/DL — SIGNIFICANT CHANGE UP (ref 0.5–1.3)
D DIMER BLD IA.RAPID-MCNC: 266 NG/ML DDU — HIGH
EOSINOPHIL # BLD AUTO: 0.53 K/UL — HIGH (ref 0–0.5)
EOSINOPHIL NFR BLD AUTO: 15.8 % — HIGH (ref 0–6)
FIBRINOGEN PPP-MCNC: 429 MG/DL — SIGNIFICANT CHANGE UP (ref 350–510)
GLUCOSE BLDC GLUCOMTR-MCNC: 97 MG/DL — SIGNIFICANT CHANGE UP (ref 70–99)
GLUCOSE SERPL-MCNC: 101 MG/DL — HIGH (ref 70–99)
HCT VFR BLD CALC: 27.7 % — LOW (ref 34.5–45)
HGB BLD-MCNC: 8.9 G/DL — LOW (ref 11.5–15.5)
INR BLD: 1.11 RATIO — SIGNIFICANT CHANGE UP (ref 0.88–1.16)
LDH SERPL L TO P-CCNC: 168 U/L — SIGNIFICANT CHANGE UP (ref 50–242)
LYMPHOCYTES # BLD AUTO: 1.38 K/UL — SIGNIFICANT CHANGE UP (ref 1–3.3)
LYMPHOCYTES # BLD AUTO: 41.2 % — SIGNIFICANT CHANGE UP (ref 13–44)
MACROCYTES BLD QL: SLIGHT — SIGNIFICANT CHANGE UP
MAGNESIUM SERPL-MCNC: 2.2 MG/DL — SIGNIFICANT CHANGE UP (ref 1.6–2.6)
MANUAL SMEAR VERIFICATION: SIGNIFICANT CHANGE UP
MCHC RBC-ENTMCNC: 30.2 PG — SIGNIFICANT CHANGE UP (ref 27–34)
MCHC RBC-ENTMCNC: 32.1 GM/DL — SIGNIFICANT CHANGE UP (ref 32–36)
MCV RBC AUTO: 93.9 FL — SIGNIFICANT CHANGE UP (ref 80–100)
MONOCYTES # BLD AUTO: 0.12 K/UL — SIGNIFICANT CHANGE UP (ref 0–0.9)
MONOCYTES NFR BLD AUTO: 3.5 % — SIGNIFICANT CHANGE UP (ref 2–14)
NEUTROPHILS # BLD AUTO: 1.23 K/UL — LOW (ref 1.8–7.4)
NEUTROPHILS NFR BLD AUTO: 36.9 % — LOW (ref 43–77)
NRBC # BLD: 1 /100 — HIGH (ref 0–0)
PHOSPHATE SERPL-MCNC: 4 MG/DL — SIGNIFICANT CHANGE UP (ref 2.5–4.5)
PLAT MORPH BLD: NORMAL — SIGNIFICANT CHANGE UP
PLATELET # BLD AUTO: 230 K/UL — SIGNIFICANT CHANGE UP (ref 150–400)
POTASSIUM SERPL-MCNC: 3.9 MMOL/L — SIGNIFICANT CHANGE UP (ref 3.5–5.3)
POTASSIUM SERPL-SCNC: 3.9 MMOL/L — SIGNIFICANT CHANGE UP (ref 3.5–5.3)
PROT SERPL-MCNC: 7.1 G/DL — SIGNIFICANT CHANGE UP (ref 6–8.3)
PROTHROM AB SERPL-ACNC: 13.1 SEC — SIGNIFICANT CHANGE UP (ref 10.6–13.6)
RBC # BLD: 2.95 M/UL — LOW (ref 3.8–5.2)
RBC # FLD: 17.4 % — HIGH (ref 10.3–14.5)
RBC BLD AUTO: SIGNIFICANT CHANGE UP
RH IG SCN BLD-IMP: POSITIVE — SIGNIFICANT CHANGE UP
SODIUM SERPL-SCNC: 143 MMOL/L — SIGNIFICANT CHANGE UP (ref 135–145)
URATE SERPL-MCNC: 5.4 MG/DL — SIGNIFICANT CHANGE UP (ref 2.5–7)
WBC # BLD: 3.34 K/UL — LOW (ref 3.8–10.5)
WBC # FLD AUTO: 3.34 K/UL — LOW (ref 3.8–10.5)

## 2020-07-17 PROCEDURE — 88271 CYTOGENETICS DNA PROBE: CPT

## 2020-07-17 PROCEDURE — 85384 FIBRINOGEN ACTIVITY: CPT

## 2020-07-17 PROCEDURE — C1751: CPT

## 2020-07-17 PROCEDURE — 82553 CREATINE MB FRACTION: CPT

## 2020-07-17 PROCEDURE — 36430 TRANSFUSION BLD/BLD COMPNT: CPT

## 2020-07-17 PROCEDURE — 99239 HOSP IP/OBS DSCHRG MGMT >30: CPT

## 2020-07-17 PROCEDURE — 97110 THERAPEUTIC EXERCISES: CPT

## 2020-07-17 PROCEDURE — 88305 TISSUE EXAM BY PATHOLOGIST: CPT

## 2020-07-17 PROCEDURE — 84550 ASSAY OF BLOOD/URIC ACID: CPT

## 2020-07-17 PROCEDURE — 85027 COMPLETE CBC AUTOMATED: CPT

## 2020-07-17 PROCEDURE — 70552 MRI BRAIN STEM W/DYE: CPT

## 2020-07-17 PROCEDURE — 87798 DETECT AGENT NOS DNA AMP: CPT

## 2020-07-17 PROCEDURE — 93005 ELECTROCARDIOGRAM TRACING: CPT

## 2020-07-17 PROCEDURE — C1769: CPT

## 2020-07-17 PROCEDURE — 87389 HIV-1 AG W/HIV-1&-2 AB AG IA: CPT

## 2020-07-17 PROCEDURE — P9037: CPT

## 2020-07-17 PROCEDURE — 80048 BASIC METABOLIC PNL TOTAL CA: CPT

## 2020-07-17 PROCEDURE — 85730 THROMBOPLASTIN TIME PARTIAL: CPT

## 2020-07-17 PROCEDURE — 85049 AUTOMATED PLATELET COUNT: CPT

## 2020-07-17 PROCEDURE — 87086 URINE CULTURE/COLONY COUNT: CPT

## 2020-07-17 PROCEDURE — 97530 THERAPEUTIC ACTIVITIES: CPT

## 2020-07-17 PROCEDURE — 81001 URINALYSIS AUTO W/SCOPE: CPT

## 2020-07-17 PROCEDURE — 73201 CT UPPER EXTREMITY W/DYE: CPT

## 2020-07-17 PROCEDURE — 93971 EXTREMITY STUDY: CPT

## 2020-07-17 PROCEDURE — 73200 CT UPPER EXTREMITY W/O DYE: CPT

## 2020-07-17 PROCEDURE — P9040: CPT

## 2020-07-17 PROCEDURE — 88184 FLOWCYTOMETRY/ TC 1 MARKER: CPT

## 2020-07-17 PROCEDURE — 87040 BLOOD CULTURE FOR BACTERIA: CPT

## 2020-07-17 PROCEDURE — 86900 BLOOD TYPING SEROLOGIC ABO: CPT

## 2020-07-17 PROCEDURE — 85610 PROTHROMBIN TIME: CPT

## 2020-07-17 PROCEDURE — 84484 ASSAY OF TROPONIN QUANT: CPT

## 2020-07-17 PROCEDURE — 85097 BONE MARROW INTERPRETATION: CPT

## 2020-07-17 PROCEDURE — 82962 GLUCOSE BLOOD TEST: CPT

## 2020-07-17 PROCEDURE — 36569 INSJ PICC 5 YR+ W/O IMAGING: CPT

## 2020-07-17 PROCEDURE — P9011: CPT

## 2020-07-17 PROCEDURE — 88280 CHROMOSOME KARYOTYPE STUDY: CPT

## 2020-07-17 PROCEDURE — 87186 SC STD MICRODIL/AGAR DIL: CPT

## 2020-07-17 PROCEDURE — 86850 RBC ANTIBODY SCREEN: CPT

## 2020-07-17 PROCEDURE — 86923 COMPATIBILITY TEST ELECTRIC: CPT

## 2020-07-17 PROCEDURE — 94640 AIRWAY INHALATION TREATMENT: CPT

## 2020-07-17 PROCEDURE — 82550 ASSAY OF CK (CPK): CPT

## 2020-07-17 PROCEDURE — 83735 ASSAY OF MAGNESIUM: CPT

## 2020-07-17 PROCEDURE — 88285 CHROMOSOME COUNT ADDITIONAL: CPT

## 2020-07-17 PROCEDURE — U0003: CPT

## 2020-07-17 PROCEDURE — 81003 URINALYSIS AUTO W/O SCOPE: CPT

## 2020-07-17 PROCEDURE — 97116 GAIT TRAINING THERAPY: CPT

## 2020-07-17 PROCEDURE — 71045 X-RAY EXAM CHEST 1 VIEW: CPT

## 2020-07-17 PROCEDURE — 85379 FIBRIN DEGRADATION QUANT: CPT

## 2020-07-17 PROCEDURE — 88313 SPECIAL STAINS GROUP 2: CPT

## 2020-07-17 PROCEDURE — 83880 ASSAY OF NATRIURETIC PEPTIDE: CPT

## 2020-07-17 PROCEDURE — 88237 TISSUE CULTURE BONE MARROW: CPT

## 2020-07-17 PROCEDURE — 87529 HSV DNA AMP PROBE: CPT

## 2020-07-17 PROCEDURE — 80053 COMPREHEN METABOLIC PANEL: CPT

## 2020-07-17 PROCEDURE — 84100 ASSAY OF PHOSPHORUS: CPT

## 2020-07-17 PROCEDURE — 88341 IMHCHEM/IMCYTCHM EA ADD ANTB: CPT

## 2020-07-17 PROCEDURE — 88185 FLOWCYTOMETRY/TC ADD-ON: CPT

## 2020-07-17 PROCEDURE — 76882 US LMTD JT/FCL EVL NVASC XTR: CPT

## 2020-07-17 PROCEDURE — 80202 ASSAY OF VANCOMYCIN: CPT

## 2020-07-17 PROCEDURE — A9585: CPT

## 2020-07-17 PROCEDURE — 86803 HEPATITIS C AB TEST: CPT

## 2020-07-17 PROCEDURE — 88275 CYTOGENETICS 100-300: CPT

## 2020-07-17 PROCEDURE — 82272 OCCULT BLD FECES 1-3 TESTS: CPT

## 2020-07-17 PROCEDURE — 36584 COMPL RPLCMT PICC RS&I: CPT

## 2020-07-17 PROCEDURE — 87205 SMEAR GRAM STAIN: CPT

## 2020-07-17 PROCEDURE — 97161 PT EVAL LOW COMPLEX 20 MIN: CPT

## 2020-07-17 PROCEDURE — 92553 AUDIOMETRY AIR & BONE: CPT

## 2020-07-17 PROCEDURE — 83615 LACTATE (LD) (LDH) ENZYME: CPT

## 2020-07-17 PROCEDURE — 71250 CT THORAX DX C-: CPT

## 2020-07-17 PROCEDURE — 86769 SARS-COV-2 COVID-19 ANTIBODY: CPT

## 2020-07-17 PROCEDURE — 86901 BLOOD TYPING SEROLOGIC RH(D): CPT

## 2020-07-17 PROCEDURE — 88264 CHROMOSOME ANALYSIS 20-25: CPT

## 2020-07-17 RX ORDER — APIXABAN 2.5 MG/1
1 TABLET, FILM COATED ORAL
Qty: 60 | Refills: 0
Start: 2020-07-17 | End: 2020-08-15

## 2020-07-17 RX ORDER — ATENOLOL 25 MG/1
1 TABLET ORAL
Qty: 30 | Refills: 0
Start: 2020-07-17 | End: 2020-08-15

## 2020-07-17 RX ORDER — NYSTATIN CREAM 100000 [USP'U]/G
1 CREAM TOPICAL
Qty: 60 | Refills: 0
Start: 2020-07-17 | End: 2020-08-15

## 2020-07-17 RX ORDER — METFORMIN HYDROCHLORIDE 850 MG/1
1 TABLET ORAL
Qty: 60 | Refills: 2
Start: 2020-07-17 | End: 2020-10-14

## 2020-07-17 RX ORDER — SOD,AMMONIUM,POTASSIUM LACTATE
1 CREAM (GRAM) TOPICAL
Qty: 430 | Refills: 0
Start: 2020-07-17 | End: 2020-07-30

## 2020-07-17 RX ORDER — GABAPENTIN 400 MG/1
1 CAPSULE ORAL
Qty: 90 | Refills: 0
Start: 2020-07-17 | End: 2020-08-15

## 2020-07-17 RX ORDER — LIDOCAINE 4 G/100G
1 CREAM TOPICAL
Qty: 30 | Refills: 0
Start: 2020-07-17 | End: 2020-08-15

## 2020-07-17 RX ADMIN — Medication 5 MILLILITER(S): at 01:10

## 2020-07-17 RX ADMIN — GABAPENTIN 100 MILLIGRAM(S): 400 CAPSULE ORAL at 13:02

## 2020-07-17 RX ADMIN — Medication 5 MILLILITER(S): at 09:21

## 2020-07-17 RX ADMIN — Medication 1 APPLICATION(S): at 06:05

## 2020-07-17 RX ADMIN — NYSTATIN CREAM 1 APPLICATION(S): 100000 CREAM TOPICAL at 06:05

## 2020-07-17 RX ADMIN — ATENOLOL 25 MILLIGRAM(S): 25 TABLET ORAL at 06:03

## 2020-07-17 RX ADMIN — ENOXAPARIN SODIUM 80 MILLIGRAM(S): 100 INJECTION SUBCUTANEOUS at 06:03

## 2020-07-17 RX ADMIN — Medication 1 APPLICATION(S): at 06:04

## 2020-07-17 RX ADMIN — Medication 5 MILLILITER(S): at 13:01

## 2020-07-17 RX ADMIN — GABAPENTIN 100 MILLIGRAM(S): 400 CAPSULE ORAL at 06:03

## 2020-07-17 NOTE — PROGRESS NOTE ADULT - PROBLEM SELECTOR PLAN 7
On Lovenox SQ BID for DVT treatment. Discontinue if platelets are < 50,000       Contact Information [phone]

## 2020-07-17 NOTE — PROGRESS NOTE ADULT - REASON FOR ADMISSION
APML
APML, covid+
APML
Acute Leukemia
APML

## 2020-07-17 NOTE — PROGRESS NOTE ADULT - ATTENDING COMMENTS
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  Presented with pancytopenia, flow cytometry/bone marrow biopsy/FISH done 5/26- c/w acute promyelocytic leukemia with PML-AWA translocation.    She was started on ATRA 5/23- day +55, GALA 5/27- day +50 (arsenic held several times).    -pt had BM bx done on 6/26 showing persistent APL -had 14% blasts+Promyelo. s/p repeat BM bx on 7/13, counts recovered, pt has had >30days GALA and is morphologically in CR - stop GALA/ATRA and d/c home. Results and plan discussed with son  -PICC out since 7/10, was leaking. Pt has peripheral IV access as of 7/12, missed 1 GALA dose on 7/10. Plan to do MediPort on 7/13, but now they wanted to defer as pt is uncomfortable with getting it done.   -monitor lytes daily, keep K>4, Mg>2.  EKG twice a week  -Zoster rash on R buttock and back of RLE, crusted over. Cont and will remain on VZV prophylaxis of Valcyclovir   -d/c Levaquin and Posaconazole as she is no longer neutropenic  -RUE doppler showing axillary DVT on 6/23, on therapeutic AC since 6/23 (initially with hep, now Lovenox). Repeat RUE doppler done 7/2 -clot cleared. 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  Presented with pancytopenia, flow cytometry/bone marrow biopsy/FISH done 5/26- c/w acute promyelocytic leukemia with PML-AWA translocation.    She was started on ATRA 5/23- day +56, GALA 5/27- day +51 (arsenic held several times).    -pt had BM bx done on 6/26 showing persistent APL -had 14% blasts+Promyelo. s/p repeat BM bx on 7/13, counts recovered, pt has had >30days GALA and is morphologically in CR - stop GALA/ATRA and d/c home. Results and plan discussed with son  -PICC out since 7/10, was leaking. Pt has peripheral IV access as of 7/12, missed 1 GALA dose on 7/10. Plan to do MediPort on 7/13, but now they wanted to defer as pt is uncomfortable with getting it done.   -monitor lytes daily, keep K>4, Mg>2.  EKG twice a week  -Zoster rash on R buttock and back of RLE, crusted over. Cont and will remain on VZV prophylaxis of Valcyclovir   -d/c Levaquin and Posaconazole as she is no longer neutropenic  -RUE doppler showing axillary DVT on 6/23, on therapeutic AC since 6/23 (initially with hep, now Lovenox). Repeat RUE doppler done 7/16 -clot cleared. Will give 3 months of A/C -cannot self administer Lovenox so will change to Eliquis for discharge  -discharge today

## 2020-07-17 NOTE — PROGRESS NOTE ADULT - PROBLEM SELECTOR PROBLEM 1
APL (acute promyelocytic leukemia)
Hearing abnormally acute
APL (acute promyelocytic leukemia)
Acute promyelocytic leukemia not having achieved remission
Acute promyelocytic leukemia not having achieved remission
APL (acute promyelocytic leukemia)
APL (acute promyelocytic leukemia)
Acute promyelocytic leukemia not having achieved remission
APL (acute promyelocytic leukemia)

## 2020-07-17 NOTE — PROGRESS NOTE ADULT - SUBJECTIVE AND OBJECTIVE BOX
Diagnosis: APL    Protocol/Chemo Regimen: ATRA/Arsenic Trioxide    Day: ATRA Day 56 and Arsenic 51 (Held from 6/22/20 to 6/30/2020 and resumed on 6/30/20, held on 7/10 x1 day and resumed on 7/11)    Pt endorsed: No acute complaints.    Review of Systems: Denies nausea, vomiting, diarrhea, chest pain, SOB, headache      Translated by patient's son via phone call per patient request     Pain scale: Denies.    Diet: Consistent Carb w/ Evening Snack    Allergies: No Known Allergies    ANTIMICROBIALS  valACYclovir 500 milliGRAM(s) Oral daily      HEME/ONC MEDICATIONS  enoxaparin Injectable 80 milliGRAM(s) SubCutaneous         STANDING MEDICATIONS  ammonium lactate 12% Lotion 1 Application(s) Topical two times a day  artificial  tears Solution 1 Drop(s) Both EYES three times a day  ATENolol  Tablet 25 milliGRAM(s) Oral daily  Biotene Dry Mouth Oral Rinse 5 milliLiter(s) Swish and Spit five times a day  chlorhexidine 4% Liquid 1 Application(s) Topical <User Schedule>  dextrose 5%. 1000 milliLiter(s) IV Continuous <Continuous>  dextrose 50% Injectable 12.5 Gram(s) IV Push once  dextrose 50% Injectable 25 Gram(s) IV Push once  dextrose 50% Injectable 25 Gram(s) IV Push once  gabapentin 100 milliGRAM(s) Oral every 8 hours  insulin lispro (HumaLOG) corrective regimen sliding scale   SubCutaneous three times a day before meals  insulin lispro (HumaLOG) corrective regimen sliding scale   SubCutaneous at bedtime  nystatin Powder 1 Application(s) Topical two times a day  polyethylene glycol 3350 17 Gram(s) Oral daily  triamcinolone 0.1% Ointment 1 Application(s) Topical every 12 hours      PRN MEDICATIONS  acetaminophen   Tablet .. 650 milliGRAM(s) Oral every 6 hours PRN  dextrose 40% Gel 15 Gram(s) Oral once PRN  glucagon  Injectable 1 milliGRAM(s) IntraMuscular once PRN  ondansetron Injectable 8 milliGRAM(s) IV Push every 8 hours PRN  sodium chloride 0.9% lock flush 10 milliLiter(s) IV Push every 1 hour PRN  sodium chloride 0.9% lock flush 10 milliLiter(s) IV Push every 1 hour PRN                Vital Signs Last 24 Hrs  T(C): 36.8 (17 Jul 2020 12:45), Max: 36.8 (16 Jul 2020 21:57)  T(F): 98.2 (17 Jul 2020 12:45), Max: 98.3 (17 Jul 2020 08:00)  HR: 56 (17 Jul 2020 12:45) (56 - 67)  BP: 116/67 (17 Jul 2020 12:45) (116/67 - 144/77)  BP(mean): --  RR: 18 (17 Jul 2020 12:45) (18 - 18)  SpO2: 96% (17 Jul 2020 12:45) (96% - 97%)      PHYSICAL EXAM  General: NAD  Oral: no erythema or ulcers  HEENT: PERRLA, EOMI, clear oropharynx  Neck: supple  CV: (+) S1/S2 RRR  Lungs: clear to auscultation, no wheezes or rales  Abdomen: soft, non-tender, non-distended (+) BS  Ext: no edema  Skin: no rash  Neuro: alert and oriented X 3, no focal deficits  Central Line: None   Peripheral line: C/D/I     RECENT CULTURES:    Culture - Urine (07.07.20 @ 21:58)    Specimen Source: .Urine Clean Catch (Midstream)    Culture Results:   <10,000 CFU/mL Normal Urogenital Krista        LABS:                        8.9    3.34  )-----------( 230      ( 17 Jul 2020 06:50 )             27.7         Mean Cell Volume : 93.9 fl  Mean Cell Hemoglobin : 30.2 pg  Mean Cell Hemoglobin Concentration : 32.1 gm/dL  Auto Neutrophil # : 1.23 K/uL  Auto Lymphocyte # : 1.38 K/uL  Auto Monocyte # : 0.12 K/uL  Auto Eosinophil # : 0.53 K/uL  Auto Basophil # : 0.09 K/uL  Auto Neutrophil % : 36.9 %  Auto Lymphocyte % : 41.2 %  Auto Monocyte % : 3.5 %  Auto Eosinophil % : 15.8 %  Auto Basophil % : 2.6 %      07-17    143  |  108  |  13  ----------------------------<  101<H>  3.9   |  22  |  0.87    Ca    9.8      17 Jul 2020 06:50  Phos  4.0     07-17  Mg     2.2     07-17    TPro  7.1  /  Alb  4.2  /  TBili  0.2  /  DBili  x   /  AST  25  /  ALT  20  /  AlkPhos  94  07-17      Mg 2.2  Phos 4.0      PT/INR - ( 17 Jul 2020 06:50 )   PT: 13.1 sec;   INR: 1.11 ratio         PTT - ( 17 Jul 2020 06:50 )  PTT:40.4 sec      Uric Acid 5.4        RADIOLOGY & ADDITIONAL STUDIES:  VA Duplex Upper Ext Vein Scan, Right (07.16.20 @ 14:17) >  No evidence of right upper extremity deep venous thrombosis.

## 2020-07-17 NOTE — PROGRESS NOTE ADULT - PROVIDER SPECIALTY LIST ADULT
Heme/Onc
Hospitalist
Internal Medicine
ENT
Heme/Onc

## 2020-07-17 NOTE — DISCHARGE NOTE NURSING/CASE MANAGEMENT/SOCIAL WORK - NSDCFUADDAPPT_GEN_ALL_CORE_FT
You will receive a call from [facility] regarding your appointment with    and also Chemotherapy appointments.

## 2020-07-17 NOTE — PROGRESS NOTE ADULT - ASSESSMENT
Ms. Gant is a 61 y/o Tajik-speaking female w/ a PMHx of HTN, HLD, T2DM, and COVID-19 infection in 4/2020 admitted for management of newly diagnosed APL. The patient has been receiving treatment with ATRA and Arsenic (ATRA since 5/22 and Arsenic 5/27). Arsenic which was on hold since 6/22 secondary to Herpes Zoster infection, resumed on 6/30.  Patient's hospital course has been complicated by transaminitis, refractory thrombocytopenia, COVID-19 (+) state (6/16), shingles, right axillary DVT superficial and occipital/parietal hypersensitivity. On 7/13 a bone marrow biopsy found to be chemotherapeutic.

## 2020-07-17 NOTE — PROGRESS NOTE ADULT - NSHPATTENDINGPLANDISCUSS_GEN_ALL_CORE
team
AUSTIN Kemp, and RN, and PICC RN, and patient's son over the phone
GUTIERREZ Jo, and patient's son over the phone
team
Medicine AUSTIN Davis
patient, son Raman, shabana/onc.

## 2020-07-17 NOTE — DISCHARGE NOTE NURSING/CASE MANAGEMENT/SOCIAL WORK - PATIENT PORTAL LINK FT
You can access the FollowMyHealth Patient Portal offered by [facility] by registering at the following website: [email or web address]. By joining [facility]’s FollowMyHealth portal, you will also be able to view your health information using other applications (apps) compatible with our system.

## 2020-07-17 NOTE — PROGRESS NOTE ADULT - PROBLEM SELECTOR PLAN 2
Patient is not neutropenic, afebrile   6/29 Started Levaquin and Posaconazole for PPX, d/dorothy on 7/14/2020.   received Valtrex until completion of Arsenic.   6/22 Right buttock and upper thigh shingles - Valtrex, Lac hydrin   6/23 Persistent right upper inner arm pain/swelling - CT no abscess, + DVT,   7/2 repeat Doppler - No infectious sources, R Axilla DVT resolved, proximal obst  Previous COVID-19 (+) outpatient  Then COVID (-) on 5/22, 5/26, 6/2 and 6/9 6/16 COVID 19 (+)   6/18 COVID 19 Ab (+)  6/19 Repeat COVID-19 PCR (-)

## 2020-07-17 NOTE — PROGRESS NOTE ADULT - PROBLEM SELECTOR PLAN 3
Patient with right upper arm pain and swelling s/p PICC line removal   6/23 [facility] Dup[facility] (+) for acute partially occlusive DVT affecting right axillary vein   6/24 CT consistent with DVT, no abscess   initially treated with Heparin now on Lovenox.  7/2- repeat doppler -Right axillary DVT appears resolved. with suggestion of proximal obstruction.   7/16 follow up repeat [facility] Duplex of ALAN Patient with right upper arm pain and swelling s/p PICC line removal   6/23 [facility] Duplex (+) for acute partially occlusive DVT affecting right axillary vein   6/24 CT consistent with DVT, no abscess   initially treated with Heparin now on Lovenox.  7/2- repeat doppler -Right axillary DVT appears resolved. with suggestion of proximal obstruction.   7/16 Repeat [facility] Duplex of RUE: (-) for acute DVT's.

## 2020-07-17 NOTE — PROGRESS NOTE ADULT - I WAS PHYSICALLY PRESENT FOR THE KEY PORTIONS OF THE EVALUATION AND MANAGEMENT (E/M) SERVICE PROVIDED.  I AGREE WITH THE ABOVE HISTORY, PHYSICAL, AND PLAN WHICH I HAVE REVIEWED AND EDITED WHERE APPROPRIATE

## 2020-07-17 NOTE — PROGRESS NOTE ADULT - PROBLEM SELECTOR PLAN 1
BMBx (6/26) after a month of treatment (+) for hypercellular marrow with erythroid predominance.   ATRA 40mg BID (started 5/22 completed 7/16  Arsenic Trioxide started on 5/27.  Held 6/22- 6/29 due to Zoster infection, resumed   Follow up CBC with diff, CMP, TLS labs   Strict Is and Os/Daily weights/Mouth Care  7/9 PICC in Left Brachiocephalic vein, s/p PICC exchange - now removed due to pain/swelling LUE. Doppler negative, however, leaking from entry site and continued discomfort with infusions, PICC line removed on 7/10.  7/13 Bone marrow biopsy (+) hematopoetic trilineage with erythroid predominance  7/15 discharge planning to home. BMBx (6/26) after a month of treatment (+) for hypercellular marrow with erythroid predominance.   ATRA 40mg BID (started 5/22 completed 7/16  Arsenic Trioxide started on 5/27.  Held 6/22- 6/29 due to Zoster infection, resumed   Follow up CBC with diff, CMP, TLS labs   Strict Is and Os/Daily weights/Mouth Care  7/9 PICC in Left Brachiocephalic vein, s/p PICC exchange - now removed due to pain/swelling LUE. Doppler negative, however, leaking from entry site and continued discomfort with infusions, PICC line removed on 7/10.  7/13 Bone marrow biopsy (+) hematopoetic trilineage with erythroid predominance  7/17 discharge home today.

## 2020-07-21 ENCOUNTER — OUTPATIENT (OUTPATIENT)
Dept: OUTPATIENT SERVICES | Facility: HOSPITAL | Age: 60
LOS: 1 days | Discharge: HOME | End: 2020-07-21

## 2020-07-21 ENCOUNTER — TRANSCRIPTION ENCOUNTER (OUTPATIENT)
Age: 60
End: 2020-07-21

## 2020-07-21 DIAGNOSIS — C92.40 ACUTE PROMYELOCYTIC LEUKEMIA, NOT HAVING ACHIEVED REMISSION: ICD-10-CM

## 2020-07-22 ENCOUNTER — APPOINTMENT (OUTPATIENT)
Dept: HEMATOLOGY ONCOLOGY | Facility: CLINIC | Age: 60
End: 2020-07-22
Payer: MEDICAID

## 2020-07-22 ENCOUNTER — RESULT REVIEW (OUTPATIENT)
Age: 60
End: 2020-07-22

## 2020-07-22 VITALS
RESPIRATION RATE: 15 BRPM | HEIGHT: 61.42 IN | SYSTOLIC BLOOD PRESSURE: 142 MMHG | BODY MASS INDEX: 29.5 KG/M2 | DIASTOLIC BLOOD PRESSURE: 84 MMHG | HEART RATE: 64 BPM | OXYGEN SATURATION: 98 % | WEIGHT: 158.29 LBS

## 2020-07-22 VITALS — TEMPERATURE: 97.1 F

## 2020-07-22 LAB
BASOPHILS # BLD AUTO: 0.04 K/UL — SIGNIFICANT CHANGE UP (ref 0–0.2)
BASOPHILS NFR BLD AUTO: 0.8 % — SIGNIFICANT CHANGE UP (ref 0–2)
EOSINOPHIL # BLD AUTO: 0.91 K/UL — HIGH (ref 0–0.5)
EOSINOPHIL NFR BLD AUTO: 18.1 % — HIGH (ref 0–6)
HCT VFR BLD CALC: 30.6 % — LOW (ref 34.5–45)
HGB BLD-MCNC: 10.1 G/DL — LOW (ref 11.5–15.5)
IMM GRANULOCYTES NFR BLD AUTO: 0.8 % — SIGNIFICANT CHANGE UP (ref 0–1.5)
LYMPHOCYTES # BLD AUTO: 1.87 K/UL — SIGNIFICANT CHANGE UP (ref 1–3.3)
LYMPHOCYTES # BLD AUTO: 37.3 % — SIGNIFICANT CHANGE UP (ref 13–44)
MCHC RBC-ENTMCNC: 31.4 PG — SIGNIFICANT CHANGE UP (ref 27–34)
MCHC RBC-ENTMCNC: 33 GM/DL — SIGNIFICANT CHANGE UP (ref 32–36)
MCV RBC AUTO: 95 FL — SIGNIFICANT CHANGE UP (ref 80–100)
MONOCYTES # BLD AUTO: 0.27 K/UL — SIGNIFICANT CHANGE UP (ref 0–0.9)
MONOCYTES NFR BLD AUTO: 5.4 % — SIGNIFICANT CHANGE UP (ref 2–14)
NEUTROPHILS # BLD AUTO: 1.89 K/UL — SIGNIFICANT CHANGE UP (ref 1.8–7.4)
NEUTROPHILS NFR BLD AUTO: 37.6 % — LOW (ref 43–77)
NRBC # BLD: 0 /100 WBCS — SIGNIFICANT CHANGE UP (ref 0–0)
PLATELET # BLD AUTO: 270 K/UL — SIGNIFICANT CHANGE UP (ref 150–400)
RBC # BLD: 3.22 M/UL — LOW (ref 3.8–5.2)
RBC # FLD: 17.2 % — HIGH (ref 10.3–14.5)
WBC # BLD: 5.02 K/UL — SIGNIFICANT CHANGE UP (ref 3.8–10.5)
WBC # FLD AUTO: 5.02 K/UL — SIGNIFICANT CHANGE UP (ref 3.8–10.5)

## 2020-07-22 PROCEDURE — 99215 OFFICE O/P EST HI 40 MIN: CPT

## 2020-07-27 LAB — CHROM ANALY OVERALL INTERP SPEC-IMP: SIGNIFICANT CHANGE UP

## 2020-07-28 ENCOUNTER — LABORATORY RESULT (OUTPATIENT)
Age: 60
End: 2020-07-28

## 2020-08-02 ENCOUNTER — OUTPATIENT (OUTPATIENT)
Dept: OUTPATIENT SERVICES | Facility: HOSPITAL | Age: 60
LOS: 1 days | End: 2020-08-02
Payer: MEDICAID

## 2020-08-02 DIAGNOSIS — Z11.59 ENCOUNTER FOR SCREENING FOR OTHER VIRAL DISEASES: ICD-10-CM

## 2020-08-02 LAB — SARS-COV-2 RNA SPEC QL NAA+PROBE: SIGNIFICANT CHANGE UP

## 2020-08-02 PROCEDURE — U0003: CPT

## 2020-08-05 ENCOUNTER — OUTPATIENT (OUTPATIENT)
Dept: OUTPATIENT SERVICES | Facility: HOSPITAL | Age: 60
LOS: 1 days | End: 2020-08-05
Payer: MEDICAID

## 2020-08-05 ENCOUNTER — RESULT REVIEW (OUTPATIENT)
Age: 60
End: 2020-08-05

## 2020-08-05 VITALS
DIASTOLIC BLOOD PRESSURE: 69 MMHG | HEART RATE: 71 BPM | RESPIRATION RATE: 16 BRPM | TEMPERATURE: 97 F | SYSTOLIC BLOOD PRESSURE: 139 MMHG

## 2020-08-05 VITALS
RESPIRATION RATE: 16 BRPM | DIASTOLIC BLOOD PRESSURE: 69 MMHG | HEART RATE: 60 BPM | OXYGEN SATURATION: 98 % | SYSTOLIC BLOOD PRESSURE: 150 MMHG | TEMPERATURE: 97 F

## 2020-08-05 DIAGNOSIS — C92.40 ACUTE PROMYELOCYTIC LEUKEMIA, NOT HAVING ACHIEVED REMISSION: ICD-10-CM

## 2020-08-05 LAB
GLUCOSE BLDC GLUCOMTR-MCNC: 111 MG/DL — HIGH (ref 70–99)
GLUCOSE BLDC GLUCOMTR-MCNC: 99 MG/DL — SIGNIFICANT CHANGE UP (ref 70–99)

## 2020-08-05 PROCEDURE — 76937 US GUIDE VASCULAR ACCESS: CPT

## 2020-08-05 PROCEDURE — 82962 GLUCOSE BLOOD TEST: CPT

## 2020-08-05 PROCEDURE — 36561 INSERT TUNNELED CV CATH: CPT

## 2020-08-05 PROCEDURE — 76937 US GUIDE VASCULAR ACCESS: CPT | Mod: 26

## 2020-08-05 PROCEDURE — C1788: CPT

## 2020-08-05 PROCEDURE — 77001 FLUOROGUIDE FOR VEIN DEVICE: CPT | Mod: 26

## 2020-08-05 PROCEDURE — C1769: CPT

## 2020-08-05 PROCEDURE — C1894: CPT

## 2020-08-05 PROCEDURE — 77001 FLUOROGUIDE FOR VEIN DEVICE: CPT

## 2020-08-05 NOTE — PRE-ANESTHESIA EVALUATION ADULT - NSANTHOSAYNRD_GEN_A_CORE
No. KRISTEN screening performed.  STOP BANG Legend: 0-2 = LOW Risk; 3-4 = INTERMEDIATE Risk; 5-8 = HIGH Risk

## 2020-08-10 ENCOUNTER — APPOINTMENT (OUTPATIENT)
Dept: INFUSION THERAPY | Facility: HOSPITAL | Age: 60
End: 2020-08-10

## 2020-08-10 ENCOUNTER — LABORATORY RESULT (OUTPATIENT)
Age: 60
End: 2020-08-10

## 2020-08-10 ENCOUNTER — RESULT REVIEW (OUTPATIENT)
Age: 60
End: 2020-08-10

## 2020-08-10 ENCOUNTER — APPOINTMENT (OUTPATIENT)
Dept: HEMATOLOGY ONCOLOGY | Facility: CLINIC | Age: 60
End: 2020-08-10
Payer: MEDICAID

## 2020-08-10 LAB
BASOPHILS # BLD AUTO: 0.03 K/UL — SIGNIFICANT CHANGE UP (ref 0–0.2)
BASOPHILS # BLD AUTO: 0.09 K/UL — SIGNIFICANT CHANGE UP (ref 0–0.2)
BASOPHILS NFR BLD AUTO: 0.3 % — SIGNIFICANT CHANGE UP (ref 0–2)
BASOPHILS NFR BLD AUTO: 0.7 % — SIGNIFICANT CHANGE UP (ref 0–2)
EOSINOPHIL # BLD AUTO: 1.32 K/UL — HIGH (ref 0–0.5)
EOSINOPHIL # BLD AUTO: 1.42 K/UL — HIGH (ref 0–0.5)
EOSINOPHIL NFR BLD AUTO: 11.4 % — HIGH (ref 0–6)
EOSINOPHIL NFR BLD AUTO: 11.5 % — HIGH (ref 0–6)
HCT VFR BLD CALC: 31.2 % — LOW (ref 34.5–45)
HCT VFR BLD CALC: 32.9 % — LOW (ref 34.5–45)
HGB BLD-MCNC: 10 G/DL — LOW (ref 11.5–15.5)
HGB BLD-MCNC: 10.8 G/DL — LOW (ref 11.5–15.5)
IMM GRANULOCYTES NFR BLD AUTO: 0.8 % — SIGNIFICANT CHANGE UP (ref 0–1.5)
IMM GRANULOCYTES NFR BLD AUTO: 1 % — SIGNIFICANT CHANGE UP (ref 0–1.5)
LYMPHOCYTES # BLD AUTO: 2.74 K/UL — SIGNIFICANT CHANGE UP (ref 1–3.3)
LYMPHOCYTES # BLD AUTO: 23.9 % — SIGNIFICANT CHANGE UP (ref 13–44)
LYMPHOCYTES # BLD AUTO: 26 % — SIGNIFICANT CHANGE UP (ref 13–44)
LYMPHOCYTES # BLD AUTO: 3.24 K/UL — SIGNIFICANT CHANGE UP (ref 1–3.3)
MCHC RBC-ENTMCNC: 29.5 PG — SIGNIFICANT CHANGE UP (ref 27–34)
MCHC RBC-ENTMCNC: 29.8 PG — SIGNIFICANT CHANGE UP (ref 27–34)
MCHC RBC-ENTMCNC: 32.1 GM/DL — SIGNIFICANT CHANGE UP (ref 32–36)
MCHC RBC-ENTMCNC: 32.8 GM/DL — SIGNIFICANT CHANGE UP (ref 32–36)
MCV RBC AUTO: 90.9 FL — SIGNIFICANT CHANGE UP (ref 80–100)
MCV RBC AUTO: 92 FL — SIGNIFICANT CHANGE UP (ref 80–100)
MONOCYTES # BLD AUTO: 0.61 K/UL — SIGNIFICANT CHANGE UP (ref 0–0.9)
MONOCYTES # BLD AUTO: 0.67 K/UL — SIGNIFICANT CHANGE UP (ref 0–0.9)
MONOCYTES NFR BLD AUTO: 4.9 % — SIGNIFICANT CHANGE UP (ref 2–14)
MONOCYTES NFR BLD AUTO: 5.8 % — SIGNIFICANT CHANGE UP (ref 2–14)
NEUTROPHILS # BLD AUTO: 6.61 K/UL — SIGNIFICANT CHANGE UP (ref 1.8–7.4)
NEUTROPHILS # BLD AUTO: 6.97 K/UL — SIGNIFICANT CHANGE UP (ref 1.8–7.4)
NEUTROPHILS NFR BLD AUTO: 56 % — SIGNIFICANT CHANGE UP (ref 43–77)
NEUTROPHILS NFR BLD AUTO: 57.7 % — SIGNIFICANT CHANGE UP (ref 43–77)
NRBC # BLD: 0 /100 WBCS — SIGNIFICANT CHANGE UP (ref 0–0)
NRBC # BLD: 0 /100 WBCS — SIGNIFICANT CHANGE UP (ref 0–0)
PLATELET # BLD AUTO: 199 K/UL — SIGNIFICANT CHANGE UP (ref 150–400)
PLATELET # BLD AUTO: 199 K/UL — SIGNIFICANT CHANGE UP (ref 150–400)
RBC # BLD: 3.39 M/UL — LOW (ref 3.8–5.2)
RBC # BLD: 3.62 M/UL — LOW (ref 3.8–5.2)
RBC # FLD: 14.5 % — SIGNIFICANT CHANGE UP (ref 10.3–14.5)
RBC # FLD: 14.6 % — HIGH (ref 10.3–14.5)
WBC # BLD: 11.46 K/UL — HIGH (ref 3.8–10.5)
WBC # BLD: 12.45 K/UL — HIGH (ref 3.8–10.5)
WBC # FLD AUTO: 11.46 K/UL — HIGH (ref 3.8–10.5)
WBC # FLD AUTO: 12.45 K/UL — HIGH (ref 3.8–10.5)

## 2020-08-10 PROCEDURE — 99213 OFFICE O/P EST LOW 20 MIN: CPT

## 2020-08-11 ENCOUNTER — APPOINTMENT (OUTPATIENT)
Dept: INFUSION THERAPY | Facility: HOSPITAL | Age: 60
End: 2020-08-11

## 2020-08-11 ENCOUNTER — EMERGENCY (EMERGENCY)
Facility: HOSPITAL | Age: 60
LOS: 1 days | Discharge: HOME | End: 2020-08-11
Attending: EMERGENCY MEDICINE
Payer: MEDICAID

## 2020-08-11 VITALS
RESPIRATION RATE: 18 BRPM | HEART RATE: 66 BPM | TEMPERATURE: 98 F | DIASTOLIC BLOOD PRESSURE: 76 MMHG | OXYGEN SATURATION: 97 % | WEIGHT: 164.91 LBS | SYSTOLIC BLOOD PRESSURE: 126 MMHG | HEIGHT: 62 IN

## 2020-08-11 VITALS
HEART RATE: 68 BPM | SYSTOLIC BLOOD PRESSURE: 112 MMHG | DIASTOLIC BLOOD PRESSURE: 58 MMHG | RESPIRATION RATE: 16 BRPM | OXYGEN SATURATION: 96 %

## 2020-08-11 DIAGNOSIS — Z51.11 ENCOUNTER FOR ANTINEOPLASTIC CHEMOTHERAPY: ICD-10-CM

## 2020-08-11 DIAGNOSIS — R11.2 NAUSEA WITH VOMITING, UNSPECIFIED: ICD-10-CM

## 2020-08-11 LAB
ALBUMIN SERPL ELPH-MCNC: 5.3 G/DL — HIGH (ref 3.3–5)
ALP SERPL-CCNC: 96 U/L — SIGNIFICANT CHANGE UP (ref 40–120)
ALT FLD-CCNC: 19 U/L — SIGNIFICANT CHANGE UP (ref 10–45)
ANION GAP SERPL CALC-SCNC: 20 MMOL/L — HIGH (ref 5–17)
AST SERPL-CCNC: 16 U/L — SIGNIFICANT CHANGE UP (ref 10–40)
BASOPHILS # BLD AUTO: 0.05 K/UL — SIGNIFICANT CHANGE UP (ref 0–0.2)
BASOPHILS NFR BLD AUTO: 0.5 % — SIGNIFICANT CHANGE UP (ref 0–2)
BILIRUB SERPL-MCNC: 0.2 MG/DL — SIGNIFICANT CHANGE UP (ref 0.2–1.2)
BUN SERPL-MCNC: 21 MG/DL — SIGNIFICANT CHANGE UP (ref 7–23)
CALCIUM SERPL-MCNC: 10.5 MG/DL — SIGNIFICANT CHANGE UP (ref 8.4–10.5)
CHLORIDE SERPL-SCNC: 100 MMOL/L — SIGNIFICANT CHANGE UP (ref 96–108)
CO2 SERPL-SCNC: 23 MMOL/L — SIGNIFICANT CHANGE UP (ref 22–31)
CREAT SERPL-MCNC: 1.06 MG/DL — SIGNIFICANT CHANGE UP (ref 0.5–1.3)
EOSINOPHIL # BLD AUTO: 1.06 K/UL — HIGH (ref 0–0.5)
EOSINOPHIL NFR BLD AUTO: 9.6 % — HIGH (ref 0–6)
GLUCOSE SERPL-MCNC: 109 MG/DL — HIGH (ref 70–99)
HCT VFR BLD CALC: 33.6 % — LOW (ref 34.5–45)
HGB BLD-MCNC: 10.7 G/DL — LOW (ref 11.5–15.5)
IMM GRANULOCYTES NFR BLD AUTO: 1.2 % — SIGNIFICANT CHANGE UP (ref 0–1.5)
LYMPHOCYTES # BLD AUTO: 3.56 K/UL — HIGH (ref 1–3.3)
LYMPHOCYTES # BLD AUTO: 32.2 % — SIGNIFICANT CHANGE UP (ref 13–44)
MCHC RBC-ENTMCNC: 29.3 PG — SIGNIFICANT CHANGE UP (ref 27–34)
MCHC RBC-ENTMCNC: 31.8 GM/DL — LOW (ref 32–36)
MCV RBC AUTO: 92.1 FL — SIGNIFICANT CHANGE UP (ref 80–100)
MONOCYTES # BLD AUTO: 0.92 K/UL — HIGH (ref 0–0.9)
MONOCYTES NFR BLD AUTO: 8.3 % — SIGNIFICANT CHANGE UP (ref 2–14)
NEUTROPHILS # BLD AUTO: 5.32 K/UL — SIGNIFICANT CHANGE UP (ref 1.8–7.4)
NEUTROPHILS NFR BLD AUTO: 48.2 % — SIGNIFICANT CHANGE UP (ref 43–77)
NRBC # BLD: 0 /100 WBCS — SIGNIFICANT CHANGE UP (ref 0–0)
PLATELET # BLD AUTO: 213 K/UL — SIGNIFICANT CHANGE UP (ref 150–400)
POTASSIUM SERPL-MCNC: 4.1 MMOL/L — SIGNIFICANT CHANGE UP (ref 3.5–5.3)
POTASSIUM SERPL-SCNC: 4.1 MMOL/L — SIGNIFICANT CHANGE UP (ref 3.5–5.3)
PROT SERPL-MCNC: 7.9 G/DL — SIGNIFICANT CHANGE UP (ref 6–8.3)
RBC # BLD: 3.65 M/UL — LOW (ref 3.8–5.2)
RBC # FLD: 14.5 % — SIGNIFICANT CHANGE UP (ref 10.3–14.5)
SODIUM SERPL-SCNC: 143 MMOL/L — SIGNIFICANT CHANGE UP (ref 135–145)
WBC # BLD: 11.04 K/UL — HIGH (ref 3.8–10.5)
WBC # FLD AUTO: 11.04 K/UL — HIGH (ref 3.8–10.5)

## 2020-08-11 PROCEDURE — 85027 COMPLETE CBC AUTOMATED: CPT

## 2020-08-11 PROCEDURE — 96374 THER/PROPH/DIAG INJ IV PUSH: CPT

## 2020-08-11 PROCEDURE — 96375 TX/PRO/DX INJ NEW DRUG ADDON: CPT

## 2020-08-11 PROCEDURE — 80053 COMPREHEN METABOLIC PANEL: CPT

## 2020-08-11 PROCEDURE — 71045 X-RAY EXAM CHEST 1 VIEW: CPT

## 2020-08-11 PROCEDURE — 71045 X-RAY EXAM CHEST 1 VIEW: CPT | Mod: 26

## 2020-08-11 PROCEDURE — 99284 EMERGENCY DEPT VISIT MOD MDM: CPT | Mod: 25

## 2020-08-11 PROCEDURE — 99284 EMERGENCY DEPT VISIT MOD MDM: CPT

## 2020-08-11 RX ORDER — ACETAMINOPHEN 500 MG
975 TABLET ORAL ONCE
Refills: 0 | Status: COMPLETED | OUTPATIENT
Start: 2020-08-11 | End: 2020-08-11

## 2020-08-11 RX ORDER — DIPHENHYDRAMINE HCL 50 MG
25 CAPSULE ORAL ONCE
Refills: 0 | Status: COMPLETED | OUTPATIENT
Start: 2020-08-11 | End: 2020-08-11

## 2020-08-11 RX ORDER — FAMOTIDINE 10 MG/ML
20 INJECTION INTRAVENOUS DAILY
Refills: 0 | Status: DISCONTINUED | OUTPATIENT
Start: 2020-08-11 | End: 2020-08-15

## 2020-08-11 RX ORDER — DEXAMETHASONE 0.5 MG/5ML
10 ELIXIR ORAL ONCE
Refills: 0 | Status: COMPLETED | OUTPATIENT
Start: 2020-08-11 | End: 2020-08-11

## 2020-08-11 RX ADMIN — FAMOTIDINE 20 MILLIGRAM(S): 10 INJECTION INTRAVENOUS at 04:52

## 2020-08-11 RX ADMIN — Medication 975 MILLIGRAM(S): at 03:48

## 2020-08-11 RX ADMIN — Medication 204 MILLIGRAM(S): at 05:17

## 2020-08-11 RX ADMIN — Medication 25 MILLIGRAM(S): at 04:52

## 2020-08-11 NOTE — ED ADULT TRIAGE NOTE - CHIEF COMPLAINT QUOTE
pain and itching at mediport site. First time port has been ever been accessed for chemotherapy was today. Negative COVID test two days ago.

## 2020-08-11 NOTE — ED PROVIDER NOTE - ATTENDING CONTRIBUTION TO CARE
rash is subtle, mildly reticular, located over R upper chest in area around port, however, it is not erythematous, warm, or tender. no systemic sx. no other symptoms that would indicate anaphylaxis. rash does not appear to be cellulitis. labs unremarkable, cxr unremarkable. unlcear etiology of rash but may be a mild allergic reaction vs irritation from infusion. Multiple diagnoses were considered during patient's evaluation today. While exact etiology of symptoms is unclear, there appears to be no emergent process today that would require further emergent or inpatient management. Pt is safe for dc with outpt f/u and return instructions if symptoms worsen.

## 2020-08-11 NOTE — ED ADULT NURSE NOTE - CAS ELECT INFOMATION PROVIDED
discharge instructions given to son, patient is to follow up with oncologist today. Take Tylenol and Motrin for fevers. Monitor rash and redness./DC instructions

## 2020-08-11 NOTE — ED PROVIDER NOTE - OBJECTIVE STATEMENT
60yF h/o APML presents mediport site pain and itchiness. Patient has mediport 60yF h/o APML presents mediport site pain and itchiness few hours prior to presentation.   Collateral information from Son: Patient has mediport placed 5 days prior in which it was accessed today for the first time with patient receiving arsenic in the afternoon. Patient started complaining of itching and intermittent pain around site. No fever, chest pain, shortness of breath, nausea, vomiting, drainage from site.

## 2020-08-11 NOTE — ED PROVIDER NOTE - CLINICAL SUMMARY MEDICAL DECISION MAKING FREE TEXT BOX
a 60yF h/o APML presents mediport site pain and itchiness few hours prior to presentation without sign of infection. Patient well appearing, afebrile. Will get labs, CXR and reassess

## 2020-08-11 NOTE — ED PROVIDER NOTE - PATIENT PORTAL LINK FT
You can access the FollowMyHealth Patient Portal offered by [facility] by registering at the following website: [email or web address]. By joining [facility]’s FollowMyHealth portal, you will also be able to view your health information using other applications (apps) compatible with our system.

## 2020-08-11 NOTE — ED PROVIDER NOTE - NS ED ROS FT
GENERAL: No fever or chills, EYES: no change in vision, HEENT: no trouble swallowing or speaking, CARDIAC: +chest wall itchiness, no chest pain, PULMONARY: no cough or SOB, GI: no abdominal pain, no nausea, no vomiting, no diarrhea or constipation, : No changes in urination, SKIN: no rashes, NEURO: no headache,  MSK: No joint pain ~Bobby Ocasio M.D. Resident

## 2020-08-11 NOTE — ED ADULT NURSE NOTE - OBJECTIVE STATEMENT
61 yo Vietnamese speaking female AAOX3 with hx of APL, HTN and DM presents to ED for itching and pain at port site. As per son who was called for translation purposes, patient had right side chest wall port placed 5 days ago, today was first chemotherapy treatment through port at approximately 2pm. Around 10pm patient started to develop pain and itching at port site, patient took Motrin and Benadryl at home. Port site appears to have non raised rash, +redness, no discharge or warmth noted to site. No fevers, chills, cough, n/v/d. Patient denies any CP, SOB, weakness or numbness. Safety measures maintained with bed in low position and side rails up.

## 2020-08-11 NOTE — ED PROVIDER NOTE - NSFOLLOWUPINSTRUCTIONS_ED_ALL_ED_FT
No signs of emergency medical condition on today's workup.  Presumptive diagnosis made, but further evaluation may be required by your primary care doctor or specialist for a definitive diagnosis.    Please follow up with your primary care physician in 24-48 hours  A copy of your results have been provided to you  Please come back if any of the following: Fever, chest pain, shortness of breath, redness, warmth, purulent discharge at mediport site abdominal pain, nausea, vomiting or any major concern .

## 2020-08-11 NOTE — ED PROVIDER NOTE - PHYSICAL EXAMINATION
Gen: AAOx3, non-toxic  Head: NCAT  HEENT: EOMI, oral mucosa moist, normal conjunctiva  Lung: CTAB, no respiratory distress,, speaking in full sentences  CV: RRR, no murmurs, rubs or gallops  Abd: soft, NTND, no guarding, no CVA tenderness  MSK: no visible deformities, Mediport site dry, intact without any erythema, warmth,            discharge or discharge  Neuro: No focal sensory or motor deficits  Skin: Warm, well perfused, no rash  Psych: normal affect.   ~Bobby Ocasio M.D. Resident

## 2020-08-12 ENCOUNTER — APPOINTMENT (OUTPATIENT)
Dept: INFUSION THERAPY | Facility: HOSPITAL | Age: 60
End: 2020-08-12

## 2020-08-13 ENCOUNTER — APPOINTMENT (OUTPATIENT)
Dept: INFUSION THERAPY | Facility: HOSPITAL | Age: 60
End: 2020-08-13

## 2020-08-13 ENCOUNTER — RESULT REVIEW (OUTPATIENT)
Age: 60
End: 2020-08-13

## 2020-08-13 DIAGNOSIS — C92.40 ACUTE PROMYELOCYTIC LEUKEMIA, NOT HAVING ACHIEVED REMISSION: ICD-10-CM

## 2020-08-13 DIAGNOSIS — Z45.2 ENCOUNTER FOR ADJUSTMENT AND MANAGEMENT OF VASCULAR ACCESS DEVICE: ICD-10-CM

## 2020-08-13 LAB
BASOPHILS # BLD AUTO: 0.04 K/UL — SIGNIFICANT CHANGE UP (ref 0–0.2)
BASOPHILS # BLD AUTO: 0.05 K/UL — SIGNIFICANT CHANGE UP (ref 0–0.2)
BASOPHILS NFR BLD AUTO: 0.4 % — SIGNIFICANT CHANGE UP (ref 0–2)
BASOPHILS NFR BLD AUTO: 0.5 % — SIGNIFICANT CHANGE UP (ref 0–2)
EOSINOPHIL # BLD AUTO: 0.06 K/UL — SIGNIFICANT CHANGE UP (ref 0–0.5)
EOSINOPHIL # BLD AUTO: 0.08 K/UL — SIGNIFICANT CHANGE UP (ref 0–0.5)
EOSINOPHIL NFR BLD AUTO: 0.6 % — SIGNIFICANT CHANGE UP (ref 0–6)
EOSINOPHIL NFR BLD AUTO: 0.7 % — SIGNIFICANT CHANGE UP (ref 0–6)
HCT VFR BLD CALC: 30.3 % — LOW (ref 34.5–45)
HCT VFR BLD CALC: 31.1 % — LOW (ref 34.5–45)
HGB BLD-MCNC: 10.1 G/DL — LOW (ref 11.5–15.5)
HGB BLD-MCNC: 10.2 G/DL — LOW (ref 11.5–15.5)
IMM GRANULOCYTES NFR BLD AUTO: 1.6 % — HIGH (ref 0–1.5)
IMM GRANULOCYTES NFR BLD AUTO: 2.1 % — HIGH (ref 0–1.5)
LYMPHOCYTES # BLD AUTO: 2.66 K/UL — SIGNIFICANT CHANGE UP (ref 1–3.3)
LYMPHOCYTES # BLD AUTO: 2.89 K/UL — SIGNIFICANT CHANGE UP (ref 1–3.3)
LYMPHOCYTES # BLD AUTO: 26.1 % — SIGNIFICANT CHANGE UP (ref 13–44)
LYMPHOCYTES # BLD AUTO: 26.6 % — SIGNIFICANT CHANGE UP (ref 13–44)
MCHC RBC-ENTMCNC: 29.6 PG — SIGNIFICANT CHANGE UP (ref 27–34)
MCHC RBC-ENTMCNC: 29.8 PG — SIGNIFICANT CHANGE UP (ref 27–34)
MCHC RBC-ENTMCNC: 32.8 GM/DL — SIGNIFICANT CHANGE UP (ref 32–36)
MCHC RBC-ENTMCNC: 33.3 GM/DL — SIGNIFICANT CHANGE UP (ref 32–36)
MCV RBC AUTO: 89.4 FL — SIGNIFICANT CHANGE UP (ref 80–100)
MCV RBC AUTO: 90.1 FL — SIGNIFICANT CHANGE UP (ref 80–100)
MONOCYTES # BLD AUTO: 0.47 K/UL — SIGNIFICANT CHANGE UP (ref 0–0.9)
MONOCYTES # BLD AUTO: 0.5 K/UL — SIGNIFICANT CHANGE UP (ref 0–0.9)
MONOCYTES NFR BLD AUTO: 4.3 % — SIGNIFICANT CHANGE UP (ref 2–14)
MONOCYTES NFR BLD AUTO: 4.9 % — SIGNIFICANT CHANGE UP (ref 2–14)
NEUTROPHILS # BLD AUTO: 6.79 K/UL — SIGNIFICANT CHANGE UP (ref 1.8–7.4)
NEUTROPHILS # BLD AUTO: 7.15 K/UL — SIGNIFICANT CHANGE UP (ref 1.8–7.4)
NEUTROPHILS NFR BLD AUTO: 65.8 % — SIGNIFICANT CHANGE UP (ref 43–77)
NEUTROPHILS NFR BLD AUTO: 66.4 % — SIGNIFICANT CHANGE UP (ref 43–77)
NRBC # BLD: 0 /100 WBCS — SIGNIFICANT CHANGE UP (ref 0–0)
NRBC # BLD: 0 /100 WBCS — SIGNIFICANT CHANGE UP (ref 0–0)
PLATELET # BLD AUTO: 207 K/UL — SIGNIFICANT CHANGE UP (ref 150–400)
PLATELET # BLD AUTO: 215 K/UL — SIGNIFICANT CHANGE UP (ref 150–400)
RBC # BLD: 3.39 M/UL — LOW (ref 3.8–5.2)
RBC # BLD: 3.45 M/UL — LOW (ref 3.8–5.2)
RBC # FLD: 14.6 % — HIGH (ref 10.3–14.5)
RBC # FLD: 14.6 % — HIGH (ref 10.3–14.5)
WBC # BLD: 10.21 K/UL — SIGNIFICANT CHANGE UP (ref 3.8–10.5)
WBC # BLD: 10.87 K/UL — HIGH (ref 3.8–10.5)
WBC # FLD AUTO: 10.21 K/UL — SIGNIFICANT CHANGE UP (ref 3.8–10.5)
WBC # FLD AUTO: 10.87 K/UL — HIGH (ref 3.8–10.5)

## 2020-08-14 ENCOUNTER — APPOINTMENT (OUTPATIENT)
Dept: INFUSION THERAPY | Facility: HOSPITAL | Age: 60
End: 2020-08-14

## 2020-08-17 ENCOUNTER — APPOINTMENT (OUTPATIENT)
Dept: INFUSION THERAPY | Facility: HOSPITAL | Age: 60
End: 2020-08-17

## 2020-08-17 ENCOUNTER — RESULT REVIEW (OUTPATIENT)
Age: 60
End: 2020-08-17

## 2020-08-17 ENCOUNTER — LABORATORY RESULT (OUTPATIENT)
Age: 60
End: 2020-08-17

## 2020-08-17 ENCOUNTER — APPOINTMENT (OUTPATIENT)
Dept: HEMATOLOGY ONCOLOGY | Facility: CLINIC | Age: 60
End: 2020-08-17
Payer: MEDICAID

## 2020-08-17 ENCOUNTER — OUTPATIENT (OUTPATIENT)
Dept: OUTPATIENT SERVICES | Facility: HOSPITAL | Age: 60
LOS: 1 days | Discharge: HOME | End: 2020-08-17
Payer: MEDICAID

## 2020-08-17 DIAGNOSIS — C92.40 ACUTE PROMYELOCYTIC LEUKEMIA, NOT HAVING ACHIEVED REMISSION: ICD-10-CM

## 2020-08-17 DIAGNOSIS — E86.0 DEHYDRATION: ICD-10-CM

## 2020-08-17 LAB
BASOPHILS # BLD AUTO: 0.04 K/UL — SIGNIFICANT CHANGE UP (ref 0–0.2)
BASOPHILS NFR BLD AUTO: 0.4 % — SIGNIFICANT CHANGE UP (ref 0–2)
BUN SERPL-MCNC: 14 MG/DL — SIGNIFICANT CHANGE UP (ref 7–23)
CA-I BLDA-SCNC: 1.24 MMOL/L — SIGNIFICANT CHANGE UP (ref 1.12–1.3)
CHLORIDE SERPL-SCNC: 105 MMOL/L — SIGNIFICANT CHANGE UP (ref 96–108)
CO2 SERPL-SCNC: 25 MMOL/L — SIGNIFICANT CHANGE UP (ref 22–31)
CREAT SERPL-MCNC: 0.7 MG/DL — SIGNIFICANT CHANGE UP (ref 0.5–1.3)
EOSINOPHIL # BLD AUTO: 0.32 K/UL — SIGNIFICANT CHANGE UP (ref 0–0.5)
EOSINOPHIL NFR BLD AUTO: 3.1 % — SIGNIFICANT CHANGE UP (ref 0–6)
GLUCOSE SERPL-MCNC: 143 MG/DL — HIGH (ref 70–99)
HCT VFR BLD CALC: 30.1 % — LOW (ref 34.5–45)
HGB BLD-MCNC: 10 G/DL — LOW (ref 11.5–15.5)
IMM GRANULOCYTES NFR BLD AUTO: 1.8 % — HIGH (ref 0–1.5)
LYMPHOCYTES # BLD AUTO: 2.4 K/UL — SIGNIFICANT CHANGE UP (ref 1–3.3)
LYMPHOCYTES # BLD AUTO: 23.2 % — SIGNIFICANT CHANGE UP (ref 13–44)
MCHC RBC-ENTMCNC: 29.9 PG — SIGNIFICANT CHANGE UP (ref 27–34)
MCHC RBC-ENTMCNC: 33.2 GM/DL — SIGNIFICANT CHANGE UP (ref 32–36)
MCV RBC AUTO: 89.9 FL — SIGNIFICANT CHANGE UP (ref 80–100)
MONOCYTES # BLD AUTO: 0.32 K/UL — SIGNIFICANT CHANGE UP (ref 0–0.9)
MONOCYTES NFR BLD AUTO: 3.1 % — SIGNIFICANT CHANGE UP (ref 2–14)
NEUTROPHILS # BLD AUTO: 7.09 K/UL — SIGNIFICANT CHANGE UP (ref 1.8–7.4)
NEUTROPHILS NFR BLD AUTO: 68.4 % — SIGNIFICANT CHANGE UP (ref 43–77)
NRBC # BLD: 0 /100 WBCS — SIGNIFICANT CHANGE UP (ref 0–0)
PLATELET # BLD AUTO: 203 K/UL — SIGNIFICANT CHANGE UP (ref 150–400)
POTASSIUM SERPL-MCNC: 4.5 MMOL/L — SIGNIFICANT CHANGE UP (ref 3.5–5.3)
POTASSIUM SERPL-SCNC: 4.5 MMOL/L — SIGNIFICANT CHANGE UP (ref 3.5–5.3)
RBC # BLD: 3.35 M/UL — LOW (ref 3.8–5.2)
RBC # FLD: 15 % — HIGH (ref 10.3–14.5)
SODIUM SERPL-SCNC: 141 MMOL/L — SIGNIFICANT CHANGE UP (ref 135–145)
WBC # BLD: 10.36 K/UL — SIGNIFICANT CHANGE UP (ref 3.8–10.5)
WBC # FLD AUTO: 10.36 K/UL — SIGNIFICANT CHANGE UP (ref 3.8–10.5)

## 2020-08-17 PROCEDURE — 99212 OFFICE O/P EST SF 10 MIN: CPT

## 2020-08-17 PROCEDURE — 93010 ELECTROCARDIOGRAM REPORT: CPT

## 2020-08-18 ENCOUNTER — APPOINTMENT (OUTPATIENT)
Dept: INFUSION THERAPY | Facility: HOSPITAL | Age: 60
End: 2020-08-18

## 2020-08-18 ENCOUNTER — APPOINTMENT (OUTPATIENT)
Dept: HEMATOLOGY ONCOLOGY | Facility: CLINIC | Age: 60
End: 2020-08-18

## 2020-08-18 DIAGNOSIS — R11.2 NAUSEA WITH VOMITING, UNSPECIFIED: ICD-10-CM

## 2020-08-18 DIAGNOSIS — Z51.11 ENCOUNTER FOR ANTINEOPLASTIC CHEMOTHERAPY: ICD-10-CM

## 2020-08-19 ENCOUNTER — APPOINTMENT (OUTPATIENT)
Dept: INFUSION THERAPY | Facility: HOSPITAL | Age: 60
End: 2020-08-19

## 2020-08-19 ENCOUNTER — RESULT REVIEW (OUTPATIENT)
Age: 60
End: 2020-08-19

## 2020-08-19 ENCOUNTER — LABORATORY RESULT (OUTPATIENT)
Age: 60
End: 2020-08-19

## 2020-08-19 ENCOUNTER — APPOINTMENT (OUTPATIENT)
Dept: HEMATOLOGY ONCOLOGY | Facility: CLINIC | Age: 60
End: 2020-08-19
Payer: MEDICAID

## 2020-08-19 DIAGNOSIS — L23.1 ALLERGIC CONTACT DERMATITIS DUE TO ADHESIVES: ICD-10-CM

## 2020-08-19 LAB
BASOPHILS # BLD AUTO: 0.04 K/UL — SIGNIFICANT CHANGE UP (ref 0–0.2)
BASOPHILS NFR BLD AUTO: 0.5 % — SIGNIFICANT CHANGE UP (ref 0–2)
EOSINOPHIL # BLD AUTO: 0.25 K/UL — SIGNIFICANT CHANGE UP (ref 0–0.5)
EOSINOPHIL NFR BLD AUTO: 3.3 % — SIGNIFICANT CHANGE UP (ref 0–6)
HCT VFR BLD CALC: 30.6 % — LOW (ref 34.5–45)
HGB BLD-MCNC: 9.7 G/DL — LOW (ref 11.5–15.5)
IMM GRANULOCYTES NFR BLD AUTO: 0.5 % — SIGNIFICANT CHANGE UP (ref 0–1.5)
LYMPHOCYTES # BLD AUTO: 1.91 K/UL — SIGNIFICANT CHANGE UP (ref 1–3.3)
LYMPHOCYTES # BLD AUTO: 24.9 % — SIGNIFICANT CHANGE UP (ref 13–44)
MCHC RBC-ENTMCNC: 29.3 PG — SIGNIFICANT CHANGE UP (ref 27–34)
MCHC RBC-ENTMCNC: 31.7 GM/DL — LOW (ref 32–36)
MCV RBC AUTO: 92.4 FL — SIGNIFICANT CHANGE UP (ref 80–100)
MONOCYTES # BLD AUTO: 0.36 K/UL — SIGNIFICANT CHANGE UP (ref 0–0.9)
MONOCYTES NFR BLD AUTO: 4.7 % — SIGNIFICANT CHANGE UP (ref 2–14)
NEUTROPHILS # BLD AUTO: 5.08 K/UL — SIGNIFICANT CHANGE UP (ref 1.8–7.4)
NEUTROPHILS NFR BLD AUTO: 66.1 % — SIGNIFICANT CHANGE UP (ref 43–77)
NRBC # BLD: 0 /100 WBCS — SIGNIFICANT CHANGE UP (ref 0–0)
PLATELET # BLD AUTO: 214 K/UL — SIGNIFICANT CHANGE UP (ref 150–400)
RBC # BLD: 3.31 M/UL — LOW (ref 3.8–5.2)
RBC # FLD: 15.3 % — HIGH (ref 10.3–14.5)
WBC # BLD: 7.68 K/UL — SIGNIFICANT CHANGE UP (ref 3.8–10.5)
WBC # FLD AUTO: 7.68 K/UL — SIGNIFICANT CHANGE UP (ref 3.8–10.5)

## 2020-08-19 PROCEDURE — 99214 OFFICE O/P EST MOD 30 MIN: CPT

## 2020-08-20 ENCOUNTER — RESULT REVIEW (OUTPATIENT)
Age: 60
End: 2020-08-20

## 2020-08-20 ENCOUNTER — APPOINTMENT (OUTPATIENT)
Dept: INFUSION THERAPY | Facility: HOSPITAL | Age: 60
End: 2020-08-20

## 2020-08-20 LAB
BASOPHILS # BLD AUTO: 0.03 K/UL — SIGNIFICANT CHANGE UP (ref 0–0.2)
BASOPHILS NFR BLD AUTO: 0.4 % — SIGNIFICANT CHANGE UP (ref 0–2)
EOSINOPHIL # BLD AUTO: 0.22 K/UL — SIGNIFICANT CHANGE UP (ref 0–0.5)
EOSINOPHIL NFR BLD AUTO: 3.2 % — SIGNIFICANT CHANGE UP (ref 0–6)
HCT VFR BLD CALC: 29.7 % — LOW (ref 34.5–45)
HGB BLD-MCNC: 9.8 G/DL — LOW (ref 11.5–15.5)
IMM GRANULOCYTES NFR BLD AUTO: 0.3 % — SIGNIFICANT CHANGE UP (ref 0–1.5)
LYMPHOCYTES # BLD AUTO: 1.85 K/UL — SIGNIFICANT CHANGE UP (ref 1–3.3)
LYMPHOCYTES # BLD AUTO: 26.6 % — SIGNIFICANT CHANGE UP (ref 13–44)
MCHC RBC-ENTMCNC: 29.8 PG — SIGNIFICANT CHANGE UP (ref 27–34)
MCHC RBC-ENTMCNC: 33 GM/DL — SIGNIFICANT CHANGE UP (ref 32–36)
MCV RBC AUTO: 90.3 FL — SIGNIFICANT CHANGE UP (ref 80–100)
MONOCYTES # BLD AUTO: 0.34 K/UL — SIGNIFICANT CHANGE UP (ref 0–0.9)
MONOCYTES NFR BLD AUTO: 4.9 % — SIGNIFICANT CHANGE UP (ref 2–14)
NEUTROPHILS # BLD AUTO: 4.5 K/UL — SIGNIFICANT CHANGE UP (ref 1.8–7.4)
NEUTROPHILS NFR BLD AUTO: 64.6 % — SIGNIFICANT CHANGE UP (ref 43–77)
NRBC # BLD: 0 /100 WBCS — SIGNIFICANT CHANGE UP (ref 0–0)
PLATELET # BLD AUTO: 214 K/UL — SIGNIFICANT CHANGE UP (ref 150–400)
RBC # BLD: 3.29 M/UL — LOW (ref 3.8–5.2)
RBC # FLD: 15.2 % — HIGH (ref 10.3–14.5)
SARS-COV-2 RNA SPEC QL NAA+PROBE: SIGNIFICANT CHANGE UP
WBC # BLD: 6.96 K/UL — SIGNIFICANT CHANGE UP (ref 3.8–10.5)
WBC # FLD AUTO: 6.96 K/UL — SIGNIFICANT CHANGE UP (ref 3.8–10.5)

## 2020-08-21 ENCOUNTER — APPOINTMENT (OUTPATIENT)
Dept: INFUSION THERAPY | Facility: HOSPITAL | Age: 60
End: 2020-08-21

## 2020-08-21 NOTE — REVIEW OF SYSTEMS
[FreeTextEntry9] : R neck pain and B/L lower extremity stiffness [de-identified] : Irritated skin around port insertion site covered with dermabond

## 2020-08-21 NOTE — PHYSICAL EXAM
[de-identified] : Minor patch of redness around R port site and R subclavian area, dermabond still in place. [de-identified] : R sided paravertebral tenderness around T8

## 2020-08-21 NOTE — HISTORY OF PRESENT ILLNESS
[de-identified] : 60 year old female who is here for a follow up visit after a hospitalization for acute promyelocytic leukemia.  She initially presented to [facility] for fevers, found to be pancytopenic.  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  She was started on ATRA empirically on 5/23, then on arsenic on 5/27 when the diagnosis was confirmed.  Initially she appeared to have refractory thrombocytopenia which improved.  She was found to be COVID positive on 6/16, transferred to COVID unit, on 6/19 she was COVID negative, antibody positive and was transferred back.  She was treated for an enterococal UTI on 6/20.  Arsenic was held due to zoster from 6/22- 6/30.  6/23 she was found to have a partially occlusive DVT in her right axillary vein, treated with lovenox.  Repeat duplex was negative on 7/16.  PICC replaced in E, on incidental CXR on 7/8, was found to be malpositioned, 7/9 PICC was exchanged, then removed on 7/10 due to irritation?.  Bone marrow biopsy on 6/25 with persistent disease, repeated on 7/13 no evidence of leukemia.  Her plt recovered 6/27, ANC by 7/11. [de-identified] :  ID # [id number]\par Patient seen in treatment room for complaint of pruritus. She states she has had itchiness around the port site that started  2-3 days after it was placed. Notes she has not showered since the port was placed and still has the dermabond on. Patient also complains of 3/10 R neck pain that is not as bothersome today, did not take any OTC medications. Patient denies chest pain, shortness of breath, throat discomfort, cough, headache, fever, abdominal pain, nausea, vomiting, or diarrhea.\par

## 2020-08-21 NOTE — ASSESSMENT
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  She was started on ATRA empirically on 5/23, then on arsenic on 5/27 when the diagnosis was confirmed. 6/23/20 she was found to have a partially occlusive DVT in her right axillary vein, treated with lovenox.  Repeat duplex was negative on 7/16.  PICC removed on 7/10 due to irritation?.  Bone marrow biopsy on 6/25 with persistent disease, repeated on 7/13 no evidence of leukemia. \par \par Vital signs stable per sunrise. Afebrile.\par Zytrec 10mg x 1 for pruritus\par Clean dermabond off with soap and water before accessing port today.\par Okay to get treatment today and access port.\par OTC tylenol prn for neck pain

## 2020-08-22 ENCOUNTER — INPATIENT (INPATIENT)
Facility: HOSPITAL | Age: 60
LOS: 4 days | Discharge: HOME | End: 2020-08-27
Attending: HOSPITALIST | Admitting: HOSPITALIST
Payer: MEDICAID

## 2020-08-22 VITALS
TEMPERATURE: 99 F | HEART RATE: 76 BPM | RESPIRATION RATE: 18 BRPM | SYSTOLIC BLOOD PRESSURE: 124 MMHG | DIASTOLIC BLOOD PRESSURE: 63 MMHG | OXYGEN SATURATION: 100 %

## 2020-08-22 DIAGNOSIS — I82.409 ACUTE EMBOLISM AND THROMBOSIS OF UNSPECIFIED DEEP VEINS OF UNSPECIFIED LOWER EXTREMITY: ICD-10-CM

## 2020-08-22 DIAGNOSIS — I10 ESSENTIAL (PRIMARY) HYPERTENSION: ICD-10-CM

## 2020-08-22 DIAGNOSIS — Z00.00 ENCOUNTER FOR GENERAL ADULT MEDICAL EXAMINATION WITHOUT ABNORMAL FINDINGS: ICD-10-CM

## 2020-08-22 DIAGNOSIS — T82.7XXA INFECTION AND INFLAMMATORY REACTION DUE TO OTHER CARDIAC AND VASCULAR DEVICES, IMPLANTS AND GRAFTS, INITIAL ENCOUNTER: ICD-10-CM

## 2020-08-22 DIAGNOSIS — R07.9 CHEST PAIN, UNSPECIFIED: ICD-10-CM

## 2020-08-22 DIAGNOSIS — E11.9 TYPE 2 DIABETES MELLITUS WITHOUT COMPLICATIONS: ICD-10-CM

## 2020-08-22 DIAGNOSIS — C92.40 ACUTE PROMYELOCYTIC LEUKEMIA, NOT HAVING ACHIEVED REMISSION: ICD-10-CM

## 2020-08-22 DIAGNOSIS — Z02.9 ENCOUNTER FOR ADMINISTRATIVE EXAMINATIONS, UNSPECIFIED: ICD-10-CM

## 2020-08-22 DIAGNOSIS — Z29.9 ENCOUNTER FOR PROPHYLACTIC MEASURES, UNSPECIFIED: ICD-10-CM

## 2020-08-22 DIAGNOSIS — R10.31 RIGHT LOWER QUADRANT PAIN: ICD-10-CM

## 2020-08-22 LAB
ALBUMIN SERPL ELPH-MCNC: 4.8 G/DL — SIGNIFICANT CHANGE UP (ref 3.3–5)
ALP SERPL-CCNC: 85 U/L — SIGNIFICANT CHANGE UP (ref 40–120)
ALT FLD-CCNC: 13 U/L — SIGNIFICANT CHANGE UP (ref 4–33)
ANION GAP SERPL CALC-SCNC: 14 MMO/L — SIGNIFICANT CHANGE UP (ref 7–14)
APPEARANCE UR: CLEAR — SIGNIFICANT CHANGE UP
APTT BLD: 36.8 SEC — HIGH (ref 27–36.3)
AST SERPL-CCNC: 15 U/L — SIGNIFICANT CHANGE UP (ref 4–32)
BASE EXCESS BLDV CALC-SCNC: -0.4 MMOL/L — SIGNIFICANT CHANGE UP
BASE EXCESS BLDV CALC-SCNC: 0.9 MMOL/L — SIGNIFICANT CHANGE UP
BASOPHILS # BLD AUTO: 0.04 K/UL — SIGNIFICANT CHANGE UP (ref 0–0.2)
BASOPHILS NFR BLD AUTO: 0.3 % — SIGNIFICANT CHANGE UP (ref 0–2)
BILIRUB SERPL-MCNC: 0.3 MG/DL — SIGNIFICANT CHANGE UP (ref 0.2–1.2)
BILIRUB UR-MCNC: NEGATIVE — SIGNIFICANT CHANGE UP
BLD GP AB SCN SERPL QL: NEGATIVE — SIGNIFICANT CHANGE UP
BLOOD GAS VENOUS - CREATININE: 0.9 MG/DL — SIGNIFICANT CHANGE UP (ref 0.5–1.3)
BLOOD GAS VENOUS - CREATININE: 0.93 MG/DL — SIGNIFICANT CHANGE UP (ref 0.5–1.3)
BLOOD GAS VENOUS - FIO2: 21 — SIGNIFICANT CHANGE UP
BLOOD UR QL VISUAL: NEGATIVE — SIGNIFICANT CHANGE UP
BUN SERPL-MCNC: 18 MG/DL — SIGNIFICANT CHANGE UP (ref 7–23)
CALCIUM SERPL-MCNC: 9.9 MG/DL — SIGNIFICANT CHANGE UP (ref 8.4–10.5)
CHLORIDE BLDV-SCNC: 106 MMOL/L — SIGNIFICANT CHANGE UP (ref 96–108)
CHLORIDE BLDV-SCNC: 107 MMOL/L — SIGNIFICANT CHANGE UP (ref 96–108)
CHLORIDE SERPL-SCNC: 103 MMOL/L — SIGNIFICANT CHANGE UP (ref 98–107)
CO2 SERPL-SCNC: 22 MMOL/L — SIGNIFICANT CHANGE UP (ref 22–31)
COLOR SPEC: COLORLESS — SIGNIFICANT CHANGE UP
CREAT SERPL-MCNC: 0.84 MG/DL — SIGNIFICANT CHANGE UP (ref 0.5–1.3)
EOSINOPHIL # BLD AUTO: 0.14 K/UL — SIGNIFICANT CHANGE UP (ref 0–0.5)
EOSINOPHIL NFR BLD AUTO: 1.2 % — SIGNIFICANT CHANGE UP (ref 0–6)
GAS PNL BLDV: 136 MMOL/L — SIGNIFICANT CHANGE UP (ref 136–146)
GAS PNL BLDV: 140 MMOL/L — SIGNIFICANT CHANGE UP (ref 136–146)
GLUCOSE BLDV-MCNC: 131 MG/DL — HIGH (ref 70–99)
GLUCOSE BLDV-MCNC: 157 MG/DL — HIGH (ref 70–99)
GLUCOSE SERPL-MCNC: 132 MG/DL — HIGH (ref 70–99)
GLUCOSE UR-MCNC: NEGATIVE — SIGNIFICANT CHANGE UP
HCO3 BLDV-SCNC: 22 MMOL/L — SIGNIFICANT CHANGE UP (ref 20–27)
HCO3 BLDV-SCNC: 23 MMOL/L — SIGNIFICANT CHANGE UP (ref 20–27)
HCT VFR BLD CALC: 32.6 % — LOW (ref 34.5–45)
HCT VFR BLDV CALC: 32.5 % — LOW (ref 34.5–45)
HCT VFR BLDV CALC: 32.9 % — LOW (ref 34.5–45)
HGB BLD-MCNC: 10.1 G/DL — LOW (ref 11.5–15.5)
HGB BLDV-MCNC: 10.5 G/DL — LOW (ref 11.5–15.5)
HGB BLDV-MCNC: 10.6 G/DL — LOW (ref 11.5–15.5)
IMM GRANULOCYTES NFR BLD AUTO: 0.3 % — SIGNIFICANT CHANGE UP (ref 0–1.5)
INR BLD: 1.44 — HIGH (ref 0.88–1.16)
KETONES UR-MCNC: NEGATIVE — SIGNIFICANT CHANGE UP
LACTATE BLDV-MCNC: 1.8 MMOL/L — SIGNIFICANT CHANGE UP (ref 0.5–2)
LACTATE BLDV-MCNC: 2 MMOL/L — SIGNIFICANT CHANGE UP (ref 0.5–2)
LEUKOCYTE ESTERASE UR-ACNC: NEGATIVE — SIGNIFICANT CHANGE UP
LYMPHOCYTES # BLD AUTO: 1.85 K/UL — SIGNIFICANT CHANGE UP (ref 1–3.3)
LYMPHOCYTES # BLD AUTO: 15.7 % — SIGNIFICANT CHANGE UP (ref 13–44)
MCHC RBC-ENTMCNC: 28.7 PG — SIGNIFICANT CHANGE UP (ref 27–34)
MCHC RBC-ENTMCNC: 31 % — LOW (ref 32–36)
MCV RBC AUTO: 92.6 FL — SIGNIFICANT CHANGE UP (ref 80–100)
MONOCYTES # BLD AUTO: 0.5 K/UL — SIGNIFICANT CHANGE UP (ref 0–0.9)
MONOCYTES NFR BLD AUTO: 4.2 % — SIGNIFICANT CHANGE UP (ref 2–14)
NEUTROPHILS # BLD AUTO: 9.24 K/UL — HIGH (ref 1.8–7.4)
NEUTROPHILS NFR BLD AUTO: 78.3 % — HIGH (ref 43–77)
NITRITE UR-MCNC: NEGATIVE — SIGNIFICANT CHANGE UP
NRBC # FLD: 0 K/UL — SIGNIFICANT CHANGE UP (ref 0–0)
PCO2 BLDV: 52 MMHG — HIGH (ref 41–51)
PCO2 BLDV: 55 MMHG — HIGH (ref 41–51)
PH BLDV: 7.29 PH — LOW (ref 7.32–7.43)
PH BLDV: 7.32 PH — SIGNIFICANT CHANGE UP (ref 7.32–7.43)
PH UR: 6.5 — SIGNIFICANT CHANGE UP (ref 5–8)
PLATELET # BLD AUTO: 236 K/UL — SIGNIFICANT CHANGE UP (ref 150–400)
PMV BLD: 11.7 FL — SIGNIFICANT CHANGE UP (ref 7–13)
PO2 BLDV: < 24 MMHG — LOW (ref 35–40)
PO2 BLDV: < 24 MMHG — LOW (ref 35–40)
POTASSIUM BLDV-SCNC: 3.9 MMOL/L — SIGNIFICANT CHANGE UP (ref 3.4–4.5)
POTASSIUM BLDV-SCNC: 4 MMOL/L — SIGNIFICANT CHANGE UP (ref 3.4–4.5)
POTASSIUM SERPL-MCNC: 4.2 MMOL/L — SIGNIFICANT CHANGE UP (ref 3.5–5.3)
POTASSIUM SERPL-SCNC: 4.2 MMOL/L — SIGNIFICANT CHANGE UP (ref 3.5–5.3)
PROT SERPL-MCNC: 7.5 G/DL — SIGNIFICANT CHANGE UP (ref 6–8.3)
PROT UR-MCNC: 10 — SIGNIFICANT CHANGE UP
PROTHROM AB SERPL-ACNC: 16.3 SEC — HIGH (ref 10.6–13.6)
RBC # BLD: 3.52 M/UL — LOW (ref 3.8–5.2)
RBC # FLD: 15.5 % — HIGH (ref 10.3–14.5)
RH IG SCN BLD-IMP: POSITIVE — SIGNIFICANT CHANGE UP
SAO2 % BLDV: 17.6 % — LOW (ref 60–85)
SAO2 % BLDV: 19.4 % — LOW (ref 60–85)
SARS-COV-2 RNA SPEC QL NAA+PROBE: SIGNIFICANT CHANGE UP
SODIUM SERPL-SCNC: 139 MMOL/L — SIGNIFICANT CHANGE UP (ref 135–145)
SP GR SPEC: 1.01 — SIGNIFICANT CHANGE UP (ref 1–1.04)
TROPONIN T, HIGH SENSITIVITY: 10 NG/L — SIGNIFICANT CHANGE UP (ref ?–14)
TROPONIN T, HIGH SENSITIVITY: 11 NG/L — SIGNIFICANT CHANGE UP (ref ?–14)
UROBILINOGEN FLD QL: NORMAL — SIGNIFICANT CHANGE UP
WBC # BLD: 11.8 K/UL — HIGH (ref 3.8–10.5)
WBC # FLD AUTO: 11.8 K/UL — HIGH (ref 3.8–10.5)

## 2020-08-22 PROCEDURE — 93010 ELECTROCARDIOGRAM REPORT: CPT

## 2020-08-22 PROCEDURE — 71275 CT ANGIOGRAPHY CHEST: CPT | Mod: 26

## 2020-08-22 PROCEDURE — 99223 1ST HOSP IP/OBS HIGH 75: CPT | Mod: GC

## 2020-08-22 PROCEDURE — 99223 1ST HOSP IP/OBS HIGH 75: CPT | Mod: AI,GC

## 2020-08-22 PROCEDURE — 99285 EMERGENCY DEPT VISIT HI MDM: CPT

## 2020-08-22 PROCEDURE — 71046 X-RAY EXAM CHEST 2 VIEWS: CPT | Mod: 26

## 2020-08-22 RX ORDER — ACETAMINOPHEN 500 MG
650 TABLET ORAL EVERY 6 HOURS
Refills: 0 | Status: DISCONTINUED | OUTPATIENT
Start: 2020-08-22 | End: 2020-08-27

## 2020-08-22 RX ORDER — ASPIRIN/CALCIUM CARB/MAGNESIUM 324 MG
324 TABLET ORAL ONCE
Refills: 0 | Status: COMPLETED | OUTPATIENT
Start: 2020-08-22 | End: 2020-08-22

## 2020-08-22 RX ORDER — PIPERACILLIN AND TAZOBACTAM 4; .5 G/20ML; G/20ML
3.38 INJECTION, POWDER, LYOPHILIZED, FOR SOLUTION INTRAVENOUS EVERY 8 HOURS
Refills: 0 | Status: DISCONTINUED | OUTPATIENT
Start: 2020-08-22 | End: 2020-08-23

## 2020-08-22 RX ORDER — DEXTROSE 50 % IN WATER 50 %
15 SYRINGE (ML) INTRAVENOUS ONCE
Refills: 0 | Status: DISCONTINUED | OUTPATIENT
Start: 2020-08-22 | End: 2020-08-27

## 2020-08-22 RX ORDER — OXYCODONE HYDROCHLORIDE 5 MG/1
5 TABLET ORAL EVERY 6 HOURS
Refills: 0 | Status: DISCONTINUED | OUTPATIENT
Start: 2020-08-22 | End: 2020-08-27

## 2020-08-22 RX ORDER — APIXABAN 2.5 MG/1
5 TABLET, FILM COATED ORAL EVERY 12 HOURS
Refills: 0 | Status: DISCONTINUED | OUTPATIENT
Start: 2020-08-22 | End: 2020-08-27

## 2020-08-22 RX ORDER — INSULIN LISPRO 100/ML
VIAL (ML) SUBCUTANEOUS AT BEDTIME
Refills: 0 | Status: DISCONTINUED | OUTPATIENT
Start: 2020-08-22 | End: 2020-08-27

## 2020-08-22 RX ORDER — DEXTROSE 50 % IN WATER 50 %
25 SYRINGE (ML) INTRAVENOUS ONCE
Refills: 0 | Status: DISCONTINUED | OUTPATIENT
Start: 2020-08-22 | End: 2020-08-27

## 2020-08-22 RX ORDER — DEXTROSE 50 % IN WATER 50 %
12.5 SYRINGE (ML) INTRAVENOUS ONCE
Refills: 0 | Status: DISCONTINUED | OUTPATIENT
Start: 2020-08-22 | End: 2020-08-27

## 2020-08-22 RX ORDER — ATORVASTATIN CALCIUM 80 MG/1
20 TABLET, FILM COATED ORAL AT BEDTIME
Refills: 0 | Status: DISCONTINUED | OUTPATIENT
Start: 2020-08-22 | End: 2020-08-27

## 2020-08-22 RX ORDER — PIPERACILLIN AND TAZOBACTAM 4; .5 G/20ML; G/20ML
3.38 INJECTION, POWDER, LYOPHILIZED, FOR SOLUTION INTRAVENOUS ONCE
Refills: 0 | Status: COMPLETED | OUTPATIENT
Start: 2020-08-22 | End: 2020-08-22

## 2020-08-22 RX ORDER — INSULIN LISPRO 100/ML
VIAL (ML) SUBCUTANEOUS
Refills: 0 | Status: DISCONTINUED | OUTPATIENT
Start: 2020-08-22 | End: 2020-08-27

## 2020-08-22 RX ORDER — ACETAMINOPHEN 500 MG
325 TABLET ORAL ONCE
Refills: 0 | Status: COMPLETED | OUTPATIENT
Start: 2020-08-22 | End: 2020-08-22

## 2020-08-22 RX ORDER — ACETAMINOPHEN 500 MG
650 TABLET ORAL EVERY 6 HOURS
Refills: 0 | Status: DISCONTINUED | OUTPATIENT
Start: 2020-08-22 | End: 2020-08-22

## 2020-08-22 RX ORDER — VANCOMYCIN HCL 1 G
1000 VIAL (EA) INTRAVENOUS EVERY 12 HOURS
Refills: 0 | Status: DISCONTINUED | OUTPATIENT
Start: 2020-08-23 | End: 2020-08-24

## 2020-08-22 RX ORDER — ACETAMINOPHEN 500 MG
650 TABLET ORAL ONCE
Refills: 0 | Status: COMPLETED | OUTPATIENT
Start: 2020-08-22 | End: 2020-08-22

## 2020-08-22 RX ORDER — MORPHINE SULFATE 50 MG/1
4 CAPSULE, EXTENDED RELEASE ORAL ONCE
Refills: 0 | Status: DISCONTINUED | OUTPATIENT
Start: 2020-08-22 | End: 2020-08-22

## 2020-08-22 RX ORDER — SODIUM CHLORIDE 9 MG/ML
1000 INJECTION INTRAMUSCULAR; INTRAVENOUS; SUBCUTANEOUS ONCE
Refills: 0 | Status: COMPLETED | OUTPATIENT
Start: 2020-08-22 | End: 2020-08-22

## 2020-08-22 RX ORDER — ACETAMINOPHEN 500 MG
650 TABLET ORAL ONCE
Refills: 0 | Status: DISCONTINUED | OUTPATIENT
Start: 2020-08-22 | End: 2020-08-22

## 2020-08-22 RX ORDER — VANCOMYCIN HCL 1 G
1000 VIAL (EA) INTRAVENOUS ONCE
Refills: 0 | Status: COMPLETED | OUTPATIENT
Start: 2020-08-22 | End: 2020-08-22

## 2020-08-22 RX ORDER — ACETAMINOPHEN 500 MG
975 TABLET ORAL ONCE
Refills: 0 | Status: DISCONTINUED | OUTPATIENT
Start: 2020-08-22 | End: 2020-08-22

## 2020-08-22 RX ORDER — SODIUM CHLORIDE 9 MG/ML
1000 INJECTION, SOLUTION INTRAVENOUS
Refills: 0 | Status: DISCONTINUED | OUTPATIENT
Start: 2020-08-22 | End: 2020-08-27

## 2020-08-22 RX ORDER — GLUCAGON INJECTION, SOLUTION 0.5 MG/.1ML
1 INJECTION, SOLUTION SUBCUTANEOUS ONCE
Refills: 0 | Status: DISCONTINUED | OUTPATIENT
Start: 2020-08-22 | End: 2020-08-27

## 2020-08-22 RX ADMIN — MORPHINE SULFATE 4 MILLIGRAM(S): 50 CAPSULE, EXTENDED RELEASE ORAL at 16:36

## 2020-08-22 RX ADMIN — ATORVASTATIN CALCIUM 20 MILLIGRAM(S): 80 TABLET, FILM COATED ORAL at 22:55

## 2020-08-22 RX ADMIN — MORPHINE SULFATE 4 MILLIGRAM(S): 50 CAPSULE, EXTENDED RELEASE ORAL at 16:18

## 2020-08-22 RX ADMIN — Medication 650 MILLIGRAM(S): at 14:09

## 2020-08-22 RX ADMIN — Medication 650 MILLIGRAM(S): at 13:07

## 2020-08-22 RX ADMIN — OXYCODONE HYDROCHLORIDE 5 MILLIGRAM(S): 5 TABLET ORAL at 21:44

## 2020-08-22 RX ADMIN — Medication 325 MILLIGRAM(S): at 14:38

## 2020-08-22 RX ADMIN — APIXABAN 5 MILLIGRAM(S): 2.5 TABLET, FILM COATED ORAL at 20:53

## 2020-08-22 RX ADMIN — SODIUM CHLORIDE 1000 MILLILITER(S): 9 INJECTION INTRAMUSCULAR; INTRAVENOUS; SUBCUTANEOUS at 12:26

## 2020-08-22 RX ADMIN — Medication 250 MILLIGRAM(S): at 13:22

## 2020-08-22 RX ADMIN — PIPERACILLIN AND TAZOBACTAM 25 GRAM(S): 4; .5 INJECTION, POWDER, LYOPHILIZED, FOR SOLUTION INTRAVENOUS at 20:53

## 2020-08-22 RX ADMIN — PIPERACILLIN AND TAZOBACTAM 200 GRAM(S): 4; .5 INJECTION, POWDER, LYOPHILIZED, FOR SOLUTION INTRAVENOUS at 12:26

## 2020-08-22 RX ADMIN — OXYCODONE HYDROCHLORIDE 5 MILLIGRAM(S): 5 TABLET ORAL at 21:14

## 2020-08-22 RX ADMIN — Medication 324 MILLIGRAM(S): at 13:54

## 2020-08-22 NOTE — ED PROVIDER NOTE - NS ED ROS FT
GENERAL: chills, //             EYES: no change in vision, //             HEENT: no trouble swallowing or speaking, //             CARDIAC:  chest pain, //              PULMONARY: no cough or SOB, //             GI: no abdominal pain, no nausea or no vomiting, no diarrhea or constipation, //             : No changes in urination,  //            SKIN: no rashes,  //            NEURO: no headache,  //             MSK: No joint pain otherwise as HPI or negative. ~Zach Méndez DO PGY3

## 2020-08-22 NOTE — H&P ADULT - PROBLEM SELECTOR PLAN 7
Transitions of Care Status:  1.  Name of PCP: Dr. Oriana Montanez ([phone])  2.  PCP Contacted on Admission: [ ] Y    [ ] N    3.  PCP contacted at Discharge: [ ] Y    [ ] N    [ ] N/A  4.  Post-Discharge Appointment Date and Location:  5.  Summary of Handoff given to PCP: Diet: Consistent carbohydrate  DVT prophylaxis: Eliquis 5mg  Dispo:

## 2020-08-22 NOTE — H&P ADULT - NSHPPHYSICALEXAM_GEN_ALL_CORE
Vital Signs Last 24 Hrs  T(C): 36.7 (22 Aug 2020 17:41), Max: 38.2 (22 Aug 2020 12:26)  T(F): 98.1 (22 Aug 2020 17:41), Max: 100.8 (22 Aug 2020 12:26)  HR: 85 (22 Aug 2020 17:41) (72 - 92)  BP: 119/52 (22 Aug 2020 17:41) (98/68 - 135/61)  RR: 18 (22 Aug 2020 17:41) (18 - 26)  SpO2: 100% (22 Aug 2020 17:41) (99% - 100%)    GENERAL: Appears in mild pain  NEURO: A&O x4  CV: RRR. +S1/S2. No m/r/g  PULM: CTAB  GI:  Soft abdomen, except for 1x1cm nodule 3cm Left of umbilicus. Normoactive bowel sounds  : +Suprapubic pain. No inguinal lymphadenopathy, erythema, or eschars  SKIN: Erythema and warmth over R mediport.  PSYCH: Euthymic and cooperative

## 2020-08-22 NOTE — PATIENT PROFILE ADULT - BRADEN SENSORY
Has had a problem with diarrhea for a long time and wants to get his diarrhea under  control so he can travel. Sometimes he is incontinent of stool. If he takes 2 anti-diarrhea pills before he flys, he doesn't have a BM for 30 hours. He's been taking 2 metamucil capsules every night for a month or so. For the past 10 days, he's also been taking 1 tablespoon of metamucil with fluids once a day and his stools are a little firmer. He would like a referral to GI and asking who Dr Diaz would recommend?   (4) no impairment

## 2020-08-22 NOTE — H&P ADULT - NSHPLANGTRANSLATORFT_GEN_A_CORE
Patient prefers sons to translate: Ivonne ([phone]) and Shamim ([phone]) Patient prefers sons to translate: Saif ([phone]) and Shamim ([phone])

## 2020-08-22 NOTE — ED PROVIDER NOTE - CLINICAL SUMMARY MEDICAL DECISION MAKING FREE TEXT BOX
paul pgy3: 59 yo f hx lymphoma on chemo pw fever. pt arrives w/ rigors, febrile rectally. hypotensive in field but normotensive in ED. otherwise hds. possible port site infxn. will check labs, cxr, blood cx. low threshold for abx as pt is immunosuppressed. reassess.

## 2020-08-22 NOTE — CONSULT NOTE ADULT - SUBJECTIVE AND OBJECTIVE BOX
Hematology Consult Note    HPI:      Allergies    No Known Allergies    Intolerances        MEDICATIONS  (STANDING):  acetaminophen   Tablet .. 650 milliGRAM(s) Oral once  vancomycin  IVPB. 1000 milliGRAM(s) IV Intermittent once    MEDICATIONS  (PRN):      PAST MEDICAL & SURGICAL HISTORY:  HLD (hyperlipidemia)  Hypertension  Diabetes  No significant past surgical history      FAMILY HISTORY:  Family history of diabetes mellitus: Mother      SOCIAL HISTORY: No EtOH, no tobacco    REVIEW OF SYSTEMS:    CONSTITUTIONAL: No weakness, fevers or chills  EYES/ENT: No visual changes;  No vertigo or throat pain   NECK: No pain or stiffness  RESPIRATORY: No cough, wheezing, hemoptysis; No shortness of breath  CARDIOVASCULAR: No chest pain or palpitations  GASTROINTESTINAL: No abdominal or epigastric pain. No nausea, vomiting, or hematemesis; No diarrhea or constipation. No melena or hematochezia.  GENITOURINARY: No dysuria, frequency or hematuria  NEUROLOGICAL: No numbness or weakness  SKIN: No itching, burning, rashes, or lesions   All other review of systems is negative unless indicated above.        T(F): 100.8 (08-22-20 @ 12:26), Max: 100.8 (08-22-20 @ 12:26)  HR: 73 (08-22-20 @ 12:26)  BP: 129/61 (08-22-20 @ 12:26)  RR: 21 (08-22-20 @ 12:26)  SpO2: 100% (08-22-20 @ 12:26)  Wt(kg): --    Physical Exam  GENERAL: NAD, well-developed  HEAD:  Atraumatic, Normocephalic  EYES: EOMI, PERRLA, conjunctiva and sclera clear  NECK: Supple, No JVD  CHEST/LUNG: Clear to auscultation bilaterally; No wheeze  HEART: Regular rate and rhythm; No murmurs, rubs, or gallops  ABDOMEN: Soft, Nontender, Nondistended; Bowel sounds present  EXTREMITIES:  2+ Peripheral Pulses, No clubbing, cyanosis, or edema  NEUROLOGY: non-focal  SKIN: No rashes or lesions                          10.1   11.80 )-----------( 236      ( 22 Aug 2020 11:55 )             32.6 Hematology Consult Note    HPI: 59yo Amharic speaking female with h/o APL, currently on ATRA & Arsenic consolidation, right axillary DVT currently on Eliquis presented to ED due to SOB with chest pain after having a suspected GI bleeding earlier. Patient was hypotensive at 70's and was given IV fluid in field. She is febrile in ED and noted to have rigors.       Allergies    No Known Allergies    Intolerances        MEDICATIONS  (STANDING):  acetaminophen   Tablet .. 650 milliGRAM(s) Oral once  vancomycin  IVPB. 1000 milliGRAM(s) IV Intermittent once    MEDICATIONS  (PRN):      PAST MEDICAL & SURGICAL HISTORY:  HLD (hyperlipidemia)  Hypertension  Diabetes  No significant past surgical history      FAMILY HISTORY:  Family history of diabetes mellitus: Mother      SOCIAL HISTORY: No EtOH, no tobacco    REVIEW OF SYSTEMS:    CONSTITUTIONAL: No weakness, fevers or chills  EYES/ENT: No visual changes;  No vertigo or throat pain   NECK: No pain or stiffness  RESPIRATORY: No cough, wheezing, hemoptysis; No shortness of breath  CARDIOVASCULAR: No chest pain or palpitations  GASTROINTESTINAL: No abdominal or epigastric pain. No nausea, vomiting, or hematemesis; No diarrhea or constipation. No melena or hematochezia.  GENITOURINARY: No dysuria, frequency or hematuria  NEUROLOGICAL: No numbness or weakness  SKIN: No itching, burning, rashes, or lesions   All other review of systems is negative unless indicated above.        T(F): 100.8 (08-22-20 @ 12:26), Max: 100.8 (08-22-20 @ 12:26)  HR: 73 (08-22-20 @ 12:26)  BP: 129/61 (08-22-20 @ 12:26)  RR: 21 (08-22-20 @ 12:26)  SpO2: 100% (08-22-20 @ 12:26)  Wt(kg): --    Physical Exam  GENERAL: NAD, well-developed  HEAD:  Atraumatic, Normocephalic  EYES: EOMI, PERRLA, conjunctiva and sclera clear  NECK: Supple, No JVD  CHEST/LUNG: Clear to auscultation bilaterally; No wheeze  HEART: Regular rate and rhythm; No murmurs, rubs, or gallops  ABDOMEN: Soft, Nontender, Nondistended; Bowel sounds present  EXTREMITIES:  2+ Peripheral Pulses, No clubbing, cyanosis, or edema  NEUROLOGY: non-focal  SKIN: No rashes or lesions                          10.1   11.80 )-----------( 236      ( 22 Aug 2020 11:55 )             32.6 Hematology Consult Note    HPI: 59yo Azeri speaking female with h/o APL, currently on ATRA & Arsenic consolidation, right axillary DVT currently on Eliquis presented to ED due to SOB with chest pain. Patient was hypotensive at 70's and was given IV fluid in field. She is febrile in ED and noted to have rigors.     Son helped translating over the phone and giving the history during the patient encounter([phone]).      Allergies    No Known Allergies    Intolerances        MEDICATIONS  (STANDING):  acetaminophen   Tablet .. 650 milliGRAM(s) Oral once  vancomycin  IVPB. 1000 milliGRAM(s) IV Intermittent once    MEDICATIONS  (PRN):      PAST MEDICAL & SURGICAL HISTORY:  HLD (hyperlipidemia)  Hypertension  Diabetes  No significant past surgical history      FAMILY HISTORY:  Family history of diabetes mellitus: Mother      SOCIAL HISTORY: No EtOH, no tobacco    REVIEW OF SYSTEMS:    CONSTITUTIONAL: No weakness, fevers or chills  EYES/ENT: No vertigo or throat pain   NECK: No pain or stiffness  RESPIRATORY: No cough, wheezing, hemoptysis; No shortness of breath  CARDIOVASCULAR: + chest pain, no palpitations  GASTROINTESTINAL: No abdominal or epigastric pain. No nausea, vomiting, or hematemesis; No diarrhea or constipation. No melena or hematochezia.  GENITOURINARY: No dysuria, frequency or hematuria  NEUROLOGICAL: No numbness or weakness  MSK: back pain  SKIN: No itching, burning, rashes, or lesions   All other review of systems is negative unless indicated above.        T(F): 100.8 (08-22-20 @ 12:26), Max: 100.8 (08-22-20 @ 12:26)  HR: 73 (08-22-20 @ 12:26)  BP: 129/61 (08-22-20 @ 12:26)  RR: 21 (08-22-20 @ 12:26)  SpO2: 100% (08-22-20 @ 12:26)  Wt(kg): --    Physical Exam  GENERAL: NAD, well-developed  HEAD:  Atraumatic, Normocephalic  EYES: EOMI, conjunctiva and sclera clear  NECK: Supple, No JVD  CHEST/LUNG: Clear to auscultation bilaterally; No wheeze  HEART: Regular rate and rhythm;S1S2 present  ABDOMEN: Soft, Nontender, Nondistended; Bowel sounds present  EXTREMITIES:  2+ Peripheral Pulses, No clubbing, cyanosis, or edema  NEUROLOGY: non-focal  SKIN: Warmth and mild redness over the right chest mediport area. Tender to touch.                          10.1   11.80 )-----------( 236      ( 22 Aug 2020 11:55 )             32.6 Hematology Consult Note    HPI: 59yo Armenian speaking female with h/o APL, currently on ATRA & Arsenic consolidation, right axillary DVT currently on Eliquis presented to ED due to SOB with chest pain. Patient was hypotensive at 70's and was given IV fluid in field. She is febrile in ED and noted to have rigors.     Son helped translating over the phone and giving the history during the patient encounter([phone]).      Allergies    No Known Allergies    Intolerances        MEDICATIONS  (STANDING):  acetaminophen   Tablet .. 650 milliGRAM(s) Oral once  vancomycin  IVPB. 1000 milliGRAM(s) IV Intermittent once    MEDICATIONS  (PRN):      PAST MEDICAL & SURGICAL HISTORY:  HLD (hyperlipidemia)  Hypertension  Diabetes  No significant past surgical history      FAMILY HISTORY:  Family history of diabetes mellitus: Mother      SOCIAL HISTORY: No EtOH, no tobacco    REVIEW OF SYSTEMS:    CONSTITUTIONAL: No weakness, fevers or chills  EYES/ENT: No vertigo or throat pain   NECK: No pain or stiffness  RESPIRATORY: No cough, wheezing, hemoptysis; No shortness of breath  CARDIOVASCULAR: + chest pain, no palpitations  GASTROINTESTINAL: No abdominal or epigastric pain. No nausea, vomiting, or hematemesis; No diarrhea or constipation. No melena or hematochezia.  GENITOURINARY: No dysuria, frequency or hematuria. + right groin pain  NEUROLOGICAL: No numbness or weakness  MSK: back pain  SKIN: No itching, burning, rashes, or lesions   All other review of systems is negative unless indicated above.        T(F): 100.8 (08-22-20 @ 12:26), Max: 100.8 (08-22-20 @ 12:26)  HR: 73 (08-22-20 @ 12:26)  BP: 129/61 (08-22-20 @ 12:26)  RR: 21 (08-22-20 @ 12:26)  SpO2: 100% (08-22-20 @ 12:26)  Wt(kg): --    Physical Exam  GENERAL: NAD, well-developed  HEAD:  Atraumatic, Normocephalic  EYES: EOMI, conjunctiva and sclera clear  NECK: Supple, No JVD  CHEST/LUNG: Clear to auscultation bilaterally; No wheeze  HEART: Regular rate and rhythm;S1S2 present  ABDOMEN: Soft, Nontender, Nondistended; Bowel sounds present  EXTREMITIES:  2+ Peripheral Pulses, No clubbing, cyanosis, or edema  NEUROLOGY: non-focal  SKIN: Warmth and mild redness over the right chest mediport area. Tender to touch.                          10.1   11.80 )-----------( 236      ( 22 Aug 2020 11:55 )             32.6

## 2020-08-22 NOTE — ED PROVIDER NOTE - PHYSICAL EXAMINATION
Rx faxed.    Cheryl AGUILAR, Patient Care      General: well appearing, interactive, well nourished, no apparent distress, ncat  HEENT: EOMI, PERRLA, normal mucosa, normal oropharynx, no lesions on the lips or on oral mucosa, normal external ear  Neck: supple, no lymphadenopathy, full range of motion, no nuchal rigidity  CV: RRR, normal S1 and S2 with no murmur, capillary refill less than two seconds  Resp: lungs CTA b/l, good aeration bilaterally, symmetric chest wall   Abd: non-distended, soft, non-tender  : no CVA tenderness  MSK: full range of motion, no cyanosis, no edema, no clubbing, no immobility  Neuro: CN II-XII grossly intact, muscle strength 5/5 in all extremities, normal gait  Skin: erythema at sign of port General: well appearing, interactive, well nourished, no apparent distress, ncat  HEENT: EOMI, PERRLA, normal mucosa, normal oropharynx, no lesions on the lips or on oral mucosa, normal external ear, uvula midline, no pharyngeal erythema/exudates  Neck: supple, no lymphadenopathy, full range of motion, no nuchal rigidity  CV: RRR, normal S1 and S2 with no murmur, capillary refill less than two seconds  Resp: lungs CTA b/l, good aeration bilaterally, symmetric chest wall   Abd: non-distended, soft, non-tender  : no CVA tenderness  MSK: full range of motion, no cyanosis, no edema, no clubbing, no immobility  Neuro: CN II-XII grossly intact, muscle strength 5/5 in all extremities, normal gait  Skin: erythema at sign of port

## 2020-08-22 NOTE — ED PROVIDER NOTE - OBJECTIVE STATEMENT
61 yo f pmh htn, hld, dm, lymphoma on weekly arsenic pw cp and fever. ems provides collateral. states pt called for cp and sob. states on arrival pt had lbp, 70s systolic. received 250 cc of 1L bolus en route. pt denies n/v, cough, congestion, rash.

## 2020-08-22 NOTE — H&P ADULT - HISTORY OF PRESENT ILLNESS
Patient is a 60-year-old Female with PMHx of HTN, HLD, T2DM, DVT (dx July 2020, on Eliquis) and APL (dx May 2020; on ATRA and arsenic) presenting with one day of chest pain and fever.  She developed mild substernal/R chest pain around 1am today, went to sleep, and then had acutely worsened pain at 9am, plus subjective fever.  Her son Giovana called EMS for chest pain and shortness of breath, and EMS noted her SBP in the 70s and gave 250cc IVF en route to the [facility] ED.  She reports not feeling pain like this before, and the pain is now reportedly radiating to her back and throat.  She also endorses R groin pain for a couple days.  Denies headache, cough, n/v, diarrhea, dysuria, or rash.  She receives arsenic weekly through her R mediport and last took her ATRA this morning.    In the ED: Tmax 100.8 rectal, HR 72-92, BP /61-68, RR 18-26, SpO2 % on RA.  EKG wnl.  Morphine x1 given for pain.  UA neg.  BCx and UCx sent, and Zosyn and Vanc x1 given empirically.  CTA showed no PE. 1L NS bolus. Patient is a 60-year-old Female with PMHx of HTN, HLD, T2DM, DVT (dx July 2020, on Eliquis) and APL (dx May 2020; on ATRA and arsenic) presenting with one day of chest pain and fever.  She developed mild substernal/R chest pain around 1am today, went to sleep, and then had acutely worsened pain at 9am, plus subjective fever.  Her son Giovana called EMS for chest pain and shortness of breath, and EMS noted her SBP in the 70s and gave 250cc IVF en route to the [facility] ED.  She reports not feeling pain like this before, and the pain is now reportedly radiating to her back and throat.  She also endorses R groin pain for a couple days.  Denies headache, cough, n/v, diarrhea, dysuria, melena, or rash.  She receives arsenic weekly through her R mediport and last took her ATRA this morning.    In the ED: Tmax 100.8 rectal, HR 72-92, BP /61-68, RR 18-26, SpO2 % on RA.  EKG wnl.  Morphine x1 given for pain.  UA neg.  BCx and UCx sent, and Zosyn and Vanc x1 given empirically.  CTA showed no PE. 1L NS bolus.

## 2020-08-22 NOTE — H&P ADULT - NSICDXPASTMEDICALHX_GEN_ALL_CORE_FT
PAST MEDICAL HISTORY:  APL (acute promyelocytic leukemia)     Diabetes     HLD (hyperlipidemia)     Hypertension

## 2020-08-22 NOTE — H&P ADULT - PROBLEM SELECTOR PLAN 8
Transitions of Care Status:  1.  Name of PCP: Dr. Oriana Montanez ([phone])  2.  PCP Contacted on Admission: [ ] Y    [ ] N    3.  PCP contacted at Discharge: [ ] Y    [ ] N    [ ] N/A  4.  Post-Discharge Appointment Date and Location:  5.  Summary of Handoff given to PCP:

## 2020-08-22 NOTE — H&P ADULT - PROBLEM SELECTOR PLAN 6
Diet: DASH and CC  DVT prophylaxis: Eliquis 5mg  Dispo: Takes Atenolol 50mg qd at home  - Hold atenolol

## 2020-08-22 NOTE — H&P ADULT - NSHPLABSRESULTS_GEN_ALL_CORE
LABS:                      10.1   11.80 )-----------( 236      ( 22 Aug 2020 11:55 )             32.6         139  |  103  |  18  ----------------------------<  132<H>  4.2   |  22  |  0.84    Ca    9.9      22 Aug 2020 11:55    TPro  7.5  /  Alb  4.8  /  TBili  0.3  /  DBili  x   /  AST  15  /  ALT  13  /  AlkPhos  85      PT/INR - ( 22 Aug 2020 11:55 )   PT: 16.3 SEC;   INR: 1.44     PTT - ( 22 Aug 2020 11:55 )  PTT:36.8 SEC    Troponin T, High Sensitivity (20 @ 11:55)    Troponin T, High Sensitivity: 10:  Normal: <6 - 14 ng/L  Indeterminate: 15 - 51 ng/L  Elevated: >51 ng/L    Troponin T, High Sensitivity (20 @ 14:01)    Troponin T, High Sensitivity: 11:     Urinalysis Basic - ( 22 Aug 2020 13:18 )    Color: COLORLESS / Appearance: CLEAR / S.011 / pH: 6.5  Gluc: NEGATIVE / Ketone: NEGATIVE  / Bili: NEGATIVE / Urobili: NORMAL   Blood: NEGATIVE / Protein: 10 / Nitrite: NEGATIVE   Leuk Esterase: NEGATIVE / RBC: x / WBC x   Sq Epi: x / Non Sq Epi: x / Bacteria: x      RADIOLOGY & ADDITIONAL IMAGING  < from: Xray Chest 2 Views PA/Lat (20 @ 13:47) >  IMPRESSION:  Stable right chest wall CT compatible power infusion port with tip in proximal SVC region.    Persistent slight right hemidiaphragm elevation.    Redemonstrated obliquely oriented thick linear opacity in left upper lung likely scar related.    Clear remaining visualized lungs. No pleural effusions or pneumothorax.    Stable prominent appearing cardiac and mediastinal silhouettes.    Trachea midline.    Generalized mild osteopenia again noted.    Also correlate with findings on already pending chest CTA.  < end of copied text >    < from: CT Angio Chest w/ IV Cont (20 @ 15:00) >  FINDINGS:  LUNGS AND AIRWAYS: No endobronchial lesions. Unchanged biapical scarring with calcifications since prior examination. Bibasilar linear atelectasis.  PLEURA: No pleural effusion.  MEDIASTINUM AND CESAR: No lymphadenopathy.  VESSELS: No pulmonary embolus.  HEART: Heart size is normal. No pericardial effusion.  CHEST WALL AND LOWER NECK: Right chest wall MediPort with tip in SVC.  Bilateral axillary lymph nodes, unchanged.  VISUALIZED UPPER ABDOMEN: Within normal limits.  BONES: Degenerative changes of the spine.    IMPRESSION:  No pulmonary embolus.    < end of copied text >

## 2020-08-22 NOTE — H&P ADULT - NSHPREVIEWOFSYSTEMS_GEN_ALL_CORE
Gen: +Fever  Eyes: No eye irritation or discharge  ENT: +Sore throat. No ear pain or congestion  Resp: +Shortness of breath  Cardiovascular: +Chest pain. No palpitations  Gastroenteric: No nausea/vomiting. No diarrhea or constipation  :  +R groin pain. No dysuria  MS: No joint or muscle pain  Skin: No rashes  Neuro: No headache; no abnormal movements

## 2020-08-22 NOTE — H&P ADULT - PROBLEM SELECTOR PLAN 2
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 Follows Dr. Hurd at [facility]  - Last marrow Bx, on 7/13/20, showed no leukemia  - Last took ATRA this AM. Immunosuppressed 2/2 chemo  - Heme/Onc consult; appreciate recs  - Per Onc, do not continue ATRA or arsenic while septic  - Do not access [facility]

## 2020-08-22 NOTE — ED PROVIDER NOTE - PROGRESS NOTE DETAILS
case dw rads who do not see acute signs of cellulitis near [facility] however it is partially imaged. will defer using it for now until further eval. results dw pt/family, questions answered. onc recs appreicated. endorsed to hospitalist. trop 10 and 11, ekg non ischemic, discussion made w/ hospitalist to defer tele at this time as pt has also had no events on cardiac monitoring.   - Zach Méndez D.O. PGY3

## 2020-08-22 NOTE — ED PROVIDER NOTE - ATTENDING CONTRIBUTION TO CARE
60 year old female presents with fever and chest pain. concern for possible port infection as source of sepsis.  will check labs lactate give abx fluids ct chest admit.  also wconern for pe given chest pain and hx of ue dvt

## 2020-08-22 NOTE — H&P ADULT - ASSESSMENT
60-year-old Female with PMHx of HTN, HLD, T2DM, DVT (dx July 2020, on Eliquis) and APL (dx May 2020; on ATRA and arsenic) presenting with one day of chest pain and fever, with redness over R chest mediport, c/f infection of mediport.

## 2020-08-22 NOTE — H&P ADULT - PROBLEM SELECTOR PLAN 3
Patient had R axillary DVT In July 2020. On Eliquis 5mg. Presented tachypneic, dyspneic, and febrile.  - C/w Eliquis 5mg  - CTA showed no PE.

## 2020-08-22 NOTE — ED ADULT TRIAGE NOTE - CHIEF COMPLAINT QUOTE
pt brought from home by EMS c/o CP since this morning, Hx leukemia, takes oral chemo daily, as per son received chemo viz mediport (T-arsenic) followed by chest pain, on presentation c/o diff breathing and sore throat, on presentation completing sentences, charge aware, presents w/ 20g L AC by EMS  Son [phone] pt brought from home by EMS c/o CP since this morning, Hx leukemia, T2DM, takes oral chemo daily, as per son received chemo viz mediport (T-arsenic) followed by chest pain, on presentation c/o diff breathing and sore throat, on presentation completing sentences, charge aware, presents w/ 20g L AC by EMS  Son [phone]

## 2020-08-22 NOTE — ED ADULT NURSE NOTE - CHIEF COMPLAINT QUOTE
pt brought from home by EMS c/o CP since this morning, Hx leukemia, T2DM, takes oral chemo daily, as per son received chemo viz mediport (T-arsenic) followed by chest pain, on presentation c/o diff breathing and sore throat, on presentation completing sentences, charge aware, presents w/ 20g L AC by EMS  Son [phone]

## 2020-08-22 NOTE — H&P ADULT - NSHPSOURCEINFOTX_GEN_ALL_CORE
Sons: Ivonne ([phone]) and Giovana ([phone]) Sons: Raman ([phone]) and Giovana ([phone])

## 2020-08-22 NOTE — CONSULT NOTE ADULT - ASSESSMENT
59yo Czech speaking female with h/o APL, currently on ATRA & Arsenic consolidation, right axillary DVT currently on Eliquis presented to ED due to SOB with chest pain after having a suspected GI bleeding earlier. Patient was hypotensive at 70's and was given IV fluid in field. She is febrile in ED and noted to have rigors.       # Fevers and rigors  - Febrile to 100.8F, immunosuppressed on active chemo w/  ATRA & Arsenic for APL.  - Infectious w/u; please send blood culture, UA, urine culture, CXR, RVP.  - Hold ATRA & Arsenic given fever and suspected infection  - Empiric antibiotics initiated in ED with vancomycin and Zosyn.  - IV hydration, antipyretics, supportive care  - Given SOB and chest pain on a patient w/ active malignancy, would r/o PE although she is on Eliquis.    # APL  - Dx in May 2020 when she presented with pancytopenia  - Last bone marrow biopsy on 7/13 showed no evidence of leukemia  - Currently on ATRA & Arsenic consolidation, plan for total duration of approx 8 Months.  - Hold ATRA & Arsenic given fever and suspected infection  - Monitor CBC with diff daily.    # 61yo Portuguese speaking female with h/o APL, currently on ATRA & Arsenic consolidation, right axillary DVT currently on Eliquis presented to ED due to SOB with chest pain. Patient was hypotensive at 70's and was given IV fluid in field. She is febrile in ED and noted to have some rigors.     # Fevers and rigors  - Febrile to 100.8F, immunosuppressed on active chemo w/  ATRA & Arsenic for APL.  - Infectious w/u; please send blood culture, UA, urine culture, CXR, RVP.  - Please do not access the mediport, the area looks red and warm and pt has tenderness. Suspecting infection.  - Hold ATRA & Arsenic given fever and suspected infection  - Empiric antibiotics initiated in ED with vancomycin and Zosyn.  - IV hydration, antipyretics, supportive care  - Given SOB and chest pain on a patient w/ h/o VTE, would r/o PE although she is on Eliquis.    # APL  - Dx in May 2020 when she presented with pancytopenia  - Last bone marrow biopsy on 7/13/20 showed no evidence of leukemia  - Currently on ATRA & Arsenic consolidation, plan for total duration of approx 8 Months.  - Hold ATRA & Arsenic given fever and suspected infection  - Monitor CBC with diff daily.  - Follows Dr. Hurd at [facility]    # h/o Right axillary DVT  - If CTA chest shows propagation of the thrombus/new VTE/worsening of the thrombus, switch to heparin drip   - Continue full dose AC with Eliquis otherwise.  - Of note, patient has stable H/H and denies having blood in the stool so will continue AC for now.    # Right groin pain  - Check US doppler given h/o VTE        Rosario Pierre MD  PGY 5, Oncology/Hematology fellow  (P) [phone]  After 5pm, please contact on-call team.

## 2020-08-22 NOTE — ED ADULT NURSE NOTE - OBJECTIVE STATEMENT
61 yo f pmh htn, hld, dm, lymphoma on weekly arsenic , present with c/o non radiating chest pain that began this morning. upon assessment pt tender to midepigastric region, febrile. as per EMS, pt hypotensive in field. as per EMS, pt family states pt received chemo this morning via right sided chest mediport. port site is red in appearance, warm to touch. pt noted to have scab to right sided buttocks. pt arrives with 20g IV to left ac. right arm precautions in place, as pt has known right UE DVT. labs sent as ordered. NS infusing well.

## 2020-08-22 NOTE — H&P ADULT - ATTENDING COMMENTS
60F HTN, HLD, DM2, DVT (dx July 2020, on Eliquis) and APL (dx May 2020; on ATRA and arsenic) with acute onset chest pain and fever, sepsis presnet on admission suspected mediport infection  -broad spectrum abx  -ID consult  -vasc surg consult consider removal of port

## 2020-08-22 NOTE — H&P ADULT - PROBLEM SELECTOR PLAN 5
Takes Metformin 500mg bid at home  - Hold metformin  - FS and SSI  - DASH and CC diet  - F/u A1c Takes Metformin 500mg bid at home  - Hold metformin  - FS and SSI  - CC diet  - F/u A1c

## 2020-08-23 ENCOUNTER — TRANSCRIPTION ENCOUNTER (OUTPATIENT)
Age: 60
End: 2020-08-23

## 2020-08-23 LAB
ALBUMIN SERPL ELPH-MCNC: 4.1 G/DL — SIGNIFICANT CHANGE UP (ref 3.3–5)
ALP SERPL-CCNC: 73 U/L — SIGNIFICANT CHANGE UP (ref 40–120)
ALT FLD-CCNC: 11 U/L — SIGNIFICANT CHANGE UP (ref 4–33)
ANION GAP SERPL CALC-SCNC: 15 MMO/L — HIGH (ref 7–14)
AST SERPL-CCNC: 14 U/L — SIGNIFICANT CHANGE UP (ref 4–32)
BASOPHILS # BLD AUTO: 0.04 K/UL — SIGNIFICANT CHANGE UP (ref 0–0.2)
BASOPHILS NFR BLD AUTO: 0.5 % — SIGNIFICANT CHANGE UP (ref 0–2)
BILIRUB SERPL-MCNC: 0.5 MG/DL — SIGNIFICANT CHANGE UP (ref 0.2–1.2)
BLD GP AB SCN SERPL QL: NEGATIVE — SIGNIFICANT CHANGE UP
BUN SERPL-MCNC: 11 MG/DL — SIGNIFICANT CHANGE UP (ref 7–23)
CALCIUM SERPL-MCNC: 9.5 MG/DL — SIGNIFICANT CHANGE UP (ref 8.4–10.5)
CHLORIDE SERPL-SCNC: 107 MMOL/L — SIGNIFICANT CHANGE UP (ref 98–107)
CO2 SERPL-SCNC: 21 MMOL/L — LOW (ref 22–31)
CREAT SERPL-MCNC: 0.8 MG/DL — SIGNIFICANT CHANGE UP (ref 0.5–1.3)
CULTURE RESULTS: SIGNIFICANT CHANGE UP
EOSINOPHIL # BLD AUTO: 0.14 K/UL — SIGNIFICANT CHANGE UP (ref 0–0.5)
EOSINOPHIL NFR BLD AUTO: 1.7 % — SIGNIFICANT CHANGE UP (ref 0–6)
GLUCOSE SERPL-MCNC: 123 MG/DL — HIGH (ref 70–99)
HCT VFR BLD CALC: 29.6 % — LOW (ref 34.5–45)
HGB BLD-MCNC: 9.2 G/DL — LOW (ref 11.5–15.5)
IMM GRANULOCYTES NFR BLD AUTO: 0.4 % — SIGNIFICANT CHANGE UP (ref 0–1.5)
LDH SERPL L TO P-CCNC: 163 U/L — SIGNIFICANT CHANGE UP (ref 135–225)
LYMPHOCYTES # BLD AUTO: 1.64 K/UL — SIGNIFICANT CHANGE UP (ref 1–3.3)
LYMPHOCYTES # BLD AUTO: 19.4 % — SIGNIFICANT CHANGE UP (ref 13–44)
MAGNESIUM SERPL-MCNC: 1.8 MG/DL — SIGNIFICANT CHANGE UP (ref 1.6–2.6)
MCHC RBC-ENTMCNC: 28.5 PG — SIGNIFICANT CHANGE UP (ref 27–34)
MCHC RBC-ENTMCNC: 31.1 % — LOW (ref 32–36)
MCV RBC AUTO: 91.6 FL — SIGNIFICANT CHANGE UP (ref 80–100)
MONOCYTES # BLD AUTO: 0.41 K/UL — SIGNIFICANT CHANGE UP (ref 0–0.9)
MONOCYTES NFR BLD AUTO: 4.9 % — SIGNIFICANT CHANGE UP (ref 2–14)
NEUTROPHILS # BLD AUTO: 6.18 K/UL — SIGNIFICANT CHANGE UP (ref 1.8–7.4)
NEUTROPHILS NFR BLD AUTO: 73.1 % — SIGNIFICANT CHANGE UP (ref 43–77)
NRBC # FLD: 0 K/UL — SIGNIFICANT CHANGE UP (ref 0–0)
PHOSPHATE SERPL-MCNC: 3.2 MG/DL — SIGNIFICANT CHANGE UP (ref 2.5–4.5)
PLATELET # BLD AUTO: 175 K/UL — SIGNIFICANT CHANGE UP (ref 150–400)
PMV BLD: 11.5 FL — SIGNIFICANT CHANGE UP (ref 7–13)
POTASSIUM SERPL-MCNC: 3.9 MMOL/L — SIGNIFICANT CHANGE UP (ref 3.5–5.3)
POTASSIUM SERPL-SCNC: 3.9 MMOL/L — SIGNIFICANT CHANGE UP (ref 3.5–5.3)
PROT SERPL-MCNC: 7.1 G/DL — SIGNIFICANT CHANGE UP (ref 6–8.3)
RBC # BLD: 3.23 M/UL — LOW (ref 3.8–5.2)
RBC # FLD: 15.6 % — HIGH (ref 10.3–14.5)
RH IG SCN BLD-IMP: POSITIVE — SIGNIFICANT CHANGE UP
SODIUM SERPL-SCNC: 143 MMOL/L — SIGNIFICANT CHANGE UP (ref 135–145)
SPECIMEN SOURCE: SIGNIFICANT CHANGE UP
URATE SERPL-MCNC: 3.8 MG/DL — SIGNIFICANT CHANGE UP (ref 2.5–7)
WBC # BLD: 8.44 K/UL — SIGNIFICANT CHANGE UP (ref 3.8–10.5)
WBC # FLD AUTO: 8.44 K/UL — SIGNIFICANT CHANGE UP (ref 3.8–10.5)

## 2020-08-23 PROCEDURE — 99233 SBSQ HOSP IP/OBS HIGH 50: CPT | Mod: GC

## 2020-08-23 RX ORDER — CEFEPIME 1 G/1
2000 INJECTION, POWDER, FOR SOLUTION INTRAMUSCULAR; INTRAVENOUS EVERY 8 HOURS
Refills: 0 | Status: DISCONTINUED | OUTPATIENT
Start: 2020-08-23 | End: 2020-08-24

## 2020-08-23 RX ADMIN — PIPERACILLIN AND TAZOBACTAM 25 GRAM(S): 4; .5 INJECTION, POWDER, LYOPHILIZED, FOR SOLUTION INTRAVENOUS at 12:07

## 2020-08-23 RX ADMIN — Medication 650 MILLIGRAM(S): at 05:55

## 2020-08-23 RX ADMIN — Medication 250 MILLIGRAM(S): at 01:14

## 2020-08-23 RX ADMIN — APIXABAN 5 MILLIGRAM(S): 2.5 TABLET, FILM COATED ORAL at 18:02

## 2020-08-23 RX ADMIN — PIPERACILLIN AND TAZOBACTAM 25 GRAM(S): 4; .5 INJECTION, POWDER, LYOPHILIZED, FOR SOLUTION INTRAVENOUS at 04:51

## 2020-08-23 RX ADMIN — Medication 650 MILLIGRAM(S): at 05:25

## 2020-08-23 RX ADMIN — ATORVASTATIN CALCIUM 20 MILLIGRAM(S): 80 TABLET, FILM COATED ORAL at 22:21

## 2020-08-23 RX ADMIN — Medication 650 MILLIGRAM(S): at 18:02

## 2020-08-23 RX ADMIN — APIXABAN 5 MILLIGRAM(S): 2.5 TABLET, FILM COATED ORAL at 07:53

## 2020-08-23 RX ADMIN — CEFEPIME 100 MILLIGRAM(S): 1 INJECTION, POWDER, FOR SOLUTION INTRAMUSCULAR; INTRAVENOUS at 19:05

## 2020-08-23 RX ADMIN — Medication 1: at 18:06

## 2020-08-23 RX ADMIN — Medication 250 MILLIGRAM(S): at 12:07

## 2020-08-23 NOTE — DISCHARGE NOTE PROVIDER - NSDCCPCAREPLAN_GEN_ALL_CORE_FT
PRINCIPAL DISCHARGE DIAGNOSIS  Diagnosis: Sepsis  Assessment and Plan of Treatment:       SECONDARY DISCHARGE DIAGNOSES  Diagnosis: Chest pain  Assessment and Plan of Treatment: PRINCIPAL DISCHARGE DIAGNOSIS  Diagnosis: Sepsis  Assessment and Plan of Treatment: You came to the hospital with fever and rapid breathing, which was concerning for infection or blood clot in your lungs.  CT scan showed you did not have a blood clot, and the skin over your RIGHT chest mediport was red and warm, suggesting the mediport was infected.  Tylenol helped with your fever.  We gave you antibiotics to manage the infection and contacted Interventional Radiology who _______.      SECONDARY DISCHARGE DIAGNOSES  Diagnosis: Chest pain  Assessment and Plan of Treatment: You presented with chest pain, shortness of breath, and rapid breathing, which was concerning for a heart attack or infection or blood clot in your lungs.  EKG and blood work indicated your were not having a heart attack, and a CT scan of your chest showed you did not have a clot in your lungs, AKA a pulmonary embolism.  The skin over your RIGHT chest mediport was red and warm, and we gave you antibiotics to manage the infected port.  We managed your pain with Tylenol and oxycodone.  Interventional Radiology _____. PRINCIPAL DISCHARGE DIAGNOSIS  Diagnosis: Sepsis  Assessment and Plan of Treatment: You came to the hospital with fever and rapid breathing, which was concerning for infection or blood clot in your lungs.  CT scan showed you did not have a blood clot, and the skin over your RIGHT chest mediport was red and warm, suggesting the mediport was infected.  Tylenol helped with your fever.  We gave you antibiotics to manage the infection and the redness around your Mediport site resolved.      SECONDARY DISCHARGE DIAGNOSES  Diagnosis: APL (acute promyelocytic leukemia)  Assessment and Plan of Treatment: You have a diagnosis of acute promyelocytic leukemia. You received a dose of your chemotherapy regimen, ATRA and arsenic, while in the hospital and you will continue your regimen with your primary hematologist.    Diagnosis: Chest pain  Assessment and Plan of Treatment: You presented with chest pain, shortness of breath, and rapid breathing, which was concerning for a heart attack or infection or blood clot in your lungs.  EKG and blood work indicated your were not having a heart attack, and a CT scan of your chest showed you did not have a clot in your lungs, AKA a pulmonary embolism.  The skin over your RIGHT chest mediport was red and warm, and we gave you antibiotics to manage the infected port.  We managed your pain with Tylenol and oxycodone.  Interventional Radiology completed a study of your Mediport which showed_____. PRINCIPAL DISCHARGE DIAGNOSIS  Diagnosis: Sepsis  Assessment and Plan of Treatment: You came to the hospital with fever and rapid breathing, which was concerning for infection or blood clot in your lungs.  CT scan showed you did not have a blood clot, and the skin over your RIGHT chest mediport was mildly red and warm, suggesting the mediport was infected.  Tylenol helped with your fever.  We gave you antibiotics to manage the infection and the redness around your Mediport site resolved after we obseveed you off antibiotcs, and thus we had lower suspiciojn for a port infection, and it was not removed. Infectious diseases, interventional radiology, hematology, and itnernal medicine all agreed this was a safe plan. If you develop any further redness or worsened pain with fevers, please return to the hospital.      SECONDARY DISCHARGE DIAGNOSES  Diagnosis: APL (acute promyelocytic leukemia)  Assessment and Plan of Treatment: You have a diagnosis of acute promyelocytic leukemia. You received a dose of your chemotherapy regimen, ATRA and arsenic, while in the hospital and you will continue your regimen with your primary hematologist. Please follow up at [facility] for your next infusion the day after your discharge.    Diagnosis: Chest pain  Assessment and Plan of Treatment: You presented with chest pain, shortness of breath, and rapid breathing, which was concerning for a heart attack or infection or blood clot in your lungs.  EKG and blood work indicated your were not having any heart issues, and a CT scan of your chest showed you did not have a clot in your lungs, AKA a pulmonary embolism.  The skin over your RIGHT chest Mdiport was red and warm, and we gave you antibiotics intitially but then observed you without antibioitcs and you did well. Interventional radiology examined the Mediport and it appeared to be working well without concerns.

## 2020-08-23 NOTE — DISCHARGE NOTE PROVIDER - NSDCCPTREATMENT_GEN_ALL_CORE_FT
PRINCIPAL PROCEDURE  Procedure: CT angiogram chest w contrast  Findings and Treatment: PRINCIPAL PROCEDURE  Procedure: CT angiogram chest w contrast  Findings and Treatment: CT scan of your chest was done to rule out a blood clot in your lungs, suspected because of your fever, rapid breathing, chest pain, and recent history of RIGHT arm blood clot.  The CT scan did not show a blood clot or any other abnormalities.

## 2020-08-23 NOTE — PROGRESS NOTE ADULT - PROBLEM SELECTOR PLAN 1
Patient tachypneic and febrile to 100.8, with erythema around R chest mediport  - Likely 2/2 mediport infection  - CTA unremarkable. No PE  - UA clean  - f/u BCx, UCx  - s/p Vanc and Zosyn  - C/w Vanc and Zosyn  - Consult ID in AM  - Consult Vascular Surgery in AM  - Pain control with Oxycodone and Tylenol Patient tachypneic and febrile to 100.8, with erythema around R chest mediport  - Likely 2/2 mediport infection  - CTA unremarkable. No PE  - UA clean  - f/u BCx, UCx  - s/p Vanc and Zosyn  - C/w Vanc and Zosyn  - ID consulted; appreciate recs  - Consult Vascular Surgery in AM  - Pain control with Oxycodone and Tylenol Patient tachypneic and febrile to 100.8, with erythema around R chest mediport  - Likely 2/2 mediport infection  - CTA unremarkable. No PE  - UA clean  - f/u BCx, UCx  - s/p Vanc and Zosyn  - C/w Vanc and Zosyn  - ID consulted; appreciate recs  - Consult Vascular Surgery or IR for possible mediport removal  - Pain control with Oxycodone and Tylenol

## 2020-08-23 NOTE — PROGRESS NOTE ADULT - PROBLEM SELECTOR PLAN 2
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 Follows Dr. Hurd at [facility]  - Last marrow Bx, on 7/13/20, showed no leukemia  - Last took ATRA this AM. Immunosuppressed 2/2 chemo  - Heme/Onc consult; appreciate recs  - Per Onc, do not continue ATRA or arsenic while septic  - Do not access [facility] Patient diagnosed with APL in May 2020, receives ATRA and weekly arsenic. Follows Dr. Hurd at [facility]  - Last marrow Bx, on 7/13/20, showed no leukemia  - Last took ATRA morning of 8/23. Immunosuppressed 2/2 chemo  - Heme/Onc consult; appreciate recs  - Per Onc, do not continue ATRA or arsenic while septic  - Do not access mediport

## 2020-08-23 NOTE — PROGRESS NOTE ADULT - PROBLEM SELECTOR PLAN 8
Transitions of Care Status:  1.  Name of PCP: Dr. Oriana Montanez ([phone])  2.  PCP Contacted on Admission: [ ] Y    [ ] N    3.  PCP contacted at Discharge: [ ] Y    [ ] N    [ ] N/A  4.  Post-Discharge Appointment Date and Location:  5.  Summary of Handoff given to PCP:

## 2020-08-23 NOTE — PROGRESS NOTE ADULT - SUBJECTIVE AND OBJECTIVE BOX
****In Progress***    PROGRESS NOTE:     Patient is a 60-year-old Female presenting with chest pain.    SUBJECTIVE / OVERNIGHT EVENTS:  Received oxycodone and Tylenol for pain relief.    MEDICATIONS  (STANDING):  apixaban 5 milliGRAM(s) Oral every 12 hours  atorvastatin 20 milliGRAM(s) Oral at bedtime  dextrose 5%. 1000 milliLiter(s) (50 mL/Hr) IV Continuous <Continuous>  dextrose 50% Injectable 12.5 Gram(s) IV Push once  dextrose 50% Injectable 25 Gram(s) IV Push once  dextrose 50% Injectable 25 Gram(s) IV Push once  insulin lispro (HumaLOG) corrective regimen sliding scale   SubCutaneous three times a day before meals  insulin lispro (HumaLOG) corrective regimen sliding scale   SubCutaneous at bedtime  piperacillin/tazobactam IVPB.. 3.375 Gram(s) IV Intermittent every 8 hours  vancomycin  IVPB 1000 milliGRAM(s) IV Intermittent every 12 hours    MEDICATIONS  (PRN):  acetaminophen   Tablet .. 650 milliGRAM(s) Oral every 6 hours PRN Mild Pain (1 - 3), Moderate Pain (4 - 6)  dextrose 40% Gel 15 Gram(s) Oral once PRN Blood Glucose LESS THAN 70 milliGRAM(s)/deciliter  glucagon  Injectable 1 milliGRAM(s) IntraMuscular once PRN Glucose LESS THAN 70 milligrams/deciliter  oxyCODONE    IR 5 milliGRAM(s) Oral every 6 hours PRN Severe Pain (7 - 10)      CAPILLARY BLOOD GLUCOSE  POCT Blood Glucose.: 165 mg/dL (22 Aug 2020 22:16)  POCT Blood Glucose.: 149 mg/dL (22 Aug 2020 17:51)  POCT Blood Glucose.: 124 mg/dL (22 Aug 2020 11:31)      PHYSICAL EXAM:  Vital Signs Last 24 Hrs  T(C): 36.7 (23 Aug 2020 05:23), Max: 38.2 (22 Aug 2020 12:26)  T(F): 98.1 (23 Aug 2020 05:23), Max: 100.8 (22 Aug 2020 12:26)  HR: 82 (23 Aug 2020 05:23) (72 - 92)  BP: 109/61 (23 Aug 2020 05:23) (98/68 - 135/61)  RR: 18 (23 Aug 2020 05:23) (18 - 26)  SpO2: 97% (23 Aug 2020 05:23) (96% - 100%)    GENERAL:   NEURO:   CV:   PULM:   GI:   SKIN:   PSYCH:       LABS: ****In Progress***    PROGRESS NOTE:     Patient is a 60-year-old Female presenting with chest pain.    SUBJECTIVE / OVERNIGHT EVENTS:  Son translating on phone ([phone]).  Received oxycodone and Tylenol for pain relief, reporting that pain is now 5/10, down from 8/10.  Her pain is over the R chest mediport site, and her R groin pain has resolved with meds.  Endorses dizziness, like she is spinning, when she tries to stand.  Also reports that the 1cm, firm, mobile nodule 6cm L of umbilicus is new, and she attributes it to frequent injections during hospital visits; reports she had similar ones at injection sites that have since resolved.  Denies other nodules, or headache, shortness of breath, or dysuria.    MEDICATIONS  (STANDING):  apixaban 5 milliGRAM(s) Oral every 12 hours  atorvastatin 20 milliGRAM(s) Oral at bedtime  dextrose 5%. 1000 milliLiter(s) (50 mL/Hr) IV Continuous <Continuous>  dextrose 50% Injectable 12.5 Gram(s) IV Push once  dextrose 50% Injectable 25 Gram(s) IV Push once  dextrose 50% Injectable 25 Gram(s) IV Push once  insulin lispro (HumaLOG) corrective regimen sliding scale   SubCutaneous three times a day before meals  insulin lispro (HumaLOG) corrective regimen sliding scale   SubCutaneous at bedtime  piperacillin/tazobactam IVPB.. 3.375 Gram(s) IV Intermittent every 8 hours  vancomycin  IVPB 1000 milliGRAM(s) IV Intermittent every 12 hours    MEDICATIONS  (PRN):  acetaminophen   Tablet .. 650 milliGRAM(s) Oral every 6 hours PRN Mild Pain (1 - 3), Moderate Pain (4 - 6)  dextrose 40% Gel 15 Gram(s) Oral once PRN Blood Glucose LESS THAN 70 milliGRAM(s)/deciliter  glucagon  Injectable 1 milliGRAM(s) IntraMuscular once PRN Glucose LESS THAN 70 milligrams/deciliter  oxyCODONE    IR 5 milliGRAM(s) Oral every 6 hours PRN Severe Pain (7 - 10)      CAPILLARY BLOOD GLUCOSE  POCT Blood Glucose.: 115 mg/dL (23 Aug 2020 08:33)  POCT Blood Glucose.: 165 mg/dL (22 Aug 2020 22:16)  POCT Blood Glucose.: 149 mg/dL (22 Aug 2020 17:51)  POCT Blood Glucose.: 124 mg/dL (22 Aug 2020 11:31)      PHYSICAL EXAM:  Vital Signs Last 24 Hrs  T(C): 36.7 (23 Aug 2020 05:23), Max: 38.2 (22 Aug 2020 12:26)  T(F): 98.1 (23 Aug 2020 05:23), Max: 100.8 (22 Aug 2020 12:26)  HR: 82 (23 Aug 2020 05:23) (72 - 92)  BP: 109/61 (23 Aug 2020 05:23) (98/68 - 135/61)  RR: 18 (23 Aug 2020 05:23) (18 - 26)  SpO2: 97% (23 Aug 2020 05:23) (96% - 100%)    GENERAL: No apparent distress  NEURO: A&O x4  CV: RRR. +S1/S2. No m/r/g  PULM: CTAB  GI:  Soft abdomen, except for 1x1cm nodule 3cm Left of umbilicus. Normoactive bowel sounds  : +Suprapubic pain. No inguinal lymphadenopathy, erythema, or eschars  SKIN: Erythema and warmth over R mediport.  PSYCH: Euthymic and cooperative      LABS: ****In Progress***    PROGRESS NOTE:     Patient is a 60-year-old Female presenting with chest pain.    SUBJECTIVE / OVERNIGHT EVENTS:  Son translating on phone ([phone]).  Received oxycodone and Tylenol for pain relief, reporting that pain is now 5/10, down from 8/10.  Her pain is over the R chest mediport site, and her R groin pain has resolved with meds.  Endorses dizziness, like she is spinning, when she tries to stand.  Also reports that the 1cm, firm, mobile nodule 6cm L of umbilicus is new, and she attributes it to frequent injections during hospital visits; reports she had similar ones at injection sites that have since resolved.  Denies other nodules, or headache, shortness of breath, or dysuria.    MEDICATIONS  (STANDING):  apixaban 5 milliGRAM(s) Oral every 12 hours  atorvastatin 20 milliGRAM(s) Oral at bedtime  dextrose 5%. 1000 milliLiter(s) (50 mL/Hr) IV Continuous <Continuous>  dextrose 50% Injectable 12.5 Gram(s) IV Push once  dextrose 50% Injectable 25 Gram(s) IV Push once  dextrose 50% Injectable 25 Gram(s) IV Push once  insulin lispro (HumaLOG) corrective regimen sliding scale   SubCutaneous three times a day before meals  insulin lispro (HumaLOG) corrective regimen sliding scale   SubCutaneous at bedtime  piperacillin/tazobactam IVPB.. 3.375 Gram(s) IV Intermittent every 8 hours  vancomycin  IVPB 1000 milliGRAM(s) IV Intermittent every 12 hours    MEDICATIONS  (PRN):  acetaminophen   Tablet .. 650 milliGRAM(s) Oral every 6 hours PRN Mild Pain (1 - 3), Moderate Pain (4 - 6)  dextrose 40% Gel 15 Gram(s) Oral once PRN Blood Glucose LESS THAN 70 milliGRAM(s)/deciliter  glucagon  Injectable 1 milliGRAM(s) IntraMuscular once PRN Glucose LESS THAN 70 milligrams/deciliter  oxyCODONE    IR 5 milliGRAM(s) Oral every 6 hours PRN Severe Pain (7 - 10)      CAPILLARY BLOOD GLUCOSE  POCT Blood Glucose.: 115 mg/dL (23 Aug 2020 08:33)  POCT Blood Glucose.: 165 mg/dL (22 Aug 2020 22:16)  POCT Blood Glucose.: 149 mg/dL (22 Aug 2020 17:51)  POCT Blood Glucose.: 124 mg/dL (22 Aug 2020 11:31)      PHYSICAL EXAM:  Vital Signs Last 24 Hrs  T(C): 36.7 (23 Aug 2020 05:23), Max: 38.2 (22 Aug 2020 12:26)  T(F): 98.1 (23 Aug 2020 05:23), Max: 100.8 (22 Aug 2020 12:26)  HR: 82 (23 Aug 2020 05:23) (72 - 92)  BP: 109/61 (23 Aug 2020 05:23) (98/68 - 135/61)  RR: 18 (23 Aug 2020 05:23) (18 - 26)  SpO2: 97% (23 Aug 2020 05:23) (96% - 100%)    GENERAL: No apparent distress  NEURO: A&O x4  CV: RRR. +S1/S2. No m/r/g  PULM: CTAB  GI:  Soft abdomen, except for 1x1cm nodule 6cm Left of umbilicus. Normoactive bowel sounds  : No suprapubic or R groin pain. No inguinal lymphadenopathy, erythema, or eschars  SKIN: Erythema and warmth over R mediport.  PSYCH: Euthymic and cooperative      LABS: PROGRESS NOTE:     Patient is a 60-year-old Female presenting with chest pain.    SUBJECTIVE / OVERNIGHT EVENTS:  Son translating on phone ([phone]).  Received oxycodone and Tylenol for pain relief, reporting that pain is now 5/10, down from 8/10.  Her pain is over the R chest mediport site, and her R groin pain has resolved with meds.  Endorses dizziness, like she is spinning, when she tries to stand.  Also reports that the 1cm, firm, mobile nodule 6cm L of umbilicus is new, and she attributes it to frequent injections during hospital visits; reports she had similar ones at injection sites that have since resolved.  Denies other nodules, or headache, shortness of breath, or dysuria.  She and her sons mention that her cancer care is at [facility] w/ Dr. Hurd, and her mediport was placed at [facility]; discussed that her current infection can be managed here at [facility], and they are satisfied.    MEDICATIONS  (STANDING):  apixaban 5 milliGRAM(s) Oral every 12 hours  atorvastatin 20 milliGRAM(s) Oral at bedtime  dextrose 5%. 1000 milliLiter(s) (50 mL/Hr) IV Continuous <Continuous>  dextrose 50% Injectable 12.5 Gram(s) IV Push once  dextrose 50% Injectable 25 Gram(s) IV Push once  dextrose 50% Injectable 25 Gram(s) IV Push once  insulin lispro (HumaLOG) corrective regimen sliding scale   SubCutaneous three times a day before meals  insulin lispro (HumaLOG) corrective regimen sliding scale   SubCutaneous at bedtime  piperacillin/tazobactam IVPB.. 3.375 Gram(s) IV Intermittent every 8 hours  vancomycin  IVPB 1000 milliGRAM(s) IV Intermittent every 12 hours    MEDICATIONS  (PRN):  acetaminophen   Tablet .. 650 milliGRAM(s) Oral every 6 hours PRN Mild Pain (1 - 3), Moderate Pain (4 - 6)  dextrose 40% Gel 15 Gram(s) Oral once PRN Blood Glucose LESS THAN 70 milliGRAM(s)/deciliter  glucagon  Injectable 1 milliGRAM(s) IntraMuscular once PRN Glucose LESS THAN 70 milligrams/deciliter  oxyCODONE    IR 5 milliGRAM(s) Oral every 6 hours PRN Severe Pain (7 - 10)      CAPILLARY BLOOD GLUCOSE  POCT Blood Glucose.: 115 mg/dL (23 Aug 2020 08:33)  POCT Blood Glucose.: 165 mg/dL (22 Aug 2020 22:16)  POCT Blood Glucose.: 149 mg/dL (22 Aug 2020 17:51)  POCT Blood Glucose.: 124 mg/dL (22 Aug 2020 11:31)      PHYSICAL EXAM:  Vital Signs Last 24 Hrs  T(C): 36.7 (23 Aug 2020 05:23), Max: 38.2 (22 Aug 2020 12:26)  T(F): 98.1 (23 Aug 2020 05:23), Max: 100.8 (22 Aug 2020 12:26)  HR: 82 (23 Aug 2020 05:23) (72 - 92)  BP: 109/61 (23 Aug 2020 05:23) (98/68 - 135/61)  RR: 18 (23 Aug 2020 05:23) (18 - 26)  SpO2: 97% (23 Aug 2020 05:23) (96% - 100%)    GENERAL: No apparent distress  NEURO: A&O x4  CV: RRR. +S1/S2. No m/r/g  PULM: CTAB  GI:  Soft abdomen, except for 1x1cm nodule 6cm Left of umbilicus. Normoactive bowel sounds  : No suprapubic or R groin pain. No inguinal lymphadenopathy, erythema, or eschars  SKIN: Erythema and warmth over R mediport.  PSYCH: Euthymic and cooperative      LABS: PROGRESS NOTE:   Medicine Team 5. Authored by Demario Wyman, MS4. Pager [phone]    Patient is a 60-year-old Female presenting with chest pain.    SUBJECTIVE / OVERNIGHT EVENTS:  Son translating on phone ([phone]).  Received oxycodone and Tylenol for pain relief, reporting that pain is now 5/10, down from 8/10.  Her pain is over the R chest mediport site, and her R groin pain has resolved with meds.  Endorses dizziness, like she is spinning, when she tries to stand.  Also reports that the 1cm, firm, mobile nodule 6cm L of umbilicus is new, and she attributes it to frequent injections during hospital visits; reports she had similar ones at injection sites that have since resolved.  Denies other nodules, or headache, shortness of breath, or dysuria.  She and her sons mention that her cancer care is at [facility] w/ Dr. Hurd, and her mediport was placed at [facility]; discussed that her current infection can be managed here at [facility], and they are satisfied.    MEDICATIONS  (STANDING):  apixaban 5 milliGRAM(s) Oral every 12 hours  atorvastatin 20 milliGRAM(s) Oral at bedtime  dextrose 5%. 1000 milliLiter(s) (50 mL/Hr) IV Continuous <Continuous>  dextrose 50% Injectable 12.5 Gram(s) IV Push once  dextrose 50% Injectable 25 Gram(s) IV Push once  dextrose 50% Injectable 25 Gram(s) IV Push once  insulin lispro (HumaLOG) corrective regimen sliding scale   SubCutaneous three times a day before meals  insulin lispro (HumaLOG) corrective regimen sliding scale   SubCutaneous at bedtime  piperacillin/tazobactam IVPB.. 3.375 Gram(s) IV Intermittent every 8 hours  vancomycin  IVPB 1000 milliGRAM(s) IV Intermittent every 12 hours    MEDICATIONS  (PRN):  acetaminophen   Tablet .. 650 milliGRAM(s) Oral every 6 hours PRN Mild Pain (1 - 3), Moderate Pain (4 - 6)  dextrose 40% Gel 15 Gram(s) Oral once PRN Blood Glucose LESS THAN 70 milliGRAM(s)/deciliter  glucagon  Injectable 1 milliGRAM(s) IntraMuscular once PRN Glucose LESS THAN 70 milligrams/deciliter  oxyCODONE    IR 5 milliGRAM(s) Oral every 6 hours PRN Severe Pain (7 - 10)      CAPILLARY BLOOD GLUCOSE  POCT Blood Glucose.: 115 mg/dL (23 Aug 2020 08:33)  POCT Blood Glucose.: 165 mg/dL (22 Aug 2020 22:16)  POCT Blood Glucose.: 149 mg/dL (22 Aug 2020 17:51)  POCT Blood Glucose.: 124 mg/dL (22 Aug 2020 11:31)      PHYSICAL EXAM:  Vital Signs Last 24 Hrs  T(C): 36.7 (23 Aug 2020 05:23), Max: 38.2 (22 Aug 2020 12:26)  T(F): 98.1 (23 Aug 2020 05:23), Max: 100.8 (22 Aug 2020 12:26)  HR: 82 (23 Aug 2020 05:23) (72 - 92)  BP: 109/61 (23 Aug 2020 05:23) (98/68 - 135/61)  RR: 18 (23 Aug 2020 05:23) (18 - 26)  SpO2: 97% (23 Aug 2020 05:23) (96% - 100%)    GENERAL: No apparent distress  NEURO: A&O x4  CV: RRR. +S1/S2. No m/r/g  PULM: CTAB  GI:  Soft abdomen, except for 1x1cm nodule 6cm Left of umbilicus. Normoactive bowel sounds  : No suprapubic or R groin pain. No inguinal lymphadenopathy, erythema, or eschars  SKIN: Erythema and warmth over R mediport.  PSYCH: Euthymic and cooperative      LABS:  Pending blood draw

## 2020-08-23 NOTE — DISCHARGE NOTE PROVIDER - CARE PROVIDER_API CALL
Karen Hurd  HEMATOLOGY  [address] [place] [address]  Phone: [phone]  Fax: [phone]  Follow Up Time:     Oriana Montanez  INTERNAL MEDICINE  [address] [place] [address]  Phone: [phone]  Fax: [phone]  Follow Up Time:

## 2020-08-23 NOTE — CONSULT NOTE ADULT - ASSESSMENT
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       fever, chest pain, r/o mediport infection vs clot - also with erythema around the site and mild leukocytosis to 11.8. CT chest angio without any notable pocket of infection. Started on Vanc and Zosyn.     recommend   - cont vancomycin 1g 12 and  start cefepime 2g q8. Can d/c zosyn as unlikely to have anaerobic infection.   - monitor vanc trough pre 4th dose  - consider doppler to eval for clot around site   - will likely need port removal with current symptoms  - f/u blood cultures    Eulogio Long MD  Fellow, Infectious Diseases, PGY-4  Pager: [phone]  After 5pm/Weekends: Call [phone]

## 2020-08-23 NOTE — PROGRESS NOTE ADULT - PROBLEM SELECTOR PLAN 3
Patient had R axillary DVT In July 2020. On Eliquis 5mg. Presented tachypneic, dyspneic, and febrile.  - C/w Eliquis 5mg  - CTA showed no PE. Patient had R axillary DVT In July 2020. On Eliquis 5mg. Presented tachypneic, dyspneic, and febrile.  - C/w Eliquis 5mg  - CTA showed no PE  - No blood draws from RIGHT arm

## 2020-08-23 NOTE — DISCHARGE NOTE PROVIDER - NSDCFUSCHEDAPPT_GEN_ALL_CORE_FT
ADELITA, GIANLUCA ; 08/24/2020 ; [facility]  ADELITA, GIANLUCA ; 08/25/2020 ; [facility]  ADELITA, GIANLUCA ; 08/26/2020 ; [facility]  ADELITA, GIANLUCA ; 08/27/2020 ; [facility]  ADELITA, GIANLUCA ; 08/28/2020 ; [facility]  ADELITA, GIANLUCA ; 08/31/2020 ; [facility]  ADELITA, GIANLUCA ; 09/01/2020 ; [facility]  ADELITA, GIANLUCA ; 09/02/2020 ; [facility]  ADELITA, GIANLUCA ; 09/03/2020 ; [facility]  ADELITA, GIANLUCA ; 09/04/2020 ; [facility]  ADELITA, GIANLUCA ; 09/17/2020 ; [facility] ADLEITA, GIANLUCA ; 08/24/2020 ; [facility]  ADELITA, GIANLUCA ; 08/25/2020 ; [facility]  ADELITA, GIANLUCA ; 08/26/2020 ; [facility]  ADELITA, GIANLUCA ; 08/27/2020 ; [facility]  ADELITA, GIANLUCA ; 08/28/2020 ; [facility]  ADELITA, GIANLUCA ; 08/31/2020 ; [facility]  ADELITA, GIANLUCA ; 09/01/2020 ; [facility]  ADELITA, GIANLUCA ; 09/02/2020 ; [facility]  ADELITA, GIANLUCA ; 09/03/2020 ; [facility]  ADELITA, GIANLUCA ; 09/04/2020 ; [facility]  ADELITA, GIANLUCA ; 09/17/2020 ; [facility] ADELITA, GIANLUCA ; 08/31/2020 ; [facility]  ADELITA, GIANLUCA ; 09/01/2020 ; [facility]  ADELITA, GIANLUCA ; 09/02/2020 ; [facility]  ADELITA, GIANLUCA ; 09/03/2020 ; [facility]  ADELITA, GIANLUCA ; 09/04/2020 ; [facility]  ADELITA, GIANLUCA ; 09/17/2020 ; [facility] GIANLUCA UREÑA ; 09/04/2020 ; [facility]  GIANLUCA UREÑA ; 09/17/2020 ; [facility]  GIANLUCA UREÑA ; 09/22/2020 ; [facility] [address] [facility]

## 2020-08-23 NOTE — CONSULT NOTE ADULT - ATTENDING COMMENTS
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    Noted to have fever and leukocytosis  Ct angio without PE.    1) Fever    Associated chest pain  Await blood culture times two     Redness around mediport- ? clot vs infection  Check US of vein nears mediport    If pt is symptomatic from infection or from clot- mediport should be removed    Continue vancomycin and change zosyn to cefepime pending blood cultures      2) Leukocytosis  Continue to monitor    3) APL  management per oncology
The patient was seen and examined today with the fellow, Dr. Pierre, and I agree with the History, Physical Exam and Plan in the record. Labs and imaging reviewed by me.

## 2020-08-23 NOTE — CONSULT NOTE ADULT - SUBJECTIVE AND OBJECTIVE BOX
Patient is a 60y old  Female who presents with a chief complaint of Chest pain (23 Aug 2020 07:26)    HPI:  Patient is a 60-year-old Female with PMHx of HTN, HLD, T2DM, DVT (dx 2020, on Eliquis) and APL (dx May 2020; on ATRA and arsenic) presenting with one day of chest pain and fever.  She developed mild substernal/R chest pain around 1am today, went to sleep, and then had acutely worsened pain at 9am, plus subjective fever.  Her son Giovana called EMS for chest pain and shortness of breath, and EMS noted her SBP in the 70s and gave 250cc IVF en route to the [facility] ED.  She reports not feeling pain like this before, and the pain is now reportedly radiating to her back and throat.  She also endorses R groin pain for a couple days.  Denies headache, cough, n/v, diarrhea, dysuria, melena, or rash.  She receives arsenic weekly through her R mediport and last took her ATRA this morning.    In the ED: Tmax 100.8 rectal, HR 72-92, BP /61-68, RR 18-26, SpO2 % on RA.  EKG wnl.  Morphine x1 given for pain.  UA neg.  BCx and UCx sent, and Zosyn and Vanc x1 given empirically.  CTA showed no PE. 1L NS bolus. (22 Aug 2020 17:52)     prior hospital charts reviewed [  ]  primary team notes reviewed [  ]  other consultant notes reviewed [  ]  PAST MEDICAL & SURGICAL HISTORY:  APL (acute promyelocytic leukemia)  HLD (hyperlipidemia)  Hypertension  Diabetes  No significant past surgical history    Allergies  adhesives (Pruritus)  No Known Drug Allergies    ANTIMICROBIALS (past 90 days)  MEDICATIONS  (STANDING):  piperacillin/tazobactam IVPB..   25 mL/Hr IV Intermittent (20 @ 04:51)   25 mL/Hr IV Intermittent (20 @ 20:53)    piperacillin/tazobactam IVPB...   200 mL/Hr IV Intermittent (20 @ 12:26)    vancomycin  IVPB   250 mL/Hr IV Intermittent (20 @ 01:14)    vancomycin  IVPB.   250 mL/Hr IV Intermittent (20 @ 13:22)      ANTIMICROBIALS:    piperacillin/tazobactam IVPB.. 3.375 every 8 hours  vancomycin  IVPB 1000 every 12 hours    OTHER MEDS: MEDICATIONS  (STANDING):  acetaminophen   Tablet .. 650 every 6 hours PRN  apixaban 5 every 12 hours  atorvastatin 20 at bedtime  dextrose 40% Gel 15 once PRN  dextrose 50% Injectable 12.5 once  dextrose 50% Injectable 25 once  dextrose 50% Injectable 25 once  glucagon  Injectable 1 once PRN  insulin lispro (HumaLOG) corrective regimen sliding scale  three times a day before meals  insulin lispro (HumaLOG) corrective regimen sliding scale  at bedtime  oxyCODONE    IR 5 every 6 hours PRN    SOCIAL HISTORY:   Social History:  Denies tobacco, EtOH, and illicit drug use.  Lives with son Giovana (22 Aug 2020 17:52)      FAMILY HISTORY:  Family history of diabetes mellitus: Mother    REVIEW OF SYSTEMS  [  ] ROS unobtainable because:    [  ] All other systems negative except as noted below:	    Constitutional:  [ ] fever [ ] chills  [ ] weight loss  [ ] weakness  Skin:  [ ] rash [ ] phlebitis	  Eyes: [ ] icterus [ ] pain  [ ] discharge	  ENMT: [ ] sore throat  [ ] thrush [ ] ulcers [ ] exudates  Respiratory: [ ] dyspnea [ ] hemoptysis [ ] cough [ ] sputum	  Cardiovascular:  [ ] chest pain [ ] palpitations [ ] edema	  Gastrointestinal:  [ ] nausea [ ] vomiting [ ] diarrhea [ ] constipation [ ] pain	  Genitourinary:  [ ] dysuria [ ] frequency [ ] hematuria [ ] discharge [ ] flank pain  [ ] incontinence  Musculoskeletal:  [ ] myalgias [ ] arthralgias [ ] arthritis  [ ] back pain  Neurological:  [ ] headache [ ] seizures  [ ] confusion/altered mental status  Psychiatric:  [ ] anxiety [ ] depression	  Hematology/Lymphatics:  [ ] lymphadenopathy  Endocrine:  [ ] adrenal [ ] thyroid  Allergic/Immunologic:	 [ ] transplant [ ] seasonal    Vital Signs Last 24 Hrs  T(F): 98.1 (20 @ 05:23), Max: 100.8 (20 @ 12:26)  Vital Signs Last 24 Hrs  HR: 82 (20 @ 05:23) (72 - 92)  BP: 109/61 (20 @ 05:23) (98/68 - 135/61)  RR: 18 (20 @ 05:23)  SpO2: 97% (20 @ 05:23) (96% - 100%)  Wt(kg): --    EXAM:  Constitutional: Not in acute distress  Eyes: pupils bilaterally reactive to light. No icterus.  Oral cavity: Clear, no lesions  Neck: No neck vein distension noted  RS: Chest clear to auscultation bilaterally. No wheeze/rhonchi/crepitations.  CVS: S1, S2 heard. Regular rate and rhythm. No murmurs/rubs/gallops.  Abdomen: Soft. No guarding/rigidity/tenderness.  : No acute abnormalities  Extremities: Warm. No pedal edema  Skin: No lesions noted  Vascular: No evidence of phlebitis  Neuro: Alert, oriented to time/place/person                          10.1   11.80 )-----------( 236      ( 22 Aug 2020 11:55 )             32.6     08    139  |  103  |  18  ----------------------------<  132<H>  4.2   |  22  |  0.84    Ca    9.9      22 Aug 2020 11:55    TPro  7.5  /  Alb  4.8  /  TBili  0.3  /  DBili  x   /  AST  15  /  ALT  13  /  AlkPhos  85      Urinalysis Basic - ( 22 Aug 2020 13:18 )    Color: COLORLESS / Appearance: CLEAR / S.011 / pH: 6.5  Gluc: NEGATIVE / Ketone: NEGATIVE  / Bili: NEGATIVE / Urobili: NORMAL   Blood: NEGATIVE / Protein: 10 / Nitrite: NEGATIVE   Leuk Esterase: NEGATIVE / RBC: x / WBC x   Sq Epi: x / Non Sq Epi: x / Bacteria: x    MICROBIOLOGY:                RADIOLOGY:  imaging below personally reviewed    OTHER TESTS: Patient is a 60y old  Female who presents with a chief complaint of Chest pain (23 Aug 2020 07:26)    HPI:  Patient is a 60-year-old Female with PMHx of HTN, HLD, T2DM, DVT (dx 2020, on Eliquis) and APL (dx May 2020; on ATRA and arsenic) presenting with one day of chest pain and fever.  She developed mild substernal/R chest pain around 1am today, went to sleep, and then had acutely worsened pain at 9am, plus subjective fever.  Her son Giovana called EMS for chest pain and shortness of breath, and EMS noted her SBP in the 70s and gave 250cc IVF en route to the [facility] ED.  She reports not feeling pain like this before, and the pain is now reportedly radiating to her back and throat.  She also endorses R groin pain for a couple days.  Denies headache, cough, n/v, diarrhea, dysuria, melena, or rash.  She receives arsenic weekly through her R mediport and last took her ATRA this morning.    In the ED: Tmax 100.8 rectal, HR 72-92, BP /61-68, RR 18-26, SpO2 % on RA.  EKG wnl.  Morphine x1 given for pain.  UA neg.  BCx and UCx sent, and Zosyn and Vanc x1 given empirically.  CTA showed no PE. 1L NS bolus.     Spoke with son over phone, endorses this has never happened before. Had PICC lines placed on prior admission and both were complicated by clots requiring removal. Currently pt endorses improvement in chest pain, but has headache and dizziness with ambulation. Denies nausea or vomiting, No diarrhea or abdominal pain.      prior hospital charts reviewed [ x ]  primary team notes reviewed [x  ]  other consultant notes reviewed [x  ]  PAST MEDICAL & SURGICAL HISTORY:  APL (acute promyelocytic leukemia)  HLD (hyperlipidemia)  Hypertension  Diabetes  No significant past surgical history    Allergies  adhesives (Pruritus)  No Known Drug Allergies    ANTIMICROBIALS (past 90 days)  MEDICATIONS  (STANDING):  piperacillin/tazobactam IVPB..   25 mL/Hr IV Intermittent (20 @ 04:51)   25 mL/Hr IV Intermittent (20 @ 20:53)    piperacillin/tazobactam IVPB...   200 mL/Hr IV Intermittent (20 @ 12:26)    vancomycin  IVPB   250 mL/Hr IV Intermittent (20 @ 01:14)    vancomycin  IVPB.   250 mL/Hr IV Intermittent (20 @ 13:22)      ANTIMICROBIALS:    piperacillin/tazobactam IVPB.. 3.375 every 8 hours  vancomycin  IVPB 1000 every 12 hours    OTHER MEDS: MEDICATIONS  (STANDING):  acetaminophen   Tablet .. 650 every 6 hours PRN  apixaban 5 every 12 hours  atorvastatin 20 at bedtime  dextrose 40% Gel 15 once PRN  dextrose 50% Injectable 12.5 once  dextrose 50% Injectable 25 once  dextrose 50% Injectable 25 once  glucagon  Injectable 1 once PRN  insulin lispro (HumaLOG) corrective regimen sliding scale  three times a day before meals  insulin lispro (HumaLOG) corrective regimen sliding scale  at bedtime  oxyCODONE    IR 5 every 6 hours PRN    SOCIAL HISTORY:   Social History:  Denies tobacco, EtOH, and illicit drug use.  Lives with son Shamim     FAMILY HISTORY:  Family history of diabetes mellitus: Mother    REVIEW OF SYSTEMS  [  ] ROS unobtainable because:    [  x] All other systems negative except as noted below:	    Constitutional:  [ x] fever [ ] chills  [ ] weight loss  [ ] weakness  Skin:  [ ] rash [ ] phlebitis	  Eyes: [ ] icterus [ ] pain  [ ] discharge	  ENMT: [ ] sore throat  [ ] thrush [ ] ulcers [ ] exudates  Respiratory: [ ] dyspnea [ ] hemoptysis [ ] cough [ ] sputum	  Cardiovascular:  [ x] chest pain [ ] palpitations [ ] edema	  Gastrointestinal:  [ ] nausea [ ] vomiting [ ] diarrhea [ ] constipation [ ] pain	  Genitourinary:  [ ] dysuria [ ] frequency [ ] hematuria [ ] discharge [ ] flank pain  [ ] incontinence  Musculoskeletal:  [ ] myalgias [ ] arthralgias [ ] arthritis  [ ] back pain  Neurological:  [ ] headache [ ] seizures  [ ] confusion/altered mental status  Psychiatric:  [ ] anxiety [ ] depression	  Hematology/Lymphatics:  [ ] lymphadenopathy  Endocrine:  [ ] adrenal [ ] thyroid  Allergic/Immunologic:	 [ ] transplant [ ] seasonal    Vital Signs Last 24 Hrs  T(F): 98.1 (20 @ 05:23), Max: 100.8 (20 @ 12:26)  Vital Signs Last 24 Hrs  HR: 82 (20 @ 05:23) (72 - 92)  BP: 109/61 (20 @ 05:23) (98/68 - 135/61)  RR: 18 (20 @ 05:23)  SpO2: 97% (20 @ 05:23) (96% - 100%)  Wt(kg): --    EXAM:  Constitutional: Not in acute distress  Eyes: pupils bilaterally reactive to light. No icterus.  Oral cavity: Clear, no lesions  Neck: No neck vein distension noted  RS: Chest clear to auscultation bilaterally. No wheeze/rhonchi/crepitations.  CVS: S1, S2 heard. Regular rate and rhythm. No murmurs/rubs/gallops.  Abdomen: Soft. No guarding/rigidity/tenderness.  : No acute abnormalities  Extremities: Warm. No pedal edema  Skin: R chest with erythema around port site, nontender on palpation  Vascular: No evidence of phlebitis  Neuro: Alert, oriented to time/place/person                          10.1   11.80 )-----------( 236      ( 22 Aug 2020 11:55 )             32.6         139  |  103  |  18  ----------------------------<  132<H>  4.2   |  22  |  0.84    Ca    9.9      22 Aug 2020 11:55    TPro  7.5  /  Alb  4.8  /  TBili  0.3  /  DBili  x   /  AST  15  /  ALT  13  /  AlkPhos  85      Urinalysis Basic - ( 22 Aug 2020 13:18 )    Color: COLORLESS / Appearance: CLEAR / S.011 / pH: 6.5  Gluc: NEGATIVE / Ketone: NEGATIVE  / Bili: NEGATIVE / Urobili: NORMAL   Blood: NEGATIVE / Protein: 10 / Nitrite: NEGATIVE   Leuk Esterase: NEGATIVE / RBC: x / WBC x   Sq Epi: x / Non Sq Epi: x / Bacteria: x    MICROBIOLOGY:    Blood cultures pending.                RADIOLOGY:  imaging below personally reviewed    < from: CT Angio Chest w/ IV Cont (20 @ 15:00) >  FINDINGS:  LUNGS AND AIRWAYS: No endobronchial lesions. Unchanged biapical scarring with calcifications since prior examination. Bibasilar linear atelectasis.  PLEURA: No pleural effusion.  MEDIASTINUM AND CESAR: No lymphadenopathy.  VESSELS: No pulmonary embolus.  HEART: Heart size is normal. No pericardial effusion.  CHEST WALL AND LOWER NECK: Right chest wall MediPort with tip in SVC.  Bilateral axillary lymph nodes, unchanged.  VISUALIZED UPPER ABDOMEN: Within normal limits.  BONES: Degenerative changes of the spine.  IMPRESSION:  No pulmonary embolus.  OTHER TESTS:

## 2020-08-23 NOTE — DISCHARGE NOTE PROVIDER - NSDCMRMEDTOKEN_GEN_ALL_CORE_FT
atenolol 50 mg oral tablet: 1 tab(s) orally once a day  atorvastatin 20 mg oral tablet: 1 tab(s) orally once a day (at bedtime)  Eliquis Starter Pack for Treatment of DVT and PE 5 mg oral tablet: 1 tab(s) orally 2 times a day   metFORMIN 500 mg oral tablet: 1 tab(s) orally 2 times a day MDD:2 tablets  metoclopramide 10 mg oral tablet: 1 tab(s) orally 4 times a day (before meals and at bedtime), As Needed  ocular lubricant ophthalmic solution: 1 drop(s) to each affected eye 3 times a day  polyethylene glycol 3350 oral powder for reconstitution: 17 gram(s) orally once a day, As Needed  tretinoin 10 mg oral capsule: orally 2 times a day atenolol 50 mg oral tablet: 1 tab(s) orally once a day  atorvastatin 20 mg oral tablet: 1 tab(s) orally once a day (at bedtime)  Eliquis Starter Pack for Treatment of DVT and PE 5 mg oral tablet: 1 tab(s) orally 2 times a day   gabapentin 100 mg oral capsule: 1 cap(s) orally 3 times a day  metFORMIN 500 mg oral tablet: 1 tab(s) orally 2 times a day MDD:2 tablets  metoclopramide 10 mg oral tablet: 1 tab(s) orally 4 times a day (before meals and at bedtime), As Needed  ocular lubricant ophthalmic solution: 1 drop(s) to each affected eye 3 times a day  pantoprazole 40 mg oral delayed release tablet: 1 tab(s) orally once a day (before a meal)  polyethylene glycol 3350 oral powder for reconstitution: 17 gram(s) orally once a day, As Needed  tretinoin 10 mg oral capsule: orally 2 times a day atenolol 50 mg oral tablet: 1 tab(s) orally once a day  atorvastatin 20 mg oral tablet: 1 tab(s) orally once a day (at bedtime)  Eliquis Starter Pack for Treatment of DVT and PE 5 mg oral tablet: 1 tab(s) orally 2 times a day   gabapentin 100 mg oral capsule: 1 cap(s) orally 3 times a day  metFORMIN 500 mg oral tablet: 1 tab(s) orally 2 times a day MDD:2 tablets  metoclopramide 10 mg oral tablet: 1 tab(s) orally 4 times a day (before meals and at bedtime), As Needed  ocular lubricant ophthalmic solution: 1 drop(s) to each affected eye 3 times a day  polyethylene glycol 3350 oral powder for reconstitution: 17 gram(s) orally once a day, As Needed

## 2020-08-23 NOTE — DISCHARGE NOTE PROVIDER - HOSPITAL COURSE
HPI:    Patient is a 60-year-old Female with PMHx of HTN, HLD, T2DM, DVT (dx July 2020, on Eliquis) and APL (dx May 2020; on ATRA and arsenic) presenting with one day of chest pain and fever.  She developed mild substernal/R chest pain around 1am today, went to sleep, and then had acutely worsened pain at 9am, plus subjective fever.  Her son Giovana called EMS for chest pain and shortness of breath, and EMS noted her SBP in the 70s and gave 250cc IVF en route to the [facility] ED.  She reports not feeling pain like this before, and the pain is now reportedly radiating to her back and throat.  She also endorses R groin pain for a couple days.  Denies headache, cough, n/v, diarrhea, dysuria, melena, or rash.  She receives arsenic weekly through her R mediport and last took her ATRA this morning.        In the ED: Tmax 100.8 rectal, HR 72-92, BP /61-68, RR 18-26, SpO2 % on RA.  EKG wnl.  Morphine x1 given for pain.  UA neg.  BCx and UCx sent, and Zosyn and Vanc x1 given empirically.  CTA showed no PE. 1L NS bolus.        Hospital Course: HPI:    Patient is a 60-year-old Female with PMHx of HTN, HLD, T2DM, DVT (dx July 2020, on Eliquis) and APL (dx May 2020; on ATRA and arsenic) presenting with one day of chest pain and fever.  She developed mild substernal/R chest pain around 1am today, went to sleep, and then had acutely worsened pain at 9am, plus subjective fever.  Her son Giovana called EMS for chest pain and shortness of breath, and EMS noted her SBP in the 70s and gave 250cc IVF en route to the [facility] ED.  She reports not feeling pain like this before, and the pain is now reportedly radiating to her back and throat.  She also endorses R groin pain for a couple days.  Denies headache, cough, n/v, diarrhea, dysuria, melena, or rash.  She receives arsenic weekly through her R mediport and last took her ATRA this morning.        In the ED: Tmax 100.8 rectal, HR 72-92, BP /61-68, RR 18-26, SpO2 % on RA.  EKG wnl.  Morphine x1 given for pain.  UA neg.  BCx and UCx sent, and Zosyn and Vanc x1 given empirically.  CTA showed no PE. 1L NS bolus.        Hospital Course:    Patient was admitted for chest pain, likely due to infection of her RIGHT chest mediport.  Hematology/Oncology was consulted and recommended temporary discontinuation of ATRA and arsenic chemotherapy until the infection had resolved.  Blood and urine cultures were ___________, and she received empiric vancomycin and piperacillin/tazobactam.  The piperacillin/tazobactam was then changed to cefepime.  Interventional Radiology was consulted to remove her infected mediport.  Her chest pain was managed with acetaminophen and oxycodone.  She remained afebrile after her first day admitted. HPI:    Patient is a 60-year-old Female with PMHx of HTN, HLD, T2DM, DVT (dx July 2020, on Eliquis) and APL (dx May 2020; on ATRA and arsenic) presenting with one day of chest pain and fever.  She developed mild substernal/R chest pain around 1am today, went to sleep, and then had acutely worsened pain at 9am, plus subjective fever.  Her son Giovana called EMS for chest pain and shortness of breath, and EMS noted her SBP in the 70s and gave 250cc IVF en route to the [facility] ED.  She reports not feeling pain like this before, and the pain is now reportedly radiating to her back and throat.  She also endorses R groin pain for a couple days.  Denies headache, cough, n/v, diarrhea, dysuria, melena, or rash.  She receives arsenic weekly through her R mediport and last took her ATRA this morning.        In the ED: Tmax 100.8 rectal, HR 72-92, BP /61-68, RR 18-26, SpO2 % on RA.  EKG wnl.  Morphine x1 given for pain.  UA neg.  BCx and UCx sent, and Zosyn and Vanc x1 given empirically.  CTA showed no PE. 1L NS bolus.        Hospital Course:    Patient was admitted for chest pain, likely due to infection of her RIGHT chest mediport.  Hematology/Oncology was consulted and recommended temporary discontinuation of ATRA and arsenic chemotherapy until the infection had resolved.  Blood and urine cultures were negative, and she received empiric vancomycin and piperacillin/tazobactam.  The piperacillin/tazobactam was then changed to cefepime per ID recommendations. Her chest pain was managed with acetaminophen and oxycodone.  She remained afebrile after her first day admitted and the redness at her Mediport site resolved. Duplex studies of the R upper extremity and bilateral lower extremities showed no DVT. Interventional Radiology was consulted to complete a port study. Hematology recommended restarting ATRA and arsenic on 8/25. HPI:    Patient is a 60-year-old Female with PMHx of HTN, HLD, T2DM, DVT (dx July 2020, on Eliquis) and APL (dx May 2020; on ATRA and arsenic) presenting with one day of chest pain and fever.  She developed mild substernal/R chest pain around 1am today, went to sleep, and then had acutely worsened pain at 9am, plus subjective fever.  Her son Giovana called EMS for chest pain and shortness of breath, and EMS noted her SBP in the 70s and gave 250cc IVF en route to the [facility] ED.  She reports not feeling pain like this before, and the pain is now reportedly radiating to her back and throat.  She also endorses R groin pain for a couple days.  Denies headache, cough, n/v, diarrhea, dysuria, melena, or rash.  She receives arsenic weekly through her R mediport and last took her ATRA this morning.        In the ED: Tmax 100.8 rectal, HR 72-92, BP /61-68, RR 18-26, SpO2 % on RA.  EKG wnl.  Morphine x1 given for pain.  UA neg.  BCx and UCx sent, and Zosyn and Vanc x1 given empirically.  CTA showed no PE. 1L NS bolus.        Hospital Course:    Patient was admitted for chest pain, likely due to infection of her RIGHT chest mediport.  Hematology/Oncology was consulted and recommended temporary discontinuation of ATRA and arsenic chemotherapy until the infection had resolved.  Blood and urine cultures were negative, and she received empiric vancomycin and piperacillin/tazobactam.  The piperacillin/tazobactam was then changed to cefepime per ID recommendations. Her chest pain was managed with acetaminophen and oxycodone.  She remained afebrile after her first day admitted and the redness at her Mediport site resolved. Duplex studies of the R upper extremity and bilateral lower extremities showed no DVT. Interventional Radiology was consulted to complete a port study, which showed that the mediport is working appropriately. Hematology restarted arsenic on 8/25, to be continued outpatient at [facility]. HPI:    Patient is a 60-year-old Female with PMHx of HTN, HLD, T2DM, DVT (dx July 2020, on Eliquis) and APL (dx May 2020; on ATRA and arsenic) presenting with one day of chest pain and fever.  She developed mild substernal/R chest pain around 1am today, went to sleep, and then had acutely worsened pain at 9am, plus subjective fever.  Her son Giovana called EMS for chest pain and shortness of breath, and EMS noted her SBP in the 70s and gave 250cc IVF en route to the [facility] ED.  She reports not feeling pain like this before, and the pain is now reportedly radiating to her back and throat.  She also endorses R groin pain for a couple days.  Denies headache, cough, n/v, diarrhea, dysuria, melena, or rash.  She receives arsenic weekly through her R mediport and last took her ATRA this morning.        In the ED: Tmax 100.8 rectal, HR 72-92, BP /61-68, RR 18-26, SpO2 % on RA.  EKG wnl.  Morphine x1 given for pain.  UA neg.  BCx and UCx sent, and Zosyn and Vanc x1 given empirically.  CTA showed no PE. 1L NS bolus.        Hospital Course:    Patient was admitted for chest pain, likely due to infection of her RIGHT chest mediport.  Hematology/Oncology was consulted and recommended temporary discontinuation of ATRA and arsenic chemotherapy until the infection had resolved.  Blood and urine cultures were negative, and she received empiric vancomycin and piperacillin/tazobactam.  The piperacillin/tazobactam was then changed to cefepime per ID recommendations. Her chest pain was managed with acetaminophen and oxycodone.  She remained afebrile after her first day admitted and the redness at her Mediport site resolved. Duplex studies of the R upper extremity and bilateral lower extremities showed no DVT. Interventional Radiology was consulted to examine the port, which showed that the Mediport is working appropriately. Hematology restarted arsenic on 8/25, to be continued outpatient at [facility].

## 2020-08-24 ENCOUNTER — LABORATORY RESULT (OUTPATIENT)
Age: 60
End: 2020-08-24

## 2020-08-24 ENCOUNTER — APPOINTMENT (OUTPATIENT)
Dept: INFUSION THERAPY | Facility: HOSPITAL | Age: 60
End: 2020-08-24

## 2020-08-24 LAB
ALBUMIN SERPL ELPH-MCNC: 3.8 G/DL — SIGNIFICANT CHANGE UP (ref 3.3–5)
ALP SERPL-CCNC: 64 U/L — SIGNIFICANT CHANGE UP (ref 40–120)
ALT FLD-CCNC: 11 U/L — SIGNIFICANT CHANGE UP (ref 4–33)
ANION GAP SERPL CALC-SCNC: 13 MMO/L — SIGNIFICANT CHANGE UP (ref 7–14)
AST SERPL-CCNC: 23 U/L — SIGNIFICANT CHANGE UP (ref 4–32)
BASOPHILS # BLD AUTO: 0.03 K/UL — SIGNIFICANT CHANGE UP (ref 0–0.2)
BASOPHILS NFR BLD AUTO: 0.4 % — SIGNIFICANT CHANGE UP (ref 0–2)
BILIRUB SERPL-MCNC: 0.4 MG/DL — SIGNIFICANT CHANGE UP (ref 0.2–1.2)
BUN SERPL-MCNC: 8 MG/DL — SIGNIFICANT CHANGE UP (ref 7–23)
CALCIUM SERPL-MCNC: 9.6 MG/DL — SIGNIFICANT CHANGE UP (ref 8.4–10.5)
CHLORIDE SERPL-SCNC: 105 MMOL/L — SIGNIFICANT CHANGE UP (ref 98–107)
CO2 SERPL-SCNC: 22 MMOL/L — SIGNIFICANT CHANGE UP (ref 22–31)
CREAT SERPL-MCNC: 0.73 MG/DL — SIGNIFICANT CHANGE UP (ref 0.5–1.3)
EOSINOPHIL # BLD AUTO: 0.49 K/UL — SIGNIFICANT CHANGE UP (ref 0–0.5)
EOSINOPHIL NFR BLD AUTO: 6.7 % — HIGH (ref 0–6)
GLUCOSE BLDC GLUCOMTR-MCNC: 111 MG/DL — HIGH (ref 70–99)
GLUCOSE BLDC GLUCOMTR-MCNC: 153 MG/DL — HIGH (ref 70–99)
GLUCOSE BLDC GLUCOMTR-MCNC: 153 MG/DL — HIGH (ref 70–99)
GLUCOSE SERPL-MCNC: 122 MG/DL — HIGH (ref 70–99)
HCT VFR BLD CALC: 28.9 % — LOW (ref 34.5–45)
HGB BLD-MCNC: 9 G/DL — LOW (ref 11.5–15.5)
IMM GRANULOCYTES NFR BLD AUTO: 0.3 % — SIGNIFICANT CHANGE UP (ref 0–1.5)
LYMPHOCYTES # BLD AUTO: 1.23 K/UL — SIGNIFICANT CHANGE UP (ref 1–3.3)
LYMPHOCYTES # BLD AUTO: 16.9 % — SIGNIFICANT CHANGE UP (ref 13–44)
MAGNESIUM SERPL-MCNC: 1.9 MG/DL — SIGNIFICANT CHANGE UP (ref 1.6–2.6)
MCHC RBC-ENTMCNC: 28.5 PG — SIGNIFICANT CHANGE UP (ref 27–34)
MCHC RBC-ENTMCNC: 31.1 % — LOW (ref 32–36)
MCV RBC AUTO: 91.5 FL — SIGNIFICANT CHANGE UP (ref 80–100)
MONOCYTES # BLD AUTO: 0.31 K/UL — SIGNIFICANT CHANGE UP (ref 0–0.9)
MONOCYTES NFR BLD AUTO: 4.3 % — SIGNIFICANT CHANGE UP (ref 2–14)
NEUTROPHILS # BLD AUTO: 5.18 K/UL — SIGNIFICANT CHANGE UP (ref 1.8–7.4)
NEUTROPHILS NFR BLD AUTO: 71.4 % — SIGNIFICANT CHANGE UP (ref 43–77)
NRBC # FLD: 0 K/UL — SIGNIFICANT CHANGE UP (ref 0–0)
PHOSPHATE SERPL-MCNC: 3 MG/DL — SIGNIFICANT CHANGE UP (ref 2.5–4.5)
PLATELET # BLD AUTO: 178 K/UL — SIGNIFICANT CHANGE UP (ref 150–400)
PMV BLD: 11.7 FL — SIGNIFICANT CHANGE UP (ref 7–13)
POTASSIUM SERPL-MCNC: 3.9 MMOL/L — SIGNIFICANT CHANGE UP (ref 3.5–5.3)
POTASSIUM SERPL-SCNC: 3.9 MMOL/L — SIGNIFICANT CHANGE UP (ref 3.5–5.3)
PROT SERPL-MCNC: 6.8 G/DL — SIGNIFICANT CHANGE UP (ref 6–8.3)
RBC # BLD: 3.16 M/UL — LOW (ref 3.8–5.2)
RBC # FLD: 15.7 % — HIGH (ref 10.3–14.5)
SODIUM SERPL-SCNC: 140 MMOL/L — SIGNIFICANT CHANGE UP (ref 135–145)
WBC # BLD: 7.26 K/UL — SIGNIFICANT CHANGE UP (ref 3.8–10.5)
WBC # FLD AUTO: 7.26 K/UL — SIGNIFICANT CHANGE UP (ref 3.8–10.5)

## 2020-08-24 PROCEDURE — 99233 SBSQ HOSP IP/OBS HIGH 50: CPT | Mod: GC

## 2020-08-24 PROCEDURE — 99232 SBSQ HOSP IP/OBS MODERATE 35: CPT

## 2020-08-24 PROCEDURE — 93970 EXTREMITY STUDY: CPT | Mod: 26

## 2020-08-24 PROCEDURE — 93971 EXTREMITY STUDY: CPT | Mod: 26

## 2020-08-24 RX ORDER — GABAPENTIN 400 MG/1
100 CAPSULE ORAL THREE TIMES A DAY
Refills: 0 | Status: DISCONTINUED | OUTPATIENT
Start: 2020-08-24 | End: 2020-08-27

## 2020-08-24 RX ORDER — LIDOCAINE 4 G/100G
1 CREAM TOPICAL DAILY
Refills: 0 | Status: DISCONTINUED | OUTPATIENT
Start: 2020-08-24 | End: 2020-08-27

## 2020-08-24 RX ADMIN — GABAPENTIN 100 MILLIGRAM(S): 400 CAPSULE ORAL at 13:04

## 2020-08-24 RX ADMIN — CEFEPIME 100 MILLIGRAM(S): 1 INJECTION, POWDER, FOR SOLUTION INTRAMUSCULAR; INTRAVENOUS at 05:10

## 2020-08-24 RX ADMIN — GABAPENTIN 100 MILLIGRAM(S): 400 CAPSULE ORAL at 22:43

## 2020-08-24 RX ADMIN — CEFEPIME 100 MILLIGRAM(S): 1 INJECTION, POWDER, FOR SOLUTION INTRAMUSCULAR; INTRAVENOUS at 12:54

## 2020-08-24 RX ADMIN — Medication 250 MILLIGRAM(S): at 12:55

## 2020-08-24 RX ADMIN — ATORVASTATIN CALCIUM 20 MILLIGRAM(S): 80 TABLET, FILM COATED ORAL at 22:43

## 2020-08-24 RX ADMIN — APIXABAN 5 MILLIGRAM(S): 2.5 TABLET, FILM COATED ORAL at 05:10

## 2020-08-24 RX ADMIN — Medication 250 MILLIGRAM(S): at 00:27

## 2020-08-24 RX ADMIN — Medication 1: at 17:56

## 2020-08-24 RX ADMIN — APIXABAN 5 MILLIGRAM(S): 2.5 TABLET, FILM COATED ORAL at 19:05

## 2020-08-24 NOTE — PROGRESS NOTE ADULT - PROBLEM SELECTOR PLAN 7
Diet: Consistent carbohydrate  DVT prophylaxis: Eliquis 5mg  Dispo: Diet: Consistent carbohydrate  DVT prophylaxis: Eliquis 5mg  Post-herpetic neuralgia and scabbing (zoster infection 6/22-6/30). Consider gabapentin   Dispo:

## 2020-08-24 NOTE — PROGRESS NOTE ADULT - SUBJECTIVE AND OBJECTIVE BOX
Internal Medicine Progress Note    Debbi Parker, MS4  pager [phone]    Patient is a 60y old  Female who presents with a chief complaint of Chest pain (23 Aug 2020 12:04)      SUBJECTIVE / OVERNIGHT EVENTS:   Overnight, patient refused vanc trough due to painful/difficult stick. Explained to patient and her son the importance of the medical team obtaining this information.    Patient seen and examined at bedside, with a translation provided by her son via telephone.   The patient reports continued substernal and mid upper back pain which worsens with inspiration. She denies shortness of breath palpitations, nausea, vomiting, cough. She endorses dizziness with standing. Patient reports new pain that radiates down her entire right leg, starting this morning. The pain is worsened by walking, sitting, and palpation. Patient also reports a bothersome scab on her right buttock from recent shingles infection. She currently reports being most bothered by her leg pain and scab on her buttock.    MEDICATIONS  (STANDING):  apixaban 5 milliGRAM(s) Oral every 12 hours  atorvastatin 20 milliGRAM(s) Oral at bedtime  cefepime   IVPB 2000 milliGRAM(s) IV Intermittent every 8 hours  dextrose 5%. 1000 milliLiter(s) (50 mL/Hr) IV Continuous <Continuous>  dextrose 50% Injectable 12.5 Gram(s) IV Push once  dextrose 50% Injectable 25 Gram(s) IV Push once  dextrose 50% Injectable 25 Gram(s) IV Push once  insulin lispro (HumaLOG) corrective regimen sliding scale   SubCutaneous three times a day before meals  insulin lispro (HumaLOG) corrective regimen sliding scale   SubCutaneous at bedtime  vancomycin  IVPB 1000 milliGRAM(s) IV Intermittent every 12 hours    MEDICATIONS  (PRN):  acetaminophen   Tablet .. 650 milliGRAM(s) Oral every 6 hours PRN Mild Pain (1 - 3), Moderate Pain (4 - 6)  dextrose 40% Gel 15 Gram(s) Oral once PRN Blood Glucose LESS THAN 70 milliGRAM(s)/deciliter  glucagon  Injectable 1 milliGRAM(s) IntraMuscular once PRN Glucose LESS THAN 70 milligrams/deciliter  oxyCODONE    IR 5 milliGRAM(s) Oral every 6 hours PRN Severe Pain (7 - 10)    Vital Signs Last 24 Hrs  T(C): 36.7 (24 Aug 2020 05:17), Max: 37.1 (23 Aug 2020 13:15)  T(F): 98.1 (24 Aug 2020 05:17), Max: 98.7 (23 Aug 2020 13:15)  HR: 78 (24 Aug 2020 05:17) (78 - 86)  BP: 135/60 (24 Aug 2020 05:17) (115/57 - 135/60)  BP(mean): --  RR: 18 (24 Aug 2020 05:17) (17 - 19)  SpO2: 100% (24 Aug 2020 05:17) (96% - 100%)    CAPILLARY BLOOD GLUCOSE      POCT Blood Glucose.: 124 mg/dL (24 Aug 2020 08:51)  POCT Blood Glucose.: 179 mg/dL (23 Aug 2020 22:03)  POCT Blood Glucose.: 175 mg/dL (23 Aug 2020 17:30)  POCT Blood Glucose.: 139 mg/dL (23 Aug 2020 12:16)    I&O's Summary    23 Aug 2020 07:01  -  24 Aug 2020 07:00  --------------------------------------------------------  IN: 550 mL / OUT: 0 mL / NET: 550 mL        PHYSICAL EXAM  GENERAL: No apparent distress  HEAD:  Atraumatic  EYES: EOMI, PERRLA, conjunctiva and sclera clear  CHEST/LUNG: Clear to auscultation bilaterally; No wheeze  HEART: Regular rate and rhythm; No murmurs, rubs, or gallops  ABDOMEN: Soft, Nontender, Nondistended  EXTREMITIES: Pain on palpation of entire right lower leg. 2+ Peripheral Pulses, No clubbing, cyanosis, or edema  SKIN:  2cm scab on right buttock. Mediport palpated in right clavicular area. No erythema or induration around Mediport   NEURO/PSYCH: AAOx3, nonfocal        LABS:                        9.0    7.26  )-----------( 178      ( 24 Aug 2020 07:28 )             28.9     Hemoglobin: 9.0 g/dL ( @ 07:28)  Hemoglobin: 9.2 g/dL ( @ 14:30)  Hemoglobin: 10.1 g/dL ( @ 11:55)  Hemoglobin: 9.8 g/dL ( @ 13:43)  Hemoglobin: 9.7 g/dL ( @ 12:54)    CBC Full  -  ( 24 Aug 2020 07:28 )  WBC Count : 7.26 K/uL  RBC Count : 3.16 M/uL  Hemoglobin : 9.0 g/dL  Hematocrit : 28.9 %  Platelet Count - Automated : 178 K/uL  Mean Cell Volume : 91.5 fL  Mean Cell Hemoglobin : 28.5 pg  Mean Cell Hemoglobin Concentration : 31.1 %  Auto Neutrophil # : 5.18 K/uL  Auto Lymphocyte # : 1.23 K/uL  Auto Monocyte # : 0.31 K/uL  Auto Eosinophil # : 0.49 K/uL  Auto Basophil # : 0.03 K/uL  Auto Neutrophil % : 71.4 %  Auto Lymphocyte % : 16.9 %  Auto Monocyte % : 4.3 %  Auto Eosinophil % : 6.7 %  Auto Basophil % : 0.4 %        140  |  105  |  8   ----------------------------<  122<H>  3.9   |  22  |  0.73    Ca    9.6      24 Aug 2020 07:28  Phos  3.0       Mg     1.9     24    TPro  6.8  /  Alb  3.8  /  TBili  0.4  /  DBili  x   /  AST  23  /  ALT  11  /  AlkPhos  64  08-24    Creatinine Trend: 0.73<--, 0.80<--, 0.84<--, 0.70<--, 1.06<--  LIVER FUNCTIONS - ( 24 Aug 2020 07:28 )  Alb: 3.8 g/dL / Pro: 6.8 g/dL / ALK PHOS: 64 u/L / ALT: 11 u/L / AST: 23 u/L / GGT: x           PT/INR - ( 22 Aug 2020 11:55 )   PT: 16.3 SEC;   INR: 1.44          PTT - ( 22 Aug 2020 11:55 )  PTT:36.8 SEC    hs Troponin:            Urinalysis Basic - ( 22 Aug 2020 13:18 )    Color: COLORLESS / Appearance: CLEAR / S.011 / pH: 6.5  Gluc: NEGATIVE / Ketone: NEGATIVE  / Bili: NEGATIVE / Urobili: NORMAL   Blood: NEGATIVE / Protein: 10 / Nitrite: NEGATIVE   Leuk Esterase: NEGATIVE / RBC: x / WBC x   Sq Epi: x / Non Sq Epi: x / Bacteria: x      CSF:                      EKG:   MICROBIOLOGY:    Culture - Blood (collected 22 Aug 2020 14:44)  Source: .Blood Blood-Peripheral  Preliminary Report (23 Aug 2020 15:01):    No growth to date.    Culture - Blood (collected 22 Aug 2020 14:44)  Source: .Blood Blood-Peripheral  Preliminary Report (23 Aug 2020 15:01):    No growth to date.    Culture - Urine (collected 22 Aug 2020 13:15)  Source: .Urine Clean Catch (Midstream)  Final Report (23 Aug 2020 13:10):    <10,000 CFU/mL Normal Urogenital Krisat      IMAGING:      Labs, imaging, EKG personally reviewed     CONSULTS: Internal Medicine Progress Note    Debbi Parker, MS4  pager [phone]    Patient is a 60y old  Female who presents with a chief complaint of Chest pain (23 Aug 2020 12:04)      SUBJECTIVE / OVERNIGHT EVENTS:   Overnight, patient refused vanc trough due to painful/difficult stick. Explained to patient and her son the importance of the medical team obtaining this information.    Patient seen and examined at bedside, with a translation provided by her son via telephone.   The patient reports continued substernal and mid upper back pain which worsens with inspiration. She denies shortness of breath palpitations, nausea, vomiting, cough. She endorses dizziness with standing. Patient reports new pain that radiates down her entire right leg, starting this morning. The pain is worsened by walking, sitting, and palpation. Patient also reports a bothersome scab on her right buttock from recent zoster infection (-). She reports being most bothered by her leg pain and scab on her buttock.    MEDICATIONS  (STANDING):  apixaban 5 milliGRAM(s) Oral every 12 hours  atorvastatin 20 milliGRAM(s) Oral at bedtime  cefepime   IVPB 2000 milliGRAM(s) IV Intermittent every 8 hours  dextrose 5%. 1000 milliLiter(s) (50 mL/Hr) IV Continuous <Continuous>  dextrose 50% Injectable 12.5 Gram(s) IV Push once  dextrose 50% Injectable 25 Gram(s) IV Push once  dextrose 50% Injectable 25 Gram(s) IV Push once  insulin lispro (HumaLOG) corrective regimen sliding scale   SubCutaneous three times a day before meals  insulin lispro (HumaLOG) corrective regimen sliding scale   SubCutaneous at bedtime  vancomycin  IVPB 1000 milliGRAM(s) IV Intermittent every 12 hours    MEDICATIONS  (PRN):  acetaminophen   Tablet .. 650 milliGRAM(s) Oral every 6 hours PRN Mild Pain (1 - 3), Moderate Pain (4 - 6)  dextrose 40% Gel 15 Gram(s) Oral once PRN Blood Glucose LESS THAN 70 milliGRAM(s)/deciliter  glucagon  Injectable 1 milliGRAM(s) IntraMuscular once PRN Glucose LESS THAN 70 milligrams/deciliter  oxyCODONE    IR 5 milliGRAM(s) Oral every 6 hours PRN Severe Pain (7 - 10)    Vital Signs Last 24 Hrs  T(C): 36.7 (24 Aug 2020 05:17), Max: 37.1 (23 Aug 2020 13:15)  T(F): 98.1 (24 Aug 2020 05:17), Max: 98.7 (23 Aug 2020 13:15)  HR: 78 (24 Aug 2020 05:17) (78 - 86)  BP: 135/60 (24 Aug 2020 05:17) (115/57 - 135/60)  BP(mean): --  RR: 18 (24 Aug 2020 05:17) (17 - 19)  SpO2: 100% (24 Aug 2020 05:17) (96% - 100%)    CAPILLARY BLOOD GLUCOSE      POCT Blood Glucose.: 124 mg/dL (24 Aug 2020 08:51)  POCT Blood Glucose.: 179 mg/dL (23 Aug 2020 22:03)  POCT Blood Glucose.: 175 mg/dL (23 Aug 2020 17:30)  POCT Blood Glucose.: 139 mg/dL (23 Aug 2020 12:16)    I&O's Summary    23 Aug 2020 07:01  -  24 Aug 2020 07:00  --------------------------------------------------------  IN: 550 mL / OUT: 0 mL / NET: 550 mL        PHYSICAL EXAM  GENERAL: No apparent distress  HEAD:  Atraumatic  EYES: EOMI, PERRLA, conjunctiva and sclera clear  CHEST/LUNG: Clear to auscultation bilaterally; No wheeze  HEART: Regular rate and rhythm; No murmurs, rubs, or gallops  ABDOMEN: Soft, Nontender, Nondistended  EXTREMITIES: Pain on palpation of entire right lower leg. 2+ Peripheral Pulses, No clubbing, cyanosis, or edema  SKIN:  2cm scab on right buttock. Mediport palpated in right clavicular area. No erythema or induration around Mediport   NEURO/PSYCH: AAOx3, nonfocal        LABS:                        9.0    7.26  )-----------( 178      ( 24 Aug 2020 07:28 )             28.9     Hemoglobin: 9.0 g/dL ( @ 07:28)  Hemoglobin: 9.2 g/dL ( @ 14:30)  Hemoglobin: 10.1 g/dL ( @ 11:55)  Hemoglobin: 9.8 g/dL ( @ 13:43)  Hemoglobin: 9.7 g/dL ( @ 12:54)    CBC Full  -  ( 24 Aug 2020 07:28 )  WBC Count : 7.26 K/uL  RBC Count : 3.16 M/uL  Hemoglobin : 9.0 g/dL  Hematocrit : 28.9 %  Platelet Count - Automated : 178 K/uL  Mean Cell Volume : 91.5 fL  Mean Cell Hemoglobin : 28.5 pg  Mean Cell Hemoglobin Concentration : 31.1 %  Auto Neutrophil # : 5.18 K/uL  Auto Lymphocyte # : 1.23 K/uL  Auto Monocyte # : 0.31 K/uL  Auto Eosinophil # : 0.49 K/uL  Auto Basophil # : 0.03 K/uL  Auto Neutrophil % : 71.4 %  Auto Lymphocyte % : 16.9 %  Auto Monocyte % : 4.3 %  Auto Eosinophil % : 6.7 %  Auto Basophil % : 0.4 %        140  |  105  |  8   ----------------------------<  122<H>  3.9   |  22  |  0.73    Ca    9.6      24 Aug 2020 07:28  Phos  3.0       Mg     1.9         TPro  6.8  /  Alb  3.8  /  TBili  0.4  /  DBili  x   /  AST  23  /  ALT  11  /  AlkPhos  64  0824    Creatinine Trend: 0.73<--, 0.80<--, 0.84<--, 0.70<--, 1.06<--  LIVER FUNCTIONS - ( 24 Aug 2020 07:28 )  Alb: 3.8 g/dL / Pro: 6.8 g/dL / ALK PHOS: 64 u/L / ALT: 11 u/L / AST: 23 u/L / GGT: x           PT/INR - ( 22 Aug 2020 11:55 )   PT: 16.3 SEC;   INR: 1.44          PTT - ( 22 Aug 2020 11:55 )  PTT:36.8 SEC    hs Troponin:            Urinalysis Basic - ( 22 Aug 2020 13:18 )    Color: COLORLESS / Appearance: CLEAR / S.011 / pH: 6.5  Gluc: NEGATIVE / Ketone: NEGATIVE  / Bili: NEGATIVE / Urobili: NORMAL   Blood: NEGATIVE / Protein: 10 / Nitrite: NEGATIVE   Leuk Esterase: NEGATIVE / RBC: x / WBC x   Sq Epi: x / Non Sq Epi: x / Bacteria: x      CSF:                      EKG:   MICROBIOLOGY:    Culture - Blood (collected 22 Aug 2020 14:44)  Source: .Blood Blood-Peripheral  Preliminary Report (23 Aug 2020 15:01):    No growth to date.    Culture - Blood (collected 22 Aug 2020 14:44)  Source: .Blood Blood-Peripheral  Preliminary Report (23 Aug 2020 15:01):    No growth to date.    Culture - Urine (collected 22 Aug 2020 13:15)  Source: .Urine Clean Catch (Midstream)  Final Report (23 Aug 2020 13:10):    <10,000 CFU/mL Normal Urogenital Krista      IMAGING:      Labs, imaging, EKG personally reviewed     CONSULTS:

## 2020-08-24 NOTE — PROGRESS NOTE ADULT - ASSESSMENT
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    Noted to have fever and leukocytosis  Ct angio without PE.    1) Fever  Associated chest pain- now resolved   Fever resolved      2) Redness around mediport-  ---Present per chart at presentation  ---Area was not red yesterday or today per my exam  US without clot    Options at this time are 1) port removal or 2) Observe off abx- if fever, redness return- then port must be removed.       2) Leukocytosis  Continue to monitor    3) APL  management per oncology

## 2020-08-24 NOTE — PROGRESS NOTE ADULT - SUBJECTIVE AND OBJECTIVE BOX
Follow Up:      Inverval History/ROS:Patient is a 60y old  Female who presents with a chief complaint of Chest pain (24 Aug 2020 09:03)    Denies pain over the port.   No fever      Allergies    adhesives (Pruritus)  No Known Drug Allergies    Intolerances        ANTIMICROBIALS:      OTHER MEDS:  acetaminophen   Tablet .. 650 milliGRAM(s) Oral every 6 hours PRN  apixaban 5 milliGRAM(s) Oral every 12 hours  atorvastatin 20 milliGRAM(s) Oral at bedtime  dextrose 40% Gel 15 Gram(s) Oral once PRN  dextrose 5%. 1000 milliLiter(s) IV Continuous <Continuous>  dextrose 50% Injectable 12.5 Gram(s) IV Push once  dextrose 50% Injectable 25 Gram(s) IV Push once  dextrose 50% Injectable 25 Gram(s) IV Push once  gabapentin 100 milliGRAM(s) Oral three times a day  glucagon  Injectable 1 milliGRAM(s) IntraMuscular once PRN  insulin lispro (HumaLOG) corrective regimen sliding scale   SubCutaneous three times a day before meals  insulin lispro (HumaLOG) corrective regimen sliding scale   SubCutaneous at bedtime  oxyCODONE    IR 5 milliGRAM(s) Oral every 6 hours PRN      Vital Signs Last 24 Hrs  T(C): 36.6 (24 Aug 2020 15:33), Max: 36.7 (24 Aug 2020 05:17)  T(F): 97.8 (24 Aug 2020 15:33), Max: 98.1 (24 Aug 2020 05:17)  HR: 80 (24 Aug 2020 15:33) (75 - 83)  BP: 140/75 (24 Aug 2020 15:33) (115/57 - 140/75)  BP(mean): --  RR: 18 (24 Aug 2020 15:33) (17 - 18)  SpO2: 100% (24 Aug 2020 15:33) (98% - 100%)    PHYSICAL EXAM:  General: [x ] non-toxic  HEAD/EYES: [ ] PERRL [ x] white sclera [ ] icterus  ENT:  [ ] normal [ x] supple [ ] thrush [ ] pharyngeal exudate  Cardiovascular:   [ ] murmur [x ] normal [ ] PPM/AICD  Respiratory:  [x ] clear to ausculation bilaterally  GI:  [x ] soft, non-tender, normal bowel sounds  :  [ ] garcia [ ] no CVA tenderness   Musculoskeletal:  [ ] no synovitis  Neurologic:  [ ] non-focal exam   Skin:  [x ] no rash  Lymph: [ ] no lymphadenopathy  Psychiatric:  [ ] appropriate affect [ x] alert & oriented  Lines:  [ x] no phlebitis [ ] central line                                9.0    7.26  )-----------( 178      ( 24 Aug 2020 07:28 )             28.9       08-24    140  |  105  |  8   ----------------------------<  122<H>  3.9   |  22  |  0.73    Ca    9.6      24 Aug 2020 07:28  Phos  3.0     08-24  Mg     1.9     08-24    TPro  6.8  /  Alb  3.8  /  TBili  0.4  /  DBili  x   /  AST  23  /  ALT  11  /  AlkPhos  64  08-24          MICROBIOLOGY:Culture Results:   No growth to date. (08-22-20 @ 14:44)  Culture Results:   No growth to date. (08-22-20 @ 14:44)  Culture Results:   <10,000 CFU/mL Normal Urogenital Krista (08-22-20 @ 13:15)      RADIOLOGY:

## 2020-08-24 NOTE — PROGRESS NOTE ADULT - PROBLEM SELECTOR PLAN 1
Patient tachypneic and febrile to 100.8, with erythema around R chest mediport  - Likely 2/2 mediport infection  - CTA unremarkable. No PE  - UA clean  - f/u BCx, UCx  - Fever and white count resolved s/p Vanc and Zosyn  - C/w Vanc and Zosyn- vanc trough to be repeated   - ID consulted; appreciate recs  - Consult Vascular Surgery or IR for possible mediport removal  - Pain control with Oxycodone and Tylenol Patient tachypneic and febrile to 100.8, with erythema around R chest mediport  - Likely 2/2 mediport infection  - CTA unremarkable. No PE  - UA clean  - f/u BCx, UCx  - Fever and white count resolved s/p Vanc and Zosyn  - C/w Vanc- vanc trough to be repeated   -Switch Zosyn to Cefepime as per ID   - ID consulted; appreciate recs  - Consult Vascular Surgery or IR for possible mediport removal  - Pain control with Oxycodone and Tylenol Patient tachypneic and febrile to 100.8, with chest pain and erythema around R chest mediport  - Likely 2/2 mediport infection  - CTA unremarkable. No PE  - Obtain TTE to rule out cardiac causes   - UA clean  - BCx, UCx NGTD  - Fever and white count resolved s/p Vanc and Zosyn. Erythema around mediport site resolved  - C/w Vanc- vanc trough to be repeated prior to next dose  -Switch Zosyn to Cefepime as per ID   - ID consulted; appreciate recs  - Consult Vascular US for mediport study   - Pain control with Oxycodone and Tylenol

## 2020-08-24 NOTE — PROGRESS NOTE ADULT - ASSESSMENT
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 Redness over Mediport site and fever resolved, but chest and back pleuritic pain continues to be bothersome.

## 2020-08-24 NOTE — PROGRESS NOTE ADULT - PROBLEM SELECTOR PLAN 3
Patient had R axillary DVT In July 2020. On Eliquis 5mg. Presented tachypneic, dyspneic, and febrile.  - C/w Eliquis 5mg  - CTA showed no PE  - No blood draws from RIGHT arm Patient had R axillary DVT In July 2020. On Eliquis 5mg. Presented tachypneic, dyspneic, and febrile.  - C/w Eliquis 5mg  - CTA showed no PE  - No blood draws from RIGHT arm  - New R leg pain on palpation and with walking. Obtain LE doppler

## 2020-08-24 NOTE — PROGRESS NOTE ADULT - PROBLEM SELECTOR PLAN 2
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 Follows Dr. Hurd at [facility]  - Last marrow Bx, on 7/13/20, showed no leukemia  - Last took ATRA morning of 8/23. Immunosuppressed 2/2 chemo  - Heme/Onc consult; appreciate recs  - Per Onc, do not continue ATRA or arsenic while septic  - Do not access mediport

## 2020-08-24 NOTE — PROGRESS NOTE ADULT - PROBLEM SELECTOR PLAN 8
Transitions of Care Status:  1.  Name of PCP: Dr. Oriana Montanez ([phone])  2.  PCP Contacted on Admission: [ ] Y    [ ] N    3.  PCP contacted at Discharge: [ ] Y    [ ] N    [ ] N/A  4.  Post-Discharge Appointment Date and Location:  5.  Summary of Handoff given to PCP:

## 2020-08-25 ENCOUNTER — APPOINTMENT (OUTPATIENT)
Dept: INFUSION THERAPY | Facility: HOSPITAL | Age: 60
End: 2020-08-25

## 2020-08-25 LAB
ALBUMIN SERPL ELPH-MCNC: 4.3 G/DL — SIGNIFICANT CHANGE UP (ref 3.3–5)
ALP SERPL-CCNC: 67 U/L — SIGNIFICANT CHANGE UP (ref 40–120)
ALT FLD-CCNC: 16 U/L — SIGNIFICANT CHANGE UP (ref 4–33)
ANION GAP SERPL CALC-SCNC: 13 MMO/L — SIGNIFICANT CHANGE UP (ref 7–14)
AST SERPL-CCNC: 22 U/L — SIGNIFICANT CHANGE UP (ref 4–32)
BASOPHILS # BLD AUTO: 0.03 K/UL — SIGNIFICANT CHANGE UP (ref 0–0.2)
BASOPHILS NFR BLD AUTO: 0.4 % — SIGNIFICANT CHANGE UP (ref 0–2)
BILIRUB SERPL-MCNC: 0.3 MG/DL — SIGNIFICANT CHANGE UP (ref 0.2–1.2)
BUN SERPL-MCNC: 12 MG/DL — SIGNIFICANT CHANGE UP (ref 7–23)
CALCIUM SERPL-MCNC: 9.5 MG/DL — SIGNIFICANT CHANGE UP (ref 8.4–10.5)
CHLORIDE SERPL-SCNC: 108 MMOL/L — HIGH (ref 98–107)
CO2 SERPL-SCNC: 22 MMOL/L — SIGNIFICANT CHANGE UP (ref 22–31)
CREAT SERPL-MCNC: 0.77 MG/DL — SIGNIFICANT CHANGE UP (ref 0.5–1.3)
EOSINOPHIL # BLD AUTO: 1.19 K/UL — HIGH (ref 0–0.5)
EOSINOPHIL NFR BLD AUTO: 15.8 % — HIGH (ref 0–6)
GLUCOSE BLDC GLUCOMTR-MCNC: 105 MG/DL — HIGH (ref 70–99)
GLUCOSE BLDC GLUCOMTR-MCNC: 122 MG/DL — HIGH (ref 70–99)
GLUCOSE BLDC GLUCOMTR-MCNC: 161 MG/DL — HIGH (ref 70–99)
GLUCOSE BLDC GLUCOMTR-MCNC: 180 MG/DL — HIGH (ref 70–99)
GLUCOSE SERPL-MCNC: 124 MG/DL — HIGH (ref 70–99)
HCT VFR BLD CALC: 28.4 % — LOW (ref 34.5–45)
HGB BLD-MCNC: 9.4 G/DL — LOW (ref 11.5–15.5)
IMM GRANULOCYTES NFR BLD AUTO: 0.3 % — SIGNIFICANT CHANGE UP (ref 0–1.5)
LYMPHOCYTES # BLD AUTO: 1.83 K/UL — SIGNIFICANT CHANGE UP (ref 1–3.3)
LYMPHOCYTES # BLD AUTO: 24.3 % — SIGNIFICANT CHANGE UP (ref 13–44)
MAGNESIUM SERPL-MCNC: 2.1 MG/DL — SIGNIFICANT CHANGE UP (ref 1.6–2.6)
MCHC RBC-ENTMCNC: 29.3 PG — SIGNIFICANT CHANGE UP (ref 27–34)
MCHC RBC-ENTMCNC: 33.1 % — SIGNIFICANT CHANGE UP (ref 32–36)
MCV RBC AUTO: 88.5 FL — SIGNIFICANT CHANGE UP (ref 80–100)
MONOCYTES # BLD AUTO: 0.31 K/UL — SIGNIFICANT CHANGE UP (ref 0–0.9)
MONOCYTES NFR BLD AUTO: 4.1 % — SIGNIFICANT CHANGE UP (ref 2–14)
NEUTROPHILS # BLD AUTO: 4.14 K/UL — SIGNIFICANT CHANGE UP (ref 1.8–7.4)
NEUTROPHILS NFR BLD AUTO: 55.1 % — SIGNIFICANT CHANGE UP (ref 43–77)
NRBC # FLD: 0 K/UL — SIGNIFICANT CHANGE UP (ref 0–0)
PHOSPHATE SERPL-MCNC: 3.8 MG/DL — SIGNIFICANT CHANGE UP (ref 2.5–4.5)
PLATELET # BLD AUTO: 198 K/UL — SIGNIFICANT CHANGE UP (ref 150–400)
PMV BLD: 11.7 FL — SIGNIFICANT CHANGE UP (ref 7–13)
POTASSIUM SERPL-MCNC: 3.9 MMOL/L — SIGNIFICANT CHANGE UP (ref 3.5–5.3)
POTASSIUM SERPL-SCNC: 3.9 MMOL/L — SIGNIFICANT CHANGE UP (ref 3.5–5.3)
PROT SERPL-MCNC: 7.2 G/DL — SIGNIFICANT CHANGE UP (ref 6–8.3)
RBC # BLD: 3.21 M/UL — LOW (ref 3.8–5.2)
RBC # FLD: 15.6 % — HIGH (ref 10.3–14.5)
SODIUM SERPL-SCNC: 143 MMOL/L — SIGNIFICANT CHANGE UP (ref 135–145)
WBC # BLD: 7.52 K/UL — SIGNIFICANT CHANGE UP (ref 3.8–10.5)
WBC # FLD AUTO: 7.52 K/UL — SIGNIFICANT CHANGE UP (ref 3.8–10.5)

## 2020-08-25 PROCEDURE — 99233 SBSQ HOSP IP/OBS HIGH 50: CPT | Mod: GC

## 2020-08-25 PROCEDURE — 99232 SBSQ HOSP IP/OBS MODERATE 35: CPT

## 2020-08-25 RX ORDER — PALONOSETRON HYDROCHLORIDE 0.25 MG/5ML
0.25 INJECTION, SOLUTION INTRAVENOUS ONCE
Refills: 0 | Status: COMPLETED | OUTPATIENT
Start: 2020-08-25 | End: 2020-08-25

## 2020-08-25 RX ORDER — ARSENIC TRIOXIDE 2 MG/ML
11 INJECTION, SOLUTION INTRAVENOUS DAILY
Refills: 0 | Status: COMPLETED | OUTPATIENT
Start: 2020-08-25 | End: 2020-08-27

## 2020-08-25 RX ORDER — ONDANSETRON 8 MG/1
16 TABLET, FILM COATED ORAL DAILY
Refills: 0 | Status: DISCONTINUED | OUTPATIENT
Start: 2020-08-25 | End: 2020-08-25

## 2020-08-25 RX ORDER — ONDANSETRON 8 MG/1
4 TABLET, FILM COATED ORAL EVERY 6 HOURS
Refills: 0 | Status: DISCONTINUED | OUTPATIENT
Start: 2020-08-25 | End: 2020-08-25

## 2020-08-25 RX ADMIN — GABAPENTIN 100 MILLIGRAM(S): 400 CAPSULE ORAL at 05:46

## 2020-08-25 RX ADMIN — GABAPENTIN 100 MILLIGRAM(S): 400 CAPSULE ORAL at 23:24

## 2020-08-25 RX ADMIN — ATORVASTATIN CALCIUM 20 MILLIGRAM(S): 80 TABLET, FILM COATED ORAL at 23:24

## 2020-08-25 RX ADMIN — Medication 1: at 12:53

## 2020-08-25 RX ADMIN — PALONOSETRON HYDROCHLORIDE 0.25 MILLIGRAM(S): 0.25 INJECTION, SOLUTION INTRAVENOUS at 15:54

## 2020-08-25 RX ADMIN — ARSENIC TRIOXIDE 130.5 MILLIGRAM(S): 2 INJECTION, SOLUTION INTRAVENOUS at 16:36

## 2020-08-25 RX ADMIN — APIXABAN 5 MILLIGRAM(S): 2.5 TABLET, FILM COATED ORAL at 20:45

## 2020-08-25 RX ADMIN — APIXABAN 5 MILLIGRAM(S): 2.5 TABLET, FILM COATED ORAL at 07:45

## 2020-08-25 RX ADMIN — GABAPENTIN 100 MILLIGRAM(S): 400 CAPSULE ORAL at 12:54

## 2020-08-25 NOTE — PROGRESS NOTE ADULT - ASSESSMENT
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    Noted to have fever and leukocytosis  Ct angio without PE.    1) Fever  Associated chest pain- now resolved   Fever resolved      2) Redness around mediport-  ---Present per chart at presentation  ---Area was not red yesterday or today per my exam  US without clot    Options at this time are 1) port removal or 2) Observe off abx- if fever, redness return- then port must be removed.       2) Leukocytosis  Continue to monitor    3) APL  management per oncology      Call ID service if we can be of assistance

## 2020-08-25 NOTE — PROGRESS NOTE ADULT - PROBLEM SELECTOR PLAN 1
- ID consulted; appreciate recs  - Fever and white count resolved s/p Vanc and Zosyn/cefepime. Bcx, Ucx NGTD. Erythema around mediport resolved, but pain persists  - Observe off abx   - Pain control with Oxycodone and Tylenol - ID consulted; appreciate recs  - Fever and white count resolved s/p Vanc and Zosyn/cefepime. Bcx, Ucx NGTD. Erythema around mediport resolved, but pain persists  - Observe off abx   - Pain control with Oxycodone and Tylenol  - IR to perform port study 8/25

## 2020-08-25 NOTE — PROGRESS NOTE ADULT - PROBLEM SELECTOR PLAN 2
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 Follows Dr. Hurd at [facility]  - Last marrow Bx, on 7/13/20, showed no leukemia  - Last took ATRA morning of 8/23. Immunosuppressed 2/2 chemo  - Heme/Onc consult; appreciate recs  - Per Onc, do not continue ATRA or arsenic while septic  - Do not access mediport Patient diagnosed with APL in May 2020, receives ATRA and weekly arsenic. Follows Dr. Hurd at [facility]  - Last marrow Bx, on 7/13/20, showed no leukemia  - Last took ATRA morning of 8/23. Immunosuppressed 2/2 chemo  - Heme/Onc consult; appreciate recs

## 2020-08-25 NOTE — PROGRESS NOTE ADULT - SUBJECTIVE AND OBJECTIVE BOX
Hematology Follow-up    INTERVAL HPI/OVERNIGHT EVENTS:  Patient S&E at bedside. No o/n events, patient resting comfortably.    VITAL SIGNS:  T(F): 97.8 (08-25-20 @ 05:39)  HR: 63 (08-25-20 @ 05:39)  BP: 119/63 (08-25-20 @ 05:39)  RR: 18 (08-25-20 @ 05:39)  SpO2: 98% (08-25-20 @ 05:39)  Wt(kg): --    PHYSICAL EXAM:    Constitutional: AAOx3, NAD,   Eyes: PERRL, EOMI, sclera non-icteric  Neck: supple, no masses, no JVD  Respiratory: CTA b/l, good air entry b/l, no wheezing, rhonchi, rales, with normal respiratory effort and no intercostal retractions  Cardiovascular: RRR, normal S1S2, no M/R/G  Gastrointestinal: soft, NTND, no masses palpable, BS normal in all four quadrants, no HSM  Extremities:  no c/c/e  Neurological: Grossly intact  Skin: Normal temperature    MEDICATIONS  (STANDING):  apixaban 5 milliGRAM(s) Oral every 12 hours  arsenic trioxide IVPB (eMAR) 11 milliGRAM(s) IV Intermittent daily  atorvastatin 20 milliGRAM(s) Oral at bedtime  dextrose 5%. 1000 milliLiter(s) (50 mL/Hr) IV Continuous <Continuous>  dextrose 50% Injectable 12.5 Gram(s) IV Push once  dextrose 50% Injectable 25 Gram(s) IV Push once  dextrose 50% Injectable 25 Gram(s) IV Push once  gabapentin 100 milliGRAM(s) Oral three times a day  insulin lispro (HumaLOG) corrective regimen sliding scale   SubCutaneous three times a day before meals  insulin lispro (HumaLOG) corrective regimen sliding scale   SubCutaneous at bedtime  palonosetron Injectable 0.25 milliGRAM(s) IV Push once    MEDICATIONS  (PRN):  acetaminophen   Tablet .. 650 milliGRAM(s) Oral every 6 hours PRN Mild Pain (1 - 3), Moderate Pain (4 - 6)  dextrose 40% Gel 15 Gram(s) Oral once PRN Blood Glucose LESS THAN 70 milliGRAM(s)/deciliter  glucagon  Injectable 1 milliGRAM(s) IntraMuscular once PRN Glucose LESS THAN 70 milligrams/deciliter  lidocaine   Patch 1 Patch Transdermal daily PRN pain at [facility] site  oxyCODONE    IR 5 milliGRAM(s) Oral every 6 hours PRN Severe Pain (7 - 10)      adhesives (Pruritus)  No Known Drug Allergies      LABS:                        9.4    7.52  )-----------( 198      ( 25 Aug 2020 07:08 )             28.4     08-25    143  |  108<H>  |  12  ----------------------------<  124<H>  3.9   |  22  |  0.77    Ca    9.5      25 Aug 2020 07:08  Phos  3.8     08-25  Mg     2.1     08-25    TPro  7.2  /  Alb  4.3  /  TBili  0.3  /  DBili  x   /  AST  22  /  ALT  16  /  AlkPhos  67  08-25               RADIOLOGY & ADDITIONAL TESTS:  Studies reviewed. Hematology Follow-up    INTERVAL HPI/OVERNIGHT EVENTS:  Patient S&E at bedside. No o/n events, patient resting comfortably.    VITAL SIGNS:  T(F): 97.8 (08-25-20 @ 05:39)  HR: 63 (08-25-20 @ 05:39)  BP: 119/63 (08-25-20 @ 05:39)  RR: 18 (08-25-20 @ 05:39)  SpO2: 98% (08-25-20 @ 05:39)  Wt(kg): --    PHYSICAL EXAM:    Constitutional: AAOx3, NAD  Eyes: EOMI, sclera non-icteric  Neck: supple, no masses, no JVD  Respiratory: CTA b/l, good air entry b/l, no wheezing, rhonchi, rales, with normal respiratory effort and no intercostal retractions  Cardiovascular: RRR, normal S1S2, no M/R/G  Gastrointestinal: soft, NTND, no masses palpable, BS normal in all four quadrants, no HSM  Extremities:  no c/c/e  Neurological: Grossly intact  Skin: Normal temperature, port area not red or warm    MEDICATIONS  (STANDING):  apixaban 5 milliGRAM(s) Oral every 12 hours  arsenic trioxide IVPB (eMAR) 11 milliGRAM(s) IV Intermittent daily  atorvastatin 20 milliGRAM(s) Oral at bedtime  dextrose 5%. 1000 milliLiter(s) (50 mL/Hr) IV Continuous <Continuous>  dextrose 50% Injectable 12.5 Gram(s) IV Push once  dextrose 50% Injectable 25 Gram(s) IV Push once  dextrose 50% Injectable 25 Gram(s) IV Push once  gabapentin 100 milliGRAM(s) Oral three times a day  insulin lispro (HumaLOG) corrective regimen sliding scale   SubCutaneous three times a day before meals  insulin lispro (HumaLOG) corrective regimen sliding scale   SubCutaneous at bedtime  palonosetron Injectable 0.25 milliGRAM(s) IV Push once    MEDICATIONS  (PRN):  acetaminophen   Tablet .. 650 milliGRAM(s) Oral every 6 hours PRN Mild Pain (1 - 3), Moderate Pain (4 - 6)  dextrose 40% Gel 15 Gram(s) Oral once PRN Blood Glucose LESS THAN 70 milliGRAM(s)/deciliter  glucagon  Injectable 1 milliGRAM(s) IntraMuscular once PRN Glucose LESS THAN 70 milligrams/deciliter  lidocaine   Patch 1 Patch Trans[facility]mal [facility]  oxyCODONE    IR 5 milliGRAM(s) Oral every 6 hours PRN Severe Pain (7 - 10)      adhesives (Pruritus)  No Known Drug Allergies      LABS:                        9.4    7.52  )-----------( 198      ( 25 Aug 2020 07:08 )             28.4     08-25    143  |  108<H>  |  12  ----------------------------<  124<H>  3.9   |  22  |  0.77    Ca    9.5      25 Aug 2020 07:08  Phos  3.8     08-25  Mg     2.1     08-25    TPro  7.2  /  Alb  4.3  /  TBili  0.3  /  DBili  x   /  AST  22  /  ALT  16  /  AlkPhos  67  08-25               RADIOLOGY & ADDITIONAL TESTS:  Studies reviewed.

## 2020-08-25 NOTE — PROGRESS NOTE ADULT - SUBJECTIVE AND OBJECTIVE BOX
Internal Medicine Progress Note    Debbi Elena, MS4  Pager [phone]    Patient is a 60y old  Female who presents with a chief complaint of Chest pain (24 Aug 2020 15:56)      SUBJECTIVE / OVERNIGHT EVENTS:  No overnight events. Pt seen and examined at bedside. Pt reports pain at Mediport site and pain in right calf. Pt is no longer bothered by chest pain or back pain. Denies fever, chills, nausea, vomiting, SOB, dizziness, abdominal discomfort, dysuria.      MEDICATIONS  (STANDING):  apixaban 5 milliGRAM(s) Oral every 12 hours  atorvastatin 20 milliGRAM(s) Oral at bedtime  dextrose 5%. 1000 milliLiter(s) (50 mL/Hr) IV Continuous <Continuous>  dextrose 50% Injectable 12.5 Gram(s) IV Push once  dextrose 50% Injectable 25 Gram(s) IV Push once  dextrose 50% Injectable 25 Gram(s) IV Push once  gabapentin 100 milliGRAM(s) Oral three times a day  insulin lispro (HumaLOG) corrective regimen sliding scale   SubCutaneous three times a day before meals  insulin lispro (HumaLOG) corrective regimen sliding scale   SubCutaneous at bedtime    MEDICATIONS  (PRN):  acetaminophen   Tablet .. 650 milliGRAM(s) Oral every 6 hours PRN Mild Pain (1 - 3), Moderate Pain (4 - 6)  dextrose 40% Gel 15 Gram(s) Oral once PRN Blood Glucose LESS THAN 70 milliGRAM(s)/deciliter  glucagon  Injectable 1 milliGRAM(s) IntraMuscular once PRN Glucose LESS THAN 70 milligrams/deciliter  lidocaine   Patch 1 Patch Transdermal daily PRN pain at mediport site  oxyCODONE    IR 5 milliGRAM(s) Oral every 6 hours PRN Severe Pain (7 - 10)    Vital Signs Last 24 Hrs  T(C): 36.6 (25 Aug 2020 05:39), Max: 36.7 (24 Aug 2020 12:52)  T(F): 97.8 (25 Aug 2020 05:39), Max: 98 (24 Aug 2020 12:52)  HR: 63 (25 Aug 2020 05:39) (63 - 91)  BP: 119/63 (25 Aug 2020 05:39) (119/63 - 140/75)  BP(mean): --  RR: 18 (25 Aug 2020 05:39) (17 - 18)  SpO2: 98% (25 Aug 2020 05:39) (98% - 100%)    CAPILLARY BLOOD GLUCOSE      POCT Blood Glucose.: 153 mg/dL (24 Aug 2020 22:32)  POCT Blood Glucose.: 153 mg/dL (24 Aug 2020 17:44)  POCT Blood Glucose.: 111 mg/dL (24 Aug 2020 12:50)  POCT Blood Glucose.: 124 mg/dL (24 Aug 2020 08:51)    I&O's Summary      PHYSICAL EXAM  GENERAL: NAD  HEAD:  Atraumatic  EYES: conjunctiva and sclera clear  CHEST/LUNG: Clear to auscultation bilaterally; No wheeze  HEART: Regular rate and rhythm; No murmurs, rubs, or gallops  ABDOMEN: Soft, Nontender, Nondistended  EXTREMITIES:  2+ Peripheral Pulses, No clubbing, cyanosis, or edema  NEURO/PSYCH: AAOx3, nonfocal  SKIN: Scabbing on R buttock      LABS:                        9.4    7.52  )-----------( 198      ( 25 Aug 2020 07:08 )             28.4     Hemoglobin: 9.4 g/dL (08-25 @ 07:08)  Hemoglobin: 9.0 g/dL (08-24 @ 07:28)  Hemoglobin: 9.2 g/dL (08-23 @ 14:30)  Hemoglobin: 10.1 g/dL (08-22 @ 11:55)  Hemoglobin: 9.8 g/dL (08-20 @ 13:43)    CBC Full  -  ( 25 Aug 2020 07:08 )  WBC Count : 7.52 K/uL  RBC Count : 3.21 M/uL  Hemoglobin : 9.4 g/dL  Hematocrit : 28.4 %  Platelet Count - Automated : 198 K/uL  Mean Cell Volume : 88.5 fL  Mean Cell Hemoglobin : 29.3 pg  Mean Cell Hemoglobin Concentration : 33.1 %  Auto Neutrophil # : 4.14 K/uL  Auto Lymphocyte # : 1.83 K/uL  Auto Monocyte # : 0.31 K/uL  Auto Eosinophil # : 1.19 K/uL  Auto Basophil # : 0.03 K/uL  Auto Neutrophil % : 55.1 %  Auto Lymphocyte % : 24.3 %  Auto Monocyte % : 4.1 %  Auto Eosinophil % : 15.8 %  Auto Basophil % : 0.4 %    08-24    140  |  105  |  8   ----------------------------<  122<H>  3.9   |  22  |  0.73    Ca    9.6      24 Aug 2020 07:28  Phos  3.0     08-24  Mg     1.9     08-24    TPro  6.8  /  Alb  3.8  /  TBili  0.4  /  DBili  x   /  AST  23  /  ALT  11  /  AlkPhos  64  08-24    Creatinine Trend: 0.73<--, 0.80<--, 0.84<--, 0.70<--, 1.06<--  LIVER FUNCTIONS - ( 24 Aug 2020 07:28 )  Alb: 3.8 g/dL / Pro: 6.8 g/dL / ALK PHOS: 64 u/L / ALT: 11 u/L / AST: 23 u/L / GGT: x               MICROBIOLOGY:    Culture - Blood (collected 22 Aug 2020 14:44)  Source: .Blood Blood-Peripheral  Preliminary Report (23 Aug 2020 15:01):    No growth to date.    Culture - Blood (collected 22 Aug 2020 14:44)  Source: .Blood Blood-Peripheral  Preliminary Report (23 Aug 2020 15:01):    No growth to date.    Culture - Urine (collected 22 Aug 2020 13:15)  Source: .Urine Clean Catch (Midstream)  Final Report (23 Aug 2020 13:10):    <10,000 CFU/mL Normal Urogenital Krista      IMAGING:      Labs, imaging, EKG personally reviewed     CONSULTS: Internal Medicine Progress Note    Debbi Elena, MS4  Pager [phone]    Patient is a 60y old  Female who presents with a chief complaint of Chest pain (24 Aug 2020 15:56)      SUBJECTIVE / OVERNIGHT EVENTS:  No overnight events. Pt seen and examined at bedside.  #[id number]. Pt reports pain at Mediport site and pain in right calf. Pt is no longer bothered by chest pain or back pain. Denies fever, chills, nausea, vomiting, SOB, dizziness, abdominal discomfort, dysuria.      MEDICATIONS  (STANDING):  apixaban 5 milliGRAM(s) Oral every 12 hours  atorvastatin 20 milliGRAM(s) Oral at bedtime  dextrose 5%. 1000 milliLiter(s) (50 mL/Hr) IV Continuous <Continuous>  dextrose 50% Injectable 12.5 Gram(s) IV Push once  dextrose 50% Injectable 25 Gram(s) IV Push once  dextrose 50% Injectable 25 Gram(s) IV Push once  gabapentin 100 milliGRAM(s) Oral three times a day  insulin lispro (HumaLOG) corrective regimen sliding scale   SubCutaneous three times a day before meals  insulin lispro (HumaLOG) corrective regimen sliding scale   SubCutaneous at bedtime    MEDICATIONS  (PRN):  acetaminophen   Tablet .. 650 milliGRAM(s) Oral every 6 hours PRN Mild Pain (1 - 3), Moderate Pain (4 - 6)  dextrose 40% Gel 15 Gram(s) Oral once PRN Blood Glucose LESS THAN 70 milliGRAM(s)/deciliter  glucagon  Injectable 1 milliGRAM(s) IntraMuscular once PRN Glucose LESS THAN 70 milligrams/deciliter  lidocaine   Patch 1 Patch Transdermal daily PRN pain at mediport site  oxyCODONE    IR 5 milliGRAM(s) Oral every 6 hours PRN Severe Pain (7 - 10)    Vital Signs Last 24 Hrs  T(C): 36.6 (25 Aug 2020 05:39), Max: 36.7 (24 Aug 2020 12:52)  T(F): 97.8 (25 Aug 2020 05:39), Max: 98 (24 Aug 2020 12:52)  HR: 63 (25 Aug 2020 05:39) (63 - 91)  BP: 119/63 (25 Aug 2020 05:39) (119/63 - 140/75)  BP(mean): --  RR: 18 (25 Aug 2020 05:39) (17 - 18)  SpO2: 98% (25 Aug 2020 05:39) (98% - 100%)    CAPILLARY BLOOD GLUCOSE      POCT Blood Glucose.: 153 mg/dL (24 Aug 2020 22:32)  POCT Blood Glucose.: 153 mg/dL (24 Aug 2020 17:44)  POCT Blood Glucose.: 111 mg/dL (24 Aug 2020 12:50)  POCT Blood Glucose.: 124 mg/dL (24 Aug 2020 08:51)    I&O's Summary      PHYSICAL EXAM  GENERAL: NAD  HEAD:  Atraumatic  EYES: conjunctiva and sclera clear  CHEST/LUNG: Clear to auscultation bilaterally; No wheeze  HEART: Regular rate and rhythm; No murmurs, rubs, or gallops  ABDOMEN: Soft, Nontender, Nondistended  EXTREMITIES:  2+ Peripheral Pulses, No clubbing, cyanosis, or edema. Pain with palpation of R calf  NEURO/PSYCH: AAOx3, nonfocal  SKIN: Scabbing on R buttock. No erythema at mediport site. Pain on palpation of mediport site, especially around reservoir      LABS:                        9.4    7.52  )-----------( 198      ( 25 Aug 2020 07:08 )             28.4     Hemoglobin: 9.4 g/dL (08-25 @ 07:08)  Hemoglobin: 9.0 g/dL (08-24 @ 07:28)  Hemoglobin: 9.2 g/dL (08-23 @ 14:30)  Hemoglobin: 10.1 g/dL (08-22 @ 11:55)  Hemoglobin: 9.8 g/dL (08-20 @ 13:43)    CBC Full  -  ( 25 Aug 2020 07:08 )  WBC Count : 7.52 K/uL  RBC Count : 3.21 M/uL  Hemoglobin : 9.4 g/dL  Hematocrit : 28.4 %  Platelet Count - Automated : 198 K/uL  Mean Cell Volume : 88.5 fL  Mean Cell Hemoglobin : 29.3 pg  Mean Cell Hemoglobin Concentration : 33.1 %  Auto Neutrophil # : 4.14 K/uL  Auto Lymphocyte # : 1.83 K/uL  Auto Monocyte # : 0.31 K/uL  Auto Eosinophil # : 1.19 K/uL  Auto Basophil # : 0.03 K/uL  Auto Neutrophil % : 55.1 %  Auto Lymphocyte % : 24.3 %  Auto Monocyte % : 4.1 %  Auto Eosinophil % : 15.8 %  Auto Basophil % : 0.4 %    08-24    140  |  105  |  8   ----------------------------<  122<H>  3.9   |  22  |  0.73    Ca    9.6      24 Aug 2020 07:28  Phos  3.0     08-24  Mg     1.9     08-24    TPro  6.8  /  Alb  3.8  /  TBili  0.4  /  DBili  x   /  AST  23  /  ALT  11  /  AlkPhos  64  08-24    Creatinine Trend: 0.73<--, 0.80<--, 0.84<--, 0.70<--, 1.06<--  LIVER FUNCTIONS - ( 24 Aug 2020 07:28 )  Alb: 3.8 g/dL / Pro: 6.8 g/dL / ALK PHOS: 64 u/L / ALT: 11 u/L / AST: 23 u/L / GGT: x               MICROBIOLOGY:    Culture - Blood (collected 22 Aug 2020 14:44)  Source: .Blood Blood-Peripheral  Preliminary Report (23 Aug 2020 15:01):    No growth to date.    Culture - Blood (collected 22 Aug 2020 14:44)  Source: .Blood Blood-Peripheral  Preliminary Report (23 Aug 2020 15:01):    No growth to date.    Culture - Urine (collected 22 Aug 2020 13:15)  Source: .Urine Clean Catch (Midstream)  Final Report (23 Aug 2020 13:10):    <10,000 CFU/mL Normal Urogenital Krista      IMAGING:      Labs, imaging, EKG personally reviewed     CONSULTS:

## 2020-08-25 NOTE — PROGRESS NOTE ADULT - ASSESSMENT
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 Redness over Mediport site and fever resolved, but pt is reporting pain over the site. 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

## 2020-08-25 NOTE — PROGRESS NOTE ADULT - PROBLEM SELECTOR PLAN 7
Transitions of Care Status:  1.  Name of PCP: Dr. Oriana Montanez ([phone])  2.  PCP Contacted on Admission: [ ] Y    [ ] N    3.  PCP contacted at Discharge: [ ] Y    [ ] N    [ ] N/A  4.  Post-Discharge Appointment Date and Location:  5.  Summary of Handoff given to PCP:

## 2020-08-25 NOTE — CHART NOTE - NSCHARTNOTEFT_GEN_A_CORE
PRE-INTERVENTIONAL RADIOLOGY PROCEDURE NOTE      Patient Age: 60    Patient Gender: F    Procedure: Port study    Diagnosis/Indication: APL s/p ATRA & Arsenic complaining of pain at [facility] site.     Interventional Radiology Attending Physician: Dr. Paul    Ordering Attending Physician: Dr. Del Cid    Pertinent Medical History: Acute promyelocytic leukemia; history of DVT 7/20 on eliquis 5mg BID- CTA with no PE, bl LE duplex and RUE duplex both with no evidence of DVT.    Pertinent labs:                      9.4    7.52  )-----------( 198      ( 25 Aug 2020 07:08 )             28.4       08-25    143  |  108<H>  |  12  ----------------------------<  124<H>  3.9   |  22  |  0.77    Ca    9.5      25 Aug 2020 07:08  Phos  3.8     08-25  Mg     2.1     08-25    TPro  7.2  /  Alb  4.3  /  TBili  0.3  /  DBili  x   /  AST  22  /  ALT  16  /  AlkPhos  67  08-25              Patient and Family Aware ? Yes PRE-INTERVENTIONAL RADIOLOGY PROCEDURE NOTE      Patient Age: 60    Patient Gender: F    Procedure: Port study    Diagnosis/Indication: APL s/p ATRA & Arsenic complaining of pain at [facility] site.     Interventional Radiology Attending Physician: Dr. Paul    Ordering Attending Physician: Dr. Del Cid    Pertinent Medical History: Acute promyelocytic leukemia; history of DVT 7/20 on eliquis 5mg BID- CTA with no PE, bl LE duplex and RUE duplex both with no evidence of DVT.    Pertinent labs:                      9.4    7.52  )-----------( 198      ( 25 Aug 2020 07:08 )             28.4       08-25    143  |  108<H>  |  12  ----------------------------<  124<H>  3.9   |  22  |  0.77    Ca    9.5      25 Aug 2020 07:08  Phos  3.8     08-25  Mg     2.1     08-25    TPro  7.2  /  Alb  4.3  /  TBili  0.3  /  DBili  x   /  AST  22  /  ALT  16  /  AlkPhos  67  08-25              Patient and Family Aware ? Yes

## 2020-08-25 NOTE — PROGRESS NOTE ADULT - SUBJECTIVE AND OBJECTIVE BOX
Follow Up:      Inverval History/ROS:Patient is a 60y old  Female who presents with a chief complaint of Chest pain (25 Aug 2020 14:00)    Denies pain   No fever      Allergies    adhesives (Pruritus)  No Known Drug Allergies    Intolerances        ANTIMICROBIALS:      OTHER MEDS:  acetaminophen   Tablet .. 650 milliGRAM(s) Oral every 6 hours PRN  apixaban 5 milliGRAM(s) Oral every 12 hours  arsenic trioxide IVPB (eMAR) 11 milliGRAM(s) IV Intermittent daily  atorvastatin 20 milliGRAM(s) Oral at bedtime  dextrose 40% Gel 15 Gram(s) Oral once PRN  dextrose 5%. 1000 milliLiter(s) IV Continuous <Continuous>  dextrose 50% Injectable 12.5 Gram(s) IV Push once  dextrose 50% Injectable 25 Gram(s) IV Push once  dextrose 50% Injectable 25 Gram(s) IV Push once  gabapentin 100 milliGRAM(s) Oral three times a day  glucagon  Injectable 1 milliGRAM(s) IntraMuscular once PRN  insulin lispro (HumaLOG) corrective regimen sliding scale   SubCutaneous three times a day before meals  insulin lispro (HumaLOG) corrective regimen sliding scale   SubCutaneous at bedtime  lidocaine   Patch 1 Patch Transdermal daily PRN  oxyCODONE    IR 5 milliGRAM(s) Oral every 6 hours PRN  palonosetron Injectable 0.25 milliGRAM(s) IV Push once      Vital Signs Last 24 Hrs  T(C): 36.7 (25 Aug 2020 14:24), Max: 36.7 (25 Aug 2020 14:24)  T(F): 98 (25 Aug 2020 14:24), Max: 98 (25 Aug 2020 14:24)  HR: 69 (25 Aug 2020 14:24) (63 - 91)  BP: 117/59 (25 Aug 2020 14:24) (117/59 - 140/75)  BP(mean): --  RR: 17 (25 Aug 2020 14:24) (17 - 18)  SpO2: 96% (25 Aug 2020 14:24) (96% - 100%)    PHYSICAL EXAM:  General: [x ] non-toxic  HEAD/EYES: [ ] PERRL [x ] white sclera [ ] icterus  ENT:  [ ] normal [ x] supple [ ] thrush [ ] pharyngeal exudate  Cardiovascular:   [ ] murmur [ x] normal [ ] PPM/AICD  Respiratory:  [x ] clear to ausculation bilaterally  GI:  [ ] soft, non-tender, normal bowel sounds  :  [ ] garcia [x ] no CVA tenderness   Musculoskeletal:  [ ] no synovitis  Neurologic:  [ ] non-focal exam   Skin:  [ x] no erythema over port  Lymph: [x ] no lymphadenopathy  Psychiatric:  [ ] appropriate affect [ ] alert & oriented  Lines:  [x ] no phlebitis [ ] central line                                9.4    7.52  )-----------( 198      ( 25 Aug 2020 07:08 )             28.4       08-25    143  |  108<H>  |  12  ----------------------------<  124<H>  3.9   |  22  |  0.77    Ca    9.5      25 Aug 2020 07:08  Phos  3.8     08-25  Mg     2.1     08-25    TPro  7.2  /  Alb  4.3  /  TBili  0.3  /  DBili  x   /  AST  22  /  ALT  16  /  AlkPhos  67  08-25          MICROBIOLOGY:Culture Results:   No growth to date. (08-22-20 @ 14:44)  Culture Results:   No growth to date. (08-22-20 @ 14:44)  Culture Results:   <10,000 CFU/mL Normal Urogenital Krista (08-22-20 @ 13:15)      RADIOLOGY:

## 2020-08-25 NOTE — PROGRESS NOTE ADULT - PROBLEM SELECTOR PLAN 3
Patient had R axillary DVT In July 2020. On Eliquis 5mg. Presented tachypneic, dyspneic, and febrile.  - C/w Eliquis 5mg  - CTA showed no PE  - No DVT in bl LE and R arm per duplex studies  - No blood draws from RIGHT arm

## 2020-08-25 NOTE — PHYSICAL THERAPY INITIAL EVALUATION ADULT - RANGE OF MOTION EXAMINATION, REHAB EVAL
bilateral lower extremity ROM was WFL (within functional limits)/bilateral upper extremity ROM was WFL (within functional limits) no...

## 2020-08-25 NOTE — PROGRESS NOTE ADULT - ASSESSMENT
59yo Yakut speaking female with h/o APL, currently on ATRA & Arsenic consolidation, right axillary DVT currently on Eliquis presented to ED due to SOB with chest pain. Patient was hypotensive at 70's and was given IV fluid in field. She is febrile in ED and noted to have some rigors.     # Fevers and rigors  - Febrile to 100.8F, immunosuppressed on active chemo w/  ATRA & Arsenic for APL.  - Infectious w/u; currently negative for any infectious source  - Please do not access the mediport, pending portogram per primary team  - We will start Arsenic today, order and consent placed. Discussed with primary hematologist Dr. Hurd  - ID input appreciated, currently off ABx, patient is afebrile and does not have leukocytosis.  - IV hydration, antipyretics, supportive care      # APL  - Dx in May 2020 when she presented with pancytopenia  - Last bone marrow biopsy on 7/13/20 showed no evidence of leukemia  - Currently on ATRA & Arsenic consolidation, we will restart Arsenic today, order and consent placed. Discussed with primary hematologist Dr. Hurd  - Monitor CBC with diff daily.  - Follows Dr. Hurd at [facility]    # h/o Right axillary DVT  - Continue full dose AC with Eliquis.            Rosario Pierre MD  PGY 5, Oncology/Hematology fellow  (P) [phone]  After 5pm, please contact on-call team.

## 2020-08-25 NOTE — CONSULT NOTE ADULT - SUBJECTIVE AND OBJECTIVE BOX
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     Pt seen and examined at bedside. Port site appears normal with no signs of infection. However, pt endorses pain over the scar site which is not unusual for a recently placed port.     PAST MEDICAL & SURGICAL HISTORY:  APL (acute promyelocytic leukemia)  HLD (hyperlipidemia)  Hypertension  Diabetes  No significant past surgical history     Female    Allergies: adhesives (Pruritus)  No Known Drug Allergies    LABS:  CBC Full  -  ( 25 Aug 2020 07:08 )  WBC Count : 7.52 K/uL  RBC Count : 3.21 M/uL  Hemoglobin : 9.4 g/dL  Hematocrit : 28.4 %  Platelet Count - Automated : 198 K/uL    08-25    143  |  108<H>  |  12  ----------------------------<  124<H>  3.9   |  22  |  0.77    Ca    9.5      25 Aug 2020 07:08  Phos  3.8     08-25  Mg     2.1     08-25    TPro  7.2  /  Alb  4.3  /  TBili  0.3  /  DBili  x   /  AST  22  /  ALT  16  /  AlkPhos  67  08-25    Case discussed and reviewed with Dr. Puente. No indication for port removal at this time. Ok to access port.     Please contact us if the clinical situation changes or if you have any questions/comments/concerns.    Oniel Griffiths MD  Chief IR Resident  Pager #[phone]

## 2020-08-26 ENCOUNTER — APPOINTMENT (OUTPATIENT)
Dept: INFUSION THERAPY | Facility: HOSPITAL | Age: 60
End: 2020-08-26

## 2020-08-26 LAB
ALBUMIN SERPL ELPH-MCNC: 4.1 G/DL — SIGNIFICANT CHANGE UP (ref 3.3–5)
ALP SERPL-CCNC: 76 U/L — SIGNIFICANT CHANGE UP (ref 40–120)
ALT FLD-CCNC: 15 U/L — SIGNIFICANT CHANGE UP (ref 4–33)
ANION GAP SERPL CALC-SCNC: 15 MMO/L — HIGH (ref 7–14)
AST SERPL-CCNC: 19 U/L — SIGNIFICANT CHANGE UP (ref 4–32)
BASOPHILS # BLD AUTO: 0.04 K/UL — SIGNIFICANT CHANGE UP (ref 0–0.2)
BASOPHILS NFR BLD AUTO: 0.5 % — SIGNIFICANT CHANGE UP (ref 0–2)
BILIRUB SERPL-MCNC: 0.3 MG/DL — SIGNIFICANT CHANGE UP (ref 0.2–1.2)
BLD GP AB SCN SERPL QL: NEGATIVE — SIGNIFICANT CHANGE UP
BUN SERPL-MCNC: 15 MG/DL — SIGNIFICANT CHANGE UP (ref 7–23)
CALCIUM SERPL-MCNC: 9.6 MG/DL — SIGNIFICANT CHANGE UP (ref 8.4–10.5)
CHLORIDE SERPL-SCNC: 104 MMOL/L — SIGNIFICANT CHANGE UP (ref 98–107)
CO2 SERPL-SCNC: 21 MMOL/L — LOW (ref 22–31)
CREAT SERPL-MCNC: 0.83 MG/DL — SIGNIFICANT CHANGE UP (ref 0.5–1.3)
EOSINOPHIL # BLD AUTO: 1.17 K/UL — HIGH (ref 0–0.5)
EOSINOPHIL NFR BLD AUTO: 13.4 % — HIGH (ref 0–6)
GLUCOSE BLDC GLUCOMTR-MCNC: 136 MG/DL — HIGH (ref 70–99)
GLUCOSE BLDC GLUCOMTR-MCNC: 149 MG/DL — HIGH (ref 70–99)
GLUCOSE BLDC GLUCOMTR-MCNC: 162 MG/DL — HIGH (ref 70–99)
GLUCOSE BLDC GLUCOMTR-MCNC: 95 MG/DL — SIGNIFICANT CHANGE UP (ref 70–99)
GLUCOSE SERPL-MCNC: 122 MG/DL — HIGH (ref 70–99)
HBA1C BLD-MCNC: 5.7 % — HIGH (ref 4–5.6)
HCT VFR BLD CALC: 30.8 % — LOW (ref 34.5–45)
HGB BLD-MCNC: 9.4 G/DL — LOW (ref 11.5–15.5)
IMM GRANULOCYTES NFR BLD AUTO: 0.1 % — SIGNIFICANT CHANGE UP (ref 0–1.5)
LYMPHOCYTES # BLD AUTO: 2.42 K/UL — SIGNIFICANT CHANGE UP (ref 1–3.3)
LYMPHOCYTES # BLD AUTO: 27.7 % — SIGNIFICANT CHANGE UP (ref 13–44)
MAGNESIUM SERPL-MCNC: 2.2 MG/DL — SIGNIFICANT CHANGE UP (ref 1.6–2.6)
MCHC RBC-ENTMCNC: 28.4 PG — SIGNIFICANT CHANGE UP (ref 27–34)
MCHC RBC-ENTMCNC: 30.5 % — LOW (ref 32–36)
MCV RBC AUTO: 93.1 FL — SIGNIFICANT CHANGE UP (ref 80–100)
MONOCYTES # BLD AUTO: 0.38 K/UL — SIGNIFICANT CHANGE UP (ref 0–0.9)
MONOCYTES NFR BLD AUTO: 4.3 % — SIGNIFICANT CHANGE UP (ref 2–14)
NEUTROPHILS # BLD AUTO: 4.73 K/UL — SIGNIFICANT CHANGE UP (ref 1.8–7.4)
NEUTROPHILS NFR BLD AUTO: 54 % — SIGNIFICANT CHANGE UP (ref 43–77)
NRBC # FLD: 0 K/UL — SIGNIFICANT CHANGE UP (ref 0–0)
PHOSPHATE SERPL-MCNC: 3.6 MG/DL — SIGNIFICANT CHANGE UP (ref 2.5–4.5)
PLATELET # BLD AUTO: 237 K/UL — SIGNIFICANT CHANGE UP (ref 150–400)
PMV BLD: 11.4 FL — SIGNIFICANT CHANGE UP (ref 7–13)
POTASSIUM SERPL-MCNC: 4 MMOL/L — SIGNIFICANT CHANGE UP (ref 3.5–5.3)
POTASSIUM SERPL-SCNC: 4 MMOL/L — SIGNIFICANT CHANGE UP (ref 3.5–5.3)
PROT SERPL-MCNC: 7.2 G/DL — SIGNIFICANT CHANGE UP (ref 6–8.3)
RBC # BLD: 3.31 M/UL — LOW (ref 3.8–5.2)
RBC # FLD: 15.6 % — HIGH (ref 10.3–14.5)
RH IG SCN BLD-IMP: POSITIVE — SIGNIFICANT CHANGE UP
SODIUM SERPL-SCNC: 140 MMOL/L — SIGNIFICANT CHANGE UP (ref 135–145)
WBC # BLD: 8.75 K/UL — SIGNIFICANT CHANGE UP (ref 3.8–10.5)
WBC # FLD AUTO: 8.75 K/UL — SIGNIFICANT CHANGE UP (ref 3.8–10.5)

## 2020-08-26 PROCEDURE — 99239 HOSP IP/OBS DSCHRG MGMT >30: CPT | Mod: GC

## 2020-08-26 RX ORDER — PANTOPRAZOLE SODIUM 20 MG/1
40 TABLET, DELAYED RELEASE ORAL
Refills: 0 | Status: DISCONTINUED | OUTPATIENT
Start: 2020-08-26 | End: 2020-08-27

## 2020-08-26 RX ORDER — PANTOPRAZOLE SODIUM 20 MG/1
1 TABLET, DELAYED RELEASE ORAL
Qty: 14 | Refills: 0
Start: 2020-08-26 | End: 2020-09-08

## 2020-08-26 RX ORDER — GABAPENTIN 400 MG/1
1 CAPSULE ORAL
Qty: 0 | Refills: 0 | DISCHARGE
Start: 2020-08-26

## 2020-08-26 RX ADMIN — Medication 1: at 12:33

## 2020-08-26 RX ADMIN — ATORVASTATIN CALCIUM 20 MILLIGRAM(S): 80 TABLET, FILM COATED ORAL at 22:12

## 2020-08-26 RX ADMIN — GABAPENTIN 100 MILLIGRAM(S): 400 CAPSULE ORAL at 22:12

## 2020-08-26 RX ADMIN — GABAPENTIN 100 MILLIGRAM(S): 400 CAPSULE ORAL at 16:52

## 2020-08-26 RX ADMIN — APIXABAN 5 MILLIGRAM(S): 2.5 TABLET, FILM COATED ORAL at 19:12

## 2020-08-26 RX ADMIN — Medication 650 MILLIGRAM(S): at 06:33

## 2020-08-26 RX ADMIN — PANTOPRAZOLE SODIUM 40 MILLIGRAM(S): 20 TABLET, DELAYED RELEASE ORAL at 11:53

## 2020-08-26 RX ADMIN — Medication 650 MILLIGRAM(S): at 07:03

## 2020-08-26 RX ADMIN — GABAPENTIN 100 MILLIGRAM(S): 400 CAPSULE ORAL at 06:33

## 2020-08-26 RX ADMIN — ARSENIC TRIOXIDE 130.5 MILLIGRAM(S): 2 INJECTION, SOLUTION INTRAVENOUS at 16:25

## 2020-08-26 RX ADMIN — APIXABAN 5 MILLIGRAM(S): 2.5 TABLET, FILM COATED ORAL at 08:25

## 2020-08-26 NOTE — PROGRESS NOTE ADULT - ASSESSMENT
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

## 2020-08-26 NOTE — PROGRESS NOTE ADULT - PROBLEM SELECTOR PLAN 1
- ID consulted; appreciate recs  - Fever and white count resolved s/p Vanc and Zosyn/cefepime. Bcx, Ucx NGTD. - Erythema around mediport resolved, but pain persists. IR assessed port and state its ok to resume use.  - Observing off abx   - Pain control with Oxycodone and Tylenol

## 2020-08-26 NOTE — PROGRESS NOTE ADULT - PROBLEM SELECTOR PLAN 2
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 Follows Dr. Hurd at [facility]  - Heme/Onc consult; appreciate recs  - Consolidation tx with arsenic started yesterday as per Heme.

## 2020-08-26 NOTE — REVIEW OF SYSTEMS
[Negative] : Heme/Lymph [FreeTextEntry1] : Port site does not look irritated/redness [de-identified] : +neuropathy (Burning sensation in the bottom of her feet)

## 2020-08-26 NOTE — ASSESSMENT
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  She was started on ATRA empirically on 5/23, then on arsenic on 5/27 when the diagnosis was confirmed. 6/23/20 she was found to have a partially occlusive DVT in her right axillary vein, treated with lovenox.  Repeat duplex was negative on 7/16.  PICC removed on 7/10 due to irritation?.  Bone marrow biopsy on 6/25 with persistent disease, repeated on 7/13 no evidence of leukemia.\par \par VSS per sunrise\par Continue consolidation with Atra & arsenic, approximate duration of 8 months\par Port placed 8/6/20\par Monitor CBC, CMP, LDH\par On eliquis for at least 3 months for upper extremity DVT near old PICC line site.\par Referral provided for PCP via [facility].\par \par Follow up in 1 month\par Case and management discussed with Dr. Hurd.

## 2020-08-26 NOTE — PROGRESS NOTE ADULT - PROBLEM SELECTOR PLAN 7
Transitions of Care Status:  1.  Name of PCP: Dr. Oriana Montanez ([phone])  2.  PCP Contacted on Admission: [ ] Y    [ ] N    3.  PCP contacted at Discharge: [ ] Y    [ ] N    [ ] N/A  4.  Post-Discharge Appointment Date and Location:  5.  Summary of Handoff given to PCP:

## 2020-08-26 NOTE — PROGRESS NOTE ADULT - SUBJECTIVE AND OBJECTIVE BOX
Internal Medicine Progress Note    Debbi Elena, MS4  Pager [phone]    Patient is a 60y old  Female who presents with a chief complaint of Chest pain (25 Aug 2020 16:29)      SUBJECTIVE / OVERNIGHT EVENTS:  No overnight events. Pt seen and examined at bedside. Pt continues to complain of pain at the inferior portion of mediport. Also complains of midline chest pain, worse with inspiration, which she states started last night after she received her chemotherapy. Reports a sour taste in her mouth. Denies fever, chills, nausea, vomiting, abdominal pain, diarrhea, palpitations.    MEDICATIONS  (STANDING):  apixaban 5 milliGRAM(s) Oral every 12 hours  arsenic trioxide IVPB (eMAR) 11 milliGRAM(s) IV Intermittent daily  atorvastatin 20 milliGRAM(s) Oral at bedtime  dextrose 5%. 1000 milliLiter(s) (50 mL/Hr) IV Continuous <Continuous>  dextrose 50% Injectable 12.5 Gram(s) IV Push once  dextrose 50% Injectable 25 Gram(s) IV Push once  dextrose 50% Injectable 25 Gram(s) IV Push once  gabapentin 100 milliGRAM(s) Oral three times a day  insulin lispro (HumaLOG) corrective regimen sliding scale   SubCutaneous three times a day before meals  insulin lispro (HumaLOG) corrective regimen sliding scale   SubCutaneous at bedtime    MEDICATIONS  (PRN):  acetaminophen   Tablet .. 650 milliGRAM(s) Oral every 6 hours PRN Mild Pain (1 - 3), Moderate Pain (4 - 6)  dextrose 40% Gel 15 Gram(s) Oral once PRN Blood Glucose LESS THAN 70 milliGRAM(s)/deciliter  glucagon  Injectable 1 milliGRAM(s) IntraMuscular once PRN Glucose LESS THAN 70 milligrams/deciliter  lidocaine   Patch 1 [facility]  oxyCODONE    IR 5 milliGRAM(s) Oral every 6 hours PRN Severe Pain (7 - 10)    Vital Signs Last 24 Hrs  T(C): 36.4 (26 Aug 2020 06:24), Max: 36.9 (25 Aug 2020 16:32)  T(F): 97.6 (26 Aug 2020 06:24), Max: 98.4 (25 Aug 2020 16:32)  HR: 62 (26 Aug 2020 06:24) (62 - 77)  BP: 124/66 (26 Aug 2020 06:24) (117/59 - 130/73)  BP(mean): --  RR: 16 (26 Aug 2020 06:24) (16 - 18)  SpO2: 96% (26 Aug 2020 06:24) (96% - 99%)    CAPILLARY BLOOD GLUCOSE      POCT Blood Glucose.: 161 mg/dL (25 Aug 2020 23:15)  POCT Blood Glucose.: 105 mg/dL (25 Aug 2020 17:56)  POCT Blood Glucose.: 180 mg/dL (25 Aug 2020 12:50)  POCT Blood Glucose.: 122 mg/dL (25 Aug 2020 09:03)    I&O's Summary      PHYSICAL EXAM  GENERAL: NAD, resting comfortably in bed   EYES: conjunctiva and sclera clear  NECK: Supple, No JVD  CHEST/LUNG: Clear to auscultation bilaterally; No wheeze  HEART: Regular rate and rhythm; No murmurs, rubs, or gallops  ABDOMEN: Soft, Nontender, Nondistended; Bowel sounds present  EXTREMITIES:  2+ Peripheral Pulses, No clubbing, cyanosis, or edema  NEURO/PSYCH: AAOx3, nonfocal  SKIN: Scab on R buttock      LABS:                        9.4    8.75  )-----------( 237      ( 26 Aug 2020 05:55 )             30.8     Hemoglobin: 9.4 g/dL (08-26 @ 05:55)  Hemoglobin: 9.4 g/dL (08-25 @ 07:08)  Hemoglobin: 9.0 g/dL (08-24 @ 07:28)  Hemoglobin: 9.2 g/dL (08-23 @ 14:30)  Hemoglobin: 10.1 g/dL (08-22 @ 11:55)    CBC Full  -  ( 26 Aug 2020 05:55 )  WBC Count : 8.75 K/uL  RBC Count : 3.31 M/uL  Hemoglobin : 9.4 g/dL  Hematocrit : 30.8 %  Platelet Count - Automated : 237 K/uL  Mean Cell Volume : 93.1 fL  Mean Cell Hemoglobin : 28.4 pg  Mean Cell Hemoglobin Concentration : 30.5 %  Auto Neutrophil # : 4.73 K/uL  Auto Lymphocyte # : 2.42 K/uL  Auto Monocyte # : 0.38 K/uL  Auto Eosinophil # : 1.17 K/uL  Auto Basophil # : 0.04 K/uL  Auto Neutrophil % : 54.0 %  Auto Lymphocyte % : 27.7 %  Auto Monocyte % : 4.3 %  Auto Eosinophil % : 13.4 %  Auto Basophil % : 0.5 %    08-26    140  |  104  |  15  ----------------------------<  122<H>  4.0   |  21<L>  |  0.83    Ca    9.6      26 Aug 2020 05:55  Phos  3.6     08-26  Mg     2.2     08-26    TPro  7.2  /  Alb  4.1  /  TBili  0.3  /  DBili  x   /  AST  19  /  ALT  15  /  AlkPhos  76  08-26    Creatinine Trend: 0.83<--, 0.77<--, 0.73<--, 0.80<--, 0.84<--, 0.70<--  LIVER FUNCTIONS - ( 26 Aug 2020 05:55 )  Alb: 4.1 g/dL / Pro: 7.2 g/dL / ALK PHOS: 76 u/L / ALT: 15 u/L / AST: 19 u/L / GGT: x               Labs, imaging, EKG personally reviewed

## 2020-08-26 NOTE — PHYSICAL EXAM
[Restricted in physically strenuous activity but ambulatory and able to carry out work of a light or sedentary nature] : Status 1- Restricted in physically strenuous activity but ambulatory and able to carry out work of a light or sedentary nature, e.g., light house work, office work [Normal] : normal spine exam without palpable tenderness, no kyphosis or scoliosis [de-identified] : + Port in R chest wall

## 2020-08-26 NOTE — REASON FOR VISIT
[Initial Consultation] : an initial consultation for [FreeTextEntry2] : Acute promyelocytic leukemia

## 2020-08-26 NOTE — HISTORY OF PRESENT ILLNESS
[de-identified] : 60 year old female who is here for a follow up visit after a hospitalization for acute promyelocytic leukemia.  She initially presented to [facility] for fevers, found to be pancytopenic.  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  She was started on ATRA empirically on 5/23, then on arsenic on 5/27 when the diagnosis was confirmed.  Initially she appeared to have refractory thrombocytopenia which improved.  She was found to be COVID positive on 6/16, transferred to COVID unit, on 6/19 she was COVID negative, antibody positive and was transferred back.  She was treated for an enterococal UTI on 6/20.  Arsenic was held due to zoster from 6/22- 6/30.  6/23 she was found to have a partially occlusive DVT in her right axillary vein, treated with lovenox.  Repeat duplex was negative on 7/16.  PICC replaced in E, on incidental CXR on 7/8, was found to be malpositioned, 7/9 PICC was exchanged, then removed on 7/10 due to irritation?.  Bone marrow biopsy on 6/25 with persistent disease, repeated on 7/13 no evidence of leukemia.  Her plt recovered 6/27, ANC by 7/11. [de-identified] :  ID: [id number]\par Patient is seen in the treatment room for follow up appointment. In the interim, the patient went to [facility] ED 8/11/20 for mediport site pain and itchiness following arsenic infusion earlier that day. She was given 25mg IV bendaryl, 20mg pepcid, 10mg IV dex, 975mg APAP and discharged home. She has gone on to receive arsenic from the port without issues. Patient also notes improvement of muscle pain in both of her arms and the burning sensation in the bottom of her feet with the gabapentin. Patient denies bone pain, fever, chills, night sweats, back pain, headache, abdominal pain, chest pain, shortness of breath, or peripheral edema. Good appetite, stable weight.\par

## 2020-08-27 ENCOUNTER — TRANSCRIPTION ENCOUNTER (OUTPATIENT)
Age: 60
End: 2020-08-27

## 2020-08-27 ENCOUNTER — APPOINTMENT (OUTPATIENT)
Dept: INFUSION THERAPY | Facility: HOSPITAL | Age: 60
End: 2020-08-27

## 2020-08-27 VITALS
RESPIRATION RATE: 17 BRPM | HEART RATE: 70 BPM | SYSTOLIC BLOOD PRESSURE: 116 MMHG | TEMPERATURE: 98 F | DIASTOLIC BLOOD PRESSURE: 70 MMHG | OXYGEN SATURATION: 100 %

## 2020-08-27 LAB
ALBUMIN SERPL ELPH-MCNC: 4.2 G/DL — SIGNIFICANT CHANGE UP (ref 3.3–5)
ALP SERPL-CCNC: 81 U/L — SIGNIFICANT CHANGE UP (ref 40–120)
ALT FLD-CCNC: 12 U/L — SIGNIFICANT CHANGE UP (ref 4–33)
ANION GAP SERPL CALC-SCNC: 12 MMO/L — SIGNIFICANT CHANGE UP (ref 7–14)
AST SERPL-CCNC: 11 U/L — SIGNIFICANT CHANGE UP (ref 4–32)
BASOPHILS # BLD AUTO: 0.03 K/UL — SIGNIFICANT CHANGE UP (ref 0–0.2)
BASOPHILS NFR BLD AUTO: 0.4 % — SIGNIFICANT CHANGE UP (ref 0–2)
BILIRUB SERPL-MCNC: 0.3 MG/DL — SIGNIFICANT CHANGE UP (ref 0.2–1.2)
BUN SERPL-MCNC: 16 MG/DL — SIGNIFICANT CHANGE UP (ref 7–23)
CALCIUM SERPL-MCNC: 9.7 MG/DL — SIGNIFICANT CHANGE UP (ref 8.4–10.5)
CHLORIDE SERPL-SCNC: 105 MMOL/L — SIGNIFICANT CHANGE UP (ref 98–107)
CO2 SERPL-SCNC: 26 MMOL/L — SIGNIFICANT CHANGE UP (ref 22–31)
CREAT SERPL-MCNC: 0.85 MG/DL — SIGNIFICANT CHANGE UP (ref 0.5–1.3)
CULTURE RESULTS: SIGNIFICANT CHANGE UP
CULTURE RESULTS: SIGNIFICANT CHANGE UP
EOSINOPHIL # BLD AUTO: 0.86 K/UL — HIGH (ref 0–0.5)
EOSINOPHIL NFR BLD AUTO: 12.9 % — HIGH (ref 0–6)
GLUCOSE BLDC GLUCOMTR-MCNC: 133 MG/DL — HIGH (ref 70–99)
GLUCOSE BLDC GLUCOMTR-MCNC: 135 MG/DL — HIGH (ref 70–99)
GLUCOSE BLDC GLUCOMTR-MCNC: 142 MG/DL — HIGH (ref 70–99)
GLUCOSE SERPL-MCNC: 125 MG/DL — HIGH (ref 70–99)
HCT VFR BLD CALC: 28.8 % — LOW (ref 34.5–45)
HGB BLD-MCNC: 9.3 G/DL — LOW (ref 11.5–15.5)
IMM GRANULOCYTES NFR BLD AUTO: 0.3 % — SIGNIFICANT CHANGE UP (ref 0–1.5)
LYMPHOCYTES # BLD AUTO: 1.98 K/UL — SIGNIFICANT CHANGE UP (ref 1–3.3)
LYMPHOCYTES # BLD AUTO: 29.6 % — SIGNIFICANT CHANGE UP (ref 13–44)
MAGNESIUM SERPL-MCNC: 2.1 MG/DL — SIGNIFICANT CHANGE UP (ref 1.6–2.6)
MCHC RBC-ENTMCNC: 29.3 PG — SIGNIFICANT CHANGE UP (ref 27–34)
MCHC RBC-ENTMCNC: 32.3 % — SIGNIFICANT CHANGE UP (ref 32–36)
MCV RBC AUTO: 90.9 FL — SIGNIFICANT CHANGE UP (ref 80–100)
MONOCYTES # BLD AUTO: 0.33 K/UL — SIGNIFICANT CHANGE UP (ref 0–0.9)
MONOCYTES NFR BLD AUTO: 4.9 % — SIGNIFICANT CHANGE UP (ref 2–14)
NEUTROPHILS # BLD AUTO: 3.46 K/UL — SIGNIFICANT CHANGE UP (ref 1.8–7.4)
NEUTROPHILS NFR BLD AUTO: 51.9 % — SIGNIFICANT CHANGE UP (ref 43–77)
NRBC # FLD: 0 K/UL — SIGNIFICANT CHANGE UP (ref 0–0)
PHOSPHATE SERPL-MCNC: 4.2 MG/DL — SIGNIFICANT CHANGE UP (ref 2.5–4.5)
PLATELET # BLD AUTO: 222 K/UL — SIGNIFICANT CHANGE UP (ref 150–400)
PMV BLD: 11.3 FL — SIGNIFICANT CHANGE UP (ref 7–13)
POTASSIUM SERPL-MCNC: 4.1 MMOL/L — SIGNIFICANT CHANGE UP (ref 3.5–5.3)
POTASSIUM SERPL-SCNC: 4.1 MMOL/L — SIGNIFICANT CHANGE UP (ref 3.5–5.3)
PROT SERPL-MCNC: 7.3 G/DL — SIGNIFICANT CHANGE UP (ref 6–8.3)
RBC # BLD: 3.17 M/UL — LOW (ref 3.8–5.2)
RBC # FLD: 15.5 % — HIGH (ref 10.3–14.5)
SODIUM SERPL-SCNC: 143 MMOL/L — SIGNIFICANT CHANGE UP (ref 135–145)
SPECIMEN SOURCE: SIGNIFICANT CHANGE UP
SPECIMEN SOURCE: SIGNIFICANT CHANGE UP
WBC # BLD: 6.68 K/UL — SIGNIFICANT CHANGE UP (ref 3.8–10.5)
WBC # FLD AUTO: 6.68 K/UL — SIGNIFICANT CHANGE UP (ref 3.8–10.5)

## 2020-08-27 PROCEDURE — 99233 SBSQ HOSP IP/OBS HIGH 50: CPT | Mod: GC

## 2020-08-27 PROCEDURE — 93306 TTE W/DOPPLER COMPLETE: CPT | Mod: 26

## 2020-08-27 RX ADMIN — PANTOPRAZOLE SODIUM 40 MILLIGRAM(S): 20 TABLET, DELAYED RELEASE ORAL at 06:13

## 2020-08-27 RX ADMIN — APIXABAN 5 MILLIGRAM(S): 2.5 TABLET, FILM COATED ORAL at 20:12

## 2020-08-27 RX ADMIN — GABAPENTIN 100 MILLIGRAM(S): 400 CAPSULE ORAL at 13:12

## 2020-08-27 RX ADMIN — ARSENIC TRIOXIDE 130.5 MILLIGRAM(S): 2 INJECTION, SOLUTION INTRAVENOUS at 17:28

## 2020-08-27 RX ADMIN — GABAPENTIN 100 MILLIGRAM(S): 400 CAPSULE ORAL at 06:13

## 2020-08-27 NOTE — PROGRESS NOTE ADULT - PROVIDER SPECIALTY LIST ADULT
Heme/Onc
Infectious Disease
Infectious Disease
Internal Medicine

## 2020-08-27 NOTE — PROGRESS NOTE ADULT - PROBLEM SELECTOR PLAN 6
Diet: Consistent carbohydrate  DVT prophylaxis: Eliquis 5mg  Post-herpetic neuralgia and scabbing (zoster infection 6/22-6/30). Outside dose of Gabapentin 100mg TID resumed  Dispo:
Takes Atenolol 50mg qd at home  - Hold atenolol
Takes Atenolol 50mg qd at home  - Hold atenolol

## 2020-08-27 NOTE — PROGRESS NOTE ADULT - PROBLEM SELECTOR PLAN 3
Patient had R axillary DVT In July 2020. On Eliquis 5mg.   - C/w Eliquis 5mg  - No blood draws from RIGHT arm

## 2020-08-27 NOTE — DISCHARGE NOTE NURSING/CASE MANAGEMENT/SOCIAL WORK - NSDCPNINST_GEN_ALL_CORE
pt alert and oriented, Turkmen speaking. stable to discharge home after chemotherapy treatment completed. pt vitals stable, denies pain. no s/s of acute distress noted. pt ambulating with stand by assist. Right chest port pt alert and oriented, Lithuanian speaking. stable to discharge home after chemotherapy treatment completed. pt vitals stable, denies pain. no s/s of acute distress noted. pt ambulating with stand by assist. Right chest port dressing in place. IV removed. Discharge instruction provided with son at bedside, copy provided

## 2020-08-27 NOTE — PROGRESS NOTE ADULT - ATTENDING COMMENTS
60 year old female with APL who is receiving consolidation chemotherapy consisting of ATRA and arsenic trioxide. Now scheduled for a five day course of treatment, she will receive arsenic trioxide this evening . Chemotherapy orders completed by Dr UNIQUE Hurd
Atypical chest pain most consistent with GERD. Monitor and consider PPI trial. Appreciate Hematology recs. After arsenic plan discharge home today with f/u. Time planning discharge 35 minutes.
Pt to be discharged after completion of arsenic dose today. Son in agreement with plan. Time planning discharge 35 minutes.
Port site is not erythematous and reveals no fluctuance. Pt reports tenderness, mostly just inferior to the port reservoir. Otherwise, the exam reveals no significant finding concerning for infection; blood cultures are also negative to date. Case discussed with Dr. Martinez, as well as Hematology. Plan to continue monitoring off antibiotics. If fever, will re-culture and resume abx; if no fever, consider discharge home in 1-2 days. Discussed with pt's son over the phone this morning.
F/u ultrasound of port site; if evidence of abscess/port infection anticipate port removal. Will try to preserve port in coordination with ID colleagues. Continue vanc/cefepime. Check vanc trough.
Patient was seen and examined personally by me. I have discussed the plan and reviewed the Resident's note and agree with the above physical exam findings including assessment and plan except as indicated below. Labs and imagining reviewed.      In brief, 60F HTN, HLD, DM2, DVT (dx July 2020, on Eliquis) and APL (dx May 2020; on ATRA and arsenic) with acute onset chest pain and fever, sepsis presnet on admission suspected mediport infection vs. clot. Appreciate ID recs, will switch zosyn to cefepime and continue Vanc. R/O clot with upper extremity dopplers.   Awating  vasc surg consult consider removal of port .     I was physically present for the key portions of the evaluation and management (E/M) service provided.  I agree with the above history, physical, and plan which I have reviewed and edited where appropriate.     Plan discussed with Patient and Team 8.

## 2020-08-27 NOTE — PHARMACOTHERAPY INTERVENTION NOTE - COMMENTS
MD ordered zofran high dose 16 mg daily as premed for chemotherapy (Arsenic Trioxide) . Informed MD that arsenic has a boxed warning for QT prolongation and when given with zofran may increase the incidence even more.   MD changed premed order to palonosetron  as per LexiComp since palonosetron does not prolong the QT interval to any clinically relevant extent.

## 2020-08-27 NOTE — PROGRESS NOTE ADULT - ASSESSMENT
60-year-old Female with PMHx of HTN, HLD, T2DM, DVT (dx July 2020, on Eliquis) and APL (dx May 2020; on ATRA and arsenic) presented with chest pain, fever, and an erythematous Mediport site c/f infection of Mediport, doing well off antibiotics, received 2 doses of arsenic via peripheral IV.

## 2020-08-27 NOTE — PROGRESS NOTE ADULT - PROBLEM SELECTOR PROBLEM 7
Discharge planning issues
Preventive measure
Preventive measure

## 2020-08-27 NOTE — PROGRESS NOTE ADULT - PROBLEM SELECTOR PROBLEM 1
Sepsis associated with internal vascular access, initial encounter

## 2020-08-27 NOTE — PROGRESS NOTE ADULT - PROBLEM SELECTOR PROBLEM 4
Diabetes mellitus type 2, noninsulin dependent
Right groin pain
Right groin pain

## 2020-08-27 NOTE — PROGRESS NOTE ADULT - PROBLEM SELECTOR PROBLEM 5
Hypertension
Diabetes mellitus type 2, noninsulin dependent
Diabetes mellitus type 2, noninsulin dependent
Hypertension
Hypertension

## 2020-08-27 NOTE — PROGRESS NOTE ADULT - PROBLEM SELECTOR PLAN 4
Takes Metformin 500mg bid at home  - Hold metformin  - FS and SSI  - CC diet  - F/u A1c
Presenting with 2 days of R groin pain, no dysuria  - UA negative  - f/u UCx
Presenting with 2 days of R groin pain, no dysuria  - UA negative  - f/u UCx
Takes Metformin 500mg bid at home  - Hold metformin  - FS and SSI  - CC diet
Takes Metformin 500mg bid at home  - Hold metformin  - FS and SSI  - CC diet  - F/u A1c

## 2020-08-27 NOTE — PROGRESS NOTE ADULT - PROBLEM SELECTOR PLAN 1
- ID consulted; appreciate recs  - Fever and white count resolved s/p Vanc and Zosyn/cefepime. Bcx, Ucx NGTD. - Erythema around mediport resolved, but pain persists. IR assessed port and state its ok to resume use.  - Observing off abx   - Pain control with Oxycodone and Tylenol -Resolved  -Pt still reports pain and itching around mediport. IR assessed port and state its ok to resume use.  - Pain control with Oxycodone and Tylenol

## 2020-08-27 NOTE — PROGRESS NOTE ADULT - PROBLEM SELECTOR PLAN 2
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 Follows Dr. Hurd at [facility]  - Heme/Onc consult; appreciate recs  - Consolidation tx with arsenic started. S/p 2 doses. To receive 3rd dose today and continue at [facility]. 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 Follows Dr. Hurd at [facility]  - Heme/Onc consult; appreciate recs  - Consolidation tx with arsenic started. S/p 2 doses. To receive 3rd dose today via mediport and continue at [facility].

## 2020-08-27 NOTE — PROGRESS NOTE ADULT - PROBLEM SELECTOR PROBLEM 2
APL (acute promyelocytic leukemia)

## 2020-08-27 NOTE — DISCHARGE NOTE NURSING/CASE MANAGEMENT/SOCIAL WORK - PATIENT PORTAL LINK FT
You can access the FollowMyHealth Patient Portal offered by [facility] by registering at the following website: [email or web address]. By joining [facility]’s FollowMyHealth portal, you will also be able to view your health information using other applications (apps) compatible with our system.

## 2020-08-27 NOTE — PROGRESS NOTE ADULT - SUBJECTIVE AND OBJECTIVE BOX
Internal Medicine Progress Note    Debbi Parker, MS4  Pager [phone]    Patient is a 60y old  Female who presents with a chief complaint of Chest pain (26 Aug 2020 08:12)      SUBJECTIVE / OVERNIGHT EVENTS:  No events overnight. Pt seen and examined at bedside. The pts son, Raman, was translating over the phone. The pt states the pain around her mediport has improved, but at times it feels itchy inside. She also states she has 2/10 chest pain when she inspires. Denies nausea, vomiting, fever, chills, diarrhea, headache, palpitations.     MEDICATIONS  (STANDING):  apixaban 5 milliGRAM(s) Oral every 12 hours  arsenic trioxide IVPB (eMAR) 11 milliGRAM(s) IV Intermittent daily  atorvastatin 20 milliGRAM(s) Oral at bedtime  dextrose 5%. 1000 milliLiter(s) (50 mL/Hr) IV Continuous <Continuous>  dextrose 50% Injectable 12.5 Gram(s) IV Push once  dextrose 50% Injectable 25 Gram(s) IV Push once  dextrose 50% Injectable 25 Gram(s) IV Push once  gabapentin 100 milliGRAM(s) Oral three times a day  insulin lispro (HumaLOG) corrective regimen sliding scale   SubCutaneous three times a day before meals  insulin lispro (HumaLOG) corrective regimen sliding scale   SubCutaneous at bedtime  pantoprazole    Tablet 40 milliGRAM(s) Oral before breakfast    MEDICATIONS  (PRN):  acetaminophen   Tablet .. 650 milliGRAM(s) Oral every 6 hours PRN Mild Pain (1 - 3), Moderate Pain (4 - 6)  dextrose 40% Gel 15 Gram(s) Oral once PRN Blood Glucose LESS THAN 70 milliGRAM(s)/deciliter  glucagon  Injectable 1 milliGRAM(s) IntraMuscular once PRN Glucose LESS THAN 70 milligrams/deciliter  lidocaine   Patch 1 Patch Transdermal daily PRN pain at mediport site  oxyCODONE    IR 5 milliGRAM(s) Oral every 6 hours PRN Severe Pain (7 - 10)    Vital Signs Last 24 Hrs  T(C): 36.6 (27 Aug 2020 06:02), Max: 36.9 (26 Aug 2020 21:21)  T(F): 97.9 (27 Aug 2020 06:02), Max: 98.5 (26 Aug 2020 21:21)  HR: 62 (27 Aug 2020 06:02) (62 - 76)  BP: 103/53 (27 Aug 2020 06:02) (103/53 - 139/58)  BP(mean): --  RR: 17 (27 Aug 2020 06:02) (17 - 18)  SpO2: 97% (27 Aug 2020 06:02) (96% - 100%)    CAPILLARY BLOOD GLUCOSE      POCT Blood Glucose.: 149 mg/dL (26 Aug 2020 22:05)  POCT Blood Glucose.: 95 mg/dL (26 Aug 2020 17:48)  POCT Blood Glucose.: 162 mg/dL (26 Aug 2020 12:18)  POCT Blood Glucose.: 136 mg/dL (26 Aug 2020 08:35)      PHYSICAL EXAM  GENERAL: NAD  HEAD:  Atraumatic  EYES:  conjunctiva and sclera clear  NECK: Supple, No JVD  CHEST/LUNG: Clear to auscultation bilaterally; No wheeze  HEART: Regular rate and rhythm; No murmurs, rubs, or gallops  ABDOMEN: Soft, Nontender, Nondistended; Bowel sounds present  EXTREMITIES:  2+ Peripheral Pulses, No clubbing, cyanosis, or edema  NEURO/PSYCH: AAOx3, nonfocal  SKIN: Scab on R buttock      LABS:                        9.3    6.68  )-----------( 222      ( 27 Aug 2020 06:31 )             28.8     Hemoglobin: 9.3 g/dL (08-27 @ 06:31)  Hemoglobin: 9.4 g/dL (08-26 @ 05:55)  Hemoglobin: 9.4 g/dL (08-25 @ 07:08)  Hemoglobin: 9.0 g/dL (08-24 @ 07:28)  Hemoglobin: 9.2 g/dL (08-23 @ 14:30)    CBC Full  -  ( 27 Aug 2020 06:31 )  WBC Count : 6.68 K/uL  RBC Count : 3.17 M/uL  Hemoglobin : 9.3 g/dL  Hematocrit : 28.8 %  Platelet Count - Automated : 222 K/uL  Mean Cell Volume : 90.9 fL  Mean Cell Hemoglobin : 29.3 pg  Mean Cell Hemoglobin Concentration : 32.3 %  Auto Neutrophil # : 3.46 K/uL  Auto Lymphocyte # : 1.98 K/uL  Auto Monocyte # : 0.33 K/uL  Auto Eosinophil # : 0.86 K/uL  Auto Basophil # : 0.03 K/uL  Auto Neutrophil % : 51.9 %  Auto Lymphocyte % : 29.6 %  Auto Monocyte % : 4.9 %  Auto Eosinophil % : 12.9 %  Auto Basophil % : 0.4 %    08-27    143  |  105  |  16  ----------------------------<  125<H>  4.1   |  26  |  0.85    Ca    9.7      27 Aug 2020 06:31  Phos  4.2     08-27  Mg     2.1     08-27    TPro  7.3  /  Alb  4.2  /  TBili  0.3  /  DBili  x   /  AST  11  /  ALT  12  /  AlkPhos  81  08-27    Creatinine Trend: 0.85<--, 0.83<--, 0.77<--, 0.73<--, 0.80<--, 0.84<--  LIVER FUNCTIONS - ( 27 Aug 2020 06:31 )  Alb: 4.2 g/dL / Pro: 7.3 g/dL / ALK PHOS: 81 u/L / ALT: 12 u/L / AST: 11 u/L / GGT: x                   Labs, imaging personally reviewed

## 2020-08-27 NOTE — PROGRESS NOTE ADULT - PROBLEM SELECTOR PLAN 5
Takes Atenolol 50mg qd at home  - Hold atenolol
Takes Metformin 500mg bid at home  - Hold metformin  - FS and SSI  - CC diet  - F/u A1c
Takes Metformin 500mg bid at home  - Hold metformin  - FS and SSI  - CC diet  - F/u A1c

## 2020-08-27 NOTE — PROGRESS NOTE ADULT - PROBLEM SELECTOR PLAN 7
Transitions of Care Status:  1.  Name of PCP: Dr. Oriana Montanez ([phone])  2.  PCP Contacted on Admission: [ ] Y    [ ] N    3.  PCP contacted at Discharge: [ ] Y    [ ] N    [ ] N/A  4.  Post-Discharge Appointment Date and Location:  5.  Summary of Handoff given to PCP:

## 2020-08-28 ENCOUNTER — APPOINTMENT (OUTPATIENT)
Dept: INFUSION THERAPY | Facility: HOSPITAL | Age: 60
End: 2020-08-28

## 2020-08-28 ENCOUNTER — RESULT REVIEW (OUTPATIENT)
Age: 60
End: 2020-08-28

## 2020-08-28 PROBLEM — C92.40 ACUTE PROMYELOCYTIC LEUKEMIA, NOT HAVING ACHIEVED REMISSION: Chronic | Status: ACTIVE | Noted: 2020-08-22

## 2020-08-28 LAB
BASOPHILS # BLD AUTO: 0.05 K/UL — SIGNIFICANT CHANGE UP (ref 0–0.2)
BASOPHILS NFR BLD AUTO: 0.7 % — SIGNIFICANT CHANGE UP (ref 0–2)
EOSINOPHIL # BLD AUTO: 0.58 K/UL — HIGH (ref 0–0.5)
EOSINOPHIL NFR BLD AUTO: 8.1 % — HIGH (ref 0–6)
HCT VFR BLD CALC: 28.1 % — LOW (ref 34.5–45)
HGB BLD-MCNC: 9.2 G/DL — LOW (ref 11.5–15.5)
IMM GRANULOCYTES NFR BLD AUTO: 1.3 % — SIGNIFICANT CHANGE UP (ref 0–1.5)
LYMPHOCYTES # BLD AUTO: 1.89 K/UL — SIGNIFICANT CHANGE UP (ref 1–3.3)
LYMPHOCYTES # BLD AUTO: 26.3 % — SIGNIFICANT CHANGE UP (ref 13–44)
MCHC RBC-ENTMCNC: 29.1 PG — SIGNIFICANT CHANGE UP (ref 27–34)
MCHC RBC-ENTMCNC: 32.7 GM/DL — SIGNIFICANT CHANGE UP (ref 32–36)
MCV RBC AUTO: 88.9 FL — SIGNIFICANT CHANGE UP (ref 80–100)
MONOCYTES # BLD AUTO: 0.33 K/UL — SIGNIFICANT CHANGE UP (ref 0–0.9)
MONOCYTES NFR BLD AUTO: 4.6 % — SIGNIFICANT CHANGE UP (ref 2–14)
NEUTROPHILS # BLD AUTO: 4.26 K/UL — SIGNIFICANT CHANGE UP (ref 1.8–7.4)
NEUTROPHILS NFR BLD AUTO: 59 % — SIGNIFICANT CHANGE UP (ref 43–77)
NRBC # BLD: 0 /100 WBCS — SIGNIFICANT CHANGE UP (ref 0–0)
PLATELET # BLD AUTO: 248 K/UL — SIGNIFICANT CHANGE UP (ref 150–400)
RBC # BLD: 3.16 M/UL — LOW (ref 3.8–5.2)
RBC # FLD: 15.3 % — HIGH (ref 10.3–14.5)
WBC # BLD: 7.2 K/UL — SIGNIFICANT CHANGE UP (ref 3.8–10.5)
WBC # FLD AUTO: 7.2 K/UL — SIGNIFICANT CHANGE UP (ref 3.8–10.5)

## 2020-08-31 ENCOUNTER — RESULT REVIEW (OUTPATIENT)
Age: 60
End: 2020-08-31

## 2020-08-31 ENCOUNTER — LABORATORY RESULT (OUTPATIENT)
Age: 60
End: 2020-08-31

## 2020-08-31 ENCOUNTER — APPOINTMENT (OUTPATIENT)
Dept: INFUSION THERAPY | Facility: HOSPITAL | Age: 60
End: 2020-08-31

## 2020-08-31 LAB
BASOPHILS # BLD AUTO: 0 K/UL — SIGNIFICANT CHANGE UP (ref 0–0.2)
BASOPHILS NFR BLD AUTO: 0 % — SIGNIFICANT CHANGE UP (ref 0–2)
BUN SERPL-MCNC: 14 MG/DL — SIGNIFICANT CHANGE UP (ref 7–23)
CA-I BLDA-SCNC: 1.25 MMOL/L — SIGNIFICANT CHANGE UP (ref 1.12–1.3)
CHLORIDE SERPL-SCNC: 106 MMOL/L — SIGNIFICANT CHANGE UP (ref 96–108)
CO2 SERPL-SCNC: 23 MMOL/L — SIGNIFICANT CHANGE UP (ref 22–31)
CREAT SERPL-MCNC: 0.8 MG/DL — SIGNIFICANT CHANGE UP (ref 0.5–1.3)
EOSINOPHIL # BLD AUTO: 0.27 K/UL — SIGNIFICANT CHANGE UP (ref 0–0.5)
EOSINOPHIL NFR BLD AUTO: 4 % — SIGNIFICANT CHANGE UP (ref 0–6)
GLUCOSE SERPL-MCNC: 100 MG/DL — HIGH (ref 70–99)
HCT VFR BLD CALC: 28.5 % — LOW (ref 34.5–45)
HGB BLD-MCNC: 9.4 G/DL — LOW (ref 11.5–15.5)
LYMPHOCYTES # BLD AUTO: 1.34 K/UL — SIGNIFICANT CHANGE UP (ref 1–3.3)
LYMPHOCYTES # BLD AUTO: 20 % — SIGNIFICANT CHANGE UP (ref 13–44)
MCHC RBC-ENTMCNC: 29.1 PG — SIGNIFICANT CHANGE UP (ref 27–34)
MCHC RBC-ENTMCNC: 33 GM/DL — SIGNIFICANT CHANGE UP (ref 32–36)
MCV RBC AUTO: 88.2 FL — SIGNIFICANT CHANGE UP (ref 80–100)
MONOCYTES # BLD AUTO: 0.53 K/UL — SIGNIFICANT CHANGE UP (ref 0–0.9)
MONOCYTES NFR BLD AUTO: 8 % — SIGNIFICANT CHANGE UP (ref 2–14)
NEUTROPHILS # BLD AUTO: 4.54 K/UL — SIGNIFICANT CHANGE UP (ref 1.8–7.4)
NEUTROPHILS NFR BLD AUTO: 68 % — SIGNIFICANT CHANGE UP (ref 43–77)
NRBC # BLD: 0 /100 — SIGNIFICANT CHANGE UP (ref 0–0)
NRBC # BLD: SIGNIFICANT CHANGE UP /100 WBCS (ref 0–0)
PLAT MORPH BLD: NORMAL — SIGNIFICANT CHANGE UP
PLATELET # BLD AUTO: 285 K/UL — SIGNIFICANT CHANGE UP (ref 150–400)
POTASSIUM SERPL-MCNC: 4.1 MMOL/L — SIGNIFICANT CHANGE UP (ref 3.5–5.3)
POTASSIUM SERPL-SCNC: 4.1 MMOL/L — SIGNIFICANT CHANGE UP (ref 3.5–5.3)
RBC # BLD: 3.23 M/UL — LOW (ref 3.8–5.2)
RBC # FLD: 15.5 % — HIGH (ref 10.3–14.5)
RBC BLD AUTO: SIGNIFICANT CHANGE UP
SODIUM SERPL-SCNC: 142 MMOL/L — SIGNIFICANT CHANGE UP (ref 135–145)
WBC # BLD: 6.68 K/UL — SIGNIFICANT CHANGE UP (ref 3.8–10.5)
WBC # FLD AUTO: 6.68 K/UL — SIGNIFICANT CHANGE UP (ref 3.8–10.5)

## 2020-08-31 PROCEDURE — 93010 ELECTROCARDIOGRAM REPORT: CPT

## 2020-09-01 ENCOUNTER — APPOINTMENT (OUTPATIENT)
Dept: INFUSION THERAPY | Facility: HOSPITAL | Age: 60
End: 2020-09-01

## 2020-09-01 PROCEDURE — 93010 ELECTROCARDIOGRAM REPORT: CPT

## 2020-09-02 ENCOUNTER — RESULT REVIEW (OUTPATIENT)
Age: 60
End: 2020-09-02

## 2020-09-02 ENCOUNTER — APPOINTMENT (OUTPATIENT)
Dept: INFUSION THERAPY | Facility: HOSPITAL | Age: 60
End: 2020-09-02

## 2020-09-02 LAB
BASOPHILS # BLD AUTO: 0.03 K/UL — SIGNIFICANT CHANGE UP (ref 0–0.2)
BASOPHILS NFR BLD AUTO: 0.5 % — SIGNIFICANT CHANGE UP (ref 0–2)
EOSINOPHIL # BLD AUTO: 0.34 K/UL — SIGNIFICANT CHANGE UP (ref 0–0.5)
EOSINOPHIL NFR BLD AUTO: 5.4 % — SIGNIFICANT CHANGE UP (ref 0–6)
HCT VFR BLD CALC: 28.9 % — LOW (ref 34.5–45)
HGB BLD-MCNC: 9.3 G/DL — LOW (ref 11.5–15.5)
IMM GRANULOCYTES NFR BLD AUTO: 1.1 % — SIGNIFICANT CHANGE UP (ref 0–1.5)
LYMPHOCYTES # BLD AUTO: 1.77 K/UL — SIGNIFICANT CHANGE UP (ref 1–3.3)
LYMPHOCYTES # BLD AUTO: 28.1 % — SIGNIFICANT CHANGE UP (ref 13–44)
MCHC RBC-ENTMCNC: 28.8 PG — SIGNIFICANT CHANGE UP (ref 27–34)
MCHC RBC-ENTMCNC: 32.2 GM/DL — SIGNIFICANT CHANGE UP (ref 32–36)
MCV RBC AUTO: 89.5 FL — SIGNIFICANT CHANGE UP (ref 80–100)
MONOCYTES # BLD AUTO: 0.51 K/UL — SIGNIFICANT CHANGE UP (ref 0–0.9)
MONOCYTES NFR BLD AUTO: 8.1 % — SIGNIFICANT CHANGE UP (ref 2–14)
NEUTROPHILS # BLD AUTO: 3.58 K/UL — SIGNIFICANT CHANGE UP (ref 1.8–7.4)
NEUTROPHILS NFR BLD AUTO: 56.8 % — SIGNIFICANT CHANGE UP (ref 43–77)
NRBC # BLD: 0 /100 WBCS — SIGNIFICANT CHANGE UP (ref 0–0)
PLATELET # BLD AUTO: 271 K/UL — SIGNIFICANT CHANGE UP (ref 150–400)
RBC # BLD: 3.23 M/UL — LOW (ref 3.8–5.2)
RBC # FLD: 15.6 % — HIGH (ref 10.3–14.5)
WBC # BLD: 6.3 K/UL — SIGNIFICANT CHANGE UP (ref 3.8–10.5)
WBC # FLD AUTO: 6.3 K/UL — SIGNIFICANT CHANGE UP (ref 3.8–10.5)

## 2020-09-03 ENCOUNTER — INPATIENT (INPATIENT)
Facility: HOSPITAL | Age: 60
LOS: 7 days | Discharge: HOME | DRG: 186 | End: 2020-09-11
Attending: STUDENT IN AN ORGANIZED HEALTH CARE EDUCATION/TRAINING PROGRAM | Admitting: HOSPITALIST
Payer: MEDICAID

## 2020-09-03 ENCOUNTER — APPOINTMENT (OUTPATIENT)
Dept: INFUSION THERAPY | Facility: HOSPITAL | Age: 60
End: 2020-09-03

## 2020-09-03 VITALS
TEMPERATURE: 98 F | HEIGHT: 63 IN | OXYGEN SATURATION: 96 % | RESPIRATION RATE: 18 BRPM | SYSTOLIC BLOOD PRESSURE: 110 MMHG | HEART RATE: 72 BPM | DIASTOLIC BLOOD PRESSURE: 74 MMHG | WEIGHT: 169.98 LBS

## 2020-09-03 DIAGNOSIS — R07.9 CHEST PAIN, UNSPECIFIED: ICD-10-CM

## 2020-09-03 LAB
ALBUMIN SERPL ELPH-MCNC: 4.6 G/DL — SIGNIFICANT CHANGE UP (ref 3.3–5)
ALP SERPL-CCNC: 91 U/L — SIGNIFICANT CHANGE UP (ref 40–120)
ALT FLD-CCNC: 8 U/L — LOW (ref 10–45)
ANION GAP SERPL CALC-SCNC: 12 MMOL/L — SIGNIFICANT CHANGE UP (ref 5–17)
APPEARANCE UR: CLEAR — SIGNIFICANT CHANGE UP
APTT BLD: 39.9 SEC — HIGH (ref 27.5–35.5)
AST SERPL-CCNC: 9 U/L — LOW (ref 10–40)
BASE EXCESS BLDV CALC-SCNC: -0.8 MMOL/L — SIGNIFICANT CHANGE UP (ref -2–2)
BASOPHILS # BLD AUTO: 0.02 K/UL — SIGNIFICANT CHANGE UP (ref 0–0.2)
BASOPHILS NFR BLD AUTO: 0.3 % — SIGNIFICANT CHANGE UP (ref 0–2)
BILIRUB SERPL-MCNC: 0.4 MG/DL — SIGNIFICANT CHANGE UP (ref 0.2–1.2)
BILIRUB UR-MCNC: NEGATIVE — SIGNIFICANT CHANGE UP
BUN SERPL-MCNC: 16 MG/DL — SIGNIFICANT CHANGE UP (ref 7–23)
CA-I SERPL-SCNC: 1.3 MMOL/L — SIGNIFICANT CHANGE UP (ref 1.12–1.3)
CALCIUM SERPL-MCNC: 9.9 MG/DL — SIGNIFICANT CHANGE UP (ref 8.4–10.5)
CHLORIDE BLDV-SCNC: 103 MMOL/L — SIGNIFICANT CHANGE UP (ref 96–108)
CHLORIDE SERPL-SCNC: 100 MMOL/L — SIGNIFICANT CHANGE UP (ref 96–108)
CO2 BLDV-SCNC: 27 MMOL/L — SIGNIFICANT CHANGE UP (ref 22–30)
CO2 SERPL-SCNC: 25 MMOL/L — SIGNIFICANT CHANGE UP (ref 22–31)
COLOR SPEC: SIGNIFICANT CHANGE UP
CREAT SERPL-MCNC: 0.85 MG/DL — SIGNIFICANT CHANGE UP (ref 0.5–1.3)
DIFF PNL FLD: NEGATIVE — SIGNIFICANT CHANGE UP
EOSINOPHIL # BLD AUTO: 0.24 K/UL — SIGNIFICANT CHANGE UP (ref 0–0.5)
EOSINOPHIL NFR BLD AUTO: 4.1 % — SIGNIFICANT CHANGE UP (ref 0–6)
GAS PNL BLDV: 140 MMOL/L — SIGNIFICANT CHANGE UP (ref 135–145)
GAS PNL BLDV: SIGNIFICANT CHANGE UP
GAS PNL BLDV: SIGNIFICANT CHANGE UP
GLUCOSE BLDV-MCNC: 172 MG/DL — HIGH (ref 70–99)
GLUCOSE SERPL-MCNC: 177 MG/DL — HIGH (ref 70–99)
GLUCOSE UR QL: NEGATIVE — SIGNIFICANT CHANGE UP
HCO3 BLDV-SCNC: 25 MMOL/L — SIGNIFICANT CHANGE UP (ref 21–29)
HCT VFR BLD CALC: 27.9 % — LOW (ref 34.5–45)
HCT VFR BLDA CALC: 37 % — LOW (ref 39–50)
HGB BLD CALC-MCNC: 12 G/DL — SIGNIFICANT CHANGE UP (ref 11.5–15.5)
HGB BLD-MCNC: 8.8 G/DL — LOW (ref 11.5–15.5)
IMM GRANULOCYTES NFR BLD AUTO: 0.2 % — SIGNIFICANT CHANGE UP (ref 0–1.5)
INR BLD: 1.65 RATIO — HIGH (ref 0.88–1.16)
KETONES UR-MCNC: NEGATIVE — SIGNIFICANT CHANGE UP
LACTATE BLDV-MCNC: 2 MMOL/L — SIGNIFICANT CHANGE UP (ref 0.7–2)
LEUKOCYTE ESTERASE UR-ACNC: NEGATIVE — SIGNIFICANT CHANGE UP
LYMPHOCYTES # BLD AUTO: 1.24 K/UL — SIGNIFICANT CHANGE UP (ref 1–3.3)
LYMPHOCYTES # BLD AUTO: 21.3 % — SIGNIFICANT CHANGE UP (ref 13–44)
MCHC RBC-ENTMCNC: 28.6 PG — SIGNIFICANT CHANGE UP (ref 27–34)
MCHC RBC-ENTMCNC: 31.5 GM/DL — LOW (ref 32–36)
MCV RBC AUTO: 90.6 FL — SIGNIFICANT CHANGE UP (ref 80–100)
MONOCYTES # BLD AUTO: 0.38 K/UL — SIGNIFICANT CHANGE UP (ref 0–0.9)
MONOCYTES NFR BLD AUTO: 6.5 % — SIGNIFICANT CHANGE UP (ref 2–14)
NEUTROPHILS # BLD AUTO: 3.92 K/UL — SIGNIFICANT CHANGE UP (ref 1.8–7.4)
NEUTROPHILS NFR BLD AUTO: 67.6 % — SIGNIFICANT CHANGE UP (ref 43–77)
NITRITE UR-MCNC: NEGATIVE — SIGNIFICANT CHANGE UP
NRBC # BLD: 0 /100 WBCS — SIGNIFICANT CHANGE UP (ref 0–0)
PCO2 BLDV: 49 MMHG — SIGNIFICANT CHANGE UP (ref 35–50)
PH BLDV: 7.33 — LOW (ref 7.35–7.45)
PH UR: 6 — SIGNIFICANT CHANGE UP (ref 5–8)
PLATELET # BLD AUTO: 245 K/UL — SIGNIFICANT CHANGE UP (ref 150–400)
PO2 BLDV: 22 MMHG — LOW (ref 25–45)
POTASSIUM BLDV-SCNC: 3.8 MMOL/L — SIGNIFICANT CHANGE UP (ref 3.5–5.3)
POTASSIUM SERPL-MCNC: 3.9 MMOL/L — SIGNIFICANT CHANGE UP (ref 3.5–5.3)
POTASSIUM SERPL-SCNC: 3.9 MMOL/L — SIGNIFICANT CHANGE UP (ref 3.5–5.3)
PROT SERPL-MCNC: 7.4 G/DL — SIGNIFICANT CHANGE UP (ref 6–8.3)
PROT UR-MCNC: SIGNIFICANT CHANGE UP
PROTHROM AB SERPL-ACNC: 19.2 SEC — HIGH (ref 10.6–13.6)
RBC # BLD: 3.08 M/UL — LOW (ref 3.8–5.2)
RBC # FLD: 15.5 % — HIGH (ref 10.3–14.5)
SAO2 % BLDV: 27 % — LOW (ref 67–88)
SARS-COV-2 RNA SPEC QL NAA+PROBE: SIGNIFICANT CHANGE UP
SODIUM SERPL-SCNC: 137 MMOL/L — SIGNIFICANT CHANGE UP (ref 135–145)
SP GR SPEC: 1.05 — HIGH (ref 1.01–1.02)
TROPONIN T, HIGH SENSITIVITY RESULT: 12 NG/L — SIGNIFICANT CHANGE UP (ref 0–51)
TROPONIN T, HIGH SENSITIVITY RESULT: 9 NG/L — SIGNIFICANT CHANGE UP (ref 0–51)
UROBILINOGEN FLD QL: NEGATIVE — SIGNIFICANT CHANGE UP
WBC # BLD: 5.81 K/UL — SIGNIFICANT CHANGE UP (ref 3.8–10.5)
WBC # FLD AUTO: 5.81 K/UL — SIGNIFICANT CHANGE UP (ref 3.8–10.5)

## 2020-09-03 PROCEDURE — 71275 CT ANGIOGRAPHY CHEST: CPT | Mod: 26

## 2020-09-03 PROCEDURE — 99285 EMERGENCY DEPT VISIT HI MDM: CPT

## 2020-09-03 PROCEDURE — 93010 ELECTROCARDIOGRAM REPORT: CPT

## 2020-09-03 PROCEDURE — 99222 1ST HOSP IP/OBS MODERATE 55: CPT | Mod: GC

## 2020-09-03 PROCEDURE — 71045 X-RAY EXAM CHEST 1 VIEW: CPT | Mod: 26

## 2020-09-03 RX ORDER — MORPHINE SULFATE 50 MG/1
4 CAPSULE, EXTENDED RELEASE ORAL ONCE
Refills: 0 | Status: DISCONTINUED | OUTPATIENT
Start: 2020-09-03 | End: 2020-09-03

## 2020-09-03 RX ORDER — SODIUM CHLORIDE 9 MG/ML
500 INJECTION INTRAMUSCULAR; INTRAVENOUS; SUBCUTANEOUS ONCE
Refills: 0 | Status: COMPLETED | OUTPATIENT
Start: 2020-09-03 | End: 2020-09-03

## 2020-09-03 RX ORDER — INFLUENZA VIRUS VACCINE 15; 15; 15; 15 UG/.5ML; UG/.5ML; UG/.5ML; UG/.5ML
0.5 SUSPENSION INTRAMUSCULAR ONCE
Refills: 0 | Status: DISCONTINUED | OUTPATIENT
Start: 2020-09-03 | End: 2020-09-11

## 2020-09-03 RX ADMIN — MORPHINE SULFATE 4 MILLIGRAM(S): 50 CAPSULE, EXTENDED RELEASE ORAL at 13:40

## 2020-09-03 RX ADMIN — MORPHINE SULFATE 4 MILLIGRAM(S): 50 CAPSULE, EXTENDED RELEASE ORAL at 21:00

## 2020-09-03 RX ADMIN — MORPHINE SULFATE 4 MILLIGRAM(S): 50 CAPSULE, EXTENDED RELEASE ORAL at 20:25

## 2020-09-03 RX ADMIN — MORPHINE SULFATE 4 MILLIGRAM(S): 50 CAPSULE, EXTENDED RELEASE ORAL at 10:42

## 2020-09-03 RX ADMIN — SODIUM CHLORIDE 500 MILLILITER(S): 9 INJECTION INTRAMUSCULAR; INTRAVENOUS; SUBCUTANEOUS at 10:42

## 2020-09-03 NOTE — CONSULT NOTE ADULT - ASSESSMENT
59 yo F w/ hx of HTN, HLD, T2DM, DVT (dx July 2020, on Eliquis) APL currently undergoing chemotherapy (dx May 2020; on ATRA and arsenic), last tx planned for 9/4/20, recently admitted for concern of Mediport infxn 8/22-8/27, now w/ presentation similar to that of previous admission w/  back and CP which began last night. Started as back pain, and became CP. CP described as heavy and substernal, and constant. Denies fevers and N/V. Associated symptoms lightheadedness, nausea, and cough. Review of chart shows pt admitted for similar symptoms previously, and workup was unremarkable to include no PE or Mediport infection, and abx were discontinued during the admission. RLE pain reported today is chronic x "years" per pt.    # chest pain  -pt found to have small pericardial effusion, and pleural effusion  -possible pericarditis - trial of indomethacin  -check TTE    # APL  -Dx in May 2020 when she presented with pancytopenia  - ast bone marrow biopsy on 7/13/20 showed no evidence of leukemia  - urrently on ATRA & Arsenic consolidation, last dose given on 8/31  - Monitor CBC with diff daily.  - [facility]urd at [facility]. PGY 6  Heme-Onc fellow  [phone]; [id number] 59 yo F w/ hx of HTN, HLD, T2DM, DVT (dx July 2020, on Eliquis) APL currently undergoing chemotherapy (dx May 2020; on ATRA and arsenic), last tx planned for 9/4/20, recently admitted for concern of Mediport infxn 8/22-8/27, now w/ presentation similar to that of previous admission w/  back and CP which began last night. Started as back pain, and became CP. CP described as heavy and substernal, and constant. Denies fevers and N/V. Associated symptoms lightheadedness, nausea, and cough. Review of chart shows pt admitted for similar symptoms previously, and workup was unremarkable to include no PE or Mediport infection, and abx were discontinued during the admission. RLE pain reported today is chronic x "years" per pt.    # chest pain  -CTA -neg for PE  -pt found to have small pericardial effusion, and pleural effusion  -possible pericarditis - trial of indomethacin  -check TTE  -pending cardiology eval    # APL  -Dx in May 2020 when she presented with pancytopenia  - ast bone marrow biopsy on 7/13/20 showed no evidence of leukemia  - urrently on ATRA & Arsenic consolidation, last dose given on 9/2 - will hold treatment while being worked up for chest pain  - Monitor CBC with diff daily.  - Follows Dr. Hurd at [facility]. PGY 6  Heme-Onc fellow  [phone]; [id number] Pt is a 85 YO F with a pmh of HTN (hypertension), Hyperlipidemia, Parkinson disease, Rheumatoid arthritis. Pt presents to the ER with a chief complaint of nausea.  Symptoms resolved overnight with IVF hydration and supportive care. Likely due to dehydration and/or gastritis. Pt medically stable for discharge.

## 2020-09-03 NOTE — ED PROVIDER NOTE - NS ED ROS FT
Gen: Denies fever and chills  HEENT: Denies headache and congestion.  CV: + CP and lightheadedness. Denies lightheadedness.  Skin: Denies rash.   Resp: + cough and mild SOB.   GI: + constipation and nausea. Denies abd pain and vomiting.   Msk: + RLE pain. Denies recent trauma.  : Denies dysuria and hematuria.  Neuro: Denies LOC, dizziness, and new numbness/tingling or new focal weakness  Psych: Denies SI

## 2020-09-03 NOTE — ED ADULT NURSE NOTE - NS ED NURSE REPORT GIVEN TO FT
Bedside report given to on coming nurse Mercedes RN holding. Understands pmh, medications given and plan of care for patient. Patient in stable condition, vital signs updated, has no complaints at this time and has been updated on care plan. Explained to patient that it is change of shift and new nurse is taking over, pt verbalized understanding.

## 2020-09-03 NOTE — ED PROVIDER NOTE - PROGRESS NOTE DETAILS
Troponin 12. Will repeat. CT read pending. Discussed with hospitalist and Dr. Lopez from Heme/Onc. Ongoing discussions as to whether pt needs inpatient admission or can be CDUd for echo in AM.   Juan Antonio Caraballo M.D. PGY-3

## 2020-09-03 NOTE — ED ADULT NURSE NOTE - NSIMPLEMENTINTERV_GEN_ALL_ED
Implemented All Fall with Harm Risk Interventions:  [facility] to call system. Call bell, personal items and telephone within reach. Instruct patient to call for assistance. Room bathroom lighting operational. Non-slip footwear when patient is off stretcher. Physically safe environment: no spills, clutter or unnecessary equipment. Stretcher in lowest position, wheels locked, appropriate side rails in place. Provide visual cue, wrist band, yellow gown, etc. Monitor gait and stability. Monitor for mental status changes and reorient to person, place, and time. Review medications for side effects contributing to fall risk. Reinforce activity limits and safety measures with patient and family. Provide visual clues: red socks.

## 2020-09-03 NOTE — ED ADULT NURSE NOTE - OBJECTIVE STATEMENT
59 y/o female presents to ed c.o back pain. Pt has a PMH of APL and had a Mediport places last week. Son is concerned for infection to sight. C/o back pain that radiates from chest and left arm. Denies ha, n/v/d, abdominal pain, f/c, urinary symptoms, hematuria. A&Ox4, tachypneic, skin warm dry and intact, MAEx4, crackles noted to left lung, abd soft obese nontender. Patient's bed in the lowest position, explained plan of care to patient and family members. Will continue to reassess. 61 y/o female presents to ed c.o back pain. Pt has a PMH of APL and had a Mediport places last week. Son is concerned for infection to sight. C/o back pain that radiates from chest and left arm. Denies ha, n/v/d, abdominal pain, f/c, urinary symptoms, hematuria. A&Ox4, tachypneic, Mediport had bandage covering sight with no redness or swelling noted,, MAEx4, crackles noted to left lung, abd soft obese nontender. Patient's bed in the lowest position, explained plan of care to patient and family members. Will continue to reassess.

## 2020-09-03 NOTE — ED PROVIDER NOTE - ATTENDING CONTRIBUTION TO CARE
Attending MD Coon: I personally have seen and examined this patient.  Resident note reviewed and agree on plan of care and except where noted.  See below for details.     Seen in [place] 1/16, Sarah  [id number] Maday    60F with PMH/PSH including ?R ear cholesteatoma, APL (dx'd 5/2020 on ATRA, Arsenic), HTN, HLD, DM, DVT (7/2020, R axillary, on Eliquis), recent admissions 7/21/20-8/17/20, 8/22/20-8/27/20 (chest pain, concern for Mediport infection), R Mediport placement 8/5/20 presents to the ED with chest pain and back pain.  Reports pain is similar to that which she had at the time of her last admission, reports different in that last time her chest pain was associated with shortness of breath and denies shortness of breath at this time.  Reports chest pain had started last night while seated, patient unable to characterize pain, denies cramping or pressure like.  Reports back pain is worse than chest pain.  Reports pain was less overnight and worse now.  Reports "I have pain all the time".  Reports pain had resolved after her last admission.  Reports RLE pain, reports chronic for 2-3 years, unchanged.  Reports cough, nonproductive, unclear duration, intermittent.  Denies abdominal pain, vomiting, diarrhea, blood in stools. Reports occasional nausea, constipation.  Denies dysuria, hematuria, change in urinary habits including frequency, urgency. A ten (10) point review of systems was negative other than as stated in the HPI or elsewhere in the chart.     Exam:   General: NAD  HENT: head NCAT, airway patent with moist mucous membranes  Eyes: PERRL  Lungs: lungs +crackles L side with good inspiratory effort, no wheezing, no rhonchi  Cardiac: +S1S2, no m/r/g  GI: abdomen soft with +BS, NT, ND  : no CVAT  MSK: FROM at neck, no tenderness to midline palpation, no stepoffs along length of spine, no calf tenderness, swelling, erythema or warmth  Neuro: moving all extremities with 5/5 strength bilateral upper and lower extremities, good and equal  strength bilaterally, sensory grossly intact, no gross neuro deficits  Psych: normal mood and affect   Skin: warm and dry    A/P: 60F with Attending MD Coon: I personally have seen and examined this patient.  Resident note reviewed and agree on plan of care and except where noted.  See below for details.     Seen in [place] 1/16, Sarah  [id number] Maday    60F with PMH/PSH including ?R ear cholesteatoma, APL (dx'd 5/2020 on ATRA, Arsenic), HTN, HLD, DM, DVT (7/2020, R axillary, on Eliquis), recent admissions 7/21/20-8/17/20, 8/22/20-8/27/20 (chest pain, concern for Mediport infection), R Mediport placement 8/5/20 presents to the ED with chest pain and back pain.  Reports pain is similar to that which she had at the time of her last admission, reports different in that last time her chest pain was associated with shortness of breath and denies shortness of breath at this time.  Reports chest pain had started last night while seated, patient unable to characterize pain, denies cramping or pressure like.  Reports back pain is worse than chest pain.  Reports pain was less overnight and worse now.  Reports "I have pain all the time".  Reports pain had resolved after her last admission.  Reports RLE pain, reports chronic for 2-3 years, unchanged.  Reports cough, nonproductive, unclear duration, intermittent.  Denies abdominal pain, vomiting, diarrhea, blood in stools. Reports occasional nausea, constipation.  Denies dysuria, hematuria, change in urinary habits including frequency, urgency. A ten (10) point review of systems was negative other than as stated in the HPI or elsewhere in the chart.     Exam:   General: NAD  HENT: head NCAT, airway patent with moist mucous membranes  Eyes: PERRL  Lungs: lungs +crackles L side with good inspiratory effort, no wheezing, no rhonchi  Cardiac: +S1S2, no m/r/g  GI: abdomen soft with +BS, NT, ND  : no CVAT  MSK: FROM at neck, no tenderness to midline palpation, no stepoffs along length of spine, no calf tenderness, swelling, erythema or warmth  Neuro: moving all extremities with 5/5 strength bilateral upper and lower extremities, good and equal  strength bilaterally, sensory grossly intact, no gross neuro deficits  Psych: normal mood and affect   Skin: warm and dry    A/P: 60F with chest pain, DDx includes PE given malignancy and history of DVT, pleural effusion, PNA, unstable angina, will obtain EKG, CXR, CTA Chest, ED Echo, labs, reassess

## 2020-09-03 NOTE — CONSULT NOTE ADULT - SUBJECTIVE AND OBJECTIVE BOX
61 yo F w/ hx of HTN, HLD, T2DM, DVT (dx July 2020, on Eliquis) APL currently undergoing chemotherapy (dx May 2020; on ATRA and arsenic, last Aug 31), last tx planned for 9/4/20, recently admitted for concern of Mediport infxn 8/22-8/27, now w/ presentation similar to that of previous admission w/  back and CP which began last night. Started as back pain, and became CP. CP described as heavy and substernal, and constant. Denies fevers and N/V. Associated symptoms lightheadedness, nausea, and cough. Review of chart shows pt admitted for similar symptoms previously, and workup was unremarkable to include no PE or Mediport infection, and abx were discontinued during the admission. RLE pain reported today is chronic x "years" per pt.            Allergies  adhesives (Pruritus)  No Known Drug Allergies    PAST MEDICAL & SURGICAL HISTORY:  APL (acute promyelocytic leukemia)  HLD (hyperlipidemia)  Hypertension  Diabetes  No significant past surgical history      FAMILY HISTORY:  Family history of diabetes mellitus: Mother      Social History:      REVIEW OF SYSTEMS:    CONSTITUTIONAL: No weakness, fevers or chills  EYES/ENT: No visual changes, no throat pain   RESPIRATORY: No cough, wheezing, hemoptysis; No shortness of breath  CARDIOVASCULAR: No chest pain or palpitations  GASTROINTESTINAL: No abdominal pain, nausea, vomiting, or hematemesis; No diarrhea or constipation. No melena or hematochezia.  GENITOURINARY: No dysuria, frequency or hematuria  MUSCULOSKELETAL: No joint pain.  NEUROLOGICAL: No dizziness, numbness, or weakness  SKIN: No itching, burning, rashes, or lesions   All other review of systems is negative unless indicated above.    VITAL SIGNS:  Vital Signs Last 24 Hrs  T(C): 37.3 (03 Sep 2020 17:14), Max: 37.3 (03 Sep 2020 17:14)  T(F): 99.1 (03 Sep 2020 17:14), Max: 99.1 (03 Sep 2020 17:14)  HR: 75 (03 Sep 2020 17:14) (72 - 81)  BP: 109/72 (03 Sep 2020 17:14) (109/72 - 117/68)  BP(mean): --  RR: 18 (03 Sep 2020 17:14) (18 - 26)  SpO2: 94% (03 Sep 2020 17:14) (94% - 97%)      PHYSICAL EXAM:     GENERAL: no acute distress  HEENT: EOMI, neck supple  RESPIRATORY: LCTAB/L, no rhonchi, rales, or wheezing  CARDIOVASCULAR: RRR, no murmurs, gallops, rubs  ABDOMINAL: soft, non-tender, non-distended, positive bowel sounds   EXTREMITIES: no clubbing, cyanosis, or edema  NEUROLOGICAL: alert and oriented x 3, non-focal  SKIN: no rashes or lesions   MUSCULOSKELETAL: no gross joint deformity                          8.8    5.81  )-----------( 245      ( 03 Sep 2020 10:30 )             27.9     09-03    137  |  100  |  16  ----------------------------<  177<H>  3.9   |  25  |  0.85    Ca    9.9      03 Sep 2020 10:30    TPro  7.4  /  Alb  4.6  /  TBili  0.4  /  DBili  x   /  AST  9<L>  /  ALT  8<L>  /  AlkPhos  91  09-03      MEDICATIONS  (STANDING):

## 2020-09-03 NOTE — ED PROVIDER NOTE - CLINICAL SUMMARY MEDICAL DECISION MAKING FREE TEXT BOX
59 yo F w/ hx of APL currently undergoing chemotherapy, last tx planned for 9/4/20, recently admitted for concern of Mediport infxn 8/22-8/27, now w/ presentation similar to that of previous admission, w/  back and CP which began overnight. Started as back pain, and became CP. CP described as heavy and substernal, and constant. Denies fevers and N/V. Associated lightheadedness. VSS, not hypoxic. Crackles throughout left lung, skin pallor. Labs, imaging, fluids, analgesia. Will touch base w/ onc and reassess. n/a

## 2020-09-03 NOTE — ED PROVIDER NOTE - OBJECTIVE STATEMENT
59 yo F w/ hx of HTN, HLD, T2DM, DVT (dx July 2020, on Eliquis) APL currently undergoing chemotherapy (dx May 2020; on ATRA and arsenic), last tx planned for 9/4/20, recently admitted for concern of Mediport infxn 8/22-8/27, now w/ presentation similar to that of previous admission w/  back and CP which began last night. Started as back pain, and became CP. CP described as heavy and substernal, and constant. Denies fevers and N/V. Associated symptoms lightheadedness, nausea, and cough. Review of chart shows pt admitted for similar symptoms previously, and workup was unremarkable to include no PE or Mediport infection, and abx were discontinued during the admission. RLE pain reported today is chronic x "years" per pt.    Oncologist: Dr. Hurd

## 2020-09-03 NOTE — CONSULT NOTE ADULT - ATTENDING COMMENTS
hx APL on Arsenic and ATRA  now p/r pleuritic chest pain  CT chest c/w small pleural effusions and pericardial effusion  possible pericarditis  await Echo to better eval pericardial fluid  chemo on hold for now  cards eval pending  will follow with you

## 2020-09-04 ENCOUNTER — APPOINTMENT (OUTPATIENT)
Dept: INFUSION THERAPY | Facility: HOSPITAL | Age: 60
End: 2020-09-04

## 2020-09-04 DIAGNOSIS — I10 ESSENTIAL (PRIMARY) HYPERTENSION: ICD-10-CM

## 2020-09-04 DIAGNOSIS — R07.9 CHEST PAIN, UNSPECIFIED: ICD-10-CM

## 2020-09-04 DIAGNOSIS — C92.40 ACUTE PROMYELOCYTIC LEUKEMIA, NOT HAVING ACHIEVED REMISSION: ICD-10-CM

## 2020-09-04 DIAGNOSIS — E78.5 HYPERLIPIDEMIA, UNSPECIFIED: ICD-10-CM

## 2020-09-04 DIAGNOSIS — E11.9 TYPE 2 DIABETES MELLITUS WITHOUT COMPLICATIONS: ICD-10-CM

## 2020-09-04 DIAGNOSIS — Z29.9 ENCOUNTER FOR PROPHYLACTIC MEASURES, UNSPECIFIED: ICD-10-CM

## 2020-09-04 DIAGNOSIS — I82.409 ACUTE EMBOLISM AND THROMBOSIS OF UNSPECIFIED DEEP VEINS OF UNSPECIFIED LOWER EXTREMITY: ICD-10-CM

## 2020-09-04 LAB
ALBUMIN SERPL ELPH-MCNC: 4.1 G/DL — SIGNIFICANT CHANGE UP (ref 3.3–5)
ALP SERPL-CCNC: 77 U/L — SIGNIFICANT CHANGE UP (ref 40–120)
ALT FLD-CCNC: 7 U/L — LOW (ref 10–45)
ANION GAP SERPL CALC-SCNC: 13 MMOL/L — SIGNIFICANT CHANGE UP (ref 5–17)
AST SERPL-CCNC: 10 U/L — SIGNIFICANT CHANGE UP (ref 10–40)
BASOPHILS # BLD AUTO: 0.01 K/UL — SIGNIFICANT CHANGE UP (ref 0–0.2)
BASOPHILS NFR BLD AUTO: 0.2 % — SIGNIFICANT CHANGE UP (ref 0–2)
BILIRUB SERPL-MCNC: 0.4 MG/DL — SIGNIFICANT CHANGE UP (ref 0.2–1.2)
BLD GP AB SCN SERPL QL: NEGATIVE — SIGNIFICANT CHANGE UP
BUN SERPL-MCNC: 14 MG/DL — SIGNIFICANT CHANGE UP (ref 7–23)
CALCIUM SERPL-MCNC: 9.7 MG/DL — SIGNIFICANT CHANGE UP (ref 8.4–10.5)
CHLORIDE SERPL-SCNC: 102 MMOL/L — SIGNIFICANT CHANGE UP (ref 96–108)
CO2 SERPL-SCNC: 24 MMOL/L — SIGNIFICANT CHANGE UP (ref 22–31)
CREAT SERPL-MCNC: 0.79 MG/DL — SIGNIFICANT CHANGE UP (ref 0.5–1.3)
EOSINOPHIL # BLD AUTO: 0.16 K/UL — SIGNIFICANT CHANGE UP (ref 0–0.5)
EOSINOPHIL NFR BLD AUTO: 2.8 % — SIGNIFICANT CHANGE UP (ref 0–6)
GLUCOSE BLDC GLUCOMTR-MCNC: 123 MG/DL — HIGH (ref 70–99)
GLUCOSE BLDC GLUCOMTR-MCNC: 127 MG/DL — HIGH (ref 70–99)
GLUCOSE BLDC GLUCOMTR-MCNC: 165 MG/DL — HIGH (ref 70–99)
GLUCOSE SERPL-MCNC: 202 MG/DL — HIGH (ref 70–99)
HCT VFR BLD CALC: 23.9 % — LOW (ref 34.5–45)
HCT VFR BLD CALC: 25.2 % — LOW (ref 34.5–45)
HGB BLD-MCNC: 7.7 G/DL — LOW (ref 11.5–15.5)
HGB BLD-MCNC: 7.7 G/DL — LOW (ref 11.5–15.5)
IMM GRANULOCYTES NFR BLD AUTO: 0.4 % — SIGNIFICANT CHANGE UP (ref 0–1.5)
LYMPHOCYTES # BLD AUTO: 1.32 K/UL — SIGNIFICANT CHANGE UP (ref 1–3.3)
LYMPHOCYTES # BLD AUTO: 23.4 % — SIGNIFICANT CHANGE UP (ref 13–44)
MCHC RBC-ENTMCNC: 27.9 PG — SIGNIFICANT CHANGE UP (ref 27–34)
MCHC RBC-ENTMCNC: 28.7 PG — SIGNIFICANT CHANGE UP (ref 27–34)
MCHC RBC-ENTMCNC: 30.6 GM/DL — LOW (ref 32–36)
MCHC RBC-ENTMCNC: 32.2 GM/DL — SIGNIFICANT CHANGE UP (ref 32–36)
MCV RBC AUTO: 89.2 FL — SIGNIFICANT CHANGE UP (ref 80–100)
MCV RBC AUTO: 91.3 FL — SIGNIFICANT CHANGE UP (ref 80–100)
MONOCYTES # BLD AUTO: 0.36 K/UL — SIGNIFICANT CHANGE UP (ref 0–0.9)
MONOCYTES NFR BLD AUTO: 6.4 % — SIGNIFICANT CHANGE UP (ref 2–14)
NEUTROPHILS # BLD AUTO: 3.76 K/UL — SIGNIFICANT CHANGE UP (ref 1.8–7.4)
NEUTROPHILS NFR BLD AUTO: 66.8 % — SIGNIFICANT CHANGE UP (ref 43–77)
NRBC # BLD: 0 /100 WBCS — SIGNIFICANT CHANGE UP (ref 0–0)
NRBC # BLD: 0 /100 WBCS — SIGNIFICANT CHANGE UP (ref 0–0)
PLATELET # BLD AUTO: 200 K/UL — SIGNIFICANT CHANGE UP (ref 150–400)
PLATELET # BLD AUTO: 212 K/UL — SIGNIFICANT CHANGE UP (ref 150–400)
POTASSIUM SERPL-MCNC: 3.8 MMOL/L — SIGNIFICANT CHANGE UP (ref 3.5–5.3)
POTASSIUM SERPL-SCNC: 3.8 MMOL/L — SIGNIFICANT CHANGE UP (ref 3.5–5.3)
PROT SERPL-MCNC: 6.7 G/DL — SIGNIFICANT CHANGE UP (ref 6–8.3)
RBC # BLD: 2.68 M/UL — LOW (ref 3.8–5.2)
RBC # BLD: 2.76 M/UL — LOW (ref 3.8–5.2)
RBC # FLD: 15.3 % — HIGH (ref 10.3–14.5)
RBC # FLD: 15.4 % — HIGH (ref 10.3–14.5)
RH IG SCN BLD-IMP: POSITIVE — SIGNIFICANT CHANGE UP
SODIUM SERPL-SCNC: 139 MMOL/L — SIGNIFICANT CHANGE UP (ref 135–145)
TROPONIN T, HIGH SENSITIVITY RESULT: 9 NG/L — SIGNIFICANT CHANGE UP (ref 0–51)
WBC # BLD: 5.63 K/UL — SIGNIFICANT CHANGE UP (ref 3.8–10.5)
WBC # BLD: 5.63 K/UL — SIGNIFICANT CHANGE UP (ref 3.8–10.5)
WBC # FLD AUTO: 5.63 K/UL — SIGNIFICANT CHANGE UP (ref 3.8–10.5)
WBC # FLD AUTO: 5.63 K/UL — SIGNIFICANT CHANGE UP (ref 3.8–10.5)

## 2020-09-04 PROCEDURE — 99223 1ST HOSP IP/OBS HIGH 75: CPT | Mod: AI,GC

## 2020-09-04 PROCEDURE — 12345: CPT | Mod: NC

## 2020-09-04 PROCEDURE — 93010 ELECTROCARDIOGRAM REPORT: CPT

## 2020-09-04 PROCEDURE — 99223 1ST HOSP IP/OBS HIGH 75: CPT

## 2020-09-04 RX ORDER — APIXABAN 2.5 MG/1
5 TABLET, FILM COATED ORAL EVERY 12 HOURS
Refills: 0 | Status: DISCONTINUED | OUTPATIENT
Start: 2020-09-04 | End: 2020-09-11

## 2020-09-04 RX ORDER — INSULIN LISPRO 100/ML
VIAL (ML) SUBCUTANEOUS
Refills: 0 | Status: DISCONTINUED | OUTPATIENT
Start: 2020-09-04 | End: 2020-09-11

## 2020-09-04 RX ORDER — METOCLOPRAMIDE HCL 10 MG
10 TABLET ORAL
Refills: 0 | Status: DISCONTINUED | OUTPATIENT
Start: 2020-09-04 | End: 2020-09-11

## 2020-09-04 RX ORDER — DEXTROSE 50 % IN WATER 50 %
25 SYRINGE (ML) INTRAVENOUS ONCE
Refills: 0 | Status: DISCONTINUED | OUTPATIENT
Start: 2020-09-04 | End: 2020-09-11

## 2020-09-04 RX ORDER — ACETAMINOPHEN 500 MG
650 TABLET ORAL ONCE
Refills: 0 | Status: COMPLETED | OUTPATIENT
Start: 2020-09-04 | End: 2020-09-04

## 2020-09-04 RX ORDER — SODIUM CHLORIDE 9 MG/ML
1000 INJECTION, SOLUTION INTRAVENOUS
Refills: 0 | Status: DISCONTINUED | OUTPATIENT
Start: 2020-09-04 | End: 2020-09-11

## 2020-09-04 RX ORDER — INSULIN LISPRO 100/ML
VIAL (ML) SUBCUTANEOUS AT BEDTIME
Refills: 0 | Status: DISCONTINUED | OUTPATIENT
Start: 2020-09-04 | End: 2020-09-11

## 2020-09-04 RX ORDER — ATENOLOL 25 MG/1
50 TABLET ORAL DAILY
Refills: 0 | Status: DISCONTINUED | OUTPATIENT
Start: 2020-09-04 | End: 2020-09-11

## 2020-09-04 RX ORDER — POLYETHYLENE GLYCOL 3350 17 G/17G
17 POWDER, FOR SOLUTION ORAL DAILY
Refills: 0 | Status: DISCONTINUED | OUTPATIENT
Start: 2020-09-04 | End: 2020-09-11

## 2020-09-04 RX ORDER — DEXTROSE 50 % IN WATER 50 %
12.5 SYRINGE (ML) INTRAVENOUS ONCE
Refills: 0 | Status: DISCONTINUED | OUTPATIENT
Start: 2020-09-04 | End: 2020-09-11

## 2020-09-04 RX ORDER — GLUCAGON INJECTION, SOLUTION 0.5 MG/.1ML
1 INJECTION, SOLUTION SUBCUTANEOUS ONCE
Refills: 0 | Status: DISCONTINUED | OUTPATIENT
Start: 2020-09-04 | End: 2020-09-11

## 2020-09-04 RX ORDER — DEXTROSE 50 % IN WATER 50 %
15 SYRINGE (ML) INTRAVENOUS ONCE
Refills: 0 | Status: DISCONTINUED | OUTPATIENT
Start: 2020-09-04 | End: 2020-09-11

## 2020-09-04 RX ORDER — ATORVASTATIN CALCIUM 80 MG/1
20 TABLET, FILM COATED ORAL AT BEDTIME
Refills: 0 | Status: DISCONTINUED | OUTPATIENT
Start: 2020-09-04 | End: 2020-09-08

## 2020-09-04 RX ORDER — GABAPENTIN 400 MG/1
100 CAPSULE ORAL THREE TIMES A DAY
Refills: 0 | Status: DISCONTINUED | OUTPATIENT
Start: 2020-09-04 | End: 2020-09-11

## 2020-09-04 RX ADMIN — ATORVASTATIN CALCIUM 20 MILLIGRAM(S): 80 TABLET, FILM COATED ORAL at 21:50

## 2020-09-04 RX ADMIN — Medication 650 MILLIGRAM(S): at 07:00

## 2020-09-04 RX ADMIN — GABAPENTIN 100 MILLIGRAM(S): 400 CAPSULE ORAL at 13:22

## 2020-09-04 RX ADMIN — Medication 650 MILLIGRAM(S): at 20:24

## 2020-09-04 RX ADMIN — Medication 1: at 17:45

## 2020-09-04 RX ADMIN — GABAPENTIN 100 MILLIGRAM(S): 400 CAPSULE ORAL at 21:50

## 2020-09-04 RX ADMIN — Medication 1 DROP(S): at 21:50

## 2020-09-04 RX ADMIN — Medication 1 DROP(S): at 06:22

## 2020-09-04 RX ADMIN — APIXABAN 5 MILLIGRAM(S): 2.5 TABLET, FILM COATED ORAL at 17:45

## 2020-09-04 RX ADMIN — Medication 650 MILLIGRAM(S): at 07:45

## 2020-09-04 RX ADMIN — Medication 650 MILLIGRAM(S): at 20:54

## 2020-09-04 RX ADMIN — ATENOLOL 50 MILLIGRAM(S): 25 TABLET ORAL at 06:21

## 2020-09-04 RX ADMIN — GABAPENTIN 100 MILLIGRAM(S): 400 CAPSULE ORAL at 06:21

## 2020-09-04 RX ADMIN — APIXABAN 5 MILLIGRAM(S): 2.5 TABLET, FILM COATED ORAL at 06:21

## 2020-09-04 NOTE — PROGRESS NOTE ADULT - SUBJECTIVE AND OBJECTIVE BOX
patient seen and examined. patient currently without chest pain   per son, chest pain has been occurring intermittently for past 2 weeks and has been increasing in frequency and duration. not necessarily exertional. better with lying down.   PHYSICAL EXAM:  GENERAL: NAD, breathing normal  HEAD:  Atraumatic, Normocephalic  EYES: conjunctiva and sclera clear  NECK: supple, No JVD  CHEST/LUNG: CTA b/l  HEART: S1 S2 RRR  ABDOMEN: +BS Soft, NT/ND  EXTREMITIES:  2+ DP Pulses, No c/c. trace b/l LE edema  NEUROLOGY: AAOx3, no facial droop, moving all extremites  SKIN: no erythema or tenderness at [facility] site

## 2020-09-04 NOTE — H&P ADULT - ASSESSMENT
Pt is a 59 yo F w/ hx of HTN, HLD, T2DM, DVT (dx July 2020, on Eliquis) APL currently undergoing chemotherapy (dx May 2020; on ATRA and arsenic), last tx planned for 9/4/20, recent admission for concern of Mediport infxn 8/22-8/27 but was neg workup, now presenting similarly w/ acute onset back and CP with neg cardiac workup and r/o PE. Pt is a 59 yo F w/ hx of HTN, HLD, T2DM, DVT (dx July 2020, on Eliquis) APL currently undergoing chemotherapy (dx May 2020; on ATRA and arsenic), last tx planned for 9/4/20, recent admission for concern of Mediport infxn 8/22-8/27 but was neg workup, now presenting similarly w/ acute onset back and CP with neg cardiac workup and r/o PE, found to have EKG with diffuse T wave inversions.

## 2020-09-04 NOTE — H&P ADULT - PROBLEM SELECTOR PLAN 6
Diabetes mellitus type 2, noninsulin dependent.  Plan: Takes Metformin 500mg bid at home  - Hold metformin  - FS and SSI  - CC/ DASH TLC diet

## 2020-09-04 NOTE — H&P ADULT - ATTENDING COMMENTS
59 yo F w/ hx of HTN, HLD, T2DM, DVT (dx July 2020, on Eliquis) APL on chemo p/w recurrent chest and back pain. 61 yo F w/ hx of HTN, HLD, T2DM, DVT (dx July 2020, on Eliquis) APL on chemo (ATRA) p/w recurrent chest and back pain. 61 yo F w/ hx of HTN, HLD, T2DM, DVT (dx July 2020, on Eliquis) APL on chemo (ATRA) p/w pain started at upper back, then to b/l arm and chest, worse with inspiration and sitting position, a/w concern for pericarditis on ATRA   - CTA neg PE, small pericardial and pleural effusion  - will hold ATRA  - will check TTE to eval pericardium   - Heme eval in am

## 2020-09-04 NOTE — H&P ADULT - NSHPLABSRESULTS_GEN_ALL_CORE
CXR: The mediastinal cardiac silhouette is unremarkable. Left basilar atelectasis and/or small effusion. No acute osseous finding.  CT chest angio 9/3/2020: No pulmonary embolism. Small left pleural effusion with passive partial left lower lobe atelectasis. Small pericardial effusion.    EKG: normal sinus rhythm with no acute ST change. T wave inversions noted in V1-V6. CXR: The mediastinal cardiac silhouette is unremarkable. Left basilar atelectasis and/or small effusion. No acute osseous finding.  CT chest angio 9/3/2020: No pulmonary embolism. Small left pleural effusion with passive partial left lower lobe atelectasis. Small pericardial effusion.    EKG: normal sinus rhythm with no acute ST change. T wave inversions noted in V1-V6.    Patient name: GIANLUCA UREÑA  YOB: 1960   Age: 60 (F)   MR#: [id number]  Study Date: 8/27/2020  Location: [id number]: [facility]  Study quality: Technically good  Referring Physician: Vandana Shaver MD  Blood Pressure: 117/60 mmHg  Height: 157 cm  Weight: 72 kg  BSA: 1.7 m2  ------------------------------------------------------------------------  PROCEDURE: Transthoracic echocardiogram with 2-D, M-Mode  and complete spectral and color flow Doppler.  INDICATION: Chest pain, unspecified (R07.9)  ------------------------------------------------------------------------  DIMENSIONS:  Dimensions:     Normal Values:  LA:     3.3 cm    2.0 - 4.0 cm  Ao:     2.9 cm    2.0 - 3.8 cm  SEPTUM: 1.0 cm    0.6 - 1.2 cm  PWT:    1.0 cm    0.6 - 1.1 cm  LVIDd:  5.0 cm    3.0 - 5.6 cm  LVIDs:  3.6 cm    1.8 - 4.0 cm  Derived Variables:  LVMI: 105 g/m2  RWT: 0.40  Fractional short: 28 %  Ejection Fraction (Teicholtz): 54 %  Peak Velocity (m/sec): AoV=1.2  ------------------------------------------------------------------------  OBSERVATIONS:  Mitral Valve: Normal mitral valve. Mild mitral  regurgitation.  Aortic Root: Normal aortic root.  Aortic Valve: Calcified trileaflet aortic valve with normal  opening.  Left Atrium: Normal left atrium.  LA volume index = 21  cc/m2.  Left Ventricle: Endocardium not well visualized; grossly  normal left ventricular systolic function. Eccentric left  ventricular hypertrophy (dilated left ventricle with normal  relative wall thickness).  Right Heart: Normal right atrium. Normal right ventricular  size and function. Normal tricuspid valve. Minimal  tricuspid regurgitation. Normal pulmonic valve. Minimal  pulmonic regurgitation.  Pericardium/PleuraNormal pericardium with no pericardial  effusion.  ------------------------------------------------------------------------  CONCLUSIONS:  1. Calcified trileaflet aortic valve with normal opening.  2. Eccentric left ventricular hypertrophy (dilated left  ventricle with normal relative wall thickness).  3. Endocardium not well visualized; grossly normal left  ventricular systolic function.  4. Normal right ventricular size and function.  ------------------------------------------------------------------------  Confirmed on  8/27/2020 - 17:37:50 by TEAGAN Schuster < from: Transthoracic Echocardiogram (20 @ 15:28) >  ------------------------------------------------------------------------  Dimensions:     Normal Values:  LA:     3.3 cm    2.0 - 4.0 cm  Ao:     2.9 cm    2.0 - 3.8 cm  SEPTUM: 1.0 cm    0.6 - 1.2 cm  PWT: 1.0 cm    0.6 - 1.1 cm  LVIDd:  5.0 cm    3.0 - 5.6 cm  LVIDs:  3.6 cm    1.8 - 4.0 cm  Derived Variables:  LVMI: 105 g/m2  RWT: 0.40  Fractional short: 28 %  Ejection Fraction (Teicholtz): 54 %  Peak Velocity (m/sec): AoV=1.2  ------------------------------------------------------------------------  OBSERVATIONS:  Mitral Valve: Normal mitral valve. Mild mitral  regurgitation.  Aortic Root: Normal aortic root.  Aortic Valve: Calcified trileaflet aortic valve with normal  opening.  Left Atrium: Normal left atrium.  LA volume index = 21  cc/m2.  Left Ventricle: Endocardium not well visualized; grossly  normal left ventricular systolic function. Eccentric left  ventricular hypertrophy (dilated left ventricle with normal  relative wall thickness).  Right Heart: Normal right atrium. Normal right ventricular  size and function. Normal tricuspid valve. Minimal  tricuspid regurgitation. Normal pulmonic valve. Minimal  pulmonic regurgitation.  Pericardium/PleuraNormal pericardium with no pericardial  effusion.  ------------------------------------------------------------------------  CONCLUSIONS:  1. Calcified trileaflet aortic valve with normal opening.  2. Eccentric left ventricular hypertrophy (dilated left  ventricle with normal relative wall thickness).  3. Endocardium not well visualized; grossly normal left  ventricular systolic function.  4. Normal right ventricular size and function.  ------------------------------------------------------------------------  Confirmed on  2020 - 17:37:50 by Valerie Villavicencio M.D. RPVI  ------------------------------------------------------------------------    < end of copied text >    18 Cardiac cath;  < from: Cardiac Cath Lab - Adult (18 @ 11:37) >    CORONARY VESSELS: The coronary circulation is right dominant.  LM:   --  LM: Normal.  LAD:   --  LAD: The vessel was large sized. Angiography showed mild  atherosclerosis with no flow limiting lesions.  --  D1: Normal.  --  D2: Normal.  CX:   --  Circumflex: Angiography showed minor luminal irregularities with no  flow limiting lesions.  RCA:   --  RCA: Angiography showed mild atherosclerosis with no flow limiting  lesions.  COMPLICATIONS: There were no complications.  DIAGNOSTIC IMPRESSIONS: Mildnon-obstructive CAD  DIAGNOSTIC RECOMMENDATIONS: Medical therapy  INTERVENTIONAL IMPRESSIONS: Mild non-obstructive CAD  INTERVENTIONAL RECOMMENDATIONS: Medical therapy  Prepared and signed by  Yessi Denton M.D.  Signed 2018 15:12:18  HEMODYNAMIC TABLES  Pressures:  Baseline  Pressures:  - HR: 77  Pressures:  - Rhythm:  Pressures:  -- Aortic Pressure (S/D/M): 103/48/76  Pressures:  -- Left Ventricle (s/edp): 113/6/--  Outputs:  Baseline  Outputs:  -- CALCULATIONS[age]  Outputs:  -- CALCULATIONS: Body Surface Area: 1.76  Outputs:  -- CALCULATIONS: Height in cm: 154.00  Outputs:  -- CALCULATIONS: Sex: Female  Outputs:  -- CALCULATIONS: Weight in k.20  Outputs:  -- OUTPUTS: O2 consumption: 219.52  Outputs:  -- OUTPUTS:Vo2 Indexed: 125.00    < end of copied text > Labs, imaging and EKG tracing personally reviewed    < from: Transthoracic Echocardiogram (20 @ 15:28) >  ------------------------------------------------------------------------  Dimensions:     Normal Values:  LA:     3.3 cm    2.0 - 4.0 cm  Ao:     2.9 cm    2.0 - 3.8 cm  SEPTUM: 1.0 cm    0.6 - 1.2 cm  PWT: 1.0 cm    0.6 - 1.1 cm  LVIDd:  5.0 cm    3.0 - 5.6 cm  LVIDs:  3.6 cm    1.8 - 4.0 cm  Derived Variables:  LVMI: 105 g/m2  RWT: 0.40  Fractional short: 28 %  Ejection Fraction (Teicholtz): 54 %  Peak Velocity (m/sec): AoV=1.2  ------------------------------------------------------------------------  OBSERVATIONS:  Mitral Valve: Normal mitral valve. Mild mitral  regurgitation.  Aortic Root: Normal aortic root.  Aortic Valve: Calcified trileaflet aortic valve with normal  opening.  Left Atrium: Normal left atrium.  LA volume index = 21  cc/m2.  Left Ventricle: Endocardium not well visualized; grossly  normal left ventricular systolic function. Eccentric left  ventricular hypertrophy (dilated left ventricle with normal  relative wall thickness).  Right Heart: Normal right atrium. Normal right ventricular  size and function. Normal tricuspid valve. Minimal  tricuspid regurgitation. Normal pulmonic valve. Minimal  pulmonic regurgitation.  Pericardium/PleuraNormal pericardium with no pericardial  effusion.  ------------------------------------------------------------------------  CONCLUSIONS:  1. Calcified trileaflet aortic valve with normal opening.  2. Eccentric left ventricular hypertrophy (dilated left  ventricle with normal relative wall thickness).  3. Endocardium not well visualized; grossly normal left  ventricular systolic function.  4. Normal right ventricular size and function.  ------------------------------------------------------------------------  Confirmed on  2020 - 17:37:50 by Valerie Villavicencio M.D. RPVI  ------------------------------------------------------------------------    < end of copied text >    18 Cardiac cath;  < from: Cardiac Cath Lab - Adult (18 @ 11:37) >    CORONARY VESSELS: The coronary circulation is right dominant.  LM:   --  LM: Normal.  LAD:   --  LAD: The vessel was large sized. Angiography showed mild  atherosclerosis with no flow limiting lesions.  --  D1: Normal.  --  D2: Normal.  CX:   --  Circumflex: Angiography showed minor luminal irregularities with no  flow limiting lesions.  RCA:   --  RCA: Angiography showed mild atherosclerosis with no flow limiting  lesions.  COMPLICATIONS: There were no complications.  DIAGNOSTIC IMPRESSIONS: Mildnon-obstructive CAD  DIAGNOSTIC RECOMMENDATIONS: Medical therapy  INTERVENTIONAL IMPRESSIONS: Mild non-obstructive CAD  INTERVENTIONAL RECOMMENDATIONS: Medical therapy  Prepared and signed by  Yessi Denton M.D.  Signed 2018 15:12:18  HEMODYNAMIC TABLES  Pressures:  Baseline  Pressures:  - HR: 77  Pressures:  - Rhythm:  Pressures:  -- Aortic Pressure (S/D/M): 103/48/76  Pressures:  -- Left Ventricle (s/edp): 113/6/--  Outputs:  Baseline  Outputs:  -- CALCULATIONS[age]  Outputs:  -- CALCULATIONS: Body Surface Area: 1.76  Outputs:  -- CALCULATIONS: Height in cm: 154.00  Outputs:  -- CALCULATIONS: Sex: Female  Outputs:  -- CALCULATIONS: Weight in k.20  Outputs:  -- OUTPUTS: O2 consumption: 219.52  Outputs:  -- OUTPUTS:Vo2 Indexed: 125.00    < end of copied text >

## 2020-09-04 NOTE — H&P ADULT - PROBLEM SELECTOR PLAN 7
Plan: Diet: Consistent carbohydrate  DVT prophylaxis: Eliquis 5mg  Post-herpetic neuralgia and scabbing (zoster infection 6/22-6/30). Outside dose of Gabapentin 100mg TID resumed  Diet: cc/ dashtlc

## 2020-09-04 NOTE — H&P ADULT - PROBLEM SELECTOR PLAN 2
Plan: Patient diagnosed with APL in May 2020, receives ATRA and weekly arsenic. Last chemo treatment due on 9/4/2020. Follows Dr. Hurd at [facility]  - Heme/Onc consult; appreciate recs  - Consolidation tx with arsenic started. To receive 3rd dose today via mediport and continue at [facility].

## 2020-09-04 NOTE — CONSULT NOTE ADULT - SUBJECTIVE AND OBJECTIVE BOX
Patient seen and evaluated at bedside    Chief Complaint: CP and back pain    HPI:  Pt Vickie speaking- children at bedside serve as translators per request. History partially obtained from chart.    59 yo F w/ hx of HTN, HLD, T2DM, DVT (dx July 2020, on Eliquis) APL currently undergoing chemotherapy (dx May 2020; on ATRA and arsenic), last tx planned for ?9/4/20, recent admission for concern of Mediport infxn 8/22-8/27 but was neg, now presenting similarly w/  back and CP which began last night. Started as back pain, and became chest pain. Patient described pain as sharp pain affecting patient's neck and upper back. Asked to clarify how long pain lasted but was unable to state as patient kept repeating that pain improved with pain medication. Endorsed that pain improved likely with morphine received in ED and had pain resolved when seen. CP described as heavy and substernal, and constant. Endorsed that pain was worse when sitting up and improved when lying down. Review of chart shows pt admitted for similar symptoms previously, and workup was unremarkable to include no PE or Mediport infection, and abx were discontinued during the admission. Patient states she was due for last chemotherapy treatment and instead was held off as she was having these symptoms. Patient sees outpatient onc Dr. Hurd    In ED: 98.5, 72HR, 110/74, 18RR, 96%RA. Received 4mg morphine x2. CXR, CT angio for PE were ordered. No PE noted. (04 Sep 2020 02:06)    Pt consulted for further evaluation of midsternal chest pain that is described as sharp with back involvement, no associated diaphoresis or NV. Pain described as pleuritic in nature.     PMHx:   APL (acute promyelocytic leukemia)  HLD (hyperlipidemia)  Hypertension  Diabetes  No pertinent past medical history      PSHx:   No significant past surgical history      Allergies:  adhesives (Pruritus)  No Known Drug Allergies      Home Meds: See med recc    Current Medications:   apixaban 5 milliGRAM(s) Oral every 12 hours  artificial  tears Solution 1 Drop(s) Both EYES three times a day  ATENolol  Tablet 50 milliGRAM(s) Oral daily  atorvastatin 20 milliGRAM(s) Oral at bedtime  dextrose 40% Gel 15 Gram(s) Oral once PRN  dextrose 5%. 1000 milliLiter(s) IV Continuous <Continuous>  dextrose 50% Injectable 12.5 Gram(s) IV Push once  dextrose 50% Injectable 25 Gram(s) IV Push once  dextrose 50% Injectable 25 Gram(s) IV Push once  gabapentin 100 milliGRAM(s) Oral three times a day  glucagon  Injectable 1 milliGRAM(s) IntraMuscular once PRN  influenza   Vaccine 0.5 milliLiter(s) IntraMuscular once  insulin lispro (HumaLOG) corrective regimen sliding scale   SubCutaneous three times a day before meals  insulin lispro (HumaLOG) corrective regimen sliding scale   SubCutaneous at bedtime  metoclopramide 10 milliGRAM(s) Oral Before meals and at bedtime PRN  polyethylene glycol 3350 17 Gram(s) Oral daily PRN      FAMILY HISTORY:  Family history of diabetes mellitus: Mother      Review of Systems:  All other review of systems is negative unless indicated above.    Physical Exam:  T(F): 98.1 (09-04), Max: 99 (09-03)  HR: 63 (09-04) (63 - 87)  BP: 116/75 (09-04) (110/66 - 144/69)  RR: 18 (09-04)  SpO2: 96% (09-04)    GENERAL: No acute distress, well-developed  HEAD:  Atraumatic, Normocephalic  ENT: EOMI, PERRLA, conjunctiva and sclera clear, Neck supple, No JVD, moist mucosa  CHEST/LUNG: Clear to auscultation bilaterally; No wheeze, equal breath sounds bilaterally   BACK: No spinal tenderness  HEART: Regular rate and rhythm; No murmurs, rubs, or gallops  ABDOMEN: Soft, Nontender, Nondistended; Bowel sounds present  EXTREMITIES:  No clubbing, cyanosis, or edema  PSYCH: Nl behavior, nl affect  NEUROLOGY: AAOx3, non-focal, cranial nerves intact  SKIN: Normal color, No rashes or lesions  LINES:    Cardiovascular Diagnostic Testing:    ECG: Personally reviewed:    Echo: Personally reviewed:    Stress Testing:    Cath:    Imaging:    CXR: Personally reviewed    Labs: Personally reviewed                        7.7    5.63  )-----------( 212      ( 04 Sep 2020 15:24 )             25.2     09-04    139  |  102  |  14  ----------------------------<  202<H>  3.8   |  24  |  0.79    Ca    9.7      04 Sep 2020 10:35    TPro  6.7  /  Alb  4.1  /  TBili  0.4  /  DBili  x   /  AST  10  /  ALT  7<L>  /  AlkPhos  77  09-04    PT/INR - ( 03 Sep 2020 10:30 )   PT: 19.2 sec;   INR: 1.65 ratio         PTT - ( 03 Sep 2020 10:30 )  PTT:39.9 sec Patient seen and evaluated at bedside    Chief Complaint: CP and back pain    HPI:  Pt Vickie speaking- children at bedside serve as translators per request. History partially obtained from chart.    59 yo F w/ hx of HTN, HLD, T2DM, DVT (dx 2020, on Eliquis) APL currently undergoing chemotherapy (dx May 2020; on ATRA and arsenic), last tx planned for ?20, recent admission for concern of Mediport infxn - but was neg, now presenting similarly w/  back and CP which began last night. Started as back pain, and became chest pain. Patient described pain as sharp pain affecting patient's neck and upper back. Asked to clarify how long pain lasted but was unable to state as patient kept repeating that pain improved with pain medication. Endorsed that pain improved likely with morphine received in ED and had pain resolved when seen. CP described as heavy and substernal, and constant. Endorsed that pain was worse when sitting up and improved when lying down. Review of chart shows pt admitted for similar symptoms previously, and workup was unremarkable to include no PE or Mediport infection, and abx were discontinued during the admission. Patient states she was due for last chemotherapy treatment and instead was held off as she was having these symptoms. Patient sees outpatient onc Dr. Hurd    In ED: 98.5, 72HR, 110/74, 18RR, 96%RA. Received 4mg morphine x2. CXR, CT angio for PE were ordered. No PE noted. (04 Sep 2020 02:06)    Pt consulted for further evaluation of midsternal chest pain that is described as sharp with back involvement, no associated diaphoresis or NV. Pain described as pleuritic in nature. Troponin here 9 x 2, ECG with lateral t-wave inversions, new compared to 2020. Cardiac cath 2018 mild non-obstructive CAD.     PMHx:   APL (acute promyelocytic leukemia)  HLD (hyperlipidemia)  Hypertension  Diabetes  No pertinent past medical history      PSHx:   No significant past surgical history      Allergies:  adhesives (Pruritus)  No Known Drug Allergies      Home Meds: See med recc    Current Medications:   apixaban 5 milliGRAM(s) Oral every 12 hours  artificial  tears Solution 1 Drop(s) Both EYES three times a day  ATENolol  Tablet 50 milliGRAM(s) Oral daily  atorvastatin 20 milliGRAM(s) Oral at bedtime  dextrose 40% Gel 15 Gram(s) Oral once PRN  dextrose 5%. 1000 milliLiter(s) IV Continuous <Continuous>  dextrose 50% Injectable 12.5 Gram(s) IV Push once  dextrose 50% Injectable 25 Gram(s) IV Push once  dextrose 50% Injectable 25 Gram(s) IV Push once  gabapentin 100 milliGRAM(s) Oral three times a day  glucagon  Injectable 1 milliGRAM(s) IntraMuscular once PRN  influenza   Vaccine 0.5 milliLiter(s) IntraMuscular once  insulin lispro (HumaLOG) corrective regimen sliding scale   SubCutaneous three times a day before meals  insulin lispro (HumaLOG) corrective regimen sliding scale   SubCutaneous at bedtime  metoclopramide 10 milliGRAM(s) Oral Before meals and at bedtime PRN  polyethylene glycol 3350 17 Gram(s) Oral daily PRN      FAMILY HISTORY:  Family history of diabetes mellitus: Mother      Review of Systems:  All other review of systems is negative unless indicated above.    Physical Exam:  T(F): 98.1 (), Max: 99 ()  HR: 63 () (63 - 87)  BP: 116/75 () (110/66 - 144/69)  RR: 18 ()  SpO2: 96% ()    GENERAL: Mod acute distress, well-developed  ENT:  Neck supple, No JVD, moist mucosa  CHEST/LUNG: Decreased breath sounds bilaterally 2/2 poor effort   HEART: Regular rate and rhythm; No murmurs, rubs, or gallops  ABDOMEN: Soft, Nontender, Nondistended  EXTREMITIES:  No clubbing, cyanosis, or edema  NEUROLOGY: AAOx3, non-focal  SKIN: Normal color, No rashes or lesions      Cardiovascular Diagnostic Testing:    ECG: Personally reviewed:    Echo: Personally reviewed:  < from: Transthoracic Echocardiogram (20 @ 15:28) >  DIMENSIONS:  Dimensions:     Normal Values:  LA:     3.3 cm    2.0 - 4.0 cm  Ao:     2.9 cm    2.0 - 3.8 cm  SEPTUM: 1.0 cm    0.6 - 1.2 cm  PWT: 1.0 cm    0.6 - 1.1 cm  LVIDd:  5.0 cm    3.0 - 5.6 cm  LVIDs:  3.6 cm    1.8 - 4.0 cm  Derived Variables:  LVMI: 105 g/m2  RWT: 0.40  Fractional short: 28 %  Ejection Fraction (Teicholtz): 54 %  Peak Velocity (m/sec): AoV=1.2  ------------------------------------------------------------------------  OBSERVATIONS:  Mitral Valve: Normal mitral valve. Mild mitral  regurgitation.  Aortic Root: Normal aortic root.  Aortic Valve: Calcified trileaflet aortic valve with normal  opening.  Left Atrium: Normal left atrium.  LA volume index = 21  cc/m2.  Left Ventricle: Endocardium not well visualized; grossly  normal left ventricular systolic function. Eccentric left  ventricular hypertrophy (dilated left ventricle with normal  relative wall thickness).  Right Heart: Normal right atrium. Normal right ventricular  size and function. Normal tricuspid valve. Minimal  tricuspid regurgitation. Normal pulmonic valve. Minimal  pulmonic regurgitation.  Pericardium/PleuraNormal pericardium with no pericardial  effusion.  ------------------------------------------------------------------------  CONCLUSIONS:  1. Calcified trileaflet aortic valve with normal opening.  2. Eccentric left ventricular hypertrophy (dilated left  ventricle with normal relative wall thickness).  3. Endocardium not well visualized; grossly normal left  ventricular systolic function.  4. Normal right ventricular size and function.  ------------------------------------------------------------------------  Confirmed on  2020 - 17:37:50 by TEAGAN Schuster    < end of copied text >      Stress Testing:    Cath:  < from: Cardiac Cath Lab - Adult (18 @ 11:37) >  PROCEDURE:  --  Left heart catheterization.  --  Left coronary angiography.  --  Right coronary angiography.  TECHNIQUE: The risks and alternatives of the procedures and conscious sedation  were explained to the patient and informed consent was obtained. Cardiac  catheterization performed electively.  Local anesthetic given. Right radial artery access. Local anesthetic given. A 5  Fr. Radial Glidesheath Slender was inserted in the vessel, utilizing the  modified Seldinger technique. Left heart catheterization. A 4 Fr JR 4.0  catheter was utilized. After recording ascending aortic pressure, the catheter  was advanced across the aortic valve and left ventricular pressure was  recorded. Left coronary artery angiography. The vessel was injected utilizing a  4 Fr. JL 3.5 catheter and positioned in the vessel ostium under fluoroscopic  guidance. Angiography was performed in multiple projections using  hand-injection of contrast. Right coronary artery angiography. The vessel was  injected utilizing a 4 Fr JR 4.0 catheter and positioned in the vessel ostia  under fluoroscopic guidance. Angiography was performed in multiple projections  using hand-injection of contrast. RADIATION EXPOSURE: 1.7 min.  CONTRAST GIVEN: 20 ml Optiray.  MEDICATIONS GIVEN: Heparin, 3000 units, IV. 2% Lidocaine, 3 ml, subcutaneously.  Cardene, 400 mcg. Cardene, 400 mcg. 0.9% Normal saline, 25 ml, IV.  VENTRICLES: No left ventriculogram was performed.  CORONARY VESSELS: The coronary circulation is right dominant.  LM:   --  LM: Normal.  LAD:   --  LAD: The vessel was large sized. Angiography showed mild  atherosclerosis with no flow limiting lesions.  --  D1: Normal.  --  D2: Normal.  CX:   --  Circumflex: Angiography showed minor luminal irregularities with no  flow limiting lesions.  RCA:   --  RCA: Angiography showed mild atherosclerosis with no flow limiting  lesions.  COMPLICATIONS: There were no complications.  DIAGNOSTIC IMPRESSIONS: Mildnon-obstructive CAD  DIAGNOSTIC RECOMMENDATIONS: Medical therapy  INTERVENTIONAL IMPRESSIONS: Mild non-obstructive CAD  INTERVENTIONAL RECOMMENDATIONS: Medical therapy  Prepared and signed by  Yessi Denton M.D.  Signed 2018 15:12:18  HEMODYNAMIC TABLES  Pressures:  Baseline  Pressures:  - HR: 77  Pressures:  - Rhythm:  Pressures:  -- Aortic Pressure (S/D/M): 103/48/76  Pressures:  -- Left Ventricle (s/edp): 113/6/--  Outputs:  Baseline  Outputs:  -- CALCULATIONS[age]  Outputs:  -- CALCULATIONS: Body Surface Area: 1.76  Outputs:  -- CALCULATIONS: Height in cm: 154.00  Outputs:  -- CALCULATIONS: Sex: Female  Outputs:  -- CALCULATIONS: Weight in k.20  Outputs:  -- OUTPUTS: O2 consumption: 219.52  Outputs:  -- OUTPUTS:Vo2 Indexed: 125.00    < end of copied text >      Imaging:    CXR: Personally reviewed    Labs: Personally reviewed                        7.7    5.63  )-----------( 212      ( 04 Sep 2020 15:24 )             25.2     09-04    139  |  102  |  14  ----------------------------<  202<H>  3.8   |  24  |  0.79    Ca    9.7      04 Sep 2020 10:35    TPro  6.7  /  Alb  4.1  /  TBili  0.4  /  DBili  x   /  AST  10  /  ALT  7<L>  /  AlkPhos  77  09-04    PT/INR - ( 03 Sep 2020 10:30 )   PT: 19.2 sec;   INR: 1.65 ratio         PTT - ( 03 Sep 2020 10:30 )  PTT:39.9 sec

## 2020-09-04 NOTE — H&P ADULT - PROBLEM SELECTOR PLAN 4
Patient had R axillary DVT In July 2020. On Eliquis 5mg.   - C/w Eliquis 5mg  - No blood draws from RIGHT arm.

## 2020-09-04 NOTE — CONSULT NOTE ADULT - ASSESSMENT
59 yo F w/ hx of HTN, HLD, T2DM, DVT (dx July 2020, on Eliquis) APL currently undergoing chemotherapy (dx May 2020; on ATRA and arsenic), last tx planned for ?9/4/20, recent admission for concern of Mediport infxn 8/22-8/27, presenting with chest pain.    Atypical chest pain   Unclear etiology, less likely pericarditic in nature, however given currently anemia would recc against trail of NSAIDS  ECG noted w new lateral T-wave inversions, trop negative currently   Previous angiogram 2018 with mild non-obstructive disease   ECHO 8/27 Eccentric left ventricular hypertrophy, Grossly normal left and right function. No pericardial effusion   Would cont to monitor on tele   Would recc treadmill stress for further eval of chest discomfort     Deep Dorman MD  Cardiology Fellow  [phone]    Please check [email or web address] password: [email or web address]" for cardiology service schedule and contact information.

## 2020-09-04 NOTE — H&P ADULT - HISTORY OF PRESENT ILLNESS
61 yo F w/ hx of HTN, HLD, T2DM, DVT (dx July 2020, on Eliquis) APL currently undergoing chemotherapy (dx May 2020; on ATRA and arsenic), last tx planned for 9/4/20, recently admitted for concern of Mediport infxn 8/22-8/27, now w/ presentation similar to that of previous admission w/  back and CP which began last night. Started as back pain, and became CP. CP described as heavy and substernal, and constant. Denies fevers and N/V. Associated symptoms lightheadedness, nausea, and cough. Review of chart shows pt admitted for similar symptoms previously, and workup was unremarkable to include no PE or Mediport infection, and abx were discontinued during the admission. RLE pain reported today is chronic x "years" per pt.    Outpatient onc Dr. Hurd    In ED: 99.1, 75HR, 109/ 61 yo F w/ hx of HTN, HLD, T2DM, DVT (dx July 2020, on Eliquis) APL currently undergoing chemotherapy (dx May 2020; on ATRA and arsenic), last tx planned for 9/4/20, recent admission for concern of Mediport infxn 8/22-8/27 but was neg, now presenting similarly w/  back and CP which began last night. Started as back pain, and became chest pain. CP described as heavy and substernal, and constant. Denies fevers and N/V. Associated symptoms lightheadedness, nausea, and cough. Review of chart shows pt admitted for similar symptoms previously, and workup was unremarkable to include no PE or Mediport infection, and abx were discontinued during the admission. RLE pain reported today is chronic x "years" per pt.     Outpatient onc Dr. Hurd    In ED: 98.5, 72HR, 110/74, 18RR, 96%RA. Received 4mg morphine x2. CXR, CT angio for PE were ordered. No PE noted. Spoke with pacific  [id number] but patient hard of hearing.    61 yo F w/ hx of HTN, HLD, T2DM, DVT (dx July 2020, on Eliquis) APL currently undergoing chemotherapy (dx May 2020; on ATRA and arsenic), last tx planned for ?9/4/20, recent admission for concern of Mediport infxn 8/22-8/27 but was neg, now presenting similarly w/  back and CP which began last night. Started as back pain, and became chest pain. Patient described pain as sharp pain affecting patient's neck and upper back as well as chest pain. Asked to clarify how long pain lasted but was unable to state. Endorsed that pain improved with morphine received in ED and had pain resolved when seen. CP described as heavy and substernal, and constant. S Associated symptoms lightheadedness, nausea, and cough. Review of chart shows pt admitted for similar symptoms previously, and workup was unremarkable to include no PE or Mediport infection, and abx were discontinued during the admission. RLE pain reported today is chronic x "years" per pt.     Outpatient onc Dr. Hurd    In ED: 98.5, 72HR, 110/74, 18RR, 96%RA. Received 4mg morphine x2. CXR, CT angio for PE were ordered. No PE noted. Spoke with pacific  [id number] but patient hard of hearing.    61 yo F w/ hx of HTN, HLD, T2DM, DVT (dx July 2020, on Eliquis) APL currently undergoing chemotherapy (dx May 2020; on ATRA and arsenic), last tx planned for ?9/4/20, recent admission for concern of Mediport infxn 8/22-8/27 but was neg, now presenting similarly w/  back and CP which began last night. Started as back pain, and became chest pain. Patient described pain as sharp pain affecting patient's neck and upper back. Asked to clarify how long pain lasted but was unable to state as patient kept repeating that pain improved with pain medication. Endorsed that pain improved likely with morphine received in ED and had pain resolved when seen. CP described as heavy and substernal, and constant. Endorsed that pain was worse when sitting up and improved when lying down. Review of chart shows pt admitted for similar symptoms previously, and workup was unremarkable to include no PE or Mediport infection, and abx were discontinued during the admission. Patient states she was due for last chemotherapy treatment and instead was held off as she was having these symptoms. Patient sees outpatient onc Dr. Hurd    In ED: 98.5, 72HR, 110/74, 18RR, 96%RA. Received 4mg morphine x2. CXR, CT angio for PE were ordered. No PE noted.

## 2020-09-04 NOTE — H&P ADULT - PROBLEM SELECTOR PLAN 1
Unclear etiology of acute onset chest pain and back pain. Patient hemodynamically stable and saturating well on RA.   - EKG with no ST changes but with diffuse T wave inversions noted. Troponins 12 > 9.  - no leukocytosis, fever,   - CT chest angio neg for PE  - received 2x morphine in ED  - Unclear etiology of acute onset chest pain and back pain. Patient hemodynamically stable and saturating well on RA.   - EKG with no ST changes but with diffuse T wave inversions noted. Troponins 12 > 9.  - no leukocytosis, fever,   - CT chest angio neg for PE  - echo with 54% EF  - Cardiac cath 2 yrs ago shows mild CAD  - received 2x morphine in ED  -will order stat EKG and f/u. If T wave inversions resolve, concern for dynamic changes and will call Card consult.   - will order Unclear etiology of acute onset chest pain and back pain. Patient hemodynamically stable and saturating well on RA. No fever or leukocytosis  - EKG with no ST changes but with diffuse T wave inversions noted. Troponins 12 > 9. Will order AM troponins to trend  - CT chest angio neg for PE  - echo with 54% EF and shows eccentric LV hypertrophy (dilated LV with normal relative wall thickness).  - Cardiac cath 2 yrs ago shows mild non-obstructive CAD  - received 2x morphine in ED  - will order stat EKG and f/u. If T wave inversions resolve, concern for dynamic changes and will call Card consult.

## 2020-09-04 NOTE — H&P ADULT - NSHPPHYSICALEXAM_GEN_ALL_CORE
Vital Signs Last 24 Hrs  T(C): 36.7 (03 Sep 2020 22:40), Max: 37.4 (03 Sep 2020 17:54)  T(F): 98.1 (03 Sep 2020 22:40), Max: 99.4 (03 Sep 2020 17:54)  HR: 81 (03 Sep 2020 22:40) (72 - 87)  BP: 144/69 (03 Sep 2020 22:40) (109/72 - 144/69)  BP(mean): --  RR: 16 (03 Sep 2020 22:40) (16 - 26)  SpO2: 97% (03 Sep 2020 22:40) (94% - 97%)    PHYSICAL EXAM:  GENERAL: NAD, lying in bed comfortably  HEAD:  Atraumatic, Normocephalic  EYES: EOMI, PERRLA, conjunctiva and sclera clear  ENT: Moist mucous membranes  NECK: Supple, No JVD  CHEST/LUNG: Clear to auscultation bilaterally; No rales, rhonchi, wheezing, or rubs. Unlabored respirations  HEART: Regular rate and rhythm; No murmurs, rubs, or gallops  ABDOMEN: Bowel sounds present; Soft, Nontender, Nondistended. No hepatomegally  EXTREMITIES:  2+ Peripheral Pulses, brisk capillary refill. No clubbing, cyanosis, or edema  NERVOUS SYSTEM:  Alert & Oriented X3, speech clear. No deficits   MSK: FROM all 4 extremities, full and equal strength  SKIN: No rashes or lesions Vital Signs Last 24 Hrs  T(C): 36.7 (03 Sep 2020 22:40), Max: 37.4 (03 Sep 2020 17:54)  T(F): 98.1 (03 Sep 2020 22:40), Max: 99.4 (03 Sep 2020 17:54)  HR: 81 (03 Sep 2020 22:40) (72 - 87)  BP: 144/69 (03 Sep 2020 22:40) (109/72 - 144/69)  BP(mean): --  RR: 16 (03 Sep 2020 22:40) (16 - 26)  SpO2: 97% (03 Sep 2020 22:40) (94% - 97%)    PHYSICAL EXAM:  GENERAL: NAD, lying in bed comfortably  HEAD:  Atraumatic, Normocephalic  EYES: EOMI, conjunctiva and sclera clear  NECK: Supple, No JVD  CHEST/LUNG: Clear to auscultation except for LLL where crackles were appreciated.  Unlabored respirations  HEART: Regular rate and rhythm; No murmurs, rubs, or gallops  ABDOMEN: Bowel sounds present; Soft, Nontender, Nondistended.   EXTREMITIES:  No clubbing, cyanosis, or edema  NERVOUS SYSTEM:  Alert & Oriented X3, speech clear. No focal deficits   MSK: equal movement in all extremities Vital Signs Last 24 Hrs  T(C): 36.7 (03 Sep 2020 22:40), Max: 37.4 (03 Sep 2020 17:54)  T(F): 98.1 (03 Sep 2020 22:40), Max: 99.4 (03 Sep 2020 17:54)  HR: 81 (03 Sep 2020 22:40) (72 - 87)  BP: 144/69 (03 Sep 2020 22:40) (109/72 - 144/69)  BP(mean): --  RR: 16 (03 Sep 2020 22:40) (16 - 26)  SpO2: 97% (03 Sep 2020 22:40) (94% - 97%)    PHYSICAL EXAM:  GENERAL: NAD, lying in bed comfortably  HEAD:  Atraumatic, Normocephalic  EYES: EOMI, conjunctiva and sclera clear  NECK: Supple, No JVD  CHEST/LUNG: Clear to auscultation except for LLL where crackles were appreciated.  Unlabored respirations  HEART: Regular rate and rhythm  ABDOMEN: Bowel sounds present; Soft, Nontender, Nondistended.   EXTREMITIES:  No edema  NERVOUS SYSTEM:  Alert & Oriented X3, speech clear. moves all extremities  MSK: equal movement in all extremities

## 2020-09-04 NOTE — PROGRESS NOTE ADULT - PROBLEM SELECTOR PLAN 1
Unclear etiology of acute onset chest pain and back pain. Patient hemodynamically stable and saturating well on RA. No fever or leukocytosis  - EKG with no ST changes but with diffuse T wave inversions noted. Troponins 12 > 9.  - CT chest angio neg for PE,  no thrombus of mediport  - echo with 54% EF and shows eccentric LV hypertrophy (dilated LV with normal relative wall thickness).  CTA with small pericardial effusion - pericarditis in differential however, clinical symptoms not really consistent with pericarditis, EKG without ST elevations. h/h drop will hold indomethacin pending cardiology eval - currently no chest pain.   - Cardiac cath 2 yrs ago shows mild non-obstructive CAD  cards consult - ?need for stress  repeat TTE ordered to evaluate pericardial effusion

## 2020-09-04 NOTE — H&P ADULT - NSHPREVIEWOFSYSTEMS_GEN_ALL_CORE
REVIEW OF SYSTEMS:    CONSTITUTIONAL: No weakness, fevers or chills  EYES/ENT: No visual changes;  No vertigo or throat pain   NECK: No pain or stiffness  RESPIRATORY: No cough, wheezing, hemoptysis; No shortness of breath  CARDIOVASCULAR: No chest pain or palpitations  GASTROINTESTINAL: No abdominal or epigastric pain. No nausea, vomiting, or hematemesis; No diarrhea or constipation. No melena or hematochezia.  GENITOURINARY: No dysuria, frequency or hematuria  NEUROLOGICAL: No numbness or weakness  SKIN: No itching, burning, rashes, or lesions   All other review of systems is negative unless indicated above. REVIEW OF SYSTEMS:    CONSTITUTIONAL: No weakness, fevers + chills but never w/ documented fever  EYES/ENT: No visual changes;  No vertigo or throat pain   NECK: No pain or stiffness  RESPIRATORY: No cough, wheezing, hemoptysis; No shortness of breath  CARDIOVASCULAR: No chest pain or palpitations  GASTROINTESTINAL: No abdominal or epigastric pain. No nausea, vomiting, or hematemesis; No diarrhea or constipation. No melena or hematochezia.  GENITOURINARY: No dysuria, frequency or hematuria  NEUROLOGICAL: No numbness or weakness  SKIN: No itching, burning, rashes, or lesions   All other review of systems is negative unless indicated above. REVIEW OF SYSTEMS:    CONSTITUTIONAL: No weakness, fevers + chills but never w/ documented fever  EYES/ENT: No acute visual changes;  NECK: had posterior neck pain  RESPIRATORY: No cough, wheezing, hemoptysis; No shortness of breath  CARDIOVASCULAR: had chest pain but improved when seen,   GASTROINTESTINAL: No abdominal or epigastric pain. No nausea, vomiting, or hematemesis; No diarrhea or constipation. No melena or hematochezia.  GENITOURINARY: No dysuria, frequency or hematuria  NEUROLOGICAL: No acute focal numbness or weakness REVIEW OF SYSTEMS:    CONSTITUTIONAL: No weakness, fevers + chills but never w/ documented fever  EYES/ENT: No acute visual changes;  NECK: had posterior neck pain  RESPIRATORY: No cough, wheezing, hemoptysis; No shortness of breath  CARDIOVASCULAR: chest pain but improved with pain med  GASTROINTESTINAL: No abdominal or epigastric pain. No nausea, vomiting, or hematemesis; No diarrhea or constipation. No melena or hematochezia.  GENITOURINARY: No dysuria, frequency or hematuria  NEUROLOGICAL: No acute focal numbness or weakness

## 2020-09-05 LAB
ANION GAP SERPL CALC-SCNC: 14 MMOL/L — SIGNIFICANT CHANGE UP (ref 5–17)
BUN SERPL-MCNC: 14 MG/DL — SIGNIFICANT CHANGE UP (ref 7–23)
CALCIUM SERPL-MCNC: 9.8 MG/DL — SIGNIFICANT CHANGE UP (ref 8.4–10.5)
CHLORIDE SERPL-SCNC: 102 MMOL/L — SIGNIFICANT CHANGE UP (ref 96–108)
CO2 SERPL-SCNC: 24 MMOL/L — SIGNIFICANT CHANGE UP (ref 22–31)
CREAT SERPL-MCNC: 0.76 MG/DL — SIGNIFICANT CHANGE UP (ref 0.5–1.3)
CULTURE RESULTS: SIGNIFICANT CHANGE UP
GLUCOSE BLDC GLUCOMTR-MCNC: 113 MG/DL — HIGH (ref 70–99)
GLUCOSE BLDC GLUCOMTR-MCNC: 123 MG/DL — HIGH (ref 70–99)
GLUCOSE BLDC GLUCOMTR-MCNC: 138 MG/DL — HIGH (ref 70–99)
GLUCOSE BLDC GLUCOMTR-MCNC: 139 MG/DL — HIGH (ref 70–99)
GLUCOSE SERPL-MCNC: 187 MG/DL — HIGH (ref 70–99)
HCT VFR BLD CALC: 26.2 % — LOW (ref 34.5–45)
HGB BLD-MCNC: 8 G/DL — LOW (ref 11.5–15.5)
MCHC RBC-ENTMCNC: 27.8 PG — SIGNIFICANT CHANGE UP (ref 27–34)
MCHC RBC-ENTMCNC: 30.5 GM/DL — LOW (ref 32–36)
MCV RBC AUTO: 91 FL — SIGNIFICANT CHANGE UP (ref 80–100)
NRBC # BLD: 0 /100 WBCS — SIGNIFICANT CHANGE UP (ref 0–0)
PLATELET # BLD AUTO: 239 K/UL — SIGNIFICANT CHANGE UP (ref 150–400)
POTASSIUM SERPL-MCNC: 3.9 MMOL/L — SIGNIFICANT CHANGE UP (ref 3.5–5.3)
POTASSIUM SERPL-SCNC: 3.9 MMOL/L — SIGNIFICANT CHANGE UP (ref 3.5–5.3)
RBC # BLD: 2.88 M/UL — LOW (ref 3.8–5.2)
RBC # FLD: 15.2 % — HIGH (ref 10.3–14.5)
SARS-COV-2 IGG SERPL QL IA: POSITIVE
SARS-COV-2 IGM SERPL IA-ACNC: 20.6 INDEX — HIGH
SODIUM SERPL-SCNC: 140 MMOL/L — SIGNIFICANT CHANGE UP (ref 135–145)
SPECIMEN SOURCE: SIGNIFICANT CHANGE UP
WBC # BLD: 6.47 K/UL — SIGNIFICANT CHANGE UP (ref 3.8–10.5)
WBC # FLD AUTO: 6.47 K/UL — SIGNIFICANT CHANGE UP (ref 3.8–10.5)

## 2020-09-05 PROCEDURE — 99233 SBSQ HOSP IP/OBS HIGH 50: CPT

## 2020-09-05 PROCEDURE — 93010 ELECTROCARDIOGRAM REPORT: CPT

## 2020-09-05 RX ORDER — POTASSIUM CHLORIDE 20 MEQ
20 PACKET (EA) ORAL ONCE
Refills: 0 | Status: COMPLETED | OUTPATIENT
Start: 2020-09-05 | End: 2020-09-05

## 2020-09-05 RX ORDER — ACETAMINOPHEN 500 MG
1000 TABLET ORAL ONCE
Refills: 0 | Status: COMPLETED | OUTPATIENT
Start: 2020-09-05 | End: 2020-09-05

## 2020-09-05 RX ADMIN — Medication 400 MILLIGRAM(S): at 06:44

## 2020-09-05 RX ADMIN — Medication 1 DROP(S): at 06:22

## 2020-09-05 RX ADMIN — Medication 20 MILLIEQUIVALENT(S): at 14:56

## 2020-09-05 RX ADMIN — ATENOLOL 50 MILLIGRAM(S): 25 TABLET ORAL at 06:22

## 2020-09-05 RX ADMIN — ATORVASTATIN CALCIUM 20 MILLIGRAM(S): 80 TABLET, FILM COATED ORAL at 22:49

## 2020-09-05 RX ADMIN — Medication 1000 MILLIGRAM(S): at 07:00

## 2020-09-05 RX ADMIN — GABAPENTIN 100 MILLIGRAM(S): 400 CAPSULE ORAL at 22:49

## 2020-09-05 RX ADMIN — APIXABAN 5 MILLIGRAM(S): 2.5 TABLET, FILM COATED ORAL at 18:28

## 2020-09-05 RX ADMIN — APIXABAN 5 MILLIGRAM(S): 2.5 TABLET, FILM COATED ORAL at 06:22

## 2020-09-05 RX ADMIN — Medication 1 DROP(S): at 13:22

## 2020-09-05 RX ADMIN — GABAPENTIN 100 MILLIGRAM(S): 400 CAPSULE ORAL at 06:22

## 2020-09-05 RX ADMIN — Medication 1 DROP(S): at 22:49

## 2020-09-05 RX ADMIN — GABAPENTIN 100 MILLIGRAM(S): 400 CAPSULE ORAL at 13:22

## 2020-09-05 NOTE — PROGRESS NOTE ADULT - PROBLEM SELECTOR PLAN 1
Unclear etiology of acute onset chest pain and back pain. Patient hemodynamically stable and saturating well on RA. No fever or leukocytosis  - EKG with no ST changes but with diffuse T wave inversions noted. Troponins 12 > 9.  - CT chest angio neg for PE,  no thrombus of mediport  - echo with 54% EF and shows eccentric LV hypertrophy (dilated LV with normal relative wall thickness).  CTA with small pericardial effusion - pericarditis in differential however, clinical symptoms not really consistent with pericarditis, EKG without ST elevations. h/h drop. cards agree less likely pericarditis - will hold trial of NSAIDs - d/w Dr. Hurd (oncology) who also agrees.   - Cardiac cath 2 yrs ago shows mild non-obstructive CAD  cards f/u  repeat TTE ordered to evaluate pericardial effusion

## 2020-09-05 NOTE — PROGRESS NOTE ADULT - SUBJECTIVE AND OBJECTIVE BOX
Patient is a 60y old  Female who presents with a chief complaint of CP and back pain (04 Sep 2020 18:09)        SUBJECTIVE / OVERNIGHT EVENTS: no chest pain at this time       MEDICATIONS  (STANDING):  apixaban 5 milliGRAM(s) Oral every 12 hours  artificial  tears Solution 1 Drop(s) Both EYES three times a day  ATENolol  Tablet 50 milliGRAM(s) Oral daily  atorvastatin 20 milliGRAM(s) Oral at bedtime  dextrose 5%. 1000 milliLiter(s) (50 mL/Hr) IV Continuous <Continuous>  dextrose 50% Injectable 12.5 Gram(s) IV Push once  dextrose 50% Injectable 25 Gram(s) IV Push once  dextrose 50% Injectable 25 Gram(s) IV Push once  gabapentin 100 milliGRAM(s) Oral three times a day  influenza   Vaccine 0.5 milliLiter(s) IntraMuscular once  insulin lispro (HumaLOG) corrective regimen sliding scale   SubCutaneous three times a day before meals  insulin lispro (HumaLOG) corrective regimen sliding scale   SubCutaneous at bedtime    MEDICATIONS  (PRN):  dextrose 40% Gel 15 Gram(s) Oral once PRN Blood Glucose LESS THAN 70 milliGRAM(s)/deciliter  glucagon  Injectable 1 milliGRAM(s) IntraMuscular once PRN Glucose LESS THAN 70 milligrams/deciliter  metoclopramide 10 milliGRAM(s) Oral Before meals and at bedtime PRN nausea  polyethylene glycol 3350 17 Gram(s) Oral daily PRN Constipation      Vital Signs Last 24 Hrs  T(C): 36.6 (05 Sep 2020 13:45), Max: 37.6 (04 Sep 2020 21:07)  T(F): 97.9 (05 Sep 2020 13:45), Max: 99.7 (04 Sep 2020 21:07)  HR: 65 (05 Sep 2020 13:45) (65 - 77)  BP: 109/70 (05 Sep 2020 13:45) (105/67 - 111/72)  BP(mean): --  RR: 18 (05 Sep 2020 13:45) (18 - 18)  SpO2: 100% (05 Sep 2020 13:45) (94% - 100%)  CAPILLARY BLOOD GLUCOSE      POCT Blood Glucose.: 138 mg/dL (05 Sep 2020 12:37)  POCT Blood Glucose.: 123 mg/dL (05 Sep 2020 09:11)  POCT Blood Glucose.: 127 mg/dL (04 Sep 2020 21:55)    I&O's Summary    04 Sep 2020 07:01  -  05 Sep 2020 07:00  --------------------------------------------------------  IN: 1160 mL / OUT: 0 mL / NET: 1160 mL    05 Sep 2020 07:01  -  05 Sep 2020 17:41  --------------------------------------------------------  IN: 440 mL / OUT: 0 mL / NET: 440 mL        PHYSICAL EXAM:  GENERAL: NAD, breathing normal  HEAD:  Atraumatic, Normocephalic  EYES: conjunctiva and sclera clear  NECK: supple, No JVD  CHEST/LUNG: CTA b/l  HEART: S1 S2 RRR  ABDOMEN: +BS Soft, NT/ND  EXTREMITIES:  2+ DP Pulses, No c/c. trace b/l LE edema  NEUROLOGY: AAOx3, no facial droop, moving all extremites  SKIN: no erythema or tenderness at [facility] site    LABS:                        8.0    6.47  )-----------( 239      ( 05 Sep 2020 10:32 )             26.2         140  |  102  |  14  ----------------------------<  187<H>  3.9   |  24  |  0.76    Ca    9.8      05 Sep 2020 10:32    TPro  6.7  /  Alb  4.1  /  TBili  0.4  /  DBili  x   /  AST  10  /  ALT  7<L>  /  AlkPhos  77  09-04          Urinalysis Basic - ( 03 Sep 2020 19:50 )    Color: Light Yellow / Appearance: Clear / S.052 / pH: x  Gluc: x / Ketone: Negative  / Bili: Negative / Urobili: Negative   Blood: x / Protein: Trace / Nitrite: Negative   Leuk Esterase: Negative / RBC: x / WBC x   Sq Epi: x / Non Sq Epi: x / Bacteria: x        RADIOLOGY & ADDITIONAL TESTS:    Imaging Personally Reviewed:  Consultant(s) Notes Reviewed:    Care Discussed with Consultants/Other Providers:

## 2020-09-05 NOTE — PROVIDER CONTACT NOTE (OTHER) - ASSESSMENT
Patient A&OX4. Able to make needs known. Patient complaining of SOB and chest pain 5/10 radiating to back.  used: Suzette ID# [id number]. Patient nausea or blurry vision. VS /76 HR 80 RR20 O2SAT 94% RA

## 2020-09-05 NOTE — CHART NOTE - NSCHARTNOTEFT_GEN_A_CORE
Medicine PA Episodic Note    Notified by RN that patient is complaining of intermittent chest pain, about 5/10 on a pain scale. She is unable to determine the quality of pain, however, endorses that it lasts for a few minutes and then subsides. Endorses that this is similar pain to which she was admitted with. It has not changed in severity. States that when she takes a deep breath, she has similar pain as well. She endorses that she usually takes pain medication with associated relief. Patient denies numbness and tingling in the both arms. States that it affects her upper back.  Patient in no acute distress, sitting in bed. Patient seen and assessed by me at bedside. Spoke to patient in native language, [facility].      Vital Signs Last 24 Hrs  T(C): 37.4 (05 Sep 2020 05:03), Max: 37.6 (04 Sep 2020 21:07)  T(F): 99.3 (05 Sep 2020 05:03), Max: 99.7 (04 Sep 2020 21:07)  HR: 69 (05 Sep 2020 05:03) (63 - 77)  BP: 111/72 (05 Sep 2020 05:03) (105/67 - 116/75)  BP(mean): --  RR: 18 (05 Sep 2020 05:03) (18 - 18)  SpO2: 97% (05 Sep 2020 05:03) (94% - 97%)      Labs:                          7.7    5.63  )-----------( 212      ( 04 Sep 2020 15:24 )             25.2     09-04    139  |  102  |  14  ----------------------------<  202<H>  3.8   |  24  |  0.79    Ca    9.7      04 Sep 2020 10:35    TPro  6.7  /  Alb  4.1  /  TBili  0.4  /  DBili  x   /  AST  10  /  ALT  7<L>  /  AlkPhos  77  09-04        Radiology:    < from: CT Angio Chest w/ IV Cont (09.03.20 @ 12:39) >    IMPRESSION:  No pulmonary embolism.    Small left pleural effusion with passive partial left lower lobe atelectasis.    Small pericardial effusion.    < end of copied text >      Physical Exam:  General: WN/WD NAD  Neurology: A&Ox3, nonfocal, CHAIREZ x 4  Head:  Normocephalic, atraumatic  Respiratory: CTA B/L  CV: RRR, S1S2, no murmur  Abdominal: Soft, NT, ND no palpable mass  MSK: No edema, + peripheral pulses, FROM all 4 extremity    Assessment & Plan:  HPI:  Spoke with pacific  [id number] but patient hard of hearing.    59 yo F w/ hx of HTN, HLD, T2DM, DVT (dx July 2020, on Eliquis) APL currently undergoing chemotherapy (dx May 2020; on ATRA and arsenic), last tx planned for ?9/4/20, recent admission for concern of Mediport infxn 8/22-8/27 but was neg, now presenting similarly w/  back and CP which began last night. Started as back pain, and became chest pain. Patient described pain as sharp pain affecting patient's neck and upper back. Asked to clarify how long pain lasted but was unable to state as patient kept repeating that pain improved with pain medication. Endorsed that pain improved likely with morphine received in ED and had pain resolved when seen. CP described as heavy and substernal, and constant. Endorsed that pain was worse when sitting up and improved when lying down. Review of chart shows pt admitted for similar symptoms previously, and workup was unremarkable to include no PE or Mediport infection, and abx were discontinued during the admission. Patient states she was due for last chemotherapy treatment and instead was held off as she was having these symptoms. Patient sees outpatient onc Dr. Hurd    Notified by RN that patient c/o of chest pain 5/10, no radiation. Patient's tropes negative x3. CTA negative for PE, however shows small left pleural effusion and pericardial effusion.     Plan:    Atypical Chest Pain  - Tylenol IV   - Pt placed on tele   - EKG Stat, done shows no acute changes from previous EKG   - Cardiology following, will appreciate recs. Pt planned for possible stress test.   - Will continue to monitor patient and VS.   - Endorsed to AM team.       Wendy WHITLEY  Dept of Medicine  [phone]

## 2020-09-06 LAB
ANION GAP SERPL CALC-SCNC: 12 MMOL/L — SIGNIFICANT CHANGE UP (ref 5–17)
BUN SERPL-MCNC: 12 MG/DL — SIGNIFICANT CHANGE UP (ref 7–23)
CALCIUM SERPL-MCNC: 9.7 MG/DL — SIGNIFICANT CHANGE UP (ref 8.4–10.5)
CHLORIDE SERPL-SCNC: 104 MMOL/L — SIGNIFICANT CHANGE UP (ref 96–108)
CO2 SERPL-SCNC: 24 MMOL/L — SIGNIFICANT CHANGE UP (ref 22–31)
CREAT SERPL-MCNC: 0.71 MG/DL — SIGNIFICANT CHANGE UP (ref 0.5–1.3)
GLUCOSE BLDC GLUCOMTR-MCNC: 104 MG/DL — HIGH (ref 70–99)
GLUCOSE BLDC GLUCOMTR-MCNC: 128 MG/DL — HIGH (ref 70–99)
GLUCOSE BLDC GLUCOMTR-MCNC: 132 MG/DL — HIGH (ref 70–99)
GLUCOSE BLDC GLUCOMTR-MCNC: 143 MG/DL — HIGH (ref 70–99)
GLUCOSE SERPL-MCNC: 125 MG/DL — HIGH (ref 70–99)
HCT VFR BLD CALC: 25.1 % — LOW (ref 34.5–45)
HGB BLD-MCNC: 7.8 G/DL — LOW (ref 11.5–15.5)
MAGNESIUM SERPL-MCNC: 2.1 MG/DL — SIGNIFICANT CHANGE UP (ref 1.6–2.6)
MCHC RBC-ENTMCNC: 28 PG — SIGNIFICANT CHANGE UP (ref 27–34)
MCHC RBC-ENTMCNC: 31.1 GM/DL — LOW (ref 32–36)
MCV RBC AUTO: 90 FL — SIGNIFICANT CHANGE UP (ref 80–100)
NRBC # BLD: 0 /100 WBCS — SIGNIFICANT CHANGE UP (ref 0–0)
PLATELET # BLD AUTO: 238 K/UL — SIGNIFICANT CHANGE UP (ref 150–400)
POTASSIUM SERPL-MCNC: 3.9 MMOL/L — SIGNIFICANT CHANGE UP (ref 3.5–5.3)
POTASSIUM SERPL-SCNC: 3.9 MMOL/L — SIGNIFICANT CHANGE UP (ref 3.5–5.3)
RBC # BLD: 2.79 M/UL — LOW (ref 3.8–5.2)
RBC # FLD: 15.4 % — HIGH (ref 10.3–14.5)
SODIUM SERPL-SCNC: 140 MMOL/L — SIGNIFICANT CHANGE UP (ref 135–145)
WBC # BLD: 6.98 K/UL — SIGNIFICANT CHANGE UP (ref 3.8–10.5)
WBC # FLD AUTO: 6.98 K/UL — SIGNIFICANT CHANGE UP (ref 3.8–10.5)

## 2020-09-06 PROCEDURE — 99233 SBSQ HOSP IP/OBS HIGH 50: CPT

## 2020-09-06 PROCEDURE — 93016 CV STRESS TEST SUPVJ ONLY: CPT

## 2020-09-06 PROCEDURE — 93018 CV STRESS TEST I&R ONLY: CPT

## 2020-09-06 PROCEDURE — 78452 HT MUSCLE IMAGE SPECT MULT: CPT | Mod: 26

## 2020-09-06 RX ADMIN — GABAPENTIN 100 MILLIGRAM(S): 400 CAPSULE ORAL at 11:40

## 2020-09-06 RX ADMIN — Medication 1 DROP(S): at 05:22

## 2020-09-06 RX ADMIN — APIXABAN 5 MILLIGRAM(S): 2.5 TABLET, FILM COATED ORAL at 16:06

## 2020-09-06 RX ADMIN — Medication 1 DROP(S): at 11:40

## 2020-09-06 RX ADMIN — APIXABAN 5 MILLIGRAM(S): 2.5 TABLET, FILM COATED ORAL at 05:22

## 2020-09-06 RX ADMIN — ATENOLOL 50 MILLIGRAM(S): 25 TABLET ORAL at 05:28

## 2020-09-06 RX ADMIN — GABAPENTIN 100 MILLIGRAM(S): 400 CAPSULE ORAL at 21:41

## 2020-09-06 RX ADMIN — Medication 1 DROP(S): at 21:41

## 2020-09-06 RX ADMIN — ATORVASTATIN CALCIUM 20 MILLIGRAM(S): 80 TABLET, FILM COATED ORAL at 21:41

## 2020-09-06 RX ADMIN — GABAPENTIN 100 MILLIGRAM(S): 400 CAPSULE ORAL at 05:22

## 2020-09-06 RX ADMIN — POLYETHYLENE GLYCOL 3350 17 GRAM(S): 17 POWDER, FOR SOLUTION ORAL at 05:24

## 2020-09-06 NOTE — PROVIDER CONTACT NOTE (OTHER) - NAME OF MD/NP/PA/DO NOTIFIED:
Rodolfo Escobar Have Your Skin Lesions Been Treated?: not been treated Is This A New Presentation, Or A Follow-Up?: Skin Lesions

## 2020-09-06 NOTE — PROGRESS NOTE ADULT - SUBJECTIVE AND OBJECTIVE BOX
Patient is a 60y old  Female who presents with a chief complaint of CP and back pain (05 Sep 2020 10:40)        SUBJECTIVE / OVERNIGHT EVENTS: no further chest pain but states she felt very sob with stress test      MEDICATIONS  (STANDING):  apixaban 5 milliGRAM(s) Oral every 12 hours  artificial  tears Solution 1 Drop(s) Both EYES three times a day  ATENolol  Tablet 50 milliGRAM(s) Oral daily  atorvastatin 20 milliGRAM(s) Oral at bedtime  dextrose 5%. 1000 milliLiter(s) (50 mL/Hr) IV Continuous <Continuous>  dextrose 50% Injectable 12.5 Gram(s) IV Push once  dextrose 50% Injectable 25 Gram(s) IV Push once  dextrose 50% Injectable 25 Gram(s) IV Push once  gabapentin 100 milliGRAM(s) Oral three times a day  influenza   Vaccine 0.5 milliLiter(s) IntraMuscular once  insulin lispro (HumaLOG) corrective regimen sliding scale   SubCutaneous three times a day before meals  insulin lispro (HumaLOG) corrective regimen sliding scale   SubCutaneous at bedtime    MEDICATIONS  (PRN):  dextrose 40% Gel 15 Gram(s) Oral once PRN Blood Glucose LESS THAN 70 milliGRAM(s)/deciliter  glucagon  Injectable 1 milliGRAM(s) IntraMuscular once PRN Glucose LESS THAN 70 milligrams/deciliter  metoclopramide 10 milliGRAM(s) Oral Before meals and at bedtime PRN nausea  polyethylene glycol 3350 17 Gram(s) Oral daily PRN Constipation      Vital Signs Last 24 Hrs  T(C): 36.7 (06 Sep 2020 11:37), Max: 36.9 (06 Sep 2020 04:57)  T(F): 98.1 (06 Sep 2020 11:37), Max: 98.4 (06 Sep 2020 04:57)  HR: 70 (06 Sep 2020 11:37) (70 - 76)  BP: 112/74 (06 Sep 2020 11:37) (90/56 - 112/74)  BP(mean): --  RR: 18 (06 Sep 2020 11:37) (18 - 18)  SpO2: 95% (06 Sep 2020 11:37) (95% - 98%)  CAPILLARY BLOOD GLUCOSE      POCT Blood Glucose.: 143 mg/dL (06 Sep 2020 12:20)  POCT Blood Glucose.: 132 mg/dL (06 Sep 2020 08:49)  POCT Blood Glucose.: 139 mg/dL (05 Sep 2020 22:13)  POCT Blood Glucose.: 113 mg/dL (05 Sep 2020 17:51)    I&O's Summary    05 Sep 2020 07:01  -  06 Sep 2020 07:00  --------------------------------------------------------  IN: 910 mL / OUT: 0 mL / NET: 910 mL    06 Sep 2020 07:01  -  06 Sep 2020 14:53  --------------------------------------------------------  IN: 460 mL / OUT: 0 mL / NET: 460 mL      PHYSICAL EXAM:  GENERAL: NAD, breathing normal  HEAD:  Atraumatic, Normocephalic  EYES: conjunctiva and sclera clear  NECK: supple, No JVD  CHEST/LUNG: CTA b/l  HEART: S1 S2 RRR  ABDOMEN: +BS Soft, NT/ND  EXTREMITIES:  2+ DP Pulses, No c/c. trace b/l LE edema  NEUROLOGY: AAOx3, no facial droop, moving all extremites  SKIN: no erythema or tenderness at [facility] site    LABS:                        7.8    6.98  )-----------( 238      ( 06 Sep 2020 10:06 )             25.1     09-06    140  |  104  |  12  ----------------------------<  125<H>  3.9   |  24  |  0.71    Ca    9.7      06 Sep 2020 10:06  Mg     2.1     09-06                RADIOLOGY & ADDITIONAL TESTS:    Imaging Personally Reviewed:  Consultant(s) Notes Reviewed:    Care Discussed with Consultants/Other Providers: Patient is a 60y old  Female who presents with a chief complaint of CP and back pain (05 Sep 2020 10:40)        SUBJECTIVE / OVERNIGHT EVENTS: no further chest pain but states she felt very sob with stress test      MEDICATIONS  (STANDING):  apixaban 5 milliGRAM(s) Oral every 12 hours  artificial  tears Solution 1 Drop(s) Both EYES three times a day  ATENolol  Tablet 50 milliGRAM(s) Oral daily  atorvastatin 20 milliGRAM(s) Oral at bedtime  dextrose 5%. 1000 milliLiter(s) (50 mL/Hr) IV Continuous <Continuous>  dextrose 50% Injectable 12.5 Gram(s) IV Push once  dextrose 50% Injectable 25 Gram(s) IV Push once  dextrose 50% Injectable 25 Gram(s) IV Push once  gabapentin 100 milliGRAM(s) Oral three times a day  influenza   Vaccine 0.5 milliLiter(s) IntraMuscular once  insulin lispro (HumaLOG) corrective regimen sliding scale   SubCutaneous three times a day before meals  insulin lispro (HumaLOG) corrective regimen sliding scale   SubCutaneous at bedtime    MEDICATIONS  (PRN):  dextrose 40% Gel 15 Gram(s) Oral once PRN Blood Glucose LESS THAN 70 milliGRAM(s)/deciliter  glucagon  Injectable 1 milliGRAM(s) IntraMuscular once PRN Glucose LESS THAN 70 milligrams/deciliter  metoclopramide 10 milliGRAM(s) Oral Before meals and at bedtime PRN nausea  polyethylene glycol 3350 17 Gram(s) Oral daily PRN Constipation      Vital Signs Last 24 Hrs  T(C): 36.7 (06 Sep 2020 11:37), Max: 36.9 (06 Sep 2020 04:57)  T(F): 98.1 (06 Sep 2020 11:37), Max: 98.4 (06 Sep 2020 04:57)  HR: 70 (06 Sep 2020 11:37) (70 - 76)  BP: 112/74 (06 Sep 2020 11:37) (90/56 - 112/74)  BP(mean): --  RR: 18 (06 Sep 2020 11:37) (18 - 18)  SpO2: 95% (06 Sep 2020 11:37) (95% - 98%)  CAPILLARY BLOOD GLUCOSE      POCT Blood Glucose.: 143 mg/dL (06 Sep 2020 12:20)  POCT Blood Glucose.: 132 mg/dL (06 Sep 2020 08:49)  POCT Blood Glucose.: 139 mg/dL (05 Sep 2020 22:13)  POCT Blood Glucose.: 113 mg/dL (05 Sep 2020 17:51)    I&O's Summary    05 Sep 2020 07:01  -  06 Sep 2020 07:00  --------------------------------------------------------  IN: 910 mL / OUT: 0 mL / NET: 910 mL    06 Sep 2020 07:01  -  06 Sep 2020 14:53  --------------------------------------------------------  IN: 460 mL / OUT: 0 mL / NET: 460 mL      PHYSICAL EXAM:  GENERAL: NAD, breathing normal  HEAD:  Atraumatic, Normocephalic  EYES: conjunctiva and sclera clear  NECK: supple, No JVD  CHEST/LUNG: decreased bs at left base  HEART: S1 S2 RRR  ABDOMEN: +BS Soft, NT/ND  EXTREMITIES:  2+ DP Pulses, No c/c. trace b/l LE edema  NEUROLOGY: AAOx3, no facial droop, moving all extremites  SKIN: no erythema or tenderness at [facility] site    LABS:                        7.8    6.98  )-----------( 238      ( 06 Sep 2020 10:06 )             25.1     09-06    140  |  104  |  12  ----------------------------<  125<H>  3.9   |  24  |  0.71    Ca    9.7      06 Sep 2020 10:06  Mg     2.1     09-06                RADIOLOGY & ADDITIONAL TESTS:    Imaging Personally Reviewed:  Consultant(s) Notes Reviewed:    Care Discussed with Consultants/Other Providers:

## 2020-09-06 NOTE — PROGRESS NOTE ADULT - PROBLEM SELECTOR PLAN 1
Unclear etiology of acute onset chest pain and back pain. Patient hemodynamically stable and saturating well on RA. No fever or leukocytosis  - CT chest angio neg for PE,  no thrombus of mediport  - recent echo with 54% EF and shows eccentric LV hypertrophy (dilated LV with normal relative wall thickness).  CTA with small pericardial effusion - pericarditis in differential however, clinical symptoms not really consistent with pericarditis, EKG without ST elevations. h/h drop. cards agree less likely pericarditis - will hold trial of NSAIDs - d/w Dr. Hurd (oncology) who also agrees.   - Cardiac cath 2 yrs ago shows mild non-obstructive CAD  - EKG with no ST changes but with diffuse T wave inversions noted. Troponins 12 > 9.  repeat TTE ordered to evaluate pericardial effusion  Stress to evaluate for ischemia  cards f/u

## 2020-09-06 NOTE — PHYSICAL THERAPY INITIAL EVALUATION ADULT - PERTINENT HX OF CURRENT PROBLEM, REHAB EVAL
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

## 2020-09-06 NOTE — PHYSICAL THERAPY INITIAL EVALUATION ADULT - ADDITIONAL COMMENTS
Pt lives in an apt w/ 2 steps at entry +UHR, then elevator, PTA pt was independent w/ all ADLs and mobility w/ no AD.

## 2020-09-06 NOTE — PROVIDER CONTACT NOTE (OTHER) - SITUATION
Patient complaining of Shortness of Breath when walking with physical therapy. Abdomen soft, non-tender, no guarding.

## 2020-09-06 NOTE — PHYSICAL THERAPY INITIAL EVALUATION ADULT - PRECAUTIONS/LIMITATIONS, REHAB EVAL
CTA w/ No PE, Small left pleural effusion with passive partial left lower lobe atelectasis. Small pericardial effusion./cardiac precautions

## 2020-09-06 NOTE — PHYSICAL THERAPY INITIAL EVALUATION ADULT - PLANNED THERAPY INTERVENTIONS, PT EVAL
gait training/Stair Training: Goal: Improve to 10 steps I with single rail, step to pattern by 1-2 weeks/transfer training

## 2020-09-06 NOTE — PROVIDER CONTACT NOTE (OTHER) - ASSESSMENT
Patient is Alert and Oriented times Four. Patient complaining of shortness of breath when walking with physical therapist. Patient denies chest pain, discomfort, lightheadedness and dizziness. Patient noted to have Dyspnea on Exertion. Patient's O2 Saturation decreased to as low as 90% Room Air when walking with physical therapist. Patient noted to have Dyspnea on Exertion. Patient free from respiratory distress. Patient assisted back into bed. Patient sitting up in bed and resting. Patient states that her shortness of breath resolved post resting. Patient's O2 Saturation re-assessed and increased to 99% Room Air.

## 2020-09-07 LAB
GLUCOSE BLDC GLUCOMTR-MCNC: 120 MG/DL — HIGH (ref 70–99)
GLUCOSE BLDC GLUCOMTR-MCNC: 132 MG/DL — HIGH (ref 70–99)
GLUCOSE BLDC GLUCOMTR-MCNC: 141 MG/DL — HIGH (ref 70–99)
GLUCOSE BLDC GLUCOMTR-MCNC: 167 MG/DL — HIGH (ref 70–99)
HCT VFR BLD CALC: 25.2 % — LOW (ref 34.5–45)
HGB BLD-MCNC: 7.9 G/DL — LOW (ref 11.5–15.5)
MCHC RBC-ENTMCNC: 28.1 PG — SIGNIFICANT CHANGE UP (ref 27–34)
MCHC RBC-ENTMCNC: 31.3 GM/DL — LOW (ref 32–36)
MCV RBC AUTO: 89.7 FL — SIGNIFICANT CHANGE UP (ref 80–100)
NRBC # BLD: 0 /100 WBCS — SIGNIFICANT CHANGE UP (ref 0–0)
OB PNL STL: NEGATIVE — SIGNIFICANT CHANGE UP
PLATELET # BLD AUTO: 224 K/UL — SIGNIFICANT CHANGE UP (ref 150–400)
RBC # BLD: 2.81 M/UL — LOW (ref 3.8–5.2)
RBC # FLD: 15.5 % — HIGH (ref 10.3–14.5)
WBC # BLD: 7.07 K/UL — SIGNIFICANT CHANGE UP (ref 3.8–10.5)
WBC # FLD AUTO: 7.07 K/UL — SIGNIFICANT CHANGE UP (ref 3.8–10.5)

## 2020-09-07 PROCEDURE — 99233 SBSQ HOSP IP/OBS HIGH 50: CPT

## 2020-09-07 PROCEDURE — 71045 X-RAY EXAM CHEST 1 VIEW: CPT | Mod: 26

## 2020-09-07 RX ORDER — FUROSEMIDE 40 MG
40 TABLET ORAL ONCE
Refills: 0 | Status: COMPLETED | OUTPATIENT
Start: 2020-09-07 | End: 2020-09-07

## 2020-09-07 RX ORDER — FUROSEMIDE 40 MG
20 TABLET ORAL ONCE
Refills: 0 | Status: DISCONTINUED | OUTPATIENT
Start: 2020-09-07 | End: 2020-09-07

## 2020-09-07 RX ADMIN — GABAPENTIN 100 MILLIGRAM(S): 400 CAPSULE ORAL at 22:08

## 2020-09-07 RX ADMIN — GABAPENTIN 100 MILLIGRAM(S): 400 CAPSULE ORAL at 05:32

## 2020-09-07 RX ADMIN — ATENOLOL 50 MILLIGRAM(S): 25 TABLET ORAL at 05:32

## 2020-09-07 RX ADMIN — ATORVASTATIN CALCIUM 20 MILLIGRAM(S): 80 TABLET, FILM COATED ORAL at 22:08

## 2020-09-07 RX ADMIN — APIXABAN 5 MILLIGRAM(S): 2.5 TABLET, FILM COATED ORAL at 16:17

## 2020-09-07 RX ADMIN — Medication 1 DROP(S): at 22:08

## 2020-09-07 RX ADMIN — Medication 1 DROP(S): at 11:52

## 2020-09-07 RX ADMIN — GABAPENTIN 100 MILLIGRAM(S): 400 CAPSULE ORAL at 11:52

## 2020-09-07 RX ADMIN — Medication 1 DROP(S): at 05:32

## 2020-09-07 RX ADMIN — Medication 40 MILLIGRAM(S): at 19:49

## 2020-09-07 RX ADMIN — APIXABAN 5 MILLIGRAM(S): 2.5 TABLET, FILM COATED ORAL at 05:32

## 2020-09-07 NOTE — CHART NOTE - NSCHARTNOTEFT_GEN_A_CORE
pulmonary consult called [place] [phone] pulmonary consult called [place] [phone]  spoke with Dr. Levi from pulmonary recommend CT chest no contrast

## 2020-09-07 NOTE — PROGRESS NOTE ADULT - PROBLEM SELECTOR PLAN 1
Plan: Can start Metronidazole 1% as needed for rash on face one time daily then d/c when resolved Unclear etiology of acute onset chest pain and back pain. Patient hemodynamically stable and saturating well on RA. No fever or leukocytosis  - CT chest angio neg for PE,  no thrombus of mediport  - recent echo with 54% EF and shows eccentric LV hypertrophy (dilated LV with normal relative wall thickness).  CTA with small pericardial effusion - pericarditis in differential however, clinical symptoms not really consistent with pericarditis, EKG without ST elevations. h/h drop. cards agree less likely pericarditis - will hold trial of NSAIDs - d/w Dr. Hurd (oncology) who also agrees.   - Cardiac cath 2 yrs ago shows mild non-obstructive CAD  - EKG with no ST changes but with diffuse T wave inversions noted. Troponins 12 > 9.  repeat TTE ordered to evaluate pericardial effusion  Stress to evaluate for ischemia  cards f/u Detail Level: Simple Initiate Treatment: Patient advised to use over the counter hydrocortisone cream if selsun blue or head and shoulders is ineffective\\n\\nThen if she continue to flare can use Ketoconazole one time daily to affect areas Unclear etiology of acute onset chest pain and back pain. Patient hemodynamically stable and saturating well on RA. No fever or leukocytosis  - CT chest angio neg for PE,  no thrombus of mediport  - recent echo with 54% EF and shows eccentric LV hypertrophy (dilated LV with normal relative wall thickness).  CTA with small pericardial effusion - pericarditis in differential however, clinical symptoms not really consistent with pericarditis, EKG without ST elevations. h/h drop. cards agree less likely pericarditis - will hold trial of NSAIDs - d/w Dr. Hurd (oncology) who also agrees.   - Cardiac cath 2 yrs ago shows mild non-obstructive CAD  - EKG with no ST changes but with diffuse T wave inversions noted. Troponin 12 > 9.  repeat TTE ordered to evaluate pericardial effusion  Stress to evaluate for ischemia  cards f/u

## 2020-09-07 NOTE — PHARMACOTHERAPY INTERVENTION NOTE - COMMENTS
Spoke with patient's son, Joseluis, over the phone. Educated son on patient's home medications and provided informational handout for the patient's family. Informed son of all of the patient's home medications. Reviewed purpose, dosage, frequency, and adverse effects of each of her medications. Reviewed pertinent signs and symptoms of bleeding with Scottie.     Terrance Palomino, PharmD  PGY-1 Pharmacy Resident  [id number]

## 2020-09-07 NOTE — PROGRESS NOTE ADULT - SUBJECTIVE AND OBJECTIVE BOX
Patient is a 60y old  Female who presents with a chief complaint of CP and back pain (07 Sep 2020 09:44)        SUBJECTIVE / OVERNIGHT EVENTS: c/o difficulty taking a deep breath. substernal chest pressure with deep inspiration      MEDICATIONS  (STANDING):  apixaban 5 milliGRAM(s) Oral every 12 hours  artificial  tears Solution 1 Drop(s) Both EYES three times a day  ATENolol  Tablet 50 milliGRAM(s) Oral daily  atorvastatin 20 milliGRAM(s) Oral at bedtime  dextrose 5%. 1000 milliLiter(s) (50 mL/Hr) IV Continuous <Continuous>  dextrose 50% Injectable 12.5 Gram(s) IV Push once  dextrose 50% Injectable 25 Gram(s) IV Push once  dextrose 50% Injectable 25 Gram(s) IV Push once  gabapentin 100 milliGRAM(s) Oral three times a day  influenza   Vaccine 0.5 milliLiter(s) IntraMuscular once  insulin lispro (HumaLOG) corrective regimen sliding scale   SubCutaneous three times a day before meals  insulin lispro (HumaLOG) corrective regimen sliding scale   SubCutaneous at bedtime    MEDICATIONS  (PRN):  dextrose 40% Gel 15 Gram(s) Oral once PRN Blood Glucose LESS THAN 70 milliGRAM(s)/deciliter  glucagon  Injectable 1 milliGRAM(s) IntraMuscular once PRN Glucose LESS THAN 70 milligrams/deciliter  metoclopramide 10 milliGRAM(s) Oral Before meals and at bedtime PRN nausea  polyethylene glycol 3350 17 Gram(s) Oral daily PRN Constipation      Vital Signs Last 24 Hrs  T(C): 36.8 (07 Sep 2020 11:42), Max: 36.9 (06 Sep 2020 21:42)  T(F): 98.2 (07 Sep 2020 11:42), Max: 98.4 (06 Sep 2020 21:42)  HR: 64 (07 Sep 2020 11:42) (64 - 86)  BP: 106/65 (07 Sep 2020 11:42) (106/65 - 133/81)  BP(mean): --  RR: 18 (07 Sep 2020 14:32) (18 - 18)  SpO2: 95% (07 Sep 2020 14:32) (90% - 99%)  CAPILLARY BLOOD GLUCOSE      POCT Blood Glucose.: 132 mg/dL (07 Sep 2020 12:55)  POCT Blood Glucose.: 120 mg/dL (07 Sep 2020 08:43)  POCT Blood Glucose.: 128 mg/dL (06 Sep 2020 21:44)  POCT Blood Glucose.: 104 mg/dL (06 Sep 2020 17:42)    I&O's Summary    06 Sep 2020 07:01  -  07 Sep 2020 07:00  --------------------------------------------------------  IN: 1120 mL / OUT: 0 mL / NET: 1120 mL    07 Sep 2020 07:01  -  07 Sep 2020 15:30  --------------------------------------------------------  IN: 340 mL / OUT: 0 mL / NET: 340 mL        PHYSICAL EXAM:  GENERAL: NAD, breathing normal  HEAD:  Atraumatic, Normocephalic  EYES: conjunctiva and sclera clear  NECK: supple, No JVD  CHEST/LUNG: decreased bs at left base - ?mid way up lung today.   HEART: S1 S2 RRR  ABDOMEN: +BS Soft, NT/ND  EXTREMITIES:  2+ DP Pulses, No c/c. trace b/l LE edema  NEUROLOGY: AAOx3, no facial droop, moving all extremites  SKIN: no erythema or tenderness at [facility] site    LABS:                        7.9    7.07  )-----------( 224      ( 07 Sep 2020 09:57 )             25.2     09-06    140  |  104  |  12  ----------------------------<  125<H>  3.9   |  24  |  0.71    Ca    9.7      06 Sep 2020 10:06  Mg     2.1     09-06                RADIOLOGY & ADDITIONAL TESTS:    Imaging Personally Reviewed:  Consultant(s) Notes Reviewed:    Care Discussed with Consultants/Other Providers:

## 2020-09-07 NOTE — CHART NOTE - NSCHARTNOTEFT_GEN_A_CORE
Pre-cervantes CXR (9/07/2020) shows increased left pleural effusion    < from: Xray Chest 1 View- PORTABLE-Urgent (09.07.20 @ 14:04) >    INTERPRETATION:  Interval increase in left pleueral effusion    < end of copied text >      Unofficial bedside TTE (9/07/2020) shows small pericardial effusion.   Patient currently without any IV access. As per discussion with son (Giovana Paredesum), IV draws are only to be done by the IV team, and only on the upper left extremity as patient has a DVT in the right upper extremity. IV team was paged for IV access help.   Son explained the risks and benefits of receiving IV Lasix vs. PO Lasix. Son understands, and wishes for the patient to receive PO Lasix for now as there is currently no IV access. IV lasix to be d/c.  Also, of note, patient has a right chest Mediport with placement      Debbi Lyons PA-C  #[id number] Pre-cervantes CXR (9/07/2020) shows increased left pleural effusion    < from: Xray Chest 1 View- PORTABLE-Urgent (09.07.20 @ 14:04) >    right mediport with tip in the SVC. interval increase in left pleural effusion compared to 9/3/2020.    < end of copied text >      Unofficial bedside TTE (9/07/2020) shows small pericardial effusion.   Patient currently without any IV access. As per discussion with son (Giovana Gant), IV draws are only to be done by the IV team, and only on the upper left extremity as patient has a DVT in the right upper extremity. IV team was paged for IV access help.   Son explained the risks and benefits of receiving IV Lasix vs. PO Lasix. Son understands, and wishes for the patient to receive PO Lasix for now as there is currently no IV access. IV lasix to be d/c.  Also, of note, patient has a right chest Mediport with placement confirmed by pre-cervantes CXR. Consider using Mediport for blood draws.   Discussed with RN  Will continue to monitor  Will endorse to AM team      Debbi Lyons PA-C  #[id number] Pre-cervantes CXR (9/07/2020) shows increased left pleural effusion    < from: Xray Chest 1 View- PORTABLE-Urgent (09.07.20 @ 14:04) >    right mediport with tip in the SVC. interval increase in left pleural effusion compared to 9/3/2020.    < end of copied text >      Unofficial bedside TTE (9/07/2020) shows small pericardial effusion.   Patient currently without any IV access. As per discussion with son (Giovana Gant), IV draws are only to be done by the IV team, and only on the upper left extremity as patient has a DVT in the right upper extremity. IV team was paged for IV access help.   Son explained the risks and benefits of receiving IV Lasix vs. PO Lasix. Son understands, and wishes for the patient to receive PO Lasix for now as there is currently no IV access. IV lasix to be d/c.  Also, of note, patient has a right chest Mediport with placement confirmed by pre-cervantes CXR. Consider using Mediport for blood draws as infectious workup has been negative.   Discussed with RN  Will continue to monitor  Will endorse to AM team      Debbi Lyons PA-C  #[id number]

## 2020-09-07 NOTE — PROGRESS NOTE ADULT - SUBJECTIVE AND OBJECTIVE BOX
Kolby Greer  [phone]  All Cardiology service information can be found 24/7 on [email or web address], password: [email or web address]    Patient seen and examined at bedside.    Overnight Events: No events overnight. Limited ROS obtained as pt requesting to sleep. Denies chest pain, sob, or palpitations.         Current Meds:  apixaban 5 milliGRAM(s) Oral every 12 hours  artificial  tears Solution 1 Drop(s) Both EYES three times a day  ATENolol  Tablet 50 milliGRAM(s) Oral daily  atorvastatin 20 milliGRAM(s) Oral at bedtime  dextrose 40% Gel 15 Gram(s) Oral once PRN  dextrose 5%. 1000 milliLiter(s) IV Continuous <Continuous>  dextrose 50% Injectable 12.5 Gram(s) IV Push once  dextrose 50% Injectable 25 Gram(s) IV Push once  dextrose 50% Injectable 25 Gram(s) IV Push once  gabapentin 100 milliGRAM(s) Oral three times a day  glucagon  Injectable 1 milliGRAM(s) IntraMuscular once PRN  influenza   Vaccine 0.5 milliLiter(s) IntraMuscular once  insulin lispro (HumaLOG) corrective regimen sliding scale   SubCutaneous three times a day before meals  insulin lispro (HumaLOG) corrective regimen sliding scale   SubCutaneous at bedtime  metoclopramide 10 milliGRAM(s) Oral Before meals and at bedtime PRN  polyethylene glycol 3350 17 Gram(s) Oral daily PRN      Vitals:  T(F): 97.9 (09-07), Max: 98.4 (09-06)  HR: 72 (09-07) (70 - 86)  BP: 131/76 (09-07) (112/74 - 133/81)  RR: 18 (09-07)  SpO2: 96% (09-07)  I&O's Summary    06 Sep 2020 07:01  -  07 Sep 2020 07:00  --------------------------------------------------------  IN: 1120 mL / OUT: 0 mL / NET: 1120 mL    07 Sep 2020 07:01  -  07 Sep 2020 09:14  --------------------------------------------------------  IN: 120 mL / OUT: 0 mL / NET: 120 mL        Physical Exam:  Appearance: No acute distress  Eyes: PERRL, EOMI, pink conjunctiva  HEENT: Normal oral mucosa  Cardiovascular: RRR, S1, S2, no edema  Respiratory: Clear to auscultation bilaterally  Gastrointestinal: soft, non-tender, non-distended with normal bowel sounds  Musculoskeletal: No clubbing; no joint deformity   Neurologic: Non-focal  Lymphatic: No lymphadenopathy  Psychiatry: mood & affect appropriate  Skin: No rashes, ecchymoses, or cyanosis                          7.8    6.98  )-----------( 238      ( 06 Sep 2020 10:06 )             25.1     09-06    140  |  104  |  12  ----------------------------<  125<H>  3.9   |  24  |  0.71    Ca    9.7      06 Sep 2020 10:06  Mg     2.1     09-06        CARDIAC MARKERS ( 04 Sep 2020 10:35 )  9 ng/L / x     / x     / x     / x     / x      CARDIAC MARKERS ( 03 Sep 2020 13:54 )  9 ng/L / x     / x     / x     / x     / x      CARDIAC MARKERS ( 03 Sep 2020 10:30 )  12 ng/L / x     / x     / x     / x     / x                  New ECG(s): Personally reviewed    Echo:    < from: Transthoracic Echocardiogram (08.27.20 @ 15:28) >  CONCLUSIONS:  1. Calcified trileaflet aortic valve with normal opening.  2. Eccentric left ventricular hypertrophy (dilated left  ventricle with normal relative wall thickness).  3. Endocardium not well visualized; grossly normal left  ventricular systolic function.  4. Normal right ventricular size and function.    < end of copied text >    < from: Nuclear Stress Test-Pharmacologic (09.06.20 @ 11:31) >  IMPRESSIONS:Abnormal Study  * There are no significant ECG changes to suggest  ischemia.  * There are moderate to large, moderate perfusion defects  in apical, apical lateral, mid to distal anterior walls  that are predominantly fixed, but correct with prone  imaging therefore no clear evidence of ischemia or  infarct.  * Post-stress gated wall motion analysis was performed  (LVEF = 47 %;LVEDV = 62 ml.), revealing moderate  hypokinesis in mid to distal anterior and anteroseptal,  septal, apical anterior, distal inferolateral wall(s).    < end of copied text >        Interpretation of Telemetry:    SR Kolby Greer  [phone]  All Cardiology service information can be found 24/7 on [email or web address], password: [email or web address]    Patient seen and examined at bedside.    Overnight Events: No events overnight. Limited ROS obtained as pt requesting to sleep. Denies chest pain, sob, or palpitations.         Current Meds:  apixaban 5 milliGRAM(s) Oral every 12 hours  artificial  tears Solution 1 Drop(s) Both EYES three times a day  ATENolol  Tablet 50 milliGRAM(s) Oral daily  atorvastatin 20 milliGRAM(s) Oral at bedtime  dextrose 40% Gel 15 Gram(s) Oral once PRN  dextrose 5%. 1000 milliLiter(s) IV Continuous <Continuous>  dextrose 50% Injectable 12.5 Gram(s) IV Push once  dextrose 50% Injectable 25 Gram(s) IV Push once  dextrose 50% Injectable 25 Gram(s) IV Push once  gabapentin 100 milliGRAM(s) Oral three times a day  glucagon  Injectable 1 milliGRAM(s) IntraMuscular once PRN  influenza   Vaccine 0.5 milliLiter(s) IntraMuscular once  insulin lispro (HumaLOG) corrective regimen sliding scale   SubCutaneous three times a day before meals  insulin lispro (HumaLOG) corrective regimen sliding scale   SubCutaneous at bedtime  metoclopramide 10 milliGRAM(s) Oral Before meals and at bedtime PRN  polyethylene glycol 3350 17 Gram(s) Oral daily PRN    Vitals:  T(F): 97.9 (09-07), Max: 98.4 (09-06)  HR: 72 (09-07) (70 - 86)  BP: 131/76 (09-07) (112/74 - 133/81)  RR: 18 (09-07)  SpO2: 96% (09-07)  I&O's Summary    06 Sep 2020 07:01  -  07 Sep 2020 07:00  --------------------------------------------------------  IN: 1120 mL / OUT: 0 mL / NET: 1120 mL    07 Sep 2020 07:01  -  07 Sep 2020 09:14  --------------------------------------------------------  IN: 120 mL / OUT: 0 mL / NET: 120 mL        Physical Exam:  Appearance: No acute distress  Eyes: PERRL, EOMI, pink conjunctiva  HEENT: Normal oral mucosa  Cardiovascular: RRR, S1, S2, no edema  Respiratory: Clear to auscultation bilaterally  Gastrointestinal: soft, non-tender, non-distended with normal bowel sounds  Musculoskeletal: No clubbing; no joint deformity   Neurologic: Non-focal  Lymphatic: No lymphadenopathy  Psychiatry: mood & affect appropriate  Skin: No rashes, ecchymoses, or cyanosis               7.8    6.98  )-----------( 238      ( 06 Sep 2020 10:06 )             25.1     09-06    140  |  104  |  12  ----------------------------<  125<H>  3.9   |  24  |  0.71    Ca    9.7      06 Sep 2020 10:06  Mg     2.1     09-06    CARDIAC MARKERS ( 04 Sep 2020 10:35 )  9 ng/L / x     / x     / x     / x     / x      CARDIAC MARKERS ( 03 Sep 2020 13:54 )  9 ng/L / x     / x     / x     / x     / x      CARDIAC MARKERS ( 03 Sep 2020 10:30 )  12 ng/L / x     / x     / x     / x     / x        New ECG(s): Personally reviewed    Echo:  < from: Transthoracic Echocardiogram (08.27.20 @ 15:28) >  CONCLUSIONS:  1. Calcified trileaflet aortic valve with normal opening.  2. Eccentric left ventricular hypertrophy (dilated left  ventricle with normal relative wall thickness).  3. Endocardium not well visualized; grossly normal left  ventricular systolic function.  4. Normal right ventricular size and function.    < end of copied text >    < from: Nuclear Stress Test-Pharmacologic (09.06.20 @ 11:31) >  IMPRESSIONS:Abnormal Study  * There are no significant ECG changes to suggest  ischemia.  * There are moderate to large, moderate perfusion defects  in apical, apical lateral, mid to distal anterior walls  that are predominantly fixed, but correct with prone  imaging therefore no clear evidence of ischemia or  infarct.  * Post-stress gated wall motion analysis was performed  (LVEF = 47 %;LVEDV = 62 ml.), revealing moderate  hypokinesis in mid to distal anterior and anteroseptal,  septal, apical anterior, distal inferolateral wall(s).    < end of copied text >    Telemetry: 9/7/2020  SR

## 2020-09-07 NOTE — PROGRESS NOTE ADULT - ATTENDING COMMENTS
uvula midline, no vesicles, no redness, and no oropharyngeal exudate. Dr. MAGUI [facility]  [phone]

## 2020-09-07 NOTE — PROVIDER CONTACT NOTE (OTHER) - ASSESSMENT
Spoke with patients eldest son Raman Fisher, requests IV draws are only to be done by the IV team, and only on the upper left extremity as patient has a DVT in the right upper extremity. IV team was paged for IV access help. RN explained the risks and benefits of receiving IV Lasix vs. PO Lasix. Son understands, and wishes for the patient to receive PO Lasix.

## 2020-09-07 NOTE — PROGRESS NOTE ADULT - ATTENDING COMMENTS
59 yo F w/ hx of HTN, HLD, T2DM, DVT (dx July 2020, on Eliquis) APL currently undergoing chemotherapy (dx May 2020; on ATRA and arsenic), recent admission for concern of Mediport infxn 8/22-8/27, presenting with atypical chest pain. She has had a relatively recent cardiac workup including cardiac cath 2018 with non-obstructive CAD. Cardiac enzymes negative. ECG benign overall. Pharm stress without clear ischemia. ECHO with grossly normal LV function. No further cardiac workup indicated. Discharge disposition. Signing off.     Chelsea Tran MD, MPH, SANTA, RPVI, FACC  Cardiovascular Specialist   Heide Granados [facility]  c: [phone] (text preferred and/or call)  e: [email or web address] 61 yo F w/ hx of HTN, HLD, T2DM, DVT (dx July 2020, on Eliquis), non-obstructive CAD (cath 2018), APL currently undergoing chemotherapy (dx May 2020; on ATRA and arsenic), recent admission for concern of Mediport infxn 8/22-8/27, presenting with atypical chest pain. Cardiac enzymes negative. ECG benign. Pharm stress without clear ischemia. ECHO with grossly normal LV function. She is becoming increasingly short of breath as well as noted small pericardial effusion on CT chest with worsening left sided infiltrate on CXR. Personally did beside ECHO demonstration small pericardial effusion and large left pleural effusion therefore able to tolerate more aggressive diuresis. Order pBNP. Appreciate pulm recommendations. No further workup from a cardiac standpoint.     Chelsea Tran MD, MPH, SANTA, RPVI, [facility]  Cardiovascular Specialist   Heide Granados [facility]  c: [phone] (text preferred and/or call)  e: [email or web address]

## 2020-09-08 DIAGNOSIS — J90 PLEURAL EFFUSION, NOT ELSEWHERE CLASSIFIED: ICD-10-CM

## 2020-09-08 LAB
ALBUMIN SERPL ELPH-MCNC: 4.1 G/DL — SIGNIFICANT CHANGE UP (ref 3.3–5)
ALP SERPL-CCNC: 82 U/L — SIGNIFICANT CHANGE UP (ref 40–120)
ALT FLD-CCNC: 7 U/L — LOW (ref 10–45)
ANION GAP SERPL CALC-SCNC: 14 MMOL/L — SIGNIFICANT CHANGE UP (ref 5–17)
AST SERPL-CCNC: 12 U/L — SIGNIFICANT CHANGE UP (ref 10–40)
BASOPHILS # BLD AUTO: 0.02 K/UL — SIGNIFICANT CHANGE UP (ref 0–0.2)
BASOPHILS NFR BLD AUTO: 0.3 % — SIGNIFICANT CHANGE UP (ref 0–2)
BILIRUB SERPL-MCNC: 0.5 MG/DL — SIGNIFICANT CHANGE UP (ref 0.2–1.2)
BUN SERPL-MCNC: 21 MG/DL — SIGNIFICANT CHANGE UP (ref 7–23)
CALCIUM SERPL-MCNC: 9.7 MG/DL — SIGNIFICANT CHANGE UP (ref 8.4–10.5)
CHLORIDE SERPL-SCNC: 99 MMOL/L — SIGNIFICANT CHANGE UP (ref 96–108)
CO2 SERPL-SCNC: 26 MMOL/L — SIGNIFICANT CHANGE UP (ref 22–31)
CREAT SERPL-MCNC: 0.86 MG/DL — SIGNIFICANT CHANGE UP (ref 0.5–1.3)
CULTURE RESULTS: SIGNIFICANT CHANGE UP
CULTURE RESULTS: SIGNIFICANT CHANGE UP
EOSINOPHIL # BLD AUTO: 0.11 K/UL — SIGNIFICANT CHANGE UP (ref 0–0.5)
EOSINOPHIL NFR BLD AUTO: 1.6 % — SIGNIFICANT CHANGE UP (ref 0–6)
GLUCOSE BLDC GLUCOMTR-MCNC: 132 MG/DL — HIGH (ref 70–99)
GLUCOSE BLDC GLUCOMTR-MCNC: 133 MG/DL — HIGH (ref 70–99)
GLUCOSE BLDC GLUCOMTR-MCNC: 139 MG/DL — HIGH (ref 70–99)
GLUCOSE SERPL-MCNC: 264 MG/DL — HIGH (ref 70–99)
HCT VFR BLD CALC: 26.1 % — LOW (ref 34.5–45)
HGB BLD-MCNC: 8.2 G/DL — LOW (ref 11.5–15.5)
IMM GRANULOCYTES NFR BLD AUTO: 0.3 % — SIGNIFICANT CHANGE UP (ref 0–1.5)
LYMPHOCYTES # BLD AUTO: 1.65 K/UL — SIGNIFICANT CHANGE UP (ref 1–3.3)
LYMPHOCYTES # BLD AUTO: 23.9 % — SIGNIFICANT CHANGE UP (ref 13–44)
MAGNESIUM SERPL-MCNC: 2.1 MG/DL — SIGNIFICANT CHANGE UP (ref 1.6–2.6)
MCHC RBC-ENTMCNC: 27.8 PG — SIGNIFICANT CHANGE UP (ref 27–34)
MCHC RBC-ENTMCNC: 31.4 GM/DL — LOW (ref 32–36)
MCV RBC AUTO: 88.5 FL — SIGNIFICANT CHANGE UP (ref 80–100)
MONOCYTES # BLD AUTO: 0.38 K/UL — SIGNIFICANT CHANGE UP (ref 0–0.9)
MONOCYTES NFR BLD AUTO: 5.5 % — SIGNIFICANT CHANGE UP (ref 2–14)
NEUTROPHILS # BLD AUTO: 4.71 K/UL — SIGNIFICANT CHANGE UP (ref 1.8–7.4)
NEUTROPHILS NFR BLD AUTO: 68.4 % — SIGNIFICANT CHANGE UP (ref 43–77)
NRBC # BLD: 0 /100 WBCS — SIGNIFICANT CHANGE UP (ref 0–0)
PHOSPHATE SERPL-MCNC: 3.7 MG/DL — SIGNIFICANT CHANGE UP (ref 2.5–4.5)
PLATELET # BLD AUTO: 257 K/UL — SIGNIFICANT CHANGE UP (ref 150–400)
POTASSIUM SERPL-MCNC: 3.7 MMOL/L — SIGNIFICANT CHANGE UP (ref 3.5–5.3)
POTASSIUM SERPL-SCNC: 3.7 MMOL/L — SIGNIFICANT CHANGE UP (ref 3.5–5.3)
PROT SERPL-MCNC: 7 G/DL — SIGNIFICANT CHANGE UP (ref 6–8.3)
RBC # BLD: 2.95 M/UL — LOW (ref 3.8–5.2)
RBC # FLD: 15.4 % — HIGH (ref 10.3–14.5)
SODIUM SERPL-SCNC: 139 MMOL/L — SIGNIFICANT CHANGE UP (ref 135–145)
SPECIMEN SOURCE: SIGNIFICANT CHANGE UP
SPECIMEN SOURCE: SIGNIFICANT CHANGE UP
TSH SERPL-MCNC: 0.85 UIU/ML — SIGNIFICANT CHANGE UP (ref 0.27–4.2)
WBC # BLD: 6.89 K/UL — SIGNIFICANT CHANGE UP (ref 3.8–10.5)
WBC # FLD AUTO: 6.89 K/UL — SIGNIFICANT CHANGE UP (ref 3.8–10.5)

## 2020-09-08 PROCEDURE — 71250 CT THORAX DX C-: CPT | Mod: 26

## 2020-09-08 PROCEDURE — 99233 SBSQ HOSP IP/OBS HIGH 50: CPT

## 2020-09-08 PROCEDURE — 99223 1ST HOSP IP/OBS HIGH 75: CPT | Mod: GC

## 2020-09-08 PROCEDURE — 93306 TTE W/DOPPLER COMPLETE: CPT | Mod: 26

## 2020-09-08 RX ORDER — PIPERACILLIN AND TAZOBACTAM 4; .5 G/20ML; G/20ML
3.38 INJECTION, POWDER, LYOPHILIZED, FOR SOLUTION INTRAVENOUS ONCE
Refills: 0 | Status: COMPLETED | OUTPATIENT
Start: 2020-09-08 | End: 2020-09-08

## 2020-09-08 RX ORDER — VANCOMYCIN HCL 1 G
1000 VIAL (EA) INTRAVENOUS EVERY 12 HOURS
Refills: 0 | Status: DISCONTINUED | OUTPATIENT
Start: 2020-09-08 | End: 2020-09-10

## 2020-09-08 RX ORDER — ATORVASTATIN CALCIUM 80 MG/1
40 TABLET, FILM COATED ORAL AT BEDTIME
Refills: 0 | Status: DISCONTINUED | OUTPATIENT
Start: 2020-09-08 | End: 2020-09-11

## 2020-09-08 RX ORDER — PIPERACILLIN AND TAZOBACTAM 4; .5 G/20ML; G/20ML
3.38 INJECTION, POWDER, LYOPHILIZED, FOR SOLUTION INTRAVENOUS EVERY 8 HOURS
Refills: 0 | Status: DISCONTINUED | OUTPATIENT
Start: 2020-09-08 | End: 2020-09-10

## 2020-09-08 RX ADMIN — ATENOLOL 50 MILLIGRAM(S): 25 TABLET ORAL at 05:23

## 2020-09-08 RX ADMIN — Medication 1 DROP(S): at 13:09

## 2020-09-08 RX ADMIN — GABAPENTIN 100 MILLIGRAM(S): 400 CAPSULE ORAL at 22:57

## 2020-09-08 RX ADMIN — Medication 1 DROP(S): at 05:23

## 2020-09-08 RX ADMIN — GABAPENTIN 100 MILLIGRAM(S): 400 CAPSULE ORAL at 05:23

## 2020-09-08 RX ADMIN — PIPERACILLIN AND TAZOBACTAM 200 GRAM(S): 4; .5 INJECTION, POWDER, LYOPHILIZED, FOR SOLUTION INTRAVENOUS at 22:56

## 2020-09-08 RX ADMIN — GABAPENTIN 100 MILLIGRAM(S): 400 CAPSULE ORAL at 13:09

## 2020-09-08 RX ADMIN — Medication 1 DROP(S): at 22:57

## 2020-09-08 RX ADMIN — ATORVASTATIN CALCIUM 40 MILLIGRAM(S): 80 TABLET, FILM COATED ORAL at 22:57

## 2020-09-08 RX ADMIN — APIXABAN 5 MILLIGRAM(S): 2.5 TABLET, FILM COATED ORAL at 17:58

## 2020-09-08 RX ADMIN — APIXABAN 5 MILLIGRAM(S): 2.5 TABLET, FILM COATED ORAL at 05:23

## 2020-09-08 NOTE — PROGRESS NOTE ADULT - SUBJECTIVE AND OBJECTIVE BOX
HOSPITALIST NOTE    Dr. Juan Antonio Castro DO  Attending Physician  Division of Hospital Medicine  [facility]  Pager:  [phone]    SUBJECTIVE  Son requesting to translate for his mother. Refusing pacific .  Reports the same chest pain with deep inspiration.   No pain with exertion.   No dyspnea.   No other complaints.    PAST MEDICAL & SURGICAL HISTORY:  APL (acute promyelocytic leukemia)  HLD (hyperlipidemia)  Hypertension  Diabetes  No significant past surgical history      MEDICATIONS  (STANDING):  apixaban 5 milliGRAM(s) Oral every 12 hours  artificial  tears Solution 1 Drop(s) Both EYES three times a day  ATENolol  Tablet 50 milliGRAM(s) Oral daily  atorvastatin 20 milliGRAM(s) Oral at bedtime  dextrose 5%. 1000 milliLiter(s) (50 mL/Hr) IV Continuous <Continuous>  dextrose 50% Injectable 12.5 Gram(s) IV Push once  dextrose 50% Injectable 25 Gram(s) IV Push once  dextrose 50% Injectable 25 Gram(s) IV Push once  gabapentin 100 milliGRAM(s) Oral three times a day  influenza   Vaccine 0.5 milliLiter(s) IntraMuscular once  insulin lispro (HumaLOG) corrective regimen sliding scale   SubCutaneous three times a day before meals  insulin lispro (HumaLOG) corrective regimen sliding scale   SubCutaneous at bedtime    MEDICATIONS  (PRN):  dextrose 40% Gel 15 Gram(s) Oral once PRN Blood Glucose LESS THAN 70 milliGRAM(s)/deciliter  glucagon  Injectable 1 milliGRAM(s) IntraMuscular once PRN Glucose LESS THAN 70 milligrams/deciliter  metoclopramide 10 milliGRAM(s) Oral Before meals and at bedtime PRN nausea  polyethylene glycol 3350 17 Gram(s) Oral daily PRN Constipation      Allergies    adhesives (Pruritus)  No Known Drug Allergies    Intolerances        T(C): 36.4 (09-08-20 @ 04:35), Max: 37.1 (09-07-20 @ 21:03)  T(F): 97.5 (09-08-20 @ 04:35), Max: 98.8 (09-07-20 @ 21:03)  HR: 67 (09-08-20 @ 05:20) (67 - 74)  BP: 110/32 (09-08-20 @ 05:20) (104/61 - 139/64)  ABP: --  ABP(mean): --  RR: 18 (09-08-20 @ 04:35) (18 - 18)  SpO2: 94% (09-08-20 @ 04:35) (94% - 97%)      CONSTITUTIONAL: No acute distress.   HEENT:  Conjunctiva clear B/L. Nasal mucosa normal. Moist oral mucosa. No posterior pharyngeal lesions noted.  Cardiovascular: RRR with no murmurs. No JVD noted. No lower extremity edema B/L. Extremities are warm and well perfused. Radial pulses 2+ B/L. Dorsalis pedis pulses 2+ B/L.    Respiratory: Dec bs over left lung fields. CTA on right lung fields. No wrr. No accessory muscle use.   Gastrointestinal:  Soft, nontender. Non-distended. Non-rigid. No CVA tenderness B/L.  MSK:  No joint swelling. No joint erythema B/L. No midline spinal tenderness.  Neurologic:  Alert and awake. Oriented x3. Moving all extremities. Following commands. Making eye contact.    Skin:  No rashes noted. No skin erythema noted.   Psych:  Normal affect. Normal Mood.     LABS                        8.2    6.89  )-----------( 257      ( 08 Sep 2020 11:08 )             26.1     09-08    139  |  99  |  21  ----------------------------<  264<H>  3.7   |  26  |  0.86    Ca    9.7      08 Sep 2020 11:08  Phos  3.7     09-08  Mg     2.1     09-08    TPro  7.0  /  Alb  4.1  /  TBili  0.5  /  DBili  x   /  AST  12  /  ALT  7<L>  /  AlkPhos  82  09-08          ADDITIONAL STUDIES PERSONALLY REVIEWED

## 2020-09-08 NOTE — CONSULT NOTE ADULT - ASSESSMENT
60F with HTN, HLD, T2DM, non-obstructive CAD, DVT (dx July 2020, on Eliquis) APML currently undergoing chemotherapy (dx May 2020; on ATRA and arsenic), last tx planned for 9/4/20, recent admission for concern of Mediport infection 8/22-8/27 presents with chest pain, EKG with TWI but troponins unremarkable, CTA chest negative for PE. Pulmonology consulted for L loculated pleural effusion, which appears small on ultrasound without safe window for thoracentesis and is unlikely the cause of pt's chest pain. There is a small R simple-appearing consolidation on POCUS. TTE also with mildly decreased LV function and pericardial effusion. Pt is well appearing without hypoxemia.     - No safe window for thoracentesis and pleural effusion is very small, will defer thoracentesis at present time  - Though pt is without fever or leukocytosis, given active cancer and likely immunosuppression would consider checking blood and sputum cultures treating empirically with broad spectrum antibiotics for hospital associated pneumonia  - Check proBNP, can consider further diuresis if elevated given TTE with evidence of mild heart failure  - Continue anticoagulation for DVT  - Incentive spirometry  - Will sign off, please call with further questions    Ashvin Hammond MD  Fellow, Pulmonary and Critical Care Medicine  [facility]  Pager: [phone], [phone] ([facility])  Email: [email or web address] 60F with HTN, HLD, T2DM, non-obstructive CAD, DVT (dx July 2020, on Eliquis) APML currently undergoing chemotherapy (dx May 2020; on ATRA and arsenic), last tx planned for 9/4/20, recent admission for concern of Mediport infection 8/22-8/27 presents with chest pain, EKG with TWI but troponins unremarkable, CTA chest negative for PE. Pulmonology consulted for L loculated pleural effusion, which appears small on ultrasound without safe window for thoracentesis and is unlikely the cause of pt's chest pain. There is a small L simple-appearing consolidation on POCUS. TTE also with mildly decreased LV function and pericardial effusion. Pt is well appearing without hypoxemia.     - No safe window for thoracentesis and pleural effusion is very small, will defer thoracentesis at present time  - Though pt is without fever or leukocytosis, given active cancer and likely immunosuppression would consider checking blood and sputum cultures treating empirically with broad spectrum antibiotics for hospital associated pneumonia  - Check proBNP, can consider further diuresis if elevated given TTE with evidence of mild heart failure  - Continue anticoagulation for DVT  - Incentive spirometry  - Will sign off, please call with further questions    Ashvin Hammond MD  Fellow, Pulmonary and Critical Care Medicine  [facility]  Pager: [phone], [phone] ([facility])  Email: [email or web address]

## 2020-09-08 NOTE — PROGRESS NOTE ADULT - PROBLEM SELECTOR PLAN 1
Interval increase in left pleural effusion on CXR.   Ddx broad. EF mildly reduced however degree of LV dysfx does not justify large unilateral effusion. Malignant effusion is possible. Viral pleuritis is possible as well.   CTC pending.  Pulmonary consulted. Input appreciated.  LDH and TP to be sent tomorrow AM.  Hold off on additional doses of lasix. On RA and not in resp distress. Interval increase in left pleural effusion on CXR.   Ddx broad. EF mildly reduced however large unilateral effusion. Malignant effusion is possible. Viral pleuritis is possible as well.   CTC pending.  Pulmonary consulted. Input appreciated.  LDH and TP to be sent tomorrow AM.  Hold off on additional doses of lasix. On RA and not in resp distress. Interval increase in left pleural effusion on CXR.   Ddx broad. EF mildly reduced however large unilateral effusion. Malignant effusion must be considered. Viral pleuritis is possible as well. PE less likely as patient has been on DOAC.  CTC pending.  Pulmonary consulted. Input appreciated.  LDH and TP to be sent tomorrow AM.  Hold off on additional doses of lasix. On RA and not in resp distress.

## 2020-09-08 NOTE — CONSULT NOTE ADULT - SUBJECTIVE AND OBJECTIVE BOX
CHIEF COMPLAINT:    HPI: 60F with HTN, HLD, T2DM, non-obstructive CAD, DVT (dx July 2020, on Eliquis) APML currently undergoing chemotherapy (dx May 2020; on ATRA and arsenic), last tx planned for 9/4/20, recent admission for concern of Mediport infection 8/22-8/27 presents with chest pain and shortness of breath, EKG with TWI but troponins unremarkable, CTA chest negative for PE. Pulmonology consulted for L loculated pleural effusion. Pt denies fever, night sweats, or cough. Pt reportedly felt better after receiving lasix yesterday, now withy recurrent shortness of breath but nontoxic appearing without hypoxemia on room air.     PAST MEDICAL & SURGICAL HISTORY:  APL (acute promyelocytic leukemia)  HLD (hyperlipidemia)  Hypertension  Diabetes  No significant past surgical history      FAMILY HISTORY:  Family history of diabetes mellitus: Mother      SOCIAL HISTORY:  Smoking: [x ] Never Smoked [ ] Former Smoker (__ packs x ___ years) [ ] Current Smoker  (__ packs x ___ years)  Substance Use: [x ] Never Used [ ] Used ____  EtOH Use: none  Marital Status: [ ] Single [ ]  [ ]  [ ]   Sexual History:   Occupation:  Recent Travel:  Country of Birth:  Advance Directives:    Allergies    adhesives (Pruritus)  No Known Drug Allergies    Intolerances        HOME MEDICATIONS:  Home Medications:  atenolol 50 mg oral tablet: 1 tab(s) orally once a day (31 Aug 2020 14:43)  atorvastatin 20 mg oral tablet: 1 tab(s) orally once a day (at bedtime) (31 Aug 2020 14:43)  gabapentin 100 mg oral capsule: 1 cap(s) orally 3 times a day (31 Aug 2020 14:43)  metoclopramide 10 mg oral tablet: 1 tab(s) orally 4 times a day (before meals and at bedtime), As Needed (31 Aug 2020 14:43)  ocular lubricant ophthalmic solution: 1 drop(s) to each affected eye 3 times a day (31 Aug 2020 14:43)  polyethylene glycol 3350 oral powder for reconstitution: 17 gram(s) orally once a day, As Needed (31 Aug 2020 14:43)      REVIEW OF SYSTEMS:  Constitutional: [ ] negative [ ] fevers [ ] chills [ ] weight loss [ ] weight gain [ ] malaise  HEENT: [ ] negative [ ] visual disturbances [ ] postnasal drip [ ] nasal congestion [ ] sore throat  CV: [ ] negative [ ] chest pain [ ] palpitations [ ] orthopnea   Resp: [ ] negative [ ] cough [ ] shortness of breath [ ] wheezing [ ] sputum [ ] hemoptysis  GI: [ ] negative [ ] nausea [ ] vomiting [ ] abdominal pain [ ] dysphagia [ ] diarrhea [ ] melena [ ] hematochezia  : [ ] negative [ ] dysuria [ ] nocturia [ ] hematuria [ ] increased urinary frequency  Musculoskeletal: [ ] negative [ ] back pain [ ] myalgias [ ] arthralgias [ ] fracture  Skin: [ ] negative [ ] rash [ ] pruritus  Neurological: [ ] negative [ ] headache [ ] dizziness [ ] syncope [ ] weakness [ ] numbness  Psychiatric: [ ] negative [ ] anxiety [ ] depression  Endocrine: [ ] negative [ ] diabetes [ ] thyroid problem  Hematologic/Lymphatic: [ ] negative [ ] anemia [ ] bleeding problem  Allergic/Immunologic: [ ] hives [ ] angioedema  [x] All other systems negative  [ ] Unable to assess ROS because ________    OBJECTIVE:  ICU Vital Signs Last 24 Hrs  T(C): 36.4 (08 Sep 2020 04:35), Max: 37.1 (07 Sep 2020 21:03)  T(F): 97.5 (08 Sep 2020 04:35), Max: 98.8 (07 Sep 2020 21:03)  HR: 67 (08 Sep 2020 05:20) (67 - 74)  BP: 110/32 (08 Sep 2020 05:20) (104/61 - 139/64)  BP(mean): --  ABP: --  ABP(mean): --  RR: 18 (08 Sep 2020 04:35) (18 - 18)  SpO2: 94% (08 Sep 2020 04:35) (94% - 97%)        09-07 @ 07:01  -  09-08 @ 07:00  --------------------------------------------------------  IN: 460 mL / OUT: 0 mL / NET: 460 mL    09-08 @ 07:01  -  09-08 @ 19:23  --------------------------------------------------------  IN: 360 mL / OUT: 0 mL / NET: 360 mL      CAPILLARY BLOOD GLUCOSE      POCT Blood Glucose.: 139 mg/dL (08 Sep 2020 17:50)      PHYSICAL EXAM:  General: NAD, appears comfortable  Skin: Warm and dry, no rashes  Neuro: alert and oriented, nonfocal  HEENT: PERRL, EOMI, no oral lesions  Neck: Full range of motion, no  JVD  Lungs: Clear to ascultation bilatereally, no wheezes, rales, or rhonchi   Heart: Regular rate and rhythm, no murmurs  Abdomen: Normoactive bowl sounds, soft, nontender, nondistended  Extremities: No lower extremity tenderness, erythema, or edema   Psych: normal mood and affect      LINES:     HOSPITAL MEDICATIONS:  Standing Meds:  apixaban 5 milliGRAM(s) Oral every 12 hours  artificial  tears Solution 1 Drop(s) Both EYES three times a day  ATENolol  Tablet 50 milliGRAM(s) Oral daily  atorvastatin 40 milliGRAM(s) Oral at bedtime  dextrose 5%. 1000 milliLiter(s) IV Continuous <Continuous>  dextrose 50% Injectable 12.5 Gram(s) IV Push once  dextrose 50% Injectable 25 Gram(s) IV Push once  dextrose 50% Injectable 25 Gram(s) IV Push once  gabapentin 100 milliGRAM(s) Oral three times a day  influenza   Vaccine 0.5 milliLiter(s) IntraMuscular once  insulin lispro (HumaLOG) corrective regimen sliding scale   SubCutaneous three times a day before meals  insulin lispro (HumaLOG) corrective regimen sliding scale   SubCutaneous at bedtime      PRN Meds:  dextrose 40% Gel 15 Gram(s) Oral once PRN  glucagon  Injectable 1 milliGRAM(s) IntraMuscular once PRN  metoclopramide 10 milliGRAM(s) Oral Before meals and at bedtime PRN  polyethylene glycol 3350 17 Gram(s) Oral daily PRN      LABS:                        8.2    6.89  )-----------( 257      ( 08 Sep 2020 11:08 )             26.1     Hgb Trend: 8.2<--, 7.9<--, 7.8<--, 8.0<--, 7.7<--  09-08    139  |  99  |  21  ----------------------------<  264<H>  3.7   |  26  |  0.86    Ca    9.7      08 Sep 2020 11:08  Phos  3.7     09-08  Mg     2.1     09-08    TPro  7.0  /  Alb  4.1  /  TBili  0.5  /  DBili  x   /  AST  12  /  ALT  7<L>  /  AlkPhos  82  09-08    Creatinine Trend: 0.86<--, 0.71<--, 0.76<--, 0.79<--, 0.85<--, 0.80<--      MICROBIOLOGY:       RADIOLOGY:  < from: CT Chest No Cont (09.08.20 @ 09:16) >    EXAM:  CT CHEST                            PROCEDURE DATE:  09/08/2020            INTERPRETATION:  CLINICAL INFORMATION: Evaluate effusion, history of lymphoma    COMPARISON: CTA chest 9/3/2020, chest radiograph 9/7/2020    PROCEDURE: CT chest without contrast    FINDINGS:    LUNGS AND LARGE AIRWAYS: Left lung areas of compressive atelectasis atelectasis, mildly increased in size since prior study. Calcified granulomas in the right lower lobe. Right apical area of scarring, unchanged.  PLEURA: Small multiloculated left pleural effusion, slightly increased since prior study.  VESSELS: Right chest wall MediPort with tip in the SVC.  HEART: Heart size is normal. Small circumferential/loculated pericardial effusion, unchanged and of unclear etiology.  MEDIASTINUM AND CESAR: Small bilateral axillary lymphadenopathy, unchanged  CHEST WALL AND LOWER NECK: Within normal limits.  VISUALIZED UPPER ABDOMEN: Within normal limits.  BONES: Degenerative changes of the spine.    IMPRESSION:    Slightly increased multiloculated left pleural effusion associated with areas of left lung compressive atelectasis slightly progressed since the prior study.    Small circumferential/loculated pericardial effusion, unchanged and of unclear etiology.    < end of copied text >

## 2020-09-08 NOTE — CONSULT NOTE ADULT - ATTENDING COMMENTS
Pt seen and examined. 60 F with extensive medical history as noted above, now with chest pain and cough, found to have a loculated L pleural effusion with adjacent consolidation. No safe window to tap. Would suggest checking Bcxs, SCx and empiric broad spectrum antibiotic coverage for possible pneumonia. Agree with diuresis, check serum proBNP level. Will require a repeat Ct chest in 6 -8 weeks and outpt pulm FUP. Pt seen and examined. 60 F with extensive medical history as noted above, now with chest pain and cough, found to have a loculated L pleural effusion with adjacent consolidation. No safe window to tap. Would suggest checking Bcxs, SCx and empiric broad spectrum antibiotic coverage for possible pneumonia. Agree with diuresis, check serum proBNP level. Will require a repeat Ct chest in 6 -8 weeks and outpt pulm FUP. Patient's son updated in detail over the phone and all Qs answered to the best of our ability. Pt seen and examined. 60 F with extensive medical history as noted above, now with chest pain and cough, found to have a loculated L pleural effusion with adjacent consolidation with dynamic air bronchograms on POCUS . No safe window to tap. Would suggest checking Bcxs, SCx and empiric broad spectrum antibiotic coverage for possible pneumonia. Agree with diuresis, check serum proBNP level. Will require a repeat Ct chest in 6 -8 weeks and outpt pulm FUP. Patient's son updated in detail over the phone and all Qs answered to the best of our ability.

## 2020-09-09 LAB
ANION GAP SERPL CALC-SCNC: 12 MMOL/L — SIGNIFICANT CHANGE UP (ref 5–17)
BUN SERPL-MCNC: 15 MG/DL — SIGNIFICANT CHANGE UP (ref 7–23)
CALCIUM SERPL-MCNC: 9.1 MG/DL — SIGNIFICANT CHANGE UP (ref 8.4–10.5)
CHLORIDE SERPL-SCNC: 104 MMOL/L — SIGNIFICANT CHANGE UP (ref 96–108)
CO2 SERPL-SCNC: 24 MMOL/L — SIGNIFICANT CHANGE UP (ref 22–31)
CREAT SERPL-MCNC: 0.83 MG/DL — SIGNIFICANT CHANGE UP (ref 0.5–1.3)
CULTURE RESULTS: SIGNIFICANT CHANGE UP
GLUCOSE BLDC GLUCOMTR-MCNC: 135 MG/DL — HIGH (ref 70–99)
GLUCOSE BLDC GLUCOMTR-MCNC: 140 MG/DL — HIGH (ref 70–99)
GLUCOSE BLDC GLUCOMTR-MCNC: 148 MG/DL — HIGH (ref 70–99)
GLUCOSE BLDC GLUCOMTR-MCNC: 168 MG/DL — HIGH (ref 70–99)
GLUCOSE SERPL-MCNC: 135 MG/DL — HIGH (ref 70–99)
HCT VFR BLD CALC: 24.6 % — LOW (ref 34.5–45)
HCT VFR BLD CALC: 27.5 % — LOW (ref 34.5–45)
HGB BLD-MCNC: 7.7 G/DL — LOW (ref 11.5–15.5)
HGB BLD-MCNC: 8.5 G/DL — LOW (ref 11.5–15.5)
LDH SERPL L TO P-CCNC: 281 U/L — HIGH (ref 50–242)
MCHC RBC-ENTMCNC: 27.7 PG — SIGNIFICANT CHANGE UP (ref 27–34)
MCHC RBC-ENTMCNC: 27.7 PG — SIGNIFICANT CHANGE UP (ref 27–34)
MCHC RBC-ENTMCNC: 30.9 GM/DL — LOW (ref 32–36)
MCHC RBC-ENTMCNC: 31.3 GM/DL — LOW (ref 32–36)
MCV RBC AUTO: 88.5 FL — SIGNIFICANT CHANGE UP (ref 80–100)
MCV RBC AUTO: 89.6 FL — SIGNIFICANT CHANGE UP (ref 80–100)
NRBC # BLD: 0 /100 WBCS — SIGNIFICANT CHANGE UP (ref 0–0)
NRBC # BLD: 0 /100 WBCS — SIGNIFICANT CHANGE UP (ref 0–0)
NT-PROBNP SERPL-SCNC: 704 PG/ML — HIGH (ref 0–300)
PLATELET # BLD AUTO: 253 K/UL — SIGNIFICANT CHANGE UP (ref 150–400)
PLATELET # BLD AUTO: 275 K/UL — SIGNIFICANT CHANGE UP (ref 150–400)
POTASSIUM SERPL-MCNC: 3.3 MMOL/L — LOW (ref 3.5–5.3)
POTASSIUM SERPL-SCNC: 3.3 MMOL/L — LOW (ref 3.5–5.3)
PROT SERPL-MCNC: 6.5 G/DL — SIGNIFICANT CHANGE UP (ref 6–8.3)
RAPID RVP RESULT: SIGNIFICANT CHANGE UP
RBC # BLD: 2.78 M/UL — LOW (ref 3.8–5.2)
RBC # BLD: 3.07 M/UL — LOW (ref 3.8–5.2)
RBC # FLD: 15.4 % — HIGH (ref 10.3–14.5)
RBC # FLD: 15.4 % — HIGH (ref 10.3–14.5)
SODIUM SERPL-SCNC: 140 MMOL/L — SIGNIFICANT CHANGE UP (ref 135–145)
SPECIMEN SOURCE: SIGNIFICANT CHANGE UP
WBC # BLD: 7.27 K/UL — SIGNIFICANT CHANGE UP (ref 3.8–10.5)
WBC # BLD: 7.34 K/UL — SIGNIFICANT CHANGE UP (ref 3.8–10.5)
WBC # FLD AUTO: 7.27 K/UL — SIGNIFICANT CHANGE UP (ref 3.8–10.5)
WBC # FLD AUTO: 7.34 K/UL — SIGNIFICANT CHANGE UP (ref 3.8–10.5)

## 2020-09-09 PROCEDURE — 99233 SBSQ HOSP IP/OBS HIGH 50: CPT

## 2020-09-09 RX ORDER — POTASSIUM CHLORIDE 20 MEQ
40 PACKET (EA) ORAL EVERY 4 HOURS
Refills: 0 | Status: COMPLETED | OUTPATIENT
Start: 2020-09-09 | End: 2020-09-09

## 2020-09-09 RX ADMIN — Medication 40 MILLIEQUIVALENT(S): at 14:52

## 2020-09-09 RX ADMIN — Medication 1 DROP(S): at 23:10

## 2020-09-09 RX ADMIN — Medication 250 MILLIGRAM(S): at 10:47

## 2020-09-09 RX ADMIN — Medication 1: at 13:27

## 2020-09-09 RX ADMIN — PIPERACILLIN AND TAZOBACTAM 25 GRAM(S): 4; .5 INJECTION, POWDER, LYOPHILIZED, FOR SOLUTION INTRAVENOUS at 05:27

## 2020-09-09 RX ADMIN — ATENOLOL 50 MILLIGRAM(S): 25 TABLET ORAL at 05:27

## 2020-09-09 RX ADMIN — GABAPENTIN 100 MILLIGRAM(S): 400 CAPSULE ORAL at 05:27

## 2020-09-09 RX ADMIN — Medication 40 MILLIEQUIVALENT(S): at 09:05

## 2020-09-09 RX ADMIN — GABAPENTIN 100 MILLIGRAM(S): 400 CAPSULE ORAL at 14:53

## 2020-09-09 RX ADMIN — APIXABAN 5 MILLIGRAM(S): 2.5 TABLET, FILM COATED ORAL at 17:47

## 2020-09-09 RX ADMIN — GABAPENTIN 100 MILLIGRAM(S): 400 CAPSULE ORAL at 23:10

## 2020-09-09 RX ADMIN — APIXABAN 5 MILLIGRAM(S): 2.5 TABLET, FILM COATED ORAL at 05:27

## 2020-09-09 RX ADMIN — Medication 250 MILLIGRAM(S): at 00:37

## 2020-09-09 RX ADMIN — PIPERACILLIN AND TAZOBACTAM 25 GRAM(S): 4; .5 INJECTION, POWDER, LYOPHILIZED, FOR SOLUTION INTRAVENOUS at 23:10

## 2020-09-09 RX ADMIN — Medication 1 DROP(S): at 05:27

## 2020-09-09 RX ADMIN — ATORVASTATIN CALCIUM 40 MILLIGRAM(S): 80 TABLET, FILM COATED ORAL at 23:10

## 2020-09-09 RX ADMIN — Medication 1 DROP(S): at 14:53

## 2020-09-09 RX ADMIN — PIPERACILLIN AND TAZOBACTAM 25 GRAM(S): 4; .5 INJECTION, POWDER, LYOPHILIZED, FOR SOLUTION INTRAVENOUS at 14:53

## 2020-09-09 NOTE — PROGRESS NOTE ADULT - SUBJECTIVE AND OBJECTIVE BOX
HOSPITALIST NOTE    Dr. Juan Antonio Castro DO  Attending Physician  Division of Hospital Medicine  [facility]  Pager:  [phone]    SUBJECTIVE  Son refusing  phone.  Patient still with same pleuritic chest pain with deep inspiration.  No other complaints.      PAST MEDICAL & SURGICAL HISTORY:  APL (acute promyelocytic leukemia)  HLD (hyperlipidemia)  Hypertension  Diabetes  No significant past surgical history      MEDICATIONS  (STANDING):  apixaban 5 milliGRAM(s) Oral every 12 hours  artificial  tears Solution 1 Drop(s) Both EYES three times a day  ATENolol  Tablet 50 milliGRAM(s) Oral daily  atorvastatin 40 milliGRAM(s) Oral at bedtime  dextrose 5%. 1000 milliLiter(s) (50 mL/Hr) IV Continuous <Continuous>  dextrose 50% Injectable 12.5 Gram(s) IV Push once  dextrose 50% Injectable 25 Gram(s) IV Push once  dextrose 50% Injectable 25 Gram(s) IV Push once  gabapentin 100 milliGRAM(s) Oral three times a day  influenza   Vaccine 0.5 milliLiter(s) IntraMuscular once  insulin lispro (HumaLOG) corrective regimen sliding scale   SubCutaneous three times a day before meals  insulin lispro (HumaLOG) corrective regimen sliding scale   SubCutaneous at bedtime  piperacillin/tazobactam IVPB.. 3.375 Gram(s) IV Intermittent every 8 hours  vancomycin  IVPB 1000 milliGRAM(s) IV Intermittent every 12 hours    MEDICATIONS  (PRN):  dextrose 40% Gel 15 Gram(s) Oral once PRN Blood Glucose LESS THAN 70 milliGRAM(s)/deciliter  glucagon  Injectable 1 milliGRAM(s) IntraMuscular once PRN Glucose LESS THAN 70 milligrams/deciliter  metoclopramide 10 milliGRAM(s) Oral Before meals and at bedtime PRN nausea  polyethylene glycol 3350 17 Gram(s) Oral daily PRN Constipation      Allergies    adhesives (Pruritus)  No Known Drug Allergies    Intolerances        T(C): 37 (09-09-20 @ 12:24), Max: 37.8 (09-08-20 @ 20:37)  T(F): 98.6 (09-09-20 @ 12:24), Max: 100.1 (09-08-20 @ 20:37)  HR: 65 (09-09-20 @ 12:24) (65 - 68)  BP: 112/60 (09-09-20 @ 12:24) (111/60 - 121/79)  ABP: --  ABP(mean): --  RR: 16 (09-09-20 @ 12:24) (16 - 18)  SpO2: 96% (09-09-20 @ 12:24) (95% - 96%)      CONSTITUTIONAL: No acute distress.   HEENT:  Conjunctiva clear B/L. Nasal mucosa normal. Moist oral mucosa. No posterior pharyngeal lesions noted.  Cardiovascular: RRR with no murmurs. No JVD noted. No lower extremity edema B/L. Extremities are warm and well perfused. Radial pulses 2+ B/L. Dorsalis pedis pulses 2+ B/L.    Respiratory: Dec bs over left lung fields. CTA on right lung fields. No wrr. No accessory muscle use.   Gastrointestinal:  Soft, nontender. Non-distended. Non-rigid. No CVA tenderness B/L.  MSK:  No joint swelling. No joint erythema B/L. No midline spinal tenderness.  Neurologic:  Alert and awake. Oriented x3. Moving all extremities. Following commands. Making eye contact.    Skin:  No rashes noted. No skin erythema noted.   Psych:  Normal affect. Normal Mood.     LABS                        8.5    7.27  )-----------( 275      ( 09 Sep 2020 10:52 )             27.5     09-09    140  |  104  |  15  ----------------------------<  135<H>  3.3<L>   |  24  |  0.83    Ca    9.1      09 Sep 2020 05:30  Phos  3.7     09-08  Mg     2.1     09-08    TPro  6.5  /  Alb  x   /  TBili  x   /  DBili  x   /  AST  x   /  ALT  x   /  AlkPhos  x   09-09          ADDITIONAL STUDIES PERSONALLY REVIEWED

## 2020-09-09 NOTE — DIETITIAN INITIAL EVALUATION ADULT. - ADD RECOMMEND
continue Consistent Carbohydrate with snack, DASH, HALAL diet. monitor need for supplement if weight continues to trend down. monitor result of recommended HgbA1c lab and need for diabetes diet ed.

## 2020-09-09 NOTE — PROGRESS NOTE ADULT - PROBLEM SELECTOR PLAN 1
Interval increase in left pleural effusion on CXR.   Ddx broad. EF mildly reduced however large unilateral effusion. Malignant effusion must be considered. Viral pleuritis and parapneumonic effusion is possible as well. PE less likely as patient has been on DOAC.  CTC noted.  Pulmonary consulted. Input appreciated. -- Effusion can not be safely sampled with thoracentesis. Air bronchogram/consolidation noted on POCUS. Recommending treatment for PNA with broad spectrum abx.   Vanco/zosyn on. Ucx, Bcx, Scx, Urine legionella, urine strep ag, and RVP pending.  Hold off on additional doses of lasix. On RA and not in resp distress. Can consider maintenance doses at a later time if patient develops need for o2 supplementation.

## 2020-09-09 NOTE — DIETITIAN INITIAL EVALUATION ADULT. - PERTINENT MEDS FT
apixaban 5  artificial  tears Solution 1  ATENolol  Tablet 50  atorvastatin 40  dextrose 40% Gel 15 PRN  dextrose 5%. 1000  dextrose 50% Injectable 12.5  dextrose 50% Injectable 25  dextrose 50% Injectable 25  gabapentin 100  glucagon  Injectable 1 PRN  influenza   Vaccine 0.5  insulin lispro (HumaLOG) corrective regimen sliding scale   insulin lispro (HumaLOG) corrective regimen sliding scale   metoclopramide 10 PRN  piperacillin/tazobactam IVPB.. 3.375  polyethylene glycol 3350 17 PRN  potassium chloride    Tablet ER 40  vancomycin  IVPB 1000

## 2020-09-09 NOTE — DIETITIAN INITIAL EVALUATION ADULT. - OTHER INFO
spoke with pt via son (Raman) as  over the phone. pt speaks Slovak.  Reason for admission :  CP and back pain  Diet PTA : oatmeal, cereal, pancakes for breakfast, rice or pasta and protein for lunch, oatmeal and fruit for dinner. snacks on fruit  Intake : >75% as per pt  % of meals as per flow sheet  Denies nausea/vomit :   Denies difficulty chewing /swallow :  Denies diarrhea/constipation:  Last BM : today  NKFA   IBW +/- 10%= 105pounds  Ht:61"  Ht taken from pt  Usual Weight PTA: pt does not weigh herself at home. prior hospitalizations 6/2020: 171.7pounds, 7/2020: 165.1pounds, 8/2020: 161.3pounds  BMI: 29.4  BMI calculated using wt from flow sheet  BMI calculated using ht from pt  Education Provided : pt follows low sugar, low fat, low sodium diet PTA as per son  pressure injury:   edema:  +1 left ankle, right ankle

## 2020-09-09 NOTE — DIETITIAN INITIAL EVALUATION ADULT. - PROBLEM SELECTOR PLAN 2
Plan: Patient diagnosed with APL in May 2020, receives ATRA and weekly arsenic. Last chemo treatment due on 9/4/2020. Follows Dr. Hurd at [facility]  - Heme/Onc consult; appreciate recs  - Consolidation tx with arsenic started. To receive 3rd dose today via mediport and continue at [facility].

## 2020-09-09 NOTE — DIETITIAN INITIAL EVALUATION ADULT. - PERTINENT LABORATORY DATA
Na 140, K+ 3.3, BUN 15, Cr 0.83, , Ca 9.1,   Na 139, K+ 3.7, BUN 21, Cr 0.86, , Phos 3.7, AST 12, ALT 7, Mg 2.1, Ca 9.7,     CAPILLARY BLOOD GLUCOSE  POCT Blood Glucose.: 135 mg/dL (09 Sep 2020 08:42)  POCT Blood Glucose.: 133 mg/dL (08 Sep 2020 21:57)  POCT Blood Glucose.: 139 mg/dL (08 Sep 2020 17:50)  POCT Blood Glucose.: 132 mg/dL (08 Sep 2020 12:52)

## 2020-09-09 NOTE — DIETITIAN INITIAL EVALUATION ADULT. - PROBLEM SELECTOR PLAN 1
Unclear etiology of acute onset chest pain and back pain. Patient hemodynamically stable and saturating well on RA. No fever or leukocytosis  - EKG with no ST changes but with diffuse T wave inversions noted. Troponins 12 > 9. Will order AM troponins to trend  - CT chest angio neg for PE  - echo with 54% EF and shows eccentric LV hypertrophy (dilated LV with normal relative wall thickness).  - Cardiac cath 2 yrs ago shows mild non-obstructive CAD  - received 2x morphine in ED  - will order stat EKG and f/u. If T wave inversions resolve, concern for dynamic changes and will call Card consult.

## 2020-09-10 LAB
A1C WITH ESTIMATED AVERAGE GLUCOSE RESULT: 5.7 % — HIGH (ref 4–5.6)
ANION GAP SERPL CALC-SCNC: 12 MMOL/L — SIGNIFICANT CHANGE UP (ref 5–17)
BUN SERPL-MCNC: 17 MG/DL — SIGNIFICANT CHANGE UP (ref 7–23)
CALCIUM SERPL-MCNC: 10.2 MG/DL — SIGNIFICANT CHANGE UP (ref 8.4–10.5)
CHLORIDE SERPL-SCNC: 103 MMOL/L — SIGNIFICANT CHANGE UP (ref 96–108)
CO2 SERPL-SCNC: 25 MMOL/L — SIGNIFICANT CHANGE UP (ref 22–31)
CREAT SERPL-MCNC: 0.97 MG/DL — SIGNIFICANT CHANGE UP (ref 0.5–1.3)
ESTIMATED AVERAGE GLUCOSE: 117 MG/DL — HIGH (ref 68–114)
GLUCOSE BLDC GLUCOMTR-MCNC: 124 MG/DL — HIGH (ref 70–99)
GLUCOSE BLDC GLUCOMTR-MCNC: 150 MG/DL — HIGH (ref 70–99)
GLUCOSE BLDC GLUCOMTR-MCNC: 150 MG/DL — HIGH (ref 70–99)
GLUCOSE BLDC GLUCOMTR-MCNC: 195 MG/DL — HIGH (ref 70–99)
GLUCOSE SERPL-MCNC: 133 MG/DL — HIGH (ref 70–99)
HCT VFR BLD CALC: 26.7 % — LOW (ref 34.5–45)
HGB BLD-MCNC: 8.3 G/DL — LOW (ref 11.5–15.5)
LEGIONELLA AG UR QL: NEGATIVE — SIGNIFICANT CHANGE UP
LIDOCAIN IGE QN: 29 U/L — SIGNIFICANT CHANGE UP (ref 7–60)
MCHC RBC-ENTMCNC: 27.7 PG — SIGNIFICANT CHANGE UP (ref 27–34)
MCHC RBC-ENTMCNC: 31.1 GM/DL — LOW (ref 32–36)
MCV RBC AUTO: 89 FL — SIGNIFICANT CHANGE UP (ref 80–100)
NRBC # BLD: 0 /100 WBCS — SIGNIFICANT CHANGE UP (ref 0–0)
PLATELET # BLD AUTO: 285 K/UL — SIGNIFICANT CHANGE UP (ref 150–400)
POTASSIUM SERPL-MCNC: 4.3 MMOL/L — SIGNIFICANT CHANGE UP (ref 3.5–5.3)
POTASSIUM SERPL-SCNC: 4.3 MMOL/L — SIGNIFICANT CHANGE UP (ref 3.5–5.3)
RBC # BLD: 3 M/UL — LOW (ref 3.8–5.2)
RBC # FLD: 15.7 % — HIGH (ref 10.3–14.5)
SODIUM SERPL-SCNC: 140 MMOL/L — SIGNIFICANT CHANGE UP (ref 135–145)
VANCOMYCIN FLD-MCNC: 21.4 UG/ML — SIGNIFICANT CHANGE UP
WBC # BLD: 6.66 K/UL — SIGNIFICANT CHANGE UP (ref 3.8–10.5)
WBC # FLD AUTO: 6.66 K/UL — SIGNIFICANT CHANGE UP (ref 3.8–10.5)

## 2020-09-10 PROCEDURE — 99232 SBSQ HOSP IP/OBS MODERATE 35: CPT

## 2020-09-10 PROCEDURE — 99231 SBSQ HOSP IP/OBS SF/LOW 25: CPT

## 2020-09-10 RX ORDER — VANCOMYCIN HCL 1 G
750 VIAL (EA) INTRAVENOUS EVERY 12 HOURS
Refills: 0 | Status: DISCONTINUED | OUTPATIENT
Start: 2020-09-10 | End: 2020-09-10

## 2020-09-10 RX ORDER — SENNA PLUS 8.6 MG/1
2 TABLET ORAL AT BEDTIME
Refills: 0 | Status: DISCONTINUED | OUTPATIENT
Start: 2020-09-10 | End: 2020-09-11

## 2020-09-10 RX ADMIN — Medication 1: at 13:50

## 2020-09-10 RX ADMIN — GABAPENTIN 100 MILLIGRAM(S): 400 CAPSULE ORAL at 13:49

## 2020-09-10 RX ADMIN — Medication 30 MILLILITER(S): at 13:49

## 2020-09-10 RX ADMIN — Medication 1 DROP(S): at 13:49

## 2020-09-10 RX ADMIN — Medication 1 TABLET(S): at 17:59

## 2020-09-10 RX ADMIN — ATORVASTATIN CALCIUM 40 MILLIGRAM(S): 80 TABLET, FILM COATED ORAL at 21:33

## 2020-09-10 RX ADMIN — Medication 1 DROP(S): at 06:58

## 2020-09-10 RX ADMIN — APIXABAN 5 MILLIGRAM(S): 2.5 TABLET, FILM COATED ORAL at 06:58

## 2020-09-10 RX ADMIN — PIPERACILLIN AND TAZOBACTAM 25 GRAM(S): 4; .5 INJECTION, POWDER, LYOPHILIZED, FOR SOLUTION INTRAVENOUS at 06:58

## 2020-09-10 RX ADMIN — Medication 250 MILLIGRAM(S): at 00:39

## 2020-09-10 RX ADMIN — ATENOLOL 50 MILLIGRAM(S): 25 TABLET ORAL at 06:58

## 2020-09-10 RX ADMIN — APIXABAN 5 MILLIGRAM(S): 2.5 TABLET, FILM COATED ORAL at 17:59

## 2020-09-10 RX ADMIN — GABAPENTIN 100 MILLIGRAM(S): 400 CAPSULE ORAL at 06:58

## 2020-09-10 RX ADMIN — GABAPENTIN 100 MILLIGRAM(S): 400 CAPSULE ORAL at 21:33

## 2020-09-10 RX ADMIN — PIPERACILLIN AND TAZOBACTAM 25 GRAM(S): 4; .5 INJECTION, POWDER, LYOPHILIZED, FOR SOLUTION INTRAVENOUS at 13:49

## 2020-09-10 RX ADMIN — POLYETHYLENE GLYCOL 3350 17 GRAM(S): 17 POWDER, FOR SOLUTION ORAL at 06:59

## 2020-09-10 RX ADMIN — SENNA PLUS 2 TABLET(S): 8.6 TABLET ORAL at 00:39

## 2020-09-10 RX ADMIN — Medication 1 DROP(S): at 21:33

## 2020-09-10 NOTE — PROGRESS NOTE ADULT - PROBLEM SELECTOR PROBLEM 7
Prophylactic measure
Prophylactic measure
Diabetes
Prophylactic measure
Prophylactic measure

## 2020-09-10 NOTE — PROGRESS NOTE ADULT - PROBLEM SELECTOR PLAN 3
On home atorvastatin
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 Last chemo treatment due on 9/4/2020. Follows Dr. Hurd at [facility]  - Heme/Onc f/u  - Consolidation tx with arsenic started. Last chemo 9/2.   - Plan for outpt [facility] f/u for additional chemo.
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 Last chemo treatment due on 9/4/2020. Follows Dr. Hurd at [facility]  - Heme/Onc f/u  - Consolidation tx with arsenic started. Last chemo 9/2 - holding chemo inpatient pending cardiac workup per hem/onc
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 Last chemo treatment due on 9/4/2020. Follows Dr. Hurd at [facility]  - Heme/Onc f/u  - Consolidation tx with arsenic started. Last chemo 9/2.   - Plan for outpt [facility] f/u for additional chemo.

## 2020-09-10 NOTE — PROGRESS NOTE ADULT - SUBJECTIVE AND OBJECTIVE BOX
HOSPITALIST NOTE    Dr. Juan nAtonio Castro DO  Attending Physician  Division of Hospital Medicine  [facility]  Pager:  [phone]    SUBJECTIVE  Son refused translation.  Patient with pleuritic type chest pain which is about the same as prior.  She reports pain in her left trapezius with palpation which is the pain which she presented with.   No dyspnea at rest. She reports mild dyspnea with exertion.   No cough.     REVIEW OF SYSTEMS  12 point review of systems negative except for items noted above.      PAST MEDICAL & SURGICAL HISTORY:  APL (acute promyelocytic leukemia)  HLD (hyperlipidemia)  Hypertension  Diabetes  No significant past surgical history      MEDICATIONS  (STANDING):  amoxicillin  875 milliGRAM(s)/clavulanate 1 Tablet(s) Oral two times a day  apixaban 5 milliGRAM(s) Oral every 12 hours  artificial  tears Solution 1 Drop(s) Both EYES three times a day  ATENolol  Tablet 50 milliGRAM(s) Oral daily  atorvastatin 40 milliGRAM(s) Oral at bedtime  dextrose 5%. 1000 milliLiter(s) (50 mL/Hr) IV Continuous <Continuous>  dextrose 50% Injectable 12.5 Gram(s) IV Push once  dextrose 50% Injectable 25 Gram(s) IV Push once  dextrose 50% Injectable 25 Gram(s) IV Push once  gabapentin 100 milliGRAM(s) Oral three times a day  influenza   Vaccine 0.5 milliLiter(s) IntraMuscular once  insulin lispro (HumaLOG) corrective regimen sliding scale   SubCutaneous three times a day before meals  insulin lispro (HumaLOG) corrective regimen sliding scale   SubCutaneous at bedtime    MEDICATIONS  (PRN):  aluminum hydroxide/magnesium hydroxide/simethicone Suspension 30 milliLiter(s) Oral every 6 hours PRN Dyspepsia  dextrose 40% Gel 15 Gram(s) Oral once PRN Blood Glucose LESS THAN 70 milliGRAM(s)/deciliter  glucagon  Injectable 1 milliGRAM(s) IntraMuscular once PRN Glucose LESS THAN 70 milligrams/deciliter  metoclopramide 10 milliGRAM(s) Oral Before meals and at bedtime PRN nausea  polyethylene glycol 3350 17 Gram(s) Oral daily PRN Constipation  senna 2 Tablet(s) Oral at bedtime PRN Constipation      Allergies    adhesives (Pruritus)  No Known Drug Allergies    Intolerances        T(C): 36.7 (09-10-20 @ 12:35), Max: 37.1 (09-09-20 @ 21:32)  T(F): 98.1 (09-10-20 @ 12:35), Max: 98.8 (09-09-20 @ 21:32)  HR: 63 (09-10-20 @ 12:35) (61 - 70)  BP: 101/65 (09-10-20 @ 12:35) (101/65 - 113/72)  ABP: --  ABP(mean): --  RR: 18 (09-10-20 @ 12:35) (16 - 18)  SpO2: 96% (09-10-20 @ 12:35) (95% - 96%)      CONSTITUTIONAL: No acute distress.   HEENT:  Conjunctiva clear B/L. Nasal mucosa normal. Moist oral mucosa. No posterior pharyngeal lesions noted.  Cardiovascular: RRR with no murmurs. No JVD noted. No lower extremity edema B/L. Extremities are warm and well perfused. Radial pulses 2+ B/L. Dorsalis pedis pulses 2+ B/L.    Respiratory: Dec bs over left lung fields. CTA on right lung fields. No wrr. No accessory muscle use.   Gastrointestinal:  Soft, nontender. Non-distended. Non-rigid. No CVA tenderness B/L.  MSK:  No joint swelling. No joint erythema B/L. No midline spinal tenderness.  Neurologic:  Alert and awake. Oriented x3. Moving all extremities. Following commands. Making eye contact.    Skin:  No rashes noted. No skin erythema noted.   Psych:  Normal affect. Normal Mood.     LABS                        8.3    6.66  )-----------( 285      ( 10 Sep 2020 07:17 )             26.7     09-10    140  |  103  |  17  ----------------------------<  133<H>  4.3   |  25  |  0.97    Ca    10.2      10 Sep 2020 07:15    TPro  6.5  /  Alb  x   /  TBili  x   /  DBili  x   /  AST  x   /  ALT  x   /  AlkPhos  x   09-09          ADDITIONAL STUDIES PERSONALLY REVIEWED

## 2020-09-10 NOTE — PROGRESS NOTE ADULT - PROBLEM SELECTOR PROBLEM 3
HLD (hyperlipidemia)
APL (acute promyelocytic leukemia)
HLD (hyperlipidemia)

## 2020-09-10 NOTE — PROGRESS NOTE ADULT - ASSESSMENT
Pt is a 59 yo F w/ hx of HTN, HLD, T2DM, DVT (dx July 2020, on Eliquis) APL currently undergoing chemotherapy (dx May 2020; on ATRA and arsenic), last tx planned for 9/4/20, recent admission for concern of Mediport infxn 8/22-8/27 but was neg workup, now presenting similarly w/ acute onset back and CP with neg cardiac workup and r/o PE, found to have EKG with diffuse T wave inversions.
59 yo F w/ hx of HTN, HLD, T2DM, DVT (dx July 2020, on Eliquis) APL currently undergoing chemotherapy (dx May 2020; on ATRA and arsenic), last tx planned for ?9/4/20, recent admission for concern of Mediport infxn 8/22-8/27, presenting with atypical chest pain.    #Atypical Chest Pain  -NST with attenuation artifact, no obvious ischemia. Recent TTE with grossly normal LV function. No further cardiac work-up.   -Increase Lipitor to 40mg given pt's underlying T2DM    #DVT  -c/w pablo Greer PGY5  [phone]  All Cardiology service information can be found 24/7 on [email or web address], password: [email or web address]
Pt is a 59 yo F w/ hx of HTN, HLD, T2DM, DVT (dx July 2020, on Eliquis) APL currently undergoing chemotherapy (dx May 2020; on ATRA and arsenic), last tx planned for 9/4/20, recent admission for concern of Mediport infxn 8/22-8/27 but was neg workup, now presenting similarly w/ CP.
Pt is a 59 yo F w/ hx of HTN, HLD, T2DM, DVT (dx July 2020, on Eliquis) APL currently undergoing chemotherapy (dx May 2020; on ATRA and arsenic), last tx planned for 9/4/20, recent admission for concern of Mediport infxn 8/22-8/27 but was neg workup, now presenting similarly w/ CP.
Pt is a 59 yo F w/ hx of HTN, HLD, T2DM, DVT (dx July 2020, on Eliquis) APL currently undergoing chemotherapy (dx May 2020; on ATRA and arsenic), last tx planned for 9/4/20, recent admission for concern of Mediport infxn 8/22-8/27 but was neg workup, now presenting similarly w/ acute onset back and CP with neg cardiac workup and r/o PE, found to have EKG with diffuse T wave inversions.
Pt is a 59 yo F w/ hx of HTN, HLD, T2DM, DVT (dx July 2020, on Eliquis) APL currently undergoing chemotherapy (dx May 2020; on ATRA and arsenic), last tx planned for 9/4/20, recent admission for concern of Mediport infxn 8/22-8/27 but was neg workup, now presenting similarly w/ acute onset back and CP with neg cardiac workup and r/o PE, found to have EKG with diffuse T wave inversions.
Pt is a 61 yo F w/ hx of HTN, HLD, T2DM, DVT (dx July 2020, on Eliquis) APL currently undergoing chemotherapy (dx May 2020; on ATRA and arsenic), last tx planned for 9/4/20, recent admission for concern of Mediport infxn 8/22-8/27 but was neg workup, now presenting similarly w/ CP.
Pt is a 59 yo F w/ hx of HTN, HLD, T2DM, DVT (dx July 2020, on Eliquis) APL currently undergoing chemotherapy (dx May 2020; on ATRA and arsenic), last tx planned for 9/4/20, recent admission for concern of Mediport infxn 8/22-8/27 but was neg workup, now presenting similarly w/ acute onset back and CP with neg cardiac workup and r/o PE, found to have EKG with diffuse T wave inversions.

## 2020-09-10 NOTE — PROVIDER CONTACT NOTE (OTHER) - ASSESSMENT
Pt ao x 4, complaining of intermittent shortness of breath. No complaint of chest pain, dizziness, or lightheadedness. O2 saturation 95% on room air with respiratory rate at 16 per minute.

## 2020-09-10 NOTE — PROGRESS NOTE ADULT - PROBLEM SELECTOR PROBLEM 4
DVT (deep venous thrombosis)
HLD (hyperlipidemia)

## 2020-09-10 NOTE — PROGRESS NOTE ADULT - PROBLEM SELECTOR PROBLEM 2
APL (acute promyelocytic leukemia)
Chest pain, unspecified type
APL (acute promyelocytic leukemia)

## 2020-09-10 NOTE — PROGRESS NOTE ADULT - PROBLEM SELECTOR PLAN 1
Interval increase in left pleural effusion on CXR.   Ddx broad. EF mildly reduced however primarily unilateral effusion. Malignant effusion must be considered. Viral pleuritis and parapneumonic effusion is possible as well. PE less likely as patient has been on DOAC.  CTC noted.  Pulmonary consulted. Input appreciated. -- Effusion can not be safely sampled with thoracentesis. Air bronchogram/consolidation noted on POCUS. Recommending treatment for PNA.  S/p Vanco/zosyn. Urine legionella and RVP negative. Bcx with ngtd. Low suspicion for MRSA or PsA pna as patient is not in respiratory distress nor does she have clinical signs of sepsis. Will cover with Augmentin which will cover both typical organisms and anaerobes (particularly since we are covering for parapneumonic infection).  Treatment for 7 day total course.   Hold off on additional doses of lasix. On RA and not in resp distress. Can consider maintenance doses outpatient if patient develops worsening dyspnea. Interval increase in left pleural effusion on CXR.   Ddx broad. EF mildly reduced however primarily unilateral effusion. Malignant effusion must be considered. Viral pleuritis and parapneumonic effusion is possible as well. PE less likely as patient has been on DOAC.  CTC noted.  Pulmonary consulted. Input appreciated. -- Effusion can not be safely sampled with thoracentesis. Air bronchogram/consolidation noted on POCUS. Recommending treatment for PNA.  S/p Vanco/zosyn. Urine legionella and RVP negative. Bcx with ngtd. Low suspicion for MRSA or PsA pna as patient is not in respiratory distress or sepsis Also Bcx are negative to date. Urine strep pending. Will cover with Augmentin which will cover both typical organisms and anaerobes (particularly since we are covering for parapneumonic infection).  Treatment for 7 day total course.   Hold off on additional doses of lasix. On RA and not in resp distress. Can consider maintenance doses outpatient if patient develops worsening dyspnea.  Pending ambulatory O2.

## 2020-09-10 NOTE — PROGRESS NOTE ADULT - REASON FOR ADMISSION
CP and back pain

## 2020-09-10 NOTE — PROGRESS NOTE ADULT - PROBLEM SELECTOR PLAN 2
Plan: Patient diagnosed with APL in May 2020, receives ATRA and weekly arsenic. Last chemo treatment due on 9/4/2020. Follows Dr. Hurd at [facility]  - Heme/Onc f/u  - Consolidation tx with arsenic started. last chemo 9/2 - holding chemo inpatient pending cardiac workup per hem/onc
Plan: Patient diagnosed with APL in May 2020, receives ATRA and weekly arsenic. Last chemo treatment due on 9/4/2020. Follows Dr. Hurd at [facility]  - Heme/Onc f/u  - Consolidation tx with arsenic started. last chemo 9/2 - holding chemo inpatient pending cardiac workup per hem/onc
Plan: Patient diagnosed with APL in May 2020, receives ATRA and weekly arsenic. Last chemo treatment due on 9/4/2020. Follows Dr. Hurd at [facility]  - Heme/Onc consult; appreciate recs  - Consolidation tx with arsenic started. last chemo 9/2 - holding chemo inpatient pending cardiac workup per hem/onc
Pleuritic in nature. Likely from pleural effusion and/or pericardial effusion. Lower suspicion for pericarditis.  Patient hemodynamically stable and saturating well on RA.    - CT chest angio neg for PE, no thrombus of mediport  - Recent echo with 54% EF and shows eccentric LV hypertrophy (dilated LV with normal relative wall thickness).  - Repeat TTE noted with mild reduce EF. 45-50%. Mild reduce RV fx. Trace pericardial effusion.  - Serial trops negative.   - Cards in agreement that unlikely pericarditis.   - Not tamponade.  - NST without inducible ischemia. No further cards w/u inpatient. Cards eval appreciated.  - Cardiac cath 2 yrs ago shows mild non-obstructive CAD
Pleuritic in nature. Likely from pleural effusion and/or pericardial effusion. Lower suspicion for pericarditis.  Patient hemodynamically stable and saturating well on RA.    - CT chest angio neg for PE, no thrombus of mediport  - Recent echo with 54% EF and shows eccentric LV hypertrophy (dilated LV with normal relative wall thickness).  - Repeat TTE noted with mild reduce EF. 45-50%. Mild reduce RV fx. Trace pericardial effusion.  - Serial trops negative.   - Cards in agreement that unlikely pericarditis.   - Not tamponade.  - NST without inducible ischemia. No further cards w/u inpatient. Cards eval appreciated.  - Cardiac cath 2 yrs ago shows mild non-obstructive CAD
Pleuritic in nature. Likely from pleural effusion. Low suspicion for pericarditis.  Patient hemodynamically stable and saturating well on RA.    - CT chest angio neg for PE, no thrombus of mediport  - Recent echo with 54% EF and shows eccentric LV hypertrophy (dilated LV with normal relative wall thickness).  - Repeat TTE noted with mild reduce EF. 45-50%. Mild reduce RV fx. Trace pericardial effusion.  - Serial trops negative.   - Cards in agreement that unlikely pericarditis.   - Not tamponade.  - NST without inducible ischemia. No further cards w/u inpatient. Cards eval appreciated.  - Cardiac cath 2 yrs ago shows mild non-obstructive CAD
Plan: Patient diagnosed with APL in May 2020, receives ATRA and weekly arsenic. Last chemo treatment due on 9/4/2020. Follows Dr. Hurd at [facility]  - Heme/Onc f/u  - Consolidation tx with arsenic started. last chemo 9/2 - holding chemo inpatient pending cardiac workup per hem/onc

## 2020-09-10 NOTE — PROGRESS NOTE ADULT - PROBLEM SELECTOR PLAN 5
Patient takes Atenolol 50mg qd at home for HTN  monitor bp
Patient takes Atenolol 50mg qd at home for HTN  monitor bp
Patient had R axillary DVT In July 2020.   - C/w home Eliquis 5mg  - No blood draws from RIGHT arm.
Patient takes Atenolol 50mg qd at home for HTN  monitor bp
Patient takes Atenolol 50mg qd at home for HTN  monitor bp

## 2020-09-10 NOTE — PROGRESS NOTE ADULT - PROBLEM SELECTOR PLAN 6
Diabetes mellitus type 2, noninsulin dependent.  Plan: Takes Metformin 500mg bid at home  - Holding metformin  a1c 5.7 (8/20)  monitor FS, ISS for coverage   - CC/ DASH TLC diet  Post-herpetic neuralgia and scabbing (zoster infection 6/22-6/30). Outside dose of Gabapentin 100mg TID resumed
Diabetes mellitus type 2, noninsulin dependent.  Plan: Takes Metformin 500mg bid at home  - Holding metformin  a1c 5.7 (8/20)  monitor FS, ISS for coverage   - CC/ DASH TLC diet  Post-herpetic neuralgia and scabbing (zoster infection 6/22-6/30). Outside dose of Gabapentin 100mg TID resumed
Diabetes mellitus type 2, noninsulin dependent.  Plan: Takes Metformin 500mg bid at home  - Holding metformin  a1c 5.7 (8/20)  monitor FS, ISS for coverage   - FS and SSI  - CC/ DASH TLC diet
Diabetes mellitus type 2, noninsulin dependent.  Plan: Takes Metformin 500mg bid at home  - Holding metformin  a1c 5.7 (8/20)  monitor FS, ISS for coverage   - FS and SSI  - CC/ DASH TLC diet
Patient takes Atenolol 50mg qd at home for HTN  Monitor bp

## 2020-09-10 NOTE — PROGRESS NOTE ADULT - PROVIDER SPECIALTY LIST ADULT
Cardiology
Hospitalist
Internal Medicine

## 2020-09-10 NOTE — PROGRESS NOTE ADULT - PROBLEM SELECTOR PLAN 7
Plan: Diet: Consistent carbohydrate  DVT prophylaxis: Eliquis 5mg  Diet: cc/ dashtlc
Plan: Diet: Consistent carbohydrate  DVT prophylaxis: Eliquis 5mg  Diet: cc/ dashtlc
Diabetes mellitus type 2, noninsulin dependent.    - Holding home metformin  - monitor FS, ISS for coverage   - CC/ DASH TLC diet  - Post-herpetic neuralgia and scabbing (zoster infection 6/22-6/30). Outside dose of Gabapentin 100mg TID resumed
Plan: Diet: Consistent carbohydrate  DVT prophylaxis: Eliquis 5mg  Post-herpetic neuralgia and scabbing (zoster infection 6/22-6/30). Outside dose of Gabapentin 100mg TID resumed  Diet: cc/ dashtlc
Plan: Diet: Consistent carbohydrate  DVT prophylaxis: Eliquis 5mg  Post-herpetic neuralgia and scabbing (zoster infection 6/22-6/30). Outside dose of Gabapentin 100mg TID resumed  Diet: cc/ dashtlc
none

## 2020-09-10 NOTE — PROGRESS NOTE ADULT - PROBLEM/PLAN-6
DISPLAY PLAN FREE TEXT
No
DISPLAY PLAN FREE TEXT

## 2020-09-10 NOTE — CHART NOTE - NSCHARTNOTEFT_GEN_A_CORE
CC:      HPI:  Called by RN  Patient denied headache, dizziness, CP, SOB, abdominal  pain, N/V/D, numbness/tingling, extremity weakness, dysuria.         ROS:  CONSTITUTIONAL:  No fever, chills, rigors  CARDIOVASCULAR:  No chest pain or palpitations  RESPIRATORY:   No SOB, cough, wheezing  GASTROINTESTINAL:  No abd pain, N/V/D  EXTREMITIES:  No swelling or joint pain  GENITOURINARY:  No burning on urination, increased frequency or urgency.  No flank pain.  NEUROLOGIC:  No HA, visual disturbances  SKIN: No rashes        PAST MEDICAL & SURGICAL HISTORY:  Chest pain  Family history of diabetes mellitus  No pertinent family history in first degree relatives  Handoff  MEWS Score  APL (acute promyelocytic leukemia)  HLD (hyperlipidemia)  Hypertension  Diabetes  No pertinent past medical history  Chest pain, unspecified type  Pleural effusion  Prophylactic measure  Diabetes  Hypertension  DVT (deep venous thrombosis)  HLD (hyperlipidemia)  APL (acute promyelocytic leukemia)  Chest pain, unspecified type  No significant past surgical history  CHEST AND BACK PAIN  RAD CDS  6  [email or web address]        Vital Signs Last 24 Hrs  T(C): 36.6 (10 Sep 2020 21:38), Max: 36.8 (10 Sep 2020 21:00)  T(F): 97.9 (10 Sep 2020 21:38), Max: 98.3 (10 Sep 2020 21:00)  HR: 86 (10 Sep 2020 21:38) (61 - 86)  BP: 104/66 (10 Sep 2020 21:38) (101/65 - 109/69)  BP(mean): --  RR: 20 (10 Sep 2020 21:38) (16 - 20)  SpO2: 95% (10 Sep 2020 21:38) (95% - 96%)      Physical Exam:  General: WN/WD NAD, AOx3, nontoxic appearing  Head:  NC/AT  CV: RRR, S1S2   Respiratory: CTA B/L, nonlabored  Abdominal: (+) bowel sounds x4. Soft, NT, ND, no palpable mass, no guarding, or rebound tenderness  Genitourinary: ? Meyer   MSK: No BLLE edema, + peripheral pulses, FROM all 4 extremity  Skin: (+) warm, dry   Psych: Appropriate affect       Labs:                        8.3    6.66  )-----------( 285      ( 10 Sep 2020 07:17 )             26.7     09-10    140  |  103  |  17  ----------------------------<  133<H>  4.3   |  25  |  0.97    Ca    10.2      10 Sep 2020 07:15    TPro  6.5  /  Alb  x   /  TBili  x   /  DBili  x   /  AST  x   /  ALT  x   /  AlkPhos  x   09-09              Radiology:          Assessment & Plan:  HPI:  Spoke with pacific  [id number] but patient hard of hearing.    59 yo F w/ hx of HTN, HLD, T2DM, DVT (dx July 2020, on Eliquis) APL currently undergoing chemotherapy (dx May 2020; on ATRA and arsenic), last tx planned for ?9/4/20, recent admission for concern of Mediport infxn 8/22-8/27 but was neg, now presenting similarly w/  back and CP which began last night. Started as back pain, and became chest pain. Patient described pain as sharp pain affecting patient's neck and upper back. Asked to clarify how long pain lasted but was unable to state as patient kept repeating that pain improved with pain medication. Endorsed that pain improved likely with morphine received in ED and had pain resolved when seen. CP described as heavy and substernal, and constant. Endorsed that pain was worse when sitting up and improved when lying down. Review of chart shows pt admitted for similar symptoms previously, and workup was unremarkable to include no PE or Mediport infection, and abx were discontinued during the admission. Patient states she was due for last chemotherapy treatment and instead was held off as she was having these symptoms. Patient sees outpatient onc Dr. Hurd    In ED: 98.5, 72HR, 110/74, 18RR, 96%RA. Received 4mg morphine x2. CXR, CT angio for PE were ordered. No PE noted. (04 Sep 2020 02:06)        -  -  -Will continue to closely monitor patient/vitals  -Primary Team to follow up in AM, attending to follow       Roberth Alvarado PA-C  Dept of Medicine CC: palpitations      HPI:  Notified by RN that patient complaining of palpitations. Patient seen and assessed at bedside, NAD, alert and awake, sitting in bed watching video on cellphone. Patient is Ukrainian-speaking; [place]  ID #[id number] used during interview. Patient denied palpitations at the time of the interview but stated she feels palpitations and shortness of breath every time she ambulates; these symptoms have been ongoing for the past two weeks. Patient denied chest pain, acute dyspnea, dizziness, nausea, vomiting, or abdominal pain at the time of the interview.       PAST MEDICAL & SURGICAL HISTORY:  APL (acute promyelocytic leukemia)  HLD (hyperlipidemia)  Hypertension  Diabetes  No significant past surgical history        Vital Signs Last 24 Hrs  T(C): 36.6 (10 Sep 2020 21:38), Max: 36.8 (10 Sep 2020 21:00)  T(F): 97.9 (10 Sep 2020 21:38), Max: 98.3 (10 Sep 2020 21:00)  HR: 86 (10 Sep 2020 21:38) (61 - 86)  BP: 104/66 (10 Sep 2020 21:38) (101/65 - 109/69)  RR: 20 (10 Sep 2020 21:38) (16 - 20)  SpO2: 95% (10 Sep 2020 21:38) (95% - 96%)      Physical Exam:  General: WN/WD, NAD, AOx3, nontoxic appearing  Head:  NC/AT  CV: RRR, S1S2   Respiratory: (+) ocassional expiratory wheeze on right,   Abdominal: (+) bowel sounds x4. Soft, NT, ND, no palpable mass, no guarding, or rebound tenderness  Genitourinary: ? Meyer   MSK: No BLLE edema, + peripheral pulses, FROM all 4 extremity  Skin: (+) warm, dry   Psych: Appropriate affect       Labs:                        8.3    6.66  )-----------( 285      ( 10 Sep 2020 07:17 )             26.7     09-10    140  |  103  |  17  ----------------------------<  133<H>  4.3   |  25  |  0.97    Ca    10.2      10 Sep 2020 07:15    TPro  6.5  /  Alb  x   /  TBili  x   /  DBili  x   /  AST  x   /  ALT  x   /  AlkPhos  x   09-09              Radiology:          Assessment & Plan:  HPI:  Spoke with pacific  [id number] but patient hard of hearing.    59 yo F w/ hx of HTN, HLD, T2DM, DVT (dx July 2020, on Eliquis) APL currently undergoing chemotherapy (dx May 2020; on ATRA and arsenic), last tx planned for ?9/4/20, recent admission for concern of Mediport infxn 8/22-8/27 but was neg, now presenting similarly w/  back and CP which began last night. Started as back pain, and became chest pain. Patient described pain as sharp pain affecting patient's neck and upper back. Asked to clarify how long pain lasted but was unable to state as patient kept repeating that pain improved with pain medication. Endorsed that pain improved likely with morphine received in ED and had pain resolved when seen. CP described as heavy and substernal, and constant. Endorsed that pain was worse when sitting up and improved when lying down. Review of chart shows pt admitted for similar symptoms previously, and workup was unremarkable to include no PE or Mediport infection, and abx were discontinued during the admission. Patient states she was due for last chemotherapy treatment and instead was held off as she was having these symptoms. Patient sees outpatient onc Dr. Hurd    In ED: 98.5, 72HR, 110/74, 18RR, 96%RA. Received 4mg morphine x2. CXR, CT angio for PE were ordered. No PE noted. (04 Sep 2020 02:06)        -  -  -Will continue to closely monitor patient/vitals  -Primary Team to follow up in AM, attending to follow       Roberth Alvarado PA-C  Dept of Medicine CC: palpitations      HPI:  Notified by RN that patient complaining of palpitations. Patient seen and assessed at bedside, NAD, alert and awake, sitting in bed watching video on cellphone. Patient is Georgian-speaking; [place]  ID #[id number] used during interview. Patient denied palpitations at the time of the interview but stated she feels palpitations and shortness of breath every time she ambulates; these symptoms have been ongoing for the past two weeks and are the reason she presented to the hospital. Patient denied chest pain, acute dyspnea, dizziness, nausea, vomiting, or abdominal pain at the time of the interview.       PAST MEDICAL & SURGICAL HISTORY:  APL (acute promyelocytic leukemia)  HLD (hyperlipidemia)  Hypertension  Diabetes  No significant past surgical history        Vital Signs Last 24 Hrs  T(C): 36.6 (10 Sep 2020 21:38), Max: 36.8 (10 Sep 2020 21:00)  T(F): 97.9 (10 Sep 2020 21:38), Max: 98.3 (10 Sep 2020 21:00)  HR: 86 (10 Sep 2020 21:38) (61 - 86)  BP: 104/66 (10 Sep 2020 21:38) (101/65 - 109/69)  RR: 20 (10 Sep 2020 21:38) (16 - 20)  SpO2: 95% (10 Sep 2020 21:38) (95% - 96%)      Physical Exam:  General: WN/WD, NAD, AOx3, nontoxic appearing  Head:  NC/AT  CV: RRR, S1S2   Respiratory: (+) occasional expiratory wheeze on right, (+) decreased breath sounds at left lung base, nonlabored on RA  MSK: No BLLE edema, + peripheral pulses, FROM all 4 extremity  Skin: (+) warm, dry   Psych: Appropriate affect       Labs:                        8.3    6.66  )-----------( 285      ( 10 Sep 2020 07:17 )             26.7     09-10    140  |  103  |  17  ----------------------------<  133<H>  4.3   |  25  |  0.97    Ca    10.2      10 Sep 2020 07:15    TPro  6.5  /  Alb  x   /  TBili  x   /  DBili  x   /  AST  x   /  ALT  x   /  AlkPhos  x   09-09              Radiology:  < from: CT Chest No Cont (09.08.20 @ 09:16) >  IMPRESSION:  Slightly increased multiloculated left pleural effusion associated with areas of left lung compressive atelectasis slightly progressed since the prior study.  Small circumferential/loculated pericardial effusion, unchanged and of unclear etiology.  < end of copied text >    < from: Xray Chest 1 View- PORTABLE-Urgent (09.07.20 @ 14:04) >  IMPRESSION:  Right chest wall vascular infusion port catheter tip is in the SVC.   No pneumothorax. Left pleural effusion is increased from 9/3/2020.  < end of copied text >    < from: Transthoracic Echocardiogram (09.08.20 @ 07:42) >  EF (Visual Estimate): 45-50 %  ------------------------------------------------------------------------  Conclusions:  1. Normal mitral valve. Minimal mitral regurgitation.  2. Aortic valve not well visualized; appears to be a  calcified trileaflet valve with normal opening. No aortic  valve regurgitation seen.  3. Endocardium not well visualized; grossly mild left  ventricular systolic dysfunction. The septum is  hypokinetic. Consider limited repeat imaging with  intravenous echo contrast to better visualize the LV if  clinically indicated. (Patient did not have intravenous  access at the time of this study.)  4. Normal right ventricular size with mildly decreased  right ventricular systolic function. TAPSE=1.4 cm. Systolic  TDI=7 cm/sec.  5. Trace pericardial effusion.  6. Left pleural effusion.  *** No previous Echo exam.  < end of copied text >      < from: CT Angio Chest w/ IV Cont (09.03.20 @ 12:39) >  IMPRESSION:  No pulmonary embolism.  Small left pleural effusion with passive partial left lower lobe atelectasis.  Small pericardial effusion.  < end of copied text >            Assessment & Plan:  Pt is a 59 yo F w/ hx of HTN, HLD, T2DM, DVT (dx July 2020, on Eliquis) APL currently undergoing chemotherapy (dx May 2020; on ATRA and arsenic), last tx planned for 9/4/20, recent admission for concern of Mediport infxn 8/22-8/27 but was neg workup, now presenting similarly w/ CP.   Patient now presenting acutely with complaints of palpitations and shortness of breath with ambulation.       #Palpitations/shortness of breath- ?secondary to underlying left pleural effusion  -Vital signs hemodynamically stable, patient denied palpitations or shortness of breath at the time of the interview as she was resting in bed  -Telemetry without evidence of tachycardia; Sinus 58-80s  -Patient is not hypoxic, SpO2 95% on RA  -CT chest (9/8) with "Slightly increased multiloculated left pleural effusion associated with areas of left lung compressive atelectasis"  -TTE (9/8) with EF 45-50%  -CTA chest (9/3) without evidence of PE  -Continue with amoxicillin for treatment of presumed pneumonia  -Continue with incentive spirometry  -Consider O2 monitoring when patient ambulatory  -Pulmonology is following patient  -Will continue to closely monitor patient/vitals  -Primary Team to follow up in AM, attending to follow       Roberth Alvarado PA-C  Dept of Medicine  [phone]

## 2020-09-10 NOTE — PROGRESS NOTE ADULT - PROBLEM SELECTOR PROBLEM 5
Hypertension
Hypertension
DVT (deep venous thrombosis)
Hypertension
Hypertension

## 2020-09-10 NOTE — PROGRESS NOTE ADULT - PROBLEM SELECTOR PLAN 4
Patient had R axillary DVT In July 2020.   - C/w home Eliquis 5mg  - No blood draws from RIGHT arm.
Patient had R axillary DVT In July 2020.   - C/w home Eliquis 5mg  - No blood draws from RIGHT arm.
On home atorvastatin. Increased per cards.
Patient had R axillary DVT In July 2020. On Eliquis 5mg.   - C/w Eliquis 5mg  - No blood draws from RIGHT arm.
Patient had R axillary DVT In July 2020. On Eliquis 5mg.   - C/w Eliquis 5mg  - No blood draws from RIGHT arm.

## 2020-09-11 ENCOUNTER — TRANSCRIPTION ENCOUNTER (OUTPATIENT)
Age: 60
End: 2020-09-11

## 2020-09-11 VITALS
DIASTOLIC BLOOD PRESSURE: 60 MMHG | TEMPERATURE: 98 F | OXYGEN SATURATION: 97 % | RESPIRATION RATE: 19 BRPM | SYSTOLIC BLOOD PRESSURE: 111 MMHG | HEART RATE: 56 BPM

## 2020-09-11 LAB
GLUCOSE BLDC GLUCOMTR-MCNC: 128 MG/DL — HIGH (ref 70–99)
GLUCOSE BLDC GLUCOMTR-MCNC: 176 MG/DL — HIGH (ref 70–99)

## 2020-09-11 PROCEDURE — 82435 ASSAY OF BLOOD CHLORIDE: CPT

## 2020-09-11 PROCEDURE — 99232 SBSQ HOSP IP/OBS MODERATE 35: CPT

## 2020-09-11 PROCEDURE — 86901 BLOOD TYPING SEROLOGIC RH(D): CPT

## 2020-09-11 PROCEDURE — 0225U NFCT DS DNA&RNA 21 SARSCOV2: CPT

## 2020-09-11 PROCEDURE — 81003 URINALYSIS AUTO W/O SCOPE: CPT

## 2020-09-11 PROCEDURE — 83735 ASSAY OF MAGNESIUM: CPT

## 2020-09-11 PROCEDURE — 85014 HEMATOCRIT: CPT

## 2020-09-11 PROCEDURE — 80053 COMPREHEN METABOLIC PANEL: CPT

## 2020-09-11 PROCEDURE — 93005 ELECTROCARDIOGRAM TRACING: CPT | Mod: 76

## 2020-09-11 PROCEDURE — 87449 NOS EACH ORGANISM AG IA: CPT

## 2020-09-11 PROCEDURE — U0003: CPT

## 2020-09-11 PROCEDURE — 85730 THROMBOPLASTIN TIME PARTIAL: CPT

## 2020-09-11 PROCEDURE — A9500: CPT

## 2020-09-11 PROCEDURE — 82962 GLUCOSE BLOOD TEST: CPT

## 2020-09-11 PROCEDURE — 71045 X-RAY EXAM CHEST 1 VIEW: CPT

## 2020-09-11 PROCEDURE — 84443 ASSAY THYROID STIM HORMONE: CPT

## 2020-09-11 PROCEDURE — 93017 CV STRESS TEST TRACING ONLY: CPT

## 2020-09-11 PROCEDURE — 97116 GAIT TRAINING THERAPY: CPT

## 2020-09-11 PROCEDURE — 84155 ASSAY OF PROTEIN SERUM: CPT

## 2020-09-11 PROCEDURE — 85027 COMPLETE CBC AUTOMATED: CPT

## 2020-09-11 PROCEDURE — 83690 ASSAY OF LIPASE: CPT

## 2020-09-11 PROCEDURE — 99285 EMERGENCY DEPT VISIT HI MDM: CPT | Mod: 25

## 2020-09-11 PROCEDURE — 87899 AGENT NOS ASSAY W/OPTIC: CPT

## 2020-09-11 PROCEDURE — 84484 ASSAY OF TROPONIN QUANT: CPT

## 2020-09-11 PROCEDURE — 86769 SARS-COV-2 COVID-19 ANTIBODY: CPT

## 2020-09-11 PROCEDURE — 86900 BLOOD TYPING SEROLOGIC ABO: CPT

## 2020-09-11 PROCEDURE — 78452 HT MUSCLE IMAGE SPECT MULT: CPT

## 2020-09-11 PROCEDURE — 87040 BLOOD CULTURE FOR BACTERIA: CPT

## 2020-09-11 PROCEDURE — 83605 ASSAY OF LACTIC ACID: CPT

## 2020-09-11 PROCEDURE — 96374 THER/PROPH/DIAG INJ IV PUSH: CPT | Mod: XU

## 2020-09-11 PROCEDURE — 85610 PROTHROMBIN TIME: CPT

## 2020-09-11 PROCEDURE — 83615 LACTATE (LD) (LDH) ENZYME: CPT

## 2020-09-11 PROCEDURE — 83880 ASSAY OF NATRIURETIC PEPTIDE: CPT

## 2020-09-11 PROCEDURE — 87086 URINE CULTURE/COLONY COUNT: CPT

## 2020-09-11 PROCEDURE — 83036 HEMOGLOBIN GLYCOSYLATED A1C: CPT

## 2020-09-11 PROCEDURE — 84295 ASSAY OF SERUM SODIUM: CPT

## 2020-09-11 PROCEDURE — 85018 HEMOGLOBIN: CPT

## 2020-09-11 PROCEDURE — 82330 ASSAY OF CALCIUM: CPT

## 2020-09-11 PROCEDURE — 82272 OCCULT BLD FECES 1-3 TESTS: CPT

## 2020-09-11 PROCEDURE — 84132 ASSAY OF SERUM POTASSIUM: CPT

## 2020-09-11 PROCEDURE — 97162 PT EVAL MOD COMPLEX 30 MIN: CPT

## 2020-09-11 PROCEDURE — 86850 RBC ANTIBODY SCREEN: CPT

## 2020-09-11 PROCEDURE — 82803 BLOOD GASES ANY COMBINATION: CPT

## 2020-09-11 PROCEDURE — 97530 THERAPEUTIC ACTIVITIES: CPT

## 2020-09-11 PROCEDURE — 71275 CT ANGIOGRAPHY CHEST: CPT

## 2020-09-11 PROCEDURE — 96376 TX/PRO/DX INJ SAME DRUG ADON: CPT | Mod: XU

## 2020-09-11 PROCEDURE — 93306 TTE W/DOPPLER COMPLETE: CPT

## 2020-09-11 PROCEDURE — 82565 ASSAY OF CREATININE: CPT

## 2020-09-11 PROCEDURE — 80048 BASIC METABOLIC PNL TOTAL CA: CPT

## 2020-09-11 PROCEDURE — 82947 ASSAY GLUCOSE BLOOD QUANT: CPT

## 2020-09-11 PROCEDURE — 84100 ASSAY OF PHOSPHORUS: CPT

## 2020-09-11 PROCEDURE — 71250 CT THORAX DX C-: CPT

## 2020-09-11 RX ORDER — ATENOLOL 25 MG/1
1 TABLET ORAL
Qty: 0 | Refills: 0 | DISCHARGE
Start: 2020-09-11

## 2020-09-11 RX ORDER — GABAPENTIN 400 MG/1
1 CAPSULE ORAL
Qty: 0 | Refills: 0 | DISCHARGE
Start: 2020-09-11

## 2020-09-11 RX ORDER — ATORVASTATIN CALCIUM 80 MG/1
1 TABLET, FILM COATED ORAL
Qty: 30 | Refills: 0
Start: 2020-09-11 | End: 2020-10-10

## 2020-09-11 RX ORDER — APIXABAN 2.5 MG/1
1 TABLET, FILM COATED ORAL
Qty: 30 | Refills: 0
Start: 2020-09-11

## 2020-09-11 RX ORDER — HYDROCORTISONE 1 %
1 OINTMENT (GRAM) TOPICAL DAILY
Refills: 0 | Status: DISCONTINUED | OUTPATIENT
Start: 2020-09-11 | End: 2020-09-11

## 2020-09-11 RX ORDER — HYDROCORTISONE 1 %
1 OINTMENT (GRAM) TOPICAL
Qty: 1 | Refills: 0
Start: 2020-09-11 | End: 2020-10-10

## 2020-09-11 RX ORDER — APIXABAN 2.5 MG/1
1 TABLET, FILM COATED ORAL
Qty: 0 | Refills: 0 | DISCHARGE
Start: 2020-09-11

## 2020-09-11 RX ADMIN — GABAPENTIN 100 MILLIGRAM(S): 400 CAPSULE ORAL at 13:17

## 2020-09-11 RX ADMIN — Medication 1 TABLET(S): at 06:30

## 2020-09-11 RX ADMIN — Medication 1 DROP(S): at 06:30

## 2020-09-11 RX ADMIN — Medication 1: at 13:17

## 2020-09-11 RX ADMIN — APIXABAN 5 MILLIGRAM(S): 2.5 TABLET, FILM COATED ORAL at 06:30

## 2020-09-11 RX ADMIN — Medication 1 DROP(S): at 13:17

## 2020-09-11 RX ADMIN — ATENOLOL 50 MILLIGRAM(S): 25 TABLET ORAL at 10:54

## 2020-09-11 RX ADMIN — GABAPENTIN 100 MILLIGRAM(S): 400 CAPSULE ORAL at 06:30

## 2020-09-11 NOTE — PROVIDER CONTACT NOTE (OTHER) - ACTION/TREATMENT ORDERED:
PO lasix administered. PA notified and aware, spoke with family. Will continue to monitor and maintain safety.
Assessed by PA at bedside. Will continue to monitor.
NP aware. NP reviewed all orders, labs, history & plan. No new orders at this time. Continue to monitor.
Np notified and aware. 2L NC ordered prn. Will continue to monitor and maintain safety.
PA made aware. Ordered EKG. Ordered for patient to be placed on Telemetry and 1G IV Tylenol for pain. Will continue to monitor.

## 2020-09-11 NOTE — DISCHARGE NOTE PROVIDER - CARE PROVIDER_API CALL
Karen Hurd (DO)  Hematology; Medical Oncology  [address] [place] [address]  Phone: [phone]  Fax: [phone]  Scheduled Appointment: 09/17/2020 10:00 AM    Jim Mendez  MEDICINE - PULMONARY MEDICINE  [address] [facility], [address]  [place] [address]  Phone: [phone]  Fax: [phone]  Scheduled Appointment: 09/24/2020 11:00 AM    Nichole Ayon  FAMILY MEDICINE  [address] [facility] [address], [address]st [place] [address]  Phone: [phone]  Fax: [phone]  Scheduled Appointment: 09/22/2020 12:30 PM

## 2020-09-11 NOTE — DISCHARGE NOTE PROVIDER - NSDCFUSCHEDAPPT_GEN_ALL_CORE_FT
GIANLUCA UREÑA ; 09/17/2020 ; [facility]  GIANLUCA UREÑA ; 09/22/2020 ; [facility] [address]  GIANLUCA UREÑA ; 09/24/2020 ; [facility] [address] [facility] GIANLUCA UREÑA ; 09/17/2020 ; [facility]  GIANLUCA UREÑA ; 09/22/2020 ; [facility] [address]  GIANLUCA UREÑA ; 09/24/2020 ; [facility] [address] [facility] GIANLUCA UREÑA ; 09/17/2020 ; [facility]  GIANLUCA UREÑA ; 09/22/2020 ; [facility] [address]  GIANLUCA UREÑA ; 09/24/2020 ; [facility] [address] [facility] GIANLUCA UREÑA ; 09/17/2020 ; [facility]  GIANLUCA UREÑA ; 09/22/2020 ; [facility] [address]  GIANLUCA UREÑA ; 09/24/2020 ; [facility] [address] [facility]

## 2020-09-11 NOTE — PROVIDER CONTACT NOTE (OTHER) - ASSESSMENT
Pt aox 4. Pt c/o chest palpitations but denies chest pain. Pt denies SOB. Pt states has SOB while walking. HR 86, /66 RR 20, 95% on room air, Temp 97.9 F

## 2020-09-11 NOTE — DISCHARGE NOTE PROVIDER - NSDCFUADDAPPT_GEN_ALL_CORE_FT
Please follow up with pulmonologist, Oncologist, and PMD as an outpatient.  Please have a repeat CT chest in 6-8 weeks as recommended by Pulmonologist. Please follow up with pulmonologist, Oncologist, and PMD as an outpatient.  Please have a repeat CT chest as recommended by Pulmonologist. You have an appointment on Sept 24th.

## 2020-09-11 NOTE — PROVIDER CONTACT NOTE (OTHER) - BACKGROUND
Pt admitted for chest pain and left pleural effusion. History of HTN, DM, and acute promyelocytic leukemia.

## 2020-09-11 NOTE — DISCHARGE NOTE PROVIDER - HOSPITAL COURSE
Pt is a 61 yo F w/ hx of HTN, HLD, T2DM, DVT (dx July 2020, on Eliquis) APL currently undergoing chemotherapy (dx May 2020; on ATRA and arsenic), last tx planned for 9/4/20, recent admission for concern of Mediport infxn 8/22-8/27 but was neg workup, now presenting similarly w/ CP.             ·  Problem: Pleural effusion.  Plan: Interval increase in left pleural effusion on CXR.     Ddx broad. EF mildly reduced however primarily unilateral effusion. Malignant effusion must be considered. Viral pleuritis and parapneumonic effusion is possible as well. PE less likely as patient has been on DOAC.    CTC noted.    Pulmonary consulted. Input appreciated. -- Effusion can not be safely sampled with thoracentesis. Air bronchogram/consolidation noted on POCUS. Recommending treatment for PNA.    S/p Vanco/zosyn. Urine legionella and RVP negative. Bcx with ngtd. Low suspicion for MRSA or PsA pna as patient is not in respiratory distress or sepsis Also Bcx are negative to date. Urine strep pending. Will cover with Augmentin which will cover both typical organisms and anaerobes (particularly since we are covering for parapneumonic infection).  Treatment for 7 day total course.     Hold off on additional doses of lasix. On RA and not in resp distress. Can consider maintenance doses outpatient if patient develops worsening dyspnea.                    ·  Problem: Chest pain, unspecified type.  Plan: Pleuritic in nature. Likely from pleural effusion and/or pericardial effusion. Lower suspicion for pericarditis.  Patient hemodynamically stable and saturating well on RA.      - CT chest angio neg for PE, no thrombus of mediport    - Recent echo with 54% EF and shows eccentric LV hypertrophy (dilated LV with normal relative wall thickness).    - Repeat TTE noted with mild reduce EF. 45-50%. Mild reduce RV fx. Trace pericardial effusion.    - Serial trops negative.     - Cards in agreement that unlikely pericarditis.     - Not tamponade.    - NST without inducible ischemia. No further cards w/u inpatient. Cards eval appreciated.    - Cardiac cath 2 yrs ago shows mild non-obstructive CAD.             ·  Problem: APL (acute promyelocytic leukemia).  Plan: Patient diagnosed with APL in May 2020, receives ATRA and weekly arsenic. Last chemo treatment due on 9/4/2020. Follows Dr. Hurd at [facility]    - Heme/Onc f/u    - Consolidation tx with arsenic started. Last chemo 9/2.     - Plan for outpt [facility] f/u for additional chemo.             ·  Problem: HLD (hyperlipidemia).  Plan: On home atorvastatin. Increased per cards.             ·  Problem: DVT (deep venous thrombosis).  Plan: Patient had R axillary DVT In July 2020.     - C/w home Eliquis 5mg    - No blood draws from RIGHT arm.             Problem: Hypertension. Plan: Patient takes Atenolol 50mg qd at home for HTN    Monitor bp.            ·  Problem: Diabetes.  Plan: Diabetes mellitus type 2, noninsulin dependent.      - Holding home metformin    - monitor FS, ISS for coverage     - CC/ DASH TLC diet    - Post-herpetic neuralgia and scabbing (zoster infection 6/22-6/30). Outside dose of Gabapentin 100mg TID resumed.             Pt stable dc home with outpatient follow up with Oncologist, PMD, and Pulmonologist. Problem/Plan - 1:    ·  Problem: Pleural effusion.  Plan: Interval increase in left pleural effusion on CXR.     Ddx broad. EF mildly reduced however primarily unilateral effusion. Malignant effusion must be considered. Viral pleuritis and parapneumonic effusion is possible as well. PE less likely as patient has been on DOAC.    CTC noted.    Pulmonary consulted. Input appreciated. -- Effusion can not be safely sampled with thoracentesis. Air bronchogram/consolidation noted on POCUS. Recommending treatment for PNA.    S/p Vanco/zosyn. Urine legionella and RVP negative. Bcx with ngtd. Low suspicion for MRSA or PsA pna as patient is not in respiratory distress or sepsis Also Bcx are negative to date. Urine strep pending. Will cover with Augmentin which will cover both typical organisms and anaerobes (particularly since we are covering for parapneumonic infection).  Treatment for 7 day total course.     Hold off on additional doses of lasix. On RA and not in resp distress. Can consider maintenance doses outpatient if patient develops worsening dyspnea.    Ambulatory o2 stable at 96%.    Outpatient pulmonary appointment made for patient Sept 24th.     Patient will need repeat CTC. Initially recommended for 6-8 weeks however will follow up sooner as per family preference.          Problem/Plan - 2:    ·  Problem: Chest pain, unspecified type.  Plan: Pleuritic in nature. Likely from pleural effusion and/or pericardial effusion. Lower suspicion for pericarditis.  Patient hemodynamically stable and saturating well on RA.      - CT chest angio neg for PE, no thrombus of mediport    - Recent echo with 54% EF and shows eccentric LV hypertrophy (dilated LV with normal relative wall thickness).    - Repeat TTE noted with mild reduce EF. 45-50%. Mild reduce RV fx. Trace pericardial effusion.    - Serial trops negative.     - Cards in agreement that unlikely pericarditis.     - Not tamponade.    - NST without inducible ischemia. No further cards w/u inpatient. Cards eval appreciated.    - Cardiac cath 2 yrs ago shows mild non-obstructive CAD.          Problem/Plan - 3:    ·  Problem: APL (acute promyelocytic leukemia).  Plan: Patient diagnosed with APL in May 2020, receives ATRA and weekly arsenic. Last chemo treatment due on 9/4/2020. Follows Dr. Hurd at [facility]    - Heme/Onc f/u    - Consolidation tx with arsenic started. Last chemo 9/2.     - Plan for outpt [facility] f/u for additional chemo.          Problem/Plan - 4:    ·  Problem: HLD (hyperlipidemia).  Plan: On home atorvastatin. Increased per cards.          Problem/Plan - 5:    ·  Problem: DVT (deep venous thrombosis).  Plan: Patient had R axillary DVT In July 2020.     - C/w home Eliquis 5mg    - No blood draws from RIGHT arm.          Problem/Plan - 6:    Problem: Hypertension. Plan: Patient takes Atenolol 50mg qd at home for HTN    Monitor bp.         Problem/Plan - 7:    ·  Problem: Diabetes.  Plan: Diabetes mellitus type 2, noninsulin dependent.      - Holding home metformin    - monitor FS, ISS for coverage     - CC/ DASH TLC diet    - Post-herpetic neuralgia and scabbing (zoster infection 6/22-6/30). Outside dose of Gabapentin 100mg TID resumed.          Problem/Plan - 8:    ·  Problem: Prophylactic measure.  Plan: Plan: Diet: Consistent carbohydrate    DVT prophylaxis: Eliquis 5mg    Diet: cc/ dashtlc.         Patient is medically optimized for d/c home today with close outpatient follow up.     Case discussed at length with patients son.     It was explained that although we do not have a definitive diagnosis for the cause of the pleural effusion, his mother does not have a clear medical necessity to remain in the hospital at this time and can continue her care outpatient with pulmonary for repeat imaging and possibly a thoracentesis.     All questions answered.    Pulmonary appointment made as above.         D/c time spent: 75 minutes. Problem/Plan - 1:    ·  Problem: Pleural effusion.  Plan: Interval increase in left pleural effusion on CXR.     Ddx broad. EF mildly reduced however primarily unilateral effusion. Malignant effusion must be considered. Viral pleuritis and parapneumonic effusion is possible as well. PE less likely as patient has been on DOAC.    CTC noted.    Pulmonary consulted. Input appreciated. -- Effusion can not be safely sampled with thoracentesis. Air bronchogram/consolidation noted on POCUS. Recommending treatment for PNA.    S/p Vanco/zosyn. Urine legionella and RVP negative. Bcx with ngtd. Low suspicion for MRSA or PsA pna as patient is not in respiratory distress or sepsis Also Bcx are negative to date. Urine strep pending. Will cover with Augmentin which will cover both typical organisms and anaerobes (particularly since we are covering for parapneumonic infection).  Treatment for 7 day total course.     Hold off on additional doses of lasix. On RA and not in resp distress. Can consider maintenance doses outpatient if patient develops worsening dyspnea.    Ambulatory o2 stable at 96%.    Outpatient pulmonary appointment made for patient Sept 24th.     Patient will need repeat CTC. Initially recommended for 6-8 weeks however will follow up sooner as per family preference.          Problem/Plan - 2:    ·  Problem: Chest pain, unspecified type.  Plan: Pleuritic in nature. Likely from pleural effusion and/or pericardial effusion. Lower suspicion for pericarditis.  Patient hemodynamically stable and saturating well on RA.      - CT chest angio neg for PE, no thrombus of mediport    - Recent echo with 54% EF and shows eccentric LV hypertrophy (dilated LV with normal relative wall thickness).    - Repeat TTE noted with mild reduce EF. 45-50%. Mild reduce RV fx. Trace pericardial effusion.    - Serial trops negative.     - Cards in agreement that unlikely pericarditis.     - Not tamponade.    - NST without inducible ischemia. No further cards w/u inpatient. Cards eval appreciated.    - Cardiac cath 2 yrs ago shows mild non-obstructive CAD.     - There is no clear acutely lethal cause of the patients chest pain. All consultants input appreciated. Close outpatient follow up warranted.          Problem/Plan - 3:    ·  Problem: APL (acute promyelocytic leukemia).  Plan: Patient diagnosed with APL in May 2020, receives ATRA and weekly arsenic. Last chemo treatment due on 9/4/2020. Follows Dr. Hurd at [facility]    - Heme/Onc f/u    - Consolidation tx with arsenic started. Last chemo 9/2.     - Plan for outpt [facility] f/u for additional chemo.          Problem/Plan - 4:    ·  Problem: HLD (hyperlipidemia).  Plan: On home atorvastatin. Increased per cards.          Problem/Plan - 5:    ·  Problem: DVT (deep venous thrombosis).  Plan: Patient had R axillary DVT In July 2020.     - C/w home Eliquis 5mg    - No blood draws from RIGHT arm.          Problem/Plan - 6:    Problem: Hypertension. Plan: Patient takes Atenolol 50mg qd at home for HTN    Monitor bp.         Problem/Plan - 7:    ·  Problem: Diabetes.  Plan: Diabetes mellitus type 2, noninsulin dependent.      - Holding home metformin    - monitor FS, ISS for coverage     - CC/ DASH TLC diet    - Post-herpetic neuralgia and scabbing (zoster infection 6/22-6/30). Outside dose of Gabapentin 100mg TID resumed.          Problem/Plan - 8:    ·  Problem: Prophylactic measure.  Plan: Plan: Diet: Consistent carbohydrate    DVT prophylaxis: Eliquis 5mg    Diet: cc/ dashtlc.         Patient is medically optimized for d/c home today with close outpatient follow up.     Case discussed at length with patients son.     It was explained that although we do not have a definitive diagnosis for the cause of the pleural effusion, his mother does not have a clear medical necessity to remain in the hospital at this time and can continue her care outpatient with pulmonary for repeat imaging and possibly a thoracentesis.     All questions answered.    Pulmonary appointment made as above.         D/c time spent: 75 minutes.

## 2020-09-11 NOTE — DISCHARGE NOTE PROVIDER - CARE PROVIDERS DIRECT ADDRESSES
[email or web address]

## 2020-09-11 NOTE — DISCHARGE NOTE NURSING/CASE MANAGEMENT/SOCIAL WORK - PATIENT PORTAL LINK FT
You can access the FollowMyHealth Patient Portal offered by [facility] by registering at the following website: [email or web address]. By joining [employer]’s FollowMyHealth portal, you will also be able to view your health information using other applications (apps) compatible with our system.

## 2020-09-11 NOTE — DISCHARGE NOTE PROVIDER - NSDCCPCAREPLAN_GEN_ALL_CORE_FT
PRINCIPAL DISCHARGE DIAGNOSIS  Diagnosis: Chest pain, unspecified type  Assessment and Plan of Treatment:   HOME CARE INSTRUCTIONS  For the next few days, avoid physical activities that bring on chest pain. Continue physical activities as directed.  Do not smoke.  Avoid drinking alcohol.   Only take over-the-counter or prescription medicine for pain, discomfort, or fever as directed by your caregiver.  Follow your caregiver's suggestions for further testing if your chest pain does not go away.  Keep any follow-up appointments you made. If you do not go to an appointment, you could develop lasting (chronic) problems with pain. If there is any problem keeping an appointment, you must call to reschedule.   SEEK MEDICAL CARE IF:  You think you are having problems from the medicine you are taking. Read your medicine instructions carefully.  Your chest pain does not go away, even after treatment.  You develop a rash with blisters on your chest.  SEEK IMMEDIATE MEDICAL CARE IF:  You have increased chest pain or pain that spreads to your arm, neck, jaw, back, or abdomen.   You develop shortness of breath, an increasing cough, or you are coughing up blood.  You have severe back or abdominal pain, feel nauseous, or vomit.  You develop severe weakness, fainting, or chills.  You have a fever.  THIS IS AN EMERGENCY. Do not wait to see if the pain will go away. Get medical help at once. Call your local emergency services. Do not drive yourself to the hospital.        SECONDARY DISCHARGE DIAGNOSES  Diagnosis: Hypertension  Assessment and Plan of Treatment: Low salt diet  Activity as tolerated.  Take all medication as prescribed.  Follow up with your medical doctor for routine blood pressure monitoring at your next visit.  Notify your doctor if you have any of the following symptoms:   Dizziness, Lightheadedness, Blurry vision, Headache, Chest pain, Shortness of breath      Diagnosis: Diabetes  Assessment and Plan of Treatment: HgA1C this admission.  Make sure you get your HgA1c checked every three months.  If you take oral diabetes medications, check your blood glucose two times a day.  If you take insulin, check your blood glucose before meals and at bedtime.  It's important not to skip any meals.  Keep a log of your blood glucose results and always take it with you to your doctor appointments.  Keep a list of your current medications including injectables and over the counter medications and bring this medication list with you to all your doctor appointments.  If you have not seen your opthalmologist this year call for appointment.  Check your feet daily for redness, sores, or openings. Do not self treat. If no improvement in two days call your primary care physician for an appointment.  Low blood sugar (hypoglycemia) is a blood sugar below 70mg/dl. Check your blood sugar if you feel signs/symptoms of hypoglycemia. If your blood sugar is below 70 take 15 grams of carbohydrates (ex 4 oz of apple juice, 3-4 glucosr tablets, or 4-6 oz of regular soda) wait 15 minutes and repeat blood sugar to make sure it comes up above 70.  If your blood sugar is above 70 and you are due for a meal, have a meal.  If you are not due for a meal have a snack.  This snack helps keeps your blood sugar at a safe range.      Diagnosis: APL (acute promyelocytic leukemia)  Assessment and Plan of Treatment: Stable. Follow up with Dr. Hurd as an outpatient.    Diagnosis: Pleural effusion  Assessment and Plan of Treatment: stable, Follow up with your Pulmonologist and repeat CT chest in 6 weeks outpatient. PRINCIPAL DISCHARGE DIAGNOSIS  Diagnosis: Chest pain, unspecified type  Assessment and Plan of Treatment: It is unclear why you have chest pain. You have had a cat scan of the chest, stress test, and blood work which have not suggested the cause. What we can say with reasonable certainty is that this is unlikely an acute lethal cause of chest pain. Your vital signs are stable and you do not require any supplemental oxygen.  Please continue to walk with family to enhance your physical conditioning.  If the pain is still there, you can take over the counter tylenol.   HOME CARE INSTRUCTIONS  For the next few days, avoid physical activities that bring on chest pain. Continue physical activities as directed.  Do not smoke.  Avoid drinking alcohol.   Only take over-the-counter or prescription medicine for pain, discomfort, or fever as directed by your caregiver.  Follow your caregiver's suggestions for further testing if your chest pain does not go away.  Keep any follow-up appointments you made. If you do not go to an appointment, you could develop lasting (chronic) problems with pain. If there is any problem keeping an appointment, you must call to reschedule.   SEEK MEDICAL CARE IF:  You think you are having problems from the medicine you are taking. Read your medicine instructions carefully.  Your chest pain does not go away, even after treatment.  You develop a rash with blisters on your chest.  SEEK IMMEDIATE MEDICAL CARE IF:  You have increased chest pain or pain that spreads to your arm, neck, jaw, back, or abdomen.   You develop shortness of breath, an increasing cough, or you are coughing up blood.  You have severe back or abdominal pain, feel nauseous, or vomit.  You develop severe weakness, fainting, or chills.  You have a fever.  THIS IS AN EMERGENCY. Do not wait to see if the pain will go away. Get medical help at once. Call your local emergency services. Do not drive yourself to the hospital.        SECONDARY DISCHARGE DIAGNOSES  Diagnosis: APL (acute promyelocytic leukemia)  Assessment and Plan of Treatment: Follow up with Dr. Hurd as an outpatient for additional treatment.    Diagnosis: Hypertension  Assessment and Plan of Treatment: Low salt diet  Activity as tolerated.  Take all medication as prescribed.  Follow up with your medical doctor for routine blood pressure monitoring at your next visit.  Notify your doctor if you have any of the following symptoms:   Dizziness, Lightheadedness, Blurry vision, Headache, Chest pain, Shortness of breath      Diagnosis: Diabetes  Assessment and Plan of Treatment: HgA1C this admission.  Make sure you get your HgA1c checked every three months.  If you take oral diabetes medications, check your blood glucose two times a day.  If you take insulin, check your blood glucose before meals and at bedtime.  It's important not to skip any meals.  Keep a log of your blood glucose results and always take it with you to your doctor appointments.  Keep a list of your current medications including injectables and over the counter medications and bring this medication list with you to all your doctor appointments.  If you have not seen your opthalmologist this year call for appointment.  Check your feet daily for redness, sores, or openings. Do not self treat. If no improvement in two days call your primary care physician for an appointment.  Low blood sugar (hypoglycemia) is a blood sugar below 70mg/dl. Check your blood sugar if you feel signs/symptoms of hypoglycemia. If your blood sugar is below 70 take 15 grams of carbohydrates (ex 4 oz of apple juice, 3-4 glucosr tablets, or 4-6 oz of regular soda) wait 15 minutes and repeat blood sugar to make sure it comes up above 70.  If your blood sugar is above 70 and you are due for a meal, have a meal.  If you are not due for a meal have a snack.  This snack helps keeps your blood sugar at a safe range.      Diagnosis: Pleural effusion  Assessment and Plan of Treatment: This is the fluid around the left lung. It is unclear why this is present however a pneumonia is possible. Please finish the antibiotics till completion. The lung doctor saw you and was unable to sample the fluid because the quantity was not sufficient. You will need a follow up with the lung doctors on Sept 24th. They will determine when you will get additional imaging such as a repeat cat scan. Additionally, you can follow up with your primary doctor and cardiologist. The pleural effusion is less likely related to poor heart function however you will still need to follow up with a cardiologist.

## 2020-09-11 NOTE — DISCHARGE NOTE PROVIDER - NSDCPNSUBOBJ_GEN_ALL_CORE
HOSPITALIST NOTE        Dr. Juan Antonio Castro DO    Attending Physician    Division of Hospital Medicine    [facility]    Pager:  [phone]        SUBJECTIVE    Son refused translation.    Chest pain improved from admission.     No dyspnea at rest. She reports mild dyspnea with exertion.     No cough.         REVIEW OF SYSTEMS    12 point review of systems negative except for items noted above.        PAST MEDICAL & SURGICAL HISTORY:    APL (acute promyelocytic leukemia)    HLD (hyperlipidemia)    Hypertension    Diabetes    No significant past surgical history            MEDICATIONS  (STANDING):    amoxicillin  875 milliGRAM(s)/clavulanate 1 Tablet(s) Oral two times a day    apixaban 5 milliGRAM(s) Oral every 12 hours    artificial  tears Solution 1 Drop(s) Both EYES three times a day    ATENolol  Tablet 50 milliGRAM(s) Oral daily    atorvastatin 40 milliGRAM(s) Oral at bedtime    dextrose 5%. 1000 milliLiter(s) (50 mL/Hr) IV Continuous <Continuous>    dextrose 50% Injectable 12.5 Gram(s) IV Push once    dextrose 50% Injectable 25 Gram(s) IV Push once    dextrose 50% Injectable 25 Gram(s) IV Push once    gabapentin 100 milliGRAM(s) Oral three times a day    influenza   Vaccine 0.5 milliLiter(s) IntraMuscular once    insulin lispro (HumaLOG) corrective regimen sliding scale   SubCutaneous three times a day before meals    insulin lispro (HumaLOG) corrective regimen sliding scale   SubCutaneous at bedtime        MEDICATIONS  (PRN):    aluminum hydroxide/magnesium hydroxide/simethicone Suspension 30 milliLiter(s) Oral every 6 hours PRN Dyspepsia    dextrose 40% Gel 15 Gram(s) Oral once PRN Blood Glucose LESS THAN 70 milliGRAM(s)/deciliter    glucagon  Injectable 1 milliGRAM(s) IntraMuscular once PRN Glucose LESS THAN 70 milligrams/deciliter    hydrocortisone 2.5% Rectal Cream 1 Application(s) Rectal daily PRN daily to anus after each bowel movement.    metoclopramide 10 milliGRAM(s) Oral Before meals and at bedtime PRN nausea    polyethylene glycol 3350 17 Gram(s) Oral daily PRN Constipation    senna 2 Tablet(s) Oral at bedtime PRN Constipation            Allergies        adhesives (Pruritus)    No Known Drug Allergies        Intolerances                T(C): 36.9 (09-11-20 @ 13:04), Max: 36.9 (09-11-20 @ 13:04)    T(F): 98.5 (09-11-20 @ 13:04), Max: 98.5 (09-11-20 @ 13:04)    HR: 56 (09-11-20 @ 13:04) (56 - 86)    BP: 111/60 (09-11-20 @ 13:04) (96/62 - 119/76)    ABP: --    ABP(mean): --    RR: 19 (09-11-20 @ 13:04) (18 - 20)    SpO2: 97% (09-11-20 @ 13:04) (93% - 97%)            CONSTITUTIONAL: No acute distress.     HEENT:  Conjunctiva clear B/L. Nasal mucosa normal. Moist oral mucosa. No posterior pharyngeal lesions noted.    Cardiovascular: RRR with no murmurs. No JVD noted. No lower extremity edema B/L. Extremities are warm and well perfused. Radial pulses 2+ B/L. Dorsalis pedis pulses 2+ B/L.      Respiratory: Dec bs over left lung fields. CTA on right lung fields. No wrr. No accessory muscle use.     Gastrointestinal:  Soft, nontender. Non-distended. Non-rigid. No CVA tenderness B/L.    MSK:  No joint swelling. No joint erythema B/L. No midline spinal tenderness.    Neurologic:  Alert and awake. Moving all extremities. Following commands. Making eye contact.      Skin:  No rashes noted. No skin erythema noted.     Psych:  Normal affect. Normal Mood.        LABS                            8.3      6.66  )-----------( 285      ( 10 Sep 2020 07:17 )               26.7         09-10        140  |  103  |  17    ----------------------------<  133<H>    4.3   |  25  |  0.97        Ca    10.2      10 Sep 2020 07:15        Assessment and Plan:     · Assessment        Pt is a 59 yo F w/ hx of HTN, HLD, T2DM, DVT (dx July 2020, on Eliquis) APL currently undergoing chemotherapy (dx May 2020; on ATRA and arsenic), last tx planned for 9/4/20, recent admission for concern of Mediport infxn 8/22-8/27 but was neg workup, now presenting similarly w/ CP.          Problem/Plan - 1:    ·  Problem: Pleural effusion.  Plan: Interval increase in left pleural effusion on CXR.     Ddx broad. EF mildly reduced however primarily unilateral effusion. Malignant effusion must be considered. Viral pleuritis and parapneumonic effusion is possible as well. PE less likely as patient has been on DOAC.    CTC noted.    Pulmonary consulted. Input appreciated. -- Effusion can not be safely sampled with thoracentesis. Air bronchogram/consolidation noted on POCUS. Recommending treatment for PNA.    S/p Vanco/zosyn. Urine legionella and RVP negative. Bcx with ngtd. Low suspicion for MRSA or PsA pna as patient is not in respiratory distress or sepsis Also Bcx are negative to date. Urine strep pending. Will cover with Augmentin which will cover both typical organisms and anaerobes (particularly since we are covering for parapneumonic infection).  Treatment for 7 day total course.     Hold off on additional doses of lasix. On RA and not in resp distress. Can consider maintenance doses outpatient if patient develops worsening dyspnea.    Ambulatory o2 stable at 96%.    Outpatient pulmonary appointment made for patient Sept 24th.     Patient will need repeat CTC. Initially recommended for 6-8 weeks however will follow up sooner as per family preference.          Problem/Plan - 2:    ·  Problem: Chest pain, unspecified type.  Plan: Pleuritic in nature. Likely from pleural effusion and/or pericardial effusion. Lower suspicion for pericarditis.  Patient hemodynamically stable and saturating well on RA.      - CT chest angio neg for PE, no thrombus of mediport    - Recent echo with 54% EF and shows eccentric LV hypertrophy (dilated LV with normal relative wall thickness).    - Repeat TTE noted with mild reduce EF. 45-50%. Mild reduce RV fx. Trace pericardial effusion.    - Serial trops negative.     - Cards in agreement that unlikely pericarditis.     - Not tamponade.    - NST without inducible ischemia. No further cards w/u inpatient. Cards eval appreciated.    - Cardiac cath 2 yrs ago shows mild non-obstructive CAD.     - There is no clear acutely lethal cause of the patients chest pain. All consultants input appreciated. Close outpatient follow up warranted.          Problem/Plan - 3:    ·  Problem: APL (acute promyelocytic leukemia).  Plan: Patient diagnosed with APL in May 2020, receives ATRA and weekly arsenic. Last chemo treatment due on 9/4/2020. Follows Dr. Hurd at [facility]    - Heme/Onc f/u    - Consolidation tx with arsenic started. Last chemo 9/2.     - Plan for outpt [facility] f/u for additional chemo.          Problem/Plan - 4:    ·  Problem: HLD (hyperlipidemia).  Plan: On home atorvastatin. Increased per cards.          Problem/Plan - 5:    ·  Problem: DVT (deep venous thrombosis).  Plan: Patient had R axillary DVT In July 2020.     - C/w home Eliquis 5mg    - No blood draws from RIGHT arm.          Problem/Plan - 6:    Problem: Hypertension. Plan: Patient takes Atenolol 50mg qd at home for HTN    Monitor bp.         Problem/Plan - 7:    ·  Problem: Diabetes.  Plan: Diabetes mellitus type 2, noninsulin dependent.      - Holding home metformin    - monitor FS, ISS for coverage     - CC/ DASH TLC diet    - Post-herpetic neuralgia and scabbing (zoster infection 6/22-6/30). Outside dose of Gabapentin 100mg TID resumed.          Problem/Plan - 8:    ·  Problem: Prophylactic measure.  Plan: Plan: Diet: Consistent carbohydrate    DVT prophylaxis: Eliquis 5mg    Diet: cc/ dashtlc.         Patient is medically optimized for d/c home today with close outpatient follow up.     Case discussed at length with patients son.     It was explained that although we do not have a definitive diagnosis for the cause of the pleural effusion, his mother does not have a clear medical necessity to remain in the hospital at this time and can continue her care outpatient with pulmonary for repeat imaging and possibly a thoracentesis.     All questions answered.    Pulmonary appointment made as above.         D/c time spent: 75 minutes.

## 2020-09-11 NOTE — DISCHARGE NOTE PROVIDER - PROVIDER TOKENS
PROVIDER:[TOKEN:[[id number]],SCHEDULEDAPPT:[09/17/2020],SCHEDULEDAPPTTIME:[10:00 AM]],PROVIDER:[[id number]],SCHEDULEDAPPT:[09/24/2020],SCHEDULEDAPPTTIME:[11:00 AM]],PROVIDER:[TOKEN:[[id number]],SCHEDULEDAPPT:[09/22/2020],SCHEDULEDAPPTTIME:[12:30 PM]]

## 2020-09-11 NOTE — DISCHARGE NOTE PROVIDER - NSDCMRMEDTOKEN_GEN_ALL_CORE_FT
amoxicillin-clavulanate 875 mg-125 mg oral tablet: 1 tab(s) orally 2 times a day  apixaban 5 mg oral tablet: 1 tab(s) orally every 12 hours  atenolol 50 mg oral tablet: 1 tab(s) orally once a day  atorvastatin 40 mg oral tablet: 1 tab(s) orally once a day (at bedtime)  gabapentin 100 mg oral capsule: 1 cap(s) orally 3 times a day  hydrocortisone 2.5% rectal cream with applicator: 1 application rectal once a day, As needed, daily to anus after each bowel movement.  metFORMIN 500 mg oral tablet: 1 tab(s) orally 2 times a day MDD:2 tablets  metoclopramide 10 mg oral tablet: 1 tab(s) orally 4 times a day (before meals and at bedtime), As Needed  ocular lubricant ophthalmic solution: 1 drop(s) to each affected eye 3 times a day  polyethylene glycol 3350 oral powder for reconstitution: 17 gram(s) orally once a day, As Needed

## 2020-09-13 LAB
CULTURE RESULTS: SIGNIFICANT CHANGE UP
S PNEUM AG UR QL: NEGATIVE — SIGNIFICANT CHANGE UP
SPECIMEN SOURCE: SIGNIFICANT CHANGE UP

## 2020-09-13 RX ORDER — GABAPENTIN 400 MG/1
1 CAPSULE ORAL
Qty: 42 | Refills: 0
Start: 2020-09-13 | End: 2020-09-26

## 2020-09-14 LAB
CULTURE RESULTS: SIGNIFICANT CHANGE UP
SPECIMEN SOURCE: SIGNIFICANT CHANGE UP

## 2020-09-14 NOTE — PROGRESS NOTE ADULT - PROBLEM/PLAN-5
Bill For Surgical Tray: no
Expected Date Of Service: 09/14/2020
Billing Type: Third-Party Bill
Lab Facility: [id number]
Performing Laboratory: [id number]
DISPLAY PLAN FREE TEXT

## 2020-09-16 ENCOUNTER — OUTPATIENT (OUTPATIENT)
Dept: OUTPATIENT SERVICES | Facility: HOSPITAL | Age: 60
LOS: 1 days | Discharge: HOME | End: 2020-09-16

## 2020-09-16 DIAGNOSIS — C92.40 ACUTE PROMYELOCYTIC LEUKEMIA, NOT HAVING ACHIEVED REMISSION: ICD-10-CM

## 2020-09-17 ENCOUNTER — RESULT REVIEW (OUTPATIENT)
Age: 60
End: 2020-09-17

## 2020-09-17 ENCOUNTER — APPOINTMENT (OUTPATIENT)
Dept: HEMATOLOGY ONCOLOGY | Facility: CLINIC | Age: 60
End: 2020-09-17
Payer: MEDICAID

## 2020-09-17 VITALS
WEIGHT: 155.4 LBS | RESPIRATION RATE: 16 BRPM | DIASTOLIC BLOOD PRESSURE: 72 MMHG | OXYGEN SATURATION: 100 % | HEART RATE: 68 BPM | TEMPERATURE: 98.1 F | SYSTOLIC BLOOD PRESSURE: 107 MMHG | BODY MASS INDEX: 28.97 KG/M2

## 2020-09-17 PROCEDURE — 99214 OFFICE O/P EST MOD 30 MIN: CPT

## 2020-09-17 NOTE — PHYSICAL EXAM
[Restricted in physically strenuous activity but ambulatory and able to carry out work of a light or sedentary nature] : Status 1- Restricted in physically strenuous activity but ambulatory and able to carry out work of a light or sedentary nature, e.g., light house work, office work [Normal] : affect appropriate [de-identified] : + Port in R chest wall- no evidence of infection

## 2020-09-17 NOTE — ASSESSMENT
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  She was started on ATRA empirically on 5/23, then on arsenic on 5/27 when the diagnosis was confirmed. 6/23/20 she was found to have a partially occlusive DVT in her right axillary vein, treated with lovenox.  Repeat duplex was negative on 7/16.  PICC removed on 7/10 due to irritation?.  Bone marrow biopsy on 6/25 with persistent disease, repeated on 7/13 no evidence of leukemia.\par \par Started on cycle 1 of consolidation with GALA/ATRA- complicated by 2 admissions for chest pain.  Found to have a small loculated pleural effusion treated with empiric vanco/zosyn followed by augmentin to complete today. \par Give 2 weeks break to improve her appetite/functional status.  \par Will resume GALA/ATRA on 10/5.\par Plan to check a chest Xray/echo the first week of October. \par Check CMP/LDH. \par Decrease atenolol to 25 mg a day.  \par Follow up with pulm/medicine. \par Discussed at length with her son over the phone.

## 2020-09-17 NOTE — HISTORY OF PRESENT ILLNESS
[de-identified] : APL- low risk\par \par 60 year old female who is here for a follow up visit after a hospitalization for acute promyelocytic leukemia.  She initially presented to [facility] for fevers, found to be pancytopenic.  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  She was started on ATRA empirically on 5/23, then on arsenic on 5/27 when the diagnosis was confirmed.  Initially she appeared to have refractory thrombocytopenia which improved.  She was found to be COVID positive on 6/16, transferred to COVID unit, on 6/19 she was COVID negative, antibody positive and was transferred back.  She was treated for an enterococal UTI on 6/20.  Arsenic was held due to zoster from 6/22- 6/30.  6/23 she was found to have a partially occlusive DVT in her right axillary vein, treated with lovenox.  Repeat duplex was negative on 7/16.  PICC replaced in [facility], on incidental CXR on 7/8, was found to be malpositioned, 7/9 PICC was exchanged, then removed on 7/10 due to irritation?.  Bone marrow biopsy on 6/25 with persistent disease, repeated on 7/13 no evidence of leukemia.  Her plt recovered 6/27, ANC by 7/11. [de-identified] : Patient's son present for visit and assisted in translation. \par She has multiple recent admissions for chest pain.  She was admitted from 9/3-9/11- for chest pain, found to have an increased pleural effusion treated for PNA/infection with vanco/zosyn changed to oral augmentin which completes today.  She has diarrhea/bloating while on the antibiotics.  \par She was admitted from 8/22-8/27 for fever/chest pain.  Cultures were negative, she received empiric vanco/zosyn.  \par \par She received 3 weeks of arsenic, last week was missed due to the hospitalizations.  \par She is feeling better, taste is slowly getting better.  Diarrhea due to abx/bloating.  No fever/chills, no chest pain, no SOB, no abdominal pain. \par \par

## 2020-09-17 NOTE — REVIEW OF SYSTEMS
[Abdominal Pain] : no abdominal pain [Vomiting] : no vomiting [Constipation] : no constipation [Diarrhea] : diarrhea [Negative] : Neurological [FreeTextEntry4] : depressed taste [FreeTextEntry7] : bloating

## 2020-09-22 ENCOUNTER — APPOINTMENT (OUTPATIENT)
Dept: FAMILY MEDICINE | Facility: CLINIC | Age: 60
End: 2020-09-22
Payer: MEDICAID

## 2020-09-22 VITALS
HEART RATE: 74 BPM | HEIGHT: 61.42 IN | TEMPERATURE: 97.9 F | BODY MASS INDEX: 29.26 KG/M2 | OXYGEN SATURATION: 98 % | SYSTOLIC BLOOD PRESSURE: 128 MMHG | DIASTOLIC BLOOD PRESSURE: 68 MMHG | WEIGHT: 157 LBS

## 2020-09-22 DIAGNOSIS — H04.209 UNSPECIFIED EPIPHORA, UNSPECIFIED SIDE: ICD-10-CM

## 2020-09-22 DIAGNOSIS — I82.90 ACUTE EMBOLISM AND THROMBOSIS OF UNSPECIFIED VEIN: ICD-10-CM

## 2020-09-22 DIAGNOSIS — R53.1 WEAKNESS: ICD-10-CM

## 2020-09-22 DIAGNOSIS — E78.00 PURE HYPERCHOLESTEROLEMIA, UNSPECIFIED: ICD-10-CM

## 2020-09-22 PROCEDURE — 36415 COLL VENOUS BLD VENIPUNCTURE: CPT

## 2020-09-22 PROCEDURE — 99495 TRANSJ CARE MGMT MOD F2F 14D: CPT | Mod: 25

## 2020-09-22 PROCEDURE — 99204 OFFICE O/P NEW MOD 45 MIN: CPT | Mod: 25

## 2020-09-22 RX ORDER — TRETINOIN 10 MG/1
10 CAPSULE ORAL TWICE DAILY
Refills: 0 | Status: DISCONTINUED | COMMUNITY
Start: 2020-06-30 | End: 2020-09-22

## 2020-09-22 NOTE — REVIEW OF SYSTEMS
[Fatigue] : fatigue [Negative] : Psychiatric [FreeTextEntry3] : watery eyes [FreeTextEntry9] : back pain

## 2020-09-22 NOTE — HEALTH RISK ASSESSMENT
[Good] : ~his/her~  mood as  good [No] : No [No falls in past year] : Patient reported no falls in the past year [0] : 2) Feeling down, depressed, or hopeless: Not at all (0) [Unemployed] : unemployed [] :  [# Of Children ___] : has [unfilled] children [Fully functional (bathing, dressing, toileting, transferring, walking, feeding)] : Fully functional (bathing, dressing, toileting, transferring, walking, feeding) [Independent] : managing medications [Some assistance needed] : managing finances [] : No [[id number]] : 0 [de-identified] : lives with younger brother

## 2020-09-22 NOTE — ASSESSMENT
[FreeTextEntry1] : acute promyelocytic leukemia\par went to the hospital for fatigue and fever, was in the hospital for three months, discharged 9/11/2020\par starting chemo 10/5, seeing shabana, reviewed notes\par \par hospital admission and discharge\par reviewed hospital discharge paperwork\par initially presented to [facility] for fevers, found to be pancytopenic.\par COVID positive on 6/16, transferred to COVID unit, on 6/19 she was COVID negative, antibody positive and was transferred back. \par  zoster from 6/22- 6/30. \par 6/23 she was found to have a partially occlusive DVT in her right axillary vein, treated with lovenox.\par had complications while in the hospital, reconciled medications\par \par arm clot, from picc line\par currently on eliquis\par \par deconditioning\par feeling weak, going to order PT, home visiting nurse for assessment\par \par chest pain, was diagnosed with pna in hospital\par refer to cardio for evaluation for persistent atypical chest pressure\par [facility] engaged\par \par recent pna\par seeing suki galloway in 2 days\par \par diabetes\par The diagnosis of diabetes is established with this patient.  Blood work was drawn in office and results will be reviewed and followed. The patient was counseled on using a low sugar and carbohydrate diet.  Patient was advised to eat small meals and exercise regularly. Patient as advised to take all medications as prescribed and to follow up with yearly podiatry and opthalmology visits. Patient was advised to call office  or go to the ER immediately if experiences any nausea, lightheadedness or for any other issues.\par \par eye tearing\par optho referreal\par \par \par

## 2020-09-22 NOTE — PHYSICAL EXAM
[Well Nourished] : well nourished [Clear to Auscultation] : lungs were clear to auscultation bilaterally [Regular Rhythm] : with a regular rhythm [Normal S1, S2] : normal S1 and S2 [Normal Insight/Judgement] : insight and judgment were intact

## 2020-09-22 NOTE — HISTORY OF PRESENT ILLNESS
[de-identified] : 60 year old female here to establish care.  The patients complete medical, family and social history was documented and reviewed with the patient.  The patients medications were identified and also reviewed with the patient as well as any allergies to any medications. All active and previous medical problems were identified and discussed with the patient.  Any new problems were documented. \par \par brought in by hal fierro\alyssia many current medical issues, does not have pcp

## 2020-09-23 LAB
25(OH)D3 SERPL-MCNC: 26.5 NG/ML
ALBUMIN SERPL ELPH-MCNC: 4.7 G/DL
ALP BLD-CCNC: 100 U/L
ALT SERPL-CCNC: 14 U/L
ANION GAP SERPL CALC-SCNC: 14 MMOL/L
APPEARANCE: CLEAR
AST SERPL-CCNC: 16 U/L
BASOPHILS # BLD AUTO: 0.04 K/UL
BASOPHILS NFR BLD AUTO: 0.5 %
BILIRUB SERPL-MCNC: 0.2 MG/DL
BILIRUBIN URINE: NEGATIVE
BLOOD URINE: NEGATIVE
BUN SERPL-MCNC: 17 MG/DL
CALCIUM SERPL-MCNC: 9.7 MG/DL
CHLORIDE SERPL-SCNC: 101 MMOL/L
CHOLEST SERPL-MCNC: 103 MG/DL
CHOLEST/HDLC SERPL: 3.1 RATIO
CO2 SERPL-SCNC: 25 MMOL/L
COLOR: COLORLESS
CREAT SERPL-MCNC: 0.89 MG/DL
CREAT SPEC-SCNC: 38 MG/DL
EOSINOPHIL # BLD AUTO: 0.1 K/UL
EOSINOPHIL NFR BLD AUTO: 1.3 %
ESTIMATED AVERAGE GLUCOSE: 123 MG/DL
GLUCOSE QUALITATIVE U: NEGATIVE
GLUCOSE SERPL-MCNC: 109 MG/DL
HBA1C MFR BLD HPLC: 5.9 %
HCT VFR BLD CALC: 31.2 %
HDLC SERPL-MCNC: 33 MG/DL
HGB BLD-MCNC: 9 G/DL
IMM GRANULOCYTES NFR BLD AUTO: 0.5 %
KETONES URINE: NEGATIVE
LDLC SERPL CALC-MCNC: 32 MG/DL
LEUKOCYTE ESTERASE URINE: NEGATIVE
LYMPHOCYTES # BLD AUTO: 2 K/UL
LYMPHOCYTES NFR BLD AUTO: 26.8 %
MAN DIFF?: NORMAL
MCHC RBC-ENTMCNC: 26.1 PG
MCHC RBC-ENTMCNC: 28.8 GM/DL
MCV RBC AUTO: 90.4 FL
MICROALBUMIN 24H UR DL<=1MG/L-MCNC: <1.2 MG/DL
MICROALBUMIN/CREAT 24H UR-RTO: NORMAL MG/G
MONOCYTES # BLD AUTO: 0.48 K/UL
MONOCYTES NFR BLD AUTO: 6.4 %
NEUTROPHILS # BLD AUTO: 4.8 K/UL
NEUTROPHILS NFR BLD AUTO: 64.5 %
NITRITE URINE: NEGATIVE
PH URINE: 6
PLATELET # BLD AUTO: 241 K/UL
POTASSIUM SERPL-SCNC: 4.3 MMOL/L
PROT SERPL-MCNC: 7.1 G/DL
PROTEIN URINE: NEGATIVE
RBC # BLD: 3.45 M/UL
RBC # FLD: 15.5 %
SODIUM SERPL-SCNC: 140 MMOL/L
SPECIFIC GRAVITY URINE: 1.01
TRIGL SERPL-MCNC: 188 MG/DL
TSH SERPL-ACNC: 0.59 UIU/ML
UROBILINOGEN URINE: NORMAL
WBC # FLD AUTO: 7.46 K/UL

## 2020-09-24 ENCOUNTER — APPOINTMENT (OUTPATIENT)
Dept: PULMONOLOGY | Facility: CLINIC | Age: 60
End: 2020-09-24
Payer: MEDICAID

## 2020-09-24 VITALS
TEMPERATURE: 97.2 F | HEIGHT: 61 IN | BODY MASS INDEX: 29.64 KG/M2 | OXYGEN SATURATION: 99 % | DIASTOLIC BLOOD PRESSURE: 73 MMHG | RESPIRATION RATE: 15 BRPM | HEART RATE: 75 BPM | SYSTOLIC BLOOD PRESSURE: 113 MMHG | WEIGHT: 157 LBS

## 2020-09-24 PROCEDURE — 99215 OFFICE O/P EST HI 40 MIN: CPT

## 2020-10-01 ENCOUNTER — APPOINTMENT (OUTPATIENT)
Dept: PULMONOLOGY | Facility: CLINIC | Age: 60
End: 2020-10-01
Payer: MEDICAID

## 2020-10-01 ENCOUNTER — OUTPATIENT (OUTPATIENT)
Dept: OUTPATIENT SERVICES | Facility: HOSPITAL | Age: 60
LOS: 1 days | Discharge: HOME | End: 2020-10-01
Payer: MEDICAID

## 2020-10-01 VITALS
WEIGHT: 162 LBS | HEART RATE: 75 BPM | BODY MASS INDEX: 30.61 KG/M2 | DIASTOLIC BLOOD PRESSURE: 67 MMHG | RESPIRATION RATE: 16 BRPM | SYSTOLIC BLOOD PRESSURE: 103 MMHG | TEMPERATURE: 97.6 F

## 2020-10-01 DIAGNOSIS — C92.40 ACUTE PROMYELOCYTIC LEUKEMIA, NOT HAVING ACHIEVED REMISSION: ICD-10-CM

## 2020-10-01 DIAGNOSIS — Z87.01 PERSONAL HISTORY OF PNEUMONIA (RECURRENT): ICD-10-CM

## 2020-10-01 PROCEDURE — 99214 OFFICE O/P EST MOD 30 MIN: CPT

## 2020-10-01 NOTE — PHYSICAL EXAM
[No Acute Distress] : no acute distress [Normal Appearance] : normal appearance [No Neck Mass] : no neck mass [No JVD] : no jvd [Normal Rate/Rhythm] : normal rate/rhythm [Normal S1, S2] : normal s1, s2 [No Resp Distress] : no resp distress [Normal Gait] : normal gait [No Clubbing] : no clubbing [No Edema] : no edema [Normal Color/ Pigmentation] : normal color/ pigmentation [No Focal Deficits] : no focal deficits [Normal Affect] : normal affect [TextBox_11] : face mask [TextBox_68] : dullness to percussion and decreased breath sounds left chest posteriorly; crackles at left anterior chest [TextBox_80] : IV port

## 2020-10-01 NOTE — PHYSICAL EXAM
[No Acute Distress] : no acute distress [Normal Appearance] : normal appearance [Normal Rate/Rhythm] : normal rate/rhythm [Normal S1, S2] : normal s1, s2 [No Murmurs] : no murmurs [No Resp Distress] : no resp distress [Clear to Auscultation Bilaterally] : clear to auscultation bilaterally [No Clubbing] : no clubbing [No Cyanosis] : no cyanosis [No Edema] : no edema [Normal Color/ Pigmentation] : normal color/ pigmentation [No Focal Deficits] : no focal deficits [Oriented x3] : oriented x3 [Normal Affect] : normal affect [TextBox_68] : crackles left lung [TextBox_80] : IV port

## 2020-10-01 NOTE — ASSESSMENT
[FreeTextEntry1] : 60 year old female with promyelocytic leukemia, recent pneumonia with loculated effusion unable to be tapped.  The pleural effusion and pericardial effusion are presumed to be infectious in etiology.  She presents today without any complaints.  \par Ultrasound exam of chest shows very little in the way of pleural or pericardial fluid\par Continue Augmentin until completed.  Will need a repeat CT scan.   \par \par I spoke ti the son and told him to contact me in 2-3 weeks and will get follow up CT chest

## 2020-10-01 NOTE — HISTORY OF PRESENT ILLNESS
[TextBox_4] : 60 year old female with daughter and son interpreting on the phone presents for a follow up with recent pneumonia.  She still has 3-4 more days of Augmentin.  She has a complaint of mid back pain.  Pt denies fever, chills, shortness of breath, chest tightness, cough.

## 2020-10-01 NOTE — HISTORY OF PRESENT ILLNESS
[TextBox_4] : 60-year-old female accompanied by her daughter who helps translate as well as her son who was present on the telephone. The patient was recently discharged from the hospital having been treated for promyelocytic leukemia, urinary tract infection, axillary vein thrombosis, herpes zoster, and a new onset small loculated pleural effusion and pericardial effusion. The patient was treated with Zosyn and vancomycin for one week and then placed on Augmentin and which she is still taking.. The presumption was that she had a pneumonia with a parapneumonic effusion. Currently she denies any fevers but does continue to have chest discomfort on the left. She denies any coughing. A CT PA failed to disclose any pulmonary emboli. \par . The patient has a history of active tuberculosis dating back at least 35 years. Unfortunately she does not remember the medications that she took but she believes she took them for at least 9 months.

## 2020-10-01 NOTE — ASSESSMENT
[FreeTextEntry1] : I spoke at length to the son via telephone and to the daughter who was present. Currently his pneumonia is still under treatment as is the parapneumonic effusion. The effusion is loculated and thus was not accessible to tapping. The etiology of the pleural and pericardial effusions is not completely known but is currently being treated as if it were an acquired infection. The patient received diuretics in the hospital. A history of tuberculosis remains somewhat of a wild card.\par My plan is currently to just keep this under observation and continue the Augmentin until its completion. I will followup the patient next week. I asked the family to monitor her temperature and if she were to become increasingly dyspneic, or have increasing chest pain, or develop a fever over 100 I said to let me know. She is scheduled for further chemotherapy on October 5.

## 2020-10-01 NOTE — REVIEW OF SYSTEMS
[Fatigue] : fatigue [Chest Discomfort] : chest discomfort [Arthralgias] : arthralgias [Anemia] : anemia [Diabetes] : diabetes [Negative] : Psychiatric [TextBox_30] : has a history of present illness [TextBox_113] : as in history of present illness

## 2020-10-01 NOTE — REASON FOR VISIT
[Follow-Up - From Hospitalization] : a follow-up visit after a recent hospitalization [Abnormal CXR/ Chest CT] : an abnormal CXR/ chest CT [Family Member] : family member [TextBox_44] : pleural effusion

## 2020-10-02 ENCOUNTER — APPOINTMENT (OUTPATIENT)
Dept: CARDIOLOGY | Facility: CLINIC | Age: 60
End: 2020-10-02
Payer: MEDICAID

## 2020-10-02 VITALS
WEIGHT: 162 LBS | BODY MASS INDEX: 30.58 KG/M2 | DIASTOLIC BLOOD PRESSURE: 80 MMHG | HEIGHT: 61 IN | SYSTOLIC BLOOD PRESSURE: 129 MMHG | HEART RATE: 66 BPM

## 2020-10-02 PROCEDURE — 99214 OFFICE O/P EST MOD 30 MIN: CPT

## 2020-10-02 NOTE — PHYSICAL EXAM
[General Appearance - Well Developed] : well developed [Normal Appearance] : normal appearance [Well Groomed] : well groomed [General Appearance - Well Nourished] : well nourished [No Deformities] : no deformities [General Appearance - In No Acute Distress] : no acute distress [Normal Conjunctiva] : the conjunctiva exhibited no abnormalities [Eyelids - No Xanthelasma] : the eyelids demonstrated no xanthelasmas [Normal Oral Mucosa] : normal oral mucosa [No Oral Pallor] : no oral pallor [No Oral Cyanosis] : no oral cyanosis [Normal Jugular Venous A Waves Present] : normal jugular venous A waves present [Normal Jugular Venous V Waves Present] : normal jugular venous V waves present [No Jugular Venous Higgins A Waves] : no jugular venous higgins A waves [Respiration, Rhythm And Depth] : normal respiratory rhythm and effort [Exaggerated Use Of Accessory Muscles For Inspiration] : no accessory muscle use [Auscultation Breath Sounds / Voice Sounds] : lungs were clear to auscultation bilaterally [Heart Rate And Rhythm] : heart rate and rhythm were normal [Heart Sounds] : normal S1 and S2 [Murmurs] : no murmurs present [Abdomen Soft] : soft [Abdomen Tenderness] : non-tender [Abdomen Mass (___ Cm)] : no abdominal mass palpated [Abnormal Walk] : normal gait [Gait - Sufficient For Exercise Testing] : the gait was sufficient for exercise testing [Nail Clubbing] : no clubbing of the fingernails [Cyanosis, Localized] : no localized cyanosis [Petechial Hemorrhages (___cm)] : no petechial hemorrhages [Skin Color & Pigmentation] : normal skin color and pigmentation [] : no rash [No Venous Stasis] : no venous stasis [Skin Lesions] : no skin lesions [No Skin Ulcers] : no skin ulcer [No Xanthoma] : no  xanthoma was observed [Oriented To Time, Place, And Person] : oriented to person, place, and time [Affect] : the affect was normal [Mood] : the mood was normal [No Anxiety] : not feeling anxious

## 2020-10-02 NOTE — HISTORY OF PRESENT ILLNESS
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 Suspicion for PNA, tx with antibiotics with subsequent improvement in effusion.  Over the course of hospitalization, she had a NST which showed perfufsion defects suspicious for artifact +/- infarct without ischemia. Initial Echo normal, subsequent echo with mild decline in EF and trivial pericardial effusion. Ultrasound done 10/1 at Dr. Mendez's office showed minimal pericardial and pleural effusion. Dx with R axillary DVT in July 2020, on Eliquis (may have been from a PICC line). \par \par She notes continued L back pain, worse with inspiration. Also notes dyspnea on exertion, all worse since hospitalization. Feels very weak. \par \par Accompanied by son who has elected to translate. \par \par ECG: SR, no ST-T wave changes\par TTE 2020:\par \par 1. Normal mitral valve. Minimal mitral regurgitation.\par 2. Aortic valve not well visualized; appears to be a\par calcified trileaflet valve with normal opening. No aortic\par valve regurgitation seen.\par 3. Endocardium not well visualized; grossly mild left\par ventricular systolic dysfunction. The septum is\par hypokinetic. Consider limited repeat imaging with\par intravenous echo contrast to better visualize the LV if\par clinically indicated. (Patient did not have intravenous\par access at the time of this study.)\par 4. Normal right ventricular size with mildly decreased\par right ventricular systolic function. TAPSE=1.4 cm. Systolic\par TDI=7 cm/sec.\par 5. Trace pericardial effusion.\par 6. Left pleural effusion.\par *** No previous Echo exam.\par \par NST 2020:\par \par IMPRESSIONS:Abnormal Study\par * There are no significant ECG changes to suggest\par ischemia.\par * There are moderate to large, moderate perfusion defects\par in apical, apical lateral, mid to distal anterior walls\par that are predominantly fixed, but correct with prone\par imaging therefore no clear evidence of ischemia or\par infarct.\par * Post-stress gated wall motion analysis was performed\par (LVEF = 47 %;LVEDV = 62 ml.), revealing moderate\par hypokinesis in mid to distal anterior and anteroseptal,\par septal, apical anterior, distal inferolateral wall(s).\par \par Atenolol 50mg, Atorvastatin 10mg, Eliquis 5mg BID\par

## 2020-10-02 NOTE — DISCUSSION/SUMMARY
[FreeTextEntry1] : 60F\par \par 1. Pericardial effusion: ?2/2 pna vs malignancy. Trace yesterday at pulmonary office. Stable vital signs\par \par -Check echo\par \par 2. Mild LV dysfunction- NST negative for ischemia. Can consider further testing once malignancy is treated.\par \par -Cont BB, consider adding ACEi. Hold off for now\par \par 3. CP: Pleuritic, L sided near back. Likely related to recent effusion and PNA. NST negative for ischeima. Continue to trend\par \par 4. HTN: Well controlled, continue current medications\par \par Echo in 1 month\par RV 3 months

## 2020-10-02 NOTE — REVIEW OF SYSTEMS
[Fever] : no fever [Chills] : no chills [Shortness Of Breath] : shortness of breath [Chest  Pressure] : no chest pressure [Chest Pain] : chest pain [Lower Ext Edema] : no extremity edema [Leg Claudication] : no intermittent leg claudication [Palpitations] : no palpitations [Abdominal Pain] : no abdominal pain [Heartburn] : no heartburn [Dysphagia] : no dysphagia [Dysuria] : no dysuria [Incontinence] : no incontinence [Joint Pain] : no joint pain [Muscle Cramps] : no muscle cramps [Dizziness] : no dizziness [Convulsions] : no convulsions [Confusion] : no confusion was observed [Anxiety] : no anxiety

## 2020-10-05 ENCOUNTER — LABORATORY RESULT (OUTPATIENT)
Age: 60
End: 2020-10-05

## 2020-10-05 ENCOUNTER — RESULT REVIEW (OUTPATIENT)
Age: 60
End: 2020-10-05

## 2020-10-05 ENCOUNTER — APPOINTMENT (OUTPATIENT)
Dept: INFUSION THERAPY | Facility: HOSPITAL | Age: 60
End: 2020-10-05
Payer: MEDICAID

## 2020-10-05 LAB
BASOPHILS # BLD AUTO: 0.02 K/UL — SIGNIFICANT CHANGE UP (ref 0–0.2)
BASOPHILS NFR BLD AUTO: 0.4 % — SIGNIFICANT CHANGE UP (ref 0–2)
BUN SERPL-MCNC: 16 MG/DL — SIGNIFICANT CHANGE UP (ref 7–23)
CA-I BLDA-SCNC: 1.25 MMOL/L — SIGNIFICANT CHANGE UP (ref 1.12–1.3)
CHLORIDE SERPL-SCNC: 101 MMOL/L — SIGNIFICANT CHANGE UP (ref 96–108)
CO2 SERPL-SCNC: 25 MMOL/L — SIGNIFICANT CHANGE UP (ref 22–31)
CREAT SERPL-MCNC: 0.8 MG/DL — SIGNIFICANT CHANGE UP (ref 0.5–1.3)
EOSINOPHIL # BLD AUTO: 0.1 K/UL — SIGNIFICANT CHANGE UP (ref 0–0.5)
EOSINOPHIL NFR BLD AUTO: 1.9 % — SIGNIFICANT CHANGE UP (ref 0–6)
GLUCOSE SERPL-MCNC: 152 MG/DL — HIGH (ref 70–99)
HCT VFR BLD CALC: 29.8 % — LOW (ref 34.5–45)
HGB BLD-MCNC: 9.1 G/DL — LOW (ref 11.5–15.5)
IMM GRANULOCYTES NFR BLD AUTO: 0.4 % — SIGNIFICANT CHANGE UP (ref 0–1.5)
LYMPHOCYTES # BLD AUTO: 2.04 K/UL — SIGNIFICANT CHANGE UP (ref 1–3.3)
LYMPHOCYTES # BLD AUTO: 39.8 % — SIGNIFICANT CHANGE UP (ref 13–44)
MCHC RBC-ENTMCNC: 26.3 PG — LOW (ref 27–34)
MCHC RBC-ENTMCNC: 30.5 G/DL — LOW (ref 32–36)
MCV RBC AUTO: 86.1 FL — SIGNIFICANT CHANGE UP (ref 80–100)
MONOCYTES # BLD AUTO: 0.31 K/UL — SIGNIFICANT CHANGE UP (ref 0–0.9)
MONOCYTES NFR BLD AUTO: 6 % — SIGNIFICANT CHANGE UP (ref 2–14)
NEUTROPHILS # BLD AUTO: 2.64 K/UL — SIGNIFICANT CHANGE UP (ref 1.8–7.4)
NEUTROPHILS NFR BLD AUTO: 51.5 % — SIGNIFICANT CHANGE UP (ref 43–77)
NRBC # BLD: 0 /100 WBCS — SIGNIFICANT CHANGE UP (ref 0–0)
PLATELET # BLD AUTO: 191 K/UL — SIGNIFICANT CHANGE UP (ref 150–400)
POTASSIUM SERPL-MCNC: 3.9 MMOL/L — SIGNIFICANT CHANGE UP (ref 3.5–5.3)
POTASSIUM SERPL-SCNC: 3.9 MMOL/L — SIGNIFICANT CHANGE UP (ref 3.5–5.3)
RBC # BLD: 3.46 M/UL — LOW (ref 3.8–5.2)
RBC # FLD: 15.8 % — HIGH (ref 10.3–14.5)
SODIUM SERPL-SCNC: 140 MMOL/L — SIGNIFICANT CHANGE UP (ref 135–145)
WBC # BLD: 5.13 K/UL — SIGNIFICANT CHANGE UP (ref 3.8–10.5)
WBC # FLD AUTO: 5.13 K/UL — SIGNIFICANT CHANGE UP (ref 3.8–10.5)

## 2020-10-05 PROCEDURE — 93010 ELECTROCARDIOGRAM REPORT: CPT

## 2020-10-05 PROCEDURE — G0008: CPT

## 2020-10-06 ENCOUNTER — APPOINTMENT (OUTPATIENT)
Dept: INFUSION THERAPY | Facility: HOSPITAL | Age: 60
End: 2020-10-06

## 2020-10-06 DIAGNOSIS — Z51.11 ENCOUNTER FOR ANTINEOPLASTIC CHEMOTHERAPY: ICD-10-CM

## 2020-10-06 DIAGNOSIS — R11.2 NAUSEA WITH VOMITING, UNSPECIFIED: ICD-10-CM

## 2020-10-06 RX ORDER — METFORMIN HYDROCHLORIDE 500 MG/1
500 TABLET, COATED ORAL
Qty: 60 | Refills: 0 | Status: DISCONTINUED | COMMUNITY
Start: 2020-08-14 | End: 2020-10-06

## 2020-10-07 ENCOUNTER — APPOINTMENT (OUTPATIENT)
Dept: INFUSION THERAPY | Facility: HOSPITAL | Age: 60
End: 2020-10-07

## 2020-10-08 ENCOUNTER — RESULT REVIEW (OUTPATIENT)
Age: 60
End: 2020-10-08

## 2020-10-08 ENCOUNTER — APPOINTMENT (OUTPATIENT)
Dept: INFUSION THERAPY | Facility: HOSPITAL | Age: 60
End: 2020-10-08

## 2020-10-08 LAB
BASOPHILS # BLD AUTO: 0.02 K/UL — SIGNIFICANT CHANGE UP (ref 0–0.2)
BASOPHILS NFR BLD AUTO: 0.4 % — SIGNIFICANT CHANGE UP (ref 0–2)
EOSINOPHIL # BLD AUTO: 0.06 K/UL — SIGNIFICANT CHANGE UP (ref 0–0.5)
EOSINOPHIL NFR BLD AUTO: 1.1 % — SIGNIFICANT CHANGE UP (ref 0–6)
HCT VFR BLD CALC: 29.5 % — LOW (ref 34.5–45)
HGB BLD-MCNC: 9.2 G/DL — LOW (ref 11.5–15.5)
IMM GRANULOCYTES NFR BLD AUTO: 1.3 % — SIGNIFICANT CHANGE UP (ref 0–1.5)
LYMPHOCYTES # BLD AUTO: 1.71 K/UL — SIGNIFICANT CHANGE UP (ref 1–3.3)
LYMPHOCYTES # BLD AUTO: 31.3 % — SIGNIFICANT CHANGE UP (ref 13–44)
MCHC RBC-ENTMCNC: 26.2 PG — LOW (ref 27–34)
MCHC RBC-ENTMCNC: 31.2 G/DL — LOW (ref 32–36)
MCV RBC AUTO: 84 FL — SIGNIFICANT CHANGE UP (ref 80–100)
MONOCYTES # BLD AUTO: 0.38 K/UL — SIGNIFICANT CHANGE UP (ref 0–0.9)
MONOCYTES NFR BLD AUTO: 7 % — SIGNIFICANT CHANGE UP (ref 2–14)
NEUTROPHILS # BLD AUTO: 3.22 K/UL — SIGNIFICANT CHANGE UP (ref 1.8–7.4)
NEUTROPHILS NFR BLD AUTO: 58.9 % — SIGNIFICANT CHANGE UP (ref 43–77)
NRBC # BLD: 0 /100 WBCS — SIGNIFICANT CHANGE UP (ref 0–0)
PLATELET # BLD AUTO: 188 K/UL — SIGNIFICANT CHANGE UP (ref 150–400)
RBC # BLD: 3.51 M/UL — LOW (ref 3.8–5.2)
RBC # FLD: 15.5 % — HIGH (ref 10.3–14.5)
WBC # BLD: 5.46 K/UL — SIGNIFICANT CHANGE UP (ref 3.8–10.5)
WBC # FLD AUTO: 5.46 K/UL — SIGNIFICANT CHANGE UP (ref 3.8–10.5)

## 2020-10-09 ENCOUNTER — INPATIENT (INPATIENT)
Facility: HOSPITAL | Age: 60
LOS: 5 days | Discharge: HOME | DRG: 314 | End: 2020-10-15
Attending: HOSPITALIST | Admitting: SPECIALIST
Payer: MEDICAID

## 2020-10-09 ENCOUNTER — APPOINTMENT (OUTPATIENT)
Dept: INFUSION THERAPY | Facility: HOSPITAL | Age: 60
End: 2020-10-09

## 2020-10-09 VITALS
TEMPERATURE: 99 F | SYSTOLIC BLOOD PRESSURE: 111 MMHG | HEART RATE: 82 BPM | HEIGHT: 63 IN | DIASTOLIC BLOOD PRESSURE: 73 MMHG | OXYGEN SATURATION: 98 %

## 2020-10-09 DIAGNOSIS — R50.9 FEVER, UNSPECIFIED: ICD-10-CM

## 2020-10-09 LAB
ALBUMIN SERPL ELPH-MCNC: 4.5 G/DL — SIGNIFICANT CHANGE UP (ref 3.3–5)
ALP SERPL-CCNC: 87 U/L — SIGNIFICANT CHANGE UP (ref 40–120)
ALT FLD-CCNC: 11 U/L — SIGNIFICANT CHANGE UP (ref 10–45)
ANION GAP SERPL CALC-SCNC: 12 MMOL/L — SIGNIFICANT CHANGE UP (ref 5–17)
APPEARANCE UR: CLEAR — SIGNIFICANT CHANGE UP
APTT BLD: 38.2 SEC — HIGH (ref 27.5–35.5)
AST SERPL-CCNC: 12 U/L — SIGNIFICANT CHANGE UP (ref 10–40)
BASE EXCESS BLDV CALC-SCNC: -0.2 MMOL/L — SIGNIFICANT CHANGE UP (ref -2–2)
BASE EXCESS BLDV CALC-SCNC: 0.5 MMOL/L — SIGNIFICANT CHANGE UP (ref -2–2)
BASOPHILS # BLD AUTO: 0.02 K/UL — SIGNIFICANT CHANGE UP (ref 0–0.2)
BASOPHILS NFR BLD AUTO: 0.4 % — SIGNIFICANT CHANGE UP (ref 0–2)
BILIRUB SERPL-MCNC: 0.2 MG/DL — SIGNIFICANT CHANGE UP (ref 0.2–1.2)
BILIRUB UR-MCNC: NEGATIVE — SIGNIFICANT CHANGE UP
BUN SERPL-MCNC: 14 MG/DL — SIGNIFICANT CHANGE UP (ref 7–23)
CA-I SERPL-SCNC: 1.21 MMOL/L — SIGNIFICANT CHANGE UP (ref 1.12–1.3)
CA-I SERPL-SCNC: 1.29 MMOL/L — SIGNIFICANT CHANGE UP (ref 1.12–1.3)
CALCIUM SERPL-MCNC: 9.6 MG/DL — SIGNIFICANT CHANGE UP (ref 8.4–10.5)
CHLORIDE BLDV-SCNC: 103 MMOL/L — SIGNIFICANT CHANGE UP (ref 96–108)
CHLORIDE BLDV-SCNC: 105 MMOL/L — SIGNIFICANT CHANGE UP (ref 96–108)
CHLORIDE SERPL-SCNC: 101 MMOL/L — SIGNIFICANT CHANGE UP (ref 96–108)
CO2 BLDV-SCNC: 27 MMOL/L — SIGNIFICANT CHANGE UP (ref 22–30)
CO2 BLDV-SCNC: 28 MMOL/L — SIGNIFICANT CHANGE UP (ref 22–30)
CO2 SERPL-SCNC: 26 MMOL/L — SIGNIFICANT CHANGE UP (ref 22–31)
COLOR SPEC: COLORLESS — SIGNIFICANT CHANGE UP
CREAT SERPL-MCNC: 0.83 MG/DL — SIGNIFICANT CHANGE UP (ref 0.5–1.3)
DIFF PNL FLD: NEGATIVE — SIGNIFICANT CHANGE UP
EOSINOPHIL # BLD AUTO: 0.05 K/UL — SIGNIFICANT CHANGE UP (ref 0–0.5)
EOSINOPHIL NFR BLD AUTO: 0.9 % — SIGNIFICANT CHANGE UP (ref 0–6)
GAS PNL BLDV: 138 MMOL/L — SIGNIFICANT CHANGE UP (ref 135–145)
GAS PNL BLDV: 140 MMOL/L — SIGNIFICANT CHANGE UP (ref 135–145)
GAS PNL BLDV: SIGNIFICANT CHANGE UP
GAS PNL BLDV: SIGNIFICANT CHANGE UP
GLUCOSE BLDV-MCNC: 104 MG/DL — HIGH (ref 70–99)
GLUCOSE BLDV-MCNC: 133 MG/DL — HIGH (ref 70–99)
GLUCOSE SERPL-MCNC: 135 MG/DL — HIGH (ref 70–99)
GLUCOSE UR QL: NEGATIVE — SIGNIFICANT CHANGE UP
HCO3 BLDV-SCNC: 25 MMOL/L — SIGNIFICANT CHANGE UP (ref 21–29)
HCO3 BLDV-SCNC: 27 MMOL/L — SIGNIFICANT CHANGE UP (ref 21–29)
HCT VFR BLD CALC: 28.4 % — LOW (ref 34.5–45)
HCT VFR BLDA CALC: 25 % — LOW (ref 39–50)
HCT VFR BLDA CALC: 27 % — LOW (ref 39–50)
HGB BLD CALC-MCNC: 7.9 G/DL — LOW (ref 11.5–15.5)
HGB BLD CALC-MCNC: 8.8 G/DL — LOW (ref 11.5–15.5)
HGB BLD-MCNC: 8.8 G/DL — LOW (ref 11.5–15.5)
IMM GRANULOCYTES NFR BLD AUTO: 0.5 % — SIGNIFICANT CHANGE UP (ref 0–1.5)
INR BLD: 1.71 RATIO — HIGH (ref 0.88–1.16)
KETONES UR-MCNC: NEGATIVE — SIGNIFICANT CHANGE UP
LACTATE BLDV-MCNC: 2 MMOL/L — SIGNIFICANT CHANGE UP (ref 0.7–2)
LACTATE BLDV-MCNC: 2.3 MMOL/L — HIGH (ref 0.7–2)
LEUKOCYTE ESTERASE UR-ACNC: NEGATIVE — SIGNIFICANT CHANGE UP
LYMPHOCYTES # BLD AUTO: 1.84 K/UL — SIGNIFICANT CHANGE UP (ref 1–3.3)
LYMPHOCYTES # BLD AUTO: 33.7 % — SIGNIFICANT CHANGE UP (ref 13–44)
MCHC RBC-ENTMCNC: 26 PG — LOW (ref 27–34)
MCHC RBC-ENTMCNC: 31 GM/DL — LOW (ref 32–36)
MCV RBC AUTO: 84 FL — SIGNIFICANT CHANGE UP (ref 80–100)
MONOCYTES # BLD AUTO: 0.48 K/UL — SIGNIFICANT CHANGE UP (ref 0–0.9)
MONOCYTES NFR BLD AUTO: 8.8 % — SIGNIFICANT CHANGE UP (ref 2–14)
NEUTROPHILS # BLD AUTO: 3.04 K/UL — SIGNIFICANT CHANGE UP (ref 1.8–7.4)
NEUTROPHILS NFR BLD AUTO: 55.7 % — SIGNIFICANT CHANGE UP (ref 43–77)
NITRITE UR-MCNC: NEGATIVE — SIGNIFICANT CHANGE UP
NRBC # BLD: 0 /100 WBCS — SIGNIFICANT CHANGE UP (ref 0–0)
PCO2 BLDV: 51 MMHG — HIGH (ref 35–50)
PCO2 BLDV: 55 MMHG — HIGH (ref 35–50)
PH BLDV: 7.31 — LOW (ref 7.35–7.45)
PH BLDV: 7.32 — LOW (ref 7.35–7.45)
PH UR: 6.5 — SIGNIFICANT CHANGE UP (ref 5–8)
PLATELET # BLD AUTO: 188 K/UL — SIGNIFICANT CHANGE UP (ref 150–400)
PO2 BLDV: 29 MMHG — SIGNIFICANT CHANGE UP (ref 25–45)
PO2 BLDV: 52 MMHG — HIGH (ref 25–45)
POTASSIUM BLDV-SCNC: 3.7 MMOL/L — SIGNIFICANT CHANGE UP (ref 3.5–5.3)
POTASSIUM BLDV-SCNC: 4.1 MMOL/L — SIGNIFICANT CHANGE UP (ref 3.5–5.3)
POTASSIUM SERPL-MCNC: 3.9 MMOL/L — SIGNIFICANT CHANGE UP (ref 3.5–5.3)
POTASSIUM SERPL-SCNC: 3.9 MMOL/L — SIGNIFICANT CHANGE UP (ref 3.5–5.3)
PROT SERPL-MCNC: 7.3 G/DL — SIGNIFICANT CHANGE UP (ref 6–8.3)
PROT UR-MCNC: NEGATIVE — SIGNIFICANT CHANGE UP
PROTHROM AB SERPL-ACNC: 20 SEC — HIGH (ref 10.6–13.6)
RBC # BLD: 3.38 M/UL — LOW (ref 3.8–5.2)
RBC # FLD: 15.6 % — HIGH (ref 10.3–14.5)
SAO2 % BLDV: 42 % — LOW (ref 67–88)
SAO2 % BLDV: 82 % — SIGNIFICANT CHANGE UP (ref 67–88)
SARS-COV-2 RNA SPEC QL NAA+PROBE: SIGNIFICANT CHANGE UP
SODIUM SERPL-SCNC: 139 MMOL/L — SIGNIFICANT CHANGE UP (ref 135–145)
SP GR SPEC: 1 — LOW (ref 1.01–1.02)
TROPONIN T, HIGH SENSITIVITY RESULT: 11 NG/L — SIGNIFICANT CHANGE UP (ref 0–51)
TROPONIN T, HIGH SENSITIVITY RESULT: 12 NG/L — SIGNIFICANT CHANGE UP (ref 0–51)
UROBILINOGEN FLD QL: NEGATIVE — SIGNIFICANT CHANGE UP
WBC # BLD: 5.46 K/UL — SIGNIFICANT CHANGE UP (ref 3.8–10.5)
WBC # FLD AUTO: 5.46 K/UL — SIGNIFICANT CHANGE UP (ref 3.8–10.5)

## 2020-10-09 PROCEDURE — 99291 CRITICAL CARE FIRST HOUR: CPT

## 2020-10-09 PROCEDURE — 71260 CT THORAX DX C+: CPT | Mod: 26

## 2020-10-09 PROCEDURE — 93010 ELECTROCARDIOGRAM REPORT: CPT

## 2020-10-09 PROCEDURE — 74177 CT ABD & PELVIS W/CONTRAST: CPT | Mod: 26

## 2020-10-09 RX ORDER — APIXABAN 2.5 MG/1
5 TABLET, FILM COATED ORAL EVERY 12 HOURS
Refills: 0 | Status: DISCONTINUED | OUTPATIENT
Start: 2020-10-10 | End: 2020-10-12

## 2020-10-09 RX ORDER — HYDROCORTISONE 1 %
1 OINTMENT (GRAM) TOPICAL DAILY
Refills: 0 | Status: DISCONTINUED | OUTPATIENT
Start: 2020-10-09 | End: 2020-10-10

## 2020-10-09 RX ORDER — VANCOMYCIN HCL 1 G
1250 VIAL (EA) INTRAVENOUS EVERY 12 HOURS
Refills: 0 | Status: DISCONTINUED | OUTPATIENT
Start: 2020-10-10 | End: 2020-10-13

## 2020-10-09 RX ORDER — ONDANSETRON 8 MG/1
4 TABLET, FILM COATED ORAL ONCE
Refills: 0 | Status: DISCONTINUED | OUTPATIENT
Start: 2020-10-09 | End: 2020-10-09

## 2020-10-09 RX ORDER — VANCOMYCIN HCL 1 G
1000 VIAL (EA) INTRAVENOUS ONCE
Refills: 0 | Status: COMPLETED | OUTPATIENT
Start: 2020-10-09 | End: 2020-10-09

## 2020-10-09 RX ORDER — PIPERACILLIN AND TAZOBACTAM 4; .5 G/20ML; G/20ML
3.38 INJECTION, POWDER, LYOPHILIZED, FOR SOLUTION INTRAVENOUS EVERY 8 HOURS
Refills: 0 | Status: DISCONTINUED | OUTPATIENT
Start: 2020-10-09 | End: 2020-10-13

## 2020-10-09 RX ORDER — PIPERACILLIN AND TAZOBACTAM 4; .5 G/20ML; G/20ML
3.38 INJECTION, POWDER, LYOPHILIZED, FOR SOLUTION INTRAVENOUS ONCE
Refills: 0 | Status: COMPLETED | OUTPATIENT
Start: 2020-10-09 | End: 2020-10-09

## 2020-10-09 RX ORDER — METOCLOPRAMIDE HCL 10 MG
10 TABLET ORAL ONCE
Refills: 0 | Status: COMPLETED | OUTPATIENT
Start: 2020-10-09 | End: 2020-10-09

## 2020-10-09 RX ORDER — ACETAMINOPHEN 500 MG
975 TABLET ORAL ONCE
Refills: 0 | Status: COMPLETED | OUTPATIENT
Start: 2020-10-09 | End: 2020-10-09

## 2020-10-09 RX ORDER — CHLORHEXIDINE GLUCONATE 213 G/1000ML
1 SOLUTION TOPICAL
Refills: 0 | Status: DISCONTINUED | OUTPATIENT
Start: 2020-10-09 | End: 2020-10-12

## 2020-10-09 RX ORDER — NOREPINEPHRINE BITARTRATE/D5W 8 MG/250ML
0.05 PLASTIC BAG, INJECTION (ML) INTRAVENOUS
Qty: 8 | Refills: 0 | Status: DISCONTINUED | OUTPATIENT
Start: 2020-10-09 | End: 2020-10-09

## 2020-10-09 RX ORDER — ATORVASTATIN CALCIUM 80 MG/1
40 TABLET, FILM COATED ORAL AT BEDTIME
Refills: 0 | Status: DISCONTINUED | OUTPATIENT
Start: 2020-10-09 | End: 2020-10-15

## 2020-10-09 RX ORDER — SODIUM CHLORIDE 9 MG/ML
1000 INJECTION INTRAMUSCULAR; INTRAVENOUS; SUBCUTANEOUS ONCE
Refills: 0 | Status: COMPLETED | OUTPATIENT
Start: 2020-10-09 | End: 2020-10-09

## 2020-10-09 RX ORDER — INFLUENZA VIRUS VACCINE 15; 15; 15; 15 UG/.5ML; UG/.5ML; UG/.5ML; UG/.5ML
0.5 SUSPENSION INTRAMUSCULAR ONCE
Refills: 0 | Status: COMPLETED | OUTPATIENT
Start: 2020-10-09 | End: 2020-10-09

## 2020-10-09 RX ORDER — NOREPINEPHRINE BITARTRATE/D5W 8 MG/250ML
0.05 PLASTIC BAG, INJECTION (ML) INTRAVENOUS
Qty: 8 | Refills: 0 | Status: DISCONTINUED | OUTPATIENT
Start: 2020-10-09 | End: 2020-10-10

## 2020-10-09 RX ORDER — METOCLOPRAMIDE HCL 10 MG
10 TABLET ORAL
Refills: 0 | Status: DISCONTINUED | OUTPATIENT
Start: 2020-10-09 | End: 2020-10-15

## 2020-10-09 RX ADMIN — Medication 975 MILLIGRAM(S): at 11:23

## 2020-10-09 RX ADMIN — SODIUM CHLORIDE 1000 MILLILITER(S): 9 INJECTION INTRAMUSCULAR; INTRAVENOUS; SUBCUTANEOUS at 13:00

## 2020-10-09 RX ADMIN — Medication 0.05 MICROGRAM(S)/KG/MIN: at 15:35

## 2020-10-09 RX ADMIN — SODIUM CHLORIDE 1000 MILLILITER(S): 9 INJECTION INTRAMUSCULAR; INTRAVENOUS; SUBCUTANEOUS at 15:32

## 2020-10-09 RX ADMIN — PIPERACILLIN AND TAZOBACTAM 3.38 GRAM(S): 4; .5 INJECTION, POWDER, LYOPHILIZED, FOR SOLUTION INTRAVENOUS at 13:00

## 2020-10-09 RX ADMIN — Medication 7.09 MICROGRAM(S)/KG/MIN: at 16:40

## 2020-10-09 RX ADMIN — PIPERACILLIN AND TAZOBACTAM 25 GRAM(S): 4; .5 INJECTION, POWDER, LYOPHILIZED, FOR SOLUTION INTRAVENOUS at 19:47

## 2020-10-09 RX ADMIN — Medication 975 MILLIGRAM(S): at 12:14

## 2020-10-09 RX ADMIN — PIPERACILLIN AND TAZOBACTAM 200 GRAM(S): 4; .5 INJECTION, POWDER, LYOPHILIZED, FOR SOLUTION INTRAVENOUS at 11:45

## 2020-10-09 RX ADMIN — ATORVASTATIN CALCIUM 40 MILLIGRAM(S): 80 TABLET, FILM COATED ORAL at 21:33

## 2020-10-09 RX ADMIN — SODIUM CHLORIDE 1000 MILLILITER(S): 9 INJECTION INTRAMUSCULAR; INTRAVENOUS; SUBCUTANEOUS at 13:28

## 2020-10-09 RX ADMIN — Medication 250 MILLIGRAM(S): at 13:00

## 2020-10-09 RX ADMIN — Medication 7.09 MICROGRAM(S)/KG/MIN: at 14:30

## 2020-10-09 RX ADMIN — Medication 1000 MILLIGRAM(S): at 14:00

## 2020-10-09 RX ADMIN — SODIUM CHLORIDE 1000 MILLILITER(S): 9 INJECTION INTRAMUSCULAR; INTRAVENOUS; SUBCUTANEOUS at 11:23

## 2020-10-09 RX ADMIN — Medication 10 MILLIGRAM(S): at 11:27

## 2020-10-09 NOTE — H&P ADULT - NSHPPHYSICALEXAM_GEN_ALL_CORE
PHYSICAL EXAM:  General: resting in bed, NAD  HEENT: atraumatic, normocephalic;  Neck: supple, no LAD, no thyromegaly  Respiratory: CTABL, not using accessory muscles  Cardiovascular: RRR, S1, S2, no M/R/G  Abdomen: normal BS; soft, non-distended, non-tender; no R/G  Extremities: radial and DP pulses intact b/l; no clubbing, cyanosis;  Skin: no rash appreciated;  Neurological: non-focal    POCUS: predominantly A-lines; trace PLEF b/l; no pericardial effusion, IVC 1.3cm. PHYSICAL EXAM:  General: resting in bed, NAD  HEENT: atraumatic, normocephalic;  Neck: supple,  Respiratory: CTABL, not using accessory muscles  Cardiovascular: RRR, S1, S2, no M/R/G  Abdomen: normal BS; soft, large habitus, non-tender; no rebound tenderness or guarding  Extremities: no clubbing, cyanosis, or edema  Skin: erythema surrounding mediport placement  Neurological: non-focal    POCUS: predominantly A-lines; trace PLEF b/l; no pericardial effusion, IVC 1.3cm.

## 2020-10-09 NOTE — ED PROVIDER NOTE - CLINICAL SUMMARY MEDICAL DECISION MAKING FREE TEXT BOX
60 y.o. F p/w fever, pleuritic CP.  Port on right chest appears warm and erythematous.  Pt. already on eliquis, not hypoxic or tachycardic.  Lower suspicion for PE.  Will pursue fever w/u, CT chest for evaluation of port, CT abd for LLQ pain.  Will administer fluids, empiric abx, and admit. 60 y.o. F p/w fever, pleuritic CP.  Port on right chest appears warm and erythematous.  Pt. already on eliquis, not hypoxic or tachycardic.  Lower suspicion for PE.  Will pursue fever w/u, CT chest for evaluation of port, CT abd for LLQ pain.  Will administer fluids, empiric abx, and admit.  ATTG: : fever / chemo / concern for infection check labs, check xray chest, given skin changes concern for cellulitis / bactermia. will start abx, sepsis work up and admit to hospital for further care.

## 2020-10-09 NOTE — ED ADULT NURSE NOTE - OBJECTIVE STATEMENT
60y f pt c/o fever and pain and swelling at site of port in right upper chest; unsure of how long port has been there; pt has leukemia is on chemo; 60y f pt c/o fever and pain and swelling at site of port in right upper chest; unsure of how long port has been there; pt has leukemia is on chemo according to son everyday; last chemo yesterday; pt has recent hx of pleural effusion and pericardial effusion; as well as a RUE DVT; son gave information as pt requested him to; aox3; no resp distress; no abd pain; no n/v/d; + fevers to 102; afebrile in ed; skin intact; + pain/redness/swelling to upper right chest; iv placed; labs drawn; pt medicated per md order; see sepsis sheet; safety/comfort maintained

## 2020-10-09 NOTE — PATIENT PROFILE ADULT - CONTRAINDICATIONS & PRECAUTIONS (SELECT ALL THAT APPLY)
[facility]    Brief Operative Note    Pre-operative diagnosis: Hx of colonic polyps [Z86.010]  Post-operative diagnosis GERD with hiatal hernia, Wide open Schatzki, Erosive gastroduodenopathy    Procedure: Procedure(s):  UPPER ENDOSCOPY AND COLONOSCOPY  Surgeon: Surgeon(s) and Role:     * Saul Jiménez MD - Primary  Anesthesia: Monitor Anesthesia Care   Estimated blood loss: None  Drains: None  Specimens:   ID Type Source Tests Collected by Time Destination   A : duodenum biopsy Biopsy Small Intestine, Duodenum SURGICAL PATHOLOGY EXAM Saul Jiménez MD 1/17/2020 10:08 AM    B : random gastric biopsies Other (specify in comments) Stomach, Body SURGICAL PATHOLOGY EXAM Saul Jiménez MD 1/17/2020 10:10 AM    C : mid esophagus biopsies Biopsy Esophagus, middle SURGICAL PATHOLOGY EXAM Saul Jiménez MD 1/17/2020 10:11 AM      Findings:   None.  Complications: None.  Implants: * No implants in log *        
none...

## 2020-10-09 NOTE — CONSULT NOTE ADULT - ASSESSMENT
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 Hurd at [facility]), HTN, HLD, T2DM, DVT (dx July 2020; on Eliquis), post-herpetic neuralgia (on gabapentin) with recent admission 9/4- 9/11 for chest and back pain likely from pleural / pericardial effusion p/w fevers (Tmax 102) since yesterday.  MICU consulted for hypotension.     #Hypotension  - possibly in setting of infection  - s/p 2L w/ mild response  - POCUS showed A-lines, trace PLEF, no pericardial effusion; please give additional 1L IVF   - can add midodrine for pressure support  - NOT a candidate for MICU at this time, but MICU will monitor for fluid responsiveness.    Case d/w MICU Attending.    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 Hurd at [facility]), HTN, HLD, T2DM, DVT (dx July 2020; on Eliquis), post-herpetic neuralgia (on gabapentin) with recent admission 9/4- 9/11 for chest and back pain likely from pleural / pericardial effusion p/w fevers (Tmax 102) since yesterday.  MICU consulted for hypotension.     #Hypotension  - possibly in setting of infection  - CT chest showed possible cellulitis of mediport  - s/p 2L IVF  - currently on Levophed  - POCUS showed A-lines, trace PLEF, no pericardial effusion; please give additional 1L IVF   - can add midodrine for pressure support  - wean off of IV pressor  - MICU will monitor for fluid responsiveness.    Case d/w MICU Attending.    Tony Love MD  [facility]  Internal Medicine, PGY2  [phone]

## 2020-10-09 NOTE — PATIENT PROFILE ADULT - NSPRESCRUSEDDRG_GEN_A_NUR
Problem: OXYGENATION/RESPIRATORY FUNCTION  Goal: Patient will maintain patent airway  1/20/2020 2335 by Earlene Stiles RN  Outcome: Ongoing  1/20/2020 1945 by Jacoby Quick RN  Outcome: Ongoing  Goal: Patient will achieve/maintain normal respiratory rate/effort  Description  Respiratory rate and effort will be within normal limits for the patient  1/20/2020 2335 by Earlene Stiles RN  Outcome: Ongoing  1/20/2020 1945 by Jacoby Quick RN  Outcome: Ongoing     Problem: HEMODYNAMIC STATUS  Goal: Patient has stable vital signs and fluid balance  1/20/2020 2335 by Earlene Stiles RN  Outcome: Ongoing  1/20/2020 1945 by Jacoby Quick RN  Outcome: Ongoing     Problem: ACTIVITY INTOLERANCE/IMPAIRED MOBILITY  Goal: Mobility/activity is maintained at optimum level for patient  1/20/2020 2335 by Earlene Stiles RN  Outcome: Ongoing  1/20/2020 1945 by Jacoby Quick RN  Outcome: Ongoing     Problem: FLUID AND ELECTROLYTE IMBALANCE  Goal: Fluid and electrolyte balance are achieved/maintained  1/20/2020 2335 by Earlene Stiles RN  Outcome: Ongoing  1/20/2020 1945 by Jacoby Quick RN  Outcome: Ongoing  Note:   Patient's EF (Ejection Fraction) is greater than 40%    Patient's weights and intake/output reviewed:    Patient's Last Weight: 257.0 lbs obtained by bed scale. Difference of 0 lbs n/a  than last documented weight. Intake/Output Summary (Last 24 hours) at 1/20/2020 1944  Last data filed at 1/20/2020 1753  Gross per 24 hour   Intake 100 ml   Output 200 ml   Net -100 ml         Pt is currently on 0 L O2. Pt denies shortness of breath. Pt with nonpitting lower extremity edema.      Patient and/or Family's stated Goal of Care this Admission: reduce shortness of breath, increase activity tolerance, better understand heart failure and disease management, be more comfortable, and reduce lower extremity edema prior to discharge      Comorbidities Reviewed Yes  Patient has a past medical history of No

## 2020-10-09 NOTE — H&P ADULT - HISTORY OF PRESENT ILLNESS
HPI:  Patient is a 61 yo F w/ PMHx of APL on chemo (dx May 2020; has regimen of ATRA and arsenic; follows Dr. Hurd at [facility]; Mediport placed Aug 5th by Dr. Guzman), HTN, HLD, T2DM, DVT (dx July 2020; home eliquis 5mg), post-herpetic neuralgia (on gabapentin) with recent admission on 9/4- 9/11 for chest and back pain likely from pleural / pericardial effusion who presents today with fevers (tmax 102) since yesterday. Hx obtained from son as pt. does not speak english.  Denies any sick contacts, cough, SOB, dysuria, hematuria, hematochezia, melena.  Pt. gets chemo every day, last chemo yesterday.  Pt. was recently admitted and per son, was found to have pleural effusion and pericardial effusion.  Last tylenol yesterday night.  Denies hx of MI, CVA, cardiac stents. Complaining of pleuritic CP as well.  On eliquis for a prior RUE DVT.    During last hospitalization, echo showed EF 45-50%, trace pericardial effusion, mild reduced RV function. Cards at that time had low suspicion for pericarditis or tamponade. Cardiac cath 2 years ago with mild non-obstructive CAD. Patient also had L sided pleural effusion with ddx icluding malignancy, viral or parapneumonic effusion. As per pulm, effusion could not be sampled safely with thoracentesis. Treated for PNA with augmentin.     In ED: 99F, 82HR, 111/73, 98% on RA. Concern for fever and pleuritic chest pain with ddx including cellulitis vs bacteremia as port on R chest was warm and erythematous. Started sepsis workup and given vanc/zosyn, tylenol, reglan, and 2x 1L boluses of IVF as well. Given concern for hypotension, patient was started on levophed. Patient is a 61 yo F w/ PMHx of APL on chemo (dx May 2020; has regimen of ATRA and arsenic; follows Dr. Hurd at [facility]; Mediport placed Aug 5th by Dr. Guzman), HTN, HLD, T2DM, DVT (dx July 2020; home eliquis 5mg), post-herpetic neuralgia (on gabapentin) with recent admission on 9/4- 9/11 for chest and back pain likely from pleural / pericardial effusion who presents today with ?fevers. Spoke to patient in [place] with  (languageline [id number] [facility]). Patient states she has had 6+ months of shortness of breath and dizziness that became worse recently with associated chills. Also endorsed R sided chest pain and shoulder pain that brought patient into the ED. Patient also endorsed back pain but no focal weakness/ sensory changes or bowel/ bladder changes. Patient went to receive chemo session yesterday and finished the session. After the session at home, patient began experiencing the symptoms mentioned earlier. Denies any sick contacts, cough, SOB, dysuria, hematuria, hematochezia, melena. Last tylenol yesterday night. On eliquis for a prior RUE DVT.    During last hospitalization, echo showed EF 45-50%, trace pericardial effusion, mild reduced RV function. Cards at that time had low suspicion for pericarditis or tamponade. Cardiac cath 2 years ago with mild non-obstructive CAD. Patient also had L sided pleural effusion with ddx including malignancy, viral or parapneumonic effusion. As per pulm, effusion could not be sampled safely with thoracentesis. Treated for PNA with augmentin.     In ED: 99F, 82HR, 111/73, 98% on RA. Concern for fever and pleuritic chest pain with ddx including cellulitis vs bacteremia as port on R chest was warm and erythematous. Started sepsis workup and given vanc/zosyn, tylenol, reglan, and 2x 1L boluses of IVF as well. Given concern for hypotension, patient was started on levophed.

## 2020-10-09 NOTE — H&P ADULT - ASSESSMENT
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 Hurd at [facility]), HTN, HLD, T2DM, DVT (dx July 2020; on Eliquis), post-herpetic neuralgia (on gabapentin) with recent admission 9/4- 9/11 for chest and back pain likely from pleural / pericardial effusion p/w fevers (Tmax 102) since yesterday.  MICU consulted for hypotension.     NEUROLOGY  # A&Ox3   - CHAITANYA     CARDIOVASCULAR  #Hypotension  - possibly in setting of infection  - CT chest showed possible cellulitis of mediport  - s/p 3L IVF  - currently on Levophed, wean as tolerated  - POCUS showed A-lines, trace PLEF, no pericardial effusion  - can add midodrine for pressure support  - wean off of IV pressor    #History of HTN, HLD,   - hold antihypertensives    #Chest pain  - troponins wnl  - likely pleuritic in nature. previously worked up in past hospitalization likely from pleural effusion/ pericardial effusion     PULMONARY  # CHAITANYA  - saturating well on RA    GASTROINTESTINAL  # NPO for now, will reassess diet  - CHAITANYA    RENAL  # CHAITANYA  - monitor I&Os    INFECTIOUS DISEASE  # Cellulitis of mediport (placed august 5th by Dr. 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 Hurd at [facility]), HTN, HLD, T2DM, DVT (dx July 2020; on Eliquis), post-herpetic neuralgia (on gabapentin) with recent admission 9/4- 9/11 for chest and back pain likely from pleural / pericardial effusion p/w fevers (Tmax 102) since yesterday.  MICU consulted for hypotension.     NEUROLOGY  # A&Ox3   - CHAITANYA     CARDIOVASCULAR  #Hypotension  - possibly in setting of infection  - CT chest showed possible cellulitis of mediport  - s/p 3L IVF  - currently on Levophed, wean as tolerated  - POCUS showed A-lines, trace PLEF, no pericardial effusion  - can add midodrine for pressure support  - wean off of IV pressor    #History of HTN, HLD,   - hold antihypertensives in setting of hypotension    #Chest pain  - troponins wnl  - likely pleuritic in nature. previously worked up in past hospitalization likely from pleural effusion/ pericardial effusion     PULMONARY  # CHAITANYA  - saturating well on RA    GASTROINTESTINAL  # NPO for now, will reassess diet  - CHAITANYA    RENAL  # CHAITANYA  - monitor I&Os    INFECTIOUS DISEASE  # Cellulitis of mediport (placed august 5th by Dr. Guzman)   - continue with vanc zosyn for now  - will address removal of mediport by contacting Dr. 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 Hurd at [facility]), HTN, HLD, T2DM, DVT (dx July 2020; on Eliquis), post-herpetic neuralgia (on gabapentin) with recent admission 9/4- 9/11 for chest and back pain likely from pleural / pericardial effusion p/w worsening R chest pain, shortness of breath and chills found to have cellulitis around mediport site with MICU consulted for hypotension.     NEUROLOGY  # A&Ox3   - CHAITANYA     CARDIOVASCULAR  #Hypotension  - possibly in setting of infection  - CT chest showed possible cellulitis of mediport  - s/p 3L IVF  - was on Levophed in ED, weaned once patient was in MICU  - POCUS showed A-lines, trace PLEF, no pericardial effusion  - will consider adding midodrine for pressure support    #History of HTN, HLD,   - hold antihypertensives in setting of hypotension  - continue home statin    #Chest pain  - troponins wnl  - likely pleuritic in nature as patient was previously worked up in past hospitalization likely from pleural effusion/ pericardial effusion     PULMONARY  # CHAITANYA  - saturating well on RA    GASTROINTESTINAL  # Will restart diet DASH TLC CC    RENAL  # CHAITANYA  - monitor I&Os    INFECTIOUS DISEASE  # Cellulitis of mediport (placed august 5th by Dr. Guzman)   - continue with vanc/ zosyn for now  - will address removal of mediport by contacting Dr. Guzman   - f/u blood cultures    ENDOCRINE  #DM2  - ISS for now    HEME  #DVT (dx July 2020; on Eliquis)   - continue with home eliquis     #Anemia  - no noted bleeding noted  - possibly secondary to chemotherapy regimen  - monitor and trend with CBCs

## 2020-10-09 NOTE — ED PROVIDER NOTE - NS ED ROS FT
General: +fever, chills; denies weight loss/weight gain.  HENT: denies nasal congestion, sore throat, rhinorrhea, ear pain.  Eyes: denies visual changes, blurred vision, eye discharge, eye redness.  Neck: denies neck pain, neck swelling.  CV: +chest pain; denies palpitations  Resp: denies difficulty breathing, cough.  Abdominal: denies nausea, vomiting, diarrhea, abdominal pain, blood in stool, dark stool.  MSK: denies muscle aches, bony pain, leg pain, leg swelling.  Neuro: +headaches; denies numbness, tingling, dizziness, lightheadedness.  Skin: denies rashes, cuts, bruises.  Hematologic: denies unexplained bruises.

## 2020-10-09 NOTE — ED ADULT TRIAGE NOTE - SPO2 (%)
[facility]     Patient: Emily Berman  YOB: 1945  Mrn: [id number]     1/9/2020  HPI: Patient: Emily Berman is a 74 year old who presents NEW to this provider for CPX, eval of several concerns and notes:  1, hx CVA 2010- remains on lifelong statin, aspirin, and generally exercises regularly.   2, HTN assoc diabetes- remains on meds as listed but ran out of HCTZ 7 days ago and she wonders if she needs this  3, diabetes- 8.0 AIC august,  FBG  Running ~100; wonders if janumet is safe and is interested in changing medications.  had dilated eye exam last week w Dr terry and she denies diabetic eye changes  4, glaucoma- seeing ophtho seeing Dr terry, pressure high and will return so for retesting  5, mammogram current 10.19, colon cancer screening in [place] colonoscopy 2016ish and was told to return in 5 yrs ( she will find the report)  6, B feet pain, bunions- seeing foot doctor, will get orthotics soon  7, thin bones- reports on alendronate and vitamin D for this. S/p fracture spring 2019    ROS: Denies [place], SSCP, fever chills sweats, diarrhea/ change in bowels, rash, tremors, palpitations     Outpatient Medications Marked as Taking for the 1/9/20 encounter (Office Visit) with Ju Toro MD   Medication Sig Dispense Refill   • sitaGLIPTIN-metFORMIN (JANUMET)  MG per tablet Take 1 tablet by mouth 2 times daily (with meals). 180 tablet 1   • glipiZIDE (GLUCOTROL ER) 10 MG 24 hr tablet Take 1 tablet by mouth daily. 90 tablet 1   • amLODIPine (NORVASC) 2.5 MG tablet Take 1 tablet by mouth daily. 90 tablet 1   • latanoprost (XALATAN) 0.005 % ophthalmic solution Place 1 drop into both eyes nightly.     • hydrochlorothiazide (HYDRODIURIL) 25 MG tablet Take 1 tablet by mouth daily. 90 tablet 3   • aspirin 81 MG chewable tablet Chew 1 tablet by mouth daily. 90 tablet 3     ALLERGIES:  Ace inhibitors    Patient Active Problem List   Diagnosis   • Type 2 diabetes mellitus, without long-term current use  of insulin (CMS/HCC)   • Hypertension associated with diabetes (CMS/HCC)   • Knee pain, right   • Environmental allergies   • Glaucoma   • Hypercholesterolemia   • Osteoarthritis of both knees   • Osteopenia   • Thyroid disorder   • History of stroke without residual deficits 2010   • Colon polyps     Past Medical History:   Diagnosis Date   • Cataract    • Closed fracture of right tibial plateau with routine healing 2019 4/2/2019   • Generalized osteoarthritis of hand 2/26/2013   • History of stroke without residual deficits 5/9/2019   • Thyroid disease      Past Surgical History:   Procedure Laterality Date   • Appendectomy     • Eye surgery      cataract   • Trigger finger release       Family History  NEG Breast cancer in M  NEG colon, ovarian, thyroid cancer  NEG early CAD  M, B, S Diabetes    Social History     Tobacco Use   • Smoking status: Never Smoker   • Smokeless tobacco: Never Used   Substance Use Topics   • Alcohol use: No     Frequency: Never   • Drug use: No   work: retired [employer]  Nutrition: veggies, all meats, olive or canola oil  Exercise chores, walking, gym 2x/ week  Lives w her son    /80 (BP Location: LUE - Left upper extremity, Patient Position: Sitting, Cuff Size: Regular)   Pulse 64   Temp 98 °F (36.7 °C) (Oral)   Resp 12   Ht 5' 5.5\" (1.664 m)   Wt 75.8 kg (167 lb 3.2 oz)   BMI 27.40 kg/m²   BSA 1.84 m²    Physical exam   Constitution: alert, in no acute distress  Head and Face: atraumatic, normal cephalic  Eyes: anicteric, EOMi, conjugate gaze  ENT: TMs/ canals normal, no nasal discharge, mucus membranes moist, normal tongue  Neck: supple, no mass, thyroid not enlarge, no thyroid nodules  Pulse: carotids no bruits  Lymph: no lymphadenopathy of ACT, supraclavicular, axilla areas  Pulmonary: normal respiratory rate/ effort, lungs clear to auscultation  no rales  Cardiovascular: PMI nonpalpable, regular rate and rhythm, no murmur/ rub no s3/S4  Ext no edema, monofilament  testing intact B soles no lesions on feet, pulses 2+ DP   Feet B bunions, called L medial MTP  Abdomen: soft, nontender, normoactive bowel sounds   Neuro: no coordination defects, moves all extremities normally  Psych: alert lucid articulate, pleasant engaged in visit, mood and affect appropriate  Skin, hair, nails: normal skin turgor, no clubbing cyanosis     Assessment/Plan:  Health prevention  - vitamin D 1000 IU daily  - nutrition: 50% of nutrition should come from vegetables, fruits  - exercise 30\" daily 5 days per week ( or more!)  - mammogram due annually, call [phone]  - colon cancer screening: colonoscopy completed in [place] 2016, next due 5 yrs  - vaccines: pneumovax-23 due  Immunization History   Administered Date(s) Administered   • Influenza, high dose seasonal, preservative-free 12/03/2015, 11/19/2019   • Influenza, injectable, quadrivalent 11/13/2012   • Influenza, injectable, quadrivalent, preservative-free 10/07/2014   • Influenza, seasonal, injectable, preservative free 11/14/2013   • PPD 01/29/2014   • Pneumococcal Conjugate 13 valent 12/03/2015     Hx stroke 2010  - CT 2013 R parietal- occipital encphalomalacia  - remains on life long aspirin, statin   - exercise 30\" 5x/ week  - BP control 130/80    Hypertension associated with Diabetes: not well controlled  - BP goal 130/ 80 or less  - resume HCTZ once daily , remains on amlodipine  - keep both diabetes and blood pressure controlled to protect the kidneys  - proceed with low salt diet, fresh veggies/ fruits, lean weight, regular exercise  - check Cr/ K+/ urine annually    Diabetes dx 2005  - complications: none known  - proceed with best nutrition, exercise, lowest weight, same medications for now  - fasting morning glucose goal is 70- 130  to achieve AIC 7% to lower the risk of nerve, eye, kidney, heart disease   - AIC is a measure of diabetes control and reflects 3 months of effort  - prevention of heart disease: statin   - protect the  kidneys: keep /80, AIC 7% or less  - annual Cr/urine exam: due annually  - annual dilated eye exam with ophtho: completed Dr terry jan 2020    Cholesterol, high risk heart disease due to presence of diabetes  - remains on atorvastatin   - proceed with mostly plant based high fiber diet  - do have healthy fats which can be found in nuts, avocados, olive oil and canola oil  - exercise regularly (goal is 2.5- 3 hours per week)  - check fasting cholesterol     BMI Body mass index is 27.4 kg/m².  - weight goal  - sustained weight loss is by sustained change in nutrition  - may use Aplica ap; just log it and learn where nutrition should change    Hx thin bones 2019 T score - 2.0 9.19 w 1 fracture 4.19  - functionally has osteoporosis  - diagnosed   - No results found for: VITD25  - treatment history: complete 5 yrs, began 2019 (?)  - vitamin D 1000 IU daily, calcium 120 % RDA from foods  - prevention of falls w good balance, good vision, good lighting   - to be reviewed again at next visit    Return in 2-4 weeks to check BP/ review diabetes medication options/ abd gas issue;  next AIC due in May    Ju Toro MD     98

## 2020-10-09 NOTE — H&P ADULT - NSHPREVIEWOFSYSTEMS_GEN_ALL_CORE
REVIEW OF SYSTEMS:    CONSTITUTIONAL: No weakness, fevers or chills  EYES/ENT: No visual changes;  No vertigo or throat pain   NECK: No pain or stiffness  RESPIRATORY: No cough, wheezing, hemoptysis; No shortness of breath  CARDIOVASCULAR: No chest pain or palpitations  GASTROINTESTINAL: No abdominal or epigastric pain. No nausea, vomiting, or hematemesis; No diarrhea or constipation. No melena or hematochezia.  GENITOURINARY: No dysuria, frequency or hematuria  NEUROLOGICAL: No numbness or weakness  SKIN: No itching, burning, rashes, or lesions   All other review of systems is negative unless indicated above. REVIEW OF SYSTEMS:    CONSTITUTIONAL: No current weakness, fevers or chills  EYES/ENT: No visual changes;  RESPIRATORY: No cough, wheezing, hemoptysis; + shortness of breath  CARDIOVASCULAR: + R chest pain and shoulder pain   GASTROINTESTINAL: No abdominal or epigastric pain. No nausea, vomiting, or hematemesis; No diarrhea or constipation. No melena or hematochezia.  GENITOURINARY: No dysuria, frequency or hematuria  NEUROLOGICAL: No numbness or weakness  SKIN: No itching, burning  All other review of systems is negative unless indicated above.

## 2020-10-09 NOTE — ED PROVIDER NOTE - OBJECTIVE STATEMENT
Pt. is a 60 y.o. 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  Hx obtained from son as pt. does not speak english.  Denies any sick contacts, cough, SOB, dysuria, hematuria, hematochezia, melena.  Pt. gets chemo every day, last chemo yesterday.  Pt. was recently admitted and per son, was found to have pleural effusion and pericardial effusion.  Last tylenol yesterday night.  Denies hx of MI, CVA, cardiac stents.  Complaining of pleuritic CP as well.  On eliquis for a prior RUE DVT.

## 2020-10-09 NOTE — ED PROVIDER NOTE - ATTENDING CONTRIBUTION TO CARE
59 y/o f with pmhx DM type 2, HLD, HTN, APL, currently on chemo presents for fever. Tm 102 yest. patient complains of pain on her right ant chest going to her neck and head. no trauma. no sick contacts or recent travel. no covid hx.  resident spoke with son who provided additional hx. She was recently admitted with a pleural effusion and pericardial effusion.   Gen.  no resp distress, mild distress from headache and right side chest wall discomfort.   HEENT:  perrl eomi dry mm  Lungs:  ant chest area of erythema over port site measures 5 x 4 cm. ttp over area and warm to touch.   CVS: S1S2   Abd;  soft non tender no distention  Ext: no pitting edema or erythema  Neuro: aaox3 no focal deficits  MSK: strength 5/5 b/l upper and lower ext.

## 2020-10-09 NOTE — H&P ADULT - ATTENDING COMMENTS
60F Hx APL on CXT (ATRA & Arsenic), T2DM, HFrEF 45%, Post-herpetic Neuralgia, RUE DVT on AC p/w ED for Fever, Chills, SOB and Hypotension s/p unresponsive to 3 Li IVF resuscitation and started on pressor agent.   - Sarah speaking and answering all questions appropriately through    - Off pressor support upon arrival to ICU with -150/55-65 mmHg   - Rt chest wall Mediport Area Cellulitis noted on exam and CT Chest   - Empiric ABx coverage and f/u cultures   - Surgery/IR consult for Mediport Removal   - DVT/GI Prophylaxis

## 2020-10-09 NOTE — ED PROVIDER NOTE - PROGRESS NOTE DETAILS
Leroy PGY2 - Pt. having steadily decreasing BP.  SBP 70s s/p 2L fluids.  Will start on pressors and call MICU. Leroy PGY2 - Levo at 0.07mcg, pressures above 110 systolic.  Bedside echo by MICU team performed; stated pt. can tolerate another liter of fluids.  Will give another liter and attempt to titrate levo down. Leroy PGY2 - BP dropped to 80s systolic s/p 3L.  Levo gtt restarted.  MICU called again, who accepted pt.  Pt.'s son made aware of plan.  All other questions and concerns have been addressed.

## 2020-10-09 NOTE — ED PROVIDER NOTE - PHYSICAL EXAMINATION
GENERAL: Patient awake alert NAD.  HEENT: NC/AT, full ROM of neck in all four directions w/o discomfort.  LUNGS: CTAB, no wheezes or crackles.  CARDIAC: RRR, no m/r/g.  ABDOMEN: Soft, LLQ tenderness, ND, No rebound, guarding.  NEURO: A&Ox3. Moving all extremities.  SKIN: Port site on right chest appears warm and erythematous.  PSYCH: Normal affect.

## 2020-10-09 NOTE — ED PROVIDER NOTE - CARE PLAN
Principal Discharge DX:	Fever and chills   Principal Discharge DX:	Fever and chills  Secondary Diagnosis:	Cellulitis  Secondary Diagnosis:	Hypotension

## 2020-10-09 NOTE — CONSULT NOTE ADULT - SUBJECTIVE AND OBJECTIVE BOX
CHIEF COMPLAINT:    HPI:  Patient is a 59 yo F w/ PMHx of APL on chemo (dx May 2020; has regimen of ATRA and arsenic; follows Dr. Hurd at [facility]), HTN, HLD, T2DM, DVT (dx 2020; home eliquis 5mg), post-herpetic neuralgia (on gabapentin) with recent admission on -  for chest and back pain likely from pleural / pericardial effusion who presents today with fevers (tmax 102) since yesterday.  Hx obtained from son as pt. does not speak english.  Denies any sick contacts, cough, SOB, dysuria, hematuria, hematochezia, melena.  Pt. gets chemo every day, last chemo yesterday.  Pt. was recently admitted and per son, was found to have pleural effusion and pericardial effusion.  Last tylenol yesterday night.  Denies hx of MI, CVA, cardiac stents.  Complaining of pleuritic CP as well.  On eliquis for a prior RUE DVT.    During last hospitalization, echo showed EF 45-50%, trace pericardial effusion, mild reduced RV function. ?  with eccentric LV hypertrophy (dilated LV with normal wall thickness. Cards at that time had low suspicion for pericarditis or tamponade. Cardiac cath 2 years ago with mild non-obstructive CAD. Patient also had L sided pleural effusion with ddx icluding malignancy, viral or parapneumonic effusion. As per pulm, effusion could not be sampled safely with thoracentesis. Treated for PNA with augmentin.     In ED: 99F, 82HR, 111/73, 98% on RA. Concern for fever and pleuritic chest pain with ddx including cellulitis vs bacteremia as port on R chest was warm and erythematous. Started sepsis workup and given vanc/zosyn, tylenol, reglan, and 2x 1L boluses of IVF as well. Given concern for hypotension, patient was started on levophed.      PAST MEDICAL & SURGICAL HISTORY:  APL (acute promyelocytic leukemia)    HLD (hyperlipidemia)    Hypertension    Diabetes    No significant past surgical history      FAMILY HISTORY:  Family history of diabetes mellitus  Mother      SOCIAL HISTORY:  Smoking: __ packs x ___ years  EtOH Use:  Marital Status:  Occupation:  Recent Travel:  Country of Birth:  Advance Directives:    Allergies    adhesives (Pruritus)  No Known Drug Allergies    Intolerances    HOME MEDICATIONS:    REVIEW OF SYSTEMS:  Constitutional: denies fevers, chills, weight loss, weight gain  HEENT: denies vision problems, eye pain, nasal congestion, rhinorrhea, sore throat, dysphagia  CV: denies chest pain, orthopnea, palpitations  Resp: denies cough, dyspnea, wheezing, hemoptysis  GI: denies nausea, vomiting, diarrhea, constipation, abdominal pain  : denies burning w/ urination, urinary frequency, incontinence, bloody urine  Musculoskeletal: denies back pain, joint pain, muscle ache  Skin: denies rash or itching  Neurological: denies headache, dizziness, syncope, weakness, numbness  Psychiatric: denies anxiety, depression, hallucination  Endocrine: denies heat/cold intolerance  Hematologic/Lymphatic: denies easy bruising or bleeding, denies swollen LN  [ ] All other systems negative  [ ] Unable to assess ROS because ________    OBJECTIVE:  ICU Vital Signs Last 24 Hrs  T(C): 36.7 (09 Oct 2020 13:40), Max: 37.4 (09 Oct 2020 11:03)  T(F): 98 (09 Oct 2020 13:40), Max: 99.4 (09 Oct 2020 11:03)  HR: 59 (09 Oct 2020 15:13) (56 - 86)  BP: 114/54 (09 Oct 2020 15:13) (81/41 - 118/67)  BP(mean): 73 (09 Oct 2020 15:13) (55 - 73)  ABP: --  ABP(mean): --  RR: 16 (09 Oct 2020 15:13) (12 - 18)  SpO2: 98% (09 Oct 2020 15:13) (96% - 100%)    CAPILLARY BLOOD GLUCOSE    PHYSICAL EXAM:  General: resting in bed, NAD  HEENT: atraumatic, normocephalic;  Neck: supple, no LAD, no thyromegaly  Respiratory: CTABL, not using accessory muscles  Cardiovascular: RRR, S1, S2, no M/R/G  Abdomen: normal BS; soft, non-distended, non-tender; no R/G  Extremities: radial and DP pulses intact b/l; no clubbing, cyanosis;  Skin: no rash appreciated;  Neurological: non-focal    POCUS: predominantly A-lines; trace PLEF b/l; no pericardial effusion, IVC 1.3cm.    HOSPITAL MEDICATIONS:  MEDICATIONS  (STANDING):  norepinephrine Infusion 0.05 MICROgram(s)/kG/Min (7.09 mL/Hr) IV Continuous <Continuous>    MEDICATIONS  (PRN):      LABS:                        8.8    5.46  )-----------( 188      ( 09 Oct 2020 11:47 )             28.4     10-09    139  |  101  |  14  ----------------------------<  135<H>  3.9   |  26  |  0.83    Ca    9.6      09 Oct 2020 11:47    TPro  7.3  /  Alb  4.5  /  TBili  0.2  /  DBili  x   /  AST  12  /  ALT  11  /  AlkPhos  87  10-09    PT/INR - ( 09 Oct 2020 11:47 )   PT: 20.0 sec;   INR: 1.71 ratio         PTT - ( 09 Oct 2020 11:47 )  PTT:38.2 sec  Urinalysis Basic - ( 09 Oct 2020 11:57 )    Color: Colorless / Appearance: Clear / S.005 / pH: x  Gluc: x / Ketone: Negative  / Bili: Negative / Urobili: Negative   Blood: x / Protein: Negative / Nitrite: Negative   Leuk Esterase: Negative / RBC: x / WBC x   Sq Epi: x / Non Sq Epi: x / Bacteria: x    Venous Blood Gas:  10-09 @ 13:47  7.32/51/52/25/82  VBG Lactate: 2.0  Venous Blood Gas:  10-09 @ 11:48  7.31/55/29/27/42  VBG Lactate: 2.3      MICROBIOLOGY:     RADIOLOGY:  [ ] Reviewed and interpreted by me    EKG: CHIEF COMPLAINT:    HPI:  Patient is a 61 yo F w/ PMHx of APL on chemo (dx May 2020; has regimen of ATRA and arsenic; follows Dr. Hurd at [facility]), HTN, HLD, T2DM, DVT (dx 2020; home eliquis 5mg), post-herpetic neuralgia (on gabapentin) with recent admission on -  for chest and back pain likely from pleural / pericardial effusion who presents today with fevers (tmax 102) since yesterday.  Hx obtained from son as pt. does not speak english.  Denies any sick contacts, cough, SOB, dysuria, hematuria, hematochezia, melena.  Pt. gets chemo every day, last chemo yesterday.  Pt. was recently admitted and per son, was found to have pleural effusion and pericardial effusion.  Last tylenol yesterday night.  Denies hx of MI, CVA, cardiac stents.  Complaining of pleuritic CP as well.  On eliquis for a prior RUE DVT.    During last hospitalization, echo showed EF 45-50%, trace pericardial effusion, mild reduced RV function. Cards at that time had low suspicion for pericarditis or tamponade. Cardiac cath 2 years ago with mild non-obstructive CAD. Patient also had L sided pleural effusion with ddx icluding malignancy, viral or parapneumonic effusion. As per pulm, effusion could not be sampled safely with thoracentesis. Treated for PNA with augmentin.     In ED: 99F, 82HR, 111/73, 98% on RA. Concern for fever and pleuritic chest pain with ddx including cellulitis vs bacteremia as port on R chest was warm and erythematous. Started sepsis workup and given vanc/zosyn, tylenol, reglan, and 2x 1L boluses of IVF as well. Given concern for hypotension, patient was started on levophed.      PAST MEDICAL & SURGICAL HISTORY:  APL (acute promyelocytic leukemia)    HLD (hyperlipidemia)    Hypertension    Diabetes    No significant past surgical history      FAMILY HISTORY:  Family history of diabetes mellitus  Mother      SOCIAL HISTORY:  Smoking: __ packs x ___ years  EtOH Use:  Marital Status:  Occupation:  Recent Travel:  Country of Birth:  Advance Directives:    Allergies    adhesives (Pruritus)  No Known Drug Allergies    Intolerances    HOME MEDICATIONS:    REVIEW OF SYSTEMS:  Constitutional: denies fevers, chills, weight loss, weight gain  HEENT: denies vision problems, eye pain, nasal congestion, rhinorrhea, sore throat, dysphagia  CV: denies chest pain, orthopnea, palpitations  Resp: denies cough, dyspnea, wheezing, hemoptysis  GI: denies nausea, vomiting, diarrhea, constipation, abdominal pain  : denies burning w/ urination, urinary frequency, incontinence, bloody urine  Musculoskeletal: denies back pain, joint pain, muscle ache  Skin: denies rash or itching  Neurological: denies headache, dizziness, syncope, weakness, numbness  Psychiatric: denies anxiety, depression, hallucination  Endocrine: denies heat/cold intolerance  Hematologic/Lymphatic: denies easy bruising or bleeding, denies swollen LN  [ ] All other systems negative  [ ] Unable to assess ROS because ________    OBJECTIVE:  ICU Vital Signs Last 24 Hrs  T(C): 36.7 (09 Oct 2020 13:40), Max: 37.4 (09 Oct 2020 11:03)  T(F): 98 (09 Oct 2020 13:40), Max: 99.4 (09 Oct 2020 11:03)  HR: 59 (09 Oct 2020 15:13) (56 - 86)  BP: 114/54 (09 Oct 2020 15:13) (81/41 - 118/67)  BP(mean): 73 (09 Oct 2020 15:13) (55 - 73)  ABP: --  ABP(mean): --  RR: 16 (09 Oct 2020 15:13) (12 - 18)  SpO2: 98% (09 Oct 2020 15:13) (96% - 100%)    CAPILLARY BLOOD GLUCOSE    PHYSICAL EXAM:  General: resting in bed, NAD  HEENT: atraumatic, normocephalic;  Neck: supple, no LAD, no thyromegaly  Respiratory: CTABL, not using accessory muscles  Cardiovascular: RRR, S1, S2, no M/R/G  Abdomen: normal BS; soft, non-distended, non-tender; no R/G  Extremities: radial and DP pulses intact b/l; no clubbing, cyanosis;  Skin: no rash appreciated;  Neurological: non-focal    POCUS: predominantly A-lines; trace PLEF b/l; no pericardial effusion, IVC 1.3cm.    HOSPITAL MEDICATIONS:  MEDICATIONS  (STANDING):  norepinephrine Infusion 0.05 MICROgram(s)/kG/Min (7.09 mL/Hr) IV Continuous <Continuous>    MEDICATIONS  (PRN):      LABS:                        8.8    5.46  )-----------( 188      ( 09 Oct 2020 11:47 )             28.4     10-09    139  |  101  |  14  ----------------------------<  135<H>  3.9   |  26  |  0.83    Ca    9.6      09 Oct 2020 11:47    TPro  7.3  /  Alb  4.5  /  TBili  0.2  /  DBili  x   /  AST  12  /  ALT  11  /  AlkPhos  87  10-09    PT/INR - ( 09 Oct 2020 11:47 )   PT: 20.0 sec;   INR: 1.71 ratio         PTT - ( 09 Oct 2020 11:47 )  PTT:38.2 sec  Urinalysis Basic - ( 09 Oct 2020 11:57 )    Color: Colorless / Appearance: Clear / S.005 / pH: x  Gluc: x / Ketone: Negative  / Bili: Negative / Urobili: Negative   Blood: x / Protein: Negative / Nitrite: Negative   Leuk Esterase: Negative / RBC: x / WBC x   Sq Epi: x / Non Sq Epi: x / Bacteria: x    Venous Blood Gas:  10-09 @ 13:47  7.32/51/52/25/82  VBG Lactate: 2.0  Venous Blood Gas:  10-09 @ 11:48  7.31/55/29/27/42  VBG Lactate: 2.3      MICROBIOLOGY:     RADIOLOGY:  [ ] Reviewed and interpreted by me    EKG: CHIEF COMPLAINT: pain and chills    Patient is a 59 yo F w/ PMHx of APL on chemo (dx May 2020; has regimen of ATRA and arsenic; follows Dr. Hurd at [facility]; Mediport placed Aug 5th by Dr. Guzman), HTN, HLD, T2DM, DVT (dx 2020; home eliquis 5mg), post-herpetic neuralgia (on gabapentin) with recent admission on -  for chest and back pain likely from pleural / pericardial effusion who presents today with ?fevers. Spoke to patient in Mongolian with  (languageline [id number] [facility]). Patient states she has had 6+ months of shortness of breath and dizziness that became worse recently with associated chills. Also endorsed R sided chest pain and shoulder pain that brought patient into the ED. Patient also endorsed back pain but no focal weakness/ sensory changes or bowel/ bladder changes. Patient went to receive chemo session yesterday and finished the session. After the session at home, patient began experiencing the symptoms mentioned earlier. Denies any sick contacts, cough, SOB, dysuria, hematuria, hematochezia, melena. Last tylenol yesterday night. On eliquis for a prior RUE DVT.    During last hospitalization, echo showed EF 45-50%, trace pericardial effusion, mild reduced RV function. Cards at that time had low suspicion for pericarditis or tamponade. Cardiac cath 2 years ago with mild non-obstructive CAD. Patient also had L sided pleural effusion with ddx including malignancy, viral or parapneumonic effusion. As per pulm, effusion could not be sampled safely with thoracentesis. Treated for PNA with augmentin.     In ED: 99F, 82HR, 111/73, 98% on RA. Concern for fever and pleuritic chest pain with ddx including cellulitis vs bacteremia as port on R chest was warm and erythematous. Started sepsis workup and given vanc/zosyn, tylenol, reglan, and 2x 1L boluses of IVF as well. Given concern for hypotension, patient was started on levophed.      PAST MEDICAL & SURGICAL HISTORY:  APL (acute promyelocytic leukemia)    HLD (hyperlipidemia)    Hypertension    Diabetes    No significant past surgical history      FAMILY HISTORY:  Family history of diabetes mellitus  Mother      SOCIAL HISTORY:  Smoking: __ packs x ___ years  EtOH Use:  Marital Status:  Occupation:  Recent Travel:  Country of Birth:  Advance Directives:    Allergies    adhesives (Pruritus)  No Known Drug Allergies    Intolerances    HOME MEDICATIONS:    REVIEW OF SYSTEMS:    CONSTITUTIONAL: No current weakness, fevers or chills  EYES/ENT: No visual changes;  RESPIRATORY: No cough, wheezing, hemoptysis; + shortness of breath  CARDIOVASCULAR: + R chest pain and shoulder pain   GASTROINTESTINAL: No abdominal or epigastric pain. No nausea, vomiting, or hematemesis; No diarrhea or constipation. No melena or hematochezia.  GENITOURINARY: No dysuria, frequency or hematuria  NEUROLOGICAL: No numbness or weakness  SKIN: No itching, burning  All other review of systems is negative unless indicated above.    OBJECTIVE:  ICU Vital Signs Last 24 Hrs  T(C): 36.7 (09 Oct 2020 13:40), Max: 37.4 (09 Oct 2020 11:03)  T(F): 98 (09 Oct 2020 13:40), Max: 99.4 (09 Oct 2020 11:03)  HR: 59 (09 Oct 2020 15:13) (56 - 86)  BP: 114/54 (09 Oct 2020 15:13) (81/41 - 118/67)  BP(mean): 73 (09 Oct 2020 15:13) (55 - 73)  ABP: --  ABP(mean): --  RR: 16 (09 Oct 2020 15:13) (12 - 18)  SpO2: 98% (09 Oct 2020 15:13) (96% - 100%)    CAPILLARY BLOOD GLUCOSE    General: resting in bed, NAD  HEENT: atraumatic, normocephalic;  Neck: supple,  Respiratory: CTABL, not using accessory muscles  Cardiovascular: RRR, S1, S2, no M/R/G  Abdomen: normal BS; soft, large habitus, non-tender; no rebound tenderness or guarding  Extremities: no clubbing, cyanosis, or edema  Skin: erythema surrounding mediport placement  Neurological: non-focal    POCUS: predominantly A-lines; trace PLEF b/l; no pericardial effusion, IVC 1.3cm.    HOSPITAL MEDICATIONS:  MEDICATIONS  (STANDING):  norepinephrine Infusion 0.05 MICROgram(s)/kG/Min (7.09 mL/Hr) IV Continuous <Continuous>    MEDICATIONS  (PRN):      LABS:                        8.8    5.46  )-----------( 188      ( 09 Oct 2020 11:47 )             28.4     10-09    139  |  101  |  14  ----------------------------<  135<H>  3.9   |  26  |  0.83    Ca    9.6      09 Oct 2020 11:47    TPro  7.3  /  Alb  4.5  /  TBili  0.2  /  DBili  x   /  AST  12  /  ALT  11  /  AlkPhos  87  10-09    PT/INR - ( 09 Oct 2020 11:47 )   PT: 20.0 sec;   INR: 1.71 ratio         PTT - ( 09 Oct 2020 11:47 )  PTT:38.2 sec  Urinalysis Basic - ( 09 Oct 2020 11:57 )    Color: Colorless / Appearance: Clear / S.005 / pH: x  Gluc: x / Ketone: Negative  / Bili: Negative / Urobili: Negative   Blood: x / Protein: Negative / Nitrite: Negative   Leuk Esterase: Negative / RBC: x / WBC x   Sq Epi: x / Non Sq Epi: x / Bacteria: x    Venous Blood Gas:  10-09 @ 13:47  7.32/51/52/25/82  VBG Lactate: 2.0  Venous Blood Gas:  10-09 @ 11:48  7.31/55/29/27/42  VBG Lactate: 2.3      MICROBIOLOGY:     RADIOLOGY:  [ ] Reviewed and interpreted by me    EKG:

## 2020-10-09 NOTE — ED ADULT NURSE NOTE - NSIMPLEMENTINTERV_GEN_ALL_ED
Implemented All Universal Safety Interventions:  [place] to call system. Call bell, personal items and telephone within reach. Instruct patient to call for assistance. Room bathroom lighting operational. Non-slip footwear when patient is off stretcher. Physically safe environment: no spills, clutter or unnecessary equipment. Stretcher in lowest position, wheels locked, appropriate side rails in place.

## 2020-10-10 LAB
ALBUMIN SERPL ELPH-MCNC: 3.6 G/DL — SIGNIFICANT CHANGE UP (ref 3.3–5)
ALP SERPL-CCNC: 72 U/L — SIGNIFICANT CHANGE UP (ref 40–120)
ALT FLD-CCNC: 10 U/L — SIGNIFICANT CHANGE UP (ref 10–45)
ANION GAP SERPL CALC-SCNC: 9 MMOL/L — SIGNIFICANT CHANGE UP (ref 5–17)
APTT BLD: 30.9 SEC — SIGNIFICANT CHANGE UP (ref 27.5–35.5)
AST SERPL-CCNC: 14 U/L — SIGNIFICANT CHANGE UP (ref 10–40)
BASOPHILS # BLD AUTO: 0.01 K/UL — SIGNIFICANT CHANGE UP (ref 0–0.2)
BASOPHILS NFR BLD AUTO: 0.2 % — SIGNIFICANT CHANGE UP (ref 0–2)
BILIRUB SERPL-MCNC: 0.3 MG/DL — SIGNIFICANT CHANGE UP (ref 0.2–1.2)
BUN SERPL-MCNC: 11 MG/DL — SIGNIFICANT CHANGE UP (ref 7–23)
CALCIUM SERPL-MCNC: 9.6 MG/DL — SIGNIFICANT CHANGE UP (ref 8.4–10.5)
CHLORIDE SERPL-SCNC: 109 MMOL/L — HIGH (ref 96–108)
CO2 SERPL-SCNC: 23 MMOL/L — SIGNIFICANT CHANGE UP (ref 22–31)
CREAT SERPL-MCNC: 0.7 MG/DL — SIGNIFICANT CHANGE UP (ref 0.5–1.3)
CULTURE RESULTS: SIGNIFICANT CHANGE UP
EOSINOPHIL # BLD AUTO: 0.13 K/UL — SIGNIFICANT CHANGE UP (ref 0–0.5)
EOSINOPHIL NFR BLD AUTO: 2.7 % — SIGNIFICANT CHANGE UP (ref 0–6)
GLUCOSE BLDC GLUCOMTR-MCNC: 131 MG/DL — HIGH (ref 70–99)
GLUCOSE BLDC GLUCOMTR-MCNC: 154 MG/DL — HIGH (ref 70–99)
GLUCOSE BLDC GLUCOMTR-MCNC: 194 MG/DL — HIGH (ref 70–99)
GLUCOSE BLDC GLUCOMTR-MCNC: 210 MG/DL — HIGH (ref 70–99)
GLUCOSE SERPL-MCNC: 165 MG/DL — HIGH (ref 70–99)
HCT VFR BLD CALC: 27.8 % — LOW (ref 34.5–45)
HGB BLD-MCNC: 8.5 G/DL — LOW (ref 11.5–15.5)
IMM GRANULOCYTES NFR BLD AUTO: 0.8 % — SIGNIFICANT CHANGE UP (ref 0–1.5)
INR BLD: 1.31 RATIO — HIGH (ref 0.88–1.16)
LYMPHOCYTES # BLD AUTO: 1.07 K/UL — SIGNIFICANT CHANGE UP (ref 1–3.3)
LYMPHOCYTES # BLD AUTO: 22.4 % — SIGNIFICANT CHANGE UP (ref 13–44)
MAGNESIUM SERPL-MCNC: 1.9 MG/DL — SIGNIFICANT CHANGE UP (ref 1.6–2.6)
MCHC RBC-ENTMCNC: 25.8 PG — LOW (ref 27–34)
MCHC RBC-ENTMCNC: 30.6 GM/DL — LOW (ref 32–36)
MCV RBC AUTO: 84.2 FL — SIGNIFICANT CHANGE UP (ref 80–100)
MONOCYTES # BLD AUTO: 0.29 K/UL — SIGNIFICANT CHANGE UP (ref 0–0.9)
MONOCYTES NFR BLD AUTO: 6.1 % — SIGNIFICANT CHANGE UP (ref 2–14)
NEUTROPHILS # BLD AUTO: 3.24 K/UL — SIGNIFICANT CHANGE UP (ref 1.8–7.4)
NEUTROPHILS NFR BLD AUTO: 67.8 % — SIGNIFICANT CHANGE UP (ref 43–77)
NRBC # BLD: 0 /100 WBCS — SIGNIFICANT CHANGE UP (ref 0–0)
PHOSPHATE SERPL-MCNC: 3.4 MG/DL — SIGNIFICANT CHANGE UP (ref 2.5–4.5)
PLATELET # BLD AUTO: 171 K/UL — SIGNIFICANT CHANGE UP (ref 150–400)
POTASSIUM SERPL-MCNC: 5 MMOL/L — SIGNIFICANT CHANGE UP (ref 3.5–5.3)
POTASSIUM SERPL-SCNC: 5 MMOL/L — SIGNIFICANT CHANGE UP (ref 3.5–5.3)
PROT SERPL-MCNC: 6.3 G/DL — SIGNIFICANT CHANGE UP (ref 6–8.3)
PROTHROM AB SERPL-ACNC: 15.5 SEC — HIGH (ref 10.6–13.6)
RBC # BLD: 3.3 M/UL — LOW (ref 3.8–5.2)
RBC # FLD: 15.6 % — HIGH (ref 10.3–14.5)
SODIUM SERPL-SCNC: 141 MMOL/L — SIGNIFICANT CHANGE UP (ref 135–145)
SPECIMEN SOURCE: SIGNIFICANT CHANGE UP
WBC # BLD: 4.78 K/UL — SIGNIFICANT CHANGE UP (ref 3.8–10.5)
WBC # FLD AUTO: 4.78 K/UL — SIGNIFICANT CHANGE UP (ref 3.8–10.5)

## 2020-10-10 PROCEDURE — 99233 SBSQ HOSP IP/OBS HIGH 50: CPT | Mod: GC

## 2020-10-10 RX ORDER — DEXTROSE 50 % IN WATER 50 %
12.5 SYRINGE (ML) INTRAVENOUS ONCE
Refills: 0 | Status: DISCONTINUED | OUTPATIENT
Start: 2020-10-10 | End: 2020-10-15

## 2020-10-10 RX ORDER — GLUCAGON INJECTION, SOLUTION 0.5 MG/.1ML
1 INJECTION, SOLUTION SUBCUTANEOUS ONCE
Refills: 0 | Status: DISCONTINUED | OUTPATIENT
Start: 2020-10-10 | End: 2020-10-15

## 2020-10-10 RX ORDER — DEXTROSE 50 % IN WATER 50 %
25 SYRINGE (ML) INTRAVENOUS ONCE
Refills: 0 | Status: DISCONTINUED | OUTPATIENT
Start: 2020-10-10 | End: 2020-10-15

## 2020-10-10 RX ORDER — SODIUM CHLORIDE 9 MG/ML
1000 INJECTION, SOLUTION INTRAVENOUS
Refills: 0 | Status: DISCONTINUED | OUTPATIENT
Start: 2020-10-10 | End: 2020-10-15

## 2020-10-10 RX ORDER — DEXTROSE 50 % IN WATER 50 %
15 SYRINGE (ML) INTRAVENOUS ONCE
Refills: 0 | Status: DISCONTINUED | OUTPATIENT
Start: 2020-10-10 | End: 2020-10-15

## 2020-10-10 RX ORDER — INSULIN LISPRO 100/ML
VIAL (ML) SUBCUTANEOUS
Refills: 0 | Status: DISCONTINUED | OUTPATIENT
Start: 2020-10-10 | End: 2020-10-15

## 2020-10-10 RX ORDER — INSULIN LISPRO 100/ML
VIAL (ML) SUBCUTANEOUS AT BEDTIME
Refills: 0 | Status: DISCONTINUED | OUTPATIENT
Start: 2020-10-10 | End: 2020-10-15

## 2020-10-10 RX ADMIN — Medication 166.67 MILLIGRAM(S): at 12:03

## 2020-10-10 RX ADMIN — APIXABAN 5 MILLIGRAM(S): 2.5 TABLET, FILM COATED ORAL at 17:38

## 2020-10-10 RX ADMIN — Medication 166.67 MILLIGRAM(S): at 02:12

## 2020-10-10 RX ADMIN — PIPERACILLIN AND TAZOBACTAM 25 GRAM(S): 4; .5 INJECTION, POWDER, LYOPHILIZED, FOR SOLUTION INTRAVENOUS at 12:03

## 2020-10-10 RX ADMIN — ATORVASTATIN CALCIUM 40 MILLIGRAM(S): 80 TABLET, FILM COATED ORAL at 21:17

## 2020-10-10 RX ADMIN — Medication 1: at 17:43

## 2020-10-10 RX ADMIN — PIPERACILLIN AND TAZOBACTAM 25 GRAM(S): 4; .5 INJECTION, POWDER, LYOPHILIZED, FOR SOLUTION INTRAVENOUS at 04:04

## 2020-10-10 RX ADMIN — APIXABAN 5 MILLIGRAM(S): 2.5 TABLET, FILM COATED ORAL at 05:08

## 2020-10-10 RX ADMIN — PIPERACILLIN AND TAZOBACTAM 25 GRAM(S): 4; .5 INJECTION, POWDER, LYOPHILIZED, FOR SOLUTION INTRAVENOUS at 20:58

## 2020-10-10 RX ADMIN — CHLORHEXIDINE GLUCONATE 1 APPLICATION(S): 213 SOLUTION TOPICAL at 05:08

## 2020-10-10 NOTE — PROGRESS NOTE ADULT - ASSESSMENT
Patient is a 59yo F with 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 Hurd at [facility]), HTN, HLD, T2DM, DVT (dx July 2020; on Eliquis), post-herpetic neuralgia (on gabapentin) with recent admission 9/4- 9/11 for chest and back pain likely from pleural / pericardial effusion p/w worsening R chest pain, shortness of breath and chills found to have cellulitis around mediport site with MICU consulted for hypotension.     NEUROLOGY  # A&Ox3   - CHAITANYA     CARDIOVASCULAR  #Hypotension  - possibly in setting of infection  - CT chest showed possible cellulitis of mediport  - s/p 3L IVF  - was on Levophed in ED, weaned once patient was in MICU  - POCUS showed A-lines, trace PLEF, no pericardial effusion    #History of HTN, HLD,   - hold antihypertensives in setting of hypotension  - continue home statin    #Chest pain  - troponins wnl  - likely pleuritic in nature as patient was previously worked up in past hospitalization likely from pleural effusion/ pericardial effusion     PULMONARY  # CHAITANYA  - saturating well on RA    GASTROINTESTINAL  # Diet DASH TLC CC    RENAL  # CHAITANYA  - monitor I&Os    INFECTIOUS DISEASE  # Cellulitis of mediport (placed august 5th by Dr. Guzman)   - continue with vanc/ zosyn for now  - will address removal of mediport by contacting Dr. Guzman   - f/u blood cultures    ENDOCRINE  #DM2  - ISS for now    HEME  #DVT (dx July 2020; on Eliquis)   - continue with home eliquis     #Anemia  - no noted bleeding noted  - possibly secondary to chemotherapy regimen  - monitor and trend with CBCs Patient is a 61yo F with 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 Hurd at [facility]), HTN, HLD, T2DM, DVT (dx July 2020; on Eliquis), post-herpetic neuralgia (on gabapentin) with recent admission 9/4- 9/11 for chest and back pain likely from pleural / pericardial effusion p/w worsening R chest pain, shortness of breath and chills found to have cellulitis around mediport site with MICU consulted for hypotension, now hemodynamically stable.     NEUROLOGY  # A&Ox3   - CHAITANYA     CARDIOVASCULAR  #Hypotension  - possibly in setting of infection  - CT chest showed possible cellulitis of mediport  - s/p 3L IVF  - was on Levophed in ED, weaned once patient was in MICU. Now off pressors  - POCUS showed A-lines, trace PLEF, no pericardial effusion    #History of HTN, HLD,   - hold antihypertensives in setting of hypotension  - continue home statin    #Chest pain  - troponins wnl  - likely pleuritic in nature as patient was previously worked up in past hospitalization likely from pleural effusion/ pericardial effusion     PULMONARY  # CHAITANYA  - saturating well on RA    GASTROINTESTINAL  # Diet DASH TLC CC    RENAL  # CHAITANYA  - monitor I&Os    INFECTIOUS DISEASE  # Cellulitis of [facility] (placed august 5th by Dr. Guzman)   - continue with vanc/ zosyn for now  - Consulted IR regaridng removal of mediport  - f/u blood cultures and narrow down abx as appropriate    ENDOCRINE  #DM2  - ISS for now    HEME  #DVT (dx July 2020; on Eliquis)   - continue with home eliquis     #Anemia  - no noted bleeding noted  - possibly secondary to chemotherapy regimen  - monitor and trend with CBCs    Dispo: to medicine service as stable.

## 2020-10-10 NOTE — CHART NOTE - NSCHARTNOTEFT_GEN_A_CORE
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 Hurd at [facility]), HTN, HLD, T2DM, DVT (dx July 2020; on Eliquis), post-herpetic neuralgia (on gabapentin) with recent admission 9/4- 9/11 for chest and back pain likely from pleural / pericardial effusion p/w worsening R chest pain, shortness of breath and chills found to have cellulitis around mediport site with MICU consulted for hypotension. In ED: 99F, 82HR, 111/73, 98% on RA. Concern for fever and pleuritic chest pain with ddx including cellulitis vs bacteremia as port on R chest was warm and erythematous. Started sepsis workup and given vanc/zosyn, tylenol, reglan, and 2x 1L boluses of IVF as well. Given concern for hypotension, patient was started on levophed.  Overnight, IR consult placed for eval of infected mediport. Oncology was emailed to update them regarding patient admission. Continued on vanc/zosyn until blood cultures return. CT chest shows cellulitis around mediport as well as mild b/l axillary LNs. Patient was medically stable and not requiring pressors. Ready to discharge to medicine service.     OBJECTIVE --  Vital Signs Last 24 Hrs  T(C): 36.9 (10 Oct 2020 08:00), Max: 36.9 (10 Oct 2020 08:00)  T(F): 98.4 (10 Oct 2020 08:00), Max: 98.4 (10 Oct 2020 08:00)  HR: 75 (10 Oct 2020 11:00) (56 - 87)  BP: 115/51 (10 Oct 2020 11:00) (81/41 - 153/69)  BP(mean): 73 (10 Oct 2020 11:00) (55 - 95)  RR: 19 (10 Oct 2020 11:00) (10 - 38)  SpO2: 99% (10 Oct 2020 11:00) (93% - 100%)    I&O's Summary    09 Oct 2020 07:01  -  10 Oct 2020 07:00  --------------------------------------------------------  IN: 452.8 mL / OUT: 1300 mL / NET: -847.2 mL    MEDICATIONS  (STANDING):  apixaban 5 milliGRAM(s) Oral every 12 hours  atorvastatin 40 milliGRAM(s) Oral at bedtime  chlorhexidine 4% Liquid 1 Application(s) Topical <User Schedule>  dextrose 5%. 1000 milliLiter(s) (50 mL/Hr) IV Continuous <Continuous>  dextrose 50% Injectable 12.5 Gram(s) IV Push once  dextrose 50% Injectable 25 Gram(s) IV Push once  dextrose 50% Injectable 25 Gram(s) IV Push once  influenza   Vaccine 0.5 milliLiter(s) IntraMuscular once  insulin lispro (HumaLOG) corrective regimen sliding scale   SubCutaneous three times a day before meals  insulin lispro (HumaLOG) corrective regimen sliding scale   SubCutaneous at bedtime  piperacillin/tazobactam IVPB.. 3.375 Gram(s) IV Intermittent every 8 hours  vancomycin  IVPB 1250 milliGRAM(s) IV Intermittent every 12 hours    MEDICATIONS  (PRN):  dextrose 40% Gel 15 Gram(s) Oral once PRN Blood Glucose LESS THAN 70 milliGRAM(s)/deciliter  glucagon  Injectable 1 milliGRAM(s) IntraMuscular once PRN Glucose LESS THAN 70 milligrams/deciliter  hydrocortisone 2.5% Rectal Cream 1 Application(s) Rectal daily PRN daily to anus after each bowel movement.  metoclopramide 10 milliGRAM(s) Oral Before meals and at bedtime PRN nausea    LABS                                            8.5                   Neurophils% (auto):   67.8   (10-10 @ 00:53):    4.78 )-----------(171          Lymphocytes% (auto):  22.4                                          27.8                   Eosinphils% (auto):   2.7      Manual%: Neutrophils x    ; Lymphocytes x    ; Eosinophils x    ; Bands%: x    ; Blasts x                                    141    |  109    |  11                  Calcium: 9.6   / iCa: x      (10-10 @ 00:53)    ----------------------------<  165       Magnesium: 1.9                              5.0     |  23     |  0.70             Phosphorous: 3.4      TPro  6.3    /  Alb  3.6    /  TBili  0.3    /  DBili  x      /  AST  14     /  ALT  10     /  AlkPhos  72     10 Oct 2020 00:53    ( 10-10 @ 00:53 )   PT: 15.5 sec;   INR: 1.31 ratio  aPTT: 30.9 sec    ASSESSMENT & PLAN:   Patient is a 61yo F with 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 Hurd at [facility]), HTN, HLD, T2DM, DVT (dx July 2020; on Eliquis), post-herpetic neuralgia (on gabapentin) with recent admission 9/4- 9/11 for chest and back pain likely from pleural / pericardial effusion p/w worsening R chest pain, shortness of breath and chills found to have cellulitis around [facility] site with MICU consulted for hypotension, initially started on levophed, now hemodynamically stable and off pressors.     NEUROLOGY  # A&Ox3   - CHAITANYA     CARDIOVASCULAR  #Hypotension  - possibly in setting of infection  - CT chest showed possible cellulitis of [facility]  - s/p 3L IVF  - was on Levophed in ED, weaned once patient was in MICU. Now off pressors  - POCUS showed A-lines, trace PLEF, no pericardial effusion    #History of HTN, HLD,   - hold antihypertensives in setting of hypotension  - continue home statin    #Chest pain  - troponins wnl  - likely pleuritic in nature as patient was previously worked up in past hospitalization likely from pleural effusion/ pericardial effusion     PULMONARY  # CHAITANYA  - saturating well on RA    GASTROINTESTINAL  # Diet DASH TLC CC    RENAL  # CHAITANYA  - monitor I&Os    INFECTIOUS DISEASE  # Cellulitis of [facility] (placed august 5th by Dr. Guzman)   - continue with vanc/ zosyn for now  - Consulted IR regaridng removal of mediport  - f/u blood cultures and narrow down abx as appropriate    ENDOCRINE  #DM2  - ISS for now    HEME  #DVT (dx July 2020; on Eliquis)   - continue with home eliquis     #Anemia  - no noted bleeding noted  - possibly secondary to chemotherapy regimen  - monitor and trend with CBCs    Dispo: to medicine service as stable.     For Follow-Up:  [ ] f/u with onco (already emailed) (Dr. Hurd follows patient) regarding next steps (and if any further workup regarding b/l axillary LN noted on CT.   [ ] f/u IR recs   [ ] f/u blood cultures

## 2020-10-10 NOTE — CHART NOTE - NSCHARTNOTEFT_GEN_A_CORE
MAR Accept Note  ---------------------------    Transfer from: MICU  Transfer to:  (X) Medicine    (  ) Telemetry    (  ) RCU    (  ) Palliative    (  ) Stroke Unit    (  ) _______________  Accepting Physician: Dr. 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 Hurd at [facility]), HTN, HLD, T2DM, DVT (dx July 2020; on Eliquis), post-herpetic neuralgia (on gabapentin) with recent admission 9/4- 9/11 for chest and back pain likely from pleural / pericardial effusion p/w worsening R chest pain, shortness of breath and chills found to have cellulitis around mediport site with MICU consulted for hypotension. In ED: 99F, 82HR, 111/73, 98% on RA. Concern for fever and pleuritic chest pain with ddx including cellulitis vs bacteremia as port on R chest was warm and erythematous. Started sepsis workup and given vanc/zosyn, tylenol, reglan, and 2x 1L boluses of IVF as well. Given concern for hypotension, patient was started on levophed.  Overnight, IR consult placed for eval of infected mediport. Oncology was emailed to update them regarding patient admission. Continued on vanc/zosyn until blood cultures return. CT chest shows cellulitis around mediport as well as mild b/l axillary LNs. Patient was medically stable and not requiring pressors. Ready to discharge to medicine service.     OBJECTIVE --  Vital Signs Last 24 Hrs  T(C): 36.9 (10 Oct 2020 08:00), Max: 36.9 (10 Oct 2020 08:00)  T(F): 98.4 (10 Oct 2020 08:00), Max: 98.4 (10 Oct 2020 08:00)  HR: 75 (10 Oct 2020 11:00) (56 - 87)  BP: 115/51 (10 Oct 2020 11:00) (81/41 - 153/69)  BP(mean): 73 (10 Oct 2020 11:00) (55 - 95)  RR: 19 (10 Oct 2020 11:00) (10 - 38)  SpO2: 99% (10 Oct 2020 11:00) (93% - 100%)    I&O's Summary    09 Oct 2020 07:01  -  10 Oct 2020 07:00  --------------------------------------------------------  IN: 452.8 mL / OUT: 1300 mL / NET: -847.2 mL    MEDICATIONS  (STANDING):  apixaban 5 milliGRAM(s) Oral every 12 hours  atorvastatin 40 milliGRAM(s) Oral at bedtime  chlorhexidine 4% Liquid 1 Application(s) Topical <User Schedule>  dextrose 5%. 1000 milliLiter(s) (50 mL/Hr) IV Continuous <Continuous>  dextrose 50% Injectable 12.5 Gram(s) IV Push once  dextrose 50% Injectable 25 Gram(s) IV Push once  dextrose 50% Injectable 25 Gram(s) IV Push once  influenza   Vaccine 0.5 milliLiter(s) IntraMuscular once  insulin lispro (HumaLOG) corrective regimen sliding scale   SubCutaneous three times a day before meals  insulin lispro (HumaLOG) corrective regimen sliding scale   SubCutaneous at bedtime  piperacillin/tazobactam IVPB.. 3.375 Gram(s) IV Intermittent every 8 hours  vancomycin  IVPB 1250 milliGRAM(s) IV Intermittent every 12 hours    MEDICATIONS  (PRN):  dextrose 40% Gel 15 Gram(s) Oral once PRN Blood Glucose LESS THAN 70 milliGRAM(s)/deciliter  glucagon  Injectable 1 milliGRAM(s) IntraMuscular once PRN Glucose LESS THAN 70 milligrams/deciliter  hydrocortisone 2.5% Rectal Cream 1 Application(s) Rectal daily PRN daily to anus after each bowel movement.  metoclopramide 10 milliGRAM(s) Oral Before meals and at bedtime PRN nausea    LABS                                            8.5                   Neurophils% (auto):   67.8   (10-10 @ 00:53):    4.78 )-----------(171          Lymphocytes% (auto):  22.4                                          27.8                   Eosinphils% (auto):   2.7      Manual%: Neutrophils x    ; Lymphocytes x    ; Eosinophils x    ; Bands%: x    ; Blasts x                                    141    |  109    |  11                  Calcium: 9.6   / iCa: x      (10-10 @ 00:53)    ----------------------------<  165       Magnesium: 1.9                              5.0     |  23     |  0.70             Phosphorous: 3.4      TPro  6.3    /  Alb  3.6    /  TBili  0.3    /  DBili  x      /  AST  14     /  ALT  10     /  AlkPhos  72     10 Oct 2020 00:53    ( 10-10 @ 00:53 )   PT: 15.5 sec;   INR: 1.31 ratio  aPTT: 30.9 sec    ASSESSMENT & PLAN:   Patient is a 61yo F with 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 Hurd at [facility]), HTN, HLD, T2DM, DVT (dx July 2020; on Eliquis), post-herpetic neuralgia (on gabapentin) with recent admission 9/4- 9/11 for chest and back pain likely from pleural / pericardial effusion p/w worsening R chest pain, shortness of breath and chills found to have cellulitis around [facility] site with MICU consulted for hypotension, initially started on levophed, now hemodynamically stable and off pressors.     NEUROLOGY  # A&Ox3   - CHAITANYA     CARDIOVASCULAR  #Hypotension  - possibly in setting of infection  - CT chest showed possible cellulitis of [facility]  - s/p 3L IVF  - was on Levophed in ED, weaned once patient was in MICU. Now off pressors  - POCUS showed A-lines, trace PLEF, no pericardial effusion    #History of HTN, HLD,   - hold antihypertensives in setting of hypotension  - continue home statin    #Chest pain  - troponins wnl  - likely pleuritic in nature as patient was previously worked up in past hospitalization likely from pleural effusion/ pericardial effusion     PULMONARY  # CHAITANYA  - saturating well on RA    GASTROINTESTINAL  # Diet DASH TLC CC    RENAL  # CHAITANYA  - monitor I&Os    INFECTIOUS DISEASE  # Cellulitis of [facility] (placed august 5th by Dr. Guzman)   - continue with vanc/ zosyn for now  - Consulted IR regaridng removal of mediport  - f/u blood cultures and narrow down abx as appropriate    ENDOCRINE  #DM2  - ISS for now    HEME  #DVT (dx July 2020; on Eliquis)   - continue with home eliquis     #Anemia  - no noted bleeding noted  - possibly secondary to chemotherapy regimen  - monitor and trend with CBCs    Dispo: to medicine service as stable.     For Follow-Up:  [ ] f/u with onco (already emailed) (Dr. Hurd follows patient) regarding next steps (and if any further workup regarding b/l axillary LN noted on CT.   [ ] f/u IR recs regarding removing mediport  [ ] f/u blood cultures.

## 2020-10-10 NOTE — PROGRESS NOTE ADULT - SUBJECTIVE AND OBJECTIVE BOX
PROGRESS NOTE:     CONTACT INFO:  Yoel Calle MD PGY-1  Internal Medicine  Please call MICU    Patient is a 60y old  Female who presents with a chief complaint of fever (09 Oct 2020 16:39)    SUBJECTIVE / OVERNIGHT EVENTS: Spoke with [place]  ([id number]). No acute events overnight. Patient seen and evaluated at bedside. States she still has mild discomfort at R shoulder and thus sleeps on her left side. Otherwise denies worsening of chest pain or any shortness of breath. Denies  fever, chills, nausea, vomiting, dizziness, lightheadedness, SOB at rest, palpitations, abdominal pain, focal weakness or sensory changes. Still has chronic back pain.     ADDITIONAL REVIEW OF SYSTEMS:    MEDICATIONS  (STANDING):  apixaban 5 milliGRAM(s) Oral every 12 hours  atorvastatin 40 milliGRAM(s) Oral at bedtime  chlorhexidine 4% Liquid 1 Application(s) Topical <User Schedule>  influenza   Vaccine 0.5 milliLiter(s) IntraMuscular once  piperacillin/tazobactam IVPB.. 3.375 Gram(s) IV Intermittent every 8 hours  vancomycin  IVPB 1250 milliGRAM(s) IV Intermittent every 12 hours    MEDICATIONS  (PRN):  hydrocortisone 2.5% Rectal Cream 1 Application(s) Rectal daily PRN daily to anus after each bowel movement.  metoclopramide 10 milliGRAM(s) Oral Before meals and at bedtime PRN nausea      CAPILLARY BLOOD GLUCOSE        I&O's Summary    09 Oct 2020 07:01  -  10 Oct 2020 07:00  --------------------------------------------------------  IN: 452.8 mL / OUT: 1300 mL / NET: -847.2 mL        PHYSICAL EXAM:  Vital Signs Last 24 Hrs  T(C): 36.6 (10 Oct 2020 04:00), Max: 37.4 (09 Oct 2020 11:03)  T(F): 97.9 (10 Oct 2020 04:00), Max: 99.4 (09 Oct 2020 11:03)  HR: 71 (10 Oct 2020 06:00) (56 - 86)  BP: 115/52 (10 Oct 2020 06:00) (81/41 - 153/69)  BP(mean): 75 (10 Oct 2020 06:00) (55 - 95)  RR: 21 (10 Oct 2020 06:00) (10 - 38)  SpO2: 97% (10 Oct 2020 06:00) (93% - 100%)    PE:   General: resting in bed, NAD  HEENT: atraumatic, normocephalic;  Neck: supple,  Respiratory: CTABL, not using accessory muscles  Cardiovascular: RRR, S1, S2, no M/R/G  Abdomen: normal BS; soft, large habitus, non-tender; no rebound tenderness or guarding  Extremities: no clubbing, cyanosis, or edema  Skin: erythema surrounding mediport placement  Neurological: non-focal    LABS:                        8.5    4.78  )-----------( 171      ( 10 Oct 2020 00:53 )             27.8     10-10    141  |  109<H>  |  11  ----------------------------<  165<H>  5.0   |  23  |  0.70    Ca    9.6      10 Oct 2020 00:53  Phos  3.4     10-10  Mg     1.9     10-10    TPro  6.3  /  Alb  3.6  /  TBili  0.3  /  DBili  x   /  AST  14  /  ALT  10  /  AlkPhos  72  10-10    PT/INR - ( 10 Oct 2020 00:53 )   PT: 15.5 sec;   INR: 1.31 ratio         PTT - ( 10 Oct 2020 00:53 )  PTT:30.9 sec      Urinalysis Basic - ( 09 Oct 2020 11:57 )    Color: Colorless / Appearance: Clear / S.005 / pH: x  Gluc: x / Ketone: Negative  / Bili: Negative / Urobili: Negative   Blood: x / Protein: Negative / Nitrite: Negative   Leuk Esterase: Negative / RBC: x / WBC x   Sq Epi: x / Non Sq Epi: x / Bacteria: x  RADIOLOGY & ADDITIONAL TESTS:      < from: CT Chest w/ IV Cont (10.09.20 @ 12:48) >    EXAM:  CT CHEST IC                          EXAM:  CT ABDOMEN AND PELVIS IC                            PROCEDURE DATE:  10/09/2020            INTERPRETATION:  CLINICAL INFORMATION: Left lower quadrant pain. Erythematous right chest port, chest pain. Lymphoma.    COMPARISON: 2020.    PROCEDURE:  CT of the Chest, Abdomen and Pelvis was performed with intravenous contrast.  Intravenous contrast: 90 ml Omnipaque 350. 10 ml discarded.  Oral contrast: None.  Sagittal and coronal reformats were performed.    FINDINGS:  CHEST:  LUNGS AND LARGE AIRWAYS: Patent central airways. Stable biapical scarring, right greater than left. Stable calcified and noncalcified nodules predominantly in the right lung since 2018.  PLEURA: No pleural effusion.  VESSELS: Within normal limits.  HEART: Heart size is normal. Trace high density pericardial effusion, decreased.  MEDIASTINUM AND CESAR: Slightly prominent bilateral axillary lymph nodes, since the prior exam. Right Mediport catheter tip in distal SVC. Mild skin thickening anterior to the Mediport in the right anterior chest wall. No surrounding free fluid or collection.  CHEST WALL AND LOWER NECK: Within normal limits.    ABDOMEN AND PELVIS:  LIVER: Within normal limits.  BILE DUCTS: Normal caliber.  GALLBLADDER: Within normal limits.  SPLEEN: Within normal limits.  PANCREAS: Within normal limits.  ADRENALS: Within normal limits.  KIDNEYS/URETERS: A coarse focus of calcification in the left lower pole. Kidneys are otherwise normal.    BLADDER: Within normal limits.  REPRODUCTIVE ORGANS: Uterus and adnexa within normal limits.    BOWEL: No bowel obstruction. Appendix is normal.  PERITONEUM: No ascites.  VESSELS: Atherosclerotic changes.  RETROPERITONEUM/LYMPH NODES: No lymphadenopathy.  ABDOMINAL WALL: Within normal limits.  BONES: Within normal limits.    IMPRESSION:  Likely mild cellulitis associated with the Mediport in the right anterior chest wall. No associated fluid or fluid collection.  Mildly prominent bilateral axillary lymph nodes  No acute abnormality within the abdomen and pelvis.    ODILIA BARAHONA M.D., ATTENDING RADIOLOGIST  This document has been electronically signed. Oct  9 2020  2:59PM    < end of copied text >   PROGRESS NOTE:     CONTACT INFO:  Yoel Calle MD PGY-1  Internal Medicine  Please call MICU    Patient is a 60y old  Female who presents with a chief complaint of fever (09 Oct 2020 16:39)    SUBJECTIVE / OVERNIGHT EVENTS: Spoke with [place]  ([id number]). No acute events overnight. Patient seen and evaluated at bedside. States she still has mild discomfort at R shoulder and thus sleeps on her left side. Otherwise denies worsening of chest pain or any shortness of breath. Denies  fever, chills, nausea, vomiting, dizziness, lightheadedness, SOB at rest, palpitations, abdominal pain, focal weakness or sensory changes. Still has chronic back pain. Discussed with son patient medically stable and will require antibiotics, have IR evaluate patient for removing mediport. [place]est PM our team sent email to notify Onc regarding patient's admission.     ADDITIONAL REVIEW OF SYSTEMS:    MEDICATIONS  (STANDING):  apixaban 5 milliGRAM(s) Oral every 12 hours  atorvastatin 40 milliGRAM(s) Oral at bedtime  chlorhexidine 4% Liquid 1 Application(s) Topical <User Schedule>  influenza   Vaccine 0.5 milliLiter(s) IntraMuscular once  piperacillin/tazobactam IVPB.. 3.375 Gram(s) IV Intermittent every 8 hours  vancomycin  IVPB 1250 milliGRAM(s) IV Intermittent every 12 hours    MEDICATIONS  (PRN):  hydrocortisone 2.5% Rectal Cream 1 Application(s) Rectal daily PRN daily to anus after each bowel movement.  metoclopramide 10 milliGRAM(s) Oral Before meals and at bedtime PRN nausea      CAPILLARY BLOOD GLUCOSE    I&O's Summary    09 Oct 2020 07:01  -  10 Oct 2020 07:00  --------------------------------------------------------  IN: 452.8 mL / OUT: 1300 mL / NET: -847.2 mL    PHYSICAL EXAM:  Vital Signs Last 24 Hrs  T(C): 36.6 (10 Oct 2020 04:00), Max: 37.4 (09 Oct 2020 11:03)  T(F): 97.9 (10 Oct 2020 04:00), Max: 99.4 (09 Oct 2020 11:03)  HR: 71 (10 Oct 2020 06:00) (56 - 86)  BP: 115/52 (10 Oct 2020 06:00) (81/41 - 153/69)  BP(mean): 75 (10 Oct 2020 06:00) (55 - 95)  RR: 21 (10 Oct 2020 06:00) (10 - 38)  SpO2: 97% (10 Oct 2020 06:00) (93% - 100%)    PE:   General: resting in bed, NAD  HEENT: atraumatic, normocephalic;  Neck: supple,  Respiratory: CTABL, not using accessory muscles  Cardiovascular: RRR, S1, S2, no M/R/G  Abdomen: normal BS; soft, large habitus, non-tender; no rebound tenderness or guarding  Extremities: no clubbing, cyanosis, or edema  Skin: erythema surrounding mediport placement  Neurological: non-focal    LABS:                        8.5    4.78  )-----------( 171      ( 10 Oct 2020 00:53 )             27.8     10-10    141  |  109<H>  |  11  ----------------------------<  165<H>  5.0   |  23  |  0.70    Ca    9.6      10 Oct 2020 00:53  Phos  3.4     10-10  Mg     1.9     10-10    TPro  6.3  /  Alb  3.6  /  TBili  0.3  /  DBili  x   /  AST  14  /  ALT  10  /  AlkPhos  72  10-10    PT/INR - ( 10 Oct 2020 00:53 )   PT: 15.5 sec;   INR: 1.31 ratio         PTT - ( 10 Oct 2020 00:53 )  PTT:30.9 sec      Urinalysis Basic - ( 09 Oct 2020 11:57 )    Color: Colorless / Appearance: Clear / S.005 / pH: x  Gluc: x / Ketone: Negative  / Bili: Negative / Urobili: Negative   Blood: x / Protein: Negative / Nitrite: Negative   Leuk Esterase: Negative / RBC: x / WBC x   Sq Epi: x / Non Sq Epi: x / Bacteria: x  RADIOLOGY & ADDITIONAL TESTS:      < from: CT Chest w/ IV Cont (10.09.20 @ 12:48) >    EXAM:  CT CHEST IC                          EXAM:  CT ABDOMEN AND PELVIS IC                            PROCEDURE DATE:  10/09/2020            INTERPRETATION:  CLINICAL INFORMATION: Left lower quadrant pain. Erythematous right chest port, chest pain. Lymphoma.    COMPARISON: 2020.    PROCEDURE:  CT of the Chest, Abdomen and Pelvis was performed with intravenous contrast.  Intravenous contrast: 90 ml Omnipaque 350. 10 ml discarded.  Oral contrast: None.  Sagittal and coronal reformats were performed.    FINDINGS:  CHEST:  LUNGS AND LARGE AIRWAYS: Patent central airways. Stable biapical scarring, right greater than left. Stable calcified and noncalcified nodules predominantly in the right lung since 2018.  PLEURA: No pleural effusion.  VESSELS: Within normal limits.  HEART: Heart size is normal. Trace high density pericardial effusion, decreased.  MEDIASTINUM AND CESAR: Slightly prominent bilateral axillary lymph nodes, since the prior exam. Right Mediport catheter tip in distal SVC. Mild skin thickening anterior to the Mediport in the right anterior chest wall. No surrounding free fluid or collection.  CHEST WALL AND LOWER NECK: Within normal limits.    ABDOMEN AND PELVIS:  LIVER: Within normal limits.  BILE DUCTS: Normal caliber.  GALLBLADDER: Within normal limits.  SPLEEN: Within normal limits.  PANCREAS: Within normal limits.  ADRENALS: Within normal limits.  KIDNEYS/URETERS: A coarse focus of calcification in the left lower pole. Kidneys are otherwise normal.    BLADDER: Within normal limits.  REPRODUCTIVE ORGANS: Uterus and adnexa within normal limits.    BOWEL: No bowel obstruction. Appendix is normal.  PERITONEUM: No ascites.  VESSELS: Atherosclerotic changes.  RETROPERITONEUM/LYMPH NODES: No lymphadenopathy.  ABDOMINAL WALL: Within normal limits.  BONES: Within normal limits.    IMPRESSION:  Likely mild cellulitis associated with the Mediport in the right anterior chest wall. No associated fluid or fluid collection.  Mildly prominent bilateral axillary lymph nodes  No acute abnormality within the abdomen and pelvis.    ODILIA BARAHONA M.D., ATTENDING RADIOLOGIST  This document has been electronically signed. Oct  9 2020  2:59PM    < end of copied text >

## 2020-10-10 NOTE — CONSULT NOTE ADULT - ATTENDING COMMENTS
60 y.o female with APL on ATRA, admitted with fever and pain at Mediport site.  Agree with IV antibiotics, if cultures positive may need to remove the port.

## 2020-10-10 NOTE — CONSULT NOTE ADULT - ASSESSMENT
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 Hurd at [facility]), HTN, HLD, T2DM, DVT (dx July 2020; on Eliquis), post-herpetic neuralgia (on gabapentin) with recent admission 9/4- 9/11 for chest and back pain likely from pleural / pericardial effusion p/w worsening R chest pain, shortness of breath and chills found to have cellulitis around mediport site with MICU consulted for hypotension. Hematology consulted given hx of APL, on treatment.    #Acute promyelocytic leukemia  #VTE, July 2020  -started ATRA empirically on 5/23/20, then on arsenic trioxide on 5/27/20 when diagnosis was confirmed  -was found to have a partially occlussive DVT in her R axillary vein, treated with lovenox, suspected to be line related (repeat duplex on 7/16/20 was negative, and line had been removed on 7/10/20 due to ?irritation)  -6/25/20 bone marrow bx showed persistent disease, retreated on 7/13/20 without evidence of leukemia  -started on cycle 1 of consolidation with GALA/ATRA, complicated by admissions for chest pain, found to have small loculated pleural effusion treated with empiric abx.  -Resumed GALA/ATRA on 10/5/20 QD, last dose on 10/9/20 prior to hospitalization.  -Planned to get repeat echocardiogram on first week of October; would recommend checking here  -EKG checked regularly while on GALA given potential for arrhytmias; EKG this admission shows T-wave inversion that were previously present in September's EKG; high sensitivity troponin are negative in setting of chest pain, so low suspicion for ischemia; would repeat cardiac enzymes if suspected  -patient being treated empirically with abx for suspected cellulitis associated with mediport. Ms. Gant is not currently neutropenic, on vancomycin and zosyn. Consider ID consult if not clinically improving or if she is bacteremic, as mediport may need to be removed  -Monitor CMP, LDH, uric acid, CBC w/ diff daily given recent chemo  -OK to continue with anticoagulation given hx of VTE; if plts <50k or if bleeding, would hold full dose anticoagulation    Please call with any questions.    Jose Luis Tijerina MD, MPH  Hematology / Oncology Fellow, PGY6  p

## 2020-10-10 NOTE — CONSULT NOTE ADULT - SUBJECTIVE AND OBJECTIVE BOX
Hematology Note    HPI: Patient is a 61 yo F w/ PMHx of APL on chemo (dx May 2020; has regimen of ATRA and arsenic; follows Dr. Hurd at [facility]; Mediport placed Aug 5th by Dr. Guzman), HTN, HLD, T2DM, DVT (dx July 2020; home eliquis 5mg), post-herpetic neuralgia (on gabapentin) with recent admission on 9/4- 9/11 for chest and back pain likely from pleural / pericardial effusion who presents today with ?fevers. Spoke to patient in Faroese with  (languageline [id number] Marco). Patient states she has had 6+ months of shortness of breath and dizziness that became worse recently with associated chills. Also endorsed R sided chest pain and shoulder pain that brought patient into the ED. Patient also endorsed back pain but no focal weakness/ sensory changes or bowel/ bladder changes. Patient went to receive chemo session yesterday and finished the session. After the session at home, patient began experiencing the symptoms mentioned earlier. Denies any sick contacts, cough, SOB, dysuria, hematuria, hematochezia, melena. Last tylenol yesterday night. On eliquis for a prior RUE DVT.    During last hospitalization, echo showed EF 45-50%, trace pericardial effusion, mild reduced RV function. Cards at that time had low suspicion for pericarditis or tamponade. Cardiac cath 2 years ago with mild non-obstructive CAD. Patient also had L sided pleural effusion with ddx including malignancy, viral or parapneumonic effusion. As per pulm, effusion could not be sampled safely with thoracentesis. Treated for PNA with augmentin.     In ED: 99F, 82HR, 111/73, 98% on RA. Concern for fever and pleuritic chest pain with ddx including cellulitis vs bacteremia as port on R chest was warm and erythematous. Started sepsis workup and given vanc/zosyn, tylenol, reglan, and 2x 1L boluses of IVF as well. Given concern for hypotension, patient was started on levophed.   (09 Oct 2020 16:39)    Hematology consulted given hx of APL, on treatment. Hx obtained from the patient on initial consultation via [facility]  (ID [id number]). Patient seen in the MICU. Endorsed hx as above of having fevers and rigors on the night of 10/8/20 around 8:00 PM, associated with pain over the R mediport site. Also endorsed some epigastric / sternal pain associated with deep breath. Denies any fevers or chills at this time. As above, denies any GI symptoms (diarrhea, abdominal pain, constipation), dysuria, incontinence, shortness of breath, or cough; only complains of continuing musculoskeletal pain in both arms and chest that is worse over the mediport site. This AM, was weaned off pressors in the MICU and was hemodynamically stable, pending transfer to medicine floor in the afternoon.    PAST MEDICAL & SURGICAL HISTORY:  APL (acute promyelocytic leukemia)    HLD (hyperlipidemia)    Hypertension    Diabetes    No significant past surgical history        FAMILY HISTORY:  Family history of diabetes mellitus  Mother        MEDICATIONS  (STANDING):  apixaban 5 milliGRAM(s) Oral every 12 hours  atorvastatin 40 milliGRAM(s) Oral at bedtime  chlorhexidine 4% Liquid 1 Application(s) Topical <User Schedule>  dextrose 5%. 1000 milliLiter(s) (50 mL/Hr) IV Continuous <Continuous>  dextrose 50% Injectable 12.5 Gram(s) IV Push once  dextrose 50% Injectable 25 Gram(s) IV Push once  dextrose 50% Injectable 25 Gram(s) IV Push once  influenza   Vaccine 0.5 milliLiter(s) IntraMuscular once  insulin lispro (HumaLOG) corrective regimen sliding scale   SubCutaneous three times a day before meals  insulin lispro (HumaLOG) corrective regimen sliding scale   SubCutaneous at bedtime  piperacillin/tazobactam IVPB.. 3.375 Gram(s) IV Intermittent every 8 hours  vancomycin  IVPB 1250 milliGRAM(s) IV Intermittent every 12 hours    MEDICATIONS  (PRN):  dextrose 40% Gel 15 Gram(s) Oral once PRN Blood Glucose LESS THAN 70 milliGRAM(s)/deciliter  glucagon  Injectable 1 milliGRAM(s) IntraMuscular once PRN Glucose LESS THAN 70 milligrams/deciliter  metoclopramide 10 milliGRAM(s) Oral Before meals and at bedtime PRN nausea      Allergies    adhesives (Pruritus)  No Known Drug Allergies    Intolerances    SOCIAL HISTORY: No EtOH, no tobacco    REVIEW OF SYSTEMS: ROS as above    CONSTITUTIONAL: fevers and rigors prior to presenting; none overnight  EYES/ENT: No visual changes;  No vertigo or throat pain   NECK: No pain or stiffness  RESPIRATORY: No cough, wheezing, hemoptysis; No shortness of breath  CARDIOVASCULAR: No chest pain or palpitations  GASTROINTESTINAL: No abdominal or epigastric pain. No nausea, vomiting, or hematemesis; No diarrhea or constipation. No melena or hematochezia.  GENITOURINARY: No dysuria, frequency or hematuria  NEUROLOGICAL: No numbness or weakness  SKIN: No itching, burning, rashes, or lesions; pain and redness over mediport site on R chest wall  All other review of systems is negative unless indicated above.    Height (cm): 154.9 (10-09 @ 18:26)  Weight (kg): 77.4 (10-09 @ 18:26)  BMI (kg/m2): 32.3 (10-09 @ 18:26)  BSA (m2): 1.77 (10-09 @ 18:26)    T(F): 98.1 (10-10-20 @ 12:00), Max: 98.4 (10-10-20 @ 08:00)  HR: 70 (10-10-20 @ 14:00)  BP: 114/56 (10-10-20 @ 14:00)  RR: 21 (10-10-20 @ 14:00)  SpO2: 100% (10-10-20 @ 14:00)  Wt(kg): --    GENERAL: NAD, well-developed; able to sit upright without assistance in bed  HEAD:  Atraumatic, Normocephalic  EYES: EOMI, PERRLA, conjunctiva and sclera clear  NECK: Supple, No JVD  CHEST/LUNG: Clear to auscultation bilaterally; No wheeze  HEART: Regular rate and rhythm; No murmurs, rubs, or gallops  ABDOMEN: Soft, Nontender, Nondistended; Bowel sounds present  EXTREMITIES:  2+ Peripheral Pulses, No clubbing, cyanosis, or edema  NEUROLOGY: non-focal  SKIN: No rashes or lesions; R anterior chest wall mediport site erythematous, slightly warm to touch; Ms. Gant reports it is tender but tolerated examination                          8.5    4.78  )-----------( 171      ( 10 Oct 2020 00:53 )             27.8       10-10    141  |  109<H>  |  11  ----------------------------<  165<H>  5.0   |  23  |  0.70    Ca    9.6      10 Oct 2020 00:53  Phos  3.4     10-10  Mg     1.9     10-10    TPro  6.3  /  Alb  3.6  /  TBili  0.3  /  DBili  x   /  AST  14  /  ALT  10  /  AlkPhos  72  10-10      Magnesium, Serum: 1.9 mg/dL (10-10 @ 00:53)  Phosphorus Level, Serum: 3.4 mg/dL (10-10 @ 00:53)      < from: CT Abdomen and Pelvis w/ IV Cont (10.09.20 @ 12:48) >    EXAM:  CT CHEST IC                          EXAM:  CT ABDOMEN AND PELVIS IC                            PROCEDURE DATE:  10/09/2020            INTERPRETATION:  CLINICAL INFORMATION: Left lower quadrant pain. Erythematous right chest port, chest pain. Lymphoma.    COMPARISON: 9/8/2020.    PROCEDURE:  CT of the Chest, Abdomen and Pelvis was performed with intravenous contrast.  Intravenous contrast: 90 ml Omnipaque 350. 10 ml discarded.  Oral contrast: None.  Sagittal and coronal reformats were performed.    FINDINGS:  CHEST:  LUNGS AND LARGE AIRWAYS: Patent central airways. Stable biapical scarring, right greater than left. Stable calcified and noncalcified nodules predominantly in the right lung since 5/19/2018.  PLEURA: No pleural effusion.  VESSELS: Within normal limits.  HEART: Heart size is normal. Trace high density pericardial effusion, decreased.  MEDIASTINUM AND CESAR: Slightly prominent bilateral axillary lymph nodes, since the prior exam. Right Mediport catheter tip in distal SVC. Mild skin thickening anterior to the Mediport in the right anterior chest wall. No surrounding free fluid or collection.  CHEST WALL AND LOWER NECK: Within normal limits.    ABDOMEN AND PELVIS:  LIVER: Within normal limits.  BILE DUCTS: Normal caliber.  GALLBLADDER: Within normal limits.  SPLEEN: Within normal limits.  PANCREAS: Within normal limits.  ADRENALS: Within normal limits.  KIDNEYS/URETERS: A coarse focus of calcification in the left lower pole. Kidneys are otherwise normal.    BLADDER: Within normal limits.  REPRODUCTIVE ORGANS: Uterus and adnexa within normal limits.    BOWEL: No bowel obstruction. Appendix is normal.  PERITONEUM: No ascites.  VESSELS: Atherosclerotic changes.  RETROPERITONEUM/LYMPH NODES: No lymphadenopathy.  ABDOMINAL WALL: Within normal limits.  BONES: Within normal limits.    IMPRESSION:  Likely mild cellulitis associated with the Mediport in the right anterior chest wall. No associated fluid or fluid collection.  Mildly prominent bilateral axillary lymph nodes  No acute abnormality within the abdomen and pelvis.    ODILIA BARAHONA M.D., ATTENDING RADIOLOGIST  This document has been electronically signed. Oct  9 2020  2:59PM    < end of copied text >

## 2020-10-11 LAB
ALBUMIN SERPL ELPH-MCNC: 3.9 G/DL — SIGNIFICANT CHANGE UP (ref 3.3–5)
ALP SERPL-CCNC: 70 U/L — SIGNIFICANT CHANGE UP (ref 40–120)
ALT FLD-CCNC: 11 U/L — SIGNIFICANT CHANGE UP (ref 10–45)
ANION GAP SERPL CALC-SCNC: 11 MMOL/L — SIGNIFICANT CHANGE UP (ref 5–17)
AST SERPL-CCNC: 13 U/L — SIGNIFICANT CHANGE UP (ref 10–40)
BASOPHILS # BLD AUTO: 0.01 K/UL — SIGNIFICANT CHANGE UP (ref 0–0.2)
BASOPHILS NFR BLD AUTO: 0.2 % — SIGNIFICANT CHANGE UP (ref 0–2)
BILIRUB SERPL-MCNC: 0.3 MG/DL — SIGNIFICANT CHANGE UP (ref 0.2–1.2)
BUN SERPL-MCNC: 8 MG/DL — SIGNIFICANT CHANGE UP (ref 7–23)
CALCIUM SERPL-MCNC: 9.7 MG/DL — SIGNIFICANT CHANGE UP (ref 8.4–10.5)
CHLORIDE SERPL-SCNC: 105 MMOL/L — SIGNIFICANT CHANGE UP (ref 96–108)
CO2 SERPL-SCNC: 25 MMOL/L — SIGNIFICANT CHANGE UP (ref 22–31)
CREAT SERPL-MCNC: 0.77 MG/DL — SIGNIFICANT CHANGE UP (ref 0.5–1.3)
EOSINOPHIL # BLD AUTO: 0.28 K/UL — SIGNIFICANT CHANGE UP (ref 0–0.5)
EOSINOPHIL NFR BLD AUTO: 6.9 % — HIGH (ref 0–6)
GLUCOSE BLDC GLUCOMTR-MCNC: 119 MG/DL — HIGH (ref 70–99)
GLUCOSE BLDC GLUCOMTR-MCNC: 124 MG/DL — HIGH (ref 70–99)
GLUCOSE BLDC GLUCOMTR-MCNC: 133 MG/DL — HIGH (ref 70–99)
GLUCOSE BLDC GLUCOMTR-MCNC: 151 MG/DL — HIGH (ref 70–99)
GLUCOSE SERPL-MCNC: 120 MG/DL — HIGH (ref 70–99)
HCT VFR BLD CALC: 27.3 % — LOW (ref 34.5–45)
HGB BLD-MCNC: 8.1 G/DL — LOW (ref 11.5–15.5)
IMM GRANULOCYTES NFR BLD AUTO: 0.5 % — SIGNIFICANT CHANGE UP (ref 0–1.5)
LYMPHOCYTES # BLD AUTO: 1.37 K/UL — SIGNIFICANT CHANGE UP (ref 1–3.3)
LYMPHOCYTES # BLD AUTO: 33.6 % — SIGNIFICANT CHANGE UP (ref 13–44)
MAGNESIUM SERPL-MCNC: 2 MG/DL — SIGNIFICANT CHANGE UP (ref 1.6–2.6)
MCHC RBC-ENTMCNC: 25 PG — LOW (ref 27–34)
MCHC RBC-ENTMCNC: 29.7 GM/DL — LOW (ref 32–36)
MCV RBC AUTO: 84.3 FL — SIGNIFICANT CHANGE UP (ref 80–100)
MONOCYTES # BLD AUTO: 0.28 K/UL — SIGNIFICANT CHANGE UP (ref 0–0.9)
MONOCYTES NFR BLD AUTO: 6.9 % — SIGNIFICANT CHANGE UP (ref 2–14)
NEUTROPHILS # BLD AUTO: 2.12 K/UL — SIGNIFICANT CHANGE UP (ref 1.8–7.4)
NEUTROPHILS NFR BLD AUTO: 51.9 % — SIGNIFICANT CHANGE UP (ref 43–77)
NRBC # BLD: 0 /100 WBCS — SIGNIFICANT CHANGE UP (ref 0–0)
PHOSPHATE SERPL-MCNC: 3.6 MG/DL — SIGNIFICANT CHANGE UP (ref 2.5–4.5)
PLATELET # BLD AUTO: 199 K/UL — SIGNIFICANT CHANGE UP (ref 150–400)
POTASSIUM SERPL-MCNC: 3.9 MMOL/L — SIGNIFICANT CHANGE UP (ref 3.5–5.3)
POTASSIUM SERPL-SCNC: 3.9 MMOL/L — SIGNIFICANT CHANGE UP (ref 3.5–5.3)
PROT SERPL-MCNC: 6.6 G/DL — SIGNIFICANT CHANGE UP (ref 6–8.3)
RBC # BLD: 3.24 M/UL — LOW (ref 3.8–5.2)
RBC # FLD: 15.4 % — HIGH (ref 10.3–14.5)
SODIUM SERPL-SCNC: 141 MMOL/L — SIGNIFICANT CHANGE UP (ref 135–145)
VANCOMYCIN TROUGH SERPL-MCNC: 18.1 UG/ML — SIGNIFICANT CHANGE UP (ref 10–20)
WBC # BLD: 4.08 K/UL — SIGNIFICANT CHANGE UP (ref 3.8–10.5)
WBC # FLD AUTO: 4.08 K/UL — SIGNIFICANT CHANGE UP (ref 3.8–10.5)

## 2020-10-11 PROCEDURE — 99233 SBSQ HOSP IP/OBS HIGH 50: CPT | Mod: GC

## 2020-10-11 RX ADMIN — Medication 1: at 17:48

## 2020-10-11 RX ADMIN — APIXABAN 5 MILLIGRAM(S): 2.5 TABLET, FILM COATED ORAL at 17:48

## 2020-10-11 RX ADMIN — PIPERACILLIN AND TAZOBACTAM 25 GRAM(S): 4; .5 INJECTION, POWDER, LYOPHILIZED, FOR SOLUTION INTRAVENOUS at 11:58

## 2020-10-11 RX ADMIN — Medication 166.67 MILLIGRAM(S): at 01:51

## 2020-10-11 RX ADMIN — APIXABAN 5 MILLIGRAM(S): 2.5 TABLET, FILM COATED ORAL at 05:46

## 2020-10-11 RX ADMIN — Medication 166.67 MILLIGRAM(S): at 13:51

## 2020-10-11 RX ADMIN — PIPERACILLIN AND TAZOBACTAM 25 GRAM(S): 4; .5 INJECTION, POWDER, LYOPHILIZED, FOR SOLUTION INTRAVENOUS at 04:26

## 2020-10-11 RX ADMIN — CHLORHEXIDINE GLUCONATE 1 APPLICATION(S): 213 SOLUTION TOPICAL at 08:32

## 2020-10-11 RX ADMIN — PIPERACILLIN AND TAZOBACTAM 25 GRAM(S): 4; .5 INJECTION, POWDER, LYOPHILIZED, FOR SOLUTION INTRAVENOUS at 21:56

## 2020-10-11 RX ADMIN — ATORVASTATIN CALCIUM 40 MILLIGRAM(S): 80 TABLET, FILM COATED ORAL at 22:40

## 2020-10-11 NOTE — PROGRESS NOTE ADULT - SUBJECTIVE AND OBJECTIVE BOX
Maria T Cruz MD  Internal Medicine PGY-1  Pager# [phone]; [facility] Pager# [phone]    PATIENT: GIANLUCA UREÑA, MRN: [id number]    CHIEF COMPLAINT: Patient is a 60y old  Female who presents with a chief complaint of fever (10 Oct 2020 15:02)    INTERVAL HISTORY/OVERNIGHT EVENTS: No overnight events. Downgraded from MICU yesterday (10/10).     REVIEW OF SYSTEMS:    Constitutional:     [ ] negative [ ] fevers [ ] chills [ ] weight loss [ ] weight gain  HEENT:                  [ ] negative [ ] dry eyes [ ] eye irritation [ ] postnasal drip [ ] nasal congestion  CV:                         [ ] negative  [ ] chest pain [ ] orthopnea [ ] palpitations [ ] murmur  Resp:                     [ ] negative [ ] cough [ ] shortness of breath [ ] dyspnea [ ] wheezing [ ] sputum [ ] hemoptysis  GI:                          [ ] negative [ ] nausea [ ] vomiting [ ] diarrhea [ ] constipation [ ] abd pain [ ] dysphagia   :                        [ ] negative [ ] dysuria [ ] nocturia [ ] hematuria [ ] increased urinary frequency  Musculoskeletal: [ ] negative [ ] back pain [ ] myalgias [ ] arthralgias [ ] fracture  Skin:                       [ ] negative [ ] rash [ ] itch  Neurological:        [ ] negative [ ] headache [ ] dizziness [ ] syncope [ ] weakness [ ] numbness  Psychiatric:           [ ] negative [ ] anxiety [ ] depression  Endocrine:            [ ] negative [ ] diabetes [ ] thyroid problem  Heme/Lymph:      [ ] negative [ ] anemia [ ] bleeding problem  Allergic/Immune: [ ] negative [ ] itchy eyes [ ] nasal discharge [ ] hives [ ] angioedema    [ ] All other systems negative  [ ] Unable to assess ROS because ________.    MEDICATIONS:  MEDICATIONS  (STANDING):  apixaban 5 milliGRAM(s) Oral every 12 hours  atorvastatin 40 milliGRAM(s) Oral at bedtime  chlorhexidine 4% Liquid 1 Application(s) Topical <User Schedule>  dextrose 5%. 1000 milliLiter(s) (50 mL/Hr) IV Continuous <Continuous>  dextrose 50% Injectable 12.5 Gram(s) IV Push once  dextrose 50% Injectable 25 Gram(s) IV Push once  dextrose 50% Injectable 25 Gram(s) IV Push once  influenza   Vaccine 0.5 milliLiter(s) IntraMuscular once  insulin lispro (HumaLOG) corrective regimen sliding scale   SubCutaneous three times a day before meals  insulin lispro (HumaLOG) corrective regimen sliding scale   SubCutaneous at bedtime  piperacillin/tazobactam IVPB.. 3.375 Gram(s) IV Intermittent every 8 hours  vancomycin  IVPB 1250 milliGRAM(s) IV Intermittent every 12 hours    MEDICATIONS  (PRN):  dextrose 40% Gel 15 Gram(s) Oral once PRN Blood Glucose LESS THAN 70 milliGRAM(s)/deciliter  glucagon  Injectable 1 milliGRAM(s) IntraMuscular once PRN Glucose LESS THAN 70 milligrams/deciliter  metoclopramide 10 milliGRAM(s) Oral Before meals and at bedtime PRN nausea      ALLERGIES: Allergies    adhesives (Pruritus)  No Known Drug Allergies    Intolerances        OBJECTIVE:  ICU Vital Signs Last 24 Hrs  T(C): 36.7 (11 Oct 2020 04:35), Max: 37.3 (10 Oct 2020 19:00)  T(F): 98.1 (11 Oct 2020 04:35), Max: 99.1 (10 Oct 2020 19:00)  HR: 80 (11 Oct 2020 04:35) (70 - 96)  BP: 116/72 (11 Oct 2020 04:35) (95/61 - 136/75)  BP(mean): 78 (10 Oct 2020 17:00) (65 - 85)  ABP: --  ABP(mean): --  RR: 18 (11 Oct 2020 04:35) (0 - 24)  SpO2: 95% (11 Oct 2020 04:35) (95% - 100%)      CAPILLARY BLOOD GLUCOSE      POCT Blood Glucose.: 194 mg/dL (10 Oct 2020 23:36)  POCT Blood Glucose.: 210 mg/dL (10 Oct 2020 22:20)  POCT Blood Glucose.: 154 mg/dL (10 Oct 2020 17:41)  POCT Blood Glucose.: 131 mg/dL (10 Oct 2020 11:58)    CAPILLARY BLOOD GLUCOSE      POCT Blood Glucose.: 194 mg/dL (10 Oct 2020 23:36)    I&O's Summary    10 Oct 2020 07:01  -  11 Oct 2020 07:00  --------------------------------------------------------  IN: 1280 mL / OUT: 0 mL / NET: 1280 mL      Daily     Daily     PHYSICAL EXAMINATION:  General: Comfortable, no acute distress, cooperative with exam.  HEENT: PERRLA, EOMI, moist mucous membranes.  Respiratory: CTAB, normal respiratory effort, no coughing, wheezes, crackles, or rales.  CV: RRR, S1S2, no murmurs, rubs or gallops. No JVD. Distal pulses intact.  Abdominal: Soft, nontender, nondistended, no rebound or guarding, normal bowel sounds.  Neurology: AOx3, no focal neuro defects, CHAIREZ x 4.  Extremities: No pitting edema, + Peripheral pulses.  Incisions:   Tubes:    LABS:                          8.1    4.08  )-----------( 199      ( 11 Oct 2020 06:36 )             27.3     10-10    141  |  109<H>  |  11  ----------------------------<  165<H>  5.0   |  23  |  0.70    Ca    9.6      10 Oct 2020 00:53  Phos  3.4     10-10  Mg     1.9     10-10    TPro  6.3  /  Alb  3.6  /  TBili  0.3  /  DBili  x   /  AST  14  /  ALT  10  /  AlkPhos  72  10-10    LIVER FUNCTIONS - ( 10 Oct 2020 00:53 )  Alb: 3.6 g/dL / Pro: 6.3 g/dL / ALK PHOS: 72 U/L / ALT: 10 U/L / AST: 14 U/L / GGT: x           PT/INR - ( 10 Oct 2020 00:53 )   PT: 15.5 sec;   INR: 1.31 ratio         PTT - ( 10 Oct 2020 00:53 )  PTT:30.9 sec        Urinalysis Basic - ( 09 Oct 2020 11:57 )    Color: Colorless / Appearance: Clear / S.005 / pH: x  Gluc: x / Ketone: Negative  / Bili: Negative / Urobili: Negative   Blood: x / Protein: Negative / Nitrite: Negative   Leuk Esterase: Negative / RBC: x / WBC x   Sq Epi: x / Non Sq Epi: x / Bacteria: x        TELEMETRY: N/A    EKG: N/A    IMAGING: No new images or tests   Maria T Cruz MD  Internal Medicine PGY-1  Pager# [phone]; [facility] Pager# [phone]    PATIENT: GIANLUCA UREÑA, MRN: [id number]    CHIEF COMPLAINT: Patient is a 60y old  Female who presents with a chief complaint of fever (10 Oct 2020 15:02)    INTERVAL HISTORY/OVERNIGHT EVENTS: No overnight events. Downgraded from MICU yesterday (10/10). At bedside, patient has been sleeping and eating well. Denies N/V/D/C.  Denies abdominal pain. Continues to complain of chest pain localized to the area of her cellulitis/mediport. Also complains of upper/mid-back pain. Denies SOB or cough. Has been ambulating with assistance. Oriented to person, place, and time. Breathing comfortably on room air.    REVIEW OF SYSTEMS:    Constitutional:     [X ] negative [ ] fevers [ ] chills [ ] weight loss [ ] weight gain  HEENT:                  [X ] negative [ ] dry eyes [ ] eye irritation [ ] postnasal drip [ ] nasal congestion  CV:                         [ ] negative  [+ ] chest pain [ ] orthopnea [ ] palpitations [ ] murmur  Resp:                     [X ] negative [ ] cough [ ] shortness of breath [ ] dyspnea [ ] wheezing [ ] sputum [ ] hemoptysis  GI:                          [X ] negative [ ] nausea [ ] vomiting [ ] diarrhea [ ] constipation [ ] abd pain [ ] dysphagia   :                        [X ] negative [ ] dysuria [ ] nocturia [ ] hematuria [ ] increased urinary frequency  Musculoskeletal: [ ] negative [+ ] back pain [ ] myalgias [ ] arthralgias [ ] fracture  Skin:                       [X ] negative [ ] rash [ ] itch  Neurological:        [X ] negative [ ] headache [ ] dizziness [ ] syncope [ ] weakness [ ] numbness  Psychiatric:           [X ] negative [ ] anxiety [ ] depression  Endocrine:            [X ] negative [ ] diabetes [ ] thyroid problem  Heme/Lymph:      [X ] negative [ ] anemia [ ] bleeding problem  Allergic/Immune: [X ] negative [ ] itchy eyes [ ] nasal discharge [ ] hives [ ] angioedema    [X ] All other systems negative  [ ] Unable to assess ROS because ________.    MEDICATIONS:  MEDICATIONS  (STANDING):  apixaban 5 milliGRAM(s) Oral every 12 hours  atorvastatin 40 milliGRAM(s) Oral at bedtime  chlorhexidine 4% Liquid 1 Application(s) Topical <User Schedule>  dextrose 5%. 1000 milliLiter(s) (50 mL/Hr) IV Continuous <Continuous>  dextrose 50% Injectable 12.5 Gram(s) IV Push once  dextrose 50% Injectable 25 Gram(s) IV Push once  dextrose 50% Injectable 25 Gram(s) IV Push once  influenza   Vaccine 0.5 milliLiter(s) IntraMuscular once  insulin lispro (HumaLOG) corrective regimen sliding scale   SubCutaneous three times a day before meals  insulin lispro (HumaLOG) corrective regimen sliding scale   SubCutaneous at bedtime  piperacillin/tazobactam IVPB.. 3.375 Gram(s) IV Intermittent every 8 hours  vancomycin  IVPB 1250 milliGRAM(s) IV Intermittent every 12 hours    MEDICATIONS  (PRN):  dextrose 40% Gel 15 Gram(s) Oral once PRN Blood Glucose LESS THAN 70 milliGRAM(s)/deciliter  glucagon  Injectable 1 milliGRAM(s) IntraMuscular once PRN Glucose LESS THAN 70 milligrams/deciliter  metoclopramide 10 milliGRAM(s) Oral Before meals and at bedtime PRN nausea      ALLERGIES: Allergies    adhesives (Pruritus)  No Known Drug Allergies    Intolerances        OBJECTIVE:  ICU Vital Signs Last 24 Hrs  T(C): 36.7 (11 Oct 2020 04:35), Max: 37.3 (10 Oct 2020 19:00)  T(F): 98.1 (11 Oct 2020 04:35), Max: 99.1 (10 Oct 2020 19:00)  HR: 80 (11 Oct 2020 04:35) (70 - 96)  BP: 116/72 (11 Oct 2020 04:35) (95/61 - 136/75)  BP(mean): 78 (10 Oct 2020 17:00) (65 - 85)  ABP: --  ABP(mean): --  RR: 18 (11 Oct 2020 04:35) (0 - 24)  SpO2: 95% (11 Oct 2020 04:35) (95% - 100%)      CAPILLARY BLOOD GLUCOSE      POCT Blood Glucose.: 194 mg/dL (10 Oct 2020 23:36)  POCT Blood Glucose.: 210 mg/dL (10 Oct 2020 22:20)  POCT Blood Glucose.: 154 mg/dL (10 Oct 2020 17:41)  POCT Blood Glucose.: 131 mg/dL (10 Oct 2020 11:58)    CAPILLARY BLOOD GLUCOSE      POCT Blood Glucose.: 194 mg/dL (10 Oct 2020 23:36)    I&O's Summary    10 Oct 2020 07:01  -  11 Oct 2020 07:00  --------------------------------------------------------  IN: 1280 mL / OUT: 0 mL / NET: 1280 mL      Daily     Daily     PHYSICAL EXAMINATION:  General: Comfortable, no acute distress, cooperative with exam.  HEENT: PERRLA, EOMI, moist mucous membranes.  Respiratory: CTAB, normal respiratory effort, no coughing, wheezes, crackles, or rales.  CV: RRR, S1S2, no murmurs, rubs or gallops. No JVD. Distal pulses intact. +R chest TTP, erythema, warmth localized to area of chest port  Abdominal: Soft, nontender, nondistended, no rebound or guarding, normal bowel sounds.  Neurology: AOx3, no focal neuro defects, CHAIREZ x 4.  Extremities: No pitting edema, + Peripheral pulses. +Paraspinal TTP in the region of the thoracic spine  Incisions:   Tubes:    LABS:                          8.1    4.08  )-----------( 199      ( 11 Oct 2020 06:36 )             27.3     10-10    141  |  109<H>  |  11  ----------------------------<  165<H>  5.0   |  23  |  0.70    Ca    9.6      10 Oct 2020 00:53  Phos  3.4     10-10  Mg     1.9     10-10    TPro  6.3  /  Alb  3.6  /  TBili  0.3  /  DBili  x   /  AST  14  /  ALT  10  /  AlkPhos  72  10-10    LIVER FUNCTIONS - ( 10 Oct 2020 00:53 )  Alb: 3.6 g/dL / Pro: 6.3 g/dL / ALK PHOS: 72 U/L / ALT: 10 U/L / AST: 14 U/L / GGT: x           PT/INR - ( 10 Oct 2020 00:53 )   PT: 15.5 sec;   INR: 1.31 ratio         PTT - ( 10 Oct 2020 00:53 )  PTT:30.9 sec        Urinalysis Basic - ( 09 Oct 2020 11:57 )    Color: Colorless / Appearance: Clear / S.005 / pH: x  Gluc: x / Ketone: Negative  / Bili: Negative / Urobili: Negative   Blood: x / Protein: Negative / Nitrite: Negative   Leuk Esterase: Negative / RBC: x / WBC x   Sq Epi: x / Non Sq Epi: x / Bacteria: x    TELEMETRY: N/A    EKG: N/A    IMAGING: No new images or tests

## 2020-10-11 NOTE — CONSULT NOTE ADULT - SUBJECTIVE AND OBJECTIVE BOX
History of Present Illness: 60yFemale with APL presented with cellulitis over right chest wall mediport. Patient developed severe sepsis requiring brief MICU admission for vasopressor support but has sicne improved and is now on a regular floor. IR is consulted for mediport removal.    Allergies:   adhesives (Pruritus)  No Known Drug Allergies    Medications (Antibiotics/Cardiovascular/Anticoagulants/Blood Products):   apixaban: 5 milliGRAM(s) Oral (10-11-20 @ 05:46)  piperacillin/tazobactam IVPB..: 25 mL/Hr IV Intermittent (10-11-20 @ 11:58)  vancomycin  IVPB: 166.67 mL/Hr IV Intermittent (10-11-20 @ 13:51)    Vital Signs:  T(F): 98.1 (10-11-20 @ 14:12), Max: 99.1 (10-10-20 @ 19:00)  HR: 86 (10-11-20 @ 14:12) (75 - 96)  BP: 127/84 (10-11-20 @ 14:12) (109/58 - 136/75)  RR: 18 (10-11-20 @ 14:12) (16 - 24)  SpO2: 96% (10-11-20 @ 14:12) (95% - 99%)    Limited Physical Exam:    mild erythema and warmth over right chest wall port site, no fluctuance, drainage, or swelling    Relevant Lab Results:            8.1  4.08)-----(199     (10-11-20 @ 06:36)         27.3     141 | 105 | 8  --------------------< 120     (10-11-20 @ 06:35)  3.9 | 25 | 0.77       PT: 15.5<H> 10-10-20 @ 00:53  aPTT: 30.9 10-10-20 @ 00:53   INR: 1.31<H> 10-10-20 @ 00:53    Blood cultures with NGTD

## 2020-10-11 NOTE — CONSULT NOTE ADULT - ASSESSMENT
Assessment: 60y Female with cellulitis over chest wall mediport. Patient with marked clinical improvement and only mild cellulitic findings over port.    Plan:  -given mild cellulitis and negative blood cultures would favor treating medically with antibiotics and avoiding mediport use for several weeks over mediport explantation  -if peripheral IV access is not sufficient for planned chemotherapy before mediport is ready for use a PICC can be placed as temporary central venous access  -would not use mediport until visible skin changes resolve  -above discussed with patient's son Raman [phone] and he and the patient agreed with the plan--he also requested to speak with heme/onc regarding plan for chemotherapy administration    Juan Antonio Felipe MD, RPVI  Chief Resident, Interventional Radiology  [facility]: (w) [phone] (p) [phone]  [facility]: (r) [phone]) [phone]

## 2020-10-11 NOTE — PROGRESS NOTE ADULT - ATTENDING COMMENTS
son translating via phone per request    patient reports some discomfort over mediport area but improving  nontoxic,  exam with some erythema, warmth tenderness over mediport area, no discharge,     Septic Shock secondary to celluitis surrounding mediport site  -CT chest with cellulitis surrounding mediport, no abscess   -s/p MICU downgrade, briefly on pressors now off with stable BP  -c/w vanc and zosyn, monitor vanc trough last 18 but unclear draw at proper time  -IR consulted to possibly remove permacath if heme in agreemnt and pending final blood cx (NGTD)  -hold anti-htn meds  -monitor vitals    hx of DVT: c/w eliquis per heme onc    Chest pain: resolved likely from cellulitis  -recently had pericardial effusion but CT chest now shows improvement in effusion size

## 2020-10-11 NOTE — PROGRESS NOTE ADULT - ASSESSMENT
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 Hurd at [facility]), HTN, HLD, T2DM, DVT (dx July 2020; on Eliquis), post-herpetic neuralgia (on gabapentin) with recent admission 9/4- 9/11 for chest and back pain likely from pleural / pericardial effusion p/w worsening R chest pain, shortness of breath and chills admitted for sepsis likely 2/2 cellulitis around [facility] site. MICU initially consulted for hypotension, initially started on levophed, now hemodynamically stable and off pressors.     #Sepsis  - continue with vanc/ zosyn for now  - Consulted IR regaridng removal of mediport  - f/u blood cultures and narrow down abx as appropriate  - Hypotension possibly in setting of infection  - CT chest showed possible cellulitis of mediport  - was on Levophed in ED, weaned once patient was in MICU. 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 Hurd at [facility]), HTN, HLD, T2DM, DVT (dx July 2020; on Eliquis), post-herpetic neuralgia (on gabapentin) with recent admission 9/4- 9/11 for chest and back pain likely from pleural / pericardial effusion p/w worsening R chest pain, shortness of breath and chills admitted for sepsis likely 2/2 cellulitis around [facility] site. MICU initially consulted for hypotension, initially started on levophed, now hemodynamically stable and off pressors.     #Sepsis  - continue with vanc/ zosyn for now -F/u vanc troughs  - IR consulted for mediport removal; pending recs  - F/u BCx x2; will tailor abx pending cultures  - CT chest (10/10): c/f cellulitis of mediport  - POCUS (10/10): A-lines, trace pleural effusion, no pericardial effusion    #Hypotension  - Hypotension likely in s/o infection - S/p Levophed in ED; weaned once patient was in MICU. Now off pressors  - Monitor VSS    #Chest Pain;  likely pleuritic in nature/related to cellulitis as patient was previously worked up during past hospitalization  - Troponins wnl  - POCUS (10/10): A-lines, trace pleural effusion, no pericardial effusion    #HTN  -Hold anti-HTN in s/o hypotension and sepsis    #HLD  -C/w home statin    #T2DM  -C/w insulin sliding scale    #Anemia; no bleeding noted, possibly secondary to chemotherapy regimen  -Trend daily CBC w/ diff    #Prophylactic Measure  DVT PPx: Eliquis  DIET: DASH/TLC CC  ACTIVITY: Ambulate w/ assistance

## 2020-10-12 ENCOUNTER — APPOINTMENT (OUTPATIENT)
Dept: INFUSION THERAPY | Facility: HOSPITAL | Age: 60
End: 2020-10-12

## 2020-10-12 LAB
ALBUMIN SERPL ELPH-MCNC: 4.3 G/DL — SIGNIFICANT CHANGE UP (ref 3.3–5)
ALP SERPL-CCNC: 78 U/L — SIGNIFICANT CHANGE UP (ref 40–120)
ALT FLD-CCNC: 12 U/L — SIGNIFICANT CHANGE UP (ref 10–45)
ANION GAP SERPL CALC-SCNC: 10 MMOL/L — SIGNIFICANT CHANGE UP (ref 5–17)
AST SERPL-CCNC: 12 U/L — SIGNIFICANT CHANGE UP (ref 10–40)
BASOPHILS # BLD AUTO: 0.02 K/UL — SIGNIFICANT CHANGE UP (ref 0–0.2)
BASOPHILS NFR BLD AUTO: 0.5 % — SIGNIFICANT CHANGE UP (ref 0–2)
BILIRUB SERPL-MCNC: 0.3 MG/DL — SIGNIFICANT CHANGE UP (ref 0.2–1.2)
BUN SERPL-MCNC: 13 MG/DL — SIGNIFICANT CHANGE UP (ref 7–23)
CALCIUM SERPL-MCNC: 10 MG/DL — SIGNIFICANT CHANGE UP (ref 8.4–10.5)
CHLORIDE SERPL-SCNC: 104 MMOL/L — SIGNIFICANT CHANGE UP (ref 96–108)
CO2 SERPL-SCNC: 27 MMOL/L — SIGNIFICANT CHANGE UP (ref 22–31)
CREAT SERPL-MCNC: 0.86 MG/DL — SIGNIFICANT CHANGE UP (ref 0.5–1.3)
EOSINOPHIL # BLD AUTO: 0.3 K/UL — SIGNIFICANT CHANGE UP (ref 0–0.5)
EOSINOPHIL NFR BLD AUTO: 6.8 % — HIGH (ref 0–6)
GLUCOSE BLDC GLUCOMTR-MCNC: 125 MG/DL — HIGH (ref 70–99)
GLUCOSE BLDC GLUCOMTR-MCNC: 154 MG/DL — HIGH (ref 70–99)
GLUCOSE BLDC GLUCOMTR-MCNC: 159 MG/DL — HIGH (ref 70–99)
GLUCOSE BLDC GLUCOMTR-MCNC: 190 MG/DL — HIGH (ref 70–99)
GLUCOSE SERPL-MCNC: 113 MG/DL — HIGH (ref 70–99)
HCT VFR BLD CALC: 31.1 % — LOW (ref 34.5–45)
HGB BLD-MCNC: 9.4 G/DL — LOW (ref 11.5–15.5)
IMM GRANULOCYTES NFR BLD AUTO: 0.2 % — SIGNIFICANT CHANGE UP (ref 0–1.5)
LDH SERPL L TO P-CCNC: 124 U/L — SIGNIFICANT CHANGE UP (ref 50–242)
LYMPHOCYTES # BLD AUTO: 1.59 K/UL — SIGNIFICANT CHANGE UP (ref 1–3.3)
LYMPHOCYTES # BLD AUTO: 36.2 % — SIGNIFICANT CHANGE UP (ref 13–44)
MAGNESIUM SERPL-MCNC: 2.1 MG/DL — SIGNIFICANT CHANGE UP (ref 1.6–2.6)
MCHC RBC-ENTMCNC: 25.3 PG — LOW (ref 27–34)
MCHC RBC-ENTMCNC: 30.2 GM/DL — LOW (ref 32–36)
MCV RBC AUTO: 83.6 FL — SIGNIFICANT CHANGE UP (ref 80–100)
MONOCYTES # BLD AUTO: 0.29 K/UL — SIGNIFICANT CHANGE UP (ref 0–0.9)
MONOCYTES NFR BLD AUTO: 6.6 % — SIGNIFICANT CHANGE UP (ref 2–14)
NEUTROPHILS # BLD AUTO: 2.18 K/UL — SIGNIFICANT CHANGE UP (ref 1.8–7.4)
NEUTROPHILS NFR BLD AUTO: 49.7 % — SIGNIFICANT CHANGE UP (ref 43–77)
NRBC # BLD: 0 /100 WBCS — SIGNIFICANT CHANGE UP (ref 0–0)
PHOSPHATE SERPL-MCNC: 3.5 MG/DL — SIGNIFICANT CHANGE UP (ref 2.5–4.5)
PLATELET # BLD AUTO: 220 K/UL — SIGNIFICANT CHANGE UP (ref 150–400)
POTASSIUM SERPL-MCNC: 3.8 MMOL/L — SIGNIFICANT CHANGE UP (ref 3.5–5.3)
POTASSIUM SERPL-SCNC: 3.8 MMOL/L — SIGNIFICANT CHANGE UP (ref 3.5–5.3)
PROT SERPL-MCNC: 7.3 G/DL — SIGNIFICANT CHANGE UP (ref 6–8.3)
RBC # BLD: 3.72 M/UL — LOW (ref 3.8–5.2)
RBC # FLD: 15.6 % — HIGH (ref 10.3–14.5)
SODIUM SERPL-SCNC: 141 MMOL/L — SIGNIFICANT CHANGE UP (ref 135–145)
URATE SERPL-MCNC: 3.7 MG/DL — SIGNIFICANT CHANGE UP (ref 2.5–7)
WBC # BLD: 4.39 K/UL — SIGNIFICANT CHANGE UP (ref 3.8–10.5)
WBC # FLD AUTO: 4.39 K/UL — SIGNIFICANT CHANGE UP (ref 3.8–10.5)

## 2020-10-12 PROCEDURE — 93970 EXTREMITY STUDY: CPT | Mod: 26

## 2020-10-12 PROCEDURE — 99233 SBSQ HOSP IP/OBS HIGH 50: CPT

## 2020-10-12 PROCEDURE — 99232 SBSQ HOSP IP/OBS MODERATE 35: CPT | Mod: GC

## 2020-10-12 RX ORDER — APIXABAN 2.5 MG/1
5 TABLET, FILM COATED ORAL EVERY 12 HOURS
Refills: 0 | Status: DISCONTINUED | OUTPATIENT
Start: 2020-10-12 | End: 2020-10-15

## 2020-10-12 RX ADMIN — Medication 166.67 MILLIGRAM(S): at 12:23

## 2020-10-12 RX ADMIN — PIPERACILLIN AND TAZOBACTAM 25 GRAM(S): 4; .5 INJECTION, POWDER, LYOPHILIZED, FOR SOLUTION INTRAVENOUS at 04:54

## 2020-10-12 RX ADMIN — ATORVASTATIN CALCIUM 40 MILLIGRAM(S): 80 TABLET, FILM COATED ORAL at 21:42

## 2020-10-12 RX ADMIN — PIPERACILLIN AND TAZOBACTAM 25 GRAM(S): 4; .5 INJECTION, POWDER, LYOPHILIZED, FOR SOLUTION INTRAVENOUS at 12:23

## 2020-10-12 RX ADMIN — PIPERACILLIN AND TAZOBACTAM 25 GRAM(S): 4; .5 INJECTION, POWDER, LYOPHILIZED, FOR SOLUTION INTRAVENOUS at 20:31

## 2020-10-12 RX ADMIN — Medication 10 MILLIGRAM(S): at 12:24

## 2020-10-12 RX ADMIN — Medication 166.67 MILLIGRAM(S): at 01:07

## 2020-10-12 RX ADMIN — Medication 1: at 13:01

## 2020-10-12 RX ADMIN — APIXABAN 5 MILLIGRAM(S): 2.5 TABLET, FILM COATED ORAL at 18:30

## 2020-10-12 RX ADMIN — APIXABAN 5 MILLIGRAM(S): 2.5 TABLET, FILM COATED ORAL at 05:56

## 2020-10-12 RX ADMIN — CHLORHEXIDINE GLUCONATE 1 APPLICATION(S): 213 SOLUTION TOPICAL at 06:02

## 2020-10-12 RX ADMIN — Medication 1: at 18:34

## 2020-10-12 NOTE — PROGRESS NOTE ADULT - ASSESSMENT
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 Hurd at [facility]), HTN, HLD, T2DM, DVT (dx July 2020; on Eliquis), post-herpetic neuralgia (on gabapentin) with recent admission 9/4- 9/11 for chest and back pain likely from pleural / pericardial effusion p/w worsening R chest pain, shortness of breath and chills found to have cellulitis around [facility] site with MICU consulted for hypotension. Hematology consulted given hx of APL, on treatment.    #Acute promyelocytic leukemia  #VTE, July 2020  -started ATRA empirically on 5/23/20, then on arsenic trioxide on 5/27/20 when diagnosis was confirmed  -was found to have a partially occlussive DVT in her R axillary vein, treated with lovenox, suspected to be line related (repeat duplex on 7/16/20 was negative, and line had been removed on 7/10/20 due to ?irritation)  -6/25/20 bone marrow bx showed persistent disease, retreated on 7/13/20 without evidence of leukemia  -started on cycle 1 of consolidation with GALA/ATRA, complicated by admissions for chest pain, found to have small loculated pleural effusion treated with empiric abx.  -Resumed GALA/ATRA on 10/5/20 QD, last dose on 10/9/20 prior to hospitalization.  -Planned to get repeat echocardiogram on first week of October; would recommend checking here  -EKG checked regularly while on GALA given potential for arrhytmias; EKG this admission shows T-wave inversion that were previously present in September's EKG; high sensitivity troponin are negative in setting of chest pain, so low suspicion for ischemia; would repeat cardiac enzymes if suspected  -pt on abx for suspected cellulitis over [facility]  -will discuss with attending, likely to start Arsenic tomorrow while here  -Monitor CMP, LDH, uric acid, CBC w/ diff daily given recent chemo  -OK to continue with anticoagulation given hx of VTE; if plts <50k or if bleeding, would hold full dose anticoagulation    Tabitha German,   Hematology/Oncology Fellow, PGY6  Pager: [phone] 60/F w/ APL on chemo (dx May 2020; has regimen of ATRA and arsenic; follows Dr. Hurd at [facility]), HTN, HLD, T2DM, DVT (dx July 2020; on Eliquis), post-herpetic neuralgia (on gabapentin) with recent admission 9/4- 9/11 for chest and back pain likely from pleural / pericardial effusion p/w worsening R chest pain, shortness of breath and chills found to have cellulitis around [facility] site with MICU consulted for hypotension. Hematology consulted given hx of APL, on treatment.    #Acute promyelocytic leukemia  #VTE, July 2020  -started ATRA empirically on 5/23/20, then on arsenic trioxide on 5/27/20 when diagnosis was confirmed  -was found to have a partially occlussive DVT in her R axillary vein, treated with lovenox, suspected to be line related (repeat duplex on 7/16/20 was negative, and line had been removed on 7/10/20 due to ?irritation)  -6/25/20 bone marrow bx showed persistent disease, retreated on 7/13/20 without evidence of leukemia  -started on cycle 1 of consolidation with GALA/ATRA, complicated by admissions for chest pain, found to have small loculated pleural effusion treated with empiric abx.  -Resumed GALA/ATRA on 10/5/20 QD, last dose on 10/9/20 prior to hospitalization.  -Planned to get repeat echocardiogram on first week of October; would recommend checking here if patient and son willing.  -EKG checked regularly while on GALA given potential for arrhytmias; EKG this admission shows T-wave inversion that were previously present in September's EKG; high sensitivity troponin are negative in setting of chest pain, so low suspicion for ischemia; would repeat cardiac enzymes if suspected  -pt on abx for suspected cellulitis over [facility]  -Monitor CMP, LDH, uric acid, CBC w/ diff daily given recent chemo  -OK to continue with anticoagulation given hx of VTE; if plts <50k or if bleeding, would hold full dose anticoagulation  - discussed with patient and son over phone.  Patient unable to get chemotherapy through [facility] at this time due to mild cellulitis.  The chemo nurse evaluated her veins and thought it would not be prudent to use her peripheral veins as a reliable access for arsenic administration.  We recommended to patient and son temporary placement of PICC line so she would be able to continue C2 atra/arsenic consolidation.  However, they refused as they are worried about DVT.  We explained she is already on therapeutic AC, so that will help prevent DVT.  They still refused.  We stated then at this time we will not continue consolidation and she will have to wait to discuss further access with Dr. Vickey Draper f/u ID Consult/ Recs regarding abx timing    Tabitha German DO  Hematology/Oncology Fellow, PGY6  Pager: [phone]

## 2020-10-12 NOTE — PROGRESS NOTE ADULT - ATTENDING COMMENTS
60 y.o female with APL on ATRA, admitted with fever and pain at Mediport site, likely due to cellulitis. Fever resolved, on IV antibiotics.   Agree with IV antibiotics, blood cultures negative thus far so no need to remove mediport.   Had planned to continue consolidation treatment inpatient but poor peripheral venous access and patient refusing PICC roberto. Will hold off until after discharge.

## 2020-10-12 NOTE — CONSULT NOTE ADULT - ASSESSMENT
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    Septic shock due to MediPort-associated Cellulitis - With hypotension requiring levophed, now off pressor on the floor. CT with mild cellulitis around port and chest exam with surrounding erythema of port and warmth. Evaluated by IR and advised to not use for weeks and recommended against removal. Currently on Vanc and zosyn. Bl clx 10/9 NGTD.  - cont to follow 10/9 bl clx  - cont vancomycin and zosyn for possible polymicrobial skin infection in setting of chemo and DMT2  - closely monitor renal function with vanc/zosyn   - f/u vanc trough prior to next dose, goal 10-15 for cellulitis      Eulogio Long MD  Fellow, Infectious Diseases, PGY-4  Pager: [phone]  Before 9am or after 5pm/Weekends: Call [phone]         59 yo F w/ PMHx of APL on chemo (dx May 2020 on ATRA and arsenic; Mediport placed 8/5/2020), HTN, HLD, T2DM, DVT (dx July 2020), post-herpetic neuralgia presents due to chest pain and SOB, found to have cellulitis around R chest medi-port.    Septic shock due to MediPort-associated Cellulitis - With hypotension requiring levophed, now off pressor on the floor. CT with mild cellulitis around port and chest exam with surrounding erythema of port and warmth. Evaluated by IR and advised to not use for weeks and recommended against removal. Currently on Vanc and zosyn. Bl clx 10/9 NGTD.    Overall septic shock with hypotension, port infection with cellulitis.   Now resolved shock and sepsis.       Plan;   - cont to follow 10/9 bl clx  - cont vancomycin, reduce dose to 1 gm iv q12h, given its only cellulitis, we do not need a high trough, around 10 is fine.   - switch zosyn to cefepime 1 gm iv q8h for possible polymicrobial skin infection in setting of chemo and DMT2  - monitor vancomycin trough and creatinine to avoid nephrotoxicity and ensure efficacy   - f/u prelim blood cx, ntd   - plan for port removal per IR and heme      Eulogio Long MD  Fellow, Infectious Diseases, PGY-4  Pager: [phone]  Before 9am or after 5pm/Weekends: Call [phone]

## 2020-10-12 NOTE — PROGRESS NOTE ADULT - SUBJECTIVE AND OBJECTIVE BOX
Maria T Cruz MD  Internal Medicine PGY-1  Pager# [phone]; [facility] Pager# [phone]    PATIENT: GIANLUCA UREÑA, MRN: [id number]    CHIEF COMPLAINT: Patient is a 60y old  Female who presents with a chief complaint of fever (11 Oct 2020 14:42)      INTERVAL HISTORY/OVERNIGHT EVENTS: No overnight events.       REVIEW OF SYSTEMS:    Constitutional:     [ ] negative [ ] fevers [ ] chills [ ] weight loss [ ] weight gain  HEENT:                  [ ] negative [ ] dry eyes [ ] eye irritation [ ] postnasal drip [ ] nasal congestion  CV:                         [ ] negative  [ ] chest pain [ ] orthopnea [ ] palpitations [ ] murmur  Resp:                     [ ] negative [ ] cough [ ] shortness of breath [ ] dyspnea [ ] wheezing [ ] sputum [ ] hemoptysis  GI:                          [ ] negative [ ] nausea [ ] vomiting [ ] diarrhea [ ] constipation [ ] abd pain [ ] dysphagia   :                        [ ] negative [ ] dysuria [ ] nocturia [ ] hematuria [ ] increased urinary frequency  Musculoskeletal: [ ] negative [ ] back pain [ ] myalgias [ ] arthralgias [ ] fracture  Skin:                       [ ] negative [ ] rash [ ] itch  Neurological:        [ ] negative [ ] headache [ ] dizziness [ ] syncope [ ] weakness [ ] numbness  Psychiatric:           [ ] negative [ ] anxiety [ ] depression  Endocrine:            [ ] negative [ ] diabetes [ ] thyroid problem  Heme/Lymph:      [ ] negative [ ] anemia [ ] bleeding problem  Allergic/Immune: [ ] negative [ ] itchy eyes [ ] nasal discharge [ ] hives [ ] angioedema    [ ] All other systems negative  [ ] Unable to assess ROS because ________.    MEDICATIONS:  MEDICATIONS  (STANDING):  apixaban 5 milliGRAM(s) Oral every 12 hours  atorvastatin 40 milliGRAM(s) Oral at bedtime  chlorhexidine 4% Liquid 1 Application(s) Topical <User Schedule>  dextrose 5%. 1000 milliLiter(s) (50 mL/Hr) IV Continuous <Continuous>  dextrose 50% Injectable 12.5 Gram(s) IV Push once  dextrose 50% Injectable 25 Gram(s) IV Push once  dextrose 50% Injectable 25 Gram(s) IV Push once  influenza   Vaccine 0.5 milliLiter(s) IntraMuscular once  insulin lispro (HumaLOG) corrective regimen sliding scale   SubCutaneous three times a day before meals  insulin lispro (HumaLOG) corrective regimen sliding scale   SubCutaneous at bedtime  piperacillin/tazobactam IVPB.. 3.375 Gram(s) IV Intermittent every 8 hours  vancomycin  IVPB 1250 milliGRAM(s) IV Intermittent every 12 hours    MEDICATIONS  (PRN):  dextrose 40% Gel 15 Gram(s) Oral once PRN Blood Glucose LESS THAN 70 milliGRAM(s)/deciliter  glucagon  Injectable 1 milliGRAM(s) IntraMuscular once PRN Glucose LESS THAN 70 milligrams/deciliter  metoclopramide 10 milliGRAM(s) Oral Before meals and at bedtime PRN nausea      ALLERGIES: Allergies    adhesives (Pruritus)  No Known Drug Allergies    Intolerances        OBJECTIVE:  ICU Vital Signs Last 24 Hrs  T(C): 36.5 (12 Oct 2020 05:32), Max: 37.1 (11 Oct 2020 21:20)  T(F): 97.7 (12 Oct 2020 05:32), Max: 98.8 (11 Oct 2020 21:20)  HR: 71 (12 Oct 2020 05:32) (71 - 86)  BP: 112/72 (12 Oct 2020 05:32) (112/72 - 136/64)  BP(mean): --  ABP: --  ABP(mean): --  RR: 18 (12 Oct 2020 05:32) (18 - 20)  SpO2: 97% (12 Oct 2020 05:32) (92% - 97%)      CAPILLARY BLOOD GLUCOSE      POCT Blood Glucose.: 133 mg/dL (11 Oct 2020 22:51)  POCT Blood Glucose.: 151 mg/dL (11 Oct 2020 17:34)  POCT Blood Glucose.: 124 mg/dL (11 Oct 2020 12:00)  POCT Blood Glucose.: 119 mg/dL (11 Oct 2020 08:30)    CAPILLARY BLOOD GLUCOSE      POCT Blood Glucose.: 133 mg/dL (11 Oct 2020 22:51)    I&O's Summary    Daily     Daily     PHYSICAL EXAMINATION:  General: Comfortable, no acute distress, cooperative with exam.  HEENT: PERRLA, EOMI, moist mucous membranes.  Respiratory: CTAB, normal respiratory effort, no coughing, wheezes, crackles, or rales.  CV: RRR, S1S2, no murmurs, rubs or gallops. No JVD. Distal pulses intact.  Abdominal: Soft, nontender, nondistended, no rebound or guarding, normal bowel sounds.  Neurology: AOx3, no focal neuro defects, CHAIREZ x 4.  Extremities: No pitting edema, + Peripheral pulses.  Incisions:   Tubes:    LABS:                          8.1    4.08  )-----------( 199      ( 11 Oct 2020 06:36 )             27.3     10-11    141  |  105  |  8   ----------------------------<  120<H>  3.9   |  25  |  0.77    Ca    9.7      11 Oct 2020 06:35  Phos  3.6     10-11  Mg     2.0     10-11    TPro  6.6  /  Alb  3.9  /  TBili  0.3  /  DBili  x   /  AST  13  /  ALT  11  /  AlkPhos  70  10-11    LIVER FUNCTIONS - ( 11 Oct 2020 06:35 )  Alb: 3.9 g/dL / Pro: 6.6 g/dL / ALK PHOS: 70 U/L / ALT: 11 U/L / AST: 13 U/L / GGT: x             TELEMETRY: N/A    EKG: N/A    IMAGING: No new images or tests   Maria T Cruz MD  Internal Medicine PGY-1  Pager# [phone]; [facility] Pager# [phone]    PATIENT: GIANLUCA UREÑA, MRN: [id number]    CHIEF COMPLAINT: Patient is a 60y old  Female who presents with a chief complaint of fever (11 Oct 2020 14:42)    INTERVAL HISTORY/OVERNIGHT EVENTS: No overnight events. At bedside, patient has been sleeping and eating well. Denies N/V/D/C. Last BM x1 regular yesterday. Denies abdominal pain. Continues to complain of chest pain localized to the area of her cellulitis/mediport. Also complains of upper/mid-back pain. Pt also complaining of U/L leg pain. Denies SOB, cough. Has been ambulating with assistance. Oriented to person, place, and time. Breathing comfortably on room air.    Spoke w/ pts son (Raman) at bedside. Addressed all concerns, answered all questions, and updated about current condition.    [employer]  (Sarah) used throughout encounter; ID#[id number]    REVIEW OF SYSTEMS:    Constitutional:     [X ] negative [ ] fevers [ ] chills [ ] weight loss [ ] weight gain  HEENT:                  [X ] negative [ ] dry eyes [ ] eye irritation [ ] postnasal drip [ ] nasal congestion  CV:                         [ ] negative  [+ ] chest pain [ ] orthopnea [ ] palpitations [ ] murmur  Resp:                     [X ] negative [ ] cough [ ] shortness of breath [ ] dyspnea [ ] wheezing [ ] sputum [ ] hemoptysis  GI:                          [X ] negative [ ] nausea [ ] vomiting [ ] diarrhea [ ] constipation [ ] abd pain [ ] dysphagia   :                        [X ] negative [ ] dysuria [ ] nocturia [ ] hematuria [ ] increased urinary frequency  Musculoskeletal: [ ] negative [+ ] back pain [ ] myalgias [ ] arthralgias [ ] fracture  Skin:                       [X ] negative [ ] rash [ ] itch  Neurological:        [X ] negative [ ] headache [ ] dizziness [ ] syncope [ ] weakness [ ] numbness  Psychiatric:           [X ] negative [ ] anxiety [ ] depression  Endocrine:            [X ] negative [ ] diabetes [ ] thyroid problem  Heme/Lymph:      [X ] negative [ ] anemia [ ] bleeding problem  Allergic/Immune: [X ] negative [ ] itchy eyes [ ] nasal discharge [ ] hives [ ] angioedema    [X ] All other systems negative  [ ] Unable to assess ROS because ________.    MEDICATIONS:  MEDICATIONS  (STANDING):  apixaban 5 milliGRAM(s) Oral every 12 hours  atorvastatin 40 milliGRAM(s) Oral at bedtime  chlorhexidine 4% Liquid 1 Application(s) Topical <User Schedule>  dextrose 5%. 1000 milliLiter(s) (50 mL/Hr) IV Continuous <Continuous>  dextrose 50% Injectable 12.5 Gram(s) IV Push once  dextrose 50% Injectable 25 Gram(s) IV Push once  dextrose 50% Injectable 25 Gram(s) IV Push once  influenza   Vaccine 0.5 milliLiter(s) IntraMuscular once  insulin lispro (HumaLOG) corrective regimen sliding scale   SubCutaneous three times a day before meals  insulin lispro (HumaLOG) corrective regimen sliding scale   SubCutaneous at bedtime  piperacillin/tazobactam IVPB.. 3.375 Gram(s) IV Intermittent every 8 hours  vancomycin  IVPB 1250 milliGRAM(s) IV Intermittent every 12 hours    MEDICATIONS  (PRN):  dextrose 40% Gel 15 Gram(s) Oral once PRN Blood Glucose LESS THAN 70 milliGRAM(s)/deciliter  glucagon  Injectable 1 milliGRAM(s) IntraMuscular once PRN Glucose LESS THAN 70 milligrams/deciliter  metoclopramide 10 milliGRAM(s) Oral Before meals and at bedtime PRN nausea      ALLERGIES: Allergies    adhesives (Pruritus)  No Known Drug Allergies    Intolerances        OBJECTIVE:  ICU Vital Signs Last 24 Hrs  T(C): 36.5 (12 Oct 2020 05:32), Max: 37.1 (11 Oct 2020 21:20)  T(F): 97.7 (12 Oct 2020 05:32), Max: 98.8 (11 Oct 2020 21:20)  HR: 71 (12 Oct 2020 05:32) (71 - 86)  BP: 112/72 (12 Oct 2020 05:32) (112/72 - 136/64)  BP(mean): --  ABP: --  ABP(mean): --  RR: 18 (12 Oct 2020 05:32) (18 - 20)  SpO2: 97% (12 Oct 2020 05:32) (92% - 97%)      CAPILLARY BLOOD GLUCOSE      POCT Blood Glucose.: 133 mg/dL (11 Oct 2020 22:51)  POCT Blood Glucose.: 151 mg/dL (11 Oct 2020 17:34)  POCT Blood Glucose.: 124 mg/dL (11 Oct 2020 12:00)  POCT Blood Glucose.: 119 mg/dL (11 Oct 2020 08:30)    CAPILLARY BLOOD GLUCOSE      POCT Blood Glucose.: 133 mg/dL (11 Oct 2020 22:51)    I&O's Summary    Daily     Daily     PHYSICAL EXAMINATION:  General: Comfortable, no acute distress, cooperative with exam.  HEENT: PERRLA, EOMI, moist mucous membranes.  Respiratory: CTAB, normal respiratory effort, no coughing, wheezes, crackles, or rales.  CV: RRR, S1S2, no murmurs, rubs or gallops. No JVD. Distal pulses intact. +R chest TTP, erythema, warmth localized to area of chest port  Abdominal: Soft, nontender, nondistended, no rebound or guarding, normal bowel sounds.  Neurology: AOx3, no focal neuro defects, CHAIREZ x 4.  Extremities: No pitting edema, + Peripheral pulses. +Paraspinal TTP in the region of the thoracic spine  Incisions:   Tubes:    LABS:                          8.1    4.08  )-----------( 199      ( 11 Oct 2020 06:36 )             27.3     10-11    141  |  105  |  8   ----------------------------<  120<H>  3.9   |  25  |  0.77    Ca    9.7      11 Oct 2020 06:35  Phos  3.6     10-11  Mg     2.0     10-11    TPro  6.6  /  Alb  3.9  /  TBili  0.3  /  DBili  x   /  AST  13  /  ALT  11  /  AlkPhos  70  10-11    LIVER FUNCTIONS - ( 11 Oct 2020 06:35 )  Alb: 3.9 g/dL / Pro: 6.6 g/dL / ALK PHOS: 70 U/L / ALT: 11 U/L / AST: 13 U/L / GGT: x             TELEMETRY: N/A    EKG: N/A    IMAGING: No new images or tests

## 2020-10-12 NOTE — PROGRESS NOTE ADULT - ASSESSMENT
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 Hurd at [facility]), HTN, HLD, T2DM, DVT (dx July 2020; on Eliquis), post-herpetic neuralgia (on gabapentin) with recent admission 9/4- 9/11 for chest and back pain likely from pleural / pericardial effusion p/w worsening R chest pain, shortness of breath and chills admitted for sepsis likely 2/2 cellulitis around mediport site. MICU initially consulted for hypotension, initially started on levophed, now hemodynamically stable and off pressors.     #Sepsis  - continue with vanc/ zosyn for now -F/u vanc troughs  - CT chest (10/10): c/f cellulitis of mediport  - POCUS (10/10): A-lines, trace pleural effusion, no pericardial effusion  - BCx x2 (10/10) NGTD  - IR consulted for mediport removal; appreciate recs -As per IR:  --Given mild cellulitis and negative BCx favor abx over removal of port  --If peripheral IV not suitable for chemo, place PICC  --Do not use mediport until skin changes resolve      #APL  - IR consulted for mediport removal; appreciate recs -As per IR:  --Given mild cellulitis and negative BCx favor abx over removal of port  --If peripheral IV not suitable for chemo, place PICC  --Do not use mediport until skin changes resolve  -Heme consulted; appreciate recs -Will f/u concerning chemo plans in s/o temporarily unusable mediport  -Monitor daily TLS labs given recent chemo    #Hypotension  - Hypotension likely in s/o infection - S/p Levophed in ED; weaned once patient was in MICU. 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 Hurd at [facility]), HTN, HLD, T2DM, DVT (dx July 2020; on Eliquis), post-herpetic neuralgia (on gabapentin) with recent admission 9/4- 9/11 for chest and back pain likely from pleural / pericardial effusion p/w worsening R chest pain, shortness of breath and chills admitted for sepsis likely 2/2 cellulitis around mediport site. MICU initially consulted for hypotension, initially started on levophed, now hemodynamically stable and off pressors.     #Sepsis  - continue with vanc/ zosyn for now -F/u vanc troughs  - CT chest (10/10): c/f cellulitis of mediport  - POCUS (10/10): A-lines, trace pleural effusion, no pericardial effusion  - BCx x2 (10/10) NGTD  - IR consulted for mediport removal; appreciate recs -As per IR:  --Given mild cellulitis and negative BCx favor abx over removal of port  --If peripheral IV not suitable for chemo, place PICC  --Do not use mediport until skin changes resolve  -ID consulted; pending recs    #APL  - IR consulted for mediport removal; appreciate recs -As per IR:  --Given mild cellulitis and negative BCx favor abx over removal of port  --If peripheral IV not suitable for chemo, place PICC  --Do not use mediport until skin changes resolve  -Heme consulted; appreciate recs -Will f/u concerning chemo plans in s/o temporarily unusable mediport  -Monitor daily TLS labs given recent chemo    #DVT - Pt has h/o DVT; no p/w U/L LE pain w/out other associated symptoms  -F/u B/L lower extremity duplex  -C/w home Eliquis    #Hypotension  - Hypotension likely in s/o infection - S/p Levophed in ED; weaned once patient was in MICU. Now off pressors  - Monitor VSS    #Chest Pain;  likely pleuritic in nature/related to cellulitis as patient was previously worked up during past hospitalization  - Troponins wnl  - POCUS (10/10): A-lines, trace pleural effusion, no pericardial effusion    #HTN  -Hold anti-HTN in s/o hypotension and sepsis    #HLD  -C/w home statin    #T2DM  -C/w insulin sliding scale    #Anemia; no bleeding noted, possibly secondary to chemotherapy regimen  -Trend daily CBC w/ diff      #Prophylactic Measure  DVT PPx: Eliquis  DIET: DASH/TLC CC  ACTIVITY: Ambulate w/ assistance

## 2020-10-12 NOTE — PROGRESS NOTE ADULT - SUBJECTIVE AND OBJECTIVE BOX
Afebrile overnight. Now on GMF.    Vital Signs Last 24 Hrs  T(C): 36.5 (12 Oct 2020 05:32), Max: 37.1 (11 Oct 2020 21:20)  T(F): 97.7 (12 Oct 2020 05:32), Max: 98.8 (11 Oct 2020 21:20)  HR: 71 (12 Oct 2020 05:32) (71 - 86)  BP: 112/72 (12 Oct 2020 05:32) (112/72 - 136/64)  BP(mean): --  RR: 18 (12 Oct 2020 05:32) (18 - 20)  SpO2: 97% (12 Oct 2020 05:32) (92% - 97%)  PHYSICAL EXAM:    GENERAL: NAD, AAOx3   HEAD:  NC/AT  EYES: EOMI, PERRLA, no scleral icterus  HEENT: Moist mucous membranes  LUNG: Clear to auscultation bilaterally; No rales, rhonchi, wheezing, or rubs  HEART: RRR; No murmurs, rubs, or gallops  ABDOMEN: +BS, ST/ND/NT  EXTREMITIES:  2+ Peripheral Pulses, No clubbing, cyanosis, or edema  LAD: no palpable adenopathy  10-12    141  |  104  |  13  ----------------------------<  113<H>  3.8   |  27  |  0.86    Ca    10.0      12 Oct 2020 07:08  Phos  3.5     10-12  Mg     2.1     10-12    TPro  7.3  /  Alb  4.3  /  TBili  0.3  /  DBili  x   /  AST  12  /  ALT  12  /  AlkPhos  78  10-12      CBC Full  -  ( 12 Oct 2020 07:10 )  WBC Count : 4.39 K/uL  RBC Count : 3.72 M/uL  Hemoglobin : 9.4 g/dL  Hematocrit : 31.1 %  Platelet Count - Automated : 220 K/uL  Mean Cell Volume : 83.6 fl  Mean Cell Hemoglobin : 25.3 pg  Mean Cell Hemoglobin Concentration : 30.2 gm/dL  Auto Neutrophil # : 2.18 K/uL  Auto Lymphocyte # : 1.59 K/uL  Auto Monocyte # : 0.29 K/uL  Auto Eosinophil # : 0.30 K/uL  Auto Basophil # : 0.02 K/uL  Auto Neutrophil % : 49.7 %  Auto Lymphocyte % : 36.2 %  Auto Monocyte % : 6.6 %  Auto Eosinophil % : 6.8 %  Auto Basophil % : 0.5 %      LIVER FUNCTIONS - ( 12 Oct 2020 07:08 )  Alb: 4.3 g/dL / Pro: 7.3 g/dL / ALK PHOS: 78 U/L / ALT: 12 U/L / AST: 12 U/L / GGT: x                 Uric Acid, Serum: 3.7 mg/dL (10-12-20 @ 09:29)  Lactate Dehydrogenase, Serum: 124 U/L (10-12-20 @ 09:29)             Afebrile overnight. Now on GMF.  Spoke with patient in [facility], also called son in [place]. Patient stated she had some soreness over mediport site, other than that she felt well.    Vital Signs Last 24 Hrs  T(C): 36.5 (12 Oct 2020 05:32), Max: 37.1 (11 Oct 2020 21:20)  T(F): 97.7 (12 Oct 2020 05:32), Max: 98.8 (11 Oct 2020 21:20)  HR: 71 (12 Oct 2020 05:32) (71 - 86)  BP: 112/72 (12 Oct 2020 05:32) (112/72 - 136/64)  BP(mean): --  RR: 18 (12 Oct 2020 05:32) (18 - 20)  SpO2: 97% (12 Oct 2020 05:32) (92% - 97%)  PHYSICAL EXAM:    GENERAL: NAD, AAOx3   HEAD:  NC/AT  EYES: EOMI, PERRLA, no scleral icterus  HEENT: Moist mucous membranes  LUNG: Clear to auscultation bilaterally; No rales, rhonchi, wheezing, or rubs + mediport on R side, TTP per patient  HEART: RRR; No murmurs, rubs, or gallops  ABDOMEN: +BS, ST/ND/NT  EXTREMITIES:  2+ Peripheral Pulses, No clubbing, cyanosis, or edema  LAD: no palpable adenopathy  10-12    141  |  104  |  13  ----------------------------<  113<H>  3.8   |  27  |  0.86    Ca    10.0      12 Oct 2020 07:08  Phos  3.5     10-12  Mg     2.1     10-12    TPro  7.3  /  Alb  4.3  /  TBili  0.3  /  DBili  x   /  AST  12  /  ALT  12  /  AlkPhos  78  10-12      CBC Full  -  ( 12 Oct 2020 07:10 )  WBC Count : 4.39 K/uL  RBC Count : 3.72 M/uL  Hemoglobin : 9.4 g/dL  Hematocrit : 31.1 %  Platelet Count - Automated : 220 K/uL  Mean Cell Volume : 83.6 fl  Mean Cell Hemoglobin : 25.3 pg  Mean Cell Hemoglobin Concentration : 30.2 gm/dL  Auto Neutrophil # : 2.18 K/uL  Auto Lymphocyte # : 1.59 K/uL  Auto Monocyte # : 0.29 K/uL  Auto Eosinophil # : 0.30 K/uL  Auto Basophil # : 0.02 K/uL  Auto Neutrophil % : 49.7 %  Auto Lymphocyte % : 36.2 %  Auto Monocyte % : 6.6 %  Auto Eosinophil % : 6.8 %  Auto Basophil % : 0.5 %      LIVER FUNCTIONS - ( 12 Oct 2020 07:08 )  Alb: 4.3 g/dL / Pro: 7.3 g/dL / ALK PHOS: 78 U/L / ALT: 12 U/L / AST: 12 U/L / GGT: x                 Uric Acid, Serum: 3.7 mg/dL (10-12-20 @ 09:29)  Lactate Dehydrogenase, Serum: 124 U/L (10-12-20 @ 09:29)

## 2020-10-12 NOTE — CONSULT NOTE ADULT - SUBJECTIVE AND OBJECTIVE BOX
Patient is a 60y old  Female who presents with a chief complaint of fever (12 Oct 2020 11:48)    HPI:  Patient is a 59 yo F w/ PMHx of APL on chemo (dx May 2020; has regimen of ATRA and arsenic; follows Dr. Hurd at [facility]; Mediport placed Aug 5th by Dr. Guzman), HTN, HLD, T2DM, DVT (dx July 2020; home eliquis 5mg), post-herpetic neuralgia (on gabapentin) with recent admission on 9/4- 9/11 for chest and back pain likely from pleural / pericardial effusion who presents today with ?fevers. Spoke to patient in [place] with  (languageline [id number] [facility]). Patient states she has had 6+ months of shortness of breath and dizziness that became worse recently with associated chills. Also endorsed R sided chest pain and shoulder pain that brought patient into the ED. Patient also endorsed back pain but no focal weakness/ sensory changes or bowel/ bladder changes. Patient went to receive chemo session yesterday and finished the session. After the session at home, patient began experiencing the symptoms mentioned earlier. Denies any sick contacts, cough, SOB, dysuria, hematuria, hematochezia, melena. Last tylenol yesterday night. On eliquis for a prior RUE DVT.    During last hospitalization, echo showed EF 45-50%, trace pericardial effusion, mild reduced RV function. Cards at that time had low suspicion for pericarditis or tamponade. Cardiac cath 2 years ago with mild non-obstructive CAD. Patient also had L sided pleural effusion with ddx including malignancy, viral or parapneumonic effusion. As per pulm, effusion could not be sampled safely with thoracentesis. Treated for PNA with augmentin.     In ED: 99F, 82HR, 111/73, 98% on RA. Concern for fever and pleuritic chest pain with ddx including cellulitis vs bacteremia as port on R chest was warm and erythematous. Started sepsis workup and given vanc/zosyn, tylenol, reglan, and 2x 1L boluses of IVF as well. Given concern for hypotension, patient was started on levophed.        Pt was admitted to MICU for septic shock requiring levophed. Found to have cellulitis on R chest port. Treated with vancomycin and zosyn. Transferred to floor now off levophed. Seen by IR and advised no removal of port. Remains on vanc/zosyn.       prior hospital charts reviewed [  ]  primary team notes reviewed [ x]  other consultant notes reviewed [x]    PAST MEDICAL & SURGICAL HISTORY:  APL (acute promyelocytic leukemia)  HLD (hyperlipidemia)  Hypertension  Diabetes  No significant past surgical history      Allergies  adhesives (Pruritus)  No Known Drug Allergies    ANTIMICROBIALS (past 90 days)  MEDICATIONS  (STANDING):  piperacillin/tazobactam IVPB.   200 mL/Hr IV Intermittent (10-09-20 @ 11:45)   25 mL/Hr IV Intermittent (10-12-20 @ 12:23)   25 mL/Hr IV Intermittent (10-12-20 @ 04:54)   25 mL/Hr IV Intermittent (10-11-20 @ 21:56)   25 mL/Hr IV Intermittent (10-11-20 @ 11:58)   25 mL/Hr IV Intermittent (10-11-20 @ 04:26)   25 mL/Hr IV Intermittent (10-10-20 @ 20:58)   25 mL/Hr IV Intermittent (10-10-20 @ 12:03)   25 mL/Hr IV Intermittent (10-10-20 @ 04:04)   25 mL/Hr IV Intermittent (10-09-20 @ 19:47)    vancomycin  IVPB   250 mL/Hr IV Intermittent (10-09-20 @ 13:00)   166.67 mL/Hr IV Intermittent (10-12-20 @ 12:23)   166.67 mL/Hr IV Intermittent (10-12-20 @ 01:07)   166.67 mL/Hr IV Intermittent (10-11-20 @ 13:51)   166.67 mL/Hr IV Intermittent (10-11-20 @ 01:51)   166.67 mL/Hr IV Intermittent (10-10-20 @ 12:03)   166.67 mL/Hr IV Intermittent (10-10-20 @ 02:12)    OTHER MEDS: MEDICATIONS  (STANDING):  apixaban 5 every 12 hours  atorvastatin 40 at bedtime  dextrose 40% Gel 15 once PRN  dextrose 50% Injectable 12.5 once  dextrose 50% Injectable 25 once  dextrose 50% Injectable 25 once  glucagon  Injectable 1 once PRN  influenza   Vaccine 0.5 once  insulin lispro (HumaLOG) corrective regimen sliding scale  three times a day before meals  insulin lispro (HumaLOG) corrective regimen sliding scale  at bedtime  metoclopramide 10 Before meals and at bedtime PRN    SOCIAL HISTORY:   Denies tobacco, EtOH, and illicit drug use. Lives with .       FAMILY HISTORY:  Family history of diabetes mellitus- Mother      REVIEW OF SYSTEMS  [  ] ROS unobtainable because:    [ x ] All other systems negative except as noted below:	    Constitutional:  [ ] fever [ ] chills  [ ] weight loss  [ ] weakness  Skin:  [ ] rash [ ] phlebitis	  Eyes: [ ] icterus [ ] pain  [ ] discharge	  ENMT: [ ] sore throat  [ ] thrush [ ] ulcers [ ] exudates  Respiratory: [x ] dyspnea [ ] hemoptysis [ ] cough [ ] sputum	  Cardiovascular:  [ x] chest pain [ ] palpitations [ ] edema	  Gastrointestinal:  [ ] nausea [ ] vomiting [ ] diarrhea [ ] constipation [ ] pain	  Genitourinary:  [ ] dysuria [ ] frequency [ ] hematuria [ ] discharge [ ] flank pain  [ ] incontinence  Musculoskeletal:  [ ] myalgias [ ] arthralgias [ ] arthritis  [ ] back pain  Neurological:  [ ] headache [ ] seizures  [ ] confusion/altered mental status  Psychiatric:  [ ] anxiety [ ] depression	  Hematology/Lymphatics:  [ ] lymphadenopathy  Endocrine:  [ ] adrenal [ ] thyroid  Allergic/Immunologic:	 [ ] transplant [ ] seasonal    Vital Signs Last 24 Hrs  T(F): 98.3 (10-12-20 @ 13:26), Max: 99.4 (10-09-20 @ 11:03)  Vital Signs Last 24 Hrs  HR: 85 (10-12-20 @ 13:26) (71 - 85)  BP: 108/66 (10-12-20 @ 13:26) (108/66 - 136/64)  RR: 18 (10-12-20 @ 13:26)  SpO2: 98% (10-12-20 @ 13:26) (92% - 98%)  Wt(kg): --    PHYSICAL EXAM:  Constitutional: non-toxic, no distress  HEAD/EYES: anicteric, no conjunctival injection  ENT:  supple, no thrush  Cardiovascular:   normal S1, S2, no murmur, no edema  Respiratory:  clear BS bilaterally, no wheezes, no rales  GI:  soft, non-tender, normal bowel sounds  :  no garcia, no CVA tenderness  Musculoskeletal:  no synovitis, normal ROM  Neurologic: awake and alert, normal strength, no focal findings  Skin:  left chest port in place with surrounding erythema  Heme/Onc: no lymphadenopathy   Psychiatric:  awake, alert, appropriate mood                            9.4    4.39  )-----------( 220      ( 12 Oct 2020 07:10 )             31.1   10-12    141  |  104  |  13  ----------------------------<  113<H>  3.8   |  27  |  0.86    Ca    10.0      12 Oct 2020 07:08  Phos  3.5     10-12  Mg     2.1     10-12    TPro  7.3  /  Alb  4.3  /  TBili  0.3  /  DBili  x   /  AST  12  /  ALT  12  /  AlkPhos  78  10-12    MICROBIOLOGY:  Vancomycin Level, Trough: 18.1 (10-11 @ 00:44)    Culture - Blood (collected 09 Oct 2020 17:17)  Source: .Blood Blood-Peripheral  Preliminary Report (10 Oct 2020 18:01):    No growth to date.    Culture - Blood (collected 09 Oct 2020 17:17)  Source: .Blood Blood-Peripheral  Preliminary Report (10 Oct 2020 18:01):    No growth to date.    Culture - Urine (collected 09 Oct 2020 15:08)  Source: .Urine Clean Catch (Midstream)  Final Report (10 Oct 2020 10:30):    <10,000 CFU/mL Normal Urogenital Krista    COVID-19 PCR: NotDetec:    RADIOLOGY:  imaging below personally reviewed and agree with findings    < from: CT Chest w/ IV Cont (10.09.20 @ 12:48) >  IMPRESSION:  Likely mild cellulitis associated with the Mediport in the right anterior chest wall. No associated fluid or fluid collection.  Mildly prominent bilateral axillary lymph nodes  No acute abnormality within the abdomen and pelvis.   Patient is a 60y old  Female who presents with a chief complaint of fever (12 Oct 2020 11:48)    HPI:  Patient is a 61 yo F w/ PMHx of APL on chemo (dx May 2020; has regimen of ATRA and arsenic; follows Dr. Hurd at [facility]; Mediport placed Aug 5th by Dr. Guzman), HTN, HLD, T2DM, DVT (dx July 2020; home eliquis 5mg), post-herpetic neuralgia (on gabapentin) with recent admission on 9/4- 9/11 for chest and back pain likely from pleural / pericardial effusion who presents today with ?fevers. Spoke to patient in [place] with  (languageline [phone] [facility]). Patient states she has had 6+ months of shortness of breath and dizziness that became worse recently with associated chills. Also endorsed R sided chest pain and shoulder pain that brought patient into the ED. Patient also endorsed back pain but no focal weakness/ sensory changes or bowel/ bladder changes. Patient went to receive chemo session yesterday and finished the session. After the session at home, patient began experiencing the symptoms mentioned earlier. Denies any sick contacts, cough, SOB, dysuria, hematuria, hematochezia, melena. Last tylenol yesterday night. On eliquis for a prior RUE DVT.    During last hospitalization, echo showed EF 45-50%, trace pericardial effusion, mild reduced RV function. Cards at that time had low suspicion for pericarditis or tamponade. Cardiac cath 2 years ago with mild non-obstructive CAD. Patient also had L sided pleural effusion with ddx including malignancy, viral or parapneumonic effusion. As per pulm, effusion could not be sampled safely with thoracentesis. Treated for PNA with augmentin.     In ED: 99F, 82HR, 111/73, 98% on RA. Concern for fever and pleuritic chest pain with ddx including cellulitis vs bacteremia as port on R chest was warm and erythematous. Started sepsis workup and given vanc/zosyn, tylenol, reglan, and 2x 1L boluses of IVF as well. Given concern for hypotension, patient was started on levophed.        Pt was admitted to MICU for septic shock requiring levophed. Found to have cellulitis on R chest port. Treated with vancomycin and zosyn. Transferred to floor now off levophed. Seen by IR and advised no removal of port. Remains on vanc/zosyn.       prior hospital charts reviewed [  ]  primary team notes reviewed [ x]  other consultant notes reviewed [x]    PAST MEDICAL & SURGICAL HISTORY:  APL (acute promyelocytic leukemia)  HLD (hyperlipidemia)  Hypertension  Diabetes  No significant past surgical history      Allergies  adhesives (Pruritus)  No Known Drug Allergies    ANTIMICROBIALS (past 90 days)  MEDICATIONS  (STANDING):  piperacillin/tazobactam IVPB.   200 mL/Hr IV Intermittent (10-09-20 @ 11:45)   25 mL/Hr IV Intermittent (10-12-20 @ 12:23)   25 mL/Hr IV Intermittent (10-12-20 @ 04:54)   25 mL/Hr IV Intermittent (10-11-20 @ 21:56)   25 mL/Hr IV Intermittent (10-11-20 @ 11:58)   25 mL/Hr IV Intermittent (10-11-20 @ 04:26)   25 mL/Hr IV Intermittent (10-10-20 @ 20:58)   25 mL/Hr IV Intermittent (10-10-20 @ 12:03)   25 mL/Hr IV Intermittent (10-10-20 @ 04:04)   25 mL/Hr IV Intermittent (10-09-20 @ 19:47)    vancomycin  IVPB   250 mL/Hr IV Intermittent (10-09-20 @ 13:00)   166.67 mL/Hr IV Intermittent (10-12-20 @ 12:23)   166.67 mL/Hr IV Intermittent (10-12-20 @ 01:07)   166.67 mL/Hr IV Intermittent (10-11-20 @ 13:51)   166.67 mL/Hr IV Intermittent (10-11-20 @ 01:51)   166.67 mL/Hr IV Intermittent (10-10-20 @ 12:03)   166.67 mL/Hr IV Intermittent (10-10-20 @ 02:12)    OTHER MEDS: MEDICATIONS  (STANDING):  apixaban 5 every 12 hours  atorvastatin 40 at bedtime  dextrose 40% Gel 15 once PRN  dextrose 50% Injectable 12.5 once  dextrose 50% Injectable 25 once  dextrose 50% Injectable 25 once  glucagon  Injectable 1 once PRN  influenza   Vaccine 0.5 once  insulin lispro (HumaLOG) corrective regimen sliding scale  three times a day before meals  insulin lispro (HumaLOG) corrective regimen sliding scale  at bedtime  metoclopramide 10 Before meals and at bedtime PRN    SOCIAL HISTORY:   Denies tobacco, EtOH, and illicit drug use. Lives with .       FAMILY HISTORY:  Family history of diabetes mellitus- Mother      REVIEW OF SYSTEMS  [  ] ROS unobtainable because:    [ x ] All other systems negative except as noted below:	    Constitutional:  [ ] fever [ ] chills  [ ] weight loss  [ ] weakness  Skin:  [ ] rash [ ] phlebitis	  Eyes: [ ] icterus [ ] pain  [ ] discharge	  ENMT: [ ] sore throat  [ ] thrush [ ] ulcers [ ] exudates  Respiratory: [x ] dyspnea [ ] hemoptysis [ ] cough [ ] sputum	  Cardiovascular:  [ x] chest pain [ ] palpitations [ ] edema	  Gastrointestinal:  [ ] nausea [ ] vomiting [ ] diarrhea [ ] constipation [ ] pain	  Genitourinary:  [ ] dysuria [ ] frequency [ ] hematuria [ ] discharge [ ] flank pain  [ ] incontinence  Musculoskeletal:  [ ] myalgias [ ] arthralgias [ ] arthritis  [ ] back pain  Neurological:  [ ] headache [ ] seizures  [ ] confusion/altered mental status  Psychiatric:  [ ] anxiety [ ] depression	  Hematology/Lymphatics:  [ ] lymphadenopathy  Endocrine:  [ ] adrenal [ ] thyroid  Allergic/Immunologic:	 [ ] transplant [ ] seasonal    Vital Signs Last 24 Hrs  T(F): 98.3 (10-12-20 @ 13:26), Max: 99.4 (10-09-20 @ 11:03)  Vital Signs Last 24 Hrs  HR: 85 (10-12-20 @ 13:26) (71 - 85)  BP: 108/66 (10-12-20 @ 13:26) (108/66 - 136/64)  RR: 18 (10-12-20 @ 13:26)  SpO2: 98% (10-12-20 @ 13:26) (92% - 98%)  Wt(kg): --    PHYSICAL EXAM:  Constitutional: non-toxic, no distress  HEAD/EYES: anicteric, no conjunctival injection  ENT:  supple, no thrush  Cardiovascular:   normal S1, S2, no murmur,   Respiratory:  + air entry b/l   GI:  soft, non-tender, normal bowel sounds  :  no garcia, no CVA tenderness  Musculoskeletal:  no synovitis, normal ROM  Neurologic: awake and alert, normal strength, no focal findings  Skin:  left chest port in place with surrounding erythema, tenderness and some warmth.   Extremities: no swelling   Psychiatric:  awake, alert, appropriate mood                            9.4    4.39  )-----------( 220      ( 12 Oct 2020 07:10 )             31.1   10-12    141  |  104  |  13  ----------------------------<  113<H>  3.8   |  27  |  0.86    Ca    10.0      12 Oct 2020 07:08  Phos  3.5     10-12  Mg     2.1     10-12    TPro  7.3  /  Alb  4.3  /  TBili  0.3  /  DBili  x   /  AST  12  /  ALT  12  /  AlkPhos  78  10-12    MICROBIOLOGY:  Vancomycin Level, Trough: 18.1 (10-11 @ 00:44)    Culture - Blood (collected 09 Oct 2020 17:17)  Source: .Blood Blood-Peripheral  Preliminary Report (10 Oct 2020 18:01):    No growth to date.    Culture - Blood (collected 09 Oct 2020 17:17)  Source: .Blood Blood-Peripheral  Preliminary Report (10 Oct 2020 18:01):    No growth to date.    Culture - Urine (collected 09 Oct 2020 15:08)  Source: .Urine Clean Catch (Midstream)  Final Report (10 Oct 2020 10:30):    <10,000 CFU/mL Normal Urogenital Krista    COVID-19 PCR: NotDetec:    RADIOLOGY:  imaging below personally reviewed and agree with findings    < from: CT Chest w/ IV Cont (10.09.20 @ 12:48) >  IMPRESSION:  Likely mild cellulitis associated with the Mediport in the right anterior chest wall. No associated fluid or fluid collection.  Mildly prominent bilateral axillary lymph nodes  No acute abnormality within the abdomen and pelvis.

## 2020-10-12 NOTE — PROGRESS NOTE ADULT - ATTENDING COMMENTS
son translating via phone per request  discussed above plan with resident on rounds. patient reports some discomfort over mediport area but improving  nontoxic, exam with very mild erythema over mediport area, no discharge   labs and imaging reviewed.    Septic Shock secondary to cellulitis surrounding mediport site  -CT chest with cellulitis surrounding mediport, no abscess   -s/p MICU downgrade, briefly on pressors now off with stable BP  -c/w vanc and zosyn  -ID consult  -IR consulted to possibly remove mediport and felt should not be taken out. bcx remain NTD  -hold anti-htn meds  -monitor vitals    LE swelling and pain- check LE dopplers though on Eliquis

## 2020-10-13 ENCOUNTER — APPOINTMENT (OUTPATIENT)
Dept: INFUSION THERAPY | Facility: HOSPITAL | Age: 60
End: 2020-10-13

## 2020-10-13 LAB
ALBUMIN SERPL ELPH-MCNC: 4.1 G/DL — SIGNIFICANT CHANGE UP (ref 3.3–5)
ALP SERPL-CCNC: 78 U/L — SIGNIFICANT CHANGE UP (ref 40–120)
ALT FLD-CCNC: 10 U/L — SIGNIFICANT CHANGE UP (ref 10–45)
ANION GAP SERPL CALC-SCNC: 11 MMOL/L — SIGNIFICANT CHANGE UP (ref 5–17)
AST SERPL-CCNC: 12 U/L — SIGNIFICANT CHANGE UP (ref 10–40)
BASOPHILS # BLD AUTO: 0.02 K/UL — SIGNIFICANT CHANGE UP (ref 0–0.2)
BASOPHILS NFR BLD AUTO: 0.4 % — SIGNIFICANT CHANGE UP (ref 0–2)
BILIRUB SERPL-MCNC: 0.3 MG/DL — SIGNIFICANT CHANGE UP (ref 0.2–1.2)
BUN SERPL-MCNC: 15 MG/DL — SIGNIFICANT CHANGE UP (ref 7–23)
CALCIUM SERPL-MCNC: 10 MG/DL — SIGNIFICANT CHANGE UP (ref 8.4–10.5)
CHLORIDE SERPL-SCNC: 105 MMOL/L — SIGNIFICANT CHANGE UP (ref 96–108)
CO2 SERPL-SCNC: 26 MMOL/L — SIGNIFICANT CHANGE UP (ref 22–31)
CREAT SERPL-MCNC: 0.95 MG/DL — SIGNIFICANT CHANGE UP (ref 0.5–1.3)
EOSINOPHIL # BLD AUTO: 0.26 K/UL — SIGNIFICANT CHANGE UP (ref 0–0.5)
EOSINOPHIL NFR BLD AUTO: 5.5 % — SIGNIFICANT CHANGE UP (ref 0–6)
GLUCOSE BLDC GLUCOMTR-MCNC: 133 MG/DL — HIGH (ref 70–99)
GLUCOSE BLDC GLUCOMTR-MCNC: 147 MG/DL — HIGH (ref 70–99)
GLUCOSE BLDC GLUCOMTR-MCNC: 148 MG/DL — HIGH (ref 70–99)
GLUCOSE BLDC GLUCOMTR-MCNC: 150 MG/DL — HIGH (ref 70–99)
GLUCOSE SERPL-MCNC: 124 MG/DL — HIGH (ref 70–99)
HCT VFR BLD CALC: 29.4 % — LOW (ref 34.5–45)
HGB BLD-MCNC: 8.7 G/DL — LOW (ref 11.5–15.5)
IMM GRANULOCYTES NFR BLD AUTO: 0.4 % — SIGNIFICANT CHANGE UP (ref 0–1.5)
LDH SERPL L TO P-CCNC: 120 U/L — SIGNIFICANT CHANGE UP (ref 50–242)
LYMPHOCYTES # BLD AUTO: 1.88 K/UL — SIGNIFICANT CHANGE UP (ref 1–3.3)
LYMPHOCYTES # BLD AUTO: 39.6 % — SIGNIFICANT CHANGE UP (ref 13–44)
MAGNESIUM SERPL-MCNC: 2.1 MG/DL — SIGNIFICANT CHANGE UP (ref 1.6–2.6)
MCHC RBC-ENTMCNC: 24.6 PG — LOW (ref 27–34)
MCHC RBC-ENTMCNC: 29.6 GM/DL — LOW (ref 32–36)
MCV RBC AUTO: 83.3 FL — SIGNIFICANT CHANGE UP (ref 80–100)
MONOCYTES # BLD AUTO: 0.33 K/UL — SIGNIFICANT CHANGE UP (ref 0–0.9)
MONOCYTES NFR BLD AUTO: 6.9 % — SIGNIFICANT CHANGE UP (ref 2–14)
NEUTROPHILS # BLD AUTO: 2.24 K/UL — SIGNIFICANT CHANGE UP (ref 1.8–7.4)
NEUTROPHILS NFR BLD AUTO: 47.2 % — SIGNIFICANT CHANGE UP (ref 43–77)
NRBC # BLD: 0 /100 WBCS — SIGNIFICANT CHANGE UP (ref 0–0)
PHOSPHATE SERPL-MCNC: 3.8 MG/DL — SIGNIFICANT CHANGE UP (ref 2.5–4.5)
PLATELET # BLD AUTO: 211 K/UL — SIGNIFICANT CHANGE UP (ref 150–400)
POTASSIUM SERPL-MCNC: 4.1 MMOL/L — SIGNIFICANT CHANGE UP (ref 3.5–5.3)
POTASSIUM SERPL-SCNC: 4.1 MMOL/L — SIGNIFICANT CHANGE UP (ref 3.5–5.3)
PROT SERPL-MCNC: 7.1 G/DL — SIGNIFICANT CHANGE UP (ref 6–8.3)
RBC # BLD: 3.53 M/UL — LOW (ref 3.8–5.2)
RBC # FLD: 15.4 % — HIGH (ref 10.3–14.5)
SODIUM SERPL-SCNC: 142 MMOL/L — SIGNIFICANT CHANGE UP (ref 135–145)
URATE SERPL-MCNC: 3.8 MG/DL — SIGNIFICANT CHANGE UP (ref 2.5–7)
VANCOMYCIN TROUGH SERPL-MCNC: 28.7 UG/ML — CRITICAL HIGH (ref 10–20)
WBC # BLD: 4.75 K/UL — SIGNIFICANT CHANGE UP (ref 3.8–10.5)
WBC # FLD AUTO: 4.75 K/UL — SIGNIFICANT CHANGE UP (ref 3.8–10.5)

## 2020-10-13 PROCEDURE — 99233 SBSQ HOSP IP/OBS HIGH 50: CPT | Mod: GC

## 2020-10-13 PROCEDURE — 99232 SBSQ HOSP IP/OBS MODERATE 35: CPT

## 2020-10-13 PROCEDURE — 93306 TTE W/DOPPLER COMPLETE: CPT | Mod: 26

## 2020-10-13 RX ORDER — VANCOMYCIN HCL 1 G
1000 VIAL (EA) INTRAVENOUS EVERY 12 HOURS
Refills: 0 | Status: DISCONTINUED | OUTPATIENT
Start: 2020-10-13 | End: 2020-10-15

## 2020-10-13 RX ORDER — CEFEPIME 1 G/1
1000 INJECTION, POWDER, FOR SOLUTION INTRAMUSCULAR; INTRAVENOUS EVERY 8 HOURS
Refills: 0 | Status: DISCONTINUED | OUTPATIENT
Start: 2020-10-13 | End: 2020-10-15

## 2020-10-13 RX ADMIN — ATORVASTATIN CALCIUM 40 MILLIGRAM(S): 80 TABLET, FILM COATED ORAL at 21:03

## 2020-10-13 RX ADMIN — CEFEPIME 100 MILLIGRAM(S): 1 INJECTION, POWDER, FOR SOLUTION INTRAMUSCULAR; INTRAVENOUS at 14:57

## 2020-10-13 RX ADMIN — APIXABAN 5 MILLIGRAM(S): 2.5 TABLET, FILM COATED ORAL at 05:47

## 2020-10-13 RX ADMIN — Medication 250 MILLIGRAM(S): at 17:36

## 2020-10-13 RX ADMIN — Medication 250 MILLIGRAM(S): at 06:24

## 2020-10-13 RX ADMIN — APIXABAN 5 MILLIGRAM(S): 2.5 TABLET, FILM COATED ORAL at 17:06

## 2020-10-13 RX ADMIN — CEFEPIME 100 MILLIGRAM(S): 1 INJECTION, POWDER, FOR SOLUTION INTRAMUSCULAR; INTRAVENOUS at 21:04

## 2020-10-13 RX ADMIN — CEFEPIME 100 MILLIGRAM(S): 1 INJECTION, POWDER, FOR SOLUTION INTRAMUSCULAR; INTRAVENOUS at 05:48

## 2020-10-13 NOTE — PROGRESS NOTE ADULT - ASSESSMENT
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    Septic shock due to MediPort-associated Cellulitis - With hypotension requiring levophed, now off pressor on the floor. CT with mild cellulitis around port and chest exam with surrounding erythema of port and warmth. Evaluated by IR and advised to not use for weeks and recommended against removal. Currently on Vanc and zosyn. Bl clx 10/9 NGTD.    Overall septic shock with hypotension, port infection with cellulitis.   Now resolved shock and sepsis.       Plan;   - cont to follow 10/9 bl clx, NTD   - cont vancomycin, reduce dose to 1 gm iv q12h, given its only cellulitis, we do not need a high trough, around 10 is fine, trough supratherapeutic overnight  - c/w cefepime 1 gm iv q8h for possible polymicrobial skin infection in setting of chemo and DMT2  - monitor vancomycin trough and creatinine to avoid nephrotoxicity and ensure efficacy   - f/u prelim blood cx, ntd   - no plan for port removal per IR   - day 5/7 of therapy today

## 2020-10-13 NOTE — PROGRESS NOTE ADULT - ASSESSMENT
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 Hurd at [facility]), HTN, HLD, T2DM, DVT (dx July 2020; on Eliquis), post-herpetic neuralgia (on gabapentin) with recent admission 9/4- 9/11 for chest and back pain likely from pleural / pericardial effusion p/w worsening R chest pain, shortness of breath and chills admitted for sepsis likely 2/2 cellulitis around mediport site. MICU initially consulted for hypotension, initially started on levophed, now hemodynamically stable and off pressors.     #Sepsis  - continue with vanc/ zosyn for now -F/u vanc troughs  - CT chest (10/10): c/f cellulitis of mediport  - POCUS (10/10): A-lines, trace pleural effusion, no pericardial effusion  - BCx x2 (10/10) NGTD  - IR consulted for mediport removal; appreciate recs -As per IR:  --Given mild cellulitis and negative BCx favor abx over removal of port  --If peripheral IV not suitable for chemo, place PICC  --Do not use mediport until skin changes resolve  -ID consulted; appreciate recs -As per ID:  --Vanc trough goal ~10  --C/w vanc 1g q12h  --D/C zosyn (10/13); start cefepime    #APL  - IR consulted for mediport removal; appreciate recs -As per IR:  --Given mild cellulitis and negative BCx favor abx over removal of port  --If peripheral IV not suitable for chemo, place PICC  --Do not use mediport until skin changes resolve  -Heme consulted; appreciate recs -As per heme, peripheral line not enough for chemo and pt refusing PICC, will not c/w chemo while i/p  -Monitor daily TLS labs given recent chemo    #DVT - Pt has h/o DVT; no p/w U/L LE pain w/out other associated symptoms  -F/u B/L lower extremity duplex  -C/w home Eliquis    #Hypotension  - Hypotension likely in s/o infection - S/p Levophed in ED; weaned once patient was in MICU. 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 Hurd at [facility]), HTN, HLD, T2DM, DVT (dx July 2020; on Eliquis), post-herpetic neuralgia (on gabapentin) with recent admission 9/4- 9/11 for chest and back pain likely from pleural / pericardial effusion p/w worsening R chest pain, shortness of breath and chills admitted for sepsis likely 2/2 cellulitis around mediport site. MICU initially consulted for hypotension, initially started on levophed, now hemodynamically stable and off pressors.     #Sepsis  - CT chest (10/10): c/f cellulitis of mediport  - POCUS (10/10): A-lines, trace pleural effusion, no pericardial effusion  - BCx x2 (10/10) NGTD  - IR consulted for mediport removal; appreciate recs -As per IR:  --Given mild cellulitis and negative BCx favor abx over removal of port  --If peripheral IV not suitable for chemo, place PICC  --Do not use mediport until skin changes resolve  -ID consulted; appreciate recs -As per ID:  --Vanc trough goal ~10 -Vanc trough 28.7 so AM dose held and vanc dose changed (10/13)  --C/w vanc 1g q12h  --D/C zosyn (10/13); start cefepime    #APL  - IR consulted for mediport removal; appreciate recs -As per IR:  --Given mild cellulitis and negative BCx favor abx over removal of port  --If peripheral IV not suitable for chemo, place PICC  --Do not use mediport until skin changes resolve  -Heme consulted; appreciate recs -As per heme, peripheral line not enough for chemo and pt refusing PICC, will not c/w chemo while i/p  -Monitor daily TLS labs given recent chemo    #DVT - Pt has h/o DVT; no p/w U/L LE pain w/out other associated symptoms  -F/u B/L lower extremity duplex  -C/w home Eliquis    #Hypotension  - Hypotension likely in s/o infection - S/p Levophed in ED; weaned once patient was in MICU. 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 Hurd at [facility]), HTN, HLD, T2DM, DVT (dx July 2020; on Eliquis), post-herpetic neuralgia (on gabapentin) with recent admission 9/4- 9/11 for chest and back pain likely from pleural / pericardial effusion p/w worsening R chest pain, shortness of breath and chills admitted for sepsis likely 2/2 cellulitis around mediport site. MICU initially consulted for hypotension, initially started on levophed, now hemodynamically stable and off pressors.     #Sepsis  - CT chest (10/10): c/f cellulitis of mediport  - POCUS (10/10): A-lines, trace pleural effusion, no pericardial effusion  - BCx x2 (10/10) NGTD  - IR consulted for mediport removal; appreciate recs -As per IR:  --Given mild cellulitis and negative BCx favor abx over removal of port  --If peripheral IV not suitable for chemo, place PICC  --Do not use mediport until skin changes resolve  -ID consulted; appreciate recs -As per ID:  --Vanc trough goal ~10 -Vanc trough 28.7 so AM dose held and vanc dose changed (10/13)  --C/w vanc 1g q12h -Pt will need to complete a 7d course; 2d remaining  --D/C zosyn (10/13); start cefepime    #APL  - IR consulted for mediport removal; appreciate recs -As per IR:  --Given mild cellulitis and negative BCx favor abx over removal of port  --If peripheral IV not suitable for chemo, place PICC  --Do not use mediport until skin changes resolve  -Heme consulted; appreciate recs -As per heme, peripheral line not enough for chemo and pt refusing PICC, will not c/w chemo while i/p  -Monitor daily TLS labs given recent chemo    #DVT - Pt has h/o DVT; no p/w U/L LE pain w/out other associated symptoms  -F/u B/L lower extremity duplex  -C/w home Eliquis    #Hypotension  - Hypotension likely in s/o infection - S/p Levophed in ED; weaned once patient was in MICU. Now off pressors  - Monitor VSS    #Chest Pain;  likely pleuritic in nature/related to cellulitis as patient was previously worked up during past hospitalization  - Troponins wnl  - POCUS (10/10): A-lines, trace pleural effusion, no pericardial effusion    #HTN  -Hold anti-HTN in s/o hypotension and sepsis    #HLD  -C/w home statin    #T2DM  -C/w insulin sliding scale    #Anemia; no bleeding noted, possibly secondary to chemotherapy regimen  -Trend daily CBC w/ diff      #Prophylactic Measure  DVT PPx: Eliquis  DIET: DASH/TLC CC  ACTIVITY: Ambulate w/ assistance

## 2020-10-13 NOTE — PROGRESS NOTE ADULT - SUBJECTIVE AND OBJECTIVE BOX
60y old  Female who presents with a chief complaint of fever (13 Oct 2020 07:36)      Interval history:  Afebrile, pain at the port site improved.     Allergies:   adhesives (Pruritus)  No Known Drug Allergies    Antimicrobials:  cefepime   IVPB 1000 milliGRAM(s) IV Intermittent every 8 hours  vancomycin  IVPB 1000 milliGRAM(s) IV Intermittent every 12 hours      REVIEW OF SYSTEMS:  No cough,  No N/V  No dysuria  No rash.       Vital Signs Last 24 Hrs  T(C): 36.2 (10-13-20 @ 15:38), Max: 36.9 (10-13-20 @ 01:00)  T(F): 97.2 (10-13-20 @ 15:38), Max: 98.4 (10-13-20 @ 01:00)  HR: 93 (10-13-20 @ 15:38) (79 - 93)  BP: 110/74 (10-13-20 @ 15:38) (109/68 - 115/77)  BP(mean): --  RR: 18 (10-13-20 @ 15:38) (17 - 18)  SpO2: 97% (10-13-20 @ 15:38) (94% - 97%)      PHYSICAL EXAM:  Patient in no acute distress. AAOX3.  Rt chest port site with some tenderness, erythema almost resolved, minimal warmth.   Cardiovascular: S1S2 normal.  Lungs: Good air entry B/L lung fields.  Gastrointestinal: soft, nontender, nondistended.  Extremities: no edema.  IV sites not inflamed.                           8.7    4.75  )-----------( 211      ( 13 Oct 2020 06:47 )             29.4   10-13    142  |  105  |  15  ----------------------------<  124<H>  4.1   |  26  |  0.95    Ca    10.0      13 Oct 2020 06:44  Phos  3.8     10-13  Mg     2.1     10-13    TPro  7.1  /  Alb  4.1  /  TBili  0.3  /  DBili  x   /  AST  12  /  ALT  10  /  AlkPhos  78  10-13      LIVER FUNCTIONS - ( 13 Oct 2020 06:44 )  Alb: 4.1 g/dL / Pro: 7.1 g/dL / ALK PHOS: 78 U/L / ALT: 10 U/L / AST: 12 U/L / GGT: x             Culture - Blood (10.09.20 @ 17:17)   Specimen Source: .Blood Blood-Peripheral   Culture Results:   No growth to date.

## 2020-10-13 NOTE — PROGRESS NOTE ADULT - SUBJECTIVE AND OBJECTIVE BOX
Maria T Cruz MD  Internal Medicine PGY-1  Pager# [phone]; [facility] Pager# [phone]    PATIENT: GIANLUCA UREÑA, MRN: [id number]    CHIEF COMPLAINT: Patient is a 60y old  Female who presents with a chief complaint of fever (12 Oct 2020 18:04)    INTERVAL HISTORY/OVERNIGHT EVENTS: No overnight events.     REVIEW OF SYSTEMS:    Constitutional:     [ ] negative [ ] fevers [ ] chills [ ] weight loss [ ] weight gain  HEENT:                  [ ] negative [ ] dry eyes [ ] eye irritation [ ] postnasal drip [ ] nasal congestion  CV:                         [ ] negative  [ ] chest pain [ ] orthopnea [ ] palpitations [ ] murmur  Resp:                     [ ] negative [ ] cough [ ] shortness of breath [ ] dyspnea [ ] wheezing [ ] sputum [ ] hemoptysis  GI:                          [ ] negative [ ] nausea [ ] vomiting [ ] diarrhea [ ] constipation [ ] abd pain [ ] dysphagia   :                        [ ] negative [ ] dysuria [ ] nocturia [ ] hematuria [ ] increased urinary frequency  Musculoskeletal: [ ] negative [ ] back pain [ ] myalgias [ ] arthralgias [ ] fracture  Skin:                       [ ] negative [ ] rash [ ] itch  Neurological:        [ ] negative [ ] headache [ ] dizziness [ ] syncope [ ] weakness [ ] numbness  Psychiatric:           [ ] negative [ ] anxiety [ ] depression  Endocrine:            [ ] negative [ ] diabetes [ ] thyroid problem  Heme/Lymph:      [ ] negative [ ] anemia [ ] bleeding problem  Allergic/Immune: [ ] negative [ ] itchy eyes [ ] nasal discharge [ ] hives [ ] angioedema    [ ] All other systems negative  [ ] Unable to assess ROS because ________.    MEDICATIONS:  MEDICATIONS  (STANDING):  apixaban 5 milliGRAM(s) Oral every 12 hours  atorvastatin 40 milliGRAM(s) Oral at bedtime  cefepime   IVPB 1000 milliGRAM(s) IV Intermittent every 8 hours  dextrose 5%. 1000 milliLiter(s) (50 mL/Hr) IV Continuous <Continuous>  dextrose 50% Injectable 12.5 Gram(s) IV Push once  dextrose 50% Injectable 25 Gram(s) IV Push once  dextrose 50% Injectable 25 Gram(s) IV Push once  influenza   Vaccine 0.5 milliLiter(s) IntraMuscular once  insulin lispro (HumaLOG) corrective regimen sliding scale   SubCutaneous three times a day before meals  insulin lispro (HumaLOG) corrective regimen sliding scale   SubCutaneous at bedtime  vancomycin  IVPB 1000 milliGRAM(s) IV Intermittent every 12 hours    MEDICATIONS  (PRN):  dextrose 40% Gel 15 Gram(s) Oral once PRN Blood Glucose LESS THAN 70 milliGRAM(s)/deciliter  glucagon  Injectable 1 milliGRAM(s) IntraMuscular once PRN Glucose LESS THAN 70 milligrams/deciliter  metoclopramide 10 milliGRAM(s) Oral Before meals and at bedtime PRN nausea      ALLERGIES: Allergies    adhesives (Pruritus)  No Known Drug Allergies    Intolerances        OBJECTIVE:  ICU Vital Signs Last 24 Hrs  T(C): 36.7 (13 Oct 2020 04:31), Max: 36.9 (13 Oct 2020 01:00)  T(F): 98.1 (13 Oct 2020 04:31), Max: 98.4 (13 Oct 2020 01:00)  HR: 83 (13 Oct 2020 04:31) (79 - 87)  BP: 109/68 (13 Oct 2020 04:31) (107/68 - 111/72)  BP(mean): --  ABP: --  ABP(mean): --  RR: 17 (13 Oct 2020 04:31) (17 - 18)  SpO2: 94% (13 Oct 2020 04:31) (94% - 98%)      CAPILLARY BLOOD GLUCOSE      POCT Blood Glucose.: 159 mg/dL (12 Oct 2020 21:02)  POCT Blood Glucose.: 154 mg/dL (12 Oct 2020 18:31)  POCT Blood Glucose.: 190 mg/dL (12 Oct 2020 12:29)  POCT Blood Glucose.: 125 mg/dL (12 Oct 2020 08:45)    CAPILLARY BLOOD GLUCOSE      POCT Blood Glucose.: 159 mg/dL (12 Oct 2020 21:02)    I&O's Summary    12 Oct 2020 07:01  -  13 Oct 2020 07:00  --------------------------------------------------------  IN: 750 mL / OUT: 0 mL / NET: 750 mL      Daily     Daily     PHYSICAL EXAMINATION:  General: Comfortable, no acute distress, cooperative with exam.  HEENT: PERRLA, EOMI, moist mucous membranes.  Respiratory: CTAB, normal respiratory effort, no coughing, wheezes, crackles, or rales.  CV: RRR, S1S2, no murmurs, rubs or gallops. No JVD. Distal pulses intact.  Abdominal: Soft, nontender, nondistended, no rebound or guarding, normal bowel sounds.  Neurology: AOx3, no focal neuro defects, CHAIREZ x 4.  Extremities: No pitting edema, + Peripheral pulses.  Incisions:   Tubes:    LABS:                          8.7    4.75  )-----------( 211      ( 13 Oct 2020 06:47 )             29.4     10-13    142  |  105  |  15  ----------------------------<  124<H>  4.1   |  26  |  0.95    Ca    10.0      13 Oct 2020 06:44  Phos  3.8     10-13  Mg     2.1     10-13    TPro  7.1  /  Alb  4.1  /  TBili  0.3  /  DBili  x   /  AST  12  /  ALT  10  /  AlkPhos  78  10-13    LIVER FUNCTIONS - ( 13 Oct 2020 06:44 )  Alb: 4.1 g/dL / Pro: 7.1 g/dL / ALK PHOS: 78 U/L / ALT: 10 U/L / AST: 12 U/L / GGT: x                       TELEMETRY: N/A    EKG: N/A    IMAGING: No new images or tests   Maria T Cruz MD  Internal Medicine PGY-1  Pager# [phone]; [facility] Pager# [phone]    PATIENT: GIANLUCA UREÑA, MRN: [id number]    CHIEF COMPLAINT: Patient is a 60y old  Female who presents with a chief complaint of fever (12 Oct 2020 18:04)    INTERVAL HISTORY/OVERNIGHT EVENTS: No overnight events. At bedside, patient has been sleeping and eating well. Denies N/V/D/C. Last BM x1 regular yesterday. Denies abdominal pain. Denies chest pain or SOB, cough. Has been ambulating with assistance. Oriented to person, place, and time. Breathing comfortably on room air.    [employer]  (Sarah) used throughout encounter: ID#[id number]    REVIEW OF SYSTEMS:    Constitutional:     [X] negative [ ] fevers [ ] chills [ ] weight loss [ ] weight gain  HEENT:                  [X ] negative [ ] dry eyes [ ] eye irritation [ ] postnasal drip [ ] nasal congestion  CV:                         [X ] negative  [ ] chest pain [ ] orthopnea [ ] palpitations [ ] murmur  Resp:                     [X ] negative [ ] cough [ ] shortness of breath [ ] dyspnea [ ] wheezing [ ] sputum [ ] hemoptysis  GI:                          [X ] negative [ ] nausea [ ] vomiting [ ] diarrhea [ ] constipation [ ] abd pain [ ] dysphagia   :                        [X ] negative [ ] dysuria [ ] nocturia [ ] hematuria [ ] increased urinary frequency  Musculoskeletal: [X ] negative [ ] back pain [ ] myalgias [ ] arthralgias [ ] fracture  Skin:                       [X ] negative [ ] rash [ ] itch  Neurological:        [X ] negative [ ] headache [ ] dizziness [ ] syncope [ ] weakness [ ] numbness  Psychiatric:           [X ] negative [ ] anxiety [ ] depression  Endocrine:            [X ] negative [ ] diabetes [ ] thyroid problem  Heme/Lymph:      [X ] negative [ ] anemia [ ] bleeding problem  Allergic/Immune: [X ] negative [ ] itchy eyes [ ] nasal discharge [ ] hives [ ] angioedema    [X ] All other systems negative  [ ] Unable to assess ROS because ________.    MEDICATIONS:  MEDICATIONS  (STANDING):  apixaban 5 milliGRAM(s) Oral every 12 hours  atorvastatin 40 milliGRAM(s) Oral at bedtime  cefepime   IVPB 1000 milliGRAM(s) IV Intermittent every 8 hours  dextrose 5%. 1000 milliLiter(s) (50 mL/Hr) IV Continuous <Continuous>  dextrose 50% Injectable 12.5 Gram(s) IV Push once  dextrose 50% Injectable 25 Gram(s) IV Push once  dextrose 50% Injectable 25 Gram(s) IV Push once  influenza   Vaccine 0.5 milliLiter(s) IntraMuscular once  insulin lispro (HumaLOG) corrective regimen sliding scale   SubCutaneous three times a day before meals  insulin lispro (HumaLOG) corrective regimen sliding scale   SubCutaneous at bedtime  vancomycin  IVPB 1000 milliGRAM(s) IV Intermittent every 12 hours    MEDICATIONS  (PRN):  dextrose 40% Gel 15 Gram(s) Oral once PRN Blood Glucose LESS THAN 70 milliGRAM(s)/deciliter  glucagon  Injectable 1 milliGRAM(s) IntraMuscular once PRN Glucose LESS THAN 70 milligrams/deciliter  metoclopramide 10 milliGRAM(s) Oral Before meals and at bedtime PRN nausea      ALLERGIES: Allergies    adhesives (Pruritus)  No Known Drug Allergies    Intolerances        OBJECTIVE:  ICU Vital Signs Last 24 Hrs  T(C): 36.7 (13 Oct 2020 04:31), Max: 36.9 (13 Oct 2020 01:00)  T(F): 98.1 (13 Oct 2020 04:31), Max: 98.4 (13 Oct 2020 01:00)  HR: 83 (13 Oct 2020 04:31) (79 - 87)  BP: 109/68 (13 Oct 2020 04:31) (107/68 - 111/72)  BP(mean): --  ABP: --  ABP(mean): --  RR: 17 (13 Oct 2020 04:31) (17 - 18)  SpO2: 94% (13 Oct 2020 04:31) (94% - 98%)      CAPILLARY BLOOD GLUCOSE      POCT Blood Glucose.: 159 mg/dL (12 Oct 2020 21:02)  POCT Blood Glucose.: 154 mg/dL (12 Oct 2020 18:31)  POCT Blood Glucose.: 190 mg/dL (12 Oct 2020 12:29)  POCT Blood Glucose.: 125 mg/dL (12 Oct 2020 08:45)    CAPILLARY BLOOD GLUCOSE      POCT Blood Glucose.: 159 mg/dL (12 Oct 2020 21:02)    I&O's Summary    12 Oct 2020 07:01  -  13 Oct 2020 07:00  --------------------------------------------------------  IN: 750 mL / OUT: 0 mL / NET: 750 mL      Daily     Daily     PHYSICAL EXAMINATION:  General: Comfortable, no acute distress, cooperative with exam.  HEENT: PERRLA, EOMI, moist mucous membranes.  Respiratory: CTAB, normal respiratory effort, no coughing, wheezes, crackles, or rales.  CV: RRR, S1S2, no murmurs, rubs or gallops. No JVD. Distal pulses intact. +R chest TTP, erythema, warmth localized to area of chest port  Abdominal: Soft, nontender, nondistended, no rebound or guarding, normal bowel sounds.  Neurology: AOx3, no focal neuro defects, CHAIREZ x 4.  Extremities: No pitting edema, + Peripheral pulses. +Paraspinal TTP in the region of the thoracic spine  Incisions:   Tubes:    LABS:                          8.7    4.75  )-----------( 211      ( 13 Oct 2020 06:47 )             29.4     10-13    142  |  105  |  15  ----------------------------<  124<H>  4.1   |  26  |  0.95    Ca    10.0      13 Oct 2020 06:44  Phos  3.8     10-13  Mg     2.1     10-13    TPro  7.1  /  Alb  4.1  /  TBili  0.3  /  DBili  x   /  AST  12  /  ALT  10  /  AlkPhos  78  10-13    LIVER FUNCTIONS - ( 13 Oct 2020 06:44 )  Alb: 4.1 g/dL / Pro: 7.1 g/dL / ALK PHOS: 78 U/L / ALT: 10 U/L / AST: 12 U/L / GGT: x                       TELEMETRY: N/A    EKG: N/A    IMAGING: No new images or tests

## 2020-10-13 NOTE — PROGRESS NOTE ADULT - ATTENDING COMMENTS
son translating via phone per request  discussed above plan with resident on rounds. patient reports some discomfort over mediport area but improving  nontoxic, exam with very mild erythema over mediport area, no discharge   labs and imaging reviewed.    Septic Shock secondary to cellulitis surrounding mediport site  -CT chest with cellulitis surrounding mediport, no abscess   -s/p MICU downgrade, briefly on pressors now off with stable BP  -c/w vanc and cefepime  -ID consult appreciated  -IR consulted to possibly remove mediport and felt should not be taken out. bcx remain NTD  -hold anti-htn meds  -monitor vitals

## 2020-10-14 ENCOUNTER — APPOINTMENT (OUTPATIENT)
Dept: INFUSION THERAPY | Facility: HOSPITAL | Age: 60
End: 2020-10-14

## 2020-10-14 LAB
ALBUMIN SERPL ELPH-MCNC: 4.3 G/DL — SIGNIFICANT CHANGE UP (ref 3.3–5)
ALP SERPL-CCNC: 86 U/L — SIGNIFICANT CHANGE UP (ref 40–120)
ALT FLD-CCNC: 12 U/L — SIGNIFICANT CHANGE UP (ref 10–45)
ANION GAP SERPL CALC-SCNC: 12 MMOL/L — SIGNIFICANT CHANGE UP (ref 5–17)
AST SERPL-CCNC: 13 U/L — SIGNIFICANT CHANGE UP (ref 10–40)
BASOPHILS # BLD AUTO: 0.01 K/UL — SIGNIFICANT CHANGE UP (ref 0–0.2)
BASOPHILS NFR BLD AUTO: 0.2 % — SIGNIFICANT CHANGE UP (ref 0–2)
BILIRUB SERPL-MCNC: 0.3 MG/DL — SIGNIFICANT CHANGE UP (ref 0.2–1.2)
BUN SERPL-MCNC: 14 MG/DL — SIGNIFICANT CHANGE UP (ref 7–23)
CALCIUM SERPL-MCNC: 9.8 MG/DL — SIGNIFICANT CHANGE UP (ref 8.4–10.5)
CHLORIDE SERPL-SCNC: 105 MMOL/L — SIGNIFICANT CHANGE UP (ref 96–108)
CO2 SERPL-SCNC: 24 MMOL/L — SIGNIFICANT CHANGE UP (ref 22–31)
CREAT SERPL-MCNC: 0.79 MG/DL — SIGNIFICANT CHANGE UP (ref 0.5–1.3)
CULTURE RESULTS: SIGNIFICANT CHANGE UP
CULTURE RESULTS: SIGNIFICANT CHANGE UP
EOSINOPHIL # BLD AUTO: 0.29 K/UL — SIGNIFICANT CHANGE UP (ref 0–0.5)
EOSINOPHIL NFR BLD AUTO: 5.2 % — SIGNIFICANT CHANGE UP (ref 0–6)
GLUCOSE BLDC GLUCOMTR-MCNC: 117 MG/DL — HIGH (ref 70–99)
GLUCOSE BLDC GLUCOMTR-MCNC: 135 MG/DL — HIGH (ref 70–99)
GLUCOSE BLDC GLUCOMTR-MCNC: 139 MG/DL — HIGH (ref 70–99)
GLUCOSE BLDC GLUCOMTR-MCNC: 159 MG/DL — HIGH (ref 70–99)
GLUCOSE SERPL-MCNC: 122 MG/DL — HIGH (ref 70–99)
HCT VFR BLD CALC: 30.1 % — LOW (ref 34.5–45)
HGB BLD-MCNC: 9.3 G/DL — LOW (ref 11.5–15.5)
IMM GRANULOCYTES NFR BLD AUTO: 0.4 % — SIGNIFICANT CHANGE UP (ref 0–1.5)
LDH SERPL L TO P-CCNC: 123 U/L — SIGNIFICANT CHANGE UP (ref 50–242)
LYMPHOCYTES # BLD AUTO: 2 K/UL — SIGNIFICANT CHANGE UP (ref 1–3.3)
LYMPHOCYTES # BLD AUTO: 35.7 % — SIGNIFICANT CHANGE UP (ref 13–44)
MAGNESIUM SERPL-MCNC: 2.1 MG/DL — SIGNIFICANT CHANGE UP (ref 1.6–2.6)
MCHC RBC-ENTMCNC: 25.5 PG — LOW (ref 27–34)
MCHC RBC-ENTMCNC: 30.9 GM/DL — LOW (ref 32–36)
MCV RBC AUTO: 82.7 FL — SIGNIFICANT CHANGE UP (ref 80–100)
MONOCYTES # BLD AUTO: 0.38 K/UL — SIGNIFICANT CHANGE UP (ref 0–0.9)
MONOCYTES NFR BLD AUTO: 6.8 % — SIGNIFICANT CHANGE UP (ref 2–14)
NEUTROPHILS # BLD AUTO: 2.91 K/UL — SIGNIFICANT CHANGE UP (ref 1.8–7.4)
NEUTROPHILS NFR BLD AUTO: 51.7 % — SIGNIFICANT CHANGE UP (ref 43–77)
NRBC # BLD: 0 /100 WBCS — SIGNIFICANT CHANGE UP (ref 0–0)
PHOSPHATE SERPL-MCNC: 4 MG/DL — SIGNIFICANT CHANGE UP (ref 2.5–4.5)
PLATELET # BLD AUTO: 234 K/UL — SIGNIFICANT CHANGE UP (ref 150–400)
POTASSIUM SERPL-MCNC: 3.9 MMOL/L — SIGNIFICANT CHANGE UP (ref 3.5–5.3)
POTASSIUM SERPL-SCNC: 3.9 MMOL/L — SIGNIFICANT CHANGE UP (ref 3.5–5.3)
PROT SERPL-MCNC: 7.4 G/DL — SIGNIFICANT CHANGE UP (ref 6–8.3)
RBC # BLD: 3.64 M/UL — LOW (ref 3.8–5.2)
RBC # FLD: 15.4 % — HIGH (ref 10.3–14.5)
SODIUM SERPL-SCNC: 141 MMOL/L — SIGNIFICANT CHANGE UP (ref 135–145)
SPECIMEN SOURCE: SIGNIFICANT CHANGE UP
SPECIMEN SOURCE: SIGNIFICANT CHANGE UP
URATE SERPL-MCNC: 4.9 MG/DL — SIGNIFICANT CHANGE UP (ref 2.5–7)
VANCOMYCIN TROUGH SERPL-MCNC: 26.1 UG/ML — CRITICAL HIGH (ref 10–20)
WBC # BLD: 5.61 K/UL — SIGNIFICANT CHANGE UP (ref 3.8–10.5)
WBC # FLD AUTO: 5.61 K/UL — SIGNIFICANT CHANGE UP (ref 3.8–10.5)

## 2020-10-14 PROCEDURE — 99233 SBSQ HOSP IP/OBS HIGH 50: CPT | Mod: GC

## 2020-10-14 PROCEDURE — 99232 SBSQ HOSP IP/OBS MODERATE 35: CPT

## 2020-10-14 RX ADMIN — APIXABAN 5 MILLIGRAM(S): 2.5 TABLET, FILM COATED ORAL at 05:10

## 2020-10-14 RX ADMIN — CEFEPIME 100 MILLIGRAM(S): 1 INJECTION, POWDER, FOR SOLUTION INTRAMUSCULAR; INTRAVENOUS at 21:56

## 2020-10-14 RX ADMIN — Medication 250 MILLIGRAM(S): at 05:48

## 2020-10-14 RX ADMIN — CEFEPIME 100 MILLIGRAM(S): 1 INJECTION, POWDER, FOR SOLUTION INTRAMUSCULAR; INTRAVENOUS at 05:10

## 2020-10-14 RX ADMIN — CEFEPIME 100 MILLIGRAM(S): 1 INJECTION, POWDER, FOR SOLUTION INTRAMUSCULAR; INTRAVENOUS at 14:00

## 2020-10-14 RX ADMIN — Medication 250 MILLIGRAM(S): at 18:10

## 2020-10-14 RX ADMIN — APIXABAN 5 MILLIGRAM(S): 2.5 TABLET, FILM COATED ORAL at 18:09

## 2020-10-14 RX ADMIN — ATORVASTATIN CALCIUM 40 MILLIGRAM(S): 80 TABLET, FILM COATED ORAL at 21:56

## 2020-10-14 NOTE — PROGRESS NOTE ADULT - SUBJECTIVE AND OBJECTIVE BOX
60y old  Female who presents with a chief complaint of fever (14 Oct 2020 06:57)      Interval history:  Afebrile, pain at the port site still.       Allergies:   adhesives (Pruritus)  No Known Drug Allergies      Antimicrobials:  cefepime   IVPB 1000 milliGRAM(s) IV Intermittent every 8 hours  vancomycin  IVPB 1000 milliGRAM(s) IV Intermittent every 12 hours      REVIEW OF SYSTEMS:  No chest pain   No cough, no SOB  No N/V  No dysuria  No rash.       Vital Signs Last 24 Hrs  T(C): 36.8 (10-14-20 @ 15:57), Max: 36.8 (10-14-20 @ 15:57)  T(F): 98.2 (10-14-20 @ 15:57), Max: 98.2 (10-14-20 @ 15:57)  HR: 81 (10-14-20 @ 15:57) (69 - 88)  BP: 128/77 (10-14-20 @ 15:57) (111/74 - 136/75)  BP(mean): --  RR: 18 (10-14-20 @ 15:57) (18 - 18)  SpO2: 97% (10-14-20 @ 15:57) (94% - 97%)      PHYSICAL EXAM:  Patient in no acute distress. AAOX3.  Rt chest port site with still some tenderness, erythema resolved, minimal warmth.   Cardiovascular: S1S2 normal.  Lungs: Good air entry B/L lung fields.  Gastrointestinal: soft, nontender, nondistended.  Extremities: no edema.  IV sites not inflamed.                          9.3    5.61  )-----------( 234      ( 14 Oct 2020 06:28 )             30.1   10-14    141  |  105  |  14  ----------------------------<  122<H>  3.9   |  24  |  0.79    Ca    9.8      14 Oct 2020 06:28  Phos  4.0     10-14  Mg     2.1     10-14    TPro  7.4  /  Alb  4.3  /  TBili  0.3  /  DBili  x   /  AST  13  /  ALT  12  /  AlkPhos  86  10-14      LIVER FUNCTIONS - ( 14 Oct 2020 06:28 )  Alb: 4.3 g/dL / Pro: 7.4 g/dL / ALK PHOS: 86 U/L / ALT: 12 U/L / AST: 13 U/L / GGT: x

## 2020-10-14 NOTE — PROVIDER CONTACT NOTE (CRITICAL VALUE NOTIFICATION) - ACTION/TREATMENT ORDERED:
Hold morning dose of vanco. Provider will d/c order
will hold current dose of vanco, and have day team follow up in am.

## 2020-10-14 NOTE — PROGRESS NOTE ADULT - ASSESSMENT
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    Septic shock due to MediPort-associated Cellulitis - With hypotension requiring levophed, now off pressor on the floor. CT with mild cellulitis around port and chest exam with surrounding erythema of port and warmth. Evaluated by IR and advised to not use for weeks and recommended against removal. Currently on Vanc and zosyn. Bl clx 10/9 NGTD.    Overall septic shock with hypotension, port infection with cellulitis.   Now resolved shock and sepsis.       Plan;   - cont to follow 10/9 bl clx, NTD   - cont vancomycin, c/w 1 gm iv q12h, MRSA PCR could not be performed as pt was already on vanco for almost 3 days prior to the day evaluation.   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    Septic shock due to MediPort-associated Cellulitis - With hypotension requiring levophed, now off pressor on the floor. CT with mild cellulitis around port and chest exam with surrounding erythema of port and warmth. Evaluated by IR and advised to not use for weeks and recommended against removal. Currently on Vanc and zosyn. Bl clx 10/9 NGTD.    Overall septic shock with hypotension, port infection with cellulitis.   Now resolved shock and sepsis.       Plan;   - cont to follow 10/9 bl clx, NTD   - cont vancomycin, c/w 1 gm iv q12h, MRSA PCR could not be performed as pt was already on vanco for almost 3 days prior to the day evaluation.   - c/w cefepime 1 gm iv q8h for possible polymicrobial skin infection in setting of chemo and DMT2  - monitor vancomycin trough and creatinine to avoid nephrotoxicity and ensure efficacy   - f/u prelim blood cx, ntd   - no plan for port removal per IR   - day 6/7 of therapy today

## 2020-10-14 NOTE — PROGRESS NOTE ADULT - ASSESSMENT
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 Hurd at [facility]), HTN, HLD, T2DM, DVT (dx July 2020; on Eliquis), post-herpetic neuralgia (on gabapentin) with recent admission 9/4- 9/11 for chest and back pain likely from pleural / pericardial effusion p/w worsening R chest pain, shortness of breath and chills admitted for sepsis likely 2/2 cellulitis around mediport site. MICU initially consulted for hypotension, initially started on levophed, now hemodynamically stable and off pressors.     #Sepsis  - CT chest (10/10): c/f cellulitis of mediport  - POCUS (10/10): A-lines, trace pleural effusion, no pericardial effusion  - BCx x2 (10/10) NGTD  - IR consulted for mediport removal; appreciate recs -As per IR:  --Given mild cellulitis and negative BCx favor abx over removal of port  --Do not use mediport until skin changes resolve  -ID consulted; appreciate recs -As per ID:  --Vanc trough goal ~10 -Vanc trough 28.7 so AM dose held and vanc dose changed (10/13)  --C/w vanc 1g q12h -Pt will need to complete a 7d course; today day 6/7  --D/C zosyn (10/13); start cefepime    #APL  - IR consulted for mediport removal; appreciate recs -As per IR:  --Given mild cellulitis and negative BCx favor abx over removal of port  --Do not use mediport until skin changes resolve  -Heme consulted; appreciate recs -As per heme, peripheral line not enough for chemo and pt refusing PICC, will not c/w chemo while i/p  -Monitor daily TLS labs given recent chemo    #DVT - Pt has h/o DVT; no p/w U/L LE pain w/out other associated symptoms  -F/u B/L lower extremity duplex  -C/w home Eliquis    #Hypotension  - Hypotension likely in s/o infection - S/p Levophed in ED; weaned once patient was in MICU. Now off pressors  - Monitor VSS    #Chest Pain;  likely pleuritic in nature/related to cellulitis as patient was previously worked up during past hospitalization  - Troponins wnl  - POCUS (10/10): A-lines, trace pleural effusion, no pericardial effusion  - TTE (10/13): grossly normal LV function    #HTN  -Hold anti-HTN in s/o hypotension and sepsis    #HLD  -C/w home statin    #T2DM  -C/w insulin sliding scale    #Anemia; no bleeding noted, possibly secondary to chemotherapy regimen  -Trend daily CBC w/ diff      #Prophylactic Measure  DVT PPx: Eliquis  DIET: DASH/TLC CC  ACTIVITY: Ambulate w/ assistance

## 2020-10-14 NOTE — PROGRESS NOTE ADULT - SUBJECTIVE AND OBJECTIVE BOX
Maria T Cruz MD  Internal Medicine PGY-1  Pager# [phone]; [facility] Pager# [phone]    PATIENT: GIANLUCA UREÑA, MRN: [id number]    CHIEF COMPLAINT: Patient is a 60y old  Female who presents with a chief complaint of fever (13 Oct 2020 15:53)    INTERVAL HISTORY/OVERNIGHT EVENTS: No overnight events.     REVIEW OF SYSTEMS:    Constitutional:     [ ] negative [ ] fevers [ ] chills [ ] weight loss [ ] weight gain  HEENT:                  [ ] negative [ ] dry eyes [ ] eye irritation [ ] postnasal drip [ ] nasal congestion  CV:                         [ ] negative  [ ] chest pain [ ] orthopnea [ ] palpitations [ ] murmur  Resp:                     [ ] negative [ ] cough [ ] shortness of breath [ ] dyspnea [ ] wheezing [ ] sputum [ ] hemoptysis  GI:                          [ ] negative [ ] nausea [ ] vomiting [ ] diarrhea [ ] constipation [ ] abd pain [ ] dysphagia   :                        [ ] negative [ ] dysuria [ ] nocturia [ ] hematuria [ ] increased urinary frequency  Musculoskeletal: [ ] negative [ ] back pain [ ] myalgias [ ] arthralgias [ ] fracture  Skin:                       [ ] negative [ ] rash [ ] itch  Neurological:        [ ] negative [ ] headache [ ] dizziness [ ] syncope [ ] weakness [ ] numbness  Psychiatric:           [ ] negative [ ] anxiety [ ] depression  Endocrine:            [ ] negative [ ] diabetes [ ] thyroid problem  Heme/Lymph:      [ ] negative [ ] anemia [ ] bleeding problem  Allergic/Immune: [ ] negative [ ] itchy eyes [ ] nasal discharge [ ] hives [ ] angioedema    [ ] All other systems negative  [ ] Unable to assess ROS because ________.    MEDICATIONS:  MEDICATIONS  (STANDING):  apixaban 5 milliGRAM(s) Oral every 12 hours  atorvastatin 40 milliGRAM(s) Oral at bedtime  cefepime   IVPB 1000 milliGRAM(s) IV Intermittent every 8 hours  dextrose 5%. 1000 milliLiter(s) (50 mL/Hr) IV Continuous <Continuous>  dextrose 50% Injectable 12.5 Gram(s) IV Push once  dextrose 50% Injectable 25 Gram(s) IV Push once  dextrose 50% Injectable 25 Gram(s) IV Push once  influenza   Vaccine 0.5 milliLiter(s) IntraMuscular once  insulin lispro (HumaLOG) corrective regimen sliding scale   SubCutaneous three times a day before meals  insulin lispro (HumaLOG) corrective regimen sliding scale   SubCutaneous at bedtime  vancomycin  IVPB 1000 milliGRAM(s) IV Intermittent every 12 hours    MEDICATIONS  (PRN):  dextrose 40% Gel 15 Gram(s) Oral once PRN Blood Glucose LESS THAN 70 milliGRAM(s)/deciliter  glucagon  Injectable 1 milliGRAM(s) IntraMuscular once PRN Glucose LESS THAN 70 milligrams/deciliter  metoclopramide 10 milliGRAM(s) Oral Before meals and at bedtime PRN nausea      ALLERGIES: Allergies    adhesives (Pruritus)  No Known Drug Allergies    Intolerances        OBJECTIVE:  ICU Vital Signs Last 24 Hrs  T(C): 36.4 (14 Oct 2020 04:57), Max: 36.7 (13 Oct 2020 20:31)  T(F): 97.5 (14 Oct 2020 04:57), Max: 98 (13 Oct 2020 20:31)  HR: 83 (14 Oct 2020 04:57) (70 - 93)  BP: 121/79 (14 Oct 2020 04:57) (110/74 - 121/79)  BP(mean): --  ABP: --  ABP(mean): --  RR: 18 (14 Oct 2020 04:57) (18 - 18)  SpO2: 94% (14 Oct 2020 04:57) (94% - 97%)      CAPILLARY BLOOD GLUCOSE      POCT Blood Glucose.: 148 mg/dL (13 Oct 2020 21:02)  POCT Blood Glucose.: 147 mg/dL (13 Oct 2020 16:58)  POCT Blood Glucose.: 133 mg/dL (13 Oct 2020 12:06)  POCT Blood Glucose.: 150 mg/dL (13 Oct 2020 07:51)    CAPILLARY BLOOD GLUCOSE      POCT Blood Glucose.: 148 mg/dL (13 Oct 2020 21:02)    I&O's Summary    12 Oct 2020 07:01  -  13 Oct 2020 07:00  --------------------------------------------------------  IN: 750 mL / OUT: 0 mL / NET: 750 mL    13 Oct 2020 07:01  -  14 Oct 2020 06:57  --------------------------------------------------------  IN: 600 mL / OUT: 0 mL / NET: 600 mL      Daily     Daily     PHYSICAL EXAMINATION:  General: Comfortable, no acute distress, cooperative with exam.  HEENT: PERRLA, EOMI, moist mucous membranes.  Respiratory: CTAB, normal respiratory effort, no coughing, wheezes, crackles, or rales.  CV: RRR, S1S2, no murmurs, rubs or gallops. No JVD. Distal pulses intact.  Abdominal: Soft, nontender, nondistended, no rebound or guarding, normal bowel sounds.  Neurology: AOx3, no focal neuro defects, CHAIREZ x 4.  Extremities: No pitting edema, + Peripheral pulses.  Incisions:   Tubes:    LABS:                          9.3    5.61  )-----------( 234      ( 14 Oct 2020 06:28 )             30.1     10-13    142  |  105  |  15  ----------------------------<  124<H>  4.1   |  26  |  0.95    Ca    10.0      13 Oct 2020 06:44  Phos  3.8     10-13  Mg     2.1     10-13    TPro  7.1  /  Alb  4.1  /  TBili  0.3  /  DBili  x   /  AST  12  /  ALT  10  /  AlkPhos  78  10-13    LIVER FUNCTIONS - ( 13 Oct 2020 06:44 )  Alb: 4.1 g/dL / Pro: 7.1 g/dL / ALK PHOS: 78 U/L / ALT: 10 U/L / AST: 12 U/L / GGT: x                       TELEMETRY: N/A    EKG: N/A    IMAGING: No new images or tests   Maria T Cruz MD  Internal Medicine PGY-1  Pager# [phone]; [facility] Pager# [phone]    PATIENT: GIANLUCA UREÑA, MRN: [id number]    CHIEF COMPLAINT: Patient is a 60y old  Female who presents with a chief complaint of fever (13 Oct 2020 15:53)    INTERVAL HISTORY/OVERNIGHT EVENTS: No overnight events. At bedside, patient has been sleeping and eating well. Denies N/V/D/C. Last BM x1 regular yesterday. Denies abdominal pain. Denies chest pain or SOB, cough. Has been ambulating with assistance. Oriented to person, place, and time. Breathing comfortably on room air.    [employer]  (Sarah) used throughout encounter: ID #[id number]    REVIEW OF SYSTEMS:    Constitutional:     [X] negative [ ] fevers [ ] chills [ ] weight loss [ ] weight gain  HEENT:                  [X ] negative [ ] dry eyes [ ] eye irritation [ ] postnasal drip [ ] nasal congestion  CV:                         [X ] negative  [ ] chest pain [ ] orthopnea [ ] palpitations [ ] murmur  Resp:                     [X ] negative [ ] cough [ ] shortness of breath [ ] dyspnea [ ] wheezing [ ] sputum [ ] hemoptysis  GI:                          [X ] negative [ ] nausea [ ] vomiting [ ] diarrhea [ ] constipation [ ] abd pain [ ] dysphagia   :                        [X ] negative [ ] dysuria [ ] nocturia [ ] hematuria [ ] increased urinary frequency  Musculoskeletal: [X ] negative [ ] back pain [ ] myalgias [ ] arthralgias [ ] fracture  Skin:                       [X ] negative [ ] rash [ ] itch  Neurological:        [X ] negative [ ] headache [ ] dizziness [ ] syncope [ ] weakness [ ] numbness  Psychiatric:           [X ] negative [ ] anxiety [ ] depression  Endocrine:            [X ] negative [ ] diabetes [ ] thyroid problem  Heme/Lymph:      [X ] negative [ ] anemia [ ] bleeding problem  Allergic/Immune: [X ] negative [ ] itchy eyes [ ] nasal discharge [ ] hives [ ] angioedema    [X ] All other systems negative  [ ] Unable to assess ROS because ________.    MEDICATIONS:  MEDICATIONS  (STANDING):  apixaban 5 milliGRAM(s) Oral every 12 hours  atorvastatin 40 milliGRAM(s) Oral at bedtime  cefepime   IVPB 1000 milliGRAM(s) IV Intermittent every 8 hours  dextrose 5%. 1000 milliLiter(s) (50 mL/Hr) IV Continuous <Continuous>  dextrose 50% Injectable 12.5 Gram(s) IV Push once  dextrose 50% Injectable 25 Gram(s) IV Push once  dextrose 50% Injectable 25 Gram(s) IV Push once  influenza   Vaccine 0.5 milliLiter(s) IntraMuscular once  insulin lispro (HumaLOG) corrective regimen sliding scale   SubCutaneous three times a day before meals  insulin lispro (HumaLOG) corrective regimen sliding scale   SubCutaneous at bedtime  vancomycin  IVPB 1000 milliGRAM(s) IV Intermittent every 12 hours    MEDICATIONS  (PRN):  dextrose 40% Gel 15 Gram(s) Oral once PRN Blood Glucose LESS THAN 70 milliGRAM(s)/deciliter  glucagon  Injectable 1 milliGRAM(s) IntraMuscular once PRN Glucose LESS THAN 70 milligrams/deciliter  metoclopramide 10 milliGRAM(s) Oral Before meals and at bedtime PRN nausea      ALLERGIES: Allergies    adhesives (Pruritus)  No Known Drug Allergies    Intolerances        OBJECTIVE:  ICU Vital Signs Last 24 Hrs  T(C): 36.4 (14 Oct 2020 04:57), Max: 36.7 (13 Oct 2020 20:31)  T(F): 97.5 (14 Oct 2020 04:57), Max: 98 (13 Oct 2020 20:31)  HR: 83 (14 Oct 2020 04:57) (70 - 93)  BP: 121/79 (14 Oct 2020 04:57) (110/74 - 121/79)  BP(mean): --  ABP: --  ABP(mean): --  RR: 18 (14 Oct 2020 04:57) (18 - 18)  SpO2: 94% (14 Oct 2020 04:57) (94% - 97%)      CAPILLARY BLOOD GLUCOSE      POCT Blood Glucose.: 148 mg/dL (13 Oct 2020 21:02)  POCT Blood Glucose.: 147 mg/dL (13 Oct 2020 16:58)  POCT Blood Glucose.: 133 mg/dL (13 Oct 2020 12:06)  POCT Blood Glucose.: 150 mg/dL (13 Oct 2020 07:51)    CAPILLARY BLOOD GLUCOSE      POCT Blood Glucose.: 148 mg/dL (13 Oct 2020 21:02)    I&O's Summary    12 Oct 2020 07:01  -  13 Oct 2020 07:00  --------------------------------------------------------  IN: 750 mL / OUT: 0 mL / NET: 750 mL    13 Oct 2020 07:01  -  14 Oct 2020 06:57  --------------------------------------------------------  IN: 600 mL / OUT: 0 mL / NET: 600 mL      Daily     Daily     PHYSICAL EXAMINATION:  General: Comfortable, no acute distress, cooperative with exam.  HEENT: PERRLA, EOMI, moist mucous membranes.  Respiratory: CTAB, normal respiratory effort, no coughing, wheezes, crackles, or rales.  CV: RRR, S1S2, no murmurs, rubs or gallops. No JVD. Distal pulses intact. +R chest TTP, mild erythema/warmth localized to area of chest port  Abdominal: Soft, nontender, nondistended, no rebound or guarding, normal bowel sounds.  Neurology: AOx3, no focal neuro defects, CHAIREZ x 4.  Extremities: No pitting edema, + Peripheral pulses.   Incisions:   Tubes:    LABS:                          9.3    5.61  )-----------( 234      ( 14 Oct 2020 06:28 )             30.1     10-13    142  |  105  |  15  ----------------------------<  124<H>  4.1   |  26  |  0.95    Ca    10.0      13 Oct 2020 06:44  Phos  3.8     10-13  Mg     2.1     10-13    TPro  7.1  /  Alb  4.1  /  TBili  0.3  /  DBili  x   /  AST  12  /  ALT  10  /  AlkPhos  78  10-13    LIVER FUNCTIONS - ( 13 Oct 2020 06:44 )  Alb: 4.1 g/dL / Pro: 7.1 g/dL / ALK PHOS: 78 U/L / ALT: 10 U/L / AST: 12 U/L / GGT: x             TELEMETRY: N/A    EKG: N/A    IMAGING: No new images or tests

## 2020-10-15 ENCOUNTER — TRANSCRIPTION ENCOUNTER (OUTPATIENT)
Age: 60
End: 2020-10-15

## 2020-10-15 ENCOUNTER — APPOINTMENT (OUTPATIENT)
Dept: INFUSION THERAPY | Facility: HOSPITAL | Age: 60
End: 2020-10-15

## 2020-10-15 VITALS
TEMPERATURE: 98 F | HEART RATE: 84 BPM | SYSTOLIC BLOOD PRESSURE: 111 MMHG | OXYGEN SATURATION: 98 % | RESPIRATION RATE: 18 BRPM

## 2020-10-15 LAB
ALBUMIN SERPL ELPH-MCNC: 4.3 G/DL — SIGNIFICANT CHANGE UP (ref 3.3–5)
ALP SERPL-CCNC: 93 U/L — SIGNIFICANT CHANGE UP (ref 40–120)
ALT FLD-CCNC: 11 U/L — SIGNIFICANT CHANGE UP (ref 10–45)
ANION GAP SERPL CALC-SCNC: 11 MMOL/L — SIGNIFICANT CHANGE UP (ref 5–17)
AST SERPL-CCNC: 12 U/L — SIGNIFICANT CHANGE UP (ref 10–40)
BASOPHILS # BLD AUTO: 0.02 K/UL — SIGNIFICANT CHANGE UP (ref 0–0.2)
BASOPHILS NFR BLD AUTO: 0.4 % — SIGNIFICANT CHANGE UP (ref 0–2)
BILIRUB SERPL-MCNC: 0.3 MG/DL — SIGNIFICANT CHANGE UP (ref 0.2–1.2)
BUN SERPL-MCNC: 17 MG/DL — SIGNIFICANT CHANGE UP (ref 7–23)
CALCIUM SERPL-MCNC: 9.7 MG/DL — SIGNIFICANT CHANGE UP (ref 8.4–10.5)
CHLORIDE SERPL-SCNC: 105 MMOL/L — SIGNIFICANT CHANGE UP (ref 96–108)
CO2 SERPL-SCNC: 25 MMOL/L — SIGNIFICANT CHANGE UP (ref 22–31)
CREAT SERPL-MCNC: 0.8 MG/DL — SIGNIFICANT CHANGE UP (ref 0.5–1.3)
EOSINOPHIL # BLD AUTO: 0.33 K/UL — SIGNIFICANT CHANGE UP (ref 0–0.5)
EOSINOPHIL NFR BLD AUTO: 5.9 % — SIGNIFICANT CHANGE UP (ref 0–6)
GLUCOSE BLDC GLUCOMTR-MCNC: 103 MG/DL — HIGH (ref 70–99)
GLUCOSE BLDC GLUCOMTR-MCNC: 191 MG/DL — HIGH (ref 70–99)
GLUCOSE BLDC GLUCOMTR-MCNC: 95 MG/DL — SIGNIFICANT CHANGE UP (ref 70–99)
GLUCOSE SERPL-MCNC: 200 MG/DL — HIGH (ref 70–99)
HCT VFR BLD CALC: 29.9 % — LOW (ref 34.5–45)
HGB BLD-MCNC: 9 G/DL — LOW (ref 11.5–15.5)
IMM GRANULOCYTES NFR BLD AUTO: 0.4 % — SIGNIFICANT CHANGE UP (ref 0–1.5)
LDH SERPL L TO P-CCNC: 117 U/L — SIGNIFICANT CHANGE UP (ref 50–242)
LYMPHOCYTES # BLD AUTO: 1.91 K/UL — SIGNIFICANT CHANGE UP (ref 1–3.3)
LYMPHOCYTES # BLD AUTO: 34.4 % — SIGNIFICANT CHANGE UP (ref 13–44)
MAGNESIUM SERPL-MCNC: 2 MG/DL — SIGNIFICANT CHANGE UP (ref 1.6–2.6)
MCHC RBC-ENTMCNC: 25.1 PG — LOW (ref 27–34)
MCHC RBC-ENTMCNC: 30.1 GM/DL — LOW (ref 32–36)
MCV RBC AUTO: 83.5 FL — SIGNIFICANT CHANGE UP (ref 80–100)
MONOCYTES # BLD AUTO: 0.33 K/UL — SIGNIFICANT CHANGE UP (ref 0–0.9)
MONOCYTES NFR BLD AUTO: 5.9 % — SIGNIFICANT CHANGE UP (ref 2–14)
NEUTROPHILS # BLD AUTO: 2.95 K/UL — SIGNIFICANT CHANGE UP (ref 1.8–7.4)
NEUTROPHILS NFR BLD AUTO: 53 % — SIGNIFICANT CHANGE UP (ref 43–77)
NRBC # BLD: 0 /100 WBCS — SIGNIFICANT CHANGE UP (ref 0–0)
PHOSPHATE SERPL-MCNC: 3.6 MG/DL — SIGNIFICANT CHANGE UP (ref 2.5–4.5)
PLATELET # BLD AUTO: 229 K/UL — SIGNIFICANT CHANGE UP (ref 150–400)
POTASSIUM SERPL-MCNC: 3.8 MMOL/L — SIGNIFICANT CHANGE UP (ref 3.5–5.3)
POTASSIUM SERPL-SCNC: 3.8 MMOL/L — SIGNIFICANT CHANGE UP (ref 3.5–5.3)
PROT SERPL-MCNC: 7.4 G/DL — SIGNIFICANT CHANGE UP (ref 6–8.3)
RBC # BLD: 3.58 M/UL — LOW (ref 3.8–5.2)
RBC # FLD: 15.4 % — HIGH (ref 10.3–14.5)
SODIUM SERPL-SCNC: 141 MMOL/L — SIGNIFICANT CHANGE UP (ref 135–145)
URATE SERPL-MCNC: 6.1 MG/DL — SIGNIFICANT CHANGE UP (ref 2.5–7)
WBC # BLD: 5.56 K/UL — SIGNIFICANT CHANGE UP (ref 3.8–10.5)
WBC # FLD AUTO: 5.56 K/UL — SIGNIFICANT CHANGE UP (ref 3.8–10.5)

## 2020-10-15 PROCEDURE — 99239 HOSP IP/OBS DSCHRG MGMT >30: CPT

## 2020-10-15 PROCEDURE — 99232 SBSQ HOSP IP/OBS MODERATE 35: CPT | Mod: GC

## 2020-10-15 PROCEDURE — 99231 SBSQ HOSP IP/OBS SF/LOW 25: CPT

## 2020-10-15 RX ORDER — ISOPROPYL ALCOHOL, BENZOCAINE .7; .06 ML/ML; ML/ML
1 SWAB TOPICAL
Qty: 100 | Refills: 1
Start: 2020-10-15 | End: 2020-12-03

## 2020-10-15 RX ORDER — VANCOMYCIN HCL 1 G
750 VIAL (EA) INTRAVENOUS EVERY 12 HOURS
Refills: 0 | Status: DISCONTINUED | OUTPATIENT
Start: 2020-10-15 | End: 2020-10-15

## 2020-10-15 RX ADMIN — APIXABAN 5 MILLIGRAM(S): 2.5 TABLET, FILM COATED ORAL at 06:29

## 2020-10-15 RX ADMIN — CEFEPIME 100 MILLIGRAM(S): 1 INJECTION, POWDER, FOR SOLUTION INTRAMUSCULAR; INTRAVENOUS at 13:14

## 2020-10-15 RX ADMIN — Medication 1: at 09:18

## 2020-10-15 RX ADMIN — CEFEPIME 100 MILLIGRAM(S): 1 INJECTION, POWDER, FOR SOLUTION INTRAMUSCULAR; INTRAVENOUS at 06:29

## 2020-10-15 RX ADMIN — APIXABAN 5 MILLIGRAM(S): 2.5 TABLET, FILM COATED ORAL at 16:48

## 2020-10-15 NOTE — DIETITIAN INITIAL EVALUATION ADULT. - PERTINENT MEDS FT
MEDICATIONS  (STANDING):  apixaban 5 milliGRAM(s) Oral every 12 hours  atorvastatin 40 milliGRAM(s) Oral at bedtime  cefepime   IVPB 1000 milliGRAM(s) IV Intermittent every 8 hours  dextrose 5%. 1000 milliLiter(s) (50 mL/Hr) IV Continuous <Continuous>  dextrose 50% Injectable 12.5 Gram(s) IV Push once  dextrose 50% Injectable 25 Gram(s) IV Push once  dextrose 50% Injectable 25 Gram(s) IV Push once  influenza   Vaccine 0.5 milliLiter(s) IntraMuscular once  insulin lispro (HumaLOG) corrective regimen sliding scale   SubCutaneous three times a day before meals  insulin lispro (HumaLOG) corrective regimen sliding scale   SubCutaneous at bedtime    MEDICATIONS  (PRN):  dextrose 40% Gel 15 Gram(s) Oral once PRN Blood Glucose LESS THAN 70 milliGRAM(s)/deciliter  glucagon  Injectable 1 milliGRAM(s) IntraMuscular once PRN Glucose LESS THAN 70 milligrams/deciliter  metoclopramide 10 milliGRAM(s) Oral Before meals and at bedtime PRN nausea

## 2020-10-15 NOTE — PROGRESS NOTE ADULT - SUBJECTIVE AND OBJECTIVE BOX
Afebrile overnight.    Vital Signs Last 24 Hrs  T(C): 36.4 (15 Oct 2020 08:05), Max: 36.8 (14 Oct 2020 15:57)  T(F): 97.6 (15 Oct 2020 08:05), Max: 98.2 (14 Oct 2020 15:57)  HR: 82 (15 Oct 2020 08:05) (73 - 82)  BP: 111/74 (15 Oct 2020 08:05) (104/64 - 128/77)  BP(mean): --  RR: 18 (15 Oct 2020 08:05) (18 - 18)  SpO2: 96% (15 Oct 2020 08:05) (96% - 97%)  PHYSICAL EXAM:    GENERAL: NAD, AAOx3   HEAD:  NC/AT  EYES: EOMI, PERRLA, no scleral icterus  HEENT: Moist mucous membranes  LUNG: Clear to auscultation bilaterally; No rales, rhonchi, wheezing, or rubs  HEART: RRR; No murmurs, rubs, or gallops  ABDOMEN: +BS, ST/ND/NT  EXTREMITIES:  2+ Peripheral Pulses, No clubbing, cyanosis, or edema  LAD: no palpable adenopathy  10-15    141  |  105  |  17  ----------------------------<  200<H>  3.8   |  25  |  0.80    Ca    9.7      15 Oct 2020 09:47  Phos  3.6     10-15  Mg     2.0     10-15    TPro  7.4  /  Alb  4.3  /  TBili  0.3  /  DBili  x   /  AST  12  /  ALT  11  /  AlkPhos  93  10-15      CBC Full  -  ( 15 Oct 2020 09:47 )  WBC Count : 5.56 K/uL  RBC Count : 3.58 M/uL  Hemoglobin : 9.0 g/dL  Hematocrit : 29.9 %  Platelet Count - Automated : 229 K/uL  Mean Cell Volume : 83.5 fl  Mean Cell Hemoglobin : 25.1 pg  Mean Cell Hemoglobin Concentration : 30.1 gm/dL  Auto Neutrophil # : 2.95 K/uL  Auto Lymphocyte # : 1.91 K/uL  Auto Monocyte # : 0.33 K/uL  Auto Eosinophil # : 0.33 K/uL  Auto Basophil # : 0.02 K/uL  Auto Neutrophil % : 53.0 %  Auto Lymphocyte % : 34.4 %  Auto Monocyte % : 5.9 %  Auto Eosinophil % : 5.9 %  Auto Basophil % : 0.4 %      LIVER FUNCTIONS - ( 15 Oct 2020 09:47 )  Alb: 4.3 g/dL / Pro: 7.4 g/dL / ALK PHOS: 93 U/L / ALT: 11 U/L / AST: 12 U/L / GGT: x                 Uric Acid, Serum: 6.1 mg/dL (10-15-20 @ 09:47)  Lactate Dehydrogenase, Serum: 117 U/L (10-15-20 @ 09:47)

## 2020-10-15 NOTE — DIETITIAN INITIAL EVALUATION ADULT. - FACTORS AFF FOOD INTAKE
pt reports she has been eating well in house; states at times she receives foods from home as well and is eating well; reports no chewing/swallowing issues; reports she has been having soft BMs but had a hard BM earlier today and did have some blood on her tissue, made Team 6 aware

## 2020-10-15 NOTE — DISCHARGE NOTE PROVIDER - NS AS DC PROVIDER CONTACT Y/N MULTI
Morphine 1mg IVP given  Vanco awaiting from pharm  CXR done   bipap ordered  respi tx given   50%venti mask put in place
Yes

## 2020-10-15 NOTE — DISCHARGE NOTE PROVIDER - NSDCFUSCHEDAPPT_GEN_ALL_CORE_FT
ADELITA, GIANLUCA ; 10/19/2020 ; [facility]  ADELITA, GIANLUCA ; 10/19/2020 ; [facility]  ADELITA, GIANLUCA ; 10/20/2020 ; [facility]  ADELITA, GIANLUCA ; 10/21/2020 ; [facility]  ADELITA, GIANLUCA ; 10/22/2020 ; [facility]  ADELITA, GIANLUCA ; 10/23/2020 ; [facility]  ADELITA, GIANLUCA ; 10/26/2020 ; [facility]  ADELITA, GIANLUCA ; 10/27/2020 ; [facility]  ADELITA, GIANLUCA ; 10/28/2020 ; [facility]  ADELITA, GIANLUCA ; 10/29/2020 ; [facility]  ADELITA, GIANLUCA ; 10/30/2020 ; [facility]  ADELITA, GIANLUCA ; 11/05/2020 ; [facility] [address]  ADELITA, GIANLUCA ; 11/05/2020 ; [facility] [address]

## 2020-10-15 NOTE — DISCHARGE NOTE PROVIDER - HOSPITAL COURSE
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 Hurd at [facility]; Mediport placed Aug 5th by Dr. Guzman), HTN, HLD, T2DM, DVT (dx July 2020; home eliquis 5mg), post-herpetic neuralgia with recent admission on 9/4- 9/11 for chest and back pain likely from pleural / pericardial effusion p/w worsening R chest pain, shortness of breath, chills found to have cellulitis around mediport site.    In ED: T99F, HR82, /73, SpO2 98% on RA. Concern for fever and pleuritic chest pain with ddx including cellulitis vs bacteremia as port on R chest was warm and erythematous. Started sepsis workup and given vanc/zosyn, tylenol, reglan, and 2x 1L boluses of IVF as well. Given concern for hypotension, patient was started on levophed. CT chest: possible cellulitis of mediport and mild b/l axillary LNs. POCUS: A-lines, trace PLEF, no pericardial effusion. Troponins WNL. UA negative. Pt was admitted to MICU for hypotension 2/2 septic shock due to mediport-associated cellulitis.  In the MICU, pt was weaned off levophed. Pt was continued on vanc/zosyn. Patient was medically stable and not requiring pressors and was discharged to medicine.   Pt was evaluated by IR and favor treating medically with antibiotics and avoiding mediport use for several weeks over mediport explantation. Also evaluated by Heme/onc; planned to continue consolidation treatment inpatient but poor peripheral venous access and patient refused PICC line so decided to hold treatment until after discharge. TTE: grossly normal LV function. Blood cultures x2 NGTD. Infectious disease was consulted. Zosyn was stopped and cefepime was started for possible polymicrobial skin infection in setting of chemo and DMT2. Pt reported improvement of pain and inflammation at mediport site. Pt completed 7 day course of Vancomycin and Cefepime.   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 Hurd at [facility]; Mediport placed Aug 5th by Dr. Guzman), HTN, HLD, T2DM, DVT (dx July 2020; home eliquis 5mg), post-herpetic neuralgia with recent admission on 9/4- 9/11 for chest and back pain likely from pleural / pericardial effusion p/w worsening R chest pain, shortness of breath, chills found to have cellulitis around mediport site.    In ED: T99F, HR82, /73, SpO2 98% on RA. Concern for fever and pleuritic chest pain with ddx including cellulitis vs bacteremia as port on R chest was warm and erythematous. Started sepsis workup and given vanc/zosyn, tylenol, reglan, and 2x 1L boluses of IVF as well. Given concern for hypotension, patient was started on levophed. CT chest: possible cellulitis of mediport and mild b/l axillary LNs. POCUS: A-lines, trace PLEF, no pericardial effusion. Troponins WNL. UA negative. Pt was admitted to MICU for hypotension 2/2 septic shock due to mediport-associated cellulitis.    In the MICU, pt was weaned off levophed. Pt was continued on vanc/zosyn. Patient was medically stable and not requiring pressors and was downgraded to medicine.     Pt was evaluated by IR and favor treating medically with antibiotics and avoiding mediport use for several weeks over mediport explantation. Also evaluated by Heme/onc; planned to continue consolidation treatment inpatient but poor peripheral venous access and patient refused PICC line so decided to hold treatment until after discharge. TTE: grossly normal LV function. Blood cultures x2 NGTD. Infectious disease was consulted. Zosyn was stopped and cefepime was started for possible polymicrobial skin infection in setting of chemo and DMT2. Pt reported improvement of pain and inflammation at mediport site. Pt completed 7 day course of Vancomycin and Cefepime.     Pt was clinically and medically stable for discharge on 10/15.

## 2020-10-15 NOTE — DISCHARGE NOTE PROVIDER - PROVIDER TOKENS
PROVIDER:[TOKEN:[[id number]],SCHEDULEDAPPT:[10/19/2020],SCHEDULEDAPPTTIME:[04:00 PM]],PROVIDER:[TOKEN:[[id number]],FOLLOWUP:[1 week]]

## 2020-10-15 NOTE — PROGRESS NOTE ADULT - ATTENDING COMMENTS
son translating via phone per request  discussed above plan with resident on rounds. patient reports some discomfort over mediport area but improving  nontoxic, exam with no erythema over mediport area today, no discharge. very mild tenderness  labs and imaging reviewed.    Septic Shock secondary to cellulitis surrounding mediport site  -CT chest with cellulitis surrounding mediport, no abscess   -s/p MICU downgrade, briefly on pressors now off with stable BP  -c/w vanc and cefepime for 7 day course completes today  -ID f/u appreciated  IR felt mediport should not be taken out. bcx remain NTD. family refusing PICC line placement for chemotherapy. heme aware.   -IR f/u regarding clearance for using mediport for chemotherapy.   -hold anti-htn meds  -monitor vitals son translating via phone per request  discussed above plan with resident on rounds. patient reports some discomfort over mediport area but improving  nontoxic, exam with no erythema over mediport area today, no discharge. very mild tenderness  labs and imaging reviewed.    Septic Shock secondary to cellulitis surrounding mediport site  -CT chest with cellulitis surrounding mediport, no abscess   -s/p MICU downgrade, briefly on pressors now off with stable BP  -c/w vanc and cefepime for 7 day course completes today  -ID f/u appreciated  IR felt mediport should not be taken out. bcx remain NTD. family refusing PICC line placement for chemotherapy. heme aware.   -IR f/u regarding clearance for using mediport for chemotherapy.   can restart home atenolol     discharge time 32 minutes

## 2020-10-15 NOTE — PROGRESS NOTE ADULT - ATTENDING COMMENTS
60 y.o female with APL on ATRA, admitted with fever and pain at Mediport site, likely due to cellulitis. Fever resolved on IV antibiotics, pain resolved, and cleared by IR for continued use of mediport. Will follow up with Dr. Hurd outpatient for further chemotherapy.

## 2020-10-15 NOTE — DISCHARGE NOTE NURSING/CASE MANAGEMENT/SOCIAL WORK - PATIENT PORTAL LINK FT
You can access the FollowMyHealth Patient Portal offered by [facility] by registering at the following website: [email or web address]. By joining [email or web address] [employer]s FollowMyHealth portal, you will also be able to view your health information using other applications (apps) compatible with our system.

## 2020-10-15 NOTE — DIETITIAN INITIAL EVALUATION ADULT. - ADD RECOMMEND
Encouraged continued good PO intake. Encouraged balanced eating pattern, consuming protein c all CHO at meals and snacks and maintaining healthy, balanced diet.

## 2020-10-15 NOTE — CHART NOTE - NSCHARTNOTEFT_GEN_A_CORE
Port site evaluated at bedside. No erythema noted, temperature equal on both sides of chest wall, blood cultures remain neg from 10/9, no purulence noted at port site. Patient is s/p completion of abx therapy today.     -Ok to use port for clinical application   -Case reviewed with Dr. Echevarria

## 2020-10-15 NOTE — DIETITIAN INITIAL EVALUATION ADULT. - OTHER INFO
Pt reports she was eating well at home. States she was eating a variety of foods, mostly Syriac foods. States she does not have her own glucometer so at times will use another family members machine, made team aware. Reports Finger sticks are usually WNL. Pt follows Halal dietary laws.    Pt reports NKFA. States no micronutrient coverage  at home.     Pt reports her weight has been relatively stable the last few months. Noted wt of about 160 pounds in August 2020 and wt in house of 158.7 pounds and 157.6 pounds (standing), noted a wt of 170.6 pounds as well, questionable accuracy. Noted as per previous RD note, pt has had wt changes in the past due to variable appetite and intake with treatment for APL.

## 2020-10-15 NOTE — DISCHARGE NOTE PROVIDER - NSDCMRMEDTOKEN_GEN_ALL_CORE_FT
apixaban 5 mg oral tablet: 1 tab(s) orally every 12 hours  atenolol 50 mg oral tablet: 1 tab(s) orally once a day  atorvastatin 40 mg oral tablet: 1 tab(s) orally once a day (at bedtime)  gabapentin 100 mg oral capsule: 1 cap(s) orally 3 times a day  glucometer (per patient&#x27;s insurance): Test blood sugars four times a day. Dispense #1 glucometer.  hydrocortisone 2.5% rectal cream with applicator: 1 application rectal once a day, As needed, daily to anus after each bowel movement.  lancets: 1 application subcutaneously 4 times a day   metFORMIN 500 mg oral tablet: 1 tab(s) orally 2 times a day MDD:2 tablets  metoclopramide 10 mg oral tablet: 1 tab(s) orally 4 times a day (before meals and at bedtime), As Needed  ocular lubricant ophthalmic solution: 1 drop(s) to each affected eye 3 times a day  polyethylene glycol 3350 oral powder for reconstitution: 17 gram(s) orally once a day, As Needed  test strips (per patient&#x27;s insurance): 1 application subcutaneously 4 times a day. ** Compatible with patient&#x27;s glucometer **  test strips (per patient&#x27;s insurance): 1 application subcutaneously 4 times a day. ** Compatible with patient&#x27;s glucometer **   alcohol swabs : Apply topically to affected area 4 times a day   apixaban 5 mg oral tablet: 1 tab(s) orally every 12 hours  atenolol 50 mg oral tablet: 1 tab(s) orally once a day  atorvastatin 40 mg oral tablet: 1 tab(s) orally once a day (at bedtime)  gabapentin 100 mg oral capsule: 1 cap(s) orally 3 times a day  glucometer (per patient&#x27;s insurance): Test blood sugars four times a day. Dispense #1 glucometer.  hydrocortisone 2.5% rectal cream with applicator: 1 application rectal once a day, As needed, daily to anus after each bowel movement.  lancets: 1 application subcutaneously 4 times a day   metFORMIN 500 mg oral tablet: 1 tab(s) orally 2 times a day MDD:2 tablets  metoclopramide 10 mg oral tablet: 1 tab(s) orally 4 times a day (before meals and at bedtime), As Needed  ocular lubricant ophthalmic solution: 1 drop(s) to each affected eye 3 times a day  polyethylene glycol 3350 oral powder for reconstitution: 17 gram(s) orally once a day, As Needed  test strips (per patient&#x27;s insurance): 1 application subcutaneously 4 times a day. ** Compatible with patient&#x27;s glucometer **

## 2020-10-15 NOTE — DISCHARGE NOTE PROVIDER - NSDCCPCAREPLAN_GEN_ALL_CORE_FT
PRINCIPAL DISCHARGE DIAGNOSIS  Diagnosis: Cellulitis  Assessment and Plan of Treatment:       SECONDARY DISCHARGE DIAGNOSES  Diagnosis: Hypotension  Assessment and Plan of Treatment:      PRINCIPAL DISCHARGE DIAGNOSIS  Diagnosis: Cellulitis  Assessment and Plan of Treatment: You were admitted to the hospital for cellulitis over the mediport you have been using for chemotherapy. You were evaluated by the infectious disease team. You were treated with IV antibiotics and your cellulitis improved. As per the interventional radiologists and infectious disease doctors you cannot use your mediport at this time for chemo. Please continue to take your home medications as prescribed, and please follow up with your oncologist, Dr. Hurd, within one week.      SECONDARY DISCHARGE DIAGNOSES  Diagnosis: Type 2 diabetes mellitus  Assessment and Plan of Treatment:     Diagnosis: Hyperlipemia  Assessment and Plan of Treatment:     Diagnosis: Hypertension  Assessment and Plan of Treatment:      PRINCIPAL DISCHARGE DIAGNOSIS  Diagnosis: Cellulitis  Assessment and Plan of Treatment: You were admitted to the hospital for cellulitis over the mediport you have been using for chemotherapy. You were evaluated by the infectious disease team. You were treated with IV antibiotics and your cellulitis improved. As per the interventional radiologists and infectious disease doctors you cannot use your mediport at this time for chemo. Please continue to take your home medications as prescribed, and please follow up with your oncologist, Dr. Hurd, within one week.      SECONDARY DISCHARGE DIAGNOSES  Diagnosis: APML (acute promyelocytic leukemia)  Assessment and Plan of Treatment: You have a history of APML, a type of leukemia. You were evaluated by the hematology team in the hospital (doctors who specalize in lymphomas and leukemia). You were unable to recieve chemo in the hospital due to the cellulitis (skin infection) over your mediport, and you refused the PICC (longterm IV). Please continue to take your home medications as prescribed, and please follow up with your oncologist, Dr. Hurd, within one week. Dr. Hurd and her team will determine how and when to resume your chemotherapy.    Diagnosis: Type 2 diabetes mellitus  Assessment and Plan of Treatment: You have a history of diabetes, or high blood sugar. Please continue to take your home medications as prescribed, and please follow up with your primary care doctor within the next one to two weeks.    Diagnosis: Hyperlipemia  Assessment and Plan of Treatment: You have a history of hyperlipidemia, or high cholesterol/fats in your blood. Please continue to take your home medications as prescribed, and please follow up with your primary care doctor within the next one to two weeks.    Diagnosis: Hypertension  Assessment and Plan of Treatment: You have a history of hypertension, or high blood pressure. Please continue to take your home medications as prescribed, and please follow up with your primary care doctor within the next one to two weeks.     PRINCIPAL DISCHARGE DIAGNOSIS  Diagnosis: Cellulitis  Assessment and Plan of Treatment: You were admitted to the hospital for cellulitis over the mediport you have been using for chemotherapy. You were evaluated by the infectious disease team. You were treated with IV antibiotics and your cellulitis improved. As per the interventional radiologists your mediport can now be used for chemo. Please continue to take your home medications as prescribed, and please follow up with your oncologist, Dr. Hurd, within one week to restart your chemotherapy.      SECONDARY DISCHARGE DIAGNOSES  Diagnosis: APML (acute promyelocytic leukemia)  Assessment and Plan of Treatment: You have a history of APML, a type of leukemia. You were evaluated by the hematology team in the hospital (doctors who specalize in lymphomas and leukemia). As per the interventional radiologists your mediport can now be used for chemo. Please continue to take your home medications as prescribed, and please follow up with your oncologist, Dr. Hurd, within one week. Dr. Hurd and her team will determine how and when to resume your chemotherapy.    Diagnosis: Type 2 diabetes mellitus  Assessment and Plan of Treatment: You have a history of diabetes, or high blood sugar. Please continue to take your home medications as prescribed, and please follow up with your primary care doctor within the next one to two weeks.    Diagnosis: Hyperlipemia  Assessment and Plan of Treatment: You have a history of hyperlipidemia, or high cholesterol/fats in your blood. Please continue to take your home medications as prescribed, and please follow up with your primary care doctor within the next one to two weeks.    Diagnosis: Hypertension  Assessment and Plan of Treatment: You have a history of hypertension, or high blood pressure. Please continue to take your home medications as prescribed, and please follow up with your primary care doctor within the next one to two weeks.

## 2020-10-15 NOTE — DISCHARGE NOTE PROVIDER - CARE PROVIDER_API CALL
Karen Hurd (DO)  Hematology; Medical Oncology  [address] [place] [address]  Phone: [phone]  Fax: [phone]  Scheduled Appointment: 10/19/2020 04:00 PM    KHURRAM HASSAN  Internal Medicine  [address] [place] [address]  Phone: [phone]  Fax: [phone]  Follow Up Time: 1 week

## 2020-10-15 NOTE — PROGRESS NOTE ADULT - ASSESSMENT
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 Hurd at [facility]), HTN, HLD, T2DM, DVT (dx July 2020; on Eliquis), post-herpetic neuralgia (on gabapentin) with recent admission 9/4- 9/11 for chest and back pain likely from pleural / pericardial effusion p/w worsening R chest pain, shortness of breath and chills found to have cellulitis around mediport site with MICU consulted for hypotension. Hematology consulted given hx of APL, on treatment.    #Acute promyelocytic leukemia  #VTE, July 2020  -started ATRA empirically on 5/23/20, then on arsenic trioxide on 5/27/20 when diagnosis was confirmed  -was found to have a partially occlussive DVT in her R axillary vein, treated with lovenox, suspected to be line related (repeat duplex on 7/16/20 was negative, and line had been removed on 7/10/20 due to ?irritation)  -6/25/20 bone marrow bx showed persistent disease, retreated on 7/13/20 without evidence of leukemia  -started on cycle 1 of consolidation with GALA/ATRA, complicated by admissions for chest pain, found to have small loculated pleural effusion treated with empiric abx.  -Resumed GALA/ATRA on 10/5/20 QD, last dose on 10/9/20 prior to hospitalization.  -EKG checked regularly while on GALA given potential for arrhytmias; EKG this admission shows T-wave inversion that were previously present in September's EKG; high sensitivity troponin are negative in setting of chest pain, so low suspicion for ischemia; would repeat cardiac enzymes if suspected  -pt on abx for suspected cellulitis over [employer], last day is today.  Pls follow up with IR regarding whether or not we can start using [employer].  -Monitor CMP, LDH, uric acid, CBC w/ diff daily given recent chemo  -OK to continue with anticoagulation given hx of VTE; if plts <50k or if bleeding, would hold full dose anticoagulation  - discussed with patient and son over phone.  Patient was unable to get chemotherapy through [employer] at this time due to mild cellulitis.  The chemo nurse evaluated her veins and thought it would not be prudent to use her peripheral veins as a reliable access for arsenic administration.  We recommended to patient and son temporary placement of PICC line so she would be able to continue C2 atra/arsenic consolidation.  However, they refused as they are worried about DVT.  We explained she is already on therapeutic AC, so that will help prevent DVT.  They still refused.  We stated then at this time we will not continue consolidation and she will have to wait to discuss further access with Dr. Hurd.  If IR states still needs time before using mediport, will need to speak with patient/son again regarding access prior to discharge.    Tabitha German DO  Hematology/Oncology Fellow, PGY6  Pager: [phone] 60/F w/ APL on chemo (dx May 2020; has regimen of ATRA and arsenic; follows Dr. Hurd at [facility]), HTN, HLD, T2DM, DVT (dx July 2020; on Eliquis), post-herpetic neuralgia (on gabapentin) with recent admission 9/4- 9/11 for chest and back pain likely from pleural / pericardial effusion p/w worsening R chest pain, shortness of breath and chills found to have cellulitis around [facility] site with MICU consulted for hypotension. Hematology consulted given hx of APL, on treatment.    #Acute promyelocytic leukemia  #VTE, July 2020  -started ATRA empirically on 5/23/20, then on arsenic trioxide on 5/27/20 when diagnosis was confirmed  -was found to have a partially occlussive DVT in her R axillary vein, treated with lovenox, suspected to be line related (repeat duplex on 7/16/20 was negative, and line had been removed on 7/10/20 due to ?irritation)  -6/25/20 bone marrow bx showed persistent disease, retreated on 7/13/20 without evidence of leukemia  -started on cycle 1 of consolidation with GALA/ATRA, complicated by admissions for chest pain, found to have small loculated pleural effusion treated with empiric abx.  -Resumed GALA/ATRA on 10/5/20 QD, last dose on 10/9/20 prior to hospitalization.  -EKG checked regularly while on GALA given potential for arrhytmias; EKG this admission shows T-wave inversion that were previously present in September's EKG; high sensitivity troponin are negative in setting of chest pain, so low suspicion for ischemia; would repeat cardiac enzymes if suspected  -pt on abx for suspected cellulitis over [facility], last day is today.   -Monitor CMP, LDH, uric acid, CBC w/ diff daily given recent chemo  -OK to continue with anticoagulation given hx of VTE; if plts <50k or if bleeding, would hold full dose anticoagulation  - discussed with patient and son over phone.  Patient was unable to get chemotherapy through [facility] at this time due to mild cellulitis.  The chemo nurse evaluated her veins and thought it would not be prudent to use her peripheral veins as a reliable access for arsenic administration.  We recommended to patient and son temporary placement of PICC line so she would be able to continue C2 atra/arsenic consolidation.  However, they refused as they are worried about DVT.  We explained she is already on therapeutic AC, so that will help prevent DVT.  They still refused.  We stated then at this time we will not continue consolidation and she will have to wait to discuss further access with Dr. Hurd.  - IR cleared for use of mediport.  OK for discharge and follow up with Dr. Hurd as outpatient for further treatment.    Tabitha German DO  Hematology/Oncology Fellow, PGY6  Pager: [phone]

## 2020-10-15 NOTE — PROGRESS NOTE ADULT - SUBJECTIVE AND OBJECTIVE BOX
Maria T Cruz MD  Internal Medicine PGY-1  Pager# [phone]; [facility] Pager# [phone]    PATIENT: GIANLUCA UREÑA, MRN: [id number]    CHIEF COMPLAINT: Patient is a 60y old  Female who presents with a chief complaint of fever (14 Oct 2020 16:19)    INTERVAL HISTORY/OVERNIGHT EVENTS: No overnight events.       REVIEW OF SYSTEMS:    Constitutional:     [ ] negative [ ] fevers [ ] chills [ ] weight loss [ ] weight gain  HEENT:                  [ ] negative [ ] dry eyes [ ] eye irritation [ ] postnasal drip [ ] nasal congestion  CV:                         [ ] negative  [ ] chest pain [ ] orthopnea [ ] palpitations [ ] murmur  Resp:                     [ ] negative [ ] cough [ ] shortness of breath [ ] dyspnea [ ] wheezing [ ] sputum [ ] hemoptysis  GI:                          [ ] negative [ ] nausea [ ] vomiting [ ] diarrhea [ ] constipation [ ] abd pain [ ] dysphagia   :                        [ ] negative [ ] dysuria [ ] nocturia [ ] hematuria [ ] increased urinary frequency  Musculoskeletal: [ ] negative [ ] back pain [ ] myalgias [ ] arthralgias [ ] fracture  Skin:                       [ ] negative [ ] rash [ ] itch  Neurological:        [ ] negative [ ] headache [ ] dizziness [ ] syncope [ ] weakness [ ] numbness  Psychiatric:           [ ] negative [ ] anxiety [ ] depression  Endocrine:            [ ] negative [ ] diabetes [ ] thyroid problem  Heme/Lymph:      [ ] negative [ ] anemia [ ] bleeding problem  Allergic/Immune: [ ] negative [ ] itchy eyes [ ] nasal discharge [ ] hives [ ] angioedema    [ ] All other systems negative  [ ] Unable to assess ROS because ________.    MEDICATIONS:  MEDICATIONS  (STANDING):  apixaban 5 milliGRAM(s) Oral every 12 hours  atorvastatin 40 milliGRAM(s) Oral at bedtime  cefepime   IVPB 1000 milliGRAM(s) IV Intermittent every 8 hours  dextrose 5%. 1000 milliLiter(s) (50 mL/Hr) IV Continuous <Continuous>  dextrose 50% Injectable 12.5 Gram(s) IV Push once  dextrose 50% Injectable 25 Gram(s) IV Push once  dextrose 50% Injectable 25 Gram(s) IV Push once  influenza   Vaccine 0.5 milliLiter(s) IntraMuscular once  insulin lispro (HumaLOG) corrective regimen sliding scale   SubCutaneous three times a day before meals  insulin lispro (HumaLOG) corrective regimen sliding scale   SubCutaneous at bedtime  vancomycin  IVPB 750 milliGRAM(s) IV Intermittent every 12 hours    MEDICATIONS  (PRN):  dextrose 40% Gel 15 Gram(s) Oral once PRN Blood Glucose LESS THAN 70 milliGRAM(s)/deciliter  glucagon  Injectable 1 milliGRAM(s) IntraMuscular once PRN Glucose LESS THAN 70 milligrams/deciliter  metoclopramide 10 milliGRAM(s) Oral Before meals and at bedtime PRN nausea      ALLERGIES: Allergies    adhesives (Pruritus)  No Known Drug Allergies    Intolerances        OBJECTIVE:  ICU Vital Signs Last 24 Hrs  T(C): 36.6 (15 Oct 2020 04:19), Max: 36.8 (14 Oct 2020 15:57)  T(F): 97.9 (15 Oct 2020 04:19), Max: 98.2 (14 Oct 2020 15:57)  HR: 73 (15 Oct 2020 04:19) (69 - 81)  BP: 116/70 (15 Oct 2020 04:19) (104/64 - 136/75)  BP(mean): --  ABP: --  ABP(mean): --  RR: 18 (15 Oct 2020 04:19) (18 - 18)  SpO2: 97% (15 Oct 2020 04:19) (96% - 97%)      CAPILLARY BLOOD GLUCOSE      POCT Blood Glucose.: 159 mg/dL (14 Oct 2020 21:49)  POCT Blood Glucose.: 139 mg/dL (14 Oct 2020 17:02)  POCT Blood Glucose.: 117 mg/dL (14 Oct 2020 12:34)  POCT Blood Glucose.: 135 mg/dL (14 Oct 2020 08:19)    CAPILLARY BLOOD GLUCOSE      POCT Blood Glucose.: 159 mg/dL (14 Oct 2020 21:49)    I&O's Summary    14 Oct 2020 07:01  -  15 Oct 2020 07:00  --------------------------------------------------------  IN: 980 mL / OUT: 800 mL / NET: 180 mL      Daily     Daily Weight in k.5 (14 Oct 2020 11:48)    PHYSICAL EXAMINATION:  General: Comfortable, no acute distress, cooperative with exam.  HEENT: PERRLA, EOMI, moist mucous membranes.  Respiratory: CTAB, normal respiratory effort, no coughing, wheezes, crackles, or rales.  CV: RRR, S1S2, no murmurs, rubs or gallops. No JVD. Distal pulses intact.  Abdominal: Soft, nontender, nondistended, no rebound or guarding, normal bowel sounds.  Neurology: AOx3, no focal neuro defects, CHAIREZ x 4.  Extremities: No pitting edema, + Peripheral pulses.  Incisions:   Tubes:    LABS:                          9.3    5.61  )-----------( 234      ( 14 Oct 2020 06:28 )             30.1     10-14    141  |  105  |  14  ----------------------------<  122<H>  3.9   |  24  |  0.79    Ca    9.8      14 Oct 2020 06:28  Phos  4.0     10-14  Mg     2.1     10-14    TPro  7.4  /  Alb  4.3  /  TBili  0.3  /  DBili  x   /  AST  13  /  ALT  12  /  AlkPhos  86  10-14    LIVER FUNCTIONS - ( 14 Oct 2020 06:28 )  Alb: 4.3 g/dL / Pro: 7.4 g/dL / ALK PHOS: 86 U/L / ALT: 12 U/L / AST: 13 U/L / GGT: x           TELEMETRY: N/A    EKG: N/A    IMAGING: No new images or tests   Maria T Cruz MD  Internal Medicine PGY-1  Pager# [phone]; [facility] Pager# [phone]    PATIENT: GIANLUCA UREÑA, MRN: [id number]    CHIEF COMPLAINT: Patient is a 60y old  Female who presents with a chief complaint of fever (14 Oct 2020 16:19)    INTERVAL HISTORY/OVERNIGHT EVENTS: No overnight events. At bedside, patient has been sleeping and eating well. Denies N/V/D/C. Last BM x1 regular yesterday. Denies abdominal pain. Denies chest pain or SOB, cough. Has been ambulating with assistance. Oriented to person, place, and time. Breathing comfortably on room air.    Pt requested use of son to translate instead of  phone.    REVIEW OF SYSTEMS:    Constitutional:     [X ] negative [ ] fevers [ ] chills [ ] weight loss [ ] weight gain  HEENT:                  [X ] negative [ ] dry eyes [ ] eye irritation [ ] postnasal drip [ ] nasal congestion  CV:                         [X ] negative  [ ] chest pain [ ] orthopnea [ ] palpitations [ ] murmur  Resp:                     [X ] negative [ ] cough [ ] shortness of breath [ ] dyspnea [ ] wheezing [ ] sputum [ ] hemoptysis  GI:                          [X ] negative [ ] nausea [ ] vomiting [ ] diarrhea [ ] constipation [ ] abd pain [ ] dysphagia   :                        [X ] negative [ ] dysuria [ ] nocturia [ ] hematuria [ ] increased urinary frequency  Musculoskeletal: [X ] negative [ ] back pain [ ] myalgias [ ] arthralgias [ ] fracture  Skin:                       [X ] negative [ ] rash [ ] itch  Neurological:        [X ] negative [ ] headache [ ] dizziness [ ] syncope [ ] weakness [ ] numbness  Psychiatric:           [X ] negative [ ] anxiety [ ] depression  Endocrine:            [X ] negative [ ] diabetes [ ] thyroid problem  Heme/Lymph:      [ X] negative [ ] anemia [ ] bleeding problem  Allergic/Immune: [X ] negative [ ] itchy eyes [ ] nasal discharge [ ] hives [ ] angioedema    [X ] All other systems negative  [ ] Unable to assess ROS because ________.    MEDICATIONS:  MEDICATIONS  (STANDING):  apixaban 5 milliGRAM(s) Oral every 12 hours  atorvastatin 40 milliGRAM(s) Oral at bedtime  cefepime   IVPB 1000 milliGRAM(s) IV Intermittent every 8 hours  dextrose 5%. 1000 milliLiter(s) (50 mL/Hr) IV Continuous <Continuous>  dextrose 50% Injectable 12.5 Gram(s) IV Push once  dextrose 50% Injectable 25 Gram(s) IV Push once  dextrose 50% Injectable 25 Gram(s) IV Push once  influenza   Vaccine 0.5 milliLiter(s) IntraMuscular once  insulin lispro (HumaLOG) corrective regimen sliding scale   SubCutaneous three times a day before meals  insulin lispro (HumaLOG) corrective regimen sliding scale   SubCutaneous at bedtime  vancomycin  IVPB 750 milliGRAM(s) IV Intermittent every 12 hours    MEDICATIONS  (PRN):  dextrose 40% Gel 15 Gram(s) Oral once PRN Blood Glucose LESS THAN 70 milliGRAM(s)/deciliter  glucagon  Injectable 1 milliGRAM(s) IntraMuscular once PRN Glucose LESS THAN 70 milligrams/deciliter  metoclopramide 10 milliGRAM(s) Oral Before meals and at bedtime PRN nausea      ALLERGIES: Allergies    adhesives (Pruritus)  No Known Drug Allergies    Intolerances        OBJECTIVE:  ICU Vital Signs Last 24 Hrs  T(C): 36.6 (15 Oct 2020 04:19), Max: 36.8 (14 Oct 2020 15:57)  T(F): 97.9 (15 Oct 2020 04:19), Max: 98.2 (14 Oct 2020 15:57)  HR: 73 (15 Oct 2020 04:19) (69 - 81)  BP: 116/70 (15 Oct 2020 04:19) (104/64 - 136/75)  BP(mean): --  ABP: --  ABP(mean): --  RR: 18 (15 Oct 2020 04:19) (18 - 18)  SpO2: 97% (15 Oct 2020 04:19) (96% - 97%)      CAPILLARY BLOOD GLUCOSE      POCT Blood Glucose.: 159 mg/dL (14 Oct 2020 21:49)  POCT Blood Glucose.: 139 mg/dL (14 Oct 2020 17:02)  POCT Blood Glucose.: 117 mg/dL (14 Oct 2020 12:34)  POCT Blood Glucose.: 135 mg/dL (14 Oct 2020 08:19)    CAPILLARY BLOOD GLUCOSE      POCT Blood Glucose.: 159 mg/dL (14 Oct 2020 21:49)    I&O's Summary    14 Oct 2020 07:01  -  15 Oct 2020 07:00  --------------------------------------------------------  IN: 980 mL / OUT: 800 mL / NET: 180 mL      Daily     Daily Weight in k.5 (14 Oct 2020 11:48)    PHYSICAL EXAMINATION:  General: Comfortable, no acute distress, cooperative with exam.  HEENT: PERRLA, EOMI, moist mucous membranes.  Respiratory: CTAB, normal respiratory effort, no coughing, wheezes, crackles, or rales.  CV: RRR, S1S2, no murmurs, rubs or gallops. No JVD. Distal pulses intact.  Abdominal: Soft, nontender, nondistended, no rebound or guarding, normal bowel sounds.  Neurology: AOx3, no focal neuro defects, CHAIREZ x 4.  Extremities: No pitting edema, + Peripheral pulses.  Incisions:   Tubes:    LABS:                          9.3    5.61  )-----------( 234      ( 14 Oct 2020 06:28 )             30.1     10-14    141  |  105  |  14  ----------------------------<  122<H>  3.9   |  24  |  0.79    Ca    9.8      14 Oct 2020 06:28  Phos  4.0     10-14  Mg     2.1     10-14    TPro  7.4  /  Alb  4.3  /  TBili  0.3  /  DBili  x   /  AST  13  /  ALT  12  /  AlkPhos  86  10-14    LIVER FUNCTIONS - ( 14 Oct 2020 06:28 )  Alb: 4.3 g/dL / Pro: 7.4 g/dL / ALK PHOS: 86 U/L / ALT: 12 U/L / AST: 13 U/L / GGT: x           TELEMETRY: N/A    EKG: N/A    IMAGING: No new images or tests

## 2020-10-15 NOTE — PROGRESS NOTE ADULT - ASSESSMENT
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 Hurd at [facility]), HTN, HLD, T2DM, DVT (dx July 2020; on Eliquis), post-herpetic neuralgia (on gabapentin) with recent admission 9/4- 9/11 for chest and back pain likely from pleural / pericardial effusion p/w worsening R chest pain, shortness of breath and chills admitted for sepsis likely 2/2 cellulitis around mediport site. MICU initially consulted for hypotension, initially started on levophed, now hemodynamically stable and off pressors.     #Sepsis  - CT chest (10/10): c/f cellulitis of mediport  - POCUS (10/10): A-lines, trace pleural effusion, no pericardial effusion  - BCx x2 (10/10) NGTD  - IR consulted for mediport removal; appreciate recs -As per IR:  --Given mild cellulitis and negative BCx favor abx over removal of port  --Do not use mediport until skin changes resolve  -ID consulted; appreciate recs -As per ID:  --Vanc trough goal ~10 -Vanc trough 26.1 so AM dose held (10/15)  --Will clarify need for further dosing/decreasing vanc dose given pt to complete 7d course today  --D/C zosyn (10/13); start cefepime    #APL  - IR consulted for mediport removal; appreciate recs -As per IR:  --Given mild cellulitis and negative BCx favor abx over removal of port  --Do not use mediport until skin changes resolve  -Heme consulted; appreciate recs -As per heme, peripheral line not enough for chemo and pt refusing PICC, will not c/w chemo while i/p  -Monitor daily TLS labs given recent chemo    #DVT - Pt has h/o DVT; no p/w U/L LE pain w/out other associated symptoms  -F/u B/L lower extremity duplex  -C/w home Eliquis    #Hypotension  - Hypotension likely in s/o infection - S/p Levophed in ED; weaned once patient was in MICU. Now off pressors  - Monitor VSS    #Chest Pain;  likely pleuritic in nature/related to cellulitis as patient was previously worked up during past hospitalization  - Troponins wnl  - POCUS (10/10): A-lines, trace pleural effusion, no pericardial effusion  - TTE (10/13): grossly normal LV function    #HTN  -Hold anti-HTN in s/o hypotension and sepsis    #HLD  -C/w home statin    #T2DM  -C/w insulin sliding scale    #Anemia; no bleeding noted, possibly secondary to chemotherapy regimen  -Trend daily CBC w/ diff      #Prophylactic Measure  DVT PPx: Eliquis  DIET: DASH/TLC CC  ACTIVITY: Ambulate w/ assistance

## 2020-10-16 ENCOUNTER — APPOINTMENT (OUTPATIENT)
Dept: INFUSION THERAPY | Facility: HOSPITAL | Age: 60
End: 2020-10-16

## 2020-10-19 ENCOUNTER — RESULT REVIEW (OUTPATIENT)
Age: 60
End: 2020-10-19

## 2020-10-19 ENCOUNTER — APPOINTMENT (OUTPATIENT)
Dept: HEMATOLOGY ONCOLOGY | Facility: CLINIC | Age: 60
End: 2020-10-19
Payer: MEDICAID

## 2020-10-19 ENCOUNTER — APPOINTMENT (OUTPATIENT)
Dept: INFUSION THERAPY | Facility: HOSPITAL | Age: 60
End: 2020-10-19

## 2020-10-19 VITALS
RESPIRATION RATE: 15 BRPM | OXYGEN SATURATION: 100 % | BODY MASS INDEX: 30.15 KG/M2 | HEART RATE: 64 BPM | DIASTOLIC BLOOD PRESSURE: 70 MMHG | SYSTOLIC BLOOD PRESSURE: 113 MMHG | HEIGHT: 60.63 IN | TEMPERATURE: 98.2 F | WEIGHT: 157.63 LBS

## 2020-10-19 DIAGNOSIS — Z86.79 PERSONAL HISTORY OF OTHER DISEASES OF THE CIRCULATORY SYSTEM: ICD-10-CM

## 2020-10-19 DIAGNOSIS — Z86.39 PERSONAL HISTORY OF OTHER ENDOCRINE, NUTRITIONAL AND METABOLIC DISEASE: ICD-10-CM

## 2020-10-19 LAB
BASOPHILS # BLD AUTO: 0.02 K/UL — SIGNIFICANT CHANGE UP (ref 0–0.2)
BASOPHILS NFR BLD AUTO: 0.3 % — SIGNIFICANT CHANGE UP (ref 0–2)
EOSINOPHIL # BLD AUTO: 0.23 K/UL — SIGNIFICANT CHANGE UP (ref 0–0.5)
EOSINOPHIL NFR BLD AUTO: 3.6 % — SIGNIFICANT CHANGE UP (ref 0–6)
HCT VFR BLD CALC: 29.7 % — LOW (ref 34.5–45)
HGB BLD-MCNC: 9.2 G/DL — LOW (ref 11.5–15.5)
IMM GRANULOCYTES NFR BLD AUTO: 0.3 % — SIGNIFICANT CHANGE UP (ref 0–1.5)
LYMPHOCYTES # BLD AUTO: 2.94 K/UL — SIGNIFICANT CHANGE UP (ref 1–3.3)
LYMPHOCYTES # BLD AUTO: 46.6 % — HIGH (ref 13–44)
MCHC RBC-ENTMCNC: 25.4 PG — LOW (ref 27–34)
MCHC RBC-ENTMCNC: 31 G/DL — LOW (ref 32–36)
MCV RBC AUTO: 82 FL — SIGNIFICANT CHANGE UP (ref 80–100)
MONOCYTES # BLD AUTO: 0.36 K/UL — SIGNIFICANT CHANGE UP (ref 0–0.9)
MONOCYTES NFR BLD AUTO: 5.7 % — SIGNIFICANT CHANGE UP (ref 2–14)
NEUTROPHILS # BLD AUTO: 2.74 K/UL — SIGNIFICANT CHANGE UP (ref 1.8–7.4)
NEUTROPHILS NFR BLD AUTO: 43.5 % — SIGNIFICANT CHANGE UP (ref 43–77)
NRBC # BLD: 0 /100 WBCS — SIGNIFICANT CHANGE UP (ref 0–0)
PLATELET # BLD AUTO: 221 K/UL — SIGNIFICANT CHANGE UP (ref 150–400)
RBC # BLD: 3.62 M/UL — LOW (ref 3.8–5.2)
RBC # FLD: 15.3 % — HIGH (ref 10.3–14.5)
WBC # BLD: 6.31 K/UL — SIGNIFICANT CHANGE UP (ref 3.8–10.5)
WBC # FLD AUTO: 6.31 K/UL — SIGNIFICANT CHANGE UP (ref 3.8–10.5)

## 2020-10-19 PROCEDURE — 99214 OFFICE O/P EST MOD 30 MIN: CPT | Mod: 25

## 2020-10-19 PROCEDURE — 99072 ADDL SUPL MATRL&STAF TM PHE: CPT

## 2020-10-19 NOTE — PHYSICAL EXAM
[Restricted in physically strenuous activity but ambulatory and able to carry out work of a light or sedentary nature] : Status 1- Restricted in physically strenuous activity but ambulatory and able to carry out work of a light or sedentary nature, e.g., light house work, office work [Normal] : grossly intact [de-identified] : + Port in R chest wall- no evidence of infection

## 2020-10-19 NOTE — REVIEW OF SYSTEMS
[Negative] : Heme/Lymph [Abdominal Pain] : abdominal pain [Vomiting] : no vomiting [Constipation] : no constipation [Diarrhea] : no diarrhea [FreeTextEntry4] : depressed taste [FreeTextEntry7] : Post-prandial epigastric pain

## 2020-10-19 NOTE — ASSESSMENT
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  She was started on ATRA empirically on 5/23, then on arsenic on 5/27 when the diagnosis was confirmed. 6/23/20 she was found to have a partially occlusive DVT in her right axillary vein, treated with lovenox.  Repeat duplex was negative on 7/16.  PICC removed on 7/10 due to irritation?.  Bone marrow biopsy on 6/25 with persistent disease, repeated on 7/13 no evidence of leukemia.\par \par Started on cycle 1 of consolidation with GALA/ATRA- complicated by multiple hospital admissions (loculated pleural effusion/mediport associated cellulitis). \par Currently on cycle 2 of total planned 4 cycles for maintenance \par Recommend resume GALA/ATRA, 2 weeks on, followed by 2 weeks off. \par Recommend resume Arsenic, 4 weeks on, followed by 4 weeks off. \par To minimize risk of skin infections that may occur while accessing the [facility], recommend OTC Hibiclens antibacterial soap to be used 2-3x/week at [facility] site, and to bathe with soap and water on a daily basis with special attention to cleaning the [facility] site.\par Recommend to check a chest Xray/echo. \par Check CMP/LDH. \par Follow up with pulm/medicine. \par \par Discussed at length with patient, patient's daughter, and patient's son (ty) via telephone ([phone]) that we do not recommend any further delays to treatment. The son was adamant in wanting to have the patient delay treatment 1-2 weeks to improve her appetite/functional status. Informed them that there have been many delays and another delay will likely lead to progression of disease. The appointments for therapy have been made and patient is scheduled to resume therapy tomorrow. However, the son wishes to hold off on treatment to pursue a second opinion at [facility]. The family is fully aware that delay in treatment will likely lead to progression of disease.\par Will have  set up consult appointment at [facility].  \par \par 1 month follow up appointment scheduled with our office per patient preference.\par Case and management discussed with Dr. Hurd

## 2020-10-19 NOTE — HISTORY OF PRESENT ILLNESS
[de-identified] : APL- low risk\par \par 60 year old female who is here for a follow up visit after a hospitalization for acute promyelocytic leukemia.  She initially presented to [facility] for fevers, found to be pancytopenic.  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  She was started on ATRA empirically on 5/23, then on arsenic on 5/27 when the diagnosis was confirmed.  Initially she appeared to have refractory thrombocytopenia which improved.  She was found to be COVID positive on 6/16, transferred to COVID unit, on 6/19 she was COVID negative, antibody positive and was transferred back.  She was treated for an enterococal UTI on 6/20.  Arsenic was held due to zoster from 6/22- 6/30.  6/23 she was found to have a partially occlusive DVT in her right axillary vein, treated with lovenox.  Repeat duplex was negative on 7/16.  [place] [facility] incidental CXR on 7/8, was found to be malpositioned, 7/9 PICC was exchanged, then removed on 7/10 due to irritation?.  Bone marrow biopsy on 6/25 with persistent disease, repeated on 7/13 no evidence of leukemia.  Her plt recovered 6/27, ANC by 7/11. [de-identified] : Patient's daughter present for visit and assisted in translation. The patient has had multiple recent admissions for chest pain. She was admitted from 8/22-8/27 for fever/chest pain, work up negative. She was admitted from 9/3-9/11- for chest pain, found to have an increased pleural effusion treated for PNA/infection with antibiotics. Most recently admitted from 10/9-10/15 for hypotension 2/2 septic shock due to mediport-associated cellulitis treated medically with antibiotics and avoiding mediport use for several weeks over mediport explantation. Recieved cefepime for possible polymicrobial skin infection in setting of chemo, completed course of antibiotics with resolution of inflammation at mediport site. Patient today complains of slight tenderness on palpation at the mediport site, and epigastric pain after meals, has ran out of pantoprazole 40mg BID.\par Patient denies fever, chills, night sweats, headache, nausea, vomiting, diarrhea, chest pain, shortness of breath, or peripheral edema. Moderate appetite/taste is slowly getting better, stable weight.\par

## 2020-10-20 ENCOUNTER — LABORATORY RESULT (OUTPATIENT)
Age: 60
End: 2020-10-20

## 2020-10-20 ENCOUNTER — NON-APPOINTMENT (OUTPATIENT)
Age: 60
End: 2020-10-20

## 2020-10-20 ENCOUNTER — APPOINTMENT (OUTPATIENT)
Dept: INFUSION THERAPY | Facility: HOSPITAL | Age: 60
End: 2020-10-20

## 2020-10-21 ENCOUNTER — APPOINTMENT (OUTPATIENT)
Dept: INFUSION THERAPY | Facility: HOSPITAL | Age: 60
End: 2020-10-21

## 2020-10-22 ENCOUNTER — APPOINTMENT (OUTPATIENT)
Dept: INFUSION THERAPY | Facility: HOSPITAL | Age: 60
End: 2020-10-22

## 2020-10-22 NOTE — PHYSICAL THERAPY INITIAL EVALUATION ADULT - THERAPY FREQUENCY, PT EVAL
Surgery Consult    Subjective:      Alicia Pineda is a 53 y.o. female who is being seen for evaluation of abdominal pain. The pain is located in the epigastrium with radiation across her abdomen. Pain is described as sharp and stabbing and measures 8/10 in intensity. Onset of pain was several years ago, intermittent in nature. She does not see a pattern and onset of pain, but it does resolve spontaneously. Pain is associated with nausea and vomiting. She had mildly delayed gastric emptying on testing in 2017. She was started on Reglan without improvement. She was transitioned to domperidone without significant improvement. She denies postprandial bloating, reflux symptoms, or recent change in bowel habits. She does note unintentional weight loss of approximately 20 pounds and thinning of hair.     Patient Active Problem List    Diagnosis Date Noted    Irritable bowel syndrome with constipation 10/22/2020    Epigastric abdominal pain 10/21/2020    Pyloric stenosis 10/21/2020    Intractable vomiting with nausea 10/21/2020    Pelvic pain in female 2016    Menopause syndrome 2015    Diffuse cystic mastopathy 2013    Decreased libido 12/10/2012    Acquired absence of both cervix and uterus 2011     Past Medical History:   Diagnosis Date    Acquired absence of both cervix and uterus 2011    Arthritis     Bartholin's gland abscess     Bone spur 2013    Right shoulder    Decreased libido 12/10/2012    Diffuse cystic mastopathy 2013    Fibrocystic breast     Gastroparesis     Headache     Hypertension     IBS (irritable bowel syndrome)     Pap smear for cervical cancer screening 09    Normal Pap    Sleep apnea     Thyroiditis       Past Surgical History:   Procedure Laterality Date    HX BARTHOLIN CYST MARSUPIALIZATION Left 04/22/15    HX  SECTION      HX CHOLECYSTECTOMY      HX DILATION AND CURETTAGE      x2    HX OOPHORECTOMY Right endometriosis and scar tissue removed    HX ORTHOPAEDIC Right 3/2013    Shoulder surgery d/t bone spur    HX ORTHOPAEDIC Left 11/2015    Left shoulder surgery d/t bone spur    HX PELVIC LAPAROSCOPY  3/94    endometriosis, BERNICE    HX TOTAL ABDOMINAL HYSTERECTOMY  2/99    w/ LSO      Social History     Tobacco Use    Smoking status: Never Smoker    Smokeless tobacco: Never Used   Substance Use Topics    Alcohol use: No      Family History   Problem Relation Age of Onset    Hypertension Father     Diabetes Father     Heart Disease Father     Hypertension Mother     Uterine Cancer Mother     Cancer Mother         kidney    Diabetes Mother     Heart Disease Mother     Breast Cancer Maternal Grandmother     Breast Cancer Maternal Aunt     Diabetes Brother     Diabetes Brother       Current Outpatient Medications   Medication Sig    amLODIPine (NORVASC) 5 mg tablet Take 5 mg by mouth daily.  omalizumab (Xolair) 150 mg/mL syrg 300 mg by SubCUTAneous route once.  EPINEPHrine (EPIPEN JR) 0.15 mg/0.3 mL injection 0.15 mg by IntraMUSCular route once as needed.  linaclotide (LINZESS) 290 mcg cap capsule Take 290 mcg by mouth daily.  rizatriptan (MAXALT) 10 mg tablet Take 10 mg by mouth as needed for Migraine. May repeat in 2 hours if needed    cholecalciferol, vitamin D3, (VITAMIN D3) 2,000 unit tab Take 2,000 Units by mouth daily.  DOMPERIDONE, BULK, Take 10 mg by mouth three (3) times daily.  hydrOXYzine (ATARAX) 10 mg tablet Take 10-40 mg by mouth nightly.  cetirizine (ZYRTEC) 10 mg tablet Take 10 mg by mouth two (2) times a day.  raNITIdine (ZANTAC) 300 mg tablet Take 300 mg by mouth two (2) times a day.  DULoxetine (CYMBALTA) 60 mg capsule Take 60 mg by mouth daily.  propranolol (INDERAL) 10 mg tablet Take 10 mg by mouth two (2) times a day.  diclofenac EC (VOLTAREN) 75 mg EC tablet Take 75 mg by mouth two (2) times a day.  OTHER Est estrg/mtest 1.25-2.5 mg met. Takes one po at nightly.  tiZANidine (ZANAFLEX) 4 mg tablet Take 4 mg by mouth three (3) times daily as needed. No current facility-administered medications for this visit. Allergies   Allergen Reactions    Gabapentin Other (comments)     \"out of sorts, loopy\"       Review of Systems:    Complete review of systems was negative except as noted in the HPI. Objective:        Visit Vitals  /86 (BP 1 Location: Left arm, BP Patient Position: Sitting)   Pulse 77   Temp 98.4 °F (36.9 °C) (Oral)   Resp 16   Ht 5' 3\" (1.6 m)   Wt 211 lb (95.7 kg)   LMP 02/01/1999   SpO2 98%   BMI 37.38 kg/m²       Physical Exam:  GENERAL: alert, cooperative, no distress, appears stated age, morbidly obese, EYE: negative, LYMPH NODES: No cervical or supraclavicular adenopathy. THROAT & NECK: normal, LUNG: clear to auscultation bilaterally, HEART: regular rate and rhythm, S1, S2 normal, no murmur. ABDOMEN: Normal active bowel sounds, obese, nondistended, soft. Epigastric pain with palpation. No mass or hernia. No guarding. EXTREMITIES:  extremities normal, atraumatic, no cyanosis or edema, SKIN: Normal., NEUROLOGIC: negative    Imaging:  Reviewed 2017 gastric emptying study    Data Review: Reviewed recent endoscopy    Assessment:     Abdominal pain, epigastric. Symptoms are longstanding. Endoscopic findings and history are suggestive of pyloric stenosis. She experienced transient improvement in her symptoms after initial Botox injection, but no improvement with subsequent treatments. It is unclear if this is a primary process, or if extrinsic compression in the epigastrium is ulcerative. Her current gastric function is likewise unknown-last testing in 2017. Plan:     1. I recommend proceeding with CT abdomen to assess for upper abdominal mass causing extrinsic compression of the pyloric area. 2.  Schedule upper gastrointestinal series. 3.  Nuclear medicine gastric emptying study ordered.   4.  Follow-up after above. There may be a role for pyloroplasty to improve her symptoms, but test results will be needed to guide therapy. 1-2x/week

## 2020-10-23 ENCOUNTER — APPOINTMENT (OUTPATIENT)
Dept: INFUSION THERAPY | Facility: HOSPITAL | Age: 60
End: 2020-10-23

## 2020-10-26 ENCOUNTER — LABORATORY RESULT (OUTPATIENT)
Age: 60
End: 2020-10-26

## 2020-10-26 ENCOUNTER — APPOINTMENT (OUTPATIENT)
Dept: INFUSION THERAPY | Facility: HOSPITAL | Age: 60
End: 2020-10-26

## 2020-10-27 ENCOUNTER — APPOINTMENT (OUTPATIENT)
Dept: INFUSION THERAPY | Facility: HOSPITAL | Age: 60
End: 2020-10-27

## 2020-10-28 ENCOUNTER — APPOINTMENT (OUTPATIENT)
Dept: INFUSION THERAPY | Facility: HOSPITAL | Age: 60
End: 2020-10-28

## 2020-10-28 ENCOUNTER — OUTPATIENT (OUTPATIENT)
Dept: OUTPATIENT SERVICES | Facility: HOSPITAL | Age: 60
LOS: 1 days | Discharge: HOME | End: 2020-10-28
Payer: MEDICAID

## 2020-10-28 DIAGNOSIS — C92.40 ACUTE PROMYELOCYTIC LEUKEMIA, NOT HAVING ACHIEVED REMISSION: ICD-10-CM

## 2020-10-29 ENCOUNTER — APPOINTMENT (OUTPATIENT)
Dept: INFUSION THERAPY | Facility: HOSPITAL | Age: 60
End: 2020-10-29

## 2020-10-29 PROCEDURE — 82947 ASSAY GLUCOSE BLOOD QUANT: CPT

## 2020-10-29 PROCEDURE — 93970 EXTREMITY STUDY: CPT

## 2020-10-29 PROCEDURE — 96365 THER/PROPH/DIAG IV INF INIT: CPT

## 2020-10-29 PROCEDURE — 87040 BLOOD CULTURE FOR BACTERIA: CPT

## 2020-10-29 PROCEDURE — 83735 ASSAY OF MAGNESIUM: CPT

## 2020-10-29 PROCEDURE — 96361 HYDRATE IV INFUSION ADD-ON: CPT

## 2020-10-29 PROCEDURE — 74177 CT ABD & PELVIS W/CONTRAST: CPT

## 2020-10-29 PROCEDURE — 85610 PROTHROMBIN TIME: CPT

## 2020-10-29 PROCEDURE — 84295 ASSAY OF SERUM SODIUM: CPT

## 2020-10-29 PROCEDURE — 82330 ASSAY OF CALCIUM: CPT

## 2020-10-29 PROCEDURE — 85018 HEMOGLOBIN: CPT

## 2020-10-29 PROCEDURE — 80202 ASSAY OF VANCOMYCIN: CPT

## 2020-10-29 PROCEDURE — 93005 ELECTROCARDIOGRAM TRACING: CPT | Mod: 76

## 2020-10-29 PROCEDURE — 82962 GLUCOSE BLOOD TEST: CPT

## 2020-10-29 PROCEDURE — 81003 URINALYSIS AUTO W/O SCOPE: CPT

## 2020-10-29 PROCEDURE — 99291 CRITICAL CARE FIRST HOUR: CPT | Mod: 25

## 2020-10-29 PROCEDURE — 97161 PT EVAL LOW COMPLEX 20 MIN: CPT

## 2020-10-29 PROCEDURE — 82435 ASSAY OF BLOOD CHLORIDE: CPT

## 2020-10-29 PROCEDURE — C8929: CPT

## 2020-10-29 PROCEDURE — 83615 LACTATE (LD) (LDH) ENZYME: CPT

## 2020-10-29 PROCEDURE — 87086 URINE CULTURE/COLONY COUNT: CPT

## 2020-10-29 PROCEDURE — 82803 BLOOD GASES ANY COMBINATION: CPT

## 2020-10-29 PROCEDURE — U0003: CPT

## 2020-10-29 PROCEDURE — 84484 ASSAY OF TROPONIN QUANT: CPT

## 2020-10-29 PROCEDURE — 85730 THROMBOPLASTIN TIME PARTIAL: CPT

## 2020-10-29 PROCEDURE — 96375 TX/PRO/DX INJ NEW DRUG ADDON: CPT

## 2020-10-29 PROCEDURE — 84550 ASSAY OF BLOOD/URIC ACID: CPT

## 2020-10-29 PROCEDURE — 96367 TX/PROPH/DG ADDL SEQ IV INF: CPT

## 2020-10-29 PROCEDURE — 71260 CT THORAX DX C+: CPT

## 2020-10-29 PROCEDURE — 84132 ASSAY OF SERUM POTASSIUM: CPT

## 2020-10-29 PROCEDURE — 80053 COMPREHEN METABOLIC PANEL: CPT

## 2020-10-29 PROCEDURE — 85014 HEMATOCRIT: CPT

## 2020-10-29 PROCEDURE — 83605 ASSAY OF LACTIC ACID: CPT

## 2020-10-29 PROCEDURE — 96376 TX/PRO/DX INJ SAME DRUG ADON: CPT

## 2020-10-29 PROCEDURE — 84100 ASSAY OF PHOSPHORUS: CPT

## 2020-10-29 PROCEDURE — 85025 COMPLETE CBC W/AUTO DIFF WBC: CPT

## 2020-10-30 ENCOUNTER — APPOINTMENT (OUTPATIENT)
Dept: INFUSION THERAPY | Facility: HOSPITAL | Age: 60
End: 2020-10-30

## 2020-11-02 ENCOUNTER — RESULT REVIEW (OUTPATIENT)
Age: 60
End: 2020-11-02

## 2020-11-02 ENCOUNTER — APPOINTMENT (OUTPATIENT)
Dept: HEMATOLOGY ONCOLOGY | Facility: CLINIC | Age: 60
End: 2020-11-02
Payer: MEDICAID

## 2020-11-02 VITALS
OXYGEN SATURATION: 98 % | RESPIRATION RATE: 17 BRPM | HEIGHT: 62.24 IN | WEIGHT: 160.47 LBS | HEART RATE: 61 BPM | BODY MASS INDEX: 29.16 KG/M2 | TEMPERATURE: 96.6 F | DIASTOLIC BLOOD PRESSURE: 76 MMHG | SYSTOLIC BLOOD PRESSURE: 117 MMHG

## 2020-11-02 DIAGNOSIS — Z00.00 ENCOUNTER FOR GENERAL ADULT MEDICAL EXAMINATION W/OUT ABNORMAL FINDINGS: ICD-10-CM

## 2020-11-02 LAB
BASOPHILS # BLD AUTO: 0.01 K/UL — SIGNIFICANT CHANGE UP (ref 0–0.2)
BASOPHILS NFR BLD AUTO: 0.2 % — SIGNIFICANT CHANGE UP (ref 0–2)
EOSINOPHIL # BLD AUTO: 0.13 K/UL — SIGNIFICANT CHANGE UP (ref 0–0.5)
EOSINOPHIL NFR BLD AUTO: 2.1 % — SIGNIFICANT CHANGE UP (ref 0–6)
HCT VFR BLD CALC: 32.3 % — LOW (ref 34.5–45)
HGB BLD-MCNC: 10 G/DL — LOW (ref 11.5–15.5)
IMM GRANULOCYTES NFR BLD AUTO: 0.8 % — SIGNIFICANT CHANGE UP (ref 0–1.5)
LYMPHOCYTES # BLD AUTO: 2.5 K/UL — SIGNIFICANT CHANGE UP (ref 1–3.3)
LYMPHOCYTES # BLD AUTO: 41.2 % — SIGNIFICANT CHANGE UP (ref 13–44)
MCHC RBC-ENTMCNC: 24.8 PG — LOW (ref 27–34)
MCHC RBC-ENTMCNC: 31 G/DL — LOW (ref 32–36)
MCV RBC AUTO: 80 FL — SIGNIFICANT CHANGE UP (ref 80–100)
MONOCYTES # BLD AUTO: 0.39 K/UL — SIGNIFICANT CHANGE UP (ref 0–0.9)
MONOCYTES NFR BLD AUTO: 6.4 % — SIGNIFICANT CHANGE UP (ref 2–14)
NEUTROPHILS # BLD AUTO: 2.99 K/UL — SIGNIFICANT CHANGE UP (ref 1.8–7.4)
NEUTROPHILS NFR BLD AUTO: 49.3 % — SIGNIFICANT CHANGE UP (ref 43–77)
NRBC # BLD: 0 /100 WBCS — SIGNIFICANT CHANGE UP (ref 0–0)
PLATELET # BLD AUTO: 174 K/UL — SIGNIFICANT CHANGE UP (ref 150–400)
RBC # BLD: 4.04 M/UL — SIGNIFICANT CHANGE UP (ref 3.8–5.2)
RBC # FLD: 15.1 % — HIGH (ref 10.3–14.5)
WBC # BLD: 6.07 K/UL — SIGNIFICANT CHANGE UP (ref 3.8–10.5)
WBC # FLD AUTO: 6.07 K/UL — SIGNIFICANT CHANGE UP (ref 3.8–10.5)

## 2020-11-02 PROCEDURE — 99072 ADDL SUPL MATRL&STAF TM PHE: CPT

## 2020-11-02 PROCEDURE — 99215 OFFICE O/P EST HI 40 MIN: CPT

## 2020-11-03 ENCOUNTER — APPOINTMENT (OUTPATIENT)
Dept: INFUSION THERAPY | Facility: HOSPITAL | Age: 60
End: 2020-11-03

## 2020-11-03 ENCOUNTER — APPOINTMENT (OUTPATIENT)
Dept: PULMONOLOGY | Facility: CLINIC | Age: 60
End: 2020-11-03
Payer: MEDICAID

## 2020-11-03 VITALS
SYSTOLIC BLOOD PRESSURE: 110 MMHG | BODY MASS INDEX: 29.25 KG/M2 | RESPIRATION RATE: 16 BRPM | WEIGHT: 161 LBS | HEART RATE: 58 BPM | DIASTOLIC BLOOD PRESSURE: 71 MMHG | HEIGHT: 62.24 IN | TEMPERATURE: 97.9 F

## 2020-11-03 PROCEDURE — 99214 OFFICE O/P EST MOD 30 MIN: CPT

## 2020-11-03 PROCEDURE — 93010 ELECTROCARDIOGRAM REPORT: CPT

## 2020-11-03 PROCEDURE — 99072 ADDL SUPL MATRL&STAF TM PHE: CPT

## 2020-11-03 NOTE — ASSESSMENT
[FreeTextEntry1] : 60-year-old female currently being treated for promyelocytic leukemia presents for left sided back pain.  I was able to reproduce the pain by pressing her muscles in her back.Repeat CT scan shows resolution of pleural effusions and improved pericardial effusion.  Believe the pain to be musculoskeletal in nature.  May follow up as needed.

## 2020-11-03 NOTE — HISTORY OF PRESENT ILLNESS
[de-identified] : APL- low risk\par \par 60 year old female who is here for a follow up visit after a hospitalization for acute promyelocytic leukemia.  She initially presented to [facility] for fevers, found to be pancytopenic.  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  She was started on ATRA empirically on 5/23, then on arsenic on 5/27 when the diagnosis was confirmed.  Initially she appeared to have refractory thrombocytopenia which improved.  She was found to be COVID positive on 6/16, transferred to COVID unit, on 6/19 she was COVID negative, antibody positive and was transferred back.  She was treated for an enterococal UTI on 6/20.  Arsenic was held due to zoster from 6/22- 6/30.  6/23 she was found to have a partially occlusive DVT in her right axillary vein, treated with lovenox.  Repeat duplex was negative on 7/16.  PICC replaced in [facility], on incidental CXR on 7/8, was found to be malpositioned, 7/9 PICC was exchanged, then removed on 7/10 due to irritation?.  Bone marrow biopsy on 6/25 with persistent disease, repeated on 7/13 no evidence of leukemia.  Her plt recovered 6/27, ANC by 7/11. [de-identified] : Patient's daughter present for visit and assisted in translation. She was recently readmitted from 10/9-10/15 for cellulitis over the mediport site, sepsis.  Treated in the MICU, started on vanco/cefepime.  The mediport was not removed, avoided access for the two weeks.  The cellulitis has since improved.  Overall she is in her usual state of health.  She had had headaches with the ATRA, has stopped after 2 days, then was hospitalized.  She occasional mid epigastric pain at times, mostly with eating.  She has low back pain, LE cramping- was present for years previously but feels it may be more pronounced now.  She has no fever/chills, no cough, no SOB at rest, still with SOLORZANO/fatigue, no vomiting/diarrhea.  \par They feel that the erythema began after use of chlorhexidine for cleaning, after which she had a local skin reaction.

## 2020-11-03 NOTE — REVIEW OF SYSTEMS
[Abdominal Pain] : abdominal pain [Negative] : Heme/Lymph [Fatigue] : fatigue [Joint Pain] : joint pain [Muscle Pain] : muscle pain [Fever] : no fever [Chills] : no chills [Night Sweats] : no night sweats [Recent Change In Weight] : ~T no recent weight change [Vomiting] : no vomiting [Constipation] : no constipation [Diarrhea] : no diarrhea [FreeTextEntry7] : Post-prandial epigastric pain [FreeTextEntry9] : back/lower extremity [de-identified] : headaches with ATRA

## 2020-11-03 NOTE — END OF VISIT
[FreeTextEntry3] : I obtained the history and examined the patient and developed a plan of care  Jim Mendez MD\par

## 2020-11-03 NOTE — ASSESSMENT
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  She was started on ATRA empirically on 5/23, then on arsenic on 5/27 when the diagnosis was confirmed. 6/23/20 she was found to have a partially occlusive DVT in her right axillary vein, treated with lovenox.  Repeat duplex was negative on 7/16.  PICC removed on 7/10 due to irritation?.  Bone marrow biopsy on 6/25 with persistent disease, repeated on 7/13 no evidence of leukemia.\par \par Started on cycle 1 of consolidation with GALA/ATRA- complicated by multiple hospital admissions (loculated pleural effusion/mediport associated cellulitis). \par Cycle 2 started, after one week she developed fever/cellulitis at the mediport site, required IV vanco/cefepime. \par She has improved, but has again been delayed for another 2 weeks. \par Extensive discussion with her daughter at her side, son over the phone.  She also had a second opinion at [facility], discussed her care with Dr. Vazquez over the phone last week.  \par Her CBC today is improved, normal WBC/plt, only her Hg is 10.0.\par Recommend to resume ATRA/GALA, plan for 3 weeks of GALA, 2 weeks of ATRA.  She will take tylenol 30 min prior to ATRA to assist with headaches.  If worsened headaches, will give fioricet which worked as an inpatient. \par Will use the mediport, clean with betadine and avoid adhesives.  Suspect her reaction was more due to the adhesive in the tegaderm.  Recommend her to have a wash with hibiclens twice weekly and to maintain good hygiene.  \par Her CT scan completed on 10/9 revealed resolution of pleural effusions, trace pericardial effusion. \par She remains on eliquis due to UE catheter related clot, as it has been over 3 months, will repeat duplex and stop the eliquis.  \par Twice weekly EKG, CMP while on GALA.  \par Refill for atenolol, gabapentin- explained she should take it at night.  Omeprazole for gastritis.  \par Appointments made.  \par She will continue to follow with medicine/cardiology/pulmonary.  \par \par Discussed at length with patient, patient's daughter, and patient's son (ty) via telephone ([phone]) that we do not recommend any further delays to treatment. The son was adamant in wanting to have the patient delay treatment 1-2 weeks to improve her appetite/functional status. Informed them that there have been many delays and another delay will likely lead to progression of disease. The appointments for therapy have been made and patient is scheduled to resume therapy tomorrow. However, the son wishes to hold off on treatment to pursue a second opinion at [facility]. The family is fully aware that delay in treatment will likely lead to progression of disease.\par Will have  set up consult appointment at [facility].  \par \par 1 month follow up appointment scheduled with our office per patient preference.\par Case and management discussed with Dr. Hurd

## 2020-11-03 NOTE — PHYSICAL EXAM
[Restricted in physically strenuous activity but ambulatory and able to carry out work of a light or sedentary nature] : Status 1- Restricted in physically strenuous activity but ambulatory and able to carry out work of a light or sedentary nature, e.g., light house work, office work [Normal] : affect appropriate [de-identified] : + Port in R chest wall- no evidence of infection, no warmth

## 2020-11-03 NOTE — HISTORY OF PRESENT ILLNESS
[TextBox_4] : 60-year-old female currently being treated for promyelocytic leukemia presents for follow up. Son translated. She had a new onset small loculated pleural effusion and pericardial effusion in June. The patient was treated with Zosyn and vancomycin for one week and then placed on Augmentin and which she is still taking.. The presumption was that she had a pneumonia with a parapneumonic effusion.  \par . The patient has a history of active tuberculosis dating back at least 35 years. Unfortunately she does not remember the medications that she took but she believes she took them for at least 9 months.\par She currently complains of left sided back pain.  The pain is sometimes more noticeable when she inhales.  She is not experiencing shortness of breath, cough, chest tightness or wheezing.  She also denies fever or chills. \par \par Recent CT from 10/9 showed stable biapical scarring, right greater than left. Stable calcified and noncalcified nodules predominantly in the right lung since 5/19/2018, no pleural effusion. Trace high density pericardial effusion, decreased.

## 2020-11-03 NOTE — PHYSICAL EXAM
[No Acute Distress] : no acute distress [Normal Appearance] : normal appearance [Normal Rate/Rhythm] : normal rate/rhythm [Normal S1, S2] : normal s1, s2 [No Murmurs] : no murmurs [No Resp Distress] : no resp distress [Clear to Auscultation Bilaterally] : clear to auscultation bilaterally [Normal Gait] : normal gait [No Clubbing] : no clubbing [No Cyanosis] : no cyanosis [No Edema] : no edema [Normal Color/ Pigmentation] : normal color/ pigmentation [No Focal Deficits] : no focal deficits [Oriented x3] : oriented x3 [Normal Affect] : normal affect [TextBox_68] : occasional crackles on left [TextBox_80] : muscle tenderness medial to scapula

## 2020-11-04 ENCOUNTER — APPOINTMENT (OUTPATIENT)
Dept: INFUSION THERAPY | Facility: HOSPITAL | Age: 60
End: 2020-11-04

## 2020-11-04 DIAGNOSIS — R11.2 NAUSEA WITH VOMITING, UNSPECIFIED: ICD-10-CM

## 2020-11-04 DIAGNOSIS — Z51.11 ENCOUNTER FOR ANTINEOPLASTIC CHEMOTHERAPY: ICD-10-CM

## 2020-11-05 ENCOUNTER — APPOINTMENT (OUTPATIENT)
Dept: CARDIOLOGY | Facility: CLINIC | Age: 60
End: 2020-11-05
Payer: MEDICAID

## 2020-11-05 ENCOUNTER — APPOINTMENT (OUTPATIENT)
Dept: INTERNAL MEDICINE | Facility: CLINIC | Age: 60
End: 2020-11-05
Payer: MEDICAID

## 2020-11-05 ENCOUNTER — APPOINTMENT (OUTPATIENT)
Dept: INFUSION THERAPY | Facility: HOSPITAL | Age: 60
End: 2020-11-05

## 2020-11-05 VITALS — DIASTOLIC BLOOD PRESSURE: 76 MMHG | HEART RATE: 67 BPM | SYSTOLIC BLOOD PRESSURE: 126 MMHG

## 2020-11-05 PROCEDURE — 93306 TTE W/DOPPLER COMPLETE: CPT

## 2020-11-05 PROCEDURE — 99214 OFFICE O/P EST MOD 30 MIN: CPT

## 2020-11-05 NOTE — HISTORY OF PRESENT ILLNESS
[FreeTextEntry1] : 60F with HTN, HLD, DM, acute promyelocytic leukemia on weekly chemo, R axillary DVT on Eliquis who presents for follow up of LV dysfunction, pericardial effusion\par \par Hospitalized 10/9-10/15 with sepsis 2/2 cellulitis from [facility]. Briefly in MICU on pressors\par Echo today showing continued moderate LV dysfunction, no pericardial effusion\par No shortness of breath\par She notes some CP, sporadic, minor, sharp in nature, nonexertional \par Remains on Eliquis for hx of DVT \par \par HISTORY:\par \par Hospitalized 9/3 - 9/11 for L sided CP, L back pain, noted with L pleural effusion, loculated. Suspicion for PNA, tx with antibiotics with subsequent improvement in effusion.  Over the course of hospitalization, she had a NST which showed perfusion defects suspicious for artifact +/- infarct without ischemia. Initial Echo normal, subsequent echo with mild decline in EF and trivial pericardial effusion. Ultrasound done 10/1 at Dr. Mendez's office showed minimal pericardial and pleural effusion. Dx with R axillary DVT in July 2020, on Eliquis (may have been from a PICC line). \par \par She notes continued L back pain, worse with inspiration. Also notes dyspnea on exertion, all worse since hospitalization. Feels very weak. \par \par Accompanied by son who has elected to translate. \par \par ECG: SR, no ST-T wave changes\par TTE 2020:\par \par 1. Normal mitral valve. Minimal mitral regurgitation.\par 2. Aortic valve not well visualized; appears to be a\par calcified trileaflet valve with normal opening. No aortic\par valve regurgitation seen.\par 3. Endocardium not well visualized; grossly mild left\par ventricular systolic dysfunction. The septum is\par hypokinetic. Consider limited repeat imaging with\par intravenous echo contrast to better visualize the LV if\par clinically indicated. (Patient did not have intravenous\par access at the time of this study.)\par 4. Normal right ventricular size with mildly decreased\par right ventricular systolic function. TAPSE=1.4 cm. Systolic\par TDI=7 cm/sec.\par 5. Trace pericardial effusion.\par 6. Left pleural effusion.\par *** No previous Echo exam.\par \par NST 2020:\par \par IMPRESSIONS:Abnormal Study\par * There are no significant ECG changes to suggest\par ischemia.\par * There are moderate to large, moderate perfusion defects\par in apical, apical lateral, mid to distal anterior walls\par that are predominantly fixed, but correct with prone\par imaging therefore no clear evidence of ischemia or\par infarct.\par * Post-stress gated wall motion analysis was performed\par (LVEF = 47 %;LVEDV = 62 ml.), revealing moderate\par hypokinesis in mid to distal anterior and anteroseptal,\par septal, apical anterior, distal inferolateral wall(s).\par \par Atenolol 50mg, Atorvastatin 10mg, Eliquis 5mg BID\par

## 2020-11-05 NOTE — REVIEW OF SYSTEMS
[Chest Pain] : chest pain [Fever] : no fever [Chills] : no chills [Shortness Of Breath] : no shortness of breath [Chest  Pressure] : no chest pressure [Lower Ext Edema] : no extremity edema [Leg Claudication] : no intermittent leg claudication [Palpitations] : no palpitations [Abdominal Pain] : no abdominal pain [Heartburn] : no heartburn [Dysphagia] : no dysphagia [Dysuria] : no dysuria [Incontinence] : no incontinence [Joint Pain] : no joint pain [Muscle Cramps] : no muscle cramps [Dizziness] : no dizziness [Convulsions] : no convulsions [Confusion] : no confusion was observed [Anxiety] : no anxiety

## 2020-11-05 NOTE — DISCUSSION/SUMMARY
[FreeTextEntry1] : 60F\par \par 1. Pericardial effusion: Not visualized on echo today. Stable vital signs\par \par -Check echo\par \par 2. HFpEF: NST negative for ischemia. Can consider further testing once malignancy is treated.\par \par -Cont BB, will add losartan 25mg \par \par 3. Atypical CP: Differential includes ischemia, inflammation related, GERD. Recent SPECT negative for ischemia. Sometimes pleuritic component. \par \par Can consider Cath vs CTA pending further chemo treatment \par \par 4. HTN: Well controlled, continue current medications- adding ARB \par \par 5. Onc: Continuing chemo with oncology. Multiple complications with recurrent infections\par \par RV 3 months

## 2020-11-06 ENCOUNTER — OUTPATIENT (OUTPATIENT)
Dept: OUTPATIENT SERVICES | Facility: HOSPITAL | Age: 60
LOS: 1 days | End: 2020-11-06
Payer: MEDICAID

## 2020-11-06 ENCOUNTER — RESULT REVIEW (OUTPATIENT)
Age: 60
End: 2020-11-06

## 2020-11-06 ENCOUNTER — APPOINTMENT (OUTPATIENT)
Dept: INFUSION THERAPY | Facility: HOSPITAL | Age: 60
End: 2020-11-06

## 2020-11-06 ENCOUNTER — APPOINTMENT (OUTPATIENT)
Dept: ULTRASOUND IMAGING | Facility: IMAGING CENTER | Age: 60
End: 2020-11-06
Payer: MEDICAID

## 2020-11-06 DIAGNOSIS — I82.90 ACUTE EMBOLISM AND THROMBOSIS OF UNSPECIFIED VEIN: ICD-10-CM

## 2020-11-06 LAB
BASOPHILS # BLD AUTO: 0.03 K/UL — SIGNIFICANT CHANGE UP (ref 0–0.2)
BASOPHILS NFR BLD AUTO: 0.6 % — SIGNIFICANT CHANGE UP (ref 0–2)
EOSINOPHIL # BLD AUTO: 0.07 K/UL — SIGNIFICANT CHANGE UP (ref 0–0.5)
EOSINOPHIL NFR BLD AUTO: 1.4 % — SIGNIFICANT CHANGE UP (ref 0–6)
HCT VFR BLD CALC: 30.2 % — LOW (ref 34.5–45)
HGB BLD-MCNC: 9.4 G/DL — LOW (ref 11.5–15.5)
IMM GRANULOCYTES NFR BLD AUTO: 2.4 % — HIGH (ref 0–1.5)
LYMPHOCYTES # BLD AUTO: 1.99 K/UL — SIGNIFICANT CHANGE UP (ref 1–3.3)
LYMPHOCYTES # BLD AUTO: 39.3 % — SIGNIFICANT CHANGE UP (ref 13–44)
MCHC RBC-ENTMCNC: 24.9 PG — LOW (ref 27–34)
MCHC RBC-ENTMCNC: 31.1 G/DL — LOW (ref 32–36)
MCV RBC AUTO: 80.1 FL — SIGNIFICANT CHANGE UP (ref 80–100)
MONOCYTES # BLD AUTO: 0.41 K/UL — SIGNIFICANT CHANGE UP (ref 0–0.9)
MONOCYTES NFR BLD AUTO: 8.1 % — SIGNIFICANT CHANGE UP (ref 2–14)
NEUTROPHILS # BLD AUTO: 2.45 K/UL — SIGNIFICANT CHANGE UP (ref 1.8–7.4)
NEUTROPHILS NFR BLD AUTO: 48.2 % — SIGNIFICANT CHANGE UP (ref 43–77)
NRBC # BLD: 0 /100 WBCS — SIGNIFICANT CHANGE UP (ref 0–0)
PLATELET # BLD AUTO: 156 K/UL — SIGNIFICANT CHANGE UP (ref 150–400)
RBC # BLD: 3.77 M/UL — LOW (ref 3.8–5.2)
RBC # FLD: 14.8 % — HIGH (ref 10.3–14.5)
WBC # BLD: 5.07 K/UL — SIGNIFICANT CHANGE UP (ref 3.8–10.5)
WBC # FLD AUTO: 5.07 K/UL — SIGNIFICANT CHANGE UP (ref 3.8–10.5)

## 2020-11-06 PROCEDURE — 93970 EXTREMITY STUDY: CPT

## 2020-11-06 PROCEDURE — 93970 EXTREMITY STUDY: CPT | Mod: 26

## 2020-11-07 ENCOUNTER — APPOINTMENT (OUTPATIENT)
Dept: INFUSION THERAPY | Facility: HOSPITAL | Age: 60
End: 2020-11-07

## 2020-11-10 ENCOUNTER — APPOINTMENT (OUTPATIENT)
Dept: INFUSION THERAPY | Facility: HOSPITAL | Age: 60
End: 2020-11-10

## 2020-11-10 ENCOUNTER — RESULT REVIEW (OUTPATIENT)
Age: 60
End: 2020-11-10

## 2020-11-10 ENCOUNTER — LABORATORY RESULT (OUTPATIENT)
Age: 60
End: 2020-11-10

## 2020-11-10 LAB
BASOPHILS # BLD AUTO: 0.01 K/UL — SIGNIFICANT CHANGE UP (ref 0–0.2)
BASOPHILS NFR BLD AUTO: 0.2 % — SIGNIFICANT CHANGE UP (ref 0–2)
BUN SERPL-MCNC: 17 MG/DL — SIGNIFICANT CHANGE UP (ref 7–23)
CA-I BLDA-SCNC: 1.33 MMOL/L — HIGH (ref 1.12–1.3)
CHLORIDE SERPL-SCNC: 107 MMOL/L — SIGNIFICANT CHANGE UP (ref 96–108)
CO2 SERPL-SCNC: 25 MMOL/L — SIGNIFICANT CHANGE UP (ref 22–31)
CREAT SERPL-MCNC: 0.9 MG/DL — SIGNIFICANT CHANGE UP (ref 0.5–1.3)
EOSINOPHIL # BLD AUTO: 0.07 K/UL — SIGNIFICANT CHANGE UP (ref 0–0.5)
EOSINOPHIL NFR BLD AUTO: 1.5 % — SIGNIFICANT CHANGE UP (ref 0–6)
GLUCOSE SERPL-MCNC: 126 MG/DL — HIGH (ref 70–99)
HCT VFR BLD CALC: 29.3 % — LOW (ref 34.5–45)
HGB BLD-MCNC: 8.8 G/DL — LOW (ref 11.5–15.5)
IMM GRANULOCYTES NFR BLD AUTO: 0.4 % — SIGNIFICANT CHANGE UP (ref 0–1.5)
LYMPHOCYTES # BLD AUTO: 2.14 K/UL — SIGNIFICANT CHANGE UP (ref 1–3.3)
LYMPHOCYTES # BLD AUTO: 45.2 % — HIGH (ref 13–44)
MCHC RBC-ENTMCNC: 25 PG — LOW (ref 27–34)
MCHC RBC-ENTMCNC: 30 G/DL — LOW (ref 32–36)
MCV RBC AUTO: 83.2 FL — SIGNIFICANT CHANGE UP (ref 80–100)
MONOCYTES # BLD AUTO: 0.25 K/UL — SIGNIFICANT CHANGE UP (ref 0–0.9)
MONOCYTES NFR BLD AUTO: 5.3 % — SIGNIFICANT CHANGE UP (ref 2–14)
NEUTROPHILS # BLD AUTO: 2.24 K/UL — SIGNIFICANT CHANGE UP (ref 1.8–7.4)
NEUTROPHILS NFR BLD AUTO: 47.4 % — SIGNIFICANT CHANGE UP (ref 43–77)
NRBC # BLD: 0 /100 WBCS — SIGNIFICANT CHANGE UP (ref 0–0)
PLATELET # BLD AUTO: 181 K/UL — SIGNIFICANT CHANGE UP (ref 150–400)
POTASSIUM SERPL-MCNC: 4.4 MMOL/L — SIGNIFICANT CHANGE UP (ref 3.5–5.3)
POTASSIUM SERPL-SCNC: 4.4 MMOL/L — SIGNIFICANT CHANGE UP (ref 3.5–5.3)
RBC # BLD: 3.52 M/UL — LOW (ref 3.8–5.2)
RBC # FLD: 15.5 % — HIGH (ref 10.3–14.5)
SODIUM SERPL-SCNC: 144 MMOL/L — SIGNIFICANT CHANGE UP (ref 135–145)
WBC # BLD: 4.73 K/UL — SIGNIFICANT CHANGE UP (ref 3.8–10.5)
WBC # FLD AUTO: 4.73 K/UL — SIGNIFICANT CHANGE UP (ref 3.8–10.5)

## 2020-11-10 PROCEDURE — 93010 ELECTROCARDIOGRAM REPORT: CPT

## 2020-11-11 ENCOUNTER — APPOINTMENT (OUTPATIENT)
Dept: INFUSION THERAPY | Facility: HOSPITAL | Age: 60
End: 2020-11-11

## 2020-11-12 ENCOUNTER — APPOINTMENT (OUTPATIENT)
Dept: HEMATOLOGY ONCOLOGY | Facility: CLINIC | Age: 60
End: 2020-11-12

## 2020-11-12 ENCOUNTER — INPATIENT (INPATIENT)
Facility: HOSPITAL | Age: 60
LOS: 2 days | Discharge: HOME | DRG: 313 | End: 2020-11-15
Attending: INTERNAL MEDICINE | Admitting: STUDENT IN AN ORGANIZED HEALTH CARE EDUCATION/TRAINING PROGRAM
Payer: MEDICAID

## 2020-11-12 ENCOUNTER — APPOINTMENT (OUTPATIENT)
Dept: INFUSION THERAPY | Facility: HOSPITAL | Age: 60
End: 2020-11-12

## 2020-11-12 ENCOUNTER — APPOINTMENT (OUTPATIENT)
Dept: HEMATOLOGY ONCOLOGY | Facility: CLINIC | Age: 60
End: 2020-11-12
Payer: MEDICAID

## 2020-11-12 VITALS
RESPIRATION RATE: 18 BRPM | OXYGEN SATURATION: 100 % | SYSTOLIC BLOOD PRESSURE: 156 MMHG | DIASTOLIC BLOOD PRESSURE: 100 MMHG | WEIGHT: 158.29 LBS | HEART RATE: 61 BPM | TEMPERATURE: 101 F | HEIGHT: 61 IN

## 2020-11-12 DIAGNOSIS — R07.9 CHEST PAIN, UNSPECIFIED: ICD-10-CM

## 2020-11-12 DIAGNOSIS — R68.83 CHILLS (WITHOUT FEVER): ICD-10-CM

## 2020-11-12 DIAGNOSIS — J90 PLEURAL EFFUSION, NOT ELSEWHERE CLASSIFIED: ICD-10-CM

## 2020-11-12 LAB
ALBUMIN SERPL ELPH-MCNC: 4.5 G/DL — SIGNIFICANT CHANGE UP (ref 3.3–5)
ALP SERPL-CCNC: 115 U/L — SIGNIFICANT CHANGE UP (ref 40–120)
ALT FLD-CCNC: 17 U/L — SIGNIFICANT CHANGE UP (ref 10–45)
ANION GAP SERPL CALC-SCNC: 16 MMOL/L — SIGNIFICANT CHANGE UP (ref 5–17)
APPEARANCE UR: CLEAR — SIGNIFICANT CHANGE UP
APTT BLD: 38.3 SEC — HIGH (ref 27.5–35.5)
AST SERPL-CCNC: 20 U/L — SIGNIFICANT CHANGE UP (ref 10–40)
BACTERIA # UR AUTO: NEGATIVE — SIGNIFICANT CHANGE UP
BASOPHILS # BLD AUTO: 0.01 K/UL — SIGNIFICANT CHANGE UP (ref 0–0.2)
BASOPHILS NFR BLD AUTO: 0.2 % — SIGNIFICANT CHANGE UP (ref 0–2)
BILIRUB SERPL-MCNC: 0.2 MG/DL — SIGNIFICANT CHANGE UP (ref 0.2–1.2)
BILIRUB UR-MCNC: NEGATIVE — SIGNIFICANT CHANGE UP
BUN SERPL-MCNC: 14 MG/DL — SIGNIFICANT CHANGE UP (ref 7–23)
CALCIUM SERPL-MCNC: 10 MG/DL — SIGNIFICANT CHANGE UP (ref 8.4–10.5)
CHLORIDE SERPL-SCNC: 100 MMOL/L — SIGNIFICANT CHANGE UP (ref 96–108)
CO2 SERPL-SCNC: 24 MMOL/L — SIGNIFICANT CHANGE UP (ref 22–31)
COLOR SPEC: COLORLESS — SIGNIFICANT CHANGE UP
CREAT SERPL-MCNC: 0.74 MG/DL — SIGNIFICANT CHANGE UP (ref 0.5–1.3)
DIFF PNL FLD: NEGATIVE — SIGNIFICANT CHANGE UP
EOSINOPHIL # BLD AUTO: 0.02 K/UL — SIGNIFICANT CHANGE UP (ref 0–0.5)
EOSINOPHIL NFR BLD AUTO: 0.3 % — SIGNIFICANT CHANGE UP (ref 0–6)
EPI CELLS # UR: 1 /HPF — SIGNIFICANT CHANGE UP
GLUCOSE SERPL-MCNC: 114 MG/DL — HIGH (ref 70–99)
GLUCOSE UR QL: NEGATIVE — SIGNIFICANT CHANGE UP
HCT VFR BLD CALC: 28.8 % — LOW (ref 34.5–45)
HGB BLD-MCNC: 9.1 G/DL — LOW (ref 11.5–15.5)
IMM GRANULOCYTES NFR BLD AUTO: 0.2 % — SIGNIFICANT CHANGE UP (ref 0–1.5)
INR BLD: 1.19 RATIO — HIGH (ref 0.88–1.16)
KETONES UR-MCNC: NEGATIVE — SIGNIFICANT CHANGE UP
LEUKOCYTE ESTERASE UR-ACNC: NEGATIVE — SIGNIFICANT CHANGE UP
LYMPHOCYTES # BLD AUTO: 1.63 K/UL — SIGNIFICANT CHANGE UP (ref 1–3.3)
LYMPHOCYTES # BLD AUTO: 24.8 % — SIGNIFICANT CHANGE UP (ref 13–44)
MAGNESIUM SERPL-MCNC: 1.7 MG/DL — SIGNIFICANT CHANGE UP (ref 1.6–2.6)
MCHC RBC-ENTMCNC: 25.1 PG — LOW (ref 27–34)
MCHC RBC-ENTMCNC: 31.6 GM/DL — LOW (ref 32–36)
MCV RBC AUTO: 79.6 FL — LOW (ref 80–100)
MONOCYTES # BLD AUTO: 0.28 K/UL — SIGNIFICANT CHANGE UP (ref 0–0.9)
MONOCYTES NFR BLD AUTO: 4.3 % — SIGNIFICANT CHANGE UP (ref 2–14)
NEUTROPHILS # BLD AUTO: 4.63 K/UL — SIGNIFICANT CHANGE UP (ref 1.8–7.4)
NEUTROPHILS NFR BLD AUTO: 70.2 % — SIGNIFICANT CHANGE UP (ref 43–77)
NITRITE UR-MCNC: NEGATIVE — SIGNIFICANT CHANGE UP
NRBC # BLD: 0 /100 WBCS — SIGNIFICANT CHANGE UP (ref 0–0)
PH UR: 7 — SIGNIFICANT CHANGE UP (ref 5–8)
PLATELET # BLD AUTO: 197 K/UL — SIGNIFICANT CHANGE UP (ref 150–400)
POTASSIUM SERPL-MCNC: 4.1 MMOL/L — SIGNIFICANT CHANGE UP (ref 3.5–5.3)
POTASSIUM SERPL-SCNC: 4.1 MMOL/L — SIGNIFICANT CHANGE UP (ref 3.5–5.3)
PROCALCITONIN SERPL-MCNC: 0.04 NG/ML — SIGNIFICANT CHANGE UP (ref 0.02–0.1)
PROT SERPL-MCNC: 7.4 G/DL — SIGNIFICANT CHANGE UP (ref 6–8.3)
PROT UR-MCNC: NEGATIVE — SIGNIFICANT CHANGE UP
PROTHROM AB SERPL-ACNC: 14.2 SEC — HIGH (ref 10.6–13.6)
RBC # BLD: 3.62 M/UL — LOW (ref 3.8–5.2)
RBC # FLD: 15.8 % — HIGH (ref 10.3–14.5)
RBC CASTS # UR COMP ASSIST: 0 /HPF — SIGNIFICANT CHANGE UP (ref 0–4)
SODIUM SERPL-SCNC: 140 MMOL/L — SIGNIFICANT CHANGE UP (ref 135–145)
SP GR SPEC: 1.01 — SIGNIFICANT CHANGE UP (ref 1.01–1.02)
TROPONIN T, HIGH SENSITIVITY RESULT: 11 NG/L — SIGNIFICANT CHANGE UP (ref 0–51)
TROPONIN T, HIGH SENSITIVITY RESULT: 9 NG/L — SIGNIFICANT CHANGE UP (ref 0–51)
UROBILINOGEN FLD QL: NEGATIVE — SIGNIFICANT CHANGE UP
WBC # BLD: 6.58 K/UL — SIGNIFICANT CHANGE UP (ref 3.8–10.5)
WBC # FLD AUTO: 6.58 K/UL — SIGNIFICANT CHANGE UP (ref 3.8–10.5)
WBC UR QL: 0 /HPF — SIGNIFICANT CHANGE UP (ref 0–5)

## 2020-11-12 PROCEDURE — 93010 ELECTROCARDIOGRAM REPORT: CPT

## 2020-11-12 PROCEDURE — 71275 CT ANGIOGRAPHY CHEST: CPT | Mod: 26

## 2020-11-12 PROCEDURE — 99285 EMERGENCY DEPT VISIT HI MDM: CPT

## 2020-11-12 PROCEDURE — 71045 X-RAY EXAM CHEST 1 VIEW: CPT | Mod: 26

## 2020-11-12 PROCEDURE — 99214 OFFICE O/P EST MOD 30 MIN: CPT

## 2020-11-12 RX ORDER — PIPERACILLIN AND TAZOBACTAM 4; .5 G/20ML; G/20ML
3.38 INJECTION, POWDER, LYOPHILIZED, FOR SOLUTION INTRAVENOUS ONCE
Refills: 0 | Status: COMPLETED | OUTPATIENT
Start: 2020-11-12 | End: 2020-11-12

## 2020-11-12 RX ORDER — DIPHENHYDRAMINE HCL 50 MG
25 CAPSULE ORAL ONCE
Refills: 0 | Status: COMPLETED | OUTPATIENT
Start: 2020-11-12 | End: 2020-11-12

## 2020-11-12 RX ORDER — ACETAMINOPHEN 500 MG
975 TABLET ORAL ONCE
Refills: 0 | Status: COMPLETED | OUTPATIENT
Start: 2020-11-12 | End: 2020-11-12

## 2020-11-12 RX ADMIN — PIPERACILLIN AND TAZOBACTAM 200 GRAM(S): 4; .5 INJECTION, POWDER, LYOPHILIZED, FOR SOLUTION INTRAVENOUS at 19:46

## 2020-11-12 RX ADMIN — PIPERACILLIN AND TAZOBACTAM 3.38 GRAM(S): 4; .5 INJECTION, POWDER, LYOPHILIZED, FOR SOLUTION INTRAVENOUS at 22:01

## 2020-11-12 RX ADMIN — Medication 975 MILLIGRAM(S): at 19:46

## 2020-11-12 RX ADMIN — Medication 975 MILLIGRAM(S): at 22:01

## 2020-11-12 RX ADMIN — Medication 25 MILLIGRAM(S): at 22:01

## 2020-11-12 NOTE — ED ADULT NURSE NOTE - OBJECTIVE STATEMENT
59 y/o female coming in via EMS from [facility] complaining of fever/chest pain. AOx4, ambulatory, PMH leukemia, HTN, HLD, DM. 61 y/o female coming in via EMS from [facility] complaining of fever/chest pain. AOx4, ambulatory, PMH leukemia, HTN, HLD, DM. As per EMS, patient was on her way to [facility] for leukemia treatment when she began to experience chest pain. Upon arrival to [facility], patient began to experience rigors and chills. Pt. was given hot packs by EMS, not given leukemia treatment, and sent to [facility] ED. Pt. found to be febrile to 100.6 F upon arrival with chills. Pt. is well appearing. Upper right chest mediport present. Denies abdominal pain, SOB, N/V/D. Denies any other complaints. Pt. placed in hospital gown. Upper right chest port accessed as per MD Buckner. Pt. placed on cardiac monitor. Will continue to reassess.

## 2020-11-12 NOTE — ED PROVIDER NOTE - ATTENDING CONTRIBUTION TO CARE
------------ATTENDING NOTE------------   pt sent to ED from [facility] for concerns of chest pain, describes

## 2020-11-12 NOTE — ED PROVIDER NOTE - PHYSICAL EXAMINATION
GENERAL: Patient awake alert NAD.  HEENT: NC/AT.  LUNGS: CTAB, no wheezes or crackles.  CARDIAC: RRR, no m/r/g.  ABDOMEN: Soft, NT, ND, No rebound, guarding.  EXT: No edema. No calf tenderness. CV 2+DP/PT bilaterally.  MSK: No pain with movement, no deformities.  NEURO: A&Ox3. Moving all extremities.  SKIN: Warm and dry. No rash.  PSYCH: Normal affect.

## 2020-11-12 NOTE — ED PROVIDER NOTE - PROGRESS NOTE DETAILS
Ivan Montesinos, PGY2: D/w heme/onc fellow - agrees w/ infectious vs PE workup. Patient given Zosyn empirically. Day heme/onc team aware and will see patient in the morning. Patient TBA to hospitalist for fever pending BCx. Gifty Bella M.D. Resident  received call from CT tech, pt feeling warm and itchy after IV contrast. Son in law who is RN translated for patient on the phone, states she complains of itchiness and warmth, denies SOB, n/v, hives. Per son in law and CT records, pt received IV contrast in the past with no reaction. Pt examined at bedside, no hives, not in respiratory distress, appears itchy, but comfortable otherwise, not anaphylaxis, benadryl ordered.

## 2020-11-12 NOTE — ED ADULT NURSE REASSESSMENT NOTE - NS ED NURSE REASSESS COMMENT FT1
Pt endorses having generalized icthing s/p receiving IV contrast for CT scan. Skin is slightly reddened throughout. Pt denies difficulty breathing or difficulty swallowing. Airway patent. Benedryl given. Awaiting rad results.

## 2020-11-12 NOTE — ED PROVIDER NOTE - NS ED ROS FT
General: +fever, chills; denies weight loss/weight gain.  HENT: denies nasal congestion, sore throat, rhinorrhea, ear pain.  Eyes: denies visual changes, blurred vision, eye discharge, eye redness.  Neck: denies neck pain, neck swelling.  CV: +chest pain; denies palpitations.  Resp: denies difficulty breathing, cough.  Abdominal: denies nausea, vomiting, diarrhea, abdominal pain, blood in stool, dark stool.  MSK: denies muscle aches, bony pain, leg pain, leg swelling.  Neuro: denies headaches, numbness, tingling, dizziness, lightheadedness.  Skin: denies rashes, cuts, bruises.  Hematologic: denies unexplained bruises.

## 2020-11-12 NOTE — ED PROVIDER NOTE - OBJECTIVE STATEMENT
60F w/ PMHx of APL, HTN, HLD, DM p/w chills and CP.  Pt. is Greenlandic speaking,  used ([id number]).  Pain started today morning in the mid-sternal region w/ rads to back.  Pain is pleuritic, non-exertional, non-positional, non-reproducible.  Denies hx of MI, cardiac stents.  No associated n/v, diaphoresis.  Denies sick contacts.  Per son, CP was similar to previous gastritis CP and resolved w/ some dyspepsia meds.  She went to [facility] to get chemo but started developing rigors and CP then.

## 2020-11-13 ENCOUNTER — APPOINTMENT (OUTPATIENT)
Dept: INFUSION THERAPY | Facility: HOSPITAL | Age: 60
End: 2020-11-13

## 2020-11-13 DIAGNOSIS — R07.9 CHEST PAIN, UNSPECIFIED: ICD-10-CM

## 2020-11-13 DIAGNOSIS — I82.409 ACUTE EMBOLISM AND THROMBOSIS OF UNSPECIFIED DEEP VEINS OF UNSPECIFIED LOWER EXTREMITY: ICD-10-CM

## 2020-11-13 DIAGNOSIS — I10 ESSENTIAL (PRIMARY) HYPERTENSION: ICD-10-CM

## 2020-11-13 DIAGNOSIS — Z02.9 ENCOUNTER FOR ADMINISTRATIVE EXAMINATIONS, UNSPECIFIED: ICD-10-CM

## 2020-11-13 DIAGNOSIS — R50.9 FEVER, UNSPECIFIED: ICD-10-CM

## 2020-11-13 DIAGNOSIS — C92.40 ACUTE PROMYELOCYTIC LEUKEMIA, NOT HAVING ACHIEVED REMISSION: ICD-10-CM

## 2020-11-13 DIAGNOSIS — E11.9 TYPE 2 DIABETES MELLITUS WITHOUT COMPLICATIONS: ICD-10-CM

## 2020-11-13 DIAGNOSIS — Z29.9 ENCOUNTER FOR PROPHYLACTIC MEASURES, UNSPECIFIED: ICD-10-CM

## 2020-11-13 LAB
ALBUMIN SERPL ELPH-MCNC: 4.4 G/DL — SIGNIFICANT CHANGE UP (ref 3.3–5)
ALP SERPL-CCNC: 104 U/L — SIGNIFICANT CHANGE UP (ref 40–120)
ALT FLD-CCNC: 14 U/L — SIGNIFICANT CHANGE UP (ref 10–45)
ANION GAP SERPL CALC-SCNC: 12 MMOL/L — SIGNIFICANT CHANGE UP (ref 5–17)
AST SERPL-CCNC: 16 U/L — SIGNIFICANT CHANGE UP (ref 10–40)
BASOPHILS # BLD AUTO: 0.01 K/UL — SIGNIFICANT CHANGE UP (ref 0–0.2)
BASOPHILS NFR BLD AUTO: 0.2 % — SIGNIFICANT CHANGE UP (ref 0–2)
BILIRUB SERPL-MCNC: 0.3 MG/DL — SIGNIFICANT CHANGE UP (ref 0.2–1.2)
BUN SERPL-MCNC: 15 MG/DL — SIGNIFICANT CHANGE UP (ref 7–23)
CALCIUM SERPL-MCNC: 9.6 MG/DL — SIGNIFICANT CHANGE UP (ref 8.4–10.5)
CHLORIDE SERPL-SCNC: 102 MMOL/L — SIGNIFICANT CHANGE UP (ref 96–108)
CO2 SERPL-SCNC: 27 MMOL/L — SIGNIFICANT CHANGE UP (ref 22–31)
CREAT SERPL-MCNC: 0.79 MG/DL — SIGNIFICANT CHANGE UP (ref 0.5–1.3)
CULTURE RESULTS: SIGNIFICANT CHANGE UP
EOSINOPHIL # BLD AUTO: 0.05 K/UL — SIGNIFICANT CHANGE UP (ref 0–0.5)
EOSINOPHIL NFR BLD AUTO: 1 % — SIGNIFICANT CHANGE UP (ref 0–6)
GLUCOSE SERPL-MCNC: 107 MG/DL — HIGH (ref 70–99)
HCT VFR BLD CALC: 28.9 % — LOW (ref 34.5–45)
HGB BLD-MCNC: 9 G/DL — LOW (ref 11.5–15.5)
IMM GRANULOCYTES NFR BLD AUTO: 0.2 % — SIGNIFICANT CHANGE UP (ref 0–1.5)
LYMPHOCYTES # BLD AUTO: 1.71 K/UL — SIGNIFICANT CHANGE UP (ref 1–3.3)
LYMPHOCYTES # BLD AUTO: 34.3 % — SIGNIFICANT CHANGE UP (ref 13–44)
MAGNESIUM SERPL-MCNC: 2 MG/DL — SIGNIFICANT CHANGE UP (ref 1.6–2.6)
MCHC RBC-ENTMCNC: 24.9 PG — LOW (ref 27–34)
MCHC RBC-ENTMCNC: 31.1 GM/DL — LOW (ref 32–36)
MCV RBC AUTO: 79.8 FL — LOW (ref 80–100)
MONOCYTES # BLD AUTO: 0.27 K/UL — SIGNIFICANT CHANGE UP (ref 0–0.9)
MONOCYTES NFR BLD AUTO: 5.4 % — SIGNIFICANT CHANGE UP (ref 2–14)
NEUTROPHILS # BLD AUTO: 2.94 K/UL — SIGNIFICANT CHANGE UP (ref 1.8–7.4)
NEUTROPHILS NFR BLD AUTO: 58.9 % — SIGNIFICANT CHANGE UP (ref 43–77)
NRBC # BLD: 0 /100 WBCS — SIGNIFICANT CHANGE UP (ref 0–0)
PHOSPHATE SERPL-MCNC: 4.3 MG/DL — SIGNIFICANT CHANGE UP (ref 2.5–4.5)
PLATELET # BLD AUTO: 184 K/UL — SIGNIFICANT CHANGE UP (ref 150–400)
POTASSIUM SERPL-MCNC: 3.8 MMOL/L — SIGNIFICANT CHANGE UP (ref 3.5–5.3)
POTASSIUM SERPL-SCNC: 3.8 MMOL/L — SIGNIFICANT CHANGE UP (ref 3.5–5.3)
PROT SERPL-MCNC: 7.2 G/DL — SIGNIFICANT CHANGE UP (ref 6–8.3)
RBC # BLD: 3.62 M/UL — LOW (ref 3.8–5.2)
RBC # FLD: 15.8 % — HIGH (ref 10.3–14.5)
SARS-COV-2 IGG SERPL QL IA: NEGATIVE — SIGNIFICANT CHANGE UP
SARS-COV-2 IGM SERPL IA-ACNC: 1.07 INDEX — SIGNIFICANT CHANGE UP
SARS-COV-2 RNA SPEC QL NAA+PROBE: SIGNIFICANT CHANGE UP
SODIUM SERPL-SCNC: 141 MMOL/L — SIGNIFICANT CHANGE UP (ref 135–145)
SPECIMEN SOURCE: SIGNIFICANT CHANGE UP
WBC # BLD: 4.99 K/UL — SIGNIFICANT CHANGE UP (ref 3.8–10.5)
WBC # FLD AUTO: 4.99 K/UL — SIGNIFICANT CHANGE UP (ref 3.8–10.5)

## 2020-11-13 PROCEDURE — 99223 1ST HOSP IP/OBS HIGH 75: CPT | Mod: GC

## 2020-11-13 RX ORDER — VANCOMYCIN HCL 1 G
1000 VIAL (EA) INTRAVENOUS ONCE
Refills: 0 | Status: COMPLETED | OUTPATIENT
Start: 2020-11-13 | End: 2020-11-13

## 2020-11-13 RX ORDER — GLUCAGON INJECTION, SOLUTION 0.5 MG/.1ML
1 INJECTION, SOLUTION SUBCUTANEOUS ONCE
Refills: 0 | Status: DISCONTINUED | OUTPATIENT
Start: 2020-11-13 | End: 2020-11-15

## 2020-11-13 RX ORDER — PANTOPRAZOLE SODIUM 20 MG/1
40 TABLET, DELAYED RELEASE ORAL
Refills: 0 | Status: DISCONTINUED | OUTPATIENT
Start: 2020-11-13 | End: 2020-11-15

## 2020-11-13 RX ORDER — INSULIN LISPRO 100/ML
VIAL (ML) SUBCUTANEOUS AT BEDTIME
Refills: 0 | Status: DISCONTINUED | OUTPATIENT
Start: 2020-11-13 | End: 2020-11-15

## 2020-11-13 RX ORDER — CEFEPIME 1 G/1
2000 INJECTION, POWDER, FOR SOLUTION INTRAMUSCULAR; INTRAVENOUS EVERY 12 HOURS
Refills: 0 | Status: DISCONTINUED | OUTPATIENT
Start: 2020-11-13 | End: 2020-11-15

## 2020-11-13 RX ORDER — METOCLOPRAMIDE HCL 10 MG
10 TABLET ORAL
Refills: 0 | Status: DISCONTINUED | OUTPATIENT
Start: 2020-11-13 | End: 2020-11-15

## 2020-11-13 RX ORDER — DEXTROSE 50 % IN WATER 50 %
15 SYRINGE (ML) INTRAVENOUS ONCE
Refills: 0 | Status: DISCONTINUED | OUTPATIENT
Start: 2020-11-13 | End: 2020-11-15

## 2020-11-13 RX ORDER — ACETAMINOPHEN 500 MG
650 TABLET ORAL EVERY 6 HOURS
Refills: 0 | Status: DISCONTINUED | OUTPATIENT
Start: 2020-11-13 | End: 2020-11-14

## 2020-11-13 RX ORDER — VANCOMYCIN HCL 1 G
VIAL (EA) INTRAVENOUS
Refills: 0 | Status: DISCONTINUED | OUTPATIENT
Start: 2020-11-13 | End: 2020-11-15

## 2020-11-13 RX ORDER — DEXTROSE 50 % IN WATER 50 %
12.5 SYRINGE (ML) INTRAVENOUS ONCE
Refills: 0 | Status: DISCONTINUED | OUTPATIENT
Start: 2020-11-13 | End: 2020-11-15

## 2020-11-13 RX ORDER — ATORVASTATIN CALCIUM 80 MG/1
40 TABLET, FILM COATED ORAL AT BEDTIME
Refills: 0 | Status: DISCONTINUED | OUTPATIENT
Start: 2020-11-13 | End: 2020-11-15

## 2020-11-13 RX ORDER — CHLORHEXIDINE GLUCONATE 213 G/1000ML
1 SOLUTION TOPICAL DAILY
Refills: 0 | Status: DISCONTINUED | OUTPATIENT
Start: 2020-11-13 | End: 2020-11-15

## 2020-11-13 RX ORDER — INSULIN LISPRO 100/ML
VIAL (ML) SUBCUTANEOUS
Refills: 0 | Status: DISCONTINUED | OUTPATIENT
Start: 2020-11-13 | End: 2020-11-15

## 2020-11-13 RX ORDER — CEFEPIME 1 G/1
INJECTION, POWDER, FOR SOLUTION INTRAMUSCULAR; INTRAVENOUS
Refills: 0 | Status: DISCONTINUED | OUTPATIENT
Start: 2020-11-13 | End: 2020-11-15

## 2020-11-13 RX ORDER — GABAPENTIN 400 MG/1
100 CAPSULE ORAL THREE TIMES A DAY
Refills: 0 | Status: DISCONTINUED | OUTPATIENT
Start: 2020-11-13 | End: 2020-11-15

## 2020-11-13 RX ORDER — CEFEPIME 1 G/1
2000 INJECTION, POWDER, FOR SOLUTION INTRAMUSCULAR; INTRAVENOUS ONCE
Refills: 0 | Status: COMPLETED | OUTPATIENT
Start: 2020-11-13 | End: 2020-11-13

## 2020-11-13 RX ORDER — PIPERACILLIN AND TAZOBACTAM 4; .5 G/20ML; G/20ML
3.38 INJECTION, POWDER, LYOPHILIZED, FOR SOLUTION INTRAVENOUS EVERY 8 HOURS
Refills: 0 | Status: DISCONTINUED | OUTPATIENT
Start: 2020-11-13 | End: 2020-11-13

## 2020-11-13 RX ORDER — SODIUM CHLORIDE 9 MG/ML
1000 INJECTION, SOLUTION INTRAVENOUS
Refills: 0 | Status: DISCONTINUED | OUTPATIENT
Start: 2020-11-13 | End: 2020-11-15

## 2020-11-13 RX ORDER — DEXTROSE 50 % IN WATER 50 %
25 SYRINGE (ML) INTRAVENOUS ONCE
Refills: 0 | Status: DISCONTINUED | OUTPATIENT
Start: 2020-11-13 | End: 2020-11-15

## 2020-11-13 RX ORDER — VANCOMYCIN HCL 1 G
1000 VIAL (EA) INTRAVENOUS EVERY 12 HOURS
Refills: 0 | Status: DISCONTINUED | OUTPATIENT
Start: 2020-11-13 | End: 2020-11-15

## 2020-11-13 RX ORDER — PIPERACILLIN AND TAZOBACTAM 4; .5 G/20ML; G/20ML
3.38 INJECTION, POWDER, LYOPHILIZED, FOR SOLUTION INTRAVENOUS ONCE
Refills: 0 | Status: COMPLETED | OUTPATIENT
Start: 2020-11-13 | End: 2020-11-13

## 2020-11-13 RX ORDER — APIXABAN 2.5 MG/1
5 TABLET, FILM COATED ORAL EVERY 12 HOURS
Refills: 0 | Status: DISCONTINUED | OUTPATIENT
Start: 2020-11-13 | End: 2020-11-15

## 2020-11-13 RX ADMIN — Medication 1 DROP(S): at 05:50

## 2020-11-13 RX ADMIN — CHLORHEXIDINE GLUCONATE 1 APPLICATION(S): 213 SOLUTION TOPICAL at 16:59

## 2020-11-13 RX ADMIN — Medication 1 DROP(S): at 21:16

## 2020-11-13 RX ADMIN — GABAPENTIN 100 MILLIGRAM(S): 400 CAPSULE ORAL at 13:57

## 2020-11-13 RX ADMIN — PIPERACILLIN AND TAZOBACTAM 200 GRAM(S): 4; .5 INJECTION, POWDER, LYOPHILIZED, FOR SOLUTION INTRAVENOUS at 06:26

## 2020-11-13 RX ADMIN — APIXABAN 5 MILLIGRAM(S): 2.5 TABLET, FILM COATED ORAL at 18:28

## 2020-11-13 RX ADMIN — Medication 250 MILLIGRAM(S): at 21:16

## 2020-11-13 RX ADMIN — GABAPENTIN 100 MILLIGRAM(S): 400 CAPSULE ORAL at 05:49

## 2020-11-13 RX ADMIN — CEFEPIME 100 MILLIGRAM(S): 1 INJECTION, POWDER, FOR SOLUTION INTRAMUSCULAR; INTRAVENOUS at 13:10

## 2020-11-13 RX ADMIN — GABAPENTIN 100 MILLIGRAM(S): 400 CAPSULE ORAL at 21:16

## 2020-11-13 RX ADMIN — PANTOPRAZOLE SODIUM 40 MILLIGRAM(S): 20 TABLET, DELAYED RELEASE ORAL at 05:49

## 2020-11-13 RX ADMIN — Medication 250 MILLIGRAM(S): at 10:08

## 2020-11-13 RX ADMIN — ATORVASTATIN CALCIUM 40 MILLIGRAM(S): 80 TABLET, FILM COATED ORAL at 21:16

## 2020-11-13 RX ADMIN — APIXABAN 5 MILLIGRAM(S): 2.5 TABLET, FILM COATED ORAL at 05:49

## 2020-11-13 RX ADMIN — Medication 1 DROP(S): at 13:57

## 2020-11-13 NOTE — CONSULT NOTE ADULT - ASSESSMENT
INCOMPLETE NOTE 60/F w/ APL on chemo (dx May 2020; has regimen of ATRA and arsenic; follows Dr. Hurd at [facility]), HTN, HLD, T2DM, DVT (dx July 2020; on Eliquis), post-herpetic neuralgia (on gabapentin) with recent admission 10/9- 10/15 pain at the [facility] site / chest pain p/w worsening chest pain, chills found to have fever, started on broad spectrum antibiotics, ruling out infection. Hematology consulted given hx of APL on treatment and hx of DVT on Eliquis     #Acute promyelocytic leukemia  -started ATRA empirically on 5/23/20, then on arsenic trioxide on 5/27/20 when diagnosis was confirmed  -6/25/20 bone marrow bx showed persistent disease, retreated on 7/13/20 without evidence of leukemia  -started on cycle 1 of consolidation with GALA/ATRA, complicated by admissions for chest pain, found to have small loculated pleural effusion treated with empiric abx.  -Resumed GALA/ATRA on 10/5/20 QD, last dose on 10/9/20 prior to hospitalization.  -Last seen by Dr. Hurd at [facility] on 11/3, and resumed treatement with with GALA/ATRA on 11/3, last dose of GALA on 11/10  -Okay to hold ATRA/GALA while inpatient, once stable for discharge will follow up with Dr. Hurd as outpatient for further treatment  -Monitor CMP, LDH, uric acid, CBC w/ diff daily given recent chemo    #Fever  Patient with Tmax in ED on 11/12 to 102F, afebrile since  - currently on cefepime and vancomycin  - ruling out infectious causes, f/u blood cultures, urine culture, CXR no signs of pneumonia  - fever can possibly be due to her APL   - if infectious workup is negative, okay to monitor off antibiotics    #VTE, Dx July 2020  -was found to have a partially occlussive DVT in her R axillary vein, treated with lovenox initially, now on Eliquis 5mg qd   -OK to continue with anticoagulation given hx of VTE and given malignancy history of APL; if plts <50k or if bleeding, would hold full dose anticoagulation  -Can follow up with Dr. Hurd outpatient regarding length of Anticougulation.     Brielle Calle MD   Internal Medicine PGY-1  Pager: NS: [phone] [facility]: [phone]

## 2020-11-13 NOTE — H&P ADULT - NSHPPHYSICALEXAM_GEN_ALL_CORE
Vital Signs (24 Hrs):  T(C): 36.8 (11-13-20 @ 01:11), Max: 38.9 (11-12-20 @ 19:29)  HR: 62 (11-13-20 @ 01:11) (61 - 95)  BP: 116/73 (11-13-20 @ 01:11) (110/67 - 156/100)  RR: 20 (11-13-20 @ 01:11) (16 - 20)  SpO2: 98% (11-13-20 @ 01:11) (97% - 100%)  Wt(kg): --    GENERAL: NAD, lying in bed comfortably  HEAD:  Atraumatic, Normocephalic  EYES: EOMI, PERRLA, conjunctiva and sclera clear  ENT: Moist mucous membranes  NECK: Supple, No JVD  CHEST/LUNG: Clear to auscultation bilaterally; No rales, rhonchi, wheezing, or rubs. Unlabored respirations  HEART: Regular rate and rhythm; No murmurs, rubs, or gallops  ABDOMEN: Bowel sounds present; Soft, Nontender, Nondistended. No hepatomegaly  EXTREMITIES:  2+ Peripheral Pulses, brisk capillary refill. No clubbing, cyanosis, or edema  NERVOUS SYSTEM:  Alert & Oriented X3, speech clear. No deficits   MSK: FROM all 4 extremities, full and equal strength  PSYCH: normal behavior, affect, speech  SKIN: No rashes or lesions Vital Signs (24 Hrs):  T(C): 36.8 (11-13-20 @ 01:11), Max: 38.9 (11-12-20 @ 19:29)  HR: 62 (11-13-20 @ 01:11) (61 - 95)  BP: 116/73 (11-13-20 @ 01:11) (110/67 - 156/100)  RR: 20 (11-13-20 @ 01:11) (16 - 20)  SpO2: 98% (11-13-20 @ 01:11) (97% - 100%)  Wt(kg): --    GENERAL: obese, lying in bed comfortably, NAD  HEAD:  Atraumatic, Normocephalic  EYES: EOMI, PERRLA, conjunctiva and sclera clear  ENT: Moist mucous membranes  NECK: Supple, No JVD  CHEST/LUNG: Rales L middle and lower lung fields; no rhonchi, wheezing, or rubs. Not using accessory muscles.    HEART: Regular rate and rhythm; No murmurs, rubs, or gallops  ABDOMEN: Bowel sounds present; Soft, Nontender, Nondistended. No hepatomegaly  EXTREMITIES:  2+ Peripheral Pulses, brisk capillary refill. No clubbing, cyanosis, or edema  NERVOUS SYSTEM:  AAOx1, speech clear. Hearling loss b/l.  MSK: FROM all 4 extremities, full and equal strength  PSYCH: normal behavior, affect, speech  SKIN: No rashes or lesions Vital Signs (24 Hrs):  T(C): 36.8 (11-13-20 @ 01:11), Max: 38.9 (11-12-20 @ 19:29)  HR: 62 (11-13-20 @ 01:11) (61 - 95)  BP: 116/73 (11-13-20 @ 01:11) (110/67 - 156/100)  RR: 20 (11-13-20 @ 01:11) (16 - 20)  SpO2: 98% (11-13-20 @ 01:11) (97% - 100%)  Wt(kg): --    GENERAL: obese, lying in bed comfortably, NAD  HEAD:  Atraumatic, Normocephalic  EYES: EOMI, PERRLA, conjunctiva and sclera clear  ENT: Moist mucous membranes  NECK: Supple, No JVD  CHEST/LUNG: Rales L middle and lower lung fields; no rhonchi, wheezing, or rubs. Not using accessory muscles.    HEART: Regular rate and rhythm; No murmurs, rubs, or gallops  ABDOMEN: Bowel sounds present; Soft, Nontender, Nondistended. No hepatomegaly  EXTREMITIES:  2+ Peripheral Pulses, brisk capillary refill. No clubbing, cyanosis, or edema  NERVOUS SYSTEM:  AAOx1, speech clear. Hearing loss b/l.  MSK: FROM all 4 extremities, full and equal strength  PSYCH: normal behavior, affect, speech  SKIN: No rashes or lesions Vital Signs (24 Hrs):  T(C): 36.8 (11-13-20 @ 01:11), Max: 38.9 (11-12-20 @ 19:29)  HR: 62 (11-13-20 @ 01:11) (61 - 95)  BP: 116/73 (11-13-20 @ 01:11) (110/67 - 156/100)  RR: 20 (11-13-20 @ 01:11) (16 - 20)  SpO2: 98% (11-13-20 @ 01:11) (97% - 100%)  Wt(kg): --    GENERAL: obese, lying in bed comfortably, NAD  HEAD:  Atraumatic, Normocephalic  EYES: EOMI, PERRLA, conjunctiva and sclera clear  ENT: Moist mucous membranes  NECK: Supple, No JVD  CHEST/LUNG: Rales L middle and lower lung fields; no rhonchi, wheezing, or rubs. Not using accessory muscles.    HEART: Regular rate and rhythm; No murmurs, rubs, or gallops  ABDOMEN: Bowel sounds present; Soft, Nontender, Nondistended. No hepatomegaly  EXTREMITIES:  2+ Peripheral Pulses, brisk capillary refill. No clubbing, cyanosis, or edema  NERVOUS SYSTEM:  AAOx1, speech clear. Hearing loss b/l.  MSK: FROM all 4 extremities, full and equal strength  PSYCH: normal behavior, affect, speech  SKIN: No rashes or lesions  Mediport at chest wall with clear dressing surrounding. NO overlying skin erythema or purulence

## 2020-11-13 NOTE — PROGRESS NOTE ADULT - PROBLEM SELECTOR PLAN 2
Pleuritic in nature, reproducible with palpation. EKG unremarkable, trops negative. CTA sig for small pericardial effusion, neg for PE. Possibly 2/2 pericardial effusion vs pericarditis  --f/u TTE

## 2020-11-13 NOTE — H&P ADULT - PROBLEM SELECTOR PLAN 8
Transitions of Care Status:  1.  Name of PCP:  2.  PCP Contacted on Admission: [ ] Y    [ ] N    3.  PCP contacted at Discharge: [ ] Y    [ ] N    [ ] N/A  4.  Post-Discharge Appointment Date and Location:  5.  Summary of Handoff given to PCP: Transitions of Care Status:  1.  Name of PCP: Dr. Hurd  2.  PCP Contacted on Admission: [ ] Y    [ ] N    3.  PCP contacted at Discharge: [ ] Y    [ ] N    [ ] N/A  4.  Post-Discharge Appointment Date and Location:  5.  Summary of Handoff given to PCP:

## 2020-11-13 NOTE — PROGRESS NOTE ADULT - SUBJECTIVE AND OBJECTIVE BOX
Sohail Garza MD  PGY 1 Department of Internal Medicine  Pager: [phone] ([facility])   Pager: [phone] ([facility])      Patient is a 60y old  Female who presents with a chief complaint of Fever (2020 02:17)      SUBJECTIVE / OVERNIGHT EVENTS: No acute overnight events. Pt seen and examined. Feels better, still has chest pain. Denies fevers, chills, SOB, Abdominal pain, N/V, Constipation, Diarrhea        MEDICATIONS  (STANDING):  apixaban 5 milliGRAM(s) Oral every 12 hours  artificial  tears Solution 1 Drop(s) Both EYES three times a day  atorvastatin 40 milliGRAM(s) Oral at bedtime  chlorhexidine 2% Cloths 1 Application(s) Topical daily  gabapentin 100 milliGRAM(s) Oral three times a day  pantoprazole    Tablet 40 milliGRAM(s) Oral before breakfast  piperacillin/tazobactam IVPB.. 3.375 Gram(s) IV Intermittent every 8 hours  vancomycin  IVPB      vancomycin  IVPB 1000 milliGRAM(s) IV Intermittent every 12 hours    MEDICATIONS  (PRN):  bisacodyl 5 milliGRAM(s) Oral daily PRN Constipation  metoclopramide 10 milliGRAM(s) Oral Before meals and at bedtime PRN nausea      I&O's Summary      Vital Signs Last 24 Hrs  T(C): 36.6 (2020 05:20), Max: 38.9 (2020 19:29)  T(F): 97.9 (2020 05:20), Max: 102 (2020 19:29)  HR: 80 (2020 05:20) (61 - 95)  BP: 118/70 (2020 05:20) (110/67 - 156/100)  BP(mean): --  RR: 20 (2020 05:20) (16 - 20)  SpO2: 97% (2020 05:20) (96% - 100%)    CAPILLARY BLOOD GLUCOSE          PHYSICAL EXAM:  GENERAL: Laying comfortably, NAD  EYES: EOMI, PERRL, no scleral icterus  NECK: No JVD  LUNG: Clear to auscultation bilaterally; No wheeze, crackles or rhonci  HEART: TTP anterior chest/sternum. Regular rate and rhythm; No murmurs, rubs, or gallops  ABDOMEN: Soft, Nontender, Nondistended  EXTREMITIES:  No LE edema, 2+ Peripheral Pulses, No clubbing, cyanosis, or edema  PSYCH: AAOx3  NEUROLOGY: non-focal, strength 5/5 in all extremities, sensation intact  SKIN: No rashes or lesions    LABS:                        9.0    4.99  )-----------( 184      ( 2020 06:29 )             28.9     Auto Eosinophil # 0.05  / Auto Eosinophil % 1.0   / Auto Neutrophil # 2.94  / Auto Neutrophil % 58.9  / BANDS % x                            9.1    6.58  )-----------( 197      ( 2020 19:51 )             28.8     Auto Eosinophil # 0.02  / Auto Eosinophil % 0.3   / Auto Neutrophil # 4.63  / Auto Neutrophil % 70.2  / BANDS % x        11-13    141  |  102  |  15  ----------------------------<  107<H>  3.8   |  27  |  0.79  -12    140  |  100  |  14  ----------------------------<  114<H>  4.1   |  24  |  0.74    Ca    9.6      2020 06:29  Mg     2.0     11-  Phos  4.3     11-  TPro  7.2  /  Alb  4.4  /  TBili  0.3  /  DBili  x   /  AST  16  /  ALT  14  /  AlkPhos  104  11-13  TPro  7.4  /  Alb  4.5  /  TBili  0.2  /  DBili  x   /  AST  20  /  ALT  17  /  AlkPhos  115  11-12    PT/INR - ( 2020 19:51 )   PT: 14.2 sec;   INR: 1.19 ratio         PTT - ( 2020 19:51 )  PTT:38.3 sec      Urinalysis Basic - ( 2020 20:06 )    Color: Colorless / Appearance: Clear / S.010 / pH: x  Gluc: x / Ketone: Negative  / Bili: Negative / Urobili: Negative   Blood: x / Protein: Negative / Nitrite: Negative   Leuk Esterase: Negative / RBC: 0 /hpf / WBC 0 /HPF   Sq Epi: x / Non Sq Epi: 1 /hpf / Bacteria: Negative            RADIOLOGY & ADDITIONAL TESTS:    Imaging Personally Reviewed:    Consultant(s) Notes Reviewed:      Care Discussed with Consultants/Other Providers:

## 2020-11-13 NOTE — H&P ADULT - HISTORY OF PRESENT ILLNESS
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 Hurd of [facility]), HTN, HLD, DM, HFpEF, recurrent infection including PNA, provoked UE DVT (on Eliquis), p/w chills and CP.    Of note, pt is being followed by outpt Cards for CP.  Treated for HFpEF.  NST neg.  SPECT neg.  Possible LHC vs. CTA.  Pericardial effusion resolved on repeat TTE.      In the ED, 60/F w/ APL (on ATRA/GALA, follows w/ Dr. Hurd of [facility]), HTN, HLD, DM, HFpEF, recurrent infection including PNA, provoked UE DVT (on Eliquis), p/w chills and CP.  CP started yesterday morning and is mid-thorax, non-radiating sharp, worsens w/ inspiration; she's had this pain before, last time when she was hospitalized.  Did not take anything for it, but it has considerably lessened by the time of this interview.  Also denies fever, chills at this time.  No SOB, cough, wheezing, abd pain, N/V/D/C, leg swelling.  Of note, pt was admitted mid-October for septic shock 2/2 mediport cellulitis that improved on Vanc and Zosyn, then Cefepime (total 7 day abx course).    Pt is following w/ Dr. Hurd for APL and has been on ATRA/GALA.  Chemo tx has been interrupted by frequent hospitalization for infection.  She is uncertain when her last chemo session was.  Review of Dr. Hurd's note showed that pt's fam declined additional rounds of chemo tx post-hospitalization because they want her to get stronger and are seeking a second opinion from [facility]. Pt being followed by pulm outpt.  Outpt CT showed resolution of PLEF, and pain was though to be mainly [facility].  Pt is being followed by outpt Cards for mgmt of HFpEF.  NST neg.  SPECT neg.  Possible LHC vs. CTA.  Pericardial effusion resolved on repeat TTE.      In the ED, Tmax 102, HR 61-80, /100 --> 110/67, RR 19, SpO2 97%RA.  Labs showed anemia to 9.1 Hb.  .  CXR and CTA obtained.  Given tylenol 975, benedryl, and Zosyn x1 (19:46).

## 2020-11-13 NOTE — H&P ADULT - ATTENDING COMMENTS
[place] ID number [id number] [place] ID number [id number]    This patient is a 61yo lady with PMH of APL, htn, hLd, T2DM, HFpEF, hx DVT, recent hospitalization for septic shock 2/2 cellulitis who presents to the ED due to chest pain. The patient complains that chest pain is worsened with inspiration. She denies dyspnea, cough, palpitations. She attended her chemotherapy session at [facility] prior to admission. Pt denies any sick contacts.  Patient on exam is alert and conversant in Sinhala. Mucous membranes are moist, lungs with audible dry crackles at bases bilaterally, cardiac exam regular rate and rhythm normal S1S2, abd soft and nontender, no pedal edema, chest wall mediport without erythema or exudates. Chest pain is reproducible to palpation.   Agree with plan noted above. C/w zosyn. Follow up cultures. Reproducible pleuritic chest pain seems less likely related to cardiac cause. 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 The patient complains that chest pain is worsened with inspiration. She denies dyspnea, cough, palpitations. She attended her chemotherapy session at [facility] prior to admission. Pt denies any sick contacts.  Patient on exam is alert and conversant in Yakut. Mucous membranes are moist, lungs with audible dry crackles at bases bilaterally, cardiac exam regular rate and rhythm normal S1S2, abd soft and nontender, no pedal edema, chest wall mediport without erythema or exudates. Chest pain is reproducible to palpation.   Agree with plan noted above. C/w zosyn. Follow up cultures. Reproducible pleuritic chest pain seems less likely related to cardiac cause. TTE on 11/5 cardiology [employer] note indicates that patient had no pericardial effusion, yet effusion identified on today's CT.  [employer] note also indicated patient had recent NST which was negative for ischemia (pt sees Dr. Eleno Walter). Check repeat TTE. Will need to hold off on further chemotherapy pending assurance of resolution of any possible infectious process.

## 2020-11-13 NOTE — H&P ADULT - NSICDXPASTMEDICALHX_GEN_ALL_CORE_FT
PAST MEDICAL HISTORY:  APL (acute promyelocytic leukemia)     Diabetes     HLD (hyperlipidemia)     Hypertension      PAST MEDICAL HISTORY:  APL (acute promyelocytic leukemia)     Diabetes     DVT (deep venous thrombosis) Upper extremity, likely catheter related    HLD (hyperlipidemia)     Hypertension

## 2020-11-13 NOTE — PROGRESS NOTE ADULT - ASSESSMENT
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 Hurd of [facility]), HTN, HLD, DM, HFpEF (50%), recurrent infection including PNA and most recently mediport cellulitis, provoked UE DVT (on Eliquis), p/w chills and CP.  Found febrile to 102.  CP pleuritic, not likely ACS.

## 2020-11-13 NOTE — H&P ADULT - ASSESSMENT
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 Hurd of [facility]), HTN, HLD, DM, HFpEF, recurrent infection including PNA, provoked UE DVT (on Eliquis), p/w chills and CP.  Found     60/F w/ APL (on ATRA/GALA, follows w/ Dr. Hurd of [facility]), HTN, HLD, DM, HFpEF (50%), recurrent infection including PNA and most recently mediport cellulitis, provoked UE DVT (on Eliquis), p/w chills and CP.  Found febrile to 102.  CP pleuritic.       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 Hurd of [facility]), HTN, HLD, DM, HFpEF (50%), recurrent infection including PNA and most recently mediport cellulitis, provoked UE DVT (on Eliquis), p/w chills and CP.  Found febrile to 102.  CP pleuritic, not likely ACS.

## 2020-11-13 NOTE — H&P ADULT - PROBLEM SELECTOR PLAN 1
- Given hx of rapid decompensation 2/2 infection, will tx empirically w/ Zosyn  - Fever may also be caused by pt's neoplasm too  - Heme c/s in the AM - Given hx of rapid decompensation 2/2 infection, will tx empirically w/ Zosyn  - consider adding Vanc for MRSA coverage if pt continues to be febrile on Zosyn  - Fever may also be caused by pt's neoplasm  - Heme c/s in the AM - Given hx of rapid decompensation 2/2 infection, will tx empirically w/ Zosyn  - consider adding Vanc for MRSA coverage if pt continues to be febrile on Zosyn  - No clear source at this time-- pt not endorsing abdominal symptoms. RVP negative. UA negative. CT chest did not identify acute pathology. Skin around mediport without erythema or purulence  - Fever may also be caused by pt's neoplasm  - Heme c/s in the AM  - follow up cultures  check procalcitonin

## 2020-11-13 NOTE — H&P ADULT - NSHPLABSRESULTS_GEN_ALL_CORE
9.1    6.58  )-----------( 197      ( 2020 19:51 )             28.8       11-12    140  |  100  |  14  ----------------------------<  114<H>  4.1   |  24  |  0.74    Ca    10.0      2020 19:51  Mg     1.7     -    TPro  7.4  /  Alb  4.5  /  TBili  0.2  /  DBili  x   /  AST  20  /  ALT  17  /  AlkPhos  115  11-12              Urinalysis Basic - ( 2020 20:06 )    Color: Colorless / Appearance: Clear / S.010 / pH: x  Gluc: x / Ketone: Negative  / Bili: Negative / Urobili: Negative   Blood: x / Protein: Negative / Nitrite: Negative   Leuk Esterase: Negative / RBC: 0 /hpf / WBC 0 /HPF   Sq Epi: x / Non Sq Epi: 1 /hpf / Bacteria: Negative        PT/INR - ( 2020 19:51 )   PT: 14.2 sec;   INR: 1.19 ratio         PTT - ( 2020 19:51 )  PTT:38.3 sec    Lactate Trend            CAPILLARY BLOOD GLUCOSE ...  ...                9.1    6.58  )-----------( 197      ( 2020 19:51 )             28.8       -12    140  |  100  |  14  ----------------------------<  114<H>  4.1   |  24  |  0.74    Ca    10.0      2020 19:51  Mg     1.7         TPro  7.4  /  Alb  4.5  /  TBili  0.2  /  DBili  x   /  AST  20  /  ALT  17  /  AlkPhos  115  -        Urinalysis Basic - ( 2020 20:06 )    Color: Colorless / Appearance: Clear / S.010 / pH: x  Gluc: x / Ketone: Negative  / Bili: Negative / Urobili: Negative   Blood: x / Protein: Negative / Nitrite: Negative   Leuk Esterase: Negative / RBC: 0 /hpf / WBC 0 /HPF   Sq Epi: x / Non Sq Epi: 1 /hpf / Bacteria: Negative    PT/INR - ( 2020 19:51 )   PT: 14.2 sec;   INR: 1.19 ratio    PTT - ( 2020 19:51 )  PTT:38.3 sec    Lactate Trend    CAPILLARY BLOOD GLUCOSE    < from: CT Angio Chest w/ IV Cont (20 @ 21:37) >    FINDINGS:    LUNGS AND AIRWAYS: Patent central airways. Unchanged appearance of biapical and bibasilar scarring, right greater than left. Unchanged bilateral calcified granulomas.  PLEURA: No pleural effusion.  MEDIASTINUM AND CESAR: No mediastinal lymphadenopathy.  VESSELS: No main, lobar, or segmental pulmonary embolism. Evaluation of the subsegmental pulmonary arteries is limited by respiratory motion and contrast bolus. Main pulmonary artery is not dilated. Thoracic aorta is normal in caliber. Right chest wall vascular infusion port catheter tip is in the SVC.  HEART: Heart size is normal. Small pericardial effusion without significant interval change compared with prior exam.  CHEST WALL AND LOWER NECK: Unchanged size and appearance of mildly prominent bilateral axillary lymph nodes.  VISUALIZED UPPER ABDOMEN: Within normal limits.  BONES: Degenerative changes.    IMPRESSION:    No main, lobar, or segmental pulmonary embolism. Evaluation of subsegmental pulmonary arteries is degraded by respiratory motion and poor opacification.    No acute parenchymal or pleural lung disease.    Small pericardial effusion, unchanged.    < end of copied text > Labs and CXR reviewed by me.                9.1    6.58  )-----------( 197      ( 2020 19:51 )             28.8       11-    140  |  100  |  14  ----------------------------<  114<H>  4.1   |  24  |  0.74    Ca    10.0      2020 19:51  Mg     1.7     -    TPro  7.4  /  Alb  4.5  /  TBili  0.2  /  DBili  x   /  AST  20  /  ALT  17  /  AlkPhos  115  11-12        Urinalysis Basic - ( 2020 20:06 )    Color: Colorless / Appearance: Clear / S.010 / pH: x  Gluc: x / Ketone: Negative  / Bili: Negative / Urobili: Negative   Blood: x / Protein: Negative / Nitrite: Negative   Leuk Esterase: Negative / RBC: 0 /hpf / WBC 0 /HPF   Sq Epi: x / Non Sq Epi: 1 /hpf / Bacteria: Negative    PT/INR - ( 2020 19:51 )   PT: 14.2 sec;   INR: 1.19 ratio    PTT - ( 2020 19:51 )  PTT:38.3 sec    Lactate Trend    CAPILLARY BLOOD GLUCOSE    < from: CT Angio Chest w/ IV Cont (20 @ 21:37) >    FINDINGS:    LUNGS AND AIRWAYS: Patent central airways. Unchanged appearance of biapical and bibasilar scarring, right greater than left. Unchanged bilateral calcified granulomas.  PLEURA: No pleural effusion.  MEDIASTINUM AND CESAR: No mediastinal lymphadenopathy.  VESSELS: No main, lobar, or segmental pulmonary embolism. Evaluation of the subsegmental pulmonary arteries is limited by respiratory motion and contrast bolus. Main pulmonary artery is not dilated. Thoracic aorta is normal in caliber. Right chest wall vascular infusion port catheter tip is in the SVC.  HEART: Heart size is normal. Small pericardial effusion without significant interval change compared with prior exam.  CHEST WALL AND LOWER NECK: Unchanged size and appearance of mildly prominent bilateral axillary lymph nodes.  VISUALIZED UPPER ABDOMEN: Within normal limits.  BONES: Degenerative changes.    IMPRESSION:    No main, lobar, or segmental pulmonary embolism. Evaluation of subsegmental pulmonary arteries is degraded by respiratory motion and poor opacification.    No acute parenchymal or pleural lung disease.    Small pericardial effusion, unchanged.    < end of copied text >

## 2020-11-13 NOTE — CONSULT NOTE ADULT - SUBJECTIVE AND OBJECTIVE BOX
Hematology Consult Note    HPI:  60/F w/ APL (on ATRA/GALA, follows w/ Dr. Hurd of [facility]), HTN, HLD, DM, HFpEF, recurrent infection including PNA, provoked UE DVT (on Eliquis), p/w chills and CP.  CP started yesterday morning and is mid-thorax, non-radiating sharp, worsens w/ inspiration; she's had this pain before, last time when she was hospitalized.  Did not take anything for it, but it has considerably lessened by the time of this interview.  Also denies fever, chills at this time.  No SOB, cough, wheezing, abd pain, N/V/D/C, leg swelling.  Of note, pt was admitted mid-October for septic shock 2/2 mediport cellulitis that improved on Vanc and Zosyn, then Cefepime (total 7 day abx course).    Pt is following w/ Dr. Hurd for APL and has been on ATRA/GALA.  Chemo tx has been interrupted by frequent hospitalization for infection.  She is uncertain when her last chemo session was.  Review of Dr. Hurd's note showed that pt's fam declined additional rounds of chemo tx post-hospitalization because they want her to get stronger and are seeking a second opinion from [facility]. Pt being followed by pulm outpt.  Outpt CT showed resolution of PLEF, and pain was though to be mainly [facility].  Pt is being followed by outpt Cards for mgmt of HFpEF.  NST neg.  SPECT neg.  Possible LHC vs. CTA.  Pericardial effusion resolved on repeat TTE.      In the ED, Tmax 102, HR 61-80, /100 --> 110/67, RR 19, SpO2 97%RA.  Labs showed anemia to 9.1 Hb.  .  CXR and CTA obtained.  Given tylenol 975, benedryl, and Zosyn x1 (19:46).   (13 Nov 2020 02:17)      Allergies    adhesives (Pruritus)  No Known Drug Allergies    Intolerances        MEDICATIONS  (STANDING):  apixaban 5 milliGRAM(s) Oral every 12 hours  artificial  tears Solution 1 Drop(s) Both EYES three times a day  atorvastatin 40 milliGRAM(s) Oral at bedtime  chlorhexidine 2% Cloths 1 Application(s) Topical daily  gabapentin 100 milliGRAM(s) Oral three times a day  pantoprazole    Tablet 40 milliGRAM(s) Oral before breakfast  vancomycin  IVPB      vancomycin  IVPB 1000 milliGRAM(s) IV Intermittent every 12 hours    MEDICATIONS  (PRN):  bisacodyl 5 milliGRAM(s) Oral daily PRN Constipation  metoclopramide 10 milliGRAM(s) Oral Before meals and at bedtime PRN nausea      PAST MEDICAL & SURGICAL HISTORY:  DVT (deep venous thrombosis)  Upper extremity, likely catheter related    APL (acute promyelocytic leukemia)    HLD (hyperlipidemia)    Hypertension    Diabetes    No significant past surgical history        FAMILY HISTORY:  Family history of diabetes mellitus  Mother        SOCIAL HISTORY: No EtOH, no tobacco    REVIEW OF SYSTEMS:    CONSTITUTIONAL: No weakness, fevers or chills  EYES/ENT: No visual changes;  No vertigo or throat pain   NECK: No pain or stiffness  RESPIRATORY: No cough, wheezing, hemoptysis; No shortness of breath  CARDIOVASCULAR: No chest pain or palpitations  GASTROINTESTINAL: No abdominal or epigastric pain. No nausea, vomiting, or hematemesis; No diarrhea or constipation. No melena or hematochezia.  GENITOURINARY: No dysuria, frequency or hematuria  NEUROLOGICAL: No numbness or weakness  SKIN: No itching, burning, rashes, or lesions   All other review of systems is negative unless indicated above.    Height (cm): 154.9 (11-12 @ 17:51)  Weight (kg): 71.8 (11-12 @ 17:51)  BMI (kg/m2): 29.9 (11-12 @ 17:51)  BSA (m2): 1.71 (11-12 @ 17:51)    T(F): 97.9 (11-13-20 @ 05:20), Max: 102 (11-12-20 @ 19:29)  HR: 80 (11-13-20 @ 05:20)  BP: 118/70 (11-13-20 @ 05:20)  RR: 20 (11-13-20 @ 05:20)  SpO2: 97% (11-13-20 @ 05:20)  Wt(kg): --    GENERAL: NAD, well-developed  HEAD:  Atraumatic, Normocephalic  EYES: EOMI, PERRLA, conjunctiva and sclera clear  NECK: Supple, No JVD  CHEST/LUNG: Clear to auscultation bilaterally; No wheeze  HEART: Regular rate and rhythm; No murmurs, rubs, or gallops  ABDOMEN: Soft, Nontender, Nondistended; Bowel sounds present  EXTREMITIES:  2+ Peripheral Pulses, No clubbing, cyanosis, or edema  NEUROLOGY: non-focal  SKIN: No rashes or lesions                          9.0    4.99  )-----------( 184      ( 13 Nov 2020 06:29 )             28.9       11-13    141  |  102  |  15  ----------------------------<  107<H>  3.8   |  27  |  0.79    Ca    9.6      13 Nov 2020 06:29  Phos  4.3     11-13  Mg     2.0     11-13    TPro  7.2  /  Alb  4.4  /  TBili  0.3  /  DBili  x   /  AST  16  /  ALT  14  /  AlkPhos  104  11-13      Magnesium, Serum: 2.0 mg/dL (11-13 @ 06:29)  Phosphorus Level, Serum: 4.3 mg/dL (11-13 @ 06:29)  Magnesium, Serum: 1.7 mg/dL (11-12 @ 19:51)       Hematology Consult Note    HPI:  60/F w/ APL (Dx May 2020, on ATRA/GALA, follows w/ Dr. Hurd of [facility]), HTN, HLD, DM, HFpEF, recurrent infection including PNA, provoked UE DVT (on Eliquis), p/w chills and CP.  CP started yesterday morning and is mid-thorax, non-radiating sharp, worsens w/ inspiration; she's had this pain before, last time when she was hospitalized.  Did not take anything for it, but it has considerably lessened by the time of this interview.  Also denies fever, chills at this time.  No SOB, cough, wheezing, abd pain, N/V/D/C, leg swelling.  Of note, pt was admitted mid-October for septic shock 2/2 mediport cellulitis that improved on Vanc and Zosyn, then Cefepime (total 7 day abx course).    Pt is following becky/ Dr. Hurd for APL and has been on ATRA/GALA.  Chemo tx has been interrupted by frequent hospitalization for infection.  She is uncertain when her last chemo session was.  Review of Dr. Hurd's note showed that pt's fam declined additional rounds of chemo tx post-hospitalization because they want her to get stronger and are seeking a second opinion from [facility]. Pt being followed by pulm outpt.  Outpt CT showed resolution of PLEF, and pain was though to be mainly [facility].  Pt is being followed by outpt Cards for mgmt of HFpEF.  NST neg.  SPECT neg.  Possible LHC vs. CTA.  Pericardial effusion resolved on repeat TTE.      In the ED, Tmax 102, HR 61-80, /100 --> 110/67, RR 19, SpO2 97%RA.  Labs showed anemia to 9.1 Hb.  .  CXR and CTA obtained.  Given tylenol 975, benedryl, and Zosyn x1 (19:46).   (13 Nov 2020 02:17)    Spoke to patient using [employer]  [id number]. Hematology consulted given the patients history of APL and VTE. Patient states she restarted treatment with ATRA/GALA on 11/3 after seeing Dr. Hurd, patient has been taking Tretinoin daily since 11/3 and has recived GALA treatment on 11/3 and 11/10. After her most recent treatment she started developing some chest pain and chills yesterday, and presented to the ED found to have a fever to 102. She appears and reports feeling improved today. She is currently denying any fevers, chills, n/v, chest pain, SOB, abdominal pain, dysuria. No other complaints. Spoke to son over phone for additional information, reprots that his mother usually gets these symptoms after starting her chemotherapy treatments and has been brought to the hospital for similar symptoms in the past.      Allergies    adhesives (Pruritus)  No Known Drug Allergies    Intolerances    MEDICATIONS  (STANDING):  apixaban 5 milliGRAM(s) Oral every 12 hours  artificial  tears Solution 1 Drop(s) Both EYES three times a day  atorvastatin 40 milliGRAM(s) Oral at bedtime  chlorhexidine 2% Cloths 1 Application(s) Topical daily  gabapentin 100 milliGRAM(s) Oral three times a day  pantoprazole    Tablet 40 milliGRAM(s) Oral before breakfast  vancomycin  IVPB      vancomycin  IVPB 1000 milliGRAM(s) IV Intermittent every 12 hours    MEDICATIONS  (PRN):  bisacodyl 5 milliGRAM(s) Oral daily PRN Constipation  metoclopramide 10 milliGRAM(s) Oral Before meals and at bedtime PRN nausea    PAST MEDICAL & SURGICAL HISTORY:  DVT (deep venous thrombosis)  Upper extremity, likely catheter related    APL (acute promyelocytic leukemia)    HLD (hyperlipidemia)    Hypertension    Diabetes    No significant past surgical history    FAMILY HISTORY:  Family history of diabetes mellitus  Mother    SOCIAL HISTORY: No EtOH, no tobacco    REVIEW OF SYSTEMS:    CONSTITUTIONAL: No weakness, fevers or chills  EYES/ENT: No visual changes;  No vertigo or throat pain   NECK: No pain or stiffness  RESPIRATORY: No cough, wheezing, hemoptysis; No shortness of breath  CARDIOVASCULAR: No chest pain or palpitations  GASTROINTESTINAL: No abdominal or epigastric pain. No nausea, vomiting, or hematemesis  GENITOURINARY: No dysuria, frequency or hematuria  NEUROLOGICAL: No numbness or weakness  SKIN: No itching, burning, rashes, or lesions   All other review of systems is negative unless indicated above.    Height (cm): 154.9 (11-12 @ 17:51)  Weight (kg): 71.8 (11-12 @ 17:51)  BMI (kg/m2): 29.9 (11-12 @ 17:51)  BSA (m2): 1.71 (11-12 @ 17:51)    T(F): 97.9 (11-13-20 @ 05:20), Max: 102 (11-12-20 @ 19:29)  HR: 80 (11-13-20 @ 05:20)  BP: 118/70 (11-13-20 @ 05:20)  RR: 20 (11-13-20 @ 05:20)  SpO2: 97% (11-13-20 @ 05:20)  Wt(kg): --    GENERAL: NAD, well-developed  HEAD:  Atraumatic, Normocephalic  EYES: EOMI, conjunctiva and sclera clear  NECK: Supple, No JVD  CHEST/LUNG: Clear to auscultation bilaterally; No wheeze or crackles  HEART: Regular rate and rhythm; No murmurs, rubs, or gallops  ABDOMEN: Soft, Nontender, Nondistended; Bowel sounds present  EXTREMITIES:  2+ Peripheral Pulses, No clubbing, cyanosis, or edema  NEUROLOGY: non-focal  SKIN: No rashes or lesions                        9.0    4.99  )-----------( 184      ( 13 Nov 2020 06:29 )             28.9     11-13    141  |  102  |  15  ----------------------------<  107<H>  3.8   |  27  |  0.79    Ca    9.6      13 Nov 2020 06:29  Phos  4.3     11-13  Mg     2.0     11-13    TPro  7.2  /  Alb  4.4  /  TBili  0.3  /  DBili  x   /  AST  16  /  ALT  14  /  AlkPhos  104  11-13    Magnesium, Serum: 2.0 mg/dL (11-13 @ 06:29)  Phosphorus Level, Serum: 4.3 mg/dL (11-13 @ 06:29)  Magnesium, Serum: 1.7 mg/dL (11-12 @ 19:51)

## 2020-11-13 NOTE — H&P ADULT - PROBLEM SELECTOR PLAN 3
- is supposed to be on ATRA + arsenic but pt's family declined tx after last hospitalization and are/were seeking a second opinion from [facility]  - Heme c/s in the AM - last ATRA/GALA 11/10  - Heme c/s in the AM - last ATRA/GALA 11/10  - hold off further chemotherapy until resolution of suspected infection  - Heme c/s in the AM

## 2020-11-13 NOTE — H&P ADULT - PROBLEM SELECTOR PLAN 4
- hx of R axillary DVT s/p Lovenox currently on Eliquis  - c/w Eliquis   - consider moving to ppx AC because inciting event--catheter--has been removed - hx of R axillary DVT s/p Lovenox currently on Eliquis  - c/w Eliquis   - consider moving to ppx AC because inciting event--catheter--has been removed, d/w hematology if patient should have reepeat VA duplex US to ensure resolution of DVT

## 2020-11-13 NOTE — PROVIDER CONTACT NOTE (OTHER) - ASSESSMENT
pt A&Ox4 bangla speaking ([facility]) pt in no distress and states chest pain has improved and she has no pain right now. pt has mediport and states she went for chemotherapy yesterday but chemo wasn't given due to symptoms she was having. pt ambulates and independent with ADL's

## 2020-11-13 NOTE — H&P ADULT - PROBLEM SELECTOR PLAN 2
- pleuritic, non-cardiac  - uncertain etiology - pleuritic, non-cardiac  - uncertain etiology at this time  - given small pericardial effusion noted on latest CTA chest, would obtain TTE - pleuritic in nature, reproducible with palpation  - uncertain etiology at this time  - given small pericardial effusion noted on latest CTA chest, would obtain TTE

## 2020-11-13 NOTE — H&P ADULT - NSHPREVIEWOFSYSTEMS_GEN_ALL_CORE
REVIEW OF SYSTEMS:  CONSTITUTIONAL: No fever, chills, night sweats, or fatigue  EYES: No eye pain, visual disturbances, or discharge  ENMT:  No difficulty hearing, tinnitus, vertigo; No sinus or throat pain  NECK: No pain or stiffness  BREASTS: No pain, masses, or nipple discharge  RESPIRATORY: No cough, wheezing, or hemoptysis; No shortness of breath  CARDIOVASCULAR: No chest pain, palpitations, dizziness, or leg swelling  GASTROINTESTINAL: No abdominal or epigastric pain. No nausea, vomiting, or hematemesis; No diarrhea or constipation. No melena or hematochezia.  GENITOURINARY: No dysuria, frequency, hematuria, or incontinence  NEUROLOGICAL: No headaches, memory loss, loss of strength, numbness, or tremors  SKIN: No itching, burning, rashes, or lesions   LYMPH NODES: No enlarged glands  ENDOCRINE: No heat or cold intolerance; No hair loss  MUSCULOSKELETAL: No joint pain or swelling; No muscle, back, or extremity pain  PSYCHIATRIC: No depression, anxiety, mood swings, or difficulty sleeping  HEME/LYMPH: No easy bruising, or bleeding gums  ALLERGY AND IMMUNOLOGIC: No hives or eczema REVIEW OF SYSTEMS:  CONSTITUTIONAL: No fever, chills, night sweats, or fatigue  EYES: No eye pain, visual disturbances, or discharge  ENMT:  Hearing loss; no sinus or throat pain  NECK: No pain or stiffness  RESPIRATORY: No cough, wheezing, or hemoptysis; No shortness of breath  CARDIOVASCULAR: +CP; no palpitations, dizziness, or leg swelling  GASTROINTESTINAL: No abdominal or epigastric pain. No nausea, vomiting, or hematemesis; No diarrhea or constipation. No melena or hematochezia.  GENITOURINARY: No dysuria, frequency, hematuria, or incontinence  NEUROLOGICAL: No headaches, memory loss, loss of strength, numbness, or tremors  SKIN: No itching, burning, rashes, or lesions   LYMPH NODES: No enlarged glands  ENDOCRINE: No heat or cold intolerance; No hair loss  MUSCULOSKELETAL: No joint pain or swelling; No muscle, back, or extremity pain  PSYCHIATRIC: No depression, anxiety, mood swings, or difficulty sleeping  HEME/LYMPH: No easy bruising, or bleeding gums  ALLERGY AND IMMUNOLOGIC: No hives or eczema

## 2020-11-13 NOTE — PROVIDER CONTACT NOTE (OTHER) - ACTION/TREATMENT ORDERED:
Provider notified and to come examine the patient. possible orders for imaging to follow after that.

## 2020-11-13 NOTE — PROVIDER CONTACT NOTE (OTHER) - REASON
upon physical exam, hard mass discovered in left lower abdominal quadrant (laterally). not noted in history.

## 2020-11-13 NOTE — PATIENT PROFILE ADULT - NSPROGENOTHERPROVIDER_GEN_A_NUR
Swelling of retinas       Past Medical History:   Diagnosis Date    Acne rosacea     treated with MetroGel    Anemia     Aortic stenosis     echo 3/18 Mod AS, Mild MR    BPH (benign prostatic hypertrophy)     CAD (coronary artery disease)     Stent x 3 DEVON 8/16    Carotid stenosis     Fairly minimal plaquing on ultrasound 5/14--in the conclusion described as less than 50% stenosis but in the body of the report described as minimal    Cataract of both eyes     CRI (chronic renal insufficiency)     Diabetes mellitus, type 2 (HCC)     1.) Proteinuria, 24 hour total urine protein 1,420 mg/24 hrs, 09/08, creatinine clearance 114, 09/08 2.) Repeat protein/creatinine ratio 2.97, 02/12 3. )24 hr urine collection 1958 mg/24 hrs, 04/12. 4.) Repeat protein/creatinine ratio 1.99, 08/12 a.) Repeat protein/creatinine ratio 2.14, 07/13 5.) Renal u/s ok 02/12  6) retinopathy at [address] 12/16  early mod. nonprolifferative  macular involved    Dyslipidemia     Erectile dysfunction     H/O agent Orange exposure     per patient in the [employer] along with exposure to cresote and naphtha    Hyperlipidemia     Hypertension     Non-rheumatic mitral regurgitation 6/27/2016    RBBB     Wears dentures     upper       Past Surgical History:   Procedure Laterality Date    CATARACT REMOVAL Bilateral     CIRCUMCISION  age 47   Anita Sandman EYE SURGERY Bilateral     cataract    TYMPANOSTOMY TUBE PLACEMENT      VASECTOMY         Current Outpatient Medications on File Prior to Visit   Medication Sig Dispense Refill    pioglitazone (ACTOS) 30 MG tablet Take 30 mg by mouth daily      simvastatin (ZOCOR) 10 MG tablet Take 10 mg by mouth nightly      glipiZIDE (GLUCOTROL) 10 MG tablet Take 10 mg by mouth 2 times daily (before meals)      vitamin D (CHOLECALCIFEROL) 1000 UNIT TABS tablet Take 1,000 Units by mouth daily      clopidogrel (PLAVIX) 75 MG tablet Take 1 tablet by mouth daily Take 4 tabs first day then one daily.  60 tablet 3    metFORMIN (GLUCOPHAGE) 1000 MG tablet Take 1 tablet by mouth 2 times daily (with meals) 60 tablet 3    lisinopril (PRINIVIL;ZESTRIL) 20 MG tablet Take 1 tablet by mouth daily 30 tablet 3    amLODIPine (NORVASC) 10 MG tablet Take 10 mg by mouth daily.  aspirin 81 MG EC tablet Take 81 mg by mouth daily. No current facility-administered medications on file prior to visit. Social History     Socioeconomic History    Marital status:      Spouse name: Not on file    Number of children: Not on file    Years of education: Not on file    Highest education level: Not on file   Occupational History    Occupation: seo   Social Needs    Financial resource strain: Not on file    Food insecurity:     Worry: Not on file     Inability: Not on file    Transportation needs:     Medical: Not on file     Non-medical: Not on file   Tobacco Use    Smoking status: Former Smoker     Packs/day: 1.00     Years: 1.50     Pack years: 1.50     Last attempt to quit: 1970     Years since quittin.6    Smokeless tobacco: Never Used   Substance and Sexual Activity    Alcohol use:  Yes     Alcohol/week: 0.0 standard drinks     Comment: a beer a couple times a week    Drug use: No    Sexual activity: Not on file   Lifestyle    Physical activity:     Days per week: Not on file     Minutes per session: Not on file    Stress: Not on file   Relationships    Social connections:     Talks on phone: Not on file     Gets together: Not on file     Attends Zoroastrian service: Not on file     Active member of club or organization: Not on file     Attends meetings of clubs or organizations: Not on file     Relationship status: Not on file    Intimate partner violence:     Fear of current or ex partner: Not on file     Emotionally abused: Not on file     Physically abused: Not on file     Forced sexual activity: Not on file   Other Topics Concern    Not on file   Social History Narrative    Not on file none

## 2020-11-13 NOTE — PROGRESS NOTE ADULT - PROBLEM SELECTOR PLAN 1
CXR: Clear, UA neg. CTA neg. No clear source of infxn. Can also be 2/2 neoplasm  --f/u B/C  --start Vancomycin & Cefepime (11/12-  --Heme c/s in the AM (follows with Dr. Hurd at [facility]) CXR: Clear, UA neg. CTA neg. No clear source of infxn. Can also be 2/2 neoplasm  --f/u B/C  --start Vancomycin 1g q12 & Cefepime 2g q12 (11/12-  --Heme c/s in the AM (follows with Dr. Hurd at [facility]) CXR: Clear, UA neg. CTA neg. No clear source of infxn. Can also be 2/2 neoplasm  --f/u B/C - if negative, D/C abx  --start Vancomycin 1g q12 & Cefepime 2g q12 (11/12-  --Heme c/s in the AM (follows with Dr. Hurd at [facility])

## 2020-11-14 ENCOUNTER — TRANSCRIPTION ENCOUNTER (OUTPATIENT)
Age: 60
End: 2020-11-14

## 2020-11-14 ENCOUNTER — APPOINTMENT (OUTPATIENT)
Dept: INFUSION THERAPY | Facility: HOSPITAL | Age: 60
End: 2020-11-14

## 2020-11-14 LAB
A1C WITH ESTIMATED AVERAGE GLUCOSE RESULT: 6 % — HIGH (ref 4–5.6)
ALBUMIN SERPL ELPH-MCNC: 4.4 G/DL — SIGNIFICANT CHANGE UP (ref 3.3–5)
ALP SERPL-CCNC: 94 U/L — SIGNIFICANT CHANGE UP (ref 40–120)
ALT FLD-CCNC: 15 U/L — SIGNIFICANT CHANGE UP (ref 10–45)
ANION GAP SERPL CALC-SCNC: 11 MMOL/L — SIGNIFICANT CHANGE UP (ref 5–17)
AST SERPL-CCNC: 16 U/L — SIGNIFICANT CHANGE UP (ref 10–40)
BASOPHILS # BLD AUTO: 0.01 K/UL — SIGNIFICANT CHANGE UP (ref 0–0.2)
BASOPHILS NFR BLD AUTO: 0.3 % — SIGNIFICANT CHANGE UP (ref 0–2)
BILIRUB SERPL-MCNC: 0.3 MG/DL — SIGNIFICANT CHANGE UP (ref 0.2–1.2)
BUN SERPL-MCNC: 14 MG/DL — SIGNIFICANT CHANGE UP (ref 7–23)
CALCIUM SERPL-MCNC: 9.5 MG/DL — SIGNIFICANT CHANGE UP (ref 8.4–10.5)
CHLORIDE SERPL-SCNC: 103 MMOL/L — SIGNIFICANT CHANGE UP (ref 96–108)
CO2 SERPL-SCNC: 27 MMOL/L — SIGNIFICANT CHANGE UP (ref 22–31)
CREAT SERPL-MCNC: 0.8 MG/DL — SIGNIFICANT CHANGE UP (ref 0.5–1.3)
EOSINOPHIL # BLD AUTO: 0.23 K/UL — SIGNIFICANT CHANGE UP (ref 0–0.5)
EOSINOPHIL NFR BLD AUTO: 5.8 % — SIGNIFICANT CHANGE UP (ref 0–6)
ESTIMATED AVERAGE GLUCOSE: 126 MG/DL — HIGH (ref 68–114)
GLUCOSE SERPL-MCNC: 130 MG/DL — HIGH (ref 70–99)
HCT VFR BLD CALC: 31.4 % — LOW (ref 34.5–45)
HGB BLD-MCNC: 9.4 G/DL — LOW (ref 11.5–15.5)
IMM GRANULOCYTES NFR BLD AUTO: 0.5 % — SIGNIFICANT CHANGE UP (ref 0–1.5)
LYMPHOCYTES # BLD AUTO: 1.14 K/UL — SIGNIFICANT CHANGE UP (ref 1–3.3)
LYMPHOCYTES # BLD AUTO: 28.8 % — SIGNIFICANT CHANGE UP (ref 13–44)
MAGNESIUM SERPL-MCNC: 2 MG/DL — SIGNIFICANT CHANGE UP (ref 1.6–2.6)
MCHC RBC-ENTMCNC: 24.4 PG — LOW (ref 27–34)
MCHC RBC-ENTMCNC: 29.9 GM/DL — LOW (ref 32–36)
MCV RBC AUTO: 81.3 FL — SIGNIFICANT CHANGE UP (ref 80–100)
MONOCYTES # BLD AUTO: 0.16 K/UL — SIGNIFICANT CHANGE UP (ref 0–0.9)
MONOCYTES NFR BLD AUTO: 4 % — SIGNIFICANT CHANGE UP (ref 2–14)
NEUTROPHILS # BLD AUTO: 2.4 K/UL — SIGNIFICANT CHANGE UP (ref 1.8–7.4)
NEUTROPHILS NFR BLD AUTO: 60.6 % — SIGNIFICANT CHANGE UP (ref 43–77)
NRBC # BLD: 0 /100 WBCS — SIGNIFICANT CHANGE UP (ref 0–0)
PHOSPHATE SERPL-MCNC: 3.7 MG/DL — SIGNIFICANT CHANGE UP (ref 2.5–4.5)
PLATELET # BLD AUTO: 202 K/UL — SIGNIFICANT CHANGE UP (ref 150–400)
POTASSIUM SERPL-MCNC: 4 MMOL/L — SIGNIFICANT CHANGE UP (ref 3.5–5.3)
POTASSIUM SERPL-SCNC: 4 MMOL/L — SIGNIFICANT CHANGE UP (ref 3.5–5.3)
PROT SERPL-MCNC: 7.4 G/DL — SIGNIFICANT CHANGE UP (ref 6–8.3)
RBC # BLD: 3.86 M/UL — SIGNIFICANT CHANGE UP (ref 3.8–5.2)
RBC # FLD: 15.9 % — HIGH (ref 10.3–14.5)
SODIUM SERPL-SCNC: 141 MMOL/L — SIGNIFICANT CHANGE UP (ref 135–145)
VANCOMYCIN TROUGH SERPL-MCNC: 16.9 UG/ML — SIGNIFICANT CHANGE UP (ref 10–20)
WBC # BLD: 3.96 K/UL — SIGNIFICANT CHANGE UP (ref 3.8–10.5)
WBC # FLD AUTO: 3.96 K/UL — SIGNIFICANT CHANGE UP (ref 3.8–10.5)

## 2020-11-14 PROCEDURE — 93306 TTE W/DOPPLER COMPLETE: CPT | Mod: 26

## 2020-11-14 PROCEDURE — 99233 SBSQ HOSP IP/OBS HIGH 50: CPT | Mod: GC

## 2020-11-14 RX ORDER — ATORVASTATIN CALCIUM 80 MG/1
1 TABLET, FILM COATED ORAL
Qty: 0 | Refills: 0 | DISCHARGE
Start: 2020-11-14

## 2020-11-14 RX ORDER — IBUPROFEN 200 MG
600 TABLET ORAL ONCE
Refills: 0 | Status: COMPLETED | OUTPATIENT
Start: 2020-11-14 | End: 2020-11-14

## 2020-11-14 RX ORDER — PANTOPRAZOLE SODIUM 20 MG/1
1 TABLET, DELAYED RELEASE ORAL
Qty: 0 | Refills: 0 | DISCHARGE
Start: 2020-11-14

## 2020-11-14 RX ORDER — IBUPROFEN 200 MG
600 TABLET ORAL
Refills: 0 | Status: DISCONTINUED | OUTPATIENT
Start: 2020-11-14 | End: 2020-11-15

## 2020-11-14 RX ORDER — PANTOPRAZOLE SODIUM 20 MG/1
1 TABLET, DELAYED RELEASE ORAL
Qty: 0 | Refills: 0 | DISCHARGE

## 2020-11-14 RX ORDER — METOCLOPRAMIDE HCL 10 MG
1 TABLET ORAL
Qty: 0 | Refills: 0 | DISCHARGE
Start: 2020-11-14

## 2020-11-14 RX ORDER — GABAPENTIN 400 MG/1
1 CAPSULE ORAL
Qty: 0 | Refills: 0 | DISCHARGE
Start: 2020-11-14

## 2020-11-14 RX ORDER — ACETAMINOPHEN 500 MG
650 TABLET ORAL EVERY 6 HOURS
Refills: 0 | Status: DISCONTINUED | OUTPATIENT
Start: 2020-11-14 | End: 2020-11-15

## 2020-11-14 RX ORDER — APIXABAN 2.5 MG/1
1 TABLET, FILM COATED ORAL
Qty: 0 | Refills: 0 | DISCHARGE
Start: 2020-11-14

## 2020-11-14 RX ADMIN — GABAPENTIN 100 MILLIGRAM(S): 400 CAPSULE ORAL at 13:21

## 2020-11-14 RX ADMIN — Medication 1 DROP(S): at 13:21

## 2020-11-14 RX ADMIN — Medication 1 DROP(S): at 05:12

## 2020-11-14 RX ADMIN — CHLORHEXIDINE GLUCONATE 1 APPLICATION(S): 213 SOLUTION TOPICAL at 11:57

## 2020-11-14 RX ADMIN — Medication 1 DROP(S): at 22:14

## 2020-11-14 RX ADMIN — ATORVASTATIN CALCIUM 40 MILLIGRAM(S): 80 TABLET, FILM COATED ORAL at 22:14

## 2020-11-14 RX ADMIN — GABAPENTIN 100 MILLIGRAM(S): 400 CAPSULE ORAL at 22:14

## 2020-11-14 RX ADMIN — APIXABAN 5 MILLIGRAM(S): 2.5 TABLET, FILM COATED ORAL at 05:12

## 2020-11-14 RX ADMIN — CEFEPIME 100 MILLIGRAM(S): 1 INJECTION, POWDER, FOR SOLUTION INTRAMUSCULAR; INTRAVENOUS at 00:03

## 2020-11-14 RX ADMIN — PANTOPRAZOLE SODIUM 40 MILLIGRAM(S): 20 TABLET, DELAYED RELEASE ORAL at 05:12

## 2020-11-14 RX ADMIN — Medication 600 MILLIGRAM(S): at 11:59

## 2020-11-14 RX ADMIN — APIXABAN 5 MILLIGRAM(S): 2.5 TABLET, FILM COATED ORAL at 17:32

## 2020-11-14 RX ADMIN — Medication 2: at 13:04

## 2020-11-14 RX ADMIN — Medication 250 MILLIGRAM(S): at 22:14

## 2020-11-14 RX ADMIN — CEFEPIME 100 MILLIGRAM(S): 1 INJECTION, POWDER, FOR SOLUTION INTRAMUSCULAR; INTRAVENOUS at 13:26

## 2020-11-14 RX ADMIN — Medication 600 MILLIGRAM(S): at 13:16

## 2020-11-14 RX ADMIN — Medication 250 MILLIGRAM(S): at 11:57

## 2020-11-14 RX ADMIN — GABAPENTIN 100 MILLIGRAM(S): 400 CAPSULE ORAL at 05:12

## 2020-11-14 NOTE — PHYSICAL THERAPY INITIAL EVALUATION ADULT - GENERAL OBSERVATIONS, REHAB EVAL
Rec'd long sitting in bed, in NAD, +mediport. Agreeable to PT. A&Ox4. Faroese speaking ( [place] #[id number] used t/o). BS reviewed: 211.

## 2020-11-14 NOTE — DISCHARGE NOTE PROVIDER - NSDCFUSCHEDAPPT_GEN_ALL_CORE_FT
ADELITA, GIANLUCA ; 11/16/2020 ; [facility]  ADELITA, GIANLUCA ; 11/17/2020 ; [facility]  ADELITA, GIANLUCA ; 11/18/2020 ; [facility]  ADELITA, GIANLUCA ; 11/19/2020 ; [facility]  ADELITA, GIANLUCA ; 11/20/2020 ; [facility]  ADELITA, GIANLUCA ; 12/04/2020 ; [facility]  ADELITA, GIANLUCA ; 12/28/2020 ; [facility]  ADELITA, GIANLUCA ; 12/29/2020 ; [facility]  ADELITA, GIANLUCA ; 12/30/2020 ; [facility]  ADELITA, GIANLUCA ; 12/31/2020 ; [facility]  ADELITA, GIANLUCA ; 01/02/2021 ; [facility]  ADELITA, GIANLUCA ; 01/04/2021 ; [facility]  ADELITA, GIANLUCA ; 01/05/2021 ; [facility]  ADELITA, GIANLUCA ; 01/06/2021 ; [facility]  ADELITA, GIANLUCA ; 01/07/2021 ; [facility]  ADELITA, GIANLUCA ; 01/08/2021 ; [facility]  ADELITA, GIANLUCA ; 01/11/2021 ; [facility]  ADELITA, GIANLUCA ; 01/11/2021 ; [facility]  ADELITA, GIANLUCA ; 01/12/2021 ; [facility]  ADELITA, GIANLUCA ; 01/13/2021 ; [facility]  ADELITA, GIANLUCA ; 01/14/2021 ; [facility]  ADELITA, GIANLUCA ; 01/15/2021 ; [facility]  ADELITA, GIANLUCA ; 01/19/2021 ; [facility]  ADELITA, GIANLUCA ; 01/20/2021 ; [facility]  ADELITA, GIANLUCA ; 01/21/2021 ; [facility]  ADELITA, GIANLUCA ; 01/22/2021 ; [facility]  ADELITA, GIANLUCA ; 01/23/2021 ; [facility]  ADELITA, GIANLUCA ; 02/08/2021 ; [facility] [address]

## 2020-11-14 NOTE — PHYSICAL THERAPY INITIAL EVALUATION ADULT - ADDITIONAL COMMENTS
Per pt, she lives in an apt with son and dtr-in-law with no steps to navigate (+elevator). Pt reports she is independent when she feels well, however some days she does not feel well and requires assistance from her children for ADLs and ambulation. Reports limited amb at home from room to room 2/2 pain in RLE. Has commode at home.

## 2020-11-14 NOTE — CHART NOTE - NSCHARTNOTEFT_GEN_A_CORE
Discussed with family regarding clinical status. Pt family wishing for interdisciplinary meeting prior to discharge. Explained to family that there is tentative discharge by tomorrow pending negative blood cultures. Talked to son, Giovana, who is adamant on having interdisciplinary meeting despite no previous conversation about setting up the meeting between medicine and hematology team. Reached out to hematology team and hematology not aware of interdisciplinary meeting. Explained to family that if blood cultures are negative, fever is likely due to the nature of patient's cancer and that she will get treatment after she leaves the hospital. Patient currently has follow-up appointments at [facility]. Will reach out to family again pending closer to discharge date if they still refuse the discharge planning pending interdisciplinary meeting.     Hyeokchan Kwon , PGY2  Pager: [phone]

## 2020-11-14 NOTE — PROGRESS NOTE ADULT - ASSESSMENT
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 Hurd of [facility]), HTN, HLD, DM, HFpEF (50%), recurrent infection including PNA and most recently mediport cellulitis, provoked UE DVT (on Eliquis), p/w chills and CP.  Found febrile to 102.  CP pleuritic, not likely ACS.

## 2020-11-14 NOTE — PROGRESS NOTE ADULT - PROBLEM SELECTOR PLAN 1
CXR: Clear, UA neg. CTA neg. No clear source of infxn. Can also be 2/2 neoplasm  --f/u B/C - if negative, D/C abx  --c/w Vancomycin 1g q12 & Cefepime 2g q12 (11/12-  --f/u Heme recs (follows with Dr. Hurd at [facility])

## 2020-11-14 NOTE — DISCHARGE NOTE PROVIDER - NSDCMRMEDTOKEN_GEN_ALL_CORE_FT
apixaban 5 mg oral tablet: 1 tab(s) orally every 12 hours  atenolol 50 mg oral tablet: 1 tab(s) orally once a day  atorvastatin 40 mg oral tablet: 1 tab(s) orally once a day (at bedtime)  gabapentin 100 mg oral capsule: 1 cap(s) orally 3 times a day  losartan 25 mg oral tablet: 1 tab(s) orally once a day  metFORMIN 500 mg oral tablet: 1 tab(s) orally 2 times a day MDD:2 tablets  metoclopramide 10 mg oral tablet: 1 tab(s) orally 4 times a day (before meals and at bedtime), As needed, nausea  ocular lubricant ophthalmic solution: 1 drop(s) to each affected eye 3 times a day  pantoprazole 40 mg oral delayed release tablet: 1 tab(s) orally once a day (before a meal)   apixaban 5 mg oral tablet: 1 tab(s) orally every 12 hours  atorvastatin 40 mg oral tablet: 1 tab(s) orally once a day (at bedtime)  gabapentin 100 mg oral capsule: 1 cap(s) orally 3 times a day  losartan 25 mg oral tablet: 1 tab(s) orally once a day  metFORMIN 500 mg oral tablet: 1 tab(s) orally 2 times a day MDD:2 tablets  metoclopramide 10 mg oral tablet: 1 tab(s) orally 4 times a day (before meals and at bedtime), As needed, nausea  ocular lubricant ophthalmic solution: 1 drop(s) to each affected eye 3 times a day  pantoprazole 40 mg oral delayed release tablet: 1 tab(s) orally once a day (before a meal)

## 2020-11-14 NOTE — DISCHARGE NOTE PROVIDER - CARE PROVIDER_API CALL
Karen Hurd (DO)  Hematology; Medical Oncology  [address] [place] [address]  Phone: [phone]  Fax: [phone]  Established Patient  Follow Up Time: 1 week

## 2020-11-14 NOTE — DISCHARGE NOTE PROVIDER - HOSPITAL COURSE
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 Hurd of [facility]), HTN, HLD, DM, HFpEF, recurrent infection including PNA, provoked UE DVT (on Eliquis), p/w chills and CP. Vitals at the ED significant for temp of 100.6, SBP of 156, HR 61, satting well on RA. CBC sig for stable anemia of Hg of 9, CMP wnl. troponin neg, Bcx and Ucx drawn, EKG showing NSR. CTAP showing no PE and stable mild pericardial effusion. Pt started on empiric therapy w/ vanc and cefepime. Pt admitted to medicine for further monitoring. Pt pain stabilized, fever resolved. Bcx and Ucx NGTD. Pt seen by hematology, first febrile likely cancer-related, as infectious etiology had no positive findings. Pt  abx discontinued. Pt otherwise hemodynamically stable and medically optimized for discharge.

## 2020-11-14 NOTE — DISCHARGE NOTE PROVIDER - NSDCCPCAREPLAN_GEN_ALL_CORE_FT
PRINCIPAL DISCHARGE DIAGNOSIS  Diagnosis: Chest pain of uncertain etiology  Assessment and Plan of Treatment: You were found to have chest pain. There are many causes of this chest pain and the most dangerous causes such as a heart attack and a clot in your lungs were ruled out. This may be due to the mediport you have in your chest, the small fluid around your heart, or due to some chronic scarring of your lungs. Please control your pain with some over the counter medications on discharge. If you have worsening chest pain that is different from this time, please come back to the emergency room. We held some of your blood-pressure medications because your blood pressure was found to be on the lower side during this admission. Please follow-up with your primary care doctor for the rest of your care.      SECONDARY DISCHARGE DIAGNOSES  Diagnosis: Fever  Assessment and Plan of Treatment: You were found to come in with a fever. We were worried that the fever may be due to some infection. We got blood cultures, images, and urine studies but all the work-up has come back negative for any signs of infection. The fever may be due to the nature of the cancer itself. Please follow-up with your cancer doctor, Dr Hurd for further management of your cancer.

## 2020-11-14 NOTE — PHYSICAL THERAPY INITIAL EVALUATION ADULT - PERTINENT HX OF CURRENT PROBLEM, REHAB EVAL
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 Hurd of [facility]), HTN, HLD, DM, HFpEF (50%), recurrent infection including PNA and most recently mediport cellulitis, provoked UE DVT (on Eliquis), p/w chills and CP.  Found febrile to 102.  CP pleuritic, not likely ACS.  CXR: Clear, UA neg. CTA neg. No clear source of infxn. Can also be 2/2 neoplasm. EKG unremarkable, trops negative. CTA sig for small pericardial effusion, neg for PE. Possibly 2/2 pericardial effusion vs pericarditis.

## 2020-11-14 NOTE — PROGRESS NOTE ADULT - SUBJECTIVE AND OBJECTIVE BOX
Sohail Garza MD  PGY 1 Department of Internal Medicine  Pager: [phone] ([facility])   Pager: [phone] ([facility])      Patient is a 60y old  Female who presents with a chief complaint of Fever (2020 08:39)      SUBJECTIVE / OVERNIGHT EVENTS: No acute overnight events. Pt seen and examined. Feeling better, no SOB. Still has mild CP w/ inspiration, 2/10 in severity now. Denies fevers, chills, CP, SOB, Abdominal pain, N/V, Constipation, Diarrhea        MEDICATIONS  (STANDING):  apixaban 5 milliGRAM(s) Oral every 12 hours  artificial  tears Solution 1 Drop(s) Both EYES three times a day  atorvastatin 40 milliGRAM(s) Oral at bedtime  cefepime   IVPB      cefepime   IVPB 2000 milliGRAM(s) IV Intermittent every 12 hours  chlorhexidine 2% Cloths 1 Application(s) Topical daily  dextrose 40% Gel 15 Gram(s) Oral once  dextrose 5%. 1000 milliLiter(s) (50 mL/Hr) IV Continuous <Continuous>  dextrose 5%. 1000 milliLiter(s) (100 mL/Hr) IV Continuous <Continuous>  dextrose 50% Injectable 25 Gram(s) IV Push once  dextrose 50% Injectable 12.5 Gram(s) IV Push once  dextrose 50% Injectable 25 Gram(s) IV Push once  gabapentin 100 milliGRAM(s) Oral three times a day  glucagon  Injectable 1 milliGRAM(s) IntraMuscular once  insulin lispro (ADMELOG) corrective regimen sliding scale   SubCutaneous three times a day before meals  insulin lispro (ADMELOG) corrective regimen sliding scale   SubCutaneous at bedtime  pantoprazole    Tablet 40 milliGRAM(s) Oral before breakfast  vancomycin  IVPB 1000 milliGRAM(s) IV Intermittent every 12 hours  vancomycin  IVPB        MEDICATIONS  (PRN):  acetaminophen   Tablet .. 650 milliGRAM(s) Oral every 6 hours PRN Temp greater or equal to 38C (100.4F), Mild Pain (1 - 3), Moderate Pain (4 - 6)  bisacodyl 5 milliGRAM(s) Oral daily PRN Constipation  metoclopramide 10 milliGRAM(s) Oral Before meals and at bedtime PRN nausea      I&O's Summary    2020 07:01  -  2020 07:00  --------------------------------------------------------  IN: 120 mL / OUT: 0 mL / NET: 120 mL        Vital Signs Last 24 Hrs  T(C): 37 (2020 04:35), Max: 37.1 (2020 16:46)  T(F): 98.6 (2020 04:35), Max: 98.7 (2020 16:46)  HR: 93 (2020 04:35) (74 - 101)  BP: 120/72 (2020 04:35) (102/62 - 122/72)  BP(mean): --  RR: 18 (2020 04:35) (18 - 20)  SpO2: 95% (2020 04:35) (95% - 96%)    CAPILLARY BLOOD GLUCOSE      POCT Blood Glucose.: 121 mg/dL (2020 08:31)  POCT Blood Glucose.: 215 mg/dL (2020 21:35)  POCT Blood Glucose.: 133 mg/dL (2020 17:37)      PHYSICAL EXAM:  GENERAL: Laying comfortably, NAD  EYES: EOMI, PERRL, no scleral icterus  NECK: No JVD  LUNG: Clear to auscultation bilaterally; No wheeze, crackles or rhonci  HEART: TTP to chest Regular rate and rhythm; No murmurs, rubs, or gallops  ABDOMEN: Soft, Nontender, Nondistended  EXTREMITIES:  No LE edema, 2+ Peripheral Pulses, No clubbing, cyanosis, or edema  PSYCH: AAOx3  NEUROLOGY: non-focal, strength 5/5 in all extremities, sensation intact  SKIN: No rashes or lesions    LABS:                        9.4    3.96  )-----------( 202      ( 2020 06:16 )             31.4     Auto Eosinophil # 0.23  / Auto Eosinophil % 5.8   / Auto Neutrophil # 2.40  / Auto Neutrophil % 60.6  / BANDS % x                            9.0    4.99  )-----------( 184      ( 2020 06:29 )             28.9     Auto Eosinophil # 0.05  / Auto Eosinophil % 1.0   / Auto Neutrophil # 2.94  / Auto Neutrophil % 58.9  / BANDS % x                            9.1    6.58  )-----------( 197      ( 2020 19:51 )             28.8     Auto Eosinophil # 0.02  / Auto Eosinophil % 0.3   / Auto Neutrophil # 4.63  / Auto Neutrophil % 70.2  / BANDS % x        11-14    141  |  103  |  14  ----------------------------<  130<H>  4.0   |  27  |  0.80  -    141  |  102  |  15  ----------------------------<  107<H>  3.8   |  27  |  0.79      140  |  100  |  14  ----------------------------<  114<H>  4.1   |  24  |  0.74    Ca    9.5      2020 06:16  Mg     2.0     11-14  Phos  3.7     11-14  TPro  7.4  /  Alb  4.4  /  TBili  0.3  /  DBili  x   /  AST  16  /  ALT  15  /  AlkPhos  94  11-14  TPro  7.2  /  Alb  4.4  /  TBili  0.3  /  DBili  x   /  AST  16  /  ALT  14  /  AlkPhos  104  11-13  TPro  7.4  /  Alb  4.5  /  TBili  0.2  /  DBili  x   /  AST  20  /  ALT  17  /  AlkPhos  115  11-12    PT/INR - ( 2020 19:51 )   PT: 14.2 sec;   INR: 1.19 ratio         PTT - ( 2020 19:51 )  PTT:38.3 sec      Urinalysis Basic - ( 2020 20:06 )    Color: Colorless / Appearance: Clear / S.010 / pH: x   Gluc: x / Ketone: Negative  / Bili: Negative / Urobili: Negative   Blood: x / Protein: Negative / Nitrite: Negative   Leuk Esterase: Negative / RBC: 0 /hpf / WBC 0 /HPF   Sq Epi: x / Non Sq Epi: 1 /hpf / Bacteria: Negative            RADIOLOGY & ADDITIONAL TESTS:    Imaging Personally Reviewed:    Consultant(s) Notes Reviewed:      Care Discussed with Consultants/Other Providers:

## 2020-11-15 ENCOUNTER — TRANSCRIPTION ENCOUNTER (OUTPATIENT)
Age: 60
End: 2020-11-15

## 2020-11-15 VITALS
OXYGEN SATURATION: 96 % | TEMPERATURE: 98 F | DIASTOLIC BLOOD PRESSURE: 66 MMHG | HEART RATE: 76 BPM | RESPIRATION RATE: 18 BRPM | SYSTOLIC BLOOD PRESSURE: 102 MMHG

## 2020-11-15 PROCEDURE — 86769 SARS-COV-2 COVID-19 ANTIBODY: CPT

## 2020-11-15 PROCEDURE — 71275 CT ANGIOGRAPHY CHEST: CPT

## 2020-11-15 PROCEDURE — 93306 TTE W/DOPPLER COMPLETE: CPT

## 2020-11-15 PROCEDURE — 81001 URINALYSIS AUTO W/SCOPE: CPT

## 2020-11-15 PROCEDURE — 83735 ASSAY OF MAGNESIUM: CPT

## 2020-11-15 PROCEDURE — 84145 PROCALCITONIN (PCT): CPT

## 2020-11-15 PROCEDURE — 85610 PROTHROMBIN TIME: CPT

## 2020-11-15 PROCEDURE — 87086 URINE CULTURE/COLONY COUNT: CPT

## 2020-11-15 PROCEDURE — 83036 HEMOGLOBIN GLYCOSYLATED A1C: CPT

## 2020-11-15 PROCEDURE — 84484 ASSAY OF TROPONIN QUANT: CPT

## 2020-11-15 PROCEDURE — 71045 X-RAY EXAM CHEST 1 VIEW: CPT

## 2020-11-15 PROCEDURE — 99285 EMERGENCY DEPT VISIT HI MDM: CPT | Mod: 25

## 2020-11-15 PROCEDURE — U0003: CPT

## 2020-11-15 PROCEDURE — 82962 GLUCOSE BLOOD TEST: CPT

## 2020-11-15 PROCEDURE — 85730 THROMBOPLASTIN TIME PARTIAL: CPT

## 2020-11-15 PROCEDURE — 99233 SBSQ HOSP IP/OBS HIGH 50: CPT | Mod: GC

## 2020-11-15 PROCEDURE — 87040 BLOOD CULTURE FOR BACTERIA: CPT

## 2020-11-15 PROCEDURE — 80053 COMPREHEN METABOLIC PANEL: CPT

## 2020-11-15 PROCEDURE — 80202 ASSAY OF VANCOMYCIN: CPT

## 2020-11-15 PROCEDURE — 83880 ASSAY OF NATRIURETIC PEPTIDE: CPT

## 2020-11-15 PROCEDURE — 97161 PT EVAL LOW COMPLEX 20 MIN: CPT

## 2020-11-15 PROCEDURE — 85025 COMPLETE CBC W/AUTO DIFF WBC: CPT

## 2020-11-15 PROCEDURE — 96365 THER/PROPH/DIAG IV INF INIT: CPT

## 2020-11-15 PROCEDURE — 84100 ASSAY OF PHOSPHORUS: CPT

## 2020-11-15 PROCEDURE — 96366 THER/PROPH/DIAG IV INF ADDON: CPT

## 2020-11-15 RX ADMIN — Medication 1 DROP(S): at 05:55

## 2020-11-15 RX ADMIN — Medication 1: at 17:21

## 2020-11-15 RX ADMIN — Medication 1 DROP(S): at 13:25

## 2020-11-15 RX ADMIN — APIXABAN 5 MILLIGRAM(S): 2.5 TABLET, FILM COATED ORAL at 17:21

## 2020-11-15 RX ADMIN — CHLORHEXIDINE GLUCONATE 1 APPLICATION(S): 213 SOLUTION TOPICAL at 11:45

## 2020-11-15 RX ADMIN — APIXABAN 5 MILLIGRAM(S): 2.5 TABLET, FILM COATED ORAL at 05:55

## 2020-11-15 RX ADMIN — GABAPENTIN 100 MILLIGRAM(S): 400 CAPSULE ORAL at 13:25

## 2020-11-15 RX ADMIN — CEFEPIME 100 MILLIGRAM(S): 1 INJECTION, POWDER, FOR SOLUTION INTRAMUSCULAR; INTRAVENOUS at 01:25

## 2020-11-15 RX ADMIN — GABAPENTIN 100 MILLIGRAM(S): 400 CAPSULE ORAL at 05:46

## 2020-11-15 RX ADMIN — PANTOPRAZOLE SODIUM 40 MILLIGRAM(S): 20 TABLET, DELAYED RELEASE ORAL at 05:46

## 2020-11-15 NOTE — PROGRESS NOTE ADULT - ATTENDING COMMENTS
Pleuritic chest pain, trops negative, PE negative, small pericardial effusion, echo with trace pericardial effusion, normal lv function   Fevers ? in the setting of APL/ checmo, blood cx, urine cx ngtd, dc vanco, no signs of infection around [facility], would favor dc cefepime, pending heme onc follow up, dispo planning
Pleuritic chest pain, trops negative, PE negative, small pericardial effusion, follow up echo   Fevers ? in the setting of APL vs active infection, blood cx, urine cx ngtd, c/w cefepime for now, no signs of infection around [facility]
Overall, Fevers, Pleuritic Chest Pain, in immunocompromised patient, not neutropenic   recent admission for Mediport cellulitis and chest pain  On exam mediport site looks c/d/i no overlying erythema or warmth  chest pain improving as per patient  agree w 2d echo to eval pericardial effusion   agree with IV vanco and cefipime, if bcx neg for MRSA can dc vanco  and if cultures neg 48hrs and no source illicited can likely monitor off of abx  also had recent RUE duplex no dvt, clarify w heme onc how long they want apixaban on board

## 2020-11-15 NOTE — DISCHARGE NOTE NURSING/CASE MANAGEMENT/SOCIAL WORK - PATIENT PORTAL LINK FT
You can access the FollowMyHealth Patient Portal offered by [facility] by registering at the following website: [email or web address]. By joining [employer]’s FollowMyHealth portal, you will also be able to view your health information using other applications (apps) compatible with our system.

## 2020-11-15 NOTE — PROGRESS NOTE ADULT - ASSESSMENT
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 Hurd of [facility]), HTN, HLD, DM, HFpEF (50%), recurrent infection including PNA and most recently mediport cellulitis, provoked UE DVT (on Eliquis), p/w chills and CP.  Found febrile to 102.  CP pleuritic, not likely ACS.

## 2020-11-15 NOTE — PROGRESS NOTE ADULT - SUBJECTIVE AND OBJECTIVE BOX
PROGRESS NOTE:   Authoted by Dr. Juan Antonio Recinos MD  Pager [phone] [facility], [id number] [facility]     Patient is a 60y old  Female who presents with a chief complaint of Fever (14 Nov 2020 09:18)      SUBJECTIVE / OVERNIGHT EVENTS: The patient was seen and examined at bedside.     REVIEW OF SYSTEMS:    CONSTITUTIONAL: No weakness, fevers or chills  EYES/ENT: No visual changes;  No vertigo or throat pain   NECK: No pain or stiffness  RESPIRATORY: No cough, wheezing, hemoptysis; No shortness of breath  CARDIOVASCULAR: No chest pain or palpitations  GASTROINTESTINAL: No abdominal or epigastric pain. No nausea, vomiting, or hematemesis; No diarrhea or constipation. No melena or hematochezia.  GENITOURINARY: No dysuria, frequency or hematuria  NEUROLOGICAL: No numbness or weakness  SKIN: No itching, rashes    MEDICATIONS  (STANDING):  apixaban 5 milliGRAM(s) Oral every 12 hours  artificial  tears Solution 1 Drop(s) Both EYES three times a day  atorvastatin 40 milliGRAM(s) Oral at bedtime  cefepime   IVPB      cefepime   IVPB 2000 milliGRAM(s) IV Intermittent every 12 hours  chlorhexidine 2% Cloths 1 Application(s) Topical daily  dextrose 40% Gel 15 Gram(s) Oral once  dextrose 5%. 1000 milliLiter(s) (50 mL/Hr) IV Continuous <Continuous>  dextrose 5%. 1000 milliLiter(s) (100 mL/Hr) IV Continuous <Continuous>  dextrose 50% Injectable 25 Gram(s) IV Push once  dextrose 50% Injectable 12.5 Gram(s) IV Push once  dextrose 50% Injectable 25 Gram(s) IV Push once  gabapentin 100 milliGRAM(s) Oral three times a day  glucagon  Injectable 1 milliGRAM(s) IntraMuscular once  insulin lispro (ADMELOG) corrective regimen sliding scale   SubCutaneous three times a day before meals  insulin lispro (ADMELOG) corrective regimen sliding scale   SubCutaneous at bedtime  pantoprazole    Tablet 40 milliGRAM(s) Oral before breakfast  vancomycin  IVPB 1000 milliGRAM(s) IV Intermittent every 12 hours  vancomycin  IVPB        MEDICATIONS  (PRN):  acetaminophen   Tablet .. 650 milliGRAM(s) Oral every 6 hours PRN Mild Pain (1 - 3)  bisacodyl 5 milliGRAM(s) Oral daily PRN Constipation  ibuprofen  Tablet. 600 milliGRAM(s) Oral two times a day PRN Moderate Pain (4 - 6), Severe Pain (7 - 10)  metoclopramide 10 milliGRAM(s) Oral Before meals and at bedtime PRN nausea      CAPILLARY BLOOD GLUCOSE      POCT Blood Glucose.: 129 mg/dL (15 Nov 2020 08:20)  POCT Blood Glucose.: 114 mg/dL (14 Nov 2020 21:58)  POCT Blood Glucose.: 94 mg/dL (14 Nov 2020 17:10)  POCT Blood Glucose.: 211 mg/dL (14 Nov 2020 12:22)    I&O's Summary    14 Nov 2020 07:01  -  15 Nov 2020 07:00  --------------------------------------------------------  IN: 970 mL / OUT: 0 mL / NET: 970 mL    15 Nov 2020 07:01  -  15 Nov 2020 10:34  --------------------------------------------------------  IN: 240 mL / OUT: 0 mL / NET: 240 mL        PHYSICAL EXAM:  Vital Signs Last 24 Hrs  T(C): 36.8 (15 Nov 2020 09:14), Max: 36.9 (14 Nov 2020 12:57)  T(F): 98.3 (15 Nov 2020 09:14), Max: 98.5 (14 Nov 2020 12:57)  HR: 81 (15 Nov 2020 09:14) (75 - 88)  BP: 132/84 (15 Nov 2020 09:14) (117/73 - 132/84)  BP(mean): --  RR: 18 (15 Nov 2020 09:14) (18 - 18)  SpO2: 97% (15 Nov 2020 09:14) (96% - 99%)    CONSTITUTIONAL: NAD, well-developed  RESPIRATORY: Normal respiratory effort; lungs are clear to auscultation bilaterally  CARDIOVASCULAR: Regular rate and rhythm, normal S1 and S2, no murmur/rub/gallop; No lower extremity edema; Peripheral pulses are 2+ bilaterally  ABDOMEN: Nontender to palpation, normoactive bowel sounds, no rebound/guarding; No hepatosplenomegaly  MUSCLOSKELETAL: no clubbing or cyanosis of digits; no joint swelling or tenderness to palpation  PSYCH: A+O to person, place, and time; affect appropriate    LABS:                        9.4    3.96  )-----------( 202      ( 14 Nov 2020 06:16 )             31.4     11-14    141  |  103  |  14  ----------------------------<  130<H>  4.0   |  27  |  0.80    Ca    9.5      14 Nov 2020 06:16  Phos  3.7     11-14  Mg     2.0     11-14    TPro  7.4  /  Alb  4.4  /  TBili  0.3  /  DBili  x   /  AST  16  /  ALT  15  /  AlkPhos  94  11-14              Culture - Urine (collected 13 Nov 2020 00:51)  Source: .Urine Clean Catch (Midstream)  Final Report (13 Nov 2020 19:54):    <10,000 CFU/mL Normal Urogenital Krista    Culture - Blood (collected 13 Nov 2020 00:30)  Source: .Blood Blood-Venous  Preliminary Report (14 Nov 2020 01:02):    No growth to date.    Culture - Blood (collected 13 Nov 2020 00:30)  Source: .Blood Blood-Peripheral  Preliminary Report (14 Nov 2020 01:02):    No growth to date.        RADIOLOGY & ADDITIONAL TESTS:  Results Reviewed:   Imaging Personally Reviewed:  Electrocardiogram Personally Reviewed:    COORDINATION OF CARE:  Care Discussed with Consultants/Other Providers [Y/N]:  Prior or Outpatient Records Reviewed [Y/N]:   PROGRESS NOTE:   Authoted by Dr. Juan Antonio Recinos MD  Pager [phone] [facility], [id number] [facility]     Patient is a 60y old  Female who presents with a chief complaint of Fever (14 Nov 2020 09:18)      SUBJECTIVE / OVERNIGHT EVENTS: The patient was seen and examined at bedside. No acute events overnight. no fevers chills, chest pain.     REVIEW OF SYSTEMS:    CONSTITUTIONAL: No weakness, fevers or chills  EYES/ENT: No visual changes;  No vertigo or throat pain   NECK: No pain or stiffness  RESPIRATORY: No cough, wheezing, hemoptysis; No shortness of breath  CARDIOVASCULAR: No chest pain or palpitations  GASTROINTESTINAL: No abdominal or epigastric pain. No nausea, vomiting, or hematemesis; No diarrhea or constipation. No melena or hematochezia.  GENITOURINARY: No dysuria, frequency or hematuria  NEUROLOGICAL: No numbness or weakness  SKIN: No itching, rashes    MEDICATIONS  (STANDING):  apixaban 5 milliGRAM(s) Oral every 12 hours  artificial  tears Solution 1 Drop(s) Both EYES three times a day  atorvastatin 40 milliGRAM(s) Oral at bedtime  cefepime   IVPB      cefepime   IVPB 2000 milliGRAM(s) IV Intermittent every 12 hours  chlorhexidine 2% Cloths 1 Application(s) Topical daily  dextrose 40% Gel 15 Gram(s) Oral once  dextrose 5%. 1000 milliLiter(s) (50 mL/Hr) IV Continuous <Continuous>  dextrose 5%. 1000 milliLiter(s) (100 mL/Hr) IV Continuous <Continuous>  dextrose 50% Injectable 25 Gram(s) IV Push once  dextrose 50% Injectable 12.5 Gram(s) IV Push once  dextrose 50% Injectable 25 Gram(s) IV Push once  gabapentin 100 milliGRAM(s) Oral three times a day  glucagon  Injectable 1 milliGRAM(s) IntraMuscular once  insulin lispro (ADMELOG) corrective regimen sliding scale   SubCutaneous three times a day before meals  insulin lispro (ADMELOG) corrective regimen sliding scale   SubCutaneous at bedtime  pantoprazole    Tablet 40 milliGRAM(s) Oral before breakfast  vancomycin  IVPB 1000 milliGRAM(s) IV Intermittent every 12 hours  vancomycin  IVPB        MEDICATIONS  (PRN):  acetaminophen   Tablet .. 650 milliGRAM(s) Oral every 6 hours PRN Mild Pain (1 - 3)  bisacodyl 5 milliGRAM(s) Oral daily PRN Constipation  ibuprofen  Tablet. 600 milliGRAM(s) Oral two times a day PRN Moderate Pain (4 - 6), Severe Pain (7 - 10)  metoclopramide 10 milliGRAM(s) Oral Before meals and at bedtime PRN nausea      CAPILLARY BLOOD GLUCOSE      POCT Blood Glucose.: 129 mg/dL (15 Nov 2020 08:20)  POCT Blood Glucose.: 114 mg/dL (14 Nov 2020 21:58)  POCT Blood Glucose.: 94 mg/dL (14 Nov 2020 17:10)  POCT Blood Glucose.: 211 mg/dL (14 Nov 2020 12:22)    I&O's Summary    14 Nov 2020 07:01  -  15 Nov 2020 07:00  --------------------------------------------------------  IN: 970 mL / OUT: 0 mL / NET: 970 mL    15 Nov 2020 07:01  -  15 Nov 2020 10:34  --------------------------------------------------------  IN: 240 mL / OUT: 0 mL / NET: 240 mL        PHYSICAL EXAM:  Vital Signs Last 24 Hrs  T(C): 36.8 (15 Nov 2020 09:14), Max: 36.9 (14 Nov 2020 12:57)  T(F): 98.3 (15 Nov 2020 09:14), Max: 98.5 (14 Nov 2020 12:57)  HR: 81 (15 Nov 2020 09:14) (75 - 88)  BP: 132/84 (15 Nov 2020 09:14) (117/73 - 132/84)  BP(mean): --  RR: 18 (15 Nov 2020 09:14) (18 - 18)  SpO2: 97% (15 Nov 2020 09:14) (96% - 99%)    CONSTITUTIONAL: NAD, well-developed  RESPIRATORY: Normal respiratory effort; lungs are clear to auscultation bilaterally  CARDIOVASCULAR: Regular rate and rhythm, normal S1 and S2, no murmur/rub/gallop; No lower extremity edema; Peripheral pulses are 2+ bilaterally  ABDOMEN: Nontender to palpation, normoactive bowel sounds, no rebound/guarding; No hepatosplenomegaly  MUSCLOSKELETAL: no clubbing or cyanosis of digits; no joint swelling or tenderness to palpation  PSYCH: A+O to person, place, and time; affect appropriate    LABS:                        9.4    3.96  )-----------( 202      ( 14 Nov 2020 06:16 )             31.4     11-14    141  |  103  |  14  ----------------------------<  130<H>  4.0   |  27  |  0.80    Ca    9.5      14 Nov 2020 06:16  Phos  3.7     11-14  Mg     2.0     11-14    TPro  7.4  /  Alb  4.4  /  TBili  0.3  /  DBili  x   /  AST  16  /  ALT  15  /  AlkPhos  94  11-14              Culture - Urine (collected 13 Nov 2020 00:51)  Source: .Urine Clean Catch (Midstream)  Final Report (13 Nov 2020 19:54):    <10,000 CFU/mL Normal Urogenital Krista    Culture - Blood (collected 13 Nov 2020 00:30)  Source: .Blood Blood-Venous  Preliminary Report (14 Nov 2020 01:02):    No growth to date.    Culture - Blood (collected 13 Nov 2020 00:30)  Source: .Blood Blood-Peripheral  Preliminary Report (14 Nov 2020 01:02):    No growth to date.        RADIOLOGY & ADDITIONAL TESTS:  Results Reviewed:   Imaging Personally Reviewed:  Electrocardiogram Personally Reviewed:    COORDINATION OF CARE:  Care Discussed with Consultants/Other Providers [Y/N]:  Prior or Outpatient Records Reviewed [Y/N]:

## 2020-11-15 NOTE — PROGRESS NOTE ADULT - PROBLEM SELECTOR PLAN 1
CXR: Clear, UA neg. CTA neg. No clear source of infxn. Can also be 2/2 neoplasm  --f/u B/C - if negative, D/C abx  --c/w Vancomycin 1g q12 & Cefepime 2g q12 (11/12-  --f/u Heme recs (follows with Dr. Hurd at [facility]) CXR: Clear, UA neg. CTA neg. No clear source of infxn. Can also be 2/2 neoplasm  --f/u B/C - if negative, D/C abx  --stop Vancomycin 1g q12(11/12-11/15) & Cefepime 2g q12 (11/12-11/15  --hematology has had extensive conversations with the family about (follows with Dr. Hurd at [facility])

## 2020-11-15 NOTE — PROGRESS NOTE ADULT - PROBLEM SELECTOR PLAN 2
Pleuritic in nature, reproducible with palpation. EKG unremarkable, trops negative. CTA sig for small pericardial effusion, neg for PE. Possibly 2/2 pericardial effusion vs pericarditis  --f/u TTE Pleuritic in nature, reproducible with palpation. EKG unremarkable, trops negative. CTA sig for small pericardial effusion, neg for PE. Possibly 2/2 pericardial effusion vs pericarditis  --TTE shows trace pericardial effusion.   - symptomatology not consistent with pericarditis.

## 2020-11-16 ENCOUNTER — LABORATORY RESULT (OUTPATIENT)
Age: 60
End: 2020-11-16

## 2020-11-16 ENCOUNTER — APPOINTMENT (OUTPATIENT)
Dept: INFUSION THERAPY | Facility: HOSPITAL | Age: 60
End: 2020-11-16

## 2020-11-17 ENCOUNTER — APPOINTMENT (OUTPATIENT)
Dept: INFUSION THERAPY | Facility: HOSPITAL | Age: 60
End: 2020-11-17

## 2020-11-17 ENCOUNTER — OUTPATIENT (OUTPATIENT)
Dept: OUTPATIENT SERVICES | Facility: HOSPITAL | Age: 60
LOS: 1 days | End: 2020-11-17
Payer: MEDICAID

## 2020-11-17 ENCOUNTER — NON-APPOINTMENT (OUTPATIENT)
Age: 60
End: 2020-11-17

## 2020-11-17 DIAGNOSIS — C92.40 ACUTE PROMYELOCYTIC LEUKEMIA, NOT HAVING ACHIEVED REMISSION: ICD-10-CM

## 2020-11-17 PROBLEM — I82.409 ACUTE EMBOLISM AND THROMBOSIS OF UNSPECIFIED DEEP VEINS OF UNSPECIFIED LOWER EXTREMITY: Chronic | Status: ACTIVE | Noted: 2020-11-13

## 2020-11-17 NOTE — HISTORY OF PRESENT ILLNESS
[de-identified] : 60 year old female who is here for a follow up visit after a hospitalization for acute promyelocytic leukemia.  She initially presented to [facility] for fevers, found to be pancytopenic.  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  She was started on ATRA empirically on 5/23, then on arsenic on 5/27 when the diagnosis was confirmed.  Initially she appeared to have refractory thrombocytopenia which improved.  She was found to be COVID positive on 6/16, transferred to COVID unit, on 6/19 she was COVID negative, antibody positive and was transferred back.  She was treated for an enterococal UTI on 6/20.  Arsenic was held due to zoster from 6/22- 6/30.  6/23 she was found to have a partially occlusive DVT in her right axillary vein, treated with lovenox.  Repeat duplex was negative on 7/16.  PICC replaced in E, on incidental CXR on 7/8, was found to be malpositioned, 7/9 PICC was exchanged, then removed on 7/10 due to irritation?.  Bone marrow biopsy on 6/25 with persistent disease, repeated on 7/13 no evidence of leukemia.  Her plt recovered 6/27, ANC by 7/11.\par \par Today she is accompanied by her son.  Overall she has no new complaints.  She has fatigue, fair appetite.  Otherwise denies any fever/chills, no cough, SOB, no abdominal c/o, no n/v/d, no bleeding.  She has generalized weakness, is attempting to move more at home.

## 2020-11-18 ENCOUNTER — RESULT REVIEW (OUTPATIENT)
Age: 60
End: 2020-11-18

## 2020-11-18 ENCOUNTER — LABORATORY RESULT (OUTPATIENT)
Age: 60
End: 2020-11-18

## 2020-11-18 ENCOUNTER — APPOINTMENT (OUTPATIENT)
Dept: INFUSION THERAPY | Facility: HOSPITAL | Age: 60
End: 2020-11-18

## 2020-11-18 ENCOUNTER — APPOINTMENT (OUTPATIENT)
Dept: HEMATOLOGY ONCOLOGY | Facility: CLINIC | Age: 60
End: 2020-11-18
Payer: MEDICAID

## 2020-11-18 VITALS
RESPIRATION RATE: 16 BRPM | WEIGHT: 159.99 LBS | SYSTOLIC BLOOD PRESSURE: 126 MMHG | OXYGEN SATURATION: 98 % | HEART RATE: 73 BPM | TEMPERATURE: 97.2 F | BODY MASS INDEX: 29.04 KG/M2 | DIASTOLIC BLOOD PRESSURE: 75 MMHG

## 2020-11-18 LAB
BASOPHILS # BLD AUTO: 0.03 K/UL — SIGNIFICANT CHANGE UP (ref 0–0.2)
BASOPHILS NFR BLD AUTO: 0.5 % — SIGNIFICANT CHANGE UP (ref 0–2)
CULTURE RESULTS: SIGNIFICANT CHANGE UP
CULTURE RESULTS: SIGNIFICANT CHANGE UP
EOSINOPHIL # BLD AUTO: 0.25 K/UL — SIGNIFICANT CHANGE UP (ref 0–0.5)
EOSINOPHIL NFR BLD AUTO: 4.5 % — SIGNIFICANT CHANGE UP (ref 0–6)
HCT VFR BLD CALC: 31.4 % — LOW (ref 34.5–45)
HGB BLD-MCNC: 9.8 G/DL — LOW (ref 11.5–15.5)
IMM GRANULOCYTES NFR BLD AUTO: 0.2 % — SIGNIFICANT CHANGE UP (ref 0–1.5)
LYMPHOCYTES # BLD AUTO: 3.07 K/UL — SIGNIFICANT CHANGE UP (ref 1–3.3)
LYMPHOCYTES # BLD AUTO: 55.7 % — HIGH (ref 13–44)
MCHC RBC-ENTMCNC: 25.1 PG — LOW (ref 27–34)
MCHC RBC-ENTMCNC: 31.2 G/DL — LOW (ref 32–36)
MCV RBC AUTO: 80.5 FL — SIGNIFICANT CHANGE UP (ref 80–100)
MONOCYTES # BLD AUTO: 0.25 K/UL — SIGNIFICANT CHANGE UP (ref 0–0.9)
MONOCYTES NFR BLD AUTO: 4.5 % — SIGNIFICANT CHANGE UP (ref 2–14)
NEUTROPHILS # BLD AUTO: 1.9 K/UL — SIGNIFICANT CHANGE UP (ref 1.8–7.4)
NEUTROPHILS NFR BLD AUTO: 34.6 % — LOW (ref 43–77)
NRBC # BLD: 0 /100 WBCS — SIGNIFICANT CHANGE UP (ref 0–0)
PLATELET # BLD AUTO: 219 K/UL — SIGNIFICANT CHANGE UP (ref 150–400)
RBC # BLD: 3.9 M/UL — SIGNIFICANT CHANGE UP (ref 3.8–5.2)
RBC # FLD: 15.4 % — HIGH (ref 10.3–14.5)
SPECIMEN SOURCE: SIGNIFICANT CHANGE UP
SPECIMEN SOURCE: SIGNIFICANT CHANGE UP
WBC # BLD: 5.51 K/UL — SIGNIFICANT CHANGE UP (ref 3.8–10.5)
WBC # FLD AUTO: 5.51 K/UL — SIGNIFICANT CHANGE UP (ref 3.8–10.5)

## 2020-11-18 PROCEDURE — 38222 DX BONE MARROW BX & ASPIR: CPT | Mod: RT

## 2020-11-18 PROCEDURE — 88313 SPECIAL STAINS GROUP 2: CPT | Mod: 26

## 2020-11-18 PROCEDURE — 88342 IMHCHEM/IMCYTCHM 1ST ANTB: CPT

## 2020-11-18 PROCEDURE — 88341 IMHCHEM/IMCYTCHM EA ADD ANTB: CPT | Mod: 26,59

## 2020-11-18 PROCEDURE — 85097 BONE MARROW INTERPRETATION: CPT

## 2020-11-18 PROCEDURE — 88305 TISSUE EXAM BY PATHOLOGIST: CPT | Mod: 26

## 2020-11-18 PROCEDURE — 88305 TISSUE EXAM BY PATHOLOGIST: CPT

## 2020-11-18 PROCEDURE — 88189 FLOWCYTOMETRY/READ 16 & >: CPT

## 2020-11-18 PROCEDURE — 88342 IMHCHEM/IMCYTCHM 1ST ANTB: CPT | Mod: 26,59

## 2020-11-18 PROCEDURE — 88313 SPECIAL STAINS GROUP 2: CPT

## 2020-11-18 PROCEDURE — 88341 IMHCHEM/IMCYTCHM EA ADD ANTB: CPT

## 2020-11-18 NOTE — REASON FOR VISIT
[Bone Marrow Biopsy] : bone marrow biopsy [Bone Marrow Aspiration] : bone marrow aspiration [FreeTextEntry2] : 61 yo Female dxed w/ APL (low risk), s/p cycle 2 consolidation chemRx w/ GALA/ATRA, BMBX to assess response.

## 2020-11-18 NOTE — PROCEDURE
[Bone Marrow Biopsy] : bone marrow biopsy [Bone Marrow Aspiration] : bone marrow aspiration  [Patient] : the patient [Patient identification verified] : patient identification verified [Procedure verified and consent obtained] : procedure verified and consent obtained [Correct positioning] : correct positioning [Prone] : prone [Lidocaine was injected and into the periosteum overlying the site.] : Lidocaine was injected and into the periosteum overlying the site. [Aspirate] : aspirate [Cytogenetics] : cytogenetics [FISH] : FISH [Biopsy] : biopsy [Flow Cytometry] : flow cytometry [] : The patient was instructed to remove the bandage the following AM. The patient may bathe. Acetaminophen may be taken for discomfort, as per package directions.If there are any other problems, the patient was instructed to call the office. The patient verbalized understanding, and is aware of the office contact numbers. [The right posterior iliac crest was prepped with betadine and draped, using sterile technique.] : The right posterior iliac crest was prepped with betadine and draped, using sterile technique. [FreeTextEntry1] : 59 yo Female dxed w/ APL (low risk), s/p cycle 2 consolidation chemRx w/ GALA/ATRA, BMBX to assess response.  [FreeTextEntry2] : CBC prior to procedure\par WBC 5.51\par Hgb  9.8 Hct 31.4\par Plt 219\par BM Bx and aspiration was performed by GUTIERREZ Martinez. 2 lavender + 2 green top tubes of BM aspirate and 1 cassette of BM core specimen sent to lab.\par \par

## 2020-11-19 ENCOUNTER — APPOINTMENT (OUTPATIENT)
Dept: CARDIOLOGY | Facility: CLINIC | Age: 60
End: 2020-11-19
Payer: MEDICAID

## 2020-11-19 ENCOUNTER — APPOINTMENT (OUTPATIENT)
Dept: INFUSION THERAPY | Facility: HOSPITAL | Age: 60
End: 2020-11-19

## 2020-11-19 VITALS
WEIGHT: 161 LBS | BODY MASS INDEX: 29.25 KG/M2 | DIASTOLIC BLOOD PRESSURE: 75 MMHG | HEIGHT: 62.25 IN | SYSTOLIC BLOOD PRESSURE: 127 MMHG | OXYGEN SATURATION: 97 % | HEART RATE: 65 BPM

## 2020-11-19 PROCEDURE — 99496 TRANSJ CARE MGMT HIGH F2F 7D: CPT

## 2020-11-19 NOTE — REVIEW OF SYSTEMS
[Fever] : no fever [Chills] : no chills [Shortness Of Breath] : no shortness of breath [Chest  Pressure] : no chest pressure [Chest Pain] : chest pain [Lower Ext Edema] : no extremity edema [Leg Claudication] : no intermittent leg claudication [Palpitations] : no palpitations [Abdominal Pain] : no abdominal pain [Heartburn] : no heartburn [Dysphagia] : no dysphagia [Dysuria] : no dysuria [Incontinence] : no incontinence [Joint Pain] : no joint pain [Muscle Cramps] : no muscle cramps [Dizziness] : no dizziness [Convulsions] : no convulsions [Confusion] : no confusion was observed [Anxiety] : no anxiety

## 2020-11-19 NOTE — HISTORY OF PRESENT ILLNESS
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 Suspicion for PNA, tx with antibiotics with subsequent improvement in effusion.  Over the course of hospitalization, she had a NST which showed perfusion defects suspicious for artifact +/- infarct without ischemia. Initial Echo normal, subsequent echo with mild decline in EF and trivial pericardial effusion. Ultrasound done 10/1 at Dr. Mendez's office showed minimal pericardial and pleural effusion. Dx with R axillary DVT in July 2020, on Eliquis (may have been from a PICC line). \par \par Hospitalized 10/9-10/15 with sepsis 2/2 cellulitis from [facility]. Briefly in MICU on pressors\par \par She notes continued L back pain, worse with inspiration. Also notes dyspnea on exertion, all worse since hospitalization. Feels very weak. \par \par Accompanied by son who has elected to translate. \par \par ECG: SR, no ST-T wave changes\par TTE 2020:\par \par 1. Normal left ventricular internal dimensions and wall\par thicknesses.\par 2. Endocardium not well visualized; grossly normal left\par ventricular systolic function.\par 3. Normal right ventricular size and systolic function\par (TAPSE 1.7 cm)\par \par NST 2020:\par \par IMPRESSIONS:Abnormal Study\par * There are no significant ECG changes to suggest\par ischemia.\par * There are moderate to large, moderate perfusion defects\par in apical, apical lateral, mid to distal anterior walls\par that are predominantly fixed, but correct with prone\par imaging therefore no clear evidence of ischemia or\par infarct.\par * Post-stress gated wall motion analysis was performed\par (LVEF = 47 %;LVEDV = 62 ml.), revealing moderate\par hypokinesis in mid to distal anterior and anteroseptal,\par septal, apical anterior, distal inferolateral wall(s).\par \par Atenolol 50mg, Atorvastatin 10mg, Losartan 25mg\par

## 2020-11-19 NOTE — DISCUSSION/SUMMARY
[FreeTextEntry1] : 60F\par \par Atypical CP: Differential includes ischemia, inflammation related, GERD. Seems to be strong correaltion with chemo treatments. Recent SPECT negative for ischemia. Sometimes pleuritic component. \par \par -Can consider Cath vs CTA pending further chemo treatment- await bone marrow Bx\par \par Pericardial effusion: Trace on last echo. Stable vital signs\par \par -Serial echos\par \par HFpEF: Some echos showing LV dysfunction, last one showing none. Can consider further testing once malignancy is treated.\par \par -Cont BB, losartan\par \par HTN: Well controlled, continue current medications- adding ARB \par \par Onc: Continuing chemo with oncology. Multiple complications with recurrent infections. Awaiting bone marrow bx. Off Eliquis \par \par RV 3 months

## 2020-11-20 ENCOUNTER — APPOINTMENT (OUTPATIENT)
Dept: INFUSION THERAPY | Facility: HOSPITAL | Age: 60
End: 2020-11-20

## 2020-11-21 PROBLEM — J90 PLEURAL EFFUSION, LEFT: Status: ACTIVE | Noted: 2020-09-24

## 2020-11-21 PROBLEM — R68.83 CHILLS: Status: ACTIVE | Noted: 2020-11-21

## 2020-11-23 LAB — HEMATOPATHOLOGY REPORT: SIGNIFICANT CHANGE UP

## 2020-11-28 ENCOUNTER — OUTPATIENT (OUTPATIENT)
Dept: OUTPATIENT SERVICES | Facility: HOSPITAL | Age: 60
LOS: 1 days | Discharge: HOME | End: 2020-11-28

## 2020-11-28 DIAGNOSIS — C92.40 ACUTE PROMYELOCYTIC LEUKEMIA, NOT HAVING ACHIEVED REMISSION: ICD-10-CM

## 2020-12-02 ENCOUNTER — RESULT REVIEW (OUTPATIENT)
Age: 60
End: 2020-12-02

## 2020-12-02 ENCOUNTER — APPOINTMENT (OUTPATIENT)
Dept: HEMATOLOGY ONCOLOGY | Facility: CLINIC | Age: 60
End: 2020-12-02

## 2020-12-02 ENCOUNTER — APPOINTMENT (OUTPATIENT)
Dept: FAMILY MEDICINE | Facility: CLINIC | Age: 60
End: 2020-12-02
Payer: MEDICAID

## 2020-12-02 VITALS
WEIGHT: 162 LBS | HEIGHT: 62.25 IN | SYSTOLIC BLOOD PRESSURE: 130 MMHG | OXYGEN SATURATION: 96 % | BODY MASS INDEX: 29.43 KG/M2 | HEART RATE: 61 BPM | DIASTOLIC BLOOD PRESSURE: 70 MMHG

## 2020-12-02 DIAGNOSIS — Z09 ENCOUNTER FOR FOLLOW-UP EXAMINATION AFTER COMPLETED TREATMENT FOR CONDITIONS OTHER THAN MALIGNANT NEOPLASM: ICD-10-CM

## 2020-12-02 LAB
BASOPHILS # BLD AUTO: 0.03 K/UL — SIGNIFICANT CHANGE UP (ref 0–0.2)
BASOPHILS NFR BLD AUTO: 0.5 % — SIGNIFICANT CHANGE UP (ref 0–2)
EOSINOPHIL # BLD AUTO: 0.15 K/UL — SIGNIFICANT CHANGE UP (ref 0–0.5)
EOSINOPHIL NFR BLD AUTO: 2.3 % — SIGNIFICANT CHANGE UP (ref 0–6)
HCT VFR BLD CALC: 32.2 % — LOW (ref 34.5–45)
HGB BLD-MCNC: 9.8 G/DL — LOW (ref 11.5–15.5)
IMM GRANULOCYTES NFR BLD AUTO: 0.6 % — SIGNIFICANT CHANGE UP (ref 0–1.5)
LYMPHOCYTES # BLD AUTO: 3.11 K/UL — SIGNIFICANT CHANGE UP (ref 1–3.3)
LYMPHOCYTES # BLD AUTO: 48.7 % — HIGH (ref 13–44)
MCHC RBC-ENTMCNC: 24.6 PG — LOW (ref 27–34)
MCHC RBC-ENTMCNC: 30.4 G/DL — LOW (ref 32–36)
MCV RBC AUTO: 80.9 FL — SIGNIFICANT CHANGE UP (ref 80–100)
MONOCYTES # BLD AUTO: 0.35 K/UL — SIGNIFICANT CHANGE UP (ref 0–0.9)
MONOCYTES NFR BLD AUTO: 5.5 % — SIGNIFICANT CHANGE UP (ref 2–14)
NEUTROPHILS # BLD AUTO: 2.71 K/UL — SIGNIFICANT CHANGE UP (ref 1.8–7.4)
NEUTROPHILS NFR BLD AUTO: 42.4 % — LOW (ref 43–77)
NRBC # BLD: 0 /100 WBCS — SIGNIFICANT CHANGE UP (ref 0–0)
PLATELET # BLD AUTO: 157 K/UL — SIGNIFICANT CHANGE UP (ref 150–400)
RBC # BLD: 3.98 M/UL — SIGNIFICANT CHANGE UP (ref 3.8–5.2)
RBC # FLD: 15.1 % — HIGH (ref 10.3–14.5)
WBC # BLD: 6.39 K/UL — SIGNIFICANT CHANGE UP (ref 3.8–10.5)
WBC # FLD AUTO: 6.39 K/UL — SIGNIFICANT CHANGE UP (ref 3.8–10.5)

## 2020-12-02 PROCEDURE — 99072 ADDL SUPL MATRL&STAF TM PHE: CPT

## 2020-12-02 PROCEDURE — 99495 TRANSJ CARE MGMT MOD F2F 14D: CPT

## 2020-12-02 PROCEDURE — 99215 OFFICE O/P EST HI 40 MIN: CPT

## 2020-12-02 RX ORDER — ATORVASTATIN CALCIUM 10 MG/1
10 TABLET, FILM COATED ORAL
Qty: 90 | Refills: 1 | Status: DISCONTINUED | COMMUNITY
Start: 2020-08-14 | End: 2020-12-02

## 2020-12-02 RX ORDER — ATENOLOL 50 MG/1
50 TABLET ORAL
Qty: 90 | Refills: 1 | Status: ACTIVE | COMMUNITY
Start: 2020-08-14 | End: 1900-01-01

## 2020-12-02 RX ORDER — METFORMIN HYDROCHLORIDE 500 MG/1
500 TABLET, COATED ORAL
Qty: 180 | Refills: 1 | Status: ACTIVE | COMMUNITY
Start: 2020-10-06 | End: 1900-01-01

## 2020-12-02 RX ORDER — LIDOCAINE AND PRILOCAINE 25; 25 MG/G; MG/G
2.5-2.5 CREAM TOPICAL
Qty: 1 | Refills: 2 | Status: DISCONTINUED | COMMUNITY
Start: 2020-08-04 | End: 2020-12-02

## 2020-12-02 RX ORDER — CHLORHEXIDINE GLUCONATE 2 G/100ML
2 SOLUTION TOPICAL
Qty: 1 | Refills: 0 | Status: DISCONTINUED | COMMUNITY
Start: 2020-11-02 | End: 2020-12-02

## 2020-12-02 RX ORDER — APIXABAN 5 MG/1
5 TABLET, FILM COATED ORAL
Qty: 180 | Refills: 1 | Status: DISCONTINUED | COMMUNITY
Start: 2020-07-16 | End: 2020-12-02

## 2020-12-02 RX ORDER — ATORVASTATIN CALCIUM 20 MG/1
20 TABLET, FILM COATED ORAL
Qty: 90 | Refills: 1 | Status: ACTIVE | COMMUNITY
Start: 1900-01-01 | End: 1900-01-01

## 2020-12-02 RX ORDER — NYSTATIN 100000 1/G
100000 POWDER TOPICAL
Qty: 1 | Refills: 0 | Status: ACTIVE | COMMUNITY
Start: 1900-01-01 | End: 1900-01-01

## 2020-12-02 RX ORDER — BUTALBITAL, ACETAMINOPHEN AND CAFFEINE 300; 50; 40 MG/1; MG/1; MG/1
50-300-40 CAPSULE ORAL EVERY 6 HOURS
Qty: 30 | Refills: 0 | Status: DISCONTINUED | COMMUNITY
Start: 2020-11-03 | End: 2020-12-02

## 2020-12-02 RX ORDER — METOCLOPRAMIDE 10 MG/1
10 TABLET ORAL EVERY 6 HOURS
Qty: 30 | Refills: 1 | Status: DISCONTINUED | COMMUNITY
Start: 2020-08-04 | End: 2020-12-02

## 2020-12-02 RX ORDER — LOSARTAN POTASSIUM 25 MG/1
25 TABLET, FILM COATED ORAL DAILY
Qty: 90 | Refills: 1 | Status: ACTIVE | COMMUNITY
Start: 2020-11-05 | End: 1900-01-01

## 2020-12-02 RX ORDER — PANTOPRAZOLE 40 MG/1
40 TABLET, DELAYED RELEASE ORAL TWICE DAILY
Qty: 60 | Refills: 0 | Status: DISCONTINUED | COMMUNITY
Start: 2020-10-19 | End: 2020-12-02

## 2020-12-02 RX ORDER — TRETINOIN 10 MG/1
10 CAPSULE ORAL TWICE DAILY
Qty: 240 | Refills: 1 | Status: DISCONTINUED | COMMUNITY
Start: 2020-10-07 | End: 2020-12-02

## 2020-12-02 NOTE — HEALTH RISK ASSESSMENT
[No] : No [No falls in past year] : Patient reported no falls in the past year [0] : 2) Feeling down, depressed, or hopeless: Not at all (0) [Good] : ~his/her~  mood as  good [Unemployed] : unemployed [] :  [# Of Children ___] : has [unfilled] children [Fully functional (bathing, dressing, toileting, transferring, walking, feeding)] : Fully functional (bathing, dressing, toileting, transferring, walking, feeding) [Independent] : managing medications [Some assistance needed] : managing finances [] : No [[id number]] : 0 [de-identified] : lives with younger brother

## 2020-12-02 NOTE — PHYSICAL EXAM
[Well Nourished] : well nourished [Clear to Auscultation] : lungs were clear to auscultation bilaterally [Regular Rhythm] : with a regular rhythm [Normal S1, S2] : normal S1 and S2

## 2020-12-02 NOTE — ASSESSMENT
[FreeTextEntry1] : hospital admission for cp after chemo\par discharged 11/20/20\par \par arm and leg pain\par occurring before chemo, will refer to ortho\par \par acute promyelocytic leukemia\par was getting chemo, five days a week, just had repeat bone marrow biopsy, great improvement, and chemo was discontinued\par \par hospital admission and discharge\par  zoster from 6/22- 6/30. \par 6/23 she was found to have a partially occlusive DVT in her right axillary vein, treated with lovenox.\par had complications while in the hospital, reconciled medications, no longer on eliquis\par \par deconditioning\par feeling weak, going to order PT, home visiting nurse for assessment\par \par diabetes\par The diagnosis of diabetes is established with this patient.  Blood work was drawn in office and results will be reviewed and followed. The patient was counseled on using a low sugar and carbohydrate diet.  Patient was advised to eat small meals and exercise regularly. Patient as advised to take all medications as prescribed and to follow up with yearly podiatry and opthalmology visits. Patient was advised to call office  or go to the ER immediately if experiences any nausea, lightheadedness or for any other issues.\par \par eye tearing\par optho referreal\par \par \par

## 2020-12-02 NOTE — HISTORY OF PRESENT ILLNESS
[de-identified] : 60 year old female is here for a followup visit after being admitted to the hospital after experiencing chest pain after chemo\par \par brought in by hal - sri\alyssia many current medical issues,

## 2020-12-02 NOTE — REVIEW OF SYSTEMS
[Fatigue] : fatigue [Negative] : Psychiatric [FreeTextEntry3] : watery eyes [FreeTextEntry9] : back pain, leg pain, arm pain

## 2020-12-04 ENCOUNTER — APPOINTMENT (OUTPATIENT)
Dept: HEMATOLOGY ONCOLOGY | Facility: CLINIC | Age: 60
End: 2020-12-04

## 2020-12-24 ENCOUNTER — RESULT REVIEW (OUTPATIENT)
Age: 60
End: 2020-12-24

## 2020-12-24 ENCOUNTER — APPOINTMENT (OUTPATIENT)
Dept: HEMATOLOGY ONCOLOGY | Facility: CLINIC | Age: 60
End: 2020-12-24
Payer: MEDICAID

## 2020-12-24 VITALS
SYSTOLIC BLOOD PRESSURE: 128 MMHG | TEMPERATURE: 98 F | DIASTOLIC BLOOD PRESSURE: 78 MMHG | OXYGEN SATURATION: 98 % | RESPIRATION RATE: 16 BRPM | HEART RATE: 66 BPM | WEIGHT: 162.92 LBS | BODY MASS INDEX: 29.56 KG/M2

## 2020-12-24 LAB
BASOPHILS # BLD AUTO: 0.02 K/UL — SIGNIFICANT CHANGE UP (ref 0–0.2)
BASOPHILS NFR BLD AUTO: 0.4 % — SIGNIFICANT CHANGE UP (ref 0–2)
EOSINOPHIL # BLD AUTO: 0.14 K/UL — SIGNIFICANT CHANGE UP (ref 0–0.5)
EOSINOPHIL NFR BLD AUTO: 2.5 % — SIGNIFICANT CHANGE UP (ref 0–6)
HCT VFR BLD CALC: 33.3 % — LOW (ref 34.5–45)
HGB BLD-MCNC: 10.2 G/DL — LOW (ref 11.5–15.5)
IMM GRANULOCYTES NFR BLD AUTO: 0.4 % — SIGNIFICANT CHANGE UP (ref 0–1.5)
LYMPHOCYTES # BLD AUTO: 2.53 K/UL — SIGNIFICANT CHANGE UP (ref 1–3.3)
LYMPHOCYTES # BLD AUTO: 44.9 % — HIGH (ref 13–44)
MCHC RBC-ENTMCNC: 24.6 PG — LOW (ref 27–34)
MCHC RBC-ENTMCNC: 30.6 G/DL — LOW (ref 32–36)
MCV RBC AUTO: 80.2 FL — SIGNIFICANT CHANGE UP (ref 80–100)
MONOCYTES # BLD AUTO: 0.38 K/UL — SIGNIFICANT CHANGE UP (ref 0–0.9)
MONOCYTES NFR BLD AUTO: 6.7 % — SIGNIFICANT CHANGE UP (ref 2–14)
NEUTROPHILS # BLD AUTO: 2.54 K/UL — SIGNIFICANT CHANGE UP (ref 1.8–7.4)
NEUTROPHILS NFR BLD AUTO: 45.1 % — SIGNIFICANT CHANGE UP (ref 43–77)
NRBC # BLD: 0 /100 WBCS — SIGNIFICANT CHANGE UP (ref 0–0)
PLATELET # BLD AUTO: 187 K/UL — SIGNIFICANT CHANGE UP (ref 150–400)
RBC # BLD: 4.15 M/UL — SIGNIFICANT CHANGE UP (ref 3.8–5.2)
RBC # FLD: 14.7 % — HIGH (ref 10.3–14.5)
WBC # BLD: 5.63 K/UL — SIGNIFICANT CHANGE UP (ref 3.8–10.5)
WBC # FLD AUTO: 5.63 K/UL — SIGNIFICANT CHANGE UP (ref 3.8–10.5)

## 2020-12-24 PROCEDURE — 99072 ADDL SUPL MATRL&STAF TM PHE: CPT

## 2020-12-24 PROCEDURE — 99214 OFFICE O/P EST MOD 30 MIN: CPT

## 2020-12-24 NOTE — ASSESSMENT
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  She was started on ATRA empirically on 5/23/20, then on arsenic on 5/27 when the diagnosis was confirmed. 6/23/20 she was found to have a partially occlusive PICC line associated DVT in her right axillary vein, treated with lovenox, transitioned to eliquis which was d/c'ed after US (11/6/20) showed resolution of clot. Bone marrow biopsy on 6/25 with persistent disease, repeated on 7/13/20 no evidence of leukemia.\par \par Patient's son (Raman) present via telephone ([phone]) for entire visit\par S/p 2 cycles of GALA/ATRA, further treatment held despite our recommendations to continue, as the patient cannot tolerate treatments. Informed patient and her family that discontinuation of treatment will lead to progression of disease.\par CT (10/9/20) revealed resolution of pleural effusions, trace pericardial effusion. \par Was seen at [facility] for second opinion by Dr. Vazquez. Patient does not want to follow up at [facility].\par Peripheral RT PML/AWA [id number]. PML/AWA negative on 12/2/20\par Chest port causing continued discomfort, patient wants the port removed. PT/PTT, INR done today. Will arrange with IR to remove.\par \par Back pain/weakness\par Continue gabapentin at night\par OTC Tylenol prn\par Avoid excessive bed rest\par Follow up with PCP/cardiology/pulmonary.  \par \par 3 month follow up \par Case and management discussed with Dr. Hurd

## 2020-12-24 NOTE — PHYSICAL EXAM
[Restricted in physically strenuous activity but ambulatory and able to carry out work of a light or sedentary nature] : Status 1- Restricted in physically strenuous activity but ambulatory and able to carry out work of a light or sedentary nature, e.g., light house work, office work [Normal] : affect appropriate [de-identified] : +Port in R chest wall- no evidence of infection, no warmth [de-identified] : No midline or paravertebral tenderness to palpation

## 2020-12-24 NOTE — REVIEW OF SYSTEMS
[Fatigue] : fatigue [Joint Pain] : joint pain [Muscle Pain] : muscle pain [Fever] : no fever [Chills] : no chills [Night Sweats] : no night sweats [Recent Change In Weight] : ~T no recent weight change [Negative] : Allergic/Immunologic [FreeTextEntry9] : back/lower extremity

## 2020-12-24 NOTE — HISTORY OF PRESENT ILLNESS
[de-identified] : APL- low risk\par \par 60 year old female who is here for a follow up visit after a hospitalization for acute promyelocytic leukemia.  She initially presented to [facility] for fevers, found to be pancytopenic.  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  She was started on ATRA empirically on 5/23, then on arsenic on 5/27 when the diagnosis was confirmed.  Initially she appeared to have refractory thrombocytopenia which improved.  She was found to be COVID positive on 6/16, transferred to COVID unit, on 6/19 she was COVID negative, antibody positive and was transferred back.  She was treated for an enterococal UTI on 6/20.  Arsenic was held due to zoster from 6/22- 6/30.  6/23 she was found to have a partially occlusive DVT in her right axillary vein, treated with lovenox.  Repeat duplex was negative on 7/16.  PICC replaced in [facility], on incidental CXR on 7/8, was found to be malpositioned, 7/9 PICC was exchanged, then removed on 7/10 due to irritation?.  Bone marrow biopsy on 6/25 with persistent disease, repeated on 7/13 no evidence of leukemia.  Her plt recovered 6/27, ANC by 7/11. [de-identified] : Patient presents for follow up visit, accompanied by her son via telephone who assisted in translation. The Mediport is still in place and the patient states it is causing her discomfort and she would like to have it removed. Overall, she is in her usual state of health. She continues to have SOLORZANO/fatigue, chronic low back pain, and lower extremity cramping. Patient denies fever, chills, night sweats, headache, abdominal pain, chest pain, shortness of breath, or peripheral edema. Good appetite, stable weight.\par

## 2020-12-28 ENCOUNTER — APPOINTMENT (OUTPATIENT)
Dept: INFUSION THERAPY | Facility: HOSPITAL | Age: 60
End: 2020-12-28

## 2020-12-29 ENCOUNTER — APPOINTMENT (OUTPATIENT)
Dept: INFUSION THERAPY | Facility: HOSPITAL | Age: 60
End: 2020-12-29

## 2020-12-30 ENCOUNTER — APPOINTMENT (OUTPATIENT)
Dept: INFUSION THERAPY | Facility: HOSPITAL | Age: 60
End: 2020-12-30

## 2020-12-31 ENCOUNTER — APPOINTMENT (OUTPATIENT)
Dept: INFUSION THERAPY | Facility: HOSPITAL | Age: 60
End: 2020-12-31

## 2021-01-02 ENCOUNTER — APPOINTMENT (OUTPATIENT)
Dept: INFUSION THERAPY | Facility: HOSPITAL | Age: 61
End: 2021-01-02

## 2021-01-04 ENCOUNTER — APPOINTMENT (OUTPATIENT)
Dept: INFUSION THERAPY | Facility: HOSPITAL | Age: 61
End: 2021-01-04

## 2021-01-05 ENCOUNTER — APPOINTMENT (OUTPATIENT)
Dept: INFUSION THERAPY | Facility: HOSPITAL | Age: 61
End: 2021-01-05

## 2021-01-06 ENCOUNTER — APPOINTMENT (OUTPATIENT)
Dept: INFUSION THERAPY | Facility: HOSPITAL | Age: 61
End: 2021-01-06

## 2021-01-07 ENCOUNTER — APPOINTMENT (OUTPATIENT)
Dept: INFUSION THERAPY | Facility: HOSPITAL | Age: 61
End: 2021-01-07

## 2021-01-08 ENCOUNTER — APPOINTMENT (OUTPATIENT)
Dept: INFUSION THERAPY | Facility: HOSPITAL | Age: 61
End: 2021-01-08

## 2021-01-08 PROBLEM — U07.1 COVID-19: Status: RESOLVED | Noted: 2021-01-08 | Resolved: 2021-01-08

## 2021-01-08 NOTE — PROCEDURE
[FreeTextEntry1] : right mediport removal [D/C IV on discharge] : D/C IV on discharge [Resume diet] : resume diet [Site check for bleeding/hematoma] : Site check for bleeding/hematoma [Vital signs on admission the q 15 mins x2] : Vital signs on admission the q 15 mins x2

## 2021-01-08 NOTE — PAST MEDICAL HISTORY
[Increasing age ( >40 years old)] : Increasing age ( >40 years old) [Malignancy] : Malignancy [Previous history of DVT or PE] : Previous history of DVT or PE [No therapy indicated for cases scheduled for less than one hour] : No therapy indicated for cases scheduled for less than one hour. [FreeTextEntry1] : Malignant Hyperthermia Screening Tool and Risk of Bleeding Assessment\par \par Ms. GIANLUCA UREÑA denies family history of unexpected death following Anesthesia or Exercise.\par Denies Family history of Malignant Hyperthermia, Muscle or Neuromuscular disorder and High Temperature following exercise.\par \par Ms. GIANLUCA UREÑA denies history of Muscle Spasm, Dark or Chocolate - Colored urine and Unanticipated fever immediately following anesthesia or serious exercise. \par Ms. UREÑA also denies bleeding tendencies/ Risks of Bleeding.\par

## 2021-01-11 ENCOUNTER — APPOINTMENT (OUTPATIENT)
Dept: ENDOVASCULAR SURGERY | Facility: CLINIC | Age: 61
End: 2021-01-11

## 2021-01-11 ENCOUNTER — APPOINTMENT (OUTPATIENT)
Dept: INFUSION THERAPY | Facility: HOSPITAL | Age: 61
End: 2021-01-11

## 2021-01-12 ENCOUNTER — OUTPATIENT (OUTPATIENT)
Dept: OUTPATIENT SERVICES | Facility: HOSPITAL | Age: 61
LOS: 1 days | Discharge: HOME | End: 2021-01-12

## 2021-01-12 ENCOUNTER — APPOINTMENT (OUTPATIENT)
Dept: INFUSION THERAPY | Facility: HOSPITAL | Age: 61
End: 2021-01-12

## 2021-01-12 DIAGNOSIS — C92.40 ACUTE PROMYELOCYTIC LEUKEMIA, NOT HAVING ACHIEVED REMISSION: ICD-10-CM

## 2021-01-13 ENCOUNTER — APPOINTMENT (OUTPATIENT)
Dept: INFUSION THERAPY | Facility: HOSPITAL | Age: 61
End: 2021-01-13

## 2021-01-14 ENCOUNTER — APPOINTMENT (OUTPATIENT)
Dept: HEMATOLOGY ONCOLOGY | Facility: CLINIC | Age: 61
End: 2021-01-14

## 2021-01-14 ENCOUNTER — OUTPATIENT (OUTPATIENT)
Dept: OUTPATIENT SERVICES | Facility: HOSPITAL | Age: 61
LOS: 1 days | End: 2021-01-14
Payer: MEDICAID

## 2021-01-14 ENCOUNTER — RESULT REVIEW (OUTPATIENT)
Age: 61
End: 2021-01-14

## 2021-01-14 ENCOUNTER — APPOINTMENT (OUTPATIENT)
Dept: INFUSION THERAPY | Facility: HOSPITAL | Age: 61
End: 2021-01-14

## 2021-01-14 VITALS
DIASTOLIC BLOOD PRESSURE: 66 MMHG | RESPIRATION RATE: 17 BRPM | HEART RATE: 60 BPM | OXYGEN SATURATION: 99 % | SYSTOLIC BLOOD PRESSURE: 130 MMHG

## 2021-01-14 VITALS
WEIGHT: 160.06 LBS | SYSTOLIC BLOOD PRESSURE: 143 MMHG | RESPIRATION RATE: 16 BRPM | DIASTOLIC BLOOD PRESSURE: 70 MMHG | OXYGEN SATURATION: 98 % | HEART RATE: 66 BPM

## 2021-01-14 DIAGNOSIS — C92.40 ACUTE PROMYELOCYTIC LEUKEMIA, NOT HAVING ACHIEVED REMISSION: ICD-10-CM

## 2021-01-14 DIAGNOSIS — U07.1 COVID-19: ICD-10-CM

## 2021-01-14 PROCEDURE — C1894: CPT

## 2021-01-14 PROCEDURE — 36590 REMOVAL TUNNELED CV CATH: CPT

## 2021-01-14 NOTE — PROCEDURE NOTE - GENERAL PROCEDURE NAME
Right chest wall port removal Patient is a 61y old  Female who presents with a chief complaint of Change in mental status (22 Nov 2019 10:00)    Patient was seen and examined.  Awaiting placement in long term facility.  Today denies any complaints.  no overnight events    PAST MEDICAL & SURGICAL HISTORY:  Psychosis    Allergies  No Known Allergies    MEDICATIONS  (STANDING):  chlorhexidine 4% Liquid 1 Application(s) Topical <User Schedule>  enoxaparin Injectable 40 milliGRAM(s) SubCutaneous daily  fluPHENAZine 1 milliGRAM(s) Oral two times a day  nicotine - 21 mG/24Hr(s) Patch 1 patch Transdermal daily  traZODone 100 milliGRAM(s) Oral at bedtime    MEDICATIONS  (PRN):    T(C): 36.9 (12-02-19 @ 05:56), Max: 36.9 (12-02-19 @ 05:56)  HR: 87 (12-02-19 @ 05:56) (78 - 102)  BP: 105/57 (12-02-19 @ 05:56) (105/57 - 131/59)  RR: 20 (12-02-19 @ 05:56) (20 - 20)  SpO2: 98% (12-02-19 @ 05:56) (95% - 98%)    O/E:  Awake, alert, not in distress.  HEENT: atraumatic, EOMI.  Chest: clear.  CVS: SIS2 +, no murmur.  P/A: Soft, BS+  CNS: non focal.  Ext: no edema feet.  Skin: no rash, no ulcers.  All systems reviewed positive findings as above.

## 2021-01-14 NOTE — ASU DISCHARGE PLAN (ADULT/PEDIATRIC) - NURSING INSTRUCTIONS
Please feel free to contact us at [phone] if any problems arise. After 6PM, Monday through Friday, on weekends and on holidays, please call [phone] and ask for the radiology resident on call to be paged.

## 2021-01-14 NOTE — ASU PATIENT PROFILE, ADULT - PMH
APL (acute promyelocytic leukemia)    Diabetes    DVT (deep venous thrombosis)  Upper extremity, likely catheter related  HLD (hyperlipidemia)    Hypertension

## 2021-01-14 NOTE — PROCEDURE NOTE - PROCEDURE FINDINGS AND DETAILS
Successful removal of right chest wall port. No complications. Successful removal of right chest wall port. No immediate complications.

## 2021-01-14 NOTE — PROGRESS NOTE ADULT - SUBJECTIVE AND OBJECTIVE BOX
59y/o of leukemia s/p chest port placement on 8/5/20 here for port removal.      NPO status:   Anticoagulation:  Antibiotics:       Allergies:adhesives (Pruritus)  No Known Drug Allergies      PAST MEDICAL & SURGICAL HISTORY:  DVT (deep venous thrombosis)  Upper extremity, likely catheter related    APL (acute promyelocytic leukemia)    HLD (hyperlipidemia)    Hypertension    Diabetes    No significant past surgical history    Pertinent labs:    reviewed on HIE.     Consent: Procedure/risks/ Benefits explained. Informed consent obtained. Pt verbalizes understanding.          61y/o of leukemia s/p chest port placement on 8/5/20 here for port removal. Reports completing therapy on 11/2020. Denies fever, chills.      NPO status: NPO past midnight.  Anticoagulation: None.     Allergies: adhesives (Pruritus)  chloraprep    PAST MEDICAL & SURGICAL HISTORY:  DVT (deep venous thrombosis)  Upper extremity, likely catheter related    APL (acute promyelocytic leukemia)    HLD (hyperlipidemia)    Hypertension    Diabetes    No significant past surgical history    Pertinent labs:    reviewed on HIE.     Consent: Procedure/risks/ Benefits explained. Informed consent obtained using a phone . Pt verbalizes understanding.

## 2021-01-15 ENCOUNTER — APPOINTMENT (OUTPATIENT)
Dept: INFUSION THERAPY | Facility: HOSPITAL | Age: 61
End: 2021-01-15

## 2021-01-19 ENCOUNTER — APPOINTMENT (OUTPATIENT)
Dept: INFUSION THERAPY | Facility: HOSPITAL | Age: 61
End: 2021-01-19

## 2021-01-20 ENCOUNTER — APPOINTMENT (OUTPATIENT)
Dept: INFUSION THERAPY | Facility: HOSPITAL | Age: 61
End: 2021-01-20

## 2021-01-21 ENCOUNTER — APPOINTMENT (OUTPATIENT)
Dept: INFUSION THERAPY | Facility: HOSPITAL | Age: 61
End: 2021-01-21

## 2021-01-21 DIAGNOSIS — C95.90 LEUKEMIA, UNSPECIFIED NOT HAVING ACHIEVED REMISSION: ICD-10-CM

## 2021-01-21 DIAGNOSIS — Z45.2 ENCOUNTER FOR ADJUSTMENT AND MANAGEMENT OF VASCULAR ACCESS DEVICE: ICD-10-CM

## 2021-01-22 ENCOUNTER — APPOINTMENT (OUTPATIENT)
Dept: INFUSION THERAPY | Facility: HOSPITAL | Age: 61
End: 2021-01-22

## 2021-01-23 ENCOUNTER — APPOINTMENT (OUTPATIENT)
Dept: INFUSION THERAPY | Facility: HOSPITAL | Age: 61
End: 2021-01-23

## 2021-02-03 ENCOUNTER — APPOINTMENT (OUTPATIENT)
Dept: HEMATOLOGY ONCOLOGY | Facility: CLINIC | Age: 61
End: 2021-02-03
Payer: MEDICAID

## 2021-02-03 ENCOUNTER — LABORATORY RESULT (OUTPATIENT)
Age: 61
End: 2021-02-03

## 2021-02-03 ENCOUNTER — RESULT REVIEW (OUTPATIENT)
Age: 61
End: 2021-02-03

## 2021-02-03 VITALS
HEIGHT: 62.6 IN | HEART RATE: 82 BPM | SYSTOLIC BLOOD PRESSURE: 137 MMHG | BODY MASS INDEX: 30.53 KG/M2 | DIASTOLIC BLOOD PRESSURE: 84 MMHG | RESPIRATION RATE: 16 BRPM | TEMPERATURE: 97.5 F | OXYGEN SATURATION: 98 % | WEIGHT: 170.17 LBS

## 2021-02-03 LAB
BASOPHILS # BLD AUTO: 0.03 K/UL — SIGNIFICANT CHANGE UP (ref 0–0.2)
BASOPHILS NFR BLD AUTO: 0.5 % — SIGNIFICANT CHANGE UP (ref 0–2)
EOSINOPHIL # BLD AUTO: 0.09 K/UL — SIGNIFICANT CHANGE UP (ref 0–0.5)
EOSINOPHIL NFR BLD AUTO: 1.4 % — SIGNIFICANT CHANGE UP (ref 0–6)
HCT VFR BLD CALC: 32.4 % — LOW (ref 34.5–45)
HGB BLD-MCNC: 10.4 G/DL — LOW (ref 11.5–15.5)
IMM GRANULOCYTES NFR BLD AUTO: 0.6 % — SIGNIFICANT CHANGE UP (ref 0–1.5)
LYMPHOCYTES # BLD AUTO: 2.63 K/UL — SIGNIFICANT CHANGE UP (ref 1–3.3)
LYMPHOCYTES # BLD AUTO: 39.5 % — SIGNIFICANT CHANGE UP (ref 13–44)
MCHC RBC-ENTMCNC: 25.6 PG — LOW (ref 27–34)
MCHC RBC-ENTMCNC: 32.1 G/DL — SIGNIFICANT CHANGE UP (ref 32–36)
MCV RBC AUTO: 79.6 FL — LOW (ref 80–100)
MONOCYTES # BLD AUTO: 0.4 K/UL — SIGNIFICANT CHANGE UP (ref 0–0.9)
MONOCYTES NFR BLD AUTO: 6 % — SIGNIFICANT CHANGE UP (ref 2–14)
NEUTROPHILS # BLD AUTO: 3.46 K/UL — SIGNIFICANT CHANGE UP (ref 1.8–7.4)
NEUTROPHILS NFR BLD AUTO: 52 % — SIGNIFICANT CHANGE UP (ref 43–77)
NRBC # BLD: 0 /100 WBCS — SIGNIFICANT CHANGE UP (ref 0–0)
PLATELET # BLD AUTO: 210 K/UL — SIGNIFICANT CHANGE UP (ref 150–400)
RBC # BLD: 4.07 M/UL — SIGNIFICANT CHANGE UP (ref 3.8–5.2)
RBC # FLD: 14.6 % — HIGH (ref 10.3–14.5)
WBC # BLD: 6.65 K/UL — SIGNIFICANT CHANGE UP (ref 3.8–10.5)
WBC # FLD AUTO: 6.65 K/UL — SIGNIFICANT CHANGE UP (ref 3.8–10.5)

## 2021-02-03 PROCEDURE — 99072 ADDL SUPL MATRL&STAF TM PHE: CPT

## 2021-02-03 PROCEDURE — 99214 OFFICE O/P EST MOD 30 MIN: CPT

## 2021-03-03 NOTE — PHYSICAL EXAM
[Restricted in physically strenuous activity but ambulatory and able to carry out work of a light or sedentary nature] : Status 1- Restricted in physically strenuous activity but ambulatory and able to carry out work of a light or sedentary nature, e.g., light house work, office work [Normal] : affect appropriate [de-identified] : port site healed [de-identified] : No midline or paravertebral tenderness to palpation

## 2021-03-03 NOTE — ASSESSMENT
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  She was started on ATRA empirically on 5/23/20, then on arsenic on 5/27 when the diagnosis was confirmed. 6/23/20 she was found to have a partially occlusive PICC line associated DVT in her right axillary vein, treated with lovenox, transitioned to eliquis which was d/c'ed after US (11/6/20) showed resolution of clot. Bone marrow biopsy on 6/25 with persistent disease, repeated on 7/13/20 no evidence of leukemia.\par \par Patient's son (Raman) present via telephone ([phone]) for entire visit\par S/p 2 cycles of GALA/ATRA, further treatment held despite our recommendations to continue, as the patient cannot tolerate treatments.\par \par CT (10/9/20) revealed resolution of pleural effusions, trace pericardial effusion. \par Was seen at [facility] for second opinion by Dr. Vazquez. Patient does not want to follow up at [facility].\par Peripheral RT PML/AWA [id number]. PML/AWA negative on 12/2/20\par Mediport removed. \par \par CBC today WBC 6.65 H/H 10.4/32.4 Plt 210.  \par Check her CMP, LDH, RT PML-AWA today.  \par Continued follow up every 3 months. \par \par She will follow up with her PCP/cardiology/pulmonary.  \par \par

## 2021-03-03 NOTE — HISTORY OF PRESENT ILLNESS
[de-identified] : APL- low risk\par \par 60 year old female who is here for a follow up visit after a hospitalization for acute promyelocytic leukemia.  She initially presented to [facility] for fevers, found to be pancytopenic.  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  She was started on ATRA empirically on 5/23, then on arsenic on 5/27 when the diagnosis was confirmed.  Initially she appeared to have refractory thrombocytopenia which improved.  She was found to be COVID positive on 6/16, transferred to COVID unit, on 6/19 she was COVID negative, antibody positive and was transferred back.  She was treated for an enterococal UTI on 6/20.  Arsenic was held due to zoster from 6/22- 6/30.  6/23 she was found to have a partially occlusive DVT in her right axillary vein, treated with lovenox.  Repeat duplex was negative on 7/16.  PICC replaced in [facility], on incidental CXR on 7/8, was found to be malpositioned, 7/9 PICC was exchanged, then removed on 7/10 due to irritation?.  Bone marrow biopsy on 6/25 with persistent disease, repeated on 7/13 no evidence of leukemia.  Her plt recovered 6/27, ANC by 7/11. [de-identified] : Patient presents for follow up visit, accompanied by her son via telephone who assisted in translation. Her mediport has been removed without issues.  She has chronic arthralgias in her shoulders/knees/low back.  Otherwise she denies any bleeding, no constitutional symptoms- no fever/chills, no night sweats, no weight loss.

## 2021-03-03 NOTE — REVIEW OF SYSTEMS
[Fatigue] : fatigue [Joint Pain] : joint pain [Muscle Pain] : muscle pain [Negative] : Allergic/Immunologic [Fever] : no fever [Chills] : no chills [Night Sweats] : no night sweats [Recent Change In Weight] : ~T no recent weight change [FreeTextEntry9] : back/lower extremity

## 2021-03-11 ENCOUNTER — APPOINTMENT (OUTPATIENT)
Dept: FAMILY MEDICINE | Facility: CLINIC | Age: 61
End: 2021-03-11
Payer: MEDICAID

## 2021-03-11 DIAGNOSIS — G89.29 OTHER CHRONIC PAIN: ICD-10-CM

## 2021-03-11 DIAGNOSIS — Z23 ENCOUNTER FOR IMMUNIZATION: ICD-10-CM

## 2021-03-11 DIAGNOSIS — H91.90 UNSPECIFIED HEARING LOSS, UNSPECIFIED EAR: ICD-10-CM

## 2021-03-11 DIAGNOSIS — H53.9 UNSPECIFIED VISUAL DISTURBANCE: ICD-10-CM

## 2021-03-11 PROCEDURE — 99442: CPT

## 2021-03-11 NOTE — HISTORY OF PRESENT ILLNESS
[Home] : at home, [unfilled] , at the time of the visit. [Medical Office: ([facility])___] : at the medical office located in  [Other:____] : [unfilled] [FreeTextEntry3] : son [FreeTextEntry1] : pt needs multiple referrals\par pt feeling well\par on 3 way call w/ son and pt [de-identified] : son states mom c/o visual issues, hearing loss, body pain throughout, and cardiac hx, has upcoming appts starting tomorrow

## 2021-03-12 ENCOUNTER — NON-APPOINTMENT (OUTPATIENT)
Age: 61
End: 2021-03-12

## 2021-03-12 ENCOUNTER — APPOINTMENT (OUTPATIENT)
Dept: OPHTHALMOLOGY | Facility: CLINIC | Age: 61
End: 2021-03-12
Payer: MEDICAID

## 2021-03-12 PROCEDURE — 68840 EXPLORE/IRRIGATE TEAR DUCTS: CPT | Mod: E2,E4

## 2021-03-12 PROCEDURE — 92004 COMPRE OPH EXAM NEW PT 1/>: CPT | Mod: 25

## 2021-03-12 PROCEDURE — 92015 DETERMINE REFRACTIVE STATE: CPT

## 2021-03-12 PROCEDURE — 99072 ADDL SUPL MATRL&STAF TM PHE: CPT

## 2021-03-12 PROCEDURE — 95060 OPH MUCOUS MEMBRANE TESTS: CPT | Mod: LT

## 2021-03-19 ENCOUNTER — APPOINTMENT (OUTPATIENT)
Dept: OTOLARYNGOLOGY | Facility: CLINIC | Age: 61
End: 2021-03-19

## 2021-03-20 NOTE — ASSESSMENT
[FreeTextEntry1] : 60 year old female found to have acute promyelocytic leukemia- low risk. She was started on ATRA empirically on 5/23, then on arsenic on 5/27 when the diagnosis was confirmed.  Hospitalization complicated by UE DVT/thrombophlebitis, s/p PICC removal.  Bone marrow biopsy completed on 7/13/20 with no morphologic evidence of APL.  \par She has completed induction with both ATRA/GALA with improvement in her counts. \par Today her WBC 5.02 H/H 10.1/30.6 Plt 270.  \par Extensive discussion had with the patient, her son/other son over the phone.  Explained her diagnosis and prognosis again. \par Explained that she will need access in order to begin consolidation. \par Will plan for mediport placement.  \par Consolidation schedule discussed, explained GALA would be Mon-Fri for 4 weeks, every other month for 8 months, along with ATRA for 2 weeks on/2 weeks off.  During that time, will monitor her lytes- K/Mg/Phos carefully along with EKG.  \par She should establish care with medicine.  \par Continue eliquis for now, repeat duplex in 3 months and will discuss stopping if provoked UE clot has resolved. \par Appointments made, all questions/concerns answered.\par 
no diabetes and no thyroid trouble.

## 2021-03-22 ENCOUNTER — APPOINTMENT (OUTPATIENT)
Dept: PAIN MANAGEMENT | Facility: CLINIC | Age: 61
End: 2021-03-22

## 2021-03-30 ENCOUNTER — APPOINTMENT (OUTPATIENT)
Dept: CARDIOLOGY | Facility: CLINIC | Age: 61
End: 2021-03-30
Payer: MEDICAID

## 2021-03-30 VITALS
DIASTOLIC BLOOD PRESSURE: 84 MMHG | WEIGHT: 170 LBS | SYSTOLIC BLOOD PRESSURE: 120 MMHG | HEIGHT: 62 IN | BODY MASS INDEX: 31.28 KG/M2 | HEART RATE: 78 BPM

## 2021-03-30 DIAGNOSIS — I31.3 PERICARDIAL EFFUSION (NONINFLAMMATORY): ICD-10-CM

## 2021-03-30 DIAGNOSIS — I10 ESSENTIAL (PRIMARY) HYPERTENSION: ICD-10-CM

## 2021-03-30 DIAGNOSIS — I51.89 OTHER ILL-DEFINED HEART DISEASES: ICD-10-CM

## 2021-03-30 DIAGNOSIS — R07.89 OTHER CHEST PAIN: ICD-10-CM

## 2021-03-30 PROCEDURE — 99214 OFFICE O/P EST MOD 30 MIN: CPT

## 2021-03-30 PROCEDURE — 99072 ADDL SUPL MATRL&STAF TM PHE: CPT

## 2021-03-30 NOTE — HISTORY OF PRESENT ILLNESS
[FreeTextEntry1] : 60F with HTN, HLD, DM, acute promyelocytic leukemia now off chemo, R axillary DVT s/p Eliquis now resolved\par \par Stopped chemo as she was intolerant\par Counts stable\par Following closely with Dr. Hurd\par Continued intermittent chest pains\par No recent hospitalizations\par Eliquis d/c'ed given normal duplex \par \par HISTORY:\par \par Hospitalized 9/3 - 9/11 for L sided CP, L back pain, noted with L pleural effusion, loculated. Suspicion for PNA, tx with antibiotics with subsequent improvement in effusion.  Over the course of hospitalization, she had a NST which showed perfusion defects suspicious for artifact +/- infarct without ischemia. Initial Echo normal, subsequent echo with mild decline in EF and trivial pericardial effusion. Ultrasound done 10/1 at Dr. Mendez's office showed minimal pericardial and pleural effusion. Dx with R axillary DVT in July 2020, on Eliquis (may have been from a PICC line). \par \par Hospitalized 10/9-10/15 with sepsis 2/2 cellulitis from [facility]. Briefly in MICU on pressors\par \par She notes continued L back pain, worse with inspiration. Also notes dyspnea on exertion, all worse since hospitalization. Feels very weak. \par \par Accompanied by son who has elected to translate. \par \par ECG: SR, no ST-T wave changes\par TTE 2020:\par \par 1. Normal left ventricular internal dimensions and wall\par thicknesses.\par 2. Endocardium not well visualized; grossly normal left\par ventricular systolic function.\par 3. Normal right ventricular size and systolic function\par (TAPSE 1.7 cm)\par \par NST 2020:\par \par IMPRESSIONS:Abnormal Study\par * There are no significant ECG changes to suggest\par ischemia.\par * There are moderate to large, moderate perfusion defects\par in apical, apical lateral, mid to distal anterior walls\par that are predominantly fixed, but correct with prone\par imaging therefore no clear evidence of ischemia or\par infarct.\par * Post-stress gated wall motion analysis was performed\par (LVEF = 47 %;LVEDV = 62 ml.), revealing moderate\par hypokinesis in mid to distal anterior and anteroseptal,\par septal, apical anterior, distal inferolateral wall(s).\par \par Atenolol 50mg, Atorvastatin 10mg, Losartan 25mg\par

## 2021-03-30 NOTE — DISCUSSION/SUMMARY
[FreeTextEntry1] : 60F\par \par Atypical CP: Unclear etiology. Ischemia less likely, Recent SPECT negative for ischemia. Repeat echo, assess for effusion and LV function.\par \par Pericardial effusion: None on last echo. Stable vital signs. Repeat echo \par \par -Serial echos\par \par HFpEF: Repeat echo to assess LV function. Asymptomatic\par \par -Cont BB, losartan\par \par HTN: Well controlled, continue current medications \par \par Onc: Off chemo, stable. Cont onc follow up\par \par Should any cardiac interventions be required would need to clarify prognosis from oncologic perspective\par \par Echo\par RV 4 months

## 2021-04-02 ENCOUNTER — APPOINTMENT (OUTPATIENT)
Dept: INTERNAL MEDICINE | Facility: CLINIC | Age: 61
End: 2021-04-02

## 2021-04-23 ENCOUNTER — APPOINTMENT (OUTPATIENT)
Dept: OTOLARYNGOLOGY | Facility: CLINIC | Age: 61
End: 2021-04-23

## 2021-04-23 LAB
ALBUMIN SERPL ELPH-MCNC: 4.5 G/DL
ALBUMIN SERPL ELPH-MCNC: 4.8 G/DL
ALBUMIN SERPL ELPH-MCNC: 4.8 G/DL
ALBUMIN SERPL ELPH-MCNC: 4.9 G/DL
ALP BLD-CCNC: 101 U/L
ALP BLD-CCNC: 115 U/L
ALP BLD-CCNC: 91 U/L
ALP BLD-CCNC: 97 U/L
ALT SERPL-CCNC: 12 U/L
ALT SERPL-CCNC: 15 U/L
ALT SERPL-CCNC: 16 U/L
ALT SERPL-CCNC: 22 U/L
ANION GAP SERPL CALC-SCNC: 11 MMOL/L
ANION GAP SERPL CALC-SCNC: 13 MMOL/L
ANION GAP SERPL CALC-SCNC: 15 MMOL/L
ANION GAP SERPL CALC-SCNC: 16 MMOL/L
APTT BLD: 29.6 SEC
AST SERPL-CCNC: 14 U/L
AST SERPL-CCNC: 15 U/L
AST SERPL-CCNC: 16 U/L
AST SERPL-CCNC: 18 U/L
BILIRUB SERPL-MCNC: 0.2 MG/DL
BILIRUB SERPL-MCNC: <0.2 MG/DL
BUN SERPL-MCNC: 12 MG/DL
BUN SERPL-MCNC: 16 MG/DL
BUN SERPL-MCNC: 18 MG/DL
BUN SERPL-MCNC: 18 MG/DL
CALCIUM SERPL-MCNC: 10.1 MG/DL
CALCIUM SERPL-MCNC: 9.6 MG/DL
CALCIUM SERPL-MCNC: 9.8 MG/DL
CALCIUM SERPL-MCNC: 9.9 MG/DL
CHLORIDE SERPL-SCNC: 101 MMOL/L
CHLORIDE SERPL-SCNC: 102 MMOL/L
CHLORIDE SERPL-SCNC: 103 MMOL/L
CHLORIDE SERPL-SCNC: 105 MMOL/L
CO2 SERPL-SCNC: 22 MMOL/L
CO2 SERPL-SCNC: 23 MMOL/L
CO2 SERPL-SCNC: 26 MMOL/L
CO2 SERPL-SCNC: 27 MMOL/L
CREAT SERPL-MCNC: 0.8 MG/DL
CREAT SERPL-MCNC: 0.83 MG/DL
CREAT SERPL-MCNC: 0.84 MG/DL
CREAT SERPL-MCNC: 0.87 MG/DL
GLUCOSE SERPL-MCNC: 129 MG/DL
GLUCOSE SERPL-MCNC: 136 MG/DL
GLUCOSE SERPL-MCNC: 165 MG/DL
GLUCOSE SERPL-MCNC: 99 MG/DL
INR PPP: 1.03 RATIO
LDH SERPL-CCNC: 104 U/L
LDH SERPL-CCNC: 130 U/L
LDH SERPL-CCNC: 171 U/L
PMLR FINAL DIAGNOSIS: NORMAL
PMLR FINAL DIAGNOSIS: NORMAL
PMLR SPECIMEN TYPE: NORMAL
PMLR SPECIMEN TYPE: NORMAL
POTASSIUM SERPL-SCNC: 4.2 MMOL/L
POTASSIUM SERPL-SCNC: 4.3 MMOL/L
POTASSIUM SERPL-SCNC: 4.3 MMOL/L
POTASSIUM SERPL-SCNC: 4.6 MMOL/L
PROT SERPL-MCNC: 6.9 G/DL
PROT SERPL-MCNC: 7.1 G/DL
PROT SERPL-MCNC: 7.2 G/DL
PROT SERPL-MCNC: 7.5 G/DL
PT BLD: 12.2 SEC
SARS-COV-2 N GENE NPH QL NAA+PROBE: NOT DETECTED
SODIUM SERPL-SCNC: 140 MMOL/L
SODIUM SERPL-SCNC: 140 MMOL/L
SODIUM SERPL-SCNC: 142 MMOL/L
SODIUM SERPL-SCNC: 142 MMOL/L

## 2021-04-27 ENCOUNTER — APPOINTMENT (OUTPATIENT)
Dept: MRI IMAGING | Facility: HOSPITAL | Age: 61
End: 2021-04-27

## 2021-04-27 ENCOUNTER — APPOINTMENT (OUTPATIENT)
Dept: MRI IMAGING | Facility: CLINIC | Age: 61
End: 2021-04-27

## 2021-04-27 ENCOUNTER — OUTPATIENT (OUTPATIENT)
Dept: OUTPATIENT SERVICES | Facility: HOSPITAL | Age: 61
LOS: 1 days | End: 2021-04-27
Payer: MEDICAID

## 2021-04-27 DIAGNOSIS — Z00.8 ENCOUNTER FOR OTHER GENERAL EXAMINATION: ICD-10-CM

## 2021-04-27 PROCEDURE — 72148 MRI LUMBAR SPINE W/O DYE: CPT | Mod: 26

## 2021-04-27 PROCEDURE — 72148 MRI LUMBAR SPINE W/O DYE: CPT

## 2021-04-30 ENCOUNTER — OUTPATIENT (OUTPATIENT)
Dept: OUTPATIENT SERVICES | Facility: HOSPITAL | Age: 61
LOS: 1 days | Discharge: HOME | End: 2021-04-30

## 2021-04-30 DIAGNOSIS — C92.40 ACUTE PROMYELOCYTIC LEUKEMIA, NOT HAVING ACHIEVED REMISSION: ICD-10-CM

## 2021-05-03 ENCOUNTER — APPOINTMENT (OUTPATIENT)
Dept: HEMATOLOGY ONCOLOGY | Facility: CLINIC | Age: 61
End: 2021-05-03
Payer: MEDICAID

## 2021-05-03 ENCOUNTER — RESULT REVIEW (OUTPATIENT)
Age: 61
End: 2021-05-03

## 2021-05-03 VITALS
SYSTOLIC BLOOD PRESSURE: 146 MMHG | HEART RATE: 82 BPM | RESPIRATION RATE: 16 BRPM | BODY MASS INDEX: 31.77 KG/M2 | HEIGHT: 61.81 IN | OXYGEN SATURATION: 99 % | DIASTOLIC BLOOD PRESSURE: 81 MMHG | WEIGHT: 172.62 LBS | TEMPERATURE: 97.1 F

## 2021-05-03 LAB
BASOPHILS # BLD AUTO: 0.02 K/UL — SIGNIFICANT CHANGE UP (ref 0–0.2)
BASOPHILS NFR BLD AUTO: 0.3 % — SIGNIFICANT CHANGE UP (ref 0–2)
EOSINOPHIL # BLD AUTO: 0.11 K/UL — SIGNIFICANT CHANGE UP (ref 0–0.5)
EOSINOPHIL NFR BLD AUTO: 1.8 % — SIGNIFICANT CHANGE UP (ref 0–6)
HCT VFR BLD CALC: 32.5 % — LOW (ref 34.5–45)
HGB BLD-MCNC: 10.3 G/DL — LOW (ref 11.5–15.5)
IMM GRANULOCYTES NFR BLD AUTO: 0.6 % — SIGNIFICANT CHANGE UP (ref 0–1.5)
LYMPHOCYTES # BLD AUTO: 3.06 K/UL — SIGNIFICANT CHANGE UP (ref 1–3.3)
LYMPHOCYTES # BLD AUTO: 49.7 % — HIGH (ref 13–44)
MCHC RBC-ENTMCNC: 25.6 PG — LOW (ref 27–34)
MCHC RBC-ENTMCNC: 31.7 G/DL — LOW (ref 32–36)
MCV RBC AUTO: 80.6 FL — SIGNIFICANT CHANGE UP (ref 80–100)
MONOCYTES # BLD AUTO: 0.38 K/UL — SIGNIFICANT CHANGE UP (ref 0–0.9)
MONOCYTES NFR BLD AUTO: 6.2 % — SIGNIFICANT CHANGE UP (ref 2–14)
NEUTROPHILS # BLD AUTO: 2.55 K/UL — SIGNIFICANT CHANGE UP (ref 1.8–7.4)
NEUTROPHILS NFR BLD AUTO: 41.4 % — LOW (ref 43–77)
NRBC # BLD: 0 /100 WBCS — SIGNIFICANT CHANGE UP (ref 0–0)
PLATELET # BLD AUTO: 175 K/UL — SIGNIFICANT CHANGE UP (ref 150–400)
RBC # BLD: 4.03 M/UL — SIGNIFICANT CHANGE UP (ref 3.8–5.2)
RBC # FLD: 13.8 % — SIGNIFICANT CHANGE UP (ref 10.3–14.5)
WBC # BLD: 6.16 K/UL — SIGNIFICANT CHANGE UP (ref 3.8–10.5)
WBC # FLD AUTO: 6.16 K/UL — SIGNIFICANT CHANGE UP (ref 3.8–10.5)

## 2021-05-03 PROCEDURE — 99072 ADDL SUPL MATRL&STAF TM PHE: CPT

## 2021-05-03 PROCEDURE — 99213 OFFICE O/P EST LOW 20 MIN: CPT

## 2021-05-07 ENCOUNTER — APPOINTMENT (OUTPATIENT)
Dept: INTERNAL MEDICINE | Facility: CLINIC | Age: 61
End: 2021-05-07

## 2021-05-10 NOTE — ASSESSMENT
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  She was started on ATRA empirically on 5/23/20, then on arsenic on 5/27 when the diagnosis was confirmed. 6/23/20 she was found to have a partially occlusive PICC line associated DVT in her right axillary vein, treated with lovenox, transitioned to eliquis which was d/c'ed after US (11/6/20) showed resolution of clot. Bone marrow biopsy on 6/25 with persistent disease, repeated on 7/13/20 no evidence of leukemia.\par \par S/p 2 cycles of GALA/ATRA, further treatment held despite our recommendations to continue, patient and family's decision as the patient cannot tolerate treatments, \par \par CT (10/9/20) revealed resolution of pleural effusions, trace pericardial effusion. \par [place] [facility] [place] Vazquez. Patient does not want to [place] [facility] [place] [facility]par Continue close surveillance with peripheral blood RT PML/AWA q3 months for 2 years. PML/AWA negative on 2/3/21.\par CBC today WBC 6.16 H/H 10.3/32.5 Plt 175. \par Check her CMP, LDH, RT PML-AWA today.  \par Continued follow up every 3 months. \par I will be leaving the practice, will transition her care to my colleagues.

## 2021-05-10 NOTE — HISTORY OF PRESENT ILLNESS
[de-identified] : APL- low risk\par \par 60 year old female who is here for a follow up visit after a hospitalization for acute promyelocytic leukemia.  She initially presented to [facility] for fevers, found to be pancytopenic.  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  She was started on ATRA empirically on 5/23, then on arsenic on 5/27 when the diagnosis was confirmed.  Initially she appeared to have refractory thrombocytopenia which improved.  She was found to be COVID positive on 6/16, transferred to COVID unit, on 6/19 she was COVID negative, antibody positive and was transferred back.  She was treated for an enterococal UTI on 6/20.  Arsenic was held due to zoster from 6/22- 6/30.  6/23 she was found to have a partially occlusive DVT in her right axillary vein, treated with lovenox.  Repeat duplex was negative on 7/16.  [place] [facility] [place] CXR on 7/8, was found to be malpositioned, 7/9 PICC was exchanged, then removed on 7/10 due to irritation?.  Bone marrow biopsy on 6/25 with persistent disease, repeated on 7/13 no evidence of leukemia.  Her plt recovered 6/27, ANC by 7/11. [de-identified] : Patient presents for follow up visit, accompanied by her son.  She complains of occasional stinging discomfort at her mediport site, also occasionaly feels a stinging pain at her upper right arm at the prior PICC site.  No swelling, no redness, no fever/chills.  She has chronic arthralgias in her shoulders/knees/low back.  Otherwise she denies any bleeding, no constitutional symptoms- no night sweats, no weight loss.  She has a good appeitte.

## 2021-05-10 NOTE — PHYSICAL EXAM
[Restricted in physically strenuous activity but ambulatory and able to carry out work of a light or sedentary nature] : Status 1- Restricted in physically strenuous activity but ambulatory and able to carry out work of a light or sedentary nature, e.g., light house work, office work [Normal] : affect appropriate [de-identified] : port site healed [de-identified] : No midline or paravertebral tenderness to palpation [de-identified] : port/PICC sites are healed, no erythema, no swelling, no tenderness

## 2021-05-10 NOTE — REVIEW OF SYSTEMS
[Joint Pain] : joint pain [Muscle Pain] : muscle pain [Negative] : Allergic/Immunologic [Fever] : no fever [Night Sweats] : no night sweats [Chills] : no chills [Fatigue] : no fatigue [Recent Change In Weight] : ~T no recent weight change [FreeTextEntry9] : back/lower extremity

## 2021-06-11 ENCOUNTER — APPOINTMENT (OUTPATIENT)
Dept: PAIN MANAGEMENT | Facility: CLINIC | Age: 61
End: 2021-06-11
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
## 2021-06-22 ENCOUNTER — INPATIENT (INPATIENT)
Facility: HOSPITAL | Age: 61
LOS: 2 days | Discharge: HOME | DRG: 864 | End: 2021-06-25
Attending: STUDENT IN AN ORGANIZED HEALTH CARE EDUCATION/TRAINING PROGRAM | Admitting: STUDENT IN AN ORGANIZED HEALTH CARE EDUCATION/TRAINING PROGRAM
Payer: MEDICAID

## 2021-06-22 VITALS
DIASTOLIC BLOOD PRESSURE: 77 MMHG | HEART RATE: 81 BPM | OXYGEN SATURATION: 97 % | WEIGHT: 160.06 LBS | RESPIRATION RATE: 18 BRPM | SYSTOLIC BLOOD PRESSURE: 133 MMHG | HEIGHT: 61 IN | TEMPERATURE: 101 F

## 2021-06-22 LAB
ALBUMIN SERPL ELPH-MCNC: 4.4 G/DL — SIGNIFICANT CHANGE UP (ref 3.3–5)
ALP SERPL-CCNC: 82 U/L — SIGNIFICANT CHANGE UP (ref 40–120)
ALT FLD-CCNC: 17 U/L — SIGNIFICANT CHANGE UP (ref 10–45)
ANION GAP SERPL CALC-SCNC: 17 MMOL/L — SIGNIFICANT CHANGE UP (ref 5–17)
APTT BLD: 31.8 SEC — SIGNIFICANT CHANGE UP (ref 27.5–35.5)
AST SERPL-CCNC: 19 U/L — SIGNIFICANT CHANGE UP (ref 10–40)
BASE EXCESS BLDV CALC-SCNC: 2.4 MMOL/L — HIGH (ref -2–2)
BASOPHILS # BLD AUTO: 0.02 K/UL — SIGNIFICANT CHANGE UP (ref 0–0.2)
BASOPHILS NFR BLD AUTO: 0.2 % — SIGNIFICANT CHANGE UP (ref 0–2)
BILIRUB SERPL-MCNC: 0.2 MG/DL — SIGNIFICANT CHANGE UP (ref 0.2–1.2)
BUN SERPL-MCNC: 18 MG/DL — SIGNIFICANT CHANGE UP (ref 7–23)
CA-I SERPL-SCNC: 1.17 MMOL/L — SIGNIFICANT CHANGE UP (ref 1.12–1.3)
CALCIUM SERPL-MCNC: 10.1 MG/DL — SIGNIFICANT CHANGE UP (ref 8.4–10.5)
CHLORIDE BLDV-SCNC: 102 MMOL/L — SIGNIFICANT CHANGE UP (ref 96–108)
CHLORIDE SERPL-SCNC: 98 MMOL/L — SIGNIFICANT CHANGE UP (ref 96–108)
CO2 BLDV-SCNC: 29 MMOL/L — SIGNIFICANT CHANGE UP (ref 22–30)
CO2 SERPL-SCNC: 22 MMOL/L — SIGNIFICANT CHANGE UP (ref 22–31)
CREAT SERPL-MCNC: 1.09 MG/DL — SIGNIFICANT CHANGE UP (ref 0.5–1.3)
EOSINOPHIL # BLD AUTO: 0.05 K/UL — SIGNIFICANT CHANGE UP (ref 0–0.5)
EOSINOPHIL NFR BLD AUTO: 0.5 % — SIGNIFICANT CHANGE UP (ref 0–6)
GAS PNL BLDV: 135 MMOL/L — SIGNIFICANT CHANGE UP (ref 135–145)
GAS PNL BLDV: SIGNIFICANT CHANGE UP
GAS PNL BLDV: SIGNIFICANT CHANGE UP
GLUCOSE BLDV-MCNC: 216 MG/DL — HIGH (ref 70–99)
GLUCOSE SERPL-MCNC: 211 MG/DL — HIGH (ref 70–99)
HCO3 BLDV-SCNC: 28 MMOL/L — SIGNIFICANT CHANGE UP (ref 21–29)
HCT VFR BLD CALC: 29.5 % — LOW (ref 34.5–45)
HCT VFR BLDA CALC: 30 % — LOW (ref 39–50)
HGB BLD CALC-MCNC: 9.6 G/DL — LOW (ref 11.5–15.5)
HGB BLD-MCNC: 9.2 G/DL — LOW (ref 11.5–15.5)
IMM GRANULOCYTES NFR BLD AUTO: 0.6 % — SIGNIFICANT CHANGE UP (ref 0–1.5)
INR BLD: 1.05 RATIO — SIGNIFICANT CHANGE UP (ref 0.88–1.16)
LACTATE BLDV-MCNC: 1.9 MMOL/L — SIGNIFICANT CHANGE UP (ref 0.7–2)
LYMPHOCYTES # BLD AUTO: 2.73 K/UL — SIGNIFICANT CHANGE UP (ref 1–3.3)
LYMPHOCYTES # BLD AUTO: 27.4 % — SIGNIFICANT CHANGE UP (ref 13–44)
MCHC RBC-ENTMCNC: 25.2 PG — LOW (ref 27–34)
MCHC RBC-ENTMCNC: 31.2 GM/DL — LOW (ref 32–36)
MCV RBC AUTO: 80.8 FL — SIGNIFICANT CHANGE UP (ref 80–100)
MONOCYTES # BLD AUTO: 0.86 K/UL — SIGNIFICANT CHANGE UP (ref 0–0.9)
MONOCYTES NFR BLD AUTO: 8.6 % — SIGNIFICANT CHANGE UP (ref 2–14)
NEUTROPHILS # BLD AUTO: 6.26 K/UL — SIGNIFICANT CHANGE UP (ref 1.8–7.4)
NEUTROPHILS NFR BLD AUTO: 62.7 % — SIGNIFICANT CHANGE UP (ref 43–77)
NRBC # BLD: 0 /100 WBCS — SIGNIFICANT CHANGE UP (ref 0–0)
PCO2 BLDV: 49 MMHG — SIGNIFICANT CHANGE UP (ref 35–50)
PH BLDV: 7.37 — SIGNIFICANT CHANGE UP (ref 7.35–7.45)
PLATELET # BLD AUTO: 195 K/UL — SIGNIFICANT CHANGE UP (ref 150–400)
PO2 BLDV: 25 MMHG — SIGNIFICANT CHANGE UP (ref 25–45)
POTASSIUM BLDV-SCNC: 4.6 MMOL/L — SIGNIFICANT CHANGE UP (ref 3.5–5.3)
POTASSIUM SERPL-MCNC: 4.9 MMOL/L — SIGNIFICANT CHANGE UP (ref 3.5–5.3)
POTASSIUM SERPL-SCNC: 4.9 MMOL/L — SIGNIFICANT CHANGE UP (ref 3.5–5.3)
PROT SERPL-MCNC: 7.9 G/DL — SIGNIFICANT CHANGE UP (ref 6–8.3)
PROTHROM AB SERPL-ACNC: 12.6 SEC — SIGNIFICANT CHANGE UP (ref 10.6–13.6)
RBC # BLD: 3.65 M/UL — LOW (ref 3.8–5.2)
RBC # FLD: 13.2 % — SIGNIFICANT CHANGE UP (ref 10.3–14.5)
SAO2 % BLDV: 36 % — LOW (ref 67–88)
SODIUM SERPL-SCNC: 137 MMOL/L — SIGNIFICANT CHANGE UP (ref 135–145)
WBC # BLD: 9.98 K/UL — SIGNIFICANT CHANGE UP (ref 3.8–10.5)
WBC # FLD AUTO: 9.98 K/UL — SIGNIFICANT CHANGE UP (ref 3.8–10.5)

## 2021-06-22 PROCEDURE — 99285 EMERGENCY DEPT VISIT HI MDM: CPT

## 2021-06-22 PROCEDURE — 71045 X-RAY EXAM CHEST 1 VIEW: CPT | Mod: 26

## 2021-06-22 PROCEDURE — 93010 ELECTROCARDIOGRAM REPORT: CPT

## 2021-06-22 RX ORDER — SODIUM CHLORIDE 9 MG/ML
2300 INJECTION, SOLUTION INTRAVENOUS ONCE
Refills: 0 | Status: COMPLETED | OUTPATIENT
Start: 2021-06-22 | End: 2021-06-22

## 2021-06-22 RX ORDER — ACETAMINOPHEN 500 MG
975 TABLET ORAL ONCE
Refills: 0 | Status: COMPLETED | OUTPATIENT
Start: 2021-06-22 | End: 2021-06-22

## 2021-06-22 RX ORDER — CEFTRIAXONE 500 MG/1
1000 INJECTION, POWDER, FOR SOLUTION INTRAMUSCULAR; INTRAVENOUS ONCE
Refills: 0 | Status: COMPLETED | OUTPATIENT
Start: 2021-06-22 | End: 2021-06-22

## 2021-06-22 RX ADMIN — Medication 975 MILLIGRAM(S): at 22:45

## 2021-06-22 RX ADMIN — SODIUM CHLORIDE 2300 MILLILITER(S): 9 INJECTION, SOLUTION INTRAVENOUS at 22:45

## 2021-06-22 RX ADMIN — CEFTRIAXONE 100 MILLIGRAM(S): 500 INJECTION, POWDER, FOR SOLUTION INTRAMUSCULAR; INTRAVENOUS at 22:45

## 2021-06-22 NOTE — ED PROVIDER NOTE - CLINICAL SUMMARY MEDICAL DECISION MAKING FREE TEXT BOX
61y f PMHx APL in remission, HTN, HLD, DM p/w 3 days of fever, weakness, body aches. febrile in ED, borderline septic vitals. lower abdominal ttp, BL CVAT L > R, lungs clear, RRR. no visible rashes. CHAIREZ. neuro exam nonfocal. ddx UTI, pyelo, PNA, colitis, viral syndrome. will send labs w/bcx, ua, ucx, IVF, tylenol, empiric abx, CXR, CT A/P, reassess likely tba - Trace Maria PA-C

## 2021-06-22 NOTE — ED ADULT NURSE NOTE - OBJECTIVE STATEMENT
Pt is a 62 y/o F, PMH APL in remission, HTN, HLD, DM , presenting to the ED c/o abdominal pain, generalized weakness and fever x 4 days. Pt tmax stated 101. Pt states she has been having abdominal pain, abdomen tender to touch. Pt endorses body aches, worse in lower back. Pt denies headache, dizziness, chest pain, palpitations, cough, SOB, n/v/d, urinary symptoms, chills at this time. Pt speaking in complete sentences. Pt is A&Ox3, moves all extremities, changed into hospital gown with call bell and family at bedside and safety maintained.

## 2021-06-22 NOTE — ED PROVIDER NOTE - OBJECTIVE STATEMENT
61y f PMHx APL in remission, HTN, HLD, DM brought in by son reporting fever. Pt Persian-speaking, son at bedside for interpretation - pt reports 3 days of fever tmax 101F, generalized weakness, generalized body aches worse in the lower back and bilateral thighs. pt also notes generalized lower abdominal pain. Denies cough, sore throat, runny nose, urinary symptoms, rash, NVDC  Vaccinated for COVID (Pfizer) May 2021

## 2021-06-22 NOTE — ED PROVIDER NOTE - PROGRESS NOTE DETAILS
Attending Masva:  pt signed out to me, fu ct scan and labs. Pt reported as unwell appearing, 61 w/ fever, workup unremarkable. no clear etiology of fever, given age will admit to hospitalist for further observation and to fu blood cx.

## 2021-06-22 NOTE — ED PROVIDER NOTE - ATTENDING CONTRIBUTION TO CARE
Brian Galvan MD, FACEP: In this physician's medical judgement based on clinical history and physical exam, patient with fever, cough, myalgias, no night sweats or weight loss. patient with pain to bilateral thighs and abdomen, even points to adipose tissue of abdomen and states that it hurts. No known sick contacts.   ncat  mild distress secondary to pain  no cervical LAD appreciated  bilateral breath sounds, lungs clear to auscultation bilaterally, equal breath sounds bilaterally  non-tachycardic   non-tachypneic  tactile fever  no rash/vesicles/petechiae  no gross deformity of extremities, no asymmetry  generally tender abdomen, no rebound/guarding   concern for abdominal pain and will get iv, cbc, cmp, ct a/p, fluids, antibiotics, ua, urine culture, blood cultures   Will follow up on labs, analgesia, imaging, reassess and disposition to the inpatient team as clinically indicated.

## 2021-06-23 DIAGNOSIS — E78.5 HYPERLIPIDEMIA, UNSPECIFIED: ICD-10-CM

## 2021-06-23 DIAGNOSIS — E11.65 TYPE 2 DIABETES MELLITUS WITH HYPERGLYCEMIA: ICD-10-CM

## 2021-06-23 DIAGNOSIS — R50.9 FEVER, UNSPECIFIED: ICD-10-CM

## 2021-06-23 DIAGNOSIS — I10 ESSENTIAL (PRIMARY) HYPERTENSION: ICD-10-CM

## 2021-06-23 LAB
ANION GAP SERPL CALC-SCNC: 15 MMOL/L — SIGNIFICANT CHANGE UP (ref 5–17)
APPEARANCE UR: CLEAR — SIGNIFICANT CHANGE UP
B PERT DNA SPEC QL NAA+PROBE: SIGNIFICANT CHANGE UP
BACTERIA # UR AUTO: NEGATIVE — SIGNIFICANT CHANGE UP
BILIRUB UR-MCNC: NEGATIVE — SIGNIFICANT CHANGE UP
BUN SERPL-MCNC: 19 MG/DL — SIGNIFICANT CHANGE UP (ref 7–23)
C PNEUM DNA SPEC QL NAA+PROBE: SIGNIFICANT CHANGE UP
CALCIUM SERPL-MCNC: 9.7 MG/DL — SIGNIFICANT CHANGE UP (ref 8.4–10.5)
CHLORIDE SERPL-SCNC: 100 MMOL/L — SIGNIFICANT CHANGE UP (ref 96–108)
CO2 SERPL-SCNC: 24 MMOL/L — SIGNIFICANT CHANGE UP (ref 22–31)
COLOR SPEC: SIGNIFICANT CHANGE UP
CREAT SERPL-MCNC: 0.95 MG/DL — SIGNIFICANT CHANGE UP (ref 0.5–1.3)
DIFF PNL FLD: NEGATIVE — SIGNIFICANT CHANGE UP
EPI CELLS # UR: 2 /HPF — SIGNIFICANT CHANGE UP
FLUAV H1 2009 PAND RNA SPEC QL NAA+PROBE: SIGNIFICANT CHANGE UP
FLUAV H1 RNA SPEC QL NAA+PROBE: SIGNIFICANT CHANGE UP
FLUAV H3 RNA SPEC QL NAA+PROBE: SIGNIFICANT CHANGE UP
FLUAV SUBTYP SPEC NAA+PROBE: SIGNIFICANT CHANGE UP
FLUBV RNA SPEC QL NAA+PROBE: SIGNIFICANT CHANGE UP
GLUCOSE BLDC GLUCOMTR-MCNC: 251 MG/DL — HIGH (ref 70–99)
GLUCOSE BLDC GLUCOMTR-MCNC: 319 MG/DL — HIGH (ref 70–99)
GLUCOSE BLDC GLUCOMTR-MCNC: 323 MG/DL — HIGH (ref 70–99)
GLUCOSE SERPL-MCNC: 277 MG/DL — HIGH (ref 70–99)
GLUCOSE UR QL: NEGATIVE — SIGNIFICANT CHANGE UP
HADV DNA SPEC QL NAA+PROBE: SIGNIFICANT CHANGE UP
HCOV PNL SPEC NAA+PROBE: SIGNIFICANT CHANGE UP
HCT VFR BLD CALC: 30.4 % — LOW (ref 34.5–45)
HGB BLD-MCNC: 9.7 G/DL — LOW (ref 11.5–15.5)
HMPV RNA SPEC QL NAA+PROBE: SIGNIFICANT CHANGE UP
HPIV1 RNA SPEC QL NAA+PROBE: SIGNIFICANT CHANGE UP
HPIV2 RNA SPEC QL NAA+PROBE: SIGNIFICANT CHANGE UP
HPIV3 RNA SPEC QL NAA+PROBE: SIGNIFICANT CHANGE UP
HPIV4 RNA SPEC QL NAA+PROBE: SIGNIFICANT CHANGE UP
HYALINE CASTS # UR AUTO: 1 /LPF — SIGNIFICANT CHANGE UP (ref 0–2)
KETONES UR-MCNC: NEGATIVE — SIGNIFICANT CHANGE UP
LEUKOCYTE ESTERASE UR-ACNC: NEGATIVE — SIGNIFICANT CHANGE UP
MAGNESIUM SERPL-MCNC: 1.8 MG/DL — SIGNIFICANT CHANGE UP (ref 1.6–2.6)
MCHC RBC-ENTMCNC: 25.5 PG — LOW (ref 27–34)
MCHC RBC-ENTMCNC: 31.9 GM/DL — LOW (ref 32–36)
MCV RBC AUTO: 80 FL — SIGNIFICANT CHANGE UP (ref 80–100)
NITRITE UR-MCNC: NEGATIVE — SIGNIFICANT CHANGE UP
NRBC # BLD: 0 /100 WBCS — SIGNIFICANT CHANGE UP (ref 0–0)
PH UR: 6.5 — SIGNIFICANT CHANGE UP (ref 5–8)
PHOSPHATE SERPL-MCNC: 3.1 MG/DL — SIGNIFICANT CHANGE UP (ref 2.5–4.5)
PLATELET # BLD AUTO: 210 K/UL — SIGNIFICANT CHANGE UP (ref 150–400)
POTASSIUM SERPL-MCNC: 5 MMOL/L — SIGNIFICANT CHANGE UP (ref 3.5–5.3)
POTASSIUM SERPL-SCNC: 5 MMOL/L — SIGNIFICANT CHANGE UP (ref 3.5–5.3)
PROT UR-MCNC: ABNORMAL
RAPID RVP RESULT: SIGNIFICANT CHANGE UP
RBC # BLD: 3.8 M/UL — SIGNIFICANT CHANGE UP (ref 3.8–5.2)
RBC # FLD: 13.2 % — SIGNIFICANT CHANGE UP (ref 10.3–14.5)
RBC CASTS # UR COMP ASSIST: 1 /HPF — SIGNIFICANT CHANGE UP (ref 0–4)
RSV RNA SPEC QL NAA+PROBE: SIGNIFICANT CHANGE UP
RV+EV RNA SPEC QL NAA+PROBE: SIGNIFICANT CHANGE UP
SARS-COV-2 RNA SPEC QL NAA+PROBE: SIGNIFICANT CHANGE UP
SODIUM SERPL-SCNC: 139 MMOL/L — SIGNIFICANT CHANGE UP (ref 135–145)
SP GR SPEC: 1.01 — SIGNIFICANT CHANGE UP (ref 1.01–1.02)
UROBILINOGEN FLD QL: NEGATIVE — SIGNIFICANT CHANGE UP
WBC # BLD: 8.61 K/UL — SIGNIFICANT CHANGE UP (ref 3.8–10.5)
WBC # FLD AUTO: 8.61 K/UL — SIGNIFICANT CHANGE UP (ref 3.8–10.5)
WBC UR QL: 3 /HPF — SIGNIFICANT CHANGE UP (ref 0–5)

## 2021-06-23 PROCEDURE — 99223 1ST HOSP IP/OBS HIGH 75: CPT

## 2021-06-23 PROCEDURE — 74177 CT ABD & PELVIS W/CONTRAST: CPT | Mod: 26,MA

## 2021-06-23 RX ORDER — DEXTROSE 50 % IN WATER 50 %
25 SYRINGE (ML) INTRAVENOUS ONCE
Refills: 0 | Status: DISCONTINUED | OUTPATIENT
Start: 2021-06-23 | End: 2021-06-25

## 2021-06-23 RX ORDER — SODIUM CHLORIDE 9 MG/ML
1000 INJECTION, SOLUTION INTRAVENOUS
Refills: 0 | Status: DISCONTINUED | OUTPATIENT
Start: 2021-06-23 | End: 2021-06-25

## 2021-06-23 RX ORDER — INSULIN LISPRO 100/ML
VIAL (ML) SUBCUTANEOUS AT BEDTIME
Refills: 0 | Status: DISCONTINUED | OUTPATIENT
Start: 2021-06-23 | End: 2021-06-25

## 2021-06-23 RX ORDER — GLUCAGON INJECTION, SOLUTION 0.5 MG/.1ML
1 INJECTION, SOLUTION SUBCUTANEOUS ONCE
Refills: 0 | Status: DISCONTINUED | OUTPATIENT
Start: 2021-06-23 | End: 2021-06-25

## 2021-06-23 RX ORDER — DEXTROSE 50 % IN WATER 50 %
15 SYRINGE (ML) INTRAVENOUS ONCE
Refills: 0 | Status: DISCONTINUED | OUTPATIENT
Start: 2021-06-23 | End: 2021-06-25

## 2021-06-23 RX ORDER — SODIUM CHLORIDE 9 MG/ML
1000 INJECTION INTRAMUSCULAR; INTRAVENOUS; SUBCUTANEOUS
Refills: 0 | Status: DISCONTINUED | OUTPATIENT
Start: 2021-06-23 | End: 2021-06-25

## 2021-06-23 RX ORDER — OMEPRAZOLE 10 MG/1
1 CAPSULE, DELAYED RELEASE ORAL
Qty: 0 | Refills: 0 | DISCHARGE

## 2021-06-23 RX ORDER — LOSARTAN POTASSIUM 100 MG/1
1 TABLET, FILM COATED ORAL
Qty: 0 | Refills: 0 | DISCHARGE

## 2021-06-23 RX ORDER — PANTOPRAZOLE SODIUM 20 MG/1
40 TABLET, DELAYED RELEASE ORAL
Refills: 0 | Status: DISCONTINUED | OUTPATIENT
Start: 2021-06-24 | End: 2021-06-25

## 2021-06-23 RX ORDER — ATORVASTATIN CALCIUM 80 MG/1
20 TABLET, FILM COATED ORAL AT BEDTIME
Refills: 0 | Status: DISCONTINUED | OUTPATIENT
Start: 2021-06-23 | End: 2021-06-25

## 2021-06-23 RX ORDER — ENOXAPARIN SODIUM 100 MG/ML
40 INJECTION SUBCUTANEOUS DAILY
Refills: 0 | Status: DISCONTINUED | OUTPATIENT
Start: 2021-06-23 | End: 2021-06-25

## 2021-06-23 RX ORDER — INSULIN LISPRO 100/ML
VIAL (ML) SUBCUTANEOUS
Refills: 0 | Status: DISCONTINUED | OUTPATIENT
Start: 2021-06-23 | End: 2021-06-25

## 2021-06-23 RX ORDER — ATENOLOL 25 MG/1
1 TABLET ORAL
Qty: 0 | Refills: 0 | DISCHARGE

## 2021-06-23 RX ORDER — CEFTRIAXONE 500 MG/1
1000 INJECTION, POWDER, FOR SOLUTION INTRAMUSCULAR; INTRAVENOUS EVERY 24 HOURS
Refills: 0 | Status: DISCONTINUED | OUTPATIENT
Start: 2021-06-24 | End: 2021-06-24

## 2021-06-23 RX ORDER — DEXTROSE 50 % IN WATER 50 %
12.5 SYRINGE (ML) INTRAVENOUS ONCE
Refills: 0 | Status: DISCONTINUED | OUTPATIENT
Start: 2021-06-23 | End: 2021-06-25

## 2021-06-23 RX ORDER — DIPHENHYDRAMINE HCL 50 MG
50 CAPSULE ORAL ONCE
Refills: 0 | Status: COMPLETED | OUTPATIENT
Start: 2021-06-23 | End: 2021-06-23

## 2021-06-23 RX ADMIN — Medication 3: at 13:26

## 2021-06-23 RX ADMIN — Medication 2: at 22:31

## 2021-06-23 RX ADMIN — ENOXAPARIN SODIUM 40 MILLIGRAM(S): 100 INJECTION SUBCUTANEOUS at 13:26

## 2021-06-23 RX ADMIN — SODIUM CHLORIDE 125 MILLILITER(S): 9 INJECTION INTRAMUSCULAR; INTRAVENOUS; SUBCUTANEOUS at 12:29

## 2021-06-23 RX ADMIN — Medication 4: at 18:22

## 2021-06-23 RX ADMIN — Medication 50 MILLIGRAM(S): at 04:09

## 2021-06-23 RX ADMIN — Medication 40 MILLIGRAM(S): at 01:23

## 2021-06-23 RX ADMIN — ATORVASTATIN CALCIUM 20 MILLIGRAM(S): 80 TABLET, FILM COATED ORAL at 21:21

## 2021-06-23 NOTE — H&P ADULT - HISTORY OF PRESENT ILLNESS
ED Presenting Vitals: Tmax = 102F, 81, 133/77 --> 103/71 despite IVF resuscitation, 18br/m, 97% on RA  ED Course: s/p LR-2.3L, Tylenol 975mg PO x1, IV Ceftriaxone 1g x1, and after contrast administration: Solumedrol 40mg IVP x1, Benadryl 50mg IVP x1 61yoF w/ PMHx significant for APL in remission (follows w/ Dr. Karen Hurd of [facility]), HTN, HLD, NIDDM, who presents w/ complaints of fevers over the past three days (with reported Tmax = 101F) associated w/ generalized weakness, body aches especially localized to the lower back, lower abdomen, and bilateral inner thighs. She denies known sick contacts, recent travel, cough, rhinorrhea, night sweats, weight loss, n/v, diarrhea, dysuria, increased frequency or urgency of urination, HA, vision changes, neck stiffness, or rash; she also denies CP, palpitations, LE edema, calf tenderness, trauma, falls, or vaginal discharge. Of note, patient's son notes lab work from PCP = Dr. Millie Dobson from Feb 2021 noting positive Hepatis A testing, though unclear whether indicative of recovery phase/prior infection or active infection -- as noted, however, patient denies diarrhea or recent travel.     She received both doses of the Pfizer COVID 19 vaccine as of May 2021.     ED Presenting Vitals: Tmax = 102F, 81, 133/77 --> 103/71 despite IVF resuscitation, 18br/m, 97% on RA  ED Course: s/p LR-2.3L, Tylenol 975mg PO x1, IV Ceftriaxone 1g x1, and after contrast administration: Solumedrol 40mg IVP x1, Benadryl 50mg IVP x1

## 2021-06-23 NOTE — DISCHARGE NOTE NURSING/CASE MANAGEMENT/SOCIAL WORK - INFLUENZA IMMUNIZATION DATE (APPROXIMATE):
Problem: Neuro Status  Goal: Neuro status will remain stable or improve  Outcome: Progressing     Problem: Skin Integrity  Goal: Skin integrity is maintained or improved  Outcome: Progressing                13-Oct-2020

## 2021-06-23 NOTE — ED ADULT NURSE REASSESSMENT NOTE - NS ED NURSE REASSESS COMMENT FT1
Pt remains AAOx3, NAD, resp nonlabored, resting comfortably in bed with family at bedside. Pt asleep with eyes closed, arouses easily to voice and touch. Pt denies headache, dizziness, chest pain, palpitations, SOB, current abd pain, n/v/d, urinary symptoms, fevers, chills, current weakness at this time. Pt awaiting 0520 benadryl prior to CT scan. Safety maintained.

## 2021-06-23 NOTE — H&P ADULT - NSHPLABSRESULTS_GEN_ALL_CORE
Patient has osteoarthritis on right hip and she plans to have right hip replacement surgery with Dr. Parkinson on 1/10/19.  She used to take diclofenac 75 mg daily as needed.  She stopped taking all the supplements and NSAIDs for 2 weeks for surgery.   Labs and imaging data obtained by the ED personally reviewed.                        9.7    8.61  )-----------( 210      ( 2021 07:54 )             30.4       139  |  100  |  19  ----------------------------<  277<H>  5.0   |  24  |  0.95    Ca    9.7      2021 07:54  Phos  3.1       Mg     1.8         TPro  7.9  /  Alb  4.4  /  TBili  0.2  /  DBili  x   /  AST  19  /  ALT  17  /  AlkPhos  82      PT/INR - ( 2021 22:47 )   PT: 12.6 sec;   INR: 1.05 ratio     PTT - ( 2021 22:47 )  PTT:31.8 sec    Blood Gas Profile - Venous (21 @ 22:47)   pH, Venous: 7.37   pCO2, Venous: 49 mmHg   pO2, Venous: 25 mmHg   HCO3, Venous: 28 mmol/L   Base Excess, Venous: 2.4 mmol/L   Oxygen Saturation, Venous: 36 %   Total CO2, Venous: 29 mmol/L   Blood Gas Source Venous: Venous    Blood Gas Venous - Lactate (21 @ 22:47)   Blood Gas Venous - Lactate: 1.9 mmoL/L     Urinalysis Basic - ( 2021 01:22 )  Color: Light Yellow / Appearance: Clear / S.011 / pH: x  Gluc: x / Ketone: Negative  / Bili: Negative / Urobili: Negative   Blood: x / Protein: Trace / Nitrite: Negative   Leuk Esterase: Negative / RBC: 1 /hpf / WBC 3 /HPF   Sq Epi: x / Non Sq Epi: 2 /hpf / Bacteria: Negative    Respiratory Viral Panel with COVID-19 by LARS (21 @ 22:47)   Rapid RVP Result: [facility]   SARS-CoV-2: [facility]: This Respiratory Panel uses polymerase chain reaction (PCR) to detect for   adenovirus; coronavirus (HKU1, NL63, 229E, OC43); human metapneumovirus   (hMPV); human enterovirus/rhinovirus (Entero/RV); influenza A; influenza   A/H1; influenza A/H3; influenza A/H1-2009; influenza B; parainfluenza   viruses 1, 2, 3, 4; respiratory syncytial virus; Mycoplasma pneumoniae;   Chlamydophila pneumoniae; and SARS-CoV-2.   Adenovirus (RapRVP): NotDetec   Influenza A (RapRVP): NotDetec   Influenza AH1 2009 (RapRVP): NotDetec   Influenza AH1 (RapRVP): NotDetec   Influenza AH3 (RapRVP): NotDetec   Influenza B (RapRVP): NotDetec   Parainfluenza 1 (RapRVP): NotDetec   Parainfluenza 2 (RapRVP): NotDetec   Parainfluenza 3 (RapRVP): NotDetec   Parainfluenza 4 (RapRVP): NotDetec   Resp Syncytial Virus (RapRVP): NotDetec   Chlamydia pneumoniae (RapRVP): NotDetec   Mycoplasma pneumoniae (RapRVP): NotDetec   Entero/Rhinovirus (RapRVP): NotDetec   hMPV (RapRVP): NotDetec   Coronavirus (229E,HKU1,NL63,OC43): NotDetec     CXR as reported: Clear lungs.    CT A/P w/ PO and IV contrast as reported: Circumferential urothelial wall thickening/enhancement involving the bilateral ureters, similar in appearance to prior CT study of 10/9/2020, correlate with urinalysis to exclude urinary tract infection. Otherwise, no acute intra-abdominal pathology or bowel obstruction.

## 2021-06-23 NOTE — PROGRESS NOTE ADULT - PROBLEM SELECTOR PLAN 6
Resources for therapy:    [facility]  - they help people find effective ways to deal with stress and problems at home/school/job. Counseling services provide an opportunity to think about new ways to deal with problems and help find effective alternative treatments.  - they can help with counseling, substance abuse, psychiatric services  - they see children, adolescents, adults, families  - services available in English, Emirati, and ASL    [address] [place] [address]  Telephone [phone]  24 hr Crisis Line [phone]        [facility]  - another option is to visit the website www.psychologytoday.com  - this allows you to pick a therapist who is covered under your insurance, in your zip code, and provides the type of therapy you are searching for      If you have thoughts of hurting/killing yourself or others please report immediately to the emergency for an evaluation.    Please reach out to me if you have any questions or concerns,  Dr. Baptiste     Home regimen with Atenolol held on admission  Will restart at 25mg PO daily for increased BP and increase if needed

## 2021-06-23 NOTE — H&P ADULT - NSHPPHYSICALEXAM_GEN_ALL_CORE
Vital Signs Last 24 Hrs  T(F): 97.7 (23 Jun 2021 10:24), Max: 102 (22 Jun 2021 22:50)  HR: 64 (23 Jun 2021 10:24) (64 - 81)  BP: 107/61 (23 Jun 2021 10:24) (103/71 - 153/74)  RR: 18 (23 Jun 2021 10:24) (18 - 20)  SpO2: 95% (23 Jun 2021 10:24) (95% - 100%)    GEN: elderly woman, laying in stretcher in NAD  PSYCH: A&Ox3, mood and affect appear appropriate   SKIN: intact, no e/o rash  NEURO: no focal neurologic deficits appreciated  EYES: PERRL, anicteric  HEAD: NC, AT  NECK: supple  RESPI: no accessory muscle use, B/L air entry, CTAB   CARDIO: regular rate/rhythm, no LE edema B/L  ABD: soft, ND, diffusely mildly tender but more notably B/L suprapubic region, flank pain but no overt CVA tenderness B/L  EXT: patient able to move all extremities spontaneously  VASC: peripheral pulses palpated

## 2021-06-23 NOTE — H&P ADULT - PROBLEM SELECTOR PLAN 1
- unclear etiology, no leukocytosis, no localizing signs/sx of infection except for noted B/L flank pain - unclear etiology, no leukocytosis, no localizing signs/sx of infection except for noted back, lower abdominal, and B/L thigh pain  - UA/micro negative and patient w/o dysuria/increased freq/urg of urination, but imaging revealing "circumferential urothelial wall thickening/enhancement involving the bilateral ureters" but similar in appearance to imaging from 2020  - for now will continue IV Ceftriaxone 1g daily x3 days as patient states symptoms have improved after Abx administration?

## 2021-06-23 NOTE — H&P ADULT - PROBLEM SELECTOR PLAN 2
- relative hypotension despite fluid resuscitation in ED  - will continue IVF and monitor hemodynamics closely while holding home antihypertensives

## 2021-06-23 NOTE — H&P ADULT - ASSESSMENT
61yoF w/ PMHx significant for APL in remission (follows w/ Dr. Karen Hurd of [facility]), HTN, HLD, NIDDM, who presents w/ complaints of fevers over the past three days (with reported Tmax = 101F) associated w/ generalized weakness, body aches especially localized to the lower back, lower abdomen, and bilateral inner thighs.     She received both doses of the Pfizer COVID 19 vaccine as of May 2021.     PCP = Dr. Millie Dobson

## 2021-06-23 NOTE — H&P ADULT - PROBLEM SELECTOR PLAN 3
- holding home oral antihyperglycemics  - ISS w/ serial FS monitoring  - noted FS w/ hyperglycemia -- f/u HbA1C; if patient remains hospitalized she will likely require initiation of basal/bolus insulin

## 2021-06-23 NOTE — ED ADULT NURSE REASSESSMENT NOTE - NS ED NURSE REASSESS COMMENT FT1
Pt ambulated to bathroom independently and steadily. Safety maintained. Pt back from CT, as per family, pt has allergy to IV contrast. Pt family states last time pt received IV contrast pt experienced full body rash. Pt ambulated to bathroom independently and steadily. Safety maintained.

## 2021-06-23 NOTE — H&P ADULT - NEGATIVE ENMT SYMPTOMS
no vertigo/no sinus symptoms/no nasal congestion/no nasal discharge/no nasal obstruction/no throat pain/no dysphagia

## 2021-06-23 NOTE — PATIENT PROFILE ADULT - HISTORY OF COVID-19 VACCINATION
Have called to review patient's most recent CT scan indicating that he does have a 2.2 cm right renal pelvis calculus.  Recommend he follow-up with urology for this.  We will also forward the CT scan to his urologist.  Patient complains of having pain however it is not unbearable at this time.  No fevers, chills.  He is taking his Flomax.  He is able to urinate freely at this time.  Does not describe any difficulty with bladder emptying.      I have also discussed with the patient that have ordered an additional CT scan of his lungs due to the findings of pulmonary nodules on the previous CT abdomen pelvis.     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  He will schedule his CT scan.   Yes

## 2021-06-23 NOTE — H&P ADULT - NSICDXPASTMEDICALHX_GEN_ALL_CORE_FT
PAST MEDICAL HISTORY:  APL (acute promyelocytic leukemia)     Diabetes     DVT (deep venous thrombosis) Upper extremity, likely catheter related    HLD (hyperlipidemia)     Hypertension

## 2021-06-24 ENCOUNTER — TRANSCRIPTION ENCOUNTER (OUTPATIENT)
Age: 61
End: 2021-06-24

## 2021-06-24 DIAGNOSIS — Z29.9 ENCOUNTER FOR PROPHYLACTIC MEASURES, UNSPECIFIED: ICD-10-CM

## 2021-06-24 DIAGNOSIS — R10.9 UNSPECIFIED ABDOMINAL PAIN: ICD-10-CM

## 2021-06-24 DIAGNOSIS — M51.36 OTHER INTERVERTEBRAL DISC DEGENERATION, LUMBAR REGION: ICD-10-CM

## 2021-06-24 LAB
A1C WITH ESTIMATED AVERAGE GLUCOSE RESULT: 7.2 % — HIGH (ref 4–5.6)
COVID-19 SPIKE DOMAIN AB INTERP: POSITIVE
COVID-19 SPIKE DOMAIN ANTIBODY RESULT: >250 U/ML — HIGH
CULTURE RESULTS: SIGNIFICANT CHANGE UP
ESTIMATED AVERAGE GLUCOSE: 160 MG/DL — HIGH (ref 68–114)
GLUCOSE BLDC GLUCOMTR-MCNC: 126 MG/DL — HIGH (ref 70–99)
GLUCOSE BLDC GLUCOMTR-MCNC: 175 MG/DL — HIGH (ref 70–99)
GLUCOSE BLDC GLUCOMTR-MCNC: 178 MG/DL — HIGH (ref 70–99)
GLUCOSE BLDC GLUCOMTR-MCNC: 200 MG/DL — HIGH (ref 70–99)
GLUCOSE BLDC GLUCOMTR-MCNC: 245 MG/DL — HIGH (ref 70–99)
SARS-COV-2 IGG+IGM SERPL QL IA: >250 U/ML — HIGH
SARS-COV-2 IGG+IGM SERPL QL IA: POSITIVE
SPECIMEN SOURCE: SIGNIFICANT CHANGE UP

## 2021-06-24 PROCEDURE — 99233 SBSQ HOSP IP/OBS HIGH 50: CPT

## 2021-06-24 RX ADMIN — CEFTRIAXONE 100 MILLIGRAM(S): 500 INJECTION, POWDER, FOR SOLUTION INTRAMUSCULAR; INTRAVENOUS at 00:17

## 2021-06-24 RX ADMIN — Medication 1: at 09:10

## 2021-06-24 RX ADMIN — PANTOPRAZOLE SODIUM 40 MILLIGRAM(S): 20 TABLET, DELAYED RELEASE ORAL at 06:01

## 2021-06-24 RX ADMIN — ENOXAPARIN SODIUM 40 MILLIGRAM(S): 100 INJECTION SUBCUTANEOUS at 11:51

## 2021-06-24 RX ADMIN — ATORVASTATIN CALCIUM 20 MILLIGRAM(S): 80 TABLET, FILM COATED ORAL at 21:50

## 2021-06-24 RX ADMIN — Medication 0: at 17:49

## 2021-06-24 RX ADMIN — Medication 1: at 13:44

## 2021-06-24 NOTE — DISCHARGE NOTE PROVIDER - HOSPITAL COURSE
62 yo F w/ PMHx significant for APL in remission (follows w/ Dr. Karen Hurd of [facility]), HTN, HLD, NIDDM, who presents w/ complaints of fevers over the past three days (with reported Tmax = 101F) associated w/ generalized weakness, body aches especially localized to the lower back, lower abdomen, and bilateral inner thighs.  ED Presenting Vitals: Tmax = 102F, 81, 133/77 --> 103/71 despite IVF resuscitation, 18br/m, 97% on RA  ED Course: s/p LR-2.3L, Tylenol 975mg PO x1, IV Ceftriaxone 1g x1, and after contrast administration: Solumedrol 40mg IVP x1, Benadryl 50mg IVP x1.     62 yo F w/ PMHx significant for APL in remission (follows w/ Dr. Karen Hurd of [facility]), HTN, HLD, NIDDM, who presents w/ complaints of fevers over the past three days (with reported Tmax = 101F) associated w/ generalized weakness, body aches especially localized to the lower back, lower abdomen, and bilateral inner thighs.  ED Presenting Vitals: Tmax = 102F, 81, 133/77 --> 103/71 despite IVF resuscitation, 18br/m, 97% on RA  ED Course: s/p LR-2.3L, Tylenol 975mg PO x1, IV Ceftriaxone 1g x1, and after contrast administration: Solumedrol 40mg IVP x1, Benadryl 50mg IVP x1.       Problem/Plan - 1:  ·  Problem: Fever.  Plan: - unclear etiology, no leukocytosis, no localizing signs/sx of infection except for noted back, lower abdominal, and B/L thigh pain, no unilateral swelling or tachycardia/hypoxia to suggest clot  - UA/UC negative, blood cx negative  - Patient was initially started on Ceftriaxone 1g daily   -CT a/p negative as well just showing some prominent lymph nodes but none enlarged by criteria  -Has enhancement of bladder and ureters which is has been noted on previous CT scans and UA/Ucx negative, no blood in urine  -Patient was monitored off of antibiotics, but is still experiencing discomfort, so will be discharged on oral antibiotics     Problem/Plan - 2:  ·  Problem: Abdominal pain.  Plan: RLQ pain, CT negative except noted enhancement of bladder and ureters but no infection  May be radiculopathy from spinal stenosis or arthritis but MRI done 2 months ago showing no cord or nerve impingment and was referred to neuro as outpt to f/u with this   PT rec home no needs.      Problem/Plan - 3:  ·  Problem: Hypertension.  Plan: - Had hypotension in ED now stable will continue to hold antihypertensives.      Problem/Plan - 4:  ·  Problem: Type 2 diabetes mellitus with hyperglycemia, without long-term current use of insulin.  Plan: - holding home oral antihyperglycemics  - ISS w/ serial FS monitoring  - noted FS w/ hyperglycemia --  HbA1C 7%; if patient remains hospitalized she will likely require initiation of basal/bolus insulin.      Problem/Plan - 5:  ·  Problem: HLD (hyperlipidemia).  Plan: - continue home statin.      Problem/Plan - 6:  Problem: Degenerative disc disease, lumbar. Plan: Had MRI for low back pain showing spinal stenosis and arthritis with some disc herniations which they have been referred to neuro for follow up  PT done rec home no needs  tylenol for pain control.     Problem/Plan - 7:  ·  Problem: Need for prophylactic measure.  Plan: lovenox  Diabetic diet    Patient cleared for discharge by Dr. Melendez, with PCP and Neurology follow up. 60 yo F w/ PMHx significant for APL in remission (follows w/ Dr. Karen Hurd of [facility]), HTN, HLD, NIDDM, who presents w/ complaints of fevers over the past three days (with reported Tmax = 101F) associated w/ generalized weakness, body aches especially localized to the lower back, lower abdomen, and bilateral inner thighs.  ED Presenting Vitals: Tmax = 102F, 81, 133/77 --> 103/71 despite IVF resuscitation, 18br/m, 97% on RA  ED Course: s/p LR-2.3L, Tylenol 975mg PO x1, IV Ceftriaxone 1g x1, and after contrast administration: Solumedrol 40mg IVP x1, Benadryl 50mg IVP x1.       Problem/Plan - 1:  ·  Problem: Fever.  Plan: - unclear etiology, no leukocytosis, no localizing signs/sx of infection except for noted back, lower abdominal, and B/L thigh pain, no unilateral swelling or tachycardia/hypoxia to suggest clot  - UA/UC negative, blood cx negative  - Patient was initially started on Ceftriaxone 1g daily   -CT a/p negative as well just showing some prominent lymph nodes but none enlarged by criteria  -Has enhancement of bladder and ureters which is has been noted on previous CT scans and UA/Ucx negative, no blood in urine  -Patient was monitored off of antibiotics, but is still experiencing discomfort, so will be discharged on oral antibiotics     Problem/Plan - 2:  ·  Problem: Abdominal pain.  Plan: RLQ pain, CT negative except noted enhancement of bladder and ureters but no infection  May be radiculopathy from spinal stenosis or arthritis but MRI done 2 months ago showing no cord or nerve impingment and was referred to neuro as outpt to f/u with this   PT rec home no needs.      Problem/Plan - 3:  ·  Problem: Hypertension.  Plan: - Had hypotension in ED now stable will continue to hold antihypertensives.      Problem/Plan - 4:  ·  Problem: Type 2 diabetes mellitus with hyperglycemia, without long-term current use of insulin.  Plan: - holding home oral antihyperglycemics  - ISS w/ serial FS monitoring  - noted FS w/ hyperglycemia --  HbA1C 7%; if patient remains hospitalized she will likely require initiation of basal/bolus insulin.      Problem/Plan - 5:  ·  Problem: HLD (hyperlipidemia).  Plan: - continue home statin.      Problem/Plan - 6:  Problem: Degenerative disc disease, lumbar. Plan: Had MRI for low back pain showing spinal stenosis and arthritis with some disc herniations which they have been referred to neuro for follow up  PT done rec home no needs  tylenol for pain control.     Problem/Plan - 7:  ·  Problem: Need for prophylactic measure.  Plan: lovenox  Diabetic diet    Patient cleared for discharge by Dr. Melendez, with PCP, Neurology, and Heme/Onc follow up.

## 2021-06-24 NOTE — DISCHARGE NOTE PROVIDER - PROVIDER TOKENS
PROVIDER:[TOKEN:[[id number]]] PROVIDER:[TOKEN:[[id number]]],PROVIDER:[TOKEN:[[id number]]],PROVIDER:[TOKEN:[[id number]]] PROVIDER:[TOKEN:[[id number]]],PROVIDER:[TOKEN:[[id number]]],PROVIDER:[TOKEN:[[id number]]],PROVIDER:[TOKEN:[[id number]]]

## 2021-06-24 NOTE — DISCHARGE NOTE PROVIDER - NSDCFUADDAPPT_GEN_ALL_CORE_FT
You will need to follow up with your PCP within 3-4 days of discharge - please call to make an appointment.    You will need to follow up with your neurologist within one week of discharge - please call to make an appointment. You will need to follow up with your PCP within 3-4 days of discharge - please call to make an appointment.    You will need to follow up with your neurologist within one week of discharge - please call to make an appointment.    You will need to follow up with your Oncologist, Dr. Hurd, within 1-2 weeks of discharge - please call to make an appointment.

## 2021-06-24 NOTE — DISCHARGE NOTE PROVIDER - NPI NUMBER (FOR SYSADMIN USE ONLY) :
[[id number]] [[id number]],[[id number]],[[id number]] [[id number]],[[id number]],[[id number]],[[id number]]

## 2021-06-24 NOTE — DISCHARGE NOTE PROVIDER - NSDCCPCAREPLAN_GEN_ALL_CORE_FT
PRINCIPAL DISCHARGE DIAGNOSIS  Diagnosis: Fever  Assessment and Plan of Treatment: Resolved      SECONDARY DISCHARGE DIAGNOSES  Diagnosis: UTI (urinary tract infection)  Assessment and Plan of Treatment: Urine culture negative, no further antibiotics needed  Drink enough water and fluids to keep your urine clear or pale yellow.  Avoid caffeine, tea, and carbonated beverages. They tend to irritate your bladder.  Empty your bladder often. Avoid holding urine for long periods of time.  After a bowel movement, women should cleanse from front to back. Use each tissue only once.  SEEK MEDICAL CARE IF:  You have back pain.  You develop a fever.  Your symptoms do not begin to resolve within 3 days.  SEEK IMMEDIATE MEDICAL CARE IF:  You have severe back pain or lower abdominal pain.  You develop chills.  You have nausea or vomiting.  You have continued burning or discomfort with urination.    Diagnosis: Hypertension  Assessment and Plan of Treatment:      PRINCIPAL DISCHARGE DIAGNOSIS  Diagnosis: Fever  Assessment and Plan of Treatment: Resolved      SECONDARY DISCHARGE DIAGNOSES  Diagnosis: UTI (urinary tract infection)  Assessment and Plan of Treatment: Urine culture negative  Drink enough water and fluids to keep your urine clear or pale yellow.  Avoid caffeine, tea, and carbonated beverages. They tend to irritate your bladder.  Empty your bladder often. Avoid holding urine for long periods of time.  After a bowel movement, women should cleanse from front to back. Use each tissue only once.  SEEK MEDICAL CARE IF:  You have back pain.  You develop a fever.  Your symptoms do not begin to resolve within 3 days.  SEEK IMMEDIATE MEDICAL CARE IF:  You have severe back pain or lower abdominal pain.  You develop chills.  You have nausea or vomiting.  You have continued burning or discomfort with urination.    Diagnosis: Degenerative disc disease, lumbar  Assessment and Plan of Treatment: You can follow up with Neurology/pain managment, Dr. Vaca upon discharge    Diagnosis: Hypertension  Assessment and Plan of Treatment: Low salt diet  Activity as tolerated.  Take all medication as prescribed.  Follow up with your medical doctor for routine blood pressure monitoring at your next visit.  Notify your doctor if you have any of the following symptoms:   Dizziness, Lightheadedness, Blurry vision, Headache, Chest pain, Shortness of breath      Diagnosis: Type 2 diabetes mellitus with hyperglycemia, without long-term current use of insulin  Assessment and Plan of Treatment: Make sure you get your HgA1c checked every three months.  If you take oral diabetes medications, check your blood glucose two times a day.  If you take insulin, check your blood glucose before meals and at bedtime.  It's important not to skip any meals.  Keep a log of your blood glucose results and always take it with you to your doctor appointments.  Keep a list of your current medications including injectables and over the counter medications and bring this medication list with you to all your doctor appointments.  If you have not seen your ophthalmologist this year call for appointment.  Check your feet daily for redness, sores, or openings. Do not self treat. If no improvement in two days call your primary care physician for an appointment.  Low blood sugar (hypoglycemia) is a blood sugar below 70mg/dl. Check your blood sugar if you feel signs/symptoms of hypoglycemia. If your blood sugar is below 70 take 15 grams of carbohydrates (ex 4 oz of apple juice, 3-4 glucose tablets, or 4-6 oz of regular soda) wait 15 minutes and repeat blood sugar to make sure it comes up above 70.  If your blood sugar is above 70 and you are due for a meal, have a meal.  If you are not due for a meal have a snack.  This snack helps keeps your blood sugar at a safe range.       PRINCIPAL DISCHARGE DIAGNOSIS  Diagnosis: Fever  Assessment and Plan of Treatment: Resolved      SECONDARY DISCHARGE DIAGNOSES  Diagnosis: UTI (urinary tract infection)  Assessment and Plan of Treatment: Urine culture negative  Drink enough water and fluids to keep your urine clear or pale yellow.  Avoid caffeine, tea, and carbonated beverages. They tend to irritate your bladder.  Empty your bladder often. Avoid holding urine for long periods of time.  After a bowel movement, women should cleanse from front to back. Use each tissue only once.  SEEK MEDICAL CARE IF:  You have back pain.  You develop a fever.  Your symptoms do not begin to resolve within 3 days.  SEEK IMMEDIATE MEDICAL CARE IF:  You have severe back pain or lower abdominal pain.  You develop chills.  You have nausea or vomiting.  You have continued burning or discomfort with urination.    Diagnosis: Localized enlarged lymph nodes  Assessment and Plan of Treatment: CT scan shows mildly prominent retroperitoneal lymph nodes.  You will need to follow up with your Oncologist upon discharge - please call to make an appointment.    Diagnosis: Degenerative disc disease, lumbar  Assessment and Plan of Treatment: You can follow up with Neurology/pain managment, Dr. Vaca upon discharge    Diagnosis: Hypertension  Assessment and Plan of Treatment: Low salt diet  Activity as tolerated.  Take all medication as prescribed.  Follow up with your medical doctor for routine blood pressure monitoring at your next visit.  Notify your doctor if you have any of the following symptoms:   Dizziness, Lightheadedness, Blurry vision, Headache, Chest pain, Shortness of breath      Diagnosis: Type 2 diabetes mellitus with hyperglycemia, without long-term current use of insulin  Assessment and Plan of Treatment: Make sure you get your HgA1c checked every three months.  If you take oral diabetes medications, check your blood glucose two times a day.  If you take insulin, check your blood glucose before meals and at bedtime.  It's important not to skip any meals.  Keep a log of your blood glucose results and always take it with you to your doctor appointments.  Keep a list of your current medications including injectables and over the counter medications and bring this medication list with you to all your doctor appointments.  If you have not seen your ophthalmologist this year call for appointment.  Check your feet daily for redness, sores, or openings. Do not self treat. If no improvement in two days call your primary care physician for an appointment.  Low blood sugar (hypoglycemia) is a blood sugar below 70mg/dl. Check your blood sugar if you feel signs/symptoms of hypoglycemia. If your blood sugar is below 70 take 15 grams of carbohydrates (ex 4 oz of apple juice, 3-4 glucose tablets, or 4-6 oz of regular soda) wait 15 minutes and repeat blood sugar to make sure it comes up above 70.  If your blood sugar is above 70 and you are due for a meal, have a meal.  If you are not due for a meal have a snack.  This snack helps keeps your blood sugar at a safe range.

## 2021-06-24 NOTE — PHYSICAL THERAPY INITIAL EVALUATION ADULT - PERTINENT HX OF CURRENT PROBLEM, REHAB EVAL
61yoF w/ PMHx significant for APL in remission (follows w/ Dr. Karen Hurd of [facility]), HTN, HLD, NIDDM, who presents w/ complaints of fevers over the past three days (with reported Tmax = 101F) associated w/ generalized weakness, body aches especially localized to the lower back, lower abdomen, and bilateral inner thighs. Fever.  Plan: - unclear etiology, no leukocytosis, no localizing signs/sx of infection except for noted back, lower abdominal, and B/L thigh pain.

## 2021-06-24 NOTE — DISCHARGE NOTE PROVIDER - CARE PROVIDER_API CALL
Nichole Ayon (DO)  Family Medicine  [address] [facility], [address]st [place] [address]  Phone: [phone]  Fax: [phone]  Follow Up Time:    Nichole Ayon (DO)  Family Medicine  [address] [facility], [address]st [place] [address]  Phone: [phone]  Fax: [phone]  Follow Up Time:     Brian Vaca)  Neurology  [address] [place] [address] [address]  [place] [address]  Phone: [phone]  Fax: [phone]  Follow Up Time:     Willis Rogel; SANTA; MS)  Neurosurgery  [address] [place] [address]  Phone: [phone]  Fax: [phone]  Follow Up Time:    Nichole Ayon (DO)  Family Medicine  [address] [facility], [address]st [place] [address]  Phone: [phone]  Fax: [phone]  Follow Up Time:     Brian Vaca)  Neurology  [address] [place] [address], [address]  [place] [address]  Phone: [phone]  Fax: [phone]  Follow Up Time:     Willis Rogel; SANTA; MS)  Neurosurgery  [address] [place] [address]  Phone: [phone]  Fax: [phone]  Follow Up Time:     Karen Hurd (DO)  Hematology; Medical Oncology  [address] [place] [address]  Phone: [phone]  Fax: [phone]  Follow Up Time:

## 2021-06-24 NOTE — DISCHARGE NOTE PROVIDER - NSDCMRMEDTOKEN_GEN_ALL_CORE_FT
Artificial Tears ophthalmic solution: 1 drop(s) to each affected eye , As Needed  atenolol 50 mg oral tablet: 1 tab(s) orally once a day  atorvastatin 20 mg oral tablet: 1 tab(s) orally once a day  calcium (as carbonate)-vitamin D 600 mg-400 intl units (10 mcg) oral tablet: 1 tab(s) orally once a day  losartan 25 mg oral tablet: 1 tab(s) orally once a day  metFORMIN 500 mg oral tablet: 1 tab(s) orally 2 times a day MDD:2 tablets  omeprazole 20 mg oral delayed release capsule: 1 cap(s) orally once a day   Artificial Tears ophthalmic solution: 1 drop(s) to each affected eye , As Needed  atenolol 50 mg oral tablet: 1 tab(s) orally once a day  atorvastatin 20 mg oral tablet: 1 tab(s) orally once a day  calcium (as carbonate)-vitamin D 600 mg-400 intl units (10 mcg) oral tablet: 1 tab(s) orally once a day  losartan 25 mg oral tablet: 1 tab(s) orally once a day  metFORMIN 500 mg oral tablet: 1 tab(s) orally 2 times a day MDD:2 tablets  omeprazole 20 mg oral delayed release capsule: 1 cap(s) orally once a day  Outpatient Physical Therapy: For strengthening, balance, and gait training

## 2021-06-24 NOTE — DISCHARGE NOTE PROVIDER - CARE PROVIDERS DIRECT ADDRESSES
,[email or web address]

## 2021-06-24 NOTE — PHYSICAL THERAPY INITIAL EVALUATION ADULT - ADDITIONAL COMMENTS
Pt. lives in apt. with son, 3 steps to enter before getting to elevator.  Patient ambulated without AD independent.

## 2021-06-24 NOTE — PHYSICAL THERAPY INITIAL EVALUATION ADULT - PRECAUTIONS/LIMITATIONS, REHAB EVAL
patient states symptoms have improved after Abx administration? patient states symptoms have improved after Abx administration?/no known precautions/limitations

## 2021-06-25 ENCOUNTER — TRANSCRIPTION ENCOUNTER (OUTPATIENT)
Age: 61
End: 2021-06-25

## 2021-06-25 VITALS — TEMPERATURE: 98 F

## 2021-06-25 LAB
GLUCOSE BLDC GLUCOMTR-MCNC: 136 MG/DL — HIGH (ref 70–99)
GLUCOSE BLDC GLUCOMTR-MCNC: 148 MG/DL — HIGH (ref 70–99)
GLUCOSE BLDC GLUCOMTR-MCNC: 148 MG/DL — HIGH (ref 70–99)

## 2021-06-25 PROCEDURE — 36415 COLL VENOUS BLD VENIPUNCTURE: CPT

## 2021-06-25 PROCEDURE — 85027 COMPLETE CBC AUTOMATED: CPT

## 2021-06-25 PROCEDURE — 99239 HOSP IP/OBS DSCHRG MGMT >30: CPT

## 2021-06-25 PROCEDURE — 82962 GLUCOSE BLOOD TEST: CPT

## 2021-06-25 PROCEDURE — 96374 THER/PROPH/DIAG INJ IV PUSH: CPT

## 2021-06-25 PROCEDURE — 84295 ASSAY OF SERUM SODIUM: CPT

## 2021-06-25 PROCEDURE — 83605 ASSAY OF LACTIC ACID: CPT

## 2021-06-25 PROCEDURE — 84132 ASSAY OF SERUM POTASSIUM: CPT

## 2021-06-25 PROCEDURE — 80053 COMPREHEN METABOLIC PANEL: CPT

## 2021-06-25 PROCEDURE — 82565 ASSAY OF CREATININE: CPT

## 2021-06-25 PROCEDURE — 82947 ASSAY GLUCOSE BLOOD QUANT: CPT

## 2021-06-25 PROCEDURE — 85730 THROMBOPLASTIN TIME PARTIAL: CPT

## 2021-06-25 PROCEDURE — 96375 TX/PRO/DX INJ NEW DRUG ADDON: CPT

## 2021-06-25 PROCEDURE — 80048 BASIC METABOLIC PNL TOTAL CA: CPT

## 2021-06-25 PROCEDURE — 85014 HEMATOCRIT: CPT

## 2021-06-25 PROCEDURE — 99285 EMERGENCY DEPT VISIT HI MDM: CPT | Mod: 25

## 2021-06-25 PROCEDURE — 83735 ASSAY OF MAGNESIUM: CPT

## 2021-06-25 PROCEDURE — 71045 X-RAY EXAM CHEST 1 VIEW: CPT

## 2021-06-25 PROCEDURE — 83036 HEMOGLOBIN GLYCOSYLATED A1C: CPT

## 2021-06-25 PROCEDURE — 0225U NFCT DS DNA&RNA 21 SARSCOV2: CPT

## 2021-06-25 PROCEDURE — 86769 SARS-COV-2 COVID-19 ANTIBODY: CPT

## 2021-06-25 PROCEDURE — 87040 BLOOD CULTURE FOR BACTERIA: CPT

## 2021-06-25 PROCEDURE — 74177 CT ABD & PELVIS W/CONTRAST: CPT

## 2021-06-25 PROCEDURE — 85610 PROTHROMBIN TIME: CPT

## 2021-06-25 PROCEDURE — 97161 PT EVAL LOW COMPLEX 20 MIN: CPT

## 2021-06-25 PROCEDURE — 82435 ASSAY OF BLOOD CHLORIDE: CPT

## 2021-06-25 PROCEDURE — 82330 ASSAY OF CALCIUM: CPT

## 2021-06-25 PROCEDURE — 84100 ASSAY OF PHOSPHORUS: CPT

## 2021-06-25 PROCEDURE — 81001 URINALYSIS AUTO W/SCOPE: CPT

## 2021-06-25 PROCEDURE — 87086 URINE CULTURE/COLONY COUNT: CPT

## 2021-06-25 PROCEDURE — 85025 COMPLETE CBC W/AUTO DIFF WBC: CPT

## 2021-06-25 PROCEDURE — 82803 BLOOD GASES ANY COMBINATION: CPT

## 2021-06-25 PROCEDURE — 93005 ELECTROCARDIOGRAM TRACING: CPT

## 2021-06-25 PROCEDURE — G0378: CPT

## 2021-06-25 PROCEDURE — 85018 HEMOGLOBIN: CPT

## 2021-06-25 RX ORDER — CEFTRIAXONE 500 MG/1
1000 INJECTION, POWDER, FOR SOLUTION INTRAMUSCULAR; INTRAVENOUS EVERY 24 HOURS
Refills: 0 | Status: DISCONTINUED | OUTPATIENT
Start: 2021-06-25 | End: 2021-06-25

## 2021-06-25 RX ADMIN — PANTOPRAZOLE SODIUM 40 MILLIGRAM(S): 20 TABLET, DELAYED RELEASE ORAL at 09:15

## 2021-06-25 RX ADMIN — ENOXAPARIN SODIUM 40 MILLIGRAM(S): 100 INJECTION SUBCUTANEOUS at 13:35

## 2021-06-25 RX ADMIN — Medication 0: at 18:42

## 2021-06-25 RX ADMIN — CEFTRIAXONE 100 MILLIGRAM(S): 500 INJECTION, POWDER, FOR SOLUTION INTRAMUSCULAR; INTRAVENOUS at 11:52

## 2021-06-25 RX ADMIN — Medication 0: at 13:10

## 2021-06-25 RX ADMIN — Medication 0: at 09:13

## 2021-06-25 NOTE — PROGRESS NOTE ADULT - PROBLEM SELECTOR PLAN 3
- Had hypotension in ED now stable will continue to hold antihypertensives
- Had hypotension in ED now stable will continue to hold antihypertensives can resume on Dc

## 2021-06-25 NOTE — CHART NOTE - NSCHARTNOTEFT_GEN_A_CORE
Request from Dr. Melendez to facilitate patient discharge.  Medication reconciliation reviewed, revised, and resolved with Dr. Melendez, who has medically cleared patient for discharge with follow up as advised.  Please refer to discharge note for detailed hospital course.

## 2021-06-25 NOTE — DISCHARGE NOTE NURSING/CASE MANAGEMENT/SOCIAL WORK - PATIENT PORTAL LINK FT
You can access the FollowMyHealth Patient Portal offered by [facility] by registering at the following website: [email or web address]. By joining [employer]’s FollowMyHealth portal, you will also be able to view your health information using other applications (apps) compatible with our system.

## 2021-06-25 NOTE — PROGRESS NOTE ADULT - SUBJECTIVE AND OBJECTIVE BOX
PROGRESS NOTE:   Deysi Melendez DO  Hospitalist  Pager [phone]  After 5pm/weekends or if no answer ext: [phone]      Patient is a 61y old  Female who presents with a chief complaint of fever, hypotension, flank pain (24 Jun 2021 16:12)      SUBJECTIVE / OVERNIGHT EVENTS: abd pain somewhat improving with Ceftriaxone, discussed via phone with son as  at her request     ADDITIONAL REVIEW OF SYSTEMS:  no fever or chills  no n/v/d    MEDICATIONS  (STANDING):  atorvastatin 20 milliGRAM(s) Oral at bedtime  cefTRIAXone   IVPB 1000 milliGRAM(s) IV Intermittent every 24 hours  dextrose 40% Gel 15 Gram(s) Oral once  dextrose 5%. 1000 milliLiter(s) (50 mL/Hr) IV Continuous <Continuous>  dextrose 5%. 1000 milliLiter(s) (100 mL/Hr) IV Continuous <Continuous>  dextrose 50% Injectable 25 Gram(s) IV Push once  dextrose 50% Injectable 12.5 Gram(s) IV Push once  dextrose 50% Injectable 25 Gram(s) IV Push once  enoxaparin Injectable 40 milliGRAM(s) SubCutaneous daily  glucagon  Injectable 1 milliGRAM(s) IntraMuscular once  insulin lispro (ADMELOG) corrective regimen sliding scale   SubCutaneous three times a day before meals  insulin lispro (ADMELOG) corrective regimen sliding scale   SubCutaneous at bedtime  pantoprazole    Tablet 40 milliGRAM(s) Oral before breakfast  sodium chloride 0.9%. 1000 milliLiter(s) (125 mL/Hr) IV Continuous <Continuous>    MEDICATIONS  (PRN):      CAPILLARY BLOOD GLUCOSE      POCT Blood Glucose.: 148 mg/dL (25 Jun 2021 12:33)  POCT Blood Glucose.: 136 mg/dL (25 Jun 2021 08:24)  POCT Blood Glucose.: 245 mg/dL (24 Jun 2021 21:20)  POCT Blood Glucose.: 126 mg/dL (24 Jun 2021 17:30)    I&O's Summary    24 Jun 2021 07:01  -  25 Jun 2021 07:00  --------------------------------------------------------  IN: 1225 mL / OUT: 0 mL / NET: 1225 mL    25 Jun 2021 07:01  -  25 Jun 2021 14:44  --------------------------------------------------------  IN: 360 mL / OUT: 0 mL / NET: 360 mL        PHYSICAL EXAM:  Vital Signs Last 24 Hrs  T(C): 36.5 (25 Jun 2021 04:41), Max: 37 (24 Jun 2021 19:26)  T(F): 97.7 (25 Jun 2021 04:41), Max: 98.6 (24 Jun 2021 19:26)  HR: 60 (25 Jun 2021 04:41) (60 - 70)  BP: 133/77 (25 Jun 2021 04:41) (133/77 - 148/82)  BP(mean): --  RR: 18 (25 Jun 2021 04:41) (18 - 18)  SpO2: 97% (25 Jun 2021 04:41) (97% - 97%)    CONSTITUTIONAL: NAD, well-developed, non toxic   RESPIRATORY: Normal respiratory effort; lungs are clear to auscultation bilaterally  CARDIOVASCULAR: Regular rate and rhythm, normal S1 and S2, no murmur/rub/gallop; No lower extremity edema; Peripheral pulses are 2+ bilaterally  ABDOMEN: Nontender to palpation, normoactive bowel sounds, no rebound/guarding; No hepatosplenomegaly  MUSCLOSKELETAL: no clubbing or cyanosis of digits; no joint swelling or tenderness to palpation  PSYCH: A+O to person, place, and time; affect appropriate    LABS:          Culture - Urine (collected 23 Jun 2021 04:08)  Source: .Urine Clean Catch (Midstream)  Final Report (24 Jun 2021 07:12):    <10,000 CFU/mL Normal Urogenital Krista    Culture - Blood (collected 23 Jun 2021 01:16)  Source: .Blood Blood-Peripheral  Preliminary Report (24 Jun 2021 02:02):    No growth to date.    Culture - Blood (collected 23 Jun 2021 01:16)  Source: .Blood Blood-Peripheral  Preliminary Report (24 Jun 2021 02:02):    No growth to date.        RADIOLOGY & ADDITIONAL TESTS:  Results Reviewed:   Imaging Personally Reviewed:  Electrocardiogram Personally Reviewed:    COORDINATION OF CARE:  Care Discussed with Consultants/Other Providers [Y/N]:  Prior or Outpatient Records Reviewed [Y/N]:  
PROGRESS NOTE:   Deysi Melendez DO  Hospitalist  Pager [phone]  After 5pm/weekends or if no answer ext: [phone]      Patient is a 61y old  Female who presents with a chief complaint of fever, hypotension, flank pain (2021 10:23)      SUBJECTIVE / OVERNIGHT EVENTS: Still complaining of some abdominal pain but afebrile overnight.  Discussed with son as  this am at pts requests and he is asking about follow up for previous MRI she had done in April.    ADDITIONAL REVIEW OF SYSTEMS:  no fever or chills overnight  no n/v/d still some abd pain    MEDICATIONS  (STANDING):  atorvastatin 20 milliGRAM(s) Oral at bedtime  cefTRIAXone   IVPB 1000 milliGRAM(s) IV Intermittent every 24 hours  dextrose 40% Gel 15 Gram(s) Oral once  dextrose 5%. 1000 milliLiter(s) (50 mL/Hr) IV Continuous <Continuous>  dextrose 5%. 1000 milliLiter(s) (100 mL/Hr) IV Continuous <Continuous>  dextrose 50% Injectable 25 Gram(s) IV Push once  dextrose 50% Injectable 12.5 Gram(s) IV Push once  dextrose 50% Injectable 25 Gram(s) IV Push once  enoxaparin Injectable 40 milliGRAM(s) SubCutaneous daily  glucagon  Injectable 1 milliGRAM(s) IntraMuscular once  insulin lispro (ADMELOG) corrective regimen sliding scale   SubCutaneous three times a day before meals  insulin lispro (ADMELOG) corrective regimen sliding scale   SubCutaneous at bedtime  pantoprazole    Tablet 40 milliGRAM(s) Oral before breakfast  sodium chloride 0.9%. 1000 milliLiter(s) (125 mL/Hr) IV Continuous <Continuous>    MEDICATIONS  (PRN):      CAPILLARY BLOOD GLUCOSE      POCT Blood Glucose.: 175 mg/dL (2021 13:40)  POCT Blood Glucose.: 200 mg/dL (2021 12:20)  POCT Blood Glucose.: 178 mg/dL (2021 08:34)  POCT Blood Glucose.: 323 mg/dL (2021 22:17)  POCT Blood Glucose.: 319 mg/dL (2021 17:43)    I&O's Summary    2021 07:01  -  2021 07:00  --------------------------------------------------------  IN: 1295 mL / OUT: 0 mL / NET: 1295 mL    2021 07:01  -  2021 16:13  --------------------------------------------------------  IN: 625 mL / OUT: 0 mL / NET: 625 mL        PHYSICAL EXAM:  Vital Signs Last 24 Hrs  T(C): 36.3 (2021 11:22), Max: 36.9 (2021 21:58)  T(F): 97.3 (2021 11:22), Max: 98.4 (2021 21:58)  HR: 69 (2021 11:22) (62 - 80)  BP: 119/69 (2021 11:22) (108/58 - 124/66)  BP(mean): --  RR: 18 (2021 11:22) (16 - 18)  SpO2: 100% (2021 11:22) (95% - 100%)    CONSTITUTIONAL: NAD, well-developed; non toxic appearing  RESPIRATORY: Normal respiratory effort; lungs are clear to auscultation bilaterally  CARDIOVASCULAR: Regular rate and rhythm, normal S1 and S2, no murmur/rub/gallop; No lower extremity edema; Peripheral pulses are 2+ bilaterally  ABDOMEN: mild tenderness to palpation of RLQ but no rebound or guarding and able to push hard  MUSCLOSKELETAL: no clubbing or cyanosis of digits; no joint swelling or tenderness to palpation  PSYCH: A+O to person, place, and time; affect appropriate    LABS:                        9.7    8.61  )-----------( 210      ( 2021 07:54 )             30.4         139  |  100  |  19  ----------------------------<  277<H>  5.0   |  24  |  0.95    Ca    9.7      2021 07:54  Phos  3.1       Mg     1.8         TPro  7.9  /  Alb  4.4  /  TBili  0.2  /  DBili  x   /  AST  19  /  ALT  17  /  AlkPhos  82      PT/INR - ( 2021 22:47 )   PT: 12.6 sec;   INR: 1.05 ratio         PTT - ( 2021 22:47 )  PTT:31.8 sec      Urinalysis Basic - ( 2021 01:22 )    Color: Light Yellow / Appearance: Clear / S.011 / pH: x  Gluc: x / Ketone: Negative  / Bili: Negative / Urobili: Negative   Blood: x / Protein: Trace / Nitrite: Negative   Leuk Esterase: Negative / RBC: 1 /hpf / WBC 3 /HPF   Sq Epi: x / Non Sq Epi: 2 /hpf / Bacteria: Negative        Culture - Urine (collected 2021 04:08)  Source: .Urine Clean Catch (Midstream)  Final Report (2021 07:12):    <10,000 CFU/mL Normal Urogenital Krista    Culture - Blood (collected 2021 01:16)  Source: .Blood Blood-Peripheral  Preliminary Report (2021 02:02):    No growth to date.    Culture - Blood (collected 2021 01:16)  Source: .Blood Blood-Peripheral  Preliminary Report (2021 02:02):    No growth to date.        RADIOLOGY & ADDITIONAL TESTS:  Results Reviewed:   Imaging Personally Reviewed:  Electrocardiogram Personally Reviewed:    COORDINATION OF CARE:  Care Discussed with Consultants/Other Providers [Y/N]:  Prior or Outpatient Records Reviewed [Y/N]:

## 2021-06-25 NOTE — DISCHARGE NOTE NURSING/CASE MANAGEMENT/SOCIAL WORK - NSDCFUADDAPPT_GEN_ALL_CORE_FT
You will need to follow up with your PCP within 3-4 days of discharge - please call to make an appointment.    You will need to follow up with your neurologist within one week of discharge - please call to make an appointment.    You will need to follow up with your Oncologist, Dr. Hurd, within 1-2 weeks of discharge - please call to make an appointment.

## 2021-06-25 NOTE — PROGRESS NOTE ADULT - PROBLEM SELECTOR PLAN 6
Had MRI for low back pain showing spinal stenosis and arthritis with some disc herniations which they have been referred to neuro for follow up as outpt  PT done rec home no needs but family requesting PT prescription.  Discussed with CM, not able to set up home PT w/o PT recommendation can only do outpt  tylenol for pain control
Had MRI for low back pain showing spinal stenosis and arthritis with some disc herniations which they have been referred to neuro for follow up  PT done rec home no needs  tylenol for pain control

## 2021-06-25 NOTE — PROGRESS NOTE ADULT - PROBLEM SELECTOR PLAN 4
- holding home oral antihyperglycemics  - ISS w/ serial FS monitoring  - noted FS w/ hyperglycemia --  HbA1C 7%; if patient remains hospitalized she will likely require initiation of basal/bolus insulin
- holding home oral antihyperglycemics  - ISS w/ serial FS monitoring  - noted FS w/ hyperglycemia --  HbA1C 7%; if patient remains hospitalized she will likely require initiation of basal/bolus insulin

## 2021-06-25 NOTE — PROGRESS NOTE ADULT - PROBLEM SELECTOR PLAN 7
lovenox  Diabetic diet    Dispo: indep, monitor off ceftriaxone for 24hrs if afebrile anticipate DC w/ outpt follow up with oncology for routine follow up of leukemia scheduled  Called son and daughter again to f/u on any concerns and left vMs.  Also emailed outpt Dr. Hurd to inform about prominent lymph nodes and inquire about enhancement of bladder and ureters on Ct scan.
lovenox  Diabetic diet    Dispo: Dc this evening after finishing Ceftriaxone w/o outpt onc follow up routinely for her APL and neuro f/u for previous MRI findings    Discussed with son

## 2021-06-25 NOTE — PROGRESS NOTE ADULT - PROBLEM SELECTOR PLAN 2
Improving with abx treatment and corresponds to CT findings of infection  Discussed previous CT results 10/20 with son showing enhancement in same area as well- says she had UTI at that time as well
RLQ pain, CT negative except noted enhancement of bladder and ureters but no infection  Will monitor off abx  May be radiculopathy from spinal stenosis or arthritis but MRI done 2 months ago showing no cord or nerve impingment and was referred to neuro as outpt to f/u with this   PT rec home no needs

## 2021-06-25 NOTE — PROGRESS NOTE ADULT - PROBLEM SELECTOR PLAN 1
- unclear etiology, no leukocytosis, no localizing signs/sx of infection except for noted back, lower abdominal, and B/L thigh pain, no unilateral swelling or tachycardia/hypoxia to suggest clot  - UA/UC negative, blood cx negative but discussed with radiology cT findings highly suggestive of infection and pt subjectivly felt worse off of  Ceftriaxone and was better while on it so will treat as UTI  - complete Ceftriaxone 1g daily x 3 days today  -CT a/p negative as well just showing some prominent lymph nodes but none enlarged by criteria- discussed with radiology attending who said jeremiah infection related.  Did email Dr. Hurd this updated CT scan result  -Has enhancement of bladder and ureters which is has been noted on previous CT scans and UA/Ucx negative, no blood in urine but will treat as infection after our discussion and pt clinical response off abx
- unclear etiology, no leukocytosis, no localizing signs/sx of infection except for noted back, lower abdominal, and B/L thigh pain, no unilateral swelling or tachycardia/hypoxia to suggest clot  - UA/UC negative, blood cx negative  - DC  Ceftriaxone 1g daily   -CT a/p negative as well just showing some prominent lymph nodes but none enlarged by criteria  -Has enhancement of bladder and ureters which is has been noted on previous CT scans and UA/Ucx negative, no blood in urine  -Monitor for fever off Ceftriaxone

## 2021-06-25 NOTE — PROGRESS NOTE ADULT - ASSESSMENT
61yoF w/ PMHx significant for APL in remission (follows w/ Dr. Karen Hurd of [facility]), HTN, HLD, NIDDM, who presents w/ complaints of fevers over three days (with reported Tmax = 101F ) now none associated w/ generalized weakness, body aches especially localized to the lower back, lower abdomen, and bilateral inner thighs.     She received both doses of the Pfizer COVID 19 vaccine as of May 2021.     PCP = Dr. Millie Dobson
61yoF w/ PMHx significant for APL in remission (follows w/ Dr. Karen Hurd of [facility]), HTN, HLD, NIDDM, who presents w/ complaints of fevers over three days (with reported Tmax = 101F ) now none associated w/ generalized weakness, body aches especially localized to the lower back, lower abdomen, and bilateral inner thighs.     She received both doses of the Pfizer COVID 19 vaccine as of May 2021.     PCP = Dr. Millie Dobson

## 2021-06-28 ENCOUNTER — NON-APPOINTMENT (OUTPATIENT)
Age: 61
End: 2021-06-28

## 2021-06-28 LAB
CULTURE RESULTS: SIGNIFICANT CHANGE UP
CULTURE RESULTS: SIGNIFICANT CHANGE UP
SPECIMEN SOURCE: SIGNIFICANT CHANGE UP
SPECIMEN SOURCE: SIGNIFICANT CHANGE UP

## 2021-07-11 NOTE — PROGRESS NOTE ADULT - PROBLEM SELECTOR PLAN 6
Home regimen with Atenolol held on admission  Restarted at 25mg PO daily for increased BP  BP is currently stable. Continue to monitor nothing

## 2021-07-12 ENCOUNTER — APPOINTMENT (OUTPATIENT)
Dept: FAMILY MEDICINE | Facility: CLINIC | Age: 61
End: 2021-07-12
Payer: MEDICAID

## 2021-07-12 VITALS
HEART RATE: 75 BPM | HEIGHT: 62 IN | WEIGHT: 173 LBS | BODY MASS INDEX: 31.83 KG/M2 | SYSTOLIC BLOOD PRESSURE: 122 MMHG | TEMPERATURE: 97.6 F | DIASTOLIC BLOOD PRESSURE: 66 MMHG | OXYGEN SATURATION: 97 %

## 2021-07-12 DIAGNOSIS — D64.9 ANEMIA, UNSPECIFIED: ICD-10-CM

## 2021-07-12 DIAGNOSIS — G89.29 OTHER CHRONIC PAIN: ICD-10-CM

## 2021-07-12 DIAGNOSIS — E11.9 TYPE 2 DIABETES MELLITUS W/OUT COMPLICATIONS: ICD-10-CM

## 2021-07-12 DIAGNOSIS — C92.40 ACUTE PROMYELOCYTIC LEUKEMIA, NOT HAVING ACHIEVED REMISSION: ICD-10-CM

## 2021-07-12 PROCEDURE — 99214 OFFICE O/P EST MOD 30 MIN: CPT

## 2021-07-12 RX ORDER — GABAPENTIN 100 MG/1
100 CAPSULE ORAL EVERY 8 HOURS
Qty: 180 | Refills: 3 | Status: ACTIVE | COMMUNITY
Start: 2020-08-14 | End: 1900-01-01

## 2021-07-12 RX ORDER — ERGOCALCIFEROL 1.25 MG/1
1.25 MG CAPSULE, LIQUID FILLED ORAL
Qty: 4 | Refills: 0 | Status: ACTIVE | COMMUNITY
Start: 2021-07-10

## 2021-07-12 NOTE — HISTORY OF PRESENT ILLNESS
[de-identified] : \par 61 year old female is here for a followup visit for body pain, difficulty breathing, fever on and off\par \par brought in by son - Taco Gillette\par sri is other son\par many current medical issues,

## 2021-07-12 NOTE — HEALTH RISK ASSESSMENT
[No] : No [No falls in past year] : Patient reported no falls in the past year [0] : 2) Feeling down, depressed, or hopeless: Not at all (0) [Good] : ~his/her~  mood as  good [Unemployed] : unemployed [] :  [# Of Children ___] : has [unfilled] children [Fully functional (bathing, dressing, toileting, transferring, walking, feeding)] : Fully functional (bathing, dressing, toileting, transferring, walking, feeding) [Independent] : managing medications [Some assistance needed] : managing finances [] : No [[id number]] : 0 [de-identified] : lives with younger brother

## 2021-07-12 NOTE — ASSESSMENT
[FreeTextEntry1] : hospital admission for cp after chemo\par discharged 11/20/20\par again admitted and discharge 6/25/21\par \par since discharged having pain in legs\par \par arm and leg pain\par occurring before chemo, will refer to ortho\par will try increasing gabapentin and refer to rheum and neuro\par \par acute promyelocytic leukemia\par was getting chemo, five days a week, just had repeat bone marrow biopsy, \par reviewed note from Dr. Hurd, according to the note, patient did not want further treatments\par referred back \par \par hospital admission and discharge\par  zoster from 6/22- 6/30. \par 6/23 she was found to have a partially occlusive DVT in her right axillary vein, treated with lovenox.\par had complications while in the hospital, reconciled medications, no longer on eliquis\par \par diabetes\par The diagnosis of diabetes is established with this patient.  Blood work was drawn in office and results will be reviewed and followed. The patient was counseled on using a low sugar and carbohydrate diet.  Patient was advised to eat small meals and exercise regularly. Patient as advised to take all medications as prescribed and to follow up with yearly podiatry and opthalmology visits. Patient was advised to call office  or go to the ER immediately if experiences any nausea, lightheadedness or for any other issues.\par \par \par \par

## 2021-07-19 ENCOUNTER — APPOINTMENT (OUTPATIENT)
Dept: CARDIOLOGY | Facility: CLINIC | Age: 61
End: 2021-07-19

## 2021-07-20 LAB
PMLR FINAL DIAGNOSIS: NORMAL
PMLR SPECIMEN TYPE: NORMAL

## 2021-07-26 ENCOUNTER — APPOINTMENT (OUTPATIENT)
Dept: PAIN MANAGEMENT | Facility: CLINIC | Age: 61
End: 2021-07-26

## 2021-09-17 ENCOUNTER — APPOINTMENT (OUTPATIENT)
Dept: OPHTHALMOLOGY | Facility: CLINIC | Age: 61
End: 2021-09-17

## 2022-02-09 NOTE — PROGRESS NOTE ADULT - ATTENDING COMMENTS
Yes 59 y/o Yakut speaking F who is transferred for newly diagnosed low/interm risk APL.  Presented with pancytopenia, flow cytometry/bone marrow biopsy/FISH from 5/26- consistent with acute promyelocytic leukemia with PML-AWA translocation.    She was started on ATRA 5/23- day +10, GALA 5/27- day +6    -monitor carefully for signs of differentiation syndrome.   -Check CBC, coags/fibrinogen twice daily.  Keep plt >50, fibrinogen >150.  Monitor lytes daily, keep K>4, Mg>2.  EKG twice a week  -Headaches likely due to ATRA, CT head no contrast on 5/22 with no bleeding.  No more headaches, just a sensation of heat on the top of the head. MRI was not suggestive of any bleeding. Heat on the head is worse with platelet transfusion, will try benadryl prior to platelets. Fioricet today which patient reports has helped.   -cont Cefepime/Posaconazole 59 y/o Thai speaking F who is transferred for newly diagnosed low/interm risk APL.  Presented with pancytopenia, flow cytometry/bone marrow biopsy/FISH from 5/26- consistent with acute promyelocytic leukemia with PML-AWA translocation.    She was started on ATRA 5/23- day +11, GALA 5/27- day +7    -monitor carefully for signs of differentiation syndrome.   -Check CBC, coags/fibrinogen twice daily.  Keep plt >40-50, fibrinogen >150.  Monitor lytes daily, keep K>4, Mg>2.  EKG twice a week  -Headaches likely due to ATRA, CT head no contrast on 5/22 with no bleeding.  No more headaches, just a sensation of heat on the top of the head. MRI was not suggestive of any bleeding. Heat on the head is worse with platelet transfusion, will try benadryl prior to platelets. On Fioricet which patient reports has helped.   -cont Cefepime/Posaconazole

## 2022-08-12 NOTE — H&P ADULT - ATTENDING COMMENTS
From: Josie Rinaldi  To: Tawanda Stewart  Sent: 8/11/2022 9:42 PM CDT  Subject: Passing blood clots post menstrual cycle    Good Evening,    My cycle ended this past Saturday; however, on Tuesday I felt dampness, and went to the restroom to find I passed a ping-pong ball size clot that was bright red in color with some additional bleeding that stopped shortly thereafter. Then again, this evening (Thursday), the same thing occurred, again a larger than usual clot associated with my menstrual cycle with renewed bleeding. This has not occurred for me in the past, and wondering what my next steps should be.    Thank you,  Josie Rinaldi  
Patient prefers to follow through with pelvic ultrasound due to symptoms experienced of blood clots and not typical for her cycle. Patient also would like to follow through with the labs and f/u visit with MD for u/s results. U/s ordered and labs ordered. E-advice central scheduling phone number per patient's request. Patient will call office to schedule f/u visit few  Weeks after scheduling u/s.      RE (Dr. Stewart): Well, its reassuring that this hasn't been a longer term concern for her.  Hopefully the current bleeding will improve since todays symptosm seem less.   I do think some evaluation could be helpful. But again, it might stop soon and then going forward it would helpful to see how next few periods go.  We have some optiosn for treating if its ongoing issue.   I would consider pelvic u/s (asap if she prefers).  CBC, TSH.  Hcg even though had BTL.     Fu in few weeks.  Or if pt prefers could f/u in few months if symptoms improve, testing ok.  Thanks!  I could also work pt in sooner if bleeding recurs/not resolving soon.     Blood clots x2 size of ping pong over last week, now subsided to faint trace dark, older blood.  
Pt seen and examined 5/23/20.    Agree with PGY-1 A+P above.  60-yo female w/PMHx of DM2, HTN, HLD, recent COVID positivity, who presents with persistent fevers, chills, and pleuritic chest pain.  Along with petechial rash on her lower extremities for the past one month.  Found to be pancytopenic and also to have promyelocytes and 6% blasts on diff.  Admitted for likely AML.  Started on vesanoid.  BMBx 5/25/20.  Gentle IVF, monitor daily TLS labs (PO4, uric acid, LDH) in addition to coags, fibrinogen, D-dimer.  F/u COVID-19 initial swab.  Hold off further antibiotics for now.  LE venous compression stockings.  Appreciate hematology/PGY-1

## 2022-09-23 ENCOUNTER — EMERGENCY (EMERGENCY)
Facility: HOSPITAL | Age: 62
LOS: 1 days | Discharge: HOME | End: 2022-09-23
Attending: EMERGENCY MEDICINE | Admitting: EMERGENCY MEDICINE

## 2022-09-23 VITALS
OXYGEN SATURATION: 95 % | DIASTOLIC BLOOD PRESSURE: 58 MMHG | HEART RATE: 73 BPM | TEMPERATURE: 100 F | HEIGHT: 61 IN | SYSTOLIC BLOOD PRESSURE: 143 MMHG | RESPIRATION RATE: 18 BRPM

## 2022-09-23 VITALS
TEMPERATURE: 98 F | OXYGEN SATURATION: 99 % | HEART RATE: 58 BPM | DIASTOLIC BLOOD PRESSURE: 60 MMHG | RESPIRATION RATE: 16 BRPM | SYSTOLIC BLOOD PRESSURE: 104 MMHG

## 2022-09-23 LAB
ALBUMIN SERPL ELPH-MCNC: 4.4 G/DL — SIGNIFICANT CHANGE UP (ref 3.3–5)
ALP SERPL-CCNC: 79 U/L — SIGNIFICANT CHANGE UP (ref 40–120)
ALT FLD-CCNC: 11 U/L — SIGNIFICANT CHANGE UP (ref 4–33)
ANION GAP SERPL CALC-SCNC: 13 MMOL/L — SIGNIFICANT CHANGE UP (ref 7–14)
APPEARANCE UR: CLEAR — SIGNIFICANT CHANGE UP
APTT BLD: 31.4 SEC — SIGNIFICANT CHANGE UP (ref 27–36.3)
AST SERPL-CCNC: 14 U/L — SIGNIFICANT CHANGE UP (ref 4–32)
B PERT DNA SPEC QL NAA+PROBE: SIGNIFICANT CHANGE UP
B PERT+PARAPERT DNA PNL SPEC NAA+PROBE: SIGNIFICANT CHANGE UP
BASE EXCESS BLDV CALC-SCNC: 3.1 MMOL/L — HIGH (ref -2–3)
BASOPHILS # BLD AUTO: 0.02 K/UL — SIGNIFICANT CHANGE UP (ref 0–0.2)
BASOPHILS NFR BLD AUTO: 0.2 % — SIGNIFICANT CHANGE UP (ref 0–2)
BILIRUB SERPL-MCNC: 0.4 MG/DL — SIGNIFICANT CHANGE UP (ref 0.2–1.2)
BILIRUB UR-MCNC: NEGATIVE — SIGNIFICANT CHANGE UP
BLOOD GAS VENOUS COMPREHENSIVE RESULT: SIGNIFICANT CHANGE UP
BORDETELLA PARAPERTUSSIS (RAPRVP): SIGNIFICANT CHANGE UP
BUN SERPL-MCNC: 7 MG/DL — SIGNIFICANT CHANGE UP (ref 7–23)
C PNEUM DNA SPEC QL NAA+PROBE: SIGNIFICANT CHANGE UP
CALCIUM SERPL-MCNC: 9.4 MG/DL — SIGNIFICANT CHANGE UP (ref 8.4–10.5)
CHLORIDE BLDV-SCNC: 103 MMOL/L — SIGNIFICANT CHANGE UP (ref 96–108)
CHLORIDE SERPL-SCNC: 100 MMOL/L — SIGNIFICANT CHANGE UP (ref 98–107)
CO2 BLDV-SCNC: 29.2 MMOL/L — HIGH (ref 22–26)
CO2 SERPL-SCNC: 27 MMOL/L — SIGNIFICANT CHANGE UP (ref 22–31)
COLOR SPEC: COLORLESS — SIGNIFICANT CHANGE UP
CREAT SERPL-MCNC: 0.89 MG/DL — SIGNIFICANT CHANGE UP (ref 0.5–1.3)
DIFF PNL FLD: NEGATIVE — SIGNIFICANT CHANGE UP
EGFR: 73 ML/MIN/1.73M2 — SIGNIFICANT CHANGE UP
EOSINOPHIL # BLD AUTO: 0.1 K/UL — SIGNIFICANT CHANGE UP (ref 0–0.5)
EOSINOPHIL NFR BLD AUTO: 1.1 % — SIGNIFICANT CHANGE UP (ref 0–6)
FLUAV SUBTYP SPEC NAA+PROBE: SIGNIFICANT CHANGE UP
FLUBV RNA SPEC QL NAA+PROBE: SIGNIFICANT CHANGE UP
GAS PNL BLDV: 138 MMOL/L — SIGNIFICANT CHANGE UP (ref 136–145)
GLUCOSE BLDV-MCNC: 130 MG/DL — HIGH (ref 70–99)
GLUCOSE SERPL-MCNC: 134 MG/DL — HIGH (ref 70–99)
GLUCOSE UR QL: NEGATIVE — SIGNIFICANT CHANGE UP
HADV DNA SPEC QL NAA+PROBE: SIGNIFICANT CHANGE UP
HCO3 BLDV-SCNC: 28 MMOL/L — SIGNIFICANT CHANGE UP (ref 22–29)
HCOV 229E RNA SPEC QL NAA+PROBE: SIGNIFICANT CHANGE UP
HCOV HKU1 RNA SPEC QL NAA+PROBE: SIGNIFICANT CHANGE UP
HCOV NL63 RNA SPEC QL NAA+PROBE: SIGNIFICANT CHANGE UP
HCOV OC43 RNA SPEC QL NAA+PROBE: SIGNIFICANT CHANGE UP
HCT VFR BLD CALC: 29.9 % — LOW (ref 34.5–45)
HCT VFR BLDA CALC: 26 % — LOW (ref 34.5–46.5)
HGB BLD CALC-MCNC: 8.8 G/DL — LOW (ref 11.5–15.5)
HGB BLD-MCNC: 9.5 G/DL — LOW (ref 11.5–15.5)
HMPV RNA SPEC QL NAA+PROBE: SIGNIFICANT CHANGE UP
HPIV1 RNA SPEC QL NAA+PROBE: SIGNIFICANT CHANGE UP
HPIV2 RNA SPEC QL NAA+PROBE: SIGNIFICANT CHANGE UP
HPIV3 RNA SPEC QL NAA+PROBE: SIGNIFICANT CHANGE UP
HPIV4 RNA SPEC QL NAA+PROBE: SIGNIFICANT CHANGE UP
IANC: 5.26 K/UL — SIGNIFICANT CHANGE UP (ref 1.8–7.4)
IMM GRANULOCYTES NFR BLD AUTO: 0.2 % — SIGNIFICANT CHANGE UP (ref 0–0.9)
INR BLD: 1.22 RATIO — HIGH (ref 0.88–1.16)
KETONES UR-MCNC: NEGATIVE — SIGNIFICANT CHANGE UP
LACTATE BLDV-MCNC: 1.1 MMOL/L — SIGNIFICANT CHANGE UP (ref 0.5–2)
LEUKOCYTE ESTERASE UR-ACNC: NEGATIVE — SIGNIFICANT CHANGE UP
LYMPHOCYTES # BLD AUTO: 2.9 K/UL — SIGNIFICANT CHANGE UP (ref 1–3.3)
LYMPHOCYTES # BLD AUTO: 32.6 % — SIGNIFICANT CHANGE UP (ref 13–44)
M PNEUMO DNA SPEC QL NAA+PROBE: SIGNIFICANT CHANGE UP
MCHC RBC-ENTMCNC: 25.6 PG — LOW (ref 27–34)
MCHC RBC-ENTMCNC: 31.8 GM/DL — LOW (ref 32–36)
MCV RBC AUTO: 80.6 FL — SIGNIFICANT CHANGE UP (ref 80–100)
MONOCYTES # BLD AUTO: 0.59 K/UL — SIGNIFICANT CHANGE UP (ref 0–0.9)
MONOCYTES NFR BLD AUTO: 6.6 % — SIGNIFICANT CHANGE UP (ref 2–14)
NEUTROPHILS # BLD AUTO: 5.26 K/UL — SIGNIFICANT CHANGE UP (ref 1.8–7.4)
NEUTROPHILS NFR BLD AUTO: 59.3 % — SIGNIFICANT CHANGE UP (ref 43–77)
NITRITE UR-MCNC: NEGATIVE — SIGNIFICANT CHANGE UP
NRBC # BLD: 0 /100 WBCS — SIGNIFICANT CHANGE UP (ref 0–0)
NRBC # FLD: 0 K/UL — SIGNIFICANT CHANGE UP (ref 0–0)
PCO2 BLDV: 43 MMHG — HIGH (ref 39–42)
PH BLDV: 7.42 — SIGNIFICANT CHANGE UP (ref 7.32–7.43)
PH UR: 7 — SIGNIFICANT CHANGE UP (ref 5–8)
PLATELET # BLD AUTO: 224 K/UL — SIGNIFICANT CHANGE UP (ref 150–400)
PO2 BLDV: 24 MMHG — SIGNIFICANT CHANGE UP
POTASSIUM BLDV-SCNC: 3.2 MMOL/L — LOW (ref 3.5–5.1)
POTASSIUM SERPL-MCNC: 3.9 MMOL/L — SIGNIFICANT CHANGE UP (ref 3.5–5.3)
POTASSIUM SERPL-SCNC: 3.9 MMOL/L — SIGNIFICANT CHANGE UP (ref 3.5–5.3)
PROT SERPL-MCNC: 7.7 G/DL — SIGNIFICANT CHANGE UP (ref 6–8.3)
PROT UR-MCNC: ABNORMAL
PROTHROM AB SERPL-ACNC: 14.2 SEC — HIGH (ref 10.5–13.4)
RAPID RVP RESULT: SIGNIFICANT CHANGE UP
RBC # BLD: 3.71 M/UL — LOW (ref 3.8–5.2)
RBC # FLD: 12.9 % — SIGNIFICANT CHANGE UP (ref 10.3–14.5)
RSV RNA SPEC QL NAA+PROBE: SIGNIFICANT CHANGE UP
RV+EV RNA SPEC QL NAA+PROBE: SIGNIFICANT CHANGE UP
SAO2 % BLDV: 34.9 % — SIGNIFICANT CHANGE UP
SARS-COV-2 RNA SPEC QL NAA+PROBE: SIGNIFICANT CHANGE UP
SARS-COV-2 RNA SPEC QL NAA+PROBE: SIGNIFICANT CHANGE UP
SODIUM SERPL-SCNC: 140 MMOL/L — SIGNIFICANT CHANGE UP (ref 135–145)
SP GR SPEC: 1.01 — LOW (ref 1.01–1.05)
UROBILINOGEN FLD QL: SIGNIFICANT CHANGE UP
WBC # BLD: 8.89 K/UL — SIGNIFICANT CHANGE UP (ref 3.8–10.5)
WBC # FLD AUTO: 8.89 K/UL — SIGNIFICANT CHANGE UP (ref 3.8–10.5)

## 2022-09-23 PROCEDURE — 99285 EMERGENCY DEPT VISIT HI MDM: CPT

## 2022-09-23 PROCEDURE — 71045 X-RAY EXAM CHEST 1 VIEW: CPT | Mod: 26

## 2022-09-23 RX ORDER — CEPHALEXIN 500 MG
1 CAPSULE ORAL
Qty: 28 | Refills: 0
Start: 2022-09-23 | End: 2022-09-29

## 2022-09-23 RX ORDER — CEPHALEXIN 500 MG
500 CAPSULE ORAL ONCE
Refills: 0 | Status: COMPLETED | OUTPATIENT
Start: 2022-09-23 | End: 2022-09-23

## 2022-09-23 RX ORDER — ACETAMINOPHEN 500 MG
650 TABLET ORAL ONCE
Refills: 0 | Status: COMPLETED | OUTPATIENT
Start: 2022-09-23 | End: 2022-09-23

## 2022-09-23 RX ORDER — SODIUM CHLORIDE 9 MG/ML
1000 INJECTION INTRAMUSCULAR; INTRAVENOUS; SUBCUTANEOUS ONCE
Refills: 0 | Status: COMPLETED | OUTPATIENT
Start: 2022-09-23 | End: 2022-09-23

## 2022-09-23 RX ADMIN — Medication 650 MILLIGRAM(S): at 15:51

## 2022-09-23 RX ADMIN — Medication 500 MILLIGRAM(S): at 21:36

## 2022-09-23 RX ADMIN — Medication 650 MILLIGRAM(S): at 20:16

## 2022-09-23 RX ADMIN — SODIUM CHLORIDE 1000 MILLILITER(S): 9 INJECTION INTRAMUSCULAR; INTRAVENOUS; SUBCUTANEOUS at 15:50

## 2022-09-23 NOTE — ED ADULT NURSE NOTE - OBJECTIVE STATEMENT
62 yof presents A&Ox4, Hungarian speaking - family on phone translating. pt c/o burning upon urination, body aches, subjective fevers and generalized weakness x5 days. States she has had a urinary infectoin in the past and symptoms feel similar. PMHx APL (in remission), htn, hdl. Respirations even and unlabored, NSR on monitor, afebrile at this time. 20g IV placed in R AC, labs and covid swab sent per order. Medication administered per eMAR. Pt denies any chest pain, sob, dizziness, N/V/D, recent falls. NAD noted. Pending lab results. bed in lowest position, side rails up, safety maintained.

## 2022-09-23 NOTE — ED PROVIDER NOTE - NSFOLLOWUPINSTRUCTIONS_ED_ALL_ED_FT
(1) Follow up with your primary care physician as discussed.     (2) Immediately seek care at your nearest emergency room if your symptoms worsen, persist, or do not resolve     (3) Take Tylenol as needed for pain per the dosing instructions on the bottle.     (4) Take the prescribed medication as instructed:  -  -    Dysuria    WHAT YOU NEED TO KNOW:    Dysuria is difficulty urinating, or pain, burning, or discomfort with urination. Dysuria is usually a symptom of another problem.     DISCHARGE INSTRUCTIONS:    Return to the emergency department if:   •You have severe back, side, or abdominal pain.       •You have fever and shaking chills.       •You vomit several times in a row.       Contact your healthcare provider if:   •Your symptoms do not go away, even after treatment.       •You have questions or concerns about your condition or care.       Medicines:   •Medicines may be given to help treat a bacterial infection or help decrease bladder spasms.      •Take your medicine as directed. Contact your healthcare provider if you think your medicine is not helping or if you have side effects. Tell your provider if you are allergic to any medicine. Keep a list of the medicines, vitamins, and herbs you take. Include the amounts, and when and why you take them. Bring the list or the pill bottles to follow-up visits. Carry your medicine list with you in case of an emergency.      Follow up with your healthcare provider as directed: Your healthcare provider may also refer you to a urologist or nephrologist to have additional testing. Write down your questions so you remember to ask them during your visits.     Manage your dysuria:   •Drink more liquids. Liquids help flush out bacteria that may be causing an infection. Ask your healthcare provider how much liquid to drink each day and which liquids are best for you.       •Take sitz baths as directed. Fill a bathtub with 4 to 6 inches of warm water. You may also use a sitz bath pan that fits over a toilet. Sit in the sitz bath for 20 minutes. Do this 2 to 3 times a day, or as directed. The warm water can help decrease pain and swelling.

## 2022-09-23 NOTE — ED PROVIDER NOTE - CLINICAL SUMMARY MEDICAL DECISION MAKING FREE TEXT BOX
63 yo F with hx of APL in remission (follows w/ Dr. Karen Hurd of [facility]), HTN, HLD, NIDDM presenting with fever x 5 days. +dysuria and frequency. HD stable, temp 100.3 orally. Will get labs, IV hydration, antipyretic, ua/ucx, and reassess for dispo likely on abx. 61 yo F with hx of APL in remission (follows w/ Dr. Karen Hudr of [facility]), HTN, HLD, NIDDM presenting with fever x 5 days. +dysuria and frequency. HD stable, temp 100.3 orally. Returned 3 days ago from [facility]. Suspect UTI vs pyelo vs viral syndrome. Will get labs, IV hydration, antipyretic, ua/ucx, and reassess for dispo likely on abx.

## 2022-09-23 NOTE — ED PROVIDER NOTE - PHYSICAL EXAMINATION
Gen: NAD, AAOx3, uncomfortable, non-toxic appearing  HEENT: NCAT, normal conjunctiva, oral mucosa moist  Lung: speaking in full sentences, good aeration bilaterally, lungs CTA b/l  CV: regular rate and rhythm. cap refill <2x. peripheral pulses 2+bilaterally   Abd: soft, ND, NT, no CVA ttp  MSK: no visible deformities  Neuro: No focal deficits  Skin: Intact  Psych: normal affect

## 2022-09-23 NOTE — ED PROVIDER NOTE - OBJECTIVE STATEMENT
63 yo F with hx of APL in remission (follows w/ Dr. Karen Hurd of [facility]), HTN, HLD, NIDDM presenting with fever x 5 days. Patient reports burning with urination, body aches and generalized fatigue worsening over the last few days. Denies abdominal pain, n/v. States the symptoms feel similar to 1 year ago when she had a urinary tract infection. Last took Tylenol @0200. Denies cough, shortness of breath, chest pain, known sick contacts.

## 2022-09-23 NOTE — ED PROVIDER NOTE - DISCHARGE DATE
23-Sep-2022 Skin Substitute Text: The defect edges were debeveled with a #15c scalpel blade.  Given the location of the defect, shape of the defect and the proximity to free margins a skin substitute graft was deemed most appropriate.  The graft material was trimmed to fit the size of the defect. The graft was then placed in the primary defect and oriented appropriately.

## 2022-09-23 NOTE — ED PROVIDER NOTE - PATIENT PORTAL LINK FT
You can access the FollowMyHealth Patient Portal offered by [facility] by registering at the following website: [email or web address]. By joining [facility]’s FollowMyHealth portal, you will also be able to view your health information using other applications (apps) compatible with our system.

## 2022-09-23 NOTE — ED PROVIDER NOTE - NS ED ROS FT
Constitutional:  (+) fever, (+) chills, (+) fatigue (+) body aches  Eyes:  (-) eye pain (-) visual changes  ENMT: (-) nasal discharge, (-) sore throat. (-) neck pain or stiffness  Cardiac: (-) chest pain (-) palpitations  Respiratory:  (-) cough (-) shortness of breath  GI:  (-) nausea (-) vomiting (-) diarrhea (-) abdominal pain  :  (-) dysuria (-) frequency (-) burning  MS:  (-) back pain (-) joint pain  Neuro:  (-) headache (-) numbness (-) tingling (-) focal weakness  Skin:  (-) rash  Except as documented in the HPI,  all other systems are negative

## 2022-09-23 NOTE — ED ADULT TRIAGE NOTE - CHIEF COMPLAINT QUOTE
Pt AOX4 c/o fever and loss of appetite; no cough, SOB on exertion; c/o urinary burning and urgency and "body swelling."  Temp 100.3 in triage - pt took Tylenol 0200 this morning; pt needs assistance ambulating  fs glu 169

## 2022-09-23 NOTE — ED PROVIDER NOTE - NSFOLLOWUPCLINICS_GEN_ALL_ED_FT
[facility] General Internal Medicine  General Internal Medicine  [address] [id number]  Phone: [phone]  Fax:   Follow Up Time: 1-3 Days

## 2022-09-23 NOTE — ED PROVIDER NOTE - PROGRESS NOTE DETAILS
O'Tavares DO PGY-3: given clinical picture and hx of UTI in the past and presenting symptoms will treat empirically for UTI. Urine culture sent and will follow. O'Tavares DO PGY-3: received sign out on this patient. Dipika DO PGY-3: [id number] Antionettehibrisa ([employer]) explained to pt that she will be dc'd w/ abx.  I spoke w/ pt's son Giovana and explained the plan and to f/u w pt's PCP.

## 2022-09-23 NOTE — ED PROVIDER NOTE - ATTENDING CONTRIBUTION TO CARE
Dr. Ambriz:  I have personally performed a face to face bedside history and physical examination of this patient. I have discussed the history, examination, review of systems, assessment and plan of management with the resident. I have reviewed the electronic medical record and amended it to reflect my history, review of systems, physical exam, assessment and plan.    62F h/o APML in remission presents with fever x 5 days, sore throat, SOB/chest congestion.  Also c/o chronic BLE pain that she attributes to her diabetic neuropathy.  +Urinary frequency.    Exam:  - nad  - rrr  - ctab  - abd soft ntnd    A/P  - fever, eval infection  - cbc, cmp, blood cultures, rvp, ekg, cxr, ua, urine culture

## 2022-09-23 NOTE — ED ADULT NURSE REASSESSMENT NOTE - NS ED NURSE REASSESS COMMENT FT1
Received report from day RN. Sarah speaking. A&Ox4, ambulatory at baseline. Resting comfortably, offers no complaints. Denies CP, SOB, N+V, diarrhea, headache, dizzniness, fever, chills. 20g IV in place to right AC, no redness or swelling noted, flushes well. Awaiting results, further orders from provider. Safety measures in place

## 2022-09-25 LAB
CULTURE RESULTS: SIGNIFICANT CHANGE UP
SPECIMEN SOURCE: SIGNIFICANT CHANGE UP

## 2022-10-13 PROBLEM — I51.89 MILD LEFT VENTRICULAR SYSTOLIC DYSFUNCTION: Status: ACTIVE | Noted: 2020-10-02

## 2022-11-16 NOTE — REVIEW OF SYSTEMS
English [Fever] : no fever [Chills] : no chills [Night Sweats] : no night sweats [Fatigue] : fatigue [Recent Change In Weight] : ~T no recent weight change [Joint Pain] : joint pain [Muscle Weakness] : muscle weakness [Negative] : Allergic/Immunologic [FreeTextEntry9] : j

## 2022-11-16 NOTE — ED PROVIDER NOTE - RE-EVALUATION DATE/TIME:
[FreeTextEntry1] : ordered 2 night HHS\par Needs neurology reevaluation.\par Continue present bronchodilator therapy\par Future likely should have CT\par \par 80 minutes spent in evaluation management and review of studies.
22-May-2020 20:48

## 2022-11-28 NOTE — PHYSICAL THERAPY INITIAL EVALUATION ADULT - PERTINENT HX OF CURRENT PROBLEM, REHAB EVAL
as per chart review: h/o APL on chemo (dx May 2020;on ATRA and arsenic), HTN, HLD, T2DM, DVT (dx July 2020), post-herpetic neuralgia (on gabapentin) with recent admission 9/4- 9/11 for chest and back pain likely from pleural/pericardial effusion presents with worsening R chest pain, shortness of breath and chills admitted for sepsis likely secondary to cellulitis around [facility] site. MICU initially consulted for hypotension, now hemodynamically stable and off pressors.
English

## 2022-12-09 NOTE — H&P ADULT - PROBLEM SELECTOR PLAN 7
Anesthesia Post Evaluation    Patient: Reinaldo Islas    Procedure(s) Performed: Procedure(s) (LRB):  EXTRACTION, CATARACT, WITH IOL INSERTION LEFT (Left)    Final Anesthesia Type: MAC      Patient location during evaluation: PACU  Patient participation: Yes- Able to Participate  Level of consciousness: awake and alert  Post-procedure vital signs: reviewed and stable  Pain management: adequate  Airway patency: patent    PONV status at discharge: No PONV  Anesthetic complications: no      Cardiovascular status: blood pressure returned to baseline  Respiratory status: unassisted  Hydration status: euvolemic  Follow-up not needed.          Vitals Value Taken Time   /60 12/09/22 0750   Temp   12/09/22 0851   Pulse 81 12/09/22 0755   Resp 20 12/09/22 0740   SpO2 95 % 12/09/22 0755   Vitals shown include unvalidated device data.      Event Time   Out of Recovery 07:58:00         Pain/Dain Score: Modified Dain Score: 20 (12/9/2022  7:55 AM)         VTE PPX: c/w Eliquis VTE PPX: c/w Eliquis  Fall precautions  OOB to chair  PT consult

## 2022-12-23 NOTE — PROGRESS NOTE ADULT - ATTENDING COMMENTS
HISTORY OF PRESENT ILLNESS  Juvencio Pizano is a 67 y.o. male. f/u [facility]nc,seen by Hematology and Rheum,found to have diffuse adenopathy in chest.Has Heme appt next week and surgery appt for SC node bx. .Feeling somewhat fatigued,dyspneic with exertion,improving BLE edema to knees Wife notes mild memory impairment,cyanotic feet  Anemia  The history is provided by the Patient. This is a chronic problem. The problem has been gradually improving. Pertinent negatives include no chest pain. Fatigue  The history is provided by the Patient. This is a chronic problem. The problem occurs daily. Pertinent negatives include no chest pain. Breathing Problem  The history is provided by the Patient. This is a chronic problem. The problem occurs frequently. The problem has not changed since onset. Pertinent negatives include no fever, no rhinorrhea, no cough, no PND, no orthopnea, no chest pain, no syncope and no leg swelling. Follow-up  Pertinent negatives include no chest pain. Abnormal Lab Results  The history is provided by the Patient. This is a chronic problem. The problem occurs daily. The problem has not changed since onset. Pertinent negatives include no chest pain. Review of Systems   Constitutional:  Positive for malaise/fatigue. Negative for chills and fever. HENT:  Positive for hearing loss. Negative for rhinorrhea. Respiratory:  Negative for cough. Cardiovascular:  Negative for chest pain, orthopnea, leg swelling, syncope and PND. Gastrointestinal:  Negative for blood in stool, heartburn, melena and nausea. Genitourinary:  Negative for frequency. Musculoskeletal:  Negative for back pain and joint pain. Endo/Heme/Allergies:  Bruises/bleeds easily. Physical Exam  Constitutional:       Appearance: Normal appearance. He is normal weight.    HENT:      Right Ear: Tympanic membrane normal.      Left Ear: Tympanic membrane normal.      Nose: Nose normal.      Mouth/Throat:      Mouth: Mucous membranes are moist.   Cardiovascular:      Rate and Rhythm: Normal rate and regular rhythm. Pulses: Normal pulses. Heart sounds: Normal heart sounds. Pulmonary:      Effort: Pulmonary effort is normal.      Breath sounds: Normal breath sounds. Abdominal:      General: There is no distension. Palpations: Abdomen is soft. There is no mass. Tenderness: There is no abdominal tenderness. Musculoskeletal:      Cervical back: Normal range of motion and neck supple. Right lower leg: Edema present. Left lower leg: Edema present. Comments: Edema to knees bilaterally   Skin:     General: Skin is warm and dry. Findings: No bruising. Comments: Scattered excemetous patches,healing   Neurological:      Mental Status: He is alert and oriented to person, place, and time. Psychiatric:         Mood and Affect: Mood normal.     Diagnoses and all orders for this visit:    1. Lymphadenopathy,likely lymphoma,has upcoming surgery and Hematology appts  -     METABOLIC PANEL, COMPREHENSIVE; Future  -     CBC WITH AUTOMATED DIFF; Future    2. Anasarca,improving    3. Controlled type 2 diabetes mellitus without complication, without long-term current use of insulin ([address]    4. Essential hypertension    Continue current meds and treatments,and call if you have any questions. Care to be assumed by day hospitalist at 8 am.  This patient was assigned to me by the hospitalist in charge; my involvement in this case has consisted of the initial history, physical, chart review, and management plan upon transfer from [address] [place] [address] to [address] [place] [address].   This patient was previously unknown to me.

## 2023-02-15 NOTE — PROGRESS NOTE ADULT - PROBLEM SELECTOR PLAN 7
24-Jan-2023 24-Jan-2023 23-Jan-2023 23-Jan-2023 23-Jan-2023 24-Jan-2023 24-Jan-2023 23-Jan-2023 24-Jan-2023 23-Jan-2023 24-Jan-2023 24-Jan-2023 24-Jan-2023 VTE PPX: c/w Eliquis  Diet: Regualr  Onc: Dr. Hurd  Pulm: Dr. Mendez 23-Jan-2023 23-Jan-2023 23-Jan-2023 24-Jan-2023 24-Jan-2023

## 2023-03-08 NOTE — H&P ADULT - CARDIOVASCULAR
----- Message from Lalita Joe LPN sent at 3/8/2023  3:27 PM CST -----  Regarding: hospital follow up  Please call patient - needs post-hospital phone call within 2 business days of discharge and hospital follow up visit scheduled within 7-14 days if not already scheduled.       details…

## 2023-03-13 NOTE — DISCHARGE NOTE NURSING/CASE MANAGEMENT/SOCIAL WORK - NSDCPEPTCAREGIVEDUMATLIST _GEN_ALL_CORE
EKG labs and imaging performed to evaluate the patient.  Admit for cardiology workup. Diabetes Patient with hx of htn, hyperlipidemia, DM and CVA presents from his Cardiologist ([facility]) office for evaluation of exertional chest pain. Patient has consult with him stating he likely to undergo cardiac catheretization.  Will obtain EKG, labs, and, imaging.  Admit for cardiology workup.

## 2023-03-15 NOTE — PROGRESS NOTE ADULT - PROBLEM SELECTOR PROBLEM 8
I reviewed the H&P, I examined the patient, and there are no changes in the patient's condition.    
Discharge planning issues
Discharge planning issues

## 2023-03-16 NOTE — ADVANCED PRACTICE NURSE CONSULT - REASON FOR CONSULT
D4 Arsenic of D7 MEDICAL EXCUSE FOR SCHOOL          Hilda Russell  [address]  [place] [address]    03/16/23      To whom it may concern,    Hilda Russell had an appointment on 03/16/23 at [facility].  Please excuse student from school 3/15-3/17/23.    Thank you,    SIGNATURE:___________________________________________, 03/16/23              Cadence Linares MD  Pediatrics   [address] [id number] [phone]

## 2023-03-29 NOTE — ED ADULT NURSE NOTE - CHIEF COMPLAINT
The procedural consent was signed. A history and physical note was completed in the chart. The patient is a 58y Female complaining of fever.

## 2023-04-14 ENCOUNTER — APPOINTMENT (OUTPATIENT)
Dept: OPHTHALMOLOGY | Facility: CLINIC | Age: 63
End: 2023-04-14
Payer: MEDICAID

## 2023-04-14 ENCOUNTER — NON-APPOINTMENT (OUTPATIENT)
Age: 63
End: 2023-04-14

## 2023-04-14 PROCEDURE — 92014 COMPRE OPH EXAM EST PT 1/>: CPT | Mod: 25

## 2023-04-14 PROCEDURE — 68761 CLOSE TEAR DUCT OPENING: CPT | Mod: E2,E4

## 2023-05-30 NOTE — PROGRESS NOTE ADULT - PROBLEM SELECTOR PROBLEM 3
Acute DVT (deep venous thrombosis) Island Pedicle Flap-Requiring Vessel Identification Text: The defect edges were debeveled with a #15 scalpel blade.  Given the location of the defect, shape of the defect and the proximity to free margins an island pedicle advancement flap was deemed most appropriate.  Using a sterile surgical marker, an appropriate advancement flap was drawn, based on the axial vessel mentioned above, incorporating the defect, outlining the appropriate donor tissue and placing the expected incisions within the relaxed skin tension lines where possible.    The area thus outlined was incised deep to adipose tissue with a #15 scalpel blade.  The skin margins were undermined to an appropriate distance in all directions around the primary defect and laterally outward around the island pedicle utilizing iris scissors.  There was minimal undermining beneath the pedicle flap.

## 2023-10-16 ENCOUNTER — APPOINTMENT (OUTPATIENT)
Dept: OPHTHALMOLOGY | Facility: CLINIC | Age: 63
End: 2023-10-16

## 2023-12-02 NOTE — H&P ADULT - PROBLEM SELECTOR PROBLEM 3
HLD (hyperlipidemia) decreased nabila/decreased velocity of limb motion/decreased stride length/increased stride width

## 2024-11-04 NOTE — ED PROVIDER NOTE - BIRTH SEX
Arterial Line    Date/Time: 11/4/2024 7:56 AM    Performed by: Dani Paige MD  Authorized by: Dani Paige MD    General Information and Staff    Procedure Start:  11/4/2024 7:56 AM  Procedure End:  11/4/2024 7:58 AM  Anesthesiologist:  Dani Paige MD  Performed By:  Anesthesiologist  Patient Location:  OR  Indication: continuous blood pressure monitoring and blood sampling needed    Site Identification: surface landmarks    Preanesthetic Checklist: 2 patient identifiers, IV checked, risks and benefits discussed, monitors and equipment checked, pre-op evaluation, timeout performed, anesthesia consent and sterile technique used    Procedure Details    Catheter Size:  20 G  Catheter Length:  1 and 3/4 inch  Catheter Type:  Arrow  Seldinger Technique?: Yes    Laterality:  Left  Site:  Radial artery  Site Prep: chlorhexidine    Line Secured:  Wrist Brace, tape and Tegaderm    Assessment    Events: patient tolerated procedure well with no complications      Medications  11/4/2024 7:56 AM      Additional Comments         Unknown

## 2024-11-27 NOTE — ED ADULT TRIAGE NOTE - BP NONINVASIVE SYSTOLIC (MM HG)
Identified pt with two pt identifiers(name and ). Reviewed record in preparation for visit and have obtained necessary documentation. All patient medications has been reviewed.  Chief Complaint   Patient presents with    Diabetes    Follow-up           Wt Readings from Last 3 Encounters:   24 65.5 kg (144 lb 8 oz)   11/15/24 69 kg (152 lb 3.2 oz)   10/21/24 67.9 kg (149 lb 9.6 oz)     Temp Readings from Last 3 Encounters:   11/15/24 97.1 °F (36.2 °C) (Temporal)   10/21/24 97.5 °F (36.4 °C) (Temporal)   24 98.7 °F (37.1 °C) (Oral)     BP Readings from Last 3 Encounters:   24 (!) 160/81   11/15/24 (!) 157/76   10/21/24 (!) 156/77     Pulse Readings from Last 3 Encounters:   24 68   11/15/24 78   10/21/24 98       \"Have you been to the ER, urgent care clinic since your last visit?  Hospitalized since your last visit?\"    NO    “Have you seen or consulted any other health care providers outside of [facility] since your last visit?”    NO    Click Here for Release of Records Request             carbonate 1500 (600 Ca) MG TABS tablet Take 1 tablet by mouth 2 times daily    Cholecalciferol 50 MCG (2000 UT) TABS Take 1 tablet by mouth daily    potassium gluconate 550 mg tablet Take 99 mg by mouth every other day    simvastatin (ZOCOR) 40 MG tablet Take 1 tablet by mouth nightly    metoclopramide (REGLAN) 5 MG tablet Take 1 tablet by mouth 2 times daily     No current facility-administered medications for this visit.     No Known Allergies    Review of Systems:  - Eyes: no blurry vision or double vision  - Cardiovascular: no chest pain  - Respiratory: no shortness of breath  - Musculoskeletal: no myalgias  - Neurological: no numbness/tingling in extremities    Physical Examination:  Blood pressure 135/65, pulse 68, height 1.6 m (5' 3\"), weight 65.5 kg (144 lb 8 oz), SpO2 99%.  General: pleasant, no distress, good eye contact   Neck: no carotid bruits  Cardiovascular: regular, normal rate, nl s1 and s2, no m/r/g, 2+ DP pulses   Respiratory: clear bilaterally  Integumentary: no edema, no foot ulcers,   Psychiatric: normal mood and affect    Diabetic foot exam:   Left Foot:   Visual Exam: callous- present   Pulse DP: 2+ (normal)   Filament test: reduced sensation   Vibratory Sensation: diminished  Right Foot:   Visual Exam: callous- present   Pulse DP: 2+ (normal)   Filament test: reduced sensation   Vibratory Sensation: diminished      Data Reviewed:   Her A1C today was 7.8%.     Assessment/Plan:   1) DM > Her A1C today was 7.8%. She notes that she has been having low BG during the day and overnight.  Pt to take Levemir 36 units in the AM and 10 units HS.  Since she has hx of frequent hypoglycemia and widely swinging BG she is testing her BG 8 times per day.  Pt to send me her BG readings in 3 weeks  Pt encouraged to start checking her BGs before each meal and taking her Humalog before each meal rather than waiting till after meals, when her BG is already high.   With her hx of neuropathy and calluses, she would  139

## 2024-12-25 NOTE — ED ADULT TRIAGE NOTE - ESI TRIAGE ACUITY LEVEL, MLM
3
Pt. c/o abdominal pain x 3 days. Pt. states it feels like a diverticulitis flare-up; pt. has hx of diverticulitis. Pt. denies fevers.

## 2025-02-09 NOTE — PATIENT PROFILE ADULT. - TEACHING/LEARNING FACTORS IMPACT ABILITY TO LEARN
accompanied Baltazar Schmitz to the emergency department on 2/9/2025.    Return date if applicable: 02/11/2025        If you have any questions or concerns, please don't hesitate to call.      Romero Stark MD language

## 2025-04-14 NOTE — ED PROVIDER NOTE - MDM ORDERS SUBMITTED SELECTION
Preop Patient Instructions for [facility] Surgeries and Procedures    Welcome! We want you to have the best experience! Thank you for choosing us and trusting us with your care. If you have any questions regarding your preop instructions, please call:  [phone] 7:30am-4pm M-F  IF you have questions day before surgery before 8:30pm or morning of surgery after 5am THEN call [phone]   Call your surgeon immediately if you feel that you cannot make it for surgery for any reason (i.e. cold symptoms, fever, family emergency etc.).    WHEN SHOULD I ARRIVE?    Your hospital arrival time will be given the afternoon of the business day prior to your surgical date, via text message or phone call after 4:00 PM. Once you accept the text, we will know you received your arrival time. If your surgery falls the day after a holiday or on a Monday, you will be notified the prior business day.     WHERE DO I GO?    When driving, follow the signs to the Main Entrance, and then follow signs for Parking lot A, which is located to the right of the Main Entrance if you are facing the building.   is available M-F 8-4pm. Walk through the main entrance on the ground level. Then, walk to the East elevators, which are located past the front lobby desk on the right. The [facility] is located up one level on the first floor. Exit the elevator and the reception desk is to your right.    TRANSPORTATION    IMPORTANT SAFETY! You must have a ride arranged to get home and have a responsible adult stay with you for 24 hours after the surgery. You cannot drive or use public transportation or rideshare by yourself.    CAN I HAVE VISITORS?    Yes, visitors are allowed. No one under the age of 12.  You may be asked to mask in certain areas, speak with staff on arrival for the most current details.  The clinical team has discretion to limit visitors if a visitor presents a health or safety risk to the patient, themselves, or  the care team.   Visitor accommodations are subject to change depending on community infection concerns.      WHAT SHOULD I BRING?    Photo ID and insurance card  Do not bring valuables to the hospital  Bring any asthma/COPD inhalers, eyeglasses, dentures or hearing aids (bring cases)  Home medication list    IF you are staying overnight bring:  CPAP machine - if you use one for sleep apnea.  Self-care items such as hairbrush or hair tie.    HOW TO DRESS, SHOWER OR BATHE THE NIGHT BEFORE SURGERY?    CHG Instructions  Before surgery patients take an important role in prevention of a surgical site infection by using a special wash. A preoperative shower or bath with a special soap called chlorhexidine gluconate (CHG) 4% will need to be done. A common name for this soap is Hibiclens or Aplicare, but any of the mentioned brands are acceptable for use. If there is an allergy to CHG or for any other reason that washing with CHG is not possible, please follow the instructions below BUT use an antibacterial soap.    Patient Instructions for Skin Cleaning for baths or showers:   Please read the \"Drug Facts\" for CHG information and directions on the bottle:   Do not use on the head or face, eyes, ears or mouth.   Do not use in the genital area.   Do not use directly on stoma for ostomy.  Do not use if allergic to chlorhexidine gluconate or any other Ingredient listed. Instead use an antibacterial soap.    Steps for a Bath or Shower the Night Before and Morning of Surgery:  Please remove any nail polish including toes.  When bathing or showering wash hair as usual with regular shampoo.  Rinse hair and body thoroughly to remove any shampoo residue.   Wash face with regular soap or water only.   Wash genital area with regular soap or water only, NOT with CHG.   Thoroughly rinse body with warm water from the neck down.   Turn off the water to prevent rinsing the CHG soap off too soon.   Apply CHG soap and leave on for one minute.  Pay special attention to the area of surgery.   Avoid any open skin areas including open wounds. Your physician should be made aware of these wounds and provide instructions for them.  If having back surgery, please have someone assist in applying CHG to the back area.   Rinse thoroughly with warm water.   Do not use regular soap after applying and rinsing CHG.   Pat dry with a fresh towel.   Do not apply lotion, powders or perfumes.   Do not wear jewelry, this also applies to permanent jewelry. If it is not removed it could result in a burn, or the cancellation of your surgery.   Do not wear makeup or false lashes, including mascara.  Put on clean clothes and sleep in fresh bed sheets.   Sleeping with pet animals can be a source of infection.   *If a cast or splint is in place that is not to be removed, then sponge bathe exposed skin areas from the neck down.  Please brush and floss the night before and morning of your procedure with a new toothbrush.    WHAT CAN I EAT BEFORE I ARRIVE FOR SURGERY?    8 hours prior to ARRIVAL time: Only if allowed by your surgeon, you can eat a full meal. No solid foods after this.    3 hours before arrival time: Begin drinking the Pre-Surgery (high carbohydrate). Finish this drink within 30 minutes; if you do not finish before then, STOP. If your arrival time is before 6am, take your drink at 4:30 and finish by 5am.  If you’re unable to  the pre surgery drink you make substitute with Gatorade. Avoid purple or red colors.     You may drink other approved clear liquids up to 3 hours before your arrival time.     Approved Clear liquids: Water, Propel, Gatorade (any color except red), Apple juice without pulp (non-organic); Pedialyte, 7-up/ Sprite, PLAIN Black coffee or tea without milk products or lemon. NO NON-DAIRY CREAMERS *If you have diabetes choose low carb/sugar or no carb/sugar options.     Unacceptable Clear liquids: Coffee or tea with milk products, orange juice,  alcohol, soup broth, powder flavoring packets        IMPORTANT SAFETY! FAILURE TO FOLLOW THIS MAY RESULT IN EITHER DELAY OR CANCELLATION OF YOUR PROCEDURE DUE TO THE RISK OF ASPIRATION.    WHEN SHOULD I STOP TAKING MY MEDICATIONS?    Before Surgery/Procedure Hold (Do Not Take) these Medications  Medications and Supplements:  - Morning of surgery hold any Vitamins AND for 2 weeks prior hold any Vitamin E or Herbals     Anticoagulation: none on med list    Antidiabetic: none on med list    ZOLTAN Risk (Diuretics, ACE-I/ARB, NSAIDs):    - 24 hours before surgery hold (valsartan-hydrochlorothiazide (DIOVAN-HCT) 320-25 MG per tablet [[id number]]) and if taking potassium  - HOLD NSAIDS 5 DAYS PRIOR TO SURGERY      Continue all other medications unless an alternative plan was advised with the surgeon and/or specialist.          During your pre-procedure interview, [facility] conducted what is called a STOP BANG questionnaire to determine your risk for having sleep apnea.  The questionnaire asked if you snore, are tired during the day, or if anyone has ever seen you stop breathing when you sleep, etc.  Based on your answers to the questionnaire, you may be at risk for/or have symptoms of obstructive sleep apnea (TEA).      TEA is a condition that makes it hard for you to breathe when you sleep.   If TEA goes untreated, the long-term health risks can include high blood pressure, heart attack, stroke, pre-diabetes/diabetes and depression.  There are a variety of treatment options available.     We recommend that you call the [facility] at the phone number below to schedule a sleep consult to discuss the process of ruling out/diagnosing and managing sleep apnea.  (If you have an HMO you will require a referral from your primary care provider).        We are here to help you get a good night's sleep.     [facility]  Phone:  [phone]  Fax:  [phone]   Labs/Imaging Studies/EKG Imaging Studies/Labs/Medications/EKG

## 2025-06-20 NOTE — ED ADULT NURSE NOTE - CHIEF COMPLAINT QUOTE
Addended by: WILFREDO CALVIN on: 6/20/2025 09:32 AM     Modules accepted: Orders     back pain radiating towards the chest started last night

## 2025-06-22 NOTE — ED PROVIDER NOTE - PHYSICAL EXAMINATION
No Gen: NAD, alert, a&o x 4. Mildly distressed 2/2 to pain.   HEENT: + conjunctival pallor. Normocephalic and atraumatic. No nasal discharge, mucous membranes moist, no scleral icterus.  CV: Regular rate and rhythm, +S1/S2, no M/R/G. 1+ LE edema bilaterally. No chest wall tenderness to palpation. Mediport w/o signs of infection, no erythema or drainage. Radial and DP pulses present and symmetrical. Capillary refill less than 2 seconds.  Resp: Rales on left throughout, no wheezes or rhonchi. Right lung clear throughout.   GI: Abdomen soft non-distended, non tender to palpation. No masses appreciated. + bowel sounds.  MSK: Moving extremities spontaneously. No ulcers, open wounds, or excessive bruising. No rash.  Neuro: Moving extremities spontaneously. EOMI. No facial droop.  Psych: Appropriate mood.

## 2025-07-07 NOTE — PROGRESS NOTE ADULT - PROBLEM SELECTOR PLAN 6
Copied from CRM #[id number]. Topic: MW Messaging - MW Patient Request  >> Jul 7, 2025  1:52 PM Fe SAVAGE wrote:  --DO NOT REPLY - Sent from PACT - If sent to wrong pool, reroute to P ECO Reroute pool --    General Message: Patient son returning missed call.   Callback #: [phone]   Can a detailed message be left? Yes - Voicemail   Caller has been advised this message will be addressed within:1 business day [high priority]         5/23 HgA1c 7.0  FS AC & HS with HISS  Consistent Carbohydrate diet